# Patient Record
Sex: MALE | Race: WHITE | NOT HISPANIC OR LATINO
[De-identification: names, ages, dates, MRNs, and addresses within clinical notes are randomized per-mention and may not be internally consistent; named-entity substitution may affect disease eponyms.]

---

## 2016-12-30 NOTE — H&P ADULT. - NEGATIVE NEUROLOGICAL SYMPTOMS
no loss of sensation/no transient paralysis/no paresthesias/no weakness/no syncope/no headache/no loss of consciousness

## 2016-12-30 NOTE — H&P ADULT. - NEUROLOGICAL DETAILS
cranial nerves intact/responds to verbal commands/alert and oriented x 3/responds to pain/normal strength/deep reflexes intact/sensation intact

## 2016-12-30 NOTE — H&P ADULT. - ASSESSMENT
66M PMH CKD stage 4, DM2, HTN, MGUS, possible GIB in past, paroxsymal afib, bph, HLD, 4 vessel CABG 15 years ago, presenting from Dr. Leyva office with pancytopenia.

## 2016-12-30 NOTE — H&P ADULT. - PROBLEM SELECTOR PLAN 5
EKG showing afib, irregular on exam  Per patient no AC, only on ASA 81  Will f/u with Dr. Leyva about lack of AC  No AC in setting of possible concern for GIB

## 2016-12-30 NOTE — ED PROVIDER NOTE - ATTENDING CONTRIBUTION TO CARE
67 y/o male s/p recent mandibular surgery, GI bleed, afib (no anticoagulation), HTN, DM, Wild's esophagus, CKD (baseline 4's) sent by pmd for anemia. Pt found to have low hgb on  routine bloodwork.  Pt w/o complaint.  No lh/weakness, cp, sob, abd pain, n/v. + black stools which pt attributed to his recent surgery.  Well appearing, nad, nc/at, conjunctivae pale, perrl, lung cta, heart irreg, abd soft/nt, ext no c/c/e, no gross neuro deficits.  Pt consented and ordered for blood, discussed w pmd who is coming to ed to see pt.  Pt found to have low plt - peripheral smear pending. Plan for admit to pmd but pt likely will sign out ama. 65 y/o male s/p recent mandibular surgery, GI bleed, afib (no anticoagulation), HTN, DM, Wild's esophagus, CKD (baseline 4's) sent by pmd for anemia. Pt found to have low hgb on  routine bloodwork.  Pt w/o complaint.  No lh/weakness, cp, sob, abd pain, n/v. + black stools which pt attributed to his recent surgery.  Well appearing, nad, nc/at, conjunctivae pale, perrl, lung cta, heart irreg, abd soft/nt, ext no c/c/e, no gross neuro deficits. No stool in vault, guaiac neg on pa exam. Pt consented and ordered for blood, discussed w pmd who is coming to ed to see pt.  Pt found to have low plt - peripheral smear pending.   PMD informed about black stool, guaiac neg - will need gi eval. Plan for admit to pmd but pt likely will sign out ama.

## 2016-12-30 NOTE — CONSULT NOTE ADULT - PROBLEM SELECTOR RECOMMENDATION 9
No more antibiotics for the current time given this possible antibiotic side effect I recommended to the patient that he followup with Dr Hoyos early next week  for further antibiotic recommendation. It is my understanding from Dr. Leyva that the plan was for a total of 6 weeks of antibiotics.   I would recommend CBC with differential on Monday before any antibiotics restarted.

## 2016-12-30 NOTE — H&P ADULT. - RS GEN PE MLT RESP DETAILS PC
normal/no rhonchi/breath sounds equal/no wheezes/no rales/clear to auscultation bilaterally/good air movement

## 2016-12-30 NOTE — ED ADULT NURSE REASSESSMENT NOTE - NS ED NURSE REASSESS COMMENT FT1
Post blood transfusion , no delayed reaction noted , patient in stable condition , will go to floor at this time .

## 2016-12-30 NOTE — H&P ADULT. - ATTENDING COMMENTS
as outlined above, pt with MGUS and anemia and renal failure ( neg w/u for myeloma), severe htn, h/o buenrostro's esoph, , h/o GIB, progressive renal failure, s/p resection of enlarging mandibular cyst, requiring placement of prosthesis in november/early dec.   post p had drainage, which grew staph, eikenella, and actinomyces, rxed by dr lake with initial iv abx, and d/juan on amox and linezolid.   pt has felt well since then, but was seen in office yesterday--- looked clinically stable, but hgb 6, and plts now 40's.  and creat increased and c02 down.  now hydrating and cautiously replacing bicarb, and transfusing.  dr james to see. dr montgomery advises we hold all abx till monday, then resume amox if counts/plt better, and then we'll consider iv vanco for staph. continues in a fib or flutter, and continues of systemic a/c since periop period.   cytopenias likely due to linezolid, but pt clearly needs inpt monitoring , hydration, bicarb, and prbc's to attempt to restore baseline state. .  will ask GI service ( best known to dr camacho) to be aware of his presence should GIB recur.

## 2016-12-30 NOTE — H&P ADULT. - PROBLEM SELECTOR PLAN 2
Patient with history of dark stool as per patient never had GIB per patient  Guaiac negative  Will continue to monitor not likely contributing to anemia  Protonix drip for now at 8mg/hr consider switching to 40mg IV BID

## 2016-12-30 NOTE — ED PROVIDER NOTE - MEDICAL DECISION MAKING DETAILS
anemia, thrombocytopenia and worsening creatinine. ? black stools. anemia due to antiobitics vs GI bleed. pt consented for transfusion and 2 units ordered. pt evaluated in ED by Dr Mishra and given ? GI bleed and low plts will admit. protonix given. Dr Lindsey consulted regarding possible change in antibiotics.

## 2016-12-30 NOTE — ED PROVIDER NOTE - CARE PLAN
Principal Discharge DX:	Anemia  Secondary Diagnosis:	GI bleed  Secondary Diagnosis:	Thrombocytopenia

## 2016-12-30 NOTE — ED ADULT NURSE NOTE - OBJECTIVE STATEMENT
patient received to ED A+Ox3 with equal/unlabored breathing and afebrile. patient went to PMD, MD Aden, yesterday for routine blood work and was told he has low H&H and to go to ER for transfusion. patient states he felt weakness yesterday. patient is asymptomatic at present moment. patient has Hx of anemia with multiple blood transfusions in the past.

## 2016-12-30 NOTE — H&P ADULT. - PROBLEM SELECTOR PLAN 4
s/p irrigation 3 weeks, was on zyvox and augmentin, zyvox likely causing panctyopenia  No antibiotics at this time as per Dr. Lindsey will f/u further recs

## 2016-12-30 NOTE — H&P ADULT. - PROBLEM SELECTOR PLAN 6
Continue home meds:   Amlodipine 5mg PO BID  Metolazone 2.5mg PO Qd  Hydralazine 50mg PO TID  Isosorbide mononitrate 30mg ER PO BID

## 2016-12-30 NOTE — H&P ADULT. - PROBLEM SELECTOR PLAN 9
ASA 81mg PO Qd, no acute issues ASA 81mg PO Qd, no acute issues  MGUS- previous history of elevated light chains will have further workup follow hem/onc recs  BPH- doxazosin 1mg PO QHs

## 2016-12-30 NOTE — CONSULT NOTE ADULT - SUBJECTIVE AND OBJECTIVE BOX
HPI:  66M PMH CKD stage 4, DM2, HTN, MGUS, possible GIB in past, paroxsymal afib, bph, HLD, 4 vessel CABG 15 years ago, presenting from Dr. Leyva office with panctyopenia. Patient was in the office yesterday and had CBC done finding pancytopenia significantly lower compared to studies 3 weeks ago. Three weeks ago the patient had mandibular surgery done for a mandibular infection around a TMJ joint prosthesis, with irrigation and debridement. Following the procedure patient placed on linezolid and amoxicillin that he has remained on. Since procedure patient has complained also of black stools, no diarrhea. Patient despite significant pancytopenia denies headache, dizziness, weakness, sob, chest pain, palpitations, fatigue. Patient further denies weight loss/gain, fever chills, paresthesias, LE edema, nvdc, dysuria, hematuria. Patient notes no difference from how he usually feels over the last three weeks. Patient has noted no significant pain, erythema, or drainage from mandibular site.    On admission VS: 98 160/77 106 18 97 on RA. Pancytopenia with 3.6/6.1/18/48. Bicarb 15. Creatinine increased to 5.86. Initially on ½ NS 100cc/hr changed to ½ NS 100cc/hr with 50meq Na bicarb. Protonix 80mg IV push x1, now on protonix drip at 8mg/hr. Guaiac study negative. Dr. Lindsey called from ID. Transfused 1U PRBC with additional unit to be transfused on floor. (30 Dec 2016 11:21)      PAST MEDICAL & SURGICAL HISTORY:  Other hyperlipidemia  Benign prostatic hyperplasia, presence of lower urinary tract symptoms unspecified, unspecified morphology  Paroxysmal atrial fibrillation  Mandibular abscess  Monoclonal gammopathy  HTN (hypertension)  GERD (gastroesophageal reflux disease)  DM (diabetes mellitus)  CRI (chronic renal insufficiency)  Barretts esophagus  Mandibular abscess  History of surgery: cyst removal  S/P CABG (coronary artery bypass graft): 15 years  No significant past surgical history      MEDICATIONS  (STANDING):  sodium chloride 0.45% 1000milliLiter(s) IV Continuous <Continuous>  pantoprazole Infusion 8mG/Hr IV Continuous <Continuous>  insulin lispro (HumaLOG) corrective regimen sliding scale  SubCutaneous three times a day before meals  dextrose 50% Injectable 25Gram(s) IV Push once  hydrALAZINE 50milliGRAM(s) Oral once  isosorbide   mononitrate ER Tablet (IMDUR) 30milliGRAM(s) Oral once    MEDICATIONS  (PRN):      Allergies: No Known Allergies    SOCIAL HISTORY:      PHYSICAL EXAM:    T(F): 97.6, Max: 98.7 (12-30 @ 10:00)  HR: 89 (82 - 106)  BP: 152/72 (128/72 - 168/83)  RR: 18 (18 - 20)  SpO2: 97% (96% - 98%)    Gen: well developed, well nourished, comfortable  HEENT: normocephalic/atraumatic, no conjunctival pallor, no scleral icterus, no oral thrush/mucosal bleeding/mucositis  Neck: supple, no masses, no JVD  Cardiovascular: RR, nl S1S2, no murmurs/rubs/gallops  Respiratory: clear air entry b/l  Gastrointestinal: BS+, soft, NT/ND, no masses, no splenomegaly, no hepatomegaly, no evidence for ascites  Extremities: no clubbing/cyanosis, no edema, no calf tenderness  Neurological: CN 2-12 grossly intact, no focal deficits  Lymph Nodes:  no cervical/supraclavicular LAD, no axillary/groin LAD    LABS:                        6.1    3.6   )-----------( 48       ( 30 Dec 2016 07:06 )             18.0     30 Dec 2016 07:07    138    |  108    |  82     ----------------------------<  119    4.5     |  15     |  5.86     Ca    7.7        30 Dec 2016 07:07    TPro  7.0    /  Alb  3.6    /  TBili  0.3    /  DBili  x      /  AST  21     /  ALT  59     /  AlkPhos  118    30 Dec 2016 07:07    PT/INR - ( 30 Dec 2016 07:06 )   PT: 11.6 sec;   INR: 1.04          PTT - ( 30 Dec 2016 07:06 )  PTT:34.0 sec      RADIOLOGY & ADDITIONAL STUDIES:

## 2016-12-30 NOTE — H&P ADULT. - PROBLEM SELECTOR PLAN 1
Etiology: likely secondary to linezolid given temporal association  GIB less likely given guaiac negative study and no other symptoms other than dark stool  Transfused 1U PRBC will get second unit on floor- checking post transfusion CBC- Hgb goal 8 given cardiac history  Anemia likely multifactorial with linezolid induced pancytopenia and CKD induced, patient given procrit by Dr. Leyva  Monitor CBC  Unlikely malignancy given acuity though if lack of improvement off linezolid consider further oncologic workup possibly including bone marrow biopsy  Platelets at 48- no concern for active bleed on platelets at this time will monitor

## 2016-12-30 NOTE — ED ADULT NURSE REASSESSMENT NOTE - NS ED NURSE REASSESS COMMENT FT1
Received patient at 8am  lying in bed comfortable  alert and oriented x 3 , appearance is pale noted with 6.2 hgb level , with order of PRBC transfusion waiting for blood bank to process . Patient in stable condition , denies any chest pain , sob , palpitations , headache, dizziness , nausea , vomiting , fever nor chills . being evaluated by Dr. Monroe , consent for blood transfusion noted in the chart signed and witnessed . Also patient PMD Dr. Ordaz came to evaluate the patient . Will continue to monitor .

## 2016-12-30 NOTE — ED ADULT NURSE NOTE - PMH
Barretts esophagus    CRI (chronic renal insufficiency)    DM (diabetes mellitus)    GERD (gastroesophageal reflux disease)    HTN (hypertension)    Monoclonal gammopathy

## 2016-12-30 NOTE — ED PROVIDER NOTE - OBJECTIVE STATEMENT
67 y/o male with hx of recent mandibular surgery, GI bleed, HTN, DM, Wild's esophagus, CKD c/o anemia. pt states pmd told him to come to ED for transfusion. pt notes routine bloodwork revealed low Hgb. Pt feeling well. no ha, dizziness or weakness. no cp, sob, abd pain, n/v. Pt admits to black stools and notes that he has attributed them to the recent mandibular surgery. no further complaints.

## 2016-12-30 NOTE — H&P ADULT. - PROBLEM SELECTOR PLAN 3
½ NS with 50meq bicarbonate for lower bicarbonate  Possibly LATRICIA on CKD given increase in creatinine, will f/u with Dr. Leyva about baseline  Urine lytes for further etiology with UA, will f/u  Monitor BMP  Consider restarting home Na Bicarb 1800mg PO Qd following IVF

## 2016-12-30 NOTE — CONSULT NOTE ADULT - ASSESSMENT
65 yo M- pt of Dr. Carreon (heme) and Dr. Leyva (renal) sent to the ED after found to have pancytopenia on outpt labs.

## 2016-12-30 NOTE — CONSULT NOTE ADULT - SUBJECTIVE AND OBJECTIVE BOX
Infectious Diseases  Covering for Dr. Dominguez Hoyos    HPI: The patient was previously in [facility] for a infection involving his mandible. He grew S.lugdensis ,Eichenella from his wound . He was sent home on Linezolid and Amoxicillin. The patient now returns with Pancytopenia. He says he has been on these antibiotics for about 3 weeks  He says that Dr Hoyos is his infectious diseases doctor and that he saw him during his last admission.      PAST MEDICAL & SURGICAL HISTORY:  Monoclonal gammopathy  HTN (hypertension)  GERD (gastroesophageal reflux disease)  DM (diabetes mellitus)  CRI (chronic renal insufficiency)  Barretts esophagus  History of surgery: cyst removal  S/P CABG (coronary artery bypass graft): 15 years  No significant past surgical history      REVIEW OF SYSTEMS:    Constitutional: No fever, weight loss or fatigue  Eyes: No eye pain, visual disturbances, or discharge  ENMT:  No difficulty hearing, tinnitus, vertigo; No sinus or throat pain  Neck: No pain or stiffness  Respiratory: No cough, wheezing, chills or hemoptysis  Cardiovascular: No chest pain, palpitations, shortness of breath, dizziness or leg swelling  Genitourinary: No dysuria, frequency, hematuria or incontinence  Neurological: No headaches, memory loss, loss of strength, numbness or tremors  Skin: No itching, burning, rashes or lesions   Allergy and Immunologic: No hives or eczema    MEDICATIONS  (STANDING):  sodium chloride 0.45% 1000milliLiter(s) IV Continuous <Continuous>  pantoprazole  Injectable 80milliGRAM(s) IV Push Once  pantoprazole Infusion 8mG/Hr IV Continuous <Continuous>    MEDICATIONS  (PRN):          Allergies    No Known Allergies    Intolerances        SOCIAL HISTORY:    FAMILY HISTORY:      Vital Signs Last 24 Hrs  T(C): 37.1, Max: 37.1 (12-30 @ 10:00)  T(F): 98.7, Max: 98.7 (12-30 @ 10:00)  HR: 88 (82 - 106)  BP: 148/76 (128/72 - 160/77)  BP(mean): --  RR: 20 (18 - 20)  SpO2: 98% (97% - 98%)    PHYSICAL EXAM:    General: Well developed; well nourished; in no acute distress  Eyes: PERRL, EOM intact; conjunctiva and sclera clear  Head: Normocephalic; atraumatic  ENMT: No nasal discharge; airway clear  Neck: Supple; non tender; no masses  Respiratory: No wheezes, rales or rhonchi  Cardiovascular: Regular rate and rhythm. S1 and S2 Normal; No murmurs, gallops or rubs  Gastrointestinal: Soft non-tender non-distended; Normal bowel sounds; No hepatosplenomegaly  Genitourinary: No costovertebral angle tenderness  Extremities: Normal range of motion, No clubbing, cyanosis or edema  Vascular: Peripheral pulses palpable 2+ bilaterally  Neurological: Alert and oriented x3  Skin: Warm and dry. No acute rash  Lymph Nodes: No acute cervical adenopathy  Musculoskeletal: Normal gait, tone, without deformities    LABS:                        6.1    3.6   )-----------( 48       ( 30 Dec 2016 07:06 )             18.0     30 Dec 2016 07:07    138    |  108    |  82     ----------------------------<  119    4.5     |  15     |  5.86     Ca    7.7        30 Dec 2016 07:07    TPro  7.0    /  Alb  3.6    /  TBili  0.3    /  DBili  x      /  AST  21     /  ALT  59     /  AlkPhos  118    30 Dec 2016 07:07    PT/INR - ( 30 Dec 2016 07:06 )   PT: 11.6 sec;   INR: 1.04          PTT - ( 30 Dec 2016 07:06 )  PTT:34.0 sec      RADIOLOGY & ADDITIONAL STUDIES:

## 2016-12-30 NOTE — ED PROVIDER NOTE - NS ED ATTENDING STATEMENT MOD
I have personally performed a face to face diagnostic evaluation on this patient. I have reviewed the PA note and agree with the history, exam, and plan of care, except as noted.

## 2016-12-30 NOTE — H&P ADULT. - PMH
Barretts esophagus    Benign prostatic hyperplasia, presence of lower urinary tract symptoms unspecified, unspecified morphology    CRI (chronic renal insufficiency)    DM (diabetes mellitus)    GERD (gastroesophageal reflux disease)    HTN (hypertension)    Mandibular abscess    Monoclonal gammopathy    Other hyperlipidemia    Paroxysmal atrial fibrillation

## 2016-12-30 NOTE — ED ADULT TRIAGE NOTE - CHIEF COMPLAINT QUOTE
pt has a history of chronic anemia with transfusions  in the past  went to his PMD had a routine blood test done yesterday and was informed that he has low H & H and to go to the ER to receive a blood transfusion , pt was c/o weakness yesterday which was resolved , pt denies any symptoms at this moment , no SOB , no dizziness

## 2016-12-30 NOTE — H&P ADULT. - PSH
History of surgery  cyst removal  Mandibular abscess    S/P CABG (coronary artery bypass graft)  15 years

## 2016-12-30 NOTE — CONSULT NOTE ADULT - PROBLEM SELECTOR RECOMMENDATION 9
Pt carries a history of MGUS. Awaiting collateral information for MM work up. For now please order SPEP, immunofixation, free serum light chains, and immunoglobulin levels.   Acute drop in blood counts from near normal counts several weeks ago may be secondary to infection (jaw) and antibiotics (linezolid and amoxicillin). Holding antibiotics for now. Will check peripheral smear and look for reversible causes of pancytopenia. Please check iron panel, b12, folate, haptoglobin, LDH, retic count, and pt, inr, ptt, fibrinogen. Pt carries a history of MGUS. Awaiting collateral information for MM work up. For now please order SPEP, immunofixation, free serum light chains, and immunoglobulin levels.   For anemia transfuse for Hgb <7.   For thrombocytopenia transfuse for plt count <10 if no bleeding and <50 if actively bleeding.   Check a post-transfusion CBC to evaluate response.  Acute drop in blood counts from near normal counts several weeks ago may be secondary to infection (jaw) and antibiotics (linezolid and amoxicillin). Holding antibiotics for now. Will check peripheral smear and look for reversible causes of pancytopenia. Please check iron panel, b12, folate, haptoglobin, LDH, retic count, and pt, inr, ptt, fibrinogen. Pt carries a history of MGUS. Awaiting collateral information for MM work up. For now please order SPEP, immunofixation, free serum light chains, and immunoglobulin levels.   For anemia transfuse for Hgb <7.   For thrombocytopenia transfuse for plt count <10 if no bleeding and <50 if actively bleeding.   Check a post-transfusion CBC to evaluate response.  Acute drop in blood counts from near normal counts several weeks ago may be secondary to infection (jaw), antibiotics (linezolid and amoxicillin), and possible slow bleed since his jaw surgery. Holding antibiotics for now. Will check peripheral smear and look for reversible causes of pancytopenia. Please check iron panel, b12, folate, haptoglobin, LDH, retic count, and pt, inr, ptt, fibrinogen.

## 2016-12-30 NOTE — H&P ADULT. - NEGATIVE ENMT SYMPTOMS
no mandibular pain/no sinus symptoms/no recurrent cold sores/no nasal congestion/no throat pain/no nasal discharge/no hearing difficulty

## 2016-12-30 NOTE — ED PROVIDER NOTE - PROGRESS NOTE DETAILS
Pt discussed w Dr Leyva who wants ivf w bicarb 2/2 pt's elevated cr and low bicarb - suspects dehydration + blood loss.  IVF ordered.  He is also discussing admit w pt 2/2 low hgb, low plt, suspected gi bleed and dehydration.  Will plan ama if pt cont to refuse admission.

## 2016-12-31 NOTE — PROGRESS NOTE ADULT - SUBJECTIVE AND OBJECTIVE BOX
CC: GI BLEED      INTERVAL HISTORY:66M PMH CKD stage 4, DM2, HTN, MGUS, possible GIB in past, paroxsymal afib, bph, HLD, 4 vessel CABG 15 years ago, presenting from Dr. Leyva office with panctyopenia.Three weeks ago the patient had mandibular surgery done for a mandibular infection around a TMJ joint prosthesis, with irrigation and debridement      ROS: No chest pain, no sob, no abd pain. No n/v/d    PAST MEDICAL & SURGICAL HISTORY:  Other hyperlipidemia  Benign prostatic hyperplasia, presence of lower urinary tract symptoms unspecified, unspecified morphology  Paroxysmal atrial fibrillation  Mandibular abscess  Monoclonal gammopathy  HTN (hypertension)  GERD (gastroesophageal reflux disease)  DM (diabetes mellitus)  CRI (chronic renal insufficiency)  Barretts esophagus  Mandibular abscess  History of surgery: cyst removal  S/P CABG (coronary artery bypass graft): 15 years  No significant past surgical history      PHYSICAL EXAM:  T(C): 36.3, Max: 37.1 (12-30 @ 10:00)  HR: 88  BP: 164/85 (128/72 - 168/83)  RR: 16  SpO2: 96%  Wt(kg): --  I&O's Summary    Weight 87 (12-30 @ 11:17)  General: AAO x 3,  NAD.  HEENT: moist mucous membranes, no pallor/cyanosis.  Neck: no JVD visible.  Cardiac: S1, S2. RRR. No murmurs   Respratory: CTA b/l, no access muscle use.   Abdomen: soft. nontender. nondistended  Skin: no rashes.  Extremities: +LE edema  Access:       DATA:                        8.3<L>  5.0   )-----------( 49<L>    ( 31 Dec 2016 06:12 )             24.7<L>        138    |  108    |  72<H>  ----------------------------<  69<L>  Ca:7.7<L> (31 Dec 2016 06:12)  4.5     |  16<L>  |  5.29<H>      eGFR if Non : 10 <L>  eGFR if : 12 <L>    TPro  7.0 g/dL  /  Alb  3.6 g/dL  /  TBili  0.3 mg/dL  /  DBili  x      /  AST  21 U/L  /  ALT  59 U/L<H>  /  AlkPhos  118 U/L  30 Dec 2016 07:07          Chloride, Random Urine: 60 mmoL/L (12-30 @ 15:16)  Creatinine, Random Urine: 53.5 mg/dL (12-30 @ 15:16)  Potassium, Random Urine: 15 mmoL/L (12-30 @ 15:16)  Sodium, Random Urine: 66 mmoL/L (12-30 @ 15:16)  Osmolality, Random Urine: 347 mosmol/kg (12-30 @ 15:16)            MEDICATIONS  (STANDING):  insulin lispro (HumaLOG) corrective regimen sliding scale  SubCutaneous three times a day before meals  dextrose 50% Injectable 25Gram(s) IV Push once  hydrALAZINE 50milliGRAM(s) Oral three times a day  isosorbide    mononitrate Tablet (ISMO) 30milliGRAM(s) Oral two times a day  amLODIPine   Tablet 5milliGRAM(s) Oral two times a day  atorvastatin 40milliGRAM(s) Oral at bedtime  pantoprazole  Injectable 40milliGRAM(s) IV Push two times a day  doxazosin 1milliGRAM(s) Oral at bedtime  sodium bicarbonate 1800milliGRAM(s) Oral daily    MEDICATIONS  (PRN):

## 2016-12-31 NOTE — PROGRESS NOTE ADULT - PROBLEM SELECTOR PLAN 1
2/2 likely to linezolid given temporal association, s/p 3U pRBC  -Hb stable eat 8.6 today  -Platelets and WBC uptrending   -f/u heme/onc  -Per ID: Pt told to call Dr Hoyos next Tuesday to discuss alternative antibiotics.

## 2016-12-31 NOTE — DISCHARGE NOTE ADULT - CARE PROVIDERS DIRECT ADDRESSES
[email or web address]

## 2016-12-31 NOTE — CONSULT NOTE ADULT - SUBJECTIVE AND OBJECTIVE BOX
HPI:    66M PMH CKD stage 4, DM2, HTN, MGUS, possible GIB in past, paroxsymal afib, bph, HLD, 4 vessel CABG 15 years ago, presenting from Dr. Leyva office with panctyopenia. Patient was in the office yesterday and had CBC done finding pancytopenia significantly lower compared to studies 3 weeks ago. Three weeks ago the patient had mandibular surgery done for a mandibular infection around a TMJ joint prosthesis, with irrigation and debridement. Following the procedure patient placed on linezolid and amoxicillin that he has remained on. Since procedure patient has had dark stools (dark brown not black and tarry). He denies BRBPR or hemetaemesis.  He denies izziness, weakness, sob, chest pain, palpitations, fatigue. Patient further denies weight loss/gain, fever chills, paresthesias, LE edema, nvdc, dysuria, hematuria. He denies any changes from his baseline.       PAST MEDICAL & SURGICAL HISTORY:  Other hyperlipidemia  Benign prostatic hyperplasia, presence of lower urinary tract symptoms unspecified, unspecified morphology  Paroxysmal atrial fibrillation  Mandibular abscess  Monoclonal gammopathy  HTN (hypertension)  GERD (gastroesophageal reflux disease)  DM (diabetes mellitus)  CRI (chronic renal insufficiency)  Barretts esophagus  Mandibular abscess  History of surgery: cyst removal  S/P CABG (coronary artery bypass graft): 15 years  No significant past surgical history          MEDICATIONS  (STANDING):  insulin lispro (HumaLOG) corrective regimen sliding scale  SubCutaneous three times a day before meals  dextrose 50% Injectable 25Gram(s) IV Push once  hydrALAZINE 50milliGRAM(s) Oral three times a day  isosorbide    mononitrate Tablet (ISMO) 30milliGRAM(s) Oral two times a day  amLODIPine   Tablet 5milliGRAM(s) Oral two times a day  atorvastatin 40milliGRAM(s) Oral at bedtime  pantoprazole  Injectable 40milliGRAM(s) IV Push two times a day  doxazosin 1milliGRAM(s) Oral at bedtime  sodium bicarbonate 1950milliGRAM(s) Oral daily  sodium bicarbonate 1950milliGRAM(s) Oral daily    MEDICATIONS  (PRN):      Allergies    No Known Allergies    Intolerances        SOCIAL HISTORY: Denies toxic habits    FAMILY HISTORY: Denies    GI HISTORY:    EGD 7/06/2016: Healing barretts   Colon 10/21/16: sigmoid diverticulosis, 2 small polyps removed          Vital Signs Last 24 Hrs  T(C): 36.3, Max: 36.5 (12-30 @ 22:54)  T(F): 97.4, Max: 97.7 (12-30 @ 22:54)  HR: 88 (88 - 93)  BP: 164/85 (152/72 - 164/85)  BP(mean): --  RR: 16 (16 - 19)  SpO2: 96% (95% - 97%)    PHYSICAL EXAM:    GEN: well appearing, NAD, AOx3  HEENT: no pallor noted, anicteric  CHEST: no w/r/r  CVS: No m/r/g  ABD: soft, nt, nd, bs+  RECTAL: scant green stool, no evidence of blood or melena          LABS:                        8.3    5.0   )-----------( 49       ( 31 Dec 2016 06:12 )             24.7     31 Dec 2016 06:12    138    |  108    |  72     ----------------------------<  69     4.5     |  16     |  5.29     Ca    7.7        31 Dec 2016 06:12  Phos  4.2       31 Dec 2016 06:12  Mg     3.3       31 Dec 2016 06:12    TPro  7.0    /  Alb  3.6    /  TBili  0.3    /  DBili  x      /  AST  21     /  ALT  59     /  AlkPhos  118    30 Dec 2016 07:07    PT/INR - ( 30 Dec 2016 07:06 )   PT: 11.6 sec;   INR: 1.04          PTT - ( 30 Dec 2016 07:06 )  PTT:34.0 sec      RADIOLOGY & ADDITIONAL STUDIES:

## 2016-12-31 NOTE — PROGRESS NOTE ADULT - SUBJECTIVE AND OBJECTIVE BOX
INTERVAL HPI/OVERNIGHT EVENTS:    VITAL SIGNS:  T(F): 97.4  HR: 88  BP: 164/85  RR: 16  SpO2: 96%  Wt(kg): --    PHYSICAL EXAM:    Constitutional: NAD, A&Ox3  Eyes: PERRLA, EOMI  ENMT: MMM, No lesions  Neck: No JVD, No LAD  Respiratory: CTA b/l, no crackles, wheezing  Cardiovascular: RRR, no murmurs, rubs, gallops  Gastrointestinal: Soft, NTND, +BS  Extremities: Warm, no clubbing, cyanosis, or edema  Vascular: 2+ distal pulses, equal  Neurological: CN II-XII grossly intact  Musculoskeletal: 5/5 strength x 4 extremities    MEDICATIONS  (STANDING):  insulin lispro (HumaLOG) corrective regimen sliding scale  SubCutaneous three times a day before meals  dextrose 50% Injectable 25Gram(s) IV Push once  hydrALAZINE 50milliGRAM(s) Oral three times a day  isosorbide    mononitrate Tablet (ISMO) 30milliGRAM(s) Oral two times a day  amLODIPine   Tablet 5milliGRAM(s) Oral two times a day  atorvastatin 40milliGRAM(s) Oral at bedtime  pantoprazole  Injectable 40milliGRAM(s) IV Push two times a day  doxazosin 1milliGRAM(s) Oral at bedtime  sodium bicarbonate 1950milliGRAM(s) Oral daily  sodium bicarbonate 1950milliGRAM(s) Oral daily    MEDICATIONS  (PRN):      Allergies    No Known Allergies    Intolerances        LABS:                        8.3    5.0   )-----------( 49       ( 31 Dec 2016 06:12 )             24.7     31 Dec 2016 06:12    138    |  108    |  72     ----------------------------<  69     4.5     |  16     |  5.29     Ca    7.7        31 Dec 2016 06:12  Phos  4.2       31 Dec 2016 06:12  Mg     3.3       31 Dec 2016 06:12    TPro  7.0    /  Alb  3.6    /  TBili  0.3    /  DBili  x      /  AST  21     /  ALT  59     /  AlkPhos  118    30 Dec 2016 07:07    PT/INR - ( 30 Dec 2016 07:06 )   PT: 11.6 sec;   INR: 1.04          PTT - ( 30 Dec 2016 07:06 )  PTT:34.0 sec      RADIOLOGY & ADDITIONAL TESTS: INTERVAL HPI/OVERNIGHT EVENTS: Patient received 3rd unit of pRBC last night. Hb stable this morning. Patient resting comfortably, denies any sob, weakness, palpitations, fevers/chills.     VITAL SIGNS:  T(F): 97.4  HR: 88  BP: 164/85  RR: 16  SpO2: 96%  Wt(kg): --    PHYSICAL EXAM:    Constitutional: NAD, A&Ox3  Eyes: PERRLA, EOMI  ENMT: MMM, No lesions  Neck: No JVD, No LAD  Respiratory: CTA b/l, no crackles, wheezing  Cardiovascular: RRR, no murmurs, rubs, gallops  Gastrointestinal: Soft, NTND, +BS  Extremities: Warm, no clubbing, cyanosis, or edema  Vascular: 2+ distal pulses, equal  Neurological: CN II-XII grossly intact  Musculoskeletal: 5/5 strength x 4 extremities    MEDICATIONS  (STANDING):  insulin lispro (HumaLOG) corrective regimen sliding scale  SubCutaneous three times a day before meals  dextrose 50% Injectable 25Gram(s) IV Push once  hydrALAZINE 50milliGRAM(s) Oral three times a day  isosorbide    mononitrate Tablet (ISMO) 30milliGRAM(s) Oral two times a day  amLODIPine   Tablet 5milliGRAM(s) Oral two times a day  atorvastatin 40milliGRAM(s) Oral at bedtime  pantoprazole  Injectable 40milliGRAM(s) IV Push two times a day  doxazosin 1milliGRAM(s) Oral at bedtime  sodium bicarbonate 1950milliGRAM(s) Oral daily  sodium bicarbonate 1950milliGRAM(s) Oral daily    MEDICATIONS  (PRN):      Allergies    No Known Allergies    Intolerances        LABS:                        8.3    5.0   )-----------( 49       ( 31 Dec 2016 06:12 )             24.7     31 Dec 2016 06:12    138    |  108    |  72     ----------------------------<  69     4.5     |  16     |  5.29     Ca    7.7        31 Dec 2016 06:12  Phos  4.2       31 Dec 2016 06:12  Mg     3.3       31 Dec 2016 06:12    TPro  7.0    /  Alb  3.6    /  TBili  0.3    /  DBili  x      /  AST  21     /  ALT  59     /  AlkPhos  118    30 Dec 2016 07:07    PT/INR - ( 30 Dec 2016 07:06 )   PT: 11.6 sec;   INR: 1.04          PTT - ( 30 Dec 2016 07:06 )  PTT:34.0 sec      RADIOLOGY & ADDITIONAL TESTS:

## 2016-12-31 NOTE — CONSULT NOTE ADULT - ASSESSMENT
66 year old male with multiple medical problems sent by PMD to [facility] for pancytopenia. Pancytopenia likely 2/2 to extended linezolid use resulting in myelosupression (as evidence by lack reticulocytes). Additionally, patient does not have any evidence overt GI bleeding at this time.  No need for endoscopic evaluation; continue to hold abx and monitor CBCs    1. Pancytopenia (likely 2/2 to linezolid induced myelosupression)  -No evidence of GI source  -Hold linezolid  -Monitor CBCs  -ID recs appreciated   -If no improvement, consider heme/onc consult.      GI following 66 year old male with multiple medical problems sent by PMD to [facility] for pancytopenia. Pancytopenia likely 2/2 to extended linezolid use resulting in myelosupression (as evidence by lack reticulocytes). Additionally, patient does not have any evidence overt GI bleeding at this time.  No need for endoscopic evaluation; continue to hold abx and monitor CBCs    1. Pancytopenia (likely 2/2 to linezolid induced myelosupression)  -No evidence of GI source  -Hold linezolid  -Monitor CBCs  -ID recs appreciated   -If no improvement, consider heme/onc consult.      GI following     case d/w with Dr. Amado

## 2016-12-31 NOTE — PROGRESS NOTE ADULT - SUBJECTIVE AND OBJECTIVE BOX
INTERVAL HPI/OVERNIGHT EVENTS: Covering for Dr Hoyos    ANTIBIOTICS    MEDICATIONS  (STANDING):  insulin lispro (HumaLOG) corrective regimen sliding scale  SubCutaneous three times a day before meals  dextrose 50% Injectable 25Gram(s) IV Push once  hydrALAZINE 50milliGRAM(s) Oral three times a day  isosorbide    mononitrate Tablet (ISMO) 30milliGRAM(s) Oral two times a day  amLODIPine   Tablet 5milliGRAM(s) Oral two times a day  atorvastatin 40milliGRAM(s) Oral at bedtime  pantoprazole  Injectable 40milliGRAM(s) IV Push two times a day  doxazosin 1milliGRAM(s) Oral at bedtime  sodium bicarbonate 1800milliGRAM(s) Oral daily    MEDICATIONS  (PRN):      Allergies    No Known Allergies    Intolerances        REVIEW OF SYSTEMS:    Constitutional: No fever, weight loss or fatigue  Eyes: No eye pain, visual disturbances, or discharge  ENMT:  No difficulty hearing, tinnitus, vertigo; No sinus or throat pain  Neck: No pain or stiffness  Respiratory: No cough, wheezing, chills or hemoptysis  Cardiovascular: No chest pain, palpitations, shortness of breath, dizziness or leg swelling  Gastrointestinal: No abdominal or epigastric pain. No nausea, vomiting or hematemesis; patient complaining of dark stools  Neurological: No headaches, memory loss, loss of strength, numbness or tremors  Skin: No itching, burning, rashes or lesions   Lymph Nodes: No enlarged glands  Endocrine: No heat or cold intolerance; No hair loss  Heme/Lymph: No easy bruising or bleeding gums  Allergy and Immunologic: No hives or eczema    Vital Signs Last 24 Hrs  T(C): 36.3, Max: 37.1 (12-30 @ 10:00)  T(F): 97.4, Max: 98.7 (12-30 @ 10:00)  HR: 88 (85 - 93)  BP: 164/85 (147/71 - 168/83)  BP(mean): --  RR: 16 (16 - 20)  SpO2: 96% (95% - 98%)    PHYSICAL EXAM:    General: Well developed; well nourished; in no acute distress  Eyes: PERRL, EOM intact; conjunctiva and sclera clear  Head: Normocephalic; atraumatic  ENMT: No nasal discharge; airway clear  Neck: Supple; non tender; no masses  Respiratory: No wheezes, rales or rhonchi  Cardiovascular: Regular rate and rhythm. S1 and S2 Normal; No murmurs, gallops or rubs  Gastrointestinal: Soft non-tender non-distended; Normal bowel sounds; No hepatosplenomegaly  Genitourinary: No costovertebral angle tenderness  Extremities: Normal range of motion, No clubbing, cyanosis or edema  Vascular: Peripheral pulses palpable 2+ bilaterally  Neurological: Alert and oriented x3  Skin: Warm and dry. No acute rash  Lymph Nodes: No acute cervical adenopathy  Musculoskeletal: Normal gait, tone, without deformities    LABS:                        8.3    5.0   )-----------( 49       ( 31 Dec 2016 06:12 )             24.7     31 Dec 2016 06:12    138    |  108    |  72     ----------------------------<  69     4.5     |  16     |  5.29     Ca    7.7        31 Dec 2016 06:12  Phos  4.2       31 Dec 2016 06:12  Mg     3.3       31 Dec 2016 06:12    TPro  7.0    /  Alb  3.6    /  TBili  0.3    /  DBili  x      /  AST  21     /  ALT  59     /  AlkPhos  118    30 Dec 2016 07:07    PT/INR - ( 30 Dec 2016 07:06 )   PT: 11.6 sec;   INR: 1.04          PTT - ( 30 Dec 2016 07:06 )  PTT:34.0 sec        MICROBIOLOGY:      RADIOLOGY & ADDITIONAL STUDIES:

## 2016-12-31 NOTE — DISCHARGE NOTE ADULT - CARE PROVIDER_API CALL
Kofi Leyva), Internal Medicine; Nephrology  [address] [place] [address]  Phone: [phone]  Fax: [phone]

## 2016-12-31 NOTE — DISCHARGE NOTE ADULT - MEDICATION SUMMARY - MEDICATIONS TO TAKE
I will START or STAY ON the medications listed below when I get home from the hospital:    aspirin 81 mg oral tablet  -- 1 tab(s) by mouth once a day  -- Indication: For Coronary arteriosclerosis after coronary artery bypass grafting    sodium bicarbonate  -- 1800 milligram(s) by mouth once a day  -- Indication: For Chronic renal impairment, stage 4 (severe)    Flomax 0.4 mg oral capsule  -- 1 cap(s) by mouth once a day (at bedtime)  -- It is very important that you take or use this exactly as directed.  Do not skip doses or discontinue unless directed by your doctor.  May cause drowsiness.  Alcohol may intensify this effect.  Use care when operating dangerous machinery.  Some non-prescription drugs may aggravate your condition.  Read all labels carefully.  If a warning appears, check with your doctor before taking.  Swallow whole.  Do not crush.  Take with food or milk.    -- Indication: For Benign prostatic hyperplasia, presence of lower urinary tract symptoms unspecified, unspecified morphology    isosorbide mononitrate 30 mg oral tablet, extended release  -- 1 tab(s) by mouth 2 times a day  -- Indication: For HTN (hypertension)    Tradjenta 5 mg oral tablet  -- 1 tab(s) by mouth once a day  -- Indication: For DM (diabetes mellitus)    Prandin 1 mg oral tablet  -- 1 tab(s) by mouth 3 times a day (before meals)  -- Indication: For DM (diabetes mellitus)    Crestor 10 mg oral tablet  -- 1 tab(s) by mouth once a day (at bedtime)  -- Indication: For Other hyperlipidemia    Ambien 5 mg oral tablet  -- 1 tab(s) by mouth once a day (at bedtime), As Needed  -- Indication: For insomnia    amLODIPine 5 mg oral tablet  -- 5 milligram(s) by mouth 2 times a day  -- Indication: For HTN (hypertension)    metolazone 2.5 mg oral tablet  -- 1 tab(s) by mouth once a day  -- Indication: For HTN (hypertension)    docusate sodium 100 mg oral capsule  -- 1 cap(s) by mouth 3 times a day, As Needed  -- Indication: For Constipation    senna oral tablet  -- 2 tab(s) by mouth once a day (at bedtime), As Needed  -- Indication: For Constipation    hydrALAZINE  -- 50 milligram(s) by mouth 3 times a day  -- Indication: For HTN (hypertension)

## 2016-12-31 NOTE — DISCHARGE NOTE ADULT - CARE PLAN
Principal Discharge DX:	Pancytopenia  Goal:	Improvement of symptoms  Instructions for follow-up, activity and diet:	You were sent in by your PMD because your blood counts were low in outpatient labs. Your low blood counts might have been due to bone marrow suppression from the antibiotic Linezolid you were taking at home. You had 3 transfusions to raise your blood count.  Secondary Diagnosis:	Mandibular abscess  Instructions for follow-up, activity and diet:	Your antibiotics were held during this admission because they caused low blood counts.   Please follow up with Dr. Gan on Tuesday to Principal Discharge DX:	Pancytopenia  Goal:	Improvement of symptoms  Instructions for follow-up, activity and diet:	You were sent in by your PMD because your blood counts were low in outpatient labs. Your low blood counts might have been due to bone marrow suppression from the antibiotic Linezolid you were taking at home. You had 3 transfusions to raise your blood count.  Secondary Diagnosis:	Mandibular abscess  Instructions for follow-up, activity and diet:	Your antibiotics were held during this admission because they caused low blood counts.   Please follow up with Dr. Gan on Tuesday.

## 2016-12-31 NOTE — PROGRESS NOTE ADULT - PROBLEM SELECTOR PLAN 9
ASA 81mg PO Qd, no acute issues  MGUS- previous history of elevated light chains will have further workup follow hem/onc recs  BPH- doxazosin 1mg PO QHs

## 2016-12-31 NOTE — PROGRESS NOTE ADULT - PROBLEM SELECTOR PLAN 1
Observe off antibiotics. Patient told to call Dr Hoyos next   Tuesday to discuss alternative antibiotics

## 2016-12-31 NOTE — PROGRESS NOTE ADULT - PROBLEM SELECTOR PLAN 5
EKG showing afib, irregular on exam  Per patient no AC, only on ASA 81  Will f/u with Dr. Leyva about lack of AC

## 2016-12-31 NOTE — PROGRESS NOTE ADULT - ATTENDING COMMENTS
pt starting to eat and drink  D/C IVF - causing some LE edema and HTN  HTN -  Norvasc started, continue other anti-hypertensives  can re-start Metolazone if BP remains high later today  GI consult

## 2016-12-31 NOTE — DISCHARGE NOTE ADULT - HOSPITAL COURSE
66M w/CKD stage 4, DM2, HTN, MGUS, possible GIB in past, paroxsymal afib, bph, HLD, 4 vessel CABG 15 years ago, s/p resection of enlarging mandibular cyst, requiring placement of prosthesis in november/early dec w/infection (+staph, eikenella, and actinomyces) on Linezolid/Augmentin for past 3 weeks p/w with pancytopenia on outpatient labs, likely 2/2 Linezolid. Patient s/p 3U pRRBCs this admission. Once Linezolid held, improvement seen in platelets, rbcs and wbcs.   Heme/onc consulted,   GI consulted, but no need for intervention or active signs of bleeding 66M w/CKD stage 4, DM2, HTN, MGUS, possible GIB in past, paroxsymal afib, bph, HLD, 4 vessel CABG 15 years ago, s/p resection of enlarging mandibular cyst, requiring placement of prosthesis in november/early dec w/infection (+staph, eikenella, and actinomyces) on Linezolid/Augmentin for past 3 weeks p/w with pancytopenia on outpatient labs, likely 2/2 Linezolid. Patient s/p 3U pRRBCs this admission. Once Linezolid held, improvement seen in platelets, rbcs and wbcs.   Heme/onc consulted. GI consulted, but no need for intervention or active signs of bleeding. Patient was deemed medically stable for discharge with a Hgb of 8.5, with follow up with Dr Leyva.

## 2016-12-31 NOTE — DISCHARGE NOTE ADULT - PLAN OF CARE
Improvement of symptoms You were sent in by your PMD because your blood counts were low in outpatient labs. Your low blood counts might have been due to bone marrow suppression from the antibiotic Linezolid you were taking at home. You had 3 transfusions to raise your blood count. Your antibiotics were held during this admission because they caused low blood counts.   Please follow up with Dr. Gan on Tuesday to Your antibiotics were held during this admission because they caused low blood counts.   Please follow up with Dr. Gan on Tuesday.

## 2016-12-31 NOTE — DISCHARGE NOTE ADULT - PATIENT PORTAL LINK FT
“You can access the Follow[facility] Patient Portal, offered by [facility], by registering with the following website: [email or web address]”

## 2017-01-01 NOTE — PROGRESS NOTE ADULT - PROBLEM SELECTOR PROBLEM 1
Pancytopenia
Antibiotics causing adverse effect in therapeutic use, initial encounter
Mandibular abscess
Pancytopenia
Thrombocytopenia

## 2017-01-01 NOTE — PROGRESS NOTE ADULT - ASSESSMENT
Thrombocytopenia most likely due to one of his antibiotics. Most likely Linzolid  platelets still low today
66 year old male with multiple medical problems sent by PMD to [facility] for pancytopenia. Pancytopenia likely 2/2 to extended linezolid use resulting in myelosupression (as evidence by lack reticulocytes). Additionally, patient does not have any evidence overt GI bleeding at this time.  No need for endoscopic evaluation    1. Pancytopenia (likely 2/2 to linezolid induced myelosupression).  -No evidence of GI source, to be DC today  -completed linezolid   -please reconsult as needed  -ID recs appreciated   -please reconsult as needed
66M PMH CKD stage 4, DM2, HTN, MGUS, possible GIB in past, paroxsymal afib, bph, HLD, 4 vessel CABG 15 years ago, presenting from Dr. Leyva office with pancytopenia, s/p 3U prbc, now improving
Thrombocytopenia due to antibiotics

## 2017-01-01 NOTE — PROGRESS NOTE ADULT - SUBJECTIVE AND OBJECTIVE BOX
CC: GI BLEED      INTERVAL HISTORY:66M PMH CKD stage 4, DM2, HTN, MGUS, possible GIB in past, paroxsymal afib, bph, HLD, 4 vessel CABG 15 years ago, presenting from Dr. Leyva office with panctyopenia. Patient was in the office yesterday and had CBC done finding pancytopenia significantly lower compared to studies 3 weeks ago. Three weeks ago the patient had mandibular surgery done for a mandibular infection around a TMJ joint prosthesis, with irrigation and debridement. Following the procedure patient placed on linezolid and amoxicillin that he has remained on. Since procedure patient has had dark stools (dark brown not black and tarry). He denies BRBPR or hemetaemesis.  He denies izziness, weakness, sob, chest pain, palpitations, fatigue. Patient further denies weight loss/gain, fever chills, paresthesias, LE edema, nvdc, dysuria, hematuria. He denies any changes from his baseline.    feels well  no complaints      ROS: No chest pain, no sob, no abd pain. No n/v/d    PAST MEDICAL & SURGICAL HISTORY:  Other hyperlipidemia  Benign prostatic hyperplasia, presence of lower urinary tract symptoms unspecified, unspecified morphology  Paroxysmal atrial fibrillation  Mandibular abscess  Monoclonal gammopathy  HTN (hypertension)  GERD (gastroesophageal reflux disease)  DM (diabetes mellitus)  CRI (chronic renal insufficiency)  Barretts esophagus  Mandibular abscess  History of surgery: cyst removal  S/P CABG (coronary artery bypass graft): 15 years  No significant past surgical history      PHYSICAL EXAM:  T(C): 36.3, Max: 37 (12-31 @ 23:09)  HR: 99  BP: 165/71 (149/73 - 166/78)  RR: 14  SpO2: 94%  Wt(kg): --  I&O's Summary    Weight 87 (12-30 @ 11:17)  General: AAO x 3,  NAD.  HEENT: moist mucous membranes, no pallor/cyanosis.  Neck: no JVD visible.  Cardiac: S1, S2. RRR. No murmurs   Respratory: CTA b/l, no access muscle use.   Abdomen: soft. nontender. nondistended  Skin: no rashes.  Extremities: no LE edema b/l  Access:       DATA:                        8.5<L>  5.7   )-----------( 52<L>    ( 01 Jan 2017 07:52 )             25.2<L>    Ferritin, Serum: 775.6 ng/mL <H> (12-31 @ 19:31)   Iron Total, Serum: 198 ug/dL <H> (12-31 @ 19:31)     136    |  107    |  69<H>  ----------------------------<  72     Ca:8.2<L> (01 Jan 2017 07:52)  4.4     |  15<L>  |  4.96<H>      eGFR if Non : 11 <L>  eGFR if : 13 <L>    TPro  6.2 g/dL  /  Alb  x      /  TBili  x      /  DBili  x      /  AST  x      /  ALT  x      /  AlkPhos  x      31 Dec 2016 16:42          Chloride, Random Urine: 60 mmoL/L (12-30 @ 15:16)  Creatinine, Random Urine: 53.5 mg/dL (12-30 @ 15:16)  Potassium, Random Urine: 15 mmoL/L (12-30 @ 15:16)  Sodium, Random Urine: 66 mmoL/L (12-30 @ 15:16)  Osmolality, Random Urine: 347 mosmol/kg (12-30 @ 15:16)            MEDICATIONS  (STANDING):  insulin lispro (HumaLOG) corrective regimen sliding scale  SubCutaneous three times a day before meals  dextrose 50% Injectable 25Gram(s) IV Push once  hydrALAZINE 50milliGRAM(s) Oral three times a day  isosorbide    mononitrate Tablet (ISMO) 30milliGRAM(s) Oral two times a day  amLODIPine   Tablet 5milliGRAM(s) Oral two times a day  atorvastatin 40milliGRAM(s) Oral at bedtime  pantoprazole  Injectable 40milliGRAM(s) IV Push two times a day  doxazosin 1milliGRAM(s) Oral at bedtime  sodium bicarbonate 1950milliGRAM(s) Oral daily    MEDICATIONS  (PRN):

## 2017-01-01 NOTE — PROGRESS NOTE ADULT - SUBJECTIVE AND OBJECTIVE BOX
Pt seen and examined. Feelign well, tolerating diet. no bloody or black bm.    REVIEW OF SYSTEMS:  Constitutional: No fever, weight loss or fatigue  Cardiovascular: No chest pain, palpitations, dizziness or leg swelling  Gastrointestinal: No abdominal or epigastric pain. No nausea, vomiting or hematemesis; No diarrhea or constipation. No melena or hematochezia.  Skin: No itching, burning, rashes or lesions       MEDICATIONS:  MEDICATIONS  (STANDING):  insulin lispro (HumaLOG) corrective regimen sliding scale  SubCutaneous three times a day before meals  dextrose 50% Injectable 25Gram(s) IV Push once  hydrALAZINE 50milliGRAM(s) Oral three times a day  isosorbide    mononitrate Tablet (ISMO) 30milliGRAM(s) Oral two times a day  amLODIPine   Tablet 5milliGRAM(s) Oral two times a day  atorvastatin 40milliGRAM(s) Oral at bedtime  pantoprazole  Injectable 40milliGRAM(s) IV Push two times a day  doxazosin 1milliGRAM(s) Oral at bedtime  sodium bicarbonate 1950milliGRAM(s) Oral daily    MEDICATIONS  (PRN):      Allergies    No Known Allergies    Intolerances        Vital Signs Last 24 Hrs  T(C): 36.3, Max: 37 (12-31 @ 23:09)  T(F): 97.3, Max: 98.6 (12-31 @ 23:09)  HR: 99 (94 - 99)  BP: 165/71 (149/73 - 166/78)  BP(mean): --  RR: 14 (12 - 16)  SpO2: 94% (94% - 94%)      PHYSICAL EXAM:    General: Well developed; well nourished; in no acute distress  HEENT: MMM, conjunctiva and sclera clear  Gastrointestinal: Soft non-tender non-distended; Normal bowel sounds; No hepatosplenomegaly. No rebound or guarding  Skin: Warm and dry. No obvious rash    LABS:  CBC Full  -  ( 01 Jan 2017 07:52 )  WBC Count : 5.7 K/uL  Hemoglobin : 8.5 g/dL  Hematocrit : 25.2 %  Platelet Count - Automated : 52 K/uL  Mean Cell Volume : 83.4 fL  Mean Cell Hemoglobin : 28.1 pg  Mean Cell Hemoglobin Concentration : 33.7 g/dL  Auto Neutrophil # : x  Auto Lymphocyte # : x  Auto Monocyte # : x  Auto Eosinophil # : x  Auto Basophil # : x  Auto Neutrophil % : x  Auto Lymphocyte % : x  Auto Monocyte % : x  Auto Eosinophil % : x  Auto Basophil % : x    01 Jan 2017 07:52    136    |  107    |  69     ----------------------------<  72     4.4     |  15     |  4.96     Ca    8.2        01 Jan 2017 07:52  Phos  4.2       31 Dec 2016 06:12  Mg     3.1       01 Jan 2017 07:52    TPro  6.2    /  Alb  x      /  TBili  x      /  DBili  x      /  AST  x      /  ALT  x      /  AlkPhos  x      31 Dec 2016 16:42                      RADIOLOGY & ADDITIONAL STUDIES:

## 2017-01-01 NOTE — PROGRESS NOTE ADULT - SUBJECTIVE AND OBJECTIVE BOX
INTERVAL HPI/OVERNIGHT EVENTS:    ANTIBIOTICS    MEDICATIONS  (STANDING):  insulin lispro (HumaLOG) corrective regimen sliding scale  SubCutaneous three times a day before meals  dextrose 50% Injectable 25Gram(s) IV Push once  hydrALAZINE 50milliGRAM(s) Oral three times a day  isosorbide    mononitrate Tablet (ISMO) 30milliGRAM(s) Oral two times a day  amLODIPine   Tablet 5milliGRAM(s) Oral two times a day  atorvastatin 40milliGRAM(s) Oral at bedtime  pantoprazole  Injectable 40milliGRAM(s) IV Push two times a day  doxazosin 1milliGRAM(s) Oral at bedtime  sodium bicarbonate 1950milliGRAM(s) Oral daily    MEDICATIONS  (PRN):      Allergies    No Known Allergies    Intolerances        REVIEW OF SYSTEMS:    Constitutional: No fever, weight loss or fatigue  Eyes: No eye pain, visual disturbances, or discharge  ENMT:  No difficulty hearing, tinnitus, vertigo; No sinus or throat pain  Neck: No pain or stiffness.  No redness, swelling or tenderness over jaw  Respiratory: No cough, wheezing, chills or hemoptysis  Cardiovascular: No chest pain, palpitations, shortness of breath, dizziness or leg swelling  Gastrointestinal: No abdominal or epigastric pain. No nausea, vomiting or hematemesis; No diarrhea or constipation. No melena or hematochezia.  Genitourinary: No dysuria, frequency, hematuria or incontinence  Rectal: No pain, hemorrhoids or incontinence  Neurological: No headaches, memory loss, loss of strength, numbness or tremors  Skin: No itching, burning, rashes or lesions   Lymph Nodes: No enlarged glands  Endocrine: No heat or cold intolerance; No hair loss  Musculoskeletal: No joint pain or swelling; No muscle, back or extremity pain  Heme/Lymph: No easy bruising or bleeding gums  Allergy and Immunologic: No hives or eczema    Vital Signs Last 24 Hrs  T(C): 36.3, Max: 37 (12-31 @ 23:09)  T(F): 97.3, Max: 98.6 (12-31 @ 23:09)  HR: 99 (94 - 99)  BP: 165/71 (149/73 - 166/78)  BP(mean): --  RR: 14 (12 - 16)  SpO2: 94% (94% - 94%)    PHYSICAL EXAM:    General: Well developed; well nourished; in no acute distress  Eyes: PERRL, EOM intact; conjunctiva and sclera clear  Head: Normocephalic; atraumatic  ENMT: No nasal discharge; airway clear  Neck: Supple; non tender; no masses  Respiratory: No wheezes, rales or rhonchi  Cardiovascular: Regular rate and rhythm. S1 and S2 Normal; No murmurs, gallops or rubs  Gastrointestinal: Soft non-tender non-distended; Normal bowel sounds; No hepatosplenomegaly  Genitourinary: No costovertebral angle tenderness  Extremities: Normal range of motion, No clubbing, cyanosis or edema  Vascular: Peripheral pulses palpable 2+ bilaterally  Neurological: Alert and oriented x3  Skin: Warm and dry. No acute rash  LABS:                        8.5    5.7   )-----------( 52       ( 01 Jan 2017 07:52 )             25.2     01 Jan 2017 07:52    136    |  107    |  69     ----------------------------<  72     4.4     |  15     |  4.96     Ca    8.2        01 Jan 2017 07:52  Phos  4.2       31 Dec 2016 06:12  Mg     3.1       01 Jan 2017 07:52    TPro  6.2    /  Alb  x      /  TBili  x      /  DBili  x      /  AST  x      /  ALT  x      /  AlkPhos  x      31 Dec 2016 16:42            MICROBIOLOGY:      RADIOLOGY & ADDITIONAL STUDIES:

## 2017-01-01 NOTE — PROGRESS NOTE ADULT - PROBLEM SELECTOR PLAN 2
Patient with history of dark stool as per patient never had GIB per patient  Guaiac negative  -Per GI, no need for intervention at this time
stable  no indications for HD  Na HCO3 tabs re-started
treated with IV antibiotics  course is complete
Patient to get a CBC with his PMD on Tuesday and followup with Dr. Hoyos this week to determine further antibiotic therapy  Discussed with patient in detail  I will not follow patient after discharge from [facility]

## 2017-01-03 ENCOUNTER — APPOINTMENT (OUTPATIENT)
Dept: NEPHROLOGY | Facility: CLINIC | Age: 67
End: 2017-01-03

## 2017-01-03 ENCOUNTER — APPOINTMENT (OUTPATIENT)
Dept: HEART AND VASCULAR | Facility: CLINIC | Age: 67
End: 2017-01-03

## 2017-01-03 VITALS
BODY MASS INDEX: 30.22 KG/M2 | SYSTOLIC BLOOD PRESSURE: 140 MMHG | DIASTOLIC BLOOD PRESSURE: 78 MMHG | HEIGHT: 66 IN | HEART RATE: 86 BPM | WEIGHT: 188 LBS

## 2017-01-04 ENCOUNTER — APPOINTMENT (OUTPATIENT)
Dept: OTOLARYNGOLOGY | Facility: CLINIC | Age: 67
End: 2017-01-04

## 2017-01-05 ENCOUNTER — LABORATORY RESULT (OUTPATIENT)
Age: 67
End: 2017-01-05

## 2017-01-05 ENCOUNTER — APPOINTMENT (OUTPATIENT)
Dept: NEPHROLOGY | Facility: CLINIC | Age: 67
End: 2017-01-05

## 2017-01-05 VITALS — SYSTOLIC BLOOD PRESSURE: 160 MMHG | HEART RATE: 84 BPM | DIASTOLIC BLOOD PRESSURE: 80 MMHG

## 2017-01-05 VITALS — SYSTOLIC BLOOD PRESSURE: 160 MMHG | HEART RATE: 84 BPM | DIASTOLIC BLOOD PRESSURE: 70 MMHG

## 2017-01-05 VITALS — WEIGHT: 188 LBS | BODY MASS INDEX: 30.34 KG/M2

## 2017-01-08 LAB
ALBUMIN SERPL ELPH-MCNC: 4.2 G/DL
ALP BLD-CCNC: 132 U/L
ALP BONE SERPL-MCNC: 21 MCG/L
ALT SERPL-CCNC: 41 U/L
ANION GAP SERPL CALC-SCNC: 20 MMOL/L
AST SERPL-CCNC: 21 U/L
BASOPHILS # BLD AUTO: 0.02 K/UL
BASOPHILS NFR BLD AUTO: 0.4 %
BILIRUB SERPL-MCNC: 0.4 MG/DL
BUN SERPL-MCNC: 64 MG/DL
CALCIUM SERPL-MCNC: 8.4 MG/DL
CHLORIDE SERPL-SCNC: 102 MMOL/L
CHOLEST SERPL-MCNC: 87 MG/DL
CHOLEST/HDLC SERPL: 2.3 RATIO
CO2 SERPL-SCNC: 19 MMOL/L
CREAT SERPL-MCNC: 4.18 MG/DL
CRP SERPL-MCNC: 3.02 MG/DL
CYSTATIN C SERPL-MCNC: 3.16 MG/L
EOSINOPHIL # BLD AUTO: 0.26 K/UL
EOSINOPHIL NFR BLD AUTO: 5.1 %
ERYTHROCYTE [SEDIMENTATION RATE] IN BLOOD BY WESTERGREN METHOD: 31 MM/HR
GFR/BSA.PRED SERPLBLD CYS-BASED-ARV: 17
GLUCOSE SERPL-MCNC: 162 MG/DL
HBA1C MFR BLD HPLC: 6.4 %
HCT VFR BLD CALC: 25.8 %
HDLC SERPL-MCNC: 38 MG/DL
HGB BLD-MCNC: 8.4 G/DL
IMM GRANULOCYTES NFR BLD AUTO: 1.4 %
LDLC SERPL CALC-MCNC: 28 MG/DL
LYMPHOCYTES # BLD AUTO: 1.64 K/UL
LYMPHOCYTES NFR BLD AUTO: 32 %
MAGNESIUM SERPL-MCNC: 2.6 MG/DL
MAN DIFF?: NORMAL
MCHC RBC-ENTMCNC: 28.4 PG
MCHC RBC-ENTMCNC: 32.6 GM/DL
MCV RBC AUTO: 87.2 FL
MONOCYTES # BLD AUTO: 0.78 K/UL
MONOCYTES NFR BLD AUTO: 15.2 %
NEUTROPHILS # BLD AUTO: 2.36 K/UL
NEUTROPHILS NFR BLD AUTO: 45.9 %
PHOSPHATE SERPL-MCNC: 3 MG/DL
PLATELET # BLD AUTO: 100 K/UL
POTASSIUM SERPL-SCNC: 3.9 MMOL/L
PROT SERPL-MCNC: 6.7 G/DL
RBC # BLD: 2.96 M/UL
RBC # FLD: 14.8 %
SODIUM SERPL-SCNC: 141 MMOL/L
T4 SERPL-MCNC: 8.1 UG/DL
TRIGL SERPL-MCNC: 103 MG/DL
TSH SERPL-ACNC: 2.46 UIU/ML
URATE SERPL-MCNC: 7.7 MG/DL
WBC # FLD AUTO: 5.13 K/UL

## 2017-01-09 ENCOUNTER — LABORATORY RESULT (OUTPATIENT)
Age: 67
End: 2017-01-09

## 2017-01-09 ENCOUNTER — APPOINTMENT (OUTPATIENT)
Dept: NEPHROLOGY | Facility: CLINIC | Age: 67
End: 2017-01-09

## 2017-01-09 VITALS — OXYGEN SATURATION: 98 % | HEART RATE: 116 BPM

## 2017-01-09 VITALS — HEART RATE: 72 BPM | DIASTOLIC BLOOD PRESSURE: 60 MMHG | SYSTOLIC BLOOD PRESSURE: 132 MMHG

## 2017-01-09 VITALS — HEART RATE: 84 BPM

## 2017-01-10 LAB
ALBUMIN SERPL ELPH-MCNC: 4.4 G/DL
ALP BLD-CCNC: 129 U/L
ALT SERPL-CCNC: 40 U/L
ANION GAP SERPL CALC-SCNC: 16 MMOL/L
AST SERPL-CCNC: 26 U/L
BASOPHILS # BLD AUTO: 0.01 K/UL
BASOPHILS NFR BLD AUTO: 0.2 %
BILIRUB SERPL-MCNC: 0.4 MG/DL
BUN SERPL-MCNC: 64 MG/DL
CALCIUM SERPL-MCNC: 8.5 MG/DL
CHLORIDE SERPL-SCNC: 105 MMOL/L
CHOLEST SERPL-MCNC: 102 MG/DL
CHOLEST/HDLC SERPL: 2.6 RATIO
CO2 SERPL-SCNC: 18 MMOL/L
CREAT SERPL-MCNC: 4.76 MG/DL
EOSINOPHIL # BLD AUTO: 0.2 K/UL
EOSINOPHIL NFR BLD AUTO: 4.3 %
FOLATE SERPL-MCNC: 12.7 NG/ML
GLUCOSE SERPL-MCNC: 151 MG/DL
HCT VFR BLD CALC: 24.8 %
HDLC SERPL-MCNC: 40 MG/DL
HGB BLD-MCNC: 8 G/DL
IMM GRANULOCYTES NFR BLD AUTO: 0.9 %
LDLC SERPL CALC-MCNC: 46 MG/DL
LYMPHOCYTES # BLD AUTO: 2.02 K/UL
LYMPHOCYTES NFR BLD AUTO: 43.5 %
MAGNESIUM SERPL-MCNC: 2.8 MG/DL
MAN DIFF?: NORMAL
MCHC RBC-ENTMCNC: 28.3 PG
MCHC RBC-ENTMCNC: 32.3 GM/DL
MCV RBC AUTO: 87.6 FL
MONOCYTES # BLD AUTO: 0.31 K/UL
MONOCYTES NFR BLD AUTO: 6.7 %
NEUTROPHILS # BLD AUTO: 2.06 K/UL
NEUTROPHILS NFR BLD AUTO: 44.4 %
PHOSPHATE SERPL-MCNC: 5 MG/DL
PLATELET # BLD AUTO: 122 K/UL
POTASSIUM SERPL-SCNC: 4.3 MMOL/L
PROT SERPL-MCNC: 7 G/DL
RBC # BLD: 2.83 M/UL
RBC # BLD: 2.83 M/UL
RBC # FLD: 16.1 %
RETICS # AUTO: 4.5 %
RETICS AGGREG/RBC NFR: 127.1 K/UL
SODIUM SERPL-SCNC: 139 MMOL/L
TRIGL SERPL-MCNC: 82 MG/DL
URATE SERPL-MCNC: 9.3 MG/DL
VIT B12 SERPL-MCNC: 740 PG/ML
WBC # FLD AUTO: 4.64 K/UL

## 2017-01-13 ENCOUNTER — APPOINTMENT (OUTPATIENT)
Dept: HEART AND VASCULAR | Facility: CLINIC | Age: 67
End: 2017-01-13

## 2017-01-18 ENCOUNTER — APPOINTMENT (OUTPATIENT)
Dept: NEPHROLOGY | Facility: CLINIC | Age: 67
End: 2017-01-18

## 2017-01-18 ENCOUNTER — LABORATORY RESULT (OUTPATIENT)
Age: 67
End: 2017-01-18

## 2017-01-18 VITALS — BODY MASS INDEX: 31.47 KG/M2 | HEART RATE: 112 BPM | WEIGHT: 195 LBS | OXYGEN SATURATION: 96 %

## 2017-01-18 VITALS — SYSTOLIC BLOOD PRESSURE: 120 MMHG | HEART RATE: 84 BPM | DIASTOLIC BLOOD PRESSURE: 60 MMHG

## 2017-01-18 VITALS — SYSTOLIC BLOOD PRESSURE: 120 MMHG | DIASTOLIC BLOOD PRESSURE: 60 MMHG

## 2017-01-21 LAB
24R-OH-CALCIDIOL SERPL-MCNC: 20.2 PG/ML
25(OH)D3 SERPL-MCNC: 32.9 NG/ML
ALBUMIN MFR SERPL ELPH: 64.7 %
ALBUMIN SERPL ELPH-MCNC: 4.2 G/DL
ALBUMIN SERPL-MCNC: 4.5 G/DL
ALBUMIN/GLOB SERPL: 1.9 RATIO
ALDOSTERONE SERUM: 23.5 NG/DL
ALP BLD-CCNC: 139 U/L
ALPHA1 GLOB MFR SERPL ELPH: 4.5 %
ALPHA1 GLOB SERPL ELPH-MCNC: 0.3 G/DL
ALPHA2 GLOB MFR SERPL ELPH: 9.8 %
ALPHA2 GLOB SERPL ELPH-MCNC: 0.7 G/DL
ALT SERPL-CCNC: 33 U/L
ANION GAP SERPL CALC-SCNC: 20 MMOL/L
AST SERPL-CCNC: 17 U/L
B-GLOBULIN MFR SERPL ELPH: 8.6 %
B-GLOBULIN SERPL ELPH-MCNC: 0.6 G/DL
BASOPHILS # BLD AUTO: 0.03 K/UL
BASOPHILS NFR BLD AUTO: 0.6 %
BILIRUB SERPL-MCNC: 0.4 MG/DL
BUN SERPL-MCNC: 66 MG/DL
CALCIUM SERPL-MCNC: 8.6 MG/DL
CALCIUM SERPL-MCNC: 8.6 MG/DL
CHLORIDE SERPL-SCNC: 105 MMOL/L
CHOLEST SERPL-MCNC: 98 MG/DL
CHOLEST/HDLC SERPL: 2.2 RATIO
CO2 SERPL-SCNC: 16 MMOL/L
COLLAGEN CTX SERPL-MCNC: 859 PG/ML
CREAT SERPL-MCNC: 4.75 MG/DL
CRP SERPL-MCNC: 0.88 MG/DL
DEPRECATED KAPPA LC FREE/LAMBDA SER: 1.47 RATIO
EOSINOPHIL # BLD AUTO: 0.21 K/UL
EOSINOPHIL NFR BLD AUTO: 3.9 %
ERYTHROCYTE [SEDIMENTATION RATE] IN BLOOD BY WESTERGREN METHOD: 20 MM/HR
FERRITIN SERPL-MCNC: 651.1 NG/ML
FOLATE SERPL-MCNC: 7.1 NG/ML
GAMMA GLOB FLD ELPH-MCNC: 0.9 G/DL
GAMMA GLOB MFR SERPL ELPH: 12.4 %
GLUCOSE SERPL-MCNC: 171 MG/DL
HBA1C MFR BLD HPLC: 5.8 %
HCT VFR BLD CALC: 25.7 %
HCYS SERPL-MCNC: 18.5 UMOL/L
HDLC SERPL-MCNC: 45 MG/DL
HGB BLD-MCNC: 8 G/DL
IGA SER QL IEP: 138 MG/DL
IGG SER QL IEP: 959 MG/DL
IGM SER QL IEP: 32 MG/DL
IMM GRANULOCYTES NFR BLD AUTO: 0.6 %
INTERPRETATION SERPL IEP-IMP: NORMAL
IRON SATN MFR SERPL: 10 %
IRON SERPL-MCNC: 23 UG/DL
KAPPA LC CSF-MCNC: 8.53 MG/DL
KAPPA LC SERPL-MCNC: 12.5 MG/DL
LDLC SERPL CALC-MCNC: 39 MG/DL
LYMPHOCYTES # BLD AUTO: 1.79 K/UL
LYMPHOCYTES NFR BLD AUTO: 33.3 %
M PROTEIN MFR SERPL ELPH: 4.9 %
M PROTEIN SPEC IFE-MCNC: NORMAL
MAGNESIUM SERPL-MCNC: 2.8 MG/DL
MAN DIFF?: NORMAL
MCHC RBC-ENTMCNC: 29.2 PG
MCHC RBC-ENTMCNC: 31.1 GM/DL
MCV RBC AUTO: 93.8 FL
MONOCLON BAND OBS SERPL: 0.3 G/DL
MONOCYTES # BLD AUTO: 0.36 K/UL
MONOCYTES NFR BLD AUTO: 6.7 %
NEUTROPHILS # BLD AUTO: 2.96 K/UL
NEUTROPHILS NFR BLD AUTO: 54.9 %
PARATHYROID HORMONE INTACT: 381 PG/ML
PHOSPHATE SERPL-MCNC: 4.2 MG/DL
PLATELET # BLD AUTO: 157 K/UL
POTASSIUM SERPL-SCNC: 5 MMOL/L
PROT SERPL-MCNC: 6.9 G/DL
RBC # BLD: 2.74 M/UL
RBC # FLD: 19 %
RENIN PLASMA: 17 PG/ML
SODIUM SERPL-SCNC: 141 MMOL/L
T3FREE SERPL-MCNC: 2.12 PG/ML
T3RU NFR SERPL: 1.04 INDEX
T4 FREE SERPL-MCNC: 1.1 NG/DL
T4 SERPL-MCNC: 6.4 UG/DL
THYROGLOB AB SERPL-ACNC: <20 IU/ML
THYROPEROXIDASE AB SERPL IA-ACNC: <10 IU/ML
TIBC SERPL-MCNC: 222 UG/DL
TRIGL SERPL-MCNC: 68 MG/DL
TSH SERPL-ACNC: 1.74 UIU/ML
UIBC SERPL-MCNC: 199 UG/DL
URATE SERPL-MCNC: 7.6 MG/DL
VIT B12 SERPL-MCNC: 558 PG/ML
WBC # FLD AUTO: 5.38 K/UL

## 2017-01-23 ENCOUNTER — APPOINTMENT (OUTPATIENT)
Dept: OTOLARYNGOLOGY | Facility: CLINIC | Age: 67
End: 2017-01-23

## 2017-01-23 LAB
ALP BONE SERPL-MCNC: 21 MCG/L
METHYLMALONATE SERPL-SCNC: 0.35 NMOL/ML

## 2017-01-25 ENCOUNTER — APPOINTMENT (OUTPATIENT)
Dept: ENDOCRINOLOGY | Facility: CLINIC | Age: 67
End: 2017-01-25

## 2017-01-25 ENCOUNTER — APPOINTMENT (OUTPATIENT)
Dept: NEPHROLOGY | Facility: CLINIC | Age: 67
End: 2017-01-25

## 2017-01-25 ENCOUNTER — LABORATORY RESULT (OUTPATIENT)
Age: 67
End: 2017-01-25

## 2017-01-25 VITALS — SYSTOLIC BLOOD PRESSURE: 160 MMHG | DIASTOLIC BLOOD PRESSURE: 80 MMHG | HEART RATE: 72 BPM

## 2017-01-25 VITALS — HEART RATE: 72 BPM

## 2017-01-25 VITALS — HEART RATE: 116 BPM | OXYGEN SATURATION: 98 %

## 2017-01-26 LAB
ALBUMIN SERPL ELPH-MCNC: 4.5 G/DL
ALP BLD-CCNC: 162 U/L
ALT SERPL-CCNC: 39 U/L
ANION GAP SERPL CALC-SCNC: 19 MMOL/L
AST SERPL-CCNC: 28 U/L
BASOPHILS # BLD AUTO: 0.1 K/UL
BASOPHILS NFR BLD AUTO: 2.8 %
BILIRUB SERPL-MCNC: 0.4 MG/DL
BUN SERPL-MCNC: 78 MG/DL
CALCIUM SERPL-MCNC: 8.6 MG/DL
CHLORIDE SERPL-SCNC: 106 MMOL/L
CHOLEST SERPL-MCNC: 100 MG/DL
CHOLEST/HDLC SERPL: 2.6 RATIO
CO2 SERPL-SCNC: 15 MMOL/L
CREAT SERPL-MCNC: 5.1 MG/DL
EOSINOPHIL # BLD AUTO: 0 K/UL
EOSINOPHIL NFR BLD AUTO: 0 %
FERRITIN SERPL-MCNC: 938.1 NG/ML
FOLATE SERPL-MCNC: 10.5 NG/ML
GLUCOSE SERPL-MCNC: 135 MG/DL
HCT VFR BLD CALC: 25.2 %
HDLC SERPL-MCNC: 39 MG/DL
HGB BLD-MCNC: 7.7 G/DL
IRON SATN MFR SERPL: 23 %
IRON SERPL-MCNC: 52 UG/DL
LDLC SERPL CALC-MCNC: 47 MG/DL
LYMPHOCYTES # BLD AUTO: 0.77 K/UL
LYMPHOCYTES NFR BLD AUTO: 21.1 %
MAGNESIUM SERPL-MCNC: 3 MG/DL
MAN DIFF?: NORMAL
MCHC RBC-ENTMCNC: 28.5 PG
MCHC RBC-ENTMCNC: 30.6 GM/DL
MCV RBC AUTO: 93.3 FL
MONOCYTES # BLD AUTO: 0.2 K/UL
MONOCYTES NFR BLD AUTO: 5.5 %
NEUTROPHILS # BLD AUTO: 2.45 K/UL
NEUTROPHILS NFR BLD AUTO: 67 %
PHOSPHATE SERPL-MCNC: 5.1 MG/DL
PLATELET # BLD AUTO: 121 K/UL
POTASSIUM SERPL-SCNC: 5.6 MMOL/L
PROT SERPL-MCNC: 6.8 G/DL
RBC # BLD: 2.7 M/UL
RBC # FLD: 18.1 %
SODIUM SERPL-SCNC: 140 MMOL/L
TIBC SERPL-MCNC: 224 UG/DL
TRIGL SERPL-MCNC: 69 MG/DL
TSH SERPL-ACNC: 2.01 UIU/ML
UIBC SERPL-MCNC: 172 UG/DL
URATE SERPL-MCNC: 8 MG/DL
VIT B12 SERPL-MCNC: 636 PG/ML
WBC # FLD AUTO: 3.66 K/UL

## 2017-01-28 LAB
ALBUMIN MFR SERPL ELPH: 62 %
ALBUMIN SERPL-MCNC: 4.2 G/DL
ALBUMIN/GLOB SERPL: 1.6 RATIO
ALPHA1 GLOB MFR SERPL ELPH: 5 %
ALPHA1 GLOB SERPL ELPH-MCNC: 0.3 G/DL
ALPHA2 GLOB MFR SERPL ELPH: 10.9 %
ALPHA2 GLOB SERPL ELPH-MCNC: 0.7 G/DL
B-GLOBULIN MFR SERPL ELPH: 9.5 %
B-GLOBULIN SERPL ELPH-MCNC: 0.6 G/DL
DEPRECATED KAPPA LC FREE/LAMBDA SER: 1.44 RATIO
GAMMA GLOB FLD ELPH-MCNC: 0.9 G/DL
GAMMA GLOB MFR SERPL ELPH: 12.6 %
HCYS SERPL-MCNC: 19.2 UMOL/L
IGA SER QL IEP: 139 MG/DL
IGG SER QL IEP: 982 MG/DL
IGM SER QL IEP: 32 MG/DL
INTERPRETATION SERPL IEP-IMP: NORMAL
KAPPA LC CSF-MCNC: 8.27 MG/DL
KAPPA LC SERPL-MCNC: 11.9 MG/DL
M PROTEIN MFR SERPL ELPH: 5 %
M PROTEIN SPEC IFE-MCNC: NORMAL
MONOCLON BAND OBS SERPL: 0.3 G/DL
PROT SERPL-MCNC: 6.8 G/DL
PROT SERPL-MCNC: 6.8 G/DL

## 2017-02-01 LAB
CYSTATIN C SERPL-MCNC: 3.79 MG/L
GFR/BSA.PRED SERPLBLD CYS-BASED-ARV: 13
METHYLMALONATE SERPL-SCNC: 0.47 NMOL/ML

## 2017-02-06 ENCOUNTER — APPOINTMENT (OUTPATIENT)
Dept: NEPHROLOGY | Facility: CLINIC | Age: 67
End: 2017-02-06

## 2017-02-06 ENCOUNTER — LABORATORY RESULT (OUTPATIENT)
Age: 67
End: 2017-02-06

## 2017-02-06 VITALS — DIASTOLIC BLOOD PRESSURE: 76 MMHG | SYSTOLIC BLOOD PRESSURE: 166 MMHG | HEART RATE: 84 BPM

## 2017-02-06 VITALS — SYSTOLIC BLOOD PRESSURE: 156 MMHG | DIASTOLIC BLOOD PRESSURE: 80 MMHG

## 2017-02-08 LAB
24R-OH-CALCIDIOL SERPL-MCNC: 17.6 PG/ML
25(OH)D3 SERPL-MCNC: 39.1 NG/ML
ALBUMIN SERPL ELPH-MCNC: 4.9 G/DL
ALP BLD-CCNC: 138 U/L
ALP BONE SERPL-MCNC: 24 MCG/L
ALT SERPL-CCNC: 27 U/L
ANA SER IF-ACNC: NEGATIVE
ANION GAP SERPL CALC-SCNC: 16 MMOL/L
APPEARANCE: CLEAR
AST SERPL-CCNC: 17 U/L
BACTERIA: NEGATIVE
BASOPHILS # BLD AUTO: 0.03 K/UL
BASOPHILS NFR BLD AUTO: 0.6 %
BILIRUB SERPL-MCNC: 0.5 MG/DL
BILIRUBIN URINE: NEGATIVE
BLOOD URINE: NEGATIVE
BUN SERPL-MCNC: 81 MG/DL
C3 SERPL-MCNC: 87 MG/DL
C4 SERPL-MCNC: 31 MG/DL
CALCIUM SERPL-MCNC: 9.2 MG/DL
CALCIUM SERPL-MCNC: 9.2 MG/DL
CHLORIDE SERPL-SCNC: 107 MMOL/L
CHOLEST SERPL-MCNC: 105 MG/DL
CHOLEST/HDLC SERPL: 2.7 RATIO
CO2 SERPL-SCNC: 18 MMOL/L
COLOR: YELLOW
CREAT SERPL-MCNC: 4.36 MG/DL
CREAT SPEC-SCNC: 44 MG/DL
CREAT/PROT UR: 2.2 RATIO
CRP SERPL-MCNC: 0.2 MG/DL
CYSTATIN C SERPL-MCNC: 3.67 MG/L
EOSINOPHIL # BLD AUTO: 0.04 K/UL
EOSINOPHIL NFR BLD AUTO: 0.8 %
ERYTHROCYTE [SEDIMENTATION RATE] IN BLOOD BY WESTERGREN METHOD: 26 MM/HR
FERRITIN SERPL-MCNC: 731 NG/ML
FOLATE SERPL-MCNC: >20 NG/ML
GFR/BSA.PRED SERPLBLD CYS-BASED-ARV: 14
GLUCOSE QUALITATIVE U: NORMAL MG/DL
GLUCOSE SERPL-MCNC: 86 MG/DL
HBA1C MFR BLD HPLC: 6.2 %
HBV SURFACE AB SER QL: NONREACTIVE
HBV SURFACE AG SER QL: NONREACTIVE
HCT VFR BLD CALC: 28.9 %
HCV AB SER QL: NONREACTIVE
HCV S/CO RATIO: 0.09 S/CO
HCYS SERPL-MCNC: 18.7 UMOL/L
HDLC SERPL-MCNC: 39 MG/DL
HGB BLD-MCNC: 8.4 G/DL
IMM GRANULOCYTES NFR BLD AUTO: 0.2 %
IRON SATN MFR SERPL: 26 %
IRON SERPL-MCNC: 60 UG/DL
KETONES URINE: NEGATIVE
LDLC SERPL CALC-MCNC: 51 MG/DL
LEUKOCYTE ESTERASE URINE: NEGATIVE
LYMPHOCYTES # BLD AUTO: 1.76 K/UL
LYMPHOCYTES NFR BLD AUTO: 36.2 %
MAGNESIUM SERPL-MCNC: 2.7 MG/DL
MAN DIFF?: NORMAL
MCHC RBC-ENTMCNC: 27.8 PG
MCHC RBC-ENTMCNC: 29.1 GM/DL
MCV RBC AUTO: 95.7 FL
MICROSCOPIC-UA: NORMAL
MONOCYTES # BLD AUTO: 0.18 K/UL
MONOCYTES NFR BLD AUTO: 3.7 %
MPO AB + PR3 PNL SER: NORMAL
NEUTROPHILS # BLD AUTO: 2.84 K/UL
NEUTROPHILS NFR BLD AUTO: 58.5 %
NITRITE URINE: NEGATIVE
NT-PROBNP SERPL-MCNC: 4738 PG/ML
PARATHYROID HORMONE INTACT: 289 PG/ML
PH URINE: 5.5
PHOSPHATE SERPL-MCNC: 4.3 MG/DL
PLATELET # BLD AUTO: 159 K/UL
POTASSIUM SERPL-SCNC: 4.8 MMOL/L
PROT SERPL-MCNC: 7.2 G/DL
PROT UR-MCNC: 97 MG/DL
PROTEIN URINE: 100 MG/DL
RBC # BLD: 3.02 M/UL
RBC # FLD: 18.6 %
RED BLOOD CELLS URINE: 2 /HPF
RHEUMATOID FACT SER QL: 7.2 IU/ML
SODIUM SERPL-SCNC: 141 MMOL/L
SPECIFIC GRAVITY URINE: 1.01
SQUAMOUS EPITHELIAL CELLS: 0 /HPF
T3FREE SERPL-MCNC: 2.27 PG/ML
T3RU NFR SERPL: 1.05 INDEX
T4 FREE SERPL-MCNC: 1.2 NG/DL
T4 SERPL-MCNC: 8 UG/DL
THYROGLOB AB SERPL-ACNC: <20 IU/ML
THYROPEROXIDASE AB SERPL IA-ACNC: <10 IU/ML
TIBC SERPL-MCNC: 234 UG/DL
TRIGL SERPL-MCNC: 73 MG/DL
TSH SERPL-ACNC: 2.48 UIU/ML
UIBC SERPL-MCNC: 174 UG/DL
URATE SERPL-MCNC: 8.7 MG/DL
UROBILINOGEN URINE: NORMAL MG/DL
VIT B12 SERPL-MCNC: 793 PG/ML
WBC # FLD AUTO: 4.86 K/UL
WHITE BLOOD CELLS URINE: 0 /HPF

## 2017-02-09 LAB
ALBUMIN MFR SERPL ELPH: 64.3 %
ALBUMIN SERPL-MCNC: 4.6 G/DL
ALBUMIN/GLOB SERPL: 1.8 RATIO
ALDOSTERONE SERUM: 13.6 NG/DL
ALPHA1 GLOB MFR SERPL ELPH: 5 %
ALPHA1 GLOB SERPL ELPH-MCNC: 0.4 G/DL
ALPHA2 GLOB MFR SERPL ELPH: 8.8 %
ALPHA2 GLOB SERPL ELPH-MCNC: 0.6 G/DL
B-GLOBULIN MFR SERPL ELPH: 9.3 %
B-GLOBULIN SERPL ELPH-MCNC: 0.7 G/DL
COLLAGEN CTX SERPL-MCNC: 790 PG/ML
DEPRECATED KAPPA LC FREE/LAMBDA SER: 1.47 RATIO
GAMMA GLOB FLD ELPH-MCNC: 0.9 G/DL
GAMMA GLOB MFR SERPL ELPH: 12.6 %
IGA SER QL IEP: 137 MG/DL
IGG SER QL IEP: 1030 MG/DL
IGM SER QL IEP: 34 MG/DL
INTERPRETATION SERPL IEP-IMP: NORMAL
KAPPA LC CSF-MCNC: 8.08 MG/DL
KAPPA LC SERPL-MCNC: 11.9 MG/DL
M PROTEIN MFR SERPL ELPH: 5.9 %
M PROTEIN SPEC IFE-MCNC: NORMAL
MONOCLON BAND OBS SERPL: 0.4 G/DL
PROT SERPL-MCNC: 7.2 G/DL
PROT SERPL-MCNC: 7.2 G/DL

## 2017-02-14 LAB — METHYLMALONATE SERPL-SCNC: 373 NMOL/L

## 2017-02-15 ENCOUNTER — APPOINTMENT (OUTPATIENT)
Dept: NEPHROLOGY | Facility: CLINIC | Age: 67
End: 2017-02-15

## 2017-02-15 ENCOUNTER — LABORATORY RESULT (OUTPATIENT)
Age: 67
End: 2017-02-15

## 2017-02-15 VITALS — HEART RATE: 97 BPM | WEIGHT: 192 LBS | BODY MASS INDEX: 30.99 KG/M2 | OXYGEN SATURATION: 97 %

## 2017-02-15 VITALS — DIASTOLIC BLOOD PRESSURE: 60 MMHG | SYSTOLIC BLOOD PRESSURE: 144 MMHG

## 2017-02-15 VITALS — DIASTOLIC BLOOD PRESSURE: 50 MMHG | SYSTOLIC BLOOD PRESSURE: 150 MMHG

## 2017-02-15 VITALS — HEART RATE: 72 BPM

## 2017-02-20 LAB
24R-OH-CALCIDIOL SERPL-MCNC: 20.8 PG/ML
25(OH)D3 SERPL-MCNC: 34.4 NG/ML
ALBUMIN MFR SERPL ELPH: 65.4 %
ALBUMIN SERPL ELPH-MCNC: 4.6 G/DL
ALBUMIN SERPL-MCNC: 4.7 G/DL
ALBUMIN/GLOB SERPL: 1.9 RATIO
ALP BLD-CCNC: 135 U/L
ALP BONE SERPL-MCNC: 24 MCG/L
ALPHA1 GLOB MFR SERPL ELPH: 3.9 %
ALPHA1 GLOB SERPL ELPH-MCNC: 0.3 G/DL
ALPHA2 GLOB MFR SERPL ELPH: 9 %
ALPHA2 GLOB SERPL ELPH-MCNC: 0.6 G/DL
ALT SERPL-CCNC: 24 U/L
ANION GAP SERPL CALC-SCNC: 19 MMOL/L
AST SERPL-CCNC: 13 U/L
B-GLOBULIN MFR SERPL ELPH: 8.8 %
B-GLOBULIN SERPL ELPH-MCNC: 0.6 G/DL
BASOPHILS # BLD AUTO: 0.01 K/UL
BASOPHILS NFR BLD AUTO: 0.2 %
BILIRUB SERPL-MCNC: 0.4 MG/DL
BUN SERPL-MCNC: 76 MG/DL
CALCIUM SERPL-MCNC: 8.9 MG/DL
CALCIUM SERPL-MCNC: 8.9 MG/DL
CHLORIDE SERPL-SCNC: 103 MMOL/L
CHOLEST SERPL-MCNC: 103 MG/DL
CHOLEST/HDLC SERPL: 2.3 RATIO
CO2 SERPL-SCNC: 17 MMOL/L
CREAT SERPL-MCNC: 4.54 MG/DL
CYSTATIN C SERPL-MCNC: 3.52 MG/L
DEPRECATED KAPPA LC FREE/LAMBDA SER: 2.05 RATIO
EOSINOPHIL # BLD AUTO: 0.01 K/UL
EOSINOPHIL NFR BLD AUTO: 0.2 %
FERRITIN SERPL-MCNC: 565.7 NG/ML
FOLATE SERPL-MCNC: 19.1 NG/ML
GAMMA GLOB FLD ELPH-MCNC: 0.9 G/DL
GAMMA GLOB MFR SERPL ELPH: 12.9 %
GFR/BSA.PRED SERPLBLD CYS-BASED-ARV: 14
GLUCOSE SERPL-MCNC: 151 MG/DL
HBA1C MFR BLD HPLC: 6.4 %
HCT VFR BLD CALC: 29.3 %
HCYS SERPL-MCNC: 16.2 UMOL/L
HDLC SERPL-MCNC: 45 MG/DL
HGB BLD-MCNC: 8.9 G/DL
IGA SER QL IEP: 146 MG/DL
IGG SER QL IEP: 1160 MG/DL
IGM SER QL IEP: 35 MG/DL
IMM GRANULOCYTES NFR BLD AUTO: 0.2 %
INTERPRETATION SERPL IEP-IMP: NORMAL
IRON SATN MFR SERPL: 25 %
IRON SERPL-MCNC: 62 UG/DL
KAPPA LC CSF-MCNC: 9.09 MG/DL
KAPPA LC SERPL-MCNC: 18.6 MG/DL
LDLC SERPL CALC-MCNC: 47 MG/DL
LYMPHOCYTES # BLD AUTO: 1.63 K/UL
LYMPHOCYTES NFR BLD AUTO: 34 %
M PROTEIN MFR SERPL ELPH: 5.3 %
M PROTEIN SPEC IFE-MCNC: NORMAL
MAGNESIUM SERPL-MCNC: 2.6 MG/DL
MAN DIFF?: NORMAL
MCHC RBC-ENTMCNC: 29.4 PG
MCHC RBC-ENTMCNC: 30.4 GM/DL
MCV RBC AUTO: 96.7 FL
MONOCLON BAND OBS SERPL: 0.4 G/DL
MONOCYTES # BLD AUTO: 0.28 K/UL
MONOCYTES NFR BLD AUTO: 5.8 %
NEUTROPHILS # BLD AUTO: 2.85 K/UL
NEUTROPHILS NFR BLD AUTO: 59.6 %
NT-PROBNP SERPL-MCNC: 3999 PG/ML
PARATHYROID HORMONE INTACT: 328 PG/ML
PHOSPHATE SERPL-MCNC: 4.5 MG/DL
PLATELET # BLD AUTO: 138 K/UL
POTASSIUM SERPL-SCNC: 4.3 MMOL/L
PROT SERPL-MCNC: 7.2 G/DL
RBC # BLD: 3.03 M/UL
RBC # FLD: 18.2 %
SODIUM SERPL-SCNC: 139 MMOL/L
T3RU NFR SERPL: 1.02 INDEX
T4 SERPL-MCNC: 8.1 UG/DL
TIBC SERPL-MCNC: 246 UG/DL
TRIGL SERPL-MCNC: 57 MG/DL
TSH SERPL-ACNC: 3.03 UIU/ML
UIBC SERPL-MCNC: 184 UG/DL
URATE SERPL-MCNC: 9.2 MG/DL
VIT B12 SERPL-MCNC: 828 PG/ML
WBC # FLD AUTO: 4.79 K/UL

## 2017-02-23 ENCOUNTER — APPOINTMENT (OUTPATIENT)
Dept: NEPHROLOGY | Facility: CLINIC | Age: 67
End: 2017-02-23

## 2017-02-23 ENCOUNTER — APPOINTMENT (OUTPATIENT)
Dept: HEART AND VASCULAR | Facility: CLINIC | Age: 67
End: 2017-02-23

## 2017-02-23 VITALS
DIASTOLIC BLOOD PRESSURE: 80 MMHG | HEIGHT: 66 IN | BODY MASS INDEX: 30.53 KG/M2 | SYSTOLIC BLOOD PRESSURE: 166 MMHG | WEIGHT: 190 LBS | HEART RATE: 83 BPM

## 2017-02-23 VITALS — SYSTOLIC BLOOD PRESSURE: 142 MMHG | HEART RATE: 84 BPM | DIASTOLIC BLOOD PRESSURE: 60 MMHG

## 2017-02-23 VITALS — SYSTOLIC BLOOD PRESSURE: 160 MMHG | DIASTOLIC BLOOD PRESSURE: 72 MMHG

## 2017-02-23 RX ORDER — SULFAMETHOXAZOLE AND TRIMETHOPRIM 800; 160 MG/1; MG/1
800-160 TABLET ORAL DAILY
Qty: 30 | Refills: 1 | Status: DISCONTINUED | COMMUNITY
Start: 2017-01-05 | End: 2017-02-23

## 2017-02-23 RX ADMIN — ERYTHROPOIETIN 0 UNIT/ML: 20000 INJECTION, SOLUTION INTRAVENOUS; SUBCUTANEOUS at 00:00

## 2017-02-26 ENCOUNTER — FORM ENCOUNTER (OUTPATIENT)
Age: 67
End: 2017-02-26

## 2017-02-27 ENCOUNTER — OUTPATIENT (OUTPATIENT)
Dept: OUTPATIENT SERVICES | Facility: HOSPITAL | Age: 67
LOS: 1 days | End: 2017-02-27
Payer: MEDICARE

## 2017-02-27 DIAGNOSIS — I25.10 ATHEROSCLEROTIC HEART DISEASE OF NATIVE CORONARY ARTERY WITHOUT ANGINA PECTORIS: ICD-10-CM

## 2017-02-27 DIAGNOSIS — Z98.890 OTHER SPECIFIED POSTPROCEDURAL STATES: Chronic | ICD-10-CM

## 2017-02-27 DIAGNOSIS — Z95.1 PRESENCE OF AORTOCORONARY BYPASS GRAFT: Chronic | ICD-10-CM

## 2017-02-27 DIAGNOSIS — R94.39 ABNORMAL RESULT OF OTHER CARDIOVASCULAR FUNCTION STUDY: ICD-10-CM

## 2017-02-27 DIAGNOSIS — M27.2 INFLAMMATORY CONDITIONS OF JAWS: Chronic | ICD-10-CM

## 2017-02-27 PROCEDURE — A9500: CPT

## 2017-02-27 PROCEDURE — 78452 HT MUSCLE IMAGE SPECT MULT: CPT | Mod: 26

## 2017-02-27 PROCEDURE — 93016 CV STRESS TEST SUPVJ ONLY: CPT

## 2017-02-27 PROCEDURE — 78452 HT MUSCLE IMAGE SPECT MULT: CPT

## 2017-02-27 PROCEDURE — 93017 CV STRESS TEST TRACING ONLY: CPT

## 2017-02-27 PROCEDURE — A9505: CPT

## 2017-02-27 PROCEDURE — 93018 CV STRESS TEST I&R ONLY: CPT

## 2017-03-05 LAB
COLLAGEN CTX SERPL-MCNC: 1002 PG/ML
METHYLMALONATE SERPL-SCNC: 339 NMOL/L

## 2017-03-06 ENCOUNTER — LABORATORY RESULT (OUTPATIENT)
Age: 67
End: 2017-03-06

## 2017-03-06 ENCOUNTER — APPOINTMENT (OUTPATIENT)
Dept: NEPHROLOGY | Facility: CLINIC | Age: 67
End: 2017-03-06

## 2017-03-06 VITALS — SYSTOLIC BLOOD PRESSURE: 140 MMHG | DIASTOLIC BLOOD PRESSURE: 60 MMHG | HEART RATE: 96 BPM

## 2017-03-06 VITALS — SYSTOLIC BLOOD PRESSURE: 138 MMHG | DIASTOLIC BLOOD PRESSURE: 60 MMHG

## 2017-03-06 VITALS — OXYGEN SATURATION: 98 % | HEART RATE: 104 BPM | WEIGHT: 196 LBS | BODY MASS INDEX: 31.64 KG/M2

## 2017-03-06 VITALS — DIASTOLIC BLOOD PRESSURE: 62 MMHG | SYSTOLIC BLOOD PRESSURE: 150 MMHG

## 2017-03-06 VITALS — SYSTOLIC BLOOD PRESSURE: 140 MMHG | DIASTOLIC BLOOD PRESSURE: 70 MMHG

## 2017-03-07 ENCOUNTER — MED ADMIN CHARGE (OUTPATIENT)
Age: 67
End: 2017-03-07

## 2017-03-07 RX ORDER — ERYTHROPOIETIN 20000 [IU]/ML
20000 INJECTION, SOLUTION INTRAVENOUS; SUBCUTANEOUS
Qty: 1 | Refills: 0 | Status: COMPLETED | OUTPATIENT
Start: 2017-02-23

## 2017-03-14 LAB
24R-OH-CALCIDIOL SERPL-MCNC: 19.3 PG/ML
25(OH)D3 SERPL-MCNC: 37 NG/ML
ALBUMIN MFR SERPL ELPH: 64.3 %
ALBUMIN SERPL ELPH-MCNC: 4.8 G/DL
ALBUMIN SERPL-MCNC: 4.7 G/DL
ALBUMIN/GLOB SERPL: 1.8 RATIO
ALDOSTERONE SERUM: 15.4 NG/DL
ALP BLD-CCNC: 140 U/L
ALP BONE SERPL-MCNC: 26 MCG/L
ALPHA1 GLOB MFR SERPL ELPH: 4.6 %
ALPHA1 GLOB SERPL ELPH-MCNC: 0.3 G/DL
ALPHA2 GLOB MFR SERPL ELPH: 9.2 %
ALPHA2 GLOB SERPL ELPH-MCNC: 0.7 G/DL
ALT SERPL-CCNC: 38 U/L
ANION GAP SERPL CALC-SCNC: 20 MMOL/L
APPEARANCE: CLEAR
AST SERPL-CCNC: 30 U/L
B-GLOBULIN MFR SERPL ELPH: 9.3 %
B-GLOBULIN SERPL ELPH-MCNC: 0.7 G/DL
BACTERIA: NEGATIVE
BASOPHILS # BLD AUTO: 0 K/UL
BASOPHILS NFR BLD AUTO: 0 %
BILIRUB SERPL-MCNC: 0.4 MG/DL
BILIRUBIN URINE: NEGATIVE
BLOOD URINE: NEGATIVE
BUN SERPL-MCNC: 85 MG/DL
CALCIUM SERPL-MCNC: 8.7 MG/DL
CALCIUM SERPL-MCNC: 8.7 MG/DL
CHLORIDE SERPL-SCNC: 104 MMOL/L
CHOLEST SERPL-MCNC: 102 MG/DL
CHOLEST/HDLC SERPL: 3.1 RATIO
CO2 SERPL-SCNC: 16 MMOL/L
COLLAGEN CTX SERPL-MCNC: 1569 PG/ML
COLOR: YELLOW
CREAT SERPL-MCNC: 4.95 MG/DL
CRP SERPL-MCNC: 0.32 MG/DL
CYSTATIN C SERPL-MCNC: 3.78 MG/L
DEPRECATED KAPPA LC FREE/LAMBDA SER: 2.1 RATIO
EOSINOPHIL # BLD AUTO: 0.05 K/UL
EOSINOPHIL NFR BLD AUTO: 0.9 %
ERYTHROCYTE [SEDIMENTATION RATE] IN BLOOD BY WESTERGREN METHOD: 22 MM/HR
FERRITIN SERPL-MCNC: 549.8 NG/ML
FOLATE SERPL-MCNC: >20 NG/ML
GAMMA GLOB FLD ELPH-MCNC: 0.9 G/DL
GAMMA GLOB MFR SERPL ELPH: 12.6 %
GFR/BSA.PRED SERPLBLD CYS-BASED-ARV: 13
GLUCOSE QUALITATIVE U: NORMAL MG/DL
GLUCOSE SERPL-MCNC: 101 MG/DL
HBA1C MFR BLD HPLC: 6.2 %
HCT VFR BLD CALC: 29 %
HCYS SERPL-MCNC: 23 UMOL/L
HDLC SERPL-MCNC: 33 MG/DL
HGB BLD-MCNC: 8.9 G/DL
HYALINE CASTS: 0 /LPF
IGA SER QL IEP: 131 MG/DL
IGG SER QL IEP: 1090 MG/DL
IGM SER QL IEP: 28 MG/DL
INTERPRETATION SERPL IEP-IMP: NORMAL
IRON SATN MFR SERPL: 20 %
IRON SERPL-MCNC: 49 UG/DL
KAPPA LC CSF-MCNC: 7.01 MG/DL
KAPPA LC SERPL-MCNC: 14.7 MG/DL
KETONES URINE: NEGATIVE
LDLC SERPL CALC-MCNC: 50 MG/DL
LEUKOCYTE ESTERASE URINE: NEGATIVE
LYMPHOCYTES # BLD AUTO: 1.74 K/UL
LYMPHOCYTES NFR BLD AUTO: 29.6 %
M PROTEIN MFR SERPL ELPH: 6 %
M PROTEIN SPEC IFE-MCNC: NORMAL
MAGNESIUM SERPL-MCNC: 2.6 MG/DL
MAN DIFF?: NORMAL
MCHC RBC-ENTMCNC: 28.6 PG
MCHC RBC-ENTMCNC: 30.7 GM/DL
MCV RBC AUTO: 93.2 FL
METHYLMALONATE SERPL-SCNC: 308 NMOL/L
MICROSCOPIC-UA: NORMAL
MONOCLON BAND OBS SERPL: 0.4 G/DL
MONOCYTES # BLD AUTO: 0.27 K/UL
MONOCYTES NFR BLD AUTO: 4.6 %
NEUTROPHILS # BLD AUTO: 3.65 K/UL
NEUTROPHILS NFR BLD AUTO: 62.1 %
NITRITE URINE: NEGATIVE
NT-PROBNP SERPL-MCNC: 4693 PG/ML
PARATHYROID HORMONE INTACT: 492 PG/ML
PH URINE: 5
PHOSPHATE SERPL-MCNC: 5.8 MG/DL
PLATELET # BLD AUTO: 151 K/UL
POTASSIUM SERPL-SCNC: 4.7 MMOL/L
PROT SERPL-MCNC: 7.3 G/DL
PROTEIN URINE: 100 MG/DL
RBC # BLD: 3.11 M/UL
RBC # FLD: 17.2 %
RED BLOOD CELLS URINE: 3 /HPF
SODIUM SERPL-SCNC: 140 MMOL/L
SPECIFIC GRAVITY URINE: 1.01
SQUAMOUS EPITHELIAL CELLS: 0 /HPF
T3FREE SERPL-MCNC: 2.59 PG/ML
T3RU NFR SERPL: 1.03 INDEX
T4 FREE SERPL-MCNC: 1.3 NG/DL
T4 SERPL-MCNC: 8.1 UG/DL
THYROGLOB AB SERPL-ACNC: <20 IU/ML
THYROPEROXIDASE AB SERPL IA-ACNC: <10 IU/ML
TIBC SERPL-MCNC: 247 UG/DL
TRIGL SERPL-MCNC: 94 MG/DL
TSH SERPL-ACNC: 2.32 UIU/ML
UIBC SERPL-MCNC: 198 UG/DL
URATE SERPL-MCNC: 8.8 MG/DL
UROBILINOGEN URINE: NORMAL MG/DL
VIT B12 SERPL-MCNC: 906 PG/ML
WBC # FLD AUTO: 5.87 K/UL
WHITE BLOOD CELLS URINE: 0 /HPF

## 2017-03-22 ENCOUNTER — APPOINTMENT (OUTPATIENT)
Dept: NEPHROLOGY | Facility: CLINIC | Age: 67
End: 2017-03-22

## 2017-03-27 ENCOUNTER — APPOINTMENT (OUTPATIENT)
Dept: NEPHROLOGY | Facility: CLINIC | Age: 67
End: 2017-03-27

## 2017-03-27 ENCOUNTER — APPOINTMENT (OUTPATIENT)
Dept: OTOLARYNGOLOGY | Facility: CLINIC | Age: 67
End: 2017-03-27

## 2017-03-27 VITALS — SYSTOLIC BLOOD PRESSURE: 180 MMHG | DIASTOLIC BLOOD PRESSURE: 80 MMHG

## 2017-03-27 VITALS — SYSTOLIC BLOOD PRESSURE: 178 MMHG | DIASTOLIC BLOOD PRESSURE: 80 MMHG

## 2017-03-27 VITALS — HEART RATE: 72 BPM

## 2017-03-27 DIAGNOSIS — L02.01 CUTANEOUS ABSCESS OF FACE: ICD-10-CM

## 2017-03-27 DIAGNOSIS — J20.8 ACUTE BRONCHITIS DUE TO OTHER SPECIFIED ORGANISMS: ICD-10-CM

## 2017-03-27 DIAGNOSIS — L08.9 LOCAL INFECTION OF THE SKIN AND SUBCUTANEOUS TISSUE, UNSPECIFIED: ICD-10-CM

## 2017-03-27 DIAGNOSIS — B34.9 VIRAL INFECTION, UNSPECIFIED: ICD-10-CM

## 2017-04-04 ENCOUNTER — APPOINTMENT (OUTPATIENT)
Dept: NEPHROLOGY | Facility: CLINIC | Age: 67
End: 2017-04-04

## 2017-04-04 ENCOUNTER — LABORATORY RESULT (OUTPATIENT)
Age: 67
End: 2017-04-04

## 2017-04-04 VITALS — HEART RATE: 60 BPM | SYSTOLIC BLOOD PRESSURE: 148 MMHG | DIASTOLIC BLOOD PRESSURE: 60 MMHG

## 2017-04-04 VITALS — WEIGHT: 193 LBS | HEART RATE: 90 BPM | BODY MASS INDEX: 31.15 KG/M2 | OXYGEN SATURATION: 97 %

## 2017-04-04 VITALS — SYSTOLIC BLOOD PRESSURE: 142 MMHG | DIASTOLIC BLOOD PRESSURE: 60 MMHG

## 2017-04-05 LAB
24R-OH-CALCIDIOL SERPL-MCNC: 17.9 PG/ML
25(OH)D3 SERPL-MCNC: 34.4 NG/ML
HBV SURFACE AB SER QL: NONREACTIVE
HBV SURFACE AG SER QL: NONREACTIVE
HCV AB SER QL: NONREACTIVE
HCV S/CO RATIO: 0.06 S/CO
HCYS SERPL-MCNC: 28.3 UMOL/L

## 2017-04-07 LAB
ALBUMIN MFR SERPL ELPH: 64.9 %
ALBUMIN SERPL ELPH-MCNC: 4.8 G/DL
ALBUMIN SERPL-MCNC: 4.7 G/DL
ALBUMIN/GLOB SERPL: 1.9 RATIO
ALDOLASE SERPL-CCNC: 7.3 U/L
ALDOSTERONE SERUM: 28.3 NG/DL
ALP BLD-CCNC: 155 U/L
ALPHA1 GLOB MFR SERPL ELPH: 3.9 %
ALPHA1 GLOB SERPL ELPH-MCNC: 0.3 G/DL
ALPHA2 GLOB MFR SERPL ELPH: 9.6 %
ALPHA2 GLOB SERPL ELPH-MCNC: 0.7 G/DL
ALT SERPL-CCNC: 25 U/L
ANA PAT FLD IF-IMP: ABNORMAL
ANA SER IF-ACNC: ABNORMAL
ANION GAP SERPL CALC-SCNC: 25 MMOL/L
AST SERPL-CCNC: 16 U/L
B-GLOBULIN MFR SERPL ELPH: 9.3 %
B-GLOBULIN SERPL ELPH-MCNC: 0.7 G/DL
BASOPHILS # BLD AUTO: 0.02 K/UL
BASOPHILS NFR BLD AUTO: 0.3 %
BILIRUB SERPL-MCNC: 0.4 MG/DL
BUN SERPL-MCNC: 91 MG/DL
C3 SERPL-MCNC: 110 MG/DL
C4 SERPL-MCNC: 32 MG/DL
CALCIUM SERPL-MCNC: 9.5 MG/DL
CALCIUM SERPL-MCNC: 9.5 MG/DL
CHLORIDE SERPL-SCNC: 102 MMOL/L
CHOLEST SERPL-MCNC: 109 MG/DL
CHOLEST/HDLC SERPL: 3 RATIO
CO2 SERPL-SCNC: 15 MMOL/L
CREAT SERPL-MCNC: 5.22 MG/DL
CRP SERPL-MCNC: 0.26 MG/DL
DEPRECATED KAPPA LC FREE/LAMBDA SER: 2.29 RATIO
EOSINOPHIL # BLD AUTO: 0.17 K/UL
EOSINOPHIL NFR BLD AUTO: 2.4 %
ERYTHROCYTE [SEDIMENTATION RATE] IN BLOOD BY WESTERGREN METHOD: 16 MM/HR
FERRITIN SERPL-MCNC: 491.8 NG/ML
FOLATE SERPL-MCNC: 16.5 NG/ML
GAMMA GLOB FLD ELPH-MCNC: 0.9 G/DL
GAMMA GLOB MFR SERPL ELPH: 12.3 %
GLUCOSE SERPL-MCNC: 91 MG/DL
HCT VFR BLD CALC: 28.4 %
HDLC SERPL-MCNC: 36 MG/DL
HGB BLD-MCNC: 8.8 G/DL
IGA SER QL IEP: 138 MG/DL
IGG SER QL IEP: 1090 MG/DL
IGM SER QL IEP: 28 MG/DL
IMM GRANULOCYTES NFR BLD AUTO: 0.4 %
INTERPRETATION SERPL IEP-IMP: NORMAL
IRON SATN MFR SERPL: 21 %
IRON SERPL-MCNC: 57 UG/DL
KAPPA LC CSF-MCNC: 8.28 MG/DL
KAPPA LC SERPL-MCNC: 19 MG/DL
LDLC SERPL CALC-MCNC: 51 MG/DL
LYMPHOCYTES # BLD AUTO: 2.82 K/UL
LYMPHOCYTES NFR BLD AUTO: 40.2 %
M PROTEIN MFR SERPL ELPH: 4.6 %
M PROTEIN SPEC IFE-MCNC: NORMAL
MAGNESIUM SERPL-MCNC: 2.7 MG/DL
MAN DIFF?: NO
MCHC RBC-ENTMCNC: 29.3 PG
MCHC RBC-ENTMCNC: 31 GM/DL
MCV RBC AUTO: 94.7 FL
MONOCLON BAND OBS SERPL: 0.3 G/DL
MONOCYTES # BLD AUTO: 0.57 K/UL
MONOCYTES NFR BLD AUTO: 8.1 %
MPO AB + PR3 PNL SER: NORMAL
NEUTROPHILS # BLD AUTO: 3.41 K/UL
NEUTROPHILS NFR BLD AUTO: 48.6 %
PARATHYROID HORMONE INTACT: 304 PG/ML
PHOSPHATE SERPL-MCNC: 5 MG/DL
PLATELET # BLD AUTO: 133 K/UL
POTASSIUM SERPL-SCNC: 4.6 MMOL/L
PROT SERPL-MCNC: 7.1 G/DL
PROT SERPL-MCNC: 7.2 G/DL
PROT SERPL-MCNC: 7.2 G/DL
RBC # BLD: 3 M/UL
RBC # FLD: 15.4 %
RHEUMATOID FACT SER QL: 9.7 IU/ML
SODIUM SERPL-SCNC: 142 MMOL/L
T3FREE SERPL-MCNC: 2.64 PG/ML
T3RU NFR SERPL: 1.05 INDEX
T4 FREE SERPL-MCNC: 1.3 NG/DL
T4 SERPL-MCNC: 8.1 UG/DL
THYROGLOB AB SERPL-ACNC: <20 IU/ML
THYROPEROXIDASE AB SERPL IA-ACNC: <10 IU/ML
TIBC SERPL-MCNC: 267 UG/DL
TRIGL SERPL-MCNC: 108 MG/DL
TSH SERPL-ACNC: 2.92 UIU/ML
UIBC SERPL-MCNC: 210 UG/DL
URATE SERPL-MCNC: 8.8 MG/DL
VIT B12 SERPL-MCNC: 739 PG/ML
WBC # FLD AUTO: 7.02 K/UL

## 2017-04-08 LAB
ADRENAL AB SER-ACNC: <1 U/ML
ALP BONE SERPL-MCNC: 31 MCG/L
COLLAGEN CTX SERPL-MCNC: 1071 PG/ML

## 2017-04-10 LAB — METHYLMALONATE SERPL-SCNC: 381 NMOL/L

## 2017-04-20 ENCOUNTER — APPOINTMENT (OUTPATIENT)
Dept: NEPHROLOGY | Facility: CLINIC | Age: 67
End: 2017-04-20

## 2017-05-10 ENCOUNTER — LABORATORY RESULT (OUTPATIENT)
Age: 67
End: 2017-05-10

## 2017-05-10 ENCOUNTER — APPOINTMENT (OUTPATIENT)
Dept: NEPHROLOGY | Facility: CLINIC | Age: 67
End: 2017-05-10

## 2017-05-10 VITALS — DIASTOLIC BLOOD PRESSURE: 70 MMHG | SYSTOLIC BLOOD PRESSURE: 150 MMHG | HEART RATE: 72 BPM

## 2017-05-10 VITALS — HEART RATE: 72 BPM | DIASTOLIC BLOOD PRESSURE: 70 MMHG | SYSTOLIC BLOOD PRESSURE: 140 MMHG

## 2017-05-10 VITALS — BODY MASS INDEX: 33.25 KG/M2 | WEIGHT: 206 LBS | OXYGEN SATURATION: 96 % | HEART RATE: 92 BPM

## 2017-05-10 VITALS — HEART RATE: 84 BPM | DIASTOLIC BLOOD PRESSURE: 70 MMHG | SYSTOLIC BLOOD PRESSURE: 146 MMHG

## 2017-05-10 DIAGNOSIS — E61.1 IRON DEFICIENCY: ICD-10-CM

## 2017-05-11 LAB
24R-OH-CALCIDIOL SERPL-MCNC: 20.6 PG/ML
25(OH)D3 SERPL-MCNC: 36.6 NG/ML
ALBUMIN SERPL ELPH-MCNC: 4.9 G/DL
ALDOSTERONE SERUM: 31.1 NG/DL
ALP BLD-CCNC: 140 U/L
ALT SERPL-CCNC: 17 U/L
ANION GAP SERPL CALC-SCNC: 18 MMOL/L
AST SERPL-CCNC: 20 U/L
BASOPHILS # BLD AUTO: 0.01 K/UL
BASOPHILS NFR BLD AUTO: 0.1 %
BILIRUB SERPL-MCNC: 0.4 MG/DL
BUN SERPL-MCNC: 81 MG/DL
CALCIUM SERPL-MCNC: 8.7 MG/DL
CALCIUM SERPL-MCNC: 8.7 MG/DL
CHLORIDE SERPL-SCNC: 104 MMOL/L
CHOLEST SERPL-MCNC: 104 MG/DL
CHOLEST/HDLC SERPL: 3 RATIO
CO2 SERPL-SCNC: 20 MMOL/L
CREAT SERPL-MCNC: 5.01 MG/DL
CRP SERPL-MCNC: 0.7 MG/DL
EOSINOPHIL # BLD AUTO: 0.14 K/UL
EOSINOPHIL NFR BLD AUTO: 2 %
ERYTHROCYTE [SEDIMENTATION RATE] IN BLOOD BY WESTERGREN METHOD: 25 MM/HR
FERRITIN SERPL-MCNC: 472 NG/ML
FOLATE SERPL-MCNC: >20 NG/ML
GLUCOSE SERPL-MCNC: 64 MG/DL
HBA1C MFR BLD HPLC: 7.4 %
HCT VFR BLD CALC: 28.1 %
HCYS SERPL-MCNC: 21.4 UMOL/L
HDLC SERPL-MCNC: 38 MG/DL
HGB BLD-MCNC: 8.9 G/DL
IMM GRANULOCYTES NFR BLD AUTO: 0.1 %
IRON SATN MFR SERPL: 18 %
IRON SERPL-MCNC: 44 UG/DL
LDLC SERPL CALC-MCNC: 55 MG/DL
LYMPHOCYTES # BLD AUTO: 2.8 K/UL
LYMPHOCYTES NFR BLD AUTO: 40 %
MAGNESIUM SERPL-MCNC: 2.7 MG/DL
MAN DIFF?: NORMAL
MCHC RBC-ENTMCNC: 29.7 PG
MCHC RBC-ENTMCNC: 31.7 GM/DL
MCV RBC AUTO: 93.7 FL
MONOCYTES # BLD AUTO: 0.41 K/UL
MONOCYTES NFR BLD AUTO: 5.9 %
NEUTROPHILS # BLD AUTO: 3.63 K/UL
NEUTROPHILS NFR BLD AUTO: 51.9 %
PARATHYROID HORMONE INTACT: 469 PG/ML
PHOSPHATE SERPL-MCNC: 4.5 MG/DL
PLATELET # BLD AUTO: 132 K/UL
POTASSIUM SERPL-SCNC: 4.2 MMOL/L
PROT SERPL-MCNC: 7.4 G/DL
RBC # BLD: 3 M/UL
RBC # FLD: 14.6 %
RENIN PLASMA: 15 PG/ML
SODIUM SERPL-SCNC: 142 MMOL/L
T3FREE SERPL-MCNC: 2.61 PG/ML
T3RU NFR SERPL: 1 INDEX
T4 FREE SERPL-MCNC: 1.4 NG/DL
T4 SERPL-MCNC: 9.4 UG/DL
TIBC SERPL-MCNC: 242 UG/DL
TRIGL SERPL-MCNC: 56 MG/DL
TSH SERPL-ACNC: 2.74 UIU/ML
UIBC SERPL-MCNC: 198 UG/DL
URATE SERPL-MCNC: 9.5 MG/DL
VIT B12 SERPL-MCNC: 740 PG/ML
WBC # FLD AUTO: 7 K/UL

## 2017-05-12 LAB
ALP BONE SERPL-MCNC: 30 MCG/L
COLLAGEN CTX SERPL-MCNC: 1141 PG/ML
CYSTATIN C SERPL-MCNC: 3.92 MG/L
GFR/BSA.PRED SERPLBLD CYS-BASED-ARV: 12

## 2017-05-14 LAB
ALBUMIN MFR SERPL ELPH: 63 %
ALBUMIN SERPL-MCNC: 4.7 G/DL
ALBUMIN/GLOB SERPL: 1.7 RATIO
ALPHA1 GLOB MFR SERPL ELPH: 4.1 %
ALPHA1 GLOB SERPL ELPH-MCNC: 0.3 G/DL
ALPHA2 GLOB MFR SERPL ELPH: 10.3 %
ALPHA2 GLOB SERPL ELPH-MCNC: 0.8 G/DL
B-GLOBULIN MFR SERPL ELPH: 9.8 %
B-GLOBULIN SERPL ELPH-MCNC: 0.7 G/DL
DEPRECATED KAPPA LC FREE/LAMBDA SER: 1.94 RATIO
GAMMA GLOB FLD ELPH-MCNC: 0.9 G/DL
GAMMA GLOB MFR SERPL ELPH: 12.8 %
IGA SER QL IEP: 138 MG/DL
IGG SER QL IEP: 1090 MG/DL
IGM SER QL IEP: 28 MG/DL
INTERPRETATION SERPL IEP-IMP: NORMAL
KAPPA LC CSF-MCNC: 8.67 MG/DL
KAPPA LC SERPL-MCNC: 16.8 MG/DL
M PROTEIN MFR SERPL ELPH: 5.6 %
M PROTEIN SPEC IFE-MCNC: NORMAL
MONOCLON BAND OBS SERPL: 0.4 G/DL
PROT SERPL-MCNC: 7.4 G/DL
PROT SERPL-MCNC: 7.4 G/DL
THYROGLOB AB SERPL-ACNC: <20 IU/ML
THYROPEROXIDASE AB SERPL IA-ACNC: <10 IU/ML

## 2017-05-16 LAB — METHYLMALONATE SERPL-SCNC: 306 NMOL/L

## 2017-05-24 ENCOUNTER — RECORD ABSTRACTING (OUTPATIENT)
Age: 67
End: 2017-05-24

## 2017-05-30 ENCOUNTER — APPOINTMENT (OUTPATIENT)
Dept: NEPHROLOGY | Facility: CLINIC | Age: 67
End: 2017-05-30

## 2017-05-30 VITALS — DIASTOLIC BLOOD PRESSURE: 60 MMHG | SYSTOLIC BLOOD PRESSURE: 142 MMHG | HEART RATE: 84 BPM

## 2017-05-30 VITALS — DIASTOLIC BLOOD PRESSURE: 60 MMHG | SYSTOLIC BLOOD PRESSURE: 140 MMHG

## 2017-05-30 VITALS — DIASTOLIC BLOOD PRESSURE: 74 MMHG | SYSTOLIC BLOOD PRESSURE: 136 MMHG

## 2017-06-08 ENCOUNTER — RECORD ABSTRACTING (OUTPATIENT)
Age: 67
End: 2017-06-08

## 2017-06-12 ENCOUNTER — APPOINTMENT (OUTPATIENT)
Dept: HEART AND VASCULAR | Facility: CLINIC | Age: 67
End: 2017-06-12

## 2017-06-12 ENCOUNTER — APPOINTMENT (OUTPATIENT)
Dept: NEPHROLOGY | Facility: CLINIC | Age: 67
End: 2017-06-12

## 2017-06-12 VITALS — DIASTOLIC BLOOD PRESSURE: 60 MMHG | SYSTOLIC BLOOD PRESSURE: 140 MMHG | HEART RATE: 84 BPM

## 2017-06-12 DIAGNOSIS — E87.2 ACIDOSIS: ICD-10-CM

## 2017-08-09 LAB — CRP SERPL-MCNC: 4.57 MG/DL

## 2017-08-16 NOTE — PROGRESS NOTE ADULT - PROBLEM SELECTOR PROBLEM 4
Dilaudid (2mg/IV) given SIVP for pain in back and abdomen. Family is at the bedside. Will continue to monitor. Mandibular abscess

## 2017-09-06 ENCOUNTER — APPOINTMENT (OUTPATIENT)
Dept: HEART AND VASCULAR | Facility: CLINIC | Age: 67
End: 2017-09-06
Payer: MEDICARE

## 2017-09-06 VITALS
WEIGHT: 189 LBS | BODY MASS INDEX: 30.37 KG/M2 | HEART RATE: 78 BPM | HEIGHT: 66 IN | SYSTOLIC BLOOD PRESSURE: 146 MMHG | DIASTOLIC BLOOD PRESSURE: 70 MMHG

## 2017-09-06 DIAGNOSIS — R40.4 TRANSIENT ALTERATION OF AWARENESS: ICD-10-CM

## 2017-09-06 PROCEDURE — 93000 ELECTROCARDIOGRAM COMPLETE: CPT

## 2017-09-06 PROCEDURE — 99215 OFFICE O/P EST HI 40 MIN: CPT | Mod: 25

## 2017-09-06 PROCEDURE — 93306 TTE W/DOPPLER COMPLETE: CPT

## 2017-09-06 RX ORDER — TACROLIMUS 0.5 MG/1
CAPSULE, GELATIN COATED ORAL
Refills: 0 | Status: ACTIVE | COMMUNITY

## 2017-09-06 RX ORDER — REPAGLINIDE 1 MG/1
1 TABLET ORAL
Qty: 270 | Refills: 0 | Status: DISCONTINUED | COMMUNITY
Start: 2017-05-10 | End: 2017-09-06

## 2017-09-06 RX ORDER — ERYTHROPOIETIN 20000 [IU]/ML
20000 INJECTION, SOLUTION INTRAVENOUS; SUBCUTANEOUS
Qty: 13 | Refills: 0 | Status: DISCONTINUED | COMMUNITY
Start: 2017-02-15 | End: 2017-09-06

## 2017-09-07 PROBLEM — R40.4 ALTERED AWARENESS, TRANSIENT: Status: ACTIVE | Noted: 2017-09-07

## 2017-09-18 ENCOUNTER — APPOINTMENT (OUTPATIENT)
Dept: NEPHROLOGY | Facility: CLINIC | Age: 67
End: 2017-09-18

## 2017-09-18 ENCOUNTER — APPOINTMENT (OUTPATIENT)
Dept: OTOLARYNGOLOGY | Facility: CLINIC | Age: 67
End: 2017-09-18
Payer: MEDICARE

## 2017-09-18 VITALS
HEIGHT: 66 IN | SYSTOLIC BLOOD PRESSURE: 139 MMHG | BODY MASS INDEX: 30.37 KG/M2 | DIASTOLIC BLOOD PRESSURE: 73 MMHG | WEIGHT: 189 LBS | TEMPERATURE: 98.8 F | HEART RATE: 71 BPM

## 2017-09-18 PROCEDURE — 99213 OFFICE O/P EST LOW 20 MIN: CPT

## 2017-09-28 ENCOUNTER — APPOINTMENT (OUTPATIENT)
Dept: NEPHROLOGY | Facility: CLINIC | Age: 67
End: 2017-09-28
Payer: MEDICARE

## 2017-09-28 VITALS — OXYGEN SATURATION: 98 % | BODY MASS INDEX: 30.99 KG/M2 | HEART RATE: 90 BPM | WEIGHT: 192 LBS

## 2017-09-28 VITALS — SYSTOLIC BLOOD PRESSURE: 120 MMHG | HEART RATE: 72 BPM | DIASTOLIC BLOOD PRESSURE: 70 MMHG

## 2017-09-28 VITALS — HEART RATE: 72 BPM

## 2017-09-28 DIAGNOSIS — Z23 ENCOUNTER FOR IMMUNIZATION: ICD-10-CM

## 2017-09-28 PROCEDURE — 99215 OFFICE O/P EST HI 40 MIN: CPT | Mod: 25

## 2017-10-16 ENCOUNTER — APPOINTMENT (OUTPATIENT)
Dept: OTOLARYNGOLOGY | Facility: CLINIC | Age: 67
End: 2017-10-16

## 2017-10-17 ENCOUNTER — FORM ENCOUNTER (OUTPATIENT)
Age: 67
End: 2017-10-17

## 2017-10-18 ENCOUNTER — OUTPATIENT (OUTPATIENT)
Dept: OUTPATIENT SERVICES | Facility: HOSPITAL | Age: 67
LOS: 1 days | End: 2017-10-18
Payer: MEDICARE

## 2017-10-18 DIAGNOSIS — Z95.1 PRESENCE OF AORTOCORONARY BYPASS GRAFT: Chronic | ICD-10-CM

## 2017-10-18 DIAGNOSIS — M27.2 INFLAMMATORY CONDITIONS OF JAWS: Chronic | ICD-10-CM

## 2017-10-18 DIAGNOSIS — Z98.890 OTHER SPECIFIED POSTPROCEDURAL STATES: Chronic | ICD-10-CM

## 2017-10-18 PROCEDURE — 70540 MRI ORBIT/FACE/NECK W/O DYE: CPT

## 2017-10-18 PROCEDURE — 70540 MRI ORBIT/FACE/NECK W/O DYE: CPT | Mod: 26

## 2017-10-26 ENCOUNTER — APPOINTMENT (OUTPATIENT)
Dept: NEPHROLOGY | Facility: CLINIC | Age: 67
End: 2017-10-26

## 2017-11-14 ENCOUNTER — RX RENEWAL (OUTPATIENT)
Age: 67
End: 2017-11-14

## 2017-11-16 ENCOUNTER — APPOINTMENT (OUTPATIENT)
Dept: NEPHROLOGY | Facility: CLINIC | Age: 67
End: 2017-11-16
Payer: MEDICARE

## 2017-11-16 VITALS — SYSTOLIC BLOOD PRESSURE: 128 MMHG | DIASTOLIC BLOOD PRESSURE: 60 MMHG

## 2017-11-16 VITALS — HEART RATE: 60 BPM | DIASTOLIC BLOOD PRESSURE: 70 MMHG | SYSTOLIC BLOOD PRESSURE: 120 MMHG

## 2017-11-16 VITALS — SYSTOLIC BLOOD PRESSURE: 120 MMHG | HEART RATE: 72 BPM | DIASTOLIC BLOOD PRESSURE: 76 MMHG

## 2017-11-16 VITALS — BODY MASS INDEX: 30.34 KG/M2 | HEART RATE: 90 BPM | OXYGEN SATURATION: 98 % | WEIGHT: 188 LBS

## 2017-11-16 VITALS — HEART RATE: 84 BPM | DIASTOLIC BLOOD PRESSURE: 48 MMHG | SYSTOLIC BLOOD PRESSURE: 100 MMHG

## 2017-11-16 PROCEDURE — 99215 OFFICE O/P EST HI 40 MIN: CPT

## 2017-12-28 ENCOUNTER — APPOINTMENT (OUTPATIENT)
Dept: NEPHROLOGY | Facility: CLINIC | Age: 67
End: 2017-12-28

## 2018-01-03 ENCOUNTER — APPOINTMENT (OUTPATIENT)
Dept: HEART AND VASCULAR | Facility: CLINIC | Age: 68
End: 2018-01-03
Payer: MEDICARE

## 2018-01-03 VITALS
HEIGHT: 66 IN | SYSTOLIC BLOOD PRESSURE: 130 MMHG | BODY MASS INDEX: 30.86 KG/M2 | WEIGHT: 192 LBS | DIASTOLIC BLOOD PRESSURE: 62 MMHG | HEART RATE: 58 BPM

## 2018-01-03 PROCEDURE — 99214 OFFICE O/P EST MOD 30 MIN: CPT | Mod: 25

## 2018-01-03 PROCEDURE — 93000 ELECTROCARDIOGRAM COMPLETE: CPT

## 2018-02-07 ENCOUNTER — APPOINTMENT (OUTPATIENT)
Dept: NEPHROLOGY | Facility: CLINIC | Age: 68
End: 2018-02-07
Payer: MEDICARE

## 2018-02-07 VITALS — DIASTOLIC BLOOD PRESSURE: 60 MMHG | HEART RATE: 84 BPM | SYSTOLIC BLOOD PRESSURE: 120 MMHG

## 2018-02-07 VITALS — OXYGEN SATURATION: 98 % | HEART RATE: 60 BPM

## 2018-02-07 VITALS — DIASTOLIC BLOOD PRESSURE: 70 MMHG | HEART RATE: 84 BPM | SYSTOLIC BLOOD PRESSURE: 130 MMHG

## 2018-02-07 VITALS — BODY MASS INDEX: 30.99 KG/M2 | WEIGHT: 192 LBS

## 2018-02-07 PROCEDURE — 93000 ELECTROCARDIOGRAM COMPLETE: CPT

## 2018-02-07 PROCEDURE — 99214 OFFICE O/P EST MOD 30 MIN: CPT

## 2018-02-19 ENCOUNTER — RX RENEWAL (OUTPATIENT)
Age: 68
End: 2018-02-19

## 2018-04-09 ENCOUNTER — APPOINTMENT (OUTPATIENT)
Dept: OTOLARYNGOLOGY | Facility: CLINIC | Age: 68
End: 2018-04-09
Payer: MEDICARE

## 2018-04-09 VITALS
HEART RATE: 62 BPM | HEIGHT: 66 IN | SYSTOLIC BLOOD PRESSURE: 128 MMHG | OXYGEN SATURATION: 98 % | DIASTOLIC BLOOD PRESSURE: 73 MMHG | WEIGHT: 192 LBS | TEMPERATURE: 98.2 F | BODY MASS INDEX: 30.86 KG/M2

## 2018-04-09 PROCEDURE — 99213 OFFICE O/P EST LOW 20 MIN: CPT

## 2018-04-11 ENCOUNTER — APPOINTMENT (OUTPATIENT)
Dept: NEPHROLOGY | Facility: CLINIC | Age: 68
End: 2018-04-11

## 2018-05-10 ENCOUNTER — APPOINTMENT (OUTPATIENT)
Dept: HEART AND VASCULAR | Facility: CLINIC | Age: 68
End: 2018-05-10

## 2018-06-18 ENCOUNTER — APPOINTMENT (OUTPATIENT)
Dept: NEPHROLOGY | Facility: CLINIC | Age: 68
End: 2018-06-18
Payer: MEDICARE

## 2018-06-18 ENCOUNTER — APPOINTMENT (OUTPATIENT)
Dept: HEART AND VASCULAR | Facility: CLINIC | Age: 68
End: 2018-06-18
Payer: MEDICARE

## 2018-06-18 VITALS — SYSTOLIC BLOOD PRESSURE: 130 MMHG | DIASTOLIC BLOOD PRESSURE: 70 MMHG | HEART RATE: 72 BPM

## 2018-06-18 VITALS — OXYGEN SATURATION: 98 % | HEART RATE: 85 BPM

## 2018-06-18 VITALS — DIASTOLIC BLOOD PRESSURE: 70 MMHG | HEART RATE: 84 BPM | SYSTOLIC BLOOD PRESSURE: 122 MMHG

## 2018-06-18 VITALS
HEART RATE: 78 BPM | SYSTOLIC BLOOD PRESSURE: 140 MMHG | DIASTOLIC BLOOD PRESSURE: 70 MMHG | WEIGHT: 196 LBS | BODY MASS INDEX: 31.64 KG/M2

## 2018-06-18 VITALS — DIASTOLIC BLOOD PRESSURE: 60 MMHG | SYSTOLIC BLOOD PRESSURE: 126 MMHG

## 2018-06-18 VITALS — HEART RATE: 84 BPM | DIASTOLIC BLOOD PRESSURE: 70 MMHG | SYSTOLIC BLOOD PRESSURE: 112 MMHG

## 2018-06-18 VITALS — DIASTOLIC BLOOD PRESSURE: 70 MMHG | SYSTOLIC BLOOD PRESSURE: 116 MMHG

## 2018-06-18 DIAGNOSIS — Z79.899 OTHER LONG TERM (CURRENT) DRUG THERAPY: ICD-10-CM

## 2018-06-18 PROCEDURE — 93321 DOPPLER ECHO F-UP/LMTD STD: CPT

## 2018-06-18 PROCEDURE — 99215 OFFICE O/P EST HI 40 MIN: CPT | Mod: 25

## 2018-06-18 PROCEDURE — 93000 ELECTROCARDIOGRAM COMPLETE: CPT

## 2018-06-18 PROCEDURE — 93325 DOPPLER ECHO COLOR FLOW MAPG: CPT

## 2018-06-18 PROCEDURE — 93308 TTE F-UP OR LMTD: CPT

## 2018-06-18 RX ORDER — SULFAMETHOXAZOLE AND TRIMETHOPRIM 800; 160 MG/1; MG/1
800-160 TABLET ORAL DAILY
Qty: 30 | Refills: 1 | Status: DISCONTINUED | COMMUNITY
End: 2018-06-18

## 2018-06-18 RX ORDER — INSULIN GLARGINE 100 [IU]/ML
INJECTION, SOLUTION SUBCUTANEOUS
Refills: 0 | Status: DISCONTINUED | COMMUNITY
End: 2018-06-18

## 2018-06-18 RX ORDER — DULAGLUTIDE 4.5 MG/.5ML
INJECTION, SOLUTION SUBCUTANEOUS
Refills: 0 | Status: DISCONTINUED | COMMUNITY
End: 2018-06-18

## 2018-08-29 ENCOUNTER — APPOINTMENT (OUTPATIENT)
Dept: NEPHROLOGY | Facility: CLINIC | Age: 68
End: 2018-08-29
Payer: MEDICARE

## 2018-08-29 ENCOUNTER — LABORATORY RESULT (OUTPATIENT)
Age: 68
End: 2018-08-29

## 2018-08-29 ENCOUNTER — APPOINTMENT (OUTPATIENT)
Dept: HEART AND VASCULAR | Facility: CLINIC | Age: 68
End: 2018-08-29
Payer: MEDICARE

## 2018-08-29 VITALS — DIASTOLIC BLOOD PRESSURE: 60 MMHG | SYSTOLIC BLOOD PRESSURE: 120 MMHG

## 2018-08-29 VITALS — HEART RATE: 74 BPM | OXYGEN SATURATION: 98 % | WEIGHT: 197 LBS | BODY MASS INDEX: 31.8 KG/M2

## 2018-08-29 VITALS — DIASTOLIC BLOOD PRESSURE: 62 MMHG | HEART RATE: 60 BPM | SYSTOLIC BLOOD PRESSURE: 118 MMHG

## 2018-08-29 VITALS — SYSTOLIC BLOOD PRESSURE: 140 MMHG | DIASTOLIC BLOOD PRESSURE: 62 MMHG

## 2018-08-29 DIAGNOSIS — M26.4 MALOCCLUSION, UNSPECIFIED: ICD-10-CM

## 2018-08-29 PROCEDURE — 93000 ELECTROCARDIOGRAM COMPLETE: CPT

## 2018-08-29 PROCEDURE — 36415 COLL VENOUS BLD VENIPUNCTURE: CPT

## 2018-08-29 PROCEDURE — 99215 OFFICE O/P EST HI 40 MIN: CPT | Mod: 25

## 2018-08-29 PROCEDURE — 99214 OFFICE O/P EST MOD 30 MIN: CPT | Mod: 25

## 2018-08-29 RX ORDER — VALGANCICLOVIR 450 MG/1
TABLET, FILM COATED ORAL
Refills: 0 | Status: DISCONTINUED | COMMUNITY
End: 2018-08-29

## 2018-08-30 LAB
BASOPHILS # BLD AUTO: 0.01 K/UL
BASOPHILS NFR BLD AUTO: 0.2 %
EOSINOPHIL # BLD AUTO: 0.1 K/UL
EOSINOPHIL NFR BLD AUTO: 1.8 %
HCT VFR BLD CALC: 38 %
HGB BLD-MCNC: 12.4 G/DL
IMM GRANULOCYTES NFR BLD AUTO: 0.4 %
LYMPHOCYTES # BLD AUTO: 2.84 K/UL
LYMPHOCYTES NFR BLD AUTO: 51.4 %
MAN DIFF?: NORMAL
MCHC RBC-ENTMCNC: 28.5 PG
MCHC RBC-ENTMCNC: 32.6 GM/DL
MCV RBC AUTO: 87.4 FL
MONOCYTES # BLD AUTO: 0.49 K/UL
MONOCYTES NFR BLD AUTO: 8.9 %
NEUTROPHILS # BLD AUTO: 2.07 K/UL
NEUTROPHILS NFR BLD AUTO: 37.3 %
PLATELET # BLD AUTO: 144 K/UL
RBC # BLD: 4.35 M/UL
RBC # FLD: 15.7 %
WBC # FLD AUTO: 5.53 K/UL

## 2018-08-31 LAB
24R-OH-CALCIDIOL SERPL-MCNC: 53.2 PG/ML
25(OH)D3 SERPL-MCNC: 22.1 NG/ML
ALBUMIN MFR SERPL ELPH: 61.4 %
ALBUMIN SERPL ELPH-MCNC: 4.8 G/DL
ALBUMIN SERPL-MCNC: 4.9 G/DL
ALBUMIN/GLOB SERPL: 1.6 RATIO
ALDOSTERONE SERUM: 15.8 NG/DL
ALP BLD-CCNC: 115 U/L
ALPHA1 GLOB MFR SERPL ELPH: 3.6 %
ALPHA1 GLOB SERPL ELPH-MCNC: 0.3 G/DL
ALPHA2 GLOB MFR SERPL ELPH: 9.4 %
ALPHA2 GLOB SERPL ELPH-MCNC: 0.7 G/DL
ALT SERPL-CCNC: 38 U/L
ANION GAP SERPL CALC-SCNC: 15 MMOL/L
APPEARANCE: CLEAR
AST SERPL-CCNC: 31 U/L
B-GLOBULIN MFR SERPL ELPH: 11.7 %
B-GLOBULIN SERPL ELPH-MCNC: 0.9 G/DL
BACTERIA UR CULT: NORMAL
BACTERIA: NEGATIVE
BILIRUB SERPL-MCNC: 0.5 MG/DL
BILIRUBIN URINE: NEGATIVE
BLOOD URINE: NEGATIVE
BUN SERPL-MCNC: 31 MG/DL
C3 SERPL-MCNC: 123 MG/DL
C4 SERPL-MCNC: 38 MG/DL
CALCIUM SERPL-MCNC: 9.7 MG/DL
CALCIUM SERPL-MCNC: 9.7 MG/DL
CHLORIDE ?TM UR-SCNC: 54 MMOL/L
CHLORIDE SERPL-SCNC: 101 MMOL/L
CHOLEST SERPL-MCNC: 105 MG/DL
CHOLEST/HDLC SERPL: 3.4 RATIO
CO2 SERPL-SCNC: 19 MMOL/L
COLOR: YELLOW
CORTIS SERPL-MCNC: 13.3 UG/DL
CREAT SERPL-MCNC: 1.31 MG/DL
CREAT SPEC-SCNC: 97 MG/DL
CREAT SPEC-SCNC: 97 MG/DL
CREAT/PROT UR: 0.2 RATIO
DEPRECATED KAPPA LC FREE/LAMBDA SER: 0.78 RATIO
DEPRECATED KAPPA LC FREE/LAMBDA SER: 0.78 RATIO
FERRITIN SERPL-MCNC: 394 NG/ML
FOLATE SERPL-MCNC: 10.8 NG/ML
GAMMA GLOB FLD ELPH-MCNC: 1.1 G/DL
GAMMA GLOB MFR SERPL ELPH: 13.9 %
GLUCOSE QUALITATIVE U: NEGATIVE MG/DL
GLUCOSE SERPL-MCNC: 151 MG/DL
HBV SURFACE AB SER QL: NONREACTIVE
HBV SURFACE AG SER QL: NONREACTIVE
HCV AB SER QL: NONREACTIVE
HCV S/CO RATIO: 0.08 S/CO
HCYS SERPL-MCNC: 12.3 UMOL/L
HDLC SERPL-MCNC: 31 MG/DL
HYALINE CASTS: 2 /LPF
IGA SER QL IEP: 415 MG/DL
IGG SER QL IEP: 1133 MG/DL
IGM SER QL IEP: 66 MG/DL
INTERPRETATION SERPL IEP-IMP: NORMAL
IRON SATN MFR SERPL: 20 %
IRON SERPL-MCNC: 65 UG/DL
KAPPA LC CSF-MCNC: 3.79 MG/DL
KAPPA LC CSF-MCNC: 3.79 MG/DL
KAPPA LC SERPL-MCNC: 2.97 MG/DL
KAPPA LC SERPL-MCNC: 2.97 MG/DL
KETONES URINE: NEGATIVE
LDLC SERPL CALC-MCNC: 53 MG/DL
LEUKOCYTE ESTERASE URINE: NEGATIVE
M PROTEIN MFR SERPL ELPH: 5.3 %
M PROTEIN SPEC IFE-MCNC: NORMAL
MAGNESIUM SERPL-MCNC: 2 MG/DL
MAGNESIUM UR-MCNC: 4.3 MG/DL
MICROALBUMIN 24H UR DL<=1MG/L-MCNC: 2.5 MG/DL
MICROALBUMIN/CREAT 24H UR-RTO: 26 MG/G
MICROSCOPIC-UA: NORMAL
MONOCLON BAND OBS SERPL: 0.4 G/DL
MPO AB + PR3 PNL SER: NORMAL
NITRITE URINE: NEGATIVE
OSMOLALITY SERPL: 291 MOS/KG
OSMOLALITY UR: 527 MOS/KG
PARATHYROID HORMONE INTACT: 61 PG/ML
PH URINE: 5.5
PHOSPHATE SERPL-MCNC: 2.7 MG/DL
POTASSIUM SERPL-SCNC: 4.8 MMOL/L
POTASSIUM UR-SCNC: 17 MMOL/L
PROT SERPL-MCNC: 7.9 G/DL
PROT UR-MCNC: 18 MG/DL
PROTEIN URINE: NEGATIVE MG/DL
RED BLOOD CELLS URINE: 1 /HPF
RENIN PLASMA: 7.9 PG/ML
RHEUMATOID FACT SER QL: 11 IU/ML
SODIUM ?TM SUB UR QN: 45 MMOL/L
SODIUM SERPL-SCNC: 135 MMOL/L
SPECIFIC GRAVITY URINE: 1.02
SQUAMOUS EPITHELIAL CELLS: 1 /HPF
T3FREE SERPL-MCNC: 2.9 PG/ML
T3RU NFR SERPL: 1.01 INDEX
T4 FREE SERPL-MCNC: 1.6 NG/DL
T4 SERPL-MCNC: 9.4 UG/DL
THYROGLOB AB SERPL-ACNC: <20 IU/ML
THYROPEROXIDASE AB SERPL IA-ACNC: <10 IU/ML
TIBC SERPL-MCNC: 324 UG/DL
TRIGL SERPL-MCNC: 106 MG/DL
TSH SERPL-ACNC: 1.64 UIU/ML
UIBC SERPL-MCNC: 259 UG/DL
URATE SERPL-MCNC: 6.1 MG/DL
URATE UR-MCNC: 33.4 MG/DL
UROBILINOGEN URINE: NEGATIVE MG/DL
VIT B12 SERPL-MCNC: 758 PG/ML
WHITE BLOOD CELLS URINE: 0 /HPF

## 2018-09-01 LAB
ALBUPE: 32.6 %
ALPHA1UPE: 11.1 %
ALPHA2UPE: 18.3 %
ANA SER IF-ACNC: NEGATIVE
BETAUPE: 14.3 %
COLLAGEN CTX SERPL-MCNC: 352 PG/ML
CREAT 24H UR-MCNC: NORMAL G/24 H
CREATININE UR (MAYO): 95 MG/DL
FREE KAPPA URINE: 266 MG/L
FREE KAPPA/LAMDA RATIO: 7.94
FREE LAMDA URINE: 33.5 MG/L
GAMMAUPE: 23.7 %
IGA 24H UR QL IFE: NORMAL
KAPPA LC 24H UR QL: PRESENT
KAPPA LC 24H UR QL: PRESENT
METHYLMALONATE SERPL-SCNC: 203 NMOL/L
PROT PATTERN 24H UR ELPH-IMP: NORMAL
PROT UR-MCNC: 17 MG/DL
PROT UR-MCNC: 17 MG/DL
SPECIMEN VOL 24H UR: NORMAL ML

## 2018-09-04 LAB
C1Q IMMUNE COMPLEX: <1.2 UG EQ/ML
C3D IMMUNE COMPLEXES: 5.3 UG EQ/ML

## 2018-09-05 ENCOUNTER — FORM ENCOUNTER (OUTPATIENT)
Age: 68
End: 2018-09-05

## 2018-09-06 ENCOUNTER — OUTPATIENT (OUTPATIENT)
Dept: OUTPATIENT SERVICES | Facility: HOSPITAL | Age: 68
LOS: 1 days | End: 2018-09-06
Payer: MEDICARE

## 2018-09-06 DIAGNOSIS — Z95.1 PRESENCE OF AORTOCORONARY BYPASS GRAFT: Chronic | ICD-10-CM

## 2018-09-06 DIAGNOSIS — R94.31 ABNORMAL ELECTROCARDIOGRAM [ECG] [EKG]: ICD-10-CM

## 2018-09-06 DIAGNOSIS — M27.2 INFLAMMATORY CONDITIONS OF JAWS: Chronic | ICD-10-CM

## 2018-09-06 DIAGNOSIS — Z98.890 OTHER SPECIFIED POSTPROCEDURAL STATES: Chronic | ICD-10-CM

## 2018-09-06 PROCEDURE — 93016 CV STRESS TEST SUPVJ ONLY: CPT

## 2018-09-06 PROCEDURE — 78452 HT MUSCLE IMAGE SPECT MULT: CPT | Mod: 26

## 2018-09-06 PROCEDURE — 93017 CV STRESS TEST TRACING ONLY: CPT

## 2018-09-06 PROCEDURE — 78452 HT MUSCLE IMAGE SPECT MULT: CPT

## 2018-09-06 PROCEDURE — A9500: CPT

## 2018-09-06 PROCEDURE — 93018 CV STRESS TEST I&R ONLY: CPT

## 2018-09-08 LAB
ALP BONE SERPL-MCNC: 25 MCG/L
VASOPRESSIN SERPL-MCNC: <0.5 PG/ML

## 2018-10-09 ENCOUNTER — APPOINTMENT (OUTPATIENT)
Dept: NEPHROLOGY | Facility: CLINIC | Age: 68
End: 2018-10-09
Payer: MEDICARE

## 2018-10-09 VITALS — OXYGEN SATURATION: 96 % | HEART RATE: 81 BPM | BODY MASS INDEX: 31.96 KG/M2 | WEIGHT: 198 LBS

## 2018-10-09 VITALS — HEART RATE: 84 BPM | DIASTOLIC BLOOD PRESSURE: 70 MMHG | SYSTOLIC BLOOD PRESSURE: 124 MMHG

## 2018-10-09 VITALS — DIASTOLIC BLOOD PRESSURE: 70 MMHG | SYSTOLIC BLOOD PRESSURE: 128 MMHG

## 2018-10-09 PROCEDURE — 99215 OFFICE O/P EST HI 40 MIN: CPT | Mod: 25

## 2018-10-09 PROCEDURE — 93000 ELECTROCARDIOGRAM COMPLETE: CPT

## 2018-11-06 ENCOUNTER — APPOINTMENT (OUTPATIENT)
Dept: GASTROENTEROLOGY | Facility: CLINIC | Age: 68
End: 2018-11-06

## 2018-11-26 ENCOUNTER — APPOINTMENT (OUTPATIENT)
Dept: GASTROENTEROLOGY | Facility: CLINIC | Age: 68
End: 2018-11-26
Payer: MEDICARE

## 2018-11-26 VITALS
HEIGHT: 66 IN | RESPIRATION RATE: 14 BRPM | DIASTOLIC BLOOD PRESSURE: 85 MMHG | SYSTOLIC BLOOD PRESSURE: 132 MMHG | HEART RATE: 79 BPM | BODY MASS INDEX: 32.14 KG/M2 | OXYGEN SATURATION: 97 % | TEMPERATURE: 98 F | WEIGHT: 200 LBS

## 2018-11-26 PROCEDURE — 99214 OFFICE O/P EST MOD 30 MIN: CPT

## 2018-12-17 ENCOUNTER — APPOINTMENT (OUTPATIENT)
Dept: NEPHROLOGY | Facility: CLINIC | Age: 68
End: 2018-12-17
Payer: MEDICARE

## 2018-12-17 VITALS — DIASTOLIC BLOOD PRESSURE: 80 MMHG | HEART RATE: 60 BPM | SYSTOLIC BLOOD PRESSURE: 124 MMHG

## 2018-12-17 DIAGNOSIS — B27.90 INFECTIOUS MONONUCLEOSIS, UNSPECIFIED W/OUT COMPLICATION: ICD-10-CM

## 2018-12-17 PROCEDURE — 99214 OFFICE O/P EST MOD 30 MIN: CPT

## 2018-12-19 ENCOUNTER — APPOINTMENT (OUTPATIENT)
Dept: OTOLARYNGOLOGY | Facility: CLINIC | Age: 68
End: 2018-12-19
Payer: MEDICARE

## 2018-12-19 VITALS
BODY MASS INDEX: 32.14 KG/M2 | HEART RATE: 75 BPM | WEIGHT: 200 LBS | HEIGHT: 66 IN | SYSTOLIC BLOOD PRESSURE: 171 MMHG | DIASTOLIC BLOOD PRESSURE: 73 MMHG

## 2018-12-19 PROCEDURE — 31231 NASAL ENDOSCOPY DX: CPT

## 2018-12-19 PROCEDURE — 99213 OFFICE O/P EST LOW 20 MIN: CPT | Mod: 25

## 2018-12-27 ENCOUNTER — APPOINTMENT (OUTPATIENT)
Dept: HEART AND VASCULAR | Facility: CLINIC | Age: 68
End: 2018-12-27

## 2019-01-02 LAB — BACTERIA SPEC CULT: ABNORMAL

## 2019-01-09 ENCOUNTER — APPOINTMENT (OUTPATIENT)
Dept: GASTROENTEROLOGY | Facility: HOSPITAL | Age: 69
End: 2019-01-09

## 2019-01-11 ENCOUNTER — APPOINTMENT (OUTPATIENT)
Dept: OTOLARYNGOLOGY | Facility: CLINIC | Age: 69
End: 2019-01-11

## 2019-01-14 ENCOUNTER — APPOINTMENT (OUTPATIENT)
Dept: OTOLARYNGOLOGY | Facility: CLINIC | Age: 69
End: 2019-01-14
Payer: MEDICARE

## 2019-01-14 PROCEDURE — 31231 NASAL ENDOSCOPY DX: CPT

## 2019-01-14 PROCEDURE — 99212 OFFICE O/P EST SF 10 MIN: CPT | Mod: 25

## 2019-01-14 NOTE — REASON FOR VISIT
[Subsequent Evaluation] : a subsequent evaluation for [FreeTextEntry2] : acute sinusitis, and nasal obstruction

## 2019-01-14 NOTE — PHYSICAL EXAM
[] : septum deviated to the right [de-identified] : edema  [Midline] : trachea located in midline position [Normal] : no rashes

## 2019-01-14 NOTE — HISTORY OF PRESENT ILLNESS
[FreeTextEntry1] : 1/14/19 Patient is following up for acute sinusitis, and nasal obstruction. Patient admits feeling much better since his last visit. Patient was last seen in December 2018 and put on Afrin, Augmentin and Sudafed with relief. Patient notes occasional nasal congestion that still persists.

## 2019-01-16 ENCOUNTER — APPOINTMENT (OUTPATIENT)
Dept: OTOLARYNGOLOGY | Facility: CLINIC | Age: 69
End: 2019-01-16

## 2019-01-17 ENCOUNTER — OUTPATIENT (OUTPATIENT)
Dept: OUTPATIENT SERVICES | Facility: HOSPITAL | Age: 69
LOS: 1 days | Discharge: HOME | End: 2019-01-17
Payer: MEDICARE

## 2019-01-17 ENCOUNTER — APPOINTMENT (OUTPATIENT)
Dept: GASTROENTEROLOGY | Facility: HOSPITAL | Age: 69
End: 2019-01-17

## 2019-01-17 ENCOUNTER — RESULT REVIEW (OUTPATIENT)
Age: 69
End: 2019-01-17

## 2019-01-17 DIAGNOSIS — Z95.1 PRESENCE OF AORTOCORONARY BYPASS GRAFT: Chronic | ICD-10-CM

## 2019-01-17 DIAGNOSIS — M27.2 INFLAMMATORY CONDITIONS OF JAWS: Chronic | ICD-10-CM

## 2019-01-17 DIAGNOSIS — Z98.890 OTHER SPECIFIED POSTPROCEDURAL STATES: Chronic | ICD-10-CM

## 2019-01-17 LAB — GLUCOSE BLDC GLUCOMTR-MCNC: 105 MG/DL — HIGH (ref 70–99)

## 2019-01-17 PROCEDURE — 88305 TISSUE EXAM BY PATHOLOGIST: CPT

## 2019-01-17 PROCEDURE — 43239 EGD BIOPSY SINGLE/MULTIPLE: CPT

## 2019-01-17 PROCEDURE — 82962 GLUCOSE BLOOD TEST: CPT

## 2019-01-18 LAB — SURGICAL PATHOLOGY STUDY: SIGNIFICANT CHANGE UP

## 2019-01-22 ENCOUNTER — RESULT REVIEW (OUTPATIENT)
Age: 69
End: 2019-01-22

## 2019-03-12 ENCOUNTER — APPOINTMENT (OUTPATIENT)
Dept: HEART AND VASCULAR | Facility: CLINIC | Age: 69
End: 2019-03-12

## 2019-04-04 ENCOUNTER — APPOINTMENT (OUTPATIENT)
Dept: OTOLARYNGOLOGY | Facility: CLINIC | Age: 69
End: 2019-04-04
Payer: MEDICARE

## 2019-04-04 PROCEDURE — 99213 OFFICE O/P EST LOW 20 MIN: CPT | Mod: 25

## 2019-04-04 PROCEDURE — 31231 NASAL ENDOSCOPY DX: CPT

## 2019-04-04 NOTE — HISTORY OF PRESENT ILLNESS
[FreeTextEntry1] : Patient returns to the office as a 3 month follow up on acute sinusitis. Patient was put on Flonase and used as needed. He states sinusitis began to develop on Saturday night and seen at an  and put on Augmentin, Flonase and Zantac with minimal improvement. He notes some relief of symptoms today. Symptoms consist of nasal congestion, minimal right sided sore throat.

## 2019-04-04 NOTE — PROCEDURE
[Recalcitrant Symptoms] : recalcitrant symptoms  [Anterior rhinoscopy insufficient to account for symptoms] : anterior rhinoscopy insufficient to account for symptoms [Topical Lidocaine] : topical lidocaine [Oxymetazoline HCl] : oxymetazoline HCl [Rigid Endoscope] : examined with a rigid endoscope [Congested] : congested [Nasal Mucosa] : bilateral purulence [Normal] : the middle meatus had no abnormalities [Paranasal Sinuses Maxillary Sinus] : bilateral maxillary purulence

## 2019-04-04 NOTE — PHYSICAL EXAM
[de-identified] : left sided cerumen impaction  [Nasal Endoscopy Performed] : nasal endoscopy was performed, see procedure section for findings [Normal] : mucosa is normal [Midline] : trachea located in midline position

## 2019-05-07 ENCOUNTER — APPOINTMENT (OUTPATIENT)
Dept: OTOLARYNGOLOGY | Facility: CLINIC | Age: 69
End: 2019-05-07
Payer: MEDICARE

## 2019-05-07 PROCEDURE — 99212 OFFICE O/P EST SF 10 MIN: CPT | Mod: 25

## 2019-05-07 PROCEDURE — 31231 NASAL ENDOSCOPY DX: CPT

## 2019-05-07 NOTE — PROCEDURE
[Topical Lidocaine] : topical lidocaine [Recalcitrant Symptoms] : recalcitrant symptoms  [Congested] : congested [Oxymetazoline HCl] : oxymetazoline HCl [Rigid Endoscope] : examined with a rigid endoscope [FreeTextEntry6] : The following anatomic sites were directly examined in a sequential fashion:\par The scope was introduced in the nasal passage between the middle and inferior turbinates to exam the inferior portion of the middle meatus and the fontanelle, as well as the maxillary ostia. Next, the scope was passed medically and posteriorly to the middle turbinates to examine the sphenoethmoid recess and the superior turbinate region.\par  [Normal] : the paranasal sinuses had no abnormalities

## 2019-05-07 NOTE — HISTORY OF PRESENT ILLNESS
[FreeTextEntry1] : 68 Year old patient returns to the office as a 1 month follow up on acute sinusitis. Patient admits symptoms have improved since his last visit. Pt still has some nasal congestion

## 2019-05-07 NOTE — PHYSICAL EXAM
[de-identified] : edema  [de-identified] : thickened  [Midline] : trachea located in midline position [Normal] : mucosa is normal

## 2019-05-21 ENCOUNTER — APPOINTMENT (OUTPATIENT)
Dept: HEART AND VASCULAR | Facility: CLINIC | Age: 69
End: 2019-05-21

## 2019-07-03 ENCOUNTER — RX RENEWAL (OUTPATIENT)
Age: 69
End: 2019-07-03

## 2019-07-31 ENCOUNTER — APPOINTMENT (OUTPATIENT)
Dept: HEART AND VASCULAR | Facility: CLINIC | Age: 69
End: 2019-07-31
Payer: MEDICARE

## 2019-07-31 ENCOUNTER — NON-APPOINTMENT (OUTPATIENT)
Age: 69
End: 2019-07-31

## 2019-07-31 VITALS
BODY MASS INDEX: 34.82 KG/M2 | HEIGHT: 65 IN | DIASTOLIC BLOOD PRESSURE: 79 MMHG | WEIGHT: 209 LBS | HEART RATE: 72 BPM | SYSTOLIC BLOOD PRESSURE: 126 MMHG | OXYGEN SATURATION: 97 %

## 2019-07-31 PROCEDURE — 93000 ELECTROCARDIOGRAM COMPLETE: CPT

## 2019-07-31 PROCEDURE — 99215 OFFICE O/P EST HI 40 MIN: CPT | Mod: 25

## 2019-08-01 NOTE — PHYSICAL EXAM
[General Appearance - Well Developed] : well developed [Normal Appearance] : normal appearance [Well Groomed] : well groomed [General Appearance - Well Nourished] : well nourished [No Deformities] : no deformities [General Appearance - In No Acute Distress] : no acute distress [Normal Conjunctiva] : the conjunctiva exhibited no abnormalities [Eyelids - No Xanthelasma] : the eyelids demonstrated no xanthelasmas [Normal Oral Mucosa] : normal oral mucosa [No Oral Pallor] : no oral pallor [No Oral Cyanosis] : no oral cyanosis [Normal Jugular Venous A Waves Present] : normal jugular venous A waves present [Normal Jugular Venous V Waves Present] : normal jugular venous V waves present [No Jugular Venous Rae A Waves] : no jugular venous rae A waves [Respiration, Rhythm And Depth] : normal respiratory rhythm and effort [Exaggerated Use Of Accessory Muscles For Inspiration] : no accessory muscle use [Auscultation Breath Sounds / Voice Sounds] : lungs were clear to auscultation bilaterally [Heart Rate And Rhythm] : heart rate and rhythm were normal [Heart Sounds] : normal S1 and S2 [Abdomen Soft] : soft [Abdomen Tenderness] : non-tender [Abdomen Mass (___ Cm)] : no abdominal mass palpated [Abnormal Walk] : normal gait [Gait - Sufficient For Exercise Testing] : the gait was sufficient for exercise testing [Nail Clubbing] : no clubbing of the fingernails [Cyanosis, Localized] : no localized cyanosis [Petechial Hemorrhages (___cm)] : no petechial hemorrhages [] : no ischemic changes [No Skin Ulcers] : no skin ulcer [No Xanthoma] : no  xanthoma was observed [FreeTextEntry1] : scaly red [Oriented To Time, Place, And Person] : oriented to person, place, and time [Affect] : the affect was normal [Mood] : the mood was normal [No Anxiety] : not feeling anxious

## 2019-08-01 NOTE — HISTORY OF PRESENT ILLNESS
[FreeTextEntry1] : The patient has dyspnea walking 10 blocks. It is mild occasional and passes with rest. He climbs the stairs slowly because of dyspnea. He has had epigastric discomfort. The patient's cholesterol was 89 and his LDL was 40. He has a cough with white sputum which is improving. The patient has one caffeinated drink a day and no alcohol.

## 2019-08-01 NOTE — DISCUSSION/SUMMARY
[FreeTextEntry1] : The patient has mild dyspnea on exertion. He has no chest discomfort. He has mildly positive nuclear stress test. EKG shows  atrial fibrillation ST segment and T-wave abnormalities which are unchanged. The cholesterol profile is good. The patient defers an echocardiogram. The patient declines a cardiac monitor. He will return to reevaluate his atrial fibrillation and see if it is persistent. His heart rate is controlled and he is anticoagulated. He will continue his current medication.

## 2019-09-03 ENCOUNTER — APPOINTMENT (OUTPATIENT)
Dept: HEART AND VASCULAR | Facility: CLINIC | Age: 69
End: 2019-09-03

## 2019-10-20 ENCOUNTER — INPATIENT (INPATIENT)
Facility: HOSPITAL | Age: 69
LOS: 1 days | Discharge: OTHER INSTITUTION | DRG: 872 | End: 2019-10-22
Attending: INTERNAL MEDICINE | Admitting: INTERNAL MEDICINE
Payer: MEDICARE

## 2019-10-20 VITALS
TEMPERATURE: 100 F | SYSTOLIC BLOOD PRESSURE: 144 MMHG | HEART RATE: 96 BPM | OXYGEN SATURATION: 97 % | WEIGHT: 207.9 LBS | DIASTOLIC BLOOD PRESSURE: 81 MMHG | RESPIRATION RATE: 22 BRPM

## 2019-10-20 DIAGNOSIS — Z95.1 PRESENCE OF AORTOCORONARY BYPASS GRAFT: ICD-10-CM

## 2019-10-20 DIAGNOSIS — E11.22 TYPE 2 DIABETES MELLITUS WITH DIABETIC CHRONIC KIDNEY DISEASE: ICD-10-CM

## 2019-10-20 DIAGNOSIS — N49.2 INFLAMMATORY DISORDERS OF SCROTUM: ICD-10-CM

## 2019-10-20 DIAGNOSIS — R59.9 ENLARGED LYMPH NODES, UNSPECIFIED: ICD-10-CM

## 2019-10-20 DIAGNOSIS — N17.9 ACUTE KIDNEY FAILURE, UNSPECIFIED: ICD-10-CM

## 2019-10-20 DIAGNOSIS — E87.2 ACIDOSIS: ICD-10-CM

## 2019-10-20 DIAGNOSIS — N18.4 CHRONIC KIDNEY DISEASE, STAGE 4 (SEVERE): ICD-10-CM

## 2019-10-20 DIAGNOSIS — Z79.82 LONG TERM (CURRENT) USE OF ASPIRIN: ICD-10-CM

## 2019-10-20 DIAGNOSIS — I12.9 HYPERTENSIVE CHRONIC KIDNEY DISEASE WITH STAGE 1 THROUGH STAGE 4 CHRONIC KIDNEY DISEASE, OR UNSPECIFIED CHRONIC KIDNEY DISEASE: ICD-10-CM

## 2019-10-20 DIAGNOSIS — E78.5 HYPERLIPIDEMIA, UNSPECIFIED: ICD-10-CM

## 2019-10-20 DIAGNOSIS — A41.9 SEPSIS, UNSPECIFIED ORGANISM: ICD-10-CM

## 2019-10-20 DIAGNOSIS — R65.20 SEVERE SEPSIS WITHOUT SEPTIC SHOCK: ICD-10-CM

## 2019-10-20 DIAGNOSIS — N40.0 BENIGN PROSTATIC HYPERPLASIA WITHOUT LOWER URINARY TRACT SYMPTOMS: ICD-10-CM

## 2019-10-20 DIAGNOSIS — Z94.0 KIDNEY TRANSPLANT STATUS: ICD-10-CM

## 2019-10-20 DIAGNOSIS — D63.1 ANEMIA IN CHRONIC KIDNEY DISEASE: ICD-10-CM

## 2019-10-20 DIAGNOSIS — Z79.84 LONG TERM (CURRENT) USE OF ORAL HYPOGLYCEMIC DRUGS: ICD-10-CM

## 2019-10-20 DIAGNOSIS — M27.2 INFLAMMATORY CONDITIONS OF JAWS: Chronic | ICD-10-CM

## 2019-10-20 DIAGNOSIS — I25.10 ATHEROSCLEROTIC HEART DISEASE OF NATIVE CORONARY ARTERY WITHOUT ANGINA PECTORIS: ICD-10-CM

## 2019-10-20 DIAGNOSIS — Z95.1 PRESENCE OF AORTOCORONARY BYPASS GRAFT: Chronic | ICD-10-CM

## 2019-10-20 DIAGNOSIS — I48.0 PAROXYSMAL ATRIAL FIBRILLATION: ICD-10-CM

## 2019-10-20 DIAGNOSIS — E87.1 HYPO-OSMOLALITY AND HYPONATREMIA: ICD-10-CM

## 2019-10-20 DIAGNOSIS — Z98.890 OTHER SPECIFIED POSTPROCEDURAL STATES: Chronic | ICD-10-CM

## 2019-10-20 LAB
ALBUMIN SERPL ELPH-MCNC: 3.6 G/DL — SIGNIFICANT CHANGE UP (ref 3.3–5)
ALBUMIN SERPL ELPH-MCNC: 4 G/DL — SIGNIFICANT CHANGE UP (ref 3.3–5)
ALP SERPL-CCNC: 103 U/L — SIGNIFICANT CHANGE UP (ref 40–120)
ALP SERPL-CCNC: 81 U/L — SIGNIFICANT CHANGE UP (ref 40–120)
ALT FLD-CCNC: 25 U/L — SIGNIFICANT CHANGE UP (ref 10–45)
ALT FLD-CCNC: 29 U/L — SIGNIFICANT CHANGE UP (ref 10–45)
ANION GAP SERPL CALC-SCNC: 18 MMOL/L — HIGH (ref 5–17)
ANION GAP SERPL CALC-SCNC: 20 MMOL/L — HIGH (ref 5–17)
AST SERPL-CCNC: 58 U/L — HIGH (ref 10–40)
AST SERPL-CCNC: 78 U/L — HIGH (ref 10–40)
BASE EXCESS BLDV CALC-SCNC: -10.2 MMOL/L — SIGNIFICANT CHANGE UP
BASOPHILS # BLD AUTO: 0 K/UL — SIGNIFICANT CHANGE UP (ref 0–0.2)
BASOPHILS NFR BLD AUTO: 0 % — SIGNIFICANT CHANGE UP (ref 0–2)
BILIRUB SERPL-MCNC: 0.9 MG/DL — SIGNIFICANT CHANGE UP (ref 0.2–1.2)
BILIRUB SERPL-MCNC: 1 MG/DL — SIGNIFICANT CHANGE UP (ref 0.2–1.2)
BUN SERPL-MCNC: 42 MG/DL — HIGH (ref 7–23)
BUN SERPL-MCNC: 46 MG/DL — HIGH (ref 7–23)
BURR CELLS BLD QL SMEAR: PRESENT — SIGNIFICANT CHANGE UP
CA-I SERPL-SCNC: 1.09 MMOL/L — LOW (ref 1.12–1.3)
CALCIUM SERPL-MCNC: 8.4 MG/DL — SIGNIFICANT CHANGE UP (ref 8.4–10.5)
CALCIUM SERPL-MCNC: 9 MG/DL — SIGNIFICANT CHANGE UP (ref 8.4–10.5)
CHLORIDE SERPL-SCNC: 86 MMOL/L — LOW (ref 96–108)
CHLORIDE SERPL-SCNC: 89 MMOL/L — LOW (ref 96–108)
CK MB CFR SERPL CALC: 3.2 NG/ML — SIGNIFICANT CHANGE UP (ref 0–6.7)
CK SERPL-CCNC: 2360 U/L — HIGH (ref 30–200)
CO2 SERPL-SCNC: 15 MMOL/L — LOW (ref 22–31)
CO2 SERPL-SCNC: 16 MMOL/L — LOW (ref 22–31)
CREAT SERPL-MCNC: 2.17 MG/DL — HIGH (ref 0.5–1.3)
CREAT SERPL-MCNC: 2.22 MG/DL — HIGH (ref 0.5–1.3)
DACRYOCYTES BLD QL SMEAR: SLIGHT — SIGNIFICANT CHANGE UP
EOSINOPHIL # BLD AUTO: 0 K/UL — SIGNIFICANT CHANGE UP (ref 0–0.5)
EOSINOPHIL NFR BLD AUTO: 0 % — SIGNIFICANT CHANGE UP (ref 0–6)
EXTRA SST TUBE: SIGNIFICANT CHANGE UP
GAS PNL BLDV: 120 MMOL/L — LOW (ref 138–146)
GAS PNL BLDV: SIGNIFICANT CHANGE UP
GAS PNL BLDV: SIGNIFICANT CHANGE UP
GIANT PLATELETS BLD QL SMEAR: PRESENT — SIGNIFICANT CHANGE UP
GLUCOSE BLDC GLUCOMTR-MCNC: 342 MG/DL — HIGH (ref 70–99)
GLUCOSE SERPL-MCNC: 306 MG/DL — HIGH (ref 70–99)
GLUCOSE SERPL-MCNC: 351 MG/DL — HIGH (ref 70–99)
HBA1C BLD-MCNC: 8.1 % — HIGH (ref 4–5.6)
HCO3 BLDV-SCNC: 15 MMOL/L — LOW (ref 20–27)
HCT VFR BLD CALC: 34.5 % — LOW (ref 39–50)
HGB BLD-MCNC: 11 G/DL — LOW (ref 13–17)
LACTATE SERPL-SCNC: 1.6 MMOL/L — SIGNIFICANT CHANGE UP (ref 0.5–2)
LACTATE SERPL-SCNC: 2.2 MMOL/L — HIGH (ref 0.5–2)
LYMPHOCYTES # BLD AUTO: 41.6 % — SIGNIFICANT CHANGE UP (ref 13–44)
LYMPHOCYTES # BLD AUTO: 9.3 K/UL — HIGH (ref 1–3.3)
MACROCYTES BLD QL: SLIGHT — SIGNIFICANT CHANGE UP
MANUAL SMEAR VERIFICATION: SIGNIFICANT CHANGE UP
MCHC RBC-ENTMCNC: 28.9 PG — SIGNIFICANT CHANGE UP (ref 27–34)
MCHC RBC-ENTMCNC: 31.9 GM/DL — LOW (ref 32–36)
MCV RBC AUTO: 90.8 FL — SIGNIFICANT CHANGE UP (ref 80–100)
METAMYELOCYTES # FLD: 0.9 % — HIGH (ref 0–0)
MONOCYTES # BLD AUTO: 0.6 K/UL — SIGNIFICANT CHANGE UP (ref 0–0.9)
MONOCYTES NFR BLD AUTO: 2.7 % — SIGNIFICANT CHANGE UP (ref 2–14)
NEUTROPHILS # BLD AUTO: 12.25 K/UL — HIGH (ref 1.8–7.4)
NEUTROPHILS NFR BLD AUTO: 45.1 % — SIGNIFICANT CHANGE UP (ref 43–77)
NEUTS BAND # BLD: 9.7 % — HIGH (ref 0–8)
OSMOLALITY SERPL: 282 MOSMOL/KG — SIGNIFICANT CHANGE UP (ref 280–301)
OVALOCYTES BLD QL SMEAR: SLIGHT — SIGNIFICANT CHANGE UP
PCO2 BLDV: 30 MMHG — LOW (ref 41–51)
PH BLDV: 7.31 — LOW (ref 7.32–7.43)
PLAT MORPH BLD: ABNORMAL
PLATELET # BLD AUTO: 126 K/UL — LOW (ref 150–400)
PO2 BLDV: 47 MMHG — SIGNIFICANT CHANGE UP
POIKILOCYTOSIS BLD QL AUTO: SLIGHT — SIGNIFICANT CHANGE UP
POLYCHROMASIA BLD QL SMEAR: SLIGHT — SIGNIFICANT CHANGE UP
POTASSIUM BLDV-SCNC: 4.6 MMOL/L — SIGNIFICANT CHANGE UP (ref 3.5–4.9)
POTASSIUM SERPL-MCNC: 4.7 MMOL/L — SIGNIFICANT CHANGE UP (ref 3.5–5.3)
POTASSIUM SERPL-MCNC: 5.1 MMOL/L — SIGNIFICANT CHANGE UP (ref 3.5–5.3)
POTASSIUM SERPL-SCNC: 4.7 MMOL/L — SIGNIFICANT CHANGE UP (ref 3.5–5.3)
POTASSIUM SERPL-SCNC: 5.1 MMOL/L — SIGNIFICANT CHANGE UP (ref 3.5–5.3)
PROT SERPL-MCNC: 6.5 G/DL — SIGNIFICANT CHANGE UP (ref 6–8.3)
PROT SERPL-MCNC: 7.9 G/DL — SIGNIFICANT CHANGE UP (ref 6–8.3)
RBC # BLD: 3.8 M/UL — LOW (ref 4.2–5.8)
RBC # FLD: 14.6 % — HIGH (ref 10.3–14.5)
RBC BLD AUTO: ABNORMAL
SAO2 % BLDV: 76 % — SIGNIFICANT CHANGE UP
SODIUM SERPL-SCNC: 121 MMOL/L — LOW (ref 135–145)
SODIUM SERPL-SCNC: 123 MMOL/L — LOW (ref 135–145)
TROPONIN T SERPL-MCNC: 0.02 NG/ML — HIGH (ref 0–0.01)
WBC # BLD: 22.36 K/UL — HIGH (ref 3.8–10.5)
WBC # FLD AUTO: 22.36 K/UL — HIGH (ref 3.8–10.5)

## 2019-10-20 PROCEDURE — 71045 X-RAY EXAM CHEST 1 VIEW: CPT | Mod: 26

## 2019-10-20 PROCEDURE — 76870 US EXAM SCROTUM: CPT | Mod: 26

## 2019-10-20 PROCEDURE — 74176 CT ABD & PELVIS W/O CONTRAST: CPT | Mod: 26

## 2019-10-20 PROCEDURE — 99285 EMERGENCY DEPT VISIT HI MDM: CPT

## 2019-10-20 PROCEDURE — 99222 1ST HOSP IP/OBS MODERATE 55: CPT

## 2019-10-20 PROCEDURE — 93010 ELECTROCARDIOGRAM REPORT: CPT

## 2019-10-20 RX ORDER — SODIUM CHLORIDE 9 MG/ML
1000 INJECTION INTRAMUSCULAR; INTRAVENOUS; SUBCUTANEOUS ONCE
Refills: 0 | Status: COMPLETED | OUTPATIENT
Start: 2019-10-20 | End: 2019-10-20

## 2019-10-20 RX ORDER — PIPERACILLIN AND TAZOBACTAM 4; .5 G/20ML; G/20ML
3.38 INJECTION, POWDER, LYOPHILIZED, FOR SOLUTION INTRAVENOUS EVERY 6 HOURS
Refills: 0 | Status: DISCONTINUED | OUTPATIENT
Start: 2019-10-20 | End: 2019-10-22

## 2019-10-20 RX ORDER — VANCOMYCIN HCL 1 G
1000 VIAL (EA) INTRAVENOUS EVERY 24 HOURS
Refills: 0 | Status: DISCONTINUED | OUTPATIENT
Start: 2019-10-21 | End: 2019-10-21

## 2019-10-20 RX ORDER — PIPERACILLIN AND TAZOBACTAM 4; .5 G/20ML; G/20ML
2.25 INJECTION, POWDER, LYOPHILIZED, FOR SOLUTION INTRAVENOUS EVERY 6 HOURS
Refills: 0 | Status: DISCONTINUED | OUTPATIENT
Start: 2019-10-20 | End: 2019-10-20

## 2019-10-20 RX ORDER — VANCOMYCIN HCL 1 G
1000 VIAL (EA) INTRAVENOUS ONCE
Refills: 0 | Status: COMPLETED | OUTPATIENT
Start: 2019-10-20 | End: 2019-10-20

## 2019-10-20 RX ORDER — SODIUM CHLORIDE 9 MG/ML
1000 INJECTION INTRAMUSCULAR; INTRAVENOUS; SUBCUTANEOUS ONCE
Refills: 0 | Status: DISCONTINUED | OUTPATIENT
Start: 2019-10-20 | End: 2019-10-20

## 2019-10-20 RX ORDER — ACETAMINOPHEN 500 MG
650 TABLET ORAL ONCE
Refills: 0 | Status: COMPLETED | OUTPATIENT
Start: 2019-10-20 | End: 2019-10-20

## 2019-10-20 RX ORDER — PIPERACILLIN AND TAZOBACTAM 4; .5 G/20ML; G/20ML
3.38 INJECTION, POWDER, LYOPHILIZED, FOR SOLUTION INTRAVENOUS ONCE
Refills: 0 | Status: COMPLETED | OUTPATIENT
Start: 2019-10-20 | End: 2019-10-20

## 2019-10-20 RX ORDER — PIPERACILLIN AND TAZOBACTAM 4; .5 G/20ML; G/20ML
3.38 INJECTION, POWDER, LYOPHILIZED, FOR SOLUTION INTRAVENOUS EVERY 6 HOURS
Refills: 0 | Status: DISCONTINUED | OUTPATIENT
Start: 2019-10-20 | End: 2019-10-20

## 2019-10-20 RX ORDER — METRONIDAZOLE 500 MG
500 TABLET ORAL ONCE
Refills: 0 | Status: DISCONTINUED | OUTPATIENT
Start: 2019-10-20 | End: 2019-10-20

## 2019-10-20 RX ORDER — INSULIN LISPRO 100/ML
VIAL (ML) SUBCUTANEOUS
Refills: 0 | Status: DISCONTINUED | OUTPATIENT
Start: 2019-10-20 | End: 2019-10-21

## 2019-10-20 RX ADMIN — SODIUM CHLORIDE 1000 MILLILITER(S): 9 INJECTION INTRAMUSCULAR; INTRAVENOUS; SUBCUTANEOUS at 16:31

## 2019-10-20 RX ADMIN — SODIUM CHLORIDE 1000 MILLILITER(S): 9 INJECTION INTRAMUSCULAR; INTRAVENOUS; SUBCUTANEOUS at 19:34

## 2019-10-20 RX ADMIN — Medication 100 MILLIGRAM(S): at 18:35

## 2019-10-20 RX ADMIN — PIPERACILLIN AND TAZOBACTAM 200 GRAM(S): 4; .5 INJECTION, POWDER, LYOPHILIZED, FOR SOLUTION INTRAVENOUS at 16:28

## 2019-10-20 RX ADMIN — Medication 650 MILLIGRAM(S): at 16:28

## 2019-10-20 RX ADMIN — Medication 8: at 22:54

## 2019-10-20 RX ADMIN — Medication 250 MILLIGRAM(S): at 19:09

## 2019-10-20 RX ADMIN — PIPERACILLIN AND TAZOBACTAM 200 GRAM(S): 4; .5 INJECTION, POWDER, LYOPHILIZED, FOR SOLUTION INTRAVENOUS at 23:36

## 2019-10-20 NOTE — CONSULT NOTE ADULT - SUBJECTIVE AND OBJECTIVE BOX
Patient is a 68y old  Male who presents with a chief complaint of     HPI:      Vital Signs Last 24 Hrs  T(C): 39.1 (20 Oct 2019 15:08), Max: 39.1 (20 Oct 2019 15:08)  T(F): 102.3 (20 Oct 2019 15:08), Max: 102.3 (20 Oct 2019 15:08)  HR: 98 (20 Oct 2019 15:20) (96 - 98)  BP: 148/86 (20 Oct 2019 15:20) (144/81 - 148/86)  BP(mean): --  RR: 22 (20 Oct 2019 15:20) (22 - 22)  SpO2: 95% (20 Oct 2019 15:20) (95% - 97%)  I&O's Summary      PE:  Gen:  Abd:  :  MICK:    LABS:                        11.0   22.36 )-----------( 126      ( 20 Oct 2019 15:09 )             34.5     10-20    121<L>  |  86<L>  |  42<H>  ----------------------------<  306<H>  4.7   |  15<L>  |  2.17<H>    Ca    9.0      20 Oct 2019 15:09    TPro  7.9  /  Alb  4.0  /  TBili  1.0  /  DBili  x   /  AST  78<H>  /  ALT  29  /  AlkPhos  103  10-20      Cultures      A/P 68M with a history of renal transplant 2.5 years ago, DM2, HTN, MGUS, pAF, BPH, HLD, CAD s/p CABG 15 years ago presented to the ED for scrotal swelling/pain and erythema. He states the pain started around 3 days ago, associated he had swelling, erythema and warmth to touch. The pain has been constant. He has taken Tylenol BID with some relief of the pain. At home he had low grade temps but no chills. He has not been seen for the scrotal pain.   He has never had any scrotal issues before.      He was seen for walking pna s/p Zpak 2 days ago.    He is followed at [facility] with  for his renal transplant. He is on Prograf 2 mg q12 and Valcyte. He stated that cellcept was stopped by his transplant team.        for progressive pain and swelling of his scrotum that began 3-4 days ago. Denies trauma. Pain is constant, worse with touching. Scrotum has become erythematous and the size of a softball. Never happened before. Pain does not radiate. No urinary symptoms. No dysuria, hematuria, rectal pain. A few loose stools x 2 days. No hematochezia or melena. No abd pain, N/V. Tried OTC antifungal without relief. Sexually active with wife, monogamous. No hx of STIs. No hx of UTIs. Never had  procedures. Getting over a walking pna, s/p azithro completed two days ago. He denies SOB, cough. Subjective fevers at home. Admits to poor PO intake. Denies neuro symptoms including lightheadedness, dizziness, headache, vision changes, n/t/w. ROS otherwise neg.    In the ED, Tmax 102.3, HR 96, /81, RR 22, 97% on RA. Labs notable for WBC 22k, hgb 11, plt 126k, Na 121, Cl 86, bicarb 15, AG 20, BUN 42, Cr 2.17, , lactate 2.2.     He received vanc, zosyn, clinda, tylenol and 1L NS.     Patient stated that he was on Prograf 2 mg po q12, as well as Valcyte. He stated that cellcept was [facility]       Vital Signs Last 24 Hrs  T(C): 39.1 (20 Oct 2019 15:08), Max: 39.1 (20 Oct 2019 15:08)  T(F): 102.3 (20 Oct 2019 15:08), Max: 102.3 (20 Oct 2019 15:08)  HR: 98 (20 Oct 2019 15:20) (96 - 98)  BP: 148/86 (20 Oct 2019 15:20) (144/81 - 148/86)  BP(mean): --  RR: 22 (20 Oct 2019 15:20) (22 - 22)  SpO2: 95% (20 Oct 2019 15:20) (95% - 97%)  I&O's Summary      PE:  Gen:  Abd:  :  MICK:    LABS:                        11.0   22.36 )-----------( 126      ( 20 Oct 2019 15:09 )             34.5     10-20    121<L>  |  86<L>  |  42<H>  ----------------------------<  306<H>  4.7   |  15<L>  |  2.17<H>    Ca    9.0      20 Oct 2019 15:09    TPro  7.9  /  Alb  4.0  /  TBili  1.0  /  DBili  x   /  AST  78<H>  /  ALT  29  /  AlkPhos  103  10-20      Cultures      A/P 68M with a history of renal transplant 2.5 years ago, DM2, HTN, MGUS, pAF, BPH, HLD, CAD s/p CABG 15 years ago presented to the ED for scrotal swelling/pain and erythema. He states the pain started around 3 days ago, associated he had swelling, erythema and warmth to touch. The pain has been constant. He has taken Tylenol BID with some relief of the pain. At home he had low grade temps but no chills. He has not been seen for the scrotal pain. He has never had any scrotal issues before. He has limited his fluid intake for the past few days and has had a low appetite. He has no prior history of UTIs or STDs. He denies fever, chills, n/v, SOB, CP, hematuria, lightheadedness, dizziness, headache, vision changes or dysuria.      He was seen for walking pna s/p Zpak 2 days ago. He is followed at [facility] with  for his renal transplant. He is on Prograf 2 mg q12 and Valcyte. He stated that cellcept was stopped by his transplant team.     In the ED, Tmax 102.3, HR 96, /81, RR 22, 97% on RA. Labs notable for WBC 22k, hgb 11, plt 126k, Na 121, Cl 86, bicarb 15, AG 20, BUN 42, Cr 2.17, , lactate 2.2.     He received vancomycin, zosyn, Clindamycin Tylenol and 1L NS.     Vital Signs Last 24 Hrs  T(C): 39.1 (20 Oct 2019 15:08), Max: 39.1 (20 Oct 2019 15:08)  T(F): 102.3 (20 Oct 2019 15:08), Max: 102.3 (20 Oct 2019 15:08)  HR: 98 (20 Oct 2019 15:20) (96 - 98)  BP: 148/86 (20 Oct 2019 15:20) (144/81 - 148/86)  BP(mean): --  RR: 22 (20 Oct 2019 15:20) (22 - 22)  SpO2: 95% (20 Oct 2019 15:20) (95% - 97%)  I&O's Summary      PE:  Gen: NAD  Abd: soft, nt/nd  : edematous scrotum, 8 inches in diameter, erythematous from base of scrotum to groin, no fluctuance, area of drainage at the base (pressure ulcer), no crepitus, no perineum tenderness      LABS:                        11.0   22.36 )-----------( 126      ( 20 Oct 2019 15:09 )             34.5     10-20    121<L>  |  86<L>  |  42<H>  ----------------------------<  306<H>  4.7   |  15<L>  |  2.17<H>    Ca    9.0      20 Oct 2019 15:09    TPro  7.9  /  Alb  4.0  /  TBili  1.0  /  DBili  x   /  AST  78<H>  /  ALT  29  /  AlkPhos  103  10-20    Cultures    A/P 68M with a history of renal transplant 2.5 years ago, DM2, HTN, MGUS, pAF, BPH, HLD, CAD s/p CABG 15 years ago presented to the ED for scrotal swelling/pain and erythema x 3 days. His Tmax was 102.3 in the ED.    -US testicular   -CT scan to r/o linda's   -Labs, Ucx, Bcx  -Abx   -Will give final recs pending scans  -ICU consult   -Case seen and discussed with  resident

## 2019-10-20 NOTE — CONSULT NOTE ADULT - SUBJECTIVE AND OBJECTIVE BOX
Pt is a 68M with a history of renal transplant on ___, DM2, HTN, MGUS, pAF, BPH, HLD, CAD s/p CABG 15 years ago,    In the ED, Tmax 102.3, HR 96, /81, RR 22, 97% on RA. Labs notable for WBC 22k, hgb 11, plt 126k, Na 121, Cl 86, bicarb 15, AG 20, BUN 42, Cr 2.17, , lactate 2.2.     He received vanc, zosyn, clinda, tylenol and 1L NS.     PMHx:  renal transplant  DM2  HTN  BPH  mandibular abscess  pAF  CAD    Medications:      Allergies:    tobacco-  etoh-  drugs-    surgeries-    family history-    MEDICATIONS  (STANDING):  clindamycin IVPB 600 milliGRAM(s) IV Intermittent once  vancomycin  IVPB 1000 milliGRAM(s) IV Intermittent once    Vital Signs Last 24 Hrs  T(C): 39.1 (20 Oct 2019 15:08), Max: 39.1 (20 Oct 2019 15:08)  T(F): 102.3 (20 Oct 2019 15:08), Max: 102.3 (20 Oct 2019 15:08)  HR: 98 (20 Oct 2019 15:20) (96 - 98)  BP: 148/86 (20 Oct 2019 15:20) (144/81 - 148/86)  BP(mean): --  RR: 22 (20 Oct 2019 15:20) (22 - 22)  SpO2: 95% (20 Oct 2019 15:20) (95% - 97%)    PHYSICAL EXAM                                              11.0   22.36 )-----------( 126      ( 20 Oct 2019 15:09 )             34.5       LIVER FUNCTIONS - ( 20 Oct 2019 15:09 )  Alb: 4.0 g/dL / Pro: 7.9 g/dL / ALK PHOS: 103 U/L / ALT: 29 U/L / AST: 78 U/L / GGT: x           All imaging reviewed. Pt is a 68M with a history of renal transplant (on unknown immunosuppresant) 2.5 years ago, DM2, HTN, MGUS, pAF, BPH, HLD, CAD s/p CABG 15 years ago, presents for progressive pain and swelling of his scrotum that began 3-4 days ago. Denies trauma. Pain is constant, worse with touching. Scrotum has become erythematous and the size of a softball. Never happened before. Pain does not radiate. No urinary symptoms. No dysuria, hematuria, rectal pain. A few loose stools x 2 days. No hematochezia or melena. No abd pain, N/V. Tried OTC antifungal without relief. Sexually active with wife, monogamous. No hx of STIs. No hx of UTIs. Never had  procedures. Getting over a walking pna, s/p azithro completed two days ago. He denies SOB, cough. Subjective fevers at home. Admits to poor PO intake. Denies neuro symptoms including lightheadedness, dizziness, headache, vision changes, n/t/w. ROS otherwise neg.    In the ED, Tmax 102.3, HR 96, /81, RR 22, 97% on RA. Labs notable for WBC 22k, hgb 11, plt 126k, Na 121, Cl 86, bicarb 15, AG 20, BUN 42, Cr 2.17, , lactate 2.2.     He received vanc, zosyn, clinda, tylenol and 1L NS.     PMHx:  renal transplant  DM2  HTN  BPH  mandibular abscess  pAF  CAD    Medications: unknown    Allergies: NKA    tobacco- denies  etoh- denies  drugs- denies    surgeries- CABG, renal trx    family history- father with tobacco abuse    MEDICATIONS  (STANDING):  clindamycin IVPB 600 milliGRAM(s) IV Intermittent once  vancomycin  IVPB 1000 milliGRAM(s) IV Intermittent once    Vital Signs Last 24 Hrs  T(C): 39.1 (20 Oct 2019 15:08), Max: 39.1 (20 Oct 2019 15:08)  T(F): 102.3 (20 Oct 2019 15:08), Max: 102.3 (20 Oct 2019 15:08)  HR: 98 (20 Oct 2019 15:20) (96 - 98)  BP: 148/86 (20 Oct 2019 15:20) (144/81 - 148/86)  BP(mean): --  RR: 22 (20 Oct 2019 15:20) (22 - 22)  SpO2: 95% (20 Oct 2019 15:20) (95% - 97%)    PHYSICAL EXAM    General:  elderly, obese, speaking in full sentences but with labored breathing in supine position for testicular U/S  HENT:  EOMI, PERRL.  No sinus tenderness.  oropharynx WNL. MM dry   Neck:  Trachea midline.  No JVD, LAD, or thyromegaly.  Heart:  S1S2 no M/R/G, slightly tachycardic  Lungs:  CTAB no wheezing, rhonchi or rales.  No accessory muscle use.  Tachypneic at ~30 RR.   Abdomen:  NABS.  soft, nontender, nondistended.  no guarding.  no ascites.  no organomegaly.  Vascular:  Peripheral pulses palpable  Extremities:  No edema  Back:  No CVA tenderness  Neuro:  AOx3, no facial asymmetry, nonfocal, no slurred speech. Strength 5/5 throughout.   GENITALIA: penis WNL, circumcised. No discharge. Scrotum erythematous and swollen (softball sized). Scrotum is soft. R testicle more tender than L.                         11.0   22.36 )-----------( 126      ( 20 Oct 2019 15:09 )             34.5       LIVER FUNCTIONS - ( 20 Oct 2019 15:09 )  Alb: 4.0 g/dL / Pro: 7.9 g/dL / ALK PHOS: 103 U/L / ALT: 29 U/L / AST: 78 U/L / GGT: x           All imaging reviewed.

## 2019-10-20 NOTE — ED ADULT NURSE NOTE - NSIMPLEMENTINTERV_GEN_ALL_ED
Implemented All Universal Safety Interventions:  [place] to call system. Call bell, personal items and telephone within reach. Instruct patient to call for assistance. Room bathroom lighting operational. Non-slip footwear when patient is off stretcher. Physically safe environment: no spills, clutter or unnecessary equipment. Stretcher in lowest position, wheels locked, appropriate side rails in place.

## 2019-10-20 NOTE — CONSULT NOTE ADULT - ATTENDING COMMENTS
Awaiting for results of imaging regarding disposition. Pt will need a monitored unit in view of immunosuppressed state and presentation of sepsis. R/O testicular torsion as well as linda's gangrene. Urology consulted. If evidence of torsion or gas in soft tissues then discussion admission with Urology service to [facility] appropriate monitored unit. continue broad spectrum abx with zosyn and Vancomycin. Would consider adding clindamycin as well until cultures and imaging back. Awaiting for results of imaging regarding disposition. Pt will need a monitored unit in view of immunosuppressed state and presentation of sepsis. R/O testicular torsion as well as linda's gangrene. Urology consulted. If evidence of torsion or gas in soft tissues then discussion admission with Urology service to their appropriate monitored unit. continue broad spectrum abx with zosyn and Vancomycin. Would consider adding clindamycin as well until cultures and imaging back.

## 2019-10-20 NOTE — ED PROVIDER NOTE - OBJECTIVE STATEMENT
69 yo male with CRI DM HTN GERD hx prior renal transplant with fever to 102.5 chills scrotal edema and warmth x 2 days- no N/V slight cough - ? recently had pneumonia  2 weeks ago  no LE edema  no calf tenderness no gross sob or cp  but dyspnea with exertion - nuc stress test 2017 EF 53%

## 2019-10-20 NOTE — ED ADULT NURSE NOTE - NS ED NOTE ABUSE RESPONSE YN
"Per Linda Holbrook PA-C, faxed the following orders to [facility] RNs: \"Discontinue  diltiazem 240mg. Start diltiazem 180mg. Check blood pressure in morning and afternoon daily\". Faxed orders to [phone].  Pt is scheduled for BMP and f/u w/ Linda Holbrook PA-C 5/4/18.   Chely TSANG    " Yes

## 2019-10-20 NOTE — PROGRESS NOTE ADULT - SUBJECTIVE AND OBJECTIVE BOX
Imaging reviewed. History, signs, labs, and exam consistent with infection likely cellulitis, not consistent with torsion. ALthough not measured with doppler there is some color flow on right testicle on one US image. No urologic intervention for possible decreased flow to right testicle. No signs of Tesfaye. No crepitus, no gas on CT scan. No urologic surgical intervention at this time. patient needs antibiotics, scrotal elevation and medical optimization. Will follow. DIscussed with Dr. Rosenberg.

## 2019-10-20 NOTE — CONSULT NOTE ADULT - ASSESSMENT
68M with a history of renal transplant on June 2017, with severe sepsis 2/2 cellulitis (r/o abscess) and hyponatremia.    # Nonoliguric LATRICIA on CKD  in renal transplant patient  - LATRICIA could be due to tacrolimus toxicity (prerenal - afferent vasoconstriction) vs ATN in setting of sepsis  - Baseline creatinine 1.2. > 2.17 today  - on Tacrolimus 2 mg po q12h  - hold Tacrolimus for tonight  - send Tacrolimus level  - goal range is between 4-6  - resume Tacrolimus if level is within the range  - suggest renal sono of transplanted kidney  - send ulytes, urine na, urine cr, urine urea  - s/p 1 L NS  - Suggest additional 1 L NS at 150 cc/hr  - avoid ACE/ARB/Contrast      # Hyponatremia  - Na 121  - may be due to Tacro toxicity and poor free water clearance in setting of LATRICIA VS SIADH  due to pain   - send tacro level, hold tacro dose tonight  - send urine na, urine osm, serum uric acid, TSH, serum am cortisol    # Sepsis  - suggest renal dosing of antibiotics  - Resume valcyte 500 mg po bid 68M with a history of renal transplant on June 2017, with severe sepsis 2/2 cellulitis (r/o abscess) and hyponatremia.    # Nonoliguric LATRICIA on CKD  in renal transplant patient  - LATRICIA could be due to tacrolimus toxicity (prerenal - afferent vasoconstriction) vs ATN in setting of sepsis  - Baseline creatinine 1.2. > 2.17 today  - on Tacrolimus 2 mg po q12h  - hold Tacrolimus for tonight  - send Tacrolimus level  - goal range is between 4-6  - resume Tacrolimus if level is within the range  - suggest renal sono of transplanted kidney  - send ulytes, urine na, urine cr, urine urea  - s/p 1 L NS  - avoid ACE/ARB/Contrast      # Hyponatremia  - Na 121  - may be due to Tacro toxicity and poor free water clearance in setting of LATRICIA VS SIADH  due to pain   - send tacro level, hold tacro dose tonight  - send urine na, urine osm, serum uric acid, TSH, serum am cortisol  - BMP, urine na, urine osm, stat then q4h  - goal Na 127 for 3 pm on 10/21    # Sepsis  - suggest renal dosing of antibiotics  - Resume valcyte 500 mg po bid

## 2019-10-20 NOTE — ED PROVIDER NOTE - CARE PLAN
Principal Discharge DX:	Renal failure  Secondary Diagnosis:	Scrotal infection  Secondary Diagnosis:	Paroxysmal atrial fibrillation  Secondary Diagnosis:	S/P CABG (coronary artery bypass graft)  Secondary Diagnosis:	Renal transplant, status post

## 2019-10-20 NOTE — ED ADULT NURSE NOTE - OBJECTIVE STATEMENT
Patient is a 67yo male reporting bilateral testicular pain and swelling x3 days. Patient was recently tx for pneumonia with Azithromycin, denies any current respiratory complaints. Denies chest pain, abdominal pain, n/v/d, urinary symptoms.

## 2019-10-20 NOTE — ED PROVIDER NOTE - CLINICAL SUMMARY MEDICAL DECISION MAKING FREE TEXT BOX
renal transplant  LATRICIA  scrotal infection req monitoring of Na  IV  abx  not nec fasc  not cw torsion

## 2019-10-20 NOTE — ED PROVIDER NOTE - SECONDARY DIAGNOSIS.
Renal transplant, status post Scrotal infection Paroxysmal atrial fibrillation S/P CABG (coronary artery bypass graft)

## 2019-10-20 NOTE — CONSULT NOTE ADULT - ASSESSMENT
68M with a history of renal transplant on ___  with severe sepsis 2/2 cellulitis (r/o abscess) and hyponatremia.    #severe sepsis  -Fever, leukocytosis, tachycardia + lactic acidosis.   -Likely source scrotal cellulitis r/o abscess  -Received vanc, zosyn and clinda in ED.   -Received 1L NS.   -Trend lactate  -Blood cultures    #hyponatremia  -Na 121  -  -Urine osm and Na  -    #LATRICIA on CKD (with renal transplant)  -Baseline creatinine 1.2. Now 2.17.  -Urine creatinine and     Dispo: pending. 68M with a history of renal transplant on ___  with severe sepsis 2/2 cellulitis (r/o abscess) and hyponatremia.    #severe sepsis  -Fever, leukocytosis, tachycardia + lactic acidosis.   -Differential includes scrotal cellulitis/abscess, epididymitis, incarcerated hernia, orchitis, torsion, Tesfaye's gangrene. Hydrocele observed on U/S.  -Uro consult    -Received vanc, zosyn and clinda in ED.   -Received 1L NS.   -Trend lactate  -Blood cultures    #hyponatremia  -Na 121  -  -Urine osm and Na  -    #LATRICIA on CKD (with renal transplant)  -Baseline creatinine 1.2. Now 2.17.  -Urine creatinine and     Dispo: pending. 68M with a history of renal transplant on immunosuppressants, CAD, AF, HTN, with severe sepsis due to scrotal infection (rule out nec fasc) and hyponatremia.    #severe sepsis  -Fever, leukocytosis, tachycardia + lactic acidosis. Possible scrotal cellulitis, rule out nec fasc.   -Ddx also includes scrotal/pelvic abscess, epididymitis, incarcerated hernia, orchitis, torsion. Hydrocele observed on U/S. F/u official read  -Recc CT A/P without contrast to assess for gas  -Uro consulted  -Received vanc, zosyn and clinda in ED.   -Received 1L NS. Recc additional 1L NS now.  -Trend lactate  -Blood cultures  -Recc UA, VBG    #tachypnea  -RR ~30. Denies feeling SOB. Denies cough. Says his walking pna resolved.   -Possibly compensatory for metabolic acidosis  -Recc CXR    #hyponatremia  -Na 121. Hypovolemic on exam. Asymptomatic.  -Recc uine osm and Na  -Recc additional 1L NS and repeat BMP.   -Goal correction <6-8 meq over 24 hours. BMP Q4-6H    #LATRICIA on CKD (with renal transplant)  -Baseline creatinine 1-1.2. Now 2.17. Possibly pre-renal in setting of decreased PO and sepsis.  -Urine creatinine, urine sodium and urine urea nitrogen (unknown home medications)  -Recc additional 1L NS now  -Collateral from Dr. Araujo/Orlin    Dispo: pending. 68M with a history of renal transplant on immunosuppressants, CAD, AF, HTN, with severe sepsis due to scrotal infection (rule out nec fasc) and hyponatremia.    #severe sepsis  -Fever, leukocytosis, tachycardia + lactic acidosis. Possible scrotal cellulitis, rule out nec fasc.   -Ddx also includes scrotal/pelvic abscess, epididymitis, incarcerated hernia, orchitis, torsion. Hydrocele observed on U/S. F/u official read  -Recc CT A/P without contrast to assess for gas  -Uro consulted  -Received vanc, zosyn and clinda in ED.   -Received 1L NS. Recc additional 1L NS now.  -Trend lactate  -Blood cultures  -Recc UA, VBG    #tachypnea  -RR ~30. Denies feeling SOB. Denies cough. Says his walking pna resolved.   -Possibly compensatory for metabolic acidosis  -Recc CXR    #hyponatremia  -Na 121. Hypovolemic on exam. Asymptomatic.  -Recc urine osm and Na, as well as serum osm  -Recc additional 1L NS and repeat BMP.   -Goal correction <6-8 meq over 24 hours. BMP Q4-6H    #LATRICIA on CKD (with renal transplant)  -Baseline creatinine 1-1.2. Now 2.17. Possibly pre-renal in setting of decreased PO and sepsis.  -Urine creatinine, urine sodium and urine urea nitrogen (unknown home medications)  -Recc additional 1L NS now  -Collateral from Dr. Araujo/Orlin    Dispo: pending. 68M with a history of renal transplant on immunosuppressants, CAD, AF, HTN, with severe sepsis due to scrotal infection (rule out nec fasc) and hyponatremia.    #severe sepsis  -Fever, leukocytosis, tachycardia + lactic acidosis. Possible scrotal cellulitis, rule out nec fasc.   -Ddx also includes scrotal/pelvic abscess, epididymitis, incarcerated hernia, orchitis, torsion. Hydrocele observed on U/S. F/u official read  -Recc CT A/P without contrast to assess for gas  -Uro consulted  -Received vanc, zosyn and clinda in ED.   -Received 1L NS. Recc additional 1L NS now.  -Trend lactate  -Blood cultures  -Recc UA, VBG    #tachypnea  -RR ~30. Denies feeling SOB. Denies cough. Says his walking pna resolved.   -Possibly compensatory for metabolic acidosis  -Recc CXR    #hyponatremia  -Na 121. Hypovolemic on exam. Asymptomatic.  -Recc urine osm and Na, as well as serum osm  -Recc additional 1L NS and repeat BMP.   -Goal correction <6-8 meq over 24 hours. BMP Q4-6H    #LATRICIA on CKD (with renal transplant)  -Baseline creatinine 1-1.2. Now 2.17. Possibly pre-renal in setting of decreased PO and sepsis.  -Urine creatinine, urine sodium and urine urea nitrogen (unknown home medications)  -Recc additional 1L NS now  -Collateral from Dr. Araujo/Orlin    Dispo: Spoke with ED attending about U/S read of possible testicular torsion; uro is evaluating. At this time, cannot accept to medicine. 68M with a history of renal transplant on immunosuppressants, CAD, AF, HTN, with severe sepsis due to scrotal infection (rule out nec fasc) and hyponatremia.    #severe sepsis  -Fever, leukocytosis, tachycardia + lactic acidosis. Possible scrotal cellulitis, rule out nec fasc.   -Ddx also includes scrotal/pelvic abscess, epididymitis, incarcerated hernia, orchitis, torsion. Hydrocele observed on U/S. F/u official read  -Recc CT A/P without contrast to assess for gas  -Uro consulted  -Received vanc, zosyn and clinda in ED.   -Received 1L NS. Recc additional 1L NS now.  -Trend lactate  -Blood cultures  -Recc UA, VBG    #tachypnea  -RR ~30. Denies feeling SOB. Denies cough. Says his walking pna resolved.   -Possibly compensatory for metabolic acidosis  -Recc CXR    #hyponatremia  -Na 121. Hypovolemic on exam. Asymptomatic. possible SIADH vs dehydration vs tacro-induced  -Recc urine osm and Na, as well as serum osm  -Recc additional 1L NS and repeat BMP.   -Goal correction <6-8 meq over 24 hours. BMP Q4-6H    #LATRICIA on CKD (with renal transplant)  -Baseline creatinine 1-1.2. Now 2.17. Possibly pre-renal in setting of decreased PO and sepsis.  -Urine creatinine, urine sodium and urine urea nitrogen (unknown home medications)  -Recc additional 1L NS now  -Collateral from Dr. Araujo/Orlin    Dispo: tele ([facility])

## 2019-10-20 NOTE — CONSULT NOTE ADULT - SUBJECTIVE AND OBJECTIVE BOX
Renal consult placed for management of LATRICIA in kidney transplant patient    68M with a history of renal transplant 2.5 years ago, DM2, HTN, MGUS, pAF, BPH, HLD, CAD s/p CABG 15 years ago, presents for progressive pain and swelling of his scrotum that began 3-4 days ago. Denies trauma. Pain is constant, worse with touching. Scrotum has become erythematous and the size of a softball. Never happened before. Pain does not radiate. No urinary symptoms. No dysuria, hematuria, rectal pain. A few loose stools x 2 days. No hematochezia or melena. No abd pain, N/V. Tried OTC antifungal without relief. Sexually active with wife, monogamous. No hx of STIs. No hx of UTIs. Never had  procedures. Getting over a walking pna, s/p azithro completed two days ago. He denies SOB, cough. Subjective fevers at home. Admits to poor PO intake. Denies neuro symptoms including lightheadedness, dizziness, headache, vision changes, n/t/w. ROS otherwise neg.    In the ED, Tmax 102.3, HR 96, /81, RR 22, 97% on RA. Labs notable for WBC 22k, hgb 11, plt 126k, Na 121, Cl 86, bicarb 15, AG 20, BUN 42, Cr 2.17, , lactate 2.2.     He received vanc, zosyn, clinda, tylenol and 1L NS.     Patient stated that he was on Prograf 2 mg po q12, as well as Valcyte. He stated that cellcept was stopped by his transplant Nephrologist at [facility], Dr Kohler.           PAST MEDICAL & SURGICAL HISTORY:  Other hyperlipidemia  Benign prostatic hyperplasia, presence of lower urinary tract symptoms unspecified, unspecified morphology  Paroxysmal atrial fibrillation  Mandibular abscess  Monoclonal gammopathy  HTN (hypertension)  GERD (gastroesophageal reflux disease)  DM (diabetes mellitus)  CRI (chronic renal insufficiency)  Barretts esophagus  Mandibular abscess  History of surgery: cyst removal  S/P CABG (coronary artery bypass graft): 15 years      Allergies    No Known Allergies    Intolerances        FAMILY HISTORY:      SOCIAL HISTORY:      MEDICATIONS  (STANDING):  clindamycin IVPB 600 milliGRAM(s) IV Intermittent once  vancomycin  IVPB 1000 milliGRAM(s) IV Intermittent once    MEDICATIONS  (PRN):      Vital Signs Last 24 Hrs  T(C): 39.1 (20 Oct 2019 15:08), Max: 39.1 (20 Oct 2019 15:08)  T(F): 102.3 (20 Oct 2019 15:08), Max: 102.3 (20 Oct 2019 15:08)  HR: 98 (20 Oct 2019 15:20) (96 - 98)  BP: 148/86 (20 Oct 2019 15:20) (144/81 - 148/86)  BP(mean): --  RR: 22 (20 Oct 2019 15:20) (22 - 22)  SpO2: 95% (20 Oct 2019 15:20) (95% - 97%)    REVIEW OF SYSTEMS:  Gen: as per hpi  CVS: No chest pain  Resp: No shortness of breath  Abd: as above  CNS: No headache      PHYSICAL EXAM:  General:  elderly, obese, speaking in full sentences but with labored breathing in supine position for testicular U/S  HENT:  EOMI, PERRL.  No sinus tenderness.  oropharynx WNL. MM dry   Neck:  Trachea midline.  No JVD, LAD, or thyromegaly.  Heart:  S1S2 no M/R/G, slightly tachycardic  Lungs:  CTAB no wheezing, rhonchi or rales.  No accessory muscle use.  Tachypneic at ~30 RR.   Abdomen:  NABS.  soft, nontender, nondistended.  no guarding.  no ascites.  no organomegaly.  Vascular:  Peripheral pulses palpable  Extremities:  No edema  Back:  No CVA tenderness  Neuro:  AOx3, no facial asymmetry, nonfocal, no slurred speech. Strength 5/5 throughout.   GENITALIA: penis WNL, circumcised. No discharge. Scrotum erythematous and swollen (softball sized). Scrotum is soft. R testicle more tender than L.       CAPILLARY BLOOD GLUCOSE          I&O's Summary        LABS:                                                   10-20-19 @ 15:09    121<L>  |  86<L>  |  42<H>  ----------------------------<  306<H>  4.7   |  15<L>  |  2.17<H>    Ca    9.0      20 Oct 2019 15:09    TPro  7.9  /  Alb  4.0  /  TBili  1.0  /  DBili  x   /  AST  78<H>  /  ALT  29  /  AlkPhos  103  10-20                          11.0   22.36 )-----------( 126      ( 20 Oct 2019 15:09 )             34.5     CBC Full  -  ( 20 Oct 2019 15:09 )  WBC Count : 22.36 K/uL  Hemoglobin : 11.0 g/dL  Hematocrit : 34.5 %  Platelet Count - Automated : 126 K/uL  Mean Cell Volume : 90.8 fl          CARDIAC MARKERS ( 20 Oct 2019 15:09 )  x     / 0.02 ng/mL / x     / x     / 3.2 ng/mL          RADIOLOGY & ADDITIONAL TESTS:

## 2019-10-21 DIAGNOSIS — E87.1 HYPO-OSMOLALITY AND HYPONATREMIA: ICD-10-CM

## 2019-10-21 DIAGNOSIS — R94.31 ABNORMAL ELECTROCARDIOGRAM [ECG] [EKG]: ICD-10-CM

## 2019-10-21 DIAGNOSIS — E11.9 TYPE 2 DIABETES MELLITUS WITHOUT COMPLICATIONS: ICD-10-CM

## 2019-10-21 DIAGNOSIS — D64.9 ANEMIA, UNSPECIFIED: ICD-10-CM

## 2019-10-21 DIAGNOSIS — R59.1 GENERALIZED ENLARGED LYMPH NODES: ICD-10-CM

## 2019-10-21 DIAGNOSIS — A41.9 SEPSIS, UNSPECIFIED ORGANISM: ICD-10-CM

## 2019-10-21 DIAGNOSIS — E87.2 ACIDOSIS: ICD-10-CM

## 2019-10-21 DIAGNOSIS — Z29.9 ENCOUNTER FOR PROPHYLACTIC MEASURES, UNSPECIFIED: ICD-10-CM

## 2019-10-21 DIAGNOSIS — N17.9 ACUTE KIDNEY FAILURE, UNSPECIFIED: ICD-10-CM

## 2019-10-21 DIAGNOSIS — N49.2 INFLAMMATORY DISORDERS OF SCROTUM: ICD-10-CM

## 2019-10-21 LAB
-  STREPTOCOCCUS SP. (NOT GRP A, B OR S PNEUMONIAE): SIGNIFICANT CHANGE UP
ANION GAP SERPL CALC-SCNC: 12 MMOL/L — SIGNIFICANT CHANGE UP (ref 5–17)
ANION GAP SERPL CALC-SCNC: 14 MMOL/L — SIGNIFICANT CHANGE UP (ref 5–17)
ANION GAP SERPL CALC-SCNC: 14 MMOL/L — SIGNIFICANT CHANGE UP (ref 5–17)
ANION GAP SERPL CALC-SCNC: 16 MMOL/L — SIGNIFICANT CHANGE UP (ref 5–17)
APPEARANCE UR: CLEAR — SIGNIFICANT CHANGE UP
APTT BLD: 32.3 SEC — SIGNIFICANT CHANGE UP (ref 27.5–36.3)
BACTERIA # UR AUTO: PRESENT /HPF
BASOPHILS # BLD AUTO: 0 K/UL — SIGNIFICANT CHANGE UP (ref 0–0.2)
BASOPHILS NFR BLD AUTO: 0 % — SIGNIFICANT CHANGE UP (ref 0–2)
BILIRUB UR-MCNC: ABNORMAL
BUN SERPL-MCNC: 48 MG/DL — HIGH (ref 7–23)
BUN SERPL-MCNC: 48 MG/DL — HIGH (ref 7–23)
BUN SERPL-MCNC: 49 MG/DL — HIGH (ref 7–23)
BUN SERPL-MCNC: 54 MG/DL — HIGH (ref 7–23)
CALCIUM SERPL-MCNC: 8.5 MG/DL — SIGNIFICANT CHANGE UP (ref 8.4–10.5)
CALCIUM SERPL-MCNC: 8.5 MG/DL — SIGNIFICANT CHANGE UP (ref 8.4–10.5)
CALCIUM SERPL-MCNC: 8.6 MG/DL — SIGNIFICANT CHANGE UP (ref 8.4–10.5)
CALCIUM SERPL-MCNC: 8.7 MG/DL — SIGNIFICANT CHANGE UP (ref 8.4–10.5)
CHLORIDE SERPL-SCNC: 88 MMOL/L — LOW (ref 96–108)
CHLORIDE SERPL-SCNC: 89 MMOL/L — LOW (ref 96–108)
CHLORIDE SERPL-SCNC: 90 MMOL/L — LOW (ref 96–108)
CHLORIDE SERPL-SCNC: 92 MMOL/L — LOW (ref 96–108)
CO2 SERPL-SCNC: 17 MMOL/L — LOW (ref 22–31)
CO2 SERPL-SCNC: 18 MMOL/L — LOW (ref 22–31)
CO2 SERPL-SCNC: 18 MMOL/L — LOW (ref 22–31)
CO2 SERPL-SCNC: 20 MMOL/L — LOW (ref 22–31)
COLOR SPEC: YELLOW — SIGNIFICANT CHANGE UP
COMMENT - URINE: SIGNIFICANT CHANGE UP
CREAT ?TM UR-MCNC: 184 MG/DL — SIGNIFICANT CHANGE UP
CREAT SERPL-MCNC: 2.11 MG/DL — HIGH (ref 0.5–1.3)
CREAT SERPL-MCNC: 2.11 MG/DL — HIGH (ref 0.5–1.3)
CREAT SERPL-MCNC: 2.28 MG/DL — HIGH (ref 0.5–1.3)
CREAT SERPL-MCNC: 2.39 MG/DL — HIGH (ref 0.5–1.3)
DIFF PNL FLD: ABNORMAL
EOSINOPHIL # BLD AUTO: 0 K/UL — SIGNIFICANT CHANGE UP (ref 0–0.5)
EOSINOPHIL NFR BLD AUTO: 0 % — SIGNIFICANT CHANGE UP (ref 0–6)
EPI CELLS # UR: SIGNIFICANT CHANGE UP /HPF (ref 0–5)
GLUCOSE BLDC GLUCOMTR-MCNC: 154 MG/DL — HIGH (ref 70–99)
GLUCOSE BLDC GLUCOMTR-MCNC: 171 MG/DL — HIGH (ref 70–99)
GLUCOSE BLDC GLUCOMTR-MCNC: 190 MG/DL — HIGH (ref 70–99)
GLUCOSE BLDC GLUCOMTR-MCNC: 256 MG/DL — HIGH (ref 70–99)
GLUCOSE BLDC GLUCOMTR-MCNC: 303 MG/DL — HIGH (ref 70–99)
GLUCOSE SERPL-MCNC: 178 MG/DL — HIGH (ref 70–99)
GLUCOSE SERPL-MCNC: 178 MG/DL — HIGH (ref 70–99)
GLUCOSE SERPL-MCNC: 293 MG/DL — HIGH (ref 70–99)
GLUCOSE SERPL-MCNC: 326 MG/DL — HIGH (ref 70–99)
GLUCOSE UR QL: 100
GRAM STN FLD: SIGNIFICANT CHANGE UP
GRAN CASTS # UR COMP ASSIST: ABNORMAL /LPF
HCT VFR BLD CALC: 30.9 % — LOW (ref 39–50)
HCV AB S/CO SERPL IA: 0.03 S/CO — SIGNIFICANT CHANGE UP
HCV AB SERPL-IMP: SIGNIFICANT CHANGE UP
HGB BLD-MCNC: 10.1 G/DL — LOW (ref 13–17)
INR BLD: 1.64 — HIGH (ref 0.88–1.16)
KETONES UR-MCNC: ABNORMAL MG/DL
LEUKOCYTE ESTERASE UR-ACNC: NEGATIVE — SIGNIFICANT CHANGE UP
LYMPHOCYTES # BLD AUTO: 34.5 % — SIGNIFICANT CHANGE UP (ref 13–44)
LYMPHOCYTES # BLD AUTO: 5.08 K/UL — HIGH (ref 1–3.3)
MAGNESIUM SERPL-MCNC: 1.9 MG/DL — SIGNIFICANT CHANGE UP (ref 1.6–2.6)
MCHC RBC-ENTMCNC: 29.1 PG — SIGNIFICANT CHANGE UP (ref 27–34)
MCHC RBC-ENTMCNC: 32.7 GM/DL — SIGNIFICANT CHANGE UP (ref 32–36)
MCV RBC AUTO: 89 FL — SIGNIFICANT CHANGE UP (ref 80–100)
METHOD TYPE: SIGNIFICANT CHANGE UP
MONOCYTES # BLD AUTO: 0.38 K/UL — SIGNIFICANT CHANGE UP (ref 0–0.9)
MONOCYTES NFR BLD AUTO: 2.6 % — SIGNIFICANT CHANGE UP (ref 2–14)
NEUTROPHILS # BLD AUTO: 9.26 K/UL — HIGH (ref 1.8–7.4)
NEUTROPHILS NFR BLD AUTO: 54.9 % — SIGNIFICANT CHANGE UP (ref 43–77)
NITRITE UR-MCNC: NEGATIVE — SIGNIFICANT CHANGE UP
OSMOLALITY SERPL: 274 MOSMOL/KG — LOW (ref 280–301)
OSMOLALITY UR: 453 MOSMOL/KG — SIGNIFICANT CHANGE UP (ref 100–650)
OSMOLALITY UR: 474 MOSMOL/KG — SIGNIFICANT CHANGE UP (ref 100–650)
PH UR: 5.5 — SIGNIFICANT CHANGE UP (ref 5–8)
PLATELET # BLD AUTO: 111 K/UL — LOW (ref 150–400)
POTASSIUM SERPL-MCNC: 4 MMOL/L — SIGNIFICANT CHANGE UP (ref 3.5–5.3)
POTASSIUM SERPL-MCNC: 4.2 MMOL/L — SIGNIFICANT CHANGE UP (ref 3.5–5.3)
POTASSIUM SERPL-MCNC: 4.6 MMOL/L — SIGNIFICANT CHANGE UP (ref 3.5–5.3)
POTASSIUM SERPL-MCNC: 4.8 MMOL/L — SIGNIFICANT CHANGE UP (ref 3.5–5.3)
POTASSIUM SERPL-SCNC: 4 MMOL/L — SIGNIFICANT CHANGE UP (ref 3.5–5.3)
POTASSIUM SERPL-SCNC: 4.2 MMOL/L — SIGNIFICANT CHANGE UP (ref 3.5–5.3)
POTASSIUM SERPL-SCNC: 4.6 MMOL/L — SIGNIFICANT CHANGE UP (ref 3.5–5.3)
POTASSIUM SERPL-SCNC: 4.8 MMOL/L — SIGNIFICANT CHANGE UP (ref 3.5–5.3)
PROT UR-MCNC: 30 MG/DL
PROTHROM AB SERPL-ACNC: 18.8 SEC — HIGH (ref 10–12.9)
RBC # BLD: 3.47 M/UL — LOW (ref 4.2–5.8)
RBC # FLD: 14.7 % — HIGH (ref 10.3–14.5)
RBC CASTS # UR COMP ASSIST: < 5 /HPF — SIGNIFICANT CHANGE UP
SODIUM SERPL-SCNC: 121 MMOL/L — LOW (ref 135–145)
SODIUM SERPL-SCNC: 121 MMOL/L — LOW (ref 135–145)
SODIUM SERPL-SCNC: 122 MMOL/L — LOW (ref 135–145)
SODIUM SERPL-SCNC: 124 MMOL/L — LOW (ref 135–145)
SODIUM UR-SCNC: 20 MMOL/L — SIGNIFICANT CHANGE UP
SODIUM UR-SCNC: 22 MMOL/L — SIGNIFICANT CHANGE UP
SP GR SPEC: >=1.03 — SIGNIFICANT CHANGE UP (ref 1–1.03)
UROBILINOGEN FLD QL: 0.2 E.U./DL — SIGNIFICANT CHANGE UP
UUN UR-MCNC: 630 MG/DL — SIGNIFICANT CHANGE UP
VANCOMYCIN TROUGH SERPL-MCNC: 5.2 UG/ML — LOW (ref 10–20)
WBC # BLD: 14.72 K/UL — HIGH (ref 3.8–10.5)
WBC # FLD AUTO: 14.72 K/UL — HIGH (ref 3.8–10.5)
WBC UR QL: < 5 /HPF — SIGNIFICANT CHANGE UP

## 2019-10-21 PROCEDURE — 99233 SBSQ HOSP IP/OBS HIGH 50: CPT | Mod: GC

## 2019-10-21 PROCEDURE — 76776 US EXAM K TRANSPL W/DOPPLER: CPT | Mod: 26,RT

## 2019-10-21 PROCEDURE — 99223 1ST HOSP IP/OBS HIGH 75: CPT | Mod: GC

## 2019-10-21 RX ORDER — ACETAMINOPHEN 500 MG
650 TABLET ORAL EVERY 6 HOURS
Refills: 0 | Status: DISCONTINUED | OUTPATIENT
Start: 2019-10-21 | End: 2019-10-22

## 2019-10-21 RX ORDER — INSULIN LISPRO 100/ML
VIAL (ML) SUBCUTANEOUS
Refills: 0 | Status: DISCONTINUED | OUTPATIENT
Start: 2019-10-21 | End: 2019-10-22

## 2019-10-21 RX ORDER — CARBIDOPA AND LEVODOPA 25; 100 MG/1; MG/1
1 TABLET ORAL
Refills: 0 | Status: DISCONTINUED | OUTPATIENT
Start: 2019-10-21 | End: 2019-10-21

## 2019-10-21 RX ORDER — TAMSULOSIN HYDROCHLORIDE 0.4 MG/1
0.4 CAPSULE ORAL AT BEDTIME
Refills: 0 | Status: DISCONTINUED | OUTPATIENT
Start: 2019-10-21 | End: 2019-10-22

## 2019-10-21 RX ORDER — APIXABAN 2.5 MG/1
2.5 TABLET, FILM COATED ORAL EVERY 12 HOURS
Refills: 0 | Status: DISCONTINUED | OUTPATIENT
Start: 2019-10-21 | End: 2019-10-22

## 2019-10-21 RX ORDER — INSULIN LISPRO 100/ML
15 VIAL (ML) SUBCUTANEOUS
Refills: 0 | Status: DISCONTINUED | OUTPATIENT
Start: 2019-10-21 | End: 2019-10-21

## 2019-10-21 RX ORDER — LINEZOLID 600 MG/300ML
600 INJECTION, SOLUTION INTRAVENOUS EVERY 12 HOURS
Refills: 0 | Status: DISCONTINUED | OUTPATIENT
Start: 2019-10-21 | End: 2019-10-22

## 2019-10-21 RX ORDER — ATORVASTATIN CALCIUM 80 MG/1
40 TABLET, FILM COATED ORAL AT BEDTIME
Refills: 0 | Status: DISCONTINUED | OUTPATIENT
Start: 2019-10-21 | End: 2019-10-22

## 2019-10-21 RX ORDER — AMLODIPINE BESYLATE 2.5 MG/1
10 TABLET ORAL DAILY
Refills: 0 | Status: DISCONTINUED | OUTPATIENT
Start: 2019-10-21 | End: 2019-10-22

## 2019-10-21 RX ORDER — APIXABAN 2.5 MG/1
2.5 TABLET, FILM COATED ORAL EVERY 12 HOURS
Refills: 0 | Status: DISCONTINUED | OUTPATIENT
Start: 2019-10-21 | End: 2019-10-21

## 2019-10-21 RX ORDER — INSULIN LISPRO 100/ML
8 VIAL (ML) SUBCUTANEOUS
Refills: 0 | Status: DISCONTINUED | OUTPATIENT
Start: 2019-10-21 | End: 2019-10-22

## 2019-10-21 RX ORDER — SODIUM CHLORIDE 5 G/100ML
500 INJECTION, SOLUTION INTRAVENOUS
Refills: 0 | Status: DISCONTINUED | OUTPATIENT
Start: 2019-10-21 | End: 2019-10-22

## 2019-10-21 RX ORDER — OXYCODONE AND ACETAMINOPHEN 5; 325 MG/1; MG/1
1 TABLET ORAL ONCE
Refills: 0 | Status: DISCONTINUED | OUTPATIENT
Start: 2019-10-21 | End: 2019-10-21

## 2019-10-21 RX ORDER — INSULIN GLARGINE 100 [IU]/ML
25 INJECTION, SOLUTION SUBCUTANEOUS AT BEDTIME
Refills: 0 | Status: DISCONTINUED | OUTPATIENT
Start: 2019-10-21 | End: 2019-10-22

## 2019-10-21 RX ORDER — INFLUENZA VIRUS VACCINE 15; 15; 15; 15 UG/.5ML; UG/.5ML; UG/.5ML; UG/.5ML
0.5 SUSPENSION INTRAMUSCULAR ONCE
Refills: 0 | Status: DISCONTINUED | OUTPATIENT
Start: 2019-10-21 | End: 2019-10-22

## 2019-10-21 RX ORDER — INSULIN LISPRO 100/ML
10 VIAL (ML) SUBCUTANEOUS
Refills: 0 | Status: DISCONTINUED | OUTPATIENT
Start: 2019-10-21 | End: 2019-10-21

## 2019-10-21 RX ORDER — MAGNESIUM SULFATE 500 MG/ML
1 VIAL (ML) INJECTION ONCE
Refills: 0 | Status: COMPLETED | OUTPATIENT
Start: 2019-10-21 | End: 2019-10-21

## 2019-10-21 RX ORDER — TACROLIMUS 5 MG/1
2 CAPSULE ORAL
Refills: 0 | Status: DISCONTINUED | OUTPATIENT
Start: 2019-10-21 | End: 2019-10-22

## 2019-10-21 RX ORDER — VALGANCICLOVIR 450 MG/1
450 TABLET, FILM COATED ORAL DAILY
Refills: 0 | Status: DISCONTINUED | OUTPATIENT
Start: 2019-10-21 | End: 2019-10-22

## 2019-10-21 RX ORDER — SODIUM CHLORIDE 9 MG/ML
1000 INJECTION INTRAMUSCULAR; INTRAVENOUS; SUBCUTANEOUS
Refills: 0 | Status: DISCONTINUED | OUTPATIENT
Start: 2019-10-21 | End: 2019-10-21

## 2019-10-21 RX ORDER — INSULIN GLARGINE 100 [IU]/ML
100 INJECTION, SOLUTION SUBCUTANEOUS AT BEDTIME
Refills: 0 | Status: DISCONTINUED | OUTPATIENT
Start: 2019-10-21 | End: 2019-10-21

## 2019-10-21 RX ADMIN — Medication 100 MILLIGRAM(S): at 11:19

## 2019-10-21 RX ADMIN — Medication 8: at 06:52

## 2019-10-21 RX ADMIN — Medication 2: at 17:12

## 2019-10-21 RX ADMIN — PIPERACILLIN AND TAZOBACTAM 200 GRAM(S): 4; .5 INJECTION, POWDER, LYOPHILIZED, FOR SOLUTION INTRAVENOUS at 06:13

## 2019-10-21 RX ADMIN — Medication 100 MILLIGRAM(S): at 20:08

## 2019-10-21 RX ADMIN — PIPERACILLIN AND TAZOBACTAM 200 GRAM(S): 4; .5 INJECTION, POWDER, LYOPHILIZED, FOR SOLUTION INTRAVENOUS at 13:00

## 2019-10-21 RX ADMIN — OXYCODONE AND ACETAMINOPHEN 1 TABLET(S): 5; 325 TABLET ORAL at 21:00

## 2019-10-21 RX ADMIN — Medication 100 MILLIGRAM(S): at 02:19

## 2019-10-21 RX ADMIN — TAMSULOSIN HYDROCHLORIDE 0.4 MILLIGRAM(S): 0.4 CAPSULE ORAL at 00:39

## 2019-10-21 RX ADMIN — APIXABAN 2.5 MILLIGRAM(S): 2.5 TABLET, FILM COATED ORAL at 00:39

## 2019-10-21 RX ADMIN — INSULIN GLARGINE 25 UNIT(S): 100 INJECTION, SOLUTION SUBCUTANEOUS at 22:35

## 2019-10-21 RX ADMIN — APIXABAN 2.5 MILLIGRAM(S): 2.5 TABLET, FILM COATED ORAL at 17:12

## 2019-10-21 RX ADMIN — AMLODIPINE BESYLATE 10 MILLIGRAM(S): 2.5 TABLET ORAL at 06:13

## 2019-10-21 RX ADMIN — Medication 2: at 22:35

## 2019-10-21 RX ADMIN — SODIUM CHLORIDE 25 MILLILITER(S): 5 INJECTION, SOLUTION INTRAVENOUS at 22:36

## 2019-10-21 RX ADMIN — Medication 100 GRAM(S): at 08:47

## 2019-10-21 RX ADMIN — Medication 2: at 11:26

## 2019-10-21 RX ADMIN — ATORVASTATIN CALCIUM 40 MILLIGRAM(S): 80 TABLET, FILM COATED ORAL at 21:07

## 2019-10-21 RX ADMIN — TAMSULOSIN HYDROCHLORIDE 0.4 MILLIGRAM(S): 0.4 CAPSULE ORAL at 21:07

## 2019-10-21 RX ADMIN — PIPERACILLIN AND TAZOBACTAM 200 GRAM(S): 4; .5 INJECTION, POWDER, LYOPHILIZED, FOR SOLUTION INTRAVENOUS at 23:58

## 2019-10-21 RX ADMIN — Medication 650 MILLIGRAM(S): at 07:38

## 2019-10-21 RX ADMIN — PIPERACILLIN AND TAZOBACTAM 200 GRAM(S): 4; .5 INJECTION, POWDER, LYOPHILIZED, FOR SOLUTION INTRAVENOUS at 18:22

## 2019-10-21 RX ADMIN — Medication 10 UNIT(S): at 17:12

## 2019-10-21 RX ADMIN — INSULIN GLARGINE 100 UNIT(S): 100 INJECTION, SOLUTION SUBCUTANEOUS at 01:18

## 2019-10-21 RX ADMIN — Medication 15 UNIT(S): at 08:43

## 2019-10-21 RX ADMIN — Medication 650 MILLIGRAM(S): at 09:30

## 2019-10-21 RX ADMIN — OXYCODONE AND ACETAMINOPHEN 1 TABLET(S): 5; 325 TABLET ORAL at 21:30

## 2019-10-21 RX ADMIN — TACROLIMUS 2 MILLIGRAM(S): 5 CAPSULE ORAL at 22:36

## 2019-10-21 NOTE — H&P ADULT - PROBLEM SELECTOR PLAN 8
Patient with LAD seen on CT imaging above concerning for metastatic adenopathy. Patient with hx of MGUS however no protein gap on CMP, calcium WNL, no spine pain low suspicion for conversion of MGUS however other etiologies may be sought  -consider heme consult in am

## 2019-10-21 NOTE — H&P ADULT - NSHPLABSRESULTS_GEN_ALL_CORE
.  LABS:                         11.0   22.36 )-----------( 126      ( 20 Oct 2019 15:09 )             34.5     10-20    123<L>  |  89<L>  |  46<H>  ----------------------------<  351<H>  5.1   |  16<L>  |  2.22<H>    Ca    8.4      20 Oct 2019 21:45    TPro  6.5  /  Alb  3.6  /  TBili  0.9  /  DBili  x   /  AST  58<H>  /  ALT  25  /  AlkPhos  81  10-20        CARDIAC MARKERS ( 20 Oct 2019 21:45 )  x     / 0.02 ng/mL / 2360 U/L / x     / 3.2 ng/mL  CARDIAC MARKERS ( 20 Oct 2019 15:09 )  x     / 0.02 ng/mL / 3438 U/L / x     / 3.2 ng/mL      Serum Pro-Brain Natriuretic Peptide: 9157 pg/mL (10-20 @ 15:09)    Lactate, Blood: 1.6 mmoL/L (10-20 @ 20:12)  Lactate, Blood: 2.2 mmoL/L (10-20 @ 15:24)      RADIOLOGY, EKG & ADDITIONAL TESTS: Reviewed. .  LABS:                         11.0   22.36 )-----------( 126      ( 20 Oct 2019 15:09 )             34.5     10-20    123<L>  |  89<L>  |  46<H>  ----------------------------<  351<H>  5.1   |  16<L>  |  2.22<H>    Ca    8.4      20 Oct 2019 21:45    TPro  6.5  /  Alb  3.6  /  TBili  0.9  /  DBili  x   /  AST  58<H>  /  ALT  25  /  AlkPhos  81  10-20        CARDIAC MARKERS ( 20 Oct 2019 21:45 )  x     / 0.02 ng/mL / 2360 U/L / x     / 3.2 ng/mL  CARDIAC MARKERS ( 20 Oct 2019 15:09 )  x     / 0.02 ng/mL / 3438 U/L / x     / 3.2 ng/mL      Serum Pro-Brain Natriuretic Peptide: 9157 pg/mL (10-20 @ 15:09)    Lactate, Blood: 1.6 mmoL/L (10-20 @ 20:12)  Lactate, Blood: 2.2 mmoL/L (10-20 @ 15:24)    < from: CT Abdomen and Pelvis No Cont (10.20.19 @ 20:00) >    IMPRESSION:   1.  Since 2/18/2016, there is thickening of the scrotum, with scrotal   fluid consistent with hydrocele seen on ultrasound from earlier same   date. No subcutaneous gas is seen in the perineum. Evaluation is limited   without intravenous contrast, however no discernible abscess.    2.  There is new lymphadenopathy involving the bilateral inguinal,   retroperitoneal, and mesenteric lymph nodes with hazy infiltration of the   mesenteric fat which is concerning for metastatic adenopathy.    3.  Distended gallbladder with small stones or sludge. No adjacent   inflammatory change, and not significantly changed compared to prior CT   dated 2/18/2016.    4.  Stable main pulmonary artery dilation measuring 4.0 cm, suggestive of   pulmonary hypertension.    5.  Fatty liver.    6.  Enlarged prostate.    < end of copied text >    < from: US Testicles (10.20.19 @ 17:56) >      IMPRESSION:  Large right hydrocele with no appreciable right testicular venous flow.   Right testicular torsion cannot be excluded.    Scrotal skin thickening which may represent cellulitis.    Left-sided varicocele with mild left hydrocele.    < end of copied text >

## 2019-10-21 NOTE — H&P ADULT - PROBLEM SELECTOR PLAN 5
Na 121. Hypovolemic on exam. Asymptomatic. possible SIADH vs dehydration vs tacro-induced  -urine osm and Na, as well as serum osm  - goal Na 127 for 3 pm on 10/21  -BMP q4

## 2019-10-21 NOTE — PROGRESS NOTE ADULT - ASSESSMENT
69 yo M with a history of renal transplant on June 2017, with severe sepsis 2/2 cellulitis (r/o abscess) and hyponatremia.    # Nonoliguric LATRICIA on CKD in renal transplant patient  - LATRICIA could be due to tacrolimus toxicity (prerenal - afferent vasoconstriction) vs ATN in setting of sepsis  - Baseline creatinine 1.2. > 2.17 today  - on Tacrolimus 2 mg po q12h, on hold   - pending Tacrolimus level  - goal range is between 4-6  - resume Tacrolimus if level is within the range  - suggest renal sono of transplanted kidney  - send ulytes, urine na, urine cr, urine urea  - s/p 1 L NS  - avoid ACE/ARB/Contrast    # Hyponatremia  - Na 122  - may be due to Tacro toxicity and poor free water clearance in setting of LATRICIA vs SIADH due to pain   - send tacro level, hold tacro dose tonight  - send urine na, urine osm, serum uric acid, TSH, serum am cortisol  - BMP, urine na, urine osm, stat then q4h  - goal Na 127 for 3 pm on 10/21    # Sepsis  - suggest renal dosing of antibiotics  - Resume valcyte 500 mg po bid 67 yo M with a history of renal transplant on June 2017, with severe sepsis 2/2 cellulitis (r/o abscess) and hyponatremia.    # Nonoliguric LATRICIA on CKD in renal transplant patient  - LATRICIA could be due to tacrolimus toxicity (prerenal - afferent vasoconstriction) vs ATN in setting of sepsis  - Baseline creatinine 1.2. > 2.17 today  - Tacrolimus 2 mg po q12h on hold, pending Tacrolimus level, goal range is between 4-6  - resume Tacrolimus if level is within the range  - suggest renal sono of transplanted kidney  - ulytes, urine na, urine cr, urine urea noted  - s/p 1 L NS  - avoid ACE/ARB/Contrast    # Hyponatremia  - Na 122  - may be due to Tacro toxicity and poor free water clearance in setting of LATRICIA vs SIADH due to pain   - pending Tacrolimus level  - urine na, urine osm noted  - send serum uric acid, TSH, serum am cortisol  - BMP, urine na, urine osm q4h  - goal Na 127 for 3 pm on 10/21  - 1.0 L fluid restriction/ day    # Sepsis  - suggest renal dosing of antibiotics  - resume valcyte 500 mg po bid      Discussed with attending Dr Araujo. 69 yo M with a history of renal transplant on June 2017, with severe sepsis 2/2 cellulitis (r/o abscess) and hyponatremia.    # Nonoliguric LATRICIA on CKD in renal transplant patient  - LATRICIA could be due to tacrolimus toxicity (prerenal - afferent vasoconstriction) vs ATN in setting of sepsis  - Baseline creatinine 1.2 > 2.17 today  - Tacrolimus 2 mg po q12h on hold, pending Tacrolimus level, goal range is between 4-6  - resume Tacrolimus if level is within the range  - suggest renal sono of transplanted kidney  - ulytes, urine na, urine cr, urine urea noted  - s/p 1 L NS  - avoid ACE/ARB/Contrast    # Hyponatremia  - Na 122  - may be due to Tacro toxicity and poor free water clearance in setting of LATRICIA vs SIADH due to pain   - pending Tacrolimus level  - urine na, urine osm noted  - send serum uric acid, TSH, serum am cortisol  - BMP, urine na, urine osm q4h  - goal Na 127 for 3 pm on 10/21  - 1.0 L fluid restriction/ day    # Sepsis  - suggest renal dosing of antibiotics  - resume valcyte 500 mg po bid  - consider ID consult    # Lymphadenopathy  - at high risk of cancer as transplant recipient  - consider HemOnc consult      Discussed with attending Dr Araujo.

## 2019-10-21 NOTE — H&P ADULT - PROBLEM SELECTOR PLAN 7
Patient with EKG and physical exam findings consistent with afib, on eliquis 2.5 BID at home but not on any rate control.  -c/w eliquis 2.5mg BID  -Clarify need for B-blocker, patient follows with Dr. Paulina Encinas

## 2019-10-21 NOTE — H&P ADULT - PROBLEM SELECTOR PLAN 2
Patient with x3 day history of swelling, erythema, and b/l non-radiating scrotal pain, not associated with n/v, no recent history of trauma. hx, physical exam and imaging as above most consistent with scrotal cellulitis, less likely testicular torsion, linda's gangrene, epididymitis.  -c/w with plan as above  -uro following-no surgical intervention at this time, will f/u recs

## 2019-10-21 NOTE — H&P ADULT - NSICDXPASTSURGICALHX_GEN_ALL_CORE_FT
PAST SURGICAL HISTORY:  History of surgery cyst removal    Mandibular abscess     S/P CABG (coronary artery bypass graft) 15 years

## 2019-10-21 NOTE — H&P ADULT - PROBLEM SELECTOR PLAN 1
Patient presenting with x3 day history of scrotal swelling, warmth, and erythema, findings consistent with cellulitis, erythematous, warm, swollen and TTP on Physical exam. Patient febrile 102.3 HR 96, RR 24, WBC 22.36 Meeting 4/4 SIRS criteria, Lactate 2.2 on admission. Hx, physical exam and labs consistent with severe sepsis in the setting of scrotal cellulitis. s/p vanc, zosyn, and clinda in the ED, 2L NS. Lactate now cleared.   -c/w clindamycin 600mg q8  -c/w vanc 1g q24, with daily vanc troughs in the setting of CKD  -blood cultures sent and pending, f/u cultures  -lactate cleared no longer following  -UA  -trend CBC

## 2019-10-21 NOTE — CONSULT NOTE ADULT - ATTENDING COMMENTS
This patient was admitted for cellulitis of the scrotum. He has a history of diabetes mellitus and is S/P Renal transplant. He also has MGUS with B ce;; clonality and had CABG 15 years ago. He has in addition a problem currently with glucose control and hyponatermia.      Sodium on admission was 121 which has increased a bit after a 2 Liter N/S challenge. His glucoses have been 306-342 yesterday and 308 this AM fasting then 154.The osmolality studies are not necessarily consistent with SIADH. He has a strange home regimen with taking variable doses of Glargine and or premeal Lispro Insulin dependent on inappropriate parameters. Here Hgb A1C is 8.1%. I agree with a weight based regimen of 25 units Lantus and 8 units premeal Lispro Insulin However we must keep in mind that at times patient was taking 100 units Glargine Insulin at home in one dose.

## 2019-10-21 NOTE — H&P ADULT - HISTORY OF PRESENT ILLNESS
68M with a history of renal transplant in 6/2017 (on Prograf), DM2, HTN, MGUS, pAF, BPH, HLD, CAD s/p CABG 15 years ago p/w with x3 days of progressively worsening scrotal erythema, pain, and swelling. Pain described as constant, non-radiating, localized to scrotum worse with palpation and sensitive touch, no recent history of trauma. Patient originally thought this was due to jock itch and attempted to relieve pain, swelling and redness with OTC anti-fungal with no improvement in symptoms. Symptoms not associated with recent fevers, chills, dysuria, hematuria, no history of STIs or UTIs.    of note: patient recently diagnosed with walking PNA recently and completed course of azithromycin 2 days ago. Currently denies CP, SOB, cough.     In the ED, Tmax 102.3, HR 96, /81, RR 22, 97% on RA. Labs notable for WBC 22k, hgb 11, plt 126k, Na 121, Cl 86, bicarb 15, AG 20, BUN 42, Cr 2.17, , lactate 2.2. No crepitus, no gas on CT scan.    He received vanc, zosyn, clinda, tylenol and 1L NS. 68M with a history of renal transplant in 6/2017 (on Prograf), DM2, HTN, MGUS, pAF, BPH, HLD, CAD s/p CABG 15 years ago p/w with x3 days of progressively worsening scrotal erythema, pain, and swelling. Pain described as constant, non-radiating, localized to scrotum worse with palpation and sensitive touch, no recent history of trauma. Patient originally thought this was due to jock itch and attempted to relieve pain, swelling and redness with OTC anti-fungal with no improvement in symptoms. Symptoms not associated with recent fevers, chills, dysuria, hematuria, no history of STIs or UTIs.    of note: patient recently diagnosed with walking PNA recently and completed course of azithromycin 2 days ago. Currently denies CP, SOB, cough.     In the ED, Tmax 102.3, HR 96, /81, RR 22, 97% on RA. Labs notable for WBC 22k, hgb 11, plt 126k, Na 121, Cl 86, bicarb 15, AG 20, BUN 42, Cr 2.17, , lactate 2.2. No crepitus, no gas on CT scan.    Meds given vanc, zosyn, clinda, tylenol and 1L NS.

## 2019-10-21 NOTE — PROGRESS NOTE ADULT - SUBJECTIVE AND OBJECTIVE BOX
Patient seen and examined at bedside. Continues to have pain in scrotum, no much changed from yesterday. No fevers since admission. WBC improved from 22 -> 14. Creat flat at 2.1.   On exam scrotum is same size as yesterday. Tender to palpation throughout scrotum. Significant scrotal wall thickening/edema. Area on bottom of necrotic tissue, likely from pressure with some scant drainage. No area of fluctuance or drainable collection. Perineum is non-tender, no crepitus. Erythema extent is similar to yesterday, no progression.                          10.1   14.72 )-----------( 111      ( 21 Oct 2019 06:17 )             30.9     10-21    122<L>  |  90<L>  |  48<H>  ----------------------------<  293<H>  4.6   |  18<L>  |  2.11<H>    Ca    8.5      21 Oct 2019 06:17  Mg     1.9     10-21    TPro  6.5  /  Alb  3.6  /  TBili  0.9  /  DBili  x   /  AST  58<H>  /  ALT  25  /  AlkPhos  81  10-20        68M with transplant kidney with scrotal cellulitis, LATRICIA, elevated BNP, uncontrolled DM    -Urology to continue serial exams  -No surgical intervention to improve current situation  -Continue broad spectrum abx  -blood sugar control  -Tacrolimus level pending  -will continue to follow

## 2019-10-21 NOTE — H&P ADULT - PROBLEM SELECTOR PLAN 4
LATRICIA could be due to tacrolimus toxicity (prerenal - afferent vasoconstriction) vs ATN in setting of sepsis. Baseline creatinine 1-1.2. Now 2.17. Possibly pre-renal in setting of decreased PO and sepsis.  -s/p 2L NS now on IVF with NS @ 100cc/hr  -Urine creatinine, urine sodium and urine urea nitrogen  -goal Na 127 for 3 pm on 10/21  -renal sono of transplanted kidney  -Tacro level  -holding tacromilus for now

## 2019-10-21 NOTE — H&P ADULT - NSHPREVIEWOFSYSTEMS_GEN_ALL_CORE
REVIEW OF SYSTEMS:    CONSTITUTIONAL: No fevers or chills, +decreased PO intake x1 week  EYES/ENT: No visual or hearing changes  RESPIRATORY: No cough, wheezing; No shortness of breath  CARDIOVASCULAR: No chest pain or palpitations  GASTROINTESTINAL: +Loose stools x2 days, no hematochezia or melena  GENITOURINARY: No dysuria, frequency or hematuria  NEUROLOGICAL: No numbness or weakness  SKIN: +erythema and warmth of scrotum  All other review of systems is negative unless indicated above.

## 2019-10-21 NOTE — H&P ADULT - PROBLEM SELECTOR PLAN 6
Patient with Hgb A1c 8.1 on admission, with glucose 351 on recent BMP. Patient with hx of poorly controlled diabetes mellitus, endorses non-adherence with Lantus. on 100 units of Lantus @ home.  -c/w 100 units lantus for now  -ISS  -Adjust as necessary  -Renal diet

## 2019-10-21 NOTE — PROGRESS NOTE ADULT - SUBJECTIVE AND OBJECTIVE BOX
Patient examined at bedside, with discomfort due to enlarged testicles. Patient states at rest pain is 4/10, but with any movement or palpation pain is 9/10. Patient denies CP, SOB, fever, chills, nausea, vomiting, chills.    T(C): 37.3 (10-21-19 @ 09:22), Max: 39.1 (10-20-19 @ 15:08)  HR: 90 (10-21-19 @ 08:10) (79 - 98)  BP: 138/63 (10-21-19 @ 08:10) (118/67 - 148/86)  RR: 20 (10-21-19 @ 04:05) (20 - 24)  SpO2: 94% (10-21-19 @ 08:10) (91% - 97%)  Wt(kg): --Vital Signs Last 24 Hrs    PHYSICAL EXAM:  GENERAL: in discomfort, obese  HEAD:  Atraumatic, Normocephalic  EYES: EOMI, PERRLA, conjunctiva and sclera clear  ENMT: Moist mucous membranes, Good dentition, No lesions  NECK: Supple, No JVD  NERVOUS SYSTEM:  Alert & Oriented X3, Good concentration;   CHEST/LUNG: Clear to percussion bilaterally; No rales, rhonchi, wheezing, or rubs  HEART: Regular rate and rhythm; No murmurs, rubs, or gallops  ABDOMEN: Soft, Nontender, Nondistended; Bowel sounds present  EXTREMITIES:  2+ Peripheral Pulses, No clubbing, cyanosis, or edema  : large erythematous warm testicles, very tender on palpation  LYMPH: No lymphadenopathy noted  SKIN: No rashes or lesions      LABS:                        10.1   14.72 )-----------( 111      ( 21 Oct 2019 06:17 )             30.9     10-21    122<L>  |  90<L>  |  48<H>  ----------------------------<  293<H>  4.6   |  18<L>  |  2.11<H>    Ca    8.5      21 Oct 2019 06:17  Mg     1.9     10-21    TPro  6.5  /  Alb  3.6  /  TBili  0.9  /  DBili  x   /  AST  58<H>  /  ALT  25  /  AlkPhos  81  10-20    PT/INR - ( 21 Oct 2019 06:17 )   PT: 18.8 sec;   INR: 1.64          PTT - ( 21 Oct 2019 06:17 )  PTT:32.3 sec  Urinalysis Basic - ( 21 Oct 2019 05:58 )    Color: Yellow / Appearance: Clear / SG: >=1.030 / pH: x  Gluc: x / Ketone: Trace mg/dL  / Bili: Small / Urobili: 0.2 E.U./dL   Blood: x / Protein: 30 mg/dL / Nitrite: NEGATIVE   Leuk Esterase: NEGATIVE / RBC: < 5 /HPF / WBC < 5 /HPF   Sq Epi: x / Non Sq Epi: 0-5 /HPF / Bacteria: Present /HPF      CAPILLARY BLOOD GLUCOSE      POCT Blood Glucose.: 256 mg/dL (21 Oct 2019 08:04)  POCT Blood Glucose.: 303 mg/dL (21 Oct 2019 06:44)  POCT Blood Glucose.: 342 mg/dL (20 Oct 2019 22:35)        Urinalysis Basic - ( 21 Oct 2019 05:58 )    Color: Yellow / Appearance: Clear / SG: >=1.030 / pH: x  Gluc: x / Ketone: Trace mg/dL  / Bili: Small / Urobili: 0.2 E.U./dL   Blood: x / Protein: 30 mg/dL / Nitrite: NEGATIVE   Leuk Esterase: NEGATIVE / RBC: < 5 /HPF / WBC < 5 /HPF   Sq Epi: x / Non Sq Epi: 0-5 /HPF / Bacteria: Present /HPF

## 2019-10-21 NOTE — H&P ADULT - NSHPPHYSICALEXAM_GEN_ALL_CORE
.  VITAL SIGNS:  T(C): 36.9 (10-20-19 @ 21:07), Max: 39.1 (10-20-19 @ 15:08)  T(F): 98.5 (10-20-19 @ 21:07), Max: 102.3 (10-20-19 @ 15:08)  HR: 79 (10-20-19 @ 23:15) (79 - 98)  BP: 126/60 (10-20-19 @ 23:15) (118/67 - 148/86)  BP(mean): 87 (10-20-19 @ 23:15) (87 - 87)  RR: 24 (10-20-19 @ 23:15) (20 - 24)  SpO2: 95% (10-20-19 @ 23:15) (95% - 97%)  Wt(kg): --    PHYSICAL EXAM:    Constitutional: WDWN obese gentleman, looks visibly uncomfortable resting in bed, Speaking in full sentences on room air  HEENT: EOMI, DMM  Neck: -JVD  Respiratory: No accessory muscle use, CTA b/l no R/W/R  Cardiac: +S1/S2; Irregularly irregular, rate controlled; no murmurs  Gastrointestinal: abdomen soft, NT/ND; no rebound or guarding; +BSx4. RLQ flank scar  Genitourinary: Circumcised, no discharge, no skin breakdown, scrotum warm, swollen and erythematous. TTP.   Back: No CVA tenderness  Extremities: WWP, trace edema  Musculoskeletal: NROM x4;  Vascular: 2+ radial, DP pulses  Neurologic: AAOx3; CNII-XII grossly intact; no focal deficits  Psychiatric: affect and characteristics of appearance, verbalizations, behaviors are appropriate

## 2019-10-21 NOTE — H&P ADULT - PROBLEM SELECTOR PLAN 10
DVT PPX: on eliquis 2.5 BID    F: IVF @ 100cc/hr for now  E: replete as necessary   N: Renal (kosher) diet

## 2019-10-21 NOTE — PROGRESS NOTE ADULT - SUBJECTIVE AND OBJECTIVE BOX
no acute events overnight  renally stable  Na 122      Meds:    acetaminophen   Tablet .. 650 every 6 hours PRN  amLODIPine   Tablet 10 daily  apixaban 2.5 every 12 hours  atorvastatin 40 at bedtime  clindamycin IVPB 600 every 8 hours  influenza   Vaccine 0.5 once  insulin glargine Injectable (LANTUS) 100 at bedtime  insulin lispro (HumaLOG) corrective regimen sliding scale  Before meals and at bedtime  insulin lispro Injectable (HumaLOG) 15 three times a day before meals  piperacillin/tazobactam IVPB.. 3.375 every 6 hours  tamsulosin 0.4 at bedtime  vancomycin  IVPB 1000 every 24 hours      T(C): , Max: 39.1 (10-20-19 @ 15:08)  T(F): , Max: 102.3 (10-20-19 @ 15:08)  HR: 82 (10-21-19 @ 11:30)  BP: 113/58 (10-21-19 @ 11:30)  BP(mean): 82 (10-21-19 @ 11:30)  RR: 20 (10-21-19 @ 04:05)  SpO2: 90% (10-21-19 @ 11:30)  Wt(kg): --      Weight (kg): 94.3 (10-20 @ 14:37)      PHYSICAL EXAM:  General: elderly, obese, speaking in full sentences  HENT: EOMI, PERRL, MM dry   Neck: LAD, No JVD  Heart: S1S2 no M/R/G  Lungs: CTAB no wheezing, rhonchi or rales  Abdomen:  NABS.  soft, nontender, nondistended.  no guarding.  no ascites.  no organomegaly.  Vascular:  Peripheral pulses palpable  Extremities:  No edema  Back:  No CVA tenderness  Neuro: no facial asymmetry, nonfocal, no slurred speech        LABS:                        10.1   14.72 )-----------( 111      ( 21 Oct 2019 06:17 )             30.9     10-21    124<L>  |  92<L>  |  49<H>  ----------------------------<  178<H>  4.2   |  20<L>  |  2.28<H>    Ca    8.7      21 Oct 2019 12:28  Mg     1.9     10-21    TPro  6.5  /  Alb  3.6  /  TBili  0.9  /  DBili  x   /  AST  58<H>  /  ALT  25  /  AlkPhos  81  10-20    Hepatitis C Virus S/CO Ratio: 0.03 S/CO (10-21 @ 06:17)  Hepatitis C Virus Interpretation: Nonreact (10-21 @ 06:17)    PT/INR - ( 21 Oct 2019 06:17 )   PT: 18.8 sec;   INR: 1.64          PTT - ( 21 Oct 2019 06:17 )  PTT:32.3 sec  Urinalysis Basic - ( 21 Oct 2019 05:58 )    Color: Yellow / Appearance: Clear / SG: >=1.030 / pH: x  Gluc: x / Ketone: Trace mg/dL  / Bili: Small / Urobili: 0.2 E.U./dL   Blood: x / Protein: 30 mg/dL / Nitrite: NEGATIVE   Leuk Esterase: NEGATIVE / RBC: < 5 /HPF / WBC < 5 /HPF   Sq Epi: x / Non Sq Epi: 0-5 /HPF / Bacteria: Present /HPF      Creatinine, Random Urine: 184 mg/dL (10-21 @ 05:58)  Sodium, Random Urine: 22 mmol/L (10-21 @ 05:58)  Osmolality, Random Urine: 474 mosmol/kg (10-21 @ 05:58)        RADIOLOGY & ADDITIONAL STUDIES:      < from: CT Abdomen and Pelvis No Cont (10.20.19 @ 20:00) >  EXAM:  CT ABDOMEN AND PELVIS                          PROCEDURE DATE:  10/20/2019          INTERPRETATION:  CT of the ABDOMEN and PELVIS     INDICATION: Scrotal edema. ?Nec fasc.    TECHNIQUE: CT of the abdomen and pelvis without intravenous or oral   contrast. Axial, sagittal, and coronal images were obtained and reviewed.    COMPARISON: Scrotal ultrasound from same date and PET/CT dated 2/18/2016..    FINDINGS:    Lower chest: Patient is status post CABG. Borderline cardiomegaly. Stable   pain pulmonary artery dilation measuring 4.0 cm. Mild bilateral   gynecomastia. Small hiatal hernia.    Liver: Hepatic steatosis.     Biliary system: The gallbladder is distended with small stones or   echogenic sludge. No pericholecystic fluid or inflammatory changes.   Appearance is similar to prior CT dated 2/18/2016.     Pancreas: Normal.    Spleen: Borderline splenomegaly measuring 13.2 cm.    Adrenal glands: Normal.    Kidneys: There is bilateral renal atrophy kidneys. Multiple bilateral   renal cysts, similar to prior. Patient has right lower quadrant renal   transplant. No hydronephrosis noted within the transplanted kidney.    Bowel/Peritoneum: There is hazy infiltration of the fat near the root of   the mesentery and surrounding diffuselymphadenopathy. Evaluation of the   bowel is limited without the use of enteric contrast. No evidence of   obstruction or free air.    Pelvic organs: Fluid-filled scrotal sac consistent with previously   identified hydrocele. Scrotal skin thickening. No subcutaneous gas.   Evaluation is limited without intravenous contrast, however no discrete   abscess is identified. Enlarged prostate. Bladder appears normal.     Lymph nodes: Diffuse common iliac, para-aortic, retrocaval   lymphadenopathy, as well as mesenteric lymphadenopathy. For example 3.0 x   2.2 cm right common iliac node, (series 4 image 110), 1.7 cm retrocaval   node. Bilateral inguinal lymph nodes nodes appear increased in number and   size compared with prior.    Vascular: Mild calcific atherosclerotic disease.    Bones/Soft tissues: Right lower abdominal wall scarring. Stable   degenerative changes.      IMPRESSION:   1.  Since 2/18/2016, there is thickening of the scrotum, with scrotal   fluid consistent with hydrocele seen on ultrasound from earlier same   date. No subcutaneous gas is seen in the perineum. Evaluation is limited   without intravenous contrast, however no discernible abscess.    2.  There is new lymphadenopathy involving the bilateral inguinal,   retroperitoneal, and mesenteric lymph nodes with hazy infiltration of the   mesenteric fat which is concerning for metastatic adenopathy.    3.  Distended gallbladder with small stones or sludge. No adjacent   inflammatory change, and not significantly changed compared to prior CT   dated 2/18/2016.    4.  Stable main pulmonary artery dilation measuring 4.0 cm, suggestive of   pulmonary hypertension.    5.  Fatty liver.    6.  Enlarged prostate.      < end of copied text >

## 2019-10-21 NOTE — CONSULT NOTE ADULT - SUBJECTIVE AND OBJECTIVE BOX
HPI: 68yMale    Age at Dx:  How dx:  Hx and duration of insulin:  Current Therapy:  Hx of hypoglycemia  Hx of DKA/HHS?    Home FSG:  Fasting  Lunch  Dinner  Bed    Hx of other regimens  Complications:  Outpatient Endo:    PMH & Surgical Hx:GROIN PAIN  H/o or current diagnosis of HF- ACEI/ARB contraindication unknown  H/o or current diagnosis of HF- Contraindication to ACEI/ARBs  H/o or current diagnosis of HF- ACEI/ARB contraindication unknown  Handoff  MEWS Score  Other hyperlipidemia  Benign prostatic hyperplasia, presence of lower urinary tract symptoms unspecified, unspecified morphology  Paroxysmal atrial fibrillation  Mandibular abscess  Monoclonal gammopathy  HTN (hypertension)  GERD (gastroesophageal reflux disease)  DM (diabetes mellitus)  CRI (chronic renal insufficiency)  Barretts esophagus  Renal failure  Prophylactic measure  Anemia  LAD (lymphadenopathy)  Abnormal EKG  Diabetes mellitus  Hyponatremia  Acute on chronic kidney failure  Metabolic acidosis, increased anion gap  Scrotal infection  Severe sepsis  Mandibular abscess  History of surgery  S/P CABG (coronary artery bypass graft)  No significant past surgical history  GROIN PAIN  90+  Renal transplant, status post  S/P CABG (coronary artery bypass graft)  Paroxysmal atrial fibrillation  Scrotal infection      FH:  DM:  Thyroid:  Autoimmune:  Other:    SH:  Smoking  Etoh:  Recreational Drugs:  Social Life:    Current Meds:  acetaminophen   Tablet .. 650 milliGRAM(s) Oral every 6 hours PRN  amLODIPine   Tablet 10 milliGRAM(s) Oral daily  apixaban 2.5 milliGRAM(s) Oral every 12 hours  atorvastatin 40 milliGRAM(s) Oral at bedtime  clindamycin IVPB 600 milliGRAM(s) IV Intermittent every 8 hours  influenza   Vaccine 0.5 milliLiter(s) IntraMuscular once  insulin glargine Injectable (LANTUS) 100 Unit(s) SubCutaneous at bedtime  insulin lispro (HumaLOG) corrective regimen sliding scale   SubCutaneous Before meals and at bedtime  insulin lispro Injectable (HumaLOG) 15 Unit(s) SubCutaneous three times a day before meals  piperacillin/tazobactam IVPB.. 3.375 Gram(s) IV Intermittent every 6 hours  tamsulosin 0.4 milliGRAM(s) Oral at bedtime  vancomycin  IVPB 1000 milliGRAM(s) IV Intermittent every 24 hours      Allergies:  No Known Allergies      ROS:  Denies the following except as indicated.    General: weight loss/weight gain, decreased appetite, fatigue  Eyes: Blurry vision, double vision, visual changes  ENT: Throat pain, changes in voice,   CV: palpitations, SOB, CP, cough  GI: NVD, difficulty swallowing, abdominal pain  : polyuria, dysuria  Endo: abnormal menses, temperature intolerance, decreased libido  MSK: weakness, joint pain  Skin: rash, dryness, diaphoresis  Heme: Easy bruising,bleeding  Neuro: HA, dizziness, lightheadedness, numbness tingling  Psych: Anxiety, Depression    Vital Signs Last 24 Hrs  T(C): 37.3 (21 Oct 2019 09:22), Max: 39.1 (20 Oct 2019 15:08)  T(F): 99.1 (21 Oct 2019 09:22), Max: 102.3 (20 Oct 2019 15:08)  HR: 82 (21 Oct 2019 11:30) (79 - 98)  BP: 113/58 (21 Oct 2019 11:30) (113/58 - 148/86)  BP(mean): 82 (21 Oct 2019 11:30) (82 - 91)  RR: 20 (21 Oct 2019 04:05) (20 - 24)  SpO2: 90% (21 Oct 2019 11:30) (90% - 97%)    Weight (kg): 94.3 (10-20 @ 14:37)      Constitutional: wn/wd in NAD.   HEENT: NCAT, MMM, OP clear, EOMI, , no proptosis or lid retraction  Neck: no thyromegaly or palpable thyroid nodules   Respiratory: lungs CTAB.  Cardiovascular: regular rhythm, normal S1 and S2, no audible murmurs, no peripheral edema  GI: soft, NT/ND, no masses/HSM appreciated.  Neurology: no tremors, DTR 2+  Skin: no visible rashes/lesions  Psychiatric: AAO x 3, normal affect/mood.  Ext: radial pulses intact, DP pulses intact, extremities warm, no cyanosis, clubbing or edema.       LABS:                        10.1   14.72 )-----------( 111      ( 21 Oct 2019 06:17 )             30.9     10-21    124<L>  |  92<L>  |  49<H>  ----------------------------<  178<H>  4.2   |  20<L>  |  2.28<H>    Ca    8.7      21 Oct 2019 12:28  Mg     1.9     10-21    TPro  6.5  /  Alb  3.6  /  TBili  0.9  /  DBili  x   /  AST  58<H>  /  ALT  25  /  AlkPhos  81  10-20    PT/INR - ( 21 Oct 2019 06:17 )   PT: 18.8 sec;   INR: 1.64          PTT - ( 21 Oct 2019 06:17 )  PTT:32.3 sec  Urinalysis Basic - ( 21 Oct 2019 05:58 )    Color: Yellow / Appearance: Clear / SG: >=1.030 / pH: x  Gluc: x / Ketone: Trace mg/dL  / Bili: Small / Urobili: 0.2 E.U./dL   Blood: x / Protein: 30 mg/dL / Nitrite: NEGATIVE   Leuk Esterase: NEGATIVE / RBC: < 5 /HPF / WBC < 5 /HPF   Sq Epi: x / Non Sq Epi: 0-5 /HPF / Bacteria: Present /HPF      Hemoglobin A1C, Whole Blood: 8.1 (10-20 @ 15:09)        RADIOLOGY & ADDITIONAL STUDIES:  CAPILLARY BLOOD GLUCOSE      POCT Blood Glucose.: 154 mg/dL (21 Oct 2019 11:18)  POCT Blood Glucose.: 256 mg/dL (21 Oct 2019 08:04)  POCT Blood Glucose.: 303 mg/dL (21 Oct 2019 06:44)  POCT Blood Glucose.: 342 mg/dL (20 Oct 2019 22:35)        A/P:68y Male    1.  DM  Please continue lantus       units at night / morning.  Please continue lispro      units before each meal.  Please continue lispro moderate / low dose sliding scale four times daily with meals and at bedtime    Pt's fingerstick glucose goal is     Will continue to monitor     For discharge, pt can continue    Pt can follow up at discharge with [facility] by calling  to make an appointment.   Will discuss case with     and update primary team HPI: 68M with a history of renal transplant in 6/2017 (on Prograf), DM2, HTN, MGUS, pAF, BPH, HLD, CAD s/p CABG 15 years ago p/w with x3 days of progressively worsening scrotal erythema, pain, and swelling. Pain described as constant, non-radiating, localized to scrotum worse with palpation and sensitive touch, no recent history of trauma. Patient originally thought this was due to jock itch and attempted to relieve pain, swelling and redness with OTC anti-fungal with no improvement in symptoms. Symptoms not associated with recent fevers, chills, dysuria, hematuria, no history of STIs or UTIs.    of note: patient recently diagnosed with walking PNA recently and completed course of azithromycin 2 days ago. Currently denies CP, SOB, cough.     In the ED, Tmax 102.3, HR 96, /81, RR 22, 97% on RA. Labs notable for WBC 22k, hgb 11, plt 126k, Na 121, Cl 86, bicarb 15, AG 20, BUN 42, Cr 2.17, , lactate 2.2. No crepitus, no gas on CT scan.    Meds given vanc, zosyn, clinda, tylenol and 1L NS.           Age at Dx:  How dx:  Hx and duration of insulin:  Current Therapy:  Hx of hypoglycemia  Hx of DKA/HHS?    Home FSG:  Fasting  Lunch  Dinner  Bed    Hx of other regimens  Complications:  Outpatient Endo:    PMH & Surgical Hx:GROIN PAIN  H/o or current diagnosis of HF- ACEI/ARB contraindication unknown  H/o or current diagnosis of HF- Contraindication to ACEI/ARBs  H/o or current diagnosis of HF- ACEI/ARB contraindication unknown  Handoff  MEWS Score  Other hyperlipidemia  Benign prostatic hyperplasia, presence of lower urinary tract symptoms unspecified, unspecified morphology  Paroxysmal atrial fibrillation  Mandibular abscess  Monoclonal gammopathy  HTN (hypertension)  GERD (gastroesophageal reflux disease)  DM (diabetes mellitus)  CRI (chronic renal insufficiency)  Barretts esophagus  Renal failure  Prophylactic measure  Anemia  LAD (lymphadenopathy)  Abnormal EKG  Diabetes mellitus  Hyponatremia  Acute on chronic kidney failure  Metabolic acidosis, increased anion gap  Scrotal infection  Severe sepsis  Mandibular abscess  History of surgery  S/P CABG (coronary artery bypass graft)  No significant past surgical history  GROIN PAIN  90+  Renal transplant, status post  S/P CABG (coronary artery bypass graft)  Paroxysmal atrial fibrillation  Scrotal infection      FH:  DM:  Thyroid:  Autoimmune:  Other:    SH:  Smoking  Etoh:  Recreational Drugs:  Social Life:    Current Meds:  acetaminophen   Tablet .. 650 milliGRAM(s) Oral every 6 hours PRN  amLODIPine   Tablet 10 milliGRAM(s) Oral daily  apixaban 2.5 milliGRAM(s) Oral every 12 hours  atorvastatin 40 milliGRAM(s) Oral at bedtime  clindamycin IVPB 600 milliGRAM(s) IV Intermittent every 8 hours  influenza   Vaccine 0.5 milliLiter(s) IntraMuscular once  insulin glargine Injectable (LANTUS) 100 Unit(s) SubCutaneous at bedtime  insulin lispro (HumaLOG) corrective regimen sliding scale   SubCutaneous Before meals and at bedtime  insulin lispro Injectable (HumaLOG) 15 Unit(s) SubCutaneous three times a day before meals  piperacillin/tazobactam IVPB.. 3.375 Gram(s) IV Intermittent every 6 hours  tamsulosin 0.4 milliGRAM(s) Oral at bedtime  vancomycin  IVPB 1000 milliGRAM(s) IV Intermittent every 24 hours      Allergies:  No Known Allergies      ROS:  Denies the following except as indicated.    General: weight loss/weight gain, decreased appetite, fatigue  Eyes: Blurry vision, double vision, visual changes  ENT: Throat pain, changes in voice,   CV: palpitations, SOB, CP, cough  GI: NVD, difficulty swallowing, abdominal pain  : polyuria, dysuria  Endo: abnormal menses, temperature intolerance, decreased libido  MSK: weakness, joint pain  Skin: rash, dryness, diaphoresis  Heme: Easy bruising,bleeding  Neuro: HA, dizziness, lightheadedness, numbness tingling  Psych: Anxiety, Depression    Vital Signs Last 24 Hrs  T(C): 37.3 (21 Oct 2019 09:22), Max: 39.1 (20 Oct 2019 15:08)  T(F): 99.1 (21 Oct 2019 09:22), Max: 102.3 (20 Oct 2019 15:08)  HR: 82 (21 Oct 2019 11:30) (79 - 98)  BP: 113/58 (21 Oct 2019 11:30) (113/58 - 148/86)  BP(mean): 82 (21 Oct 2019 11:30) (82 - 91)  RR: 20 (21 Oct 2019 04:05) (20 - 24)  SpO2: 90% (21 Oct 2019 11:30) (90% - 97%)    Weight (kg): 94.3 (10-20 @ 14:37)      Constitutional: wn/wd in NAD.   HEENT: NCAT, MMM, OP clear, EOMI, , no proptosis or lid retraction  Neck: no thyromegaly or palpable thyroid nodules   Respiratory: lungs CTAB.  Cardiovascular: regular rhythm, normal S1 and S2, no audible murmurs, no peripheral edema  GI: soft, NT/ND, no masses/HSM appreciated.  Neurology: no tremors, DTR 2+  Skin: no visible rashes/lesions  Psychiatric: AAO x 3, normal affect/mood.  Ext: radial pulses intact, DP pulses intact, extremities warm, no cyanosis, clubbing or edema.       LABS:                        10.1   14.72 )-----------( 111      ( 21 Oct 2019 06:17 )             30.9     10-21    124<L>  |  92<L>  |  49<H>  ----------------------------<  178<H>  4.2   |  20<L>  |  2.28<H>    Ca    8.7      21 Oct 2019 12:28  Mg     1.9     10-21    TPro  6.5  /  Alb  3.6  /  TBili  0.9  /  DBili  x   /  AST  58<H>  /  ALT  25  /  AlkPhos  81  10-20    PT/INR - ( 21 Oct 2019 06:17 )   PT: 18.8 sec;   INR: 1.64          PTT - ( 21 Oct 2019 06:17 )  PTT:32.3 sec  Urinalysis Basic - ( 21 Oct 2019 05:58 )    Color: Yellow / Appearance: Clear / SG: >=1.030 / pH: x  Gluc: x / Ketone: Trace mg/dL  / Bili: Small / Urobili: 0.2 E.U./dL   Blood: x / Protein: 30 mg/dL / Nitrite: NEGATIVE   Leuk Esterase: NEGATIVE / RBC: < 5 /HPF / WBC < 5 /HPF   Sq Epi: x / Non Sq Epi: 0-5 /HPF / Bacteria: Present /HPF      Hemoglobin A1C, Whole Blood: 8.1 (10-20 @ 15:09)        RADIOLOGY & ADDITIONAL STUDIES:  CAPILLARY BLOOD GLUCOSE      POCT Blood Glucose.: 154 mg/dL (21 Oct 2019 11:18)  POCT Blood Glucose.: 256 mg/dL (21 Oct 2019 08:04)  POCT Blood Glucose.: 303 mg/dL (21 Oct 2019 06:44)  POCT Blood Glucose.: 342 mg/dL (20 Oct 2019 22:35)        A/P:68y Male    1.  DM  Please continue lantus       units at night / morning.  Please continue lispro      units before each meal.  Please continue lispro moderate / low dose sliding scale four times daily with meals and at bedtime    Pt's fingerstick glucose goal is     Will continue to monitor     For discharge, pt can continue    Pt can follow up at discharge with [facility] by calling  to make an appointment.   Will discuss case with     and update primary team HPI: 68M with a history of renal transplant in 2017 (on Prograf), DM2, HTN, MGUS, pAF, BPH, HLD, CAD s/p CABG 15 years ago p/w with x3 days of progressively worsening scrotal erythema, pain, and swelling. Pain described as constant, non-radiating, localized to scrotum worse with palpation and sensitive touch, no recent history of trauma. Patient originally thought this was due to jock itch and attempted to relieve pain, swelling and redness with OTC anti-fungal with no improvement in symptoms. Symptoms not associated with recent fevers, chills, dysuria, hematuria, no history of STIs or UTIs. of note: patient recently diagnosed with walking PNA recently and completed course of azithromycin 2 days ago. Currently denies CP, SOB, cough.   In the ED, Tmax 102.3, HR 96, /81, RR 22, 97% on RA. Labs notable for WBC 22k, hgb 11, plt 126k, Na 121, Cl 86, bicarb 15, AG 20, BUN 42, Cr 2.17, , lactate 2.2. No crepitus, no gas on CT scan. vanc, zosyn, clinda, tylenol started and 1L NS.  Endocrine was consulted for Type 2 DM. Patient was also found to be hyponatremic and being followed by nephrology - D/D include hypovolemia, SIADH vs Tacrolimus. Sodium on admission was 121 - he received 2 L 0.9% Nacl bolus on day of admission. His current sodium was 124 During the hospital stay, patient was started on Lantus 100 Units qhS and Lispro 15 units TID> Hba1c was 8.1.     FSG & Insulin received:  Yesterday: 10/20  bedtime fs, 100 lantus   units at 100 AM on 10/21+  8  units lispro SS  Today:  pre-breakfast fs, 15 nutritional lispro   units+ 8   units lispro SS  pre-lunch fs, no nutritional lispro   units+  2 units lispro SS    DM History  Age at Dx: Around AGe 41 years  How dx: regular blood work  Hx and duration of insulin: Has been on insulin for 2 years. Was on prandin 1mg bid and tradjenta 5mg before. But, currently, no oral agents  Current Therapy: Lantus 30 units QHS and Humalog 20 to 28 units based on FSG ( 3 to 4 times a day). Based on FSG 200s - will give Humalog 20 uints, FSG 250s - Humalog 24 uints, FSG 300s - Huamlog 28 units. He also given lantus based on fSG. if his FSG in evening is 100s - will give no Lantus, If FSG 200s , will give Lantus 30 uints  Hx of hypoglycemia: Has episodes once in 3 to 4 months - FSG in 60s - drinks juice  Hx of DKA/HHS - never    Home FSG:  checks His FSG twice a day between 100 to 200s    Hx of other regimens  Complications: Denies neuropathy, retinopathy.      PMH & Surgical Hx:GROIN PAIN  H/o or current diagnosis of HF- ACEI/ARB contraindication unknown  Benign prostatic hyperplasia, presence of lower urinary tract symptoms unspecified, unspecified morphology  Paroxysmal atrial fibrillation  Mandibular abscess  Monoclonal gammopathy  HTN (hypertension)  GERD (gastroesophageal reflux disease)  DM (diabetes mellitus)  CRI (chronic renal insufficiency)  Barretts esophagus  Renal failure  Prophylactic measure  Anemia  LAD (lymphadenopathy)  Hyponatremia  Acute on chronic kidney failure  Metabolic acidosis, increased anion gap  Scrotal infection  Severe sepsis  Mandibular abscess  S/P CABG (coronary artery bypass graft)  Renal transplant, status post  S/P CABG (coronary artery bypass graft)  Paroxysmal atrial fibrillation  Scrotal infection      FH:  As mentioned inHPI    SH:  As mentioned in HPI    Current Meds:  acetaminophen   Tablet .. 650 milliGRAM(s) Oral every 6 hours PRN  amLODIPine   Tablet 10 milliGRAM(s) Oral daily  apixaban 2.5 milliGRAM(s) Oral every 12 hours  atorvastatin 40 milliGRAM(s) Oral at bedtime  clindamycin IVPB 600 milliGRAM(s) IV Intermittent every 8 hours  influenza   Vaccine 0.5 milliLiter(s) IntraMuscular once  insulin glargine Injectable (LANTUS) 100 Unit(s) SubCutaneous at bedtime  insulin lispro (HumaLOG) corrective regimen sliding scale   SubCutaneous Before meals and at bedtime  insulin lispro Injectable (HumaLOG) 15 Unit(s) SubCutaneous three times a day before meals  piperacillin/tazobactam IVPB.. 3.375 Gram(s) IV Intermittent every 6 hours  tamsulosin 0.4 milliGRAM(s) Oral at bedtime  vancomycin  IVPB 1000 milliGRAM(s) IV Intermittent every 24 hours      Allergies:  No Known Allergies      ROS:  Denies the following except as indicated.    General: weight loss/weight gain, decreased appetite, fatigue  Eyes: Blurry vision, double vision, visual changes  ENT: Throat pain, changes in voice,   CV: palpitations, SOB, CP, cough  GI: NVD, difficulty swallowing, abdominal pain  : polyuria, dysuria  Endo: temperature intolerance, decreased libido  MSK: weakness, joint pain  Skin: rash, dryness, diaphoresis  Heme: Easy bruising,bleeding  Neuro: HA, dizziness, lightheadedness, numbness tingling  Psych: Anxiety, Depression    Vital Signs Last 24 Hrs  T(C): 37.3 (21 Oct 2019 09:22), Max: 39.1 (20 Oct 2019 15:08)  T(F): 99.1 (21 Oct 2019 09:22), Max: 102.3 (20 Oct 2019 15:08)  HR: 82 (21 Oct 2019 11:30) (79 - 98)  BP: 113/58 (21 Oct 2019 11:30) (113/58 - 148/86)  BP(mean): 82 (21 Oct 2019 11:30) (82 - 91)  RR: 20 (21 Oct 2019 04:05) (20 - 24)  SpO2: 90% (21 Oct 2019 11:30) (90% - 97%)    Weight (kg): 94.3 (10-20 @ 14:37)      Constitutional: wn/wd in NAD.   HEENT: NCAT, MMM, OP clear, EOMI, , no proptosis or lid retraction  Neck: no thyromegaly or palpable thyroid nodules   Respiratory: lungs CTAB.  Cardiovascular: regular rhythm, normal S1 and S2,   GI: soft, NT/ND, no masses/HSM appreciated.  Neurology: no tremors, no focal neurological deficits  Psychiatric: AAO x 3, normal affect/mood.  Ext: radial pulses intact, DP pulses intact, extremities warm      LABS:                        10.1   14.72 )-----------( 111      ( 21 Oct 2019 06:17 )             30.9     10-    124<L>  |  92<L>  |  49<H>  ----------------------------<  178<H>  4.2   |  20<L>  |  2.28<H>    Ca    8.7      21 Oct 2019 12:28  Mg     1.9     10-    TPro  6.5  /  Alb  3.6  /  TBili  0.9  /  DBili  x   /  AST  58<H>  /  ALT  25  /  AlkPhos  81  10-20    PT/INR - ( 21 Oct 2019 06:17 )   PT: 18.8 sec;   INR: 1.64          PTT - ( 21 Oct 2019 06:17 )  PTT:32.3 sec  Urinalysis Basic - ( 21 Oct 2019 05:58 )    Color: Yellow / Appearance: Clear / SG: >=1.030 / pH: x  Gluc: x / Ketone: Trace mg/dL  / Bili: Small / Urobili: 0.2 E.U./dL   Blood: x / Protein: 30 mg/dL / Nitrite: NEGATIVE   Leuk Esterase: NEGATIVE / RBC: < 5 /HPF / WBC < 5 /HPF   Sq Epi: x / Non Sq Epi: 0-5 /HPF / Bacteria: Present /HPF      Hemoglobin A1C, Whole Blood: 8.1 (10-20 @ 15:09)        RADIOLOGY & ADDITIONAL STUDIES:  CAPILLARY BLOOD GLUCOSE      POCT Blood Glucose.: 154 mg/dL (21 Oct 2019 11:18)  POCT Blood Glucose.: 256 mg/dL (21 Oct 2019 08:04)  POCT Blood Glucose.: 303 mg/dL (21 Oct 2019 06:44)  POCT Blood Glucose.: 342 mg/dL (20 Oct 2019 22:35)        A/P: 68M with a history of renal transplant in 2017 (on Prograf), DM2, HTN, MGUS, pAF, BPH, HLD, CAD s/p CABG 15 years ago p/w with x3 days of progressively worsening scrotal erythema, pain, and swelling got admitted for scrotal cellulitis    1.  DM Type 2 - Uncontrolled  - Wt 94.3 kg  CR/GFR 2.28/33  Hba1c 8.1  ECHO showed EF 67%  Please decrease to lantus    25   units at night.    Please decrease to  lispro  8    units before each meal.   Please continue lispro moderate dose sliding scale four times daily with meals and at bedtime  Carb consistent diet  Pt's fingerstick glucose goal is 120-150    2. Hyponatremia  - D/D include hypovolemia, SIADH, Tacrolimus induced  - Urine sodium 22 and urine osmolality 474 with Sr.sodium 122  - Unlikely to be SIADH  - Nephrology following the patient    Will continue to monitor     For discharge, TBD    Pt can follow up at discharge with [facility] by calling  to make an appointment.   Discussed case with     and updated primary team HPI: 68M with a history of renal transplant in 2017 (on Prograf), DM2, HTN, MGUS, pAF, BPH, HLD, CAD s/p CABG 15 years ago p/w with x3 days of progressively worsening scrotal erythema, pain, and swelling. Pain described as constant, non-radiating, localized to scrotum worse with palpation and sensitive touch, no recent history of trauma. Patient originally thought this was due to jock itch and attempted to relieve pain, swelling and redness with OTC anti-fungal with no improvement in symptoms. Symptoms not associated with recent fevers, chills, dysuria, hematuria, no history of STIs or UTIs. of note: patient recently diagnosed with walking PNA recently and completed course of azithromycin 2 days ago. Currently denies CP, SOB, cough.   In the ED, Tmax 102.3, HR 96, /81, RR 22, 97% on RA. Labs notable for WBC 22k, hgb 11, plt 126k, Na 121, Cl 86, bicarb 15, AG 20, BUN 42, Cr 2.17, , lactate 2.2. No crepitus, no gas on CT scan. vanc, zosyn, clinda, tylenol started and 1L NS.  Endocrine was consulted for Type 2 DM. Patient was also found to be hyponatremic and being followed by nephrology - D/D include hypovolemia, SIADH vs Tacrolimus. Sodium on admission was 121 - he received 2 L 0.9% Nacl bolus on day of admission. His current sodium was 124 During the hospital stay, patient was started on Lantus 100 Units qhS and Lispro 15 units TID> Hba1c was 8.1.     FSG & Insulin received:  Yesterday: 10/20  bedtime fs, 100 lantus   units at 100 AM on 10/21+  8  units lispro SS  Today:  pre-breakfast fs, 15 nutritional lispro   units+ 8   units lispro SS  pre-lunch fs, no nutritional lispro   units+  2 units lispro SS    DM History  Age at Dx: Around AGe 41 years  How dx: regular blood work  Hx and duration of insulin: Has been on insulin for 2 years. Was on prandin 1mg bid and tradjenta 5mg before. But, currently, no oral agents  Current Therapy: Lantus 30 units QHS and Humalog 20 to 28 units based on FSG ( 3 to 4 times a day). Based on FSG 200s - will give Humalog 20 uints, FSG 250s - Humalog 24 uints, FSG 300s - Huamlog 28 units. He also given lantus based on fSG. if his FSG in evening is 100s - will give no Lantus, If FSG 200s , will give Lantus 30 uints  Hx of hypoglycemia: Has episodes once in 3 to 4 months - FSG in 60s - drinks juice  Hx of DKA/HHS - never    Home FSG:  checks His FSG twice a day between 100 to 200s. He eats about 2 to 3 meals a day - includes salad, fish, vegetables, 1 cup of rice    Hx of other regimens  Complications: Denies neuropathy, retinopathy.      PMH & Surgical Hx:GROIN PAIN  H/o or current diagnosis of HF- ACEI/ARB contraindication unknown  Benign prostatic hyperplasia, presence of lower urinary tract symptoms unspecified, unspecified morphology  Paroxysmal atrial fibrillation  Mandibular abscess  Monoclonal gammopathy  HTN (hypertension)  GERD (gastroesophageal reflux disease)  DM (diabetes mellitus)  CRI (chronic renal insufficiency)  Barretts esophagus  Renal failure  Prophylactic measure  Anemia  LAD (lymphadenopathy)  Hyponatremia  Acute on chronic kidney failure  Metabolic acidosis, increased anion gap  Scrotal infection  Severe sepsis  Mandibular abscess  S/P CABG (coronary artery bypass graft)  Renal transplant, status post  S/P CABG (coronary artery bypass graft)  Paroxysmal atrial fibrillation  Scrotal infection      FH:  As mentioned inHPI    SH:  As mentioned in HPI    Current Meds:  acetaminophen   Tablet .. 650 milliGRAM(s) Oral every 6 hours PRN  amLODIPine   Tablet 10 milliGRAM(s) Oral daily  apixaban 2.5 milliGRAM(s) Oral every 12 hours  atorvastatin 40 milliGRAM(s) Oral at bedtime  clindamycin IVPB 600 milliGRAM(s) IV Intermittent every 8 hours  influenza   Vaccine 0.5 milliLiter(s) IntraMuscular once  insulin glargine Injectable (LANTUS) 100 Unit(s) SubCutaneous at bedtime  insulin lispro (HumaLOG) corrective regimen sliding scale   SubCutaneous Before meals and at bedtime  insulin lispro Injectable (HumaLOG) 15 Unit(s) SubCutaneous three times a day before meals  piperacillin/tazobactam IVPB.. 3.375 Gram(s) IV Intermittent every 6 hours  tamsulosin 0.4 milliGRAM(s) Oral at bedtime  vancomycin  IVPB 1000 milliGRAM(s) IV Intermittent every 24 hours      Allergies:  No Known Allergies      ROS:  Denies the following except as indicated.    General: weight loss/weight gain, decreased appetite, fatigue  Eyes: Blurry vision, double vision, visual changes  ENT: Throat pain, changes in voice,   CV: palpitations, SOB, CP, cough  GI: NVD, difficulty swallowing, abdominal pain  : polyuria, dysuria  Endo: temperature intolerance, decreased libido  MSK: weakness, joint pain  Skin: rash, dryness, diaphoresis  Heme: Easy bruising,bleeding  Neuro: HA, dizziness, lightheadedness, numbness tingling  Psych: Anxiety, Depression    Vital Signs Last 24 Hrs  T(C): 37.3 (21 Oct 2019 09:22), Max: 39.1 (20 Oct 2019 15:08)  T(F): 99.1 (21 Oct 2019 09:22), Max: 102.3 (20 Oct 2019 15:08)  HR: 82 (21 Oct 2019 11:30) (79 - 98)  BP: 113/58 (21 Oct 2019 11:30) (113/58 - 148/86)  BP(mean): 82 (21 Oct 2019 11:30) (82 - 91)  RR: 20 (21 Oct 2019 04:05) (20 - 24)  SpO2: 90% (21 Oct 2019 11:30) (90% - 97%)    Weight (kg): 94.3 (10-20 @ 14:37)      Constitutional: wn/wd in NAD.   HEENT: NCAT, MMM, OP clear, EOMI, , no proptosis or lid retraction  Neck: no thyromegaly or palpable thyroid nodules   Respiratory: lungs CTAB.  Cardiovascular: regular rhythm, normal S1 and S2,   GI: soft, NT/ND, no masses/HSM appreciated.  Neurology: no tremors, no focal neurological deficits  Psychiatric: AAO x 3, normal affect/mood.  Ext: radial pulses intact, DP pulses intact, extremities warm      LABS:                        10.1   14.72 )-----------( 111      ( 21 Oct 2019 06:17 )             30.9     10-21    124<L>  |  92<L>  |  49<H>  ----------------------------<  178<H>  4.2   |  20<L>  |  2.28<H>    Ca    8.7      21 Oct 2019 12:28  Mg     1.9     10-    TPro  6.5  /  Alb  3.6  /  TBili  0.9  /  DBili  x   /  AST  58<H>  /  ALT  25  /  AlkPhos  81  10-20    PT/INR - ( 21 Oct 2019 06:17 )   PT: 18.8 sec;   INR: 1.64          PTT - ( 21 Oct 2019 06:17 )  PTT:32.3 sec  Urinalysis Basic - ( 21 Oct 2019 05:58 )    Color: Yellow / Appearance: Clear / SG: >=1.030 / pH: x  Gluc: x / Ketone: Trace mg/dL  / Bili: Small / Urobili: 0.2 E.U./dL   Blood: x / Protein: 30 mg/dL / Nitrite: NEGATIVE   Leuk Esterase: NEGATIVE / RBC: < 5 /HPF / WBC < 5 /HPF   Sq Epi: x / Non Sq Epi: 0-5 /HPF / Bacteria: Present /HPF      Hemoglobin A1C, Whole Blood: 8.1 (10-20 @ 15:09)        RADIOLOGY & ADDITIONAL STUDIES:  CAPILLARY BLOOD GLUCOSE      POCT Blood Glucose.: 154 mg/dL (21 Oct 2019 11:18)  POCT Blood Glucose.: 256 mg/dL (21 Oct 2019 08:04)  POCT Blood Glucose.: 303 mg/dL (21 Oct 2019 06:44)  POCT Blood Glucose.: 342 mg/dL (20 Oct 2019 22:35)        A/P: 68M with a history of renal transplant in 2017 (on Prograf), DM2, HTN, MGUS, pAF, BPH, HLD, CAD s/p CABG 15 years ago p/w with x3 days of progressively worsening scrotal erythema, pain, and swelling got admitted for scrotal cellulitis    1.  DM Type 2 - Uncontrolled  - Wt 94.3 kg  CR/GFR 2.28/33  Hba1c 8.1  ECHO showed EF 67%  Please decrease to lantus    25   units at night.    Please decrease to  lispro  8    units before each meal.   Please continue lispro moderate dose sliding scale four times daily with meals and at bedtime  Carb consistent diet  Pt's fingerstick glucose goal is 120-150    2. Hyponatremia  - D/D include hypovolemia, SIADH, Tacrolimus induced  - Urine sodium 22 and urine osmolality 474 with Sr.sodium 122  - Unlikely to be SIADH  - Nephrology following the patient    Will continue to monitor     For discharge, TBD    Pt can follow up at discharge with [facility] by calling  to make an appointment.   Discussed case with     and updated primary team

## 2019-10-21 NOTE — H&P ADULT - NSICDXPASTMEDICALHX_GEN_ALL_CORE_FT
PAST MEDICAL HISTORY:  Barretts esophagus     Benign prostatic hyperplasia, presence of lower urinary tract symptoms unspecified, unspecified morphology     CRI (chronic renal insufficiency)     DM (diabetes mellitus)     GERD (gastroesophageal reflux disease)     HTN (hypertension)     Mandibular abscess     Monoclonal gammopathy     Other hyperlipidemia     Paroxysmal atrial fibrillation

## 2019-10-21 NOTE — H&P ADULT - ASSESSMENT
68M with a history of renal transplant on immunosuppressants, CAD, AF, HTN, with severe sepsis due to scrotal infection (rule out nec fasc) and hyponatremia.

## 2019-10-21 NOTE — H&P ADULT - PROBLEM SELECTOR PLAN 3
Patient presented with AG 20, now 18 on most recent BMP likely 2/2 lactic acidosis. delta AG/delta bicarb 1. Uncomplicated increased anion gap metabolic acidosis.   -trend BMP  -c/w IVF @ 100cc/hr    #tachypnea  Patient with RR >20, possibly 2/2 to lactic acidosis  -see plan above

## 2019-10-22 ENCOUNTER — TRANSCRIPTION ENCOUNTER (OUTPATIENT)
Age: 69
End: 2019-10-22

## 2019-10-22 VITALS — TEMPERATURE: 98 F

## 2019-10-22 LAB
ANION GAP SERPL CALC-SCNC: 13 MMOL/L — SIGNIFICANT CHANGE UP (ref 5–17)
ANION GAP SERPL CALC-SCNC: 14 MMOL/L — SIGNIFICANT CHANGE UP (ref 5–17)
ANION GAP SERPL CALC-SCNC: 15 MMOL/L — SIGNIFICANT CHANGE UP (ref 5–17)
ANION GAP SERPL CALC-SCNC: 15 MMOL/L — SIGNIFICANT CHANGE UP (ref 5–17)
BASOPHILS # BLD AUTO: 0.02 K/UL — SIGNIFICANT CHANGE UP (ref 0–0.2)
BASOPHILS NFR BLD AUTO: 0.1 % — SIGNIFICANT CHANGE UP (ref 0–2)
BUN SERPL-MCNC: 57 MG/DL — HIGH (ref 7–23)
BUN SERPL-MCNC: 62 MG/DL — HIGH (ref 7–23)
BUN SERPL-MCNC: 65 MG/DL — HIGH (ref 7–23)
BUN SERPL-MCNC: 66 MG/DL — HIGH (ref 7–23)
CALCIUM SERPL-MCNC: 8.4 MG/DL — SIGNIFICANT CHANGE UP (ref 8.4–10.5)
CALCIUM SERPL-MCNC: 8.4 MG/DL — SIGNIFICANT CHANGE UP (ref 8.4–10.5)
CALCIUM SERPL-MCNC: 8.6 MG/DL — SIGNIFICANT CHANGE UP (ref 8.4–10.5)
CALCIUM SERPL-MCNC: 8.7 MG/DL — SIGNIFICANT CHANGE UP (ref 8.4–10.5)
CHLORIDE SERPL-SCNC: 88 MMOL/L — LOW (ref 96–108)
CHLORIDE SERPL-SCNC: 88 MMOL/L — LOW (ref 96–108)
CHLORIDE SERPL-SCNC: 89 MMOL/L — LOW (ref 96–108)
CHLORIDE SERPL-SCNC: 90 MMOL/L — LOW (ref 96–108)
CK SERPL-CCNC: 182 U/L — SIGNIFICANT CHANGE UP (ref 30–200)
CO2 SERPL-SCNC: 17 MMOL/L — LOW (ref 22–31)
CO2 SERPL-SCNC: 18 MMOL/L — LOW (ref 22–31)
CO2 SERPL-SCNC: 18 MMOL/L — LOW (ref 22–31)
CO2 SERPL-SCNC: 19 MMOL/L — LOW (ref 22–31)
CORTIS AM PEAK SERPL-MCNC: 21.3 UG/DL — SIGNIFICANT CHANGE UP (ref 3.9–37.5)
CREAT ?TM UR-MCNC: 226 MG/DL — SIGNIFICANT CHANGE UP
CREAT SERPL-MCNC: 2.5 MG/DL — HIGH (ref 0.5–1.3)
CREAT SERPL-MCNC: 2.88 MG/DL — HIGH (ref 0.5–1.3)
CREAT SERPL-MCNC: 2.96 MG/DL — HIGH (ref 0.5–1.3)
CREAT SERPL-MCNC: 3 MG/DL — HIGH (ref 0.5–1.3)
EOSINOPHIL # BLD AUTO: 0.02 K/UL — SIGNIFICANT CHANGE UP (ref 0–0.5)
EOSINOPHIL NFR BLD AUTO: 0.1 % — SIGNIFICANT CHANGE UP (ref 0–6)
GLUCOSE BLDC GLUCOMTR-MCNC: 137 MG/DL — HIGH (ref 70–99)
GLUCOSE BLDC GLUCOMTR-MCNC: 157 MG/DL — HIGH (ref 70–99)
GLUCOSE BLDC GLUCOMTR-MCNC: 174 MG/DL — HIGH (ref 70–99)
GLUCOSE SERPL-MCNC: 138 MG/DL — HIGH (ref 70–99)
GLUCOSE SERPL-MCNC: 153 MG/DL — HIGH (ref 70–99)
GLUCOSE SERPL-MCNC: 184 MG/DL — HIGH (ref 70–99)
GLUCOSE SERPL-MCNC: 198 MG/DL — HIGH (ref 70–99)
HCT VFR BLD CALC: 32 % — LOW (ref 39–50)
HGB BLD-MCNC: 10.1 G/DL — LOW (ref 13–17)
IMM GRANULOCYTES NFR BLD AUTO: 0.9 % — SIGNIFICANT CHANGE UP (ref 0–1.5)
LYMPHOCYTES # BLD AUTO: 38 % — SIGNIFICANT CHANGE UP (ref 13–44)
LYMPHOCYTES # BLD AUTO: 5.75 K/UL — HIGH (ref 1–3.3)
MAGNESIUM SERPL-MCNC: 2.3 MG/DL — SIGNIFICANT CHANGE UP (ref 1.6–2.6)
MCHC RBC-ENTMCNC: 28.6 PG — SIGNIFICANT CHANGE UP (ref 27–34)
MCHC RBC-ENTMCNC: 31.6 GM/DL — LOW (ref 32–36)
MCV RBC AUTO: 90.7 FL — SIGNIFICANT CHANGE UP (ref 80–100)
MONOCYTES # BLD AUTO: 0.68 K/UL — SIGNIFICANT CHANGE UP (ref 0–0.9)
MONOCYTES NFR BLD AUTO: 8 % — SIGNIFICANT CHANGE UP (ref 2–14)
NEUTROPHILS # BLD AUTO: 11.08 K/UL — HIGH (ref 1.8–7.4)
NEUTROPHILS NFR BLD AUTO: 49 % — SIGNIFICANT CHANGE UP (ref 43–77)
NRBC # BLD: 0 /100 WBCS — SIGNIFICANT CHANGE UP (ref 0–0)
PLATELET # BLD AUTO: 122 K/UL — LOW (ref 150–400)
POTASSIUM SERPL-MCNC: 3.4 MMOL/L — LOW (ref 3.5–5.3)
POTASSIUM SERPL-MCNC: 3.9 MMOL/L — SIGNIFICANT CHANGE UP (ref 3.5–5.3)
POTASSIUM SERPL-MCNC: 4 MMOL/L — SIGNIFICANT CHANGE UP (ref 3.5–5.3)
POTASSIUM SERPL-MCNC: 4.1 MMOL/L — SIGNIFICANT CHANGE UP (ref 3.5–5.3)
POTASSIUM SERPL-SCNC: 3.4 MMOL/L — LOW (ref 3.5–5.3)
POTASSIUM SERPL-SCNC: 3.9 MMOL/L — SIGNIFICANT CHANGE UP (ref 3.5–5.3)
POTASSIUM SERPL-SCNC: 4 MMOL/L — SIGNIFICANT CHANGE UP (ref 3.5–5.3)
POTASSIUM SERPL-SCNC: 4.1 MMOL/L — SIGNIFICANT CHANGE UP (ref 3.5–5.3)
RBC # BLD: 3.53 M/UL — LOW (ref 4.2–5.8)
RBC # FLD: 15.2 % — HIGH (ref 10.3–14.5)
SODIUM SERPL-SCNC: 120 MMOL/L — CRITICAL LOW (ref 135–145)
SODIUM SERPL-SCNC: 120 MMOL/L — CRITICAL LOW (ref 135–145)
SODIUM SERPL-SCNC: 121 MMOL/L — LOW (ref 135–145)
SODIUM SERPL-SCNC: 123 MMOL/L — LOW (ref 135–145)
SODIUM UR-SCNC: <20 MMOL/L — SIGNIFICANT CHANGE UP
T4 FREE SERPL-MCNC: 1 NG/DL — SIGNIFICANT CHANGE UP (ref 0.7–1.48)
TACROLIMUS SERPL-MCNC: 17.5 NG/ML — SIGNIFICANT CHANGE UP
TSH SERPL-MCNC: 0.9 UIU/ML — SIGNIFICANT CHANGE UP (ref 0.35–4.94)
WBC # BLD: 17.71 K/UL — HIGH (ref 3.8–10.5)
WBC # FLD AUTO: 17.71 K/UL — HIGH (ref 3.8–10.5)

## 2019-10-22 PROCEDURE — 81001 URINALYSIS AUTO W/SCOPE: CPT

## 2019-10-22 PROCEDURE — 84484 ASSAY OF TROPONIN QUANT: CPT

## 2019-10-22 PROCEDURE — 83605 ASSAY OF LACTIC ACID: CPT

## 2019-10-22 PROCEDURE — 82962 GLUCOSE BLOOD TEST: CPT

## 2019-10-22 PROCEDURE — 99233 SBSQ HOSP IP/OBS HIGH 50: CPT

## 2019-10-22 PROCEDURE — 80053 COMPREHEN METABOLIC PANEL: CPT

## 2019-10-22 PROCEDURE — 84132 ASSAY OF SERUM POTASSIUM: CPT

## 2019-10-22 PROCEDURE — 85730 THROMBOPLASTIN TIME PARTIAL: CPT

## 2019-10-22 PROCEDURE — 83930 ASSAY OF BLOOD OSMOLALITY: CPT

## 2019-10-22 PROCEDURE — 87799 DETECT AGENT NOS DNA QUANT: CPT

## 2019-10-22 PROCEDURE — 80202 ASSAY OF VANCOMYCIN: CPT

## 2019-10-22 PROCEDURE — 80048 BASIC METABOLIC PNL TOTAL CA: CPT

## 2019-10-22 PROCEDURE — 82553 CREATINE MB FRACTION: CPT

## 2019-10-22 PROCEDURE — 76776 US EXAM K TRANSPL W/DOPPLER: CPT

## 2019-10-22 PROCEDURE — 84443 ASSAY THYROID STIM HORMONE: CPT

## 2019-10-22 PROCEDURE — 82803 BLOOD GASES ANY COMBINATION: CPT

## 2019-10-22 PROCEDURE — 76870 US EXAM SCROTUM: CPT

## 2019-10-22 PROCEDURE — 87184 SC STD DISK METHOD PER PLATE: CPT

## 2019-10-22 PROCEDURE — 85610 PROTHROMBIN TIME: CPT

## 2019-10-22 PROCEDURE — 87040 BLOOD CULTURE FOR BACTERIA: CPT

## 2019-10-22 PROCEDURE — 83735 ASSAY OF MAGNESIUM: CPT

## 2019-10-22 PROCEDURE — 36415 COLL VENOUS BLD VENIPUNCTURE: CPT

## 2019-10-22 PROCEDURE — 96375 TX/PRO/DX INJ NEW DRUG ADDON: CPT

## 2019-10-22 PROCEDURE — 87150 DNA/RNA AMPLIFIED PROBE: CPT

## 2019-10-22 PROCEDURE — 93005 ELECTROCARDIOGRAM TRACING: CPT

## 2019-10-22 PROCEDURE — 83880 ASSAY OF NATRIURETIC PEPTIDE: CPT

## 2019-10-22 PROCEDURE — 83036 HEMOGLOBIN GLYCOSYLATED A1C: CPT

## 2019-10-22 PROCEDURE — 82330 ASSAY OF CALCIUM: CPT

## 2019-10-22 PROCEDURE — 85025 COMPLETE CBC W/AUTO DIFF WBC: CPT

## 2019-10-22 PROCEDURE — 74176 CT ABD & PELVIS W/O CONTRAST: CPT

## 2019-10-22 PROCEDURE — 86803 HEPATITIS C AB TEST: CPT

## 2019-10-22 PROCEDURE — 82550 ASSAY OF CK (CPK): CPT

## 2019-10-22 PROCEDURE — 99285 EMERGENCY DEPT VISIT HI MDM: CPT | Mod: 25

## 2019-10-22 PROCEDURE — 71045 X-RAY EXAM CHEST 1 VIEW: CPT

## 2019-10-22 PROCEDURE — 80197 ASSAY OF TACROLIMUS: CPT

## 2019-10-22 PROCEDURE — 96374 THER/PROPH/DIAG INJ IV PUSH: CPT

## 2019-10-22 PROCEDURE — 84439 ASSAY OF FREE THYROXINE: CPT

## 2019-10-22 PROCEDURE — 99222 1ST HOSP IP/OBS MODERATE 55: CPT | Mod: GC

## 2019-10-22 PROCEDURE — 84295 ASSAY OF SERUM SODIUM: CPT

## 2019-10-22 PROCEDURE — 83935 ASSAY OF URINE OSMOLALITY: CPT

## 2019-10-22 PROCEDURE — 82570 ASSAY OF URINE CREATININE: CPT

## 2019-10-22 PROCEDURE — 84300 ASSAY OF URINE SODIUM: CPT

## 2019-10-22 PROCEDURE — 84540 ASSAY OF URINE/UREA-N: CPT

## 2019-10-22 PROCEDURE — 82533 TOTAL CORTISOL: CPT

## 2019-10-22 RX ORDER — TACROLIMUS 5 MG/1
2 CAPSULE ORAL
Qty: 0 | Refills: 0 | DISCHARGE
Start: 2019-10-22

## 2019-10-22 RX ORDER — PIPERACILLIN AND TAZOBACTAM 4; .5 G/20ML; G/20ML
3.38 INJECTION, POWDER, LYOPHILIZED, FOR SOLUTION INTRAVENOUS EVERY 6 HOURS
Refills: 0 | Status: DISCONTINUED | OUTPATIENT
Start: 2019-10-22 | End: 2019-10-22

## 2019-10-22 RX ORDER — SODIUM CHLORIDE 5 G/100ML
100 INJECTION, SOLUTION INTRAVENOUS
Refills: 0 | Status: DISCONTINUED | OUTPATIENT
Start: 2019-10-22 | End: 2019-10-22

## 2019-10-22 RX ORDER — SODIUM CHLORIDE 5 G/100ML
50 INJECTION, SOLUTION INTRAVENOUS
Refills: 0 | Status: DISCONTINUED | OUTPATIENT
Start: 2019-10-22 | End: 2019-10-22

## 2019-10-22 RX ORDER — LINEZOLID 600 MG/300ML
1 INJECTION, SOLUTION INTRAVENOUS
Qty: 0 | Refills: 0 | DISCHARGE
Start: 2019-10-22

## 2019-10-22 RX ORDER — SODIUM CHLORIDE 5 G/100ML
120 INJECTION, SOLUTION INTRAVENOUS
Refills: 0 | Status: DISCONTINUED | OUTPATIENT
Start: 2019-10-22 | End: 2019-10-22

## 2019-10-22 RX ORDER — PIPERACILLIN AND TAZOBACTAM 4; .5 G/20ML; G/20ML
0 INJECTION, POWDER, LYOPHILIZED, FOR SOLUTION INTRAVENOUS
Qty: 0 | Refills: 0 | DISCHARGE
Start: 2019-10-22

## 2019-10-22 RX ORDER — VALGANCICLOVIR 450 MG/1
1 TABLET, FILM COATED ORAL
Qty: 0 | Refills: 0 | DISCHARGE
Start: 2019-10-22

## 2019-10-22 RX ADMIN — Medication 650 MILLIGRAM(S): at 11:29

## 2019-10-22 RX ADMIN — LINEZOLID 600 MILLIGRAM(S): 600 INJECTION, SOLUTION INTRAVENOUS at 11:39

## 2019-10-22 RX ADMIN — APIXABAN 2.5 MILLIGRAM(S): 2.5 TABLET, FILM COATED ORAL at 07:12

## 2019-10-22 RX ADMIN — Medication 2: at 11:27

## 2019-10-22 RX ADMIN — SODIUM CHLORIDE 30 MILLILITER(S): 5 INJECTION, SOLUTION INTRAVENOUS at 13:49

## 2019-10-22 RX ADMIN — LINEZOLID 600 MILLIGRAM(S): 600 INJECTION, SOLUTION INTRAVENOUS at 00:57

## 2019-10-22 RX ADMIN — Medication 100 MILLIGRAM(S): at 01:00

## 2019-10-22 RX ADMIN — LINEZOLID 600 MILLIGRAM(S): 600 INJECTION, SOLUTION INTRAVENOUS at 18:24

## 2019-10-22 RX ADMIN — PIPERACILLIN AND TAZOBACTAM 200 GRAM(S): 4; .5 INJECTION, POWDER, LYOPHILIZED, FOR SOLUTION INTRAVENOUS at 11:52

## 2019-10-22 RX ADMIN — Medication 650 MILLIGRAM(S): at 10:42

## 2019-10-22 RX ADMIN — PIPERACILLIN AND TAZOBACTAM 200 GRAM(S): 4; .5 INJECTION, POWDER, LYOPHILIZED, FOR SOLUTION INTRAVENOUS at 18:24

## 2019-10-22 RX ADMIN — AMLODIPINE BESYLATE 10 MILLIGRAM(S): 2.5 TABLET ORAL at 07:12

## 2019-10-22 RX ADMIN — TACROLIMUS 2 MILLIGRAM(S): 5 CAPSULE ORAL at 07:12

## 2019-10-22 RX ADMIN — Medication 2: at 07:12

## 2019-10-22 RX ADMIN — SODIUM CHLORIDE 25 MILLILITER(S): 5 INJECTION, SOLUTION INTRAVENOUS at 11:28

## 2019-10-22 RX ADMIN — VALGANCICLOVIR 450 MILLIGRAM(S): 450 TABLET, FILM COATED ORAL at 11:36

## 2019-10-22 RX ADMIN — Medication 8 UNIT(S): at 11:28

## 2019-10-22 RX ADMIN — SODIUM CHLORIDE 100 MILLILITER(S): 5 INJECTION, SOLUTION INTRAVENOUS at 11:32

## 2019-10-22 RX ADMIN — Medication 8 UNIT(S): at 07:14

## 2019-10-22 RX ADMIN — APIXABAN 2.5 MILLIGRAM(S): 2.5 TABLET, FILM COATED ORAL at 18:23

## 2019-10-22 RX ADMIN — PIPERACILLIN AND TAZOBACTAM 200 GRAM(S): 4; .5 INJECTION, POWDER, LYOPHILIZED, FOR SOLUTION INTRAVENOUS at 06:16

## 2019-10-22 RX ADMIN — TACROLIMUS 2 MILLIGRAM(S): 5 CAPSULE ORAL at 18:24

## 2019-10-22 RX ADMIN — Medication 8 UNIT(S): at 17:10

## 2019-10-22 NOTE — DISCHARGE NOTE PROVIDER - NSDCCPCAREPLAN_GEN_ALL_CORE_FT
PRINCIPAL DISCHARGE DIAGNOSIS  Diagnosis: Cellulitis  Assessment and Plan of Treatment: Cellulitis a common, potentially serious bacterial skin infection. The affected skin appears swollen and red and is typically painful and warm to the touch. It occurs when a crack or break in your skin allows bacteria to enter. Left untreated, the infection can spread to your lymph nodes and bloodstream and rapidly become life-threatening. We did imaging of your testicles and confirmed there wasnt any other cause for the swelling and pain seen in your testicles. You were started on antibiotics to help fight off your infection. Treatment progress varies from person to person.         SECONDARY DISCHARGE DIAGNOSES  Diagnosis: Hyponatremia  Assessment and Plan of Treatment: You were found to have low salt levels on admission, known as hyponatremia. Hyponatremia occurs when the concentration of sodium in your blood is abnormally low. Sodium is an electrolyte, and it helps regulate the amount of water that's in and around your cells.In hyponatremia, one or more factors — ranging from an underlying medical condition to drinking too much water — cause the sodium in your body to become diluted. When this happens, your body's water levels rise, and your cells begin to swell. This swelling can cause many health problems, from mild to life-threatening.  We restricted your fluid consumption in the hospital to 1 L. We then gave you fluids high in salt content to help bring your levels up. Given concerns that you only have one functioning transplanted kidney, we are now transferring you to [place]'s transplant team to help manage your kidney function and salt level.      Diagnosis: Renal transplant, status post  Assessment and Plan of Treatment:     Diagnosis: S/P CABG (coronary artery bypass graft)  Assessment and Plan of Treatment: 15 years    Diagnosis: Paroxysmal atrial fibrillation  Assessment and Plan of Treatment:     Diagnosis: Scrotal infection  Assessment and Plan of Treatment:

## 2019-10-22 NOTE — PROGRESS NOTE ADULT - PROBLEM SELECTOR PLAN 5
Na 121. Hypovolemic on exam. Asymptomatic. possible SIADH vs dehydration vs tacro-induced  -fluid restriction at 1 L yesterday  -given 500 cc hypertonic saline-->O/N BMP still 121  -BMP q4
Na 121. Hypovolemic on exam. Asymptomatic. possible SIADH vs dehydration vs tacro-induced  -fluid restriction at 1 L at this time   -BMP q4

## 2019-10-22 NOTE — PROGRESS NOTE ADULT - SUBJECTIVE AND OBJECTIVE BOX
CC: GROIN PAIN    INTERVAL HISTORY:    * Pt seen prior to discharge. * Asymptomatic except for scrotal swelling. * SCr increased to 3. * Sodium increased to 123 with hypertonic saline.     ROS: No chest pain. No shortness of breath. No nausea.    PAST MEDICAL & SURGICAL HISTORY:  Other hyperlipidemia  Benign prostatic hyperplasia, presence of lower urinary tract symptoms unspecified, unspecified morphology  Paroxysmal atrial fibrillation  Mandibular abscess  Monoclonal gammopathy  HTN (hypertension)  GERD (gastroesophageal reflux disease)  DM (diabetes mellitus)  CRI (chronic renal insufficiency)  Barretts esophagus  Mandibular abscess  History of surgery: cyst removal  S/P CABG (coronary artery bypass graft): 15 years  No significant past surgical history    PHYSICAL EXAM:  T(C): 36.5 (22 Oct 2019 17:24), Max: 37.1 (22 Oct 2019 09:48)  HR: 80 (22 Oct 2019 15:58)  BP: 99/64 (22 Oct 2019 15:58) (99/64 - 122/57)  RR: 17 (22 Oct 2019 15:58)  SpO2: 94% (22 Oct 2019 15:58)      21 Oct 2019 07:01  -  22 Oct 2019 07:00  --------------------------------------------------------  IN:    IV PiggyBack: 150 mL    Oral Fluid: 200 mL    sodium chloride 3%: 75 mL  Total IN: 425 mL    OUT:  Total OUT: 0 mL    Total NET: 425 mL        Weight 94.3 (20 Oct 2019 14:37)    Appearance: alert, pleasant, INAD.  ENT: oral mucosa moist, no pallor/cyanosis.  Neck: no JVD visible.  Cardiac: no rubs. no murmurs. Extremities: ankle edema  Skin: no rashes. scrotal swelling   Extremities (digits): no clubbing or cyanosis.  Respratory effort: no access muscle use. Lungs: CLEAR TO AUSCULTATION.  Abdomen: soft. nontender. no masses.  Psych affect: not depressed. Orientation: person, place, situation.     MEDICATIONS  (STANDING):  amLODIPine   Tablet 10 milliGRAM(s) Oral daily  apixaban 2.5 milliGRAM(s) Oral every 12 hours  atorvastatin 40 milliGRAM(s) Oral at bedtime  influenza   Vaccine 0.5 milliLiter(s) IntraMuscular once  insulin glargine Injectable (LANTUS) 25 Unit(s) SubCutaneous at bedtime  insulin lispro (HumaLOG) corrective regimen sliding scale   SubCutaneous Before meals and at bedtime  insulin lispro Injectable (HumaLOG) 8 Unit(s) SubCutaneous three times a day before meals  linezolid    Tablet 600 milliGRAM(s) Oral every 12 hours  piperacillin/tazobactam IVPB.. 3.375 Gram(s) IV Intermittent every 6 hours  sodium chloride 3%. 120 milliLiter(s) (30 mL/Hr) IV Continuous <Continuous>  tacrolimus 2 milliGRAM(s) Oral two times a day  tamsulosin 0.4 milliGRAM(s) Oral at bedtime  valGANciclovir 450 milliGRAM(s) Oral daily    MEDICATIONS  (PRN):  acetaminophen   Tablet .. 650 milliGRAM(s) Oral every 6 hours PRN Moderate Pain (4 - 6)    DATA:  123<L>  |  90<L>  |  66<H>  ----------------------------<  138<H>  Ca:8.6   (22 Oct 2019 15:21)  3.4<L>   |  18<L>  |  2.96<H>      eGFR if Non : 21 <L>  eGFR if : 24 <L>    TPro  6.5    /  Alb  3.6    /  TBili  0.9    /  DBili  x      /  AST  58<H>  /  ALT  25     /  AlkPhos  81     20 Oct 2019 21:45    SCr 2.96 [22 Oct 2019 15:21]  SCr 3.00 [22 Oct 2019 12:46]  SCr 2.88 [22 Oct 2019 07:45]  SCr 2.50 [22 Oct 2019 01:49]  SCr 2.39 [21 Oct 2019 20:09]                          10.1<L>  17.71<H> )-----------( 122<L>    ( 22 Oct 2019 07:45 )             32.0<L>    Phos:-- M.3 mg/dL PTH:-- Uric acid:-- Serum Osm:--  Ferritin:-- Iron:-- TIBC:-- Tsat:--  B12:0.902 uIU/mL TSH:-- (22 Oct 2019 07:45)    Urinalysis Basic - ( 21 Oct 2019 05:58 )  Color: Yellow / Appearance: Clear / SG: >=1.030 / pH: x  Gluc: x / Ketone: Trace mg/dL<!>  / Bili: Small<!> / Urobili: 0.2 E.U./dL   Blood: x / Protein: 30 mg/dL<!> / Nitrite: NEGATIVE   Leuk Esterase: NEGATIVE / RBC: < 5 /HPF / WBC < 5 /HPF   Sq Epi: x / Non Sq Epi: 0-5 /HPF / Bacteria: Present /HPF<!>      UProt:-- UCr:226 mg/dL P/C Ratio:-- 24 hour Prot:-- UVol:-- CrCl:--  Julien:<20 mmol/L UOsm:-- UVol:-- UCl:-- [place]:-- (22 Oct 2019 04:48)

## 2019-10-22 NOTE — PROGRESS NOTE ADULT - ASSESSMENT
68 year old with renal transplant, cellulitis, hyponatremia, possible resolving rhabdo, abdominal LAD concerning for PTLD.    Suggest:    1. Case discussed in detail with [facility] transplant. Will transfer today.  2. Hypertonic saline for hyponatremia.   3. Follow SCr.  4. Continue tacrolimus.    Please call with any questions.    Oz Araujo MD, FACP, FASN | kidney.[facility]  Nephrology, Hypertension, and Internal Medicine  Mobile: [phone] (Daytime Hours Only)  Office/Answering Service: [phone]  Asst. Prof. of Medicine, [employer] at [facility] - Nephrology at [address]  [address] [place] [address], [address], [place], [place]

## 2019-10-22 NOTE — PROGRESS NOTE ADULT - SUBJECTIVE AND OBJECTIVE BOX
Patient examined at bedside, in no acute distress. Patient states resting testicular pain is 2/10 today improved from yesterday. Still very tender on palpation or movement. Reports normal BM yesterday. Denies fever, chills, nausea, vomiting, HA.     T(C): 36.9 (10-22-19 @ 04:55), Max: 37.3 (10-21-19 @ 09:22)  HR: 86 (10-22-19 @ 04:22) (80 - 90)  BP: 116/57 (10-22-19 @ 04:22) (107/63 - 138/63)  RR: 18 (10-22-19 @ 04:22) (18 - 18)  SpO2: 92% (10-22-19 @ 04:22) (90% - 94%)  Wt(kg): --Vital Signs Last 24 Hrs      PHYSICAL EXAM:  GENERAL: NAD, obese  HEAD:  Atraumatic, Normocephalic  EYES: EOMI, PERRLA, conjunctiva and sclera clear  ENMT: Moist mucous membranes, Good dentition, No lesions  NECK: Supple, No JVD  NERVOUS SYSTEM:  Alert & Oriented X3, Good concentration;   CHEST/LUNG: Clear to percussion bilaterally; No rales, rhonchi, wheezing, or rubs  HEART: Regular rate and rhythm; No murmurs, rubs, or gallops  ABDOMEN: Soft, Nontender, Nondistended; Bowel sounds present  EXTREMITIES:  2+ Peripheral Pulses, No clubbing, cyanosis, or edema  : large erythematous warm testicles, very tender on palpation  LYMPH: No lymphadenopathy noted  SKIN: No rashes or lesions      LABS:                        10.1   14.72 )-----------( 111      ( 21 Oct 2019 06:17 )             30.9     10-22    121<L>  |  89<L>  |  57<H>  ----------------------------<  184<H>  4.0   |  19<L>  |  2.50<H>    Ca    8.7      22 Oct 2019 01:49  Mg     1.9     10-21    TPro  6.5  /  Alb  3.6  /  TBili  0.9  /  DBili  x   /  AST  58<H>  /  ALT  25  /  AlkPhos  81  10-20    PT/INR - ( 21 Oct 2019 06:17 )   PT: 18.8 sec;   INR: 1.64          PTT - ( 21 Oct 2019 06:17 )  PTT:32.3 sec  Urinalysis Basic - ( 21 Oct 2019 05:58 )    Color: Yellow / Appearance: Clear / SG: >=1.030 / pH: x  Gluc: x / Ketone: Trace mg/dL  / Bili: Small / Urobili: 0.2 E.U./dL   Blood: x / Protein: 30 mg/dL / Nitrite: NEGATIVE   Leuk Esterase: NEGATIVE / RBC: < 5 /HPF / WBC < 5 /HPF   Sq Epi: x / Non Sq Epi: 0-5 /HPF / Bacteria: Present /HPF      CAPILLARY BLOOD GLUCOSE      POCT Blood Glucose.: 174 mg/dL (22 Oct 2019 06:02)  POCT Blood Glucose.: 171 mg/dL (21 Oct 2019 22:07)  POCT Blood Glucose.: 190 mg/dL (21 Oct 2019 16:24)  POCT Blood Glucose.: 154 mg/dL (21 Oct 2019 11:18)  POCT Blood Glucose.: 256 mg/dL (21 Oct 2019 08:04)        Urinalysis Basic - ( 21 Oct 2019 05:58 )    Color: Yellow / Appearance: Clear / SG: >=1.030 / pH: x  Gluc: x / Ketone: Trace mg/dL  / Bili: Small / Urobili: 0.2 E.U./dL   Blood: x / Protein: 30 mg/dL / Nitrite: NEGATIVE   Leuk Esterase: NEGATIVE / RBC: < 5 /HPF / WBC < 5 /HPF   Sq Epi: x / Non Sq Epi: 0-5 /HPF / Bacteria: Present /HPF

## 2019-10-22 NOTE — PROGRESS NOTE ADULT - PROVIDER SPECIALTY LIST ADULT
Critical Care
Endocrinology
Internal Medicine
Nephrology
Nephrology
Urology
Urology
Internal Medicine

## 2019-10-22 NOTE — PROGRESS NOTE ADULT - PROBLEM SELECTOR PLAN 2
Patient with x3 day history of swelling, erythema, and b/l non-radiating scrotal pain, not associated with n/v, no recent history of trauma. hx, physical exam and imaging as above most consistent with scrotal cellulitis, less likely testicular torsion, linda's gangrene, epididymitis.  -c/w with plan as above  -uro following-no surgical intervention at this time, will f/u recs
Patient with x3 day history of swelling, erythema, and b/l non-radiating scrotal pain, not associated with n/v, no recent history of trauma. hx, physical exam and imaging as above most consistent with scrotal cellulitis, less likely testicular torsion, linda's gangrene, epididymitis.  -c/w with plan as above  -uro following-no surgical intervention at this time, will f/u recs

## 2019-10-22 NOTE — PROGRESS NOTE ADULT - PROBLEM SELECTOR PLAN 7
Patient with EKG and physical exam findings consistent with afib, on eliquis 2.5 BID at home but not on any rate control.  -c/w eliquis 2.5mg BID  -Clarify need for B-blocker, patient follows with Dr. Paulina Encinas
Patient with EKG and physical exam findings consistent with afib, on eliquis 2.5 BID at home but not on any rate control.  -c/w eliquis 2.5mg BID  -Clarify need for B-blocker, patient follows with Dr. Paulina Encinas

## 2019-10-22 NOTE — PROGRESS NOTE ADULT - ATTENDING COMMENTS
The patient was seen at the bedsidewith Dr. Champagne.  He is being transferred to [facility] for possible transplant rejection. Sodium has so far remained stable at 121-122 despite hypertonic saline infusions. I agree with him continuing on Lantus 27 units plus premeal lispro Insulini 10 units. Glucoses yesterday were 190-17-1 and today 174-157.
I independently performed the key portions of the evaluation and management service provided. I agree with the above history, physical, and plan which I have reviewed and edited where appropriate. I find renal failure, strep bacteremia, probably scrotal cellulitis without deep infection, abdominal LAD. Follow SCr. Continue tacrolimus and Valcyte. I discussed with patient and team possible transfer to [facility] for consideration of renal biopsy and lymphadenopathy workup. See full note. (Patient seen earlier in day.) (Admission note also reviewed/confirmed.)
Patient seen and examined with house-staff during bedside rounds  Resident note read, including vitals, physical findings, laboratory data, and radiological reports.   Revisions included below.  Case discussed with House staff  Direct personal management at bedside  and extensive interpretation of data. Decision making of high complexity.

## 2019-10-22 NOTE — PROGRESS NOTE ADULT - PROBLEM SELECTOR PLAN 3
Patient presented with AG 20, now 18 on most recent BMP likely 2/2 lactic acidosis. delta AG/delta bicarb 1. Uncomplicated increased anion gap metabolic acidosis.   -trend BMP  -c/w IVF @ 100cc/hr    #tachypnea  Patient with RR >20, possibly 2/2 to lactic acidosis  -see plan above
Patient presented with AG 20, now 18 on most recent BMP likely 2/2 lactic acidosis. delta AG/delta bicarb 1. Uncomplicated increased anion gap metabolic acidosis.   -trend BMP  -c/w IVF @ 100cc/hr    #tachypnea  Patient with RR >20, possibly 2/2 to lactic acidosis  -see plan above

## 2019-10-22 NOTE — CONSULT NOTE ADULT - ASSESSMENT
68M with PMH renal transplant on Tacrolimus, DM2, HTN, MGUS, pAF, BPH, HLD, CAD s/p  CABG presented with worsening pain and scrotal swelling, admitted for severe sepsis and hyponatremia.     Recommendations:     **INCOMPLETE** 68M with PMH renal transplant on Tacrolimus, DM2, HTN, MGUS, pAF, BPH, HLD, CAD s/p  CABG presented with worsening pain and scrotal swelling, admitted for severe sepsis and hyponatremia.     Recommendations:   - Continue Linezolid 600mg q12h  for MRSA coverage and gram+  - continue Zosyn 3.375 q6h for gram negatives and anaerobes   - Agree with discontinuing Clindamycin   Pt to be transferred to [facility] for further management.   Plan discussed with Dr. Ervin and team.

## 2019-10-22 NOTE — CONSULT NOTE ADULT - SUBJECTIVE AND OBJECTIVE BOX
Consultation Requested by:    Patient is a 68y old  Male who presents with a chief complaint of Testicular Cellulitis (22 Oct 2019 10:20)    HPI:  68M with a history of renal transplant in 6/2017 (on Prograf), DM2, HTN, MGUS, pAF, BPH, HLD, CAD s/p CABG 15 years ago p/w with x3 days of progressively worsening scrotal erythema, pain, and swelling. Pain described as constant, non-radiating, localized to scrotum worse with palpation and sensitive touch, no recent history of trauma. Patient originally thought this was due to jock itch and attempted to relieve pain, swelling and redness with OTC anti-fungal with no improvement in symptoms. Symptoms not associated with recent fevers, chills, dysuria, hematuria, no history of STIs or UTIs.    of note: patient recently diagnosed with walking PNA recently and completed course of azithromycin 2 days prior to arrival. Patient presented with severe sepsis suspected 2/2 scrotal cellulitis. He was initially placed on Vancomycin, Zosyn, and Clindamycin. However to uptrending Cr, he was switched to Linezolid 600mg q12h. Clindamycin also discontinued today. Testicular US  with hydroceles. One of two blood culture growing Group G strep. Per primary team, pt to be transferred to [facility] for further management for renal transplant.       REVIEW OF SYSTEMS  See HPI    Allergies    No Known Allergies    Intolerances      Antimicrobials:  linezolid    Tablet 600 milliGRAM(s) Oral every 12 hours  piperacillin/tazobactam IVPB.. 3.375 Gram(s) IV Intermittent every 6 hours  valGANciclovir 450 milliGRAM(s) Oral daily      Other Medications:  acetaminophen   Tablet .. 650 milliGRAM(s) Oral every 6 hours PRN  amLODIPine   Tablet 10 milliGRAM(s) Oral daily  apixaban 2.5 milliGRAM(s) Oral every 12 hours  atorvastatin 40 milliGRAM(s) Oral at bedtime  influenza   Vaccine 0.5 milliLiter(s) IntraMuscular once  insulin glargine Injectable (LANTUS) 25 Unit(s) SubCutaneous at bedtime  insulin lispro (HumaLOG) corrective regimen sliding scale   SubCutaneous Before meals and at bedtime  insulin lispro Injectable (HumaLOG) 8 Unit(s) SubCutaneous three times a day before meals  sodium chloride 3%. 50 milliLiter(s) IV Continuous <Continuous>  tacrolimus 2 milliGRAM(s) Oral two times a day  tamsulosin 0.4 milliGRAM(s) Oral at bedtime      FAMILY HISTORY:    PAST MEDICAL & SURGICAL HISTORY:  Other hyperlipidemia  Benign prostatic hyperplasia, presence of lower urinary tract symptoms unspecified, unspecified morphology  Paroxysmal atrial fibrillation  Mandibular abscess  Monoclonal gammopathy  HTN (hypertension)  GERD (gastroesophageal reflux disease)  DM (diabetes mellitus)  CRI (chronic renal insufficiency)  Barretts esophagus  Mandibular abscess  History of surgery: cyst removal  S/P CABG (coronary artery bypass graft): 15 years    SOCIAL HISTORY:    IMMUNIZATIONS  [] Up to Date		[] Not Up to Date:  Recent Immunizations:	[] No	[] Yes:    Daily     Daily   Head Circumference:  Vital Signs Last 24 Hrs  T(C): 37.1 (22 Oct 2019 09:48), Max: 37.1 (22 Oct 2019 09:48)  T(F): 98.7 (22 Oct 2019 09:48), Max: 98.7 (22 Oct 2019 09:48)  HR: 84 (22 Oct 2019 08:04) (80 - 86)  BP: 122/57 (22 Oct 2019 08:04) (107/63 - 127/58)  BP(mean): 81 (22 Oct 2019 08:04) (80 - 84)  RR: 17 (22 Oct 2019 08:04) (17 - 18)  SpO2: 94% (22 Oct 2019 08:04) (91% - 94%)    PHYSICAL EXAM  General: NAD, obese   HEENT: NC/AT, EOMI, moist mucus membranes  Neck: supple, no JVD  CV/Resp: CTA bilaterally, no rales/rhonchi, wheezing. RRR S1/S2 present, no murmurs appreciated   Abd: distended, obese, non-tender, Bowel sounds present   : erythematous testicles and scrotal swelling present, tender to palpation   Skin: no rashes, dry  Neuro: alert and oriented x3, no focal deficits     Lab Results:                        10.1   17.71 )-----------( 122      ( 22 Oct 2019 07:45 )             32.0     10-22    120<LL>  |  88<L>  |  62<H>  ----------------------------<  198<H>  3.9   |  18<L>  |  2.88<H>    Ca    8.4      22 Oct 2019 07:45  Mg     2.3     10-22    TPro  6.5  /  Alb  3.6  /  TBili  0.9  /  DBili  x   /  AST  58<H>  /  ALT  25  /  AlkPhos  81  10-20    LIVER FUNCTIONS - ( 20 Oct 2019 21:45 )  Alb: 3.6 g/dL / Pro: 6.5 g/dL / ALK PHOS: 81 U/L / ALT: 25 U/L / AST: 58 U/L / GGT: x           PT/INR - ( 21 Oct 2019 06:17 )   PT: 18.8 sec;   INR: 1.64          PTT - ( 21 Oct 2019 06:17 )  PTT:32.3 sec  Urinalysis Basic - ( 21 Oct 2019 05:58 )    Color: Yellow / Appearance: Clear / SG: >=1.030 / pH: x  Gluc: x / Ketone: Trace mg/dL  / Bili: Small / Urobili: 0.2 E.U./dL   Blood: x / Protein: 30 mg/dL / Nitrite: NEGATIVE   Leuk Esterase: NEGATIVE / RBC: < 5 /HPF / WBC < 5 /HPF   Sq Epi: x / Non Sq Epi: 0-5 /HPF / Bacteria: Present /HPF      MICROBIOLOGY  Culture - Blood (10.21.19 @ 13:07)    Specimen Source: .Blood Blood    Culture Results:   No growth at 12 hours    Culture - Blood (10.20.19 @ 17:51)    -  Streptococcus sp. (Not Grp A, B or S pneumoniae): Detec    Gram Stain:   Anaerobic Bottle: Gram Positive Cocci in Pairs and Chains  Result called to and read back by_ Ms. JOSÉ Pascual RN  10/21/2019 08:25:25  ***Blood Panel PCR results on this specimen are available  approximately 3 hours after the Gram stain result.***  Gram stain, PCR, and/or culture results may not always  correspond due to difference in methodologies.  ************************************************************  This PCR assay was performed using [employer].  The following targets are tested for: Enterococcus,  vancomycin resistant enterococci, Listeria monocytogenes,  coagulase negative staphylococci, S. aureus,  methicillin resistant S. aureus, Streptococcus agalactiae  (Group B), S. pneumoniae, S. pyogenes (Group A),  Acinetobacterbaumannii, Enterobacter cloacae, E. coli,  Klebsiella oxytoca, K. pneumoniae, Proteus sp.,  Serratia marcescens, Haemophilus influenzae,  Neisseria meningitidis, Pseudomonas aeruginosa, Candida  albicans, C. glabrata, C krusei, C parapsilosis,  C. tropicalis and the KPC resistance gene.    Specimen Source: .Blood Blood-Peripheral    Organism: Blood Culture PCR    Culture Results:   Growth in anaerobic bottle: Beta Hemolytic Streptococci, Group G  Susceptibility to follow.    Organism Identification: Blood Culture PCR    Method Type: PCR

## 2019-10-22 NOTE — PROGRESS NOTE ADULT - PROBLEM SELECTOR PLAN 10
DVT PPX: on eliquis 2.5 BID    F: IVF @ 100cc/hr for now  E: replete as necessary   N: Renal (kosher) diet
DVT PPX: on eliquis 2.5 BID    F: IVF @ 100cc/hr for now  E: replete as necessary   N: Renal (kosher) diet

## 2019-10-22 NOTE — PROGRESS NOTE ADULT - SUBJECTIVE AND OBJECTIVE BOX
Patient is a 68y old  Male who presents with a chief complaint of testicular cellulitis (22 Oct 2019 07:54)        INTERVAL HPI/OVERNIGHT EVENTS: none    SYMPTOMS feels better, still discomfort with movement in scrotal area, episode of vommiting this am feels better no nausea or abd pain    DRIPS none        ICU Vital Signs Last 24 Hrs  T(C): 37.1 (22 Oct 2019 09:48), Max: 37.1 (22 Oct 2019 09:48)  T(F): 98.7 (22 Oct 2019 09:48), Max: 98.7 (22 Oct 2019 09:48)  HR: 84 (22 Oct 2019 08:04) (80 - 86)  BP: 122/57 (22 Oct 2019 08:04) (107/63 - 127/58)  BP(mean): 81 (22 Oct 2019 08:04) (80 - 84)  ABP: --  ABP(mean): --  RR: 17 (22 Oct 2019 08:04) (17 - 18)  SpO2: 94% (22 Oct 2019 08:04) (90% - 94%)      I&O's Summary    21 Oct 2019 07:01  -  22 Oct 2019 07:00  --------------------------------------------------------  IN: 425 mL / OUT: 0 mL / NET: 425 mL        EXAM    Chest clear    Heart irreg    Abdomen soft nontender with bs    Extremities trace edema, scrotal area war, with erythema and weeping    Neuro intact      LABS:                            10.1   17.71 )-----------( 122      ( 22 Oct 2019 07:45 )             32.0     10-22    120<LL>  |  88<L>  |  62<H>  ----------------------------<  198<H>  3.9   |  18<L>  |  2.88<H>    Ca    8.4      22 Oct 2019 07:45  Mg     2.3     10-22    TPro  6.5  /  Alb  3.6  /  TBili  0.9  /  DBili  x   /  AST  58<H>  /  ALT  25  /  AlkPhos  81  10-20    PT/INR - ( 21 Oct 2019 06:17 )   PT: 18.8 sec;   INR: 1.64          PTT - ( 21 Oct 2019 06:17 )  PTT:32.3 sec  Urinalysis Basic - ( 21 Oct 2019 05:58 )    Color: Yellow / Appearance: Clear / SG: >=1.030 / pH: x  Gluc: x / Ketone: Trace mg/dL  / Bili: Small / Urobili: 0.2 E.U./dL   Blood: x / Protein: 30 mg/dL / Nitrite: NEGATIVE   Leuk Esterase: NEGATIVE / RBC: < 5 /HPF / WBC < 5 /HPF   Sq Epi: x / Non Sq Epi: 0-5 /HPF / Bacteria: Present /HPF      fs 170-250      RADIOLOGY & ADDITIONAL STUDIES:    CRITICAL CARE TIME SPENT:

## 2019-10-22 NOTE — DISCHARGE NOTE PROVIDER - CARE PROVIDER_API CALL
Oz Araujo)  Internal Medicine; Nephrology  [address], [place] [facility]  Phone: [phone]  Fax: [phone]  Follow Up Time:

## 2019-10-22 NOTE — PROGRESS NOTE ADULT - SUBJECTIVE AND OBJECTIVE BOX
INTERVAL HPI/OVERNIGHT EVENTS:    Patient is a 68y old  Male who presents with a chief complaint of Testicular Cellulitis (22 Oct 2019 10:20)      Pt reports the following symptoms:    CONSTITUTIONAL:  Negative fever or chills, feels well, good appetite  EYES:  Negative  blurry vision or double vision  CARDIOVASCULAR:  Negative for chest pain or palpitations  RESPIRATORY:  Negative for cough, wheezing, or SOB   GASTROINTESTINAL:  Negative for nausea, vomiting, diarrhea, constipation, or abdominal pain  GENITOURINARY:  Negative frequency, urgency or dysuria  NEUROLOGIC:  No headache, confusion, dizziness, lightheadedness    MEDICATIONS  (STANDING):  amLODIPine   Tablet 10 milliGRAM(s) Oral daily  apixaban 2.5 milliGRAM(s) Oral every 12 hours  atorvastatin 40 milliGRAM(s) Oral at bedtime  influenza   Vaccine 0.5 milliLiter(s) IntraMuscular once  insulin glargine Injectable (LANTUS) 25 Unit(s) SubCutaneous at bedtime  insulin lispro (HumaLOG) corrective regimen sliding scale   SubCutaneous Before meals and at bedtime  insulin lispro Injectable (HumaLOG) 8 Unit(s) SubCutaneous three times a day before meals  linezolid    Tablet 600 milliGRAM(s) Oral every 12 hours  piperacillin/tazobactam IVPB.. 3.375 Gram(s) IV Intermittent every 6 hours  sodium chloride 3%. 50 milliLiter(s) (100 mL/Hr) IV Continuous <Continuous>  sodium chloride 3%. 120 milliLiter(s) (30 mL/Hr) IV Continuous <Continuous>  tacrolimus 2 milliGRAM(s) Oral two times a day  tamsulosin 0.4 milliGRAM(s) Oral at bedtime  valGANciclovir 450 milliGRAM(s) Oral daily    MEDICATIONS  (PRN):  acetaminophen   Tablet .. 650 milliGRAM(s) Oral every 6 hours PRN Moderate Pain (4 - 6)      PHYSICAL EXAM  Vital Signs Last 24 Hrs  T(C): 37.1 (22 Oct 2019 09:48), Max: 37.1 (22 Oct 2019 09:48)  T(F): 98.7 (22 Oct 2019 09:48), Max: 98.7 (22 Oct 2019 09:48)  HR: 84 (22 Oct 2019 08:04) (80 - 86)  BP: 122/57 (22 Oct 2019 08:04) (107/63 - 127/58)  BP(mean): 81 (22 Oct 2019 08:04) (80 - 84)  RR: 17 (22 Oct 2019 08:04) (17 - 18)  SpO2: 94% (22 Oct 2019 08:04) (91% - 94%)    Constitutional: wn/wd in NAD.   HEENT: NCAT, MMM, OP clear, EOMI, no proptosis or lid retraction  Neck: no thyromegaly or palpable thyroid nodules   Respiratory: lungs CTAB.  Cardiovascular: regular rhythm, normal S1 and S2, no audible murmurs, no peripheral edema  GI: soft, NT/ND, no masses/HSM appreciated.  Neurology: no tremors, DTR 2+  Skin: no visible rashes/lesions  Psychiatric: AAO x 3, normal affect/mood.    LABS:                        10.1   17.71 )-----------( 122      ( 22 Oct 2019 07:45 )             32.0     10-22    120<LL>  |  88<L>  |  65<H>  ----------------------------<  153<H>  4.1   |  17<L>  |  3.00<H>    Ca    8.4      22 Oct 2019 12:46  Mg     2.3     10-22    TPro  6.5  /  Alb  3.6  /  TBili  0.9  /  DBili  x   /  AST  58<H>  /  ALT  25  /  AlkPhos  81  10-20    PT/INR - ( 21 Oct 2019 06:17 )   PT: 18.8 sec;   INR: 1.64          PTT - ( 21 Oct 2019 06:17 )  PTT:32.3 sec  Urinalysis Basic - ( 21 Oct 2019 05:58 )    Color: Yellow / Appearance: Clear / SG: >=1.030 / pH: x  Gluc: x / Ketone: Trace mg/dL  / Bili: Small / Urobili: 0.2 E.U./dL   Blood: x / Protein: 30 mg/dL / Nitrite: NEGATIVE   Leuk Esterase: NEGATIVE / RBC: < 5 /HPF / WBC < 5 /HPF   Sq Epi: x / Non Sq Epi: 0-5 /HPF / Bacteria: Present /HPF      Thyroid Stimulating Hormone, Serum: 0.902 uIU/mL (10-22 @ 07:45)      HbA1C: 8.1 % (10-20 @ 15:09)    CAPILLARY BLOOD GLUCOSE      POCT Blood Glucose.: 157 mg/dL (22 Oct 2019 11:22)  POCT Blood Glucose.: 174 mg/dL (22 Oct 2019 06:02)  POCT Blood Glucose.: 171 mg/dL (21 Oct 2019 22:07)  POCT Blood Glucose.: 190 mg/dL (21 Oct 2019 16:24)      Insulin Sliding Scale requirements X 24 Hours:    RADIOLOGY & ADDITIONAL TESTS:    A/P: 68y Male with history of DM type II presenting for       1.  DM -     Please continue           units lantus at bedtime  / in the morning and        units lispro with meals and lispro moderate / low dose sliding scale 4 times daily with meals and at bedtime.  Please continue consistent carbohydrate diet.      Goal FSG is   Will continue to monitor   For discharge, pt can continue    Pt can follow up at discharge with [facility] by calling  to make an appointment.   Will discuss case with     and update primary team INTERVAL HPI/OVERNIGHT EVENTS:    Patient     FSG & Insulin received:  Yesterday:  pre-dinner fs, 10 nutritional lispro   units +  2 units lispro SS, had jello  bedtime fs, 25 lantus   units +2    units lispro SS  Today:  pre-breakfast fs, 8 nutritional lispro   units+ 2   units lispro SS  pre-lunch fs, 8 nutritional lispro   units+ 2  units lispro SS      Pt reports the following symptoms:    CONSTITUTIONAL:  Negative fever or chills, feels well, good appetite  EYES:  Negative  blurry vision or double vision  CARDIOVASCULAR:  Negative for chest pain or palpitations  RESPIRATORY:  Negative for cough, wheezing, or SOB   GASTROINTESTINAL:  Negative for nausea, vomiting, diarrhea, constipation, or abdominal pain  GENITOURINARY:  Negative frequency, urgency or dysuria  NEUROLOGIC:  No headache, confusion, dizziness, lightheadedness    MEDICATIONS  (STANDING):  amLODIPine   Tablet 10 milliGRAM(s) Oral daily  apixaban 2.5 milliGRAM(s) Oral every 12 hours  atorvastatin 40 milliGRAM(s) Oral at bedtime  influenza   Vaccine 0.5 milliLiter(s) IntraMuscular once  insulin glargine Injectable (LANTUS) 25 Unit(s) SubCutaneous at bedtime  insulin lispro (HumaLOG) corrective regimen sliding scale   SubCutaneous Before meals and at bedtime  insulin lispro Injectable (HumaLOG) 8 Unit(s) SubCutaneous three times a day before meals  linezolid    Tablet 600 milliGRAM(s) Oral every 12 hours  piperacillin/tazobactam IVPB.. 3.375 Gram(s) IV Intermittent every 6 hours  sodium chloride 3%. 50 milliLiter(s) (100 mL/Hr) IV Continuous <Continuous>  sodium chloride 3%. 120 milliLiter(s) (30 mL/Hr) IV Continuous <Continuous>  tacrolimus 2 milliGRAM(s) Oral two times a day  tamsulosin 0.4 milliGRAM(s) Oral at bedtime  valGANciclovir 450 milliGRAM(s) Oral daily    MEDICATIONS  (PRN):  acetaminophen   Tablet .. 650 milliGRAM(s) Oral every 6 hours PRN Moderate Pain (4 - 6)      PHYSICAL EXAM  Vital Signs Last 24 Hrs  T(C): 37.1 (22 Oct 2019 09:48), Max: 37.1 (22 Oct 2019 09:48)  T(F): 98.7 (22 Oct 2019 09:48), Max: 98.7 (22 Oct 2019 09:48)  HR: 84 (22 Oct 2019 08:04) (80 - 86)  BP: 122/57 (22 Oct 2019 08:04) (107/63 - 127/58)  BP(mean): 81 (22 Oct 2019 08:04) (80 - 84)  RR: 17 (22 Oct 2019 08:04) (17 - 18)  SpO2: 94% (22 Oct 2019 08:04) (91% - 94%)    Constitutional: wn/wd in NAD.   Respiratory: lungs CTAB.  Cardiovascular:irregular rhythm, normal S1 and S2, mild peripheral edema  GI: soft, NT/ND, no masses/HSM appreciated.  Neurology: no tremors, No focal neurological deficits    LABS:                        10.1   17.71 )-----------( 122      ( 22 Oct 2019 07:45 )             32.0     10    120<LL>  |  88<L>  |  65<H>  ----------------------------<  153<H>  4.1   |  17<L>  |  3.00<H>    Ca    8.4      22 Oct 2019 12:46  Mg     2.3     10-22    TPro  6.5  /  Alb  3.6  /  TBili  0.9  /  DBili  x   /  AST  58<H>  /  ALT  25  /  AlkPhos  81  10-20    PT/INR - ( 21 Oct 2019 06:17 )   PT: 18.8 sec;   INR: 1.64          PTT - ( 21 Oct 2019 06:17 )  PTT:32.3 sec  Urinalysis Basic - ( 21 Oct 2019 05:58 )    Color: Yellow / Appearance: Clear / SG: >=1.030 / pH: x  Gluc: x / Ketone: Trace mg/dL  / Bili: Small / Urobili: 0.2 E.U./dL   Blood: x / Protein: 30 mg/dL / Nitrite: NEGATIVE   Leuk Esterase: NEGATIVE / RBC: < 5 /HPF / WBC < 5 /HPF   Sq Epi: x / Non Sq Epi: 0-5 /HPF / Bacteria: Present /HPF      Thyroid Stimulating Hormone, Serum: 0.902 uIU/mL (10-22 @ 07:45)      HbA1C: 8.1 % (10-20 @ 15:09)    CAPILLARY BLOOD GLUCOSE      POCT Blood Glucose.: 157 mg/dL (22 Oct 2019 11:22)  POCT Blood Glucose.: 174 mg/dL (22 Oct 2019 06:02)  POCT Blood Glucose.: 171 mg/dL (21 Oct 2019 22:07)  POCT Blood Glucose.: 190 mg/dL (21 Oct 2019 16:24)      Insulin Sliding Scale requirements X 24 Hours:    RADIOLOGY & ADDITIONAL TESTS:    A/P:68M with a history of renal transplant in 2017 (on Prograf), DM2, HTN, MGUS, pAF, BPH, HLD, CAD s/p CABG 15 years ago p/w with x3 days of progressively worsening scrotal erythema, pain, and swelling got admitted for scrotal cellulitis    1.  DM Type 2 - Uncontrolled  - Wt 94.3 kg  CR/GFR 2.28/33  Hba1c 8.1  ECHO showed EF 67%  Please increase to lantus    27   units at night.    Please increase to  lispro  10   units before each meal.   Please continue lispro moderate dose sliding scale four times daily with meals and at bedtime  Carb consistent diet  Pt's fingerstick glucose goal is 120-150    2. Hyponatremia -   - D/D include hypovolemia, SIADH, Tacrolimus induced  - TSH 0.902, Free T4 1, Cortisol 21.3  - Urine sodium 22 and urine osmolality 474 with Sr.sodium 122  - Sodium today was 121 and 120, urine osmolality 453 and urine sodium 20 - after he received 3% hypertonic saline 25 cc - 3 doses.   - he also received another dose 3% Hypertonic saline 50 cc today  - As per nephrology, he will be started on 3% hypertonic saline at 30cc/hr and sodium monitoring Q2H    Will continue to monitor   Patient is being planned for transfer to [place].  Pt can follow up at discharge with [facility] by calling  to make an appointment.   Discussed case with   and updated primary team INTERVAL HPI/OVERNIGHT EVENTS:    Patient seen and examined at the bedside. His sodium continued to drop and he was given 3% Hypertonic saline yesterday and today, started on continuous bolus. His renal function continues to worsen. He was started back on tacrolimus, and on vanc, zosyn and valganciclovir. His WBC was also trending high    FSG & Insulin received:  Yesterday:  pre-dinner fs, 10 nutritional lispro   units +  2 units lispro SS, had jello  bedtime fs, 25 lantus   units +2    units lispro SS  Today:  pre-breakfast fs, 8 nutritional lispro   units+ 2   units lispro SS, had oatmeal  pre-lunch fs, 8 nutritional lispro   units+ 2  units lispro SS      Pt reports the following symptoms:    CONSTITUTIONAL:  Negative fever or chills, feels well, good appetite  EYES:  Negative  blurry vision or double vision  CARDIOVASCULAR:  Negative for chest pain or palpitations  RESPIRATORY:  Negative for cough, wheezing, or SOB   GASTROINTESTINAL:  Negative for nausea, vomiting, diarrhea, constipation, or abdominal pain  GENITOURINARY:  Negative frequency, urgency or dysuria  NEUROLOGIC:  No headache, confusion, dizziness, lightheadedness    MEDICATIONS  (STANDING):  amLODIPine   Tablet 10 milliGRAM(s) Oral daily  apixaban 2.5 milliGRAM(s) Oral every 12 hours  atorvastatin 40 milliGRAM(s) Oral at bedtime  influenza   Vaccine 0.5 milliLiter(s) IntraMuscular once  insulin glargine Injectable (LANTUS) 25 Unit(s) SubCutaneous at bedtime  insulin lispro (HumaLOG) corrective regimen sliding scale   SubCutaneous Before meals and at bedtime  insulin lispro Injectable (HumaLOG) 8 Unit(s) SubCutaneous three times a day before meals  linezolid    Tablet 600 milliGRAM(s) Oral every 12 hours  piperacillin/tazobactam IVPB.. 3.375 Gram(s) IV Intermittent every 6 hours  sodium chloride 3%. 50 milliLiter(s) (100 mL/Hr) IV Continuous <Continuous>  sodium chloride 3%. 120 milliLiter(s) (30 mL/Hr) IV Continuous <Continuous>  tacrolimus 2 milliGRAM(s) Oral two times a day  tamsulosin 0.4 milliGRAM(s) Oral at bedtime  valGANciclovir 450 milliGRAM(s) Oral daily    MEDICATIONS  (PRN):  acetaminophen   Tablet .. 650 milliGRAM(s) Oral every 6 hours PRN Moderate Pain (4 - 6)      PHYSICAL EXAM  Vital Signs Last 24 Hrs  T(C): 37.1 (22 Oct 2019 09:48), Max: 37.1 (22 Oct 2019 09:48)  T(F): 98.7 (22 Oct 2019 09:48), Max: 98.7 (22 Oct 2019 09:48)  HR: 84 (22 Oct 2019 08:04) (80 - 86)  BP: 122/57 (22 Oct 2019 08:04) (107/63 - 127/58)  BP(mean): 81 (22 Oct 2019 08:04) (80 - 84)  RR: 17 (22 Oct 2019 08:04) (17 - 18)  SpO2: 94% (22 Oct 2019 08:04) (91% - 94%)    Constitutional: wn/wd in NAD.   Respiratory: lungs CTAB.  Cardiovascular:irregular rhythm, normal S1 and S2, mild peripheral edema  GI: soft, NT/ND, no masses/HSM appreciated.  Neurology: no tremors, No focal neurological deficits    LABS:                        10.1   17.71 )-----------( 122      ( 22 Oct 2019 07:45 )             32.0     10-22    120<LL>  |  88<L>  |  65<H>  ----------------------------<  153<H>  4.1   |  17<L>  |  3.00<H>    Ca    8.4      22 Oct 2019 12:46  Mg     2.3     10-22    TPro  6.5  /  Alb  3.6  /  TBili  0.9  /  DBili  x   /  AST  58<H>  /  ALT  25  /  AlkPhos  81  10-20    PT/INR - ( 21 Oct 2019 06:17 )   PT: 18.8 sec;   INR: 1.64          PTT - ( 21 Oct 2019 06:17 )  PTT:32.3 sec  Urinalysis Basic - ( 21 Oct 2019 05:58 )    Color: Yellow / Appearance: Clear / SG: >=1.030 / pH: x  Gluc: x / Ketone: Trace mg/dL  / Bili: Small / Urobili: 0.2 E.U./dL   Blood: x / Protein: 30 mg/dL / Nitrite: NEGATIVE   Leuk Esterase: NEGATIVE / RBC: < 5 /HPF / WBC < 5 /HPF   Sq Epi: x / Non Sq Epi: 0-5 /HPF / Bacteria: Present /HPF      Thyroid Stimulating Hormone, Serum: 0.902 uIU/mL (10-22 @ 07:45)      HbA1C: 8.1 % (10-20 @ 15:09)    CAPILLARY BLOOD GLUCOSE      POCT Blood Glucose.: 157 mg/dL (22 Oct 2019 11:22)  POCT Blood Glucose.: 174 mg/dL (22 Oct 2019 06:02)  POCT Blood Glucose.: 171 mg/dL (21 Oct 2019 22:07)  POCT Blood Glucose.: 190 mg/dL (21 Oct 2019 16:24)      Insulin Sliding Scale requirements X 24 Hours:    RADIOLOGY & ADDITIONAL TESTS:    A/P:68M with a history of renal transplant in 2017 (on Prograf), DM2, HTN, MGUS, pAF, BPH, HLD, CAD s/p CABG 15 years ago p/w with x3 days of progressively worsening scrotal erythema, pain, and swelling got admitted for scrotal cellulitis    1.  DM Type 2 - Uncontrolled  - Wt 94.3 kg  CR/GFR 2.28/33  Hba1c 8.1  ECHO showed EF 67%  Please increase to lantus    27   units at night.    Please increase to  lispro  10   units before each meal.   Please continue lispro moderate dose sliding scale four times daily with meals and at bedtime  Carb consistent diet  Pt's fingerstick glucose goal is 120-150    2. Hyponatremia -   - D/D include hypovolemia, SIADH, Tacrolimus induced  - TSH 0.902, Free T4 1, Cortisol 21.3  - Urine sodium 22 and urine osmolality 474 with Sr.sodium 122  - Sodium today was 121 and 120, urine osmolality 453 and urine sodium 20 - after he received 3% hypertonic saline 25 cc - 3 doses.   - he also received another dose 3% Hypertonic saline 50 cc today  - As per nephrology, he will be started on 3% hypertonic saline at 30cc/hr and sodium monitoring Q2H    Will continue to monitor   Patient is being planned for transfer to [place] given his renal transplant and to rule out rejection.   Pt can follow up at discharge with [facility] by calling  to make an appointment.   Discussed case with   and updated primary team

## 2019-10-22 NOTE — DISCHARGE NOTE PROVIDER - HOSPITAL COURSE
68M with a history of renal transplant (on unknown immunosuppresant) 2.5 years ago, DM2, HTN, MGUS, pAF, BPH, HLD, CAD s/p CABG 15 years ago, presents for progressive pain and swelling of his scrotum, admitted for severe sepsis 2/2, on10/21, scrotal cellulitis and hyponatremia            Testicular Cellulitis    -patient presented with severe Sepsis: VS-102.3 F, HR 96, RR 22, WBC 22 K    -testicles were large, warm, erythematous, tender on palpation    -Testicular US showed b/l fluid consistent with hydroceles    -CT scan showed findings similar to US with no signs of abscess    -US Right Kidney showed normal renal perfusion, no hydronephrosis, and no perigraft fluid collection    -patient was started on vancomycin 1 g QD, zosyn 3.375 g Q8, and clindamycin 600 mg Q8    -patient was noted to having worsening kidney function 2.28 Cr on admission and is now 2.88.    -Vancomycin was discontinued and patient was started on linezolid 600 mg BID        Hyponatremia    -patient found to be hyponatremic during course of admission 120-124    -values are slightly higher after glucose correction    -patient was fluid restricted to 1 L on 10/21 with no improvement    -patient was given 3 rounds of 50 cc hypertonic saline with no improvement overnight on 10/21    -patient getting 25 cc hypertonic saline over 4 hours today.         Kidney function    -given history of renal transplant and worsening kidney function    -started on valgancyclovir 450 mg    -resumed home tacrolimus 2 g    -patient to be transferred to [place] for transplant team management 68M with a history of renal transplant (on unknown immunosuppresant) 2.5 years ago, DM2, HTN, MGUS, pAF, BPH, HLD, CAD s/p CABG 15 years ago, presents for progressive pain and swelling of his scrotum, admitted for severe sepsis 2/2, on10/21, scrotal cellulitis and hyponatremia            Testicular Cellulitis    -patient presented with severe Sepsis: VS-102.3 F, HR 96, RR 22, WBC 22 K    -testicles were large, warm, erythematous, tender on palpation    -Testicular US showed b/l fluid consistent with hydroceles    -CT scan showed findings similar to US with no signs of abscess    -US Right Kidney showed normal renal perfusion, no hydronephrosis, and no perigraft fluid collection    -patient was started on vancomycin 1 g QD, zosyn 3.375 g Q8, and clindamycin 600 mg Q8    -patient was noted to having worsening kidney function 2.28 Cr on admission and is now 2.88.    -Vancomycin was discontinued and patient was started on linezolid 600 mg BID        Hyponatremia    -patient found to be hyponatremic during course of admission 120-124    -values are slightly higher after glucose correction    -patient was fluid restricted to 1 L on 10/21 with no improvement    -patient was given 3 rounds of 50 cc hypertonic saline with no improvement overnight on 10/21    -patient getting 25 cc hypertonic saline over 4 hours today.         CKD stage IV    -given history of renal transplant and worsening kidney function    -started on valgancyclovir 450 mg    -resumed home tacrolimus 2 g    -patient to be transferred to [facility] for transplant team management 68M with a history of renal transplant (on unknown immunosuppresant) 2.5 years ago, DM2, HTN, MGUS, pAF, BPH, HLD, CAD s/p CABG 15 years ago, presents for progressive pain and swelling of his scrotum, admitted for severe sepsis 2/2, on10/21, scrotal cellulitis and hyponatremia            Testicular Cellulitis    -patient presented with severe Sepsis: VS-102.3 F, HR 96, RR 22, WBC 22 K    -testicles were large, warm, erythematous, tender on palpation    -Testicular US showed b/l fluid consistent with hydroceles    -CT scan showed findings similar to US with no signs of abscess    -US Right Kidney showed normal renal perfusion, no hydronephrosis, and no perigraft fluid collection    -patient was started on vancomycin 1 g QD, zosyn 3.375 g Q8, and clindamycin 600 mg Q8    -patient was noted to having worsening kidney function 2.28 Cr on admission and is now 2.88.    -Vancomycin was discontinued and patient was started on linezolid 600 mg BID        Hyponatremia    -patient found to be hyponatremic during course of admission 120-124    -values are slightly higher after glucose correction    -patient was fluid restricted to 1 L on 10/21 with no improvement    -patient was given 3 rounds of 50 cc hypertonic saline with no improvement overnight on 10/21    -patient getting 25 cc hypertonic saline over 4 hours today.         CKD stage IV    -given history of renal transplant and worsening kidney function    -started on valgancyclovir 450 mg    -resumed home tacrolimus 2 g    -patient to be transferred to [facility] for transplant team management         Elevated Creatinine    -difficult to determine whether ATN or renal rejection in setting of infection

## 2019-10-22 NOTE — PROGRESS NOTE ADULT - PROBLEM SELECTOR PLAN 1
Patient presenting with x3 day history of scrotal swelling, warmth, and erythema, findings consistent with cellulitis, erythematous, warm, swollen and TTP on Physical exam. Patient febrile 102.3 HR 96, RR 24, WBC 22.36 Meeting 4/4 SIRS criteria, Lactate 2.2 on admission. Hx, physical exam and labs consistent with severe sepsis in the setting of scrotal cellulitis. s/p vanc, zosyn, and clinda in the ED, 2L NS. Lactate now cleared.   -c/w clindamycin 600mg q8 and zosyn 3.375 g Q8  -given worsening kidney function vanc D/C and started on linezolid 600 mg BID  -blood cultures showed gram + cocci in pairs  -lactate cleared no longer following  -UA negative  -trend CBC
Patient presenting with x3 day history of scrotal swelling, warmth, and erythema, findings consistent with cellulitis, erythematous, warm, swollen and TTP on Physical exam. Patient febrile 102.3 HR 96, RR 24, WBC 22.36 Meeting 4/4 SIRS criteria, Lactate 2.2 on admission. Hx, physical exam and labs consistent with severe sepsis in the setting of scrotal cellulitis. s/p vanc, zosyn, and clinda in the ED, 2L NS. Lactate now cleared.   -c/w clindamycin 600mg q8 and zosyn 3.375 g Q8  -c/w vanc 1g q24, with daily vanc troughs in the setting of CKD  -blood cultures showed gram + cocci in pairs  -lactate cleared no longer following  -UA negative  -trend CBC

## 2019-10-22 NOTE — PROGRESS NOTE ADULT - ASSESSMENT
A - severe scrotal cellultis/DM. sp renal  transplant /LATRICIA vs rejection/hyponatremia/ atrial fib    Suggest:  continue ABX, discuss need for clinda with ID  change all meds to NS  fluid restriction  HS per renal  continue eliquis  DM per endocrin  if vommting recurs, abd film  considering transfer to [place]

## 2019-10-22 NOTE — PROGRESS NOTE ADULT - PROBLEM SELECTOR PLAN 6
Patient with Hgb A1c 8.1 on admission, with glucose 351 on recent BMP. Patient with hx of poorly controlled diabetes mellitus, endorses non-adherence with Lantus. on 100 units of Lantus @ home.  -c/w 100 units lantus for now  -8 units lispro premeal  -endocrinology consulted  -Adjust as necessary  -Renal diet
Patient with Hgb A1c 8.1 on admission, with glucose 351 on recent BMP. Patient with hx of poorly controlled diabetes mellitus, endorses non-adherence with Lantus. on 100 units of Lantus @ home.  -c/w 100 units lantus for now  -15 units lispro premeal  -endocrinology consulted  -Adjust as necessary  -Renal diet

## 2019-10-22 NOTE — PROGRESS NOTE ADULT - PROBLEM SELECTOR PLAN 8
Patient with LAD seen on CT imaging above concerning for metastatic adenopathy. Patient with hx of MGUS however no protein gap on CMP, calcium WNL, no spine pain low suspicion for conversion of MGUS however other etiologies may be sought  -consider heme consult in am
Patient with LAD seen on CT imaging above concerning for metastatic adenopathy. Patient with hx of MGUS however no protein gap on CMP, calcium WNL, no spine pain low suspicion for conversion of MGUS however other etiologies may be sought  -consider heme consult in am

## 2019-10-22 NOTE — PROGRESS NOTE ADULT - PROBLEM SELECTOR PLAN 9
Hgb 11.0 on admission, no s&s of active bleeding-patient with hx of anemia likely 2/2 to CKD  -trend CBC for now  -transfuse for Hgb<7
Hgb 11.0 on admission, no s&s of active bleeding-patient with hx of anemia likely 2/2 to CKD  -trend CBC for now  -transfuse for Hgb<7

## 2019-10-22 NOTE — PROGRESS NOTE ADULT - PROBLEM SELECTOR PLAN 4
LATRICIA could be due to tacrolimus toxicity (prerenal - afferent vasoconstriction) vs ATN in setting of sepsis. Baseline creatinine 1-1.2. Now 2.17. Possibly pre-renal in setting of decreased PO and sepsis.  -was restricted to 1 L yesterday  -renal sono of transplanted kidney showed LATRICIA  -started on tacrolimus 2 g PO
LATRICIA could be due to tacrolimus toxicity (prerenal - afferent vasoconstriction) vs ATN in setting of sepsis. Baseline creatinine 1-1.2. Now 2.17. Possibly pre-renal in setting of decreased PO and sepsis.  -fluid restriction of 1 L at this time   -s/p 2L NS now on IVF with NS @ 100cc/hr  -renal sono of transplanted kidney  -Tacro level  -holding tacromilus for now

## 2019-10-22 NOTE — DISCHARGE NOTE NURSING/CASE MANAGEMENT/SOCIAL WORK - PATIENT PORTAL LINK FT
You can access the FollowMyHealth Patient Portal offered by [facility] by registering at the following website: [email or web address]. By joining [facility]’s FollowMyHealth portal, you will also be able to view your health information using other applications (apps) compatible with our system.

## 2019-10-23 LAB
-  AMPICILLIN: SIGNIFICANT CHANGE UP
-  CLINDAMYCIN: SIGNIFICANT CHANGE UP
-  ERYTHROMYCIN: SIGNIFICANT CHANGE UP
-  LEVOFLOXACIN: SIGNIFICANT CHANGE UP
-  PENICILLIN: SIGNIFICANT CHANGE UP
-  VANCOMYCIN: SIGNIFICANT CHANGE UP
CMV DNA CSF QL NAA+PROBE: SIGNIFICANT CHANGE UP
CMV DNA SPEC NAA+PROBE-LOG#: SIGNIFICANT CHANGE UP LOGIU/ML
METHOD TYPE: SIGNIFICANT CHANGE UP

## 2019-10-24 LAB — BKV DNA SPEC QL NAA+PROBE: SIGNIFICANT CHANGE UP

## 2019-10-25 LAB
CULTURE RESULTS: SIGNIFICANT CHANGE UP
SPECIMEN SOURCE: SIGNIFICANT CHANGE UP

## 2019-10-26 LAB
CULTURE RESULTS: SIGNIFICANT CHANGE UP
SPECIMEN SOURCE: SIGNIFICANT CHANGE UP

## 2019-10-28 LAB
CULTURE RESULTS: SIGNIFICANT CHANGE UP
ORGANISM # SPEC MICROSCOPIC CNT: SIGNIFICANT CHANGE UP
SPECIMEN SOURCE: SIGNIFICANT CHANGE UP

## 2019-10-29 ENCOUNTER — APPOINTMENT (OUTPATIENT)
Dept: NEPHROLOGY | Facility: CLINIC | Age: 69
End: 2019-10-29

## 2019-11-20 ENCOUNTER — RECORD ABSTRACTING (OUTPATIENT)
Age: 69
End: 2019-11-20

## 2019-11-20 RX ORDER — ZOLPIDEM TARTRATE 5 MG/1
5 TABLET ORAL
Qty: 30 | Refills: 3 | Status: DISCONTINUED | COMMUNITY
Start: 2017-05-10 | End: 2019-11-20

## 2019-11-20 RX ORDER — ZOLPIDEM TARTRATE 5 MG/1
5 TABLET ORAL
Qty: 30 | Refills: 3 | Status: DISCONTINUED | COMMUNITY
Start: 2018-06-18 | End: 2019-11-20

## 2019-12-19 ENCOUNTER — APPOINTMENT (OUTPATIENT)
Dept: NEPHROLOGY | Facility: CLINIC | Age: 69
End: 2019-12-19
Payer: MEDICARE

## 2019-12-19 VITALS — DIASTOLIC BLOOD PRESSURE: 80 MMHG | SYSTOLIC BLOOD PRESSURE: 110 MMHG

## 2019-12-19 VITALS — HEIGHT: 65 IN | HEART RATE: 97 BPM | OXYGEN SATURATION: 98 % | BODY MASS INDEX: 33.7 KG/M2 | WEIGHT: 202.25 LBS

## 2019-12-19 VITALS — HEART RATE: 96 BPM | DIASTOLIC BLOOD PRESSURE: 70 MMHG | SYSTOLIC BLOOD PRESSURE: 112 MMHG

## 2019-12-19 VITALS — SYSTOLIC BLOOD PRESSURE: 110 MMHG | HEART RATE: 72 BPM | DIASTOLIC BLOOD PRESSURE: 70 MMHG

## 2019-12-19 PROCEDURE — 99214 OFFICE O/P EST MOD 30 MIN: CPT

## 2019-12-19 NOTE — HISTORY OF PRESENT ILLNESS
[FreeTextEntry1] : This patient is a 69 year old male being seen for follow up after hospitalization, with PMH of CRI and past renal transplant, HTN, DM, CAD, monoclonal B cell abnormality, and history of atrial flutter. \par \par Interval Data:\par - 12/17/19 Labs reveal: glucose 180, Na 135, K 5.3, Cl 103, CO2 of 22, urea nitrogen 21, creatinine 1.21, , HGB 10, HCT 30.3.\par - 12/11/19 PET/CT.  Report is on record. \par - 10/20/19 Patient was admitted for testicular cellulitis with worsening kidney function. Testicular US showed b/l fluid consistent with hydroceles. CT scan showed findings similar to US with no signs of abscess. Patient was started on vancomycin 1 g QD, Zosyn 3.375 g Q8, clindamycin 600 mg Q8 and valganciclovir 450 mg. Vancomycin was discontinued and patient was started on linezolid 600 mg BID. Patient found to be hyponatremic during course of admission 120-124. Patient to be transferred to [place] for transplant team management.\par - Labs from 8/19/19 reveal: WBC 6.7, H/H 11.4/34.6, glucose 143, Na 132\par - On 7/31/19, patient had cardiology evaluation with Dr. Paulina Encinas for dyspnea. He has mildly positive nuclear stress test. EKG shows atrial fibrillation ST segment and T-wave abnormalities which are unchanged. Note is on record. \par - Otology evaluation with Dr. Arnold Sanabria for acute sinusitis. Notes on record. \par \par Current Complaints:\par - Patient feels generally well. He reports good appetite. Patient reports he still has infection and swollen testicles, though improved since hospitalization. Patient is scheduled to visit Dr. Huffman on 01/6/2020 and will obtain Bone Marrow examination.\par - EKG taken at [place], advised pt to send the results as he declines to obtain one today.\par - Patient reportedly received flu and pneumonia vaccine yesterday with IONA Hurtado. \par \par Current medications:  Eliquis 2.5mg BID, Ambien 5mg prn, amlodipine 10mg QD, Protonix 20mg QD, Crestor 10mg QD, Prograf 2mg BID, isosorbide 30mg QD.  \par

## 2019-12-19 NOTE — END OF VISIT
[FreeTextEntry3] : All medical record entries made by the Scribe were at my, Dr. Kofi Leyva, direction and personally dictated by me on 12/19/2019. I have reviewed the chart and agree that the record accurately reflects my personal performance of the history, physical exam, assessment and plan. I have also personally directed, reviewed, and agreed with the chart.

## 2019-12-19 NOTE — ASSESSMENT
[FreeTextEntry1] : Plan\par 1) HTN: BP acceptable today on current regimen and therefore will not adjust patient's antihypertensive medications. Will reassess pressure and regimen at next evaluation. \par 2) Transplant medicine: Continue regimen of Prograf 2mg BID, Mycophenolate 500mg BID.\par 3) HLD: lipids found to be well-controlled and within normal limits on current therapy of Crestor. Due to patient's tolerance of current treatment and acceptable lab results we will not adjust course at this time. Will continue to monitor with lipid profile on blood work today. \par 4) Patient will continue to follow with Dr. Huffman for his hemologic status. Advised patient to obtain record from [facility]. Call placed to Dr. Huffman for verbal review. \par \par Changes to Medications: none\par \par Labs were not drawn, reviewed labs from 12/17/2019 on record, and patient will return in 4-6 weeks for a follow-up appointment.

## 2019-12-19 NOTE — REASON FOR VISIT
[Follow-Up] : a follow-up visit [FreeTextEntry1] : follow up visit after recent hospitalization at [facility] for scrotal cellulitis, complicated by LATRICIA and followed by [facility] transplant team to evaluate marked lymphadenopathy. Pt has recently seen Dr. Jojo Huffman at [facility]  for apparent PTLD.

## 2019-12-19 NOTE — PHYSICAL EXAM
[General Appearance - In No Acute Distress] : in no acute distress [General Appearance - Alert] : alert [Sclera] : the sclera and conjunctiva were normal [PERRL With Normal Accommodation] : pupils were equal in size, round, and reactive to light [Outer Ear] : the ears and nose were normal in appearance [Extraocular Movements] : extraocular movements were intact [Oropharynx] : the oropharynx was normal [Neck Cervical Mass (___cm)] : no neck mass was observed [Jugular Venous Distention Increased] : there was no jugular-venous distention [Neck Appearance] : the appearance of the neck was normal [Thyroid Diffuse Enlargement] : the thyroid was not enlarged [Thyroid Nodule] : there were no palpable thyroid nodules [Heart Rate And Rhythm] : heart rate was normal and rhythm regular [Auscultation Breath Sounds / Voice Sounds] : lungs were clear to auscultation bilaterally [Heart Sounds] : normal S1 and S2 [Murmurs] : no murmurs [Heart Sounds Pericardial Friction Rub] : no pericardial rub [Heart Sounds Gallop] : no gallops [Bowel Sounds] : normal bowel sounds [Abdomen Soft] : soft [Edema] : there was no peripheral edema [Abdomen Tenderness] : non-tender [Abdomen Mass (___ Cm)] : no abdominal mass palpated [Abnormal Walk] : normal gait [No CVA Tenderness] : no ~M costovertebral angle tenderness [No Spinal Tenderness] : no spinal tenderness [Motor Tone] : muscle strength and tone were normal [Nail Clubbing] : no clubbing  or cyanosis of the fingernails [Musculoskeletal - Swelling] : no joint swelling seen [Skin Color & Pigmentation] : normal skin color and pigmentation [Skin Turgor] : normal skin turgor [] : no rash [No Focal Deficits] : no focal deficits [Oriented To Time, Place, And Person] : oriented to person, place, and time [Affect] : the affect was normal [Impaired Insight] : insight and judgment were intact

## 2020-02-10 DIAGNOSIS — C91.Z0 OTHER LYMPHOID LEUKEMIA NOT HAVING ACHIEVED REMISSION: ICD-10-CM

## 2020-02-19 ENCOUNTER — APPOINTMENT (OUTPATIENT)
Dept: HEART AND VASCULAR | Facility: CLINIC | Age: 70
End: 2020-02-19
Payer: MEDICARE

## 2020-02-19 ENCOUNTER — APPOINTMENT (OUTPATIENT)
Dept: HEART AND VASCULAR | Facility: CLINIC | Age: 70
End: 2020-02-19

## 2020-02-19 ENCOUNTER — NON-APPOINTMENT (OUTPATIENT)
Age: 70
End: 2020-02-19

## 2020-02-19 VITALS
BODY MASS INDEX: 34.28 KG/M2 | DIASTOLIC BLOOD PRESSURE: 76 MMHG | SYSTOLIC BLOOD PRESSURE: 140 MMHG | WEIGHT: 206 LBS | HEART RATE: 76 BPM

## 2020-02-19 PROCEDURE — 99214 OFFICE O/P EST MOD 30 MIN: CPT | Mod: 25

## 2020-02-19 PROCEDURE — 93923 UPR/LXTR ART STDY 3+ LVLS: CPT

## 2020-02-19 PROCEDURE — 93000 ELECTROCARDIOGRAM COMPLETE: CPT

## 2020-02-19 RX ORDER — LORATADINE 5 MG/5 ML
0.05 SOLUTION, ORAL ORAL TWICE DAILY
Qty: 2 | Refills: 0 | Status: DISCONTINUED | COMMUNITY
Start: 2018-12-19 | End: 2020-02-19

## 2020-02-19 RX ORDER — TAMSULOSIN HYDROCHLORIDE 0.4 MG/1
0.4 CAPSULE ORAL
Qty: 90 | Refills: 3 | Status: DISCONTINUED | COMMUNITY
Start: 2018-06-18 | End: 2020-02-19

## 2020-02-19 RX ORDER — MYCOPHENOLATE MOFETIL 500 MG/1
500 TABLET, FILM COATED ORAL
Refills: 0 | Status: DISCONTINUED | COMMUNITY
End: 2020-02-19

## 2020-02-19 RX ORDER — FLUTICASONE PROPIONATE 50 UG/1
50 SPRAY, METERED NASAL DAILY
Qty: 1 | Refills: 3 | Status: DISCONTINUED | COMMUNITY
Start: 2019-01-14 | End: 2020-02-19

## 2020-02-19 RX ORDER — PSEUDOEPHEDRINE HCL 30 MG
30 TABLET ORAL
Qty: 30 | Refills: 3 | Status: DISCONTINUED | COMMUNITY
Start: 2018-12-19 | End: 2020-02-19

## 2020-02-19 RX ORDER — PREDNISONE 10 MG/1
10 TABLET ORAL
Qty: 16 | Refills: 0 | Status: DISCONTINUED | COMMUNITY
Start: 2019-04-04 | End: 2020-02-19

## 2020-02-19 RX ORDER — AMOXICILLIN AND CLAVULANATE POTASSIUM 875; 125 MG/1; MG/1
875-125 TABLET, COATED ORAL
Qty: 28 | Refills: 0 | Status: DISCONTINUED | COMMUNITY
Start: 2018-12-19 | End: 2020-02-19

## 2020-02-19 RX ORDER — DOXYCYCLINE HYCLATE 100 MG/1
100 CAPSULE ORAL DAILY
Qty: 14 | Refills: 0 | Status: DISCONTINUED | COMMUNITY
Start: 2019-04-04 | End: 2020-02-19

## 2020-02-19 RX ORDER — INSULIN GLARGINE 100 [IU]/ML
100 INJECTION, SOLUTION SUBCUTANEOUS
Qty: 15 | Refills: 0 | Status: DISCONTINUED | COMMUNITY
Start: 2019-05-06 | End: 2020-02-19

## 2020-02-19 RX ORDER — SOFT LENS RINSE,STORE SOLUTION
0.9 SOLUTION, NON-ORAL MISCELLANEOUS
Qty: 2 | Refills: 10 | Status: DISCONTINUED | COMMUNITY
Start: 2019-05-07 | End: 2020-02-19

## 2020-02-19 RX ORDER — FLUTICASONE PROPIONATE 50 UG/1
50 SPRAY, METERED NASAL DAILY
Qty: 1 | Refills: 6 | Status: DISCONTINUED | COMMUNITY
Start: 2019-05-07 | End: 2020-02-19

## 2020-02-19 NOTE — DISCUSSION/SUMMARY
[FreeTextEntry1] : The patient's is in atrial fibrillation. Last EKG in sinus rhythm was October of 2018. He is asymptomatic. He will undergo a one-week cardiac monitor to determine if he has episodes of sinus rhythm. We had a pulmonary discussion about the alternatives if he is in persistent atrial fibrillation. The ABIs shows elevated pressures. The patient will return for an arterial Doppler. His recent blood pressures have been normal. He will continue his current medication.

## 2020-02-19 NOTE — PHYSICAL EXAM
[General Appearance - Well Developed] : well developed [Normal Appearance] : normal appearance [Well Groomed] : well groomed [General Appearance - Well Nourished] : well nourished [No Deformities] : no deformities [General Appearance - In No Acute Distress] : no acute distress [Normal Conjunctiva] : the conjunctiva exhibited no abnormalities [Eyelids - No Xanthelasma] : the eyelids demonstrated no xanthelasmas [Normal Oral Mucosa] : normal oral mucosa [No Oral Pallor] : no oral pallor [No Oral Cyanosis] : no oral cyanosis [Normal Jugular Venous A Waves Present] : normal jugular venous A waves present [No Jugular Venous Rae A Waves] : no jugular venous rae A waves [Normal Jugular Venous V Waves Present] : normal jugular venous V waves present [Respiration, Rhythm And Depth] : normal respiratory rhythm and effort [Exaggerated Use Of Accessory Muscles For Inspiration] : no accessory muscle use [Heart Rate And Rhythm] : heart rate and rhythm were normal [Auscultation Breath Sounds / Voice Sounds] : lungs were clear to auscultation bilaterally [Abdomen Soft] : soft [Heart Sounds] : normal S1 and S2 [Abdomen Tenderness] : non-tender [Abdomen Mass (___ Cm)] : no abdominal mass palpated [Gait - Sufficient For Exercise Testing] : the gait was sufficient for exercise testing [Nail Clubbing] : no clubbing of the fingernails [Abnormal Walk] : normal gait [Cyanosis, Localized] : no localized cyanosis [Petechial Hemorrhages (___cm)] : no petechial hemorrhages [] : no ischemic changes [No Skin Ulcers] : no skin ulcer [No Xanthoma] : no  xanthoma was observed [Mood] : the mood was normal [Oriented To Time, Place, And Person] : oriented to person, place, and time [Affect] : the affect was normal [No Anxiety] : not feeling anxious [FreeTextEntry1] : hyperpigmented

## 2020-02-19 NOTE — HISTORY OF PRESENT ILLNESS
[FreeTextEntry1] : The patient's was hospitalized at [facility] for three weeks for scrotum cellulitis. He was also hospitalized overnight for pneumonia. The patient walks a box without difficulty. He climbs stairs slowly but without symptoms. His blood pressure has been 120-130 systolic.

## 2020-02-24 DIAGNOSIS — I47.2 VENTRICULAR TACHYCARDIA: ICD-10-CM

## 2020-03-27 DIAGNOSIS — Z86.39 PERSONAL HISTORY OF OTHER ENDOCRINE, NUTRITIONAL AND METABOLIC DISEASE: ICD-10-CM

## 2020-04-21 ENCOUNTER — APPOINTMENT (OUTPATIENT)
Dept: OTOLARYNGOLOGY | Facility: CLINIC | Age: 70
End: 2020-04-21
Payer: MEDICARE

## 2020-04-21 DIAGNOSIS — Z87.09 PERSONAL HISTORY OF OTHER DISEASES OF THE RESPIRATORY SYSTEM: ICD-10-CM

## 2020-04-21 PROCEDURE — 99213 OFFICE O/P EST LOW 20 MIN: CPT | Mod: 95

## 2020-04-21 NOTE — HISTORY OF PRESENT ILLNESS
[FreeTextEntry1] : Pt has several week history of congestion thick brown mucous.  Pt has decreased smell and taste. Pt has been on fluticasone with mild improvement, Pt has persistent symptoms that have had only minimal improvement.  [de-identified] : telemedicine consent was obtained. two way video was performed.

## 2020-04-27 ENCOUNTER — APPOINTMENT (OUTPATIENT)
Dept: HEART AND VASCULAR | Facility: CLINIC | Age: 70
End: 2020-04-27
Payer: MEDICARE

## 2020-04-27 PROCEDURE — 99213 OFFICE O/P EST LOW 20 MIN: CPT | Mod: 95

## 2020-04-27 NOTE — HISTORY OF PRESENT ILLNESS
[Medical Office: ([facility])___] : at the medical office located in  [Home] : at home, [unfilled] , at the time of the visit. [Self] : self [Patient] : the patient [FreeTextEntry1] :  The patient requested a telemetry visit and consented utilizing  [employer] . [employer] could not be downloaded. The patient has a family member who was improved after ablation. The patient has felt fatigued. He does not sleep well. He denies palpitations dyspnea and chest discomfort. He has epigastric pain after eating. He has gained 20 pounds. He is sedentary.

## 2020-04-27 NOTE — DISCUSSION/SUMMARY
[FreeTextEntry1] :  The patient has a history of atrial fibrillation. He is fatigued. This may be related to his rhythm. He is consistently in atrial fibrillation. The likelihood of maintaining normal sinus rhythm his declining. The patient will consult with Dr. Schuster about the options for correcting his cardiac rhythm. He will continue his current medication.

## 2020-04-28 ENCOUNTER — APPOINTMENT (OUTPATIENT)
Dept: NEPHROLOGY | Facility: CLINIC | Age: 70
End: 2020-04-28
Payer: MEDICARE

## 2020-04-28 PROCEDURE — 99214 OFFICE O/P EST MOD 30 MIN: CPT | Mod: 95

## 2020-04-28 NOTE — ASSESSMENT
[FreeTextEntry1] : Plan:\par 1) Atrial flutter: Will speak with Dr. Schuster concerning further evaluation. Questions will include need for intervention and timing of therapy if so.\par 2) HTN: Patient reports acceptable BPs at home. Continue current regimen.\par 3) Transplant medication: Patient to continue on Prograf and Valsyte. Reinforced with patient that due to his immunosuppression he absolutely must maintain his self isolation in quarantine to protect himself from covid.\par 4) Patient to have routine labs drawn at his home later this week. Will share labs with Dr. Huffman and Dr. Chao (transplant doctor), and all of [facility] team. \par \par Plan to reconvene with patient in one week after labs are drawn.

## 2020-04-28 NOTE — PHYSICAL EXAM
[General Appearance - Alert] : alert [Outer Ear] : the ears and nose were normal in appearance [General Appearance - In No Acute Distress] : in no acute distress [Extraocular Movements] : extraocular movements were intact [Neck Appearance] : the appearance of the neck was normal [Respiration, Rhythm And Depth] : normal respiratory rhythm and effort [] : no respiratory distress [Edema] : there was no peripheral edema [Exaggerated Use Of Accessory Muscles For Inspiration] : no accessory muscle use [Involuntary Movements] : no involuntary movements were seen [Skin Color & Pigmentation] : normal skin color and pigmentation [No Focal Deficits] : no focal deficits [Impaired Insight] : insight and judgment were intact [Oriented To Time, Place, And Person] : oriented to person, place, and time [Affect] : the affect was normal [FreeTextEntry1] : no evidence of deformity of discomfort.

## 2020-04-28 NOTE — HISTORY OF PRESENT ILLNESS
[Home] : at home, [unfilled] , at the time of the visit. [Patient] : the patient [Other Location: e.g. Home (Enter Location, City,State)___] : at [unfilled] [Self] : self [Stable] : stable [Good Compliance] : good compliance with treatment [___ Month(s) Ago] : [unfilled] month(s) ago [Chronic Kidney Disease] : chronic kidney disease [Primary] : primary hypertension [CAD] : coronary artery disease [Diabetes Mellitus] : diabetes mellitus [None] : ~He/She~ has no significant interval events [de-identified] : PTLD [de-identified] : h/o renal transplant, monoclonal B cell abnormality, and AFib. [FreeTextEntry1] : Patient reports feeling generally well today. He has been in quarantine with his wife. He denies changes in breathing and exercise tolerance. He denies sob, cough, fevers, and LE edema. Patient reports that he saw Dr. Encinas a few weeks ago and was reportedly advised to f/u with Dr. Schuster for atrial flutter. He notes that his pulse at home is between 75-85 and BP has been around  130-140/70. He remain on Eliquis 2.5mg BID, and Prograf and Valsite for transplant immunosuppression. He notes that he has gained weight while in quarantine. Patient notes that he saw Dr. Huffman in December for bone marrow examination and was scheduled in March for f/u, but appointment was rescheduled for May due to covid pandemic. \par \par Labs from 2/7/20 reveal: RBC 3.93, HGB 12.3, HCT 37.2, platelet 122k, BUN 27, creatinine 1.02, eGFR 73.\par \par Current medications: Eliquis 2.5mg BID, Ambien 5mg prn, amlodipine 10mg QD, Protonix 20mg QD, Crestor 10mg QD, Prograf 2mg BID, isosorbide 30mg QD, Valcyte.

## 2020-04-28 NOTE — END OF VISIT
[Time Spent: ___ minutes] : I have spent [unfilled] minutes of face to face time with the patient [FreeTextEntry3] : All medical record entries made by the Scribe were at my, Dr. Kofi Leyva, direction and personally dictated by me on 04/28/2020. I have reviewed the chart and agree that the record accurately reflects my personal performance of the history, physical exam, assessment and plan. I have also personally directed, reviewed, and agreed with the chart.

## 2020-04-30 ENCOUNTER — APPOINTMENT (OUTPATIENT)
Dept: HEART AND VASCULAR | Facility: CLINIC | Age: 70
End: 2020-04-30
Payer: MEDICARE

## 2020-04-30 PROCEDURE — 99214 OFFICE O/P EST MOD 30 MIN: CPT | Mod: 95

## 2020-04-30 NOTE — DISCUSSION/SUMMARY
[FreeTextEntry1] : Mr. Ashton is a 66 year-old gentleman with HTN, HLD, DM II, CAD s/p CABG, renal transplant, and persistent atrial fibrillation and flutter.  A review of his EKGs over the last several years implies that his has been in afib with good rate control most, if not all, of the time over the last 2 years.  It is difficult to ascribe his fatigue over the last 2 months to his AF.  As such, I have asked him to increase his level of physical activity and attempt to lose 10lbs over the next 1-2 months.  If his fatigue fails to improve, we will consider a SUKHDEEP/DCCV to re-evaluate his symptoms in NSR.  With regards to his stroke prophylaxis, Eliquis 2.5mg twice daily may not be an appropriate dose for him.  I will d/w Chuck Encinas and Orlin about any contraindications to increasing to 5mg twice daily.  Mr. Ashton and I will meet again in 6-8 weeks.

## 2020-04-30 NOTE — HISTORY OF PRESENT ILLNESS
[Home] : at home, [unfilled] , at the time of the visit. [Other Location: e.g. Home (Enter Location, City,State)___] : at [unfilled] [Patient] : the patient [FreeTextEntry1] : Mr. Ashton is a pleasant 66 year-old gentleman with a past medical history significant for HTN, HLD, DM II, CAD s/p CABG and atrial fibrillation/flutter.  Of note, he underwent renal transplant in the fall of 2019 with a complicated post-operative course.  He presents for follow-up today after reports of worsening fatigue over the last 1-2 months.   He is unsure if this is due quarantine and the associated decrease in activity or due to his arrhythmia.   He denies any associated CP, palpitations, SOB, SORIA dizziness, near syncope or syncope.  His exercise tolerance, he believes, is stable.  He has been maintained on Eliquis 2.5mg twice daily for stroke prophylaxis.

## 2020-04-30 NOTE — PHYSICAL EXAM
[] : no respiratory distress [Respiration, Rhythm And Depth] : normal respiratory rhythm and effort [Heart Rate And Rhythm] : heart rate and rhythm were normal [Heart Sounds] : normal S1 and S2 [Murmurs] : no murmurs present [Abdomen Mass (___ Cm)] : no abdominal mass palpated [Oriented To Time, Place, And Person] : oriented to person, place, and time

## 2020-04-30 NOTE — REVIEW OF SYSTEMS
[Recent Weight Gain (___ Lbs)] : recent [unfilled] ~Ulb weight gain [Feeling Fatigued] : feeling fatigued [see HPI] : see HPI [Negative] : Heme/Lymph

## 2020-05-07 DIAGNOSIS — N18.9 CHRONIC KIDNEY DISEASE, UNSPECIFIED: ICD-10-CM

## 2020-05-19 ENCOUNTER — APPOINTMENT (OUTPATIENT)
Dept: HEART AND VASCULAR | Facility: CLINIC | Age: 70
End: 2020-05-19

## 2020-06-09 ENCOUNTER — APPOINTMENT (OUTPATIENT)
Dept: HEART AND VASCULAR | Facility: CLINIC | Age: 70
End: 2020-06-09
Payer: MEDICARE

## 2020-06-09 ENCOUNTER — NON-APPOINTMENT (OUTPATIENT)
Age: 70
End: 2020-06-09

## 2020-06-09 VITALS
OXYGEN SATURATION: 97 % | TEMPERATURE: 98.4 F | HEART RATE: 93 BPM | SYSTOLIC BLOOD PRESSURE: 137 MMHG | DIASTOLIC BLOOD PRESSURE: 67 MMHG

## 2020-06-09 PROCEDURE — 93000 ELECTROCARDIOGRAM COMPLETE: CPT

## 2020-06-09 PROCEDURE — 99213 OFFICE O/P EST LOW 20 MIN: CPT | Mod: 25

## 2020-06-09 RX ORDER — DOXYCYCLINE HYCLATE 100 MG/1
100 CAPSULE ORAL DAILY
Qty: 14 | Refills: 0 | Status: DISCONTINUED | COMMUNITY
Start: 2020-04-21 | End: 2020-06-09

## 2020-06-09 RX ORDER — APIXABAN 2.5 MG/1
2.5 TABLET, FILM COATED ORAL
Qty: 180 | Refills: 2 | Status: DISCONTINUED | COMMUNITY
End: 2020-06-09

## 2020-06-11 ENCOUNTER — APPOINTMENT (OUTPATIENT)
Dept: NEPHROLOGY | Facility: CLINIC | Age: 70
End: 2020-06-11
Payer: MEDICARE

## 2020-06-11 PROCEDURE — 99214 OFFICE O/P EST MOD 30 MIN: CPT | Mod: 95

## 2020-06-11 NOTE — ASSESSMENT
[FreeTextEntry1] : Plan:\par 1) HTN: The patient reports recent BP readings of 137/60, which is borderline elevated.  Will reassess current management at next office visit.\par 2) Transplant medication: patient to continue on Prograf and Valcyte.  Reinforced with patient COVID precautions as noted below.\par 3) Atrial flutter: Per Dr. Schuster, Eliquis increased to 5mg BID. WIll reassess at next office visit.\par 4) Hyperglycemia: Patient HbA1C of 9.5 and blood sugar of 220-230 is elevated.  Will reassess HbA1C with labs at next visit.\par 5) COVID precaution: Discussed with patient that he is at very high risk for COVID infection and complications due to his status as a transplant recipient and immunocompromised state. Strongly stressed that patient should not be attending [facility] and should be avoiding crowds, along with associated precautions for COVID avoidance generally. Discussed that patient should not have visitors at his house who are not proven not be immune to COVID infection.  Will test patient for COVID antibodies in office at next visit.\par \par Will reassess in 1 week in office.

## 2020-06-11 NOTE — HISTORY OF PRESENT ILLNESS
[Home] : at home, [unfilled] , at the time of the visit. [Other Location: e.g. Home (Enter Location, City,State)___] : at [unfilled] [Verbal consent obtained from patient] : the patient, [unfilled] [Adding Medication ___] : adding [unfilled] [___ Week(s) Ago] : [unfilled] week(s) ago [Changing Medication Dose ___] : changing the dose of [unfilled] [Primary] : primary hypertension [CAD] : coronary artery disease [Diabetes Mellitus] : diabetes mellitus [Hyperlipidemia] : hyperlipidemia [None] : The patient is currently asymptomatic [de-identified] : PTLD [de-identified] : s/p CABG and AFib, h/o renal transplant, monoclonal B cell abnormality [FreeTextEntry1] : The patient reports he has lost 10 lbs since last visit voluntarily with increased exercise and dieting. He feels less lethargic recently along with his weight loss. He reports recent BP readings of 137/60.  However,he believes his blood sugar levels have worsened, and are now in the 220-230 range.  He also denies having had any symptoms suggestive of COVID. His sleep study with Dr. Huffman is still ongoing.\par Patient has reportedly begun taking alprazolam as needed for sleep. Patient was seen 6/9/20 by Dr. Schuster for atrial flutter, and had Eliqius increased to 5mg BID from 2.5mg.\par \par Labs from 5/5/20 reveal: WBC 7.0, HGB 11.2, HCT 34.5, glucose 234, sodium 133, CO2 21.4, eGFR >60, HbA1C 9.5\par \par Current medications: Eliquis 5mg BID, Ambien 5mg prn, amlodipine 10mg QD, Protonix 20mg QD, Crestor 10mg QD, Prograf 2mg BID, isosorbide 30mg QD, alprazolam PRN QHS, Valcyte.

## 2020-06-11 NOTE — ADDENDUM
[FreeTextEntry1] : All medical record entries made by the Scribe were at my, Dr. Kofi Leyva, direction and personally dictated by me on 06/11/2020. I have reviewed the chart and agree that the record accurately reflects my personal performance of the history, physical exam, assessment and plan. I have also personally directed, reviewed, and agreed with the chart.

## 2020-06-11 NOTE — PHYSICAL EXAM
[General Appearance - Alert] : alert [General Appearance - In No Acute Distress] : in no acute distress [Extraocular Movements] : extraocular movements were intact [Outer Ear] : the ears and nose were normal in appearance [Neck Appearance] : the appearance of the neck was normal [Hearing Threshold Finger Rub Not Leon] : hearing was normal [Examination Of The Oral Cavity] : the lips and gums were normal [Impaired Insight] : insight and judgment were intact [Affect] : the affect was normal [Oriented To Time, Place, And Person] : oriented to person, place, and time [No Focal Deficits] : no focal deficits [FreeTextEntry1] : No evidence of abnormal or labored breathing

## 2020-06-11 NOTE — END OF VISIT
[Time Spent: ___ minutes] : I have spent [unfilled] minutes of time on the encounter. [FreeTextEntry3] : Documented by Sandoavl Chaudhari acting as a scribe for Dr. Kofi Leyva on 06/11/2020.

## 2020-06-17 NOTE — DISCUSSION/SUMMARY
[FreeTextEntry1] : 68 y/o M with HTN, HLD, DM II, CAD s/p CABG, renal transplant, and persistent atrial fibrillation and flutter.  Since the last visit he has been sleeping better and he reports having more energy.  \par - Lab from Dr. Ordaz in May showed Cr 1. Plan to increase Eliquis to 5 mg BID for his age and weight.  Plan to repeat CBC / BMP in 1 month.  \par - Since he is asymptomatic with AFIB which is well rate controlled, we would aim for rate control strategy. \par - Pending Sleep Apnea test result.  \par - Continue weight loss. Aim for 5 lbs weight loss every 6 months. Explained to pt that controlling risk factors is a way to manage his AFIB. Encourage daily walking. \par - F/U in 1 month via Telehealth.

## 2020-06-17 NOTE — HISTORY OF PRESENT ILLNESS
[FreeTextEntry1] : 68 y/o M with a past medical history significant for HTN, HLD, DM II, CAD s/p CABG and atrial fibrillation/flutter.  Of note, he underwent renal transplant in the fall of 2019 with a complicated post-operative course. He is on Eliquis 2.5 mg BID.  \par Feels better since he was given Xanax to help him sleep better at night. He is in the process of getting Sleep Study at [facility].  \par No palpitations, CP, dizziness, near-syncope, or syncope. \par

## 2020-08-13 ENCOUNTER — NON-APPOINTMENT (OUTPATIENT)
Age: 70
End: 2020-08-13

## 2020-08-13 ENCOUNTER — APPOINTMENT (OUTPATIENT)
Dept: HEART AND VASCULAR | Facility: CLINIC | Age: 70
End: 2020-08-13
Payer: MEDICARE

## 2020-08-13 VITALS
BODY MASS INDEX: 37.82 KG/M2 | DIASTOLIC BLOOD PRESSURE: 78 MMHG | HEIGHT: 65 IN | HEART RATE: 72 BPM | WEIGHT: 227 LBS | SYSTOLIC BLOOD PRESSURE: 150 MMHG

## 2020-08-13 VITALS
WEIGHT: 218 LBS | HEART RATE: 74 BPM | DIASTOLIC BLOOD PRESSURE: 70 MMHG | BODY MASS INDEX: 36.32 KG/M2 | HEIGHT: 65 IN | SYSTOLIC BLOOD PRESSURE: 130 MMHG

## 2020-08-13 VITALS — TEMPERATURE: 97 F

## 2020-08-13 PROCEDURE — 99214 OFFICE O/P EST MOD 30 MIN: CPT | Mod: 25

## 2020-08-13 PROCEDURE — 93970 EXTREMITY STUDY: CPT

## 2020-08-13 PROCEDURE — 93000 ELECTROCARDIOGRAM COMPLETE: CPT

## 2020-08-14 ENCOUNTER — APPOINTMENT (OUTPATIENT)
Age: 70
End: 2020-08-14
Payer: MEDICARE

## 2020-08-14 DIAGNOSIS — Z00.00 ENCOUNTER FOR GENERAL ADULT MEDICAL EXAMINATION W/OUT ABNORMAL FINDINGS: ICD-10-CM

## 2020-08-14 DIAGNOSIS — K21.0 GASTRO-ESOPHAGEAL REFLUX DISEASE WITH ESOPHAGITIS: ICD-10-CM

## 2020-08-14 LAB
CHOLEST SERPL-MCNC: 100 MG/DL
CHOLEST/HDLC SERPL: 2.6 RATIO
HDLC SERPL-MCNC: 39 MG/DL
LDLC SERPL CALC-MCNC: 45 MG/DL
TRIGL SERPL-MCNC: 83 MG/DL
TSH SERPL-ACNC: 2.41 UIU/ML

## 2020-08-14 PROCEDURE — 99214 OFFICE O/P EST MOD 30 MIN: CPT | Mod: 95

## 2020-08-14 RX ORDER — POTASSIUM CHLORIDE 750 MG/1
10 TABLET, FILM COATED, EXTENDED RELEASE ORAL
Qty: 30 | Refills: 1 | Status: COMPLETED | COMMUNITY
Start: 2020-08-14 | End: 2020-08-14

## 2020-08-16 NOTE — ASSESSMENT
[FreeTextEntry1] : 69 y.o M with PMH of CKD (s/p LDKT), DM, HTN, kidney stones, ? monoclonal gammopathy diagnosed with BE in 2016 (without dysplasia), here for follow up  \par \par #BE\par -EGD 6/17 demonstrated BE without dysplasia\par -Plan for repeat EGD for surveillance in October 2021 \par -continue PPI 20mg daily, consider increase PPI to 40mg if symptoms worsen \par - weight loss/ possible nutritionist referral as weight gain is likely contributing to symptoms\par \par #HCM\par -2 TA's repeat colonoscopy in 2021 \par \par f/u 1 month\par \par Luma Haddad, NP

## 2020-08-16 NOTE — DISCUSSION/SUMMARY
[FreeTextEntry1] : 69 year old male with PMHX of CAD sp CABG, AFIB / Flutter, DM II,  HTN and PVD here for follow up.\par \par CAD sp CABG: Currently no exertional symptoms. EKG today AFib, rate controlled at 72 bpm with TWI throughout similar to history. Will stop IMDUR. Get stress echo at next visit. \par AFIB / FLUTTER: Currently rate controlled. He reports no symptoms and tolerating the Eliquis 5mg BID well. Continue to follow up with Dr Schuster.\par DMII: Currently not under control. Advised decrease in sugar and carbohydrate intake\par HLD: Currently stable at LDL 45, HDL 39. Goal LDL < 70, HDL >40. Continue with Crestor 10 and increase exercise\par PVD: Swelling has worsened. Venous US reveals severe bilateral venous stasis. Patient currently not interested in any surgical interventions. Will do conservative treatment; compression stockings, low sodium diet and elevate when at rest. Will start Lasix 20mg MWF. Plan of initiating diuretic discussed w Dr Leyva. Patient will return in 2 weeks to reassess blood work. \par \par Stress echo in 3 months\par  I have discussed the case with PUSHPA Gandhi. I have personally performed a history, physical exam, and my own medical decision making. I have reviewed the note and agree with the findings and plan with the following additions: Bilateral GSC reflux.	\par

## 2020-08-16 NOTE — HISTORY OF PRESENT ILLNESS
[FreeTextEntry1] : 69 year old male with PMHX of CAD sp CABG, AFIB / Flutter, DM II,  HTN and PVD here for follow up. He has since followed up with Dr Schuster and has increased Eliquis to 5mg BID with out complications. He remains to deny any irregular heart beats, dyspnea, dizziness or episodes of LOC. \par \par He remains to have epigastric pain and sensation of food is stuck after eating. He denies any nausea or vomiting. He is due to have a telehealth visit with GI, Dr North tomorrow. \par \par He continues to be primarily sedentary but walks out of his house daily, usually to his car and back. He does however report going hiking with his children last week without any problem. He denies any exertional chest pain, dyspnea, palpitations, dizziness or claudications.\par \par No changes in leg swelling. States he noticed it after gaining all his weight and being more sedentary. Denies any pain, redness, ulcers, orthopnea but usually sleeps up or on his side if he just ate to avoid epigastric pains. He has started following up with a nutritionist and is focused on his weight loss. \par \par Non fasting labs sent today.

## 2020-08-16 NOTE — PHYSICAL EXAM
[General Appearance - Well Developed] : well developed [Normal Appearance] : normal appearance [Well Groomed] : well groomed [General Appearance - Well Nourished] : well nourished [Eyelids - No Xanthelasma] : the eyelids demonstrated no xanthelasmas [Normal Conjunctiva] : the conjunctiva exhibited no abnormalities [Auscultation Breath Sounds / Voice Sounds] : lungs were clear to auscultation bilaterally [Respiration, Rhythm And Depth] : normal respiratory rhythm and effort [Heart Rate And Rhythm] : heart rate and rhythm were normal [Heart Sounds] : normal S1 and S2 [Murmurs] : no murmurs present [Arterial Pulses Normal] : the arterial pulses were normal [Veins - Varicosity Changes] : no varicosital changes were noted in the lower extremities [Abdomen Soft] : soft [Abdomen Tenderness] : non-tender [Abnormal Walk] : normal gait [Skin Color & Pigmentation] : normal skin color and pigmentation [Gait - Sufficient For Exercise Testing] : the gait was sufficient for exercise testing [No Venous Stasis] : no venous stasis [] : no rash [Skin Lesions] : no skin lesions [No Skin Ulcers] : no skin ulcer [Oriented To Time, Place, And Person] : oriented to person, place, and time [Affect] : the affect was normal [Impaired Insight] : insight and judgment were intact [Mood] : the mood was normal [FreeTextEntry1] : Bilateral edema 1-2 +, Normal pulses

## 2020-08-16 NOTE — HISTORY OF PRESENT ILLNESS
[Home] : at home, [unfilled] , at the time of the visit. [Other Location: e.g. Home (Enter Location, City,State)___] : at [unfilled] [Verbal consent obtained from patient] : the patient, [unfilled] [de-identified] : 69m WITH PMHx of CKD (s/p KT) MGUS, BE and ALEXANDER referred by Dr. Ordaz for evaluation of BE surveillance. S/p LDKT. Here for follow up. \par \par Pt requested TELEMEDICINE appt today. \par \par 8/14/20\par - Patient reports that he has hiatal hernia and recently has been feeling like food stays stuck sometimes after eating and has to take a deep breath in order to get relief. has gained weight since COVID which he believes is why he is having symptoms.\par - no heartburn or regurgitation symptoms\par - taking Pantoprazole 20 mg daily \par -last colonoscopy 2016 and last EGD 2019 \par - pt reports he had asymptomatic COVID\par \par Previous history:\par BE hx:\par -7/16: 3 cm tongue of BE and small nodule \par **PATH:  c/w BE without dysplasia\par \par VCE 10/16\par -No evidence of GI bleed\par Previous Hx:\par \par 65 y.o M with PMH of CKD (Cr 3), DM, HTN, kidney stones, ? 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 No current GI complaints. No melena, rectal bleeding.\par Currently pt is receiving IV iron infusions.\par Bone marrow Bx: inconclusive\par \par EGD (2 yrs ago): hiatal hernia\par Colonoscopy (2 yrs ago): ? small polyp\par \par No family h/o CRC.

## 2020-08-16 NOTE — REVIEW OF SYSTEMS
[Lower Ext Edema] : lower extremity edema [see HPI] : see HPI [Fever] : no fever [Shortness Of Breath] : no shortness of breath [Chills] : no chills [Dyspnea on exertion] : not dyspnea during exertion [Chest  Pressure] : no chest pressure [Chest Pain] : no chest pain [Leg Claudication] : no intermittent leg claudication [Palpitations] : no palpitations [Abdominal Pain] : no abdominal pain [Cough] : no cough [Nausea] : no nausea [Vomiting] : no vomiting [Heartburn] : no heartburn [Change in Appetite] : no change in appetite [Dysphagia] : no dysphagia [Skin: A Rash] : no rash: [Change In Color Of Skin] : change in skin color [Dizziness] : no dizziness [Easy Bleeding] : no tendency for easy bleeding [Easy Bruising] : no tendency for easy bruising

## 2020-08-31 ENCOUNTER — RX RENEWAL (OUTPATIENT)
Age: 70
End: 2020-08-31

## 2020-09-01 ENCOUNTER — RX RENEWAL (OUTPATIENT)
Age: 70
End: 2020-09-01

## 2020-10-15 NOTE — ED ADULT NURSE NOTE - NSSEPSISCRITERIAMET_ED_A_ED
Je / Cardiovascular Progress Note:    Patient: Yadi Oneil Date: 10/15/2020   : 1935 Primary Service: Sreekanth Mayer MD   85 year old female Outpatient Cardiologist: Je     Summary of Our Care On This Date:    Cardiovascular-Related Diagnoses:  1. S/P CABG x 2    2. Atherosclerosis of native coronary artery of native heart without angina pectoris    3. Mixed hyperlipidemia    4. Essential hypertension    5. Irritable bowel syndrome, unspecified type         Medical Decision-Making Today:  Discussed with Dr. Mayer. Very grateful for heroic surgery. Recommendations at bedside.     Hemodynamics stable. Agree with discontinue swan  Defer decision re chest tubes to Dr. Mayer   Weaning  oxygen as tolerates.   HTN:  Better after bb  Acute post op blood loss anemia:  Transfused, please discharge on FeS04    _________________________________________________________________    Documentation:    Chief Complaint/Main CV (cardiovascular) Problems Today:      All other physician and consultant notes since our last note have been reviewed: yes    Scheduled:   • hydrALAZINE  10 mg Oral Once   • furosemide       • melatonin  3 mg Oral Nightly   • aspirin  81 mg Oral Daily   • atorvastatin  80 mg Oral Nightly   • sodium chloride (PF)  2 mL Intracatheter 2 times per day   • insulin lispro   Subcutaneous TID AC   • metoPROLOL tartrate  12.5 mg Oral 2 times per day   • thyroid  60 mg Oral Daily   • Potassium Standard Replacement Protocol   Does not apply See Admin Instructions   • Magnesium Standard Replacement Protocol   Does not apply See Admin Instructions   • acetaminophen  650 mg Oral 4 times per day   • famotidine  20 mg Intravenous Daily   • docusate sodium-sennosides  2 tablet Oral Daily   • bisacodyl  10 mg Rectal Once   • [START ON 10/16/2020] sodium biphosphate  1 enema Rectal Once   • [START ON 10/17/2020] polyethylene glycol  17 g Oral BID   • [START ON 10/16/2020] influenza virus  quadrivalent vaccine inactivated injection  0.5 mL Intramuscular Once   • [START ON 10/16/2020] pneumococcal 23-valent vaccine  0.5 mL Intramuscular Once        Weight: 79.4 kg  Wt Readings from Last 4 Encounters:   10/15/20 82.6 kg   10/02/20 79.9 kg   09/14/20 79.7 kg   09/04/20 79.5 kg       I/O last 3 completed shifts:  In: 300 [Blood:300]  Out: 1990 [Urine:1820; Chest Tube:170]    Intake/Output Summary (Last 24 hours) at 10/15/2020 0800  Last data filed at 10/15/2020 0430  Gross per 24 hour   Intake --   Output 1925 ml   Net -1925 ml       Blood pressure 144/75, pulse 110, temperature 97.7 °F (36.5 °C),, SpO2 100 %.      General appearance: No distress,  Skin: Warm and dry  Eyes: Anicteric   JVP  See CVP reading =  Chest configuration: Normal bandages clear  Lungs: Crackles laterally   CV: PMI not palpated due to bandages.RRR, gallop, Normal S1/physiologically split S2 urmurs: Systolic ejection soft,   Abdomen: Soft, non-tender  Extremities: nwarm perfused no cyanosis peripheral edema: __  Distal pulses: See prior   No acute joint inflammation.  Psych: sedated    TELE/ECG: NSR    Recent Labs   Lab 10/15/20  0517 10/14/20  1421 10/14/20  0401 10/13/20  1046   WBC  --   --  8.0 7.1   HCT  --  25.9* 20.9* 23.8*   HGB  --  8.0* 6.5* 7.4*   PLT  --   --  93* 102*   INR  --   --  1.1 1.2   PTT  --   --   --  29   SODIUM 140  --   --   --    POTASSIUM 3.6  --  4.1 5.1   CHLORIDE 106  --   --   --    CO2 28  --   --   --    GLUCOSE 144*  --   --   --    BUN 18  --   --   --    CREATININE 0.90  --   --   --         Best Practice Box       See Top of Note for Today's Diagnoses and Decision Making    Jack Wooten MD [facility]  8:00 AM           Abnormal VS & WBC/Abnormal Lactate

## 2020-11-24 ENCOUNTER — RX RENEWAL (OUTPATIENT)
Age: 70
End: 2020-11-24

## 2020-12-02 ENCOUNTER — LABORATORY RESULT (OUTPATIENT)
Age: 70
End: 2020-12-02

## 2020-12-02 ENCOUNTER — APPOINTMENT (OUTPATIENT)
Dept: NEPHROLOGY | Facility: CLINIC | Age: 70
End: 2020-12-02
Payer: MEDICARE

## 2020-12-02 VITALS — HEART RATE: 98 BPM | OXYGEN SATURATION: 97 % | TEMPERATURE: 97.5 F

## 2020-12-02 VITALS — SYSTOLIC BLOOD PRESSURE: 120 MMHG | HEART RATE: 72 BPM | DIASTOLIC BLOOD PRESSURE: 70 MMHG

## 2020-12-02 DIAGNOSIS — R16.1 SPLENOMEGALY, NOT ELSEWHERE CLASSIFIED: ICD-10-CM

## 2020-12-02 PROCEDURE — 36415 COLL VENOUS BLD VENIPUNCTURE: CPT

## 2020-12-02 PROCEDURE — 99214 OFFICE O/P EST MOD 30 MIN: CPT | Mod: 25

## 2020-12-02 NOTE — END OF VISIT
[Time Spent: ___ minutes] : I have spent [unfilled] minutes of time on the encounter. [>50% of the face to face encounter time was spent on counseling and/or coordination of care for ___] : Greater than 50% of the face to face encounter time was spent on counseling and/or coordination of care for [unfilled] [FreeTextEntry3] : Documented by Sandoval Chaudhari acting as a scribe for Dr. Kofi Leyva on 12/02/2020.

## 2020-12-02 NOTE — ADDENDUM
[FreeTextEntry1] : All medical record entries made by the Scribe were at my, Dr. Kofi Leyva, direction and personally dictated by me on 12/02/2020. I have reviewed the chart and agree that the record accurately reflects my personal performance of the history, physical exam, assessment and plan. I have also personally directed, reviewed, and agreed with the chart.

## 2020-12-02 NOTE — PHYSICAL EXAM
[General Appearance - Alert] : alert [General Appearance - In No Acute Distress] : in no acute distress [Sclera] : the sclera and conjunctiva were normal [PERRL With Normal Accommodation] : pupils were equal in size, round, and reactive to light [Extraocular Movements] : extraocular movements were intact [Outer Ear] : the ears and nose were normal in appearance [Examination Of The Oral Cavity] : the lips and gums were normal [Neck Appearance] : the appearance of the neck was normal [Neck Cervical Mass (___cm)] : no neck mass was observed [Jugular Venous Distention Increased] : there was no jugular-venous distention [Thyroid Diffuse Enlargement] : the thyroid was not enlarged [Thyroid Nodule] : there were no palpable thyroid nodules [Auscultation Breath Sounds / Voice Sounds] : lungs were clear to auscultation bilaterally [Heart Rate And Rhythm] : heart rate was normal and rhythm regular [Heart Sounds] : normal S1 and S2 [Heart Sounds Gallop] : no gallops [Murmurs] : no murmurs [Heart Sounds Pericardial Friction Rub] : no pericardial rub [Bowel Sounds] : normal bowel sounds [Abdomen Soft] : soft [Abdomen Tenderness] : non-tender [Abdomen Mass (___ Cm)] : no abdominal mass palpated [No CVA Tenderness] : no ~M costovertebral angle tenderness [No Spinal Tenderness] : no spinal tenderness [Abnormal Walk] : normal gait [Involuntary Movements] : no involuntary movements were seen [Musculoskeletal - Swelling] : no joint swelling seen [Motor Tone] : muscle strength and tone were normal [Skin Color & Pigmentation] : normal skin color and pigmentation [Skin Turgor] : normal skin turgor [] : no rash [No Focal Deficits] : no focal deficits [Oriented To Time, Place, And Person] : oriented to person, place, and time [Impaired Insight] : insight and judgment were intact [Affect] : the affect was normal [FreeTextEntry1] : Trace bilateral LE edema

## 2020-12-02 NOTE — HISTORY OF PRESENT ILLNESS
[FreeTextEntry1] : The patient is a 70 year old male presenting today for follow-up evaluation of HTN, CAD, DM, HLD, s/p CABG, AFib, h/o renal transplant, and monoclonal B cell abnormality.\par \par The patient is feeling generally well today, and states he is continuing to follow recommended COVID avoidance protocols. However, he notes he has struggled with his weight recently; he states is followed by a nutritionist in [facility]. He was started on furosemide by Dr. Encinas in cardiology since our last visit 6 months ago, though patient admits to infrequently taking this medication as his LE edema has reportedly improved. He denies any CP, SOB, dysuria. The patient also reports congestion with phlegm in his throat that onsets intermittently; he endorses post-nasal drip.\par \par Labs from 10/27/20 reveal: WBC 9.0, HGB 10.5, HCT 32.4, , glucose 210, sodium 132, potassium 4.7, BUN 24, creatinine 1.04, eGFR >60, ALK phos 135, AST 15, IgG Lambda monoclonal band\par \par Current medications: Eliquis 5mg BID, furosemide 20mg MWF, Ambien 5mg prn, amlodipine 10mg QD, Protonix 20mg QD, Crestor 10mg QD, Prograf 2mg QAM+1mg QPM, isosorbide 30mg QD, alprazolam PRN QHS, Valcyte, Humalog, tamsulosin 0.4mg QPM. Patient is reportedly taking Trulicity infrequently due to perceived lack of effectiveness.

## 2020-12-02 NOTE — ASSESSMENT
[FreeTextEntry1] : Plan:\par 1) HTN: BP excellent today on current regimen. Will therefore not adjust patient's antihypertensive medications at this time but will reassess pressure and regimen at next evaluation. \par 2) Fluid overload: patient determined to be with trace bilateral LE edema today through physical exam. We will maintain current approach to fluid volume management today but will continue to monitor at future evaluations.\par 3) Transplant medications: patient to continue on Prograf and Valcyte.\par 4) H/o atrial flutter: EKG taken 8/13 with Dr. Encinas reviewed, which revealed nonspecific STT changes and atrial fibrillation. Advised patient to f/u with Dr. Schuster in cardiology for rhythm evaluation.\par 5) Hyperglycemia: Patient's most recent HbA1C of 9.5 and blood sugar of 210 is elevated. Will reassess HbA1C with labs today. Recommended that patient discuss Trulicity regimen with endocrinology.\par 6) Advised patient to continue COVID precautions throughout the duration of the COVID pandemic.\par 7) The patient reports he has already received annual influenza vaccine.\par \par Changes to medications: None\par \par Labs were drawn and patient will return in 2 months for a follow-up appointment.

## 2020-12-03 LAB
25(OH)D3 SERPL-MCNC: 18.2 NG/ML
ALBUMIN SERPL ELPH-MCNC: 4.6 G/DL
ALP BLD-CCNC: 138 U/L
ALT SERPL-CCNC: 14 U/L
ANION GAP SERPL CALC-SCNC: 11 MMOL/L
APPEARANCE: CLEAR
AST SERPL-CCNC: 19 U/L
BACTERIA: NEGATIVE
BILIRUB SERPL-MCNC: 0.4 MG/DL
BILIRUBIN URINE: NEGATIVE
BLOOD URINE: NEGATIVE
BUN SERPL-MCNC: 25 MG/DL
CALCIUM SERPL-MCNC: 8.8 MG/DL
CALCIUM SERPL-MCNC: 8.8 MG/DL
CHLORIDE SERPL-SCNC: 101 MMOL/L
CHOLEST SERPL-MCNC: 91 MG/DL
CO2 SERPL-SCNC: 19 MMOL/L
COLOR: NORMAL
CREAT SERPL-MCNC: 1.08 MG/DL
CREAT SPEC-SCNC: 78 MG/DL
CREAT SPEC-SCNC: 78 MG/DL
CREAT/PROT UR: 0.2 RATIO
CRP SERPL-MCNC: 0.27 MG/DL
CYSTATIN C SERPL-MCNC: 1.61 MG/L
ERYTHROCYTE [SEDIMENTATION RATE] IN BLOOD BY WESTERGREN METHOD: 40 MM/HR
ESTIMATED AVERAGE GLUCOSE: 206 MG/DL
FERRITIN SERPL-MCNC: 124 NG/ML
FOLATE SERPL-MCNC: 17.3 NG/ML
GFR/BSA.PRED SERPLBLD CYS-BASED-ARV: 40 ML/MIN
GLUCOSE QUALITATIVE U: NEGATIVE
GLUCOSE SERPL-MCNC: 104 MG/DL
HBA1C MFR BLD HPLC: 8.8 %
HBV SURFACE AB SER QL: NONREACTIVE
HBV SURFACE AG SER QL: NONREACTIVE
HCV AB SER QL: NONREACTIVE
HCV S/CO RATIO: 0.05 S/CO
HCYS SERPL-MCNC: 15.6 UMOL/L
HDLC SERPL-MCNC: 38 MG/DL
HYALINE CASTS: 0 /LPF
IRON SATN MFR SERPL: 13 %
IRON SERPL-MCNC: 42 UG/DL
KETONES URINE: NEGATIVE
LDLC SERPL CALC-MCNC: 43 MG/DL
LEUKOCYTE ESTERASE URINE: NEGATIVE
MAGNESIUM SERPL-MCNC: 2.1 MG/DL
MICROALBUMIN 24H UR DL<=1MG/L-MCNC: 3.7 MG/DL
MICROALBUMIN/CREAT 24H UR-RTO: 47 MG/G
MICROSCOPIC-UA: NORMAL
NITRITE URINE: NEGATIVE
NONHDLC SERPL-MCNC: 53 MG/DL
NT-PROBNP SERPL-MCNC: 1163 PG/ML
PARATHYROID HORMONE INTACT: 194 PG/ML
PH URINE: 5.5
PHOSPHATE SERPL-MCNC: 3.1 MG/DL
POTASSIUM SERPL-SCNC: 4.3 MMOL/L
PROT SERPL-MCNC: 7.5 G/DL
PROT UR-MCNC: 19 MG/DL
PROTEIN URINE: NEGATIVE
PSA FREE FLD-MCNC: 55 %
PSA FREE SERPL-MCNC: 0.24 NG/ML
PSA SERPL-MCNC: 0.44 NG/ML
RED BLOOD CELLS URINE: 1 /HPF
RHEUMATOID FACT SER QL: 16 IU/ML
SODIUM SERPL-SCNC: 131 MMOL/L
SPECIFIC GRAVITY URINE: 1.01
SQUAMOUS EPITHELIAL CELLS: 0 /HPF
T3FREE SERPL-MCNC: 2.86 PG/ML
T3RU NFR SERPL: 0.9 TBI
T4 FREE SERPL-MCNC: 1.4 NG/DL
T4 SERPL-MCNC: 7.7 UG/DL
TIBC SERPL-MCNC: 329 UG/DL
TRIGL SERPL-MCNC: 49 MG/DL
TSH SERPL-ACNC: 2.75 UIU/ML
UIBC SERPL-MCNC: 288 UG/DL
URATE SERPL-MCNC: 6.8 MG/DL
UROBILINOGEN URINE: NORMAL
VIT B12 SERPL-MCNC: 667 PG/ML
WHITE BLOOD CELLS URINE: 0 /HPF

## 2020-12-04 LAB
24R-OH-CALCIDIOL SERPL-MCNC: 55.6 PG/ML
ALDOSTERONE SERUM: 17.3 NG/DL
ANA PAT FLD IF-IMP: ABNORMAL
ANA SER IF-ACNC: ABNORMAL
BASOPHILS # BLD AUTO: 0.09 K/UL
BASOPHILS NFR BLD AUTO: 0.9 %
C3 SERPL-MCNC: 112 MG/DL
C4 SERPL-MCNC: 27 MG/DL
DEPRECATED KAPPA LC FREE/LAMBDA SER: 0.67 RATIO
EOSINOPHIL # BLD AUTO: 0 K/UL
EOSINOPHIL NFR BLD AUTO: 0 %
HCT VFR BLD CALC: 37 %
HGB BLD-MCNC: 11.1 G/DL
KAPPA LC CSF-MCNC: 8.01 MG/DL
KAPPA LC SERPL-MCNC: 5.39 MG/DL
KAPPA TOTAL LIGHT CHAIN, URINE: 2.41 MG/DL
KAPPA/LAMBDA TOTAL LIGHT CHAIN RATIO, URINE: 1.8
LAMBDA TOTAL LIGHT CHAIN, URINE: 1.34 MG/DL
LYMPHOCYTES # BLD AUTO: 8.43 K/UL
LYMPHOCYTES NFR BLD AUTO: 85 %
MAN DIFF?: NORMAL
MCHC RBC-ENTMCNC: 27.3 PG
MCHC RBC-ENTMCNC: 30 GM/DL
MCV RBC AUTO: 90.9 FL
MONOCYTES # BLD AUTO: 0.26 K/UL
MONOCYTES NFR BLD AUTO: 2.6 %
MPO AB + PR3 PNL SER: NORMAL
NEUTROPHILS # BLD AUTO: 1.14 K/UL
NEUTROPHILS NFR BLD AUTO: 11.5 %
PLATELET # BLD AUTO: 162 K/UL
RBC # BLD: 4.07 M/UL
RBC # FLD: 15.9 %
RENIN PLASMA: 11.5 PG/ML
THYROGLOB AB SERPL-ACNC: <20 IU/ML
THYROPEROXIDASE AB SERPL IA-ACNC: <10 IU/ML
WBC # FLD AUTO: 9.92 K/UL

## 2020-12-06 LAB
ALBUMIN MFR SERPL ELPH: 56.4 %
ALBUMIN SERPL-MCNC: 4.6 G/DL
ALBUMIN/GLOB SERPL: 1.3 RATIO
ALP BONE SERPL-MCNC: 23 UG/L
ALPHA1 GLOB MFR SERPL ELPH: 4 %
ALPHA1 GLOB SERPL ELPH-MCNC: 0.3 G/DL
ALPHA2 GLOB MFR SERPL ELPH: 9.8 %
ALPHA2 GLOB SERPL ELPH-MCNC: 0.8 G/DL
B-GLOBULIN MFR SERPL ELPH: 9.8 %
B-GLOBULIN SERPL ELPH-MCNC: 0.8 G/DL
DEPRECATED KAPPA LC FREE/LAMBDA SER: 0.67 RATIO
GAMMA GLOB FLD ELPH-MCNC: 1.6 G/DL
GAMMA GLOB MFR SERPL ELPH: 20 %
IGA SER QL IEP: 241 MG/DL
IGG SER QL IEP: 1665 MG/DL
IGM SER QL IEP: 25 MG/DL
INTERPRETATION SERPL IEP-IMP: NORMAL
KAPPA LC CSF-MCNC: 8.01 MG/DL
KAPPA LC SERPL-MCNC: 5.39 MG/DL
M PROTEIN MFR SERPL ELPH: 3.1 %
M PROTEIN SPEC IFE-MCNC: NORMAL
METHYLMALONATE SERPL-SCNC: 224 NMOL/L
MONOCLON BAND OBS SERPL: 0.3 G/DL
PROT SERPL-MCNC: 8.1 G/DL
PROT SERPL-MCNC: 8.1 G/DL

## 2020-12-08 LAB
C1Q IMMUNE COMPLEX: 2.1 UG EQ/ML
C3D IMMUNE COMPLEXES: 6.6 UG EQ/ML
COLLAGEN CTX SERPL-MCNC: 726 PG/ML

## 2020-12-14 ENCOUNTER — APPOINTMENT (OUTPATIENT)
Dept: HEART AND VASCULAR | Facility: CLINIC | Age: 70
End: 2020-12-14
Payer: MEDICARE

## 2020-12-14 ENCOUNTER — APPOINTMENT (OUTPATIENT)
Dept: HEART AND VASCULAR | Facility: CLINIC | Age: 70
End: 2020-12-14

## 2020-12-14 ENCOUNTER — NON-APPOINTMENT (OUTPATIENT)
Age: 70
End: 2020-12-14

## 2020-12-14 VITALS
WEIGHT: 215 LBS | BODY MASS INDEX: 34.55 KG/M2 | OXYGEN SATURATION: 95 % | SYSTOLIC BLOOD PRESSURE: 138 MMHG | HEART RATE: 71 BPM | HEIGHT: 66 IN | DIASTOLIC BLOOD PRESSURE: 78 MMHG

## 2020-12-14 VITALS — TEMPERATURE: 98.1 F

## 2020-12-14 VITALS — DIASTOLIC BLOOD PRESSURE: 72 MMHG | SYSTOLIC BLOOD PRESSURE: 130 MMHG

## 2020-12-14 VITALS — DIASTOLIC BLOOD PRESSURE: 68 MMHG | SYSTOLIC BLOOD PRESSURE: 126 MMHG

## 2020-12-14 DIAGNOSIS — I51.7 CARDIOMEGALY: ICD-10-CM

## 2020-12-14 PROCEDURE — 93000 ELECTROCARDIOGRAM COMPLETE: CPT

## 2020-12-14 PROCEDURE — 99214 OFFICE O/P EST MOD 30 MIN: CPT | Mod: 25

## 2020-12-14 PROCEDURE — 93306 TTE W/DOPPLER COMPLETE: CPT

## 2020-12-14 RX ORDER — APIXABAN 5 MG/1
5 TABLET, FILM COATED ORAL
Qty: 180 | Refills: 3 | Status: COMPLETED | COMMUNITY
Start: 2020-06-09 | End: 2020-12-14

## 2020-12-14 RX ORDER — TAMSULOSIN HCL 0.4 MG
0.4 CAPSULE ORAL
Refills: 0 | Status: COMPLETED | COMMUNITY
End: 2020-12-14

## 2020-12-16 NOTE — PHYSICAL EXAM
[General Appearance - Well Developed] : well developed [Normal Appearance] : normal appearance [Well Groomed] : well groomed [General Appearance - Well Nourished] : well nourished [] : no respiratory distress [Respiration, Rhythm And Depth] : normal respiratory rhythm and effort [Exaggerated Use Of Accessory Muscles For Inspiration] : no accessory muscle use [Heart Rate And Rhythm] : heart rate and rhythm were normal [Heart Sounds] : normal S1 and S2 [Murmurs] : no murmurs present [Bowel Sounds] : normal bowel sounds [Abdomen Soft] : soft [Abdomen Tenderness] : non-tender [Oriented To Time, Place, And Person] : oriented to person, place, and time [Impaired Insight] : insight and judgment were intact [Affect] : the affect was normal [Mood] : the mood was normal [FreeTextEntry1] : Venous stasis with breaks in skin noted bilaterally

## 2020-12-16 NOTE — HISTORY OF PRESENT ILLNESS
[FreeTextEntry1] : 70 year old male with PMHX of CAD sp CABG, AFIB / Flutter ( on Eliquis ), DM II,  HTN and PVD here for follow up and echocardiogram. \par \par Since last visit, he has only restarted Furosemide 20mg averaging 1 to 2 tabs per week. He denies any leg swelling or dyspnea on exertion but is primarily sedentary. He gets out of home daily but at most length from home to his vehicle. He did not notice any difficulty breathing from walking front of our building to today's appointment. \par \par He continues to deny any chest pains, palpitations, dizziness or episodes of LOC. He continues to report epigastric pain and the sensation of food getting stuck only after he eats. He continues to follow up with Dr Hartmann. \par \par He checks his FS routinely but reports various readings ranging from 110's  - 300's.  \par \par He was to get a stress echo at today's visit however he recently fell and does not feel comfortable ambulating quickly. Mechanical fall with primary impact of left upper arm about 2 weeks ago. No preceding palpations, dizziness, chest pains or shortness of breath. Had some significant bruising however has greatly resolved.

## 2020-12-16 NOTE — REVIEW OF SYSTEMS
[see HPI] : see HPI [Abdominal Pain] : abdominal pain [Fever] : no fever [Chills] : no chills [Shortness Of Breath] : no shortness of breath [Dyspnea on exertion] : not dyspnea during exertion [Chest  Pressure] : no chest pressure [Chest Pain] : no chest pain [Lower Ext Edema] : no extremity edema [Leg Claudication] : no intermittent leg claudication [Palpitations] : no palpitations [Cough] : no cough [Nausea] : no nausea [Vomiting] : no vomiting [Heartburn] : no heartburn [Change in Appetite] : no change in appetite [Dysphagia] : no dysphagia [Skin: A Rash] : no rash: [Skin Lesions] : no skin lesions [Easy Bleeding] : no tendency for easy bleeding [Easy Bruising] : no tendency for easy bruising

## 2020-12-23 PROBLEM — Z87.09 HISTORY OF ACUTE SINUSITIS: Status: RESOLVED | Noted: 2018-12-19 | Resolved: 2020-12-23

## 2020-12-31 ENCOUNTER — APPOINTMENT (OUTPATIENT)
Dept: OTOLARYNGOLOGY | Facility: CLINIC | Age: 70
End: 2020-12-31
Payer: MEDICARE

## 2020-12-31 VITALS — TEMPERATURE: 97.2 F

## 2020-12-31 DIAGNOSIS — Z87.09 PERSONAL HISTORY OF OTHER DISEASES OF THE RESPIRATORY SYSTEM: ICD-10-CM

## 2020-12-31 DIAGNOSIS — J30.9 ALLERGIC RHINITIS, UNSPECIFIED: ICD-10-CM

## 2020-12-31 DIAGNOSIS — J01.90 ACUTE SINUSITIS, UNSPECIFIED: ICD-10-CM

## 2020-12-31 PROCEDURE — 99214 OFFICE O/P EST MOD 30 MIN: CPT | Mod: 25

## 2020-12-31 PROCEDURE — 31231 NASAL ENDOSCOPY DX: CPT

## 2020-12-31 NOTE — PROCEDURE
[Recalcitrant Symptoms] : recalcitrant symptoms  [None] : none [Congested] : congested [Normal] : the paranasal sinuses had no abnormalities

## 2020-12-31 NOTE — PHYSICAL EXAM
[Nasal Endoscopy Performed] : nasal endoscopy was performed, see procedure section for findings [Midline] : trachea located in midline position [Normal] : external appearance is normal [de-identified] : edema  [de-identified] : thickened

## 2020-12-31 NOTE — HISTORY OF PRESENT ILLNESS
[FreeTextEntry1] : Patient returns today following up nasal congestion.Denies any post nasal drip. Hard to breathe out of nostrils, nose is stuffy. he was given course of Doxycycline at last visit. RInsing of the nose has not been helpful. Pt has started on flonase. COVID ab positive.

## 2021-02-08 ENCOUNTER — APPOINTMENT (OUTPATIENT)
Age: 71
End: 2021-02-08
Payer: MEDICARE

## 2021-02-08 VITALS
HEIGHT: 66 IN | TEMPERATURE: 97.3 F | OXYGEN SATURATION: 97 % | WEIGHT: 215 LBS | RESPIRATION RATE: 15 BRPM | DIASTOLIC BLOOD PRESSURE: 70 MMHG | SYSTOLIC BLOOD PRESSURE: 125 MMHG | BODY MASS INDEX: 34.55 KG/M2 | HEART RATE: 93 BPM

## 2021-02-08 DIAGNOSIS — R63.5 ABNORMAL WEIGHT GAIN: ICD-10-CM

## 2021-02-08 DIAGNOSIS — Z86.010 PERSONAL HISTORY OF COLONIC POLYPS: ICD-10-CM

## 2021-02-08 DIAGNOSIS — K22.70 BARRETT'S ESOPHAGUS W/OUT DYSPLASIA: ICD-10-CM

## 2021-02-08 DIAGNOSIS — K86.2 CYST OF PANCREAS: ICD-10-CM

## 2021-02-08 PROCEDURE — 99213 OFFICE O/P EST LOW 20 MIN: CPT

## 2021-02-10 PROBLEM — R63.5 WEIGHT GAIN: Status: ACTIVE | Noted: 2021-02-10

## 2021-02-10 PROBLEM — Z86.010 HISTORY OF COLON POLYPS: Status: ACTIVE | Noted: 2021-02-10

## 2021-02-10 NOTE — HISTORY OF PRESENT ILLNESS
[Home] : at home, [unfilled] , at the time of the visit. [Other Location: e.g. Home (Enter Location, City,State)___] : at [unfilled] [Verbal consent obtained from patient] : the patient, [unfilled] [de-identified] : 70m WITH PMHx of CKD (s/p KT) MGUS, BE and ALEXANDER referred by Dr. Ordaz for evaluation of BE surveillance. S/p LDKT. Here for follow up. \par \par 2/8/21\par - in general feeling very well\par - know has hiatal hernia (2cm) and that is bothering him\par - has gained 25 pounds since COVID \par - no reflux symptoms \par - pantoprazole 20mg\par - feels bloating in epigastric area and like "something is there" \par \par 8/14/20\par - Patient reports that he has hiatal hernia and recently has been feeling like food stays stuck sometimes after eating and has to take a deep breath in order to get relief. has gained weight since COVID which he believes is why he is having symptoms.\par - no heartburn or regurgitation symptoms\par - taking Pantoprazole 20 mg daily \par -last colonoscopy 2016 and last EGD 2019 \par - pt reports he had asymptomatic COVID\par \par Previous history:\par BE hx:\par -7/16: 3 cm tongue of BE and small nodule \par **PATH:  c/w BE without dysplasia\par \par VCE 10/16\par -No evidence of GI bleed\par Previous Hx:\par \par 65 y.o M with PMH of CKD (Cr 3), DM, HTN, kidney stones, ? 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 No current GI complaints. No melena, rectal bleeding.\par Currently pt is receiving IV iron infusions.\par Bone marrow Bx: inconclusive\par \par EGD (2 yrs ago): hiatal hernia\par Colonoscopy (2 yrs ago): ? small polyp\par \par No family h/o CRC.

## 2021-02-10 NOTE — PHYSICAL EXAM
[General Appearance - Alert] : alert [General Appearance - In No Acute Distress] : in no acute distress [Sclera] : the sclera and conjunctiva were normal [General Appearance - Well Nourished] : well nourished [Outer Ear] : the ears and nose were normal in appearance [Neck Appearance] : the appearance of the neck was normal [Respiration, Rhythm And Depth] : normal respiratory rhythm and effort [Heart Rate And Rhythm] : heart rate was normal and rhythm regular [Edema] : there was no peripheral edema [Bowel Sounds] : normal bowel sounds [Abdomen Soft] : soft [Abdomen Tenderness] : non-tender [Abnormal Walk] : normal gait [Skin Color & Pigmentation] : normal skin color and pigmentation [Skin Turgor] : normal skin turgor [] : no rash [No Focal Deficits] : no focal deficits [Oriented To Time, Place, And Person] : oriented to person, place, and time [Impaired Insight] : insight and judgment were intact [Affect] : the affect was normal

## 2021-02-10 NOTE — END OF VISIT
[FreeTextEntry3] : non teaching FTF to review hx BE and weight gain, inactivity and polyp surveillance

## 2021-02-10 NOTE — ASSESSMENT
[FreeTextEntry1] : 70m WITH PMHx of CKD (s/p KT) MGUS, BE and ALEXANDER referred by Dr. Ordaz for evaluation of BE surveillance. S/p LDKT. Here for follow up. \par \par #BE\par -EGD 6/17 demonstrated BE without dysplasia\par -Plan for repeat EGD in next few months \par -continue PPI 20mg daily\par - weight loss/ possible nutritionist referral as weight gain is likely contributing to symptoms\par \par #HCM\par -2 TA's on cscope in 2016, repeat colonoscopy at time of EGD \par - will need to be off Eliquis prior to procedures, obtain cardiac clearance \par \par f/u post procedures \par \par Luma Haddad NP

## 2021-03-03 NOTE — PATIENT PROFILE ADULT - NSPROCHRONICPAIN_GEN_A_NUR
Hospitalist Hospitalist Hospitalist Hospitalist Hospitalist Hospitalist Hospitalist Infectious Disease Physiatry Rehab Medicine Rehab Medicine Rehab Medicine Hospitalist Hospitalist Hospitalist Hospitalist Hospitalist Infectious Disease Physiatry Physiatry Physiatry Physiatry Rehab Medicine Rehab Medicine Rehab Medicine Rehab Medicine Rehab Medicine Hospitalist Hospitalist Hospitalist Hospitalist Hospitalist Hospitalist Hospitalist Hospitalist Hospitalist Hospitalist Hospitalist Hospitalist Hospitalist Hospitalist Hospitalist Hospitalist Hospitalist Hospitalist Hospitalist Hospitalist Hospitalist Hospitalist Hospitalist Hospitalist Hospitalist Hospitalist Hospitalist Hospitalist Infectious Disease Infectious Disease Infectious Disease Physiatry Physiatry Physiatry Physiatry Physiatry Physiatry Physiatry Physiatry Physiatry Physiatry Rehab Medicine Rehab Medicine Rehab Medicine Rehab Medicine Rehab Medicine Rehab Medicine Rehab Medicine Rehab Medicine Rehab Medicine Hospitalist Hospitalist Hospitalist Hospitalist Hospitalist Hospitalist Hospitalist Infectious Disease Infectious Disease Physiatry Physiatry Physiatry Rehab Medicine Rehab Medicine Rehab Medicine Rehab Medicine Hospitalist Physiatry Physiatry Physiatry Rehab Medicine Rehab Medicine Hospitalist Physiatry Physiatry Physiatry Physiatry no Physiatry

## 2021-03-08 DIAGNOSIS — Z01.812 ENCOUNTER FOR PREPROCEDURAL LABORATORY EXAMINATION: ICD-10-CM

## 2021-03-10 LAB — SARS-COV-2 N GENE NPH QL NAA+PROBE: NOT DETECTED

## 2021-03-11 ENCOUNTER — RESULT REVIEW (OUTPATIENT)
Age: 71
End: 2021-03-11

## 2021-03-11 ENCOUNTER — OUTPATIENT (OUTPATIENT)
Dept: OUTPATIENT SERVICES | Facility: HOSPITAL | Age: 71
LOS: 1 days | Discharge: HOME | End: 2021-03-11
Payer: MEDICARE

## 2021-03-11 ENCOUNTER — APPOINTMENT (OUTPATIENT)
Age: 71
End: 2021-03-11

## 2021-03-11 DIAGNOSIS — Z95.1 PRESENCE OF AORTOCORONARY BYPASS GRAFT: Chronic | ICD-10-CM

## 2021-03-11 DIAGNOSIS — M27.2 INFLAMMATORY CONDITIONS OF JAWS: Chronic | ICD-10-CM

## 2021-03-11 DIAGNOSIS — Z98.890 OTHER SPECIFIED POSTPROCEDURAL STATES: Chronic | ICD-10-CM

## 2021-03-11 PROCEDURE — 88305 TISSUE EXAM BY PATHOLOGIST: CPT | Mod: 26

## 2021-03-11 PROCEDURE — 43239 EGD BIOPSY SINGLE/MULTIPLE: CPT

## 2021-03-11 PROCEDURE — 45380 COLONOSCOPY AND BIOPSY: CPT

## 2021-03-12 PROCEDURE — 45380 COLONOSCOPY AND BIOPSY: CPT

## 2021-03-12 PROCEDURE — 43239 EGD BIOPSY SINGLE/MULTIPLE: CPT

## 2021-03-12 PROCEDURE — 88305 TISSUE EXAM BY PATHOLOGIST: CPT

## 2021-03-16 LAB — SURGICAL PATHOLOGY STUDY: SIGNIFICANT CHANGE UP

## 2021-03-16 RX ORDER — PANTOPRAZOLE 40 MG/1
40 TABLET, DELAYED RELEASE ORAL
Qty: 90 | Refills: 0 | Status: ACTIVE | COMMUNITY
Start: 2017-02-15 | End: 1900-01-01

## 2021-03-16 RX ORDER — ZOLPIDEM TARTRATE 10 MG/1
10 TABLET ORAL
Qty: 30 | Refills: 0 | Status: DISCONTINUED | COMMUNITY
Start: 1900-01-01 | End: 2021-03-16

## 2021-04-14 ENCOUNTER — APPOINTMENT (OUTPATIENT)
Dept: HEART AND VASCULAR | Facility: CLINIC | Age: 71
End: 2021-04-14

## 2021-04-28 ENCOUNTER — RX RENEWAL (OUTPATIENT)
Age: 71
End: 2021-04-28

## 2021-05-10 ENCOUNTER — APPOINTMENT (OUTPATIENT)
Dept: HEART AND VASCULAR | Facility: CLINIC | Age: 71
End: 2021-05-10
Payer: MEDICARE

## 2021-05-10 ENCOUNTER — NON-APPOINTMENT (OUTPATIENT)
Age: 71
End: 2021-05-10

## 2021-05-10 VITALS — SYSTOLIC BLOOD PRESSURE: 120 MMHG | DIASTOLIC BLOOD PRESSURE: 70 MMHG

## 2021-05-10 VITALS
DIASTOLIC BLOOD PRESSURE: 86 MMHG | HEART RATE: 86 BPM | SYSTOLIC BLOOD PRESSURE: 124 MMHG | HEIGHT: 66 IN | BODY MASS INDEX: 34.39 KG/M2 | OXYGEN SATURATION: 94 % | WEIGHT: 214 LBS

## 2021-05-10 PROCEDURE — ZZZZZ: CPT

## 2021-05-10 PROCEDURE — 99214 OFFICE O/P EST MOD 30 MIN: CPT | Mod: 25

## 2021-05-10 PROCEDURE — 93000 ELECTROCARDIOGRAM COMPLETE: CPT

## 2021-05-10 PROCEDURE — 93306 TTE W/DOPPLER COMPLETE: CPT

## 2021-05-10 NOTE — HISTORY OF PRESENT ILLNESS
[FreeTextEntry1] : The patient received 2 Pfizer vaccines.  When he walks after eating he feels something is stuck in his epigastrium and has dyspnea.  Without food he can walk 8 blocks without difficulty.  He has been dizzy when his sugar is 60.  He has been on Imdur in the past.  He sees the podiatrist every few weeks.

## 2021-05-10 NOTE — DISCUSSION/SUMMARY
[FreeTextEntry1] : The patient does moderate exercise without difficulty.  He has a history of coronary artery disease.  He will restart his Imdur.  EKG shows atrial fibrillation and incomplete right bundle branch block and ST segment and T wave abnormalities.  It is unchanged.  The echocardiogram reveals an ejection fraction of 65% without significant valvular disease.  There is moderate left ventricular hypertrophy.  The blood pressure is well controlled.  The patient has a history of elevated pressures on an CINDY.  He will return for an arterial Doppler.

## 2021-05-10 NOTE — PHYSICAL EXAM
[Well Developed] : well developed [Well Nourished] : well nourished [No Acute Distress] : no acute distress [Normal Conjunctiva] : normal conjunctiva [Normal Venous Pressure] : normal venous pressure [No Carotid Bruit] : no carotid bruit [Normal S1, S2] : normal S1, S2 [No Murmur] : no murmur [No Rub] : no rub [No Gallop] : no gallop [Clear Lung Fields] : clear lung fields [Good Air Entry] : good air entry [No Respiratory Distress] : no respiratory distress  [Soft] : abdomen soft [Non Tender] : non-tender [No Masses/organomegaly] : no masses/organomegaly [Normal Bowel Sounds] : normal bowel sounds [Normal Gait] : normal gait [No Edema] : no edema [No Cyanosis] : no cyanosis [No Clubbing] : no clubbing [No Varicosities] : no varicosities [Venous stasis] : venous stasis [Moves all extremities] : moves all extremities [No Focal Deficits] : no focal deficits [Normal Speech] : normal speech [Alert and Oriented] : alert and oriented [Normal memory] : normal memory [de-identified] : Hyperpigmentation and erythema

## 2021-07-05 ENCOUNTER — INPATIENT (INPATIENT)
Facility: HOSPITAL | Age: 71
LOS: 7 days | Discharge: HOME | DRG: 853 | End: 2021-07-13
Payer: MEDICARE

## 2021-07-05 VITALS
OXYGEN SATURATION: 94 % | SYSTOLIC BLOOD PRESSURE: 162 MMHG | WEIGHT: 214.07 LBS | HEART RATE: 114 BPM | RESPIRATION RATE: 20 BRPM | TEMPERATURE: 100 F | HEIGHT: 66 IN | DIASTOLIC BLOOD PRESSURE: 83 MMHG

## 2021-07-05 DIAGNOSIS — Z95.1 PRESENCE OF AORTOCORONARY BYPASS GRAFT: Chronic | ICD-10-CM

## 2021-07-05 DIAGNOSIS — M27.2 INFLAMMATORY CONDITIONS OF JAWS: Chronic | ICD-10-CM

## 2021-07-05 DIAGNOSIS — Z98.890 OTHER SPECIFIED POSTPROCEDURAL STATES: Chronic | ICD-10-CM

## 2021-07-05 LAB
ALBUMIN SERPL ELPH-MCNC: 3.4 G/DL — SIGNIFICANT CHANGE UP (ref 3.3–5)
ALBUMIN SERPL ELPH-MCNC: 3.8 G/DL — SIGNIFICANT CHANGE UP (ref 3.3–5)
ALP SERPL-CCNC: 108 U/L — SIGNIFICANT CHANGE UP (ref 40–120)
ALP SERPL-CCNC: 115 U/L — SIGNIFICANT CHANGE UP (ref 40–120)
ALT FLD-CCNC: 18 U/L — SIGNIFICANT CHANGE UP (ref 10–45)
ALT FLD-CCNC: 19 U/L — SIGNIFICANT CHANGE UP (ref 10–45)
ANION GAP SERPL CALC-SCNC: 13 MMOL/L — SIGNIFICANT CHANGE UP (ref 5–17)
ANION GAP SERPL CALC-SCNC: 16 MMOL/L — SIGNIFICANT CHANGE UP (ref 5–17)
ANISOCYTOSIS BLD QL: SLIGHT — SIGNIFICANT CHANGE UP
APPEARANCE UR: CLEAR — SIGNIFICANT CHANGE UP
APTT BLD: 35.2 SEC — SIGNIFICANT CHANGE UP (ref 27.5–35.5)
AST SERPL-CCNC: 26 U/L — SIGNIFICANT CHANGE UP (ref 10–40)
AST SERPL-CCNC: 26 U/L — SIGNIFICANT CHANGE UP (ref 10–40)
BACTERIA # UR AUTO: PRESENT /HPF
BASE EXCESS BLDV CALC-SCNC: -5.4 MMOL/L — LOW (ref -2–3)
BASOPHILS # BLD AUTO: 0 K/UL — SIGNIFICANT CHANGE UP (ref 0–0.2)
BASOPHILS NFR BLD AUTO: 0 % — SIGNIFICANT CHANGE UP (ref 0–2)
BILIRUB SERPL-MCNC: 1.1 MG/DL — SIGNIFICANT CHANGE UP (ref 0.2–1.2)
BILIRUB SERPL-MCNC: 1.2 MG/DL — SIGNIFICANT CHANGE UP (ref 0.2–1.2)
BILIRUB UR-MCNC: ABNORMAL
BUN SERPL-MCNC: 21 MG/DL — SIGNIFICANT CHANGE UP (ref 7–23)
BUN SERPL-MCNC: 25 MG/DL — HIGH (ref 7–23)
BURR CELLS BLD QL SMEAR: PRESENT — SIGNIFICANT CHANGE UP
CA-I SERPL-SCNC: 1.12 MMOL/L — LOW (ref 1.15–1.33)
CALCIUM SERPL-MCNC: 8.3 MG/DL — LOW (ref 8.4–10.5)
CALCIUM SERPL-MCNC: 8.8 MG/DL — SIGNIFICANT CHANGE UP (ref 8.4–10.5)
CHLORIDE SERPL-SCNC: 91 MMOL/L — LOW (ref 96–108)
CHLORIDE SERPL-SCNC: 98 MMOL/L — SIGNIFICANT CHANGE UP (ref 96–108)
CO2 BLDV-SCNC: 21.2 MMOL/L — LOW (ref 22–26)
CO2 SERPL-SCNC: 16 MMOL/L — LOW (ref 22–31)
CO2 SERPL-SCNC: 19 MMOL/L — LOW (ref 22–31)
COLOR SPEC: YELLOW — SIGNIFICANT CHANGE UP
CREAT SERPL-MCNC: 1.16 MG/DL — SIGNIFICANT CHANGE UP (ref 0.5–1.3)
CREAT SERPL-MCNC: 1.21 MG/DL — SIGNIFICANT CHANGE UP (ref 0.5–1.3)
DACRYOCYTES BLD QL SMEAR: SLIGHT — SIGNIFICANT CHANGE UP
DIFF PNL FLD: ABNORMAL
EOSINOPHIL # BLD AUTO: 0 K/UL — SIGNIFICANT CHANGE UP (ref 0–0.5)
EOSINOPHIL NFR BLD AUTO: 0 % — SIGNIFICANT CHANGE UP (ref 0–6)
EPI CELLS # UR: SIGNIFICANT CHANGE UP /HPF (ref 0–5)
GAS PNL BLDV: 123 MMOL/L — LOW (ref 136–145)
GAS PNL BLDV: SIGNIFICANT CHANGE UP
GLUCOSE BLDC GLUCOMTR-MCNC: 256 MG/DL — HIGH (ref 70–99)
GLUCOSE SERPL-MCNC: 151 MG/DL — HIGH (ref 70–99)
GLUCOSE SERPL-MCNC: 267 MG/DL — HIGH (ref 70–99)
GLUCOSE UR QL: NEGATIVE — SIGNIFICANT CHANGE UP
HCO3 BLDV-SCNC: 20 MMOL/L — LOW (ref 22–29)
HCT VFR BLD CALC: 33.6 % — LOW (ref 39–50)
HCT VFR BLD CALC: 35.7 % — LOW (ref 39–50)
HGB BLD-MCNC: 10.6 G/DL — LOW (ref 13–17)
HGB BLD-MCNC: 11.4 G/DL — LOW (ref 13–17)
INR BLD: 1.75 — HIGH (ref 0.88–1.16)
KETONES UR-MCNC: 15 MG/DL
LACTATE SERPL-SCNC: 1.3 MMOL/L — SIGNIFICANT CHANGE UP (ref 0.5–2)
LEUKOCYTE ESTERASE UR-ACNC: NEGATIVE — SIGNIFICANT CHANGE UP
LYMPHOCYTES # BLD AUTO: 22.4 K/UL — HIGH (ref 1–3.3)
LYMPHOCYTES # BLD AUTO: 92.2 % — HIGH (ref 13–44)
MANUAL SMEAR VERIFICATION: SIGNIFICANT CHANGE UP
MCHC RBC-ENTMCNC: 29.4 PG — SIGNIFICANT CHANGE UP (ref 27–34)
MCHC RBC-ENTMCNC: 29.6 PG — SIGNIFICANT CHANGE UP (ref 27–34)
MCHC RBC-ENTMCNC: 31.5 GM/DL — LOW (ref 32–36)
MCHC RBC-ENTMCNC: 31.9 GM/DL — LOW (ref 32–36)
MCV RBC AUTO: 92.7 FL — SIGNIFICANT CHANGE UP (ref 80–100)
MCV RBC AUTO: 93.1 FL — SIGNIFICANT CHANGE UP (ref 80–100)
MICROCYTES BLD QL: SLIGHT — SIGNIFICANT CHANGE UP
MONOCYTES # BLD AUTO: 1.26 K/UL — HIGH (ref 0–0.9)
MONOCYTES NFR BLD AUTO: 5.2 % — SIGNIFICANT CHANGE UP (ref 2–14)
MRSA PCR RESULT.: NEGATIVE — SIGNIFICANT CHANGE UP
NEUTROPHILS # BLD AUTO: 0.63 K/UL — LOW (ref 1.8–7.4)
NEUTROPHILS NFR BLD AUTO: 2.6 % — LOW (ref 43–77)
NITRITE UR-MCNC: NEGATIVE — SIGNIFICANT CHANGE UP
NRBC # BLD: 0 /100 WBCS — SIGNIFICANT CHANGE UP (ref 0–0)
OVALOCYTES BLD QL SMEAR: SLIGHT — SIGNIFICANT CHANGE UP
PCO2 BLDV: 38 MMHG — LOW (ref 42–55)
PH BLDV: 7.33 — SIGNIFICANT CHANGE UP (ref 7.32–7.43)
PH UR: 6 — SIGNIFICANT CHANGE UP (ref 5–8)
PLAT MORPH BLD: NORMAL — SIGNIFICANT CHANGE UP
PLATELET # BLD AUTO: 122 K/UL — LOW (ref 150–400)
PLATELET # BLD AUTO: 130 K/UL — LOW (ref 150–400)
PO2 BLDV: 36 MMHG — SIGNIFICANT CHANGE UP
POIKILOCYTOSIS BLD QL AUTO: SLIGHT — SIGNIFICANT CHANGE UP
POLYCHROMASIA BLD QL SMEAR: SLIGHT — SIGNIFICANT CHANGE UP
POTASSIUM BLDV-SCNC: 5.4 MMOL/L — HIGH (ref 3.5–5.1)
POTASSIUM SERPL-MCNC: 5.2 MMOL/L — SIGNIFICANT CHANGE UP (ref 3.5–5.3)
POTASSIUM SERPL-MCNC: 5.4 MMOL/L — HIGH (ref 3.5–5.3)
POTASSIUM SERPL-SCNC: 5.2 MMOL/L — SIGNIFICANT CHANGE UP (ref 3.5–5.3)
POTASSIUM SERPL-SCNC: 5.4 MMOL/L — HIGH (ref 3.5–5.3)
PROT SERPL-MCNC: 6.8 G/DL — SIGNIFICANT CHANGE UP (ref 6–8.3)
PROT SERPL-MCNC: 7.4 G/DL — SIGNIFICANT CHANGE UP (ref 6–8.3)
PROT UR-MCNC: 100 MG/DL
PROTHROM AB SERPL-ACNC: 20.4 SEC — HIGH (ref 10.6–13.6)
RAPID RVP RESULT: SIGNIFICANT CHANGE UP
RBC # BLD: 3.61 M/UL — LOW (ref 4.2–5.8)
RBC # BLD: 3.85 M/UL — LOW (ref 4.2–5.8)
RBC # FLD: 15.3 % — HIGH (ref 10.3–14.5)
RBC # FLD: 15.6 % — HIGH (ref 10.3–14.5)
RBC BLD AUTO: ABNORMAL
RBC CASTS # UR COMP ASSIST: < 5 /HPF — SIGNIFICANT CHANGE UP
S AUREUS DNA NOSE QL NAA+PROBE: POSITIVE
SAO2 % BLDV: 61 % — SIGNIFICANT CHANGE UP
SARS-COV-2 RNA SPEC QL NAA+PROBE: NEGATIVE — SIGNIFICANT CHANGE UP
SARS-COV-2 RNA SPEC QL NAA+PROBE: SIGNIFICANT CHANGE UP
SCHISTOCYTES BLD QL AUTO: SLIGHT — SIGNIFICANT CHANGE UP
SMUDGE CELLS # BLD: PRESENT — SIGNIFICANT CHANGE UP
SODIUM SERPL-SCNC: 126 MMOL/L — LOW (ref 135–145)
SODIUM SERPL-SCNC: 127 MMOL/L — LOW (ref 135–145)
SP GR SPEC: >=1.03 — SIGNIFICANT CHANGE UP (ref 1–1.03)
SPHEROCYTES BLD QL SMEAR: SLIGHT — SIGNIFICANT CHANGE UP
UROBILINOGEN FLD QL: 1 E.U./DL — SIGNIFICANT CHANGE UP
WBC # BLD: 23.3 K/UL — HIGH (ref 3.8–10.5)
WBC # BLD: 24.29 K/UL — HIGH (ref 3.8–10.5)
WBC # FLD AUTO: 23.3 K/UL — HIGH (ref 3.8–10.5)
WBC # FLD AUTO: 24.29 K/UL — HIGH (ref 3.8–10.5)
WBC UR QL: < 5 /HPF — SIGNIFICANT CHANGE UP

## 2021-07-05 PROCEDURE — 93010 ELECTROCARDIOGRAM REPORT: CPT

## 2021-07-05 PROCEDURE — 99291 CRITICAL CARE FIRST HOUR: CPT

## 2021-07-05 PROCEDURE — 71045 X-RAY EXAM CHEST 1 VIEW: CPT | Mod: 26

## 2021-07-05 PROCEDURE — 99291 CRITICAL CARE FIRST HOUR: CPT | Mod: CS

## 2021-07-05 RX ORDER — AZITHROMYCIN 500 MG/1
500 TABLET, FILM COATED ORAL EVERY 24 HOURS
Refills: 0 | Status: DISCONTINUED | OUTPATIENT
Start: 2021-07-06 | End: 2021-07-09

## 2021-07-05 RX ORDER — SODIUM CHLORIDE 9 MG/ML
1000 INJECTION, SOLUTION INTRAVENOUS
Refills: 0 | Status: DISCONTINUED | OUTPATIENT
Start: 2021-07-05 | End: 2021-07-13

## 2021-07-05 RX ORDER — INSULIN LISPRO 100/ML
VIAL (ML) SUBCUTANEOUS EVERY 6 HOURS
Refills: 0 | Status: DISCONTINUED | OUTPATIENT
Start: 2021-07-05 | End: 2021-07-07

## 2021-07-05 RX ORDER — DEXTROSE 50 % IN WATER 50 %
15 SYRINGE (ML) INTRAVENOUS ONCE
Refills: 0 | Status: DISCONTINUED | OUTPATIENT
Start: 2021-07-05 | End: 2021-07-13

## 2021-07-05 RX ORDER — ACETAMINOPHEN 500 MG
650 TABLET ORAL ONCE
Refills: 0 | Status: COMPLETED | OUTPATIENT
Start: 2021-07-05 | End: 2021-07-05

## 2021-07-05 RX ORDER — TAMSULOSIN HYDROCHLORIDE 0.4 MG/1
0.4 CAPSULE ORAL AT BEDTIME
Refills: 0 | Status: DISCONTINUED | OUTPATIENT
Start: 2021-07-05 | End: 2021-07-06

## 2021-07-05 RX ORDER — GLUCAGON INJECTION, SOLUTION 0.5 MG/.1ML
1 INJECTION, SOLUTION SUBCUTANEOUS ONCE
Refills: 0 | Status: DISCONTINUED | OUTPATIENT
Start: 2021-07-05 | End: 2021-07-13

## 2021-07-05 RX ORDER — ALPRAZOLAM 0.25 MG
0.25 TABLET ORAL ONCE
Refills: 0 | Status: DISCONTINUED | OUTPATIENT
Start: 2021-07-05 | End: 2021-07-05

## 2021-07-05 RX ORDER — INSULIN HUMAN 100 [IU]/ML
10 INJECTION, SOLUTION SUBCUTANEOUS ONCE
Refills: 0 | Status: COMPLETED | OUTPATIENT
Start: 2021-07-05 | End: 2021-07-05

## 2021-07-05 RX ORDER — APIXABAN 2.5 MG/1
5 TABLET, FILM COATED ORAL EVERY 12 HOURS
Refills: 0 | Status: DISCONTINUED | OUTPATIENT
Start: 2021-07-05 | End: 2021-07-06

## 2021-07-05 RX ORDER — LINEZOLID 600 MG/300ML
600 INJECTION, SOLUTION INTRAVENOUS EVERY 12 HOURS
Refills: 0 | Status: DISCONTINUED | OUTPATIENT
Start: 2021-07-05 | End: 2021-07-08

## 2021-07-05 RX ORDER — ATORVASTATIN CALCIUM 80 MG/1
40 TABLET, FILM COATED ORAL AT BEDTIME
Refills: 0 | Status: DISCONTINUED | OUTPATIENT
Start: 2021-07-05 | End: 2021-07-06

## 2021-07-05 RX ORDER — DEXTROSE 50 % IN WATER 50 %
50 SYRINGE (ML) INTRAVENOUS ONCE
Refills: 0 | Status: COMPLETED | OUTPATIENT
Start: 2021-07-05 | End: 2021-07-05

## 2021-07-05 RX ORDER — APIXABAN 2.5 MG/1
1 TABLET, FILM COATED ORAL
Qty: 0 | Refills: 0 | DISCHARGE

## 2021-07-05 RX ORDER — INSULIN LISPRO 100/ML
VIAL (ML) SUBCUTANEOUS AT BEDTIME
Refills: 0 | Status: DISCONTINUED | OUTPATIENT
Start: 2021-07-05 | End: 2021-07-05

## 2021-07-05 RX ORDER — SODIUM POLYSTYRENE SULFONATE 4.1 MEQ/G
15 POWDER, FOR SUSPENSION ORAL ONCE
Refills: 0 | Status: DISCONTINUED | OUTPATIENT
Start: 2021-07-05 | End: 2021-07-06

## 2021-07-05 RX ORDER — CHLORHEXIDINE GLUCONATE 213 G/1000ML
1 SOLUTION TOPICAL
Refills: 0 | Status: DISCONTINUED | OUTPATIENT
Start: 2021-07-05 | End: 2021-07-12

## 2021-07-05 RX ORDER — VANCOMYCIN HCL 1 G
1500 VIAL (EA) INTRAVENOUS ONCE
Refills: 0 | Status: COMPLETED | OUTPATIENT
Start: 2021-07-05 | End: 2021-07-05

## 2021-07-05 RX ORDER — SODIUM CHLORIDE 9 MG/ML
1000 INJECTION INTRAMUSCULAR; INTRAVENOUS; SUBCUTANEOUS ONCE
Refills: 0 | Status: COMPLETED | OUTPATIENT
Start: 2021-07-05 | End: 2021-07-05

## 2021-07-05 RX ORDER — PIPERACILLIN AND TAZOBACTAM 4; .5 G/20ML; G/20ML
4.5 INJECTION, POWDER, LYOPHILIZED, FOR SOLUTION INTRAVENOUS ONCE
Refills: 0 | Status: COMPLETED | OUTPATIENT
Start: 2021-07-05 | End: 2021-07-05

## 2021-07-05 RX ORDER — AZITHROMYCIN 500 MG/1
500 TABLET, FILM COATED ORAL ONCE
Refills: 0 | Status: COMPLETED | OUTPATIENT
Start: 2021-07-05 | End: 2021-07-05

## 2021-07-05 RX ORDER — DEXTROSE 50 % IN WATER 50 %
25 SYRINGE (ML) INTRAVENOUS ONCE
Refills: 0 | Status: DISCONTINUED | OUTPATIENT
Start: 2021-07-05 | End: 2021-07-13

## 2021-07-05 RX ORDER — PIPERACILLIN AND TAZOBACTAM 4; .5 G/20ML; G/20ML
4.5 INJECTION, POWDER, LYOPHILIZED, FOR SOLUTION INTRAVENOUS EVERY 6 HOURS
Refills: 0 | Status: DISCONTINUED | OUTPATIENT
Start: 2021-07-05 | End: 2021-07-07

## 2021-07-05 RX ORDER — DEXTROSE 50 % IN WATER 50 %
12.5 SYRINGE (ML) INTRAVENOUS ONCE
Refills: 0 | Status: DISCONTINUED | OUTPATIENT
Start: 2021-07-05 | End: 2021-07-13

## 2021-07-05 RX ORDER — PANTOPRAZOLE SODIUM 20 MG/1
1 TABLET, DELAYED RELEASE ORAL
Qty: 0 | Refills: 0 | DISCHARGE

## 2021-07-05 RX ORDER — AMLODIPINE BESYLATE 2.5 MG/1
5 TABLET ORAL
Qty: 0 | Refills: 0 | DISCHARGE

## 2021-07-05 RX ORDER — AZITHROMYCIN 500 MG/1
TABLET, FILM COATED ORAL
Refills: 0 | Status: DISCONTINUED | OUTPATIENT
Start: 2021-07-05 | End: 2021-07-09

## 2021-07-05 RX ORDER — VANCOMYCIN HCL 1 G
1000 VIAL (EA) INTRAVENOUS ONCE
Refills: 0 | Status: DISCONTINUED | OUTPATIENT
Start: 2021-07-05 | End: 2021-07-05

## 2021-07-05 RX ORDER — HALOPERIDOL DECANOATE 100 MG/ML
1 INJECTION INTRAMUSCULAR ONCE
Refills: 0 | Status: COMPLETED | OUTPATIENT
Start: 2021-07-05 | End: 2021-07-05

## 2021-07-05 RX ORDER — PIPERACILLIN AND TAZOBACTAM 4; .5 G/20ML; G/20ML
3.38 INJECTION, POWDER, LYOPHILIZED, FOR SOLUTION INTRAVENOUS ONCE
Refills: 0 | Status: COMPLETED | OUTPATIENT
Start: 2021-07-05 | End: 2021-07-05

## 2021-07-05 RX ORDER — SODIUM POLYSTYRENE SULFONATE 4.1 MEQ/G
30 POWDER, FOR SUSPENSION ORAL ONCE
Refills: 0 | Status: COMPLETED | OUTPATIENT
Start: 2021-07-05 | End: 2021-07-05

## 2021-07-05 RX ADMIN — AZITHROMYCIN 255 MILLIGRAM(S): 500 TABLET, FILM COATED ORAL at 19:50

## 2021-07-05 RX ADMIN — Medication 300 MILLIGRAM(S): at 13:11

## 2021-07-05 RX ADMIN — Medication 50 MILLILITER(S): at 23:39

## 2021-07-05 RX ADMIN — SODIUM CHLORIDE 1000 MILLILITER(S): 9 INJECTION INTRAMUSCULAR; INTRAVENOUS; SUBCUTANEOUS at 14:04

## 2021-07-05 RX ADMIN — Medication 650 MILLIGRAM(S): at 12:56

## 2021-07-05 RX ADMIN — INSULIN HUMAN 10 UNIT(S): 100 INJECTION, SOLUTION SUBCUTANEOUS at 23:40

## 2021-07-05 RX ADMIN — Medication 6: at 22:55

## 2021-07-05 RX ADMIN — LINEZOLID 300 MILLIGRAM(S): 600 INJECTION, SOLUTION INTRAVENOUS at 23:14

## 2021-07-05 RX ADMIN — SODIUM CHLORIDE 1000 MILLILITER(S): 9 INJECTION INTRAMUSCULAR; INTRAVENOUS; SUBCUTANEOUS at 15:00

## 2021-07-05 RX ADMIN — PIPERACILLIN AND TAZOBACTAM 200 GRAM(S): 4; .5 INJECTION, POWDER, LYOPHILIZED, FOR SOLUTION INTRAVENOUS at 12:56

## 2021-07-05 RX ADMIN — SODIUM CHLORIDE 1000 MILLILITER(S): 9 INJECTION INTRAMUSCULAR; INTRAVENOUS; SUBCUTANEOUS at 16:38

## 2021-07-05 RX ADMIN — PIPERACILLIN AND TAZOBACTAM 3.38 GRAM(S): 4; .5 INJECTION, POWDER, LYOPHILIZED, FOR SOLUTION INTRAVENOUS at 13:30

## 2021-07-05 RX ADMIN — Medication 650 MILLIGRAM(S): at 13:30

## 2021-07-05 RX ADMIN — SODIUM CHLORIDE 1000 MILLILITER(S): 9 INJECTION INTRAMUSCULAR; INTRAVENOUS; SUBCUTANEOUS at 14:00

## 2021-07-05 RX ADMIN — SODIUM CHLORIDE 1000 MILLILITER(S): 9 INJECTION INTRAMUSCULAR; INTRAVENOUS; SUBCUTANEOUS at 12:56

## 2021-07-05 RX ADMIN — HALOPERIDOL DECANOATE 1 MILLIGRAM(S): 100 INJECTION INTRAMUSCULAR at 22:23

## 2021-07-05 RX ADMIN — Medication 1500 MILLIGRAM(S): at 16:03

## 2021-07-05 RX ADMIN — Medication 0.25 MILLIGRAM(S): at 15:30

## 2021-07-05 RX ADMIN — PIPERACILLIN AND TAZOBACTAM 200 GRAM(S): 4; .5 INJECTION, POWDER, LYOPHILIZED, FOR SOLUTION INTRAVENOUS at 18:34

## 2021-07-05 NOTE — ED PROVIDER NOTE - OBJECTIVE STATEMENT
70M PMH renal transplant (tacrolimus), DM2, HTN, MGUS, pAF, BPH, HLD, CAD s/p CABG 17 years ago, p/w fever. Has 5d of fevers, cough, worsening generalized weakness. Completed z-pack w/o improvement. Was COVID+ March 2020, also received pfizer vaccine x2 a few months ago. Also noticed painless rash to b/l LE for 3-4weeks.   Denies recent travel, sick contacts, HA, SOB, CP, rhinorrhea, sore throat, nausea, vomiting, diarrhea, abd pain, urinary complaints, black/bloody stool, focal weakness/numbness, vision changes, neck/back pain, LE pain/swelling.   meds: pantoprazole, eliquis, tacrolimus, rosuvastatin, amlodipine, isosorbide

## 2021-07-05 NOTE — CONSULT NOTE ADULT - ATTENDING COMMENTS
Patient is tachypneic but feels better on BIPAP. Discussed with ID and will start linezolid and zosyn/zithromax to cover MRSA and Pseudomonas/CAP in this immunosuppressed patient. Continue hydration and follow creatinine and UO.

## 2021-07-05 NOTE — H&P ADULT - HISTORY OF PRESENT ILLNESS
70M PMHx renal transplant (tacrolimus), DM2, HTN, MGUS, pAF, BPH, HLD, CAD s/p CABG 17 years ago, p/w fever. Has 5d of fevers, cough, worsening generalized weakness. Completed z-pack w/o improvement. Was COVID+ March 2020, also received pfizer vaccine x2 a few months ago. Also noticed painless rash to b/l LE for 3-4weeks.    70M with PMHx significant for renal transplant (on tacrolimus, unk date), DM2, HTN, MGUS, pAF, BPH, HLD, CAD (s/p CABG 15 years ago), presents for 3-4 days of worsening cough. Patient states that he has a productive cough with brownish colored sputum. Patient denied fever or shortness of breath at home, but on day of admission did notice that his breathing felt different and that he was breathing quicker. Per ED it was reported that he completed a Z-pack without improvement. Past history notable for COVID+ March 2020, also received pfizer vaccine x2 a few months ago. Endorses SOB, is drinking less fluid, and may be urinating less. Denies sick contacts, HA, CP, rhinorrhea, sore throat, nausea, vomiting, diarrhea, abd pain, urinary complaints, black/bloody stool, focal weakness/numbness, vision changes, neck/back pain, LE pain/swelling.     Patient also reports a rash on both of his upper thighs that he developed after coming back from [place] 3 weeks ago. It does not itch, does not know if any creams or detergents were used. States that he has a dermatologist, but does not know what the diagnosis is.    ED Vitals:   T 100, P 114, /83, RR 18, O2 94% on 6L NC  T 103.4, P 115, /65, RR 22, O2 96% on 6L NC  ED Course:  Labs notable for WBC 24.29 (92.2% neutrophils), Hgb 11.4, Lactate 1.3, INR 1.75, Na 126, Cl 91, Co2 19, AG 16, procal 0.43, Cr 1.16  CXR: Cardiomegaly, status post median sternotomy, CABG. Bilateral opacities/right perihilar opacity/consolidation.     70M with PMHx significant for renal transplant (on tacrolimus, unk date), DM2, HTN, MGUS, pAF, BPH, HLD, CAD (s/p CABG 15 years ago), presents for 3-4 days of worsening cough. Patient states that he has a productive cough with brownish colored sputum. Patient denied fever or shortness of breath at home, but on day of admission did notice that his breathing felt different and that he was breathing quicker. Per ED it was reported that he completed a Z-pack without improvement. Past history notable for COVID+ March 2020, also received pfizer vaccine x2 a few months ago. Endorses SOB, is drinking less fluid, and may be urinating less. Denies sick contacts, HA, CP, rhinorrhea, sore throat, nausea, vomiting, diarrhea, abd pain, urinary complaints, black/bloody stool, focal weakness/numbness, vision changes, neck/back pain, LE pain/swelling.     Patient also reports a rash on both of his upper thighs that he developed after coming back from [place] 3 weeks ago. It does not itch, does not know if any creams or detergents were used. States that he has a dermatologist, but does not know what the diagnosis is.    ED Vitals:   T 100, P 114, /83, RR 18, O2 94% on 6L NC  T 103.4, P 115, /65, RR 22, O2 96% on 6L NC  ED Course:  Labs notable for WBC 24.29 (92.2% neutrophils), Hgb 11.4, Lactate 1.3, INR 1.75, Na 126, Cl 91, Co2 19, AG 16, procal 0.43, Cr 1.16  CXR: Cardiomegaly, status post median sternotomy, CABG. Bilateral opacities/right perihilar opacity/consolidation.    Bedside POCUS: diffuse b-lines, consolidations worse on R lung

## 2021-07-05 NOTE — ED ADULT TRIAGE NOTE - OTHER COMPLAINTS
patient BIBEMS from home c/o fevers-- diagnosed with URI x several days ago, no improvement s/p Z-pack-- patient reports being vaccinated for COVID

## 2021-07-05 NOTE — ED ADULT NURSE NOTE - NSIMPLEMENTINTERV_GEN_ALL_ED
Implemented All Fall Risk Interventions:  [place] to call system. Call bell, personal items and telephone within reach. Instruct patient to call for assistance. Room bathroom lighting operational. Non-slip footwear when patient is off stretcher. Physically safe environment: no spills, clutter or unnecessary equipment. Stretcher in lowest position, wheels locked, appropriate side rails in place. Provide visual cue, wrist band, yellow gown, etc. Monitor gait and stability. Monitor for mental status changes and reorient to person, place, and time. Review medications for side effects contributing to fall risk. Reinforce activity limits and safety measures with patient and family.

## 2021-07-05 NOTE — H&P ADULT - ASSESSMENT
71 yo M with a past medical history of renal transplant (on tacrolimus, unk date), DM2, HTN, MGUS, pAF, BPH, HLD, CAD (s/p CABG 15 years ago), presents for 3-4 days of worsening cough. Patient states that he has a productive cough with brownish colored sputum. Found to have acute hypoxic respiratory failure and sepsis 2/2 likely multifocal pneumonia.    Neuro  no active issues    CV      Pulm  # Acute hypoxic respiratory failure  Likely 2/2 multifocal pneumonia. Increasing work of breathing in ED requiring escalating O2 requirements from nasal canula to NRB to BIPAP  - Antibiotic management as above  - c/w BIPAP and wean to HFNC as tolerated  - Bronchodilator PRN  - Strict I's and O's  - Daily CXR    GI    ID  # Sepsis  Patient with 4/4 SIRS criteria (febrile, tachycardic, tachypneic, and WBC) likely 2/2 multifocal pneumonia. Patient with recent Z-pack use, but no known past hospitalizations or other antibiotics in past 3 months. Labs in ED notable for lactate 1.1 and VBG 7.33, CO2 38, HCO3 20. UA with specific gravity 1.030.  - s/p 3L NS, Vancomycin, and Zosyn  - will continue Azithromycin 500mg until legionella is ruled out  - Linezolid 600mg q12h to cover MRSA   - Zosyn 4.5g q6h to cover Pseudomonas  - f/u Tacrolimus level  - f/u urine strep and legionella  - f/u MRSA swab  - f/u blood culture, urine culture and sputum culture  - Nephrology consult for collateral on tacrolimus and if patient is on other immunosuppressive agents (Dr. Leyva and Dr. Preciado)  - Strict I's and O's with q6h bladder scans    Renal  # Renal Transplant  - History of renal transplant on unknown date. Reported tacrolimus use. Unknown if other immunosuppressives being use. Patient reports creatinine baseline 1.0.  - Nephrology consult for collateral on tacrolimus and if patient is on other immunosuppressive agents (Dr. Leyva and Dr. Preciado)  - Strict I's and O's with q6h bladder scans    Heme    Endo    F  E  N  G  D    Dispo: MICU    CODE: Full         71 yo M with a past medical history of renal transplant (on tacrolimus, unk date), DM2, HTN, MGUS, pAF, BPH, HLD, CAD (s/p CABG 15 years ago), presents for 3-4 days of worsening cough. Patient states that he has a productive cough with brownish colored sputum. Found to have acute hypoxic respiratory failure and sepsis 2/2 likely multifocal pneumonia.    Neuro  no active issues    CV      Pulm  # Acute hypoxic respiratory failure  Likely 2/2 multifocal pneumonia. Increasing work of breathing in ED requiring escalating O2 requirements from nasal canula to NRB to BIPAP  - Antibiotic management as above  - c/w BIPAP and wean to HFNC as tolerated  - Bronchodilator PRN  - Strict I's and O's  - Daily CXR    GI  no active issues  npo while on BIPAP    ID  # Sepsis  Patient with 4/4 SIRS criteria (febrile, tachycardic, tachypneic, and WBC) likely 2/2 multifocal pneumonia. Patient with recent Z-pack use, but no known past hospitalizations or other antibiotics in past 3 months. Labs in ED notable for lactate 1.1 and VBG 7.33, CO2 38, HCO3 20. UA with specific gravity 1.030.  - s/p 3L NS, Vancomycin, and Zosyn  - will continue Azithromycin 500mg until legionella is ruled out  - Linezolid 600mg q12h to cover MRSA   - Zosyn 4.5g q6h to cover Pseudomonas  - f/u Tacrolimus level  - f/u urine strep and legionella  - f/u MRSA swab  - f/u blood culture, urine culture and sputum culture  - Nephrology consult for collateral on tacrolimus and if patient is on other immunosuppressive agents (Dr. Leyva and Dr. Preciado)  - Strict I's and O's with q6h bladder scans    Renal  # Renal Transplant  - History of renal transplant on unknown date. Reported tacrolimus use. Unknown if other immunosuppressives being use. Patient reports creatinine baseline 1.0.  - Nephrology consult for collateral on tacrolimus and if patient is on other immunosuppressive agents (Dr. Leyva and Dr. Preciado)  - Strict I's and O's with q6h bladder scans    Heme  - no active issues    Endo  -    F  E  N  G  D    Dispo: MICU    CODE: Full         71 yo M with a past medical history of renal transplant (on tacrolimus, unk date), DM2, HTN, MGUS, pAF, BPH, HLD, CAD (s/p CABG 15 years ago), presents for 3-4 days of worsening cough. Patient states that he has a productive cough with brownish colored sputum. Found to have acute hypoxic respiratory failure and sepsis 2/2 likely multifocal pneumonia.    Neuro  no active issues    CV  # HTN  - on home amlodipine 10mg, furosemide 20mg  - restart as needed  # HLD  - on home rosuvastatin 10mg  # CAD (s/p CABG 15 yrs ago)  - on home isosorbide mononitrate 30mg  # pAF  - on home eliquis 5mg BID    Pulm  # Acute hypoxic respiratory failure  Likely 2/2 multifocal pneumonia. Increasing work of breathing in ED requiring escalating O2 requirements from nasal canula to NRB to BIPAP  - Antibiotic management as above  - c/w BIPAP and wean to HFNC as tolerated  - Bronchodilator PRN  - Strict I's and O's  - Daily CXR    GI  no active issues  npo while on BIPAP    ID  # Sepsis  Patient with 4/4 SIRS criteria (febrile, tachycardic, tachypneic, and WBC) likely 2/2 multifocal pneumonia. Patient with recent Z-pack use, but no known past hospitalizations or other antibiotics in past 3 months. Labs in ED notable for lactate 1.1 and VBG 7.33, CO2 38, HCO3 20. UA with specific gravity 1.030.  - s/p 3L NS, Vancomycin, and Zosyn  - will continue Azithromycin 500mg until legionella is ruled out  - Linezolid 600mg q12h to cover MRSA   - Zosyn 4.5g q6h to cover Pseudomonas  - f/u Tacrolimus level  - f/u urine strep and legionella  - f/u MRSA swab  - f/u blood culture, urine culture and sputum culture  - Nephrology consult for collateral on tacrolimus and if patient is on other immunosuppressive agents (Dr. Leyva and Dr. Preciado)  - Strict I's and O's with q6h bladder scans    Renal  # Renal Transplant  - History of renal transplant on unknown date. Reported tacrolimus use. Unknown if other immunosuppressives being use. Patient reports creatinine baseline 1.0.  - Nephrology consult for collateral on tacrolimus and if patient is on other immunosuppressive agents (Dr. Leyva and Dr. Preciado)  - Strict I's and O's with q6h bladder scans      # BPH  - On tamsulosin 0.4mg    Heme  # anemia  - Hgb 11.4, normocytic anemia    Endo  # DM2  - moderate insulin sliding scale    F: none  E: replete PRN  N: NPO while on BiPAP  G: none  D:     Dispo: MICU    CODE: Full         69 yo M with a past medical history of renal transplant (on tacrolimus, unk date), DM2, HTN, MGUS, pAF, BPH, HLD, CAD (s/p CABG 15 years ago), presents for 3-4 days of worsening cough. Patient states that he has a productive cough with brownish colored sputum. Found to have acute hypoxic respiratory failure and sepsis 2/2 likely multifocal pneumonia.    Neuro  no active issues    CV  # HTN  - on home amlodipine 10mg, furosemide 20mg  - restart as needed  # HLD  - on home rosuvastatin 10mg  # CAD (s/p CABG 15 yrs ago)  - on home isosorbide mononitrate 30mg  # pAF  - on home eliquis 5mg BID, continue    Pulm  # Acute hypoxic respiratory failure  Likely 2/2 multifocal pneumonia. Increasing work of breathing in ED requiring escalating O2 requirements from nasal canula to NRB to BIPAP  - Antibiotic management as above  - c/w BIPAP and wean to HFNC as tolerated  - Bronchodilator PRN  - Strict I's and O's  - Daily CXR    GI  no active issues  npo while on BIPAP    ID  # Sepsis  Patient with 4/4 SIRS criteria (febrile, tachycardic, tachypneic, and WBC) likely 2/2 multifocal pneumonia. Patient with recent Z-pack use, but no known past hospitalizations or other antibiotics in past 3 months. Labs in ED notable for lactate 1.1 and VBG 7.33, CO2 38, HCO3 20. UA with specific gravity 1.030.  - s/p 3L NS, Vancomycin, and Zosyn  - will continue Azithromycin 500mg until legionella is ruled out  - Linezolid 600mg q12h to cover MRSA   - Zosyn 4.5g q6h to cover Pseudomonas  - f/u Tacrolimus level  - f/u urine strep and legionella  - f/u MRSA swab  - f/u blood culture, urine culture and sputum culture  - Nephrology consult for collateral on tacrolimus and if patient is on other immunosuppressive agents (Dr. Leyva and Dr. Preciado)  - Strict I's and O's with q6h bladder scans    Renal  # Renal Transplant  - History of renal transplant on unknown date. Reported tacrolimus use. Unknown if other immunosuppressives being use. Patient reports creatinine baseline 1.0.  - Nephrology consult for collateral on tacrolimus and if patient is on other immunosuppressive agents (Dr. Leyva and Dr. Preciado)  - Strict I's and O's with q6h bladder scans      # BPH  - On tamsulosin 0.4mg    Heme  # anemia  - Hgb 11.4, normocytic anemia    Endo  # DM2  - moderate insulin sliding scale    F: none  E: replete PRN  N: NPO while on BiPAP  G: none  D:     Dispo: MICU    CODE: Full

## 2021-07-05 NOTE — ED PROVIDER NOTE - CLINICAL SUMMARY MEDICAL DECISION MAKING FREE TEXT BOX
70M PMH renal transplant (tacrolimus), DM2, HTN, MGUS, pAF, BPH, HLD, CAD s/p CABG 17 years ago, p/w fever. Has 5d of fevers, cough, worsening generalized weakness. Completed z-pack w/o improvement.  Tachycardic. Hypoxic to 91% on 6L NC. Mild tachypnea, other vitals wnl. Exam as above.  ddx: Sepsis. Likely pulm etiology. Immunosuppressed.   sepsis labs and w/u. Empiric abx. Judicious IVF (given hx CKD, hypoxia, ?aspect of fluid overload). Reassess.  Less likely PE (on eliquis). CTA given CKD/renal transplant likely has more risks than benefits at this time.

## 2021-07-05 NOTE — ED PROVIDER NOTE - CARE PLAN
Principal Discharge DX:	Sepsis   Principal Discharge DX:	Sepsis  Secondary Diagnosis:	Multifocal pneumonia  Secondary Diagnosis:	Hyponatremia   Principal Discharge DX:	Sepsis  Secondary Diagnosis:	Multifocal pneumonia  Secondary Diagnosis:	Hyponatremia  Secondary Diagnosis:	Respiratory failure

## 2021-07-05 NOTE — H&P ADULT - NSHPPHYSICALEXAM_GEN_ALL_CORE
PHYSICAL EXAM:  GENERAL: Pt lying in bed on BIPAP, in mild discomfort but able to speak in short sentences  HEAD:  normocephalic atraumatic  EENT: EOMI, PERRLA, conjunctiva and sclera clear.  NECK: Supple, non-tender, no JVD  CHEST/LUNGS: On BIPAP, b/l inspiratory and expiratory wheezes with good air movement, no rales or rhonchi  HEART: Regular rate, normal rhythm; normal S1, S2; no murmurs  ABDOMEN: Soft, Nontender, Nondistended; BS4+  EXTREMITIES:  WWP, No clubbing, cyanosis, or edema  Vascular: 2+ peripheral pulses b/l  SKIN: No rashes or lesions  Neuro: A&O x3 PHYSICAL EXAM:  GENERAL: Pt lying in bed on BIPAP, in mild discomfort but able to speak in short sentences  HEAD:  normocephalic atraumatic  EENT: EOMI, PERRLA, conjunctiva and sclera clear.  NECK: Supple, non-tender, no JVD  CHEST/LUNGS: On BIPAP, b/l inspiratory and expiratory wheezes with good air movement, no rales or rhonchi  HEART: Regular rate, normal rhythm; normal S1, S2; no murmurs  ABDOMEN: Soft, Nontender, Nondistended; BS4+  EXTREMITIES:  WWP, No clubbing, cyanosis, or edema  Vascular: 2+ peripheral pulses b/l  SKIN: there is an erythematous, lluvia, non-raised, non-blanching rash present from upper thigh to the knees b/l  Neuro: A&O x3

## 2021-07-05 NOTE — ED PROVIDER NOTE - PROGRESS NOTE DETAILS
Klepfish: pt denies SOB, satting 93% on 6L NC.  WBC 24, plt 130, na 126, cxr w/ likely multifocal pna. though stable on 6L NC, given persistent mild tachypnea and hypoxia, will consult ICU. Dr. chester had called  earlier requesting us to notify dr. frederick. I updated dr. frederick. Updated pt. Klepfish: increasing WOB, diaphoresis, tachycardia despite being on NRB. Started BIPAP. Now improving and more comfortable on bipap. Will admit micu for further care.

## 2021-07-05 NOTE — H&P ADULT - NSHPLABSRESULTS_GEN_ALL_CORE
LABS:                         11.4   24.29 )-----------( 130      ( 05 Jul 2021 12:57 )             35.7     07-05    126<L>  |  91<L>  |  21  ----------------------------<  151<H>  5.2   |  19<L>  |  1.16    Ca    8.8      05 Jul 2021 12:57    TPro  7.4  /  Alb  3.8  /  TBili  1.1  /  DBili  x   /  AST  26  /  ALT  19  /  AlkPhos  115  07-05    PT/INR - ( 05 Jul 2021 13:10 )   PT: 20.4 sec;   INR: 1.75          PTT - ( 05 Jul 2021 13:10 )  PTT:35.2 sec  Urinalysis Basic - ( 05 Jul 2021 15:26 )    Color: Yellow / Appearance: Clear / SG: >=1.030 / pH: x  Gluc: x / Ketone: 15 mg/dL  / Bili: Small / Urobili: 1.0 E.U./dL   Blood: x / Protein: 100 mg/dL / Nitrite: NEGATIVE   Leuk Esterase: NEGATIVE / RBC: < 5 /HPF / WBC < 5 /HPF   Sq Epi: x / Non Sq Epi: 0-5 /HPF / Bacteria: Present /HPF        Lactate, Blood: 1.3 mmol/L (07-05 @ 12:57)      RADIOLOGY, EKG & ADDITIONAL TESTS: Reviewed.

## 2021-07-05 NOTE — CHART NOTE - NSCHARTNOTEFT_GEN_A_CORE
Infectious Diseases Anti-infective Approval Note    Medication: linezolid  Dose: 600mg  Route: IV (switch to PO if patient can tolerate)  Frequency: q12h  Duration**: 3 days    *THIS IS NOT AN INFECTIOUS DISEASES CONSULTATION*    **Indicates duration of approval, not necessarily duration of treatment**

## 2021-07-05 NOTE — ED ADULT NURSE NOTE - OBJECTIVE STATEMENT
PT is a 69 y/o male A&Ox4 BIBA c/o fever. Pt reports SOB, cough, generalized weakness/fatigue. Pt reports hx of COVID in 2020 and vaccinated for COVID, recently diagnosed with URI and placed on z-pack. PT denies urinary symptoms, N/V/D, chest pain, itchiness. Pt tachypneic, 92% on 6L NC, talking in clear, short sentences. Pt noted to have erythematic rash to b/l lower extremities. EKG done and signed, PIV placed, labs sent, pt placed on CCM.

## 2021-07-05 NOTE — CONSULT NOTE ADULT - SUBJECTIVE AND OBJECTIVE BOX
69 yo M with a past medical history of renal transplant (on tacrolimus, unk date), DM2, HTN, MGUS, pAF, BPH, HLD, CAD (s/p CABG 15 years ago), presents for 3-4 days of worsening cough. Patient states that he has a productive cough with brownish colored sputum. Patient denied fever or shortness of breath at home, but on day of admission did notice that his breathing felt different and that he was breathing quicker. Per ED it was reported that he completed a Z-pack without improvement. Past history notable for COVID+ March 2020, also received pfizer vaccine x2 a few months ago. Endorses SOB, is drinking less fluid, and may be urinating less. Denies sick contacts, HA, CP, rhinorrhea, sore throat, nausea, vomiting, diarrhea, abd pain, urinary complaints, black/bloody stool, focal weakness/numbness, vision changes, neck/back pain, LE pain/swelling.     Patient also reports a rash on both of his upper thighs that he developed after coming back from [place] 3 weeks ago. It does not itch, does not know if any creams or detergents were used. States that he has a dermatologist, but does not know what the diagnosis is.    ED Vitals:   T 100, P 114, /83, RR 18, O2 94% on 6L NC  T 103.4, P 115, /65, RR 22, O2 96% on 6L NC  ED Course:  Labs notable for WBC 24.29 (92.2% neutrophils), Hgb 11.4, Lactate 1.3, INR 1.75, Na 126, Cl 91, Co2 19, AG 16, procal 0.43, Cr 1.16  CXR: Cardiomegaly, status post median sternotomy, CABG. Bilateral opacities/right perihilar opacity/consolidation.        OBJECTIVE:    VITAL SIGNS:  ICU Vital Signs Last 24 Hrs  T(C): 36.3 (05 Jul 2021 16:43), Max: 39.7 (05 Jul 2021 12:44)  T(F): 97.4 (05 Jul 2021 16:43), Max: 103.4 (05 Jul 2021 12:44)  HR: 98 (05 Jul 2021 17:13) (92 - 120)  BP: 143/67 (05 Jul 2021 17:13) (133/65 - 162/83)  BP(mean): --  ABP: --  ABP(mean): --  RR: 18 (05 Jul 2021 17:13) (18 - 28)  SpO2: 94% (05 Jul 2021 17:13) (89% - 96%)        CAPILLARY BLOOD GLUCOSE          PHYSICAL EXAM:  General: uncomfortable appearing, non-toxic, answering questions in medium length sentences  HEENT: NC/AT; PERRL, clear conjunctiva, dry mucous membranes  Neck: supple  Respiratory: diffuse bilateral rhonchi, tachypneic appearing, but no nasal flaring. Tolerating BIPAP.  Cardiovascular: +S1/S2; tachycardic rate  Abdomen: soft, obese abdomen, NT/ND; +BS x4  Extremities: WWP, 2+ peripheral pulses b/l; peripheral skin changes on lower extermity  Skin: normal color and turgor; bilateral rash on upper thighs, scattered clusters of erythematous skin, no clear demarcation. Limited to thighs, does not extend to stomach, back, or scrotum  Neurological: AAOx3    MEDICATIONS:  MEDICATIONS  (STANDING):  chlorhexidine 2% Cloths 1 Application(s) Topical <User Schedule>    MEDICATIONS  (PRN):      ALLERGIES:  Allergies    No Known Allergies    Intolerances        LABS:                        11.4   24.29 )-----------( 130      ( 05 Jul 2021 12:57 )             35.7     07-05    126<L>  |  91<L>  |  21  ----------------------------<  151<H>  5.2   |  19<L>  |  1.16    Ca    8.8      05 Jul 2021 12:57    TPro  7.4  /  Alb  3.8  /  TBili  1.1  /  DBili  x   /  AST  26  /  ALT  19  /  AlkPhos  115  07-05    PT/INR - ( 05 Jul 2021 13:10 )   PT: 20.4 sec;   INR: 1.75          PTT - ( 05 Jul 2021 13:10 )  PTT:35.2 sec  Urinalysis Basic - ( 05 Jul 2021 15:26 )    Color: Yellow / Appearance: Clear / SG: >=1.030 / pH: x  Gluc: x / Ketone: 15 mg/dL  / Bili: Small / Urobili: 1.0 E.U./dL   Blood: x / Protein: 100 mg/dL / Nitrite: NEGATIVE   Leuk Esterase: NEGATIVE / RBC: < 5 /HPF / WBC < 5 /HPF   Sq Epi: x / Non Sq Epi: 0-5 /HPF / Bacteria: Present /HPF    RADIOLOGY & ADDITIONAL TESTS: Reviewed.

## 2021-07-05 NOTE — ED PROVIDER NOTE - ADMIT DISPOSITION PRESENT ON ADMISSION SEPSIS Q1 - RE-EVALUATED PATIENT FLUID AND VITAL SIGNS
[facility] Psychiatric Progress Note       Interim History   The patient's care was discussed with the treatment team and chart notes were reviewed. According to staff members, the patient continues to be pleasant, cooperative, medication compliant, and attending group sessions. It was noted that the patient expressed feelings of becoming withdrawn and lethargic more than usual. Per  note, Eufemia Lundy at [employer] has reported they are pursing revocation of the patients provisional discharge. Pt seen on 2/21/19 by Dr. Phillips. Upon interview, the patient reports he is feeling a little achy, flu-like. Dr. Phillips informs the patient this could be from the medication Trintellix that was recently started, thus he will order Zofran PRN. Again, Dr. Phillips reviews the possibility of the patient attending [facility] after this hospitalization. The patient continues to be in support of this plan.      Hospital Course   This is a pleasant 27 year old male with a longstanding history of substance abuse (methamphetamine and alcohol) and anxiety. The patient was presented to the Emergency Department for psychiatric evaluation due to the patient demonstrating symptoms of psychosis. It was noted that the patient had to be placed in 5 point restraints due to becoming agitated, paranoid, and violent. He was given Ativan for this and became sedate, calm, and cooperative thus the restraints were removed shortly after. Patient has previous hospitalizations and ED visits in regards to psychosis (auditory hallucinations), drug induced. He had a recent ED visit on 2/16/19 in which he was discharged back to [facility]. He is currently under full commitment through [facility] in regards to substance abuse from 11/1/18-5/1/19. On interview with Dr. Phillips, patient expressed feelings of anxiety, however he is not currently experiencing auditory hallucinations. He reported  "that these psychotic episodes occur during and after drug use. Patient's current drugs of choice are alcohol, which then leads him to use methamphetamine. He stated prior to relapse 2 days ago, he had his longest sobriety period, which was 4 months. Due to patient's current state of anxiousness, Dr. Phillips informed patient about the  medication Trintellix. After reviewing his medication, patient gave verbal consent to start this medication. Thus, Dr. Phillips started Trintellix 5 mg daily. Along with this medication adjustment, Dr. Phillips has started Vraylar 1.5 mg in mornings to address patient's recurrent auditory hallucinations. Additionally, our  was informed by [place] at [facility] that patient has been discharged at this time.      Medications     Current Facility-Administered Medications Ordered in Epic   Medication Dose Route Frequency Last Rate Last Dose     - Psychiatric Emergency -   Does not apply See Admin Instructions         cariprazine (VRAYLAR) capsule CAPS 1.5 mg  1.5 mg Oral Daily   1.5 mg at 02/21/19 0847     ibuprofen (ADVIL/MOTRIN) tablet 400 mg  400 mg Oral Q6H PRN   400 mg at 02/21/19 1214     melatonin tablet 5 mg  5 mg Oral At Bedtime PRN         nicotine (NICORETTE) gum 2 mg  2 mg Buccal Q1H PRN         OLANZapine zydis (zyPREXA) ODT tab 5-10 mg  5-10 mg Oral BID PRN   5 mg at 02/21/19 1214    Or     OLANZapine (zyPREXA) injection 5-10 mg  5-10 mg Intramuscular BID PRN         OLANZapine (zyPREXA) tablet 2.5 mg  2.5 mg Oral At Bedtime   2.5 mg at 02/20/19 2102     omeprazole (priLOSEC) CR capsule 40 mg  40 mg Oral QAM   40 mg at 02/21/19 0847     vortioxetine (TRINTELLIX/BRINTELLIX) tablet 5 mg  5 mg Oral Daily   5 mg at 02/21/19 0846     No current [facility]-ordered outpatient medications on file.         Allergies      Allergies   Allergen Reactions     Seroquel [Quetiapine]      \"When I take it I go to sleep for like, days\"        Medical Review of Systems     BP (!) " "145/96   Pulse 115   Temp 98.1  F (36.7  C) (Oral)   Resp 16   Ht 1.727 m (5' 8\")   Wt 128.1 kg (282 lb 6.4 oz)   SpO2 96%   BMI 42.94 kg/m    Body mass index is 42.94 kg/m .  A 10-point review of systems was performed by Yong Phillips MD and is negative, no new findings.      Psychiatric Examination     Appearance Sitting in chair, dressed in hospital scrubs. Appears stated age.   Attitude Cooperative   Orientation Oriented to person, place, time   Eye Contact Good   Speech Regular rate, rhythm, volume and tone   Language Normal   Psychomotor Behavior Normal   Mood Depressed   Affect Pleasant   Thought Process Goal-Oriented, Intact   Associations Intact   Thought Content Patient is currently negative for suicidal ideation, negative for plan or intent, able to contract no self harm and identify barriers to suicide.  Negative for obsessions, compulsions or psychosis.     Fund of Knowledge Intact   Insight Improving   Judgement Improving   Attention Span & Concentration Intact   Recent & Remote Memory Normal   Gait Normal   Muscle Tone Intact        Labs     Labs reviewed.  No results found for this or any previous visit (from the past 24 hour(s)).     Impression   This is a pleasant 27 year old male with a longstanding history of substance abuse (methamphetamine and alcohol) and anxiety. The patient was presented to the Emergency Department for psychiatric evaluation due to the patient demonstrating symptoms of psychosis. Attending staff members, the patient has been cooperative, medication compliant, and pleasant throughout SDU.  He proceeds to tolerate his current medication regiment. He has denied experiencing any forms of side effects or attaining any concerns in regards of his medications. Again, the patient has a desire to attend an IOP after this hospitalization, thus our  will continue to be in contact with the patients  in regards of placement at [facility]. "      Diagnoses     1. Anxiety Disorder, NOS  2. Major depression, recurrent, severe, without psychotic features.  3. 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 Explained side effects, benefits, and complications of medications to the patient, Pt gave verbal consent.  2. Medication changes: Ordered Zofran 8mg every 6 hrs PRN   3. Discussed treatment plan with patient and team.  4. Projected length of stay: 7+ days  5.  will contact the patients  in regards of finding   6. Please encourage the patient to attend group sessions    Attestation:   Patient has been seen and evaluated by me, Yong Phillips MD.    Patient ID:  Name: Vipul Salazar    MRN: [id number]  Admission: 2/18/2019   YOB: 1991   I have re-evaluated the patient's fluid status and reviewed vital signs. Clinical perfusion assessment was performed.

## 2021-07-05 NOTE — ED PROVIDER NOTE - PHYSICAL EXAMINATION
faint non-blanching non-confluent macular rash on b/l LE  no LE edema  resp: mild tachypnea, good air entry b/l, minimal scattered coarse breath sounds.

## 2021-07-06 LAB
A1C WITH ESTIMATED AVERAGE GLUCOSE RESULT: 8.2 % — HIGH (ref 4–5.6)
ANION GAP SERPL CALC-SCNC: 11 MMOL/L — SIGNIFICANT CHANGE UP (ref 5–17)
ANION GAP SERPL CALC-SCNC: 12 MMOL/L — SIGNIFICANT CHANGE UP (ref 5–17)
ANION GAP SERPL CALC-SCNC: 13 MMOL/L — SIGNIFICANT CHANGE UP (ref 5–17)
ANISOCYTOSIS BLD QL: SLIGHT — SIGNIFICANT CHANGE UP
APTT BLD: 29.8 SEC — SIGNIFICANT CHANGE UP (ref 27.5–35.5)
BASOPHILS # BLD AUTO: 0 K/UL — SIGNIFICANT CHANGE UP (ref 0–0.2)
BASOPHILS NFR BLD AUTO: 0 % — SIGNIFICANT CHANGE UP (ref 0–2)
BLD GP AB SCN SERPL QL: NEGATIVE — SIGNIFICANT CHANGE UP
BUN SERPL-MCNC: 25 MG/DL — HIGH (ref 7–23)
BUN SERPL-MCNC: 26 MG/DL — HIGH (ref 7–23)
BUN SERPL-MCNC: 27 MG/DL — HIGH (ref 7–23)
BURR CELLS BLD QL SMEAR: PRESENT — SIGNIFICANT CHANGE UP
CALCIUM SERPL-MCNC: 8.4 MG/DL — SIGNIFICANT CHANGE UP (ref 8.4–10.5)
CALCIUM SERPL-MCNC: 8.6 MG/DL — SIGNIFICANT CHANGE UP (ref 8.4–10.5)
CALCIUM SERPL-MCNC: 8.6 MG/DL — SIGNIFICANT CHANGE UP (ref 8.4–10.5)
CHLORIDE SERPL-SCNC: 93 MMOL/L — LOW (ref 96–108)
CHLORIDE SERPL-SCNC: 96 MMOL/L — SIGNIFICANT CHANGE UP (ref 96–108)
CHLORIDE SERPL-SCNC: 98 MMOL/L — SIGNIFICANT CHANGE UP (ref 96–108)
CMV IGG FLD QL: 7.5 U/ML — HIGH
CMV IGG SERPL-IMP: POSITIVE
CMV IGM FLD-ACNC: >240 AU/ML — HIGH
CMV IGM SERPL QL: POSITIVE
CO2 SERPL-SCNC: 19 MMOL/L — LOW (ref 22–31)
CO2 SERPL-SCNC: 19 MMOL/L — LOW (ref 22–31)
CO2 SERPL-SCNC: 21 MMOL/L — LOW (ref 22–31)
COVID-19 SPIKE DOMAIN AB INTERP: POSITIVE
COVID-19 SPIKE DOMAIN ANTIBODY RESULT: >250 U/ML — HIGH
CREAT SERPL-MCNC: 1.14 MG/DL — SIGNIFICANT CHANGE UP (ref 0.5–1.3)
CREAT SERPL-MCNC: 1.18 MG/DL — SIGNIFICANT CHANGE UP (ref 0.5–1.3)
CREAT SERPL-MCNC: 1.38 MG/DL — HIGH (ref 0.5–1.3)
CULTURE RESULTS: NO GROWTH — SIGNIFICANT CHANGE UP
DACRYOCYTES BLD QL SMEAR: SLIGHT — SIGNIFICANT CHANGE UP
EBV EA AB SER IA-ACNC: >150 U/ML — HIGH
EBV EA AB TITR SER IF: NEGATIVE — SIGNIFICANT CHANGE UP
EBV EA IGG SER-ACNC: POSITIVE
EBV NA IGG SER IA-ACNC: <3 U/ML — SIGNIFICANT CHANGE UP
EBV PATRN SPEC IB-IMP: SIGNIFICANT CHANGE UP
EBV VCA IGG AVIDITY SER QL IA: POSITIVE
EBV VCA IGM SER IA-ACNC: 458 U/ML — HIGH
EBV VCA IGM SER IA-ACNC: >160 U/ML — HIGH
EBV VCA IGM TITR FLD: POSITIVE
EOSINOPHIL # BLD AUTO: 0 K/UL — SIGNIFICANT CHANGE UP (ref 0–0.5)
EOSINOPHIL NFR BLD AUTO: 0 % — SIGNIFICANT CHANGE UP (ref 0–6)
ESTIMATED AVERAGE GLUCOSE: 189 MG/DL — HIGH (ref 68–114)
GAS PNL BLDA: SIGNIFICANT CHANGE UP
GAS PNL BLDA: SIGNIFICANT CHANGE UP
GLUCOSE BLDC GLUCOMTR-MCNC: 266 MG/DL — HIGH (ref 70–99)
GLUCOSE BLDC GLUCOMTR-MCNC: 268 MG/DL — HIGH (ref 70–99)
GLUCOSE BLDC GLUCOMTR-MCNC: 273 MG/DL — HIGH (ref 70–99)
GLUCOSE BLDC GLUCOMTR-MCNC: 281 MG/DL — HIGH (ref 70–99)
GLUCOSE BLDC GLUCOMTR-MCNC: 302 MG/DL — HIGH (ref 70–99)
GLUCOSE SERPL-MCNC: 272 MG/DL — HIGH (ref 70–99)
GLUCOSE SERPL-MCNC: 274 MG/DL — HIGH (ref 70–99)
GLUCOSE SERPL-MCNC: 314 MG/DL — HIGH (ref 70–99)
GRAM STN FLD: SIGNIFICANT CHANGE UP
HCT VFR BLD CALC: 32.1 % — LOW (ref 39–50)
HGB BLD-MCNC: 10.3 G/DL — LOW (ref 13–17)
INR BLD: 1.41 — HIGH (ref 0.88–1.16)
LEGIONELLA AG UR QL: NEGATIVE — SIGNIFICANT CHANGE UP
LYMPHOCYTES # BLD AUTO: 16.05 K/UL — HIGH (ref 1–3.3)
LYMPHOCYTES # BLD AUTO: 85.9 % — HIGH (ref 13–44)
MAGNESIUM SERPL-MCNC: 2.2 MG/DL — SIGNIFICANT CHANGE UP (ref 1.6–2.6)
MANUAL SMEAR VERIFICATION: SIGNIFICANT CHANGE UP
MCHC RBC-ENTMCNC: 30.3 PG — SIGNIFICANT CHANGE UP (ref 27–34)
MCHC RBC-ENTMCNC: 32.1 GM/DL — SIGNIFICANT CHANGE UP (ref 32–36)
MCV RBC AUTO: 94.4 FL — SIGNIFICANT CHANGE UP (ref 80–100)
METAMYELOCYTES # FLD: 4.4 % — HIGH (ref 0–0)
MICROCYTES BLD QL: SLIGHT — SIGNIFICANT CHANGE UP
MONOCYTES # BLD AUTO: 0.17 K/UL — SIGNIFICANT CHANGE UP (ref 0–0.9)
MONOCYTES NFR BLD AUTO: 0.9 % — LOW (ref 2–14)
MYELOCYTES NFR BLD: 2.6 % — HIGH (ref 0–0)
NEUTROPHILS # BLD AUTO: 0.99 K/UL — LOW (ref 1.8–7.4)
NEUTROPHILS NFR BLD AUTO: 4.4 % — LOW (ref 43–77)
NEUTS BAND # BLD: 0.9 % — SIGNIFICANT CHANGE UP (ref 0–8)
NT-PROBNP SERPL-SCNC: 3362 PG/ML — HIGH (ref 0–300)
OVALOCYTES BLD QL SMEAR: SLIGHT — SIGNIFICANT CHANGE UP
PLAT MORPH BLD: ABNORMAL
PLATELET # BLD AUTO: 120 K/UL — LOW (ref 150–400)
POIKILOCYTOSIS BLD QL AUTO: SLIGHT — SIGNIFICANT CHANGE UP
POLYCHROMASIA BLD QL SMEAR: SLIGHT — SIGNIFICANT CHANGE UP
POTASSIUM SERPL-MCNC: 5.2 MMOL/L — SIGNIFICANT CHANGE UP (ref 3.5–5.3)
POTASSIUM SERPL-MCNC: 5.4 MMOL/L — HIGH (ref 3.5–5.3)
POTASSIUM SERPL-MCNC: 5.5 MMOL/L — HIGH (ref 3.5–5.3)
POTASSIUM SERPL-SCNC: 5.2 MMOL/L — SIGNIFICANT CHANGE UP (ref 3.5–5.3)
POTASSIUM SERPL-SCNC: 5.4 MMOL/L — HIGH (ref 3.5–5.3)
POTASSIUM SERPL-SCNC: 5.5 MMOL/L — HIGH (ref 3.5–5.3)
PROTHROM AB SERPL-ACNC: 16.6 SEC — HIGH (ref 10.6–13.6)
RBC # BLD: 3.4 M/UL — LOW (ref 4.2–5.8)
RBC # FLD: 15.8 % — HIGH (ref 10.3–14.5)
RBC BLD AUTO: ABNORMAL
RH IG SCN BLD-IMP: POSITIVE — SIGNIFICANT CHANGE UP
SARS-COV-2 IGG+IGM SERPL QL IA: >250 U/ML — HIGH
SARS-COV-2 IGG+IGM SERPL QL IA: POSITIVE
SMUDGE CELLS # BLD: PRESENT — SIGNIFICANT CHANGE UP
SODIUM SERPL-SCNC: 125 MMOL/L — LOW (ref 135–145)
SODIUM SERPL-SCNC: 127 MMOL/L — LOW (ref 135–145)
SODIUM SERPL-SCNC: 130 MMOL/L — LOW (ref 135–145)
SPECIMEN SOURCE: SIGNIFICANT CHANGE UP
SPECIMEN SOURCE: SIGNIFICANT CHANGE UP
VARIANT LYMPHS # BLD: 0.9 % — SIGNIFICANT CHANGE UP (ref 0–6)
WBC # BLD: 18.68 K/UL — HIGH (ref 3.8–10.5)
WBC # FLD AUTO: 18.68 K/UL — HIGH (ref 3.8–10.5)

## 2021-07-06 PROCEDURE — 99223 1ST HOSP IP/OBS HIGH 75: CPT

## 2021-07-06 PROCEDURE — 43753 TX GASTRO INTUB W/ASP: CPT

## 2021-07-06 PROCEDURE — 99222 1ST HOSP IP/OBS MODERATE 55: CPT

## 2021-07-06 PROCEDURE — 36620 INSERTION CATHETER ARTERY: CPT

## 2021-07-06 PROCEDURE — 71045 X-RAY EXAM CHEST 1 VIEW: CPT | Mod: 26,77

## 2021-07-06 PROCEDURE — 99291 CRITICAL CARE FIRST HOUR: CPT

## 2021-07-06 PROCEDURE — 76937 US GUIDE VASCULAR ACCESS: CPT | Mod: 26

## 2021-07-06 PROCEDURE — 99223 1ST HOSP IP/OBS HIGH 75: CPT | Mod: GC

## 2021-07-06 PROCEDURE — 71045 X-RAY EXAM CHEST 1 VIEW: CPT | Mod: 26

## 2021-07-06 RX ORDER — FENTANYL CITRATE 50 UG/ML
200 INJECTION INTRAVENOUS ONCE
Refills: 0 | Status: DISCONTINUED | OUTPATIENT
Start: 2021-07-06 | End: 2021-07-06

## 2021-07-06 RX ORDER — ACETAMINOPHEN 500 MG
1000 TABLET ORAL ONCE
Refills: 0 | Status: COMPLETED | OUTPATIENT
Start: 2021-07-06 | End: 2021-07-06

## 2021-07-06 RX ORDER — HEPARIN SODIUM 5000 [USP'U]/ML
1700 INJECTION INTRAVENOUS; SUBCUTANEOUS
Qty: 25000 | Refills: 0 | Status: DISCONTINUED | OUTPATIENT
Start: 2021-07-06 | End: 2021-07-07

## 2021-07-06 RX ORDER — ACETAMINOPHEN 500 MG
650 TABLET ORAL EVERY 6 HOURS
Refills: 0 | Status: DISCONTINUED | OUTPATIENT
Start: 2021-07-06 | End: 2021-07-06

## 2021-07-06 RX ORDER — FUROSEMIDE 40 MG
20 TABLET ORAL ONCE
Refills: 0 | Status: COMPLETED | OUTPATIENT
Start: 2021-07-06 | End: 2021-07-06

## 2021-07-06 RX ORDER — HUMAN INSULIN 100 [IU]/ML
20 INJECTION, SUSPENSION SUBCUTANEOUS ONCE
Refills: 0 | Status: COMPLETED | OUTPATIENT
Start: 2021-07-06 | End: 2021-07-06

## 2021-07-06 RX ORDER — NOREPINEPHRINE BITARTRATE/D5W 8 MG/250ML
0.05 PLASTIC BAG, INJECTION (ML) INTRAVENOUS
Qty: 8 | Refills: 0 | Status: DISCONTINUED | OUTPATIENT
Start: 2021-07-06 | End: 2021-07-07

## 2021-07-06 RX ORDER — INSULIN GLARGINE 100 [IU]/ML
40 INJECTION, SOLUTION SUBCUTANEOUS AT BEDTIME
Refills: 0 | Status: DISCONTINUED | OUTPATIENT
Start: 2021-07-06 | End: 2021-07-07

## 2021-07-06 RX ORDER — TACROLIMUS 5 MG/1
1 CAPSULE ORAL EVERY 12 HOURS
Refills: 0 | Status: DISCONTINUED | OUTPATIENT
Start: 2021-07-06 | End: 2021-07-07

## 2021-07-06 RX ORDER — DEXMEDETOMIDINE HYDROCHLORIDE IN 0.9% SODIUM CHLORIDE 4 UG/ML
0.2 INJECTION INTRAVENOUS
Qty: 400 | Refills: 0 | Status: DISCONTINUED | OUTPATIENT
Start: 2021-07-06 | End: 2021-07-06

## 2021-07-06 RX ORDER — INSULIN GLARGINE 100 [IU]/ML
25 INJECTION, SOLUTION SUBCUTANEOUS AT BEDTIME
Refills: 0 | Status: DISCONTINUED | OUTPATIENT
Start: 2021-07-06 | End: 2021-07-06

## 2021-07-06 RX ORDER — FENTANYL CITRATE 50 UG/ML
50 INJECTION INTRAVENOUS ONCE
Refills: 0 | Status: DISCONTINUED | OUTPATIENT
Start: 2021-07-06 | End: 2021-07-06

## 2021-07-06 RX ORDER — ACETAMINOPHEN 500 MG
650 TABLET ORAL EVERY 6 HOURS
Refills: 0 | Status: DISCONTINUED | OUTPATIENT
Start: 2021-07-06 | End: 2021-07-11

## 2021-07-06 RX ORDER — DEXTROSE 50 % IN WATER 50 %
50 SYRINGE (ML) INTRAVENOUS ONCE
Refills: 0 | Status: COMPLETED | OUTPATIENT
Start: 2021-07-06 | End: 2021-07-06

## 2021-07-06 RX ORDER — SODIUM ZIRCONIUM CYCLOSILICATE 10 G/10G
5 POWDER, FOR SUSPENSION ORAL ONCE
Refills: 0 | Status: COMPLETED | OUTPATIENT
Start: 2021-07-06 | End: 2021-07-06

## 2021-07-06 RX ORDER — SODIUM ZIRCONIUM CYCLOSILICATE 10 G/10G
5 POWDER, FOR SUSPENSION ORAL ONCE
Refills: 0 | Status: DISCONTINUED | OUTPATIENT
Start: 2021-07-06 | End: 2021-07-06

## 2021-07-06 RX ORDER — INSULIN HUMAN 100 [IU]/ML
2 INJECTION, SOLUTION SUBCUTANEOUS
Qty: 50 | Refills: 0 | Status: DISCONTINUED | OUTPATIENT
Start: 2021-07-06 | End: 2021-07-08

## 2021-07-06 RX ORDER — FUROSEMIDE 40 MG
20 TABLET ORAL DAILY
Refills: 0 | Status: DISCONTINUED | OUTPATIENT
Start: 2021-07-06 | End: 2021-07-07

## 2021-07-06 RX ORDER — CHLORHEXIDINE GLUCONATE 213 G/1000ML
15 SOLUTION TOPICAL EVERY 12 HOURS
Refills: 0 | Status: DISCONTINUED | OUTPATIENT
Start: 2021-07-06 | End: 2021-07-10

## 2021-07-06 RX ORDER — INSULIN HUMAN 100 [IU]/ML
10 INJECTION, SOLUTION SUBCUTANEOUS ONCE
Refills: 0 | Status: COMPLETED | OUTPATIENT
Start: 2021-07-06 | End: 2021-07-06

## 2021-07-06 RX ORDER — TACROLIMUS 5 MG/1
2 CAPSULE ORAL
Refills: 0 | Status: DISCONTINUED | OUTPATIENT
Start: 2021-07-06 | End: 2021-07-06

## 2021-07-06 RX ORDER — PROPOFOL 10 MG/ML
5 INJECTION, EMULSION INTRAVENOUS
Qty: 500 | Refills: 0 | Status: DISCONTINUED | OUTPATIENT
Start: 2021-07-06 | End: 2021-07-07

## 2021-07-06 RX ADMIN — FENTANYL CITRATE 200 MICROGRAM(S): 50 INJECTION INTRAVENOUS at 14:51

## 2021-07-06 RX ADMIN — Medication 650 MILLIGRAM(S): at 18:23

## 2021-07-06 RX ADMIN — AZITHROMYCIN 255 MILLIGRAM(S): 500 TABLET, FILM COATED ORAL at 17:30

## 2021-07-06 RX ADMIN — PIPERACILLIN AND TAZOBACTAM 200 GRAM(S): 4; .5 INJECTION, POWDER, LYOPHILIZED, FOR SOLUTION INTRAVENOUS at 18:23

## 2021-07-06 RX ADMIN — PIPERACILLIN AND TAZOBACTAM 200 GRAM(S): 4; .5 INJECTION, POWDER, LYOPHILIZED, FOR SOLUTION INTRAVENOUS at 07:08

## 2021-07-06 RX ADMIN — PROPOFOL 2.91 MICROGRAM(S)/KG/MIN: 10 INJECTION, EMULSION INTRAVENOUS at 17:31

## 2021-07-06 RX ADMIN — FENTANYL CITRATE 200 MICROGRAM(S): 50 INJECTION INTRAVENOUS at 15:02

## 2021-07-06 RX ADMIN — Medication 20 MILLIGRAM(S): at 03:46

## 2021-07-06 RX ADMIN — Medication 650 MILLIGRAM(S): at 12:19

## 2021-07-06 RX ADMIN — LINEZOLID 300 MILLIGRAM(S): 600 INJECTION, SOLUTION INTRAVENOUS at 11:20

## 2021-07-06 RX ADMIN — HEPARIN SODIUM 17 UNIT(S)/HR: 5000 INJECTION INTRAVENOUS; SUBCUTANEOUS at 17:30

## 2021-07-06 RX ADMIN — Medication 650 MILLIGRAM(S): at 19:36

## 2021-07-06 RX ADMIN — PIPERACILLIN AND TAZOBACTAM 200 GRAM(S): 4; .5 INJECTION, POWDER, LYOPHILIZED, FOR SOLUTION INTRAVENOUS at 12:18

## 2021-07-06 RX ADMIN — Medication 50 MILLILITER(S): at 13:59

## 2021-07-06 RX ADMIN — FENTANYL CITRATE 50 MICROGRAM(S): 50 INJECTION INTRAVENOUS at 19:24

## 2021-07-06 RX ADMIN — Medication 6: at 11:46

## 2021-07-06 RX ADMIN — Medication 8: at 07:05

## 2021-07-06 RX ADMIN — INSULIN GLARGINE 40 UNIT(S): 100 INJECTION, SOLUTION SUBCUTANEOUS at 22:35

## 2021-07-06 RX ADMIN — SODIUM POLYSTYRENE SULFONATE 30 GRAM(S): 4.1 POWDER, FOR SUSPENSION ORAL at 01:06

## 2021-07-06 RX ADMIN — Medication 6: at 22:36

## 2021-07-06 RX ADMIN — FENTANYL CITRATE 50 MICROGRAM(S): 50 INJECTION INTRAVENOUS at 20:20

## 2021-07-06 RX ADMIN — SODIUM ZIRCONIUM CYCLOSILICATE 5 GRAM(S): 10 POWDER, FOR SUSPENSION ORAL at 17:30

## 2021-07-06 RX ADMIN — PROPOFOL 2.91 MICROGRAM(S)/KG/MIN: 10 INJECTION, EMULSION INTRAVENOUS at 21:03

## 2021-07-06 RX ADMIN — HUMAN INSULIN 20 UNIT(S): 100 INJECTION, SUSPENSION SUBCUTANEOUS at 09:38

## 2021-07-06 RX ADMIN — INSULIN HUMAN 10 UNIT(S): 100 INJECTION, SOLUTION SUBCUTANEOUS at 13:58

## 2021-07-06 RX ADMIN — LINEZOLID 300 MILLIGRAM(S): 600 INJECTION, SOLUTION INTRAVENOUS at 22:48

## 2021-07-06 RX ADMIN — CHLORHEXIDINE GLUCONATE 1 APPLICATION(S): 213 SOLUTION TOPICAL at 07:08

## 2021-07-06 RX ADMIN — PIPERACILLIN AND TAZOBACTAM 200 GRAM(S): 4; .5 INJECTION, POWDER, LYOPHILIZED, FOR SOLUTION INTRAVENOUS at 00:39

## 2021-07-06 RX ADMIN — Medication 6: at 17:31

## 2021-07-06 RX ADMIN — Medication 650 MILLIGRAM(S): at 11:20

## 2021-07-06 RX ADMIN — Medication 20 MILLIGRAM(S): at 11:21

## 2021-07-06 NOTE — CONSULT NOTE ADULT - ATTENDING COMMENTS
70M h/o renal transplant (6/2017 at [place] on tacro), MGUS, T2DM, extensive CAD p/w SOB, productive cough and weakness x 1 week. Was recently treated with azithro x 10 days w/o improvement. Of note, he traveled to [place] three weeks ago.  Upon presentation, he was febrile to 103.4, tachy, required BiPAP.  Lab notable for leukocytosis of 24 however lymphocyte predominant with ANC 0.63. CXR showed large R mid lung opacity. He was intubated today.  MRSA nasal swab neg. COVID ab positive, PCR neg x 2.  He is vaccinated x 2. He also developed b/l non-branching reticular leg rash which progressed during this admission.       Acute hypoxic respiratory failure due to PNA.  DDx broad in immunocompromised patient with renal transplant - includes bacterial pneumonia (although lymphocyte predominant), fungal pneumonia (large consolidation), atypical such as legionella (he is hyponatremic but didn't respond to azithro).  Less likely viral PNA (imaging not c/w viral).    Patient is both CMV IgG and IgM positive, which represents past infection or reactivation.  In setting of acute illness, it is common to have CMV reactivation and the serology result doesn't tell if patient has CMV disease or not.   EBV serology result represent past infection.       - cont zosyn/linezolid/azithro  - if oxygen requirement goes up or patient decompensate overnight, then start empiric voriconazole 600mg IV x q12h x 2 followed by 400mg IV q12h   - CT chest  - f/u CMV PCR serum  - toxo serum PCR and serology  - HSV serology   - legionella urine ag, strep urine ag  - fungitell, galactomannon, serum  - aspergillus serum ab  - histoplasma ag, ab  - cryptococcus ag, ab  - coccidioides ab   - sputum culture     For bronchoscopy, please send the following:  - bacterial, fungal, AFB culture  - nocardia culture, legionella culture  - PCP PCR, toxoplasma PCR, HSV PCR, CMV PCR, VZV PCR  - galactomannon, fungitell  - biopsy with GMS, AFB, CMV stain       Team 1 will follow you.  Case d/w primary team.    Chiquis García MD, MS  Infectious Disease attending  work cell [phone] 70M h/o renal transplant (6/2017 at [place] on tacro), MGUS, T2DM, extensive CAD p/w SOB, productive cough and weakness x 1 week. Was recently treated with azithro x 10 days w/o improvement. Of note, he traveled to [place] three weeks ago.  Upon presentation, he was febrile to 103.4, tachy, required BiPAP.  Lab notable for leukocytosis of 24 however lymphocyte predominant with ANC 0.63. CXR showed large R mid lung opacity. He was intubated today.  MRSA nasal swab neg. COVID ab positive, PCR neg x 2.  He is vaccinated x 2. He also developed b/l non-branching reticular leg rash which progressed during this admission.       Acute hypoxic respiratory failure due to PNA.  DDx broad in immunocompromised patient with renal transplant - includes bacterial pneumonia (although lymphocyte predominant), fungal pneumonia (large consolidation), atypical such as legionella (he is hyponatremic but didn't respond to azithro).  Less likely viral PNA (imaging not c/w viral).    Patient is both CMV IgG and IgM positive, which represents past infection or reactivation.  In setting of acute illness, it is common to have CMV reactivation and the serology result doesn't tell if patient has CMV disease or not.   EBV serology result represent past infection.       - cont zosyn/linezolid/azithro  - if oxygen requirement goes up or patient decompensate overnight, then start empiric voriconazole 600mg IV x q12h x 2 followed by 400mg IV q12h   - CT chest  - f/u CMV PCR serum  - toxo serum PCR and serology  - HSV serology   - legionella urine ag, strep urine ag  - fungitell, galactomannon, serum  - aspergillus serum ab  - histoplasma ag, ab  - cryptococcus ag, ab  - coccidioides ab   - sputum culture   - derm consult for skin biopsy of the rash    For bronchoscopy, please send the following:  - bacterial, fungal, AFB culture  - nocardia culture, legionella culture  - PCP PCR, toxoplasma PCR, HSV PCR, CMV PCR, VZV PCR  - galactomannon, fungitell  - biopsy with GMS, AFB, CMV stain       Team 1 will follow you.  Case d/w primary team.    Chiquis García MD, MS  Infectious Disease attending  work cell [phone]

## 2021-07-06 NOTE — CONSULT NOTE ADULT - ATTENDING COMMENTS
70M h/o renal transplant (6/2017 at [place] on tacro), MGUS, T2DM, extensive CAD p/w SOB, productive cough and weakness x 1 week. Was recently treated with azithro x 10 days w/o improvement. Of note, he traveled to [place] three weeks ago.  Upon presentation, he was febrile to 103.4, tachy, required BiPAP.  Lab notable for leukocytosis of 24 however lymphocyte predominant with ANC 0.63. CXR showed large R mid lung opacity. He was intubated today.  MRSA nasal swab neg. COVID ab positive, PCR neg x 2.  He is vaccinated x 2. He also developed b/l non-branching reticular leg rash which progressed during this admission.       Acute hypoxic respiratory failure due to PNA.  DDx broad in immunocompromised patient with renal transplant - includes bacterial pneumonia (although lymphocyte predominant), fungal pneumonia, atypical such as legionella (he is hyponatremic but didn't respond to azithro), viral PNA (although imaging not c/w viral).    Patient is both CMV IgG and IgM positive, which represents past infection or reactivation.  In setting of acute illness, it is common to have CMV reactivation and the serology result doesn't tell if patient has CMV disease or not.   EBV serology result represent past infection.       - cont zosyn/linezolid/azithro  - if oxygen requirement goes up or patient decompensate overnight, then start empiric voriconazole 600mg IV x q12h x 2 followed by 400mg IV q12h   - CT chest  - f/u CMV PCR serum  - toxo serum PCR and serology  - HSV serology   - legionella urine ag, strep urine ag  - fungitell, galactomannon, serum  - aspergillus serum ab  - histoplasma ag, ab  - cryptococcus ag, ab  - coccidioides ab   - sputum culture     For bronchoscopy, please send the following:  - bacterial, fungal, AFB culture  - nocardia culture, legionella culture  - PCP PCR, toxoplasma PCR, HSV PCR, CMV PCR, VZV PCR  - galactomannon, fungitell      Team 1 will follow you.  Case d/w primary team.    Chiquis García MD, MS  Infectious Disease attending  work cell [phone] Acute hypoxic respiratory failure with right side pneumonia with right side pleural effusion with h/o renal transplant (on tacrolimus) with lymphocytosis with h/o MGUS. Plan for CT chest and then consider for BAL with possible transbronchial biopsy. Continue management for HAP for  now. Possibility of additional lymphoma can not be ruled out. Rest as above.

## 2021-07-06 NOTE — CONSULT NOTE ADULT - ASSESSMENT
71 yo M with a past medical history of renal transplant (on tacrolimus, unk date), DM2, HTN, MGUS, pAF, BPH, HLD, CAD (s/p CABG 15 years ago), presents for 3-4 days of worsening cough. Patient states that he has a productive cough with brownish colored sputum. Found to have acute hypoxic respiratory failure and sepsis 2/2 likely multifocal pneumonia. Negative for legionella pneumonia. Today has been intubated.    Recommendations:  - Suspend Vancomycin, and Linezolid since MSSA has been found in the cultures  - Will continue Azithromycin 500mg until legionella is ruled out  - Zosyn 4.5g q6h to cover Pseudomonas  - f/u Tacrolimus level  - f/u urine strep  - f/u fungi work up (cryptococcus, aspergillus, PCP, hystoplasma (serology and PCR)), toxoplasma (serology and PCR), CMV serology (Ig M/Ig G).  - f/u blood culture, urine culture and sputum culture  - We recommend to obtain a CT chest 69 yo M with a past medical history of renal transplant (on tacrolimus, unk date), DM2, HTN, MGUS, pAF, BPH, HLD, CAD (s/p CABG 15 years ago), presents for 3-4 days of worsening cough. Patient states that he has a productive cough with brownish colored sputum. Found to have acute hypoxic respiratory failure and sepsis 2/2 likely multifocal pneumonia. Negative for legionella pneumonia. Today has been intubated. Pulmonology likely will perform diagnostic bronchoscopy tomorrow.    Recommendations:  - Suspend Vancomycin, and Linezolid since MSSA has been found in the cultures  - Will continue Azithromycin 500mg until legionella is ruled out  - Zosyn 4.5g q6h to cover Pseudomonas  - f/u Tacrolimus level  - f/u urine strep  - f/u fungi work up (cryptococcus, aspergillus, PCP, hystoplasma (serology and PCR)), toxoplasma (serology and PCR), CMV serology (Ig M/Ig G).  - f/u blood culture, urine culture and sputum culture  - We recommend to obtain a CT chest 69 yo M with a past medical history of renal transplant (on tacrolimus, unk date), DM2, HTN, MGUS, pAF, BPH, HLD, CAD (s/p CABG 15 years ago), presents for 3-4 days of worsening cough. Patient states that he has a productive cough with brownish colored sputum. Found to have acute hypoxic respiratory failure and sepsis 2/2 likely multifocal pneumonia. Negative for legionella pneumonia. Today has been intubated. Pulmonology likely will perform diagnostic bronchoscopy tomorrow.    Recommendations:  - continue Azithromycin 500mg for now  - Continue linezolid 600mg IV q12h for now  - Zosyn 4.5g q6h to cover Pseudomonas  - f/u Tacrolimus level  - f/u urine strep  - f/u fungi work up (cryptococcus, aspergillus, PCP, hystoplasma (serology and PCR)), toxoplasma (serology and PCR), CMV serology (Ig M/Ig G).  - f/u blood culture, urine culture and sputum culture  - We recommend to obtain a CT chest

## 2021-07-06 NOTE — CONSULT NOTE ADULT - ASSESSMENT
69 YO M with PMH of renal transplant (6/2017, [place], on Tacrolimus), DM2 (on insulin), HTN, MGUS, AF (on Eliquis), CAD (CABG ~15 years ago) presents with generalized weakness, admitted for acute hypoxic respiratory failure and found to have large R sided pneumonia.    # Acute hypoxic respiratory failure 2/2 below  # R Lung lobar pneumonia in context of immunosuppression with Tacrolimus    Hypoxia (90% on 6L). Fever (TMax 103.4). Tachypnea to 35.  Small noncomplicated effusion on R side on Pocus. Large lobar consolidation on Xray.  RVP negative.  Procal only 0.43. Lyph 16k, Neutr <1.0.     Overall clinical picture is consistent with bacterial pneumonia, however several features favor atypical pathogens in this relatively immunosupressed patient:   1. Lymphocytosis with neutropenia, while when admitted in 2019 with cellulitis he diff was profoundly neutrophilic   2. Macular rash on lower extremities points towards viral, but RVP is negative. CMV PCR is pending.  3. Macular rash and hyponatremia (although he was hypoNA already on prior admission) also raises concern for Legionella.    Suggest:  - agree with current Antibiotics  - follow CMV PCR  - send urine legionella antigen  - CT chest with contrast   - will likely perform bronchoscopy tomorrow, please keep NPO for tomorrow, and off apixaban       INCOMPLETE NOTE       71 YO M with PMH of renal transplant (6/2017, [place], on Tacrolimus), DM2 (on insulin), HTN, MGUS, AF (on Eliquis), CAD (CABG ~15 years ago) presents with generalized weakness, admitted for acute hypoxic respiratory failure and found to have large R sided pneumonia.    # Acute hypoxic respiratory failure 2/2 below  # R Lung lobar pneumonia in context of immunosuppression with Tacrolimus    Hypoxia (90% on 6L). Fever (TMax 103.4). Tachypnea to 35.  Small noncomplicated effusion on R side on Pocus. Large lobar consolidation on Xray.  RVP negative.  Procal only 0.43. Lyph 16k, Neutr <1.0.     Overall clinical picture is consistent with bacterial pneumonia, however several features favor atypical pathogens in this relatively immunosupressed patient:   1. Lymphocytosis with neutropenia, while when admitted in 2019 with bacterial cellulitis his diff was profoundly neutrophilic   2. Macular rash on lower extremities points towards viral, but RVP is negative. CMV PCR is pending.  3. Macular rash and hyponatremia (although he was hypoNA already on prior admission) also raises concern for Legionella, especially given T-Lymphocyte suppression 2/2 Tacrolimus.    Suggest:  - agree with current Antibiotics  - follow CMV PCR  - send urine legionella antigen  - CT chest with contrast   - will likely perform bronchoscopy tomorrow, please keep NPO for tomorrow, and off apixaban       INCOMPLETE NOTE       71 YO M with PMH of renal transplant (6/2017, [place], on Tacrolimus), DM2 (on insulin), HTN, MGUS, AF (on Eliquis), CAD (CABG ~15 years ago) presents with generalized weakness, admitted for acute hypoxic respiratory failure and found to have large R sided pneumonia.    # Acute hypoxic respiratory failure 2/2 below  # R Lung lobar pneumonia in context of immunosuppression with Tacrolimus    Hypoxia (90% on 6L). Fever (TMax 103.4). Tachypnea to 35.  Small noncomplicated effusion on R side on Pocus. Large lobar consolidation on Xray.  RVP negative.  Procal only 0.43. Lyph 16k, Neutr <1.0.     Overall clinical picture is consistent with bacterial pneumonia, however several features favor atypical pathogens in this relatively immunosupressed patient:   1. Lymphocytosis with neutropenia, while when admitted in 2019 with bacterial cellulitis his diff was profoundly neutrophilic   2. Macular rash on lower extremities points towards viral, but RVP is negative. CMV PCR is pending.  3. Macular rash and hyponatremia also raises concern for Legionella, especially given T-Lymphocyte suppression 2/2 Tacrolimus.    Suggest:  - agree with current Antibiotics  - follow CMV PCR  - send urine legionella antigen  - CT chest with contrast   - will likely perform bronchoscopy tomorrow, please keep NPO for tomorrow, and off apixaban      71 YO M with PMH of renal transplant (6/2017, [place], on Tacrolimus), DM2 (on insulin), HTN, MGUS, AF (on Eliquis), CAD (CABG ~15 years ago) presents with generalized weakness, admitted for acute hypoxic respiratory failure and found to have large R sided pneumonia.    # Acute hypoxic respiratory failure 2/2 below  # R Lung lobar pneumonia in context of immunosuppression with Tacrolimus    Hypoxia (90% on 6L). Fever (TMax 103.4). Tachypnea to 35.  Small noncomplicated effusion on R side on Pocus. Large lobar consolidation on Xray.  RVP negative.  Procal only 0.43. Lyph 16k, Neutr <1.0.     Overall clinical picture is consistent with bacterial pneumonia, however several features favor atypical pathogens in this relatively immunosupressed patient:   1. Lymphocytosis with neutropenia, while when admitted in 2019 with bacterial cellulitis his diff was profoundly neutrophilic   2. Macular rash on lower extremities points towards viral, but RVP is negative. CMV PCR is pending.  3. Macular rash and hyponatremia also raises concern for Legionella, especially given T-Lymphocyte suppression 2/2 Tacrolimus.    Suggest:  - agree with current Antibiotics  - follow CMV PCR  - send urine legionella antigen and legionella PCR from sputum  - send sputum cultures  - send PCP PCR in sputum  - CT chest with contrast   - will likely perform bronchoscopy tomorrow, please keep NPO for tomorrow, and off apixaban      69 YO M with PMH of renal transplant (6/2017, [place], on Tacrolimus), DM2 (on insulin), HTN, MGUS, AF (on Eliquis), CAD (CABG ~15 years ago) presents with generalized weakness, admitted for acute hypoxic respiratory failure and found to have large R sided pneumonia.    # Acute hypoxic respiratory failure 2/2 below  # R Lung lobar pneumonia in context of immunosuppression with Tacrolimus    Hypoxia (90% on 6L). Fever (TMax 103.4). Tachypnea to 35.  Small noncomplicated effusion on R side on Pocus. Large lobar consolidation on Xray.  RVP negative.  Procal only 0.43. Lyph 16k, Neutr <1.0.     Overall clinical picture is consistent with bacterial pneumonia, however several features favor atypical pathogens in this relatively immunosupressed patient:   1. Lymphocytosis with neutropenia, while when admitted in 2019 with bacterial cellulitis his diff was profoundly neutrophilic   2. Macular rash on lower extremities points towards viral, but RVP is negative. CMV PCR is pending.  3. Macular rash and hyponatremia also raises concern for Legionella, especially given T-Lymphocyte suppression 2/2 Tacrolimus.    Suggest:  - agree with current Antibiotics  - follow CMV PCR  - send urine legionella antigen and legionella PCR from sputum  - send sputum cultures  - send PCP PCR in sputum  - CT chest with contrast   - will likely perform diagnostic bronchoscopy tomorrow, please keep NPO for tomorrow, and off apixaban      71 YO M with PMH of renal transplant (6/2017, [place], on Tacrolimus), DM2 (on insulin), HTN, MGUS, AF (on Eliquis), CAD (CABG ~15 years ago) presents with generalized weakness, admitted for acute hypoxic respiratory failure and found to have large R sided pneumonia.    # Acute hypoxic respiratory failure 2/2 below  # R Lung lobar pneumonia in context of immunosuppression with Tacrolimus    Hypoxia (90% on 6L). Fever (TMax 103.4). Tachypnea to 35.  Small noncomplicated effusion on R side on Pocus. Large lobar consolidation on Xray.  RVP negative.  Procal only 0.43. Lyph 16k, Neutr <1.0.     Overall clinical picture is consistent with bacterial pneumonia, however several features favor atypical pathogens in this relatively immunosupressed patient:   1. Lymphocytosis with neutropenia, while when admitted in 2019 with bacterial cellulitis his diff was profoundly neutrophilic   2. Macular rash on lower extremities points towards viral, but RVP is negative. CMV PCR is pending.  3. Macular rash and hyponatremia also raises concern for Legionella, especially given T-Lymphocyte suppression 2/2 Tacrolimus.    Suggest:  - agree with current Antibiotics  - follow CMV PCR  - send urine legionella antigen and legionella PCR from sputum  - send sputum cultures  - send PCP PCR in sputum  - CT chest  - will likely perform diagnostic bronchoscopy tomorrow, please keep NPO for tomorrow, and off apixaban

## 2021-07-06 NOTE — CONSULT NOTE ADULT - SUBJECTIVE AND OBJECTIVE BOX
Patient is a 70y old  Male who presents with a chief complaint of sepsis (06 Jul 2021 12:53)      HPI:  70M with PMHx significant for renal transplant (in 2017 2/2 diabetic nephropathy and MGUS with post-transplant lymphoma, now with , renal transplant physician at [facility] Dr Julisa Carreno formerly on cellcept now on  tacrolimus, ), DM2, HTN, MGUS, pAF, BPH, HLD, CAD (s/p CABG 15 years ago), presents for 3-4 days of worsening cough. Patient states that he has a productive cough with brownish colored sputum. Patient denied fever or shortness of breath at home, but on day of admission did notice that his breathing felt different and that he was breathing quicker. Per ED it was reported that he completed a Z-pack without improvement. Past history notable for COVID+ March 2020, also received pfizer vaccine x2 a few months ago. Endorses SOB, is drinking less fluid, and may be urinating less. Denies sick contacts, HA, CP, rhinorrhea, sore throat, nausea, vomiting, diarrhea, abd pain, urinary complaints, black/bloody stool, focal weakness/numbness, vision changes, neck/back pain, LE pain/swelling.     Patient also reports a rash on both of his upper thighs that he developed after coming back from [place] 3 weeks ago. It does not itch, does not know if any creams or detergents were used. States that he has a dermatologist, but does not know what the diagnosis is.    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 poor follow-up/compliance; patients baseline Cr is 1    ED Vitals:   T 100, P 114, /83, RR 18, O2 94% on 6L NC  T 103.4, P 115, /65, RR 22, O2 96% on 6L NC  ED Course:  Labs notable for WBC 24.29 (92.2% neutrophils), Hgb 11.4, Lactate 1.3, INR 1.75, Na 126, Cl 91, Co2 19, AG 16, procal 0.43, Cr 1.16  CXR: Cardiomegaly, status post median sternotomy, CABG. Bilateral opacities/right perihilar opacity/consolidation.    Bedside POCUS: diffuse b-lines, consolidations worse on R lung (05 Jul 2021 17:04)      PAST MEDICAL & SURGICAL HISTORY:  Barretts esophagus    CRI (chronic renal insufficiency)    DM (diabetes mellitus)    GERD (gastroesophageal reflux disease)    HTN (hypertension)    Monoclonal gammopathy    Mandibular abscess    Paroxysmal atrial fibrillation    Benign prostatic hyperplasia, presence of lower urinary tract symptoms unspecified, unspecified morphology    Other hyperlipidemia    S/P CABG (coronary artery bypass graft)  15 years    History of surgery  cyst removal    Mandibular abscess          Allergies:  No Known Allergies      Home Medications:   acetaminophen  Suppository .. 650 milliGRAM(s) Rectal every 6 hours PRN  azithromycin  IVPB      azithromycin  IVPB 500 milliGRAM(s) IV Intermittent every 24 hours  chlorhexidine 2% Cloths 1 Application(s) Topical <User Schedule>  dextrose 40% Gel 15 Gram(s) Oral once  dextrose 5%. 1000 milliLiter(s) IV Continuous <Continuous>  dextrose 5%. 1000 milliLiter(s) IV Continuous <Continuous>  dextrose 50% Injectable 25 Gram(s) IV Push once  dextrose 50% Injectable 12.5 Gram(s) IV Push once  dextrose 50% Injectable 25 Gram(s) IV Push once  furosemide   Injectable 20 milliGRAM(s) IV Push daily  glucagon  Injectable 1 milliGRAM(s) IntraMuscular once  insulin glargine Injectable (LANTUS) 25 Unit(s) SubCutaneous at bedtime  insulin lispro (ADMELOG) corrective regimen sliding scale   SubCutaneous every 6 hours  linezolid  IVPB 600 milliGRAM(s) IV Intermittent every 12 hours  norepinephrine Infusion 0.05 MICROgram(s)/kG/Min IV Continuous <Continuous>  piperacillin/tazobactam IVPB.. 4.5 Gram(s) IV Intermittent every 6 hours  propofol Infusion 5 MICROgram(s)/kG/Min IV Continuous <Continuous>      Hospital Medications:   MEDICATIONS  (STANDING):  azithromycin  IVPB      azithromycin  IVPB 500 milliGRAM(s) IV Intermittent every 24 hours  chlorhexidine 2% Cloths 1 Application(s) Topical <User Schedule>  dextrose 40% Gel 15 Gram(s) Oral once  dextrose 5%. 1000 milliLiter(s) (50 mL/Hr) IV Continuous <Continuous>  dextrose 5%. 1000 milliLiter(s) (100 mL/Hr) IV Continuous <Continuous>  dextrose 50% Injectable 25 Gram(s) IV Push once  dextrose 50% Injectable 12.5 Gram(s) IV Push once  dextrose 50% Injectable 25 Gram(s) IV Push once  furosemide   Injectable 20 milliGRAM(s) IV Push daily  glucagon  Injectable 1 milliGRAM(s) IntraMuscular once  insulin glargine Injectable (LANTUS) 25 Unit(s) SubCutaneous at bedtime  insulin lispro (ADMELOG) corrective regimen sliding scale   SubCutaneous every 6 hours  linezolid  IVPB 600 milliGRAM(s) IV Intermittent every 12 hours  norepinephrine Infusion 0.05 MICROgram(s)/kG/Min (9.1 mL/Hr) IV Continuous <Continuous>  piperacillin/tazobactam IVPB.. 4.5 Gram(s) IV Intermittent every 6 hours  propofol Infusion 5 MICROgram(s)/kG/Min (2.91 mL/Hr) IV Continuous <Continuous>      SOCIAL HISTORY:  Denies ETOh, Smoking,     Family History:  FAMILY HISTORY:        VITALS:  T(F): 101.6 (07-06-21 @ 11:00), Max: 101.6 (07-06-21 @ 11:00)  HR: 80 (07-06-21 @ 14:30)  BP: 124/57 (07-06-21 @ 14:30)  RR: 28 (07-06-21 @ 14:30)  SpO2: 92% (07-06-21 @ 14:30)  Wt(kg): --    07-05 @ 07:01  -  07-06 @ 07:00  --------------------------------------------------------  IN: 350 mL / OUT: 1000 mL / NET: -650 mL    07-06 @ 07:01  -  07-06 @ 15:04  --------------------------------------------------------  IN: 400 mL / OUT: 350 mL / NET: 50 mL        CAPILLARY BLOOD GLUCOSE      POCT Blood Glucose.: 273 mg/dL (06 Jul 2021 11:43)  POCT Blood Glucose.: 266 mg/dL (06 Jul 2021 09:32)  POCT Blood Glucose.: 302 mg/dL (06 Jul 2021 06:33)  POCT Blood Glucose.: 256 mg/dL (05 Jul 2021 22:50)      Review of Systems:  CONSTITUTIONAL: No fever or chills.  RESPIRATORY: + shortness of breath  CARDIOVASCULAR: + shortness of breath   GASTROINTESTINAL: No abdominal pain, nausea, vomiting, diarrhea  SKIN: + rash on bl upper thighs   MUSCULOSKELETAL: + swelling  Psych: Denies suicidal or homicidal ideation    PHYSICAL EXAM:  GENERAL: Alert, awake, on bipap; in distress    CHEST/LUNG: + rhonchi BL; + crackles   HEART: Regular rate and rhythm, no murmur, no gallops, no rub   ABDOMEN: Soft, nontender, non distended  EXTREMITIES: 2+ pedal edema   SKIN+ macular rash on bL upper thighs       LABS:  07-06    127<L>  |  96  |  26<H>  ----------------------------<  272<H>  5.5<H>   |  19<L>  |  1.18    Ca    8.4      06 Jul 2021 12:05  Mg     2.2     07-06    TPro  6.8  /  Alb  3.4  /  TBili  1.2  /  DBili      /  AST  26  /  ALT  18  /  AlkPhos  108  07-05    Creatinine Trend: 1.18 <--, 1.14 <--, 1.21 <--, 1.16 <--                        10.3   18.68 )-----------( 120      ( 06 Jul 2021 03:31 )             32.1     Urine Studies:  Urinalysis Basic - ( 05 Jul 2021 15:26 )    Color: Yellow / Appearance: Clear / SG: >=1.030 / pH:   Gluc:  / Ketone: 15 mg/dL  / Bili: Small / Urobili: 1.0 E.U./dL   Blood:  / Protein: 100 mg/dL / Nitrite: NEGATIVE   Leuk Esterase: NEGATIVE / RBC: < 5 /HPF / WBC < 5 /HPF   Sq Epi:  / Non Sq Epi: 0-5 /HPF / Bacteria: Present /HPF             Patient is a 70y old  Male who presents with a chief complaint of sepsis (06 Jul 2021 12:53)      HPI:  70M with PMHx significant for renal transplant (in 2017 2/2 diabetic nephropathy and MGUS with post-transplant lymphoma, now with , renal transplant physician at [facility] Dr Julisa Carreno formerly on cellcept now on  tacrolimus, ), DM2, HTN, MGUS, pAF, BPH, HLD, CAD (s/p CABG 15 years ago), presents for 3-4 days of worsening cough. Patient states that he has a productive cough with brownish colored sputum. Patient denied fever or shortness of breath at home, but on day of admission did notice that his breathing felt different and that he was breathing quicker. Per ED it was reported that he completed a Z-pack without improvement. Past history notable for COVID+ March 2020, also received pfizer vaccine x2 a few months ago. Endorses SOB, is drinking less fluid, and may be urinating less. Denies sick contacts, HA, CP, rhinorrhea, sore throat, nausea, vomiting, diarrhea, abd pain, urinary complaints, black/bloody stool, focal weakness/numbness, vision changes, neck/back pain, LE pain/swelling.     Patient also reports a rash on both of his upper thighs that he developed after coming back from [place] 3 weeks ago. It does not itch, does not know if any creams or detergents were used. States that he has a dermatologist, but does not know what the diagnosis is.    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 Bilateral opacities/right perihilar opacity/consolidation.    Bedside POCUS: diffuse b-lines, consolidations worse on R lung (05 Jul 2021 17:04)      PAST MEDICAL & SURGICAL HISTORY:  Barretts esophagus    CRI (chronic renal insufficiency)    DM (diabetes mellitus)    GERD (gastroesophageal reflux disease)    HTN (hypertension)    Monoclonal gammopathy    Mandibular abscess    Paroxysmal atrial fibrillation    Benign prostatic hyperplasia, presence of lower urinary tract symptoms unspecified, unspecified morphology    Other hyperlipidemia    S/P CABG (coronary artery bypass graft)  15 years    History of surgery  cyst removal    Mandibular abscess          Allergies:  No Known Allergies      Home Medications:   acetaminophen  Suppository .. 650 milliGRAM(s) Rectal every 6 hours PRN  azithromycin  IVPB      azithromycin  IVPB 500 milliGRAM(s) IV Intermittent every 24 hours  chlorhexidine 2% Cloths 1 Application(s) Topical <User Schedule>  dextrose 40% Gel 15 Gram(s) Oral once  dextrose 5%. 1000 milliLiter(s) IV Continuous <Continuous>  dextrose 5%. 1000 milliLiter(s) IV Continuous <Continuous>  dextrose 50% Injectable 25 Gram(s) IV Push once  dextrose 50% Injectable 12.5 Gram(s) IV Push once  dextrose 50% Injectable 25 Gram(s) IV Push once  furosemide   Injectable 20 milliGRAM(s) IV Push daily  glucagon  Injectable 1 milliGRAM(s) IntraMuscular once  insulin glargine Injectable (LANTUS) 25 Unit(s) SubCutaneous at bedtime  insulin lispro (ADMELOG) corrective regimen sliding scale   SubCutaneous every 6 hours  linezolid  IVPB 600 milliGRAM(s) IV Intermittent every 12 hours  norepinephrine Infusion 0.05 MICROgram(s)/kG/Min IV Continuous <Continuous>  piperacillin/tazobactam IVPB.. 4.5 Gram(s) IV Intermittent every 6 hours  propofol Infusion 5 MICROgram(s)/kG/Min IV Continuous <Continuous>      Hospital Medications:   MEDICATIONS  (STANDING):  azithromycin  IVPB      azithromycin  IVPB 500 milliGRAM(s) IV Intermittent every 24 hours  chlorhexidine 2% Cloths 1 Application(s) Topical <User Schedule>  dextrose 40% Gel 15 Gram(s) Oral once  dextrose 5%. 1000 milliLiter(s) (50 mL/Hr) IV Continuous <Continuous>  dextrose 5%. 1000 milliLiter(s) (100 mL/Hr) IV Continuous <Continuous>  dextrose 50% Injectable 25 Gram(s) IV Push once  dextrose 50% Injectable 12.5 Gram(s) IV Push once  dextrose 50% Injectable 25 Gram(s) IV Push once  furosemide   Injectable 20 milliGRAM(s) IV Push daily  glucagon  Injectable 1 milliGRAM(s) IntraMuscular once  insulin glargine Injectable (LANTUS) 25 Unit(s) SubCutaneous at bedtime  insulin lispro (ADMELOG) corrective regimen sliding scale   SubCutaneous every 6 hours  linezolid  IVPB 600 milliGRAM(s) IV Intermittent every 12 hours  norepinephrine Infusion 0.05 MICROgram(s)/kG/Min (9.1 mL/Hr) IV Continuous <Continuous>  piperacillin/tazobactam IVPB.. 4.5 Gram(s) IV Intermittent every 6 hours  propofol Infusion 5 MICROgram(s)/kG/Min (2.91 mL/Hr) IV Continuous <Continuous>      SOCIAL HISTORY:  Denies ETOh, Smoking,     Family History:  FAMILY HISTORY:        VITALS:  T(F): 101.6 (07-06-21 @ 11:00), Max: 101.6 (07-06-21 @ 11:00)  HR: 80 (07-06-21 @ 14:30)  BP: 124/57 (07-06-21 @ 14:30)  RR: 28 (07-06-21 @ 14:30)  SpO2: 92% (07-06-21 @ 14:30)  Wt(kg): --    07-05 @ 07:01  -  07-06 @ 07:00  --------------------------------------------------------  IN: 350 mL / OUT: 1000 mL / NET: -650 mL    07-06 @ 07:01  -  07-06 @ 15:04  --------------------------------------------------------  IN: 400 mL / OUT: 350 mL / NET: 50 mL        CAPILLARY BLOOD GLUCOSE      POCT Blood Glucose.: 273 mg/dL (06 Jul 2021 11:43)  POCT Blood Glucose.: 266 mg/dL (06 Jul 2021 09:32)  POCT Blood Glucose.: 302 mg/dL (06 Jul 2021 06:33)  POCT Blood Glucose.: 256 mg/dL (05 Jul 2021 22:50)      Review of Systems:  CONSTITUTIONAL: No fever or chills.  RESPIRATORY: + shortness of breath  CARDIOVASCULAR: + shortness of breath   GASTROINTESTINAL: No abdominal pain, nausea, vomiting, diarrhea  SKIN: + rash on bl upper thighs   MUSCULOSKELETAL: + swelling  Psych: Denies suicidal or homicidal ideation    PHYSICAL EXAM:  GENERAL: Alert, awake, on bipap; in distress    CHEST/LUNG: + rhonchi BL; + crackles   HEART: Regular rate and rhythm, no murmur, no gallops, no rub   ABDOMEN: Soft, nontender, non distended  EXTREMITIES: 2+ pedal edema   SKIN+ macular rash on bL upper thighs       LABS:  07-06    127<L>  |  96  |  26<H>  ----------------------------<  272<H>  5.5<H>   |  19<L>  |  1.18    Ca    8.4      06 Jul 2021 12:05  Mg     2.2     07-06    TPro  6.8  /  Alb  3.4  /  TBili  1.2  /  DBili      /  AST  26  /  ALT  18  /  AlkPhos  108  07-05    Creatinine Trend: 1.18 <--, 1.14 <--, 1.21 <--, 1.16 <--                        10.3   18.68 )-----------( 120      ( 06 Jul 2021 03:31 )             32.1     Urine Studies:  Urinalysis Basic - ( 05 Jul 2021 15:26 )    Color: Yellow / Appearance: Clear / SG: >=1.030 / pH:   Gluc:  / Ketone: 15 mg/dL  / Bili: Small / Urobili: 1.0 E.U./dL   Blood:  / Protein: 100 mg/dL / Nitrite: NEGATIVE   Leuk Esterase: NEGATIVE / RBC: < 5 /HPF / WBC < 5 /HPF   Sq Epi:  / Non Sq Epi: 0-5 /HPF / Bacteria: Present /HPF             Patient is a 70y old  Male who presents with a chief complaint of sepsis (06 Jul 2021 12:53)      HPI:  70M with PMHx significant for renal transplant (in 2017 2/2 diabetic nephropathy and MGUS with post-transplant lymphoma, renal transplant physician at [facility] Dr Julisa Carreno formerly on cellcept now only on tacrolimus), DM2, HTN, pAF, BPH, HLD, CAD (s/p CABG 15 years ago), presents for 3-4 days of worsening cough. Patient states that he has a productive cough with brownish colored sputum. Patient denied fever or shortness of breath at home, but on day of admission did notice that his breathing felt different and that he was breathing quicker. Per ED it was reported that he completed a Z-pack without improvement. Past history notable for COVID+ March 2020, also received pfizer vaccine x2 a few months ago. Endorses SOB, is drinking less fluid, and may be urinating less. Denies sick contacts, HA, CP, rhinorrhea, sore throat, nausea, vomiting, diarrhea, abd pain, urinary complaints, black/bloody stool, focal weakness/numbness, vision changes, neck/back pain, LE pain/swelling.     Patient also reports a rash on both of his upper thighs that he developed after coming back from [place] 3 weeks ago. It does not itch, does not know if any creams or detergents were used. States that he has a dermatologist, but does not know what the diagnosis is.    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 Bilateral opacities/right perihilar opacity/consolidation.    Bedside POCUS: diffuse b-lines, consolidations worse on R lung (05 Jul 2021 17:04)      PAST MEDICAL & SURGICAL HISTORY:  Barretts esophagus    CRI (chronic renal insufficiency)    DM (diabetes mellitus)    GERD (gastroesophageal reflux disease)    HTN (hypertension)    Monoclonal gammopathy    Mandibular abscess    Paroxysmal atrial fibrillation    Benign prostatic hyperplasia, presence of lower urinary tract symptoms unspecified, unspecified morphology    Other hyperlipidemia    S/P CABG (coronary artery bypass graft)  15 years    History of surgery  cyst removal    Mandibular abscess          Allergies:  No Known Allergies      Home Medications:   acetaminophen  Suppository .. 650 milliGRAM(s) Rectal every 6 hours PRN  azithromycin  IVPB      azithromycin  IVPB 500 milliGRAM(s) IV Intermittent every 24 hours  chlorhexidine 2% Cloths 1 Application(s) Topical <User Schedule>  dextrose 40% Gel 15 Gram(s) Oral once  dextrose 5%. 1000 milliLiter(s) IV Continuous <Continuous>  dextrose 5%. 1000 milliLiter(s) IV Continuous <Continuous>  dextrose 50% Injectable 25 Gram(s) IV Push once  dextrose 50% Injectable 12.5 Gram(s) IV Push once  dextrose 50% Injectable 25 Gram(s) IV Push once  furosemide   Injectable 20 milliGRAM(s) IV Push daily  glucagon  Injectable 1 milliGRAM(s) IntraMuscular once  insulin glargine Injectable (LANTUS) 25 Unit(s) SubCutaneous at bedtime  insulin lispro (ADMELOG) corrective regimen sliding scale   SubCutaneous every 6 hours  linezolid  IVPB 600 milliGRAM(s) IV Intermittent every 12 hours  norepinephrine Infusion 0.05 MICROgram(s)/kG/Min IV Continuous <Continuous>  piperacillin/tazobactam IVPB.. 4.5 Gram(s) IV Intermittent every 6 hours  propofol Infusion 5 MICROgram(s)/kG/Min IV Continuous <Continuous>      Hospital Medications:   MEDICATIONS  (STANDING):  azithromycin  IVPB      azithromycin  IVPB 500 milliGRAM(s) IV Intermittent every 24 hours  chlorhexidine 2% Cloths 1 Application(s) Topical <User Schedule>  dextrose 40% Gel 15 Gram(s) Oral once  dextrose 5%. 1000 milliLiter(s) (50 mL/Hr) IV Continuous <Continuous>  dextrose 5%. 1000 milliLiter(s) (100 mL/Hr) IV Continuous <Continuous>  dextrose 50% Injectable 25 Gram(s) IV Push once  dextrose 50% Injectable 12.5 Gram(s) IV Push once  dextrose 50% Injectable 25 Gram(s) IV Push once  furosemide   Injectable 20 milliGRAM(s) IV Push daily  glucagon  Injectable 1 milliGRAM(s) IntraMuscular once  insulin glargine Injectable (LANTUS) 25 Unit(s) SubCutaneous at bedtime  insulin lispro (ADMELOG) corrective regimen sliding scale   SubCutaneous every 6 hours  linezolid  IVPB 600 milliGRAM(s) IV Intermittent every 12 hours  norepinephrine Infusion 0.05 MICROgram(s)/kG/Min (9.1 mL/Hr) IV Continuous <Continuous>  piperacillin/tazobactam IVPB.. 4.5 Gram(s) IV Intermittent every 6 hours  propofol Infusion 5 MICROgram(s)/kG/Min (2.91 mL/Hr) IV Continuous <Continuous>      SOCIAL HISTORY:  Denies ETOh, Smoking,     Family History:  FAMILY HISTORY:        VITALS:  T(F): 101.6 (07-06-21 @ 11:00), Max: 101.6 (07-06-21 @ 11:00)  HR: 80 (07-06-21 @ 14:30)  BP: 124/57 (07-06-21 @ 14:30)  RR: 28 (07-06-21 @ 14:30)  SpO2: 92% (07-06-21 @ 14:30)  Wt(kg): --    07-05 @ 07:01  -  07-06 @ 07:00  --------------------------------------------------------  IN: 350 mL / OUT: 1000 mL / NET: -650 mL    07-06 @ 07:01  -  07-06 @ 15:04  --------------------------------------------------------  IN: 400 mL / OUT: 350 mL / NET: 50 mL        CAPILLARY BLOOD GLUCOSE      POCT Blood Glucose.: 273 mg/dL (06 Jul 2021 11:43)  POCT Blood Glucose.: 266 mg/dL (06 Jul 2021 09:32)  POCT Blood Glucose.: 302 mg/dL (06 Jul 2021 06:33)  POCT Blood Glucose.: 256 mg/dL (05 Jul 2021 22:50)      Review of Systems:  CONSTITUTIONAL: No fever or chills.  RESPIRATORY: + shortness of breath  CARDIOVASCULAR: + shortness of breath   GASTROINTESTINAL: No abdominal pain, nausea, vomiting, diarrhea  SKIN: + rash on bl upper thighs   MUSCULOSKELETAL: + swelling  Psych: Denies suicidal or homicidal ideation    PHYSICAL EXAM:  GENERAL: Alert, awake, on bipap; in distress    CHEST/LUNG: + rhonchi BL; + crackles   HEART: Regular rate and rhythm, no murmur, no gallops, no rub   ABDOMEN: Soft, nontender, non distended  EXTREMITIES: 2+ pedal edema   SKIN+ macular rash on bL upper thighs       LABS:  07-06    127<L>  |  96  |  26<H>  ----------------------------<  272<H>  5.5<H>   |  19<L>  |  1.18    Ca    8.4      06 Jul 2021 12:05  Mg     2.2     07-06    TPro  6.8  /  Alb  3.4  /  TBili  1.2  /  DBili      /  AST  26  /  ALT  18  /  AlkPhos  108  07-05    Creatinine Trend: 1.18 <--, 1.14 <--, 1.21 <--, 1.16 <--                        10.3   18.68 )-----------( 120      ( 06 Jul 2021 03:31 )             32.1     Urine Studies:  Urinalysis Basic - ( 05 Jul 2021 15:26 )    Color: Yellow / Appearance: Clear / SG: >=1.030 / pH:   Gluc:  / Ketone: 15 mg/dL  / Bili: Small / Urobili: 1.0 E.U./dL   Blood:  / Protein: 100 mg/dL / Nitrite: NEGATIVE   Leuk Esterase: NEGATIVE / RBC: < 5 /HPF / WBC < 5 /HPF   Sq Epi:  / Non Sq Epi: 0-5 /HPF / Bacteria: Present /HPF             Patient is a 70y old  Male who presents with a chief complaint of sepsis (06 Jul 2021 12:53)      HPI:  70M with PMHx significant for renal transplant (in 2017 2/2 diabetic nephropathy and MGUS with post-transplant lymphoma, renal transplant physician at [facility] Dr Julisa Carreno formerly on cellcept now only on tacrolimus), DM2, HTN, pAF, BPH, HLD, CAD (s/p CABG 15 years ago), presents for 3-4 days of worsening cough. Patient states that he has a productive cough with brownish colored sputum. Patient denied fever or shortness of breath at home, but on day of admission did notice that his breathing felt different and that he was breathing quicker. Per ED it was reported that he completed a Z-pack without improvement. Past history notable for COVID+ March 2020, also received pfizer vaccine x2 a few months ago. Endorses SOB, is drinking less fluid, and may be urinating less. Denies sick contacts, HA, CP, rhinorrhea, sore throat, nausea, vomiting, diarrhea, abd pain, urinary complaints, black/bloody stool, focal weakness/numbness, vision changes, neck/back pain, LE pain/swelling.     Patient also reports a rash on both of his upper thighs that he developed after coming back from [place] 3 weeks ago. It does not itch, does not know if any creams or detergents were used. States that he has a dermatologist, but does not know what the diagnosis is.    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; patient was formerly on cellcept and tacrolimus however was taken off cellcept a few months ago; patients baseline Cr is 1      PAST MEDICAL & SURGICAL HISTORY:  Barretts esophagus    CRI (chronic renal insufficiency)    DM (diabetes mellitus)    GERD (gastroesophageal reflux disease)    HTN (hypertension)    Monoclonal gammopathy    Mandibular abscess    Paroxysmal atrial fibrillation    Benign prostatic hyperplasia, presence of lower urinary tract symptoms unspecified, unspecified morphology    Other hyperlipidemia    S/P CABG (coronary artery bypass graft)  15 years    History of surgery  cyst removal    Mandibular abscess          Allergies:  No Known Allergies      Home Medications:   acetaminophen  Suppository .. 650 milliGRAM(s) Rectal every 6 hours PRN  azithromycin  IVPB      azithromycin  IVPB 500 milliGRAM(s) IV Intermittent every 24 hours  chlorhexidine 2% Cloths 1 Application(s) Topical <User Schedule>  dextrose 40% Gel 15 Gram(s) Oral once  dextrose 5%. 1000 milliLiter(s) IV Continuous <Continuous>  dextrose 5%. 1000 milliLiter(s) IV Continuous <Continuous>  dextrose 50% Injectable 25 Gram(s) IV Push once  dextrose 50% Injectable 12.5 Gram(s) IV Push once  dextrose 50% Injectable 25 Gram(s) IV Push once  furosemide   Injectable 20 milliGRAM(s) IV Push daily  glucagon  Injectable 1 milliGRAM(s) IntraMuscular once  insulin glargine Injectable (LANTUS) 25 Unit(s) SubCutaneous at bedtime  insulin lispro (ADMELOG) corrective regimen sliding scale   SubCutaneous every 6 hours  linezolid  IVPB 600 milliGRAM(s) IV Intermittent every 12 hours  norepinephrine Infusion 0.05 MICROgram(s)/kG/Min IV Continuous <Continuous>  piperacillin/tazobactam IVPB.. 4.5 Gram(s) IV Intermittent every 6 hours  propofol Infusion 5 MICROgram(s)/kG/Min IV Continuous <Continuous>      Hospital Medications:   MEDICATIONS  (STANDING):  azithromycin  IVPB      azithromycin  IVPB 500 milliGRAM(s) IV Intermittent every 24 hours  chlorhexidine 2% Cloths 1 Application(s) Topical <User Schedule>  dextrose 40% Gel 15 Gram(s) Oral once  dextrose 5%. 1000 milliLiter(s) (50 mL/Hr) IV Continuous <Continuous>  dextrose 5%. 1000 milliLiter(s) (100 mL/Hr) IV Continuous <Continuous>  dextrose 50% Injectable 25 Gram(s) IV Push once  dextrose 50% Injectable 12.5 Gram(s) IV Push once  dextrose 50% Injectable 25 Gram(s) IV Push once  furosemide   Injectable 20 milliGRAM(s) IV Push daily  glucagon  Injectable 1 milliGRAM(s) IntraMuscular once  insulin glargine Injectable (LANTUS) 25 Unit(s) SubCutaneous at bedtime  insulin lispro (ADMELOG) corrective regimen sliding scale   SubCutaneous every 6 hours  linezolid  IVPB 600 milliGRAM(s) IV Intermittent every 12 hours  norepinephrine Infusion 0.05 MICROgram(s)/kG/Min (9.1 mL/Hr) IV Continuous <Continuous>  piperacillin/tazobactam IVPB.. 4.5 Gram(s) IV Intermittent every 6 hours  propofol Infusion 5 MICROgram(s)/kG/Min (2.91 mL/Hr) IV Continuous <Continuous>      SOCIAL HISTORY:  Denies ETOh, Smoking,     Family History:  FAMILY HISTORY:        VITALS:  T(F): 101.6 (07-06-21 @ 11:00), Max: 101.6 (07-06-21 @ 11:00)  HR: 80 (07-06-21 @ 14:30)  BP: 124/57 (07-06-21 @ 14:30)  RR: 28 (07-06-21 @ 14:30)  SpO2: 92% (07-06-21 @ 14:30)  Wt(kg): --    07-05 @ 07:01  -  07-06 @ 07:00  --------------------------------------------------------  IN: 350 mL / OUT: 1000 mL / NET: -650 mL    07-06 @ 07:01  -  07-06 @ 15:04  --------------------------------------------------------  IN: 400 mL / OUT: 350 mL / NET: 50 mL        CAPILLARY BLOOD GLUCOSE      POCT Blood Glucose.: 273 mg/dL (06 Jul 2021 11:43)  POCT Blood Glucose.: 266 mg/dL (06 Jul 2021 09:32)  POCT Blood Glucose.: 302 mg/dL (06 Jul 2021 06:33)  POCT Blood Glucose.: 256 mg/dL (05 Jul 2021 22:50)      Review of Systems:  CONSTITUTIONAL: No fever or chills.  RESPIRATORY: + shortness of breath  CARDIOVASCULAR: + shortness of breath   GASTROINTESTINAL: No abdominal pain, nausea, vomiting, diarrhea  SKIN: + rash on bl upper thighs   MUSCULOSKELETAL: + swelling  Psych: Denies suicidal or homicidal ideation    PHYSICAL EXAM:  GENERAL: Alert, awake, on bipap; in distress    CHEST/LUNG: + rhonchi BL; + crackles   HEART: Regular rate and rhythm, no murmur, no gallops, no rub   ABDOMEN: Soft, nontender, non distended  EXTREMITIES: 2+ pedal edema   SKIN+ macular rash on bL upper thighs       LABS:  07-06    127<L>  |  96  |  26<H>  ----------------------------<  272<H>  5.5<H>   |  19<L>  |  1.18    Ca    8.4      06 Jul 2021 12:05  Mg     2.2     07-06    TPro  6.8  /  Alb  3.4  /  TBili  1.2  /  DBili      /  AST  26  /  ALT  18  /  AlkPhos  108  07-05    Creatinine Trend: 1.18 <--, 1.14 <--, 1.21 <--, 1.16 <--                        10.3   18.68 )-----------( 120      ( 06 Jul 2021 03:31 )             32.1     Urine Studies:  Urinalysis Basic - ( 05 Jul 2021 15:26 )    Color: Yellow / Appearance: Clear / SG: >=1.030 / pH:   Gluc:  / Ketone: 15 mg/dL  / Bili: Small / Urobili: 1.0 E.U./dL   Blood:  / Protein: 100 mg/dL / Nitrite: NEGATIVE   Leuk Esterase: NEGATIVE / RBC: < 5 /HPF / WBC < 5 /HPF   Sq Epi:  / Non Sq Epi: 0-5 /HPF / Bacteria: Present /HPF

## 2021-07-06 NOTE — CONSULT NOTE ADULT - ASSESSMENT
70M with PMHx significant for renal transplant (in 2017 2/2 diabetic nephropathy and MGUS with post-transplant lymphoma, renal transplant physician at [facility] Dr Julisa Carreno formerly on cellcept now on  tacrolimus, ), DM2, HTN, MGUS, pAF, BPH, HLD, CAD (s/p CABG 15 years ago), presents for 3-4 days of worsening cough. Patient states that he has a productive cough with brownish colored sputum now on BIPAP with likely bacterial pnuemonia     #Renal Transplant (2017)  patient formerly on cellcept, valagancyclovir and tacrolimus now solely on tacrolimus  -baseline Cr 1.0  -f/u tacrolimus level   -c/w tacrolimus 1 BID    #Anemia  Hgb 10.3  -no signs of active bleed  -can get iron panel and ferritin    #BPH  no signs of retention  -c/w flomax          70M with PMHx significant for renal transplant (in 2017 2/2 diabetic nephropathy and MGUS with post-transplant lymphoma, renal transplant physician at [facility] Dr Julisa Carreno formerly on cellcept now on  tacrolimus, ), DM2, HTN, MGUS, pAF, BPH, HLD, CAD (s/p CABG 15 years ago), presents for 3-4 days of worsening cough now intubated with likely bacterial pnuemonia     #Renal Transplant (2017)  patient formerly on cellcept, valagancyclovir and tacrolimus now solely on tacrolimus  -baseline Cr 1.0  -will f/u tacrolimus level   -please restart tacrolimus 1 mg BID this evening       #Hyponatremia  Acute on Chronic Hyponatremia (patient has been hyponatremic in the past according to sunrise); likely hypervolemic hyponatremia    however given patients hyperkalemia  will need to rule out adrenal insufficiency  -order TSH, AM Cortisol, Urine Na, Urine Osm, Serum Osm, Urine K,      #Anemia  Hgb 10.3  -no signs of active bleed  -can get iron panel and ferritin    #BPH  no signs of retention  -c/w flomax   -monitor I's and O's        70M with PMHx significant for renal transplant (in 2017 2/2 diabetic nephropathy and MGUS with post-transplant lymphoma, renal transplant physician at [facility] Dr Julisa Carreno formerly on cellcept now on  tacrolimus, ), DM2, HTN, MGUS, pAF, BPH, HLD, CAD (s/p CABG 15 years ago), presents for 3-4 days of worsening cough now intubated with likely bacterial pnuemonia     #Renal Transplant (2017)  patient formerly on cellcept, valagancyclovir and tacrolimus, now solely on tacrolimus 1mg q12h (dose confirmed w/Dr. Leyva)   -baseline Cr 1.0  -f/u tacrolimus level   -please restart tacrolimus 1 mg q12h this evening     #Hyponatremia  Acute on Chronic Hyponatremia (patient has been hyponatremic in the past according to sunrise); likely hypervolemic hyponatremia    however given patients hyperkalemia  will need to rule out adrenal insufficiency  -order TSH, AM Cortisol, Urine Na, Urine Osm, Serum Osm, Urine K,      #Anemia  Hgb 10.3  -no signs of active bleed  -can get iron panel and ferritin    #BPH  no signs of retention  -c/w flomax   -monitor I's and O's

## 2021-07-06 NOTE — CONSULT NOTE ADULT - ATTENDING COMMENTS
septic shock due to PNA with cultures so far + MSSA. Given immunosuppression due to renal transplant medications, appreciate ID recs for broadened infectious w/u and CT chest. f/u tacro levels, last dose > 24hrs ago so trough will not be accurate. Restart tacrolimus. Not getting cellcept as outpt for unclear reasons, will touch base with his transplant nephrologist. Not on steroids however w hyponatremia, hyperkalemia and low BPs - check cortisol and TSH. Check u/a with urine Na, Cr, Urea, Osm

## 2021-07-06 NOTE — CONSULT NOTE ADULT - SUBJECTIVE AND OBJECTIVE BOX
Infectious Diseases Progress Note:    SUBJECTIVE: Patient seen and examined at bedside. He is able to have a conversation but he has an important SOB. Patient denies fever, chills, HA, nausea, vomiting, abdominal pain, dysuria, diarrhea.    OBJECTIVE: Patient is dyspneic and has been recently placed again to BIPAP. He has been placed in nasal canula but he wasn't able to tolerate it. By the end of the day, he has been intubated.     SEPSIS MULTIFOCAL PNEUMONIA    Allergies  No Known Allergies    ANTIBIOTICS/RELEVANT:  antimicrobials  azithromycin  IVPB      azithromycin  IVPB 500 milliGRAM(s) IV Intermittent every 24 hours  linezolid  IVPB 600 milliGRAM(s) IV Intermittent every 12 hours  piperacillin/tazobactam IVPB.. 4.5 Gram(s) IV Intermittent every 6 hours    immunologic:  tacrolimus 2 milliGRAM(s) Oral two times a day    OTHER:  acetaminophen    Suspension .. 650 milliGRAM(s) Oral every 6 hours PRN  chlorhexidine 2% Cloths 1 Application(s) Topical <User Schedule>  dextrose 40% Gel 15 Gram(s) Oral once  dextrose 5%. 1000 milliLiter(s) IV Continuous <Continuous>  dextrose 5%. 1000 milliLiter(s) IV Continuous <Continuous>  dextrose 50% Injectable 25 Gram(s) IV Push once  dextrose 50% Injectable 12.5 Gram(s) IV Push once  dextrose 50% Injectable 25 Gram(s) IV Push once  furosemide   Injectable 20 milliGRAM(s) IV Push daily  glucagon  Injectable 1 milliGRAM(s) IntraMuscular once  heparin  Infusion 1700 Unit(s)/Hr IV Continuous <Continuous>  insulin glargine Injectable (LANTUS) 40 Unit(s) SubCutaneous at bedtime  insulin lispro (ADMELOG) corrective regimen sliding scale   SubCutaneous every 6 hours  norepinephrine Infusion 0.05 MICROgram(s)/kG/Min IV Continuous <Continuous>  propofol Infusion 5 MICROgram(s)/kG/Min IV Continuous <Continuous>    Objective:  Vital Signs Last 24 Hrs  T(C): 38.2 (06 Jul 2021 18:16), Max: 38.7 (06 Jul 2021 11:00)  T(F): 100.8 (06 Jul 2021 18:16), Max: 101.6 (06 Jul 2021 11:00)  HR: 59 (06 Jul 2021 18:19) (59 - 104)  BP: 96/58 (06 Jul 2021 18:00) (93/53 - 162/70)  BP(mean): 72 (06 Jul 2021 18:00) (68 - 115)  RR: 13 (06 Jul 2021 18:00) (13 - 332)  SpO2: 93% (06 Jul 2021 18:19) (91% - 98%)    PHYSICAL EXAM:  GENERAL: Pt lying in bed intubated.  HEAD:  normocephalic atraumatic  EENT: EOMI, PERRLA, conjunctiva and sclera clear.  NECK: Supple, non-tender, no JVD  CHEST/LUNGS: On BIPAP, b/l inspiratory and expiratory wheezes with good air movement with difuse rhonchi  HEART: Regular rate, normal rhythm; normal S1, S2; no murmurs  ABDOMEN: Soft, Nontender, Nondistended; BS4+  EXTREMITIES:  WWP, No clubbing, cyanosis, or edema  Vascular: 2+ peripheral pulses b/l  SKIN: there is an erythematous, lluvia, non-raised, non-blanching rash present from upper thigh to the knees b/l  Neuro: A&O x3    LABS:                10.3   18.68 )-----------( 120      ( 06 Jul 2021 03:31 )             32.1     07-06  130<L>  |  98  |  27<H>  ----------------------------<  274<H>  5.4<H>   |  21<L>  |  1.38<H>    Ca    8.6      06 Jul 2021 17:24  Mg     2.2     07-06    TPro  6.8  /  Alb  3.4  /  TBili  1.2  /  DBili  x   /  AST  26  /  ALT  18  /  AlkPhos  108  07-05    PT/INR - ( 06 Jul 2021 17:24 )   PT: 16.6 sec;   INR: 1.41       PTT - ( 06 Jul 2021 17:24 )  PTT:29.8 sec  Urinalysis Basic - ( 05 Jul 2021 15:26 )    Color: Yellow / Appearance: Clear / SG: >=1.030 / pH: x  Gluc: x / Ketone: 15 mg/dL  / Bili: Small / Urobili: 1.0 E.U./dL   Blood: x / Protein: 100 mg/dL / Nitrite: NEGATIVE   Leuk Esterase: NEGATIVE / RBC: < 5 /HPF / WBC < 5 /HPF   Sq Epi: x / Non Sq Epi: 0-5 /HPF / Bacteria: Present /HPF    MICROBIOLOGY:  Culture - Sputum (collected 07-06-21 @ 16:08) Source: Sputum. Gram Stain. No epithelial cells seen. Few WBC's. Rare Gram positive cocci in pairs.  Legionella pneumophila Antigen, Urine (07.06.21 @ 15:51) Negative  MRSA/MSSA PCR (07.05.21 @ 18:33) MRSA PCR Result.: Negative. Staph Aureus PCR Result: Positive.  Culture - Blood (07.05.21 @ 16:39) Specimen Source: .Blood Blood-Peripheral. Culture Results: No growth at 1 day.    RADIOLOGY & ADDITIONAL STUDIES:  Xray Chest 1 View-PORTABLE IMMEDIATE (07.05.21 @ 13:23) Cardiomegaly, status post median sternotomy, CABG. Bilateral opacities/right perihilar opacity/consolidation.  Xray Chest 1 View- PORTABLE-Urgent (Xray Chest 1 View- PORTABLE-Urgent .) (07.06.21 @ 02:46) Support Devices: None. Heart: Cardiomegaly. Mediastinum:  Unremarkable. Lungs/Airways: Worsening right midlung airspace opacity, favor pneumonia, less likely asymmetrical pulmonary edema. No significant pleural effusion. Bones/Soft tissues:Median sternotomy.

## 2021-07-06 NOTE — CONSULT NOTE ADULT - SUBJECTIVE AND OBJECTIVE BOX
71 YO immunosuppressed M presented with generalized weakness, found to have R sided focal pneumonia, for which pulmonary is consulted.     Mr Poli has PMH of renal transplant (6/2017, [place], on Tacrolimus), DM2 (on insulin), HTN, MGUS, AF (on Eliquis), CAD (CABG ~15 years ago) presented yesterday with weakness progressive over last 1 weak. He also noticed worsening cough productive of brownish sputum. It was associated with chills and low grade temp (tmax 100). He reports lower extremities bilateral faint red rash over last 2 weeks. He did not endorse SOB to me, but his son noticed that "he was breathing heavier and faster". No lower extremity swelling, no orthopnea, no chest pain. No sick contacts. He is compliant with his meds, including Prograft and Lasix. He traveled to [place] and [place] over last 6M. He had covid last year, received pfizer vacinne x 2 this year.     As per Dr Whiteside, he also has history of post transplant lymphoma. Patient himself denies it - he said he had lymph node biopsy, and was told "it's all infection".       Vitals: Initially 96% on 6L, put on Bipap. I took him off bipap for a moment - O2 sat drops to 90% with good waveform on 6L NC. Temp 103.4. -120. BP 140s/60s. RR 35 on Bipap, 35-40 on NC.     Labs s/f: WBC of 24 with 22.4 of lymphocytes, Neutrophil count 0.63. WBC trending down now, Neutr trending up. Hb 11.4 (at baseline).  (at baseline).   Na 125, K+ 5.4, Crea 1.14.   No transaminitis    Imaging: Dx Chest - large R midlung opacity.     Bedside pocus: Large consolidation on R side, small pleural effusion. Scattered B- lines R side. Normal appearing L lung.         Meds:  Tacrolimus 2mg BID  Pantoprazole   Flomax  Lantus  Humalog  Eliquis 5mg BID  Rosuvastatin 10mg QD  Furosemide 20mg QD    He was admitted for pneumonia. He is treated with Azithro/Linezolid/Zosyn. Also received 1 dose of Vanc yesterday, and 3L of IVF. Today he feels around the same as yesterday.            IMAGING    CT AP 2019    IMPRESSION:   1. Since 2/18/2016, there is thickening of the scrotum, with scrotal fluid   consistent with hydrocele seen on ultrasound from earlier same date. No   subcutaneous gas is seen in the perineum. Evaluation is limited without   intravenous contrast, however no discernible abscess.     2. There is new lymphadenopathy involving the bilateral inguinal,   retroperitoneal, and mesenteric lymph nodes with hazy infiltration of the   mesenteric fat which is concerning for metastatic adenopathy.     3. Distended gallbladder with small stones or sludge. No adjacent   inflammatory change, and not significantly changed compared to prior CT   dated 2/18/2016.     4. Stable main pulmonary artery dilation measuring 4.0 cm, suggestive of   pulmonary hypertension.     5. Fatty liver.     6. Enlarged prostate.     TTE 2016    IMPRESSION: Interpretation Summary   There is moderate concentric left ventricular hypertrophy. The left   ventricular    wall motion is normal. The left ventricular ejection fraction is normal.   The    left ventricular ejection fraction is 65%. The left atrium is mildly   dilated.    Right atrial size is normal.The right ventricle is normal in size and    function.There is mild mitral annular calcification. There is mild to   moderate    mitral regurgitation. There is mild to moderate tricuspid   regurgitation.There    is mild to moderate pulmonary hypertension. The pulmonary artery systolic    pressure is estimated to be 45-50 mmHg. The IVC is dilated (>2.1 cm)   however    with normal inspiratory collapse (>50%) consistent with mildly elevated   right    atrial pressure ([id number], range 5-10mmHg). No aortic root dilatation.    Normal size aortic arch with no hemodynamic evidence for coarctation       Physical Exam  Gen: Elderly male, initially on BIPAP breathing at 35 O2 sat 95%. When off bipap - on 6L 90%, RR 35, in no distress but with mild abdominal breathing  Chest: Decreased breath sound on R side. Normal breath sounds on L side. No wheezes. No rhonchi. No crackles.  Heart: RRR, no rmg  Skin: Macular rash on lower extremities up to thighs. No other rashes.   Lower ext: Trace pitting edema. WWP. Good distal pulses  Abd: obese, soft, nontender  +Lucas in

## 2021-07-06 NOTE — PROCEDURE NOTE - NSPOSTCAREGUIDE_GEN_A_CORE
Detail Level: Simple
Verbal/written post procedure instructions were given to patient/caregiver/Instructed patient/caregiver regarding signs and symptoms of infection/Keep the cast/splint/dressing clean and dry/Care for catheter as per unit/ICU protocols
Verbal/written post procedure instructions were given to patient/caregiver/Instructed patient/caregiver regarding signs and symptoms of infection/Keep the cast/splint/dressing clean and dry

## 2021-07-06 NOTE — PROGRESS NOTE ADULT - SUBJECTIVE AND OBJECTIVE BOX
INTERVAL HPI/OVERNIGHT EVENTS: anxious o/n. Given haldol 1mg IV X1, haldol then d/c'd because of risk of torsades d/t interaction with tacrolimus. Pt give hyperkalemia cocktail for elevated K. Pt agitated on BIPAP, broke BIPAP mask, started on precedex and placed on NC this AM, unable to tolerate (sats 89%) and was placed back on BIPAP    SUBJECTIVE: pt denies any complaints but states that his mouth is very dry    OBJECTIVE:    VITAL SIGNS:  ICU Vital Signs Last 24 Hrs  T(C): 38.7 (06 Jul 2021 11:00), Max: 38.7 (06 Jul 2021 11:00)  T(F): 101.6 (06 Jul 2021 11:00), Max: 101.6 (06 Jul 2021 11:00)  HR: 73 (06 Jul 2021 13:00) (73 - 120)  BP: 128/64 (06 Jul 2021 13:00) (113/61 - 162/70)  BP(mean): 90 (06 Jul 2021 13:00) (79 - 115)  ABP: --  ABP(mean): --  RR: 30 (06 Jul 2021 13:00) (18 - 332)  SpO2: 95% (06 Jul 2021 13:00) (89% - 98%)        07-05 @ 07:01  -  07-06 @ 07:00  --------------------------------------------------------  IN: 350 mL / OUT: 1000 mL / NET: -650 mL    07-06 @ 07:01  -  07-06 @ 13:44  --------------------------------------------------------  IN: 400 mL / OUT: 350 mL / NET: 50 mL      CAPILLARY BLOOD GLUCOSE      POCT Blood Glucose.: 273 mg/dL (06 Jul 2021 11:43)      PHYSICAL EXAM:    General: obese male in moderate distress, lying on his right side  HEENT: PERRL, clear conjunctiva, MMM  Neck: supple  Respiratory: tachypneic, abdominal breathing pattern, diminished BS b/l R worse than L, no rales, wheezing or rhonchi  Cardiovascular: +S1/S2; RRR  Abdomen: soft, NT/ND; +BS x4  Extremities: WWP, no LE edema  Vascular: 2+ peripheral pulses b/l  Skin: erythematous, lluvia, non-raised, non-blanching rash present from upper thigh to the knees b/l (unchanged from admission)  Neurological: AOX3    MEDICATIONS:  MEDICATIONS  (STANDING):  azithromycin  IVPB      azithromycin  IVPB 500 milliGRAM(s) IV Intermittent every 24 hours  chlorhexidine 2% Cloths 1 Application(s) Topical <User Schedule>  dextrose 40% Gel 15 Gram(s) Oral once  dextrose 5%. 1000 milliLiter(s) (50 mL/Hr) IV Continuous <Continuous>  dextrose 5%. 1000 milliLiter(s) (100 mL/Hr) IV Continuous <Continuous>  dextrose 50% Injectable 25 Gram(s) IV Push once  dextrose 50% Injectable 12.5 Gram(s) IV Push once  dextrose 50% Injectable 25 Gram(s) IV Push once  dextrose 50% Injectable 50 milliLiter(s) IV Push once  fentaNYL    Injectable 200 MICROGram(s) IV Push once  furosemide   Injectable 20 milliGRAM(s) IV Push daily  glucagon  Injectable 1 milliGRAM(s) IntraMuscular once  insulin lispro (ADMELOG) corrective regimen sliding scale   SubCutaneous every 6 hours  insulin regular  human recombinant 10 Unit(s) IV Push once  linezolid  IVPB 600 milliGRAM(s) IV Intermittent every 12 hours  norepinephrine Infusion 0.05 MICROgram(s)/kG/Min (9.1 mL/Hr) IV Continuous <Continuous>  piperacillin/tazobactam IVPB.. 4.5 Gram(s) IV Intermittent every 6 hours  propofol Infusion 5 MICROgram(s)/kG/Min (2.91 mL/Hr) IV Continuous <Continuous>    MEDICATIONS  (PRN):  acetaminophen  Suppository .. 650 milliGRAM(s) Rectal every 6 hours PRN Temp greater or equal to 38C (100.4F), Mild Pain (1 - 3)      ALLERGIES:  Allergies    No Known Allergies    Intolerances        LABS:                        10.3   18.68 )-----------( 120      ( 06 Jul 2021 03:31 )             32.1     07-06    127<L>  |  96  |  26<H>  ----------------------------<  272<H>  5.5<H>   |  19<L>  |  1.18    Ca    8.4      06 Jul 2021 12:05  Mg     2.2     07-06    TPro  6.8  /  Alb  3.4  /  TBili  1.2  /  DBili  x   /  AST  26  /  ALT  18  /  AlkPhos  108  07-05    PT/INR - ( 05 Jul 2021 13:10 )   PT: 20.4 sec;   INR: 1.75          PTT - ( 05 Jul 2021 13:10 )  PTT:35.2 sec  Urinalysis Basic - ( 05 Jul 2021 15:26 )    Color: Yellow / Appearance: Clear / SG: >=1.030 / pH: x  Gluc: x / Ketone: 15 mg/dL  / Bili: Small / Urobili: 1.0 E.U./dL   Blood: x / Protein: 100 mg/dL / Nitrite: NEGATIVE   Leuk Esterase: NEGATIVE / RBC: < 5 /HPF / WBC < 5 /HPF   Sq Epi: x / Non Sq Epi: 0-5 /HPF / Bacteria: Present /HPF        RADIOLOGY & ADDITIONAL TESTS: Reviewed. CXR today shows worsening infiltrate on the R lung field

## 2021-07-06 NOTE — PROCEDURE NOTE - NSICDXPROCEDURE_GEN_ALL_CORE_FT
PROCEDURES:  Insertion, nasogastric tube 06-Jul-2021 17:38:10  Darrel Esteban  
PROCEDURES:  Insertion, arterial line, percutaneous 06-Jul-2021 19:54:54  Darrel Esteban  Ultrasound guidance, for vascular access 06-Jul-2021 19:55:07  Darrel Esteban  
PROCEDURES:  Endotracheal intubation 06-Jul-2021 15:06:44  Marvel Mijares

## 2021-07-06 NOTE — PROGRESS NOTE ADULT - ASSESSMENT
71 yo M with a past medical history of renal transplant (on tacrolimus, unk date), DM2, HTN, MGUS, pAF, BPH, HLD, CAD (s/p CABG 15 years ago), presents for 3-4 days of worsening cough. Patient states that he has a productive cough with brownish colored sputum. Found to have acute hypoxic respiratory failure and sepsis 2/2 likely multifocal pneumonia.    Neuro  - Precedex d/c'd as Pt is no longer agitated    CV  # HTN  - on home amlodipine 10mg, furosemide 20mg  - restart as needed  # HLD  - on home rosuvastatin 10mg  # CAD (s/p CABG 15 yrs ago)  - on home isosorbide mononitrate 30mg  # pAF  - on home eliquis 5mg BID, continue    Pulm  # Acute hypoxic respiratory failure  Likely 2/2 multifocal pneumonia. Increasing work of breathing in ED requiring escalating O2 requirements from nasal canula to NRB to BIPAP. CXR findings show worsening infiltrate on R lung field and Pt is doing worse on BIPAP.   -Will need CT chest, bronchoscopy/lung Bx to evaluate further  - Anticipate need for intubation given need for above tests/worsening respiratory status  - restarted home lasix 20 IV for pulmonary edema  - Consult pulmonology for possible bronchoscopy  - Get ABG to r/o acidosis as pt is tachypneic  - Antibiotic management as above  - c/w BIPAP and wean to HFNC as tolerated  - Bronchodilator PRN  - Strict I's and O's  - Daily CXR - worse today (see pulm)    GI  no active issues  npo while on BIPAP    ID  # Sepsis  Patient with 4/4 SIRS criteria (febrile, tachycardic, tachypneic, and WBC) likely 2/2 multifocal pneumonia. Patient with recent Z-pack use, but no known past hospitalizations or other antibiotics in past 3 months. Labs in ED notable for lactate 1.1 and VBG 7.33, CO2 38, HCO3 20. UA with specific gravity 1.030.  - s/p 3L NS, Vancomycin, and Zosyn  - will continue Azithromycin 500mg until legionella is ruled out  - Linezolid 600mg q12h to cover MRSA   - Zosyn 4.5g q6h to cover Pseudomonas  - f/u Tacrolimus level  - f/u urine strep and legionella  - Staph positive, MRSA negative  -RVP negative  -COVID negative  -BCX no growth to date (7/5)    - Nephrology consult for collateral on tacrolimus and if patient is on other immunosuppressive agents (Dr. Leyva and Dr. Preciado)  - Strict I's and O's with q6h bladder scans    Renal  # Renal Transplant  - History of renal transplant on unknown date. Reported tacrolimus use. Unknown if other immunosuppressives being use. Patient reports creatinine baseline 1.0.  - Cr 1.18 today  - Nephrology consult for collateral on tacrolimus and if patient is on other immunosuppressive agents (Dr. Leyva and Dr. Preciado)  - Strict I's and O's with q6h bladder scans  - insulin/dextrose given for hyperkalemia (K 5.2) will recheck BMP in PM      # BPH  - On tamsulosin 0.4mg    Heme  # anemia  - Hgb 10.3, normocytic anemia    Endo  # DM2  -  today  - 20U NPH given  - Pt on home lantus 45 at night and lispro 45 TID  - Started Lantus 25U  - Moderate insulin sliding scale    DERM  # lluvia reticular non-blanching rash from upper thighs to knees b/l  - consult Dermatology    F: none  E: replete PRN  N: NPO while on BiPAP  G: none  D:     Dispo: MICU    CODE: Full

## 2021-07-07 ENCOUNTER — RESULT REVIEW (OUTPATIENT)
Age: 71
End: 2021-07-07

## 2021-07-07 LAB
ALBUMIN SERPL ELPH-MCNC: 2.8 G/DL — LOW (ref 3.3–5)
ALP SERPL-CCNC: 93 U/L — SIGNIFICANT CHANGE UP (ref 40–120)
ALT FLD-CCNC: 24 U/L — SIGNIFICANT CHANGE UP (ref 10–45)
ANION GAP SERPL CALC-SCNC: 10 MMOL/L — SIGNIFICANT CHANGE UP (ref 5–17)
ANION GAP SERPL CALC-SCNC: 12 MMOL/L — SIGNIFICANT CHANGE UP (ref 5–17)
ANION GAP SERPL CALC-SCNC: 9 MMOL/L — SIGNIFICANT CHANGE UP (ref 5–17)
ANISOCYTOSIS BLD QL: SLIGHT — SIGNIFICANT CHANGE UP
APTT BLD: 30.8 SEC — SIGNIFICANT CHANGE UP (ref 27.5–35.5)
APTT BLD: 51.3 SEC — HIGH (ref 27.5–35.5)
AST SERPL-CCNC: 36 U/L — SIGNIFICANT CHANGE UP (ref 10–40)
B PERT IGG+IGM PNL SER: SIGNIFICANT CHANGE UP
BASE EXCESS BLDA CALC-SCNC: -3.8 MMOL/L — LOW (ref -2–3)
BASOPHILS # BLD AUTO: 0 K/UL — SIGNIFICANT CHANGE UP (ref 0–0.2)
BASOPHILS NFR BLD AUTO: 0 % — SIGNIFICANT CHANGE UP (ref 0–2)
BILIRUB SERPL-MCNC: 0.6 MG/DL — SIGNIFICANT CHANGE UP (ref 0.2–1.2)
BUN SERPL-MCNC: 29 MG/DL — HIGH (ref 7–23)
BUN SERPL-MCNC: 32 MG/DL — HIGH (ref 7–23)
BUN SERPL-MCNC: 35 MG/DL — HIGH (ref 7–23)
BURR CELLS BLD QL SMEAR: PRESENT — SIGNIFICANT CHANGE UP
CALCIUM SERPL-MCNC: 8.4 MG/DL — SIGNIFICANT CHANGE UP (ref 8.4–10.5)
CALCIUM SERPL-MCNC: 8.6 MG/DL — SIGNIFICANT CHANGE UP (ref 8.4–10.5)
CALCIUM SERPL-MCNC: 8.9 MG/DL — SIGNIFICANT CHANGE UP (ref 8.4–10.5)
CHLORIDE SERPL-SCNC: 96 MMOL/L — SIGNIFICANT CHANGE UP (ref 96–108)
CHLORIDE SERPL-SCNC: 97 MMOL/L — SIGNIFICANT CHANGE UP (ref 96–108)
CHLORIDE SERPL-SCNC: 97 MMOL/L — SIGNIFICANT CHANGE UP (ref 96–108)
CO2 BLDA-SCNC: 23 MMOL/L — SIGNIFICANT CHANGE UP (ref 19–24)
CO2 SERPL-SCNC: 20 MMOL/L — LOW (ref 22–31)
CO2 SERPL-SCNC: 21 MMOL/L — LOW (ref 22–31)
CO2 SERPL-SCNC: 21 MMOL/L — LOW (ref 22–31)
COLOR FLD: SIGNIFICANT CHANGE UP
COMMENT - FLUIDS: SIGNIFICANT CHANGE UP
CORTIS AM PEAK SERPL-MCNC: 15.66 UG/DL — SIGNIFICANT CHANGE UP (ref 6.02–18.4)
CREAT SERPL-MCNC: 1.57 MG/DL — HIGH (ref 0.5–1.3)
CREAT SERPL-MCNC: 1.72 MG/DL — HIGH (ref 0.5–1.3)
CREAT SERPL-MCNC: 1.84 MG/DL — HIGH (ref 0.5–1.3)
CRYPTOC AG FLD QL: NEGATIVE — SIGNIFICANT CHANGE UP
DACRYOCYTES BLD QL SMEAR: SLIGHT — SIGNIFICANT CHANGE UP
EOSINOPHIL # BLD AUTO: 0.14 K/UL — SIGNIFICANT CHANGE UP (ref 0–0.5)
EOSINOPHIL NFR BLD AUTO: 0.9 % — SIGNIFICANT CHANGE UP (ref 0–6)
FERRITIN SERPL-MCNC: 870 NG/ML — HIGH (ref 30–400)
FLUID INTAKE SUBSTANCE CLASS: SIGNIFICANT CHANGE UP
FLUID SEGMENTED GRANULOCYTES: 72 % — SIGNIFICANT CHANGE UP
GAS PNL BLDA: SIGNIFICANT CHANGE UP
GLUCOSE BLDC GLUCOMTR-MCNC: 129 MG/DL — HIGH (ref 70–99)
GLUCOSE BLDC GLUCOMTR-MCNC: 130 MG/DL — HIGH (ref 70–99)
GLUCOSE BLDC GLUCOMTR-MCNC: 135 MG/DL — HIGH (ref 70–99)
GLUCOSE BLDC GLUCOMTR-MCNC: 136 MG/DL — HIGH (ref 70–99)
GLUCOSE BLDC GLUCOMTR-MCNC: 149 MG/DL — HIGH (ref 70–99)
GLUCOSE BLDC GLUCOMTR-MCNC: 152 MG/DL — HIGH (ref 70–99)
GLUCOSE BLDC GLUCOMTR-MCNC: 154 MG/DL — HIGH (ref 70–99)
GLUCOSE BLDC GLUCOMTR-MCNC: 157 MG/DL — HIGH (ref 70–99)
GLUCOSE BLDC GLUCOMTR-MCNC: 159 MG/DL — HIGH (ref 70–99)
GLUCOSE BLDC GLUCOMTR-MCNC: 160 MG/DL — HIGH (ref 70–99)
GLUCOSE BLDC GLUCOMTR-MCNC: 164 MG/DL — HIGH (ref 70–99)
GLUCOSE BLDC GLUCOMTR-MCNC: 166 MG/DL — HIGH (ref 70–99)
GLUCOSE BLDC GLUCOMTR-MCNC: 168 MG/DL — HIGH (ref 70–99)
GLUCOSE BLDC GLUCOMTR-MCNC: 168 MG/DL — HIGH (ref 70–99)
GLUCOSE BLDC GLUCOMTR-MCNC: 184 MG/DL — HIGH (ref 70–99)
GLUCOSE BLDC GLUCOMTR-MCNC: 191 MG/DL — HIGH (ref 70–99)
GLUCOSE BLDC GLUCOMTR-MCNC: 199 MG/DL — HIGH (ref 70–99)
GLUCOSE BLDC GLUCOMTR-MCNC: 201 MG/DL — HIGH (ref 70–99)
GLUCOSE BLDC GLUCOMTR-MCNC: 230 MG/DL — HIGH (ref 70–99)
GLUCOSE BLDC GLUCOMTR-MCNC: 247 MG/DL — HIGH (ref 70–99)
GLUCOSE SERPL-MCNC: 135 MG/DL — HIGH (ref 70–99)
GLUCOSE SERPL-MCNC: 173 MG/DL — HIGH (ref 70–99)
GLUCOSE SERPL-MCNC: 285 MG/DL — HIGH (ref 70–99)
GRAM STN FLD: SIGNIFICANT CHANGE UP
HCO3 BLDA-SCNC: 22 MMOL/L — SIGNIFICANT CHANGE UP (ref 21–28)
HCT VFR BLD CALC: 29.1 % — LOW (ref 39–50)
HGB BLD-MCNC: 9.3 G/DL — LOW (ref 13–17)
INR BLD: 1.34 — HIGH (ref 0.88–1.16)
IRON SATN MFR SERPL: 18 % — SIGNIFICANT CHANGE UP (ref 16–55)
IRON SATN MFR SERPL: 26 UG/DL — LOW (ref 45–165)
LYMPHOCYTES # BLD AUTO: 12.56 K/UL — HIGH (ref 1–3.3)
LYMPHOCYTES # BLD AUTO: 81.6 % — HIGH (ref 13–44)
LYMPHOCYTES # FLD: 5 % — SIGNIFICANT CHANGE UP
MAGNESIUM SERPL-MCNC: 2.3 MG/DL — SIGNIFICANT CHANGE UP (ref 1.6–2.6)
MANUAL SMEAR VERIFICATION: SIGNIFICANT CHANGE UP
MCHC RBC-ENTMCNC: 29.8 PG — SIGNIFICANT CHANGE UP (ref 27–34)
MCHC RBC-ENTMCNC: 32 GM/DL — SIGNIFICANT CHANGE UP (ref 32–36)
MCV RBC AUTO: 93.3 FL — SIGNIFICANT CHANGE UP (ref 80–100)
METAMYELOCYTES # FLD: 1.8 % — HIGH (ref 0–0)
MICROCYTES BLD QL: SLIGHT — SIGNIFICANT CHANGE UP
MONOCYTES # BLD AUTO: 0.26 K/UL — SIGNIFICANT CHANGE UP (ref 0–0.9)
MONOCYTES NFR BLD AUTO: 1.7 % — LOW (ref 2–14)
MONOS+MACROS # FLD: 23 % — SIGNIFICANT CHANGE UP
MYELOCYTES NFR BLD: 1.8 % — HIGH (ref 0–0)
NEUTROPHILS # BLD AUTO: 1.88 K/UL — SIGNIFICANT CHANGE UP (ref 1.8–7.4)
NEUTROPHILS NFR BLD AUTO: 9.6 % — LOW (ref 43–77)
NEUTS BAND # BLD: 2.6 % — SIGNIFICANT CHANGE UP (ref 0–8)
OSMOLALITY UR: 380 MOSM/KG — SIGNIFICANT CHANGE UP (ref 300–900)
OVALOCYTES BLD QL SMEAR: SLIGHT — SIGNIFICANT CHANGE UP
PCO2 BLDA: 39 MMHG — SIGNIFICANT CHANGE UP (ref 35–48)
PH BLDA: 7.35 — SIGNIFICANT CHANGE UP (ref 7.35–7.45)
PHOSPHATE SERPL-MCNC: 4 MG/DL — SIGNIFICANT CHANGE UP (ref 2.5–4.5)
PLAT MORPH BLD: ABNORMAL
PLATELET # BLD AUTO: 131 K/UL — LOW (ref 150–400)
PO2 BLDA: 80 MMHG — LOW (ref 83–108)
POIKILOCYTOSIS BLD QL AUTO: SLIGHT — SIGNIFICANT CHANGE UP
POLYCHROMASIA BLD QL SMEAR: SLIGHT — SIGNIFICANT CHANGE UP
POTASSIUM SERPL-MCNC: 4.5 MMOL/L — SIGNIFICANT CHANGE UP (ref 3.5–5.3)
POTASSIUM SERPL-SCNC: 4.5 MMOL/L — SIGNIFICANT CHANGE UP (ref 3.5–5.3)
POTASSIUM UR-SCNC: 47 MMOL/L — SIGNIFICANT CHANGE UP
PROT SERPL-MCNC: 6.1 G/DL — SIGNIFICANT CHANGE UP (ref 6–8.3)
PROTHROM AB SERPL-ACNC: 15.9 SEC — HIGH (ref 10.6–13.6)
RBC # BLD: 3.12 M/UL — LOW (ref 4.2–5.8)
RBC # FLD: 15.9 % — HIGH (ref 10.3–14.5)
RBC BLD AUTO: ABNORMAL
RCV VOL RI: 7450 /UL — HIGH (ref 0–0)
SAO2 % BLDA: 96.7 % — SIGNIFICANT CHANGE UP (ref 94–98)
SMUDGE CELLS # BLD: PRESENT — SIGNIFICANT CHANGE UP
SODIUM SERPL-SCNC: 127 MMOL/L — LOW (ref 135–145)
SODIUM SERPL-SCNC: 128 MMOL/L — LOW (ref 135–145)
SODIUM SERPL-SCNC: 128 MMOL/L — LOW (ref 135–145)
SODIUM UR-SCNC: <20 MMOL/L — SIGNIFICANT CHANGE UP
SPECIMEN SOURCE FLD: SIGNIFICANT CHANGE UP
SPECIMEN SOURCE: SIGNIFICANT CHANGE UP
TACROLIMUS SERPL-MCNC: 6.4 NG/ML — SIGNIFICANT CHANGE UP
TIBC SERPL-MCNC: 146 UG/DL — LOW (ref 220–430)
TOTAL NUCLEATED CELL COUNT, BODY FLUID: 269 /UL — SIGNIFICANT CHANGE UP
TRANSFERRIN SERPL-MCNC: 123 MG/DL — LOW (ref 200–360)
TRIGL SERPL-MCNC: 123 MG/DL — SIGNIFICANT CHANGE UP
TSH SERPL-MCNC: 0.93 UIU/ML — SIGNIFICANT CHANGE UP (ref 0.27–4.2)
TUBE TYPE: SIGNIFICANT CHANGE UP
UIBC SERPL-MCNC: 120 UG/DL — SIGNIFICANT CHANGE UP (ref 110–370)
UUN UR-MCNC: 454 MG/DL — SIGNIFICANT CHANGE UP
WBC # BLD: 15.39 K/UL — HIGH (ref 3.8–10.5)
WBC # FLD AUTO: 15.39 K/UL — HIGH (ref 3.8–10.5)

## 2021-07-07 PROCEDURE — 71045 X-RAY EXAM CHEST 1 VIEW: CPT | Mod: 26

## 2021-07-07 PROCEDURE — 31624 DX BRONCHOSCOPE/LAVAGE: CPT

## 2021-07-07 PROCEDURE — 88112 CYTOPATH CELL ENHANCE TECH: CPT | Mod: 26,59

## 2021-07-07 PROCEDURE — 99232 SBSQ HOSP IP/OBS MODERATE 35: CPT | Mod: GC

## 2021-07-07 PROCEDURE — 99291 CRITICAL CARE FIRST HOUR: CPT

## 2021-07-07 PROCEDURE — 99233 SBSQ HOSP IP/OBS HIGH 50: CPT

## 2021-07-07 PROCEDURE — 88305 TISSUE EXAM BY PATHOLOGIST: CPT | Mod: 26

## 2021-07-07 PROCEDURE — 99233 SBSQ HOSP IP/OBS HIGH 50: CPT | Mod: GC,25

## 2021-07-07 PROCEDURE — 71250 CT THORAX DX C-: CPT | Mod: 26

## 2021-07-07 RX ORDER — NOREPINEPHRINE BITARTRATE/D5W 8 MG/250ML
0.05 PLASTIC BAG, INJECTION (ML) INTRAVENOUS
Qty: 8 | Refills: 0 | Status: DISCONTINUED | OUTPATIENT
Start: 2021-07-07 | End: 2021-07-07

## 2021-07-07 RX ORDER — SODIUM CHLORIDE 9 MG/ML
500 INJECTION INTRAMUSCULAR; INTRAVENOUS; SUBCUTANEOUS ONCE
Refills: 0 | Status: COMPLETED | OUTPATIENT
Start: 2021-07-07 | End: 2021-07-07

## 2021-07-07 RX ORDER — HEPARIN SODIUM 5000 [USP'U]/ML
1900 INJECTION INTRAVENOUS; SUBCUTANEOUS
Qty: 25000 | Refills: 0 | Status: DISCONTINUED | OUTPATIENT
Start: 2021-07-07 | End: 2021-07-08

## 2021-07-07 RX ORDER — PIPERACILLIN AND TAZOBACTAM 4; .5 G/20ML; G/20ML
3.38 INJECTION, POWDER, LYOPHILIZED, FOR SOLUTION INTRAVENOUS EVERY 6 HOURS
Refills: 0 | Status: DISCONTINUED | OUTPATIENT
Start: 2021-07-07 | End: 2021-07-09

## 2021-07-07 RX ORDER — HEPARIN SODIUM 5000 [USP'U]/ML
1900 INJECTION INTRAVENOUS; SUBCUTANEOUS
Qty: 25000 | Refills: 0 | Status: DISCONTINUED | OUTPATIENT
Start: 2021-07-07 | End: 2021-07-07

## 2021-07-07 RX ORDER — POLYETHYLENE GLYCOL 3350 17 G/17G
17 POWDER, FOR SOLUTION ORAL EVERY 24 HOURS
Refills: 0 | Status: DISCONTINUED | OUTPATIENT
Start: 2021-07-07 | End: 2021-07-09

## 2021-07-07 RX ORDER — PROPOFOL 10 MG/ML
10 INJECTION, EMULSION INTRAVENOUS
Qty: 1000 | Refills: 0 | Status: DISCONTINUED | OUTPATIENT
Start: 2021-07-07 | End: 2021-07-10

## 2021-07-07 RX ORDER — SENNA PLUS 8.6 MG/1
2 TABLET ORAL AT BEDTIME
Refills: 0 | Status: DISCONTINUED | OUTPATIENT
Start: 2021-07-07 | End: 2021-07-11

## 2021-07-07 RX ORDER — TACROLIMUS 5 MG/1
1 CAPSULE ORAL EVERY 12 HOURS
Refills: 0 | Status: DISCONTINUED | OUTPATIENT
Start: 2021-07-07 | End: 2021-07-07

## 2021-07-07 RX ADMIN — Medication 1 APPLICATION(S): at 10:52

## 2021-07-07 RX ADMIN — Medication 1 APPLICATION(S): at 22:48

## 2021-07-07 RX ADMIN — PIPERACILLIN AND TAZOBACTAM 200 GRAM(S): 4; .5 INJECTION, POWDER, LYOPHILIZED, FOR SOLUTION INTRAVENOUS at 00:47

## 2021-07-07 RX ADMIN — HEPARIN SODIUM 19 UNIT(S)/HR: 5000 INJECTION INTRAVENOUS; SUBCUTANEOUS at 15:41

## 2021-07-07 RX ADMIN — PIPERACILLIN AND TAZOBACTAM 200 GRAM(S): 4; .5 INJECTION, POWDER, LYOPHILIZED, FOR SOLUTION INTRAVENOUS at 13:46

## 2021-07-07 RX ADMIN — LINEZOLID 300 MILLIGRAM(S): 600 INJECTION, SOLUTION INTRAVENOUS at 22:55

## 2021-07-07 RX ADMIN — TACROLIMUS 1 MILLIGRAM(S): 5 CAPSULE ORAL at 10:21

## 2021-07-07 RX ADMIN — PIPERACILLIN AND TAZOBACTAM 200 GRAM(S): 4; .5 INJECTION, POWDER, LYOPHILIZED, FOR SOLUTION INTRAVENOUS at 07:16

## 2021-07-07 RX ADMIN — INSULIN HUMAN 2 UNIT(S)/HR: 100 INJECTION, SOLUTION SUBCUTANEOUS at 01:22

## 2021-07-07 RX ADMIN — Medication 650 MILLIGRAM(S): at 18:45

## 2021-07-07 RX ADMIN — CHLORHEXIDINE GLUCONATE 1 APPLICATION(S): 213 SOLUTION TOPICAL at 07:15

## 2021-07-07 RX ADMIN — Medication 650 MILLIGRAM(S): at 19:18

## 2021-07-07 RX ADMIN — PIPERACILLIN AND TAZOBACTAM 200 GRAM(S): 4; .5 INJECTION, POWDER, LYOPHILIZED, FOR SOLUTION INTRAVENOUS at 18:45

## 2021-07-07 RX ADMIN — INSULIN HUMAN 2 UNIT(S)/HR: 100 INJECTION, SOLUTION SUBCUTANEOUS at 13:42

## 2021-07-07 RX ADMIN — PROPOFOL 5.83 MICROGRAM(S)/KG/MIN: 10 INJECTION, EMULSION INTRAVENOUS at 13:54

## 2021-07-07 RX ADMIN — POLYETHYLENE GLYCOL 3350 17 GRAM(S): 17 POWDER, FOR SOLUTION ORAL at 10:54

## 2021-07-07 RX ADMIN — Medication 20 MILLIGRAM(S): at 07:16

## 2021-07-07 RX ADMIN — LINEZOLID 300 MILLIGRAM(S): 600 INJECTION, SOLUTION INTRAVENOUS at 10:51

## 2021-07-07 RX ADMIN — PROPOFOL 2.91 MICROGRAM(S)/KG/MIN: 10 INJECTION, EMULSION INTRAVENOUS at 01:22

## 2021-07-07 RX ADMIN — SODIUM CHLORIDE 1000 MILLILITER(S): 9 INJECTION INTRAMUSCULAR; INTRAVENOUS; SUBCUTANEOUS at 19:19

## 2021-07-07 RX ADMIN — SENNA PLUS 2 TABLET(S): 8.6 TABLET ORAL at 22:55

## 2021-07-07 RX ADMIN — CHLORHEXIDINE GLUCONATE 15 MILLILITER(S): 213 SOLUTION TOPICAL at 22:54

## 2021-07-07 RX ADMIN — AZITHROMYCIN 255 MILLIGRAM(S): 500 TABLET, FILM COATED ORAL at 17:48

## 2021-07-07 RX ADMIN — SODIUM CHLORIDE 1000 MILLILITER(S): 9 INJECTION INTRAMUSCULAR; INTRAVENOUS; SUBCUTANEOUS at 15:47

## 2021-07-07 RX ADMIN — CHLORHEXIDINE GLUCONATE 15 MILLILITER(S): 213 SOLUTION TOPICAL at 10:51

## 2021-07-07 NOTE — DIETITIAN INITIAL EVALUATION ADULT. - OTHER CALCULATIONS
Ideal body weight used for calculations as pt >120% of IBW (151% IBW). Needs estimated for age and adjusted for vent demands. Fluids per team 2/2 respiratory failure, hyponatremia

## 2021-07-07 NOTE — DIETITIAN INITIAL EVALUATION ADULT. - PERTINENT LABORATORY DATA
WBC 15.39 (H), Na 128 (L), K/Mg/Phos WNL, BUN 32/Cr 1.72 (H, trending up), POC , 191, 201mg/dL, A1C 8.2%

## 2021-07-07 NOTE — PROGRESS NOTE ADULT - SUBJECTIVE AND OBJECTIVE BOX
Patient is a 70y Male seen and evaluated at bedside.       Meds:    acetaminophen    Suspension .. 650 every 6 hours PRN  azithromycin  IVPB    azithromycin  IVPB 500 every 24 hours  chlorhexidine 0.12% Liquid 15 every 12 hours  chlorhexidine 2% Cloths 1 <User Schedule>  dextrose 40% Gel 15 once  dextrose 5%. 1000 <Continuous>  dextrose 5%. 1000 <Continuous>  dextrose 50% Injectable 25 once  dextrose 50% Injectable 12.5 once  dextrose 50% Injectable 25 once  glucagon  Injectable 1 once  insulin regular Infusion 2 <Continuous>  linezolid  IVPB 600 every 12 hours  petrolatum Ophthalmic Ointment 1 two times a day  piperacillin/tazobactam IVPB.. 3.375 every 6 hours  polyethylene glycol 3350 17 every 24 hours  propofol Infusion 10 <Continuous>      T(C): , Max: 38.2 (07-06-21 @ 18:16)  T(F): , Max: 100.8 (07-06-21 @ 18:16)  HR: 69 (07-07-21 @ 11:00)  BP: 98/55 (07-07-21 @ 11:00)  BP(mean): 71 (07-07-21 @ 11:00)  RR: 20 (07-07-21 @ 11:00)  SpO2: 90% (07-07-21 @ 11:00)  Wt(kg): --    07-06 @ 07:01  -  07-07 @ 07:00  --------------------------------------------------------  IN: 1985.1 mL / OUT: 1575 mL / NET: 410.1 mL    07-07 @ 07:01  -  07-07 @ 12:26  --------------------------------------------------------  IN: 367.5 mL / OUT: 50 mL / NET: 317.5 mL          Review of Systems:  CONSTITUTIONAL: No fever or chills, No fatigue or tiredness  RESPIRATORY: No shortness of breath, cough, hemoptysis  CARDIOVASCULAR: No chest pain or shortness of breath  GASTROINTESTINAL: No abdominal or flank pain, No nausea or vomiting, No diarrhea  GENITOURINARY: No dysuria or urinary burning, No difficulty passing urine, No hematuria  NEUROLOGICAL: No headaches or blurred vision  SKIN: No skin rashes   MUSCULOSKELETAL: No arthralgia, No leg edema, No muscle pain      PHYSICAL EXAM:  GENERAL: well-developed, well nourished, alert, no acute distress at present  NECK: supple, No JVD  CHEST/LUNG: Clear to auscultation bilaterally  HEART: normal S1S2, RRR  ABDOMEN: Soft, Nontender, +BS, No flank tenderness bilateral  EXTREMITIES: No clubbing, cyanosis, or edema   NEUROLOGY: AAO x3, no focal neurological deficit  ACCESS: good thrill and bruit appreciated      LABS:                        9.3    15.39 )-----------( 131      ( 07 Jul 2021 06:23 )             29.1     07-07    128<L>  |  97  |  32<H>  ----------------------------<  173<H>  4.5   |  21<L>  |  1.72<H>    Ca    8.9      07 Jul 2021 06:23  Phos  4.0     07-07  Mg     2.3     07-07    TPro  6.1  /  Alb  2.8<L>  /  TBili  0.6  /  DBili  x   /  AST  36  /  ALT  24  /  AlkPhos  93  07-07      PT/INR - ( 07 Jul 2021 08:47 )   PT: 15.9 sec;   INR: 1.34          PTT - ( 07 Jul 2021 08:47 )  PTT:30.8 sec  Urinalysis Basic - ( 05 Jul 2021 15:26 )    Color: Yellow / Appearance: Clear / SG: >=1.030 / pH: x  Gluc: x / Ketone: 15 mg/dL  / Bili: Small / Urobili: 1.0 E.U./dL   Blood: x / Protein: 100 mg/dL / Nitrite: NEGATIVE   Leuk Esterase: NEGATIVE / RBC: < 5 /HPF / WBC < 5 /HPF   Sq Epi: x / Non Sq Epi: 0-5 /HPF / Bacteria: Present /HPF            RADIOLOGY & ADDITIONAL STUDIES:           Patient is a 70y Male seen and evaluated at bedside. overnight patient became hypotensive requiring pressor support with levophed - this AM patient no longer on levophed and his oxygen  requirements are decreasing; urine output was 320ml by straight cath  CR this AM 1.71->1.57->1.38->1.12      Meds:    acetaminophen    Suspension .. 650 every 6 hours PRN  azithromycin  IVPB    azithromycin  IVPB 500 every 24 hours  chlorhexidine 0.12% Liquid 15 every 12 hours  chlorhexidine 2% Cloths 1 <User Schedule>  dextrose 40% Gel 15 once  dextrose 5%. 1000 <Continuous>  dextrose 5%. 1000 <Continuous>  dextrose 50% Injectable 25 once  dextrose 50% Injectable 12.5 once  dextrose 50% Injectable 25 once  glucagon  Injectable 1 once  insulin regular Infusion 2 <Continuous>  linezolid  IVPB 600 every 12 hours  petrolatum Ophthalmic Ointment 1 two times a day  piperacillin/tazobactam IVPB.. 3.375 every 6 hours  polyethylene glycol 3350 17 every 24 hours  propofol Infusion 10 <Continuous>      T(C): , Max: 38.2 (07-06-21 @ 18:16)  T(F): , Max: 100.8 (07-06-21 @ 18:16)  HR: 69 (07-07-21 @ 11:00)  BP: 98/55 (07-07-21 @ 11:00)  BP(mean): 71 (07-07-21 @ 11:00)  RR: 20 (07-07-21 @ 11:00)  SpO2: 90% (07-07-21 @ 11:00)  Wt(kg): --    07-06 @ 07:01  -  07-07 @ 07:00  --------------------------------------------------------  IN: 1985.1 mL / OUT: 1575 mL / NET: 410.1 mL    07-07 @ 07:01  -  07-07 @ 12:26  --------------------------------------------------------  IN: 367.5 mL / OUT: 50 mL / NET: 317.5 mL          Review of Systems:  CONSTITUTIONAL: No fever or chills, No fatigue or tiredness  RESPIRATORY: No shortness of breath, cough, hemoptysis  CARDIOVASCULAR: No chest pain or shortness of breath  GASTROINTESTINAL: No abdominal or flank pain, No nausea or vomiting, No diarrhea  GENITOURINARY: No dysuria or urinary burning, No difficulty passing urine, No hematuria  NEUROLOGICAL: No headaches or blurred vision  SKIN: No skin rashes   MUSCULOSKELETAL: No arthralgia, No leg edema, No muscle pain      PHYSICAL EXAM:  GENERAL: well-developed, well nourished, alert, no acute distress at present  NECK: supple, No JVD  CHEST/LUNG: Clear to auscultation bilaterally  HEART: normal S1S2, RRR  ABDOMEN: Soft, Nontender, +BS, No flank tenderness bilateral  EXTREMITIES: No clubbing, cyanosis, or edema   NEUROLOGY: AAO x3, no focal neurological deficit  ACCESS: good thrill and bruit appreciated      LABS:                        9.3    15.39 )-----------( 131      ( 07 Jul 2021 06:23 )             29.1     07-07    128<L>  |  97  |  32<H>  ----------------------------<  173<H>  4.5   |  21<L>  |  1.72<H>    Ca    8.9      07 Jul 2021 06:23  Phos  4.0     07-07  Mg     2.3     07-07    TPro  6.1  /  Alb  2.8<L>  /  TBili  0.6  /  DBili  x   /  AST  36  /  ALT  24  /  AlkPhos  93  07-07      PT/INR - ( 07 Jul 2021 08:47 )   PT: 15.9 sec;   INR: 1.34          PTT - ( 07 Jul 2021 08:47 )  PTT:30.8 sec  Urinalysis Basic - ( 05 Jul 2021 15:26 )    Color: Yellow / Appearance: Clear / SG: >=1.030 / pH: x  Gluc: x / Ketone: 15 mg/dL  / Bili: Small / Urobili: 1.0 E.U./dL   Blood: x / Protein: 100 mg/dL / Nitrite: NEGATIVE   Leuk Esterase: NEGATIVE / RBC: < 5 /HPF / WBC < 5 /HPF   Sq Epi: x / Non Sq Epi: 0-5 /HPF / Bacteria: Present /HPF            RADIOLOGY & ADDITIONAL STUDIES:           Patient is a 70y Male seen and evaluated at bedside. overnight patient became hypotensive requiring pressor support with levophed - this AM patient no longer on levophed and his oxygen  requirements are decreasing; urine output was 320ml by straight cath  CR this AM 1.71->1.57->1.38->1.12      Meds:    acetaminophen    Suspension .. 650 every 6 hours PRN  azithromycin  IVPB    azithromycin  IVPB 500 every 24 hours  chlorhexidine 0.12% Liquid 15 every 12 hours  chlorhexidine 2% Cloths 1 <User Schedule>  dextrose 40% Gel 15 once  dextrose 5%. 1000 <Continuous>  dextrose 5%. 1000 <Continuous>  dextrose 50% Injectable 25 once  dextrose 50% Injectable 12.5 once  dextrose 50% Injectable 25 once  glucagon  Injectable 1 once  insulin regular Infusion 2 <Continuous>  linezolid  IVPB 600 every 12 hours  petrolatum Ophthalmic Ointment 1 two times a day  piperacillin/tazobactam IVPB.. 3.375 every 6 hours  polyethylene glycol 3350 17 every 24 hours  propofol Infusion 10 <Continuous>      T(C): , Max: 38.2 (07-06-21 @ 18:16)  T(F): , Max: 100.8 (07-06-21 @ 18:16)  HR: 69 (07-07-21 @ 11:00)  BP: 98/55 (07-07-21 @ 11:00)  BP(mean): 71 (07-07-21 @ 11:00)  RR: 20 (07-07-21 @ 11:00)  SpO2: 90% (07-07-21 @ 11:00)  Wt(kg): --    07-06 @ 07:01  -  07-07 @ 07:00  --------------------------------------------------------  IN: 1985.1 mL / OUT: 1575 mL / NET: 410.1 mL    07-07 @ 07:01  -  07-07 @ 12:26  --------------------------------------------------------  IN: 367.5 mL / OUT: 50 mL / NET: 317.5 mL          Review of Systems:Unablt to obtain as patient is intubated; sedated       PHYSICAL EXAM:  GENERAL: intubated; sedated  NECK: supple, No JVD  CHEST/LUNG: rhocnhi appreciated b/l  HEART: normal S1S2, RRR  ABDOMEN: Soft, Nontender, +BS, No flank tenderness bilateral  EXTREMITIES:+ 2+ pititng edema BL LE and           LABS:                        9.3    15.39 )-----------( 131      ( 07 Jul 2021 06:23 )             29.1     07-07    128<L>  |  97  |  32<H>  ----------------------------<  173<H>  4.5   |  21<L>  |  1.72<H>    Ca    8.9      07 Jul 2021 06:23  Phos  4.0     07-07  Mg     2.3     07-07    TPro  6.1  /  Alb  2.8<L>  /  TBili  0.6  /  DBili  x   /  AST  36  /  ALT  24  /  AlkPhos  93  07-07      PT/INR - ( 07 Jul 2021 08:47 )   PT: 15.9 sec;   INR: 1.34          PTT - ( 07 Jul 2021 08:47 )  PTT:30.8 sec  Urinalysis Basic - ( 05 Jul 2021 15:26 )    Color: Yellow / Appearance: Clear / SG: >=1.030 / pH: x  Gluc: x / Ketone: 15 mg/dL  / Bili: Small / Urobili: 1.0 E.U./dL   Blood: x / Protein: 100 mg/dL / Nitrite: NEGATIVE   Leuk Esterase: NEGATIVE / RBC: < 5 /HPF / WBC < 5 /HPF   Sq Epi: x / Non Sq Epi: 0-5 /HPF / Bacteria: Present /HPF            RADIOLOGY & ADDITIONAL STUDIES:           Patient is a 70y Male seen and evaluated at bedside. overnight patient became hypotensive requiring pressor support with levophed - this AM patient no longer on levophed and his oxygen  requirements are decreasing; urine output was 320ml by straight cath  CR this AM 1.71->1.57->1.38->1.12      Meds:    acetaminophen    Suspension .. 650 every 6 hours PRN  azithromycin  IVPB    azithromycin  IVPB 500 every 24 hours  chlorhexidine 0.12% Liquid 15 every 12 hours  chlorhexidine 2% Cloths 1 <User Schedule>  dextrose 40% Gel 15 once  dextrose 5%. 1000 <Continuous>  dextrose 5%. 1000 <Continuous>  dextrose 50% Injectable 25 once  dextrose 50% Injectable 12.5 once  dextrose 50% Injectable 25 once  glucagon  Injectable 1 once  insulin regular Infusion 2 <Continuous>  linezolid  IVPB 600 every 12 hours  petrolatum Ophthalmic Ointment 1 two times a day  piperacillin/tazobactam IVPB.. 3.375 every 6 hours  polyethylene glycol 3350 17 every 24 hours  propofol Infusion 10 <Continuous>      T(C): , Max: 38.2 (07-06-21 @ 18:16)  T(F): , Max: 100.8 (07-06-21 @ 18:16)  HR: 69 (07-07-21 @ 11:00)  BP: 98/55 (07-07-21 @ 11:00)  BP(mean): 71 (07-07-21 @ 11:00)  RR: 20 (07-07-21 @ 11:00)  SpO2: 90% (07-07-21 @ 11:00)  Wt(kg): --    07-06 @ 07:01  -  07-07 @ 07:00  --------------------------------------------------------  IN: 1985.1 mL / OUT: 1575 mL / NET: 410.1 mL    07-07 @ 07:01  -  07-07 @ 12:26  --------------------------------------------------------  IN: 367.5 mL / OUT: 50 mL / NET: 317.5 mL          Review of Systems:Unablt to obtain as patient is intubated; sedated       PHYSICAL EXAM:  GENERAL: intubated; sedated  NECK: supple, No JVD  CHEST/LUNG: rhocnhi appreciated b/l at bases  HEART: normal S1S2, RRR  ABDOMEN: Soft, Nontender, +BS, No flank tenderness bilateral  EXTREMITIES:+ 2+ pitting edema BL LE w/ scrotal edema            LABS:                        9.3    15.39 )-----------( 131      ( 07 Jul 2021 06:23 )             29.1     07-07    128<L>  |  97  |  32<H>  ----------------------------<  173<H>  4.5   |  21<L>  |  1.72<H>    Ca    8.9      07 Jul 2021 06:23  Phos  4.0     07-07  Mg     2.3     07-07    TPro  6.1  /  Alb  2.8<L>  /  TBili  0.6  /  DBili  x   /  AST  36  /  ALT  24  /  AlkPhos  93  07-07      PT/INR - ( 07 Jul 2021 08:47 )   PT: 15.9 sec;   INR: 1.34          PTT - ( 07 Jul 2021 08:47 )  PTT:30.8 sec  Urinalysis Basic - ( 05 Jul 2021 15:26 )    Color: Yellow / Appearance: Clear / SG: >=1.030 / pH: x  Gluc: x / Ketone: 15 mg/dL  / Bili: Small / Urobili: 1.0 E.U./dL   Blood: x / Protein: 100 mg/dL / Nitrite: NEGATIVE   Leuk Esterase: NEGATIVE / RBC: < 5 /HPF / WBC < 5 /HPF   Sq Epi: x / Non Sq Epi: 0-5 /HPF / Bacteria: Present /HPF            RADIOLOGY & ADDITIONAL STUDIES:

## 2021-07-07 NOTE — CHART NOTE - NSCHARTNOTEFT_GEN_A_CORE
[facility] PRE-BRONCHOSCOPY RISK ASSESSMENT  ------------------------------------------------------------    Procedure Date: 7/7/21    Provider Name: Wes Connell    Reason for Bronchoscopy: Lobar pneumonia, r/o atypical infection     Location of Procedure (check one):   [  ] Endoscopy  [  ] Emergency Department  [x] Intensive Care Unit  [  ] Operating Room  [  ] Other: _________    RISK ASSESSMENT  I. Patient symptoms (check all that apply): >/ 3 of these = SIGNIFICANT RISK for TB  [  ] Coughing > 2 to 3 weeks                 [  ] Unexplained fever >/ 2 weeks   [  ] Unusual weakness or fatigue  [  ] Unexplained weight loss > 10lbs.  [  ] Hemoptysis                                   [  ] Unusual or night sweating  [x] NON-APPLICABLE    II. TB history (Check all that apply): >/ 1 of these = SIGNIFICANT RISK for TB  [  ] Sputum smear/culture (+) for acid fast bacilli (AFB)                           [  ] Abnormal CXR or CT suggestive of TB; date(s) & description __________  [  ] Positive TB skin or blood test; date: __________                               [  ] On medication for latent TB or disease; list medication(s) ____________  [  ] TB diagnosed in the past; year treated: _______                                [  ] Inadequately treated TB  [  ] Current close contact of a person known or suspected to have TB  [x] NON-APPLICABLE    III. Additional Risk factors for TB (Check all that apply): Consider these in relation to symptoms and history  [x] Person has conditions placing them at higher risk for TB disease (i.e. immunosuppressive therapy)  [  ] Person has lived in a country for 3 months or more where TB is common  [  ] Person lives in a high risk environment for TB (i.e. long term care, health care worker, incarcerated, homeless)  [  ] Person injects illicit drugs  [  ] Person is HIV positive or at high risk for HIV    ****************************************************************  BASED ON THE TB RISK ASSESSMENT ABOVE, THE PATIENT'S RISK FOR TB IS:                       [x] LOW RISK FOR TB            [  ] SIGNIFICANT RISK FOR TB  ****************************************************************    IV. Based on the Determined Risk for TB, the following Action(s) are Recommended:  [x] Low risk for TB infection --> Proceed with the diagnostic procedure    [  ] Significant risk for TB infection:  1. Perform the procedure in a negative pressure room, with appropriate personal protective equipment (PPE) for healthcare personnel (i.e. N95 respirator)    2. If it is not feasible to move the patient or defer the procedure:    a. Use a single-bedded room in a low traffic area to perform the bronchoscopy procedure    b. Place a portable high-efficiency particulate air (HEPA) filter in the space prior to starting the procedure and keep the door closed. Refer to Infection Control policy titled "Tuberculosis Control Strategy Plan" for additional information.    c. All healthcare personnel in the procedure room shall wear and N95 disposible respirator.    3. Documentation of the tuberculosis risk assessment is to be included in the patient's medical record.

## 2021-07-07 NOTE — PROGRESS NOTE ADULT - SUBJECTIVE AND OBJECTIVE BOX
INTERVAL HPI/OVERNIGHT EVENTS:    SUBJECTIVE: Patient seen and examined at bedside.     OBJECTIVE:    VITAL SIGNS:  ICU Vital Signs Last 24 Hrs  T(C): 37.5 (07 Jul 2021 09:32), Max: 38.2 (06 Jul 2021 18:16)  T(F): 99.5 (07 Jul 2021 09:32), Max: 100.8 (06 Jul 2021 18:16)  HR: 69 (07 Jul 2021 11:00) (59 - 80)  BP: 98/55 (07 Jul 2021 11:00) (89/48 - 130/70)  BP(mean): 71 (07 Jul 2021 11:00) (64 - 94)  ABP: 103/45 (07 Jul 2021 11:00) (96/50 - 126/54)  ABP(mean): 60 (07 Jul 2021 11:00) (56 - 72)  RR: 20 (07 Jul 2021 11:00) (13 - 30)  SpO2: 90% (07 Jul 2021 11:00) (90% - 100%)    Mode: AC/ CMV (Assist Control/ Continuous Mandatory Ventilation), RR (machine): 14, TV (machine): 500, FiO2: 50, PEEP: 8, ITime: 1, MAP: 13, PIP: 26    07-06 @ 07:01  -  07-07 @ 07:00  --------------------------------------------------------  IN: 1985.1 mL / OUT: 1575 mL / NET: 410.1 mL    07-07 @ 07:01  -  07-07 @ 12:34  --------------------------------------------------------  IN: 367.5 mL / OUT: 50 mL / NET: 317.5 mL      CAPILLARY BLOOD GLUCOSE      POCT Blood Glucose.: 164 mg/dL (07 Jul 2021 12:07)      PHYSICAL EXAM:    General: NAD  HEENT: NC/AT; PERRL, clear conjunctiva  Neck: supple, sumandibular and sebmental nodes palpated  Respiratory: CTA b/l  Cardiovascular: +S1/S2; RRR  Abdomen: soft, NT/ND; +BS x4  Extremities: WWP, 2+ peripheral pulses b/l; no LE edema  Skin: normal color and turgor; no rash  Neurological:    MEDICATIONS:  MEDICATIONS  (STANDING):  azithromycin  IVPB      azithromycin  IVPB 500 milliGRAM(s) IV Intermittent every 24 hours  chlorhexidine 0.12% Liquid 15 milliLiter(s) Oral Mucosa every 12 hours  chlorhexidine 2% Cloths 1 Application(s) Topical <User Schedule>  dextrose 40% Gel 15 Gram(s) Oral once  dextrose 5%. 1000 milliLiter(s) (50 mL/Hr) IV Continuous <Continuous>  dextrose 5%. 1000 milliLiter(s) (100 mL/Hr) IV Continuous <Continuous>  dextrose 50% Injectable 25 Gram(s) IV Push once  dextrose 50% Injectable 12.5 Gram(s) IV Push once  dextrose 50% Injectable 25 Gram(s) IV Push once  glucagon  Injectable 1 milliGRAM(s) IntraMuscular once  insulin regular Infusion 2 Unit(s)/Hr (2 mL/Hr) IV Continuous <Continuous>  linezolid  IVPB 600 milliGRAM(s) IV Intermittent every 12 hours  petrolatum Ophthalmic Ointment 1 Application(s) Both EYES two times a day  piperacillin/tazobactam IVPB.. 3.375 Gram(s) IV Intermittent every 6 hours  polyethylene glycol 3350 17 Gram(s) Oral every 24 hours  propofol Infusion 10 MICROgram(s)/kG/Min (5.83 mL/Hr) IV Continuous <Continuous>    MEDICATIONS  (PRN):  acetaminophen    Suspension .. 650 milliGRAM(s) Enteral Tube every 6 hours PRN Temp greater or equal to 38C (100.4F), Mild Pain (1 - 3)      ALLERGIES:  Allergies    No Known Allergies    Intolerances        LABS:                        9.3    15.39 )-----------( 131      ( 07 Jul 2021 06:23 )             29.1     07-07    128<L>  |  97  |  32<H>  ----------------------------<  173<H>  4.5   |  21<L>  |  1.72<H>    Ca    8.9      07 Jul 2021 06:23  Phos  4.0     07-07  Mg     2.3     07-07    TPro  6.1  /  Alb  2.8<L>  /  TBili  0.6  /  DBili  x   /  AST  36  /  ALT  24  /  AlkPhos  93  07-07    PT/INR - ( 07 Jul 2021 08:47 )   PT: 15.9 sec;   INR: 1.34          PTT - ( 07 Jul 2021 08:47 )  PTT:30.8 sec  Urinalysis Basic - ( 05 Jul 2021 15:26 )    Color: Yellow / Appearance: Clear / SG: >=1.030 / pH: x  Gluc: x / Ketone: 15 mg/dL  / Bili: Small / Urobili: 1.0 E.U./dL   Blood: x / Protein: 100 mg/dL / Nitrite: NEGATIVE   Leuk Esterase: NEGATIVE / RBC: < 5 /HPF / WBC < 5 /HPF   Sq Epi: x / Non Sq Epi: 0-5 /HPF / Bacteria: Present /HPF        RADIOLOGY & ADDITIONAL TESTS: Reviewed. INTERVAL HPI/OVERNIGHT EVENTS: o/n sugars 200. Started on insuin 2 units gtt/hr. BS at 0300 with 288 SC with 325 out.    SUBJECTIVE: Patient seen and examined at bedside. Intubated and sedated.    OBJECTIVE:    VITAL SIGNS:  ICU Vital Signs Last 24 Hrs  T(C): 37.5 (07 Jul 2021 09:32), Max: 38.2 (06 Jul 2021 18:16)  T(F): 99.5 (07 Jul 2021 09:32), Max: 100.8 (06 Jul 2021 18:16)  HR: 69 (07 Jul 2021 11:00) (59 - 80)  BP: 98/55 (07 Jul 2021 11:00) (89/48 - 130/70)  BP(mean): 71 (07 Jul 2021 11:00) (64 - 94)  ABP: 103/45 (07 Jul 2021 11:00) (96/50 - 126/54)  ABP(mean): 60 (07 Jul 2021 11:00) (56 - 72)  RR: 20 (07 Jul 2021 11:00) (13 - 30)  SpO2: 90% (07 Jul 2021 11:00) (90% - 100%)    Mode: AC/ CMV (Assist Control/ Continuous Mandatory Ventilation), RR (machine): 14, TV (machine): 500, FiO2: 50, PEEP: 8, ITime: 1, MAP: 13, PIP: 26    07-06 @ 07:01  -  07-07 @ 07:00  --------------------------------------------------------  IN: 1985.1 mL / OUT: 1575 mL / NET: 410.1 mL    07-07 @ 07:01 - 07-07 @ 12:34  --------------------------------------------------------  IN: 367.5 mL / OUT: 50 mL / NET: 317.5 mL      CAPILLARY BLOOD GLUCOSE      POCT Blood Glucose.: 164 mg/dL (07 Jul 2021 12:07)      PHYSICAL EXAM:    General: obese male in NAD, intubated and sedated RASS -5  HEENT: PERRL, clear conjunctiva, MMM, Sump and ET tube in place   Neck: supple, submandibular and submental lympahdenopathy  Respiratory: Diminished breath sounds on the R vs L, no wheezing, rhonchi or crackles  Cardiovascular: +S1/S2; RRR  Abdomen: soft, abdominal fullness, dullness to percussion in the suprapubic region  Extremities: WWP, no LE edema, R a line  Vascular: 2+ peripheral pulses b/l  Skin: erythematous, lluvia, non-raised, non-blanching rash present from upper thigh to the knees b/l (unchanged from admission)  Neurological: Unable to assess d/t sedation      MEDICATIONS:  MEDICATIONS  (STANDING):  azithromycin  IVPB      azithromycin  IVPB 500 milliGRAM(s) IV Intermittent every 24 hours  chlorhexidine 0.12% Liquid 15 milliLiter(s) Oral Mucosa every 12 hours  chlorhexidine 2% Cloths 1 Application(s) Topical <User Schedule>  dextrose 40% Gel 15 Gram(s) Oral once  dextrose 5%. 1000 milliLiter(s) (50 mL/Hr) IV Continuous <Continuous>  dextrose 5%. 1000 milliLiter(s) (100 mL/Hr) IV Continuous <Continuous>  dextrose 50% Injectable 25 Gram(s) IV Push once  dextrose 50% Injectable 12.5 Gram(s) IV Push once  dextrose 50% Injectable 25 Gram(s) IV Push once  glucagon  Injectable 1 milliGRAM(s) IntraMuscular once  insulin regular Infusion 2 Unit(s)/Hr (2 mL/Hr) IV Continuous <Continuous>  linezolid  IVPB 600 milliGRAM(s) IV Intermittent every 12 hours  petrolatum Ophthalmic Ointment 1 Application(s) Both EYES two times a day  piperacillin/tazobactam IVPB.. 3.375 Gram(s) IV Intermittent every 6 hours  polyethylene glycol 3350 17 Gram(s) Oral every 24 hours  propofol Infusion 10 MICROgram(s)/kG/Min (5.83 mL/Hr) IV Continuous <Continuous>    MEDICATIONS  (PRN):  acetaminophen    Suspension .. 650 milliGRAM(s) Enteral Tube every 6 hours PRN Temp greater or equal to 38C (100.4F), Mild Pain (1 - 3)      ALLERGIES:  Allergies    No Known Allergies    Intolerances        LABS:                        9.3    15.39 )-----------( 131      ( 07 Jul 2021 06:23 )             29.1     07-07    128<L>  |  97  |  32<H>  ----------------------------<  173<H>  4.5   |  21<L>  |  1.72<H>    Ca    8.9      07 Jul 2021 06:23  Phos  4.0     07-07  Mg     2.3     07-07    TPro  6.1  /  Alb  2.8<L>  /  TBili  0.6  /  DBili  x   /  AST  36  /  ALT  24  /  AlkPhos  93  07-07    PT/INR - ( 07 Jul 2021 08:47 )   PT: 15.9 sec;   INR: 1.34          PTT - ( 07 Jul 2021 08:47 )  PTT:30.8 sec  Urinalysis Basic - ( 05 Jul 2021 15:26 )    Color: Yellow / Appearance: Clear / SG: >=1.030 / pH: x  Gluc: x / Ketone: 15 mg/dL  / Bili: Small / Urobili: 1.0 E.U./dL   Blood: x / Protein: 100 mg/dL / Nitrite: NEGATIVE   Leuk Esterase: NEGATIVE / RBC: < 5 /HPF / WBC < 5 /HPF   Sq Epi: x / Non Sq Epi: 0-5 /HPF / Bacteria: Present /HPF        RADIOLOGY & ADDITIONAL TESTS: Reviewed.    constult heme/onc for CT lymph nodes and exam findings  ALEXANDER - ferritin increased

## 2021-07-07 NOTE — DIETITIAN INITIAL EVALUATION ADULT. - OTHER INFO
71 yo M with a past medical history of renal transplant (on tacrolimus, unk date), DM2, HTN, MGUS, pAF, BPH, HLD, CAD (s/p CABG 15 years ago), presents for 3-4 days of worsening cough. Patient states that he has a productive cough with brownish colored sputum. Found to have acute hypoxic respiratory failure and sepsis 2/2 likely multifocal pneumonia. Pt intubated on 7/6 in preparation for CT chest and bronch. Pt seen in room and discussed during MICU rounds. He is intubated on VC/AC mode, sedated on propofol @ 29.1ml/hr (768kcal/day from lipids). MAP 68- not requiring levophed at this time. NPO w/sump to LIWS. Pending bronch today, and will start EN after if pt not extubated. Noted on insulin gtt @ 3U/hr. No BM recorded since admit. Skin intact w/generalized mild edema. Febrile to 99.9F this AM. Will continue to follow per RD protocol.

## 2021-07-07 NOTE — PROGRESS NOTE ADULT - ASSESSMENT
70M with PMHx significant for renal transplant (in 2017 2/2 diabetic nephropathy and MGUS with post-transplant lymphoma, renal transplant physician at [facility] Dr Julisa Carreno formerly on cellcept now on  tacrolimus, ), DM2, HTN, MGUS, pAF, BPH, HLD, CAD (s/p CABG 15 years ago), presents for 3-4 days of worsening cough now intubated with likely bacterial pnuemonia     #Renal Transplant (2017)  patient formerly on cellcept, valagancyclovir and tacrolimus, now solely on tacrolimus 1mg q12h (dose confirmed w/Dr. Leyva)   -tacrolimus level noted to be 9.3;   -hold tacrolimus; repeat level at 10pm this evening (goal for tacrolimus level 4-6)   -baseline Cr 1.0     #LATRICIA on CKD  Cr up to 1.7 from 1.57   likely 2/2 ATN given patients hypotensive episodes requiring pressor support overnight  -please order urine Na, Urine Cr, urine K urine urea  -monitor urine output (most recent straight cath was 320ml)    #Hyponatremia  128 this AM  Acute on Chronic Hyponatremia (patient has been hyponatremic in the past according to sunrise); likely hypervolemic hyponatremia   -order Urine Na, Urine Osm, Serum Osm (TSH and Am cortisol noted)  -repeat BMP this afternoon      #Anemia  Hgb 9.3  -no signs of active bleed  -can get iron panel and ferritin    #BPH  no signs of retention  -c/w flomax   -monitor I's and O's     #Lymphadenopathy  patient found to have extensive lymphadenopathy seen on CT scan ? lymphoma/neoplastic disease  -given patients history of already having lymphoma  would obtain heme-onc consult

## 2021-07-07 NOTE — PROGRESS NOTE ADULT - ASSESSMENT
69 YO M with PMH of renal transplant (6/2017, [place], on Tacrolimus), DM2 (on insulin), HTN, MGUS, AF (on Eliquis), CAD (CABG ~15 years ago) presents with generalized weakness, admitted for acute hypoxic respiratory failure and found to have large R sided pneumonia.    # Acute hypoxic respiratory failure 2/2 below  # R Lung lobar pneumonia in context of immunosuppression with Tacrolimus    Initial: Hypoxia (90% on 6L). Fever (TMax 103.4). Tachypnea to 35.  Small noncomplicated effusion on R side on Pocus. Large lobar consolidation on Xray.  RVP negative.  Procal only 0.43. Lyph 16k, Neutr <1.0.     Now WBC     Overall clinical picture is consistent with bacterial pneumonia, however several features favor atypical pathogens in this relatively immunosupressed patient:   1. Lymphocytosis with neutropenia, while when admitted in 2019 with bacterial cellulitis his diff was profoundly neutrophilic   2. Macular rash on lower extremities points towards viral, but RVP is negative. CMV PCR is pending.  3. Macular rash and hyponatremia also raises concern for Legionella, especially given T-Lymphocyte suppression 2/2 Tacrolimus.    Suggest:  - agree with current Antibiotics  - follow CMV PCR  - send urine legionella antigen and legionella PCR from sputum  - send sputum cultures  - send PCP PCR in sputum  - CT chest  - will likely perform diagnostic bronchoscopy tomorrow, please keep NPO for tomorrow, and off apixaban    69 YO M with PMH of renal transplant (6/2017, [place], on Tacrolimus), DM2 (on insulin), HTN, MGUS, AF (on Eliquis), CAD (CABG ~15 years ago) presents with generalized weakness, admitted for acute hypoxic respiratory failure and found to have large R sided pneumonia.    # Acute hypoxic respiratory failure 2/2 below  # R Lung lobar pneumonia in context of immunosuppression with Tacrolimus    Initial: Hypoxia (90% on 6L). Fever (TMax 103.4). Tachypnea to 35.  Small noncomplicated effusion on R side on Pocus. Large lobar consolidation on Xray.  RVP negative.      Initially with some concerns that this is not bacterial pneumonia (in view of pronounced lymphocytosis, neutropenia, rash on lower extremities), but now he seems to be improving on antibiotics - WBC is trending down and he is afebrile. Neutropenia also resolved. He is s/p Bronchoscopy with BAL, awaiting results, but during bronchoscopy significant purulent secretions from RUL and RML noted. CT scan also with large lobar consolidation. Overall, it all supports bacterial pneumonia, but will follow results of BAL. No biopsy performed.      Suggest  - continue with current Antibiotics  - will follow results of BAL   71 YO M with PMH of renal transplant (6/2017, [place], on Tacrolimus), DM2 (on insulin), HTN, MGUS, AF (on Eliquis), CAD (CABG ~15 years ago) presents with generalized weakness, admitted for acute hypoxic respiratory failure and found to have large R sided pneumonia.    # Acute hypoxic respiratory failure 2/2 below  # R Lung lobar pneumonia in context of immunosuppression with Tacrolimus    Initial: Hypoxia (90% on 6L). Fever (TMax 103.4). Tachypnea to 35.  Small noncomplicated effusion on R side on Pocus. Large lobar consolidation on Xray.  RVP negative.      Initially with some concerns that this is not bacterial pneumonia (in view of pronounced lymphocytosis, neutropenia, rash on lower extremities), but now he seems to be improving on antibiotics - WBC is trending down and he is afebrile. Neutropenia also resolved. He is s/p Bronchoscopy with BAL, awaiting results, but during bronchoscopy significant purulent secretions from RUL and RML noted. CT scan also with large lobar consolidation. Overall, it all supports bacterial pneumonia, but will follow results of BAL. No biopsy performed.      Suggest  - continue with current Antibiotics  - will follow results of BAL  - will follow bronchial cultures (fungal, bacterial, AFB)

## 2021-07-07 NOTE — PROGRESS NOTE ADULT - SUBJECTIVE AND OBJECTIVE BOX
Infectious Diseases Progress Note:    SUBJECTIVE: Patient seen and examined at bedside. Patient denies fever, chills, HA, nausea, vomiting, abdominal pain, dysuria, diarrhea.    SEPSIS MULTIFOCALPNEUMONIA          Allergies    No Known Allergies    Intolerances        ANTIBIOTICS/RELEVANT:  antimicrobials  azithromycin  IVPB      azithromycin  IVPB 500 milliGRAM(s) IV Intermittent every 24 hours  linezolid  IVPB 600 milliGRAM(s) IV Intermittent every 12 hours  piperacillin/tazobactam IVPB.. 3.375 Gram(s) IV Intermittent every 6 hours    immunologic:      OTHER:  acetaminophen    Suspension .. 650 milliGRAM(s) Enteral Tube every 6 hours PRN  chlorhexidine 0.12% Liquid 15 milliLiter(s) Oral Mucosa every 12 hours  chlorhexidine 2% Cloths 1 Application(s) Topical <User Schedule>  dextrose 40% Gel 15 Gram(s) Oral once  dextrose 5%. 1000 milliLiter(s) IV Continuous <Continuous>  dextrose 5%. 1000 milliLiter(s) IV Continuous <Continuous>  dextrose 50% Injectable 25 Gram(s) IV Push once  dextrose 50% Injectable 12.5 Gram(s) IV Push once  dextrose 50% Injectable 25 Gram(s) IV Push once  glucagon  Injectable 1 milliGRAM(s) IntraMuscular once  heparin  Infusion 1900 Unit(s)/Hr IV Continuous <Continuous>  insulin regular Infusion 2 Unit(s)/Hr IV Continuous <Continuous>  petrolatum Ophthalmic Ointment 1 Application(s) Both EYES two times a day  polyethylene glycol 3350 17 Gram(s) Oral every 24 hours  propofol Infusion 10 MICROgram(s)/kG/Min IV Continuous <Continuous>      Objective:  Vital Signs Last 24 Hrs  T(C): 38.2 (07 Jul 2021 16:00), Max: 38.2 (06 Jul 2021 18:16)  T(F): 100.7 (07 Jul 2021 16:00), Max: 100.8 (06 Jul 2021 18:16)  HR: 68 (07 Jul 2021 16:00) (59 - 73)  BP: 100/55 (07 Jul 2021 16:00) (89/48 - 115/62)  BP(mean): 72 (07 Jul 2021 16:00) (64 - 83)  RR: 16 (07 Jul 2021 16:00) (13 - 27)  SpO2: 95% (07 Jul 2021 16:00) (89% - 100%)    PHYSICAL EXAM:  Constitutional: Well-developed, well nourished  Eyes: PERRLA, EOMI  Ear/Nose/Throat: no oral lesion, no sinus tenderness on percussion	  Neck:no JVD, no lymphadenopathy, supple  Respiratory: CTA bilateral  Cardiovascular: S1S2, RRR, no murmurs  Gastrointestinal: soft, NTND, (+) BS, no HSM  Extremities: no c/e/e  Skin: no rashes    LABS:                        9.3    15.39 )-----------( 131      ( 07 Jul 2021 06:23 )             29.1     07-07    128<L>  |  97  |  32<H>  ----------------------------<  173<H>  4.5   |  21<L>  |  1.72<H>    Ca    8.9      07 Jul 2021 06:23  Phos  4.0     07-07  Mg     2.3     07-07    TPro  6.1  /  Alb  2.8<L>  /  TBili  0.6  /  DBili  x   /  AST  36  /  ALT  24  /  AlkPhos  93  07-07    PT/INR - ( 07 Jul 2021 08:47 )   PT: 15.9 sec;   INR: 1.34          PTT - ( 07 Jul 2021 08:47 )  PTT:30.8 sec      MICROBIOLOGY:            RADIOLOGY & ADDITIONAL STUDIES: Infectious Diseases Progress Note:    SUBJECTIVE: Patient seen and examined at bedside. Patient denies fever, chills, HA, nausea, vomiting, abdominal pain, dysuria, diarrhea.    SEPSIS MULTIFOCALPNEUMONIA    Allergies  No Known Allergies    ANTIBIOTICS/RELEVANT:  antimicrobials  azithromycin  IVPB      azithromycin  IVPB 500 milliGRAM(s) IV Intermittent every 24 hours  linezolid  IVPB 600 milliGRAM(s) IV Intermittent every 12 hours  piperacillin/tazobactam IVPB.. 3.375 Gram(s) IV Intermittent every 6 hours    OTHER:  acetaminophen    Suspension .. 650 milliGRAM(s) Enteral Tube every 6 hours PRN  chlorhexidine 0.12% Liquid 15 milliLiter(s) Oral Mucosa every 12 hours  chlorhexidine 2% Cloths 1 Application(s) Topical <User Schedule>  dextrose 40% Gel 15 Gram(s) Oral once  dextrose 5%. 1000 milliLiter(s) IV Continuous <Continuous>  dextrose 5%. 1000 milliLiter(s) IV Continuous <Continuous>  dextrose 50% Injectable 25 Gram(s) IV Push once  dextrose 50% Injectable 12.5 Gram(s) IV Push once  dextrose 50% Injectable 25 Gram(s) IV Push once  glucagon  Injectable 1 milliGRAM(s) IntraMuscular once  heparin  Infusion 1900 Unit(s)/Hr IV Continuous <Continuous>  insulin regular Infusion 2 Unit(s)/Hr IV Continuous <Continuous>  petrolatum Ophthalmic Ointment 1 Application(s) Both EYES two times a day  polyethylene glycol 3350 17 Gram(s) Oral every 24 hours  propofol Infusion 10 MICROgram(s)/kG/Min IV Continuous <Continuous>    Objective:  Vital Signs Last 24 Hrs  T(C): 38.2 (07 Jul 2021 16:00), Max: 38.2 (06 Jul 2021 18:16)  T(F): 100.7 (07 Jul 2021 16:00), Max: 100.8 (06 Jul 2021 18:16)  HR: 68 (07 Jul 2021 16:00) (59 - 73)  BP: 100/55 (07 Jul 2021 16:00) (89/48 - 115/62)  BP(mean): 72 (07 Jul 2021 16:00) (64 - 83)  RR: 16 (07 Jul 2021 16:00) (13 - 27)  SpO2: 95% (07 Jul 2021 16:00) (89% - 100%)    PHYSICAL EXAM:  Constitutional: Well-developed, well nourished  Eyes: PERRLA, EOMI  Ear/Nose/Throat: no oral lesion, no sinus tenderness on percussion	  Neck:no JVD, no lymphadenopathy, supple  Respiratory: CTA bilateral  Cardiovascular: S1S2, RRR, no murmurs  Gastrointestinal: soft, NTND, (+) BS, no HSM  Extremities: no c/e/e  Skin: no rashes    LABS:                        9.3    15.39 )-----------( 131      ( 07 Jul 2021 06:23 )             29.1     07-07    128<L>  |  97  |  32<H>  ----------------------------<  173<H>  4.5   |  21<L>  |  1.72<H>    Ca    8.9      07 Jul 2021 06:23  Phos  4.0     07-07  Mg     2.3     07-07    TPro  6.1  /  Alb  2.8<L>  /  TBili  0.6  /  DBili  x   /  AST  36  /  ALT  24  /  AlkPhos  93  07-07    PT/INR - ( 07 Jul 2021 08:47 )   PT: 15.9 sec;   INR: 1.34        PTT - ( 07 Jul 2021 08:47 )  PTT:30.8 sec    MICROBIOLOGY:  MRSA/MSSA PCR (07.05.21 @ 18:33) MRSA PCR Result.: Negative. Staph Aureus PCR Result: Positive.  Culture - Blood (07.05.21 @ 16:39) Specimen Source: .Blood Blood-Peripheral. Culture Results: No growth at 1 day.  Culture - Sputum (collected 07-06-21 @ 16:08) Source: Sputum. Gram Stain. No epithelial cells seen. Few WBC's. Rare Gram positive cocci in pairs.  Legionella pneumophila Antigen, Urine (07.06.21 @ 15:51) Negative  Culture - Bronchial (07.07.21 @ 12:21) Gram Stain: No epithelial cells seen. Rare WBC's. No organisms seen. Specimen Source: Bronchial Bronchial Wash    RADIOLOGY & ADDITIONAL STUDIES:  Xray Chest 1 View-PORTABLE IMMEDIATE (07.05.21 @ 13:23) Cardiomegaly, status post median sternotomy, CABG. Bilateral opacities/right perihilar opacity/consolidation.  Xray Chest 1 View- PORTABLE-Urgent (Xray Chest 1 View- PORTABLE-Urgent .) (07.06.21 @ 02:46) Support Devices: None. Heart: Cardiomegaly. Mediastinum:  Unremarkable. Lungs/Airways: Worsening right midlung airspace opacity, favor pneumonia, less likely asymmetrical pulmonary edema. No significant pleural effusion. Bones/Soft tissues:Median sternotomy.   Infectious Diseases Progress Note:    SUBJECTIVE: Patient seen and examined at bedside. Patient denies fever, chills, HA, nausea, vomiting, abdominal pain, dysuria, diarrhea.    SEPSIS MULTIFOCALPNEUMONIA    Allergies  No Known Allergies    ANTIBIOTICS/RELEVANT:  antimicrobials  azithromycin  IVPB      azithromycin  IVPB 500 milliGRAM(s) IV Intermittent every 24 hours  linezolid  IVPB 600 milliGRAM(s) IV Intermittent every 12 hours  piperacillin/tazobactam IVPB.. 3.375 Gram(s) IV Intermittent every 6 hours    OTHER:  acetaminophen    Suspension .. 650 milliGRAM(s) Enteral Tube every 6 hours PRN  chlorhexidine 0.12% Liquid 15 milliLiter(s) Oral Mucosa every 12 hours  chlorhexidine 2% Cloths 1 Application(s) Topical <User Schedule>  dextrose 40% Gel 15 Gram(s) Oral once  dextrose 5%. 1000 milliLiter(s) IV Continuous <Continuous>  dextrose 5%. 1000 milliLiter(s) IV Continuous <Continuous>  dextrose 50% Injectable 25 Gram(s) IV Push once  dextrose 50% Injectable 12.5 Gram(s) IV Push once  dextrose 50% Injectable 25 Gram(s) IV Push once  glucagon  Injectable 1 milliGRAM(s) IntraMuscular once  heparin  Infusion 1900 Unit(s)/Hr IV Continuous <Continuous>  insulin regular Infusion 2 Unit(s)/Hr IV Continuous <Continuous>  petrolatum Ophthalmic Ointment 1 Application(s) Both EYES two times a day  polyethylene glycol 3350 17 Gram(s) Oral every 24 hours  propofol Infusion 10 MICROgram(s)/kG/Min IV Continuous <Continuous>    Objective:  Vital Signs Last 24 Hrs  T(C): 38.2 (07 Jul 2021 16:00), Max: 38.2 (06 Jul 2021 18:16)  T(F): 100.7 (07 Jul 2021 16:00), Max: 100.8 (06 Jul 2021 18:16)  HR: 68 (07 Jul 2021 16:00) (59 - 73)  BP: 100/55 (07 Jul 2021 16:00) (89/48 - 115/62)  BP(mean): 72 (07 Jul 2021 16:00) (64 - 83)  RR: 16 (07 Jul 2021 16:00) (13 - 27)  SpO2: 95% (07 Jul 2021 16:00) (89% - 100%)    PHYSICAL EXAM:  Gen: Elderly male, intubated, sedated  Chest: Decreased breath sound on R side. Normal breath sounds on L side. No wheezes. No rhonchi. No crackles.  Heart: RRR, no rmg  Skin: Macular rash on lower extremities up to thighs. No other rashes.   Lower ext: Trace pitting edema. WWP. Good distal pulses  Abd: obese, soft, nontender  +Lucas in  +A-line    LABS:                        9.3    15.39 )-----------( 131      ( 07 Jul 2021 06:23 )             29.1     07-07    128<L>  |  97  |  32<H>  ----------------------------<  173<H>  4.5   |  21<L>  |  1.72<H>    Ca    8.9      07 Jul 2021 06:23  Phos  4.0     07-07  Mg     2.3     07-07    TPro  6.1  /  Alb  2.8<L>  /  TBili  0.6  /  DBili  x   /  AST  36  /  ALT  24  /  AlkPhos  93  07-07    PT/INR - ( 07 Jul 2021 08:47 )   PT: 15.9 sec;   INR: 1.34        PTT - ( 07 Jul 2021 08:47 )  PTT:30.8 sec    MICROBIOLOGY:  MRSA/MSSA PCR (07.05.21 @ 18:33) MRSA PCR Result.: Negative. Staph Aureus PCR Result: Positive.  Culture - Blood (07.05.21 @ 16:39) Specimen Source: .Blood Blood-Peripheral. Culture Results: No growth at 1 day.  Culture - Sputum (collected 07-06-21 @ 16:08) Source: Sputum. Gram Stain. No epithelial cells seen. Few WBC's. Rare Gram positive cocci in pairs.  Legionella pneumophila Antigen, Urine (07.06.21 @ 15:51) Negative  Culture - Bronchial (07.07.21 @ 12:21) Gram Stain: No epithelial cells seen. Rare WBC's. No organisms seen. Specimen Source: Bronchial Bronchial Wash    RADIOLOGY & ADDITIONAL STUDIES:  Xray Chest 1 View-PORTABLE IMMEDIATE (07.05.21 @ 13:23) Cardiomegaly, status post median sternotomy, CABG. Bilateral opacities/right perihilar opacity/consolidation.  Xray Chest 1 View- PORTABLE-Urgent (Xray Chest 1 View- PORTABLE-Urgent .) (07.06.21 @ 02:46) Support Devices: None. Heart: Cardiomegaly. Mediastinum:  Unremarkable. Lungs/Airways: Worsening right midlung airspace opacity, favor pneumonia, less likely asymmetrical pulmonary edema. No significant pleural effusion. Bones/Soft tissues:Median sternotomy.  CT Chest No Cont (07.07.21 @ 07:18) Large multi lobar pulmonary consolidations, right greater than left, for pneumonia but recommend follow-up to exclude neoplasm including lymphoma. Small to moderate right pleural effusion. Trace left pleural effusion. Extensive cervical, thoracic and upper abdominal lymphadenopathy. Partially visualized splenomegaly.   Infectious Diseases Progress Note:    SUBJECTIVE: Patient seen and examined at bedside. Patient intubated and sedated.     SEPSIS MULTIFOCALPNEUMONIA    Allergies  No Known Allergies    ANTIBIOTICS/RELEVANT:  antimicrobials  azithromycin  IVPB      azithromycin  IVPB 500 milliGRAM(s) IV Intermittent every 24 hours  linezolid  IVPB 600 milliGRAM(s) IV Intermittent every 12 hours  piperacillin/tazobactam IVPB.. 3.375 Gram(s) IV Intermittent every 6 hours    OTHER:  acetaminophen    Suspension .. 650 milliGRAM(s) Enteral Tube every 6 hours PRN  chlorhexidine 0.12% Liquid 15 milliLiter(s) Oral Mucosa every 12 hours  chlorhexidine 2% Cloths 1 Application(s) Topical <User Schedule>  dextrose 40% Gel 15 Gram(s) Oral once  dextrose 5%. 1000 milliLiter(s) IV Continuous <Continuous>  dextrose 5%. 1000 milliLiter(s) IV Continuous <Continuous>  dextrose 50% Injectable 25 Gram(s) IV Push once  dextrose 50% Injectable 12.5 Gram(s) IV Push once  dextrose 50% Injectable 25 Gram(s) IV Push once  glucagon  Injectable 1 milliGRAM(s) IntraMuscular once  heparin  Infusion 1900 Unit(s)/Hr IV Continuous <Continuous>  insulin regular Infusion 2 Unit(s)/Hr IV Continuous <Continuous>  petrolatum Ophthalmic Ointment 1 Application(s) Both EYES two times a day  polyethylene glycol 3350 17 Gram(s) Oral every 24 hours  propofol Infusion 10 MICROgram(s)/kG/Min IV Continuous <Continuous>    Objective:  Vital Signs Last 24 Hrs  T(C): 38.2 (07 Jul 2021 16:00), Max: 38.2 (06 Jul 2021 18:16)  T(F): 100.7 (07 Jul 2021 16:00), Max: 100.8 (06 Jul 2021 18:16)  HR: 68 (07 Jul 2021 16:00) (59 - 73)  BP: 100/55 (07 Jul 2021 16:00) (89/48 - 115/62)  BP(mean): 72 (07 Jul 2021 16:00) (64 - 83)  RR: 16 (07 Jul 2021 16:00) (13 - 27)  SpO2: 95% (07 Jul 2021 16:00) (89% - 100%)    PHYSICAL EXAM:  Gen: Elderly male, intubated, sedated  Chest: Decreased breath sound on R side. Normal breath sounds on L side. No wheezes. No rhonchi. No crackles.  Heart: RRR, no rmg  Skin: Macular rash on lower extremities up to thighs. No other rashes.   Lower ext: Trace pitting edema. WWP. Good distal pulses  Abd: obese, soft, nontender  +Lucas in  +A-line    LABS:                        9.3    15.39 )-----------( 131      ( 07 Jul 2021 06:23 )             29.1     07-07    128<L>  |  97  |  32<H>  ----------------------------<  173<H>  4.5   |  21<L>  |  1.72<H>    Ca    8.9      07 Jul 2021 06:23  Phos  4.0     07-07  Mg     2.3     07-07    TPro  6.1  /  Alb  2.8<L>  /  TBili  0.6  /  DBili  x   /  AST  36  /  ALT  24  /  AlkPhos  93  07-07    PT/INR - ( 07 Jul 2021 08:47 )   PT: 15.9 sec;   INR: 1.34        PTT - ( 07 Jul 2021 08:47 )  PTT:30.8 sec    MICROBIOLOGY:  MRSA/MSSA PCR (07.05.21 @ 18:33) MRSA PCR Result.: Negative. Staph Aureus PCR Result: Positive.  Culture - Blood (07.05.21 @ 16:39) Specimen Source: .Blood Blood-Peripheral. Culture Results: No growth at 1 day.  Culture - Sputum (collected 07-06-21 @ 16:08) Source: Sputum. Gram Stain. No epithelial cells seen. Few WBC's. Rare Gram positive cocci in pairs.  Legionella pneumophila Antigen, Urine (07.06.21 @ 15:51) Negative  Culture - Bronchial (07.07.21 @ 12:21) Gram Stain: No epithelial cells seen. Rare WBC's. No organisms seen. Specimen Source: Bronchial Bronchial Wash    RADIOLOGY & ADDITIONAL STUDIES:  Xray Chest 1 View-PORTABLE IMMEDIATE (07.05.21 @ 13:23) Cardiomegaly, status post median sternotomy, CABG. Bilateral opacities/right perihilar opacity/consolidation.  Xray Chest 1 View- PORTABLE-Urgent (Xray Chest 1 View- PORTABLE-Urgent .) (07.06.21 @ 02:46) Support Devices: None. Heart: Cardiomegaly. Mediastinum:  Unremarkable. Lungs/Airways: Worsening right midlung airspace opacity, favor pneumonia, less likely asymmetrical pulmonary edema. No significant pleural effusion. Bones/Soft tissues:Median sternotomy.  CT Chest No Cont (07.07.21 @ 07:18) Large multi lobar pulmonary consolidations, right greater than left, for pneumonia but recommend follow-up to exclude neoplasm including lymphoma. Small to moderate right pleural effusion. Trace left pleural effusion. Extensive cervical, thoracic and upper abdominal lymphadenopathy. Partially visualized splenomegaly.

## 2021-07-07 NOTE — DIETITIAN INITIAL EVALUATION ADULT. - ETIOLOGY
RT current NPO status ,DirectAddress_Unknown,[email or web address] [place] [facility]\[email or web address],DirectAddress_Unknown,DirectAddress_Unknown,DirectAddress_Unknown,[email or web address] [place] [facility]\""[facility]

## 2021-07-07 NOTE — DIETITIAN INITIAL EVALUATION ADULT. - ADD RECOMMEND
1. If pt not extubated w/in 24hrs, recommend starting trickle feeds of Glucerna 1.5 [place] @ 20ml/hr via dobhoff. Will provide goal recs pending tolerance, medical course, and propofol use 2. Once extubated, recommend swallow eval prior to diet advancement. 3. Monitor lytes and replete prn. POC BG q6hrs 4. Pain and bowel regimens per team 5. Obtain nutrition hx and provide diet ed at f/u as appropriate *d/w team during rounds

## 2021-07-07 NOTE — CONSULT NOTE ADULT - ATTENDING COMMENTS
Patient seen and examined at bedside.  Agree with above.    No acute surgical intervention currently indicated given patient's current clinical status (intubated due to PNA).  As LN are not clinically palpable, recommend IR evaluation for biopsy.  If IR biopsy is non-diagnostic, consider surgical oncology evaluation (inpatient versus outpatient) versus thoracic surgery evaluation given thoracic LAD.

## 2021-07-07 NOTE — CONSULT NOTE ADULT - SUBJECTIVE AND OBJECTIVE BOX
INCOMPLETE    Infectious Diseases Progress Note:    SUBJECTIVE: Patient seen and examined at bedside. Patient denies fever, chills, HA, nausea, vomiting, abdominal pain, dysuria, diarrhea.    SEPSIS MULTIFOCALPNEUMONIA          Allergies    No Known Allergies    Intolerances        ANTIBIOTICS/RELEVANT:  antimicrobials  azithromycin  IVPB      azithromycin  IVPB 500 milliGRAM(s) IV Intermittent every 24 hours  linezolid  IVPB 600 milliGRAM(s) IV Intermittent every 12 hours  piperacillin/tazobactam IVPB.. 4.5 Gram(s) IV Intermittent every 6 hours    immunologic:  tacrolimus 1 milliGRAM(s) Oral every 12 hours      OTHER:  acetaminophen    Suspension .. 650 milliGRAM(s) Oral every 6 hours PRN  chlorhexidine 0.12% Liquid 15 milliLiter(s) Oral Mucosa every 12 hours  chlorhexidine 2% Cloths 1 Application(s) Topical <User Schedule>  dextrose 40% Gel 15 Gram(s) Oral once  dextrose 5%. 1000 milliLiter(s) IV Continuous <Continuous>  dextrose 5%. 1000 milliLiter(s) IV Continuous <Continuous>  dextrose 50% Injectable 25 Gram(s) IV Push once  dextrose 50% Injectable 12.5 Gram(s) IV Push once  dextrose 50% Injectable 25 Gram(s) IV Push once  furosemide   Injectable 20 milliGRAM(s) IV Push daily  glucagon  Injectable 1 milliGRAM(s) IntraMuscular once  insulin regular Infusion 2 Unit(s)/Hr IV Continuous <Continuous>  norepinephrine Infusion 0.05 MICROgram(s)/kG/Min IV Continuous <Continuous>  petrolatum Ophthalmic Ointment 1 Application(s) Both EYES two times a day  propofol Infusion 5 MICROgram(s)/kG/Min IV Continuous <Continuous>      Objective:  Vital Signs Last 24 Hrs  T(C): 37.7 (07 Jul 2021 01:12), Max: 38.7 (06 Jul 2021 11:00)  T(F): 99.9 (07 Jul 2021 01:12), Max: 101.6 (06 Jul 2021 11:00)  HR: 66 (07 Jul 2021 07:00) (59 - 80)  BP: 90/50 (06 Jul 2021 19:30) (89/48 - 152/74)  BP(mean): 67 (06 Jul 2021 19:30) (64 - 106)  RR: 27 (07 Jul 2021 07:00) (13 - 332)  SpO2: 93% (07 Jul 2021 07:00) (92% - 98%)    PHYSICAL EXAM:  Constitutional: Well-developed, well nourished  Eyes: PERRLA, EOMI  Ear/Nose/Throat: no oral lesion, no sinus tenderness on percussion	  Neck:no JVD, no lymphadenopathy, supple  Respiratory: CTA bilateral  Cardiovascular: S1S2, RRR, no murmurs  Gastrointestinal: soft, NTND, (+) BS, no HSM  Extremities: no c/e/e  Skin: no rashes    LABS:                        9.3    15.39 )-----------( 131      ( 07 Jul 2021 06:23 )             29.1     07-07    128<L>  |  97  |  32<H>  ----------------------------<  173<H>  4.5   |  21<L>  |  1.72<H>    Ca    8.9      07 Jul 2021 06:23  Phos  4.0     07-07  Mg     2.3     07-07    TPro  6.1  /  Alb  2.8<L>  /  TBili  0.6  /  DBili  x   /  AST  36  /  ALT  24  /  AlkPhos  93  07-07    PT/INR - ( 06 Jul 2021 17:24 )   PT: 16.6 sec;   INR: 1.41          PTT - ( 06 Jul 2021 23:46 )  PTT:51.3 sec  Urinalysis Basic - ( 05 Jul 2021 15:26 )    Color: Yellow / Appearance: Clear / SG: >=1.030 / pH: x  Gluc: x / Ketone: 15 mg/dL  / Bili: Small / Urobili: 1.0 E.U./dL   Blood: x / Protein: 100 mg/dL / Nitrite: NEGATIVE   Leuk Esterase: NEGATIVE / RBC: < 5 /HPF / WBC < 5 /HPF   Sq Epi: x / Non Sq Epi: 0-5 /HPF / Bacteria: Present /HPF        MICROBIOLOGY:    Culture - Sputum (collected 07-06-21 @ 16:08)  Source: .Sputum Sputum  Gram Stain (07-06-21 @ 17:21):    No epithelial cells seen    Few WBC's    Rare Gram positive cocci in pairs              RADIOLOGY & ADDITIONAL STUDIES:

## 2021-07-07 NOTE — CONSULT NOTE ADULT - ASSESSMENT
70M with PMHx significant for renal transplant (on tacrolimus, unk date), DM2, HTN, MGUS, pAF, BPH, HLD, CAD (s/p CABG 15 years ago), presents for 3-4 days of worsening cough, admitted for acute hypoxic respiratory failure and sepsis 2/2 multifocal PNA. General surgery consulted for evaluation for excisional lymph node biopsy given finding of lymphadenopathy and splenomegaly in setting of immunosuppression and MGUS history.     Recommendations:   - no palpable axillary and supra/infraclavicular nodes  - no acute surgical intervention  - patient should be medically optimized  - lymph node excisional bx can be done outpt if needed  - schedule for outpt follow-up with Dr. Bucio for bx  - can consider CT consult if bx needed inpt  - surgery team 4c signing off  - seen and examined w chief resident  - discussed w attending physician

## 2021-07-07 NOTE — CONSULT NOTE ADULT - SUBJECTIVE AND OBJECTIVE BOX
General Surgery Consult      Consulting surgical team: 4c  Consulting attending: Dr. Nunez      HPI:  70M with PMHx significant for renal transplant (on tacrolimus, unk date), DM2, HTN, MGUS, pAF, BPH, HLD, CAD (s/p CABG 15 years ago), presents for 3-4 days of worsening cough. Patient states that he has a productive cough with brownish colored sputum. Patient denied fever or shortness of breath at home, but on day of admission did notice that his breathing felt different and that he was breathing quicker. Per ED it was reported that he completed a Z-pack without improvement. Past history notable for COVID+ March 2020, also received pfizer vaccine x2 a few months ago. Endorses SOB, is drinking less fluid, and may be urinating less. Denies sick contacts, HA, CP, rhinorrhea, sore throat, nausea, vomiting, diarrhea, abd pain, urinary complaints, black/bloody stool, focal weakness/numbness, vision changes, neck/back pain, LE pain/swelling.     General surgery consulted for evaluation for excisional lymph node biopsy given finding of lymphadenopathy and splenomegaly in setting of immunosuppression and MGUS history.         PAST MEDICAL HISTORY: renal transplant (on tacrolimus, unk date), DM2, HTN, MGUS, pAF, BPH, HLD, CAD (s/p CABG 15 years ago)    PAST SURGICAL HISTORY:  CABG 15 yr ago  kidney txp (on tacro)    Significant meds: eliquis held since 7/4 on hep gtt, tacro for renal txp    ALLERGIES:  No Known Allergies    VITALS & I/Os:  Vital Signs Last 24 Hrs  T(C): 37.8 (07 Jul 2021 13:40), Max: 38.2 (06 Jul 2021 18:16)  T(F): 100.1 (07 Jul 2021 13:40), Max: 100.8 (06 Jul 2021 18:16)  HR: 69 (07 Jul 2021 12:00) (59 - 80)  BP: 105/59 (07 Jul 2021 12:00) (89/48 - 124/57)  BP(mean): 77 (07 Jul 2021 12:00) (64 - 82)  RR: 20 (07 Jul 2021 12:00) (13 - 28)  SpO2: 89% (07 Jul 2021 12:00) (89% - 100%)  Mode: AC/ CMV (Assist Control/ Continuous Mandatory Ventilation)  RR (machine): 14  TV (machine): 500  FiO2: 50  PEEP: 8  ITime: 1  MAP: 13  PIP: 26    I&O's Summary    06 Jul 2021 07:01  -  07 Jul 2021 07:00  --------------------------------------------------------  IN: 1985.1 mL / OUT: 1575 mL / NET: 410.1 mL    07 Jul 2021 07:01  -  07 Jul 2021 14:17  --------------------------------------------------------  IN: 367.5 mL / OUT: 50 mL / NET: 317.5 mL      PHYSICAL EXAM:  General: intubated, sedated, nonagitated  Respiratory: intubated  Axilla: no palpable nodes bilaterally  Chest: no palpable supra or infraclavicular nodes  Abdominal: Soft, nondistended. No grimace to palpation. Unable to palpate splenomegaly      LABS:                        9.3    15.39 )-----------( 131      ( 07 Jul 2021 06:23 )             29.1     07-07    128<L>  |  97  |  32<H>  ----------------------------<  173<H>  4.5   |  21<L>  |  1.72<H>    Ca    8.9      07 Jul 2021 06:23  Phos  4.0     07-07  Mg     2.3     07-07    TPro  6.1  /  Alb  2.8<L>  /  TBili  0.6  /  DBili  x   /  AST  36  /  ALT  24  /  AlkPhos  93  07-07    Lactate:    PT/INR - ( 07 Jul 2021 08:47 )   PT: 15.9 sec;   INR: 1.34          PTT - ( 07 Jul 2021 08:47 )  PTT:30.8 sec  ABG - ( 07 Jul 2021 06:22 )  pH, Arterial: 7.35  pH, Blood: x     /  pCO2: 39    /  pO2: 80    / HCO3: 22    / Base Excess: -3.8  /  SaO2: 96.7        u/a negative        IMAGING:  < from: CT Chest No Cont (07.07.21 @ 07:18) >  IMPRESSION:    Large multi lobar pulmonary consolidations, right greater than left, for pneumonia but recommend follow-up to exclude neoplasm including lymphoma.    Small to moderate right pleural effusion. Trace left pleural effusion.    Extensive cervical, thoracic and upper abdominal lymphadenopathy. Partially visualized splenomegaly.    < from: CT Chest No Cont (07.07.21 @ 07:18) >  Mediastinum and hilar regions: Numerous enlarged mediastinal lymphadenopathy with the largest right paratracheal lymph node measuring 1.4 cm in short axis and prevascular space lymph node measuring 1.3 cm in short axis. Extensive bilateral axillary lymphadenopathy with the largest right axillary lymph node measuring 1.5 cm short axis and the largest left axillary lymphadenopathy measuring 1.4 cm short axis. Partial visualization of supraclavicular and cervical lymphadenopathy.    < end of copied text >

## 2021-07-07 NOTE — PROGRESS NOTE ADULT - ASSESSMENT
71 yo M with a past medical history of renal transplant (on tacrolimus, unk date), DM2, HTN, MGUS, pAF, BPH, HLD, CAD (s/p CABG 15 years ago), presents for 3-4 days of worsening cough. Patient states that he has a productive cough with brownish colored sputum. Found to have acute hypoxic respiratory failure and sepsis 2/2 likely multifocal pneumonia. Negative for legionella pneumonia. Today has been intubated. Pulmonology likely will perform diagnostic bronchoscopy tomorrow.    Recommendations:  - CT chest showed large multi lobar pulmonary consolidations, R>L, as well as extensive cervical, thoracic and upper abdominal lymphadenopathy and splenomegary.   - Bronch showed thick purulent secretion on R lung.  Suspect bacterial PNA as well as lymphoma, or PTLD.  Per team, patient has h/o PTLD in 2018.  EBV serum PCR added.  Cont linezoild/zosyn/azithro for now.  f/u bronch studies.  f/u CMV, EBV, toxo pcr.  f/u toxo, HSV serology. f/u aspergillus, crypto, histo, cocci ab.  f/u galactomannon, fungitell.  need LN biopsy.  Patient's transplant ID Dr. Sloan at [facility] is well known to Dr. Nicole who will take over the care tomorrow.  I asked Dr. Nicole to obtain patient's ID history from Dr. Sloan.   71 yo M with a past medical history of renal transplant (on tacrolimus, unk date), DM2, HTN, MGUS, pAF, BPH, HLD, CAD (s/p CABG 15 years ago), presents for 3-4 days of worsening cough. Patient states that he has a productive cough with brownish colored sputum. Found to have acute hypoxic respiratory failure and sepsis 2/2 likely multifocal pneumonia. Negative for legionella pneumonia. Today has been intubated. Pulmonology likely will perform diagnostic bronchoscopy tomorrow. CT chest showed large multi lobar pulmonary consolidations, R>L, as well as extensive cervical, thoracic and upper abdominal lymphadenopathy and splenomegary. Bronch showed thick purulent secretion on R lung.  Suspect bacterial PNA as well as lymphoma, or PTLD.  Per team, patient has h/o PTLD in 2018.  EBV serum PCR added.    Recommendations:  - Cont linezoild/zosyn/azithro for now.    - F/u bronch studies.    - F/u CMV, EBV, toxo PCR.    - F/u toxo, HSV serology. f/u aspergillus, crypto, histo, cocci ab.    - F/u galactomannon, fungitell.    - Need LN biopsy.   71 yo M with a past medical history of renal transplant (on tacrolimus, unk date), DM2, HTN, MGUS, pAF, BPH, HLD, CAD (s/p CABG 15 years ago), presents for 3-4 days of worsening cough. Patient states that he has a productive cough with brownish colored sputum. Found to have acute hypoxic respiratory failure and sepsis 2/2 likely multifocal pneumonia. Now intubated on 7/6. CT chest showed large multi lobar pulmonary consolidations, R>L, as well as extensive cervical, thoracic and upper abdominal lymphadenopathy and splenomegaly. Urine legionella negative. Bronch this morning showed thick purulent secretion on R lung. Suspect bacterial PNA as well as lymphoma, or PTLD.  Per team, patient has h/o PTLD in 2018. EBV serum PCR added.    Recommendations:  - Cont linezoild/zosyn/azithro for now  - F/u bronch studies  - F/u CMV, EBV, toxo PCR  - F/u toxo, HSV serology. f/u aspergillus, crypto, histo, cocci ab.    - F/u galactomannon, fungitell  - Need LN biopsy

## 2021-07-07 NOTE — CONSULT NOTE ADULT - ASSESSMENT
71 yo M with PMHx of renal transplant (on tacrolimus), DM2, essential hypertension, MGUS, hx of lymphoma (follows Dr. Jojo Huffman), pAFib, BPH, HLD, CAD s/p CABG (15 years ago), presents for 3-4 days of worsening cough, found to be in AHRF and sepsis likely 2/2 multifocal pneumonia, incidentally found to have extensive, thoracic and upper abdominal lymphadenopathy. Hematology consulted for lymphadenopathy in the context of lymphoma history.    #) DIAGNOSIS  - Normocytic anemia, likely anemia of chronic disease/borderline iron deficiency anemia  - Mild leukocytosis    #) PLAN    To discuss with Dr. Carmen    71 yo M with PMHx of renal transplant (on tacrolimus), DM2, essential hypertension, MGUS, hx of lymphoma (follows Dr. Jojo Huffman), pAFib, BPH, HLD, CAD s/p CABG (15 years ago), presents for 3-4 days of worsening cough, found to be in AHRF and sepsis likely 2/2 multifocal pneumonia, incidentally found to have extensive, thoracic and upper abdominal lymphadenopathy. Hematology consulted for lymphadenopathy in the context of lymphoma history.    #) DIAGNOSIS  - Bicytopenia: Normocytic anemia, likely anemia of chronic disease/borderline or mixed mild iron deficiency anemia, and mild thrombocytopenia  - Mild leukocytosis with lymphocytic predominance (ALC 12K) and left-shift   - Extensive bilateral axillary/supraclavicular/cervical/mediastinal/abdominal lymphadenopathy  - Splenomegaly  - History of lymphoma?  - History of MGUS   - Mild INR prolongation likely 2/2 Eliquis  - AFib on Eliquis    #) PLAN  - Send stat peripheral flow cytometry, LDH and uric acid.   - Consider CT A/P with IV Contrast for evaluation of abdominopelvic lymphadenopathy. If contrast cannot be used due to declining renal function, can also perform CT A/P non-contrast.   - Send for whole-body PET-CT to complete staging  - Surgery consult for excisional lymph node biopsy    - Obtain collateral from [facility] (Dr. Lydia Huffman)    To discuss with Dr. Carmen    71 yo M with PMHx of renal transplant (on tacrolimus), DM2, essential hypertension, MGUS, hx of lymphoma (follows Dr. Jojo Huffman), pAFib, BPH, HLD, CAD s/p CABG (15 years ago), presents for 3-4 days of worsening cough, found to be in AHRF and sepsis likely 2/2 multifocal pneumonia, incidentally found to have extensive, thoracic and upper abdominal lymphadenopathy. Hematology consulted for lymphadenopathy in the context of lymphoma history.    #) DIAGNOSIS  - Bicytopenia: Normocytic anemia, likely anemia of chronic disease/borderline or mixed mild iron deficiency anemia, and mild thrombocytopenia likely multifactorial but partially related to sepsis   - Mild leukocytosis with lymphocytic predominance (ALC 12K) and left-shift   - Extensive bilateral axillary/supraclavicular/cervical/mediastinal/abdominal lymphadenopathy  - Splenomegaly  - History of lymphoma?  - History of MGUS   - Mild INR prolongation likely 2/2 Eliquis  - AFib on Eliquis    #) PLAN  - Per family at bedside/phone, patient has a known history of lymphadenopathy for which he is following with Dr. Lydia Huffman at [facility], last appointment sometime earlier this year. He has underwent biopsies of the lymph nodes that showed it was primarily related to infection at the time. The lymph nodes were under routine surveillance. The family is unclear about the diagnosis of lymphoma, but says that it may have been related to one of the immunosuppressive medications he was taking s/p renal transplant. Per family, he did not have any constitutional symptoms including night sweats, persistent fever, or weight loss over the past 6 months.   - Obtain collateral from [facility] (Dr. Lydia Huffman, [phone]). Should have medical records release the following: the most recent CT Chest/Abdomen/Pelvis, PET scan, LDH, uric acid, peripheral flow cytometry, CBC, CMP, SPEP, serum immunofixation, quantitative immunoglobulin, free light chains, UPEP, urine immunofixation, bone marrow biopsy, most recent outpatient progress note with Dr. Huffman.   - Given earlier bronchoscopy (07/07) that showed clinical signs of bacterial pneumonia, it is possible that extensive lymphadenopathy may be related to infection. Will need to compare radiology images/reports.    - Peripheral blood smears shows numerous lymphocytes with clumped chromatin and a narrow rim of cytoplasm, concerning for possible CLL.   - In the meantime, send stat peripheral flow cytometry to evaluate for lymphocytosis (rule-out CLL), LDH and uric acid.   - Send CT A/P with IV Contrast for evaluation of abdominopelvic lymphadenopathy. If contrast cannot be used due to declining renal function, can also perform CT A/P non-contrast. Send for whole-body PET-CT to complete staging  - Will hold off Surgery consult for excisional lymph node biopsy until lymph nodes are properly evaluated.     - Maintain active T+S, transfuse PRBC if Hb < 7 or if actively bleeding/symptomatic or if platelets < 10K or < 20K if febrile or < 50 K if actively bleeding     To discuss with Dr. Carmen    69 yo M with PMHx of renal transplant (on tacrolimus), DM2, essential hypertension, MGUS, hx of lymphoma (follows Dr. Jojo Huffman), pAFib, BPH, HLD, CAD s/p CABG (15 years ago), presents for 3-4 days of worsening cough, found to be in AHRF and sepsis likely 2/2 multifocal pneumonia, incidentally found to have extensive, thoracic and upper abdominal lymphadenopathy. Hematology consulted for lymphadenopathy in the context of lymphoma history.    #) DIAGNOSIS  - Bicytopenia: Normocytic anemia, likely anemia of chronic disease/borderline or mixed mild iron deficiency anemia, and mild thrombocytopenia likely multifactorial such as immunosuppressive medications, anemia of renal disease, and sepsis   - Mild leukocytosis with lymphocytic predominance (ALC 12K) and left-shift   - Extensive bilateral axillary/supraclavicular/cervical/mediastinal/abdominal lymphadenopathy  - Splenomegaly  - History of lymphoma?  - History of MGUS   - Mild INR prolongation likely 2/2 Eliquis  - AFib on Eliquis    #) PLAN  - Per family at bedside/phone, patient has a known history of lymphadenopathy for which he is following with Dr. Lydia Huffman at [facility], last appointment sometime earlier this year. He has underwent biopsies of the lymph nodes that showed it was primarily related to infection at the time. The lymph nodes were under routine surveillance. The family is unclear about the diagnosis of lymphoma, but says that it may have been related to one of the immunosuppressive medications he was taking s/p renal transplant. Per family, he did not have any constitutional symptoms including night sweats, persistent fever, or weight loss over the past 6 months.   - Obtain collateral from [facility] (Dr. Lydia Huffman, [phone]). Should have medical records release the following: the most recent CT Chest/Abdomen/Pelvis, PET scan, LDH, uric acid, peripheral flow cytometry, CBC, CMP, SPEP, serum immunofixation, quantitative immunoglobulin, free light chains, UPEP, urine immunofixation, bone marrow biopsy, most recent outpatient progress note with Dr. Huffman.   - Given earlier bronchoscopy (07/07) that showed clinical signs of bacterial pneumonia, it is possible that extensive lymphadenopathy may be related to infection. Will need to compare radiology images/reports.    - Peripheral blood smears shows numerous lymphocytes with clumped chromatin and a narrow rim of cytoplasm, concerning for possible CLL.   - In the meantime, send stat peripheral flow cytometry to evaluate for lymphocytosis (rule-out CLL), LDH and uric acid.   - Send CT A/P with IV Contrast for evaluation of abdominopelvic lymphadenopathy and splenomegaly. If contrast cannot be used due to declining renal function, can also perform CT A/P non-contrast. Send for whole-body PET-CT to complete staging  - Will hold off Surgery consult for excisional lymph node biopsy until lymph nodes are properly evaluated.     - Maintain active T+S, transfuse PRBC if Hb < 7 or if actively bleeding/symptomatic or if platelets < 10K or < 20K if febrile or < 50 K if actively bleeding     To discuss with Dr. Carmen    71 yo M with PMHx of renal transplant (on tacrolimus), DM2, essential hypertension, MGUS, hx of lymphoma (follows Dr. Jojo Huffman), pAFib, BPH, HLD, CAD s/p CABG (15 years ago), presents for 3-4 days of worsening cough, found to be in AHRF and sepsis likely 2/2 multifocal pneumonia, incidentally found to have extensive, thoracic and upper abdominal lymphadenopathy. Hematology consulted for lymphadenopathy in the context of lymphoma history.    #) DIAGNOSIS  - Bicytopenia: Normocytic anemia, likely anemia of chronic disease/borderline or mixed mild iron deficiency anemia, and mild thrombocytopenia likely multifactorial such as immunosuppressive medications, anemia of renal disease, and sepsis   - Mild leukocytosis with lymphocytic predominance (ALC 12K) and left-shift   - Extensive bilateral axillary/supraclavicular/cervical/mediastinal/abdominal lymphadenopathy  - Splenomegaly  - History of lymphoma?  - History of MGUS   - Mild INR prolongation likely 2/2 Eliquis  - AFib on Eliquis    #) PLAN  - Per family at bedside/phone, patient has a known history of lymphadenopathy for which he is following with Dr. Lydia Huffman at [facility], last appointment sometime earlier this year. He has underwent biopsies of the lymph nodes that showed it was primarily related to infection at the time. The lymph nodes were under routine surveillance. The family is unclear about the diagnosis of lymphoma, but says that it may have been related to one of the immunosuppressive medications he was taking s/p renal transplant. Per family, he did not have any constitutional symptoms including night sweats, persistent fever, or weight loss over the past 6 months.   - Obtain collateral from [facility] (Dr. Lydia Huffman, [phone]). Should have medical records release the following: the most recent CT Chest/Abdomen/Pelvis, PET scan, LDH, uric acid, peripheral flow cytometry, CBC, CMP, SPEP, serum immunofixation, quantitative immunoglobulin, free light chains, UPEP, urine immunofixation, bone marrow biopsy, most recent outpatient progress note with Dr. Huffman.   - Given earlier bronchoscopy (07/07) that showed clinical signs of bacterial pneumonia, it is possible that extensive lymphadenopathy may be related to infection. Will need to compare radiology images/reports.    - Peripheral blood smears shows numerous lymphocytes with clumped chromatin and a narrow rim of cytoplasm, concerning for possible CLL.   - In the meantime, send stat peripheral flow cytometry to evaluate for lymphocytosis (rule-out CLL), LDH and uric acid.   - Send CT A/P with IV Contrast for evaluation of abdominopelvic lymphadenopathy and splenomegaly. If contrast cannot be used due to declining renal function, can also perform CT A/P non-contrast. Send for whole-body PET-CT to complete staging  - Will hold off Surgery consult for excisional lymph node biopsy until lymph nodes are properly evaluated.     - Send serum immunofixation, SPEP, quantitative immunoglobulin and free light chains.   - Maintain active T+S, transfuse PRBC if Hb < 7 or if actively bleeding/symptomatic or if platelets < 10K or < 20K if febrile or < 50 K if actively bleeding     Discussed with Dr. Carmen

## 2021-07-07 NOTE — PROGRESS NOTE ADULT - SUBJECTIVE AND OBJECTIVE BOX
PULMONARY CONSULT SERVICE FOLLOW-UP NOTE    INTERVAL HPI:  Reviewed chart and overnight events; patient seen and examined at bedside.    MEDICATIONS:  Pulmonary:    Antimicrobials:  azithromycin  IVPB      azithromycin  IVPB 500 milliGRAM(s) IV Intermittent every 24 hours  linezolid  IVPB 600 milliGRAM(s) IV Intermittent every 12 hours  piperacillin/tazobactam IVPB.. 3.375 Gram(s) IV Intermittent every 6 hours    Anticoagulants:    Cardiac:      Allergies    No Known Allergies    Intolerances        Vital Signs Last 24 Hrs  T(C): 37.5 (07 Jul 2021 09:32), Max: 38.2 (06 Jul 2021 18:16)  T(F): 99.5 (07 Jul 2021 09:32), Max: 100.8 (06 Jul 2021 18:16)  HR: 69 (07 Jul 2021 11:00) (59 - 80)  BP: 98/55 (07 Jul 2021 11:00) (89/48 - 135/71)  BP(mean): 71 (07 Jul 2021 11:00) (64 - 98)  RR: 20 (07 Jul 2021 11:00) (13 - 30)  SpO2: 90% (07 Jul 2021 11:00) (90% - 100%)    07-06 @ 07:01 - 07-07 @ 07:00  --------------------------------------------------------  IN: 1985.1 mL / OUT: 1575 mL / NET: 410.1 mL    07-07 @ 07:01 - 07-07 @ 11:36  --------------------------------------------------------  IN: 367.5 mL / OUT: 50 mL / NET: 317.5 mL      Mode: AC/ CMV (Assist Control/ Continuous Mandatory Ventilation)  RR (machine): 14  TV (machine): 500  FiO2: 50  PEEP: 8  ITime: 1  MAP: 13  PIP: 26      PHYSICAL EXAM:  Constitutional: WDWN  HEENT: NC/AT; PERRL, anicteric sclera; MMM  Neck: supple  Cardiovascular: +S1/S2, RRR  Respiratory: CTA B/L; no W/R/R  Gastrointestinal: soft, NT/ND  Extremities: WWP; no edema, clubbing or cyanosis  Vascular: 2+ radial pulses B/L  Neurological: awake and alert; BABCOCK    LABS:  ABG - ( 07 Jul 2021 06:22 )  pH, Arterial: 7.35  pH, Blood: x     /  pCO2: 39    /  pO2: 80    / HCO3: 22    / Base Excess: -3.8  /  SaO2: 96.7                CBC Full  -  ( 07 Jul 2021 06:23 )  WBC Count : 15.39 K/uL  RBC Count : 3.12 M/uL  Hemoglobin : 9.3 g/dL  Hematocrit : 29.1 %  Platelet Count - Automated : 131 K/uL  Mean Cell Volume : 93.3 fl  Mean Cell Hemoglobin : 29.8 pg  Mean Cell Hemoglobin Concentration : 32.0 gm/dL  Auto Neutrophil # : 1.88 K/uL  Auto Lymphocyte # : 12.56 K/uL  Auto Monocyte # : 0.26 K/uL  Auto Eosinophil # : 0.14 K/uL  Auto Basophil # : 0.00 K/uL  Auto Neutrophil % : 9.6 %  Auto Lymphocyte % : 81.6 %  Auto Monocyte % : 1.7 %  Auto Eosinophil % : 0.9 %  Auto Basophil % : 0.0 %    07-07    128<L>  |  97  |  32<H>  ----------------------------<  173<H>  4.5   |  21<L>  |  1.72<H>    Ca    8.9      07 Jul 2021 06:23  Phos  4.0     07-07  Mg     2.3     07-07    TPro  6.1  /  Alb  2.8<L>  /  TBili  0.6  /  DBili  x   /  AST  36  /  ALT  24  /  AlkPhos  93  07-07    PT/INR - ( 07 Jul 2021 08:47 )   PT: 15.9 sec;   INR: 1.34          PTT - ( 07 Jul 2021 08:47 )  PTT:30.8 sec      Urinalysis Basic - ( 05 Jul 2021 15:26 )    Color: Yellow / Appearance: Clear / SG: >=1.030 / pH: x  Gluc: x / Ketone: 15 mg/dL  / Bili: Small / Urobili: 1.0 E.U./dL   Blood: x / Protein: 100 mg/dL / Nitrite: NEGATIVE   Leuk Esterase: NEGATIVE / RBC: < 5 /HPF / WBC < 5 /HPF   Sq Epi: x / Non Sq Epi: 0-5 /HPF / Bacteria: Present /HPF                RADIOLOGY & ADDITIONAL STUDIES: PULMONARY CONSULT SERVICE FOLLOW-UP NOTE    INTERVAL HPI:  WBC trending down on antibiotics, neutropenia resolved, he's been afebrile.  Was intubated yesterday for CT scan and Bronchoscopy  Today bronchoscopy performed, showing significant amount of purulent secretions. No endobronchial lesions. No biopsy performed Awaiting BAL.      MEDICATIONS:  Pulmonary:    Antimicrobials:  azithromycin  IVPB      azithromycin  IVPB 500 milliGRAM(s) IV Intermittent every 24 hours  linezolid  IVPB 600 milliGRAM(s) IV Intermittent every 12 hours  piperacillin/tazobactam IVPB.. 3.375 Gram(s) IV Intermittent every 6 hours    Anticoagulants:    Cardiac:      Allergies    No Known Allergies    Intolerances        Vital Signs Last 24 Hrs  T(C): 37.5 (07 Jul 2021 09:32), Max: 38.2 (06 Jul 2021 18:16)  T(F): 99.5 (07 Jul 2021 09:32), Max: 100.8 (06 Jul 2021 18:16)  HR: 69 (07 Jul 2021 11:00) (59 - 80)  BP: 98/55 (07 Jul 2021 11:00) (89/48 - 135/71)  BP(mean): 71 (07 Jul 2021 11:00) (64 - 98)  RR: 20 (07 Jul 2021 11:00) (13 - 30)  SpO2: 90% (07 Jul 2021 11:00) (90% - 100%)    07-06 @ 07:01 - 07-07 @ 07:00  --------------------------------------------------------  IN: 1985.1 mL / OUT: 1575 mL / NET: 410.1 mL    07-07 @ 07:01 - 07-07 @ 11:36  --------------------------------------------------------  IN: 367.5 mL / OUT: 50 mL / NET: 317.5 mL      Mode: AC/ CMV (Assist Control/ Continuous Mandatory Ventilation)  RR (machine): 14  TV (machine): 500  FiO2: 50  PEEP: 8  ITime: 1  MAP: 13  PIP: 26        LABS:  ABG - ( 07 Jul 2021 06:22 )  pH, Arterial: 7.35  pH, Blood: x     /  pCO2: 39    /  pO2: 80    / HCO3: 22    / Base Excess: -3.8  /  SaO2: 96.7          CBC Full  -  ( 07 Jul 2021 06:23 )  WBC Count : 15.39 K/uL  RBC Count : 3.12 M/uL  Hemoglobin : 9.3 g/dL  Hematocrit : 29.1 %  Platelet Count - Automated : 131 K/uL  Mean Cell Volume : 93.3 fl  Mean Cell Hemoglobin : 29.8 pg  Mean Cell Hemoglobin Concentration : 32.0 gm/dL  Auto Neutrophil # : 1.88 K/uL  Auto Lymphocyte # : 12.56 K/uL  Auto Monocyte # : 0.26 K/uL  Auto Eosinophil # : 0.14 K/uL  Auto Basophil # : 0.00 K/uL  Auto Neutrophil % : 9.6 %  Auto Lymphocyte % : 81.6 %  Auto Monocyte % : 1.7 %  Auto Eosinophil % : 0.9 %  Auto Basophil % : 0.0 %    07-07    128<L>  |  97  |  32<H>  ----------------------------<  173<H>  4.5   |  21<L>  |  1.72<H>    Ca    8.9      07 Jul 2021 06:23  Phos  4.0     07-07  Mg     2.3     07-07    TPro  6.1  /  Alb  2.8<L>  /  TBili  0.6  /  DBili  x   /  AST  36  /  ALT  24  /  AlkPhos  93  07-07    PT/INR - ( 07 Jul 2021 08:47 )   PT: 15.9 sec;   INR: 1.34          PTT - ( 07 Jul 2021 08:47 )  PTT:30.8 sec      Urinalysis Basic - ( 05 Jul 2021 15:26 )    Color: Yellow / Appearance: Clear / SG: >=1.030 / pH: x  Gluc: x / Ketone: 15 mg/dL  / Bili: Small / Urobili: 1.0 E.U./dL   Blood: x / Protein: 100 mg/dL / Nitrite: NEGATIVE   Leuk Esterase: NEGATIVE / RBC: < 5 /HPF / WBC < 5 /HPF   Sq Epi: x / Non Sq Epi: 0-5 /HPF / Bacteria: Present /HPF        Physical Exam  Gen: Elderly male, intubated, sedated  Chest: Decreased breath sound on R side. Normal breath sounds on L side. No wheezes. No rhonchi. No crackles.  Heart: RRR, no rmg  Skin: Macular rash on lower extremities up to thighs. No other rashes.   Lower ext: Trace pitting edema. WWP. Good distal pulses  Abd: obese, soft, nontender  +Lucas in  +A-line

## 2021-07-07 NOTE — CONSULT NOTE ADULT - SUBJECTIVE AND OBJECTIVE BOX
LENGTH OF HOSPITAL STAY: 2d    CHIEF COMPLAINT:   Patient is a 70y old  Male who presents with a chief complaint of sepsis (07 Jul 2021 12:25)    HISTORY OF PRESENTING ILLNESS:   70M with PMHx significant for renal transplant (on tacrolimus, unk date), DM2, HTN, MGUS, pAF, BPH, HLD, CAD (s/p CABG 15 years ago), presents for 3-4 days of worsening cough. Patient states that he has a productive cough with brownish colored sputum. Patient denied fever or shortness of breath at home, but on day of admission did notice that his breathing felt different and that he was breathing quicker. Per ED it was reported that he completed a Z-pack without improvement. Past history notable for COVID+ March 2020, also received pfizer vaccine x2 a few months ago. Endorses SOB, is drinking less fluid, and may be urinating less. Denies sick contacts, HA, CP, rhinorrhea, sore throat, nausea, vomiting, diarrhea, abd pain, urinary complaints, black/bloody stool, focal weakness/numbness, vision changes, neck/back pain, LE pain/swelling.     Patient also reports a rash on both of his upper thighs that he developed after coming back from [place] 3 weeks ago. It does not itch, does not know if any creams or detergents were used. States that he has a dermatologist, but does not know what the diagnosis is.    ED Vitals:   T 100, P 114, /83, RR 18, O2 94% on 6L NC  T 103.4, P 115, /65, RR 22, O2 96% on 6L NC  ED Course:  Labs notable for WBC 24.29 (92.2% neutrophils), Hgb 11.4, Lactate 1.3, INR 1.75, Na 126, Cl 91, Co2 19, AG 16, procal 0.43, Cr 1.16  CXR: Cardiomegaly, status post median sternotomy, CABG. Bilateral opacities/right perihilar opacity/consolidation.    Bedside POCUS: diffuse b-lines, consolidations worse on R lung (05 Jul 2021 17:04)    PAST MEDICAL & SURGICAL HISTORY:  Barretts esophagus  CRI (chronic renal insufficiency)  DM (diabetes mellitus)  GERD (gastroesophageal reflux disease)  HTN (hypertension)  Monoclonal gammopathy  Mandibular abscess  Paroxysmal atrial fibrillation  Benign prostatic hyperplasia, presence of lower urinary tract symptoms unspecified, unspecified morphology  Other hyperlipidemia  S/P CABG (coronary artery bypass graft)  15 years  History of surgery  cyst removal  Mandibular abscess    SOCIAL HISTORY:    ALLERGIES:  No Known Allergies    MEDICATIONS:  STANDING MEDICATIONS  azithromycin  IVPB      azithromycin  IVPB 500 milliGRAM(s) IV Intermittent every 24 hours  chlorhexidine 0.12% Liquid 15 milliLiter(s) Oral Mucosa every 12 hours  chlorhexidine 2% Cloths 1 Application(s) Topical <User Schedule>  dextrose 40% Gel 15 Gram(s) Oral once  dextrose 5%. 1000 milliLiter(s) IV Continuous <Continuous>  dextrose 5%. 1000 milliLiter(s) IV Continuous <Continuous>  dextrose 50% Injectable 25 Gram(s) IV Push once  dextrose 50% Injectable 12.5 Gram(s) IV Push once  dextrose 50% Injectable 25 Gram(s) IV Push once  glucagon  Injectable 1 milliGRAM(s) IntraMuscular once  insulin regular Infusion 2 Unit(s)/Hr IV Continuous <Continuous>  linezolid  IVPB 600 milliGRAM(s) IV Intermittent every 12 hours  petrolatum Ophthalmic Ointment 1 Application(s) Both EYES two times a day  piperacillin/tazobactam IVPB.. 3.375 Gram(s) IV Intermittent every 6 hours  polyethylene glycol 3350 17 Gram(s) Oral every 24 hours  propofol Infusion 10 MICROgram(s)/kG/Min IV Continuous <Continuous>    PRN MEDICATIONS  acetaminophen    Suspension .. 650 milliGRAM(s) Enteral Tube every 6 hours PRN    VITALS:   T(F): 99.5  HR: 69  BP: 105/59  RR: 20  SpO2: 89%    LABS:                        9.3    15.39 )-----------( 131      ( 07 Jul 2021 06:23 )             29.1     07-07    128<L>  |  97  |  32<H>  ----------------------------<  173<H>  4.5   |  21<L>  |  1.72<H>    Ca    8.9      07 Jul 2021 06:23  Phos  4.0     07-07  Mg     2.3     07-07    TPro  6.1  /  Alb  2.8<L>  /  TBili  0.6  /  DBili  x   /  AST  36  /  ALT  24  /  AlkPhos  93  07-07    PT/INR - ( 07 Jul 2021 08:47 )   PT: 15.9 sec;   INR: 1.34       PTT - ( 07 Jul 2021 08:47 )  PTT:30.8 sec  Urinalysis Basic - ( 05 Jul 2021 15:26 )    Color: Yellow / Appearance: Clear / SG: >=1.030 / pH: x  Gluc: x / Ketone: 15 mg/dL  / Bili: Small / Urobili: 1.0 E.U./dL   Blood: x / Protein: 100 mg/dL / Nitrite: NEGATIVE   Leuk Esterase: NEGATIVE / RBC: < 5 /HPF / WBC < 5 /HPF   Sq Epi: x / Non Sq Epi: 0-5 /HPF / Bacteria: Present /HPF    ABG - ( 07 Jul 2021 06:22 )  pH, Arterial: 7.35  pH, Blood: x     /  pCO2: 39    /  pO2: 80    / HCO3: 22    / Base Excess: -3.8  /  SaO2: 96.7      Culture - Sputum (collected 06 Jul 2021 16:08)  Source: .Sputum Sputum  Gram Stain (06 Jul 2021 17:21):    No epithelial cells seen    Few WBC's    Rare Gram positive cocci in pairs  Preliminary Report (07 Jul 2021 08:38):    Rare Normal Respiratory Jessi present to date    Culture - Blood (collected 05 Jul 2021 16:39)  Source: .Blood Blood-Peripheral  Preliminary Report (06 Jul 2021 17:00):    No growth at 1 day.    Culture - Blood (collected 05 Jul 2021 16:39)  Source: .Blood Blood-Peripheral  Preliminary Report (06 Jul 2021 17:00):    No growth at 1 day.    Culture - Urine (collected 05 Jul 2021 16:03)  Source: .Urine Clean Catch (Midstream)  Final Report (06 Jul 2021 15:39):    No growth    RADIOLOGY:  < from: CT Chest No Cont (07.07.21 @ 07:18) >  Lungs and large airways: ET tube in satisfactory position. Large airspace consolidation right upper lobe. Complete consolidation of the right lower lobe. Small subsegmental atelectasis/consolidation left upper lobe. Dependent atelectasis left lower lobe. Patent bronchi.    Pleura: Small to moderate right pleural effusion. Trace left pleural effusion.    Mediastinum and hilar regions: Numerous enlarged mediastinal lymphadenopathy with the largest right paratracheal lymph node measuring 1.4 cm in short axis and prevascular space lymph node measuring 1.3 cm in short axis. Extensive bilateral axillary lymphadenopathy with the largest right axillary lymph node measuring 1.5 cm short axis and the largest left axillary lymphadenopathy measuring 1.4 cm short axis. Partial visualization of supraclavicular and cervical lymphadenopathy.    Heart and pericardium: Status post CABG. Mild cardiomegaly. No pericardial effusion.    Vessels:  No thoracic aortic aneurysm.    Chest wall and lower neck:  Symmetrical gynecomastia.    Upper abdomen: Gastric tube in satisfactory position. Small hiatal hernia. Upper abdominal lymphadenopathy partially visualized. Splenomegaly partially visualized. Infiltration of the retroperitoneal fat, partially visualized. 1.3 cm cyst upper pole left kidney. Sludge within the gallbladder.    Bones: Median sternotomy. Degenerative changes of spine.      IMPRESSION:    Large multi lobar pulmonary consolidations, right greater than left, for pneumonia but recommend follow-up to exclude neoplasm including lymphoma.    Small to moderate right pleural effusion. Trace left pleural effusion.    Extensive cervical, thoracic and upper abdominal lymphadenopathy. Partially visualized splenomegaly.      < end of copied text >    PHYSICAL EXAM:  GEN: No acute distress  HEENT:   LUNGS: Clear to auscultation bilaterally   HEART: S1/S2 present. RRR.   ABD: Soft, non-tender, non-distended. Bowel sounds present  EXT:  NEURO: AAOX3     LENGTH OF HOSPITAL STAY: 2d    CHIEF COMPLAINT:   Patient is a 70y old  Male who presents with a chief complaint of sepsis (07 Jul 2021 12:25)    HISTORY OF PRESENTING ILLNESS:   70M with PMHx significant for renal transplant (on tacrolimus, unk date), DM2, HTN, MGUS, pAF, BPH, HLD, CAD (s/p CABG 15 years ago), presents for 3-4 days of worsening cough. Patient states that he has a productive cough with brownish colored sputum. Patient denied fever or shortness of breath at home, but on day of admission did notice that his breathing felt different and that he was breathing quicker. Per ED it was reported that he completed a Z-pack without improvement. Past history notable for COVID+ March 2020, also received pfizer vaccine x2 a few months ago. Endorses SOB, is drinking less fluid, and may be urinating less. Denies sick contacts, HA, CP, rhinorrhea, sore throat, nausea, vomiting, diarrhea, abd pain, urinary complaints, black/bloody stool, focal weakness/numbness, vision changes, neck/back pain, LE pain/swelling.     Patient also reports a rash on both of his upper thighs that he developed after coming back from [place] 3 weeks ago. It does not itch, does not know if any creams or detergents were used. States that he has a dermatologist, but does not know what the diagnosis is.    Per wife, no bleeding or clotting history. No night sweats, fever, weight loss. Has recently travelled to [place]. Per daughter, he tends to get lung infections whenever he travels. Lymph nodes are known to them since 2 years ago, has been following Dr. Lydia Huffman (), last visit was sometime this year per son who takes care of him (Anup ). These lymph nodes were biopsied and were found to be infectious-related. Denies chemoradiation.     PAST MEDICAL & SURGICAL HISTORY:  Barretts esophagus  CRI (chronic renal insufficiency)  DM (diabetes mellitus)  GERD (gastroesophageal reflux disease)  HTN (hypertension)  Monoclonal gammopathy  Mandibular abscess  Paroxysmal atrial fibrillation  Benign prostatic hyperplasia, presence of lower urinary tract symptoms unspecified, unspecified morphology  Other hyperlipidemia  S/P CABG (coronary artery bypass graft)  15 years  History of surgery  cyst removal  Mandibular abscess    SOCIAL HISTORY:  Never a smoker  No history of cancer    ALLERGIES:  No Known Allergies    MEDICATIONS:  STANDING MEDICATIONS  azithromycin  IVPB      azithromycin  IVPB 500 milliGRAM(s) IV Intermittent every 24 hours  chlorhexidine 0.12% Liquid 15 milliLiter(s) Oral Mucosa every 12 hours  chlorhexidine 2% Cloths 1 Application(s) Topical <User Schedule>  dextrose 40% Gel 15 Gram(s) Oral once  dextrose 5%. 1000 milliLiter(s) IV Continuous <Continuous>  dextrose 5%. 1000 milliLiter(s) IV Continuous <Continuous>  dextrose 50% Injectable 25 Gram(s) IV Push once  dextrose 50% Injectable 12.5 Gram(s) IV Push once  dextrose 50% Injectable 25 Gram(s) IV Push once  glucagon  Injectable 1 milliGRAM(s) IntraMuscular once  insulin regular Infusion 2 Unit(s)/Hr IV Continuous <Continuous>  linezolid  IVPB 600 milliGRAM(s) IV Intermittent every 12 hours  petrolatum Ophthalmic Ointment 1 Application(s) Both EYES two times a day  piperacillin/tazobactam IVPB.. 3.375 Gram(s) IV Intermittent every 6 hours  polyethylene glycol 3350 17 Gram(s) Oral every 24 hours  propofol Infusion 10 MICROgram(s)/kG/Min IV Continuous <Continuous>    PRN MEDICATIONS  acetaminophen    Suspension .. 650 milliGRAM(s) Enteral Tube every 6 hours PRN    VITALS:   T(F): 99.5  HR: 69  BP: 105/59  RR: 20  SpO2: 89%    LABS:                        9.3    15.39 )-----------( 131      ( 07 Jul 2021 06:23 )             29.1     07-07    128<L>  |  97  |  32<H>  ----------------------------<  173<H>  4.5   |  21<L>  |  1.72<H>    Ca    8.9      07 Jul 2021 06:23  Phos  4.0     07-07  Mg     2.3     07-07    TPro  6.1  /  Alb  2.8<L>  /  TBili  0.6  /  DBili  x   /  AST  36  /  ALT  24  /  AlkPhos  93  07-07    PT/INR - ( 07 Jul 2021 08:47 )   PT: 15.9 sec;   INR: 1.34       PTT - ( 07 Jul 2021 08:47 )  PTT:30.8 sec  Urinalysis Basic - ( 05 Jul 2021 15:26 )    Color: Yellow / Appearance: Clear / SG: >=1.030 / pH: x  Gluc: x / Ketone: 15 mg/dL  / Bili: Small / Urobili: 1.0 E.U./dL   Blood: x / Protein: 100 mg/dL / Nitrite: NEGATIVE   Leuk Esterase: NEGATIVE / RBC: < 5 /HPF / WBC < 5 /HPF   Sq Epi: x / Non Sq Epi: 0-5 /HPF / Bacteria: Present /HPF    ABG - ( 07 Jul 2021 06:22 )  pH, Arterial: 7.35  pH, Blood: x     /  pCO2: 39    /  pO2: 80    / HCO3: 22    / Base Excess: -3.8  /  SaO2: 96.7      Culture - Sputum (collected 06 Jul 2021 16:08)  Source: .Sputum Sputum  Gram Stain (06 Jul 2021 17:21):    No epithelial cells seen    Few WBC's    Rare Gram positive cocci in pairs  Preliminary Report (07 Jul 2021 08:38):    Rare Normal Respiratory Jessi present to date    Culture - Blood (collected 05 Jul 2021 16:39)  Source: .Blood Blood-Peripheral  Preliminary Report (06 Jul 2021 17:00):    No growth at 1 day.    Culture - Blood (collected 05 Jul 2021 16:39)  Source: .Blood Blood-Peripheral  Preliminary Report (06 Jul 2021 17:00):    No growth at 1 day.    Culture - Urine (collected 05 Jul 2021 16:03)  Source: .Urine Clean Catch (Midstream)  Final Report (06 Jul 2021 15:39):    No growth    RADIOLOGY:  < from: CT Chest No Cont (07.07.21 @ 07:18) >  Lungs and large airways: ET tube in satisfactory position. Large airspace consolidation right upper lobe. Complete consolidation of the right lower lobe. Small subsegmental atelectasis/consolidation left upper lobe. Dependent atelectasis left lower lobe. Patent bronchi.    Pleura: Small to moderate right pleural effusion. Trace left pleural effusion.    Mediastinum and hilar regions: Numerous enlarged mediastinal lymphadenopathy with the largest right paratracheal lymph node measuring 1.4 cm in short axis and prevascular space lymph node measuring 1.3 cm in short axis. Extensive bilateral axillary lymphadenopathy with the largest right axillary lymph node measuring 1.5 cm short axis and the largest left axillary lymphadenopathy measuring 1.4 cm short axis. Partial visualization of supraclavicular and cervical lymphadenopathy.    Heart and pericardium: Status post CABG. Mild cardiomegaly. No pericardial effusion.    Vessels:  No thoracic aortic aneurysm.    Chest wall and lower neck:  Symmetrical gynecomastia.    Upper abdomen: Gastric tube in satisfactory position. Small hiatal hernia. Upper abdominal lymphadenopathy partially visualized. Splenomegaly partially visualized. Infiltration of the retroperitoneal fat, partially visualized. 1.3 cm cyst upper pole left kidney. Sludge within the gallbladder.    Bones: Median sternotomy. Degenerative changes of spine.      IMPRESSION:    Large multi lobar pulmonary consolidations, right greater than left, for pneumonia but recommend follow-up to exclude neoplasm including lymphoma.    Small to moderate right pleural effusion. Trace left pleural effusion.    Extensive cervical, thoracic and upper abdominal lymphadenopathy. Partially visualized splenomegaly.      < end of copied text >    PHYSICAL EXAM:  GEN: No acute distress  HEENT: NCAT  LUNGS: Clear to auscultation bilaterally   HEART: S1/S2 present. RRR.   ABD: Soft, non-tender, distended. Bowel sounds present  EXT: No pitting edema  NEURO: AAOX0, intubated

## 2021-07-08 ENCOUNTER — RESULT REVIEW (OUTPATIENT)
Age: 71
End: 2021-07-08

## 2021-07-08 LAB
ALBUMIN SERPL ELPH-MCNC: 2.8 G/DL — LOW (ref 3.3–5)
ALP SERPL-CCNC: 91 U/L — SIGNIFICANT CHANGE UP (ref 40–120)
ALT FLD-CCNC: 38 U/L — SIGNIFICANT CHANGE UP (ref 10–45)
ANION GAP SERPL CALC-SCNC: 13 MMOL/L — SIGNIFICANT CHANGE UP (ref 5–17)
ANISOCYTOSIS BLD QL: SLIGHT — SIGNIFICANT CHANGE UP
APTT BLD: 58.8 SEC — HIGH (ref 27.5–35.5)
APTT BLD: 67 SEC — HIGH (ref 27.5–35.5)
APTT BLD: 76.4 SEC — HIGH (ref 27.5–35.5)
AST SERPL-CCNC: 63 U/L — HIGH (ref 10–40)
BASOPHILS # BLD AUTO: 0 K/UL — SIGNIFICANT CHANGE UP (ref 0–0.2)
BASOPHILS NFR BLD AUTO: 0 % — SIGNIFICANT CHANGE UP (ref 0–2)
BILIRUB SERPL-MCNC: 0.6 MG/DL — SIGNIFICANT CHANGE UP (ref 0.2–1.2)
BUN SERPL-MCNC: 32 MG/DL — HIGH (ref 7–23)
BURR CELLS BLD QL SMEAR: PRESENT — SIGNIFICANT CHANGE UP
CALCIUM SERPL-MCNC: 8.4 MG/DL — SIGNIFICANT CHANGE UP (ref 8.4–10.5)
CHLORIDE SERPL-SCNC: 96 MMOL/L — SIGNIFICANT CHANGE UP (ref 96–108)
CMV IGG FLD QL: 6.6 U/ML — HIGH
CMV IGG SERPL-IMP: POSITIVE
CMV IGM FLD-ACNC: >240 AU/ML — HIGH
CMV IGM SERPL QL: POSITIVE
CO2 SERPL-SCNC: 20 MMOL/L — LOW (ref 22–31)
CREAT SERPL-MCNC: 1.65 MG/DL — HIGH (ref 0.5–1.3)
CULTURE RESULTS: NO GROWTH — SIGNIFICANT CHANGE UP
CULTURE RESULTS: SIGNIFICANT CHANGE UP
DACRYOCYTES BLD QL SMEAR: SLIGHT — SIGNIFICANT CHANGE UP
EOSINOPHIL # BLD AUTO: 0.17 K/UL — SIGNIFICANT CHANGE UP (ref 0–0.5)
EOSINOPHIL NFR BLD AUTO: 0.9 % — SIGNIFICANT CHANGE UP (ref 0–6)
FOLATE SERPL-MCNC: 6.6 NG/ML — SIGNIFICANT CHANGE UP
GIANT PLATELETS BLD QL SMEAR: PRESENT — SIGNIFICANT CHANGE UP
GLUCOSE BLDC GLUCOMTR-MCNC: 127 MG/DL — HIGH (ref 70–99)
GLUCOSE BLDC GLUCOMTR-MCNC: 139 MG/DL — HIGH (ref 70–99)
GLUCOSE BLDC GLUCOMTR-MCNC: 150 MG/DL — HIGH (ref 70–99)
GLUCOSE BLDC GLUCOMTR-MCNC: 156 MG/DL — HIGH (ref 70–99)
GLUCOSE BLDC GLUCOMTR-MCNC: 158 MG/DL — HIGH (ref 70–99)
GLUCOSE BLDC GLUCOMTR-MCNC: 163 MG/DL — HIGH (ref 70–99)
GLUCOSE BLDC GLUCOMTR-MCNC: 174 MG/DL — HIGH (ref 70–99)
GLUCOSE BLDC GLUCOMTR-MCNC: 175 MG/DL — HIGH (ref 70–99)
GLUCOSE BLDC GLUCOMTR-MCNC: 186 MG/DL — HIGH (ref 70–99)
GLUCOSE BLDC GLUCOMTR-MCNC: 198 MG/DL — HIGH (ref 70–99)
GLUCOSE BLDC GLUCOMTR-MCNC: 205 MG/DL — HIGH (ref 70–99)
GLUCOSE SERPL-MCNC: 146 MG/DL — HIGH (ref 70–99)
HCT VFR BLD CALC: 29.7 % — LOW (ref 39–50)
HGB BLD-MCNC: 9.3 G/DL — LOW (ref 13–17)
IGA FLD-MCNC: 160 MG/DL — SIGNIFICANT CHANGE UP (ref 84–499)
IGG FLD-MCNC: 877 MG/DL — SIGNIFICANT CHANGE UP (ref 610–1660)
IGM SERPL-MCNC: 24 MG/DL — LOW (ref 35–242)
KAPPA LC SER QL IFE: 3.62 MG/DL — HIGH (ref 0.33–1.94)
KAPPA LC SER QL IFE: 3.62 MG/DL — HIGH (ref 0.33–1.94)
KAPPA/LAMBDA FREE LIGHT CHAIN RATIO, SERUM: 0.4 RATIO — SIGNIFICANT CHANGE UP (ref 0.26–1.65)
KAPPA/LAMBDA FREE LIGHT CHAIN RATIO, SERUM: 0.4 RATIO — SIGNIFICANT CHANGE UP (ref 0.26–1.65)
LAMBDA LC SER QL IFE: 9.03 MG/DL — HIGH (ref 0.57–2.63)
LAMBDA LC SER QL IFE: 9.03 MG/DL — HIGH (ref 0.57–2.63)
LDH SERPL L TO P-CCNC: 283 U/L — HIGH (ref 50–242)
LYMPHOCYTES # BLD AUTO: 16.8 K/UL — HIGH (ref 1–3.3)
LYMPHOCYTES # BLD AUTO: 90.4 % — HIGH (ref 13–44)
MAGNESIUM SERPL-MCNC: 2.3 MG/DL — SIGNIFICANT CHANGE UP (ref 1.6–2.6)
MANUAL SMEAR VERIFICATION: SIGNIFICANT CHANGE UP
MCHC RBC-ENTMCNC: 29.2 PG — SIGNIFICANT CHANGE UP (ref 27–34)
MCHC RBC-ENTMCNC: 31.3 GM/DL — LOW (ref 32–36)
MCV RBC AUTO: 93.4 FL — SIGNIFICANT CHANGE UP (ref 80–100)
METAMYELOCYTES # FLD: 1.7 % — HIGH (ref 0–0)
MICROCYTES BLD QL: SLIGHT — SIGNIFICANT CHANGE UP
MONOCYTES # BLD AUTO: 0.33 K/UL — SIGNIFICANT CHANGE UP (ref 0–0.9)
MONOCYTES NFR BLD AUTO: 1.8 % — LOW (ref 2–14)
MYELOCYTES NFR BLD: 1.7 % — HIGH (ref 0–0)
NEUTROPHILS # BLD AUTO: 0.65 K/UL — LOW (ref 1.8–7.4)
NEUTROPHILS NFR BLD AUTO: 2.6 % — LOW (ref 43–77)
NEUTS BAND # BLD: 0.9 % — SIGNIFICANT CHANGE UP (ref 0–8)
NIGHT BLUE STAIN TISS: SIGNIFICANT CHANGE UP
OVALOCYTES BLD QL SMEAR: SLIGHT — SIGNIFICANT CHANGE UP
PHOSPHATE SERPL-MCNC: 5.2 MG/DL — HIGH (ref 2.5–4.5)
PLAT MORPH BLD: NORMAL — SIGNIFICANT CHANGE UP
PLATELET # BLD AUTO: 144 K/UL — LOW (ref 150–400)
POIKILOCYTOSIS BLD QL AUTO: SLIGHT — SIGNIFICANT CHANGE UP
POLYCHROMASIA BLD QL SMEAR: SLIGHT — SIGNIFICANT CHANGE UP
POTASSIUM SERPL-MCNC: 4.2 MMOL/L — SIGNIFICANT CHANGE UP (ref 3.5–5.3)
POTASSIUM SERPL-SCNC: 4.2 MMOL/L — SIGNIFICANT CHANGE UP (ref 3.5–5.3)
PROT SERPL-MCNC: 6.1 G/DL — SIGNIFICANT CHANGE UP (ref 6–8.3)
RBC # BLD: 3.18 M/UL — LOW (ref 4.2–5.8)
RBC # FLD: 16.1 % — HIGH (ref 10.3–14.5)
RBC BLD AUTO: ABNORMAL
S PNEUM AG UR QL: NEGATIVE — SIGNIFICANT CHANGE UP
SMUDGE CELLS # BLD: PRESENT — SIGNIFICANT CHANGE UP
SODIUM SERPL-SCNC: 129 MMOL/L — LOW (ref 135–145)
SPECIMEN SOURCE: SIGNIFICANT CHANGE UP
T GONDII IGG SER QL: <3 IU/ML — SIGNIFICANT CHANGE UP
T GONDII IGG SER QL: NEGATIVE — SIGNIFICANT CHANGE UP
T GONDII IGM SER QL: <3 AU/ML — SIGNIFICANT CHANGE UP
T GONDII IGM SER QL: NEGATIVE — SIGNIFICANT CHANGE UP
TACROLIMUS SERPL-MCNC: 7.2 NG/ML — SIGNIFICANT CHANGE UP
URATE SERPL-MCNC: 5.3 MG/DL — SIGNIFICANT CHANGE UP (ref 3.4–8.8)
VIT B12 SERPL-MCNC: >2000 PG/ML — HIGH (ref 232–1245)
WBC # BLD: 18.58 K/UL — HIGH (ref 3.8–10.5)
WBC # FLD AUTO: 18.58 K/UL — HIGH (ref 3.8–10.5)

## 2021-07-08 PROCEDURE — 88189 FLOWCYTOMETRY/READ 16 & >: CPT

## 2021-07-08 PROCEDURE — 99233 SBSQ HOSP IP/OBS HIGH 50: CPT

## 2021-07-08 PROCEDURE — 88360 TUMOR IMMUNOHISTOCHEM/MANUAL: CPT | Mod: 26

## 2021-07-08 PROCEDURE — 88341 IMHCHEM/IMCYTCHM EA ADD ANTB: CPT | Mod: 26,59

## 2021-07-08 PROCEDURE — 93306 TTE W/DOPPLER COMPLETE: CPT | Mod: 26

## 2021-07-08 PROCEDURE — 99291 CRITICAL CARE FIRST HOUR: CPT

## 2021-07-08 PROCEDURE — 88342 IMHCHEM/IMCYTCHM 1ST ANTB: CPT | Mod: 26,59

## 2021-07-08 PROCEDURE — 71045 X-RAY EXAM CHEST 1 VIEW: CPT | Mod: 26

## 2021-07-08 PROCEDURE — 99233 SBSQ HOSP IP/OBS HIGH 50: CPT | Mod: GC

## 2021-07-08 PROCEDURE — 38505 NEEDLE BIOPSY LYMPH NODES: CPT

## 2021-07-08 PROCEDURE — 88173 CYTOPATH EVAL FNA REPORT: CPT | Mod: 26

## 2021-07-08 PROCEDURE — 76942 ECHO GUIDE FOR BIOPSY: CPT | Mod: 26

## 2021-07-08 RX ORDER — SODIUM CHLORIDE 9 MG/ML
500 INJECTION INTRAMUSCULAR; INTRAVENOUS; SUBCUTANEOUS ONCE
Refills: 0 | Status: COMPLETED | OUTPATIENT
Start: 2021-07-08 | End: 2021-07-08

## 2021-07-08 RX ORDER — INSULIN HUMAN 100 [IU]/ML
2 INJECTION, SOLUTION SUBCUTANEOUS
Qty: 50 | Refills: 0 | Status: DISCONTINUED | OUTPATIENT
Start: 2021-07-08 | End: 2021-07-08

## 2021-07-08 RX ORDER — INSULIN GLARGINE 100 [IU]/ML
45 INJECTION, SOLUTION SUBCUTANEOUS EVERY MORNING
Refills: 0 | Status: DISCONTINUED | OUTPATIENT
Start: 2021-07-08 | End: 2021-07-09

## 2021-07-08 RX ORDER — HEPARIN SODIUM 5000 [USP'U]/ML
1900 INJECTION INTRAVENOUS; SUBCUTANEOUS
Qty: 25000 | Refills: 0 | Status: DISCONTINUED | OUTPATIENT
Start: 2021-07-08 | End: 2021-07-10

## 2021-07-08 RX ORDER — FAMOTIDINE 10 MG/ML
20 INJECTION INTRAVENOUS DAILY
Refills: 0 | Status: DISCONTINUED | OUTPATIENT
Start: 2021-07-08 | End: 2021-07-11

## 2021-07-08 RX ORDER — INSULIN HUMAN 100 [IU]/ML
INJECTION, SOLUTION SUBCUTANEOUS EVERY 6 HOURS
Refills: 0 | Status: DISCONTINUED | OUTPATIENT
Start: 2021-07-08 | End: 2021-07-12

## 2021-07-08 RX ADMIN — INSULIN HUMAN 2: 100 INJECTION, SOLUTION SUBCUTANEOUS at 13:12

## 2021-07-08 RX ADMIN — HEPARIN SODIUM 19 UNIT(S)/HR: 5000 INJECTION INTRAVENOUS; SUBCUTANEOUS at 04:31

## 2021-07-08 RX ADMIN — CHLORHEXIDINE GLUCONATE 15 MILLILITER(S): 213 SOLUTION TOPICAL at 10:30

## 2021-07-08 RX ADMIN — PIPERACILLIN AND TAZOBACTAM 200 GRAM(S): 4; .5 INJECTION, POWDER, LYOPHILIZED, FOR SOLUTION INTRAVENOUS at 07:46

## 2021-07-08 RX ADMIN — LINEZOLID 300 MILLIGRAM(S): 600 INJECTION, SOLUTION INTRAVENOUS at 10:30

## 2021-07-08 RX ADMIN — Medication 1 APPLICATION(S): at 10:30

## 2021-07-08 RX ADMIN — PROPOFOL 5.83 MICROGRAM(S)/KG/MIN: 10 INJECTION, EMULSION INTRAVENOUS at 05:55

## 2021-07-08 RX ADMIN — CHLORHEXIDINE GLUCONATE 15 MILLILITER(S): 213 SOLUTION TOPICAL at 22:57

## 2021-07-08 RX ADMIN — SODIUM CHLORIDE 2000 MILLILITER(S): 9 INJECTION INTRAMUSCULAR; INTRAVENOUS; SUBCUTANEOUS at 07:10

## 2021-07-08 RX ADMIN — PIPERACILLIN AND TAZOBACTAM 200 GRAM(S): 4; .5 INJECTION, POWDER, LYOPHILIZED, FOR SOLUTION INTRAVENOUS at 13:11

## 2021-07-08 RX ADMIN — PROPOFOL 5.83 MICROGRAM(S)/KG/MIN: 10 INJECTION, EMULSION INTRAVENOUS at 22:57

## 2021-07-08 RX ADMIN — CHLORHEXIDINE GLUCONATE 1 APPLICATION(S): 213 SOLUTION TOPICAL at 05:56

## 2021-07-08 RX ADMIN — PIPERACILLIN AND TAZOBACTAM 200 GRAM(S): 4; .5 INJECTION, POWDER, LYOPHILIZED, FOR SOLUTION INTRAVENOUS at 01:26

## 2021-07-08 RX ADMIN — POLYETHYLENE GLYCOL 3350 17 GRAM(S): 17 POWDER, FOR SOLUTION ORAL at 10:30

## 2021-07-08 RX ADMIN — Medication 1 APPLICATION(S): at 22:58

## 2021-07-08 RX ADMIN — PIPERACILLIN AND TAZOBACTAM 200 GRAM(S): 4; .5 INJECTION, POWDER, LYOPHILIZED, FOR SOLUTION INTRAVENOUS at 18:51

## 2021-07-08 RX ADMIN — HEPARIN SODIUM 19 UNIT(S)/HR: 5000 INJECTION INTRAVENOUS; SUBCUTANEOUS at 12:00

## 2021-07-08 RX ADMIN — SENNA PLUS 2 TABLET(S): 8.6 TABLET ORAL at 22:57

## 2021-07-08 RX ADMIN — PROPOFOL 5.83 MICROGRAM(S)/KG/MIN: 10 INJECTION, EMULSION INTRAVENOUS at 14:17

## 2021-07-08 RX ADMIN — AZITHROMYCIN 255 MILLIGRAM(S): 500 TABLET, FILM COATED ORAL at 18:51

## 2021-07-08 RX ADMIN — INSULIN GLARGINE 45 UNIT(S): 100 INJECTION, SOLUTION SUBCUTANEOUS at 11:10

## 2021-07-08 NOTE — PROGRESS NOTE ADULT - ASSESSMENT
70M with PMHx significant for renal transplant on tacrolimus, ) DM2, HTN, MGUS, pAF, BPH, HLD, CAD (s/p CABG 15 years ago), presents for 3-4 days of worsening cough nephrology consulted for LATRICIA on CKD     #Renal Transplant (2017)  patient formerly on cellcept, valagancyclovir and tacrolimus, now solely on tacrolimus 1mg q12h (dose confirmed w/Dr. Leyva)   -tacrolimus level noted to be 6.4 this AM- will touch base with heme-onc regarding goal tacro level as l  -hold tacrolimus;        #LATRICIA on CKD  likely etiology ischemic ATN 2/ 2 hemodynamic instability   Cr down to 1.6 this AM form 1.71   -strict i's and o's       #Hyponatremia  129 this AM up from 128 yesterday   -urine studies noted ; hypovolemic hyponatremia in setting of septic shock       #Anemia  Hgb 9.3  -no signs of active bleed  -iron panel noted      #Lymphadenopathy  patient has history of post-transplant lymphoproliferative disorder   found to have extensive lymphadenopathy seen on CT scan ? lymphoma/neoplastic disease   -heme- onc on board; patient to undergo lymph node FNA  70M with PMHx significant for renal transplant on tacrolimus, ) DM2, HTN, MGUS, pAF, BPH, HLD, CAD (s/p CABG 15 years ago), presents for 3-4 days of worsening cough nephrology consulted for LATRICIA on CKD     #Renal Transplant (2017)  patient formerly on cellcept, valagancyclovir and tacrolimus, now solely on tacrolimus 1mg q12h (dose confirmed w/Dr. Leyva)   -tacrolimus level noted to be 6.4 this AM  -will touch base with heme-onc regarding goal tacro level given concern for post-transplant lymphoproliferative disorder   -hold tacrolimus pending new level today        #LATRICIA on CKD  likely etiology ischemic ATN 2/ 2 hemodynamic instability   Cr down to 1.6 this AM form 1.71   -strict i's and o's       #Hyponatremia  129 this AM up from 128 yesterday   -urine studies noted ; hypovolemic hyponatremia in setting of septic shock       #Anemia  Hgb 9.3, ~at goal   -no signs of active bleed  -iron panel noted      #Lymphadenopathy  patient has history of post-transplant lymphoproliferative disorder  -found to have extensive lymphadenopathy seen on CT scan ? lymphoma/neoplastic disease   -heme-onc on board; patient to undergo lymph node FNA

## 2021-07-08 NOTE — PROGRESS NOTE ADULT - ASSESSMENT
ID ATTENDING ATTESTATION  KT c/b CLL/PTLD; [place]. [place] [facility] [place] persistent low level EBV viremia. Now with c/f PNA as well as PTLD; f/u BAL cx including Nocardia culture; f/u axillary LN bx; EBV and CMV PCR pending; urine S. pneumoniae Ag; continue empiric Zosyn and azithromycin; d/c linezolid.  DORITA Nicole MD 71 yo M with PMH of kidney transplant c/b CLL/PTLD with persistent EBV viremia in a low level (collateral obtained from Dr. Sanchez, [place] f[place] [facility] [place] DM2, HTN, MGUS, pAF, BPH, HLD, CAD (s/p CABG 15 years ago), now with PNA as well as PTLD. Now intubated on 7/6. CT chest showed large multi lobar pulmonary consolidations, R>L, as well as extensive cervical, thoracic and upper abdominal lymphadenopathy and splenomegaly. Urine legionella negative. Cryptoc negative. Fast/acid negative. Other microbiology labs are in progress. Today axillary LN bx intervention was performed. EBV and CMV PCR and urine S. pneumoniae Ag are pending.     Recommendations:  - F/u BAL cx including Nocardia cx.  - F/u axillary LN bx.  - Continue empiric Zosyn and azithromycin  - D/c linezolid.      ID ATTENDING ATTESTATION  KT c/b CLL/PTLD; collateral obtained from Dr. Sanchez [place] [facility] [place]known to have persistent low level EBV viremia. Now with c/f PNA as well as PTLD; f/u BAL cx including Nocardia culture; f/u axillary LN bx; EBV and CMV PCR pending; urine S. pneumoniae Ag; continue empiric Zosyn and azithromycin; d/c linezolid.  DORITA Nicole MD

## 2021-07-08 NOTE — PROGRESS NOTE ADULT - SUBJECTIVE AND OBJECTIVE BOX
LENGTH OF HOSPITAL STAY: 3d    SUBJECTIVE: No acute overnight events    HISTORY OF PRESENTING ILLNESS:   70M with PMHx significant for renal transplant (on tacrolimus, unk date), DM2, HTN, MGUS, pAF, BPH, HLD, CAD (s/p CABG 15 years ago), presents for 3-4 days of worsening cough. Patient states that he has a productive cough with brownish colored sputum. Patient denied fever or shortness of breath at home, but on day of admission did notice that his breathing felt different and that he was breathing quicker. Per ED it was reported that he completed a Z-pack without improvement. Past history notable for COVID+ March 2020, also received pfizer vaccine x2 a few months ago. Endorses SOB, is drinking less fluid, and may be urinating less. Denies sick contacts, HA, CP, rhinorrhea, sore throat, nausea, vomiting, diarrhea, abd pain, urinary complaints, black/bloody stool, focal weakness/numbness, vision changes, neck/back pain, LE pain/swelling.     Patient also reports a rash on both of his upper thighs that he developed after coming back from [place] 3 weeks ago. It does not itch, does not know if any creams or detergents were used. States that he has a dermatologist, but does not know what the diagnosis is.    PAST MEDICAL & SURGICAL HISTORY:  Barretts esophagus  CRI (chronic renal insufficiency)  DM (diabetes mellitus)  GERD (gastroesophageal reflux disease)  HTN (hypertension)  Monoclonal gammopathy  Mandibular abscess  Paroxysmal atrial fibrillation  Benign prostatic hyperplasia, presence of lower urinary tract symptoms unspecified, unspecified morphology  Other hyperlipidemia  S/P CABG (coronary artery bypass graft)  15 years  History of surgery  cyst removal  Mandibular abscess    ALLERGIES:  No Known Allergies    MEDICATIONS:  STANDING MEDICATIONS  azithromycin  IVPB      azithromycin  IVPB 500 milliGRAM(s) IV Intermittent every 24 hours  chlorhexidine 0.12% Liquid 15 milliLiter(s) Oral Mucosa every 12 hours  chlorhexidine 2% Cloths 1 Application(s) Topical <User Schedule>  dextrose 40% Gel 15 Gram(s) Oral once  dextrose 5%. 1000 milliLiter(s) IV Continuous <Continuous>  dextrose 5%. 1000 milliLiter(s) IV Continuous <Continuous>  dextrose 50% Injectable 25 Gram(s) IV Push once  dextrose 50% Injectable 12.5 Gram(s) IV Push once  dextrose 50% Injectable 25 Gram(s) IV Push once  glucagon  Injectable 1 milliGRAM(s) IntraMuscular once  heparin  Infusion 1900 Unit(s)/Hr IV Continuous <Continuous>  insulin glargine Injectable (LANTUS) 45 Unit(s) SubCutaneous every morning  insulin regular  human corrective regimen sliding scale   SubCutaneous every 6 hours  petrolatum Ophthalmic Ointment 1 Application(s) Both EYES two times a day  piperacillin/tazobactam IVPB.. 3.375 Gram(s) IV Intermittent every 6 hours  polyethylene glycol 3350 17 Gram(s) Oral every 24 hours  propofol Infusion 10 MICROgram(s)/kG/Min IV Continuous <Continuous>  senna 2 Tablet(s) Oral at bedtime    PRN MEDICATIONS  acetaminophen    Suspension .. 650 milliGRAM(s) Enteral Tube every 6 hours PRN    VITALS:   T(F): 99.9  HR: 60  BP: 134/71  RR: 18  SpO2: 99%    LABS:                        9.3    18.58 )-----------( 144      ( 08 Jul 2021 07:18 )             29.7     07-08    129<L>  |  96  |  32<H>  ----------------------------<  146<H>  4.2   |  20<L>  |  1.65<H>    Ca    8.4      08 Jul 2021 07:18  Phos  5.2     07-08  Mg     2.3     07-08    TPro  6.1  /  Alb  2.8<L>  /  TBili  0.6  /  DBili  x   /  AST  63<H>  /  ALT  38  /  AlkPhos  91  07-08    PT/INR - ( 07 Jul 2021 08:47 )   PT: 15.9 sec;   INR: 1.34        PTT - ( 08 Jul 2021 01:02 )  PTT:67.0 sec    ABG - ( 07 Jul 2021 06:22 )  pH, Arterial: 7.35  pH, Blood: x     /  pCO2: 39    /  pO2: 80    / HCO3: 22    / Base Excess: -3.8  /  SaO2: 96.7      Culture - Acid Fast - Bronchial w/Smear (collected 08 Jul 2021 03:28)  Source: .Bronchial Bronchial Wash    Culture - Fungal, Bronchial (collected 08 Jul 2021 03:28)  Source: .Bronchial Bronchial Wash  Preliminary Report (08 Jul 2021 11:51):    Testing in progress    Culture - Nocardia (collected 08 Jul 2021 03:28)  Source: .Bronchial Bronchial Wash  Preliminary Report (08 Jul 2021 10:41):    Testing in progress    Culture - Urine (collected 07 Jul 2021 14:45)  Source: .Urine Catheterized  Final Report (08 Jul 2021 08:54):    No growth    Culture - Bronchial (collected 07 Jul 2021 12:21)  Source: .Bronchial Bronchial Wash  Gram Stain (07 Jul 2021 17:47):    No epithelial cells seen    Rare WBC's    No organisms seen  Preliminary Report (08 Jul 2021 07:56):    Normal Respiratory Jessi present to date    Culture - Sputum (collected 06 Jul 2021 16:08)  Source: .Sputum Sputum  Gram Stain (06 Jul 2021 17:21):    No epithelial cells seen    Few WBC's    Rare Gram positive cocci in pairs  Final Report (08 Jul 2021 11:28):    Rare Normal Respiratory Jessi present    Culture - Blood (collected 05 Jul 2021 16:39)  Source: .Blood Blood-Peripheral  Preliminary Report (07 Jul 2021 17:01):    No growth at 2 days.    Culture - Blood (collected 05 Jul 2021 16:39)  Source: .Blood Blood-Peripheral  Preliminary Report (07 Jul 2021 17:01):    No growth at 2 days.    Culture - Urine (collected 05 Jul 2021 16:03)  Source: .Urine Clean Catch (Midstream)  Final Report (06 Jul 2021 15:39):    No growth    RADIOLOGY:  Reviewed    PHYSICAL EXAM:  GEN: No acute distress  HEENT: NCAT  LUNGS: Clear to auscultation bilaterally   HEART: S1/S2 present. RRR.   ABD: Soft, non-tender, distended. Bowel sounds present  EXT: No pitting edema  NEURO: AAOX0, intubated

## 2021-07-08 NOTE — PROCEDURE NOTE - NSPROCDETAILS_GEN_ALL_CORE
patient pre-oxygenated, tube inserted, placement confirmed
nasogastric/audible air bolus/placement confirmed by auscultation
nasogastric
US guidance/location identified, draped/prepped, sterile technique used, needle inserted/introduced/positive blood return obtained via catheter/connected to a pressurized flush line/sutured in place/hemostasis with direct pressure, dressing applied/Seldinger technique/all materials/supplies accounted for at end of procedure

## 2021-07-08 NOTE — PROCEDURE NOTE - NSINDICATIONS_GEN_A_CORE
feeds
respiratory distress
arterial puncture to obtain ABG's/blood sampling/cannulation purposes/critical patient/monitoring purposes
drainage

## 2021-07-08 NOTE — CONSULT NOTE ADULT - SUBJECTIVE AND OBJECTIVE BOX
69 yo M with a past medical history of renal transplant (on tacrolimus, unk date), DM2, HTN, MGUS, pAF, BPH, HLD, CAD (s/p CABG 15 years ago), presents for 3-4 days of worsening cough. Patient states that he has a productive cough with brownish colored sputum. Found to have acute hypoxic respiratory failure and sepsis 2/2 likely multifocal pneumonia. Now intubated on 7/6, sedated. CT chest showed large multi lobar pulmonary consolidations, R>L, as well as extensive cervical, thoracic and upper abdominal lymphadenopathy and splenomegaly. Urine legionella negative. Bronch this morning showed thick purulent secretion on R lung. Suspect bacterial PNA as well as lymphoma, or PTLD.  Per team, patient has h/o PTLD in 2018. General surgery recommended against excisional lymph node biopsy given finding of lymphadenopathy and splenomegaly in setting of immunosuppression and MGUS history. IR consulted for lymph node FNA.     Clinical History: SEPSIS MULTIFOCALPNEUMONIA    H/o or current diagnosis of HF- ACEI/ARB contraindication unknown    H/o or current diagnosis of HF- Contraindication to ACEI/ARBs    H/o or current diagnosis of HF- ACEI/ARB contraindication unknown    Handoff    MEWS Score    Barretts esophagus    CRI (chronic renal insufficiency)    DM (diabetes mellitus)    GERD (gastroesophageal reflux disease)    HTN (hypertension)    Monoclonal gammopathy    Mandibular abscess    Paroxysmal atrial fibrillation    Benign prostatic hyperplasia, presence of lower urinary tract symptoms unspecified, unspecified morphology    Other hyperlipidemia    Lobar pneumonia    Lobar pneumonia    Sepsis    Endotracheal intubation    Insertion, nasogastric tube    Insertion, arterial line, percutaneous    Ultrasound guidance, for vascular access    Bronchoscopy with bronchoalveolar lavage    No significant past surgical history    S/P CABG (coronary artery bypass graft)    History of surgery    Mandibular abscess    FEVER    90+    Multifocal pneumonia    Hyponatremia    Respiratory failure    [email or web address]        Meds:acetaminophen    Suspension .. 650 milliGRAM(s) Enteral Tube every 6 hours PRN  azithromycin  IVPB      azithromycin  IVPB 500 milliGRAM(s) IV Intermittent every 24 hours  chlorhexidine 0.12% Liquid 15 milliLiter(s) Oral Mucosa every 12 hours  chlorhexidine 2% Cloths 1 Application(s) Topical <User Schedule>  dextrose 40% Gel 15 Gram(s) Oral once  dextrose 5%. 1000 milliLiter(s) IV Continuous <Continuous>  dextrose 5%. 1000 milliLiter(s) IV Continuous <Continuous>  dextrose 50% Injectable 25 Gram(s) IV Push once  dextrose 50% Injectable 12.5 Gram(s) IV Push once  dextrose 50% Injectable 25 Gram(s) IV Push once  glucagon  Injectable 1 milliGRAM(s) IntraMuscular once  insulin regular Infusion 2 Unit(s)/Hr IV Continuous <Continuous>  linezolid  IVPB 600 milliGRAM(s) IV Intermittent every 12 hours  petrolatum Ophthalmic Ointment 1 Application(s) Both EYES two times a day  piperacillin/tazobactam IVPB.. 3.375 Gram(s) IV Intermittent every 6 hours  polyethylene glycol 3350 17 Gram(s) Oral every 24 hours  propofol Infusion 10 MICROgram(s)/kG/Min IV Continuous <Continuous>  senna 2 Tablet(s) Oral at bedtime      Allergies:No Known Allergies        Labs:                           9.3    18.58 )-----------( 144      ( 08 Jul 2021 07:18 )             29.7     PT/INR - ( 07 Jul 2021 08:47 )   PT: 15.9 sec;   INR: 1.34          PTT - ( 08 Jul 2021 01:02 )  PTT:67.0 sec  07-08    129<L>  |  96  |  32<H>  ----------------------------<  146<H>  4.2   |  20<L>  |  1.65<H>    Ca    8.4      08 Jul 2021 07:18  Phos  5.2     07-08  Mg     2.3     07-08    TPro  6.1  /  Alb  2.8<L>  /  TBili  0.6  /  DBili  x   /  AST  63<H>  /  ALT  38  /  AlkPhos  91  07-08          Imaging Findings: reviewed; cervical, thoracic and upper abdominal LAD

## 2021-07-08 NOTE — PROGRESS NOTE ADULT - SUBJECTIVE AND OBJECTIVE BOX
INCOMPLETE    Infectious Diseases Progress Note:    SUBJECTIVE: Patient seen and examined at bedside. Patient denies fever, chills, HA, nausea, vomiting, abdominal pain, dysuria, diarrhea.    SEPSIS MULTIFOCALPNEUMONIA          Allergies    No Known Allergies    Intolerances        ANTIBIOTICS/RELEVANT:  antimicrobials  azithromycin  IVPB      azithromycin  IVPB 500 milliGRAM(s) IV Intermittent every 24 hours  linezolid  IVPB 600 milliGRAM(s) IV Intermittent every 12 hours  piperacillin/tazobactam IVPB.. 3.375 Gram(s) IV Intermittent every 6 hours    immunologic:      OTHER:  acetaminophen    Suspension .. 650 milliGRAM(s) Enteral Tube every 6 hours PRN  chlorhexidine 0.12% Liquid 15 milliLiter(s) Oral Mucosa every 12 hours  chlorhexidine 2% Cloths 1 Application(s) Topical <User Schedule>  dextrose 40% Gel 15 Gram(s) Oral once  dextrose 5%. 1000 milliLiter(s) IV Continuous <Continuous>  dextrose 5%. 1000 milliLiter(s) IV Continuous <Continuous>  dextrose 50% Injectable 25 Gram(s) IV Push once  dextrose 50% Injectable 12.5 Gram(s) IV Push once  dextrose 50% Injectable 25 Gram(s) IV Push once  glucagon  Injectable 1 milliGRAM(s) IntraMuscular once  insulin regular Infusion 2 Unit(s)/Hr IV Continuous <Continuous>  petrolatum Ophthalmic Ointment 1 Application(s) Both EYES two times a day  polyethylene glycol 3350 17 Gram(s) Oral every 24 hours  propofol Infusion 10 MICROgram(s)/kG/Min IV Continuous <Continuous>  senna 2 Tablet(s) Oral at bedtime      Objective:  Vital Signs Last 24 Hrs  T(C): 37.9 (08 Jul 2021 06:02), Max: 38.3 (07 Jul 2021 18:28)  T(F): 100.2 (08 Jul 2021 06:02), Max: 100.9 (07 Jul 2021 18:28)  HR: 66 (08 Jul 2021 05:00) (61 - 72)  BP: 121/73 (08 Jul 2021 05:00) (98/55 - 128/60)  BP(mean): 93 (08 Jul 2021 05:00) (71 - 93)  RR: 15 (08 Jul 2021 05:00) (14 - 21)  SpO2: 98% (08 Jul 2021 05:00) (89% - 100%)    PHYSICAL EXAM:  Constitutional: Well-developed, well nourished  Eyes: PERRLA, EOMI  Ear/Nose/Throat: no oral lesion, no sinus tenderness on percussion	  Neck:no JVD, no lymphadenopathy, supple  Respiratory: CTA bilateral  Cardiovascular: S1S2, RRR, no murmurs  Gastrointestinal: soft, NTND, (+) BS, no HSM  Extremities: no c/e/e  Skin: no rashes    LABS:                        9.3    18.58 )-----------( 144      ( 08 Jul 2021 07:18 )             29.7     07-08    129<L>  |  96  |  32<H>  ----------------------------<  146<H>  4.2   |  20<L>  |  1.65<H>    Ca    8.4      08 Jul 2021 07:18  Phos  5.2     07-08  Mg     2.3     07-08    TPro  6.1  /  Alb  2.8<L>  /  TBili  0.6  /  DBili  x   /  AST  63<H>  /  ALT  38  /  AlkPhos  91  07-08    PT/INR - ( 07 Jul 2021 08:47 )   PT: 15.9 sec;   INR: 1.34          PTT - ( 08 Jul 2021 01:02 )  PTT:67.0 sec      MICROBIOLOGY:    Culture - Bronchial (collected 07-07-21 @ 12:21)  Source: .Bronchial Bronchial Wash  Gram Stain (07-07-21 @ 17:47):    No epithelial cells seen    Rare WBC's    No organisms seen              RADIOLOGY & ADDITIONAL STUDIES: Infectious Diseases Progress Note:    SUBJECTIVE: Patient seen and examined at bedside. Patient intubated and sedated. I inform his wife about his infectious status and his pending test.     OBJECTIVE: No significant changes with the previous examination.     SEPSIS MULTIFOCALPNEUMONIA    Allergies  No Known Allergies    ANTIBIOTICS/RELEVANT:  antimicrobials  azithromycin  IVPB      azithromycin  IVPB 500 milliGRAM(s) IV Intermittent every 24 hours  linezolid  IVPB 600 milliGRAM(s) IV Intermittent every 12 hours  piperacillin/tazobactam IVPB.. 3.375 Gram(s) IV Intermittent every 6 hours    OTHER:  acetaminophen    Suspension .. 650 milliGRAM(s) Enteral Tube every 6 hours PRN  chlorhexidine 0.12% Liquid 15 milliLiter(s) Oral Mucosa every 12 hours  chlorhexidine 2% Cloths 1 Application(s) Topical <User Schedule>  dextrose 40% Gel 15 Gram(s) Oral once  dextrose 5%. 1000 milliLiter(s) IV Continuous <Continuous>  dextrose 5%. 1000 milliLiter(s) IV Continuous <Continuous>  dextrose 50% Injectable 25 Gram(s) IV Push once  dextrose 50% Injectable 12.5 Gram(s) IV Push once  dextrose 50% Injectable 25 Gram(s) IV Push once  glucagon  Injectable 1 milliGRAM(s) IntraMuscular once  insulin regular Infusion 2 Unit(s)/Hr IV Continuous <Continuous>  petrolatum Ophthalmic Ointment 1 Application(s) Both EYES two times a day  polyethylene glycol 3350 17 Gram(s) Oral every 24 hours  propofol Infusion 10 MICROgram(s)/kG/Min IV Continuous <Continuous>  senna 2 Tablet(s) Oral at bedtime    Objective:  Vital Signs Last 24 Hrs  T(C): 37.9 (08 Jul 2021 06:02), Max: 38.3 (07 Jul 2021 18:28)  T(F): 100.2 (08 Jul 2021 06:02), Max: 100.9 (07 Jul 2021 18:28)  HR: 66 (08 Jul 2021 05:00) (61 - 72)  BP: 121/73 (08 Jul 2021 05:00) (98/55 - 128/60)  BP(mean): 93 (08 Jul 2021 05:00) (71 - 93)  RR: 15 (08 Jul 2021 05:00) (14 - 21)  SpO2: 98% (08 Jul 2021 05:00) (89% - 100%)    PHYSICAL EXAM:  Gen: Elderly male, intubated, sedated  Chest: Decreased breath sound on R side. Normal breath sounds on L side. No wheezes. No rhonchi. No crackles.  Heart: RRR, no rmg  Skin: Macular rash on lower extremities up to thighs. No other rashes.   Lower ext: Trace pitting edema. WWP. Good distal pulses  Abd: obese, soft, nontender  +Lucas in  +A-line    LABS:             9.3    18.58 )-----------( 144      ( 08 Jul 2021 07:18 )             29.7     07-08    129<L>  |  96  |  32<H>  ----------------------------<  146<H>  4.2   |  20<L>  |  1.65<H>    Ca    8.4      08 Jul 2021 07:18  Phos  5.2     07-08  Mg     2.3     07-08    TPro  6.1  /  Alb  2.8<L>  /  TBili  0.6  /  DBili  x   /  AST  63<H>  /  ALT  38  /  AlkPhos  91  07-08    PT/INR - ( 07 Jul 2021 08:47 )   PT: 15.9 sec;   INR: 1.34       PTT - ( 08 Jul 2021 01:02 )  PTT:67.0 sec    MICROBIOLOGY:  Culture - Bronchial (collected 07-07-21 @ 12:21) Source: .Bronchial Bronchial Wash. Gram Stain (07-07-21 @ 17:47): No epithelial cells seen. Rare WBC's. No organisms seen.  Culture - Urine (07.07.21 @ 14:45) Specimen Source: .Urine Catheterized. Culture Results: No growth.  Culture - Nocardia (07.08.21 @ 03:28) Specimen Source: .Bronchial Bronchial Wash. Culture Results: Testing in progress.  Culture - Fungal, Bronchial (07.08.21 @ 03:28) Specimen Source: .Bronchial Bronchial Wash. Culture Results: Testing in progress.  Culture - Acid Fast - Bronchial w/Smear (07.08.21 @ 03:28) Specimen Source: .Bronchial Bronchial Wash. Acid Fast Bacilli. Smear: No acid fast bacilli seen by fluorochrome stain.    RADIOLOGY & ADDITIONAL STUDIES:   Xray Chest 1 View-PORTABLE IMMEDIATE (Xray Chest 1 View-PORTABLE IMMEDIATE .) (07.08.21 @ 10:37) Support Devices:  Unchanged. Heart: Cardiomegaly. Mediastinum:  Unremarkable. Lungs/Airways: Pulmonary edema and small right pleural effusion stable. Bones/Soft tissues: Unremarkable.   Infectious Diseases Progress Note:    SUBJECTIVE: Patient seen and examined at bedside. Patient intubated and sedated. I inform his wife about his infectious status and his pending test.     OBJECTIVE: No significant changes with the previous examination.     SEPSIS MULTIFOCALPNEUMONIA    Allergies  No Known Allergies    ANTIBIOTICS/RELEVANT:  antimicrobials  azithromycin  IVPB      azithromycin  IVPB 500 milliGRAM(s) IV Intermittent every 24 hours  linezolid  IVPB 600 milliGRAM(s) IV Intermittent every 12 hours  piperacillin/tazobactam IVPB.. 3.375 Gram(s) IV Intermittent every 6 hours    OTHER:  acetaminophen    Suspension .. 650 milliGRAM(s) Enteral Tube every 6 hours PRN  chlorhexidine 0.12% Liquid 15 milliLiter(s) Oral Mucosa every 12 hours  chlorhexidine 2% Cloths 1 Application(s) Topical <User Schedule>  dextrose 40% Gel 15 Gram(s) Oral once  dextrose 5%. 1000 milliLiter(s) IV Continuous <Continuous>  dextrose 5%. 1000 milliLiter(s) IV Continuous <Continuous>  dextrose 50% Injectable 25 Gram(s) IV Push once  dextrose 50% Injectable 12.5 Gram(s) IV Push once  dextrose 50% Injectable 25 Gram(s) IV Push once  glucagon  Injectable 1 milliGRAM(s) IntraMuscular once  insulin regular Infusion 2 Unit(s)/Hr IV Continuous <Continuous>  petrolatum Ophthalmic Ointment 1 Application(s) Both EYES two times a day  polyethylene glycol 3350 17 Gram(s) Oral every 24 hours  propofol Infusion 10 MICROgram(s)/kG/Min IV Continuous <Continuous>  senna 2 Tablet(s) Oral at bedtime    Objective:  Vital Signs Last 24 Hrs  T(C): 37.9 (08 Jul 2021 06:02), Max: 38.3 (07 Jul 2021 18:28)  T(F): 100.2 (08 Jul 2021 06:02), Max: 100.9 (07 Jul 2021 18:28)  HR: 66 (08 Jul 2021 05:00) (61 - 72)  BP: 121/73 (08 Jul 2021 05:00) (98/55 - 128/60)  BP(mean): 93 (08 Jul 2021 05:00) (71 - 93)  RR: 15 (08 Jul 2021 05:00) (14 - 21)  SpO2: 98% (08 Jul 2021 05:00) (89% - 100%)    PHYSICAL EXAM:  Gen: Elderly male, intubated, sedated  Chest: Decreased breath sound on R side. Normal breath sounds on L side. No wheezes. No rhonchi. No crackles.  Heart: RRR, no rmg  Skin: Macular rash on lower extremities up to thighs. No other rashes.   Lower ext: Trace pitting edema. WWP. Good distal pulses  Abd: obese, soft, nontender  +Lucas in  +A-line    LABS:             9.3    18.58 )-----------( 144      ( 08 Jul 2021 07:18 )             29.7     07-08    129<L>  |  96  |  32<H>  ----------------------------<  146<H>  4.2   |  20<L>  |  1.65<H>    Ca    8.4      08 Jul 2021 07:18  Phos  5.2     07-08  Mg     2.3     07-08    TPro  6.1  /  Alb  2.8<L>  /  TBili  0.6  /  DBili  x   /  AST  63<H>  /  ALT  38  /  AlkPhos  91  07-08    PT/INR - ( 07 Jul 2021 08:47 )   PT: 15.9 sec;   INR: 1.34       PTT - ( 08 Jul 2021 01:02 )  PTT:67.0 sec    MICROBIOLOGY:  Culture - Bronchial (collected 07-07-21 @ 12:21) Source: .Bronchial Bronchial Wash. Gram Stain (07-07-21 @ 17:47): No epithelial cells seen. Rare WBC's. No organisms seen.  Culture - Urine (07.07.21 @ 14:45) Specimen Source: .Urine Catheterized. Culture Results: No growth.  Culture - Nocardia (07.08.21 @ 03:28) Specimen Source: .Bronchial Bronchial Wash. Culture Results: Testing in progress.  Culture - Fungal, Bronchial (07.08.21 @ 03:28) Specimen Source: .Bronchial Bronchial Wash. Culture Results: Testing in progress.  Culture - Acid Fast - Bronchial w/Smear (07.08.21 @ 03:28) Specimen Source: .Bronchial Bronchial Wash. Acid Fast Bacilli. Smear: No acid fast bacilli seen by fluorochrome stain.    RADIOLOGY & ADDITIONAL STUDIES:   Xray Chest 1 View-PORTABLE IMMEDIATE (Xray Chest 1 View-PORTABLE IMMEDIATE .) (07.08.21 @ 10:37) Support Devices:  Unchanged. Heart: Cardiomegaly. Mediastinum:  Unremarkable. Lungs/Airways: Pulmonary edema and small right pleural effusion stable. Bones/Soft tissues: Unremarkable.    INTERVENTIONAL PROCEDURE:  IR Procedure. (07.08.21 @ 09:38) Left supraclavicular lymph node fine-needle aspiration and core needle biopsy.

## 2021-07-08 NOTE — CONSULT NOTE ADULT - ASSESSMENT
Assessment: 71 yo M with a past medical history of renal transplant (on tacrolimus, unk date), DM2, HTN, MGUS, pAF, BPH, HLD, CAD (s/p CABG 15 years ago), presents for 3-4 days of worsening cough. Patient states that he has a productive cough with brownish colored sputum. Found to have acute hypoxic respiratory failure and sepsis 2/2 likely multifocal pneumonia. Now intubated on 7/6, sedated. Ct chest showing LAD. IR consulted for lymph node FNA given finding of lymphadenopathy and splenomegaly in setting of immunosuppression and MGUS history. Case reviewed with Dr. Gomez, plan for procedure with local lidocaine.     Recommendations: turn off heparin gtt 1 hour prior to procedure.    Communicated with: primary team, Dr. Farrell

## 2021-07-08 NOTE — PROGRESS NOTE ADULT - ASSESSMENT
69 yo M with PMHx of renal transplant (on tacrolimus), DM2, essential hypertension, MGUS, hx of lymphoma (follows Dr. Jojo Huffman), pAFib, BPH, HLD, CAD s/p CABG (15 years ago), presents for 3-4 days of worsening cough, found to be in AHRF and sepsis likely 2/2 multifocal pneumonia, incidentally found to have extensive, thoracic and upper abdominal lymphadenopathy. Hematology consulted for lymphadenopathy in the context of lymphoma history.    #) DIAGNOSIS  - Bicytopenia: Normocytic anemia, likely anemia of chronic disease/borderline or mixed mild iron deficiency anemia, and mild thrombocytopenia likely multifactorial such as immunosuppressive medications, anemia of renal disease, and sepsis   - Mild leukocytosis with lymphocytic predominance (ALC 12K) and left-shift   - Extensive bilateral axillary/supraclavicular/cervical/mediastinal/abdominal lymphadenopathy  - Splenomegaly  - History of lymphoma?  - History of MGUS   - Mild INR prolongation likely 2/2 Eliquis  - AFib on Eliquis    #) PLAN  - Per family at bedside/phone, patient has a known history of lymphadenopathy for which he is following with Dr. Lydia Huffman at [facility], last appointment sometime earlier this year. He has underwent biopsies of the lymph nodes that showed it was primarily related to infection at the time. The lymph nodes were under routine surveillance. The family is unclear about the diagnosis of lymphoma, but says that it may have been related to one of the immunosuppressive medications he was taking s/p renal transplant. Per family, he did not have any constitutional symptoms including night sweats, persistent fever, or weight loss over the past 6 months.   - Obtain collateral from [facility] (Dr. Lydia Huffman, [phone]). Should have medical records release the following: the most recent CT Chest/Abdomen/Pelvis, PET scan, LDH, uric acid, peripheral flow cytometry, CBC, CMP, SPEP, serum immunofixation, quantitative immunoglobulin, free light chains, UPEP, urine immunofixation, bone marrow biopsy, most recent outpatient progress note with Dr. Huffman.   - Given bronchoscopy (07/07) showing clinical signs of bacterial pneumonia, it is possible that extensive lymphadenopathy may be related to infection. Will need to compare radiology images/reports.    - Peripheral blood smears shows numerous lymphocytes with clumped chromatin and a narrow rim of cytoplasm, concerning for possible CLL.   - In the meantime, follow-up peripheral flow cytometry to evaluate for lymphocytosis (rule-out CLL). LDH minimally elevated. Uric acid WNL.   - Send CT A/P with IV Contrast for evaluation of abdominopelvic lymphadenopathy and splenomegaly. If contrast cannot be used due to declining renal function, can also perform CT A/P non-contrast. Send for whole-body PET-CT to complete staging  - Will hold off Surgery consult for excisional lymph node biopsy until lymph nodes are properly evaluated.     - Send serum immunofixation, SPEP, quantitative immunoglobulin and free light chains.   - Maintain active T+S, transfuse PRBC if Hb < 7 or if actively bleeding/symptomatic or if platelets < 10K or < 20K if febrile or < 50 K if actively bleeding     Discussed with Dr. Carmen  71 yo M with PMHx of renal transplant (on tacrolimus), DM2, essential hypertension, MGUS, hx of lymphoma (follows Dr. Jojo Huffman), pAFib, BPH, HLD, CAD s/p CABG (15 years ago), presents for 3-4 days of worsening cough, found to be in AHRF and sepsis likely 2/2 multifocal pneumonia, incidentally found to have extensive, thoracic and upper abdominal lymphadenopathy. Hematology consulted for lymphadenopathy in the context of lymphoma history.    #) DIAGNOSIS  - Bicytopenia: Normocytic anemia, likely anemia of chronic disease/borderline or mixed mild iron deficiency anemia, and mild thrombocytopenia likely multifactorial such as immunosuppressive medications, anemia of renal disease, and sepsis   - Mild leukocytosis with lymphocytic predominance (ALC 12K) and left-shift   - Extensive bilateral axillary/supraclavicular/cervical/mediastinal/abdominal lymphadenopathy  - Splenomegaly  - Known history of CLL Stage 0   - History of adult T-cell lymphoma  - History of MGUS   - Mild INR prolongation likely 2/2 Eliquis  - AFib on Eliquis    #) PLAN  - Records from [facility] showed a history of adult T-cell lymphoma, MGUS, CLL Stage 0 and known splenomegaly. The lymph nodes were monitored by Dr. Lydia Huffman, and no treatment was offered. Per family, he did not have any constitutional symptoms including night sweats, persistent fever, or weight loss over the past 6 months.   - Given bronchoscopy (07/07) showing clinical signs of bacterial pneumonia, it is possible that extensive lymphadenopathy may be related to infection. Will need to compare radiology images/reports.    - Peripheral blood smears shows numerous lymphocytes with clumped chromatin and a narrow rim of cytoplasm, likely related to known history of CLL.   - Follow-up peripheral flow cytometry (rule-out CLL). LDH minimally elevated. Uric acid WNL.   - Send CT A/P with IV Contrast for evaluation of abdominopelvic lymphadenopathy and splenomegaly. If contrast cannot be used due to declining renal function, can also perform CT A/P non-contrast. Send for whole-body PET-CT once infection is cleared   - Hold off on lymph node biopsy for now until medical condition stabilizes/cleared infection   - Send serum immunofixation, SPEP, quantitative immunoglobulin and free light chains.   - Maintain active T+S, transfuse PRBC if Hb < 7 or if actively bleeding/symptomatic or if platelets < 10K or < 20K if febrile or < 50 K if actively bleeding     To discuss with Dr. Carmen  71 yo M with PMHx of renal transplant (on tacrolimus), DM2, essential hypertension, MGUS, hx of lymphoma (follows Dr. Jojo Huffman), pAFib, BPH, HLD, CAD s/p CABG (15 years ago), presents for 3-4 days of worsening cough, found to be in AHRF and sepsis likely 2/2 multifocal pneumonia, incidentally found to have extensive, thoracic and upper abdominal lymphadenopathy. Hematology consulted for lymphadenopathy in the context of lymphoma history.    #) DIAGNOSIS  - Bicytopenia: Normocytic anemia, likely anemia of chronic disease/borderline or mixed mild iron deficiency anemia, and mild thrombocytopenia likely multifactorial such as immunosuppressive medications, anemia of renal disease, and sepsis   - Mild leukocytosis with lymphocytic predominance (ALC 12K) and left-shift   - Extensive bilateral axillary/supraclavicular/cervical/mediastinal/abdominal lymphadenopathy  - Splenomegaly (known to be 15.8 cm in 12/2019)  - Known history of CLL Stage 0   - History of adult T-cell lymphoma  - History of MGUS (12/2016)  - Mild INR prolongation likely 2/2 Eliquis  - AFib on Eliquis  - History of renal transplant (06/20/2017 with living donor)  - History of Barretts esophagus without dysplasia (12/2016)  - Hstory of pancreatic cyst (12/2016)    #) PLAN  - Records from [facility] showed a history of adult T-cell lymphoma, MGUS, CLL Stage 0 and known splenomegaly. The lymph nodes were monitored by Dr. Lydia Huffman, and no treatment was offered. Per family, he did not have any constitutional symptoms including night sweats, persistent fever, or weight loss over the past 6 months. Last CBC in 4/2021: WBC 13.6, Hb 11.3, Platelet 141, M-spike 0.39, K/L 0.65.   - Given bronchoscopy (07/07) showing clinical signs of bacterial pneumonia, it is possible that extensive lymphadenopathy may be related to infection. Will need to compare radiology images/reports.    - Peripheral blood smears shows numerous lymphocytes with clumped chromatin and a narrow rim of cytoplasm, likely related to known history of CLL.   - Follow-up peripheral flow cytometry (rule-out CLL). LDH minimally elevated. Uric acid WNL.   - Send CT A/P with IV Contrast for evaluation of abdominopelvic lymphadenopathy and splenomegaly. If contrast cannot be used due to declining renal function, can also perform CT A/P non-contrast. Send for whole-body PET-CT once infection is cleared   - Hold off on lymph node biopsy for now until medical condition stabilizes/cleared infection   - Send serum immunofixation, SPEP, slightly lower IgM, K/L 0.40  - Maintain active T+S, transfuse PRBC if Hb < 7 or if actively bleeding/symptomatic or if platelets < 10K or < 20K if febrile or < 50 K if actively bleeding     To discuss with Dr. Carmen  69 yo M with PMHx of renal transplant (on tacrolimus), DM2, essential hypertension, MGUS, hx of lymphoma (follows Dr. Jojo Huffman), pAFib, BPH, HLD, CAD s/p CABG (15 years ago), presents for 3-4 days of worsening cough, found to be in AHRF and sepsis likely 2/2 multifocal pneumonia, incidentally found to have extensive, thoracic and upper abdominal lymphadenopathy. Hematology consulted for lymphadenopathy in the context of lymphoma history.    #) DIAGNOSIS  - Bicytopenia: Normocytic anemia, likely anemia of chronic disease/borderline or mixed mild iron deficiency anemia, and mild thrombocytopenia likely multifactorial such as immunosuppressive medications, anemia of renal disease, and sepsis   - Mild leukocytosis with lymphocytic predominance (ALC 12K) and left-shift   - Extensive bilateral axillary/supraclavicular/cervical/mediastinal/abdominal lymphadenopathy  - Splenomegaly (known to be 15.8 cm in 12/2019)  - Known history of CLL Stage 0   - History of adult T-cell lymphoma  - History of MGUS (12/2016)  - Mild INR prolongation likely 2/2 Eliquis  - AFib on Eliquis  - History of renal transplant (06/20/2017 with living donor)  - History of Barretts esophagus without dysplasia (12/2016)  - Hstory of pancreatic cyst (12/2016)    #) PLAN  - Spoke with Dr. Lydia Huffman, patient's oncologist yesterday over the phone. Records from [facility] showed a history of adult T-cell lymphoma, MGUS, CLL Stage 0 and known splenomegaly. The lymph nodes were monitored by Dr. Huffman, and no treatment was offered due to low stage/no evidence of disease progression at the time. Per family, he did not have any constitutional symptoms including night sweats, persistent fever, or weight loss over the past 6 months. Last CBC in 4/2021: WBC 13.6, Hb 11.3, Platelet 141, M-spike 0.39, K/L 0.65.   - Given bronchoscopy (07/07) showing clinical signs of bacterial pneumonia, it is possible that extensive lymphadenopathy may be related to infection. Comparison of radiology reports of the CT Chest to a PET scan obtained in 12/2019 showed roughly stable mediastinal/axillary lymph nodes. Cervical/supraclavicular lymph nodes could not be directly compared as they were poorly visualized in CT Chest imaging.   - Peripheral blood smears shows numerous lymphocytes with clumped chromatin and a narrow rim of cytoplasm, likely related to known history of CLL.   - Follow-up peripheral flow cytometry, although prior records indicates that patient already has confirmed diagnosis of CLL/SLL. LDH minimally elevated. Uric acid WNL.   - Send CT A/P with IV Contrast for evaluation of abdominopelvic lymphadenopathy and splenomegaly, to see if there is a disease progression of CLL. If contrast cannot be used due to declining renal function, can also perform CT A/P non-contrast.   - Can hold off on whole-body PET-CT to be done as outpatient when infection is cleared. Hold off on lymph node biopsy for now until medical condition stabilizes/cleared infection   - Follow-up serum immunofixation, SPEP. K/L 0.40. Quantitative IgM slightly low.   - Maintain active T+S, transfuse PRBC if Hb < 7 or if actively bleeding/symptomatic or if platelets < 10K or < 20K if febrile or < 50 K if actively bleeding  - Can follow-up with Dr. Lydia Huffman at [facility] upon discharge in 1-2 weeks (Office: [phone]).    Discussed with Dr. Carmen

## 2021-07-08 NOTE — PROGRESS NOTE ADULT - ASSESSMENT
71 YO M with PMH of renal transplant (6/2017, [place], on Tacrolimus), DM2 (on insulin), HTN, MGUS, AF (on Eliquis), CAD (CABG ~15 years ago), ?Post transplant lymphoma presents with generalized weakness, admitted for acute hypoxic respiratory failure and found to have bilateral multifocal pneumonia    # Acute hypoxic respiratory failure 2/2 below  # R Lung lobar pneumonia in context of immunosuppression with Tacrolimus    Initial: Hypoxia (90% on 6L). Fever (TMax 103.4). Tachypnea to 35.  Small noncomplicated effusion on R side on P[place]. Large lobar consolidation on Xray.  RVP negative.  Surprisingly initially with lymphocytosis and neutropenia with some doubts whether it is regular bacterial pneumonia, however, now he seems to be improving on antibiotics - WBC is trending down and he is afebrile. Neutropenia also resolved. He is s/p Bronchoscopy with BAL - it is neutrophilic predominant. During bronchoscopy significant purulent secretions from RUL and RML noted. CT scan also with large lobar consolidation. Overall, it all supports bacterial pneumonia. No biopsy performed.  Low grade fever yesterday likely postprocedural.    # C/F Lymphoma. He is found to have abdominopelvic lymphadenopathy and splenomegaly. 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 He is s/p FNA biopsy of LN, hem/onc is following.       Suggest:  - continue with current Antibiotics  - will follow bronchial cultures (fungal, bacterial, AFB)  - will continue to follow

## 2021-07-08 NOTE — PROGRESS NOTE ADULT - ASSESSMENT
69 yo M with a past medical history of renal transplant (on tacrolimus, unk date), complicated by low grade lymphoma post transplant, DM2, HTN, MGUS, pAF, BPH, HLD, CAD (s/p CABG 15 years ago), presents with fever, chills and worsening cough for 5 days. Patient states that he has a productive cough with brownish colored sputum. Found to have acute hypoxic respiratory failure and sepsis 2/2 likely multifocal pneumonia.    Neuro    #pt intubated and sedated  - propofol 50mcg/kg/hr RASS -5  - continue current management      CV  # HTN  - on home amlodipine 10mg, furosemide 20mg  - restart as needed    # HLD  - on home rosuvastatin 10mg  # CAD (s/p CABG 15 yrs ago)  - on home isosorbide mononitrate 30mg    # pAF  - d/c'd home eliquis 5mg BID. Will restart when appropriate    Pulm  # Acute hypoxic respiratory failure  Likely 2/2 multifocal pneumonia. Increasing work of breathing in ED requiring intubation on 7/6. Chest CT (7/7) showing Large airspace consolidation right upper lobe. Complete consolidation of the right lower lobe. Small subsegmental atelectasis/consolidation left upper lobe.  - BAL culture in progress  - Strict I's and O's  - Daily CXR- Improvement today in infiltrate on R lung field      GI  -no active issues      ID  # Sepsis  Patient with 4/4 SIRS criteria (febrile, tachycardic, tachypneic, and WBC) likely 2/2 multifocal pneumonia. Patient with recent Z-pack use, but no known past hospitalizations or other antibiotics in past 3 months. Labs in ED notable for lactate 1.1 and VBG 7.33, CO2 38, HCO3 20. UA with specific gravity 1.030. s/p 3L NS, Vancomycin, and Zosyn.    -continue Azithromycin 500mg to cover atypicals  - Linezolid 600mg q12h to cover MRSA   - Zosyn 3.375g (renal dosing) q6h to cover Pseudomonas pneumonia  - f/u urine strep  - Urine legionella negative  - Staph PCR positive  -MRSA PCR negative  -RVP negative  -COVID negative  -BCX no growth to date (7/5) HERE      Renal  # Renal Transplant  - History of renal transplant on unknown date.  Patient reports creatinine baseline 1.0.  - Nephrology (Dr. Leyva and Dr. Preciado) on board  - d/c lasix because of elevated creatinine  - f/u Tacrolimus level  - Strict I's and O's with q6h bladder scans    #Acute kidney injury  -BUN of 32 and Cr of 1.7 today  - will order urine electrolytes (FeNa, FeUrea) to identify source of LATRICIA        # BPH  - on tamsulosin 0.4mg at home. Will restart when appropriate    Heme  # Iron deficiency anemia  - serum iron of 26  - will do FOBT to evaluate potential source of blood loss    #Hx of lymphoma post transplant  - consulted surgery, recommend OP lymph node Bx  - IR will do excisional lymph node Bx in AM tomorrow  - consulted heme/onc, recommend PET scan and CT with contrast    Endo  # DM2  - Pt on home lantus 45 at night and lispro 45 TID  - serum glucose of 173 this AM  -continue insulin 2 units ggt/hr    DERM  # lluvia reticular non-blanching rash from upper thighs to knees b/l  - consult Dermatology    F: none  E: replete PRN  N: NPO while on BiPAP  G: none  D:     Dispo: MICU    CODE: Full         69 yo M with a past medical history of renal transplant (on tacrolimus, unk date), complicated by low grade lymphoma post transplant, DM2, HTN, MGUS, pAF, BPH, HLD, CAD (s/p CABG 15 years ago), presents with fever, chills and worsening cough for 5 days. Patient states that he has a productive cough with brownish colored sputum. Found to have acute hypoxic respiratory failure and sepsis 2/2 likely multifocal pneumonia.    Neuro    #pt intubated and sedated  - propofol 50mcg/kg/hr RASS -5  - continue current management      CV  - Ordered TTE to r/o cardiac causes of pulmonary disease    # HTN  - on home amlodipine 10mg, furosemide 20mg  - restart as needed    # HLD  - on home rosuvastatin 10mg  # CAD (s/p CABG 15 yrs ago)  - on home isosorbide mononitrate 30mg    # pAF  - d/c'd home eliquis 5mg BID. Will restart when appropriate    Pulm  # Acute hypoxic respiratory failure  Likely 2/2 multifocal pneumonia. Increasing work of breathing in ED requiring intubation on 7/6. Chest CT (7/7) showing Large airspace consolidation right upper lobe. Complete consolidation of the right lower lobe. Small subsegmental atelectasis/consolidation left upper lobe.  - BAL culture in progress  - Strict I's and O's  - Daily CXR- Improvement today in infiltrate on R lung field      GI  -no active issues      ID  # Sepsis  Patient with 4/4 SIRS criteria (febrile, tachycardic, tachypneic, and WBC) likely 2/2 multifocal pneumonia. Patient with recent Z-pack use, but no known past hospitalizations or other antibiotics in past 3 months. Labs in ED notable for lactate 1.1 and VBG 7.33, CO2 38, HCO3 20. UA with specific gravity 1.030. s/p 3L NS, Vancomycin, and Zosyn.    Thick secretions today, will require saline lavage with suctioning, not ready for extubation as of today.    -F/u BAL cultures  -continue Azithromycin 500mg to cover atypicals  - Linezolid 600mg q12h to cover MRSA   - Zosyn 3.375g (renal dosing) q6h to cover Pseudomonas pneumonia  - f/u urine strep  - Urine legionella negative  - Staph PCR positive  -MRSA PCR negative  -RVP negative  -COVID negative  -BCX: no growth      Renal  # Renal Transplant  - History of renal transplant on (6/2017, [facility], on tacolimus) .creatinine baseline 1.0.  - Nephrology (Dr. Leyva and Dr. Preciado) on board  - f/u Tacrolimus level  - Strict I's and O's with q6h bladder scans    #Acute kidney injury  -Ulytes show prerenal picture. Given 500mL bolus NS overnight  -Cr improving this AM        # BPH  - on tamsulosin 0.4mg at home. Will restart when appropriate    Heme  # Anemia of chronic disease/ Mixed mild Iron deficiency anemia  - f/u FOBT to evaluate potential source of blood loss    #Hx of lymphoma post transplant  - consulted surgery, recommend OP lymph node Bx  - Completed excisional Lymph node Bx of Left suprclavicular nodes this AM  - Can restart Heparin drip per IR  - Heme/onc reccs PET scan and CT with contrast. Will not get CT W contrast currently d/t poor renal function    Endo  # DM2  - Pt on home lantus 45 at night and lispro 45 TID  - this AM. Last fingertick 127  -d/c insulin 2 units ggt/hr  - will begin 45U [place] tomorrow AM.   - regular fingersticks with sliding scale coverage to determine amount of lispro needed  - Begin feeds today with glucerna 1.5 [place]    DERM  # lluvia reticular non-blanching rash from upper thighs to knees b/l  - consult Dermatology  - no change on exam    F: none  E: replete PRN  N: NPO while on BiPAP  G: none  D:     Dispo: MICU    CODE: Full

## 2021-07-08 NOTE — PROGRESS NOTE ADULT - SUBJECTIVE AND OBJECTIVE BOX
PULMONARY CONSULT SERVICE FOLLOW-UP NOTE    INTERVAL HPI:  Seen at bedside, intubated and sedated, unable to provide history  Underwent IR guided FNA biopsy of LN today  Spiked low grade fever yesterday, likely related to s/p bronchoscopy    MEDICATIONS:  Pulmonary:    Antimicrobials:  azithromycin  IVPB      azithromycin  IVPB 500 milliGRAM(s) IV Intermittent every 24 hours  piperacillin/tazobactam IVPB.. 3.375 Gram(s) IV Intermittent every 6 hours    Anticoagulants:  heparin  Infusion 1900 Unit(s)/Hr IV Continuous <Continuous>    Cardiac:      Allergies    No Known Allergies    Intolerances        Vital Signs Last 24 Hrs  T(C): 37.6 (08 Jul 2021 14:35), Max: 38.3 (07 Jul 2021 18:28)  T(F): 99.7 (08 Jul 2021 14:35), Max: 100.9 (07 Jul 2021 18:28)  HR: 60 (08 Jul 2021 14:00) (58 - 71)  BP: 134/71 (08 Jul 2021 13:00) (100/55 - 154/77)  BP(mean): 97 (08 Jul 2021 13:00) (72 - 105)  RR: 18 (08 Jul 2021 14:00) (11 - 22)  SpO2: 99% (08 Jul 2021 14:00) (92% - 99%)    07-07 @ 07:01 - 07-08 @ 07:00  --------------------------------------------------------  IN: 3078 mL / OUT: 1295 mL / NET: 1783 mL    07-08 @ 07:01 - 07-08 @ 15:08  --------------------------------------------------------  IN: 185.6 mL / OUT: 700 mL / NET: -514.4 mL      Mode: AC/ CMV (Assist Control/ Continuous Mandatory Ventilation)  RR (machine): 14  TV (machine): 500  FiO2: 45  PEEP: 8  ITime: 1  MAP: 12  PIP: 25      LABS:  ABG - ( 07 Jul 2021 06:22 )  pH, Arterial: 7.35  pH, Blood: x     /  pCO2: 39    /  pO2: 80    / HCO3: 22    / Base Excess: -3.8  /  SaO2: 96.7          CBC Full  -  ( 08 Jul 2021 07:18 )  WBC Count : 18.58 K/uL  RBC Count : 3.18 M/uL  Hemoglobin : 9.3 g/dL  Hematocrit : 29.7 %  Platelet Count - Automated : 144 K/uL  Mean Cell Volume : 93.4 fl  Mean Cell Hemoglobin : 29.2 pg  Mean Cell Hemoglobin Concentration : 31.3 gm/dL  Auto Neutrophil # : 0.65 K/uL  Auto Lymphocyte # : 16.80 K/uL  Auto Monocyte # : 0.33 K/uL  Auto Eosinophil # : 0.17 K/uL  Auto Basophil # : 0.00 K/uL  Auto Neutrophil % : 2.6 %  Auto Lymphocyte % : 90.4 %  Auto Monocyte % : 1.8 %  Auto Eosinophil % : 0.9 %  Auto Basophil % : 0.0 %    07-08    129<L>  |  96  |  32<H>  ----------------------------<  146<H>  4.2   |  20<L>  |  1.65<H>    Ca    8.4      08 Jul 2021 07:18  Phos  5.2     07-08  Mg     2.3     07-08    TPro  6.1  /  Alb  2.8<L>  /  TBili  0.6  /  DBili  x   /  AST  63<H>  /  ALT  38  /  AlkPhos  91  07-08    PT/INR - ( 07 Jul 2021 08:47 )   PT: 15.9 sec;   INR: 1.34          PTT - ( 08 Jul 2021 01:02 )  PTT:67.0 sec      RADIOLOGY & ADDITIONAL STUDIES:    Physical Exam  Gen: Elderly male, intubated, sedated  Chest: Decreased breath sound on R side. Normal breath sounds on L side. No wheezes. No rhonchi. No crackles.  Heart: RRR, no rmg  Skin: Macular rash on lower extremities up to thighs. No other rashes.   Lower ext: Trace pitting edema. WWP. Good distal pulses  Abd: obese, soft, nontender  +Lucas in  +A-line

## 2021-07-08 NOTE — PROGRESS NOTE ADULT - SUBJECTIVE AND OBJECTIVE BOX
INTERVAL HPI/OVERNIGHT EVENTS: BS 45 at 1AM, 500mL NS bolus given    SUBJECTIVE: Patient is non-verbal d/t intubation and sedation    OBJECTIVE:    VITAL SIGNS:  ICU Vital Signs Last 24 Hrs  T(C): 37.9 (08 Jul 2021 06:02), Max: 38.3 (07 Jul 2021 18:28)  T(F): 100.2 (08 Jul 2021 06:02), Max: 100.9 (07 Jul 2021 18:28)  HR: 66 (08 Jul 2021 05:00) (61 - 72)  BP: 121/73 (08 Jul 2021 05:00) (98/55 - 128/60)  BP(mean): 93 (08 Jul 2021 05:00) (71 - 93)  ABP: 134/58 (08 Jul 2021 05:00) (99/42 - 134/58)  ABP(mean): 81 (08 Jul 2021 05:00) (56 - 81)  RR: 15 (08 Jul 2021 05:00) (14 - 21)  SpO2: 98% (08 Jul 2021 05:00) (89% - 100%)    Mode: AC/ CMV (Assist Control/ Continuous Mandatory Ventilation), RR (machine): 14, TV (machine): 500, FiO2: 45, PEEP: 8, ITime: 1, MAP: 13, PIP: 24    07-07 @ 07:01  -  07-08 @ 07:00  --------------------------------------------------------  IN: 3078 mL / OUT: 1295 mL / NET: 1783 mL      CAPILLARY BLOOD GLUCOSE      POCT Blood Glucose.: 158 mg/dL (08 Jul 2021 07:12)      PHYSICAL EXAM:    General: NAD, does not follow commands, moves head to painful stimuli RASS -4  HEENT: PERRL, clear conjunctiva, bruising of the lip  Neck: cervical LAD b/l  Respiratory: diminshed BS on R vs L. no wheezes, crackles or rhonchi  Cardiovascular: +S1/S2; RRR  Abdomen: soft, non distended; +BS x4  Extremities: WWP, ; no LE edema  Vascular: 2+ peripheral pulses b/l  Skin: normal color and turgor; no rash  Neurological: sedated    MEDICATIONS:  MEDICATIONS  (STANDING):  azithromycin  IVPB      azithromycin  IVPB 500 milliGRAM(s) IV Intermittent every 24 hours  chlorhexidine 0.12% Liquid 15 milliLiter(s) Oral Mucosa every 12 hours  chlorhexidine 2% Cloths 1 Application(s) Topical <User Schedule>  dextrose 40% Gel 15 Gram(s) Oral once  dextrose 5%. 1000 milliLiter(s) (50 mL/Hr) IV Continuous <Continuous>  dextrose 5%. 1000 milliLiter(s) (100 mL/Hr) IV Continuous <Continuous>  dextrose 50% Injectable 25 Gram(s) IV Push once  dextrose 50% Injectable 12.5 Gram(s) IV Push once  dextrose 50% Injectable 25 Gram(s) IV Push once  glucagon  Injectable 1 milliGRAM(s) IntraMuscular once  heparin  Infusion 1900 Unit(s)/Hr (19 mL/Hr) IV Continuous <Continuous>  insulin regular Infusion 2 Unit(s)/Hr (2 mL/Hr) IV Continuous <Continuous>  linezolid  IVPB 600 milliGRAM(s) IV Intermittent every 12 hours  petrolatum Ophthalmic Ointment 1 Application(s) Both EYES two times a day  piperacillin/tazobactam IVPB.. 3.375 Gram(s) IV Intermittent every 6 hours  polyethylene glycol 3350 17 Gram(s) Oral every 24 hours  propofol Infusion 10 MICROgram(s)/kG/Min (5.83 mL/Hr) IV Continuous <Continuous>  senna 2 Tablet(s) Oral at bedtime    MEDICATIONS  (PRN):  acetaminophen    Suspension .. 650 milliGRAM(s) Enteral Tube every 6 hours PRN Temp greater or equal to 38C (100.4F), Mild Pain (1 - 3)      ALLERGIES:  Allergies    No Known Allergies    Intolerances        LABS:                        9.3    x     )-----------( 144      ( 08 Jul 2021 07:18 )             29.7     07-07    128<L>  |  96  |  35<H>  ----------------------------<  135<H>  4.5   |  20<L>  |  1.84<H>    Ca    8.6      07 Jul 2021 19:08  Phos  4.0     07-07  Mg     2.3     07-07    TPro  6.1  /  Alb  2.8<L>  /  TBili  0.6  /  DBili  x   /  AST  36  /  ALT  24  /  AlkPhos  93  07-07    PT/INR - ( 07 Jul 2021 08:47 )   PT: 15.9 sec;   INR: 1.34          PTT - ( 08 Jul 2021 01:02 )  PTT:67.0 sec      RADIOLOGY & ADDITIONAL TESTS: Reviewed. CXR from today shows improvement in R sided infiltrate. INTERVAL HPI/OVERNIGHT EVENTS: BS 45 at 1AM, 500mL NS bolus given    SUBJECTIVE: Patient is non-verbal d/t intubation and sedation    OBJECTIVE:    VITAL SIGNS:  ICU Vital Signs Last 24 Hrs  T(C): 37.9 (08 Jul 2021 06:02), Max: 38.3 (07 Jul 2021 18:28)  T(F): 100.2 (08 Jul 2021 06:02), Max: 100.9 (07 Jul 2021 18:28)  HR: 66 (08 Jul 2021 05:00) (61 - 72)  BP: 121/73 (08 Jul 2021 05:00) (98/55 - 128/60)  BP(mean): 93 (08 Jul 2021 05:00) (71 - 93)  ABP: 134/58 (08 Jul 2021 05:00) (99/42 - 134/58)  ABP(mean): 81 (08 Jul 2021 05:00) (56 - 81)  RR: 15 (08 Jul 2021 05:00) (14 - 21)  SpO2: 98% (08 Jul 2021 05:00) (89% - 100%)    Mode: AC/ CMV (Assist Control/ Continuous Mandatory Ventilation), RR (machine): 14, TV (machine): 500, FiO2: 45, PEEP: 8, ITime: 1, MAP: 13, PIP: 24    07-07 @ 07:01  -  07-08 @ 07:00  --------------------------------------------------------  IN: 3078 mL / OUT: 1295 mL / NET: 1783 mL      CAPILLARY BLOOD GLUCOSE      POCT Blood Glucose.: 158 mg/dL (08 Jul 2021 07:12)      PHYSICAL EXAM:    General: NAD, does not follow commands, moves head to painful stimuli RASS -4  HEENT: PERRL, clear conjunctiva, bruising of the lip, SUMP and ETT in place  Neck: cervical LAD b/l  Respiratory: diminshed BS on R vs L. no wheezes, crackles or rhonchi  Cardiovascular: +S1/S2; RRR  Abdomen: soft, non distended; +BS x4  Extremities: WWP, ; no LE edema, L rober  Vascular: 2+ peripheral pulses b/l  Skin: normal color and turgor; no rash  Neurological: sedated    MEDICATIONS:  MEDICATIONS  (STANDING):  azithromycin  IVPB      azithromycin  IVPB 500 milliGRAM(s) IV Intermittent every 24 hours  chlorhexidine 0.12% Liquid 15 milliLiter(s) Oral Mucosa every 12 hours  chlorhexidine 2% Cloths 1 Application(s) Topical <User Schedule>  dextrose 40% Gel 15 Gram(s) Oral once  dextrose 5%. 1000 milliLiter(s) (50 mL/Hr) IV Continuous <Continuous>  dextrose 5%. 1000 milliLiter(s) (100 mL/Hr) IV Continuous <Continuous>  dextrose 50% Injectable 25 Gram(s) IV Push once  dextrose 50% Injectable 12.5 Gram(s) IV Push once  dextrose 50% Injectable 25 Gram(s) IV Push once  glucagon  Injectable 1 milliGRAM(s) IntraMuscular once  heparin  Infusion 1900 Unit(s)/Hr (19 mL/Hr) IV Continuous <Continuous>  insulin regular Infusion 2 Unit(s)/Hr (2 mL/Hr) IV Continuous <Continuous>  linezolid  IVPB 600 milliGRAM(s) IV Intermittent every 12 hours  petrolatum Ophthalmic Ointment 1 Application(s) Both EYES two times a day  piperacillin/tazobactam IVPB.. 3.375 Gram(s) IV Intermittent every 6 hours  polyethylene glycol 3350 17 Gram(s) Oral every 24 hours  propofol Infusion 10 MICROgram(s)/kG/Min (5.83 mL/Hr) IV Continuous <Continuous>  senna 2 Tablet(s) Oral at bedtime    MEDICATIONS  (PRN):  acetaminophen    Suspension .. 650 milliGRAM(s) Enteral Tube every 6 hours PRN Temp greater or equal to 38C (100.4F), Mild Pain (1 - 3)      ALLERGIES:  Allergies    No Known Allergies    Intolerances        LABS:                        9.3    x     )-----------( 144      ( 08 Jul 2021 07:18 )             29.7     07-07    128<L>  |  96  |  35<H>  ----------------------------<  135<H>  4.5   |  20<L>  |  1.84<H>    Ca    8.6      07 Jul 2021 19:08  Phos  4.0     07-07  Mg     2.3     07-07    TPro  6.1  /  Alb  2.8<L>  /  TBili  0.6  /  DBili  x   /  AST  36  /  ALT  24  /  AlkPhos  93  07-07    PT/INR - ( 07 Jul 2021 08:47 )   PT: 15.9 sec;   INR: 1.34          PTT - ( 08 Jul 2021 01:02 )  PTT:67.0 sec      RADIOLOGY & ADDITIONAL TESTS: Reviewed. CXR from today shows improvement in R sided infiltrate. INTERVAL HPI/OVERNIGHT EVENTS: BS 45 at 1AM, 500mL NS bolus given    SUBJECTIVE: Patient is non-verbal d/t intubation and sedation    OBJECTIVE:    VITAL SIGNS:  ICU Vital Signs Last 24 Hrs  T(C): 37.9 (08 Jul 2021 06:02), Max: 38.3 (07 Jul 2021 18:28)  T(F): 100.2 (08 Jul 2021 06:02), Max: 100.9 (07 Jul 2021 18:28)  HR: 66 (08 Jul 2021 05:00) (61 - 72)  BP: 121/73 (08 Jul 2021 05:00) (98/55 - 128/60)  BP(mean): 93 (08 Jul 2021 05:00) (71 - 93)  ABP: 134/58 (08 Jul 2021 05:00) (99/42 - 134/58)  ABP(mean): 81 (08 Jul 2021 05:00) (56 - 81)  RR: 15 (08 Jul 2021 05:00) (14 - 21)  SpO2: 98% (08 Jul 2021 05:00) (89% - 100%)    Mode: AC/ CMV (Assist Control/ Continuous Mandatory Ventilation), RR (machine): 14, TV (machine): 500, FiO2: 45, PEEP: 8, ITime: 1, MAP: 13, PIP: 24    07-07 @ 07:01  -  07-08 @ 07:00  --------------------------------------------------------  IN: 3078 mL / OUT: 1295 mL / NET: 1783 mL      CAPILLARY BLOOD GLUCOSE      POCT Blood Glucose.: 158 mg/dL (08 Jul 2021 07:12)      PHYSICAL EXAM:    General: NAD, does not follow commands, moves head to painful stimuli RASS -4  HEENT: PERRL, clear conjunctiva, bruising of the lip, blood tinged thick secretions, good cough reflex, NGT and ETT in place  Neck: cervical LAD b/l  Respiratory: diminished BS on R vs L. no wheezes, crackles or rhonchi  Cardiovascular: +S1/S2; RRR  Abdomen: soft, non distended; +BS x4  Extremities: WWP, ; no LE edema, L rober  Vascular: 2+ peripheral pulses b/l  Skin: normal color and turgor; no rash  Neurological: sedated    MEDICATIONS:  MEDICATIONS  (STANDING):  azithromycin  IVPB      azithromycin  IVPB 500 milliGRAM(s) IV Intermittent every 24 hours  chlorhexidine 0.12% Liquid 15 milliLiter(s) Oral Mucosa every 12 hours  chlorhexidine 2% Cloths 1 Application(s) Topical <User Schedule>  dextrose 40% Gel 15 Gram(s) Oral once  dextrose 5%. 1000 milliLiter(s) (50 mL/Hr) IV Continuous <Continuous>  dextrose 5%. 1000 milliLiter(s) (100 mL/Hr) IV Continuous <Continuous>  dextrose 50% Injectable 25 Gram(s) IV Push once  dextrose 50% Injectable 12.5 Gram(s) IV Push once  dextrose 50% Injectable 25 Gram(s) IV Push once  glucagon  Injectable 1 milliGRAM(s) IntraMuscular once  heparin  Infusion 1900 Unit(s)/Hr (19 mL/Hr) IV Continuous <Continuous>  insulin regular Infusion 2 Unit(s)/Hr (2 mL/Hr) IV Continuous <Continuous>  linezolid  IVPB 600 milliGRAM(s) IV Intermittent every 12 hours  petrolatum Ophthalmic Ointment 1 Application(s) Both EYES two times a day  piperacillin/tazobactam IVPB.. 3.375 Gram(s) IV Intermittent every 6 hours  polyethylene glycol 3350 17 Gram(s) Oral every 24 hours  propofol Infusion 10 MICROgram(s)/kG/Min (5.83 mL/Hr) IV Continuous <Continuous>  senna 2 Tablet(s) Oral at bedtime    MEDICATIONS  (PRN):  acetaminophen    Suspension .. 650 milliGRAM(s) Enteral Tube every 6 hours PRN Temp greater or equal to 38C (100.4F), Mild Pain (1 - 3)      ALLERGIES:  Allergies    No Known Allergies    Intolerances        LABS:                        9.3    x     )-----------( 144      ( 08 Jul 2021 07:18 )             29.7     07-07    128<L>  |  96  |  35<H>  ----------------------------<  135<H>  4.5   |  20<L>  |  1.84<H>    Ca    8.6      07 Jul 2021 19:08  Phos  4.0     07-07  Mg     2.3     07-07    TPro  6.1  /  Alb  2.8<L>  /  TBili  0.6  /  DBili  x   /  AST  36  /  ALT  24  /  AlkPhos  93  07-07    PT/INR - ( 07 Jul 2021 08:47 )   PT: 15.9 sec;   INR: 1.34          PTT - ( 08 Jul 2021 01:02 )  PTT:67.0 sec      RADIOLOGY & ADDITIONAL TESTS: Reviewed. CXR from today shows improvement in R sided infiltrate.

## 2021-07-08 NOTE — PROCEDURE NOTE - NSPROCNAME_GEN_A_CORE
Arterial Puncture/Cannulation
Gastric Intubation/Gastric Lavage
Gastric Intubation/Gastric Lavage
Tracheal Intubation

## 2021-07-08 NOTE — PROGRESS NOTE ADULT - SUBJECTIVE AND OBJECTIVE BOX
Patient is a 70y Male seen and evaluated at bedside. yesterday patient underwent a bronchoscopy with showed purulent secretions at the RUL and RML ; patient hemodynamically stable off of pressor support; with decreasing oxygen requirements  Cr this AM 1.65 down from 1.72 yesterday  output: 1.3L in 24 hrs  tacro level : 6.4      Meds:    acetaminophen    Suspension .. 650 every 6 hours PRN  azithromycin  IVPB    azithromycin  IVPB 500 every 24 hours  chlorhexidine 0.12% Liquid 15 every 12 hours  chlorhexidine 2% Cloths 1 <User Schedule>  dextrose 40% Gel 15 once  dextrose 5%. 1000 <Continuous>  dextrose 5%. 1000 <Continuous>  dextrose 50% Injectable 25 once  dextrose 50% Injectable 12.5 once  dextrose 50% Injectable 25 once  glucagon  Injectable 1 once  heparin  Infusion 1900 <Continuous>  insulin glargine Injectable (LANTUS) 45 every morning  insulin regular  human corrective regimen sliding scale  every 6 hours  petrolatum Ophthalmic Ointment 1 two times a day  piperacillin/tazobactam IVPB.. 3.375 every 6 hours  polyethylene glycol 3350 17 every 24 hours  propofol Infusion 10 <Continuous>  senna 2 at bedtime      T(C): , Max: 38.3 (07-07-21 @ 18:28)  T(F): , Max: 100.9 (07-07-21 @ 18:28)  HR: 60 (07-08-21 @ 11:46)  BP: 154/77 (07-08-21 @ 08:00)  BP(mean): 105 (07-08-21 @ 08:00)  RR: 16 (07-08-21 @ 11:00)  SpO2: 98% (07-08-21 @ 11:46)  Wt(kg): --    07-07 @ 07:01  -  07-08 @ 07:00  --------------------------------------------------------  IN: 3078 mL / OUT: 1295 mL / NET: 1783 mL          Review of Systems: unable to obtain as patient is intubated and sedated        PHYSICAL EXAM:  GENERAL:intubated; sedated   CHEST/LUNG: Coarse rhochi appreciated at the bases  HEART: normal S1S2, RRR  ABDOMEN: Soft, Nontender, +BS,   EXTREMITIES: 2+ pitting edema BL LE   SKIN: faint macular rash on thighs (improving)        LABS:                        9.3    18.58 )-----------( 144      ( 08 Jul 2021 07:18 )             29.7     07-08    129<L>  |  96  |  32<H>  ----------------------------<  146<H>  4.2   |  20<L>  |  1.65<H>    Ca    8.4      08 Jul 2021 07:18  Phos  5.2     07-08  Mg     2.3     07-08    TPro  6.1  /  Alb  2.8<L>  /  TBili  0.6  /  DBili  x   /  AST  63<H>  /  ALT  38  /  AlkPhos  91  07-08    Uric Acid, Serum: 5.3 mg/dL [3.4 - 8.8] (07-08 @ 07:18)    PT/INR - ( 07 Jul 2021 08:47 )   PT: 15.9 sec;   INR: 1.34          PTT - ( 08 Jul 2021 01:02 )  PTT:67.0 sec    Osmolality, Random Urine: 380 mosm/kg (07-07 @ 14:17)  Sodium, Random Urine: <20 mmol/L (07-07 @ 14:17)  Potassium, Random Urine: 47 mmol/L (07-07 @ 14:17)        RADIOLOGY & ADDITIONAL STUDIES:

## 2021-07-09 DIAGNOSIS — R53.2 FUNCTIONAL QUADRIPLEGIA: ICD-10-CM

## 2021-07-09 DIAGNOSIS — Z51.5 ENCOUNTER FOR PALLIATIVE CARE: ICD-10-CM

## 2021-07-09 DIAGNOSIS — D47.2 MONOCLONAL GAMMOPATHY: ICD-10-CM

## 2021-07-09 DIAGNOSIS — J96.01 ACUTE RESPIRATORY FAILURE WITH HYPOXIA: ICD-10-CM

## 2021-07-09 DIAGNOSIS — Z71.89 OTHER SPECIFIED COUNSELING: ICD-10-CM

## 2021-07-09 LAB
ALBUMIN SERPL ELPH-MCNC: 2.7 G/DL — LOW (ref 3.3–5)
ALP SERPL-CCNC: 89 U/L — SIGNIFICANT CHANGE UP (ref 40–120)
ALT FLD-CCNC: 39 U/L — SIGNIFICANT CHANGE UP (ref 10–45)
ANION GAP SERPL CALC-SCNC: 10 MMOL/L — SIGNIFICANT CHANGE UP (ref 5–17)
APTT BLD: 80.4 SEC — HIGH (ref 27.5–35.5)
AST SERPL-CCNC: 67 U/L — HIGH (ref 10–40)
BASOPHILS # BLD AUTO: 0 K/UL — SIGNIFICANT CHANGE UP (ref 0–0.2)
BASOPHILS NFR BLD AUTO: 0 % — SIGNIFICANT CHANGE UP (ref 0–2)
BILIRUB SERPL-MCNC: 0.5 MG/DL — SIGNIFICANT CHANGE UP (ref 0.2–1.2)
BLD GP AB SCN SERPL QL: NEGATIVE — SIGNIFICANT CHANGE UP
BUN SERPL-MCNC: 23 MG/DL — SIGNIFICANT CHANGE UP (ref 7–23)
CALCIUM SERPL-MCNC: 8.6 MG/DL — SIGNIFICANT CHANGE UP (ref 8.4–10.5)
CHLORIDE SERPL-SCNC: 101 MMOL/L — SIGNIFICANT CHANGE UP (ref 96–108)
CMV DNA CSF QL NAA+PROBE: SIGNIFICANT CHANGE UP
CO2 SERPL-SCNC: 22 MMOL/L — SIGNIFICANT CHANGE UP (ref 22–31)
CREAT SERPL-MCNC: 1.24 MG/DL — SIGNIFICANT CHANGE UP (ref 0.5–1.3)
CULTURE RESULTS: SIGNIFICANT CHANGE UP
DACRYOCYTES BLD QL SMEAR: SLIGHT — SIGNIFICANT CHANGE UP
EOSINOPHIL # BLD AUTO: 0 K/UL — SIGNIFICANT CHANGE UP (ref 0–0.5)
EOSINOPHIL NFR BLD AUTO: 0 % — SIGNIFICANT CHANGE UP (ref 0–6)
FUNGITELL: <31 PG/ML — SIGNIFICANT CHANGE UP
GALACTOMANNAN AG SERPL-ACNC: 0.06 INDEX — SIGNIFICANT CHANGE UP (ref 0–0.49)
GIANT PLATELETS BLD QL SMEAR: PRESENT — SIGNIFICANT CHANGE UP
GLUCOSE BLDC GLUCOMTR-MCNC: 156 MG/DL — HIGH (ref 70–99)
GLUCOSE BLDC GLUCOMTR-MCNC: 160 MG/DL — HIGH (ref 70–99)
GLUCOSE BLDC GLUCOMTR-MCNC: 175 MG/DL — HIGH (ref 70–99)
GLUCOSE BLDC GLUCOMTR-MCNC: 182 MG/DL — HIGH (ref 70–99)
GLUCOSE SERPL-MCNC: 184 MG/DL — HIGH (ref 70–99)
H CAPSUL MYC AB FLD-ACNC: SIGNIFICANT CHANGE UP
H CAPSUL YST AB FLD-ACNC: SIGNIFICANT CHANGE UP
HCT VFR BLD CALC: 29.7 % — LOW (ref 39–50)
HEMATOPATHOLOGY REPORT: SIGNIFICANT CHANGE UP
HEMATOPATHOLOGY REPORT: SIGNIFICANT CHANGE UP
HGB BLD-MCNC: 9.3 G/DL — LOW (ref 13–17)
HYPOCHROMIA BLD QL: SLIGHT — SIGNIFICANT CHANGE UP
LYMPHOCYTES # BLD AUTO: 13.43 K/UL — HIGH (ref 1–3.3)
LYMPHOCYTES # BLD AUTO: 83.2 % — HIGH (ref 13–44)
MAGNESIUM SERPL-MCNC: 2.4 MG/DL — SIGNIFICANT CHANGE UP (ref 1.6–2.6)
MANUAL SMEAR VERIFICATION: SIGNIFICANT CHANGE UP
MCHC RBC-ENTMCNC: 29.4 PG — SIGNIFICANT CHANGE UP (ref 27–34)
MCHC RBC-ENTMCNC: 31.3 GM/DL — LOW (ref 32–36)
MCV RBC AUTO: 94 FL — SIGNIFICANT CHANGE UP (ref 80–100)
MONOCYTES # BLD AUTO: 0.29 K/UL — SIGNIFICANT CHANGE UP (ref 0–0.9)
MONOCYTES NFR BLD AUTO: 1.8 % — LOW (ref 2–14)
NEUTROPHILS # BLD AUTO: 2.42 K/UL — SIGNIFICANT CHANGE UP (ref 1.8–7.4)
NEUTROPHILS NFR BLD AUTO: 15 % — LOW (ref 43–77)
NON-GYNECOLOGICAL CYTOLOGY STUDY: SIGNIFICANT CHANGE UP
OVALOCYTES BLD QL SMEAR: SLIGHT — SIGNIFICANT CHANGE UP
P JIROVECII DNA L RESP QL NAA+NON-PROBE: NEGATIVE — SIGNIFICANT CHANGE UP
PHOSPHATE SERPL-MCNC: 3.1 MG/DL — SIGNIFICANT CHANGE UP (ref 2.5–4.5)
PLAT MORPH BLD: ABNORMAL
PLATELET # BLD AUTO: 147 K/UL — LOW (ref 150–400)
POIKILOCYTOSIS BLD QL AUTO: SLIGHT — SIGNIFICANT CHANGE UP
POLYCHROMASIA BLD QL SMEAR: SLIGHT — SIGNIFICANT CHANGE UP
POTASSIUM SERPL-MCNC: 4.4 MMOL/L — SIGNIFICANT CHANGE UP (ref 3.5–5.3)
POTASSIUM SERPL-SCNC: 4.4 MMOL/L — SIGNIFICANT CHANGE UP (ref 3.5–5.3)
PROT SERPL-MCNC: 5.7 G/DL — LOW (ref 6–8.3)
PROT SERPL-MCNC: 5.7 G/DL — LOW (ref 6–8.3)
PROT SERPL-MCNC: 6 G/DL — SIGNIFICANT CHANGE UP (ref 6–8.3)
RBC # BLD: 3.16 M/UL — LOW (ref 4.2–5.8)
RBC # FLD: 15.9 % — HIGH (ref 10.3–14.5)
RBC BLD AUTO: ABNORMAL
RH IG SCN BLD-IMP: POSITIVE — SIGNIFICANT CHANGE UP
SCHISTOCYTES BLD QL AUTO: SLIGHT — SIGNIFICANT CHANGE UP
SMUDGE CELLS # BLD: PRESENT — SIGNIFICANT CHANGE UP
SODIUM SERPL-SCNC: 133 MMOL/L — LOW (ref 135–145)
SPECIMEN SOURCE: SIGNIFICANT CHANGE UP
SPECIMEN SOURCE: SIGNIFICANT CHANGE UP
T GONDII DNA SPEC QL NAA+PROBE: SIGNIFICANT CHANGE UP
TACROLIMUS SERPL-MCNC: 4.5 NG/ML — SIGNIFICANT CHANGE UP
WBC # BLD: 16.14 K/UL — HIGH (ref 3.8–10.5)
WBC # FLD AUTO: 16.14 K/UL — HIGH (ref 3.8–10.5)

## 2021-07-09 PROCEDURE — 99291 CRITICAL CARE FIRST HOUR: CPT

## 2021-07-09 PROCEDURE — 99233 SBSQ HOSP IP/OBS HIGH 50: CPT

## 2021-07-09 PROCEDURE — 71045 X-RAY EXAM CHEST 1 VIEW: CPT | Mod: 26

## 2021-07-09 PROCEDURE — 99233 SBSQ HOSP IP/OBS HIGH 50: CPT | Mod: GC

## 2021-07-09 PROCEDURE — 99358 PROLONG SERVICE W/O CONTACT: CPT

## 2021-07-09 PROCEDURE — 99223 1ST HOSP IP/OBS HIGH 75: CPT

## 2021-07-09 RX ORDER — DEXTROSE 10 % IN WATER 10 %
1000 INTRAVENOUS SOLUTION INTRAVENOUS
Refills: 0 | Status: DISCONTINUED | OUTPATIENT
Start: 2021-07-09 | End: 2021-07-09

## 2021-07-09 RX ORDER — LACTULOSE 10 G/15ML
20 SOLUTION ORAL ONCE
Refills: 0 | Status: COMPLETED | OUTPATIENT
Start: 2021-07-09 | End: 2021-07-09

## 2021-07-09 RX ORDER — PIPERACILLIN AND TAZOBACTAM 4; .5 G/20ML; G/20ML
4.5 INJECTION, POWDER, LYOPHILIZED, FOR SOLUTION INTRAVENOUS EVERY 6 HOURS
Refills: 0 | Status: DISCONTINUED | OUTPATIENT
Start: 2021-07-09 | End: 2021-07-11

## 2021-07-09 RX ORDER — POLYETHYLENE GLYCOL 3350 17 G/17G
17 POWDER, FOR SOLUTION ORAL
Refills: 0 | Status: DISCONTINUED | OUTPATIENT
Start: 2021-07-09 | End: 2021-07-11

## 2021-07-09 RX ORDER — TACROLIMUS 5 MG/1
0.5 CAPSULE ORAL
Refills: 0 | Status: DISCONTINUED | OUTPATIENT
Start: 2021-07-10 | End: 2021-07-11

## 2021-07-09 RX ORDER — INSULIN GLARGINE 100 [IU]/ML
25 INJECTION, SOLUTION SUBCUTANEOUS EVERY MORNING
Refills: 0 | Status: DISCONTINUED | OUTPATIENT
Start: 2021-07-10 | End: 2021-07-12

## 2021-07-09 RX ADMIN — PIPERACILLIN AND TAZOBACTAM 200 GRAM(S): 4; .5 INJECTION, POWDER, LYOPHILIZED, FOR SOLUTION INTRAVENOUS at 17:06

## 2021-07-09 RX ADMIN — INSULIN HUMAN 2: 100 INJECTION, SOLUTION SUBCUTANEOUS at 05:54

## 2021-07-09 RX ADMIN — HEPARIN SODIUM 19 UNIT(S)/HR: 5000 INJECTION INTRAVENOUS; SUBCUTANEOUS at 14:58

## 2021-07-09 RX ADMIN — PROPOFOL 5.83 MICROGRAM(S)/KG/MIN: 10 INJECTION, EMULSION INTRAVENOUS at 05:22

## 2021-07-09 RX ADMIN — HEPARIN SODIUM 19 UNIT(S)/HR: 5000 INJECTION INTRAVENOUS; SUBCUTANEOUS at 00:08

## 2021-07-09 RX ADMIN — FAMOTIDINE 20 MILLIGRAM(S): 10 INJECTION INTRAVENOUS at 12:02

## 2021-07-09 RX ADMIN — Medication 650 MILLIGRAM(S): at 23:32

## 2021-07-09 RX ADMIN — Medication 1 APPLICATION(S): at 21:10

## 2021-07-09 RX ADMIN — POLYETHYLENE GLYCOL 3350 17 GRAM(S): 17 POWDER, FOR SOLUTION ORAL at 17:07

## 2021-07-09 RX ADMIN — LACTULOSE 20 GRAM(S): 10 SOLUTION ORAL at 11:34

## 2021-07-09 RX ADMIN — Medication 1 APPLICATION(S): at 09:55

## 2021-07-09 RX ADMIN — PIPERACILLIN AND TAZOBACTAM 200 GRAM(S): 4; .5 INJECTION, POWDER, LYOPHILIZED, FOR SOLUTION INTRAVENOUS at 07:30

## 2021-07-09 RX ADMIN — PIPERACILLIN AND TAZOBACTAM 200 GRAM(S): 4; .5 INJECTION, POWDER, LYOPHILIZED, FOR SOLUTION INTRAVENOUS at 12:01

## 2021-07-09 RX ADMIN — INSULIN HUMAN 2: 100 INJECTION, SOLUTION SUBCUTANEOUS at 17:04

## 2021-07-09 RX ADMIN — Medication 650 MILLIGRAM(S): at 10:05

## 2021-07-09 RX ADMIN — Medication 650 MILLIGRAM(S): at 11:20

## 2021-07-09 RX ADMIN — PIPERACILLIN AND TAZOBACTAM 200 GRAM(S): 4; .5 INJECTION, POWDER, LYOPHILIZED, FOR SOLUTION INTRAVENOUS at 00:21

## 2021-07-09 RX ADMIN — CHLORHEXIDINE GLUCONATE 15 MILLILITER(S): 213 SOLUTION TOPICAL at 09:54

## 2021-07-09 RX ADMIN — CHLORHEXIDINE GLUCONATE 15 MILLILITER(S): 213 SOLUTION TOPICAL at 22:28

## 2021-07-09 RX ADMIN — PIPERACILLIN AND TAZOBACTAM 200 GRAM(S): 4; .5 INJECTION, POWDER, LYOPHILIZED, FOR SOLUTION INTRAVENOUS at 23:30

## 2021-07-09 RX ADMIN — PROPOFOL 5.83 MICROGRAM(S)/KG/MIN: 10 INJECTION, EMULSION INTRAVENOUS at 13:29

## 2021-07-09 RX ADMIN — CHLORHEXIDINE GLUCONATE 1 APPLICATION(S): 213 SOLUTION TOPICAL at 07:30

## 2021-07-09 RX ADMIN — INSULIN HUMAN 2: 100 INJECTION, SOLUTION SUBCUTANEOUS at 12:17

## 2021-07-09 RX ADMIN — INSULIN GLARGINE 45 UNIT(S): 100 INJECTION, SOLUTION SUBCUTANEOUS at 08:01

## 2021-07-09 RX ADMIN — INSULIN HUMAN 4: 100 INJECTION, SOLUTION SUBCUTANEOUS at 00:21

## 2021-07-09 NOTE — CONSULT NOTE ADULT - PROBLEM SELECTOR RECOMMENDATION 3
.  Found ot have lymphadenopathy and cytopenias  -ongoing work-up guided by Heme to evaluate for malignancy

## 2021-07-09 NOTE — PROGRESS NOTE ADULT - ASSESSMENT
71 YO M with PMH of renal transplant (6/2017, [place], on Tacrolimus), DM2 (on insulin), HTN, MGUS, AF (on Eliquis), CAD (CABG ~15 years ago), ?Post transplant lymphoma presents with generalized weakness, admitted for acute hypoxic respiratory failure and found to have bilateral multifocal pneumonia.    # Acute hypoxic respiratory failure 2/2 below  # R Lung lobar pneumonia in context of immunosuppression with Tacrolimus    Initial: Hypoxia (90% on 6L). Fever (TMax 103.4). Tachypnea to 35.  Small noncomplicated effusion on R side on Pocus. Large lobar consolidation on Xray.  RVP negative.  Surprisingly initially with lymphocytosis and neutropenia with some doubts whether it is regular bacterial pneumonia, however, now he seems to be improving on antibiotics - WBC is trending down, fever curve trending down. Neutropenia also resolved. He is s/p Bronchoscopy with BAL - it is neutrophilic predominant. During bronchoscopy significant purulent secretions from RUL and RML noted. CT scan also with large lobar consolidation. Overall, it all supports bacterial pneumonia. No biopsy performed.  Cultures from BAL so far negative (but performed while on antibiotics already).  Legionella negative.      # C/F Lymphoma. He is found to have abdominopelvic lymphadenopathy and splenomegaly. 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 He is s/p FNA biopsy of LN, hem/onc is following. We would suggest excisional biopsy of LN, but will defer to primary team and heamatology/oncology.    Suggest:  - continue with current Antibiotics      Pulmonary will sign off. Please recall us with any questions.    69 YO M with PMH of renal transplant (6/2017, [place], on Tacrolimus), DM2 (on insulin), HTN, MGUS, AF (on Eliquis), CAD (CABG ~15 years ago), Post transplant lymphoma, CLL stage 0 presents with generalized weakness, admitted for acute hypoxic respiratory failure and found to have bilateral multifocal pneumonia.    # Acute hypoxic respiratory failure 2/2 below  # R Lung lobar pneumonia in context of immunosuppression with Tacrolimus    Initial: Hypoxia (90% on 6L). Fever (TMax 103.4). Tachypnea to 35.  Small noncomplicated effusion on R side on Pocus. Large lobar consolidation on Xray.  RVP negative.  Surprisingly initially with lymphocytosis and neutropenia with some doubts whether it is regular bacterial pneumonia, however, now he seems to be improving on antibiotics - WBC is trending down, fever curve trending down. Neutropenia also resolved. He is s/p Bronchoscopy with BAL - it is neutrophilic predominant. During bronchoscopy significant purulent secretions from RUL and RML noted. CT scan also with large lobar consolidation. Overall, it all supports bacterial pneumonia. No biopsy performed.  Cultures from BAL so far negative (but performed while on antibiotics already).  Legionella negative.      # C/F Lymphoma. He is found to have abdominopelvic lymphadenopathy and splenomegaly. It is known from prior, but unclear if progressed.  Reportedly he has history of Post Transplant Lymphoma and CLL, collaterals were obtained by Hem/Onc from his primamry onc. He is s/p FNA biopsy of LN, hem/onc is following. We would suggest excisional biopsy of LN, but will defer to primary team and heamatology/oncology.    Suggest:  - continue with current Antibiotics      Pulmonary will sign off. Please recall us with any questions.

## 2021-07-09 NOTE — CONSULT NOTE ADULT - CONVERSATION DETAILS
Reviewed patient's medical course and recent developments. Emotional support provided to wife, discussed current critical status and the life-sustaining treatments underway. Patient's spouse states they feel obligated to pursue all indicated medical interventions due to their jayde tradition. She states the patient never discussed advanced directives with her prior to this. Patient is Full Code.

## 2021-07-09 NOTE — PROGRESS NOTE ADULT - SUBJECTIVE AND OBJECTIVE BOX
PULMONARY CONSULT SERVICE FOLLOW-UP NOTE      INTERVAL HPI:  - intubated and sedated, unable to provide history   - Hem/Onc obtained collaterals from patient's oncologist - he has history of adult T-Cell lymphoma, MGUS, and CLL stage 0 and known splenomegaly      MEDICATIONS:  Pulmonary:    Antimicrobials:  azithromycin  IVPB      azithromycin  IVPB 500 milliGRAM(s) IV Intermittent every 24 hours  piperacillin/tazobactam IVPB.. 4.5 Gram(s) IV Intermittent every 6 hours    Anticoagulants:  heparin  Infusion 1900 Unit(s)/Hr IV Continuous <Continuous>    Cardiac:      Allergies    No Known Allergies    Intolerances        Vital Signs Last 24 Hrs  T(C): 38.2 (09 Jul 2021 09:17), Max: 38.2 (09 Jul 2021 09:17)  T(F): 100.8 (09 Jul 2021 09:17), Max: 100.8 (09 Jul 2021 09:17)  HR: 71 (09 Jul 2021 10:30) (58 - 72)  BP: 134/71 (08 Jul 2021 13:00) (134/71 - 134/71)  BP(mean): 97 (08 Jul 2021 13:00) (97 - 97)  RR: 30 (09 Jul 2021 10:30) (15 - 30)  SpO2: 96% (09 Jul 2021 10:30) (95% - 100%)    07-08 @ 07:01 - 07-09 @ 07:00  --------------------------------------------------------  IN: 1694.8 mL / OUT: 2951 mL / NET: -1256.2 mL    07-09 @ 07:01  -  07-09 @ 12:25  --------------------------------------------------------  IN: 67.5 mL / OUT: 0 mL / NET: 67.5 mL      Mode: AC/ CMV (Assist Control/ Continuous Mandatory Ventilation)  RR (machine): 14  TV (machine): 500  FiO2: 45  PEEP: 8  ITime: 1  MAP: 13  PIP: 26      Physical Exam  Gen: Elderly male, intubated, sedated  Chest: Decreased breath sound on R side. Normal breath sounds on L side. No wheezes. No rhonchi. No crackles.  Heart: RRR, no rmg  Skin: Macular rash on lower extremities up to thighs. No other rashes.   Lower ext: Trace pitting edema. WWP. Good distal pulses  Abd: obese, soft, nontender  +Lucas in  +A-line      LABS:      CBC Full  -  ( 09 Jul 2021 04:38 )  WBC Count : 16.14 K/uL  RBC Count : 3.16 M/uL  Hemoglobin : 9.3 g/dL  Hematocrit : 29.7 %  Platelet Count - Automated : 147 K/uL  Mean Cell Volume : 94.0 fl  Mean Cell Hemoglobin : 29.4 pg  Mean Cell Hemoglobin Concentration : 31.3 gm/dL  Auto Neutrophil # : 2.42 K/uL  Auto Lymphocyte # : 13.43 K/uL  Auto Monocyte # : 0.29 K/uL  Auto Eosinophil # : 0.00 K/uL  Auto Basophil # : 0.00 K/uL  Auto Neutrophil % : 15.0 %  Auto Lymphocyte % : 83.2 %  Auto Monocyte % : 1.8 %  Auto Eosinophil % : 0.0 %  Auto Basophil % : 0.0 %    07-09    133<L>  |  101  |  23  ----------------------------<  184<H>  4.4   |  22  |  1.24    Ca    8.6      09 Jul 2021 04:37  Phos  3.1     07-09  Mg     2.4     07-09    TPro  6.0  /  Alb  2.7<L>  /  TBili  0.5  /  DBili  x   /  AST  67<H>  /  ALT  39  /  AlkPhos  89  07-09    PTT - ( 09 Jul 2021 04:37 )  PTT:80.4 sec     PULMONARY CONSULT SERVICE FOLLOW-UP NOTE      INTERVAL HPI:  - intubated and sedated, unable to provide history   - Hem/Onc obtained collaterals from patient's oncologist - he has history of adult T-Cell lymphoma, MGUS, and CLL stage 0 and known splenomegaly  - low grade fevers, but overall fever curve down    MEDICATIONS:  Pulmonary:    Antimicrobials:  azithromycin  IVPB      azithromycin  IVPB 500 milliGRAM(s) IV Intermittent every 24 hours  piperacillin/tazobactam IVPB.. 4.5 Gram(s) IV Intermittent every 6 hours    Anticoagulants:  heparin  Infusion 1900 Unit(s)/Hr IV Continuous <Continuous>    Cardiac:      Allergies    No Known Allergies    Intolerances        Vital Signs Last 24 Hrs  T(C): 38.2 (09 Jul 2021 09:17), Max: 38.2 (09 Jul 2021 09:17)  T(F): 100.8 (09 Jul 2021 09:17), Max: 100.8 (09 Jul 2021 09:17)  HR: 71 (09 Jul 2021 10:30) (58 - 72)  BP: 134/71 (08 Jul 2021 13:00) (134/71 - 134/71)  BP(mean): 97 (08 Jul 2021 13:00) (97 - 97)  RR: 30 (09 Jul 2021 10:30) (15 - 30)  SpO2: 96% (09 Jul 2021 10:30) (95% - 100%)    07-08 @ 07:01  -  07-09 @ 07:00  --------------------------------------------------------  IN: 1694.8 mL / OUT: 2951 mL / NET: -1256.2 mL    07-09 @ 07:01  -  07-09 @ 12:25  --------------------------------------------------------  IN: 67.5 mL / OUT: 0 mL / NET: 67.5 mL      Mode: AC/ CMV (Assist Control/ Continuous Mandatory Ventilation)  RR (machine): 14  TV (machine): 500  FiO2: 45  PEEP: 8  ITime: 1  MAP: 13  PIP: 26      Physical Exam  Gen: Elderly male, intubated, sedated  Chest: Decreased breath sound on R side. Normal breath sounds on L side. No wheezes. No rhonchi. No crackles.  Heart: RRR, no rmg  Skin: Macular rash on lower extremities up to thighs. No other rashes.   Lower ext: Trace pitting edema. WWP. Good distal pulses  Abd: obese, soft, nontender  +Lucas in  +A-line      LABS:      CBC Full  -  ( 09 Jul 2021 04:38 )  WBC Count : 16.14 K/uL  RBC Count : 3.16 M/uL  Hemoglobin : 9.3 g/dL  Hematocrit : 29.7 %  Platelet Count - Automated : 147 K/uL  Mean Cell Volume : 94.0 fl  Mean Cell Hemoglobin : 29.4 pg  Mean Cell Hemoglobin Concentration : 31.3 gm/dL  Auto Neutrophil # : 2.42 K/uL  Auto Lymphocyte # : 13.43 K/uL  Auto Monocyte # : 0.29 K/uL  Auto Eosinophil # : 0.00 K/uL  Auto Basophil # : 0.00 K/uL  Auto Neutrophil % : 15.0 %  Auto Lymphocyte % : 83.2 %  Auto Monocyte % : 1.8 %  Auto Eosinophil % : 0.0 %  Auto Basophil % : 0.0 %    07-09    133<L>  |  101  |  23  ----------------------------<  184<H>  4.4   |  22  |  1.24    Ca    8.6      09 Jul 2021 04:37  Phos  3.1     07-09  Mg     2.4     07-09    TPro  6.0  /  Alb  2.7<L>  /  TBili  0.5  /  DBili  x   /  AST  67<H>  /  ALT  39  /  AlkPhos  89  07-09    PTT - ( 09 Jul 2021 04:37 )  PTT:80.4 sec

## 2021-07-09 NOTE — PROGRESS NOTE ADULT - SUBJECTIVE AND OBJECTIVE BOX
INTERVAL HPI/OVERNIGHT EVENTS: o/n   at 12AM. PTT therapeutic X2    SUBJECTIVE: Patient seen and examined at bedside.     OBJECTIVE:    VITAL SIGNS:  ICU Vital Signs Last 24 Hrs  T(C): 38 (09 Jul 2021 11:00), Max: 38.2 (09 Jul 2021 09:17)  T(F): 100.4 (09 Jul 2021 11:00), Max: 100.8 (09 Jul 2021 09:17)  HR: 69 (09 Jul 2021 13:00) (60 - 72)  BP: --  BP(mean): --  ABP: 148/61 (09 Jul 2021 13:00) (116/42 - 156/69)  ABP(mean): 94 (09 Jul 2021 13:00) (64 - 99)  RR: 35 (09 Jul 2021 13:00) (15 - 35)  SpO2: 94% (09 Jul 2021 13:00) (94% - 100%)    Mode: AC/ CMV (Assist Control/ Continuous Mandatory Ventilation), RR (machine): 14, TV (machine): 500, FiO2: 45, PEEP: 8, ITime: 1, MAP: 13, PIP: 26    07-08 @ 07:01  -  07-09 @ 07:00  --------------------------------------------------------  IN: 1694.8 mL / OUT: 2951 mL / NET: -1256.2 mL    07-09 @ 07:01  -  07-09 @ 13:52  --------------------------------------------------------  IN: 380.5 mL / OUT: 553 mL / NET: -172.5 mL      CAPILLARY BLOOD GLUCOSE      POCT Blood Glucose.: 182 mg/dL (09 Jul 2021 12:16)      PHYSICAL EXAM:    General: NAD, intubated and sedated opens eyes to verbal stimuli, unable to follow commands RASS -1  HEENT: PERRL, clear conjunctiva NGT and ETT in place  Neck: cervical LAD b/l  Respiratory: diminished BS on R vs L. no wheezes, crackles or rhonchi  Cardiovascular: +S1/S2; RRR  Abdomen: soft, non distended; +BS x4  Extremities: WWP, ; no LE edema, L rober  Vascular: 2+ peripheral pulses b/l  Skin: normal color and turgor; no rash  Neurological: sedated    MEDICATIONS:  MEDICATIONS  (STANDING):  chlorhexidine 0.12% Liquid 15 milliLiter(s) Oral Mucosa every 12 hours  chlorhexidine 2% Cloths 1 Application(s) Topical <User Schedule>  dextrose 10%. 1000 milliLiter(s) (50 mL/Hr) IV Continuous <Continuous>  dextrose 40% Gel 15 Gram(s) Oral once  dextrose 5%. 1000 milliLiter(s) (50 mL/Hr) IV Continuous <Continuous>  dextrose 5%. 1000 milliLiter(s) (100 mL/Hr) IV Continuous <Continuous>  dextrose 50% Injectable 25 Gram(s) IV Push once  dextrose 50% Injectable 12.5 Gram(s) IV Push once  dextrose 50% Injectable 25 Gram(s) IV Push once  famotidine   Suspension 20 milliGRAM(s) Oral daily  glucagon  Injectable 1 milliGRAM(s) IntraMuscular once  heparin  Infusion 1900 Unit(s)/Hr (19 mL/Hr) IV Continuous <Continuous>  insulin regular  human corrective regimen sliding scale   SubCutaneous every 6 hours  petrolatum Ophthalmic Ointment 1 Application(s) Both EYES two times a day  piperacillin/tazobactam IVPB.. 4.5 Gram(s) IV Intermittent every 6 hours  polyethylene glycol 3350 17 Gram(s) Oral two times a day  propofol Infusion 10 MICROgram(s)/kG/Min (5.83 mL/Hr) IV Continuous <Continuous>  senna 2 Tablet(s) Oral at bedtime    MEDICATIONS  (PRN):  acetaminophen    Suspension .. 650 milliGRAM(s) Enteral Tube every 6 hours PRN Temp greater or equal to 38C (100.4F), Mild Pain (1 - 3)      ALLERGIES:  Allergies    No Known Allergies    Intolerances        LABS:                        9.3    16.14 )-----------( 147      ( 09 Jul 2021 04:38 )             29.7     07-09    133<L>  |  101  |  23  ----------------------------<  184<H>  4.4   |  22  |  1.24    Ca    8.6      09 Jul 2021 04:37  Phos  3.1     07-09  Mg     2.4     07-09    TPro  6.0  /  Alb  2.7<L>  /  TBili  0.5  /  DBili  x   /  AST  67<H>  /  ALT  39  /  AlkPhos  89  07-09    PTT - ( 09 Jul 2021 04:37 )  PTT:80.4 sec      RADIOLOGY & ADDITIONAL TESTS: reviewed

## 2021-07-09 NOTE — PROGRESS NOTE ADULT - ASSESSMENT
70M with PMHx significant for renal transplant on tacrolimus, ) DM2, HTN, MGUS, pAF, BPH, HLD, CAD (s/p CABG 15 years ago), presents for 3-4 days of worsening cough nephrology consulted for LATRICIA on CKD     #Renal Transplant (2017)  patient formerly on cellcept, valagancyclovir and tacrolimus, now solely on tacrolimus 1mg q12h (dose confirmed w/Dr. Leyva)   -tacrolimus level noted to be 7.4 this AM  -will touch base with heme-onc regarding goal tacro level given concern for post-transplant lymphoproliferative disorder (goal usually between 2-5)  -hold tacrolimus pending new level today        #LATRICIA on CKD  likely etiology ischemic ATN 2/ 2 hemodynamic instability   improving  Cr down to 1.2 this AM from 1.71   -strict i's and o's       #Hyponatremia  133 this AM up from 129 yesterday   -urine studies noted ; hypovolemic hyponatremia in setting of septic shock       #Anemia  Hgb 9.3, ~at goal   -no signs of active bleed  -iron panel noted      #Lymphadenopathy  patient has history of post-transplant lymphoproliferative disorder, MUGUS and CLL  -found to have extensive lymphadenopathy seen on CT scan ? lymphoma/neoplastic disease   -heme-onc on board; patient underwent LN FNA yesterday 70M with PMHx significant for renal transplant on tacrolimus, ) DM2, HTN, MGUS, pAF, BPH, HLD, CAD (s/p CABG 15 years ago), presents for 3-4 days of worsening cough nephrology consulted for LATRICIA on CKD     #Renal Transplant (2017)  patient formerly on cellcept, valagancyclovir and tacrolimus, now solely on tacrolimus 1mg q12h (dose confirmed w/Dr. Leyva)   -tacrolimus level noted to be 7.4 this AM  -will touch base with heme-onc regarding goal tacro level given concern for post-transplant lymphoproliferative disorder (goal usually between 2-5)  -hold tacrolimus pending new level today        #LATRICIA on CKD  likely etiology ischemic ATN 2/ 2 hemodynamic instability   improving  Cr down to 1.2 this AM from 1.71   -strict i's and o's l urine output over 24 hours >2L; need to closely monitor for post ATN diuresis       #Hyponatremia  133 this AM up from 129 yesterday   -urine studies noted ; hypovolemic hyponatremia in setting of septic shock       #Anemia  Hgb 9.3, ~at goal   -no signs of active bleed  -iron panel noted      #Lymphadenopathy  patient has history of post-transplant lymphoproliferative disorder, MUGUS and CLL  -found to have extensive lymphadenopathy seen on CT scan ? lymphoma/neoplastic disease   -heme-onc on board; patient underwent LN FNA yesterday

## 2021-07-09 NOTE — PROGRESS NOTE ADULT - ASSESSMENT
69 yo M with PMH of kidney transplant c/b CLL/SLL stage 0/PTLD with persistent EBV viremia in a low level (collateral obtained from Dr. Sanchez, transplant ID f/u the patient at [facility] [place]), DM2, HTN, MGUS, pAF, BPH, HLD, CAD (s/p CABG 15 years ago), now with PNA as well as PTLD. Now intubated on 7/6. CT chest showed large multi lobar pulmonary consolidations, R>L, as well as extensive cervical, thoracic and upper abdominal lymphadenopathy and splenomegaly.  Yesterday axillary LN bx intervention was performed. In terms of microlabs, PCP PCR, CMV PCR, urine S. pneumoniae and Legionella, Cryptoc and Fast/acid were negatives. Other microbiology labs are in progress. EBV PCR is pending. EBV PCR still pending.     Recommendations:  - F/u BAL cx including Nocardia cx.  - F/u axillary LN bx.   - Continue empiric Zosyn. ID will reevaluate during this weekend.   - After 5 days of treatment, with legionella cx negative, we recommend d/c azithromicin. 71 yo M with PMH of CLL/SLL stage 0, adult T-cell lynphoma and MGUS; kidney transplant c/w PTLD with persistent EBV viremia in a low level (collateral obtained from Dr. Sanchez, transplant ID f/u the patient at [facility] [place]), DM2, HTN, HLD, pAF, CAD (s/p CABG 15 years ago), now with PNA. Now intubated on 7/6. CT chest showed large multi lobar pulmonary consolidations, R>L, as well as extensive cervical, thoracic and upper abdominal lymphadenopathy and splenomegaly. Yesterday axillary LN bx intervention was performed. In terms of microlabs, just PCR MSSA was found in nasal specimen. PCP PCR, CMV PCR, urine S. pneumoniae and Legionella, Cryptoc and Fast/acid were negatives. Other microbiology labs are in progress: EBV PCR, fungal, nocardia, aspergillus, Fungitel, toxoplasma PCR and histoplasma. Chest XR has shown decresed bilateral opacities/pleural effusions today.     Recommendations:  - F/u BAL cx including Nocardia cx, EBV PCR, fungal, nocardia, aspergillus, Fungitel, toxoplasma PCR and histoplasma.  - F/u axillary LN bx.   - Continue empiric Zosyn. ID will reevaluate during this weekend.   - After 5 days of treatment, with legionella cx negative, we recommend d/c azithromycin

## 2021-07-09 NOTE — PROGRESS NOTE ADULT - SUBJECTIVE AND OBJECTIVE BOX
INCOMPLETE     Infectious Diseases Progress Note:    SUBJECTIVE: Patient seen and examined at bedside. Patient denies fever, chills, HA, nausea, vomiting, abdominal pain, dysuria, diarrhea.    SEPSIS MULTIFOCALPNEUMONIA          Allergies    No Known Allergies    Intolerances        ANTIBIOTICS/RELEVANT:  antimicrobials  azithromycin  IVPB 500 milliGRAM(s) IV Intermittent every 24 hours  azithromycin  IVPB      piperacillin/tazobactam IVPB.. 4.5 Gram(s) IV Intermittent every 6 hours    immunologic:      OTHER:  acetaminophen    Suspension .. 650 milliGRAM(s) Enteral Tube every 6 hours PRN  chlorhexidine 0.12% Liquid 15 milliLiter(s) Oral Mucosa every 12 hours  chlorhexidine 2% Cloths 1 Application(s) Topical <User Schedule>  dextrose 40% Gel 15 Gram(s) Oral once  dextrose 5%. 1000 milliLiter(s) IV Continuous <Continuous>  dextrose 5%. 1000 milliLiter(s) IV Continuous <Continuous>  dextrose 50% Injectable 25 Gram(s) IV Push once  dextrose 50% Injectable 12.5 Gram(s) IV Push once  dextrose 50% Injectable 25 Gram(s) IV Push once  famotidine   Suspension 20 milliGRAM(s) Oral daily  glucagon  Injectable 1 milliGRAM(s) IntraMuscular once  heparin  Infusion 1900 Unit(s)/Hr IV Continuous <Continuous>  insulin glargine Injectable (LANTUS) 45 Unit(s) SubCutaneous every morning  insulin regular  human corrective regimen sliding scale   SubCutaneous every 6 hours  petrolatum Ophthalmic Ointment 1 Application(s) Both EYES two times a day  polyethylene glycol 3350 17 Gram(s) Oral every 24 hours  propofol Infusion 10 MICROgram(s)/kG/Min IV Continuous <Continuous>  senna 2 Tablet(s) Oral at bedtime      Objective:  Vital Signs Last 24 Hrs  T(C): 38.1 (09 Jul 2021 05:20), Max: 38.1 (09 Jul 2021 05:20)  T(F): 100.6 (09 Jul 2021 05:20), Max: 100.6 (09 Jul 2021 05:20)  HR: 68 (09 Jul 2021 08:00) (58 - 72)  BP: 134/71 (08 Jul 2021 13:00) (134/71 - 134/71)  BP(mean): 97 (08 Jul 2021 13:00) (97 - 97)  RR: 25 (09 Jul 2021 08:00) (15 - 25)  SpO2: 99% (09 Jul 2021 08:00) (95% - 100%)    PHYSICAL EXAM:  Constitutional: Well-developed, well nourished  Eyes: PERRLA, EOMI  Ear/Nose/Throat: no oral lesion, no sinus tenderness on percussion	  Neck:no JVD, no lymphadenopathy, supple  Respiratory: CTA bilateral  Cardiovascular: S1S2, RRR, no murmurs  Gastrointestinal: soft, NTND, (+) BS, no HSM  Extremities: no c/e/e  Skin: no rashes    LABS:                        9.3    16.14 )-----------( 147      ( 09 Jul 2021 04:38 )             29.7     07-09    133<L>  |  101  |  23  ----------------------------<  184<H>  4.4   |  22  |  1.24    Ca    8.6      09 Jul 2021 04:37  Phos  3.1     07-09  Mg     2.4     07-09    TPro  6.0  /  Alb  2.7<L>  /  TBili  0.5  /  DBili  x   /  AST  67<H>  /  ALT  39  /  AlkPhos  89  07-09    PT/INR - ( 07 Jul 2021 08:47 )   PT: 15.9 sec;   INR: 1.34          PTT - ( 09 Jul 2021 04:37 )  PTT:80.4 sec      MICROBIOLOGY:            RADIOLOGY & ADDITIONAL STUDIES: Infectious Diseases Progress Note:    SUBJECTIVE: Patient seen and examined at bedside. I inform her wife about ID plan.     OBJECTIVE: No changes clinically.     SEPSIS MULTIFOCALPNEUMONIA    Allergies  No Known Allergies    ANTIBIOTICS/RELEVANT:  antimicrobials  azithromycin  IVPB 500 milliGRAM(s) IV Intermittent every 24 hours  azithromycin  IVPB      piperacillin/tazobactam IVPB.. 4.5 Gram(s) IV Intermittent every 6 hours    OTHER:  acetaminophen    Suspension .. 650 milliGRAM(s) Enteral Tube every 6 hours PRN  chlorhexidine 0.12% Liquid 15 milliLiter(s) Oral Mucosa every 12 hours  chlorhexidine 2% Cloths 1 Application(s) Topical <User Schedule>  dextrose 40% Gel 15 Gram(s) Oral once  dextrose 5%. 1000 milliLiter(s) IV Continuous <Continuous>  dextrose 5%. 1000 milliLiter(s) IV Continuous <Continuous>  dextrose 50% Injectable 25 Gram(s) IV Push once  dextrose 50% Injectable 12.5 Gram(s) IV Push once  dextrose 50% Injectable 25 Gram(s) IV Push once  famotidine   Suspension 20 milliGRAM(s) Oral daily  glucagon  Injectable 1 milliGRAM(s) IntraMuscular once  heparin  Infusion 1900 Unit(s)/Hr IV Continuous <Continuous>  insulin glargine Injectable (LANTUS) 45 Unit(s) SubCutaneous every morning  insulin regular  human corrective regimen sliding scale   SubCutaneous every 6 hours  petrolatum Ophthalmic Ointment 1 Application(s) Both EYES two times a day  polyethylene glycol 3350 17 Gram(s) Oral every 24 hours  propofol Infusion 10 MICROgram(s)/kG/Min IV Continuous <Continuous>  senna 2 Tablet(s) Oral at bedtime    Objective:  Vital Signs Last 24 Hrs  T(C): 38.1 (09 Jul 2021 05:20), Max: 38.1 (09 Jul 2021 05:20)  T(F): 100.6 (09 Jul 2021 05:20), Max: 100.6 (09 Jul 2021 05:20)  HR: 68 (09 Jul 2021 08:00) (58 - 72)  BP: 134/71 (08 Jul 2021 13:00) (134/71 - 134/71)  BP(mean): 97 (08 Jul 2021 13:00) (97 - 97)  RR: 25 (09 Jul 2021 08:00) (15 - 25)  SpO2: 99% (09 Jul 2021 08:00) (95% - 100%)    PHYSICAL EXAM:  Gen: Elderly male, intubated, sedated  Chest: Decreased breath sound on R side. Normal breath sounds on L side. No wheezes. No rhonchi. No crackles.  Heart: RRR, no rmg  Skin: Macular rash on lower extremities up to thighs. No other rashes.   Lower ext: Trace pitting edema. WWP. Good distal pulses  Abd: obese, soft, nontender  +Lucas in  +A-line    LABS:                        9.3    16.14 )-----------( 147      ( 09 Jul 2021 04:38 )             29.7     07-09    133<L>  |  101  |  23  ----------------------------<  184<H>  4.4   |  22  |  1.24    Ca    8.6      09 Jul 2021 04:37  Phos  3.1     07-09  Mg     2.4     07-09    TPro  6.0  /  Alb  2.7<L>  /  TBili  0.5  /  DBili  x   /  AST  67<H>  /  ALT  39  /  AlkPhos  89  07-09    PT/INR - ( 07 Jul 2021 08:47 )   PT: 15.9 sec;   INR: 1.34          PTT - ( 09 Jul 2021 04:37 )  PTT:80.4 sec      MICROBIOLOGY:  Culture - Nocardia (07.08.21 @ 03:28)    Specimen Source: .Bronchial Bronchial Wash    Culture Results:   Testing in progress    Culture - Legionella (07.08.21 @ 03:28)    Specimen Source: .Legionella Bronchial Wash    Culture Results:   No Legionella species isolated to date    Culture - Acid Fast - Bronchial w/Smear (07.08.21 @ 03:28)    Specimen Source: .Bronchial Bronchial Wash    Acid Fast Bacilli Smear:   No acid fast bacilli seen by fluorochrome stain    Culture - Fungal, Bronchial (07.08.21 @ 03:28)    Specimen Source: .Bronchial Bronchial Wash    Culture Results:   Testing in progress    Pneumocystis jiroveci PCR (07.08.21 @ 21:46)    Pneumocystis Specimen Source: See Note: RESULT: BRONCHOALVEOLAR LAVAGE RIGHT UPPER LOBE    Pneumocystis PCR Result: Negative:    RADIOLOGY & ADDITIONAL STUDIES:  Xray Chest 1 View- PORTABLE-Routine (Xray Chest 1 View- PORTABLE-Routine in AM.) (07.09.21 @ 04:46) >  Stable positioning of endotracheal tube.. Dobbhoff tube followed to the edge of the radiograph in the region of the EG junction with the tip not being included. Stable cardiomegaly, status post median sternotomy, CABG. Bilateral opacities/pleural effusions, decreased. Stable bony structures.

## 2021-07-09 NOTE — PROGRESS NOTE ADULT - ASSESSMENT
71 yo M with a past medical history of renal transplant (on tacrolimus, unk date), complicated by low grade lymphoma post transplant, DM2, HTN, MGUS, pAF, BPH, HLD, CAD (s/p CABG 15 years ago), presents with fever, chills and worsening cough for 5 days. Patient states that he has a productive cough with brownish colored sputum. Found to have acute hypoxic respiratory failure and sepsis 2/2 likely multifocal pneumonia.    Neuro    #pt intubated and sedated  - plan to wean off of propofol today    CV  - Grade III diastolic dysfunction. EF: 60-65%    # HTN  - on home amlodipine 10mg, furosemide 20mg  - restart as needed    # HLD  - on home rosuvastatin 10mg  # CAD (s/p CABG 15 yrs ago)  - on home isosorbide mononitrate 30mg    # pAF  - d/c'd home eliquis 5mg BID. Will restart when appropriate    Pulm  # Acute hypoxic respiratory failure  Likely 2/2 multifocal pneumonia. Increasing work of breathing in ED requiring intubation on 7/6. Chest CT (7/7) showing Large airspace consolidation right upper lobe. Complete consolidation of the right lower lobe. Small subsegmental atelectasis/consolidation left upper lobe.  - AFB negative, Pneumocystis PCR negative  - Strict I's and O's  - Daily CXR      GI  -D10 at 50cc/hr when feeds not running  -Begin feeds today with glucerna 1.5 [place]      ID  # Sepsis  Patient with 4/4 SIRS criteria (febrile, tachycardic, tachypneic, and WBC) likely 2/2 multifocal pneumonia. Patient with recent Z-pack use, but no known past hospitalizations or other antibiotics in past 3 months. Labs in ED notable for lactate 1.1 and VBG 7.33, CO2 38, HCO3 20. UA with specific gravity 1.030. s/p 3L NS, Vancomycin, and Zosyn.    -AFB negative, Pneumocystis PCR negative   -continue Azithromycin 500mg to cover atypicals  - Linezolid 600mg q12h to cover MRSA   - inc zosyn to 4.5g Q6hrs as renal function has improved  - f/u urine strep  - Urine legionella negative  - Staph PCR positive  -MRSA PCR negative  -RVP negative  -COVID negative  -BCX: no growth      Renal  # Renal Transplant  - History of renal transplant on (6/2017, [facility], on tacolimus) .creatinine baseline 1.0.  - Nephrology (Dr. Leyva and Dr. Preciado) on board  - tacrolimus level sent at 10AM  - Strict I's and O's with q6h bladder scans    #Acute kidney injury- Resolved   -BUN, Cr stable this AM      # BPH  - on tamsulosin 0.4mg at home. Will restart when appropriate    Heme  # Anemia of chronic disease/ Mixed mild Iron deficiency anemia  - f/u FOBT to evaluate potential source of blood loss    #Hx of lymphoma post transplant  - consulted surgery, recommend OP lymph node Bx  - Completed excisional Lymph node Bx of Left suprclavicular nodes this AM  - on heparin drip 25U, PTT 80.4  - Heme/onc reccs PET scan and CT with contrast. Will not get CT W contrast currently d/t poor renal function    Endo  # DM2  - Pt on home lantus 45 at night and lispro 45 TID  - this AM. Last fingerstick 156  - Required 8 units of sliding scale coverage   - Dec lantus to 25 units starting tomorrow      DERM  # lluvia reticular non-blanching rash from upper thighs to knees b/l  - consult Dermatology  - no change on exam    F: none  E: replete PRN  N: NPO while on BiPAP  G: none  D:     Dispo: MICU    CODE: Full

## 2021-07-09 NOTE — CONSULT NOTE ADULT - ASSESSMENT
Full Note to Follow.    71yo M with PMH of Renal Transplant, T2DM, HTN, MGUS, CAD, and AFib p/w worsening cough now intubated for respiratory failure in the setting of PNA. Palliative consulted for complex medical decision making in the setting of critical illness.    ·	met with patient's wife at bedside, emotional support provided and questions answered  ·	wife states that even though the patient would never "want" CPR/Intubation, they would accept these interventions given the life/death implications 71yo M with PMH of Renal Transplant, T2DM, HTN, MGUS, CAD, and AFib p/w worsening cough now intubated for respiratory failure in the setting of PNA. Palliative consulted for complex medical decision making in the setting of critical illness.    ·	met with patient's wife at bedside, emotional support provided and questions answered  ·	wife states that even though the patient would never "want" CPR/Intubation, they would accept these interventions given the life/death implications -> remains Full Code

## 2021-07-09 NOTE — PROGRESS NOTE ADULT - SUBJECTIVE AND OBJECTIVE BOX
Patient is a 70y Male seen and evaluated at bedside. overnight patient spiked a fever of 100.6-; patient r      Meds:    acetaminophen    Suspension .. 650 every 6 hours PRN  azithromycin  IVPB 500 every 24 hours  azithromycin  IVPB    chlorhexidine 0.12% Liquid 15 every 12 hours  chlorhexidine 2% Cloths 1 <User Schedule>  dextrose 40% Gel 15 once  dextrose 5%. 1000 <Continuous>  dextrose 5%. 1000 <Continuous>  dextrose 50% Injectable 25 once  dextrose 50% Injectable 12.5 once  dextrose 50% Injectable 25 once  famotidine   Suspension 20 daily  glucagon  Injectable 1 once  heparin  Infusion 1900 <Continuous>  insulin glargine Injectable (LANTUS) 45 every morning  insulin regular  human corrective regimen sliding scale  every 6 hours  petrolatum Ophthalmic Ointment 1 two times a day  piperacillin/tazobactam IVPB.. 4.5 every 6 hours  polyethylene glycol 3350 17 every 24 hours  propofol Infusion 10 <Continuous>  senna 2 at bedtime      T(C): , Max: 38.1 (07-09-21 @ 05:20)  T(F): , Max: 100.6 (07-09-21 @ 05:20)  HR: 68 (07-09-21 @ 08:00)  BP: 134/71 (07-08-21 @ 13:00)  BP(mean): 97 (07-08-21 @ 13:00)  RR: 25 (07-09-21 @ 08:00)  SpO2: 99% (07-09-21 @ 08:00)  Wt(kg): --    07-08 @ 07:01  -  07-09 @ 07:00  --------------------------------------------------------  IN: 1694.8 mL / OUT: 2951 mL / NET: -1256.2 mL    07-09 @ 07:01  -  07-09 @ 09:17  --------------------------------------------------------  IN: 67.5 mL / OUT: 0 mL / NET: 67.5 mL          Review of Systems:  CONSTITUTIONAL: No fever or chills, No fatigue or tiredness  RESPIRATORY: No shortness of breath, cough, hemoptysis  CARDIOVASCULAR: No chest pain or shortness of breath  GASTROINTESTINAL: No abdominal or flank pain, No nausea or vomiting, No diarrhea  GENITOURINARY: No dysuria or urinary burning, No difficulty passing urine, No hematuria  NEUROLOGICAL: No headaches or blurred vision  SKIN: No skin rashes   MUSCULOSKELETAL: No arthralgia, No leg edema, No muscle pain      PHYSICAL EXAM:  GENERAL: well-developed, well nourished, alert, no acute distress at present  NECK: supple, No JVD  CHEST/LUNG: Clear to auscultation bilaterally  HEART: normal S1S2, RRR  ABDOMEN: Soft, Nontender, +BS, No flank tenderness bilateral  EXTREMITIES: No clubbing, cyanosis, or edema   NEUROLOGY: AAO x3, no focal neurological deficit  ACCESS: good thrill and bruit appreciated      LABS:                        9.3    16.14 )-----------( 147      ( 09 Jul 2021 04:38 )             29.7     07-09    133<L>  |  101  |  23  ----------------------------<  184<H>  4.4   |  22  |  1.24    Ca    8.6      09 Jul 2021 04:37  Phos  3.1     07-09  Mg     2.4     07-09    TPro  6.0  /  Alb  2.7<L>  /  TBili  0.5  /  DBili  x   /  AST  67<H>  /  ALT  39  /  AlkPhos  89  07-09    [place]/Lambda Free Light Chain Ratio, Serum: 0.40 Ratio [0.26 - 1.65] (07-08 @ 11:59)  [place]/Lambda Free Light Chain Ratio, Serum: 0.40 Ratio [0.26 - 1.65] (07-08 @ 11:59)    PTT - ( 09 Jul 2021 04:37 )  PTT:80.4 sec    Osmolality, Random Urine: 380 mosm/kg (07-07 @ 14:17)  Sodium, Random Urine: <20 mmol/L (07-07 @ 14:17)  Potassium, Random Urine: 47 mmol/L (07-07 @ 14:17)        RADIOLOGY & ADDITIONAL STUDIES:           Patient is a 70y Male seen and evaluated at bedside. overnight patient spiked a fever of 100.6-; patient underwent lymph node FNA yesterday; patient remains hemdodynamically stable off pressor support  urine output: 2.9L/2 hrs with net negative 1.25cc/hr  Cr down to 1.24 from 1.77 yesterday   tacro level : 7.4      Meds:    acetaminophen    Suspension .. 650 every 6 hours PRN  azithromycin  IVPB 500 every 24 hours  azithromycin  IVPB    chlorhexidine 0.12% Liquid 15 every 12 hours  chlorhexidine 2% Cloths 1 <User Schedule>  dextrose 40% Gel 15 once  dextrose 5%. 1000 <Continuous>  dextrose 5%. 1000 <Continuous>  dextrose 50% Injectable 25 once  dextrose 50% Injectable 12.5 once  dextrose 50% Injectable 25 once  famotidine   Suspension 20 daily  glucagon  Injectable 1 once  heparin  Infusion 1900 <Continuous>  insulin glargine Injectable (LANTUS) 45 every morning  insulin regular  human corrective regimen sliding scale  every 6 hours  petrolatum Ophthalmic Ointment 1 two times a day  piperacillin/tazobactam IVPB.. 4.5 every 6 hours  polyethylene glycol 3350 17 every 24 hours  propofol Infusion 10 <Continuous>  senna 2 at bedtime      T(C): , Max: 38.1 (07-09-21 @ 05:20)  T(F): , Max: 100.6 (07-09-21 @ 05:20)  HR: 68 (07-09-21 @ 08:00)  BP: 134/71 (07-08-21 @ 13:00)  BP(mean): 97 (07-08-21 @ 13:00)  RR: 25 (07-09-21 @ 08:00)  SpO2: 99% (07-09-21 @ 08:00)  Wt(kg): --    07-08 @ 07:01  -  07-09 @ 07:00  --------------------------------------------------------  IN: 1694.8 mL / OUT: 2951 mL / NET: -1256.2 mL    07-09 @ 07:01  -  07-09 @ 09:17  --------------------------------------------------------  IN: 67.5 mL / OUT: 0 mL / NET: 67.5 mL          Review of Systems:  unable to obtain as patient is intubated and sedated      PHYSICAL EXAM:  GENERAL: intubated; sedated on 45% fi02  CHEST/LUNG: rhonchi appreicated B/L  HEART: normal S1S2, RRR  ABDOMEN: Soft, Nontender, +BS  EXTREMITIES: 2+ pitting edema BL LE  SKIN: faint macular rash on BL thighs       LABS:                        9.3    16.14 )-----------( 147      ( 09 Jul 2021 04:38 )             29.7     07-09    133<L>  |  101  |  23  ----------------------------<  184<H>  4.4   |  22  |  1.24    Ca    8.6      09 Jul 2021 04:37  Phos  3.1     07-09  Mg     2.4     07-09    TPro  6.0  /  Alb  2.7<L>  /  TBili  0.5  /  DBili  x   /  AST  67<H>  /  ALT  39  /  AlkPhos  89  07-09    [place]/Lambda Free Light Chain Ratio, Serum: 0.40 Ratio [0.26 - 1.65] (07-08 @ 11:59)  [place]/Lambda Free Light Chain Ratio, Serum: 0.40 Ratio [0.26 - 1.65] (07-08 @ 11:59)    PTT - ( 09 Jul 2021 04:37 )  PTT:80.4 sec    Osmolality, Random Urine: 380 mosm/kg (07-07 @ 14:17)  Sodium, Random Urine: <20 mmol/L (07-07 @ 14:17)  Potassium, Random Urine: 47 mmol/L (07-07 @ 14:17)        RADIOLOGY & ADDITIONAL STUDIES:

## 2021-07-09 NOTE — CONSULT NOTE ADULT - SUBJECTIVE AND OBJECTIVE BOX
[facility] Geriatrics and Palliative Care  Rufino Brandon, Palliative Care Attending  Contact Info: Call [phone] (including Nights/Weekend) or message on Microsoft Teams (Rufino Brandon)    HPI:  70M with PMHx significant for renal transplant (on tacrolimus, unk date), DM2, HTN, MGUS, pAF, BPH, HLD, CAD (s/p CABG 15 years ago), presents for 3-4 days of worsening cough. Patient states that he has a productive cough with brownish colored sputum. Patient denied fever or shortness of breath at home, but on day of admission did notice that his breathing felt different and that he was breathing quicker. Per ED it was reported that he completed a Z-pack without improvement. Past history notable for COVID+ March 2020, also received pfizer vaccine x2 a few months ago. Endorses SOB, is drinking less fluid, and may be urinating less. Denies sick contacts, HA, CP, rhinorrhea, sore throat, nausea, vomiting, diarrhea, abd pain, urinary complaints, black/bloody stool, focal weakness/numbness, vision changes, neck/back pain, LE pain/swelling. Patient also reports a rash on both of his upper thighs that he developed after coming back from [place] 3 weeks ago. It does not itch, does not know if any creams or detergents were used. States that he has a dermatologist, but does not know what the diagnosis is. (05 Jul 2021 17:04)    PERTINENT PM/SXH:   Barretts esophagus  CRI (chronic renal insufficiency)  DM (diabetes mellitus)  GERD (gastroesophageal reflux disease)  HTN (hypertension)  Monoclonal gammopathy  Mandibular abscess  Paroxysmal atrial fibrillation  Benign prostatic hyperplasia, presence of lower urinary tract symptoms unspecified, unspecified morphology  Other hyperlipidemia  S/P CABG (coronary artery bypass graft)  History of surgery  Mandibular abscess    FAMILY HISTORY:  Non-contributory in first degree relatives    ITEMS NOT CHECKED ARE NOT PRESENT    SOCIAL HISTORY:   Significant other/partner:  [x]  Children:  []  Temple/Spirituality:  Substance hx:  []   Tobacco hx:  []   Alcohol hx: []   Home Opioid hx:  [] I-Stop Reference No: [id number]  06/07/2021 06/09/2021 alprazolam 0.25 mg tablet 30 30 Holley Chao [id number] [facility]   03/16/2021 03/17/2021 alprazolam 0.25 mg tablet 45 15 Arnold Hartmann MD [id number] [facility]   12/04/2020 12/07/2020 alprazolam 0.25 mg tablet 45 15 Kofi Leyva MD [id number] [facility]   12/04/2020 12/07/2020 zolpidem tartrate 10 mg tablet 30 30 Kofi Leyva MD [id number] [facility]   08/13/2020 08/13/2020 alprazolam 0.25 mg tablet 45 30 Paulina Encinas MD [id number] Insurance [facility]  Living Situation: [x]Home  []Long term care  []Rehab []Other    ADVANCE DIRECTIVES:    []MOLST  []Living Will  DECISION MAKER(s):  [] Health Care Proxy(s)  [] Surrogate(s)  [] Guardian           Name(s)/Phone Number(s):     BASELINE (I)ADLs (prior to admission):  [place]: []Total  [] Moderate []Dependent    ALLERGIES:  NKDA    MEDICATIONS  (STANDING):  azithromycin  IVPB 500 milliGRAM(s) IV Intermittent every 24 hours  azithromycin  IVPB      chlorhexidine 0.12% Liquid 15 milliLiter(s) Oral Mucosa every 12 hours  chlorhexidine 2% Cloths 1 Application(s) Topical <User Schedule>  dextrose 10%. 1000 milliLiter(s) (50 mL/Hr) IV Continuous <Continuous>  dextrose 40% Gel 15 Gram(s) Oral once  dextrose 5%. 1000 milliLiter(s) (50 mL/Hr) IV Continuous <Continuous>  dextrose 5%. 1000 milliLiter(s) (100 mL/Hr) IV Continuous <Continuous>  dextrose 50% Injectable 25 Gram(s) IV Push once  dextrose 50% Injectable 12.5 Gram(s) IV Push once  dextrose 50% Injectable 25 Gram(s) IV Push once  famotidine   Suspension 20 milliGRAM(s) Oral daily  glucagon  Injectable 1 milliGRAM(s) IntraMuscular once  heparin  Infusion 1900 Unit(s)/Hr (19 mL/Hr) IV Continuous <Continuous>  insulin regular  human corrective regimen sliding scale   SubCutaneous every 6 hours  petrolatum Ophthalmic Ointment 1 Application(s) Both EYES two times a day  piperacillin/tazobactam IVPB.. 4.5 Gram(s) IV Intermittent every 6 hours  polyethylene glycol 3350 17 Gram(s) Oral two times a day  propofol Infusion 10 MICROgram(s)/kG/Min (5.83 mL/Hr) IV Continuous <Continuous>  senna 2 Tablet(s) Oral at bedtime    MEDICATIONS  (PRN):  acetaminophen    Suspension .. 650 milliGRAM(s) Enteral Tube every 6 hours PRN Temp greater or equal to 38C (100.4F), Mild Pain (1 - 3)    PRESENT SYMPTOMS: [x]Unable to obtain due to poor mentation/encephalopathy  Source if other than patient:  []Family   []Team     Pain: [ ] yes [ ] no  QOL impact -   Location -                    Aggravating Factors -  Quality -  Radiation -  Timing -  Severity (0-10 scale) -   Minimal Acceptable Level (0-10 scale) -    PAIN AD Score: 1  http://geriatrictoolkit.[email or web address]/painad.pdf (press ctrl +  left click to view)    Dyspnea:                           []Mild  []Moderate []Severe  Anxiety:                             []Mild []Moderate []Severe  Fatigue:                             []Mild []Moderate []Severe  Nausea:                             []Mild []Moderate []Severe  Loss of Appetite:              []Mild []Moderate []Severe  Constipation:                    []Mild []Moderate []Severe    Other Symptoms:  []All other review of systems negative     Palliative Performance Status Version 2:  30%    http://npcrc.org/files/news/palliative_performance_scale_ppsv2.pdf    PHYSICAL EXAM:  GENERAL:  []Alert  []Oriented x   []Lethargic  []Cachexia  []Unarousable  []Verbal  []Non-Verbal  Behavioral:   [] Anxiety  [] Delirium [] Agitation [] Other  HEENT:  []Normal   []Dry mouth   []ET Tube/Trach  []Oral lesions  PULMONARY:   []Clear []Tachypnea  []Audible excessive secretions   []Rhonchi        []Right []Left []Bilateral  []Crackles        []Right []Left []Bilateral  []Wheezing     []Right []Left []Bilateral  CARDIOVASCULAR:    []Regular []Irregular []Tachy  []Cesar []Murmur []Other  GASTROINTESTINAL:  []Soft  []Distended   []+BS  []Non tender []Tender  []PEG []OGT/ NGT  Last BM:   07-08-21 @ 07:01  -  07-09-21 @ 07:00  --------------------------------------------------------  IN: 0 mL / OUT: 1 mL / NET: -1 mL      GENITOURINARY:  []Normal [] Incontinent   []Oliguria/Anuria   []Lucas  MUSCULOSKELETAL:   []Normal   []Weakness  []Bed/Wheelchair bound []Edema  NEUROLOGIC:   []No focal deficits  [] Cognitive impairment  [] Dysphagia []Dysarthria [] Paresis []Other   SKIN:   []Normal   []Pressure ulcer(s)  []Rash    CRITICAL CARE:  [ ] Shock Present  [ ]Septic [ ]Cardiogenic [ ]Neurologic [ ]Hypovolemic  [ ]  Vasopressors [ ]  Inotropes   [ ] Respiratory failure present [ ] Mechanical Ventilation [ ] Non-invasive ventilatory support [ ] High-Flow  [ ] Acute  [ ] Chronic [ ] Hypoxic  [ ] Hypercarbic [ ] Other  [ ] Other organ failure    Vital Signs Last 24 Hrs  T(C): 38.2 (09 Jul 2021 09:17), Max: 38.2 (09 Jul 2021 09:17)  T(F): 100.8 (09 Jul 2021 09:17), Max: 100.8 (09 Jul 2021 09:17)  HR: 64 (09 Jul 2021 12:00) (58 - 72)  BP: 134/71 (08 Jul 2021 13:00) (134/71 - 134/71)  BP(mean): 97 (08 Jul 2021 13:00) (97 - 97)  RR: 27 (09 Jul 2021 12:00) (15 - 30)  SpO2: 94% (09 Jul 2021 12:00) (94% - 100%) I&O's Summary    08 Jul 2021 07:01  -  09 Jul 2021 07:00  --------------------------------------------------------  IN: 1694.8 mL / OUT: 2951 mL / NET: -1256.2 mL    09 Jul 2021 07:01  -  09 Jul 2021 12:59  --------------------------------------------------------  IN: 67.5 mL / OUT: 0 mL / NET: 67.5 mL    LABS:                        9.3    16.14 )-----------( 147      ( 09 Jul 2021 04:38 )             29.7   07-09    133<L>  |  101  |  23  ----------------------------<  184<H>  4.4   |  22  |  1.24    Ca    8.6      09 Jul 2021 04:37  Phos  3.1     07-09  Mg     2.4     07-09    TPro  6.0  /  Alb  2.7<L>  /  TBili  0.5  /  DBili  x   /  AST  67<H>  /  ALT  39  /  AlkPhos  89  07-09    RADIOLOGY & ADDITIONAL STUDIES:  < from: Xray Chest 1 View- PORTABLE-Routine (Xray Chest 1 View- PORTABLE-Routine in AM.) (07.09.21 @ 04:46) >  Stable positioning of endotracheal tube.. Dobbhoff tube followed to the edge of the radiograph in the region of the EG junction with the tip not being included. Stable cardiomegaly, status post median sternotomy, CABG. Bilateral opacities/pleural effusions, decreased. Stable bony structures.    PROTEIN CALORIE MALNUTRITION PRESENT: [ ]mild [ ]moderate [ ]severe [ ]underweight [ ]morbid obesity  []PPSV2 < or = to 30% []significant weight loss  []poor nutritional intake []catabolic state []anasarca     Albumin, Serum: 2.7 g/dL (07-09-21 @ 04:37)  Artificial Nutrition []     REFERRALS:  [x]Social Work  []Case management []PT/OT []Chaplaincy  []Hospice  []Patient/Family Support    Care Coordination/Goals of Care Document: None            [facility] Geriatrics and Palliative Care  Rufino Brandon, Palliative Care Attending  Contact Info: Call [phone] (including Nights/Weekend) or message on Microsoft Teams (Rufino Brandon)    HPI:  70M with PMHx significant for renal transplant (on tacrolimus, unk date), DM2, HTN, MGUS, pAF, BPH, HLD, CAD (s/p CABG 15 years ago), presents for 3-4 days of worsening cough. Patient states that he has a productive cough with brownish colored sputum. Patient denied fever or shortness of breath at home, but on day of admission did notice that his breathing felt different and that he was breathing quicker. Per ED it was reported that he completed a Z-pack without improvement. Past history notable for COVID+ March 2020, also received pfizer vaccine x2 a few months ago. Endorses SOB, is drinking less fluid, and may be urinating less. Denies sick contacts, HA, CP, rhinorrhea, sore throat, nausea, vomiting, diarrhea, abd pain, urinary complaints, black/bloody stool, focal weakness/numbness, vision changes, neck/back pain, LE pain/swelling. Patient also reports a rash on both of his upper thighs that he developed after coming back from [place] 3 weeks ago. It does not itch, does not know if any creams or detergents were used. States that he has a dermatologist, but does not know what the diagnosis is. (05 Jul 2021 17:04)    PERTINENT PM/SXH:   Barretts esophagus  CRI (chronic renal insufficiency)  DM (diabetes mellitus)  GERD (gastroesophageal reflux disease)  HTN (hypertension)  Monoclonal gammopathy  Mandibular abscess  Paroxysmal atrial fibrillation  Benign prostatic hyperplasia, presence of lower urinary tract symptoms unspecified, unspecified morphology  Other hyperlipidemia  S/P CABG (coronary artery bypass graft)  History of surgery  Mandibular abscess    FAMILY HISTORY:  Non-contributory in first degree relatives    ITEMS NOT CHECKED ARE NOT PRESENT    SOCIAL HISTORY:   Significant other/partner:  [x]  Children:  [x]  Worship/Spirituality: Roman Catholic  Substance hx:  []   Tobacco hx:  []   Alcohol hx: []   Home Opioid hx:  [] I-Stop Reference No: [id number]  06/07/2021 06/09/2021 alprazolam 0.25 mg tablet 30 30 Holley Chao [id number] [facility]   03/16/2021 03/17/2021 alprazolam 0.25 mg tablet 45 15 Arnold Hartmann MD [id number] [facility]   12/04/2020 12/07/2020 alprazolam 0.25 mg tablet 45 15 Kofi Leyva MD [id number] [facility]   12/04/2020 12/07/2020 zolpidem tartrate 10 mg tablet 30 30 Kofi Leyva MD [id number] Insurance [facility]   08/13/2020 08/13/2020 alprazolam 0.25 mg tablet 45 30 Paulina Encinas MD [id number] Insurance [facility]  Living Situation: [x]Home  []Long term care  []Rehab []Other    ADVANCE DIRECTIVES:    []MOLST  []Living Will  DECISION MAKER(s):  [] Health Care Proxy(s)  [] Surrogate(s)  [] Guardian           Name(s)/Phone Number(s):     BASELINE (I)ADLs (prior to admission):  [place]: []Total  [x] Moderate []Dependent    ALLERGIES:  NKDA    MEDICATIONS  (STANDING):  azithromycin  IVPB 500 milliGRAM(s) IV Intermittent every 24 hours  azithromycin  IVPB      chlorhexidine 0.12% Liquid 15 milliLiter(s) Oral Mucosa every 12 hours  chlorhexidine 2% Cloths 1 Application(s) Topical <User Schedule>  dextrose 10%. 1000 milliLiter(s) (50 mL/Hr) IV Continuous <Continuous>  dextrose 40% Gel 15 Gram(s) Oral once  dextrose 5%. 1000 milliLiter(s) (50 mL/Hr) IV Continuous <Continuous>  dextrose 5%. 1000 milliLiter(s) (100 mL/Hr) IV Continuous <Continuous>  dextrose 50% Injectable 25 Gram(s) IV Push once  dextrose 50% Injectable 12.5 Gram(s) IV Push once  dextrose 50% Injectable 25 Gram(s) IV Push once  famotidine   Suspension 20 milliGRAM(s) Oral daily  glucagon  Injectable 1 milliGRAM(s) IntraMuscular once  heparin  Infusion 1900 Unit(s)/Hr (19 mL/Hr) IV Continuous <Continuous>  insulin regular  human corrective regimen sliding scale   SubCutaneous every 6 hours  petrolatum Ophthalmic Ointment 1 Application(s) Both EYES two times a day  piperacillin/tazobactam IVPB.. 4.5 Gram(s) IV Intermittent every 6 hours  polyethylene glycol 3350 17 Gram(s) Oral two times a day  propofol Infusion 10 MICROgram(s)/kG/Min (5.83 mL/Hr) IV Continuous <Continuous>  senna 2 Tablet(s) Oral at bedtime    MEDICATIONS  (PRN):  acetaminophen    Suspension .. 650 milliGRAM(s) Enteral Tube every 6 hours PRN Temp greater or equal to 38C (100.4F), Mild Pain (1 - 3)    PRESENT SYMPTOMS: [x]Unable to obtain due to poor mentation/encephalopathy  Source if other than patient:  []Family   []Team     Pain: [ ] yes [ ] no  QOL impact -   Location -                    Aggravating Factors -  Quality -  Radiation -  Timing -  Severity (0-10 scale) -   Minimal Acceptable Level (0-10 scale) -    PAIN AD Score: 1  http://geriatrictoolkit.[facility]/cog/painad.pdf (press ctrl +  left click to view)    Dyspnea:                           []Mild  []Moderate []Severe  Anxiety:                             []Mild []Moderate []Severe  Fatigue:                             []Mild []Moderate []Severe  Nausea:                             []Mild []Moderate []Severe  Loss of Appetite:              []Mild []Moderate []Severe  Constipation:                    []Mild []Moderate []Severe    Other Symptoms:  []All other review of systems negative     Palliative Performance Status Version 2:  30%    http://npcrc.org/files/news/palliative_performance_scale_ppsv2.pdf    PHYSICAL EXAM:  GENERAL:  []Alert  []Oriented x   [x]Lethargic  []Cachexia  []Unarousable  []Verbal  [x]Non-Verbal  Behavioral:   [] Anxiety  [x] Delirium [] Agitation [] Other  HEENT:  []Normal   []Dry mouth   [x]ET Tube/Trach  []Oral lesions  PULMONARY:   []Clear []Tachypnea  []Audible excessive secretions   [x]Rhonchi        [x]Right []Left []Bilateral  []Crackles        []Right []Left []Bilateral  []Wheezing     []Right []Left []Bilateral  CARDIOVASCULAR:    [x]Regular []Irregular []Tachy  []Cesar []Murmur []Other  GASTROINTESTINAL:  [x]Soft  [x]Distended   [x]+BS  [x]Non tender []Tender  []PEG [x]OGT/ NGT  Last BM:   07-08-21 @ 07:01  -  07-09-21 @ 07:00  --------------------------------------------------------  IN: 0 mL / OUT: 1 mL / NET: -1 mL  GENITOURINARY:  [x]Normal [] Incontinent   []Oliguria/Anuria   []Lucas  MUSCULOSKELETAL:   []Normal   []Weakness  [x]Bed/Wheelchair bound []Edema  NEUROLOGIC:   []No focal deficits  [] Cognitive impairment  [] Dysphagia []Dysarthria [] Paresis [x]Encephalopathic  SKIN:   [x]Normal   []Pressure ulcer(s)  []Rash    CRITICAL CARE:  [ ] Shock Present  [ ]Septic [ ]Cardiogenic [ ]Neurologic [ ]Hypovolemic  [ ]  Vasopressors [ ]  Inotropes   [ ] Respiratory failure present [ ] Mechanical Ventilation [ ] Non-invasive ventilatory support [ ] High-Flow  [ ] Acute  [ ] Chronic [ ] Hypoxic  [ ] Hypercarbic [ ] Other  [ ] Other organ failure    Vital Signs Last 24 Hrs  T(C): 38.2 (09 Jul 2021 09:17), Max: 38.2 (09 Jul 2021 09:17)  T(F): 100.8 (09 Jul 2021 09:17), Max: 100.8 (09 Jul 2021 09:17)  HR: 64 (09 Jul 2021 12:00) (58 - 72)  BP: 134/71 (08 Jul 2021 13:00) (134/71 - 134/71)  BP(mean): 97 (08 Jul 2021 13:00) (97 - 97)  RR: 27 (09 Jul 2021 12:00) (15 - 30)  SpO2: 94% (09 Jul 2021 12:00) (94% - 100%) I&O's Summary    08 Jul 2021 07:01  -  09 Jul 2021 07:00  --------------------------------------------------------  IN: 1694.8 mL / OUT: 2951 mL / NET: -1256.2 mL    09 Jul 2021 07:01  -  09 Jul 2021 12:59  --------------------------------------------------------  IN: 67.5 mL / OUT: 0 mL / NET: 67.5 mL    LABS:                        9.3    16.14 )-----------( 147      ( 09 Jul 2021 04:38 )             29.7   07-09    133<L>  |  101  |  23  ----------------------------<  184<H>  4.4   |  22  |  1.24    Ca    8.6      09 Jul 2021 04:37  Phos  3.1     07-09  Mg     2.4     07-09    TPro  6.0  /  Alb  2.7<L>  /  TBili  0.5  /  DBili  x   /  AST  67<H>  /  ALT  39  /  AlkPhos  89  07-09    RADIOLOGY & ADDITIONAL STUDIES:  < from: Xray Chest 1 View- PORTABLE-Routine (Xray Chest 1 View- PORTABLE-Routine in AM.) (07.09.21 @ 04:46) >  Stable positioning of endotracheal tube.. Dobbhoff tube followed to the edge of the radiograph in the region of the EG junction with the tip not being included. Stable cardiomegaly, status post median sternotomy, CABG. Bilateral opacities/pleural effusions, decreased. Stable bony structures.    PROTEIN CALORIE MALNUTRITION PRESENT: [ ]mild [ ]moderate [ ]severe [ ]underweight [ ]morbid obesity  []PPSV2 < or = to 30% []significant weight loss  []poor nutritional intake []catabolic state []anasarca     Albumin, Serum: 2.7 g/dL (07-09-21 @ 04:37)  Artificial Nutrition []     REFERRALS:  [x]Social Work  []Case management []PT/OT []Chaplaincy  []Hospice  []Patient/Family Support    Care Coordination/Goals of Care Document: None    PALLIATIVE MEDICINE COORDINATION OF CARE DOCUMENTATION: [x] Inpatient Consult  Non-Face-to-Face prolonged service provided that relates to (face-to-face) care that has or will occur and ongoing patient management, including one or more of the following: - Reviewed documentation from other physicians and other health care professional services - Reviewed medical records and diagnostic / radiology study results - Coordination with patient's support system  ************************************************************************  MEDICATION REVIEW:  - See Medication List Above    ISTOP REFERENCE: [id number]  - chronic Xanax PRN Rx's  - PRN usage: NO PRN'S  ------------------------------------------------------------------------  COORDINATION OF CARE:  - Palliative Care consulted for: GO  - Patient (to be) assessed: 7/9/2021  - Patient previously seen by Palliative Care service: NO    ADVANCE CARE PLANNING  - Code status: FULL  - MOLST reviewed in chart: NONE; None found on Alpha  - HCP/ Surrogate: NONE found on Alpha  - GO documents: NONE found on Alpha  - HCP/ Living will/Other Advanced Directives in Alpha: NONE found on Alpha  ------------------------------------------------------------------------  CARE PROVIDER DOCUMENTATION:  - SW/CM notes: Remains medically active  - Renal notes: will touch base with heme-onc regarding goal tacro level given concern for post-transplant lymphoproliferative disorder   - ID notes: known to have persistent low level EBV viremia. Now with c/f PNA as well as PTLD; f/u BAL cx including Nocardia culture; f/u axillary LN bx; EBV and CMV PCR pending; urine S. pneumoniae Ag; continue empiric Zosyn and azithromycin; d/c linezolid  - IR notes: consulted for lymph node FNA given finding of lymphadenopathy and splenomegaly in setting of immunosuppression and MGUS  - Heme/Onc notes: CT A/P with IV Contrast for evaluation of abdominopelvic lymphadenopathy and splenomegaly. whole-body PET-CT to complete staging. hold off Surgery consult for excisional lymph node biopsy until lymph nodes are properly evaluated.    PLAN OF CARE  - Known admissions in past year: 0  - Current admit date: 7/5/21  - LOS: 4  - LACE score: 12  - Current dispo plan: TO BE DETERMINED  ------------------------------------------------------------------------  - Time Spent/Chart reviewed: 31 Minutes [including time used to gather, review and transfer data]  - Start: 12:30PM  - End: 1:01PM    Prolonged services rendered, as part of this patient's care provided by Palliative Medicine, include: i. chart review for provider and ancillary service documentation, ii. pertinent diagnostics including laboratory and imaging studies, iii. medication review including PRN use, iv. admission history including previous palliative care encounters and GOC notes, v. advance care planning documents including HCP and MOLST forms in Alpha. Part of Palliative Medicine extended evaluation and management also involves coordination of care with our IDT, the primary and consulting teams, and unit /SW and Hospice if eligible. Recommendations based on the information gathered and discussed are outlined in the A/P of Palliative notes.

## 2021-07-09 NOTE — CONSULT NOTE ADULT - PROBLEM SELECTOR RECOMMENDATION 4
.  Patient is Full Code  -discussed with patient's wife at bedside, she states they feel obligated to accept all indicated medical interventions to prolong patient's life

## 2021-07-09 NOTE — CONSULT NOTE ADULT - PROBLEM SELECTOR RECOMMENDATION 5
.  Complex medical decision making in the setting of critical illness.    Will continue to follow for ongoing GOC discussion and emotional support.   Emotional support provided, questions answered.  Active Psychosocial Referrals: SW    For new or uncontrolled symptoms, please call Palliative Care at [phone]. The service is available 24/7 (including nights & weekends) to provide symptom management recommendations over the phone as appropriate

## 2021-07-10 DIAGNOSIS — N17.9 ACUTE KIDNEY FAILURE, UNSPECIFIED: ICD-10-CM

## 2021-07-10 LAB
A FLAVUS AB FLD QL: NEGATIVE — SIGNIFICANT CHANGE UP
A NIGER AB FLD QL: NEGATIVE — SIGNIFICANT CHANGE UP
A NIGER AB FLD QL: NEGATIVE — SIGNIFICANT CHANGE UP
ALBUMIN SERPL ELPH-MCNC: 2.9 G/DL — LOW (ref 3.3–5)
ALP SERPL-CCNC: 91 U/L — SIGNIFICANT CHANGE UP (ref 40–120)
ALT FLD-CCNC: 35 U/L — SIGNIFICANT CHANGE UP (ref 10–45)
ANION GAP SERPL CALC-SCNC: 10 MMOL/L — SIGNIFICANT CHANGE UP (ref 5–17)
ANISOCYTOSIS BLD QL: SLIGHT — SIGNIFICANT CHANGE UP
APTT BLD: 97.6 SEC — HIGH (ref 27.5–35.5)
AST SERPL-CCNC: 48 U/L — HIGH (ref 10–40)
BASE EXCESS BLDA CALC-SCNC: 0.1 MMOL/L — SIGNIFICANT CHANGE UP (ref -2–3)
BASOPHILS # BLD AUTO: 0 K/UL — SIGNIFICANT CHANGE UP (ref 0–0.2)
BASOPHILS NFR BLD AUTO: 0 % — SIGNIFICANT CHANGE UP (ref 0–2)
BILIRUB SERPL-MCNC: 0.4 MG/DL — SIGNIFICANT CHANGE UP (ref 0.2–1.2)
BUN SERPL-MCNC: 18 MG/DL — SIGNIFICANT CHANGE UP (ref 7–23)
CA-I BLDA-SCNC: 1.17 MMOL/L — SIGNIFICANT CHANGE UP (ref 1.15–1.33)
CALCIUM SERPL-MCNC: 8.5 MG/DL — SIGNIFICANT CHANGE UP (ref 8.4–10.5)
CHLORIDE SERPL-SCNC: 104 MMOL/L — SIGNIFICANT CHANGE UP (ref 96–108)
CO2 BLDA-SCNC: 26 MMOL/L — HIGH (ref 19–24)
CO2 SERPL-SCNC: 23 MMOL/L — SIGNIFICANT CHANGE UP (ref 22–31)
COHGB MFR BLDA: 1.1 % — SIGNIFICANT CHANGE UP
CREAT SERPL-MCNC: 1.07 MG/DL — SIGNIFICANT CHANGE UP (ref 0.5–1.3)
CULTURE RESULTS: SIGNIFICANT CHANGE UP
CULTURE RESULTS: SIGNIFICANT CHANGE UP
DACRYOCYTES BLD QL SMEAR: SLIGHT — SIGNIFICANT CHANGE UP
EOSINOPHIL # BLD AUTO: 0.12 K/UL — SIGNIFICANT CHANGE UP (ref 0–0.5)
EOSINOPHIL NFR BLD AUTO: 0.9 % — SIGNIFICANT CHANGE UP (ref 0–6)
GLUCOSE BLDC GLUCOMTR-MCNC: 137 MG/DL — HIGH (ref 70–99)
GLUCOSE BLDC GLUCOMTR-MCNC: 141 MG/DL — HIGH (ref 70–99)
GLUCOSE BLDC GLUCOMTR-MCNC: 152 MG/DL — HIGH (ref 70–99)
GLUCOSE BLDC GLUCOMTR-MCNC: 160 MG/DL — HIGH (ref 70–99)
GLUCOSE SERPL-MCNC: 156 MG/DL — HIGH (ref 70–99)
H CAPSUL AG SER IA-MCNC: SIGNIFICANT CHANGE UP
HCO3 BLDA-SCNC: 25 MMOL/L — SIGNIFICANT CHANGE UP (ref 21–28)
HCT VFR BLD CALC: 30.4 % — LOW (ref 39–50)
HGB BLD-MCNC: 9.3 G/DL — LOW (ref 13–17)
HGB BLDA-MCNC: <6.5 G/DL — CRITICAL LOW (ref 12.6–17.4)
HYPOCHROMIA BLD QL: SLIGHT — SIGNIFICANT CHANGE UP
LYMPHOCYTES # BLD AUTO: 10.82 K/UL — HIGH (ref 1–3.3)
LYMPHOCYTES # BLD AUTO: 79.3 % — HIGH (ref 13–44)
MACROCYTES BLD QL: SLIGHT — SIGNIFICANT CHANGE UP
MAGNESIUM SERPL-MCNC: 2.5 MG/DL — SIGNIFICANT CHANGE UP (ref 1.6–2.6)
MANUAL SMEAR VERIFICATION: SIGNIFICANT CHANGE UP
MCHC RBC-ENTMCNC: 29.2 PG — SIGNIFICANT CHANGE UP (ref 27–34)
MCHC RBC-ENTMCNC: 30.6 GM/DL — LOW (ref 32–36)
MCV RBC AUTO: 95.6 FL — SIGNIFICANT CHANGE UP (ref 80–100)
METHGB MFR BLDA: 0.2 % — SIGNIFICANT CHANGE UP
MICROCYTES BLD QL: SLIGHT — SIGNIFICANT CHANGE UP
MONOCYTES # BLD AUTO: 0 K/UL — SIGNIFICANT CHANGE UP (ref 0–0.9)
MONOCYTES NFR BLD AUTO: 0 % — LOW (ref 2–14)
MYELOCYTES NFR BLD: 0.9 % — HIGH (ref 0–0)
NEUTROPHILS # BLD AUTO: 2.58 K/UL — SIGNIFICANT CHANGE UP (ref 1.8–7.4)
NEUTROPHILS NFR BLD AUTO: 18 % — LOW (ref 43–77)
NEUTS BAND # BLD: 0.9 % — SIGNIFICANT CHANGE UP (ref 0–8)
OVALOCYTES BLD QL SMEAR: SLIGHT — SIGNIFICANT CHANGE UP
OXYHGB MFR BLDA: 96.4 % — HIGH (ref 90–95)
PCO2 BLDA: 39 MMHG — SIGNIFICANT CHANGE UP (ref 35–48)
PH BLDA: 7.41 — SIGNIFICANT CHANGE UP (ref 7.35–7.45)
PHOSPHATE SERPL-MCNC: 3.5 MG/DL — SIGNIFICANT CHANGE UP (ref 2.5–4.5)
PLAT MORPH BLD: NORMAL — SIGNIFICANT CHANGE UP
PLATELET # BLD AUTO: 143 K/UL — LOW (ref 150–400)
PO2 BLDA: 79 MMHG — LOW (ref 83–108)
POIKILOCYTOSIS BLD QL AUTO: SIGNIFICANT CHANGE UP
POLYCHROMASIA BLD QL SMEAR: SLIGHT — SIGNIFICANT CHANGE UP
POTASSIUM BLDA-SCNC: 4.4 MMOL/L — SIGNIFICANT CHANGE UP (ref 3.5–5.1)
POTASSIUM SERPL-MCNC: 4.4 MMOL/L — SIGNIFICANT CHANGE UP (ref 3.5–5.3)
POTASSIUM SERPL-SCNC: 4.4 MMOL/L — SIGNIFICANT CHANGE UP (ref 3.5–5.3)
PROT SERPL-MCNC: 6 G/DL — SIGNIFICANT CHANGE UP (ref 6–8.3)
RBC # BLD: 3.18 M/UL — LOW (ref 4.2–5.8)
RBC # FLD: 16.1 % — HIGH (ref 10.3–14.5)
RBC BLD AUTO: ABNORMAL
SAO2 % BLDA: 97.7 % — SIGNIFICANT CHANGE UP (ref 94–98)
SMUDGE CELLS # BLD: PRESENT — SIGNIFICANT CHANGE UP
SODIUM BLDA-SCNC: 136 MMOL/L — SIGNIFICANT CHANGE UP (ref 136–145)
SODIUM SERPL-SCNC: 137 MMOL/L — SIGNIFICANT CHANGE UP (ref 135–145)
SPECIMEN SOURCE: SIGNIFICANT CHANGE UP
SPECIMEN SOURCE: SIGNIFICANT CHANGE UP
SPHEROCYTES BLD QL SMEAR: SLIGHT — SIGNIFICANT CHANGE UP
TACROLIMUS SERPL-MCNC: 3.4 NG/ML — SIGNIFICANT CHANGE UP
WBC # BLD: 13.65 K/UL — HIGH (ref 3.8–10.5)
WBC # FLD AUTO: 13.65 K/UL — HIGH (ref 3.8–10.5)

## 2021-07-10 PROCEDURE — 99232 SBSQ HOSP IP/OBS MODERATE 35: CPT

## 2021-07-10 PROCEDURE — 99233 SBSQ HOSP IP/OBS HIGH 50: CPT | Mod: GC

## 2021-07-10 PROCEDURE — 71045 X-RAY EXAM CHEST 1 VIEW: CPT | Mod: 26

## 2021-07-10 RX ORDER — DEXTROSE 10 % IN WATER 10 %
1000 INTRAVENOUS SOLUTION INTRAVENOUS
Refills: 0 | Status: DISCONTINUED | OUTPATIENT
Start: 2021-07-10 | End: 2021-07-10

## 2021-07-10 RX ORDER — LANOLIN ALCOHOL/MO/W.PET/CERES
5 CREAM (GRAM) TOPICAL AT BEDTIME
Refills: 0 | Status: DISCONTINUED | OUTPATIENT
Start: 2021-07-10 | End: 2021-07-13

## 2021-07-10 RX ADMIN — PIPERACILLIN AND TAZOBACTAM 200 GRAM(S): 4; .5 INJECTION, POWDER, LYOPHILIZED, FOR SOLUTION INTRAVENOUS at 06:25

## 2021-07-10 RX ADMIN — Medication 1 APPLICATION(S): at 10:33

## 2021-07-10 RX ADMIN — TACROLIMUS 0.5 MILLIGRAM(S): 5 CAPSULE ORAL at 06:24

## 2021-07-10 RX ADMIN — INSULIN HUMAN 2: 100 INJECTION, SOLUTION SUBCUTANEOUS at 18:37

## 2021-07-10 RX ADMIN — FAMOTIDINE 20 MILLIGRAM(S): 10 INJECTION INTRAVENOUS at 11:55

## 2021-07-10 RX ADMIN — CHLORHEXIDINE GLUCONATE 15 MILLILITER(S): 213 SOLUTION TOPICAL at 10:33

## 2021-07-10 RX ADMIN — PIPERACILLIN AND TAZOBACTAM 200 GRAM(S): 4; .5 INJECTION, POWDER, LYOPHILIZED, FOR SOLUTION INTRAVENOUS at 23:15

## 2021-07-10 RX ADMIN — PIPERACILLIN AND TAZOBACTAM 200 GRAM(S): 4; .5 INJECTION, POWDER, LYOPHILIZED, FOR SOLUTION INTRAVENOUS at 11:55

## 2021-07-10 RX ADMIN — HEPARIN SODIUM 19 UNIT(S)/HR: 5000 INJECTION INTRAVENOUS; SUBCUTANEOUS at 17:20

## 2021-07-10 RX ADMIN — Medication 5 MILLIGRAM(S): at 23:49

## 2021-07-10 RX ADMIN — INSULIN GLARGINE 25 UNIT(S): 100 INJECTION, SOLUTION SUBCUTANEOUS at 06:41

## 2021-07-10 RX ADMIN — TACROLIMUS 0.5 MILLIGRAM(S): 5 CAPSULE ORAL at 17:21

## 2021-07-10 RX ADMIN — Medication 50 MILLILITER(S): at 19:27

## 2021-07-10 RX ADMIN — PIPERACILLIN AND TAZOBACTAM 200 GRAM(S): 4; .5 INJECTION, POWDER, LYOPHILIZED, FOR SOLUTION INTRAVENOUS at 17:20

## 2021-07-10 RX ADMIN — Medication 1 APPLICATION(S): at 22:51

## 2021-07-10 RX ADMIN — INSULIN HUMAN 2: 100 INJECTION, SOLUTION SUBCUTANEOUS at 00:36

## 2021-07-10 RX ADMIN — CHLORHEXIDINE GLUCONATE 1 APPLICATION(S): 213 SOLUTION TOPICAL at 06:25

## 2021-07-10 RX ADMIN — Medication 650 MILLIGRAM(S): at 00:31

## 2021-07-10 NOTE — PROGRESS NOTE ADULT - PROBLEM SELECTOR PLAN 1
probably from ischemic ATN  has resolved to baseline serum creatinine of 1.0  Tacolimus dose decreased to 0.5mg suspension BID  continue to follow levels daily

## 2021-07-10 NOTE — PROGRESS NOTE ADULT - SUBJECTIVE AND OBJECTIVE BOX
INTERVAL HPI/OVERNIGHT EVENTS: Extubated. Fever.    CONSTITUTIONAL:  Negative fever or chills, feels well, good appetite  EYES:  Negative  blurry vision or double vision  CARDIOVASCULAR:  Negative for chest pain or palpitations  RESPIRATORY:  Negative for cough, wheezing, or SOB   GASTROINTESTINAL:  Negative for nausea, vomiting, diarrhea, constipation, or abdominal pain  GENITOURINARY:  Negative frequency, urgency or dysuria  NEUROLOGIC:  No headache, confusion, dizziness, lightheadedness      ANTIBIOTICS/RELEVANT:    MEDICATIONS  (STANDING):  chlorhexidine 2% Cloths 1 Application(s) Topical <User Schedule>  dextrose 40% Gel 15 Gram(s) Oral once  dextrose 5%. 1000 milliLiter(s) (50 mL/Hr) IV Continuous <Continuous>  dextrose 5%. 1000 milliLiter(s) (100 mL/Hr) IV Continuous <Continuous>  dextrose 50% Injectable 25 Gram(s) IV Push once  dextrose 50% Injectable 12.5 Gram(s) IV Push once  dextrose 50% Injectable 25 Gram(s) IV Push once  famotidine   Suspension 20 milliGRAM(s) Oral daily  glucagon  Injectable 1 milliGRAM(s) IntraMuscular once  heparin  Infusion 1900 Unit(s)/Hr (19 mL/Hr) IV Continuous <Continuous>  insulin glargine Injectable (LANTUS) 25 Unit(s) SubCutaneous every morning  insulin regular  human corrective regimen sliding scale   SubCutaneous every 6 hours  petrolatum Ophthalmic Ointment 1 Application(s) Both EYES two times a day  piperacillin/tazobactam IVPB.. 4.5 Gram(s) IV Intermittent every 6 hours  polyethylene glycol 3350 17 Gram(s) Oral two times a day  propofol Infusion 10 MICROgram(s)/kG/Min (5.83 mL/Hr) IV Continuous <Continuous>  senna 2 Tablet(s) Oral at bedtime  tacrolimus    0.5 mG/mL Suspension 0.5 milliGRAM(s) Oral two times a day    MEDICATIONS  (PRN):  acetaminophen    Suspension .. 650 milliGRAM(s) Enteral Tube every 6 hours PRN Temp greater or equal to 38C (100.4F), Mild Pain (1 - 3)        Vital Signs Last 24 Hrs  T(C): 37.7 (10 Jul 2021 14:29), Max: 38.8 (10 Jul 2021 04:58)  T(F): 99.9 (10 Jul 2021 14:29), Max: 101.9 (10 Jul 2021 04:58)  HR: 61 (10 Jul 2021 16:00) (52 - 68)  BP: --  BP(mean): --  RR: 32 (10 Jul 2021 16:00) (14 - 35)  SpO2: 95% (10 Jul 2021 16:00) (93% - 98%)    PHYSICAL EXAM:  Constitutional:Well-developed, well nourished  Eyes:FADY, EOMI  Ear/Nose/Throat: no oral lesion, no sinus tenderness on percussion	  Neck:no JVD, no lymphadenopathy, supple  Respiratory: CTA dm  Cardiovascular: S1S2 RRR, no murmurs  Gastrointestinal:soft, (+) BS, no HSM  Extremities:no e/e/c  Vascular: DP Pulse:	right normal; left normal      LABS:                        9.3    13.65 )-----------( 143      ( 10 Jul 2021 06:01 )             30.4     07-10    137  |  104  |  18  ----------------------------<  156<H>  4.4   |  23  |  1.07    Ca    8.5      10 Jul 2021 06:01  Phos  3.5     07-10  Mg     2.5     07-10    TPro  6.0  /  Alb  2.9<L>  /  TBili  0.4  /  DBili  x   /  AST  48<H>  /  ALT  35  /  AlkPhos  91  07-10    PTT - ( 10 Jul 2021 06:01 )  PTT:97.6 sec      MICROBIOLOGY: reviewed    RADIOLOGY & ADDITIONAL STUDIES: reviewed

## 2021-07-10 NOTE — PROGRESS NOTE ADULT - ASSESSMENT
69 yo M with a past medical history of renal transplant (on tacrolimus, unk date), complicated by low grade lymphoma post transplant, DM2, HTN, MGUS, pAF, BPH, HLD, CAD (s/p CABG 15 years ago), presents with fever, chills and worsening cough for 5 days. Patient states that he has a productive cough with brownish colored sputum. Found to have acute hypoxic respiratory failure and sepsis 2/2 likely multifocal pneumonia.    Neuro  #pt intubated and sedated  - d/c propofol today  - patient passed SBT for which he was extubated and started on CPAP and subsequently placed on HFNC    CV  - Grade III diastolic dysfunction. EF: 60-65%    # HTN  - on home amlodipine 10mg, furosemide 20mg  - restart as needed    # HLD  - on home rosuvastatin 10mg    # CAD (s/p CABG 15 yrs ago)  - on home isosorbide mononitrate 30mg    # pAF  - d/c'd home eliquis 5mg BID. Will restart when appropriate    Pulm  # Acute hypoxic respiratory failure  Likely 2/2 multifocal pneumonia. Increasing work of breathing in ED requiring intubation on 7/6. Chest CT (7/7) showing Large airspace consolidation right upper lobe. Complete consolidation of the right lower lobe. Small subsegmental atelectasis/consolidation left upper lobe.  - AFB negative, Pneumocystis PCR negative  - Strict I's and O's  - Daily CXR  - extubated today, currently on HFNC      GI  -D10 at 50cc/hr when feeds not running  -Begin feeds today with glucerna 1.5 [place]       ID  # Sepsis  Patient with 4/4 SIRS criteria (febrile, tachycardic, tachypneic, and WBC) likely 2/2 multifocal pneumonia. Patient with recent Z-pack use, but no known past hospitalizations or other antibiotics in past 3 months. Labs in ED notable for lactate 1.1 and VBG 7.33, CO2 38, HCO3 20. UA with specific gravity 1.030. s/p 3L NS, Vancomycin, and Zosyn.  - AFB negative, Pneumocystis PCR negative   - zosyn to 4.5g Q6hrs as renal function has improved  - f/u urine strep  - Urine legionella negative  - Staph PCR positive  - MRSA PCR negative  - RVP negative  - COVID negative  - BCX: no growth  - Bronchial lavage still pending results      Renal  # Renal Transplant  - History of renal transplant on (6/2017, [facility], on tacolimus) .creatinine baseline 1.0.  - Nephrology (Dr. Leyva and Dr. Preciado) on board  - tacrolimus level sent for tomorrow am  - Strict I's and O's with q6h bladder scans    #Acute kidney injury- Resolved   -BUN, Cr stable this AM      # BPH  - on tamsulosin 0.4mg at home. Will restart when appropriate    Heme  #Hx of lymphoma post transplant  - consulted surgery, recommend OP lymph node Bx  - Completed excisional Lymph node Bx of Left suprclavicular nodes- pending result  - on heparin drip 25U, PTT 97.6  - Heme/onc reccs PET scan and CT with contrast. Will not get CT W contrast currently d/t poor renal function  - Flow cytometry 07/08/21: B-cell lymphoma and are suggestive of chronic lymphocytic leukemia/small lymphocytic lymphoma    # Anemia of chronic disease/ Mixed mild Iron deficiency anemia      Endo  # DM2  - Pt on home lantus 45 at night and lispro 45 TID  -  this AM. CMP glucose 156  - sliding scale coverage   - lantus to 25 units in the morning      DERM  # lluvia reticular non-blanching rash from upper thighs to knees b/l  - consult Dermatology  - no change on exam    F: none  E: replete PRN  N: NPO with tube feed       Dispo: MICU    CODE: Full

## 2021-07-10 NOTE — PROGRESS NOTE ADULT - SUBJECTIVE AND OBJECTIVE BOX
CC: SEPSIS MULTIFOCALPNEUMONIA        INTERVAL HISTORY:  intubated but awake and communicative      ROS: No chest pain, no sob, no abd pain. No n/v/d    PAST MEDICAL & SURGICAL HISTORY:  Barretts esophagus    CRI (chronic renal insufficiency)    DM (diabetes mellitus)    GERD (gastroesophageal reflux disease)    HTN (hypertension)    Monoclonal gammopathy    Mandibular abscess    Paroxysmal atrial fibrillation    Benign prostatic hyperplasia, presence of lower urinary tract symptoms unspecified, unspecified morphology    Other hyperlipidemia    No significant past surgical history    S/P CABG (coronary artery bypass graft)  15 years    History of surgery  cyst removal    Mandibular abscess        PHYSICAL EXAM:  T(C): 37.8 (07-10-21 @ 09:00), Max: 38.8 (07-10-21 @ 04:58)  HR: 58 (07-10-21 @ 08:00)  BP: --  RR: 17 (07-10-21 @ 08:00)  SpO2: 97% (07-10-21 @ 08:00)  Wt(kg): --  I&O's Summary    09 Jul 2021 07:01  -  10 Jul 2021 07:00  --------------------------------------------------------  IN: 1774 mL / OUT: 1757 mL / NET: 17 mL    10 Jul 2021 07:01  -  10 Jul 2021 09:24  --------------------------------------------------------  IN: 67.5 mL / OUT: 0 mL / NET: 67.5 mL      Weight 97.1 (07-05 @ 12:33)  General: AAO x 3,  NAD.  HEENT: moist mucous membranes, no pallor/cyanosis.  Neck: no JVD visible.  Cardiac: S1, S2. RRR. No murmurs   Respratory: CTA b/l, no access muscle use.   Abdomen: soft. nontender. nondistended  Skin: no rashes.  Extremities: no LE edema b/l  Access:       DATA:                        9.3<L>  13.65<H> )-----------( 143<L>    ( 10 Jul 2021 06:01 )             30.4<L>    Ferritin, Serum: 870 ng/mL *H* (07-07 @ 06:23)      137    |  104    |  18     ----------------------------<  156<H>  Ca:8.5   (10 Jul 2021 06:01)  4.4     |  23     |  1.07       eGFR if Non : 70  eGFR if : 82    TPro  6.0    /  Alb  2.9<L>  /  TBili  0.4    /  DBili  x      /  AST  48<H>  /  ALT  35     /  AlkPhos  91     10 Jul 2021 06:01        Urinalysis Basic - ( 05 Jul 2021 15:26 )  Color: Yellow / Appearance: Clear / SG: >=1.030 / pH: x  Gluc: x / Ketone: 15 mg/dL<!>  / Bili: Small<!> / Urobili: 1.0 E.U./dL   Blood: x / Protein: 100 mg/dL<!> / Nitrite: NEGATIVE   Leuk Esterase: NEGATIVE / RBC: < 5 /HPF / WBC < 5 /HPF   Sq Epi: x / Non Sq Epi: 0-5 /HPF / Bacteria: Present /HPF<!>                MEDICATIONS  (STANDING):  chlorhexidine 0.12% Liquid 15 milliLiter(s) Oral Mucosa every 12 hours  chlorhexidine 2% Cloths 1 Application(s) Topical <User Schedule>  dextrose 40% Gel 15 Gram(s) Oral once  dextrose 5%. 1000 milliLiter(s) (50 mL/Hr) IV Continuous <Continuous>  dextrose 5%. 1000 milliLiter(s) (100 mL/Hr) IV Continuous <Continuous>  dextrose 50% Injectable 25 Gram(s) IV Push once  dextrose 50% Injectable 12.5 Gram(s) IV Push once  dextrose 50% Injectable 25 Gram(s) IV Push once  famotidine   Suspension 20 milliGRAM(s) Oral daily  glucagon  Injectable 1 milliGRAM(s) IntraMuscular once  heparin  Infusion 1900 Unit(s)/Hr (19 mL/Hr) IV Continuous <Continuous>  insulin glargine Injectable (LANTUS) 25 Unit(s) SubCutaneous every morning  insulin regular  human corrective regimen sliding scale   SubCutaneous every 6 hours  petrolatum Ophthalmic Ointment 1 Application(s) Both EYES two times a day  piperacillin/tazobactam IVPB.. 4.5 Gram(s) IV Intermittent every 6 hours  polyethylene glycol 3350 17 Gram(s) Oral two times a day  propofol Infusion 10 MICROgram(s)/kG/Min (5.83 mL/Hr) IV Continuous <Continuous>  senna 2 Tablet(s) Oral at bedtime  tacrolimus    0.5 mG/mL Suspension 0.5 milliGRAM(s) Oral two times a day    MEDICATIONS  (PRN):  acetaminophen    Suspension .. 650 milliGRAM(s) Enteral Tube every 6 hours PRN Temp greater or equal to 38C (100.4F), Mild Pain (1 - 3)

## 2021-07-10 NOTE — PROGRESS NOTE ADULT - ASSESSMENT
70M with PMHx significant for renal transplant on tacrolimus, ) DM2, HTN, MGUS, pAF, BPH, HLD, CAD (s/p CABG 15 years ago), presents with sepsis from multifocal pneumonia  following for LATRICIA

## 2021-07-10 NOTE — PROGRESS NOTE ADULT - SUBJECTIVE AND OBJECTIVE BOX
INTERVAL HPI/OVERNIGHT EVENTS:    SUBJECTIVE: Patient seen and examined at bedside.     OBJECTIVE:    VITAL SIGNS:  ICU Vital Signs Last 24 Hrs  T(C): 38.8 (10 Jul 2021 04:58), Max: 38.8 (10 Jul 2021 04:58)  T(F): 101.9 (10 Jul 2021 04:58), Max: 101.9 (10 Jul 2021 04:58)  HR: 58 (10 Jul 2021 08:00) (52 - 71)  BP: --  BP(mean): --  ABP: 112/70 (10 Jul 2021 08:00) (103/64 - 156/69)  ABP(mean): 89 (10 Jul 2021 08:00) (69 - 99)  RR: 17 (10 Jul 2021 08:00) (14 - 35)  SpO2: 97% (10 Jul 2021 08:00) (91% - 98%)    Mode: AC/ CMV (Assist Control/ Continuous Mandatory Ventilation), RR (machine): 14, TV (machine): 500, FiO2: 35, PEEP: 5, ITime: 1, MAP: 16, PIP: 23    07-09 @ 07:01  -  07-10 @ 07:00  --------------------------------------------------------  IN: 1774 mL / OUT: 1757 mL / NET: 17 mL    07-10 @ 07:01  -  07-10 @ 08:38  --------------------------------------------------------  IN: 67.5 mL / OUT: 0 mL / NET: 67.5 mL      CAPILLARY BLOOD GLUCOSE      POCT Blood Glucose.: 141 mg/dL (10 Jul 2021 06:34)      PHYSICAL EXAM:    General: NAD  HEENT: NC/AT; PERRL, clear conjunctiva  Neck: supple  Respiratory: CTA b/l  Cardiovascular: +S1/S2; RRR  Abdomen: soft, NT/ND; +BS x4  Extremities: WWP, 2+ peripheral pulses b/l; no LE edema  Skin: normal color and turgor; no rash  Neurological:    MEDICATIONS:  MEDICATIONS  (STANDING):  chlorhexidine 0.12% Liquid 15 milliLiter(s) Oral Mucosa every 12 hours  chlorhexidine 2% Cloths 1 Application(s) Topical <User Schedule>  dextrose 40% Gel 15 Gram(s) Oral once  dextrose 5%. 1000 milliLiter(s) (50 mL/Hr) IV Continuous <Continuous>  dextrose 5%. 1000 milliLiter(s) (100 mL/Hr) IV Continuous <Continuous>  dextrose 50% Injectable 25 Gram(s) IV Push once  dextrose 50% Injectable 12.5 Gram(s) IV Push once  dextrose 50% Injectable 25 Gram(s) IV Push once  famotidine   Suspension 20 milliGRAM(s) Oral daily  glucagon  Injectable 1 milliGRAM(s) IntraMuscular once  heparin  Infusion 1900 Unit(s)/Hr (19 mL/Hr) IV Continuous <Continuous>  insulin glargine Injectable (LANTUS) 25 Unit(s) SubCutaneous every morning  insulin regular  human corrective regimen sliding scale   SubCutaneous every 6 hours  petrolatum Ophthalmic Ointment 1 Application(s) Both EYES two times a day  piperacillin/tazobactam IVPB.. 4.5 Gram(s) IV Intermittent every 6 hours  polyethylene glycol 3350 17 Gram(s) Oral two times a day  propofol Infusion 10 MICROgram(s)/kG/Min (5.83 mL/Hr) IV Continuous <Continuous>  senna 2 Tablet(s) Oral at bedtime  tacrolimus    0.5 mG/mL Suspension 0.5 milliGRAM(s) Oral two times a day    MEDICATIONS  (PRN):  acetaminophen    Suspension .. 650 milliGRAM(s) Enteral Tube every 6 hours PRN Temp greater or equal to 38C (100.4F), Mild Pain (1 - 3)      ALLERGIES:  Allergies    No Known Allergies    Intolerances        LABS:                        9.3    13.65 )-----------( 143      ( 10 Jul 2021 06:01 )             30.4     07-10    137  |  104  |  18  ----------------------------<  156<H>  4.4   |  23  |  1.07    Ca    8.5      10 Jul 2021 06:01  Phos  3.5     07-10  Mg     2.5     07-10    TPro  6.0  /  Alb  2.9<L>  /  TBili  0.4  /  DBili  x   /  AST  48<H>  /  ALT  35  /  AlkPhos  91  07-10    PTT - ( 10 Jul 2021 06:01 )  PTT:97.6 sec      RADIOLOGY & ADDITIONAL TESTS: Reviewed. INTERVAL HPI/OVERNIGHT EVENTS: Toxo negative and  ml at 12 am.     SUBJECTIVE: Patient seen and examined at bedside. Patient is intubated and refers feeling anxious.     OBJECTIVE:    VITAL SIGNS:  ICU Vital Signs Last 24 Hrs  T(C): 38.8 (10 Jul 2021 04:58), Max: 38.8 (10 Jul 2021 04:58)  T(F): 101.9 (10 Jul 2021 04:58), Max: 101.9 (10 Jul 2021 04:58)  HR: 58 (10 Jul 2021 08:00) (52 - 71)  BP: --  BP(mean): --  ABP: 112/70 (10 Jul 2021 08:00) (103/64 - 156/69)  ABP(mean): 89 (10 Jul 2021 08:00) (69 - 99)  RR: 17 (10 Jul 2021 08:00) (14 - 35)  SpO2: 97% (10 Jul 2021 08:00) (91% - 98%)    Mode: AC/ CMV (Assist Control/ Continuous Mandatory Ventilation), RR (machine): 14, TV (machine): 500, FiO2: 35, PEEP: 5, ITime: 1, MAP: 16, PIP: 23    07-09 @ 07:01  -  07-10 @ 07:00  --------------------------------------------------------  IN: 1774 mL / OUT: 1757 mL / NET: 17 mL    07-10 @ 07:01  -  07-10 @ 08:38  --------------------------------------------------------  IN: 67.5 mL / OUT: 0 mL / NET: 67.5 mL      CAPILLARY BLOOD GLUCOSE      POCT Blood Glucose.: 141 mg/dL (10 Jul 2021 06:34)      PHYSICAL EXAM:    General: NAD, intubated and sedated opens eyes to verbal stimuli, follows commands RASS -1  HEENT: PERRL, clear conjunctiva NGT and ETT in place in the morning, removed ETT in the afternoon   Neck: cervical LAD b/l  Respiratory: diminished BS on R vs L. no wheezes, crackles or rhonchi  Cardiovascular: +S1/S2; RRR  Abdomen: soft, non distended; +BS x4  Extremities: WWP, ; no LE edema, L rober  Vascular: 2+ peripheral pulses b/l  Skin: normal color and turgor; no rash  Neurological: sedated    MEDICATIONS:  MEDICATIONS  (STANDING):  chlorhexidine 0.12% Liquid 15 milliLiter(s) Oral Mucosa every 12 hours  chlorhexidine 2% Cloths 1 Application(s) Topical <User Schedule>  dextrose 40% Gel 15 Gram(s) Oral once  dextrose 5%. 1000 milliLiter(s) (50 mL/Hr) IV Continuous <Continuous>  dextrose 5%. 1000 milliLiter(s) (100 mL/Hr) IV Continuous <Continuous>  dextrose 50% Injectable 25 Gram(s) IV Push once  dextrose 50% Injectable 12.5 Gram(s) IV Push once  dextrose 50% Injectable 25 Gram(s) IV Push once  famotidine   Suspension 20 milliGRAM(s) Oral daily  glucagon  Injectable 1 milliGRAM(s) IntraMuscular once  heparin  Infusion 1900 Unit(s)/Hr (19 mL/Hr) IV Continuous <Continuous>  insulin glargine Injectable (LANTUS) 25 Unit(s) SubCutaneous every morning  insulin regular  human corrective regimen sliding scale   SubCutaneous every 6 hours  petrolatum Ophthalmic Ointment 1 Application(s) Both EYES two times a day  piperacillin/tazobactam IVPB.. 4.5 Gram(s) IV Intermittent every 6 hours  polyethylene glycol 3350 17 Gram(s) Oral two times a day  propofol Infusion 10 MICROgram(s)/kG/Min (5.83 mL/Hr) IV Continuous <Continuous>  senna 2 Tablet(s) Oral at bedtime  tacrolimus    0.5 mG/mL Suspension 0.5 milliGRAM(s) Oral two times a day    MEDICATIONS  (PRN):  acetaminophen    Suspension .. 650 milliGRAM(s) Enteral Tube every 6 hours PRN Temp greater or equal to 38C (100.4F), Mild Pain (1 - 3)      ALLERGIES:  Allergies    No Known Allergies    Intolerances        LABS:                        9.3    13.65 )-----------( 143      ( 10 Jul 2021 06:01 )             30.4     07-10    137  |  104  |  18  ----------------------------<  156<H>  4.4   |  23  |  1.07    Ca    8.5      10 Jul 2021 06:01  Phos  3.5     07-10  Mg     2.5     07-10    TPro  6.0  /  Alb  2.9<L>  /  TBili  0.4  /  DBili  x   /  AST  48<H>  /  ALT  35  /  AlkPhos  91  07-10    PTT - ( 10 Jul 2021 06:01 )  PTT:97.6 sec      RADIOLOGY & ADDITIONAL TESTS: Reviewed.

## 2021-07-10 NOTE — PROGRESS NOTE ADULT - ASSESSMENT
69 yo M with PMH of CLL/SLL stage 0, adult T-cell lynphoma and MGUS; kidney transplant c/w PTLD with persistent EBV viremia in a low level (collateral obtained from Dr. Sanchez, transplant ID f/u the [place] at [facility] [place]), DM2, HTN, HLD, pAF, CAD (s/p CABG 15 years ago), now with PNA. Now intubated on 7/6. CT chest showed large multi lobar pulmonary consolidations, R>L, as well as extensive cervical, thoracic and upper abdominal lymphadenopathy and splenomegaly. Yesterday axillary LN bx intervention was performed. In terms of microlabs, just PCR MSSA was found in nasal specimen. PCP PCR, CMV PCR, urine S. pneumoniae and Legionella, Cryptoc and Fast/acid were negatives. Other microbiology labs are in progress: EBV PCR, fungal, nocardia, aspergillus, Fungitel, toxoplasma PCR and histoplasma. Chest XR has shown decresed bilateral opacities/pleural effusions today. Extubated with resolving PNA.    Recommendations:  - F/u BAL cx including Nocardia cx, fungal, nocardia, aspergillus, toxoplasma PCR and histoplasma.  - f/u serum EBV PCR  - F/u axillary LN bx  - continue Zosyn 4.5g IV q6h to complete course through 7/11

## 2021-07-11 DIAGNOSIS — I10 ESSENTIAL (PRIMARY) HYPERTENSION: ICD-10-CM

## 2021-07-11 DIAGNOSIS — E11.9 TYPE 2 DIABETES MELLITUS WITHOUT COMPLICATIONS: ICD-10-CM

## 2021-07-11 DIAGNOSIS — Z94.0 KIDNEY TRANSPLANT STATUS: ICD-10-CM

## 2021-07-11 DIAGNOSIS — I48.0 PAROXYSMAL ATRIAL FIBRILLATION: ICD-10-CM

## 2021-07-11 DIAGNOSIS — C85.90 NON-HODGKIN LYMPHOMA, UNSPECIFIED, UNSPECIFIED SITE: ICD-10-CM

## 2021-07-11 DIAGNOSIS — N40.0 BENIGN PROSTATIC HYPERPLASIA WITHOUT LOWER URINARY TRACT SYMPTOMS: ICD-10-CM

## 2021-07-11 DIAGNOSIS — R21 RASH AND OTHER NONSPECIFIC SKIN ERUPTION: ICD-10-CM

## 2021-07-11 DIAGNOSIS — Z02.9 ENCOUNTER FOR ADMINISTRATIVE EXAMINATIONS, UNSPECIFIED: ICD-10-CM

## 2021-07-11 DIAGNOSIS — J18.9 PNEUMONIA, UNSPECIFIED ORGANISM: ICD-10-CM

## 2021-07-11 LAB
ALBUMIN SERPL ELPH-MCNC: 3.2 G/DL — LOW (ref 3.3–5)
ALP SERPL-CCNC: 91 U/L — SIGNIFICANT CHANGE UP (ref 40–120)
ALT FLD-CCNC: 37 U/L — SIGNIFICANT CHANGE UP (ref 10–45)
ANION GAP SERPL CALC-SCNC: 8 MMOL/L — SIGNIFICANT CHANGE UP (ref 5–17)
ANISOCYTOSIS BLD QL: SLIGHT — SIGNIFICANT CHANGE UP
APTT BLD: 33.1 SEC — SIGNIFICANT CHANGE UP (ref 27.5–35.5)
AST SERPL-CCNC: 54 U/L — HIGH (ref 10–40)
BASOPHILS # BLD AUTO: 0.22 K/UL — HIGH (ref 0–0.2)
BASOPHILS NFR BLD AUTO: 1.7 % — SIGNIFICANT CHANGE UP (ref 0–2)
BILIRUB SERPL-MCNC: 0.6 MG/DL — SIGNIFICANT CHANGE UP (ref 0.2–1.2)
BLD GP AB SCN SERPL QL: NEGATIVE — SIGNIFICANT CHANGE UP
BUN SERPL-MCNC: 13 MG/DL — SIGNIFICANT CHANGE UP (ref 7–23)
CALCIUM SERPL-MCNC: 8.9 MG/DL — SIGNIFICANT CHANGE UP (ref 8.4–10.5)
CHLORIDE SERPL-SCNC: 100 MMOL/L — SIGNIFICANT CHANGE UP (ref 96–108)
CO2 SERPL-SCNC: 25 MMOL/L — SIGNIFICANT CHANGE UP (ref 22–31)
CREAT SERPL-MCNC: 1.02 MG/DL — SIGNIFICANT CHANGE UP (ref 0.5–1.3)
DACRYOCYTES BLD QL SMEAR: SLIGHT — SIGNIFICANT CHANGE UP
EOSINOPHIL # BLD AUTO: 0.12 K/UL — SIGNIFICANT CHANGE UP (ref 0–0.5)
EOSINOPHIL NFR BLD AUTO: 0.9 % — SIGNIFICANT CHANGE UP (ref 0–6)
GLUCOSE BLDC GLUCOMTR-MCNC: 101 MG/DL — HIGH (ref 70–99)
GLUCOSE BLDC GLUCOMTR-MCNC: 107 MG/DL — HIGH (ref 70–99)
GLUCOSE BLDC GLUCOMTR-MCNC: 118 MG/DL — HIGH (ref 70–99)
GLUCOSE BLDC GLUCOMTR-MCNC: 89 MG/DL — SIGNIFICANT CHANGE UP (ref 70–99)
GLUCOSE SERPL-MCNC: 94 MG/DL — SIGNIFICANT CHANGE UP (ref 70–99)
HCT VFR BLD CALC: 30.2 % — LOW (ref 39–50)
HGB BLD-MCNC: 9.2 G/DL — LOW (ref 13–17)
INR BLD: 1.33 — HIGH (ref 0.88–1.16)
LYMPHOCYTES # BLD AUTO: 75 % — HIGH (ref 13–44)
LYMPHOCYTES # BLD AUTO: 9.67 K/UL — HIGH (ref 1–3.3)
MACROCYTES BLD QL: SLIGHT — SIGNIFICANT CHANGE UP
MAGNESIUM SERPL-MCNC: 2.2 MG/DL — SIGNIFICANT CHANGE UP (ref 1.6–2.6)
MANUAL SMEAR VERIFICATION: SIGNIFICANT CHANGE UP
MCHC RBC-ENTMCNC: 29.2 PG — SIGNIFICANT CHANGE UP (ref 27–34)
MCHC RBC-ENTMCNC: 30.5 GM/DL — LOW (ref 32–36)
MCV RBC AUTO: 95.9 FL — SIGNIFICANT CHANGE UP (ref 80–100)
MONOCYTES # BLD AUTO: 0.44 K/UL — SIGNIFICANT CHANGE UP (ref 0–0.9)
MONOCYTES NFR BLD AUTO: 3.4 % — SIGNIFICANT CHANGE UP (ref 2–14)
NEUTROPHILS # BLD AUTO: 2.33 K/UL — SIGNIFICANT CHANGE UP (ref 1.8–7.4)
NEUTROPHILS NFR BLD AUTO: 16.4 % — LOW (ref 43–77)
NEUTS BAND # BLD: 1.7 % — SIGNIFICANT CHANGE UP (ref 0–8)
NON-GYNECOLOGICAL CYTOLOGY STUDY: SIGNIFICANT CHANGE UP
OVALOCYTES BLD QL SMEAR: SLIGHT — SIGNIFICANT CHANGE UP
PHOSPHATE SERPL-MCNC: 3.4 MG/DL — SIGNIFICANT CHANGE UP (ref 2.5–4.5)
PLAT MORPH BLD: ABNORMAL
PLATELET # BLD AUTO: 134 K/UL — LOW (ref 150–400)
POIKILOCYTOSIS BLD QL AUTO: SLIGHT — SIGNIFICANT CHANGE UP
POLYCHROMASIA BLD QL SMEAR: SLIGHT — SIGNIFICANT CHANGE UP
POTASSIUM SERPL-MCNC: 4.3 MMOL/L — SIGNIFICANT CHANGE UP (ref 3.5–5.3)
POTASSIUM SERPL-SCNC: 4.3 MMOL/L — SIGNIFICANT CHANGE UP (ref 3.5–5.3)
PROT SERPL-MCNC: 6.4 G/DL — SIGNIFICANT CHANGE UP (ref 6–8.3)
PROTHROM AB SERPL-ACNC: 15.8 SEC — HIGH (ref 10.6–13.6)
RBC # BLD: 3.15 M/UL — LOW (ref 4.2–5.8)
RBC # FLD: 15.9 % — HIGH (ref 10.3–14.5)
RBC BLD AUTO: ABNORMAL
RH IG SCN BLD-IMP: POSITIVE — SIGNIFICANT CHANGE UP
SMUDGE CELLS # BLD: PRESENT — SIGNIFICANT CHANGE UP
SODIUM SERPL-SCNC: 133 MMOL/L — LOW (ref 135–145)
STOMATOCYTES BLD QL SMEAR: SLIGHT — SIGNIFICANT CHANGE UP
T GONDII DNA SPEC QL NAA+PROBE: SIGNIFICANT CHANGE UP
TACROLIMUS SERPL-MCNC: <2 NG/ML — SIGNIFICANT CHANGE UP
VARIANT LYMPHS # BLD: 0.9 % — SIGNIFICANT CHANGE UP (ref 0–6)
WBC # BLD: 12.89 K/UL — HIGH (ref 3.8–10.5)
WBC # FLD AUTO: 12.89 K/UL — HIGH (ref 3.8–10.5)

## 2021-07-11 PROCEDURE — 99233 SBSQ HOSP IP/OBS HIGH 50: CPT | Mod: GC

## 2021-07-11 PROCEDURE — 71045 X-RAY EXAM CHEST 1 VIEW: CPT | Mod: 26

## 2021-07-11 RX ORDER — TAMSULOSIN HYDROCHLORIDE 0.4 MG/1
0.4 CAPSULE ORAL AT BEDTIME
Refills: 0 | Status: DISCONTINUED | OUTPATIENT
Start: 2021-07-11 | End: 2021-07-13

## 2021-07-11 RX ORDER — TACROLIMUS 5 MG/1
1 CAPSULE ORAL ONCE
Refills: 0 | Status: COMPLETED | OUTPATIENT
Start: 2021-07-11 | End: 2021-07-11

## 2021-07-11 RX ORDER — ZALEPLON 10 MG
5 CAPSULE ORAL AT BEDTIME
Refills: 0 | Status: DISCONTINUED | OUTPATIENT
Start: 2021-07-11 | End: 2021-07-11

## 2021-07-11 RX ORDER — FAMOTIDINE 10 MG/ML
20 INJECTION INTRAVENOUS DAILY
Refills: 0 | Status: DISCONTINUED | OUTPATIENT
Start: 2021-07-11 | End: 2021-07-13

## 2021-07-11 RX ORDER — SENNA PLUS 8.6 MG/1
2 TABLET ORAL AT BEDTIME
Refills: 0 | Status: DISCONTINUED | OUTPATIENT
Start: 2021-07-11 | End: 2021-07-13

## 2021-07-11 RX ORDER — FUROSEMIDE 40 MG
20 TABLET ORAL DAILY
Refills: 0 | Status: DISCONTINUED | OUTPATIENT
Start: 2021-07-11 | End: 2021-07-13

## 2021-07-11 RX ORDER — LOPERAMIDE HCL 2 MG
2 TABLET ORAL
Refills: 0 | Status: DISCONTINUED | OUTPATIENT
Start: 2021-07-11 | End: 2021-07-13

## 2021-07-11 RX ORDER — ZALEPLON 10 MG
10 CAPSULE ORAL AT BEDTIME
Refills: 0 | Status: DISCONTINUED | OUTPATIENT
Start: 2021-07-11 | End: 2021-07-12

## 2021-07-11 RX ORDER — POLYETHYLENE GLYCOL 3350 17 G/17G
17 POWDER, FOR SOLUTION ORAL
Refills: 0 | Status: DISCONTINUED | OUTPATIENT
Start: 2021-07-11 | End: 2021-07-13

## 2021-07-11 RX ORDER — TACROLIMUS 5 MG/1
0.5 CAPSULE ORAL
Refills: 0 | Status: DISCONTINUED | OUTPATIENT
Start: 2021-07-11 | End: 2021-07-11

## 2021-07-11 RX ORDER — ACETAMINOPHEN 500 MG
650 TABLET ORAL EVERY 6 HOURS
Refills: 0 | Status: DISCONTINUED | OUTPATIENT
Start: 2021-07-11 | End: 2021-07-13

## 2021-07-11 RX ORDER — ACETAMINOPHEN 500 MG
650 TABLET ORAL EVERY 6 HOURS
Refills: 0 | Status: DISCONTINUED | OUTPATIENT
Start: 2021-07-11 | End: 2021-07-11

## 2021-07-11 RX ORDER — TACROLIMUS 5 MG/1
1 CAPSULE ORAL EVERY 12 HOURS
Refills: 0 | Status: DISCONTINUED | OUTPATIENT
Start: 2021-07-11 | End: 2021-07-13

## 2021-07-11 RX ORDER — ISOSORBIDE MONONITRATE 60 MG/1
30 TABLET, EXTENDED RELEASE ORAL DAILY
Refills: 0 | Status: DISCONTINUED | OUTPATIENT
Start: 2021-07-11 | End: 2021-07-13

## 2021-07-11 RX ADMIN — PIPERACILLIN AND TAZOBACTAM 200 GRAM(S): 4; .5 INJECTION, POWDER, LYOPHILIZED, FOR SOLUTION INTRAVENOUS at 17:17

## 2021-07-11 RX ADMIN — Medication 5 MILLIGRAM(S): at 21:15

## 2021-07-11 RX ADMIN — Medication 2 MILLIGRAM(S): at 11:05

## 2021-07-11 RX ADMIN — FAMOTIDINE 20 MILLIGRAM(S): 10 INJECTION INTRAVENOUS at 11:05

## 2021-07-11 RX ADMIN — CHLORHEXIDINE GLUCONATE 1 APPLICATION(S): 213 SOLUTION TOPICAL at 06:39

## 2021-07-11 RX ADMIN — TACROLIMUS 1 MILLIGRAM(S): 5 CAPSULE ORAL at 17:17

## 2021-07-11 RX ADMIN — Medication 20 MILLIGRAM(S): at 11:05

## 2021-07-11 RX ADMIN — ISOSORBIDE MONONITRATE 30 MILLIGRAM(S): 60 TABLET, EXTENDED RELEASE ORAL at 17:19

## 2021-07-11 RX ADMIN — TAMSULOSIN HYDROCHLORIDE 0.4 MILLIGRAM(S): 0.4 CAPSULE ORAL at 21:15

## 2021-07-11 RX ADMIN — PIPERACILLIN AND TAZOBACTAM 200 GRAM(S): 4; .5 INJECTION, POWDER, LYOPHILIZED, FOR SOLUTION INTRAVENOUS at 11:05

## 2021-07-11 RX ADMIN — Medication 10 MILLIGRAM(S): at 21:14

## 2021-07-11 RX ADMIN — Medication 5 MILLIGRAM(S): at 01:17

## 2021-07-11 RX ADMIN — PIPERACILLIN AND TAZOBACTAM 200 GRAM(S): 4; .5 INJECTION, POWDER, LYOPHILIZED, FOR SOLUTION INTRAVENOUS at 06:39

## 2021-07-11 NOTE — PROGRESS NOTE ADULT - SUBJECTIVE AND OBJECTIVE BOX
INTERVAL HPI/OVERNIGHT EVENTS: o/n hep d/c'd due to hemoptysis from prior day. Melotonin 5mg for sleep, pt reports melotonin was not working. Sonata 5mg given instead. Pt pulled/NG tube "fell out    SUBJECTIVE: Patient states he feels better. Denies any complaints currently     OBJECTIVE:    VITAL SIGNS:  ICU Vital Signs Last 24 Hrs  T(C): 37.5 (11 Jul 2021 01:44), Max: 37.8 (10 Jul 2021 09:00)  T(F): 99.5 (11 Jul 2021 01:44), Max: 100 (10 Jul 2021 09:00)  HR: 69 (11 Jul 2021 07:00) (58 - 69)  BP: 136/90 (11 Jul 2021 07:00) (133/68 - 180/78)  BP(mean): 109 (11 Jul 2021 07:00) (93 - 123)  ABP: 91/90 (11 Jul 2021 00:00) (91/90 - 176/76)  ABP(mean): 91 (11 Jul 2021 00:00) (84 - 108)  RR: 35 (11 Jul 2021 07:00) (17 - 48)  SpO2: 69% (11 Jul 2021 07:00) (69% - 99%)    Mode: CPAP with PS, FiO2: 35, PEEP: 5, ITime: 13, MAP: 8, PIP: 13    07-10 @ 07:01  -  07-11 @ 07:00  --------------------------------------------------------  IN: 933.5 mL / OUT: 1576 mL / NET: -642.5 mL      CAPILLARY BLOOD GLUCOSE      POCT Blood Glucose.: 101 mg/dL (11 Jul 2021 01:30)      PHYSICAL EXAM:    General: NAD, sitting up in bed, speaks in full sentences  HEENT: PERRL, clear conjunctiva    Neck: cervical LAD b/l  Respiratory: Diminished BS on R vs L with improvment. no wheezes, crackles or rhonchi  Cardiovascular: +S1/S2; RRR  Abdomen: soft, non distended; +BS x4  Extremities: WWP, ; no LE edema  Vascular: 2+ peripheral pulses b/l  Skin: normal color and turgor; b/l lluvia reticular non blanching rash from upper thighs to knees  Neurological: AOX4    MEDICATIONS:  MEDICATIONS  (STANDING):  chlorhexidine 2% Cloths 1 Application(s) Topical <User Schedule>  dextrose 40% Gel 15 Gram(s) Oral once  dextrose 5%. 1000 milliLiter(s) (50 mL/Hr) IV Continuous <Continuous>  dextrose 5%. 1000 milliLiter(s) (100 mL/Hr) IV Continuous <Continuous>  dextrose 50% Injectable 25 Gram(s) IV Push once  dextrose 50% Injectable 25 Gram(s) IV Push once  dextrose 50% Injectable 12.5 Gram(s) IV Push once  famotidine   Suspension 20 milliGRAM(s) Oral daily  glucagon  Injectable 1 milliGRAM(s) IntraMuscular once  insulin glargine Injectable (LANTUS) 25 Unit(s) SubCutaneous every morning  insulin regular  human corrective regimen sliding scale   SubCutaneous every 6 hours  melatonin 5 milliGRAM(s) Oral at bedtime  petrolatum Ophthalmic Ointment 1 Application(s) Both EYES two times a day  piperacillin/tazobactam IVPB.. 4.5 Gram(s) IV Intermittent every 6 hours  polyethylene glycol 3350 17 Gram(s) Oral two times a day  senna 2 Tablet(s) Oral at bedtime  tacrolimus    0.5 mG/mL Suspension 0.5 milliGRAM(s) Oral two times a day    MEDICATIONS  (PRN):  acetaminophen    Suspension .. 650 milliGRAM(s) Enteral Tube every 6 hours PRN Temp greater or equal to 38C (100.4F), Mild Pain (1 - 3)      ALLERGIES:  Allergies    No Known Allergies    Intolerances        LABS:                        9.2    12.89 )-----------( 134      ( 11 Jul 2021 06:04 )             30.2     07-11    133<L>  |  100  |  13  ----------------------------<  94  4.3   |  25  |  1.02    Ca    8.9      11 Jul 2021 06:04  Phos  3.4     07-11  Mg     2.2     07-11    TPro  6.4  /  Alb  3.2<L>  /  TBili  0.6  /  DBili  x   /  AST  54<H>  /  ALT  37  /  AlkPhos  91  07-11    PT/INR - ( 11 Jul 2021 06:04 )   PT: 15.8 sec;   INR: 1.33          PTT - ( 11 Jul 2021 06:04 )  PTT:33.1 sec      RADIOLOGY & ADDITIONAL TESTS: Reviewed. INTERVAL HPI/OVERNIGHT EVENTS: o/n hep d/c'd due to hemoptysis from prior day. Melotonin 5mg for sleep, pt reports melotonin was not working. Sonata 5mg given instead. Pt pulled/NG tube "fell out.    SUBJECTIVE: Patient states he feels better. Denies any complaints currently     OBJECTIVE:    VITAL SIGNS:  ICU Vital Signs Last 24 Hrs  T(C): 37.5 (11 Jul 2021 01:44), Max: 37.8 (10 Jul 2021 09:00)  T(F): 99.5 (11 Jul 2021 01:44), Max: 100 (10 Jul 2021 09:00)  HR: 69 (11 Jul 2021 07:00) (58 - 69)  BP: 136/90 (11 Jul 2021 07:00) (133/68 - 180/78)  BP(mean): 109 (11 Jul 2021 07:00) (93 - 123)  ABP: 91/90 (11 Jul 2021 00:00) (91/90 - 176/76)  ABP(mean): 91 (11 Jul 2021 00:00) (84 - 108)  RR: 35 (11 Jul 2021 07:00) (17 - 48)  SpO2: 69% (11 Jul 2021 07:00) (69% - 99%)    Mode: CPAP with PS, FiO2: 35, PEEP: 5, ITime: 13, MAP: 8, PIP: 13    07-10 @ 07:01  -  07-11 @ 07:00  --------------------------------------------------------  IN: 933.5 mL / OUT: 1576 mL / NET: -642.5 mL      CAPILLARY BLOOD GLUCOSE      POCT Blood Glucose.: 101 mg/dL (11 Jul 2021 01:30)      PHYSICAL EXAM:    General: NAD, sitting up in bed, speaks in full sentences  HEENT: PERRL, clear conjunctiva    Neck: cervical LAD b/l  Respiratory: Diminished BS on R vs L with improvment. no wheezes, crackles or rhonchi  Cardiovascular: +S1/S2; RRR  Abdomen: soft, non distended; +BS x4  Extremities: WWP, ; no LE edema  Vascular: 2+ peripheral pulses b/l  Skin: normal color and turgor; b/l lluvia reticular non blanching rash from upper thighs to knees  Neurological: AOX4    MEDICATIONS:  MEDICATIONS  (STANDING):  chlorhexidine 2% Cloths 1 Application(s) Topical <User Schedule>  dextrose 40% Gel 15 Gram(s) Oral once  dextrose 5%. 1000 milliLiter(s) (50 mL/Hr) IV Continuous <Continuous>  dextrose 5%. 1000 milliLiter(s) (100 mL/Hr) IV Continuous <Continuous>  dextrose 50% Injectable 25 Gram(s) IV Push once  dextrose 50% Injectable 25 Gram(s) IV Push once  dextrose 50% Injectable 12.5 Gram(s) IV Push once  famotidine   Suspension 20 milliGRAM(s) Oral daily  glucagon  Injectable 1 milliGRAM(s) IntraMuscular once  insulin glargine Injectable (LANTUS) 25 Unit(s) SubCutaneous every morning  insulin regular  human corrective regimen sliding scale   SubCutaneous every 6 hours  melatonin 5 milliGRAM(s) Oral at bedtime  petrolatum Ophthalmic Ointment 1 Application(s) Both EYES two times a day  piperacillin/tazobactam IVPB.. 4.5 Gram(s) IV Intermittent every 6 hours  polyethylene glycol 3350 17 Gram(s) Oral two times a day  senna 2 Tablet(s) Oral at bedtime  tacrolimus    0.5 mG/mL Suspension 0.5 milliGRAM(s) Oral two times a day    MEDICATIONS  (PRN):  acetaminophen    Suspension .. 650 milliGRAM(s) Enteral Tube every 6 hours PRN Temp greater or equal to 38C (100.4F), Mild Pain (1 - 3)      ALLERGIES:  Allergies    No Known Allergies    Intolerances        LABS:                        9.2    12.89 )-----------( 134      ( 11 Jul 2021 06:04 )             30.2     07-11    133<L>  |  100  |  13  ----------------------------<  94  4.3   |  25  |  1.02    Ca    8.9      11 Jul 2021 06:04  Phos  3.4     07-11  Mg     2.2     07-11    TPro  6.4  /  Alb  3.2<L>  /  TBili  0.6  /  DBili  x   /  AST  54<H>  /  ALT  37  /  AlkPhos  91  07-11    PT/INR - ( 11 Jul 2021 06:04 )   PT: 15.8 sec;   INR: 1.33          PTT - ( 11 Jul 2021 06:04 )  PTT:33.1 sec      RADIOLOGY & ADDITIONAL TESTS: CXR: poor quality film Transfer MICU to     Hospital Course:  Pt is a 69 yo M with PMH renal tx (tacro), T2D, HTN, HLD, MGUS, pAF (eliquis), BPH, CAD (s/p CABG), COVID (3/2020), Garcia's esophagus/GERD, chronic hypoNa, CLL, and post-renal tx low grade lymphoma who p/w F/C, cough, and brown sputum x5d along with BL LE painless rash x3-4wks. Recently returned from [place] 3-4wks prior to admission. On arrival, septic from pneumonia treated with V/Z -- linezolid, azithromycin, and zosyn. Intubated 7/6 for work of breathing and planned bronchoscopy, started on heparin gtt; started on insulin gtt for hyperglycemia >200s. 7/7 underwent bronchoscopy with pulm with copious purulent secretions suctioned and BAL sent. Gen sx consulted for LAD with no plan for excisional bx. Heme-onc consulted with collateral from [facility] with CLL and adult T cell lymphoma. IR was consulted and pt underwent LN FNA/CNA 7/8 of L supraclavicular node; dc'd linezolid per ID and restarted tube feeds and heparin gtt; dc'd insulin gtt and started on lantus with mISS. 7/9 started SBT and zosyn dosing increased as renal function improved to cover for pseudomonas. BCx were sent for persistent low grade fevers with NGTD. DC'd azithromycin as strep ag and legionella neg. Flow cytometry returned with B cell lymphoma/CLL. 7/10 tacrolimus level at goal 3, restarted tacrolimus 0.5mg BID and sent T cell receptor rearrangement testing along with T cell counts. Extubated to BIPAP 12/5 with HFNC 40/40 during the day. Passed dysphagia screen but with hemoptysis so remained NPO and held heparin gtt. 7/11 without hemoptysis x12h, plan to hold AC for another 24h and restart eliquis tomorrow if stable. Deescalated HFNC to NC and tolerating 4L with O2sat >90%. Restarted home meds and started diet. Stable for s/d 7L for further observation and management. Last day of zosyn today.    INTERVAL HPI/OVERNIGHT EVENTS: o/n hep d/c'd due to hemoptysis from prior day. Melotonin 5mg for sleep, pt reports melotonin was not working. Sonata 5mg given instead. Pt pulled/NG tube "fell out.    SUBJECTIVE: Patient states he feels better. Denies any complaints currently     OBJECTIVE:    VITAL SIGNS:  ICU Vital Signs Last 24 Hrs  T(C): 37.5 (11 Jul 2021 01:44), Max: 37.8 (10 Jul 2021 09:00)  T(F): 99.5 (11 Jul 2021 01:44), Max: 100 (10 Jul 2021 09:00)  HR: 69 (11 Jul 2021 07:00) (58 - 69)  BP: 136/90 (11 Jul 2021 07:00) (133/68 - 180/78)  BP(mean): 109 (11 Jul 2021 07:00) (93 - 123)  ABP: 91/90 (11 Jul 2021 00:00) (91/90 - 176/76)  ABP(mean): 91 (11 Jul 2021 00:00) (84 - 108)  RR: 35 (11 Jul 2021 07:00) (17 - 48)  SpO2: 69% (11 Jul 2021 07:00) (69% - 99%)    Mode: CPAP with PS, FiO2: 35, PEEP: 5, ITime: 13, MAP: 8, PIP: 13    07-10 @ 07:01  -  07-11 @ 07:00  --------------------------------------------------------  IN: 933.5 mL / OUT: 1576 mL / NET: -642.5 mL      CAPILLARY BLOOD GLUCOSE      POCT Blood Glucose.: 101 mg/dL (11 Jul 2021 01:30)      PHYSICAL EXAM:    General: NAD, sitting up in bed, speaks in full sentences  HEENT: PERRL, clear conjunctiva    Neck: cervical LAD b/l  Respiratory: Diminished BS on R vs L with improvment. no wheezes, crackles or rhonchi  Cardiovascular: +S1/S2; RRR  Abdomen: soft, non distended; +BS x4  Extremities: WWP, ; no LE edema  Vascular: 2+ peripheral pulses b/l  Skin: normal color and turgor; b/l lluvia reticular non blanching rash from upper thighs to knees  Neurological: AOX4    MEDICATIONS:  MEDICATIONS  (STANDING):  chlorhexidine 2% Cloths 1 Application(s) Topical <User Schedule>  dextrose 40% Gel 15 Gram(s) Oral once  dextrose 5%. 1000 milliLiter(s) (50 mL/Hr) IV Continuous <Continuous>  dextrose 5%. 1000 milliLiter(s) (100 mL/Hr) IV Continuous <Continuous>  dextrose 50% Injectable 25 Gram(s) IV Push once  dextrose 50% Injectable 25 Gram(s) IV Push once  dextrose 50% Injectable 12.5 Gram(s) IV Push once  famotidine   Suspension 20 milliGRAM(s) Oral daily  glucagon  Injectable 1 milliGRAM(s) IntraMuscular once  insulin glargine Injectable (LANTUS) 25 Unit(s) SubCutaneous every morning  insulin regular  human corrective regimen sliding scale   SubCutaneous every 6 hours  melatonin 5 milliGRAM(s) Oral at bedtime  petrolatum Ophthalmic Ointment 1 Application(s) Both EYES two times a day  piperacillin/tazobactam IVPB.. 4.5 Gram(s) IV Intermittent every 6 hours  polyethylene glycol 3350 17 Gram(s) Oral two times a day  senna 2 Tablet(s) Oral at bedtime  tacrolimus    0.5 mG/mL Suspension 0.5 milliGRAM(s) Oral two times a day    MEDICATIONS  (PRN):  acetaminophen    Suspension .. 650 milliGRAM(s) Enteral Tube every 6 hours PRN Temp greater or equal to 38C (100.4F), Mild Pain (1 - 3)      ALLERGIES:  Allergies    No Known Allergies    Intolerances        LABS:                        9.2    12.89 )-----------( 134      ( 11 Jul 2021 06:04 )             30.2     07-11    133<L>  |  100  |  13  ----------------------------<  94  4.3   |  25  |  1.02    Ca    8.9      11 Jul 2021 06:04  Phos  3.4     07-11  Mg     2.2     07-11    TPro  6.4  /  Alb  3.2<L>  /  TBili  0.6  /  DBili  x   /  AST  54<H>  /  ALT  37  /  AlkPhos  91  07-11    PT/INR - ( 11 Jul 2021 06:04 )   PT: 15.8 sec;   INR: 1.33          PTT - ( 11 Jul 2021 06:04 )  PTT:33.1 sec      RADIOLOGY & ADDITIONAL TESTS: CXR: poor quality film

## 2021-07-11 NOTE — SWALLOW BEDSIDE ASSESSMENT ADULT - COMMENTS
Pt currently receiving 4L supplemental O2 via NC. Pt currently receiving 4L supplemental O2 via NC. Pt was expectorating thick red secretions frequently yesterday, now improved/minimal. Per pt, no hx of dysphagia.

## 2021-07-11 NOTE — PROGRESS NOTE ADULT - PROBLEM SELECTOR PLAN 6
# lluvia reticular non-blanching rash from upper thighs to knees b/l  - consult Dermatology  - no change on exam

## 2021-07-11 NOTE — SWALLOW BEDSIDE ASSESSMENT ADULT - SWALLOW EVAL: DIAGNOSIS
Pt presented with a grossly functional oropharyngeal swallow. Recs for mech soft vs hard solids in conjunction with pt preference at this time.

## 2021-07-11 NOTE — PROGRESS NOTE ADULT - SUBJECTIVE AND OBJECTIVE BOX
------------ACCEPTED TRANSFER TO [facility]------------------  CC: Patient is a 70y old  Male who presents with a chief complaint of sepsis (11 Jul 2021 09:40)    Pt is a 69 yo M with PMH renal tx (tacro), T2D, HTN, HLD, MGUS, pAF (eliquis), BPH, CAD (s/p CABG), COVID (3/2020), Garcia's esophagus/GERD, chronic hypoNa, CLL, and post-renal tx low grade lymphoma who p/w F/C, cough, and brown sputum x5d along with BL LE painless rash x3-4wks. Recently returned from [place] 3-4wks prior to admission. On arrival, septic from pneumonia treated with V/Z -- linezolid, azithromycin, and zosyn. Intubated 7/6 for work of breathing and planned bronchoscopy, started on heparin gtt; started on insulin gtt for hyperglycemia >200s. 7/7 underwent bronchoscopy with pulm with copious purulent secretions suctioned and BAL sent. Gen sx consulted for LAD with no plan for excisional bx. Heme-onc consulted with collateral from [facility] with CLL and adult T cell lymphoma. IR was consulted and pt underwent LN FNA/CNA 7/8 of L supraclavicular node; dc'd linezolid per ID and restarted tube feeds and heparin gtt; dc'd insulin gtt and started on lantus with mISS. 7/9 started SBT and zosyn dosing increased as renal function improved to cover for pseudomonas. BCx were sent for persistent low grade fevers with NGTD. DC'd azithromycin as strep ag and legionella neg. Flow cytometry returned with B cell lymphoma/CLL. 7/10 tacrolimus level at goal 3, restarted tacrolimus 0.5mg BID and sent T cell receptor rearrangement testing along with T cell counts. Extubated to BIPAP 12/5 with HFNC 40/40 during the day. Passed dysphagia screen but with hemoptysis so remained NPO and held heparin gtt. 7/11 without hemoptysis x12h, plan to hold AC for another 24h and restart eliquis tomorrow if stable. Deescalated HFNC to NC and tolerating 4L with O2sat >90%. Restarted home meds and started diet. Stable for s/d 7L for further observation and management. Last day of zosyn today 7/11    INTERVAL EVENTS: ALEXANDER    SUBJECTIVE / INTERVAL HPI: Patient seen and examined at bedside.     ROS: negative unless otherwise stated above.    VITAL SIGNS:  Vital Signs Last 24 Hrs  T(C): 36.9 (11 Jul 2021 09:20), Max: 37.8 (10 Jul 2021 18:11)  T(F): 98.5 (11 Jul 2021 09:20), Max: 100 (10 Jul 2021 18:11)  HR: 64 (11 Jul 2021 14:00) (61 - 69)  BP: 109/51 (11 Jul 2021 10:00) (109/51 - 180/78)  BP(mean): 75 (11 Jul 2021 10:00) (75 - 123)  RR: 32 (11 Jul 2021 14:00) (21 - 48)  SpO2: 96% (11 Jul 2021 14:00) (69% - 99%)      07-10-21 @ 07:01  -  07-11-21 @ 07:00  --------------------------------------------------------  IN: 1033.5 mL / OUT: 1651 mL / NET: -617.5 mL    07-11-21 @ 07:01  -  07-11-21 @ 14:20  --------------------------------------------------------  IN: 100 mL / OUT: 1321 mL / NET: -1221 mL        PHYSICAL EXAM:    General: NAD, sitting up in bed, speaks in full sentences  HEENT: PERRL, clear conjunctiva    Neck: cervical LAD b/l  Respiratory: Diminished BS on R vs L no wheezes, crackles or rhonchi  Cardiovascular: +S1/S2; RRR  Abdomen: soft, non distended; +BS x4  Extremities: WWP, ; no LE edema  Vascular: 2+ peripheral pulses b/l  Skin: normal color and turgor; b/l lluvia reticular non blanching rash from upper thighs to knees  Neurological: AOX4    MEDICATIONS:  MEDICATIONS  (STANDING):  chlorhexidine 2% Cloths 1 Application(s) Topical <User Schedule>  dextrose 40% Gel 15 Gram(s) Oral once  dextrose 5%. 1000 milliLiter(s) (50 mL/Hr) IV Continuous <Continuous>  dextrose 5%. 1000 milliLiter(s) (100 mL/Hr) IV Continuous <Continuous>  dextrose 50% Injectable 25 Gram(s) IV Push once  dextrose 50% Injectable 25 Gram(s) IV Push once  dextrose 50% Injectable 12.5 Gram(s) IV Push once  famotidine   Suspension 20 milliGRAM(s) Oral daily  furosemide    Tablet 20 milliGRAM(s) Oral daily  glucagon  Injectable 1 milliGRAM(s) IntraMuscular once  insulin glargine Injectable (LANTUS) 25 Unit(s) SubCutaneous every morning  insulin regular  human corrective regimen sliding scale   SubCutaneous every 6 hours  isosorbide   mononitrate ER Tablet (IMDUR) 30 milliGRAM(s) Oral daily  melatonin 5 milliGRAM(s) Oral at bedtime  piperacillin/tazobactam IVPB.. 4.5 Gram(s) IV Intermittent every 6 hours  polyethylene glycol 3350 17 Gram(s) Oral two times a day  senna 2 Tablet(s) Oral at bedtime  tacrolimus    0.5 mG/mL Suspension 0.5 milliGRAM(s) Oral two times a day  tamsulosin 0.4 milliGRAM(s) Oral at bedtime    MEDICATIONS  (PRN):  acetaminophen    Suspension .. 650 milliGRAM(s) Oral every 6 hours PRN Temp greater or equal to 38C (100.4F), Mild Pain (1 - 3)  loperamide 2 milliGRAM(s) Oral every 3 hours PRN Diarrhea      ALLERGIES:  Allergies    No Known Allergies    Intolerances        LABS:                        9.2    12.89 )-----------( 134      ( 11 Jul 2021 06:04 )             30.2     07-11    133<L>  |  100  |  13  ----------------------------<  94  4.3   |  25  |  1.02    Ca    8.9      11 Jul 2021 06:04  Phos  3.4     07-11  Mg     2.2     07-11    TPro  6.4  /  Alb  3.2<L>  /  TBili  0.6  /  DBili  x   /  AST  54<H>  /  ALT  37  /  AlkPhos  91  07-11    PT/INR - ( 11 Jul 2021 06:04 )   PT: 15.8 sec;   INR: 1.33          PTT - ( 11 Jul 2021 06:04 )  PTT:33.1 sec    CAPILLARY BLOOD GLUCOSE      POCT Blood Glucose.: 89 mg/dL (11 Jul 2021 11:11)      RADIOLOGY & ADDITIONAL TESTS: Reviewed.

## 2021-07-11 NOTE — PROGRESS NOTE ADULT - ASSESSMENT
69 yo M with a past medical history of renal transplant (on tacrolimus, unk date), complicated by low grade lymphoma post transplant, DM2, HTN, MGUS, pAF, BPH, HLD, CAD (s/p CABG 15 years ago), presents with fever, chills and worsening cough for 5 days. Patient states that he has a productive cough with brownish colored sputum. Found to have acute hypoxic respiratory failure and sepsis 2/2 likely multifocal pneumonia.    Neuro - no active issues    CV  - Grade III diastolic dysfunction. EF: 60-65%    # HTN  - restart 20mg lasix   - continue to hold amlodipine 10mg  - restart as needed    # HLD  - on home rosuvastatin 10mg    # CAD (s/p CABG 15 yrs ago)  - restart home isosorbide mononitrate 30mg    # pAF  - will beign home eliquis 5mg BID tomorrow if pt is stable  Pulm  # Acute hypoxic respiratory failure  Likely 2/2 multifocal pneumonia. Increasing work of breathing in ED requiring intubation on 7/6. Chest CT (7/7) showing Large airspace consolidation right upper lobe. Complete consolidation of the right lower lobe. Small subsegmental atelectasis/consolidation left upper lobe.  - AFB negative, Pneumocystis PCR negative  - Strict I's and O's  - Daily CXR  - Switched HFNC to 6L NC  - A-line removed today      GI  -NGT removed o/n. Will perform dysphagia screen in AM, and begin PO feeds if pt passes screening  - Rectal tube removed today  - Start 2mg loperamide Q3hrs PRN as pt having diarrhea    ID  # Sepsis  Patient with 4/4 SIRS criteria (febrile, tachycardic, tachypneic, and WBC) likely 2/2 multifocal pneumonia. Patient with recent Z-pack use, but no known past hospitalizations or other antibiotics in past 3 months. Labs in ED notable for lactate 1.1 and VBG 7.33, CO2 38, HCO3 20. UA with specific gravity 1.030. s/p 3L NS, Vancomycin, and Zosyn.  - AFB negative, Pneumocystis PCR negative   - zosyn to 4.5g Q6hrs as renal function has improved  - f/u urine strep  - Urine legionella negative  - Staph PCR positive  - MRSA PCR negative  - RVP negative  - COVID negative  - BCX: no growth  - Bronchial lavage still pending results      Renal  # Renal Transplant  - History of renal transplant on (6/2017, [facility], on tacolimus) .creatinine baseline 1.0.  - Nephrology (Dr. Leyva and Dr. Preciado) on board  - tacrolimus level sent for tomorrow am  - Strict I's and O's with q6h bladder scans    #Acute kidney injury- Resolved   -BUN, Cr stable this AM      # BPH  - restart tamsulosin 0.4mg at home.    Heme  #Hx of lymphoma post transplant  - consulted surgery, recommend OP lymph node Bx  - Completed excisional Lymph node Bx of Left suprclavicular nodes- pending result  - on heparin drip 25U, PTT 97.6  - Heme/onc reccs PET scan and CT with contrast. Will not get CT W contrast currently d/t poor renal function  - Flow cytometry 07/08/21: B-cell lymphoma and are suggestive of chronic lymphocytic leukemia/small lymphocytic lymphoma    # Anemia of chronic disease/ Mixed mild Iron deficiency anemia      Endo  # DM2  - Pt on home lantus 45 at night and lispro 45 TID  -  this AM. CMP glucose 156  - sliding scale coverage   - lantus to 25 units in the morning      DERM  # lluvia reticular non-blanching rash from upper thighs to knees b/l  - consult Dermatology  - no change on exam    F: none  E: replete PRN  N: NPO with tube feed       Dispo: [facility]    CODE: Full         71 yo M with a past medical history of renal transplant (on tacrolimus, unk date), complicated by low grade lymphoma post transplant, DM2, HTN, MGUS, pAF, BPH, HLD, CAD (s/p CABG 15 years ago), presents with fever, chills and worsening cough for 5 days. Patient states that he has a productive cough with brownish colored sputum. Found to have acute hypoxic respiratory failure and sepsis 2/2 likely multifocal pneumonia.    Neuro - no active issues    CV  - Grade III diastolic dysfunction. EF: 60-65%    # HTN  - restart 20mg lasix   - continue to hold amlodipine 10mg    # HLD  - on home rosuvastatin 10mg    # CAD (s/p CABG 15 yrs ago)  - restart home isosorbide mononitrate 30mg    # pAF  - will beign home eliquis 5mg BID tomorrow if pt is stable  Pulm  # Acute hypoxic respiratory failure  Likely 2/2 multifocal pneumonia. Increasing work of breathing in ED requiring intubation on 7/6. Chest CT (7/7) showing Large airspace consolidation right upper lobe. Complete consolidation of the right lower lobe. Small subsegmental atelectasis/consolidation left upper lobe.  - AFB negative, Pneumocystis PCR negative  - Strict I's and O's  - Daily CXR  - Switched HFNC to 6L NC  - A-line removed today      GI  -NGT removed o/n. Will perform dysphagia screen in AM, and begin PO feeds if pt passes screening  - Rectal tube removed today  - Start 2mg loperamide Q3hrs PRN as pt having diarrhea    ID  # Sepsis  Patient with 4/4 SIRS criteria (febrile, tachycardic, tachypneic, and WBC) likely 2/2 multifocal pneumonia. Patient with recent Z-pack use, but no known past hospitalizations or other antibiotics in past 3 months. Labs in ED notable for lactate 1.1 and VBG 7.33, CO2 38, HCO3 20. UA with specific gravity 1.030. s/p 3L NS, Vancomycin, and Zosyn.  - AFB negative, Pneumocystis PCR negative   - zosyn to 4.5g Q6hrs as renal function has improved  - f/u urine strep  - Urine legionella negative  - Staph PCR positive  - MRSA PCR negative  - RVP negative  - COVID negative  - BCX: no growth  - Bronchial lavage still pending results      Renal  # Renal Transplant  - History of renal transplant on (6/2017, [facility], on tacolimus) .creatinine baseline 1.0.  - Nephrology (Dr. Leyva and Dr. Preciado) on board  - tacrolimus level 3.4 this AM    #Acute kidney injury- Resolved   -BUN, Cr stable this AM      # BPH  - restart tamsulosin 0.4mg at home.    Heme  #Hx of lymphoma post transplant  - consulted surgery, recommend OP lymph node Bx  - Completed excisional Lymph node Bx of Left suprclavicular nodes- pending result  - Flow cytometry 07/08/21: B-cell lymphoma and are suggestive of chronic lymphocytic leukemia/small lymphocytic lymphoma    # Anemia of chronic disease/ Mixed mild Iron deficiency anemia  - H&H stable  - will continue to monitor    Endo  # DM2  - Pt on home lantus 45 at night and lispro 45 TID  - sliding scale coverage   - lantus 25 units in the morning      DERM  # lluvia reticular non-blanching rash from upper thighs to knees b/l  - consult Dermatology  - no change on exam    F: none  E: replete PRN  N: NPO with tube feed       Dispo: Pt ready for s/d to [facility]    CODE: Full

## 2021-07-11 NOTE — PROGRESS NOTE ADULT - PROBLEM SELECTOR PLAN 7
# HTN  - c/w 20mg lasix   - hold amlodipine 10mg    # HLD  - on home rosuvastatin 10mg    # CAD (s/p CABG 15 yrs ago)  - c/w home isosorbide mononitrate 30mg

## 2021-07-11 NOTE — PROGRESS NOTE ADULT - PROBLEM SELECTOR PLAN 1
Likely 2/2 multifocal pneumonia. Intubated on 7/6. Chest CT (7/7) showing Large airspace consolidation right upper lobe. Complete consolidation of the right lower lobe. Small subsegmental atelectasis/consolidation left upper lobe.  - AFB negative, Pneumocystis PCR negative  - Strict I's and O's  - Daily CXR  - Switched HFNC to 6L NC

## 2021-07-11 NOTE — SWALLOW BEDSIDE ASSESSMENT ADULT - SLP PERTINENT HISTORY OF CURRENT PROBLEM
Acute hypoxic respiratory failure and sepsis 2/2 likely multifocal PNA, intubated 7/6-7/10. PMHx renal transplant, lymphoma, DM2, CAD s/p CABG 15 yrs ago.

## 2021-07-11 NOTE — PROGRESS NOTE ADULT - PROBLEM SELECTOR PLAN 3
Heme-onc consulted with collateral from [facility] with CLL and adult T cell lymphoma. Flow cytometry 07/08/21: B-cell lymphoma and are suggestive of chronic lymphocytic leukemia/small lymphocytic lymphoma  - s/p excisional Lymph node Bx of Left supraclavicular nodes- pending result    PLAN:  - f/u biopsy results  - Send CT A/P with IV Contrast for evaluation of abdominopelvic lymphadenopathy and splenomegaly, to see if there is a disease progression of CLL. If contrast cannot be used due to declining renal function, can also perform CT A/P non-contrast.   - Can hold off on whole-body PET-CT to be done as outpatient when infection is cleared.   - Follow-up serum immunofixation, SPEP. K/L 0.40. Quantitative IgM slightly low.   - Maintain active T+S, transfuse PRBC if Hb < 7 or if actively bleeding/symptomatic or if platelets < 10K or < 20K if febrile or < 50 K if actively bleeding  - Can follow-up with Dr. Lydia Huffman at [facility] upon discharge in 1-2 weeks (Office: [phone]).

## 2021-07-11 NOTE — PROGRESS NOTE ADULT - SUBJECTIVE AND OBJECTIVE BOX
CC: SEPSIS MULTIFOCALPNEUMONIA        INTERVAL HISTORY: extubated  doing well  no complaints      ROS: No chest pain, no sob, no abd pain. No n/v/d    PAST MEDICAL & SURGICAL HISTORY:  Barretts esophagus    CRI (chronic renal insufficiency)    DM (diabetes mellitus)    GERD (gastroesophageal reflux disease)    HTN (hypertension)    Monoclonal gammopathy    Mandibular abscess    Paroxysmal atrial fibrillation    Benign prostatic hyperplasia, presence of lower urinary tract symptoms unspecified, unspecified morphology    Other hyperlipidemia    No significant past surgical history    S/P CABG (coronary artery bypass graft)  15 years    History of surgery  cyst removal    Mandibular abscess        PHYSICAL EXAM:  T(C): 36.9 (07-11-21 @ 09:20), Max: 37.8 (07-10-21 @ 18:11)  HR: 66 (07-11-21 @ 09:00)  BP: 140/70 (07-11-21 @ 09:00) (133/68 - 180/78)  RR: 32 (07-11-21 @ 09:00)  SpO2: 92% (07-11-21 @ 09:00)  Wt(kg): --  I&O's Summary    10 Jul 2021 07:01  -  11 Jul 2021 07:00  --------------------------------------------------------  IN: 1033.5 mL / OUT: 1651 mL / NET: -617.5 mL    11 Jul 2021 07:01  -  11 Jul 2021 09:40  --------------------------------------------------------  IN: 0 mL / OUT: 301 mL / NET: -301 mL      Weight 97.1 (07-05 @ 12:33)  General: AAO x 3,  NAD.  HEENT: moist mucous membranes, no pallor/cyanosis.  Neck: no JVD visible.  Cardiac: S1, S2. RRR. No murmurs   Respratory: CTA b/l, no access muscle use.   Abdomen: soft. nontender. nondistended  Skin: no rashes.  Extremities: no LE edema b/l  Access:       DATA:                        9.2<L>  12.89<H> )-----------( 134<L>    ( 11 Jul 2021 06:04 )             30.2<L>    Ferritin, Serum: 870 ng/mL *H* (07-07 @ 06:23)      133<L>  |  100    |  13     ----------------------------<  94     Ca:8.9   (11 Jul 2021 06:04)  4.3     |  25     |  1.02       eGFR if Non : 75  eGFR if : 87    TPro  6.4    /  Alb  3.2<L>  /  TBili  0.6    /  DBili  x      /  AST  54<H>  /  ALT  37     /  AlkPhos  91     11 Jul 2021 06:04                    MEDICATIONS  (STANDING):  chlorhexidine 2% Cloths 1 Application(s) Topical <User Schedule>  dextrose 40% Gel 15 Gram(s) Oral once  dextrose 5%. 1000 milliLiter(s) (50 mL/Hr) IV Continuous <Continuous>  dextrose 5%. 1000 milliLiter(s) (100 mL/Hr) IV Continuous <Continuous>  dextrose 50% Injectable 25 Gram(s) IV Push once  dextrose 50% Injectable 25 Gram(s) IV Push once  dextrose 50% Injectable 12.5 Gram(s) IV Push once  famotidine   Suspension 20 milliGRAM(s) Oral daily  furosemide    Tablet 20 milliGRAM(s) Oral daily  glucagon  Injectable 1 milliGRAM(s) IntraMuscular once  insulin glargine Injectable (LANTUS) 25 Unit(s) SubCutaneous every morning  insulin regular  human corrective regimen sliding scale   SubCutaneous every 6 hours  isosorbide   mononitrate ER Tablet (IMDUR) 30 milliGRAM(s) Oral daily  melatonin 5 milliGRAM(s) Oral at bedtime  piperacillin/tazobactam IVPB.. 4.5 Gram(s) IV Intermittent every 6 hours  polyethylene glycol 3350 17 Gram(s) Oral two times a day  senna 2 Tablet(s) Oral at bedtime  tacrolimus    0.5 mG/mL Suspension 0.5 milliGRAM(s) Oral two times a day  tamsulosin 0.4 milliGRAM(s) Oral at bedtime    MEDICATIONS  (PRN):  acetaminophen    Suspension .. 650 milliGRAM(s) Oral every 6 hours PRN Temp greater or equal to 38C (100.4F), Mild Pain (1 - 3)  loperamide 2 milliGRAM(s) Oral every 3 hours PRN Diarrhea

## 2021-07-11 NOTE — PROGRESS NOTE ADULT - PROBLEM SELECTOR PLAN 5
# Renal Transplant  - History of renal transplant on (6/2017, [facility], on tacolimus) .creatinine baseline 1.0.  - Nephrology (Dr. Leyva and Dr. Preciado) on board    PLAN:  - tacrolimus level 3.4  - c/w PO tacrolimus dose of .5mg suspension BID  - f/u daily tacrolimus levels      #Acute kidney injury- Resolved   -BUN, Cr stable # Renal Transplant  - History of renal transplant on (6/2017, [facility], on tacolimus) .creatinine baseline 1.0.  - Nephrology (Dr. Leyva and Dr. Preciado) on board    PLAN:  - tacrolimus level goal 3-4  - c/w PO tacrolimus dose of .5mg suspension BID  - f/u daily tacrolimus levels  - f/u renal recs for tacro dosing      #Acute kidney injury- Resolved   -BUN, Cr stable

## 2021-07-11 NOTE — PROGRESS NOTE ADULT - ASSESSMENT
69 yo M with a past medical history of renal transplant (on tacrolimus, unk date), complicated by low grade lymphoma post transplant, DM2, HTN, MGUS, pAF, BPH, HLD, CAD (s/p CABG 15 years ago), presents with fever, chills and worsening cough for 5 days. Patient states that he has a productive cough with brownish colored sputum. Found to have acute hypoxic respiratory failure and sepsis 2/2 likely multifocal pneumonia in the setting of new CLL/B cell lymphoma.

## 2021-07-11 NOTE — PROGRESS NOTE ADULT - PROBLEM SELECTOR PLAN 4
- Pt on home lantus 45 at night and lispro 45 TID  - sliding scale coverage     #GI  - NGT removed o/n.   - Passed dysphagia screening  - Rectal tube removed    PLAN:  - c/w PO diet DASH/TLC  - c/w 2mg loperamide Q3hrs PRN as pt having diarrhea - Pt on home lantus 25 at night  - sliding scale coverage for now  - when restarting diet, will calculate lispro needs.    #GI  - NGT removed o/n.   - Passed dysphagia screening  - Rectal tube removed    PLAN:  - c/w PO diet DASH/TLC  - c/w 2mg loperamide Q3hrs PRN as pt having diarrhea

## 2021-07-11 NOTE — CHART NOTE - NSCHARTNOTEFT_GEN_A_CORE
Tacro level resulted <2.     Recommend increase tacrolimus dose to 1mg PO q12h and give 1mg STAT.     D/w attending, resident.

## 2021-07-11 NOTE — PROGRESS NOTE ADULT - PROBLEM SELECTOR PLAN 10
F: none  E: replete PRN  N: diet DASH/TLC  DVT: held anticoag for procedure consider restarting 7/12    Dispo: [facility] F: none  E: replete PRN  N: diet DASH/TLC, mechanical soft  DVT: held anticoag for procedure consider restarting 7/12    Dispo: [facility]

## 2021-07-12 LAB
4/8 RATIO: 0.33 RATIO — LOW (ref 0.86–4.14)
A FLAVUS AB FLD QL: NEGATIVE — SIGNIFICANT CHANGE UP
A NIGER AB FLD QL: NEGATIVE — SIGNIFICANT CHANGE UP
A NIGER AB FLD QL: NEGATIVE — SIGNIFICANT CHANGE UP
ABS CD8: 699 /UL — SIGNIFICANT CHANGE UP (ref 90–775)
ALBUMIN SERPL ELPH-MCNC: 3.1 G/DL — LOW (ref 3.3–5)
ALP SERPL-CCNC: 90 U/L — SIGNIFICANT CHANGE UP (ref 40–120)
ALT FLD-CCNC: 37 U/L — SIGNIFICANT CHANGE UP (ref 10–45)
ANION GAP SERPL CALC-SCNC: 12 MMOL/L — SIGNIFICANT CHANGE UP (ref 5–17)
ANISOCYTOSIS BLD QL: SLIGHT — SIGNIFICANT CHANGE UP
AST SERPL-CCNC: 50 U/L — HIGH (ref 10–40)
BASOPHILS # BLD AUTO: 0 K/UL — SIGNIFICANT CHANGE UP (ref 0–0.2)
BASOPHILS NFR BLD AUTO: 0 % — SIGNIFICANT CHANGE UP (ref 0–2)
BILIRUB SERPL-MCNC: 0.6 MG/DL — SIGNIFICANT CHANGE UP (ref 0.2–1.2)
BUN SERPL-MCNC: 15 MG/DL — SIGNIFICANT CHANGE UP (ref 7–23)
BURR CELLS BLD QL SMEAR: PRESENT — SIGNIFICANT CHANGE UP
CALCIUM SERPL-MCNC: 8.7 MG/DL — SIGNIFICANT CHANGE UP (ref 8.4–10.5)
CD3 BLASTS SPEC-ACNC: 10 % — LOW (ref 58–84)
CD3 BLASTS SPEC-ACNC: 972 /UL — SIGNIFICANT CHANGE UP (ref 396–2024)
CD4 %: 2 % — LOW (ref 30–56)
CD8 %: 8 % — LOW (ref 11–43)
CHLORIDE SERPL-SCNC: 101 MMOL/L — SIGNIFICANT CHANGE UP (ref 96–108)
CMV DNA CSF QL NAA+PROBE: SIGNIFICANT CHANGE UP
CO2 SERPL-SCNC: 24 MMOL/L — SIGNIFICANT CHANGE UP (ref 22–31)
CREAT SERPL-MCNC: 0.99 MG/DL — SIGNIFICANT CHANGE UP (ref 0.5–1.3)
DACRYOCYTES BLD QL SMEAR: SLIGHT — SIGNIFICANT CHANGE UP
EBV DNA SERPL NAA+PROBE-ACNC: 800 IU/ML — HIGH
EOSINOPHIL # BLD AUTO: 0.11 K/UL — SIGNIFICANT CHANGE UP (ref 0–0.5)
EOSINOPHIL NFR BLD AUTO: 0.8 % — SIGNIFICANT CHANGE UP (ref 0–6)
FUNGITELL B-D-GLUCAN,  BRONCHIAL LAVAGE: SIGNIFICANT CHANGE UP
GALACTOMANNAN AG SERPL-ACNC: 0.06 INDEX — SIGNIFICANT CHANGE UP (ref 0–0.49)
GLUCOSE BLDC GLUCOMTR-MCNC: 106 MG/DL — HIGH (ref 70–99)
GLUCOSE BLDC GLUCOMTR-MCNC: 158 MG/DL — HIGH (ref 70–99)
GLUCOSE BLDC GLUCOMTR-MCNC: 174 MG/DL — HIGH (ref 70–99)
GLUCOSE BLDC GLUCOMTR-MCNC: 182 MG/DL — HIGH (ref 70–99)
GLUCOSE SERPL-MCNC: 105 MG/DL — HIGH (ref 70–99)
HCT VFR BLD CALC: 30.9 % — LOW (ref 39–50)
HGB BLD-MCNC: 9.2 G/DL — LOW (ref 13–17)
LYMPHOCYTES # BLD AUTO: 67.8 % — HIGH (ref 13–44)
LYMPHOCYTES # BLD AUTO: 9.74 K/UL — HIGH (ref 1–3.3)
MAGNESIUM SERPL-MCNC: 1.9 MG/DL — SIGNIFICANT CHANGE UP (ref 1.6–2.6)
MANUAL SMEAR VERIFICATION: SIGNIFICANT CHANGE UP
MCHC RBC-ENTMCNC: 28.8 PG — SIGNIFICANT CHANGE UP (ref 27–34)
MCHC RBC-ENTMCNC: 29.8 GM/DL — LOW (ref 32–36)
MCV RBC AUTO: 96.9 FL — SIGNIFICANT CHANGE UP (ref 80–100)
METAMYELOCYTES # FLD: 0.9 % — HIGH (ref 0–0)
MICROCYTES BLD QL: SLIGHT — SIGNIFICANT CHANGE UP
MISCELLANEOUS TEST NAME: SIGNIFICANT CHANGE UP
MONOCYTES # BLD AUTO: 0.13 K/UL — SIGNIFICANT CHANGE UP (ref 0–0.9)
MONOCYTES NFR BLD AUTO: 0.9 % — LOW (ref 2–14)
NEUTROPHILS # BLD AUTO: 4.25 K/UL — SIGNIFICANT CHANGE UP (ref 1.8–7.4)
NEUTROPHILS NFR BLD AUTO: 28.7 % — LOW (ref 43–77)
NEUTS BAND # BLD: 0.9 % — SIGNIFICANT CHANGE UP (ref 0–8)
OVALOCYTES BLD QL SMEAR: SLIGHT — SIGNIFICANT CHANGE UP
PHOSPHATE SERPL-MCNC: 3.4 MG/DL — SIGNIFICANT CHANGE UP (ref 2.5–4.5)
PLAT MORPH BLD: NORMAL — SIGNIFICANT CHANGE UP
PLATELET # BLD AUTO: 139 K/UL — LOW (ref 150–400)
POIKILOCYTOSIS BLD QL AUTO: SLIGHT — SIGNIFICANT CHANGE UP
POLYCHROMASIA BLD QL SMEAR: SLIGHT — SIGNIFICANT CHANGE UP
POTASSIUM SERPL-MCNC: 4.8 MMOL/L — SIGNIFICANT CHANGE UP (ref 3.5–5.3)
POTASSIUM SERPL-SCNC: 4.8 MMOL/L — SIGNIFICANT CHANGE UP (ref 3.5–5.3)
PROT SERPL-MCNC: 6.3 G/DL — SIGNIFICANT CHANGE UP (ref 6–8.3)
RBC # BLD: 3.19 M/UL — LOW (ref 4.2–5.8)
RBC # FLD: 15.3 % — HIGH (ref 10.3–14.5)
RBC BLD AUTO: ABNORMAL
SMUDGE CELLS # BLD: PRESENT — SIGNIFICANT CHANGE UP
SODIUM SERPL-SCNC: 137 MMOL/L — SIGNIFICANT CHANGE UP (ref 135–145)
SPHEROCYTES BLD QL SMEAR: SLIGHT — SIGNIFICANT CHANGE UP
SURGICAL PATHOLOGY STUDY: SIGNIFICANT CHANGE UP
T-CELL CD4 SUBSET PNL BLD: 231 /UL — LOW (ref 325–1251)
TACROLIMUS SERPL-MCNC: 2.6 NG/ML — SIGNIFICANT CHANGE UP
WBC # BLD: 14.36 K/UL — HIGH (ref 3.8–10.5)
WBC # FLD AUTO: 14.36 K/UL — HIGH (ref 3.8–10.5)

## 2021-07-12 PROCEDURE — 99232 SBSQ HOSP IP/OBS MODERATE 35: CPT | Mod: GC

## 2021-07-12 PROCEDURE — 99233 SBSQ HOSP IP/OBS HIGH 50: CPT

## 2021-07-12 PROCEDURE — 71045 X-RAY EXAM CHEST 1 VIEW: CPT | Mod: 26

## 2021-07-12 PROCEDURE — 99232 SBSQ HOSP IP/OBS MODERATE 35: CPT

## 2021-07-12 RX ORDER — ALPRAZOLAM 0.25 MG
0.25 TABLET ORAL ONCE
Refills: 0 | Status: DISCONTINUED | OUTPATIENT
Start: 2021-07-12 | End: 2021-07-12

## 2021-07-12 RX ORDER — INSULIN LISPRO 100/ML
VIAL (ML) SUBCUTANEOUS
Refills: 0 | Status: DISCONTINUED | OUTPATIENT
Start: 2021-07-12 | End: 2021-07-13

## 2021-07-12 RX ORDER — VALGANCICLOVIR 450 MG/1
450 TABLET, FILM COATED ORAL EVERY 24 HOURS
Refills: 0 | Status: DISCONTINUED | OUTPATIENT
Start: 2021-07-12 | End: 2021-07-12

## 2021-07-12 RX ORDER — INSULIN GLARGINE 100 [IU]/ML
20 INJECTION, SOLUTION SUBCUTANEOUS EVERY MORNING
Refills: 0 | Status: DISCONTINUED | OUTPATIENT
Start: 2021-07-12 | End: 2021-07-13

## 2021-07-12 RX ORDER — APIXABAN 2.5 MG/1
5 TABLET, FILM COATED ORAL EVERY 12 HOURS
Refills: 0 | Status: DISCONTINUED | OUTPATIENT
Start: 2021-07-12 | End: 2021-07-13

## 2021-07-12 RX ORDER — ROSUVASTATIN CALCIUM 5 MG/1
10 TABLET ORAL AT BEDTIME
Refills: 0 | Status: DISCONTINUED | OUTPATIENT
Start: 2021-07-12 | End: 2021-07-13

## 2021-07-12 RX ORDER — ALPRAZOLAM 0.25 MG
0.25 TABLET ORAL AT BEDTIME
Refills: 0 | Status: DISCONTINUED | OUTPATIENT
Start: 2021-07-12 | End: 2021-07-13

## 2021-07-12 RX ADMIN — INSULIN GLARGINE 25 UNIT(S): 100 INJECTION, SOLUTION SUBCUTANEOUS at 06:52

## 2021-07-12 RX ADMIN — VALGANCICLOVIR 450 MILLIGRAM(S): 450 TABLET, FILM COATED ORAL at 13:44

## 2021-07-12 RX ADMIN — Medication 20 MILLIGRAM(S): at 05:43

## 2021-07-12 RX ADMIN — Medication 0.25 MILLIGRAM(S): at 22:06

## 2021-07-12 RX ADMIN — TAMSULOSIN HYDROCHLORIDE 0.4 MILLIGRAM(S): 0.4 CAPSULE ORAL at 22:07

## 2021-07-12 RX ADMIN — APIXABAN 5 MILLIGRAM(S): 2.5 TABLET, FILM COATED ORAL at 11:34

## 2021-07-12 RX ADMIN — ROSUVASTATIN CALCIUM 10 MILLIGRAM(S): 5 TABLET ORAL at 22:07

## 2021-07-12 RX ADMIN — Medication 1: at 23:12

## 2021-07-12 RX ADMIN — APIXABAN 5 MILLIGRAM(S): 2.5 TABLET, FILM COATED ORAL at 22:06

## 2021-07-12 RX ADMIN — FAMOTIDINE 20 MILLIGRAM(S): 10 INJECTION INTRAVENOUS at 11:34

## 2021-07-12 RX ADMIN — Medication 0.25 MILLIGRAM(S): at 03:43

## 2021-07-12 RX ADMIN — TACROLIMUS 1 MILLIGRAM(S): 5 CAPSULE ORAL at 05:43

## 2021-07-12 RX ADMIN — ISOSORBIDE MONONITRATE 30 MILLIGRAM(S): 60 TABLET, EXTENDED RELEASE ORAL at 11:34

## 2021-07-12 RX ADMIN — Medication 1: at 17:39

## 2021-07-12 RX ADMIN — TACROLIMUS 1 MILLIGRAM(S): 5 CAPSULE ORAL at 17:39

## 2021-07-12 NOTE — PROGRESS NOTE ADULT - SUBJECTIVE AND OBJECTIVE BOX
---------------------TRANSFER to [facility] ------------------------    Hospital Course:   Pt is a 69 yo M with PMH renal tx (tacro), T2D, HTN, HLD, MGUS, pAF (eliquis), BPH, CAD (s/p CABG), COVID (3/2020), Garcia's esophagus/GERD, chronic hypoNa, CLL, and post-renal transplant low grade lymphoma who p/w F/C, cough, and brown sputum x5d along with BL LE painless rash x3-4wks. On arrival, septic from pneumonia treated with Vanc/Zosyn, then changed to linezolid, azithromycin, and zosyn as per ID. Intubated on 7/6 for work of breathing and planned bronchoscopy. Bronchoscopy (on 7/7) showed copious purulent secretions sent for BAL. Hilar lymphadenopathy was seen on imaging. Antibiotics were adjusted as per ID to zosyn alone for pseudomonal coverage. BCx have NGTD. Due to lymphadenopathy on CT scan being concerning for lymphoma, heme-onc consulted and recommended FNA/CNA of L supraclavicular node which was performed 7/8. After completion of bx, collateral from [facility] revealed hx of CLL and adult T cell lymphoma. Flow cytometry returned with B cell lymphoma/CLL, which was consistent with his past history which is followed outpatient by oncologist at [facility]. Pt was restarted on tacrolimus with a goal level 3-4. S/p extubation and weaning to NC. Post extubation course complicated by hemoptysis on 7/10, and anticoagulation was held. Hemoptysis resolved and diet was started the following day 7/11. Completed course of zosyn. NC titrated and tolerating 2L with O2sat >90%. Restarting eliquis today as patient is without hemoptysis for >36 hours. Stable for s/d to [facility] for further observation and management.    CC: Patient is a 70y old  Male who presents with a chief complaint of sepsis (11 Jul 2021 14:20)      INTERVAL EVENTS: Completed zosyn. anxious overnight given ativan .25mg. toxo and aspergillus negative.     SUBJECTIVE / INTERVAL HPI: Patient seen and examined at bedside. Endorses feeling well today. Does not think he needs to be on oxygen. Currently on 2L NC. Denies any SOB. Endorses continued non-bloody productive cough. Denies fever, chills, headache, chest pain, SOB, abd pain, nausea, vomiting, diarrhea, extremity pain or swelling.     ROS: negative unless otherwise stated above.    VITAL SIGNS:  Vital Signs Last 24 Hrs  T(C): 36.5 (12 Jul 2021 06:31), Max: 37.2 (11 Jul 2021 21:45)  T(F): 97.7 (12 Jul 2021 06:31), Max: 98.9 (11 Jul 2021 21:45)  HR: 70 (12 Jul 2021 04:16) (64 - 74)  BP: 129/60 (12 Jul 2021 04:16) (98/51 - 179/72)  BP(mean): 87 (12 Jul 2021 04:16) (72 - 112)  RR: 24 (12 Jul 2021 04:16) (24 - 32)  SpO2: 94% (12 Jul 2021 04:16) (92% - 97%)      07-11-21 @ 07:01  -  07-12-21 @ 07:00  --------------------------------------------------------  IN: 100 mL / OUT: 2671 mL / NET: -2571 mL        PHYSICAL EXAM:    General: NAD, sitting comfortably in chair with 2L NC.   HEENT: Normocephalic atraumatic, MMM, conjunctiva/sclera clear, no rhinorrhea  Neck: supple  Cardiovascular: +S1/S2; RRR  Respiratory: CTA b/l, no W/R/R  Gastrointestinal: soft, NT/ND. BS present x4.   Extremities: WWP; no edema, clubbing or cyanosis  Vascular: 2+ radial, DP/PT pulses B/L  Neurological: AAOx3; no focal deficits    MEDICATIONS:  MEDICATIONS  (STANDING):  chlorhexidine 2% Cloths 1 Application(s) Topical <User Schedule>  dextrose 40% Gel 15 Gram(s) Oral once  dextrose 5%. 1000 milliLiter(s) (50 mL/Hr) IV Continuous <Continuous>  dextrose 5%. 1000 milliLiter(s) (100 mL/Hr) IV Continuous <Continuous>  dextrose 50% Injectable 25 Gram(s) IV Push once  dextrose 50% Injectable 12.5 Gram(s) IV Push once  dextrose 50% Injectable 25 Gram(s) IV Push once  famotidine   Suspension 20 milliGRAM(s) Oral daily  furosemide    Tablet 20 milliGRAM(s) Oral daily  glucagon  Injectable 1 milliGRAM(s) IntraMuscular once  insulin glargine Injectable (LANTUS) 25 Unit(s) SubCutaneous every morning  insulin regular  human corrective regimen sliding scale   SubCutaneous every 6 hours  isosorbide   mononitrate ER Tablet (IMDUR) 30 milliGRAM(s) Oral daily  melatonin 5 milliGRAM(s) Oral at bedtime  polyethylene glycol 3350 17 Gram(s) Oral two times a day  senna 2 Tablet(s) Oral at bedtime  tacrolimus 1 milliGRAM(s) Oral every 12 hours  tamsulosin 0.4 milliGRAM(s) Oral at bedtime    MEDICATIONS  (PRN):  acetaminophen    Suspension .. 650 milliGRAM(s) Oral every 6 hours PRN Temp greater or equal to 38C (100.4F), Mild Pain (1 - 3)  loperamide 2 milliGRAM(s) Oral every 3 hours PRN Diarrhea  zaleplon 10 milliGRAM(s) Oral at bedtime PRN Insomnia      ALLERGIES:  Allergies    No Known Allergies    Intolerances        LABS:                        9.2    14.36 )-----------( 139      ( 12 Jul 2021 06:19 )             30.9     07-12    137  |  101  |  15  ----------------------------<  105<H>  4.8   |  24  |  0.99    Ca    8.7      12 Jul 2021 06:18  Phos  3.4     07-12  Mg     1.9     07-12    TPro  6.3  /  Alb  3.1<L>  /  TBili  0.6  /  DBili  x   /  AST  50<H>  /  ALT  37  /  AlkPhos  90  07-12    PT/INR - ( 11 Jul 2021 06:04 )   PT: 15.8 sec;   INR: 1.33          PTT - ( 11 Jul 2021 06:04 )  PTT:33.1 sec    CAPILLARY BLOOD GLUCOSE      POCT Blood Glucose.: 106 mg/dL (12 Jul 2021 06:36)      RADIOLOGY & ADDITIONAL TESTS: Reviewed.

## 2021-07-12 NOTE — PROGRESS NOTE ADULT - PROBLEM SELECTOR PLAN 7
# HTN  - c/w 20mg lasix   - hold amlodipine 10mg    # HLD  - on home rosuvastatin 10mg    # CAD (s/p CABG 15 yrs ago)  - c/w home isosorbide mononitrate 30mg # Renal Transplant  - History of renal transplant on (6/2017, [facility], on tacolimus) .creatinine baseline 1.0.  - Nephrology (Dr. Leyva and Dr. Preciado) on board    PLAN:  - tacrolimus level goal 3-4  - c/w PO tacrolimus dose of 1mg suspension BID  - f/u ID approval for valganiciclovir   - f/u daily tacrolimus levels  - f/u renal recs for tacro dosing      #Acute kidney injury- Resolved   -BUN, Cr stable

## 2021-07-12 NOTE — PROGRESS NOTE ADULT - ASSESSMENT
70M with PMHx significant for renal transplant on tacrolimus, ) DM2, HTN, MGUS, pAF, BPH, HLD, CAD (s/p CABG 15 years ago), presents for 3-4 days of worsening cough nephrology consulted for LATRICIA on CKD     #Renal Transplant (2017)  patient formerly on cellcept, valagancyclovir and tacrolimus, now solely on tacrolimus 2mg in AM and 1 mg PM (confirmed with [facility] transplant physician)  -last tacro level noted to be 2.6  repeat level in tomorrow AM (30 mins before AM dose); goal 3-4  - will continue with 1mg q12 given concern for PTLD ?    #LATRICIA on CKD  likely etiology ischemic ATN 2/ 2 hemodynamic instability   resolved  Cr down to 0.98 this AM from 1.1 yesterday  -strict i's and o's l       #Hyponatremia  resolved      #Anemia  Hgb 9.3, ~at goal   -no signs of active bleed  -iron panel noted      #Lymphadenopathy  patient has history of post-transplant lymphoproliferative disorder, MUGUS and CLL  -found to have extensive lymphadenopathy seen on CT scan ? lymphoma/neoplastic disease   -heme-onc on board; patient underwent LN FNA results pending

## 2021-07-12 NOTE — PROGRESS NOTE ADULT - PROBLEM SELECTOR PLAN 10
F: none  E: replete PRN  N: diet DASH/TLC, mechanical soft  DVT: held anticoag for procedure consider restarting 7/12    Dispo: [facility]

## 2021-07-12 NOTE — PROGRESS NOTE ADULT - PROBLEM SELECTOR PLAN 3
Heme-onc consulted with collateral from [facility] with CLL and adult T cell lymphoma. Flow cytometry 07/08/21: B-cell lymphoma and are suggestive of chronic lymphocytic leukemia/small lymphocytic lymphoma  - s/p excisional Lymph node Bx of Left supraclavicular nodes- pending result    PLAN:  - f/u biopsy results  - Send CT A/P with IV Contrast for evaluation of abdominopelvic lymphadenopathy and splenomegaly, to see if there is a disease progression of CLL. If contrast cannot be used due to declining renal function, can also perform CT A/P non-contrast.   - Can hold off on whole-body PET-CT to be done as outpatient when infection is cleared.   - Follow-up serum immunofixation, SPEP. K/L 0.40. Quantitative IgM slightly low.   - Maintain active T+S, transfuse PRBC if Hb < 7 or if actively bleeding/symptomatic or if platelets < 10K or < 20K if febrile or < 50 K if actively bleeding  - Can follow-up with Dr. Lydia Huffman at [facility] upon discharge in 1-2 weeks (Office: [phone]). Heme-onc consulted with collateral from [facility] with CLL and adult T cell lymphoma. Flow cytometry 07/08/21: B-cell lymphoma and are suggestive of chronic lymphocytic leukemia/small lymphocytic lymphoma  - s/p excisional Lymph node Bx of Left supraclavicular nodes- pending result    PLAN:  - f/u biopsy results  - Deferring CT A/P with IV Contrast for evaluation of abdominopelvic lymphadenopathy and splenomegaly to outpatient  - Can hold off on whole-body PET-CT to be done as outpatient when infection is cleared.   - Follow-up serum immunofixation, SPEP. K/L 0.40. Quantitative IgM slightly low.   - Maintain active T+S, transfuse PRBC if Hb < 7 or if actively bleeding/symptomatic or if platelets < 10K or < 20K if febrile or < 50 K if actively bleeding  - Can follow-up with Dr. Lydia Huffman at [facility] upon discharge in 1-2 weeks (Office: [phone]). - anticoagulation initially held in the setting of blood tinged sputum post extubation  - restart home Eliquis 5mg BID

## 2021-07-12 NOTE — PROGRESS NOTE ADULT - SUBJECTIVE AND OBJECTIVE BOX
Patient is a 70y Male seen and evaluated at bedside. no acute events overnight patient states that he is feeling well and is very anxious to go home- patient was extubated over the weekend and completed his antibiotic course;   urine output: 2.7L; net negative 2.5L        Meds:    acetaminophen    Suspension .. 650 every 6 hours PRN  ALPRAZolam 0.25 at bedtime PRN  apixaban 5 every 12 hours  chlorhexidine 2% Cloths 1 <User Schedule>  dextrose 40% Gel 15 once  dextrose 5%. 1000 <Continuous>  dextrose 5%. 1000 <Continuous>  dextrose 50% Injectable 25 once  dextrose 50% Injectable 25 once  dextrose 50% Injectable 12.5 once  famotidine   Suspension 20 daily  furosemide    Tablet 20 daily  glucagon  Injectable 1 once  insulin glargine Injectable (LANTUS) 25 every morning  insulin regular  human corrective regimen sliding scale  every 6 hours  isosorbide   mononitrate ER Tablet (IMDUR) 30 daily  loperamide 2 every 3 hours PRN  melatonin 5 at bedtime  polyethylene glycol 3350 17 two times a day  rosuvastatin 10 at bedtime  senna 2 at bedtime  tacrolimus 1 every 12 hours  tamsulosin 0.4 at bedtime  valGANciclovir 450 every 24 hours      T(C): , Max: 37.2 (07-11-21 @ 21:45)  T(F): , Max: 98.9 (07-11-21 @ 21:45)  HR: 74 (07-12-21 @ 11:36)  BP: 121/58 (07-12-21 @ 11:36)  BP(mean): 84 (07-12-21 @ 11:36)  RR: 20 (07-12-21 @ 11:36)  SpO2: 94% (07-12-21 @ 11:36)  Wt(kg): --    07-11 @ 07:01  -  07-12 @ 07:00  --------------------------------------------------------  IN: 100 mL / OUT: 2671 mL / NET: -2571 mL          Review of Systems:  CONSTITUTIONAL: No fever or chills, No fatigue or tiredness  RESPIRATORY: No shortness of breath, cough,   CARDIOVASCULAR: No chest pain or shortness of breath  GASTROINTESTINAL: No abdominal pain, No nausea or vomiting, No diarrhea  GENITOURINARY: No dysuria or urinary burning, No difficulty passing urine, No hematuria        PHYSICAL EXAM:  GENERAL: well-developed, well nourished, alert, no acute distress at present  CHEST/LUNG: Clear to auscultation bilaterally no rales, rhonchi or wheezing  HEART: normal S1S2, RRR  ABDOMEN: Soft, Nontender, +BS,   EXTREMITIES: No clubbing, cyanosis, or edema       LABS:                        9.2    14.36 )-----------( 139      ( 12 Jul 2021 06:19 )             30.9     07-12    137  |  101  |  15  ----------------------------<  105<H>  4.8   |  24  |  0.99    Ca    8.7      12 Jul 2021 06:18  Phos  3.4     07-12  Mg     1.9     07-12    TPro  6.3  /  Alb  3.1<L>  /  TBili  0.6  /  DBili  x   /  AST  50<H>  /  ALT  37  /  AlkPhos  90  07-12      PT/INR - ( 11 Jul 2021 06:04 )   PT: 15.8 sec;   INR: 1.33          PTT - ( 11 Jul 2021 06:04 )  PTT:33.1 sec          RADIOLOGY & ADDITIONAL STUDIES:

## 2021-07-12 NOTE — PROGRESS NOTE ADULT - PROBLEM SELECTOR PLAN 4
# HTN  - c/w 20mg lasix   - holding amlodipine 10mg, consider re-starting     # HLD  - on home rosuvastatin 10mg    # CAD (s/p CABG 15 yrs ago)  - home med isosorbide mononitrate 30mg BID  Plan:  - cont isosorbide mononitrate 30mg OD, titrate up to BID as tolerated

## 2021-07-12 NOTE — PROGRESS NOTE ADULT - PROBLEM SELECTOR PLAN 8
- discuss restarting home Eliquis 5mg BID tomorrow 7/12 if pt is stable # lluvia reticular non-blanching rash from upper thighs to knees b/l  - f/u outpatient w/ derm  - no change on exam

## 2021-07-12 NOTE — PROGRESS NOTE ADULT - PROBLEM SELECTOR PLAN 10
F: none  E: replete PRN  N: diet DASH/TLC, mechanical soft  DVT: held anticoag for procedure consider restarting 7/12

## 2021-07-12 NOTE — CONSULT NOTE ADULT - CONSULT REQUESTED DATE/TIME
09-Jul-2021 12:58
06-Jul-2021 08:39
06-Jul-2021 12:53
05-Jul-2021 17:20
07-Jul-2021 07:52
12-Jul-2021 06:40
07-Jul-2021 12:58
06-Jul-2021 15:03
07-Jul-2021 14:17
08-Jul-2021 09:33

## 2021-07-12 NOTE — PROGRESS NOTE ADULT - ASSESSMENT
69 yo M with a past medical history of renal transplant (on tacrolimus, unk date), complicated by low grade lymphoma post transplant, DM2, HTN, MGUS, pAF, BPH, HLD, CAD (s/p CABG 15 years ago), presents with fever, chills and worsening cough for 5 days. Patient states that he has a productive cough with brownish colored sputum. Found to have acute hypoxic respiratory failure and sepsis 2/2 likely multifocal pneumonia in the setting of new CLL/B cell lymphoma. 69 yo M with a past medical history of renal transplant (on tacrolimus, unk date), complicated by low grade lymphoma post transplant, DM2, HTN, MGUS, pAF, BPH, HLD, CAD (s/p CABG 15 years ago), presents with fever, chills and worsening cough for 5 days. Patient states that he has a productive cough with brownish colored sputum. Found to have acute hypoxic respiratory failure and sepsis 2/2 likely multifocal pneumonia in the setting of known CLL/B cell lymphoma.

## 2021-07-12 NOTE — PROGRESS NOTE ADULT - PROBLEM SELECTOR PLAN 8
# lluvia reticular non-blanching rash from upper thighs to knees b/l  - f/u outpatient w/ derm  - no change on exam

## 2021-07-12 NOTE — PROGRESS NOTE ADULT - PROBLEM SELECTOR PLAN 5
# Renal Transplant  - History of renal transplant on (6/2017, [facility], on tacolimus) .creatinine baseline 1.0.  - Nephrology (Dr. Leyva and Dr. Preciado) on board    PLAN:  - tacrolimus level goal 3-4  - c/w PO tacrolimus dose of 1mg suspension BID  - f/u daily tacrolimus levels  - f/u renal recs for tacro dosing      #Acute kidney injury- Resolved   -BUN, Cr stable Heme-onc consulted with collateral from [facility] with CLL and adult T cell lymphoma. Flow cytometry 07/08/21: B-cell lymphoma and are suggestive of chronic lymphocytic leukemia/small lymphocytic lymphoma  -SPEP with elevated Ig Kappa and Lambda   -EBV positive  PLAN:  - Deferring CT A/P with IV Contrast for evaluation of abdominopelvic lymphadenopathy and splenomegaly to outpatient (pt follows with [facility])  - Can hold off on whole-body PET-CT to be done as outpatient when infection is cleared.   - Follow-up serum immunofixation, SPEP. K/L 0.40. Quantitative IgM slightly low.   - Maintain active T+S, transfuse PRBC if Hb < 7 or if actively bleeding/symptomatic or if platelets < 10K or < 20K if febrile or < 50 K if actively bleeding  - Can follow-up with Dr. Lydia Huffman at [facility] upon discharge in 1-2 weeks (Office: [phone]).

## 2021-07-12 NOTE — CHART NOTE - NSCHARTNOTEFT_GEN_A_CORE
Patient seen at bedside. Doing well post-extubation, denies any physical symptoms (no pain, SOB, nausea, etc). Reaffirms his desire to be Full Code, relies heavily on his jayde tradition. Goals are established; Symptoms are managed. Palliative Care will SIGN OFF.    Please reconsult with any new issues or concerns (including nights/weekends): Call [phone]  Rufino Brandon, Palliative Care Attending

## 2021-07-12 NOTE — PROGRESS NOTE ADULT - PROBLEM SELECTOR PLAN 2
Patient with 4/4 SIRS criteria (febrile, tachycardic, tachypneic, and WBC) likely 2/2 multifocal pneumonia on admission.  -Chest CT (7/7) showing Large airspace consolidation right upper lobe. Complete consolidation of the right lower lobe. Small subsegmental atelectasis/consolidation left upper lobe.  - Staph PCR positive, MRSA PCR negative  - AFB negative, Pneumocystis PCR neg, Urine legionella negative, urine strep negative, RVP neg, COVID neg, histoplasma negative, toxoplasma negative, aspergillus negative  - CMV Ab's positive, but PCR negative  - BAL negative for toxoplasma, PCP  - BCx: NGTD  - s/p vanc, linezolid  - s/p IV zosyn to 4.5g Q6hrs as renal function has improved (completed course 7/11)  Plan:  - f/u Bronchial lavage cx including fungal and Nocardia cx  - monitor off antibiotics

## 2021-07-12 NOTE — CONSULT NOTE ADULT - ASSESSMENT
per Internal Medicine    71 yo M with a past medical history of renal transplant (on tacrolimus, unk date), complicated by low grade lymphoma post transplant, DM2, HTN, MGUS, pAF, BPH, HLD, CAD (s/p CABG 15 years ago), presents with fever, chills and worsening cough for 5 days. Patient states that he has a productive cough with brownish colored sputum. Found to have acute hypoxic respiratory failure and sepsis 2/2 likely multifocal pneumonia in the setting of new CLL/B cell lymphoma.    Problem/Plan - 1:  ·  Problem: Acute respiratory failure with hypoxia.  Plan: Likely 2/2 multifocal pneumonia. Intubated on 7/6. Chest CT (7/7) showing Large airspace consolidation right upper lobe. Complete consolidation of the right lower lobe. Small subsegmental atelectasis/consolidation left upper lobe.  - AFB negative, Pneumocystis PCR negative  - Strict I's and O's  - Daily CXR  - Switched HFNC to 6L NC.     Problem/Plan - 2:  ·  Problem: Multifocal pneumonia.  Plan: Patient with 4/4 SIRS criteria (febrile, tachycardic, tachypneic, and WBC) likely 2/2 multifocal pneumonia on admission.   - Staph PCR positive, MRSA PCR negative  - s/p vanc, linezolid  - s/p IV zosyn to 4.5g Q6hrs as renal function has improved (completed course 7/11)  - AFB negative, Pneumocystis PCR neg, Urine legionella negative, RVP neg, COVID neg  - BCx: NGTD  - f/u Bronchial lavage cx including Nocardia cx, fungal, nocardia, aspergillus, toxoplasma PCR and histoplasma.  - f/u urine strep  - f/u serum EBV PCR  - F/u axillary LN bx.     Problem/Plan - 3:  ·  Problem: Lymphoma.  Plan: Heme-onc consulted with collateral from [facility] with CLL and adult T cell lymphoma. Flow cytometry 07/08/21: B-cell lymphoma and are suggestive of chronic lymphocytic leukemia/small lymphocytic lymphoma  - s/p excisional Lymph node Bx of Left supraclavicular nodes- pending result    PLAN:  - f/u biopsy results  - Send CT A/P with IV Contrast for evaluation of abdominopelvic lymphadenopathy and splenomegaly, to see if there is a disease progression of CLL. If contrast cannot be used due to declining renal function, can also perform CT A/P non-contrast.   - Can hold off on whole-body PET-CT to be done as outpatient when infection is cleared.   - Follow-up serum immunofixation, SPEP. K/L 0.40. Quantitative IgM slightly low.   - Maintain active T+S, transfuse PRBC if Hb < 7 or if actively bleeding/symptomatic or if platelets < 10K or < 20K if febrile or < 50 K if actively bleeding  - Can follow-up with Dr. Lydia Huffman at [facility] upon discharge in 1-2 weeks (Office: [phone]).     Problem/Plan - 4:  ·  Problem: DM (diabetes mellitus).  Plan: - Pt on home lantus 25 at night  - sliding scale coverage for now  - when restarting diet, will calculate lispro needs.    #GI  - NGT removed o/n.   - Passed dysphagia screening  - Rectal tube removed    PLAN:  - c/w PO diet DASH/TLC  - c/w 2mg loperamide Q3hrs PRN as pt having diarrhea.     Problem/Plan - 5:  ·  Problem: Renal transplant recipient.  Plan: # Renal Transplant  - History of renal transplant on (6/2017, [facility], on tacolimus) .creatinine baseline 1.0.  - Nephrology (Dr. Leyva and Dr. Preciado) on board    PLAN:  - tacrolimus level goal 3-4  - c/w PO tacrolimus dose of 1mg suspension BID  - f/u daily tacrolimus levels  - f/u renal recs for tacro dosing      #Acute kidney injury- Resolved   -BUN, Cr stable.     Problem/Plan - 6:  Problem: Rash. Plan: # lluvia reticular non-blanching rash from upper thighs to knees b/l  - f/u outpatient w/ derm  - no change on exam.    Problem/Plan - 7:  ·  Problem: HTN (hypertension).  Plan: # HTN  - c/w 20mg lasix   - hold amlodipine 10mg    # HLD  - on home rosuvastatin 10mg    # CAD (s/p CABG 15 yrs ago)  - c/w home isosorbide mononitrate 30mg.     Problem/Plan - 8:  ·  Problem: Paroxysmal atrial fibrillation.  Plan: - discuss restarting home Eliquis 5mg BID tomorrow 7/12 if pt is stable.     Problem/Plan - 9:  ·  Problem: BPH (benign prostatic hyperplasia).  Plan: - c/w tamsulosin 0.4mg at home.     Problem/Plan - 10:  Problem: Discharge planning issues. Plan; F: none  E: replete PRN  N: diet DASH/TLC, mechanical soft  DVT: held anticoag for procedure consider restarting 7/12    Dispo: 7lach.

## 2021-07-12 NOTE — PROGRESS NOTE ADULT - PROBLEM SELECTOR PLAN 7
# Renal Transplant  - History of renal transplant on (6/2017, [facility], on tacolimus) .creatinine baseline 1.0.  - Nephrology (Dr. Leyva and Dr. Preciado) on board    PLAN:  - tacrolimus level goal 3-4  - c/w PO tacrolimus dose of 1mg suspension BID  - f/u ID approval for valganiciclovir   - f/u daily tacrolimus levels  - f/u renal recs for tacro dosing      #Acute kidney injury- Resolved   -BUN, Cr stable

## 2021-07-12 NOTE — PROGRESS NOTE ADULT - SUBJECTIVE AND OBJECTIVE BOX
INCOMPLETE    Infectious Diseases Progress Note:    SUBJECTIVE: Patient seen and examined at bedside. Patient denies fever, chills, HA, nausea, vomiting, abdominal pain, dysuria, diarrhea.    SEPSIS MULTIFOCALPNEUMONIA      Functional quadriplegia    Acute respiratory failure with hypoxia    MGUS (monoclonal gammopathy of unknown significance)    Advanced care planning/counseling discussion    Encounter for palliative care    LATRICIA (acute kidney injury)    Multifocal pneumonia    Lymphoma    DM (diabetes mellitus)    Renal transplant recipient    Rash    HTN (hypertension)    Paroxysmal atrial fibrillation    BPH (benign prostatic hyperplasia)    Discharge planning issues        Allergies    No Known Allergies    Intolerances        ANTIBIOTICS/RELEVANT:  antimicrobials    immunologic:  tacrolimus 1 milliGRAM(s) Oral every 12 hours      OTHER:  acetaminophen    Suspension .. 650 milliGRAM(s) Oral every 6 hours PRN  ALPRAZolam 0.25 milliGRAM(s) Oral at bedtime PRN  chlorhexidine 2% Cloths 1 Application(s) Topical <User Schedule>  dextrose 40% Gel 15 Gram(s) Oral once  dextrose 5%. 1000 milliLiter(s) IV Continuous <Continuous>  dextrose 5%. 1000 milliLiter(s) IV Continuous <Continuous>  dextrose 50% Injectable 25 Gram(s) IV Push once  dextrose 50% Injectable 25 Gram(s) IV Push once  dextrose 50% Injectable 12.5 Gram(s) IV Push once  famotidine   Suspension 20 milliGRAM(s) Oral daily  furosemide    Tablet 20 milliGRAM(s) Oral daily  glucagon  Injectable 1 milliGRAM(s) IntraMuscular once  insulin glargine Injectable (LANTUS) 25 Unit(s) SubCutaneous every morning  insulin regular  human corrective regimen sliding scale   SubCutaneous every 6 hours  isosorbide   mononitrate ER Tablet (IMDUR) 30 milliGRAM(s) Oral daily  loperamide 2 milliGRAM(s) Oral every 3 hours PRN  melatonin 5 milliGRAM(s) Oral at bedtime  polyethylene glycol 3350 17 Gram(s) Oral two times a day  senna 2 Tablet(s) Oral at bedtime  tamsulosin 0.4 milliGRAM(s) Oral at bedtime      Objective:  Vital Signs Last 24 Hrs  T(C): 36.5 (12 Jul 2021 06:31), Max: 37.2 (11 Jul 2021 21:45)  T(F): 97.7 (12 Jul 2021 06:31), Max: 98.9 (11 Jul 2021 21:45)  HR: 66 (12 Jul 2021 08:36) (64 - 74)  BP: 139/67 (12 Jul 2021 08:36) (98/51 - 179/72)  BP(mean): 96 (12 Jul 2021 08:36) (72 - 112)  RR: 24 (12 Jul 2021 08:36) (24 - 30)  SpO2: 94% (12 Jul 2021 08:36) (94% - 97%)    PHYSICAL EXAM:  Constitutional: Well-developed, well nourished  Eyes: PERRLA, EOMI  Ear/Nose/Throat: no oral lesion, no sinus tenderness on percussion	  Neck:no JVD, no lymphadenopathy, supple  Respiratory: CTA bilateral  Cardiovascular: S1S2, RRR, no murmurs  Gastrointestinal: soft, NTND, (+) BS, no HSM  Extremities: no c/e/e  Skin: no rashes    LABS:                        9.2    14.36 )-----------( 139      ( 12 Jul 2021 06:19 )             30.9     07-12    137  |  101  |  15  ----------------------------<  105<H>  4.8   |  24  |  0.99    Ca    8.7      12 Jul 2021 06:18  Phos  3.4     07-12  Mg     1.9     07-12    TPro  6.3  /  Alb  3.1<L>  /  TBili  0.6  /  DBili  x   /  AST  50<H>  /  ALT  37  /  AlkPhos  90  07-12    PT/INR - ( 11 Jul 2021 06:04 )   PT: 15.8 sec;   INR: 1.33          PTT - ( 11 Jul 2021 06:04 )  PTT:33.1 sec      MICROBIOLOGY:            RADIOLOGY & ADDITIONAL STUDIES: Infectious Diseases Progress Note:    SUBJECTIVE: Patient seen and examined at chair. Patient denies fever, chills, HA, nausea, vomiting, abdominal pain, dysuria, diarrhea. He is eating and going to the bathroom regularly. Respiratory he is on nasal canula without dyspnea. He does not have any complain about anything during the weekend.     SEPSIS MULTIFOCALPNEUMONIA  Functional quadriplegia  Acute respiratory failure with hypoxia  MGUS (monoclonal gammopathy of unknown significance)  Advanced care planning/counseling discussion  Encounter for palliative care  LATRICIA (acute kidney injury)  Multifocal pneumonia  Lymphoma  DM (diabetes mellitus)  Renal transplant recipient  Rash  HTN (hypertension)  Paroxysmal atrial fibrillation  BPH (benign prostatic hyperplasia)  Discharge planning issues    Allergies  No Known Allergies    ANTIBIOTICS/RELEVANT:  antimicrobials    immunologic:  tacrolimus 1 milliGRAM(s) Oral every 12 hours    OTHER:  acetaminophen    Suspension .. 650 milliGRAM(s) Oral every 6 hours PRN  ALPRAZolam 0.25 milliGRAM(s) Oral at bedtime PRN  chlorhexidine 2% Cloths 1 Application(s) Topical <User Schedule>  dextrose 40% Gel 15 Gram(s) Oral once  dextrose 5%. 1000 milliLiter(s) IV Continuous <Continuous>  dextrose 5%. 1000 milliLiter(s) IV Continuous <Continuous>  dextrose 50% Injectable 25 Gram(s) IV Push once  dextrose 50% Injectable 25 Gram(s) IV Push once  dextrose 50% Injectable 12.5 Gram(s) IV Push once  famotidine   Suspension 20 milliGRAM(s) Oral daily  furosemide    Tablet 20 milliGRAM(s) Oral daily  glucagon  Injectable 1 milliGRAM(s) IntraMuscular once  insulin glargine Injectable (LANTUS) 25 Unit(s) SubCutaneous every morning  insulin regular  human corrective regimen sliding scale   SubCutaneous every 6 hours  isosorbide   mononitrate ER Tablet (IMDUR) 30 milliGRAM(s) Oral daily  loperamide 2 milliGRAM(s) Oral every 3 hours PRN  melatonin 5 milliGRAM(s) Oral at bedtime  polyethylene glycol 3350 17 Gram(s) Oral two times a day  senna 2 Tablet(s) Oral at bedtime  tamsulosin 0.4 milliGRAM(s) Oral at bedtime    Objective:  Vital Signs Last 24 Hrs  T(C): 36.5 (12 Jul 2021 06:31), Max: 37.2 (11 Jul 2021 21:45)  T(F): 97.7 (12 Jul 2021 06:31), Max: 98.9 (11 Jul 2021 21:45)  HR: 66 (12 Jul 2021 08:36) (64 - 74)  BP: 139/67 (12 Jul 2021 08:36) (98/51 - 179/72)  BP(mean): 96 (12 Jul 2021 08:36) (72 - 112)  RR: 24 (12 Jul 2021 08:36) (24 - 30)  SpO2: 94% (12 Jul 2021 08:36) (94% - 97%)    PHYSICAL EXAM:  General: NAD  HEENT: MMM  Neck: supple  Cardiovascular: +S1/S2; RRR  Respiratory: mild bibasilar crackles   Gastrointestinal: soft, NT/ND  Extremities: WWP; no edema, clubbing or cyanosis  Vascular: 2+ radial, DP/PT pulses B/L  Neurological: AAOx3; no focal deficits    LABS:                        9.2    14.36 )-----------( 139      ( 12 Jul 2021 06:19 )             30.9     07-12    137  |  101  |  15  ----------------------------<  105<H>  4.8   |  24  |  0.99    Ca    8.7      12 Jul 2021 06:18  Phos  3.4     07-12  Mg     1.9     07-12    TPro  6.3  /  Alb  3.1<L>  /  TBili  0.6  /  DBili  x   /  AST  50<H>  /  ALT  37  /  AlkPhos  90  07-12    PT/INR - ( 11 Jul 2021 06:04 )   PT: 15.8 sec;   INR: 1.33       PTT - ( 11 Jul 2021 06:04 )  PTT:33.1 sec    MICROBIOLOGY:  Culture - Blood (07.09.21 @ 15:26)    Specimen Source: .Blood Blood-Venous    Culture Results:   No growth at 3 days.              RADIOLOGY & ADDITIONAL STUDIES:

## 2021-07-12 NOTE — PROGRESS NOTE ADULT - ASSESSMENT
ID ATTENDING ATTESTATION  Pneumonia resolved. Continue to observe off antibiotics. Axillary LN bx c/w known lymphoproliferative disorder--follows at [place] (Dr. Huffman). Known low level EBV viremia. VGCV started per primary team--to reconcile indication.   DORITA Nicole MD         ID ATTENDING ATTESTATION  Pneumonia resolved. Continue to observe off antibiotics. Axillary LN bx c/w known lymphoproliferative disorder--follows at [place] (Dr. Huffman). Known low level EBV viremia. VGCV started per primary team--no obvious indication; KT was 4 years ago; did not appear to come in on this agent; CMV PCR here not detected--advise d/c VGCV (unless team able to confirm this is in fact a home medication for him).  Please reconsult with?  DORITA Nicole MD 71 yo M with PMH of CLL/SLL stage 0, adult T-cell lynphoma and MGUS; kidney transplant c/w PTLD with persistent EBV viremia in a low level (collateral obtained from Dr. Sanchez, transplant ID f/u the patient at [facility]), DM2, HTN, HLD, pAF, CAD (s/p CABG 15 years ago), now with PNA. Now intubated on 7/6. CT chest showed large multi lobar pulmonary consolidations, R>L, as well as extensive cervical, thoracic and upper abdominal lymphadenopathy and splenomegaly. Yesterday axillary LN bx intervention was performed. In terms of microlabs, just PCR MSSA was found in nasal specimen. PCP PCR, CMV PCR, urine S. pneumoniae, Legionella, Cryptoc, Fast/acid, nocardia, aspergillus, Fungitel, toxoplasma PCR and histoplasm were negatives. EBV PCR has been positive. Chest XR has shown decresed bilateral opacities/pleural effusions today. His axillary LN bx c/w known lymphoproliferative disorder and his flow citometry is compatible with CLL/SLL. Extubated with resolving probable bacterial PNA, he is now on nasal canula after been placed on BIPAP. Clinically, patient has shown an extraordinary improvement during the weekend.    Recommendations:  - No longer course of antibiotics are needed.  - Continue care with primary team.  - ID team 1 signs off.      ID ATTENDING ATTESTATION  Pneumonia resolved. Continue to observe off antibiotics. Axillary LN bx c/w known lymphoproliferative disorder--follows at [place] (Dr. Huffman). Known low level EBV viremia. VGCV started per primary team--no obvious indication; KT was 4 years ago; did not appear to come in on this agent; CMV PCR here not detected--advise d/c VGCV (unless team able to confirm this is in fact a home medication for him).  Please reconsult with?  DORITA Nicole MD

## 2021-07-12 NOTE — CONSULT NOTE ADULT - SUBJECTIVE AND OBJECTIVE BOX
Patient is a 70y old  Male who presents with a chief complaint of sepsis (12 Jul 2021 07:07)       HPI:  70M with PMHx significant for renal transplant (on tacrolimus, unk date), DM2, HTN, MGUS, pAF, BPH, HLD, CAD (s/p CABG 15 years ago), presents for 3-4 days of worsening cough. Patient states that he has a productive cough with brownish colored sputum. Patient denied fever or shortness of breath at home, but on day of admission did notice that his breathing felt different and that he was breathing quicker. Per ED it was reported that he completed a Z-pack without improvement. Past history notable for COVID+ March 2020, also received pfizer vaccine x2 a few months ago. Endorses SOB, is drinking less fluid, and may be urinating less. Denies sick contacts, HA, CP, rhinorrhea, sore throat, nausea, vomiting, diarrhea, abd pain, urinary complaints, black/bloody stool, focal weakness/numbness, vision changes, neck/back pain, LE pain/swelling.     Patient also reports a rash on both of his upper thighs that he developed after coming back from [place] 3 weeks ago. It does not itch, does not know if any creams or detergents were used. States that he has a dermatologist, but does not know what the diagnosis is.    ED Vitals:   T 100, P 114, /83, RR 18, O2 94% on 6L NC  T 103.4, P 115, /65, RR 22, O2 96% on 6L NC  ED Course:  Labs notable for WBC 24.29 (92.2% neutrophils), Hgb 11.4, Lactate 1.3, INR 1.75, Na 126, Cl 91, Co2 19, AG 16, procal 0.43, Cr 1.16  CXR: Cardiomegaly, status post median sternotomy, CABG. Bilateral opacities/right perihilar opacity/consolidation.    Bedside POCUS: diffuse b-lines, consolidations worse on R lung (05 Jul 2021 17:04)      PAST MEDICAL & SURGICAL HISTORY:  Barretts esophagus    CRI (chronic renal insufficiency)    DM (diabetes mellitus)    GERD (gastroesophageal reflux disease)    HTN (hypertension)    Monoclonal gammopathy    Mandibular abscess    Paroxysmal atrial fibrillation    Benign prostatic hyperplasia, presence of lower urinary tract symptoms unspecified, unspecified morphology    Other hyperlipidemia    S/P CABG (coronary artery bypass graft)  15 years    History of surgery  cyst removal    Mandibular abscess        MEDICATIONS  (STANDING):  chlorhexidine 2% Cloths 1 Application(s) Topical <User Schedule>  dextrose 40% Gel 15 Gram(s) Oral once  dextrose 5%. 1000 milliLiter(s) (50 mL/Hr) IV Continuous <Continuous>  dextrose 5%. 1000 milliLiter(s) (100 mL/Hr) IV Continuous <Continuous>  dextrose 50% Injectable 25 Gram(s) IV Push once  dextrose 50% Injectable 25 Gram(s) IV Push once  dextrose 50% Injectable 12.5 Gram(s) IV Push once  famotidine   Suspension 20 milliGRAM(s) Oral daily  furosemide    Tablet 20 milliGRAM(s) Oral daily  glucagon  Injectable 1 milliGRAM(s) IntraMuscular once  insulin glargine Injectable (LANTUS) 25 Unit(s) SubCutaneous every morning  insulin regular  human corrective regimen sliding scale   SubCutaneous every 6 hours  isosorbide   mononitrate ER Tablet (IMDUR) 30 milliGRAM(s) Oral daily  melatonin 5 milliGRAM(s) Oral at bedtime  polyethylene glycol 3350 17 Gram(s) Oral two times a day  senna 2 Tablet(s) Oral at bedtime  tacrolimus 1 milliGRAM(s) Oral every 12 hours  tamsulosin 0.4 milliGRAM(s) Oral at bedtime    MEDICATIONS  (PRN):  acetaminophen    Suspension .. 650 milliGRAM(s) Oral every 6 hours PRN Temp greater or equal to 38C (100.4F), Mild Pain (1 - 3)  ALPRAZolam 0.25 milliGRAM(s) Oral at bedtime PRN sleep  loperamide 2 milliGRAM(s) Oral every 3 hours PRN Diarrhea      FAMILY HISTORY:      CBC Full  -  ( 12 Jul 2021 06:19 )  WBC Count : 14.36 K/uL  RBC Count : 3.19 M/uL  Hemoglobin : 9.2 g/dL  Hematocrit : 30.9 %  Platelet Count - Automated : 139 K/uL  Mean Cell Volume : 96.9 fl  Mean Cell Hemoglobin : 28.8 pg  Mean Cell Hemoglobin Concentration : 29.8 gm/dL  Auto Neutrophil # : 4.25 K/uL  Auto Lymphocyte # : 9.74 K/uL  Auto Monocyte # : 0.13 K/uL  Auto Eosinophil # : 0.11 K/uL  Auto Basophil # : 0.00 K/uL  Auto Neutrophil % : 28.7 %  Auto Lymphocyte % : 67.8 %  Auto Monocyte % : 0.9 %  Auto Eosinophil % : 0.8 %  Auto Basophil % : 0.0 %      07-12    137  |  101  |  15  ----------------------------<  105<H>  4.8   |  24  |  0.99    Ca    8.7      12 Jul 2021 06:18  Phos  3.4     07-12  Mg     1.9     07-12    TPro  6.3  /  Alb  3.1<L>  /  TBili  0.6  /  DBili  x   /  AST  50<H>  /  ALT  37  /  AlkPhos  90  07-12            Radiology:    < from: CT Chest No Cont (07.07.21 @ 07:18) >  EXAM:  CT CHEST                          PROCEDURE DATE:  07/07/2021          INTERPRETATION:  CT SCAN OF CHEST    History: Sepsis. Multifocal pneumonia. Immunosuppressed. Acute respiratory failure.    Technique: CT scan of chest performed from lung apices through lung bases. Axial, coronal, and sagittal multiplanar reformatted images were produced. Thin section axial images and axial MIPS were also produced. Intravenous contrast material was not administered, as ordered.    Comparison: CT abdomen pelvis 10/20/2019. PET/CT 2/18/2016.    Findings:    Lungs and large airways: ET tube in satisfactory position. Large airspace consolidation right upper lobe. Complete consolidation of the right lower lobe. Small subsegmental atelectasis/consolidation left upper lobe. Dependent atelectasis left lower lobe. Patent bronchi.    Pleura: Small to moderate right pleural effusion. Trace left pleural effusion.    Mediastinum and hilar regions: Numerous enlarged mediastinal lymphadenopathy with the largest right paratracheal lymph node measuring 1.4 cm in short axis and prevascular space lymph node measuring 1.3 cm in short axis. Extensive bilateral axillary lymphadenopathy with the largest right axillary lymph node measuring 1.5 cm short axis and the largest left axillary lymphadenopathy measuring 1.4 cm short axis. Partial visualization of supraclavicular and cervical lymphadenopathy.    Heart and pericardium: Status post CABG. Mild cardiomegaly. No pericardial effusion.    Vessels:  No thoracic aortic aneurysm.    Chest wall and lower neck:  Symmetrical gynecomastia.    Upper abdomen: Gastric tube in satisfactory position. Small hiatal hernia. Upper abdominal lymphadenopathy partially visualized. Splenomegaly partially visualized. Infiltration of the retroperitoneal fat, partially visualized. 1.3 cm cyst upper pole left kidney. Sludge within the gallbladder.    Bones: Median sternotomy. Degenerative changes of spine.      IMPRESSION:    Large multi lobar pulmonary consolidations, right greater than left, for pneumonia but recommend follow-up to exclude neoplasm including lymphoma.    Small to moderate right pleural effusion. Trace left pleural effusion.    Extensive cervical, thoracic and upper abdominal lymphadenopathy. Partially visualized splenomegaly.                Vital Signs Last 24 Hrs  T(C): 36.5 (12 Jul 2021 06:31), Max: 37.2 (11 Jul 2021 21:45)  T(F): 97.7 (12 Jul 2021 06:31), Max: 98.9 (11 Jul 2021 21:45)  HR: 66 (12 Jul 2021 08:36) (64 - 74)  BP: 139/67 (12 Jul 2021 08:36) (98/51 - 179/72)  BP(mean): 96 (12 Jul 2021 08:36) (72 - 112)  RR: 24 (12 Jul 2021 08:36) (24 - 30)  SpO2: 94% (12 Jul 2021 08:36) (94% - 97%)        REVIEW OF SYSTEMS: as per HPI,     CONSTITUTIONAL: No fever, weight loss, or fatigue  EYES: No eye pain, visual disturbances, or discharge  ENMT:  No difficulty hearing, tinnitus, vertigo; No sinus or throat pain  NECK: No pain or stiffness  BREASTS: No pain, masses, or nipple discharge  RESPIRATORY:  cough  CARDIOVASCULAR: No chest pain, palpitations, dizziness, or leg swelling  GASTROINTESTINAL: No abdominal or epigastric pain. No nausea, vomiting, or hematemesis; No diarrhea or constipation. No melena or hematochezia.  GENITOURINARY: No dysuria, frequency, hematuria, or incontinence  NEUROLOGICAL: No headaches, memory loss, loss of strength, numbness, or tremors  SKIN: No itching, burning, rashes, or lesions   LYMPH NODES: No enlarged glands  ENDOCRINE: No heat or cold intolerance; No hair loss  MUSCULOSKELETAL: No joint pain or swelling; No muscle, back, or extremity pain  PSYCHIATRIC: No depression, anxiety, mood swings, or difficulty sleeping  HEME/LYMPH: No easy bruising, or bleeding gums  ALLERGY AND IMMUNOLOGIC: No hives or eczema  VASCULAR: no swelling, erythema,   : no dysuria, hematuria, frequency        Physical Exam:  WDWN 69 yo  gentleman lying in semi Hutchins's position, no acute complaints    Neurologic Exam:    Alert and oriented to person, place, date/year, speech fluent w/o dysarthria    Motor Exam:    Right UE:           no focal weakness > 3+/5    Left UE:             no focal weakness > 3+/5    Right LE:           no focal weakness > 3+/5    Left LE:             no focal weakness > 3+/5               Sensation:           intact to light touch x 4 extremities                                                    DTR:                  biceps/brachioradialis: equal bilaterally                                                     patella/ankle: equal bilaterally                            Gait:  not tested        PM&R Impression:    1) deconditioned  2) no focal weakness    Plan:    1) Physical therapy focusing on therapeutic exercises, bed mobility/transfer out of bed evaluation, progressive ambulation with assistive devices prn.    2) Anticipated Disposition Plan/Recs:    pending functional progress

## 2021-07-12 NOTE — PROGRESS NOTE ADULT - PROBLEM SELECTOR PLAN 5
Heme-onc consulted with collateral from [facility] with CLL and adult T cell lymphoma. Flow cytometry 07/08/21: B-cell lymphoma and are suggestive of chronic lymphocytic leukemia/small lymphocytic lymphoma  -SPEP with elevated Ig Kappa and Lambda   -EBV positive  PLAN:  - Deferring CT A/P with IV Contrast for evaluation of abdominopelvic lymphadenopathy and splenomegaly to outpatient (pt follows with [facility])  - Can hold off on whole-body PET-CT to be done as outpatient when infection is cleared.   - Follow-up serum immunofixation, SPEP. K/L 0.40. Quantitative IgM slightly low.   - Maintain active T+S, transfuse PRBC if Hb < 7 or if actively bleeding/symptomatic or if platelets < 10K or < 20K if febrile or < 50 K if actively bleeding  - Can follow-up with Dr. Lydia Huffman at [facility] upon discharge in 1-2 weeks (Office: [phone]).

## 2021-07-12 NOTE — PROGRESS NOTE ADULT - SUBJECTIVE AND OBJECTIVE BOX
CC: Patient is a 70y old  Male who presents with a chief complaint of sepsis (11 Jul 2021 14:20)      INTERVAL EVENTS: Completed zosyn. anxious overnight given ativan .25mg. toxo and aspergillus negative.     SUBJECTIVE / INTERVAL HPI: Patient seen and examined at bedside.     ROS: negative unless otherwise stated above.    VITAL SIGNS:  Vital Signs Last 24 Hrs  T(C): 36.5 (12 Jul 2021 06:31), Max: 37.2 (11 Jul 2021 21:45)  T(F): 97.7 (12 Jul 2021 06:31), Max: 98.9 (11 Jul 2021 21:45)  HR: 70 (12 Jul 2021 04:16) (64 - 74)  BP: 129/60 (12 Jul 2021 04:16) (98/51 - 179/72)  BP(mean): 87 (12 Jul 2021 04:16) (72 - 112)  RR: 24 (12 Jul 2021 04:16) (24 - 32)  SpO2: 94% (12 Jul 2021 04:16) (92% - 97%)      07-11-21 @ 07:01  -  07-12-21 @ 07:00  --------------------------------------------------------  IN: 100 mL / OUT: 2671 mL / NET: -2571 mL        PHYSICAL EXAM:    General: NAD  HEENT: MMM  Neck: supple  Cardiovascular: +S1/S2; RRR  Respiratory: CTA B/L; no W/R/R  Gastrointestinal: soft, NT/ND  Extremities: WWP; no edema, clubbing or cyanosis  Vascular: 2+ radial, DP/PT pulses B/L  Neurological: AAOx3; no focal deficits    MEDICATIONS:  MEDICATIONS  (STANDING):  chlorhexidine 2% Cloths 1 Application(s) Topical <User Schedule>  dextrose 40% Gel 15 Gram(s) Oral once  dextrose 5%. 1000 milliLiter(s) (50 mL/Hr) IV Continuous <Continuous>  dextrose 5%. 1000 milliLiter(s) (100 mL/Hr) IV Continuous <Continuous>  dextrose 50% Injectable 25 Gram(s) IV Push once  dextrose 50% Injectable 12.5 Gram(s) IV Push once  dextrose 50% Injectable 25 Gram(s) IV Push once  famotidine   Suspension 20 milliGRAM(s) Oral daily  furosemide    Tablet 20 milliGRAM(s) Oral daily  glucagon  Injectable 1 milliGRAM(s) IntraMuscular once  insulin glargine Injectable (LANTUS) 25 Unit(s) SubCutaneous every morning  insulin regular  human corrective regimen sliding scale   SubCutaneous every 6 hours  isosorbide   mononitrate ER Tablet (IMDUR) 30 milliGRAM(s) Oral daily  melatonin 5 milliGRAM(s) Oral at bedtime  polyethylene glycol 3350 17 Gram(s) Oral two times a day  senna 2 Tablet(s) Oral at bedtime  tacrolimus 1 milliGRAM(s) Oral every 12 hours  tamsulosin 0.4 milliGRAM(s) Oral at bedtime    MEDICATIONS  (PRN):  acetaminophen    Suspension .. 650 milliGRAM(s) Oral every 6 hours PRN Temp greater or equal to 38C (100.4F), Mild Pain (1 - 3)  loperamide 2 milliGRAM(s) Oral every 3 hours PRN Diarrhea  zaleplon 10 milliGRAM(s) Oral at bedtime PRN Insomnia      ALLERGIES:  Allergies    No Known Allergies    Intolerances        LABS:                        9.2    14.36 )-----------( 139      ( 12 Jul 2021 06:19 )             30.9     07-12    137  |  101  |  15  ----------------------------<  105<H>  4.8   |  24  |  0.99    Ca    8.7      12 Jul 2021 06:18  Phos  3.4     07-12  Mg     1.9     07-12    TPro  6.3  /  Alb  3.1<L>  /  TBili  0.6  /  DBili  x   /  AST  50<H>  /  ALT  37  /  AlkPhos  90  07-12    PT/INR - ( 11 Jul 2021 06:04 )   PT: 15.8 sec;   INR: 1.33          PTT - ( 11 Jul 2021 06:04 )  PTT:33.1 sec    CAPILLARY BLOOD GLUCOSE      POCT Blood Glucose.: 106 mg/dL (12 Jul 2021 06:36)      RADIOLOGY & ADDITIONAL TESTS: Reviewed. ---------------------TRANSFER to [facility] ------------------------    Hospital Course:   Pt is a 71 yo M with PMH renal tx (tacro), T2D, HTN, HLD, MGUS, pAF (eliquis), BPH, CAD (s/p CABG), COVID (3/2020), Garcia's esophagus/GERD, chronic hypoNa, CLL, and post-renal transplant low grade lymphoma who p/w F/C, cough, and brown sputum x5d along with BL LE painless rash x3-4wks. On arrival, septic from pneumonia treated with Vanc/Zosyn, then changed to linezolid, azithromycin, and zosyn as per ID. Intubated on 7/6 for work of breathing and planned bronchoscopy. Bronchoscopy (on 7/7) showed copious purulent secretions sent for BAL. Hilar lymphadenopathy was seen on imaging. Pt had known history of CLL/Adult T cell lymphoma.  Heme-onc consulted with collateral from [facility] with CLL and adult T cell lymphoma. Gen surgery declined excisional biopsy while inpatient. IR was consulted and pt underwent LN FNA/CNA 7/8 of L supraclavicular node. Antibiotics were adjusted as per ID to zosyn alone for pseudomonal coverage. BCx have NGTD. Flow cytometry returned with B cell lymphoma/CLL, which was consistent with his past history which is followed outpatient by oncologist at [facility]. Pt was restarted on tacrolimus with a goal level 3-4. Tacro levels were subtherapeutic so dosing was adjusted to 1mg BID with levels to be followed daily. Extubated to BIPAP 12/5. Transitioned to HFNC. Post extubation course complicated by hemoptysis on 7/10, so diet was held despite passing dysphagia screen and heparin gtt was held. Hemoptysis resolved and diet was started the following day 7/11. Completed course of zosyn. NC titrated and tolerating 2L with O2sat >90%. Restarting eliquis today as patient is without hemoptysis for >36 hours. Stable for s/d to [facility] for further observation and management.    CC: Patient is a 70y old  Male who presents with a chief complaint of sepsis (11 Jul 2021 14:20)      INTERVAL EVENTS: Completed zosyn. anxious overnight given ativan .25mg. toxo and aspergillus negative.     SUBJECTIVE / INTERVAL HPI: Patient seen and examined at bedside.     ROS: negative unless otherwise stated above.    VITAL SIGNS:  Vital Signs Last 24 Hrs  T(C): 36.5 (12 Jul 2021 06:31), Max: 37.2 (11 Jul 2021 21:45)  T(F): 97.7 (12 Jul 2021 06:31), Max: 98.9 (11 Jul 2021 21:45)  HR: 70 (12 Jul 2021 04:16) (64 - 74)  BP: 129/60 (12 Jul 2021 04:16) (98/51 - 179/72)  BP(mean): 87 (12 Jul 2021 04:16) (72 - 112)  RR: 24 (12 Jul 2021 04:16) (24 - 32)  SpO2: 94% (12 Jul 2021 04:16) (92% - 97%)      07-11-21 @ 07:01  -  07-12-21 @ 07:00  --------------------------------------------------------  IN: 100 mL / OUT: 2671 mL / NET: -2571 mL        PHYSICAL EXAM:    General: NAD  HEENT: MMM  Neck: supple  Cardiovascular: +S1/S2; RRR  Respiratory: mild bibasilar crackles   Gastrointestinal: soft, NT/ND  Extremities: WWP; no edema, clubbing or cyanosis  Vascular: 2+ radial, DP/PT pulses B/L  Neurological: AAOx3; no focal deficits    MEDICATIONS:  MEDICATIONS  (STANDING):  chlorhexidine 2% Cloths 1 Application(s) Topical <User Schedule>  dextrose 40% Gel 15 Gram(s) Oral once  dextrose 5%. 1000 milliLiter(s) (50 mL/Hr) IV Continuous <Continuous>  dextrose 5%. 1000 milliLiter(s) (100 mL/Hr) IV Continuous <Continuous>  dextrose 50% Injectable 25 Gram(s) IV Push once  dextrose 50% Injectable 12.5 Gram(s) IV Push once  dextrose 50% Injectable 25 Gram(s) IV Push once  famotidine   Suspension 20 milliGRAM(s) Oral daily  furosemide    Tablet 20 milliGRAM(s) Oral daily  glucagon  Injectable 1 milliGRAM(s) IntraMuscular once  insulin glargine Injectable (LANTUS) 25 Unit(s) SubCutaneous every morning  insulin regular  human corrective regimen sliding scale   SubCutaneous every 6 hours  isosorbide   mononitrate ER Tablet (IMDUR) 30 milliGRAM(s) Oral daily  melatonin 5 milliGRAM(s) Oral at bedtime  polyethylene glycol 3350 17 Gram(s) Oral two times a day  senna 2 Tablet(s) Oral at bedtime  tacrolimus 1 milliGRAM(s) Oral every 12 hours  tamsulosin 0.4 milliGRAM(s) Oral at bedtime    MEDICATIONS  (PRN):  acetaminophen    Suspension .. 650 milliGRAM(s) Oral every 6 hours PRN Temp greater or equal to 38C (100.4F), Mild Pain (1 - 3)  loperamide 2 milliGRAM(s) Oral every 3 hours PRN Diarrhea  zaleplon 10 milliGRAM(s) Oral at bedtime PRN Insomnia      ALLERGIES:  Allergies    No Known Allergies    Intolerances        LABS:                        9.2    14.36 )-----------( 139      ( 12 Jul 2021 06:19 )             30.9     07-12    137  |  101  |  15  ----------------------------<  105<H>  4.8   |  24  |  0.99    Ca    8.7      12 Jul 2021 06:18  Phos  3.4     07-12  Mg     1.9     07-12    TPro  6.3  /  Alb  3.1<L>  /  TBili  0.6  /  DBili  x   /  AST  50<H>  /  ALT  37  /  AlkPhos  90  07-12    PT/INR - ( 11 Jul 2021 06:04 )   PT: 15.8 sec;   INR: 1.33          PTT - ( 11 Jul 2021 06:04 )  PTT:33.1 sec    CAPILLARY BLOOD GLUCOSE      POCT Blood Glucose.: 106 mg/dL (12 Jul 2021 06:36)      RADIOLOGY & ADDITIONAL TESTS: Reviewed. ---------------------TRANSFER to [facility] ------------------------    Hospital Course:   Pt is a 71 yo M with PMH renal tx (tacro), T2D, HTN, HLD, MGUS, pAF (eliquis), BPH, CAD (s/p CABG), COVID (3/2020), Garcia's esophagus/GERD, chronic hypoNa, CLL, and post-renal transplant low grade lymphoma who p/w F/C, cough, and brown sputum x5d along with BL LE painless rash x3-4wks. On arrival, septic from pneumonia treated with Vanc/Zosyn, then changed to linezolid, azithromycin, and zosyn as per ID. Intubated on 7/6 for work of breathing and planned bronchoscopy. Bronchoscopy (on 7/7) showed copious purulent secretions sent for BAL. Hilar lymphadenopathy was seen on imaging. Antibiotics were adjusted as per ID to zosyn alone for pseudomonal coverage. BCx have NGTD. Due to lymphadenopathy on CT scan being concerning for lymphoma, heme-onc consulted and recommended FNA/CNA of L supraclavicular node which was performed 7/8. After completion of bx, collateral from [facility] revealed hx of CLL and adult T cell lymphoma. Flow cytometry returned with B cell lymphoma/CLL, which was consistent with his past history which is followed outpatient by oncologist at [facility]. Pt was restarted on tacrolimus with a goal level 3-4. S/p extubation and weaning to NC. Post extubation course complicated by hemoptysis on 7/10, and anticoagulation was held. Hemoptysis resolved and diet was started the following day 7/11. Completed course of zosyn. NC titrated and tolerating 2L with O2sat >90%. Restarting eliquis today as patient is without hemoptysis for >36 hours. Stable for s/d to [facility] for further observation and management.    CC: Patient is a 70y old  Male who presents with a chief complaint of sepsis (11 Jul 2021 14:20)      INTERVAL EVENTS: Completed zosyn. anxious overnight given ativan .25mg. toxo and aspergillus negative.     SUBJECTIVE / INTERVAL HPI: Patient seen and examined at bedside.     ROS: negative unless otherwise stated above.    VITAL SIGNS:  Vital Signs Last 24 Hrs  T(C): 36.5 (12 Jul 2021 06:31), Max: 37.2 (11 Jul 2021 21:45)  T(F): 97.7 (12 Jul 2021 06:31), Max: 98.9 (11 Jul 2021 21:45)  HR: 70 (12 Jul 2021 04:16) (64 - 74)  BP: 129/60 (12 Jul 2021 04:16) (98/51 - 179/72)  BP(mean): 87 (12 Jul 2021 04:16) (72 - 112)  RR: 24 (12 Jul 2021 04:16) (24 - 32)  SpO2: 94% (12 Jul 2021 04:16) (92% - 97%)      07-11-21 @ 07:01  -  07-12-21 @ 07:00  --------------------------------------------------------  IN: 100 mL / OUT: 2671 mL / NET: -2571 mL        PHYSICAL EXAM:    General: NAD  HEENT: MMM  Neck: supple  Cardiovascular: +S1/S2; RRR  Respiratory: mild bibasilar crackles   Gastrointestinal: soft, NT/ND  Extremities: WWP; no edema, clubbing or cyanosis  Vascular: 2+ radial, DP/PT pulses B/L  Neurological: AAOx3; no focal deficits    MEDICATIONS:  MEDICATIONS  (STANDING):  chlorhexidine 2% Cloths 1 Application(s) Topical <User Schedule>  dextrose 40% Gel 15 Gram(s) Oral once  dextrose 5%. 1000 milliLiter(s) (50 mL/Hr) IV Continuous <Continuous>  dextrose 5%. 1000 milliLiter(s) (100 mL/Hr) IV Continuous <Continuous>  dextrose 50% Injectable 25 Gram(s) IV Push once  dextrose 50% Injectable 12.5 Gram(s) IV Push once  dextrose 50% Injectable 25 Gram(s) IV Push once  famotidine   Suspension 20 milliGRAM(s) Oral daily  furosemide    Tablet 20 milliGRAM(s) Oral daily  glucagon  Injectable 1 milliGRAM(s) IntraMuscular once  insulin glargine Injectable (LANTUS) 25 Unit(s) SubCutaneous every morning  insulin regular  human corrective regimen sliding scale   SubCutaneous every 6 hours  isosorbide   mononitrate ER Tablet (IMDUR) 30 milliGRAM(s) Oral daily  melatonin 5 milliGRAM(s) Oral at bedtime  polyethylene glycol 3350 17 Gram(s) Oral two times a day  senna 2 Tablet(s) Oral at bedtime  tacrolimus 1 milliGRAM(s) Oral every 12 hours  tamsulosin 0.4 milliGRAM(s) Oral at bedtime    MEDICATIONS  (PRN):  acetaminophen    Suspension .. 650 milliGRAM(s) Oral every 6 hours PRN Temp greater or equal to 38C (100.4F), Mild Pain (1 - 3)  loperamide 2 milliGRAM(s) Oral every 3 hours PRN Diarrhea  zaleplon 10 milliGRAM(s) Oral at bedtime PRN Insomnia      ALLERGIES:  Allergies    No Known Allergies    Intolerances        LABS:                        9.2    14.36 )-----------( 139      ( 12 Jul 2021 06:19 )             30.9     07-12    137  |  101  |  15  ----------------------------<  105<H>  4.8   |  24  |  0.99    Ca    8.7      12 Jul 2021 06:18  Phos  3.4     07-12  Mg     1.9     07-12    TPro  6.3  /  Alb  3.1<L>  /  TBili  0.6  /  DBili  x   /  AST  50<H>  /  ALT  37  /  AlkPhos  90  07-12    PT/INR - ( 11 Jul 2021 06:04 )   PT: 15.8 sec;   INR: 1.33          PTT - ( 11 Jul 2021 06:04 )  PTT:33.1 sec    CAPILLARY BLOOD GLUCOSE      POCT Blood Glucose.: 106 mg/dL (12 Jul 2021 06:36)      RADIOLOGY & ADDITIONAL TESTS: Reviewed.

## 2021-07-12 NOTE — CHART NOTE - NSCHARTNOTESELECT_GEN_ALL_CORE
Nephrology/Event Note
TB Evaluation/Event Note
anti-infective approval note/Event Note
Palliative Care/Off Service Note
anti-infective approval/Event Note
follow up/Nutrition Services

## 2021-07-12 NOTE — PROGRESS NOTE ADULT - PROBLEM SELECTOR PLAN 4
- Pt on home lantus 25 at night  - sliding scale coverage for now  - when restarting diet, will calculate lispro needs.    #GI  - NGT removed o/n.   - Passed dysphagia screening  - Rectal tube removed    PLAN:  - c/w PO diet DASH/TLC  - c/w 2mg loperamide Q3hrs PRN as pt having diarrhea - Pt on home lantus 25 at night  - sliding scale coverage for now  - when restarting diet, will calculate lispro needs.    #GI  - NGT removed o/n.   - Passed dysphagia screening  - Rectal tube removed    PLAN:  - c/w PO diet DASH/TLC  - c/w 2mg loperamide Q3hrs PRN as pt was having diarrhea   - diarrhea improved while on loperamide # HTN  - c/w 20mg lasix   - hold amlodipine 10mg    # HLD  - on home rosuvastatin 10mg    # CAD (s/p CABG 15 yrs ago)  - home med isosorbide mononitrate 30mg BID  Plan:  - cont isosorbide mononitrate 30mg OD, titrate up to BID as tolerated

## 2021-07-12 NOTE — PROGRESS NOTE ADULT - ASSESSMENT
71 yo M with a past medical history of renal transplant (on tacrolimus, unk date), complicated by low grade lymphoma post transplant, DM2, HTN, MGUS, pAF, BPH, HLD, CAD (s/p CABG 15 years ago), presents with fever, chills and worsening cough for 5 days. Patient states that he has a productive cough with brownish colored sputum. Found to have acute hypoxic respiratory failure and sepsis 2/2 likely multifocal pneumonia in the setting of known CLL/B cell lymphoma.

## 2021-07-12 NOTE — CHART NOTE - NSCHARTNOTEFT_GEN_A_CORE
Admitting Diagnosis:   Patient is a 70y old  Male who presents with a chief complaint of sepsis (12 Jul 2021 13:16)      PAST MEDICAL & SURGICAL HISTORY:  Barretts esophagus    CRI (chronic renal insufficiency)    DM (diabetes mellitus)    GERD (gastroesophageal reflux disease)    HTN (hypertension)    Monoclonal gammopathy    Mandibular abscess    Paroxysmal atrial fibrillation    Benign prostatic hyperplasia, presence of lower urinary tract symptoms unspecified, unspecified morphology    Other hyperlipidemia    S/P CABG (coronary artery bypass graft)  15 years    History of surgery  cyst removal    Mandibular abscess        Current Nutrition Order:   DASH/TLC, CST CHO, Mechanical soft diet, kosher    PO Intake: Good (%) [   ]  Fair (50-75%) [   ] Poor (<25%) [ x ]    GI Issues: Diarrhea, last BM 7/12    Pain: denies pain/discomfort    Skin Integrity: Isac 19, pressure ulcer, suspected deep tissue @ L and R buttocks    Labs:   07-12    137  |  101  |  15  ----------------------------<  105<H>  4.8   |  24  |  0.99    Ca    8.7      12 Jul 2021 06:18  Phos  3.4     07-12  Mg     1.9     07-12    TPro  6.3  /  Alb  3.1<L>  /  TBili  0.6  /  DBili  x   /  AST  50<H>  /  ALT  37  /  AlkPhos  90  07-12    CAPILLARY BLOOD GLUCOSE      POCT Blood Glucose.: 158 mg/dL (12 Jul 2021 11:49)  POCT Blood Glucose.: 106 mg/dL (12 Jul 2021 06:36)  POCT Blood Glucose.: 118 mg/dL (11 Jul 2021 23:29)  POCT Blood Glucose.: 107 mg/dL (11 Jul 2021 17:13)      Medications:  MEDICATIONS  (STANDING):  apixaban 5 milliGRAM(s) Oral every 12 hours  chlorhexidine 2% Cloths 1 Application(s) Topical <User Schedule>  dextrose 40% Gel 15 Gram(s) Oral once  dextrose 5%. 1000 milliLiter(s) (50 mL/Hr) IV Continuous <Continuous>  dextrose 5%. 1000 milliLiter(s) (100 mL/Hr) IV Continuous <Continuous>  dextrose 50% Injectable 25 Gram(s) IV Push once  dextrose 50% Injectable 25 Gram(s) IV Push once  dextrose 50% Injectable 12.5 Gram(s) IV Push once  famotidine   Suspension 20 milliGRAM(s) Oral daily  furosemide    Tablet 20 milliGRAM(s) Oral daily  glucagon  Injectable 1 milliGRAM(s) IntraMuscular once  insulin glargine Injectable (LANTUS) 25 Unit(s) SubCutaneous every morning  insulin regular  human corrective regimen sliding scale   SubCutaneous every 6 hours  isosorbide   mononitrate ER Tablet (IMDUR) 30 milliGRAM(s) Oral daily  melatonin 5 milliGRAM(s) Oral at bedtime  polyethylene glycol 3350 17 Gram(s) Oral two times a day  rosuvastatin 10 milliGRAM(s) Oral at bedtime  senna 2 Tablet(s) Oral at bedtime  tacrolimus 1 milliGRAM(s) Oral every 12 hours  tamsulosin 0.4 milliGRAM(s) Oral at bedtime  valGANciclovir 450 milliGRAM(s) Oral every 24 hours    MEDICATIONS  (PRN):  acetaminophen    Suspension .. 650 milliGRAM(s) Oral every 6 hours PRN Temp greater or equal to 38C (100.4F), Mild Pain (1 - 3)  ALPRAZolam 0.25 milliGRAM(s) Oral at bedtime PRN sleep  loperamide 2 milliGRAM(s) Oral every 3 hours PRN Diarrhea    Adm Anthropometrics:  Height 5 ft 6 in  Weight 214 lbs  BMI 34.6   lbs  %%    Weight Change: no new wts in EMR. Please obtain wt weekly to assess for any changes    Estimated energy needs:     Subjective:   69 yo M with a past medical history of renal transplant (on tacrolimus, unk date), DM2, HTN, MGUS, pAF, BPH, HLD, CAD (s/p CABG 15 years ago), CLL and adult T cell lymphoma presents for 3-4 days of worsening cough. Patient states that he has a productive cough with brownish colored sputum. Found to have acute hypoxic respiratory failure and sepsis 2/2 likely multifocal pneumonia. Pt intubated on 7/6 in preparation for CT chest and bronch. Chest CT (7/7) showing Large airspace consolidation right upper lobe. Complete consolidation of the right lower lobe. Small subsegmental atelectasis/consolidation left upper lobe. Bronchoscopy (on 7/7) showed copious purulent secretions sent for BAL. BCx have NGTD. Due to lymphadenopathy on CT scan being concerning for lymphoma, heme-onc consulted and recommended FNA/CNA of L supraclavicular node, performed on 7/8. Flow cytometry revealed B cell lymphoma/CLL, which was consistent with his pmh which is followed outpatient by oncologist at [facility]. D/c propofol, s/p extubation 7/10 and weaned to NC, resolving PNA. Post extubation course complicated by hemoptysis on 7/10, and anticoagulation was held. Hemoptysis resolved and diet was started 7/11. S/s eval, recs mech soft/thins.    On follow up assessment, pt seen lying in bed, comfortable, currently on NC saturating at 94%, 2L O2, daughter in room. Pt reported eating very little for breakfast this morning, ate eggs, potatoes, and bread, which he reports to only have eaten a couple of bites. Pt reported to have just ordered lunch which consisted of mainly liquids (diet soda, juice, pudding). He reports that he "didn't want to have an appetite", which daughter explained that he didn't want to eat more d/t wanting to loss weight. Pt reported that UBW is prior to COVID was 200 lbs, and after COVID he was at 228 lbs, now current wt at 214 lbs. Explained to pt that he needs adequate PO intake, and discussed with pt heart healthy with CST CHO diet +daily physical activity to help with weight loss while maintaining a healthy/balanced diet. Written material given to daughter to review with pt. Denies any n/v. Confirmed NKFA. Will continue to follow per RD protocol. Please see RD recs below.    Previous Nutrition Diagnosis:  Nutrition Diagnostic Terminology #1 Other...     Other Inadequate energy intake.     Etiology RT current NPO status.     Signs/Symptoms AEB 0% of EER able to be met at this time.     Goal/Expected Outcome Nutrition will be initiated via tolerated route w/in 48hrs.  Active [   ]  Resolved [ x ]    If resolved, new PES:   Inadequate oral intake RT need for diet education 2/2 PMH (HLD, CAD, DM2) and increased needs d/t sepsis AEB lack of PO intake <25%.    Goal: Pt to recall 2 main concepts from edu (healthy fats, low cholesterol foods, CST CHO diet)    Recommendations:  1. Continue on DASH/TLC, CST/CHO diet, kosher, consistency per SLP recs  2. Monitor %PO intake  3. Reinforce diet edu prn  4. Monitor BM, may need fiber supplement  5. Monitor Labs: BMP, CBC, BG q6hrs, lytes, replete prn    Education: Heart Healthy with CST CHO diet    Risk Level: High [ x ] Moderate [   ] Low [   ] Admitting Diagnosis:   Patient is a 70y old  Male who presents with a chief complaint of sepsis (12 Jul 2021 13:16)      PAST MEDICAL & SURGICAL HISTORY:  Barretts esophagus    CRI (chronic renal insufficiency)    DM (diabetes mellitus)    GERD (gastroesophageal reflux disease)    HTN (hypertension)    Monoclonal gammopathy    Mandibular abscess    Paroxysmal atrial fibrillation    Benign prostatic hyperplasia, presence of lower urinary tract symptoms unspecified, unspecified morphology    Other hyperlipidemia    S/P CABG (coronary artery bypass graft)  15 years    History of surgery  cyst removal    Mandibular abscess        Current Nutrition Order:   DASH/TLC, CST CHO, Mechanical soft diet, kosher    PO Intake: Good (%) [   ]  Fair (50-75%) [   ] Poor (<25%) [ x ]    GI Issues: Diarrhea, last BM 7/12    Pain: denies pain/discomfort    Skin Integrity: Isac 19, pressure ulcer, suspected deep tissue @ L and R buttocks    Labs:   07-12    137  |  101  |  15  ----------------------------<  105<H>  4.8   |  24  |  0.99    Ca    8.7      12 Jul 2021 06:18  Phos  3.4     07-12  Mg     1.9     07-12    TPro  6.3  /  Alb  3.1<L>  /  TBili  0.6  /  DBili  x   /  AST  50<H>  /  ALT  37  /  AlkPhos  90  07-12    CAPILLARY BLOOD GLUCOSE      POCT Blood Glucose.: 158 mg/dL (12 Jul 2021 11:49)  POCT Blood Glucose.: 106 mg/dL (12 Jul 2021 06:36)  POCT Blood Glucose.: 118 mg/dL (11 Jul 2021 23:29)  POCT Blood Glucose.: 107 mg/dL (11 Jul 2021 17:13)      Medications:  MEDICATIONS  (STANDING):  apixaban 5 milliGRAM(s) Oral every 12 hours  chlorhexidine 2% Cloths 1 Application(s) Topical <User Schedule>  dextrose 40% Gel 15 Gram(s) Oral once  dextrose 5%. 1000 milliLiter(s) (50 mL/Hr) IV Continuous <Continuous>  dextrose 5%. 1000 milliLiter(s) (100 mL/Hr) IV Continuous <Continuous>  dextrose 50% Injectable 25 Gram(s) IV Push once  dextrose 50% Injectable 25 Gram(s) IV Push once  dextrose 50% Injectable 12.5 Gram(s) IV Push once  famotidine   Suspension 20 milliGRAM(s) Oral daily  furosemide    Tablet 20 milliGRAM(s) Oral daily  glucagon  Injectable 1 milliGRAM(s) IntraMuscular once  insulin glargine Injectable (LANTUS) 25 Unit(s) SubCutaneous every morning  insulin regular  human corrective regimen sliding scale   SubCutaneous every 6 hours  isosorbide   mononitrate ER Tablet (IMDUR) 30 milliGRAM(s) Oral daily  melatonin 5 milliGRAM(s) Oral at bedtime  polyethylene glycol 3350 17 Gram(s) Oral two times a day  rosuvastatin 10 milliGRAM(s) Oral at bedtime  senna 2 Tablet(s) Oral at bedtime  tacrolimus 1 milliGRAM(s) Oral every 12 hours  tamsulosin 0.4 milliGRAM(s) Oral at bedtime  valGANciclovir 450 milliGRAM(s) Oral every 24 hours    MEDICATIONS  (PRN):  acetaminophen    Suspension .. 650 milliGRAM(s) Oral every 6 hours PRN Temp greater or equal to 38C (100.4F), Mild Pain (1 - 3)  ALPRAZolam 0.25 milliGRAM(s) Oral at bedtime PRN sleep  loperamide 2 milliGRAM(s) Oral every 3 hours PRN Diarrhea    Adm Anthropometrics:  Height 5 ft 6 in  Weight 214 lbs  BMI 34.6   lbs  %%    Weight Change: no new wts in EMR. Please obtain wt weekly to assess for any changes    Estimated energy needs:     Subjective:   69 yo M with a past medical history of renal transplant (on tacrolimus, unk date), DM2, HTN, MGUS, pAF, BPH, HLD, CAD (s/p CABG 15 years ago), CLL and adult T cell lymphoma presents for 3-4 days of worsening cough. Patient states that he has a productive cough with brownish colored sputum. Found to have acute hypoxic respiratory failure and sepsis 2/2 likely multifocal pneumonia. Pt intubated on 7/6 in preparation for CT chest and bronch. Chest CT (7/7) showing Large airspace consolidation right upper lobe. Complete consolidation of the right lower lobe. Small subsegmental atelectasis/consolidation left upper lobe. Bronchoscopy (on 7/7) showed copious purulent secretions sent for BAL. BCx have NGTD. Due to lymphadenopathy on CT scan being concerning for lymphoma, heme-onc consulted and recommended FNA/CNA of L supraclavicular node, performed on 7/8. Flow cytometry revealed B cell lymphoma/CLL, which was consistent with his pmh which is followed outpatient by oncologist at [facility]. D/c propofol, s/p extubation 7/10 and weaned to NC, resolving PNA. Post extubation course complicated by hemoptysis on 7/10, and anticoagulation was held. Hemoptysis resolved and diet was started 7/11. S/s eval, recs mech soft/thins.    On follow up assessment, pt seen lying in bed, comfortable, currently on NC saturating at 94%, 2L O2, daughter in room. Pt reported eating very little for breakfast this morning, ate eggs, potatoes, and bread, which he reports to only have eaten a couple of bites. Pt reported to have just ordered lunch which consisted of mainly liquids (diet soda, juice, pudding). He reports that he "didn't want to have an appetite", which daughter explained that he didn't want to eat more d/t wanting to loss weight. Pt reported that UBW is prior to COVID was 200 lbs, and after COVID he was at 228 lbs, now current wt at 214 lbs. Explained to pt that he needs adequate PO intake, and discussed with pt heart healthy with CST CHO diet +daily physical activity to help with weight loss while maintaining a healthy/balanced diet. Written material given to daughter to review with pt. Denies any n/v. No complaints at this time. Confirmed NKFA. Will continue to follow per RD protocol. Please see RD recs below.    Previous Nutrition Diagnosis:  Nutrition Diagnostic Terminology #1 Other...     Other Inadequate energy intake.     Etiology RT current NPO status.     Signs/Symptoms AEB 0% of EER able to be met at this time.     Goal/Expected Outcome Nutrition will be initiated via tolerated route w/in 48hrs.  Active [   ]  Resolved [ x ]    If resolved, new PES:   Inadequate oral intake RT need for diet education 2/2 PMH (HLD, CAD, DM2) and increased needs d/t sepsis AEB lack of PO intake <25%.    Goal: Pt to recall 2 main concepts from [facility] (healthy fats, low cholesterol foods, CST CHO diet)    Recommendations:  1. Continue on DASH/TLC, CST/CHO diet, kosher, consistency per SLP recs  2. Monitor %PO intake  3. Reinforce diet edu prn  4. Monitor BM, may need fiber supplement  5. Monitor Labs: BMP, CBC, BG q6hrs, lytes, replete prn    Education: Heart Healthy with CST CHO diet    Risk Level: High [ x ] Moderate [   ] Low [   ] Supple/No evidence of meningial irritation

## 2021-07-12 NOTE — CHART NOTE - NSCHARTNOTEFT_GEN_A_CORE
Infectious Diseases Anti-infective Approval Note    Medication:  Valganciclovir  Dose:  450 mg   Route:  PO   Frequency: daily  Duration:  duration of hospitalization    **Duration refers to duration of approval, not recommended duration of treatment    Dose may be adjusted as needed for alterations in renal function.    *THIS IS NOT AN INFECTIOUS DISEASES CONSULTATION*

## 2021-07-12 NOTE — CONSULT NOTE ADULT - CONSULT REASON
Sepsis / multifocal pneumonia
History of lymphoma
[facility] Trigger - Complex Medical Decision Making/GOC in the setting of Critical Illness
R lung pneumonia, assessment for potential bronchoscopy
Rehab evaluation
Sepsis and respiratory failure
excisional biopsy lymph node
renal transplant patient
Cough and sputum without fever
request for lymph node FNA

## 2021-07-12 NOTE — PROGRESS NOTE ADULT - PROBLEM SELECTOR PLAN 1
Likely 2/2 multifocal pneumonia. Intubated on 7/6. Chest CT (7/7) showing Large airspace consolidation right upper lobe. Complete consolidation of the right lower lobe. Small subsegmental atelectasis/consolidation left upper lobe.  - AFB negative, Pneumocystis PCR negative  - Strict I's and O's  - Daily CXR  - Switched HFNC to 6L NC Likely 2/2 multifocal pneumonia. Intubated on 7/6. Chest CT (7/7) showing Large airspace consolidation right upper lobe. Complete consolidation of the right lower lobe. Small subsegmental atelectasis/consolidation left upper lobe.  - AFB negative, Pneumocystis PCR negative  - Strict I's and O's  - Daily CXR  - Titrated NC from 6L to 2L tolerating well.   - unable to transition to room air as patient desaturates to 80% Likely 2/2 multifocal pneumonia. Intubated on 7/6, extubated 7/10. Chest CT (7/7) showing Large airspace consolidation right upper lobe. Complete consolidation of the right lower lobe. Small subsegmental atelectasis/consolidation left upper lobe.  Plan:  - AFB negative, Pneumocystis PCR negative  - Titrated NC from 6L to 2L tolerating well.   - unable to transition to room air as patient desaturates to 80%  - tx pneumonia as below

## 2021-07-12 NOTE — PROGRESS NOTE ADULT - PROBLEM SELECTOR PLAN 1
Likely 2/2 multifocal pneumonia. Intubated on 7/6, extubated 7/10. Chest CT (7/7) showing Large airspace consolidation right upper lobe. Complete consolidation of the right lower lobe. Small subsegmental atelectasis/consolidation left upper lobe.  Plan:  - AFB negative, Pneumocystis PCR negative  - Titrated NC from 6L to 2L tolerating well.   - unable to transition to room air as patient desaturates to 80%  - tx pneumonia as below

## 2021-07-12 NOTE — CONSULT NOTE ADULT - CONSULT REQUESTED BY NAME
MICU
Dr. Harrison
Chacha Feliciano
Dr Felicaino
Dr. Feliciano
Dr. Rollins
house staff
Medicine
dr Feliciano
Chacha Feliciano

## 2021-07-12 NOTE — PROGRESS NOTE ADULT - PROBLEM SELECTOR PLAN 3
- anticoagulation initially held in the setting of blood tinged sputum post extubation  - restarted home Eliquis 5mg BID

## 2021-07-12 NOTE — PHYSICAL THERAPY INITIAL EVALUATION ADULT - GENERAL OBSERVATIONS, REHAB EVAL
As per RN Mei patient cleared for PT/OOB. Received sitting in chair + heplock, oxygen 2LNC, family at bedside, in NAD

## 2021-07-12 NOTE — PROGRESS NOTE ADULT - PROBLEM SELECTOR PLAN 6
Home meds: lantus 45, humalog 40 TID  Plan:  - lantus 25 at night  - sliding scale coverage for now    #Diarrhea  PLAN:  - c/w PO diet DASH/TLC  - c/w 2mg loperamide Q3hrs PRN as pt was having diarrhea   - diarrhea improved while on loperamide

## 2021-07-12 NOTE — PROGRESS NOTE ADULT - PROBLEM SELECTOR PLAN 2
Patient with 4/4 SIRS criteria (febrile, tachycardic, tachypneic, and WBC) likely 2/2 multifocal pneumonia on admission.   - Staph PCR positive, MRSA PCR negative  - s/p vanc, linezolid  - s/p IV zosyn to 4.5g Q6hrs as renal function has improved (completed course 7/11)  - AFB negative, Pneumocystis PCR neg, Urine legionella negative, RVP neg, COVID neg  - BCx: NGTD  - f/u Bronchial lavage cx including Nocardia cx, fungal, nocardia, aspergillus, toxoplasma PCR and histoplasma.  - f/u urine strep  - f/u serum EBV PCR  - F/u axillary LN bx Patient with 4/4 SIRS criteria (febrile, tachycardic, tachypneic, and WBC) likely 2/2 multifocal pneumonia on admission.  -Chest CT (7/7) showing Large airspace consolidation right upper lobe. Complete consolidation of the right lower lobe. Small subsegmental atelectasis/consolidation left upper lobe.  - Staph PCR positive, MRSA PCR negative  - AFB negative, Pneumocystis PCR neg, Urine legionella negative, urine strep negative, RVP neg, COVID neg, histoplasma negative, toxoplasma negative, aspergillus negative  - CMV Ab's positive, but PCR negative  - BAL negative for toxoplasma, PCP  - BCx: NGTD  - s/p vanc, linezolid  - s/p IV zosyn to 4.5g Q6hrs as renal function has improved (completed course 7/11)  Plan:  - f/u Bronchial lavage cx including fungal and Nocardia cx  - monitor off antibiotics

## 2021-07-12 NOTE — PHYSICAL THERAPY INITIAL EVALUATION ADULT - ADDITIONAL COMMENTS
Patient lives with wife in a private house with 3 ADAM. Denies use of AD PTA but states he owns a RW. Denies recent falls

## 2021-07-13 ENCOUNTER — TRANSCRIPTION ENCOUNTER (OUTPATIENT)
Age: 71
End: 2021-07-13

## 2021-07-13 VITALS — OXYGEN SATURATION: 94 %

## 2021-07-13 LAB
ALBUMIN SERPL ELPH-MCNC: 3.7 G/DL — SIGNIFICANT CHANGE UP (ref 3.3–5)
ALP SERPL-CCNC: 101 U/L — SIGNIFICANT CHANGE UP (ref 40–120)
ALT FLD-CCNC: 40 U/L — SIGNIFICANT CHANGE UP (ref 10–45)
ANION GAP SERPL CALC-SCNC: 8 MMOL/L — SIGNIFICANT CHANGE UP (ref 5–17)
ANISOCYTOSIS BLD QL: SLIGHT — SIGNIFICANT CHANGE UP
AST SERPL-CCNC: 43 U/L — HIGH (ref 10–40)
BASOPHILS # BLD AUTO: 0 K/UL — SIGNIFICANT CHANGE UP (ref 0–0.2)
BASOPHILS NFR BLD AUTO: 0 % — SIGNIFICANT CHANGE UP (ref 0–2)
BILIRUB SERPL-MCNC: 0.5 MG/DL — SIGNIFICANT CHANGE UP (ref 0.2–1.2)
BUN SERPL-MCNC: 15 MG/DL — SIGNIFICANT CHANGE UP (ref 7–23)
CALCIUM SERPL-MCNC: 9.5 MG/DL — SIGNIFICANT CHANGE UP (ref 8.4–10.5)
CHLORIDE SERPL-SCNC: 98 MMOL/L — SIGNIFICANT CHANGE UP (ref 96–108)
CO2 SERPL-SCNC: 27 MMOL/L — SIGNIFICANT CHANGE UP (ref 22–31)
CREAT SERPL-MCNC: 0.95 MG/DL — SIGNIFICANT CHANGE UP (ref 0.5–1.3)
DACRYOCYTES BLD QL SMEAR: SLIGHT — SIGNIFICANT CHANGE UP
EOSINOPHIL # BLD AUTO: 0 K/UL — SIGNIFICANT CHANGE UP (ref 0–0.5)
EOSINOPHIL NFR BLD AUTO: 0 % — SIGNIFICANT CHANGE UP (ref 0–6)
GIANT PLATELETS BLD QL SMEAR: PRESENT — SIGNIFICANT CHANGE UP
GLUCOSE BLDC GLUCOMTR-MCNC: 161 MG/DL — HIGH (ref 70–99)
GLUCOSE BLDC GLUCOMTR-MCNC: 259 MG/DL — HIGH (ref 70–99)
GLUCOSE BLDC GLUCOMTR-MCNC: 284 MG/DL — HIGH (ref 70–99)
GLUCOSE SERPL-MCNC: 163 MG/DL — HIGH (ref 70–99)
HCT VFR BLD CALC: 31.6 % — LOW (ref 39–50)
HGB BLD-MCNC: 9.7 G/DL — LOW (ref 13–17)
HYPOCHROMIA BLD QL: SIGNIFICANT CHANGE UP
LYMPHOCYTES # BLD AUTO: 11.55 K/UL — HIGH (ref 1–3.3)
LYMPHOCYTES # BLD AUTO: 73.9 % — HIGH (ref 13–44)
MACROCYTES BLD QL: SLIGHT — SIGNIFICANT CHANGE UP
MAGNESIUM SERPL-MCNC: 2 MG/DL — SIGNIFICANT CHANGE UP (ref 1.6–2.6)
MANUAL SMEAR VERIFICATION: SIGNIFICANT CHANGE UP
MCHC RBC-ENTMCNC: 29.6 PG — SIGNIFICANT CHANGE UP (ref 27–34)
MCHC RBC-ENTMCNC: 30.7 GM/DL — LOW (ref 32–36)
MCV RBC AUTO: 96.3 FL — SIGNIFICANT CHANGE UP (ref 80–100)
MICROCYTES BLD QL: SLIGHT — SIGNIFICANT CHANGE UP
MONOCYTES # BLD AUTO: 1.22 K/UL — HIGH (ref 0–0.9)
MONOCYTES NFR BLD AUTO: 7.8 % — SIGNIFICANT CHANGE UP (ref 2–14)
NEUTROPHILS # BLD AUTO: 2.86 K/UL — SIGNIFICANT CHANGE UP (ref 1.8–7.4)
NEUTROPHILS NFR BLD AUTO: 18.3 % — LOW (ref 43–77)
OVALOCYTES BLD QL SMEAR: SLIGHT — SIGNIFICANT CHANGE UP
PHOSPHATE SERPL-MCNC: 3.6 MG/DL — SIGNIFICANT CHANGE UP (ref 2.5–4.5)
PLAT MORPH BLD: ABNORMAL
PLATELET # BLD AUTO: 145 K/UL — LOW (ref 150–400)
POIKILOCYTOSIS BLD QL AUTO: SLIGHT — SIGNIFICANT CHANGE UP
POLYCHROMASIA BLD QL SMEAR: SLIGHT — SIGNIFICANT CHANGE UP
POTASSIUM SERPL-MCNC: 4.5 MMOL/L — SIGNIFICANT CHANGE UP (ref 3.5–5.3)
POTASSIUM SERPL-SCNC: 4.5 MMOL/L — SIGNIFICANT CHANGE UP (ref 3.5–5.3)
PROT SERPL-MCNC: 7 G/DL — SIGNIFICANT CHANGE UP (ref 6–8.3)
RBC # BLD: 3.28 M/UL — LOW (ref 4.2–5.8)
RBC # FLD: 15.1 % — HIGH (ref 10.3–14.5)
RBC BLD AUTO: ABNORMAL
SMUDGE CELLS # BLD: PRESENT — SIGNIFICANT CHANGE UP
SODIUM SERPL-SCNC: 133 MMOL/L — LOW (ref 135–145)
SPHEROCYTES BLD QL SMEAR: SLIGHT — SIGNIFICANT CHANGE UP
STOMATOCYTES BLD QL SMEAR: SLIGHT — SIGNIFICANT CHANGE UP
TACROLIMUS SERPL-MCNC: 2.8 NG/ML — SIGNIFICANT CHANGE UP
TACROLIMUS SERPL-MCNC: 3.6 NG/ML — SIGNIFICANT CHANGE UP
WBC # BLD: 15.63 K/UL — HIGH (ref 3.8–10.5)
WBC # FLD AUTO: 15.63 K/UL — HIGH (ref 3.8–10.5)

## 2021-07-13 PROCEDURE — 82803 BLOOD GASES ANY COMBINATION: CPT

## 2021-07-13 PROCEDURE — 86778 TOXOPLASMA ANTIBODY IGM: CPT

## 2021-07-13 PROCEDURE — 87594 PNEUMCYSTS JIROVECII AMP PRB: CPT

## 2021-07-13 PROCEDURE — 86641 CRYPTOCOCCUS ANTIBODY: CPT

## 2021-07-13 PROCEDURE — 82330 ASSAY OF CALCIUM: CPT

## 2021-07-13 PROCEDURE — 87641 MR-STAPH DNA AMP PROBE: CPT

## 2021-07-13 PROCEDURE — 83036 HEMOGLOBIN GLYCOSYLATED A1C: CPT

## 2021-07-13 PROCEDURE — C8929: CPT

## 2021-07-13 PROCEDURE — 36415 COLL VENOUS BLD VENIPUNCTURE: CPT

## 2021-07-13 PROCEDURE — 93005 ELECTROCARDIOGRAM TRACING: CPT

## 2021-07-13 PROCEDURE — 87635 SARS-COV-2 COVID-19 AMP PRB: CPT

## 2021-07-13 PROCEDURE — 89051 BODY FLUID CELL COUNT: CPT

## 2021-07-13 PROCEDURE — 82784 ASSAY IGA/IGD/IGG/IGM EACH: CPT

## 2021-07-13 PROCEDURE — 71250 CT THORAX DX C-: CPT

## 2021-07-13 PROCEDURE — 88360 TUMOR IMMUNOHISTOCHEM/MANUAL: CPT

## 2021-07-13 PROCEDURE — 88305 TISSUE EXAM BY PATHOLOGIST: CPT

## 2021-07-13 PROCEDURE — 76942 ECHO GUIDE FOR BIOPSY: CPT

## 2021-07-13 PROCEDURE — 84300 ASSAY OF URINE SODIUM: CPT

## 2021-07-13 PROCEDURE — 88341 IMHCHEM/IMCYTCHM EA ADD ANTB: CPT

## 2021-07-13 PROCEDURE — 86698 HISTOPLASMA ANTIBODY: CPT

## 2021-07-13 PROCEDURE — 87106 FUNGI IDENTIFICATION YEAST: CPT

## 2021-07-13 PROCEDURE — 96365 THER/PROPH/DIAG IV INF INIT: CPT

## 2021-07-13 PROCEDURE — 86769 SARS-COV-2 COVID-19 ANTIBODY: CPT

## 2021-07-13 PROCEDURE — 38505 NEEDLE BIOPSY LYMPH NODES: CPT

## 2021-07-13 PROCEDURE — 87206 SMEAR FLUORESCENT/ACID STAI: CPT

## 2021-07-13 PROCEDURE — 99291 CRITICAL CARE FIRST HOUR: CPT | Mod: 25

## 2021-07-13 PROCEDURE — 83880 ASSAY OF NATRIURETIC PEPTIDE: CPT

## 2021-07-13 PROCEDURE — 71046 X-RAY EXAM CHEST 2 VIEWS: CPT | Mod: 26

## 2021-07-13 PROCEDURE — 86900 BLOOD TYPING SEROLOGIC ABO: CPT

## 2021-07-13 PROCEDURE — 83735 ASSAY OF MAGNESIUM: CPT

## 2021-07-13 PROCEDURE — 96368 THER/DIAG CONCURRENT INF: CPT

## 2021-07-13 PROCEDURE — 85730 THROMBOPLASTIN TIME PARTIAL: CPT

## 2021-07-13 PROCEDURE — 88173 CYTOPATH EVAL FNA REPORT: CPT

## 2021-07-13 PROCEDURE — 84155 ASSAY OF PROTEIN SERUM: CPT

## 2021-07-13 PROCEDURE — 96366 THER/PROPH/DIAG IV INF ADDON: CPT

## 2021-07-13 PROCEDURE — 87305 ASPERGILLUS AG IA: CPT

## 2021-07-13 PROCEDURE — 71045 X-RAY EXAM CHEST 1 VIEW: CPT

## 2021-07-13 PROCEDURE — 82728 ASSAY OF FERRITIN: CPT

## 2021-07-13 PROCEDURE — 84295 ASSAY OF SERUM SODIUM: CPT

## 2021-07-13 PROCEDURE — 85018 HEMOGLOBIN: CPT

## 2021-07-13 PROCEDURE — 87899 AGENT NOS ASSAY W/OPTIC: CPT

## 2021-07-13 PROCEDURE — 87081 CULTURE SCREEN ONLY: CPT

## 2021-07-13 PROCEDURE — 81001 URINALYSIS AUTO W/SCOPE: CPT

## 2021-07-13 PROCEDURE — 84133 ASSAY OF URINE POTASSIUM: CPT

## 2021-07-13 PROCEDURE — 86665 EPSTEIN-BARR CAPSID VCA: CPT

## 2021-07-13 PROCEDURE — 87040 BLOOD CULTURE FOR BACTERIA: CPT

## 2021-07-13 PROCEDURE — 83935 ASSAY OF URINE OSMOLALITY: CPT

## 2021-07-13 PROCEDURE — 87086 URINE CULTURE/COLONY COUNT: CPT

## 2021-07-13 PROCEDURE — 87385 HISTOPLASMA CAPSUL AG IA: CPT

## 2021-07-13 PROCEDURE — 86777 TOXOPLASMA ANTIBODY: CPT

## 2021-07-13 PROCEDURE — 87015 SPECIMEN INFECT AGNT CONCNTJ: CPT

## 2021-07-13 PROCEDURE — 87116 MYCOBACTERIA CULTURE: CPT

## 2021-07-13 PROCEDURE — 85025 COMPLETE CBC W/AUTO DIFF WBC: CPT

## 2021-07-13 PROCEDURE — 87449 NOS EACH ORGANISM AG IA: CPT

## 2021-07-13 PROCEDURE — 88112 CYTOPATH CELL ENHANCE TECH: CPT

## 2021-07-13 PROCEDURE — 85610 PROTHROMBIN TIME: CPT

## 2021-07-13 PROCEDURE — 86850 RBC ANTIBODY SCREEN: CPT

## 2021-07-13 PROCEDURE — 84540 ASSAY OF URINE/UREA-N: CPT

## 2021-07-13 PROCEDURE — 87640 STAPH A DNA AMP PROBE: CPT

## 2021-07-13 PROCEDURE — 80053 COMPREHEN METABOLIC PANEL: CPT

## 2021-07-13 PROCEDURE — 80048 BASIC METABOLIC PNL TOTAL CA: CPT

## 2021-07-13 PROCEDURE — 99232 SBSQ HOSP IP/OBS MODERATE 35: CPT

## 2021-07-13 PROCEDURE — 83605 ASSAY OF LACTIC ACID: CPT

## 2021-07-13 PROCEDURE — 86359 T CELLS TOTAL COUNT: CPT

## 2021-07-13 PROCEDURE — 80197 ASSAY OF TACROLIMUS: CPT

## 2021-07-13 PROCEDURE — 83550 IRON BINDING TEST: CPT

## 2021-07-13 PROCEDURE — 83615 LACTATE (LD) (LDH) ENZYME: CPT

## 2021-07-13 PROCEDURE — 85027 COMPLETE CBC AUTOMATED: CPT

## 2021-07-13 PROCEDURE — 99239 HOSP IP/OBS DSCHRG MGMT >30: CPT

## 2021-07-13 PROCEDURE — 87252 VIRUS INOCULATION TISSUE: CPT

## 2021-07-13 PROCEDURE — 94002 VENT MGMT INPAT INIT DAY: CPT

## 2021-07-13 PROCEDURE — 71046 X-RAY EXAM CHEST 2 VIEWS: CPT

## 2021-07-13 PROCEDURE — 82607 VITAMIN B-12: CPT

## 2021-07-13 PROCEDURE — 84550 ASSAY OF BLOOD/URIC ACID: CPT

## 2021-07-13 PROCEDURE — 84443 ASSAY THYROID STIM HORMONE: CPT

## 2021-07-13 PROCEDURE — 82746 ASSAY OF FOLIC ACID SERUM: CPT

## 2021-07-13 PROCEDURE — 87102 FUNGUS ISOLATION CULTURE: CPT

## 2021-07-13 PROCEDURE — 84100 ASSAY OF PHOSPHORUS: CPT

## 2021-07-13 PROCEDURE — 94003 VENT MGMT INPAT SUBQ DAY: CPT

## 2021-07-13 PROCEDURE — 86403 PARTICLE AGGLUT ANTBDY SCRN: CPT

## 2021-07-13 PROCEDURE — 84478 ASSAY OF TRIGLYCERIDES: CPT

## 2021-07-13 PROCEDURE — 82533 TOTAL CORTISOL: CPT

## 2021-07-13 PROCEDURE — 87799 DETECT AGENT NOS DNA QUANT: CPT

## 2021-07-13 PROCEDURE — 86901 BLOOD TYPING SEROLOGIC RH(D): CPT

## 2021-07-13 PROCEDURE — 84466 ASSAY OF TRANSFERRIN: CPT

## 2021-07-13 PROCEDURE — 86663 EPSTEIN-BARR ANTIBODY: CPT

## 2021-07-13 PROCEDURE — 82962 GLUCOSE BLOOD TEST: CPT

## 2021-07-13 PROCEDURE — 87070 CULTURE OTHR SPECIMN AEROBIC: CPT

## 2021-07-13 PROCEDURE — 86606 ASPERGILLUS ANTIBODY: CPT

## 2021-07-13 PROCEDURE — 84132 ASSAY OF SERUM POTASSIUM: CPT

## 2021-07-13 PROCEDURE — 0225U NFCT DS DNA&RNA 21 SARSCOV2: CPT

## 2021-07-13 PROCEDURE — 86645 CMV ANTIBODY IGM: CPT

## 2021-07-13 PROCEDURE — 86664 EPSTEIN-BARR NUCLEAR ANTIGEN: CPT

## 2021-07-13 PROCEDURE — 86334 IMMUNOFIX E-PHORESIS SERUM: CPT

## 2021-07-13 PROCEDURE — 86644 CMV ANTIBODY: CPT

## 2021-07-13 PROCEDURE — 83521 IG LIGHT CHAINS FREE EACH: CPT

## 2021-07-13 PROCEDURE — 83540 ASSAY OF IRON: CPT

## 2021-07-13 PROCEDURE — 86360 T CELL ABSOLUTE COUNT/RATIO: CPT

## 2021-07-13 PROCEDURE — 87798 DETECT AGENT NOS DNA AMP: CPT

## 2021-07-13 PROCEDURE — 84145 PROCALCITONIN (PCT): CPT

## 2021-07-13 PROCEDURE — 84165 PROTEIN E-PHORESIS SERUM: CPT

## 2021-07-13 PROCEDURE — 97162 PT EVAL MOD COMPLEX 30 MIN: CPT

## 2021-07-13 PROCEDURE — 92610 EVALUATE SWALLOWING FUNCTION: CPT

## 2021-07-13 RX ORDER — VALGANCICLOVIR 450 MG/1
450 TABLET, FILM COATED ORAL DAILY
Refills: 0 | Status: DISCONTINUED | OUTPATIENT
Start: 2021-07-13 | End: 2021-07-13

## 2021-07-13 RX ORDER — INSULIN LISPRO 100/ML
0 VIAL (ML) SUBCUTANEOUS
Qty: 0 | Refills: 0 | DISCHARGE

## 2021-07-13 RX ORDER — INSULIN GLARGINE 100 [IU]/ML
23 INJECTION, SOLUTION SUBCUTANEOUS
Qty: 0 | Refills: 0 | DISCHARGE
Start: 2021-07-13

## 2021-07-13 RX ORDER — ISOSORBIDE MONONITRATE 60 MG/1
1 TABLET, EXTENDED RELEASE ORAL
Qty: 0 | Refills: 0 | DISCHARGE
Start: 2021-07-13

## 2021-07-13 RX ORDER — VALGANCICLOVIR 450 MG/1
1 TABLET, FILM COATED ORAL
Qty: 0 | Refills: 0 | DISCHARGE
Start: 2021-07-13

## 2021-07-13 RX ORDER — INSULIN GLARGINE 100 [IU]/ML
23 INJECTION, SOLUTION SUBCUTANEOUS AT BEDTIME
Refills: 0 | Status: DISCONTINUED | OUTPATIENT
Start: 2021-07-13 | End: 2021-07-13

## 2021-07-13 RX ORDER — INSULIN GLARGINE 100 [IU]/ML
100 INJECTION, SOLUTION SUBCUTANEOUS
Qty: 0 | Refills: 0 | DISCHARGE

## 2021-07-13 RX ADMIN — INSULIN GLARGINE 20 UNIT(S): 100 INJECTION, SOLUTION SUBCUTANEOUS at 10:20

## 2021-07-13 RX ADMIN — TACROLIMUS 1 MILLIGRAM(S): 5 CAPSULE ORAL at 08:33

## 2021-07-13 RX ADMIN — Medication 20 MILLIGRAM(S): at 08:32

## 2021-07-13 RX ADMIN — ISOSORBIDE MONONITRATE 30 MILLIGRAM(S): 60 TABLET, EXTENDED RELEASE ORAL at 11:09

## 2021-07-13 RX ADMIN — FAMOTIDINE 20 MILLIGRAM(S): 10 INJECTION INTRAVENOUS at 11:10

## 2021-07-13 RX ADMIN — APIXABAN 5 MILLIGRAM(S): 2.5 TABLET, FILM COATED ORAL at 11:09

## 2021-07-13 RX ADMIN — Medication 0.25 MILLIGRAM(S): at 01:03

## 2021-07-13 RX ADMIN — Medication 1: at 08:32

## 2021-07-13 RX ADMIN — TACROLIMUS 1 MILLIGRAM(S): 5 CAPSULE ORAL at 18:31

## 2021-07-13 RX ADMIN — Medication 3: at 13:59

## 2021-07-13 NOTE — DISCHARGE NOTE NURSING/CASE MANAGEMENT/SOCIAL WORK - PATIENT PORTAL LINK FT
You can access the FollowMyHealth Patient Portal offered by [facility] by registering at the following website: [email or web address]. By joining [facility]’s FollowMyHealth portal, you will also be able to view your health information using other applications (apps) compatible with our system.

## 2021-07-13 NOTE — DISCHARGE NOTE PROVIDER - HOSPITAL COURSE
Mr. Ashton is a 69 yo M with a past medical history of renal transplant (on tacrolimus, unk date), complicated by low grade lymphoma post transplant, DM2, HTN, MGUS, pAF, BPH, HLD, CAD (s/p CABG 15 years ago), presents with fever, chills and worsening cough for 5 days. Patient states that he has a productive cough with brownish colored sputum. Found to have acute hypoxic respiratory failure and sepsis 2/2 likely multifocal pneumonia in the setting of known CLL/B cell lymphoma.    Problem List/Main Diagnoses (system-based):    # Acute respiratory failure with hypoxia.  Plan: Likely 2/2 multifocal pneumonia. Intubated on 7/6, extubated 7/10. Chest CT (7/7) showing Large airspace consolidation right upper lobe. Complete consolidation of the right lower lobe. Small subsegmental atelectasis/consolidation left upper lobe.  Plan:  - AFB negative, Pneumocystis PCR negative  - Titrated NC from 6L to 1L tolerating well.   - de-satting to 86% while walking off O2; increases when lying down.   - tx pneumonia as below  -d/c with home oxygen    # Multifocal pneumonia.  Plan: Patient with 4/4 SIRS criteria (febrile, tachycardic, tachypneic, and WBC) likely 2/2 multifocal pneumonia on admission.  -Chest CT (7/7) showing Large airspace consolidation right upper lobe. Complete consolidation of the right lower lobe. Small subsegmental atelectasis/consolidation left upper lobe.  - Staph PCR positive, MRSA PCR negative  - AFB negative, Pneumocystis PCR neg, Urine legionella negative, urine strep negative, RVP neg, COVID neg, histoplasma negative, toxoplasma negative, aspergillus negative  - CMV Ab's positive, but PCR negative  - BAL negative for toxoplasma, PCP, and aspergillus   - BCx: NGTD (last 7/9)  - s/p vanc, linezolid  - s/p IV zosyn to 4.5g Q6hrs as renal function has improved (completed course 7/11)  - per ID no more antibiotics needed.     #Paroxysmal atrial fibrillation.  Plan: - anticoagulation initially held in the setting of blood tinged sputum post extubation  - restarted home Eliquis 5mg BID  -monitor for any more hemoptysis.      #HTN (hypertension).  Plan: # HTN  - c/w 20mg lasix   - holding home amlodipine 10mg, SBP has been at target 120-130  -goal SBP >100    # HLD  - on home rosuvastatin 10mg    # CAD (s/p CABG 15 yrs ago)  - home med isosorbide mononitrate 30mg BID  - cont isosorbide mononitrate 30mg OD     # Lymphoma.  Plan: Heme-onc consulted with collateral from [facility] with CLL and adult T cell lymphoma. Flow cytometry 07/08/21: B-cell lymphoma and are suggestive of chronic lymphocytic leukemia/small lymphocytic lymphoma  -SPEP with elevated Ig Kappa and Lambda   -EBV positive  - Deferring CT A/P with IV Contrast for evaluation of abdominopelvic lymphadenopathy and splenomegaly to outpatient (pt follows with [facility])  - Can hold off on whole-body PET-CT to be done as outpatient when infection is cleared.   -. K/L 0.40. Quantitative IgM slightly low.   - Maintain active T+S, transfuse PRBC if Hb < 7 or if actively bleeding/symptomatic or if platelets < 10K or < 20K if febrile or < 50 K if actively bleeding  - Can follow-up with Dr. Lydia Huffman at [facility] upon discharge in 1-2 weeks (Office: [phone]).     #Problem: DM (diabetes mellitus). Plan: Home meds: lantus 45, humalog 40 TID  Plan:  - lantus 25 at night decreased to 20, sugars this morning 200 after eating, increased lantus to 23   - sliding scale coverage     #Diarrhea  - c/w PO diet DASH/TLC  - c/w 2mg loperamide Q3hrs PRN as pt was having diarrhea   - diarrhea improved while on loperamide.     #Renal transplant recipient.  Plan: # Renal Transplant  - History of renal transplant on (6/2017, [facility], on tacolimus) .creatinine baseline 1.0.  - Nephrology (Dr. Leyva and Dr. Preciado) on board  - tacrolimus level goal 3-4  - c/w PO tacrolimus dose of 1mg suspension BID  - followed daily tacrolimus levels  -valgancyclovir restarted     #Acute kidney injury- Resolved   -BUN, Cr stable.      #Rash.  Plan: # lluvia reticular non-blanching rash from upper thighs to knees b/l  - f/u outpatient w/ derm  - no change on exam.      #BPH (benign prostatic hyperplasia).  Plan: - c/w tamsulosin 0.4mg at home.    Inpatient treatment course:   Pt is a 69 yo M with PMH renal tx (tacro), T2D, HTN, HLD, MGUS, pAF (eliquis), BPH, CAD (s/p CABG), COVID (3/2020), Garcia's esophagus/GERD, chronic hypoNa, CLL, and post-renal transplant low grade lymphoma who p/w F/C, cough, and brown sputum x5d along with BL LE painless rash x3-4wks. On arrival, septic from pneumonia treated with Vanc/Zosyn, then changed to linezolid, azithromycin, and zosyn as per ID. Intubated on 7/6 for work of breathing and planned bronchoscopy. Bronchoscopy (on 7/7) showed copious purulent secretions sent for BAL. Hilar lymphadenopathy was seen on imaging. Antibiotics were adjusted as per ID to zosyn alone for pseudomonal coverage. BCx have NGTD. Due to lymphadenopathy on CT scan being concerning for lymphoma, heme-onc consulted and recommended FNA/CNA of L supraclavicular node which was performed 7/8. After completion of bx, collateral from [facility] revealed hx of CLL and adult T cell lymphoma. Flow cytometry returned with B cell lymphoma/CLL, which was consistent with his past history which is followed outpatient by oncologist at [facility]. Pt was restarted on tacrolimus with a goal level 3-4. S/p extubation and weaning to NC. Post extubation course complicated by hemoptysis on 7/10, and anticoagulation was held. Hemoptysis resolved and diet was started the following day 7/11. Completed course of zosyn. NC titrated and tolerating 2L with O2sat >90%. Restarting eliquis today as patient is without hemoptysis for >36 hours. Stable for s/d to [facility] for further observation and management.   neg aspergillis, NC titrated down to 2L, incentive spirometry. restarted eliquis. restarted home valganciclovir and crestor. Tacro level ordered for AM. Monitor FSG to prevent hypoglycemia. Lantus reduced to 20 units. DC Valgancyclovir per Dr. Nicole               New medications:   Labs to be followed outpatient:   Exam to be followed outpatient:    Mr. Ashton is a 71 yo M with a past medical history of renal transplant (on tacrolimus, unk date), complicated by low grade lymphoma post transplant, DM2, HTN, MGUS, pAF, BPH, HLD, CAD (s/p CABG 15 years ago), presents with fever, chills and worsening cough for 5 days. Patient states that he has a productive cough with brownish colored sputum. Found to have acute hypoxic respiratory failure and sepsis 2/2 likely multifocal pneumonia in the setting of known CLL/B cell lymphoma.    Problem List/Main Diagnoses (system-based):    # Acute respiratory failure with hypoxia.  Plan: Likely 2/2 multifocal pneumonia. Intubated on 7/6, extubated 7/10. Chest CT (7/7) showing Large airspace consolidation right upper lobe. Complete consolidation of the right lower lobe. Small subsegmental atelectasis/consolidation left upper lobe.  Plan:  - AFB negative, Pneumocystis PCR negative  - Titrated NC from 6L to 1L tolerating well.   - de-satting to 86% while walking off O2; increases when lying down.   - tx pneumonia as below  -d/c with home oxygen    # Multifocal pneumonia.  Plan: Patient with 4/4 SIRS criteria (febrile, tachycardic, tachypneic, and WBC) likely 2/2 multifocal pneumonia on admission.  -Chest CT (7/7) showing Large airspace consolidation right upper lobe. Complete consolidation of the right lower lobe. Small subsegmental atelectasis/consolidation left upper lobe.  - Staph PCR positive, MRSA PCR negative  - AFB negative, Pneumocystis PCR neg, Urine legionella negative, urine strep negative, RVP neg, COVID neg, histoplasma negative, toxoplasma negative, aspergillus negative  - CMV Ab's positive, but PCR negative  - BAL negative for toxoplasma, PCP, and aspergillus   - BCx: NGTD (last 7/9)  - s/p vanc, linezolid  - s/p IV zosyn to 4.5g Q6hrs as renal function has improved (completed course 7/11)  - per ID no more antibiotics needed.     #Paroxysmal atrial fibrillation.  Plan: - anticoagulation initially held in the setting of blood tinged sputum post extubation  - restarted home Eliquis 5mg BID  -monitor for any more hemoptysis.      #HTN (hypertension).  Plan: # HTN  - c/w 20mg lasix   - holding home amlodipine 10mg, SBP has been at target 120-130  -goal SBP >100    # HLD  - on home rosuvastatin 10mg    # CAD (s/p CABG 15 yrs ago)  - home med isosorbide mononitrate 30mg BID  - cont isosorbide mononitrate 30mg OD     # Lymphoma.  Plan: Heme-onc consulted with collateral from [facility] with CLL and adult T cell lymphoma. Flow cytometry 07/08/21: B-cell lymphoma and are suggestive of chronic lymphocytic leukemia/small lymphocytic lymphoma  -SPEP with elevated Ig Kappa and Lambda   -EBV positive  - Deferring CT A/P with IV Contrast for evaluation of abdominopelvic lymphadenopathy and splenomegaly to outpatient (pt follows with [facility])  - Can hold off on whole-body PET-CT to be done as outpatient when infection is cleared.   -. K/L 0.40. Quantitative IgM slightly low.   - Maintain active T+S, transfuse PRBC if Hb < 7 or if actively bleeding/symptomatic or if platelets < 10K or < 20K if febrile or < 50 K if actively bleeding  - Can follow-up with Dr. Lydia Huffman at [facility] upon discharge in 1-2 weeks (Office: [phone]).     #Problem: DM (diabetes mellitus). Plan: Home meds: lantus 45, humalog 40 TID  Plan:  - lantus 25 at night decreased to 20, sugars this morning 200 after eating, increased lantus to 23   - sliding scale coverage     #Diarrhea  - c/w PO diet DASH/TLC  - c/w 2mg loperamide Q3hrs PRN as pt was having diarrhea   - diarrhea improved while on loperamide.     #Renal transplant recipient.  Plan: # Renal Transplant  - History of renal transplant on (6/2017, [facility], on tacolimus) .creatinine baseline 1.0.  - Nephrology (Dr. Leyva and Dr. Preciado) on board  - tacrolimus level goal 3-4  - c/w PO tacrolimus dose of 1mg suspension BID  - followed daily tacrolimus levels  -valgancyclovir restarted     #Acute kidney injury- Resolved   -BUN, Cr stable.      #Rash.  Plan: # lluvia reticular non-blanching rash from upper thighs to knees b/l  - f/u outpatient w/ derm  - no change on exam.      #BPH (benign prostatic hyperplasia).  Plan: - c/w tamsulosin 0.4mg at home.    Inpatient treatment course:   Pt is a 71 yo M with PMH renal tx (tacro), T2D, HTN, HLD, MGUS, pAF (eliquis), BPH, CAD (s/p CABG), COVID (3/2020), Garcia's esophagus/GERD, chronic hypoNa, CLL, and post-renal transplant low grade lymphoma who p/w F/C, cough, and brown sputum x5d along with BL LE painless rash x3-4wks. On arrival, septic from pneumonia treated with Vanc/Zosyn, then changed to linezolid, azithromycin, and zosyn as per ID. Intubated on 7/6 for work of breathing and planned bronchoscopy. Bronchoscopy (on 7/7) showed copious purulent secretions sent for BAL. Hilar lymphadenopathy was seen on imaging. Antibiotics were adjusted as per ID to zosyn alone for pseudomonal coverage. BCx have NGTD. Due to lymphadenopathy on CT scan being concerning for lymphoma, heme-onc consulted and recommended FNA/CNA of L supraclavicular node which was performed 7/8. After completion of bx, collateral from [facility] revealed hx of CLL and adult T cell lymphoma. Flow cytometry returned with B cell lymphoma/CLL, which was consistent with his past history which is followed outpatient by oncologist at [facility]. Pt was restarted on tacrolimus with a goal level 3-4. S/p extubation and weaning to NC. Post extubation course complicated by hemoptysis on 7/10, and anticoagulation was held. Hemoptysis resolved and diet was started the following day 7/11. Completed course of zosyn. NC titrated and tolerating 2L with O2sat >90%. Restarting eliquis today as patient is without hemoptysis for >36 hours. Stable for s/d to F for further observation and management. Aspergillis negative. NC titrated down to 1L, incentive spirometry. restarted eliquis. restarted home valganciclovir and crestor. Lantus reduced to 23 units. Patient stable to be discharged on home medications.      New medications: none  Labs to be followed outpatient: CBC, CMP, oxygen saturation  Exam to be followed outpatient:    Mr. Ashton is a 69 yo M with a past medical history of renal transplant (on tacrolimus, unk date), complicated by low grade lymphoma post transplant, DM2, HTN, MGUS, pAF, BPH, HLD, CAD (s/p CABG 15 years ago), presents with fever, chills and worsening cough for 5 days. Patient states that he has a productive cough with brownish colored sputum. Found to have acute hypoxic respiratory failure and sepsis 2/2 likely multifocal pneumonia in the setting of known CLL/B cell lymphoma.    Problem List/Main Diagnoses (system-based):    # Acute respiratory failure with hypoxia.  Plan: Likely 2/2 multifocal pneumonia. Intubated on 7/6, extubated 7/10. Chest CT (7/7) showing Large airspace consolidation right upper lobe. Complete consolidation of the right lower lobe. Small subsegmental atelectasis/consolidation left upper lobe.  Plan:  - AFB negative, Pneumocystis PCR negative  - Titrated NC from 6L to 1L tolerating well.   - de-satting to 86% while walking off O2; increases when lying down.   - tx pneumonia as below  -d/c with home oxygen    # Multifocal pneumonia.  Plan: Patient with 4/4 SIRS criteria (febrile, tachycardic, tachypneic, and WBC) likely 2/2 multifocal pneumonia on admission.  -Chest CT (7/7) showing Large airspace consolidation right upper lobe. Complete consolidation of the right lower lobe. Small subsegmental atelectasis/consolidation left upper lobe.  - Staph PCR positive, MRSA PCR negative  - AFB negative, Pneumocystis PCR neg, Urine legionella negative, urine strep negative, RVP neg, COVID neg, histoplasma negative, toxoplasma negative, aspergillus negative  - CMV Ab's positive, but PCR negative  - BAL negative for toxoplasma, PCP, and aspergillus   - BCx: NGTD (last 7/9)  - s/p vanc, linezolid  - s/p IV zosyn to 4.5g Q6hrs as renal function has improved (completed course 7/11)  - per ID no more antibiotics needed.     #Paroxysmal atrial fibrillation.  Plan: - anticoagulation initially held in the setting of blood tinged sputum post extubation  - restarted home Eliquis 5mg BID  -monitor for any more hemoptysis.      #HTN (hypertension).  Plan: # HTN  - c/w 20mg lasix   - holding home amlodipine 10mg, SBP has been at target 120-130  -goal SBP >100    # HLD  - on home rosuvastatin 10mg    # CAD (s/p CABG 15 yrs ago)  - home med isosorbide mononitrate 30mg BID  - cont isosorbide mononitrate 30mg OD     # Lymphoma.  Plan: Heme-onc consulted with collateral from [facility] with CLL and adult T cell lymphoma. Flow cytometry 07/08/21: B-cell lymphoma and are suggestive of chronic lymphocytic leukemia/small lymphocytic lymphoma  -SPEP with elevated Ig Kappa and Lambda   -EBV positive  - Deferring CT A/P with IV Contrast for evaluation of abdominopelvic lymphadenopathy and splenomegaly to outpatient (pt follows with [facility])  - Can hold off on whole-body PET-CT to be done as outpatient when infection is cleared.   -. K/L 0.40. Quantitative IgM slightly low.   - Maintain active T+S, transfuse PRBC if Hb < 7 or if actively bleeding/symptomatic or if platelets < 10K or < 20K if febrile or < 50 K if actively bleeding  - Can follow-up with Dr. Lydia Huffman at [facility] upon discharge in 1-2 weeks (Office: [phone]).     #Problem: DM (diabetes mellitus). Plan: Home meds: lantus 45, humalog 40 TID  Plan:  - lantus 25 at night decreased to 20, sugars this morning 200 after eating, increased lantus to 23   - sliding scale coverage     #Diarrhea  - c/w PO diet DASH/TLC  - c/w 2mg loperamide Q3hrs PRN as pt was having diarrhea   - diarrhea improved while on loperamide.     #Renal transplant recipient.  Plan: # Renal Transplant  - History of renal transplant on (6/2017, [facility], on tacolimus) .creatinine baseline 1.0.  - Nephrology (Dr. Leyva and Dr. Preciado) on board  - tacrolimus level goal 3-4  - c/w PO tacrolimus dose of 1mg suspension BID  - followed daily tacrolimus levels  -valgancyclovir restarted     #Acute kidney injury- Resolved   -BUN, Cr stable.      #Rash.  Plan: # lluvia reticular non-blanching rash from upper thighs to knees b/l  - f/u outpatient w/ derm  - no change on exam.      #BPH (benign prostatic hyperplasia).  Plan: - c/w tamsulosin 0.4mg at home.    Inpatient treatment course:   Pt is a 69 yo M with PMH renal tx (tacro), T2D, HTN, HLD, MGUS, pAF (eliquis), BPH, CAD (s/p CABG), COVID (3/2020), Garcia's esophagus/GERD, chronic hypoNa, CLL, and post-renal transplant low grade lymphoma who p/w F/C, cough, and brown sputum x5d along with BL LE painless rash x3-4wks. On arrival, septic from pneumonia treated with Vanc/Zosyn, then changed to linezolid, azithromycin, and zosyn as per ID. Intubated on 7/6 for work of breathing and planned bronchoscopy. Bronchoscopy (on 7/7) showed copious purulent secretions sent for BAL. Hilar lymphadenopathy was seen on imaging. Antibiotics were adjusted as per ID to zosyn alone for pseudomonal coverage. BCx have NGTD. Due to lymphadenopathy on CT scan being concerning for lymphoma, heme-onc consulted and recommended FNA/CNA of L supraclavicular node which was performed 7/8. After completion of bx, collateral from [facility] revealed hx of CLL and adult T cell lymphoma. Flow cytometry returned with B cell lymphoma/CLL, which was consistent with his past history which is followed outpatient by oncologist at [facility]. Pt was restarted on tacrolimus with a goal level 3-4. S/p extubation and weaning to NC. Post extubation course complicated by hemoptysis on 7/10, and anticoagulation was held. Hemoptysis resolved and diet was started the following day 7/11. Completed course of zosyn. NC titrated and tolerating 2L with O2sat >90%. Restarting eliquis today as patient is without hemoptysis for >36 hours. Stable for s/d to F for further observation and management. Aspergillis negative. NC titrated down to 1L, incentive spirometry. restarted eliquis. restarted home valganciclovir and crestor. Lantus reduced to 23 units. Patient stable to be discharged on home medications and home oxygen.      New medications: none  Labs to be followed outpatient: CBC, CMP, oxygen saturation  Exam to be followed outpatient:

## 2021-07-13 NOTE — PROGRESS NOTE ADULT - PROBLEM SELECTOR PLAN 1
Likely 2/2 multifocal pneumonia. Intubated on 7/6, extubated 7/10. Chest CT (7/7) showing Large airspace consolidation right upper lobe. Complete consolidation of the right lower lobe. Small subsegmental atelectasis/consolidation left upper lobe.  Plan:  - AFB negative, Pneumocystis PCR negative  - Titrated NC from 6L to 2L tolerating well.   - de-satting to 86% while walking off O2; increases when lying down.   - tx pneumonia as below Likely 2/2 multifocal pneumonia. Intubated on 7/6, extubated 7/10. Chest CT (7/7) showing Large airspace consolidation right upper lobe. Complete consolidation of the right lower lobe. Small subsegmental atelectasis/consolidation left upper lobe.  Plan:  - AFB negative, Pneumocystis PCR negative  - Titrated NC from 6L to 1L tolerating well.   - de-satting to 86% while walking off O2; increases when lying down.   - tx pneumonia as below  -f/u repeat CXR

## 2021-07-13 NOTE — PROGRESS NOTE ADULT - PROBLEM SELECTOR PLAN 2
Patient with 4/4 SIRS criteria (febrile, tachycardic, tachypneic, and WBC) likely 2/2 multifocal pneumonia on admission.  -Chest CT (7/7) showing Large airspace consolidation right upper lobe. Complete consolidation of the right lower lobe. Small subsegmental atelectasis/consolidation left upper lobe.  - Staph PCR positive, MRSA PCR negative  - AFB negative, Pneumocystis PCR neg, Urine legionella negative, urine strep negative, RVP neg, COVID neg, histoplasma negative, toxoplasma negative, aspergillus negative  - CMV Ab's positive, but PCR negative  - BAL negative for toxoplasma, PCP, and aspergillus   - BCx: NGTD (last 7/9)  - s/p vanc, linezolid  - s/p IV zosyn to 4.5g Q6hrs as renal function has improved (completed course 7/11)  Plan:  - f/u Bronchial lavage cx including fungal and Nocardia cx  - per ID no more antibiotics needed

## 2021-07-13 NOTE — PROGRESS NOTE ADULT - SUBJECTIVE AND OBJECTIVE BOX
Patient is a 70y Male seen and evaluated at bedside. no acute events overnight; patient is feeling very well and is ready to go home; he remains hemodynamically stable  tacro leveL 3.6  Cr stable       Meds:    acetaminophen    Suspension .. 650 every 6 hours PRN  ALPRAZolam 0.25 at bedtime PRN  apixaban 5 every 12 hours  dextrose 40% Gel 15 once  dextrose 5%. 1000 <Continuous>  dextrose 5%. 1000 <Continuous>  dextrose 50% Injectable 25 once  dextrose 50% Injectable 25 once  dextrose 50% Injectable 12.5 once  famotidine   Suspension 20 daily  furosemide    Tablet 20 daily  glucagon  Injectable 1 once  insulin glargine Injectable (LANTUS) 20 every morning  insulin lispro (ADMELOG) corrective regimen sliding scale.  Before meals and at bedtime  isosorbide   mononitrate ER Tablet (IMDUR) 30 daily  loperamide 2 every 3 hours PRN  melatonin 5 at bedtime  polyethylene glycol 3350 17 two times a day  rosuvastatin 10 at bedtime  senna 2 at bedtime  tacrolimus 1 every 12 hours  tamsulosin 0.4 at bedtime      T(C): , Max: 36.9 (07-12-21 @ 13:16)  T(F): , Max: 98.4 (07-12-21 @ 13:16)  HR: 74 (07-13-21 @ 07:47)  BP: 119/68 (07-13-21 @ 07:47)  BP(mean): 84 (07-12-21 @ 11:36)  RR: 19 (07-13-21 @ 06:15)  SpO2: 98% (07-13-21 @ 06:15)  Wt(kg): --        Review of Systems:  CONSTITUTIONAL: No fever or chills, No fatigue   RESPIRATORY: No shortness of breath, cough,   CARDIOVASCULAR: No chest pain or shortness of breath  GASTROINTESTINAL: No abdominalNo nausea or vomiting, No diarrhea  GENITOURINARY: No dysuria or urinary burning, MUSCULOSKELETAL: no leg edema       PHYSICAL EXAM:  GENERAL: well-developed, well nourished, alert, no acute distress at present  NECK: supple, No JVD  CHEST/LUNG: Clear to auscultation bilaterally no rales, rhonchi or wheezing   HEART: normal S1S2, RRR  ABDOMEN: Soft, Nontender, +BS,   EXTREMITIES: No clubbing, cyanosis, or edema         LABS:                        9.7    15.63 )-----------( 145      ( 13 Jul 2021 08:27 )             31.6     07-13    133<L>  |  98  |  15  ----------------------------<  163<H>  4.5   |  27  |  0.95    Ca    9.5      13 Jul 2021 08:27  Phos  3.6     07-13  Mg     2.0     07-13    TPro  7.0  /  Alb  3.7  /  TBili  0.5  /  DBili  x   /  AST  43<H>  /  ALT  40  /  AlkPhos  101  07-13                RADIOLOGY & ADDITIONAL STUDIES:

## 2021-07-13 NOTE — DISCHARGE NOTE PROVIDER - NSDCMRMEDTOKEN_GEN_ALL_CORE_FT
Eliquis 5 mg oral tablet: 1 tab(s) orally 2 times a day  Flomax 0.4 mg oral capsule: 1 cap(s) orally once a day (at bedtime)  furosemide 20 mg oral tablet: 1 tab(s) orally once a day  HumaLOG 100 units/mL injectable solution: unit(s) injectable  isosorbide mononitrate 30 mg oral tablet, extended release: 1 tab(s) orally once a day  Lantus 100 units/mL subcutaneous solution: 100 unit(s) subcutaneous once a day (at bedtime)  pantoprazole 20 mg oral delayed release tablet: 1 tab(s) orally once a day  rosuvastatin 10 mg oral tablet: 1 tab(s) orally once a day (at bedtime)  tacrolimus 1 mg oral capsule: 2 cap(s) orally 2 times a day  valGANciclovir 450 mg oral tablet: 1 tab(s) orally once a day  Xanax 0.25 mg oral tablet: 1 tab(s) orally once a day (at bedtime), As Needed

## 2021-07-13 NOTE — PROGRESS NOTE ADULT - PROBLEM SELECTOR PLAN 4
# HTN  - c/w 20mg lasix   - holding home amlodipine 10mg, SBP has been at target 120-130  -goal SBP >100    # HLD  - on home rosuvastatin 10mg    # CAD (s/p CABG 15 yrs ago)  - home med isosorbide mononitrate 30mg BID  Plan:  - cont isosorbide mononitrate 30mg OD, will consider titrating up to BID as tolerated

## 2021-07-13 NOTE — PROGRESS NOTE ADULT - PROBLEM SELECTOR PROBLEM 1
LATRICIA (acute kidney injury)
LATRICIA (acute kidney injury)
Acute respiratory failure with hypoxia

## 2021-07-13 NOTE — DISCHARGE NOTE PROVIDER - NSDCHC_MEDRECSTATUS_GEN_ALL_CORE
How Severe Are Your Bumps?: moderate
Have Your Bumps Been Treated?: not been treated
Is This A New Presentation, Or A Follow-Up?: Bumps
Admission Reconciliation is Completed  Discharge Reconciliation is Completed

## 2021-07-13 NOTE — PROGRESS NOTE ADULT - PROBLEM SELECTOR PLAN 3
- anticoagulation initially held in the setting of blood tinged sputum post extubation  - restarted home Eliquis 5mg BID  -monitor for any more hemoptysis

## 2021-07-13 NOTE — PROGRESS NOTE ADULT - ATTENDING SUPERVISION STATEMENT
Fellow
Resident
Resident/Fellow
Fellow
Resident
Fellow
Resident

## 2021-07-13 NOTE — PROGRESS NOTE ADULT - PROVIDER SPECIALTY LIST ADULT
MICU
MICU
Heme/Onc
Infectious Disease
Infectious Disease
MICU
Nephrology
MICU
Nephrology
Pulmonology
Pulmonology
Infectious Disease
Pulmonology
Infectious Disease
Infectious Disease
Nephrology
Internal Medicine

## 2021-07-13 NOTE — PROGRESS NOTE ADULT - PROBLEM SELECTOR PLAN 7
# Renal Transplant  - History of renal transplant on (6/2017, [facility], on tacolimus) .creatinine baseline 1.0.  - Nephrology (Dr. Leyva and Dr. Preciado) on board    PLAN:  - tacrolimus level goal 3-4  - c/w PO tacrolimus dose of 1mg suspension BID  - f/u daily tacrolimus levels  - tacrolimus level 3.6 today  -per ID do not start valgancyclovir unless it is a home med as CMV PCR negative      #Acute kidney injury- Resolved   -BUN, Cr stable

## 2021-07-13 NOTE — DISCHARGE NOTE PROVIDER - NSDCCAREPROVSEEN_GEN_ALL_CORE_FT
Marie Lora Marie Lora A  Patient seen and examined with house-staff during bedside rounds.  Resident note read, including vitals, physical findings, laboratory data, and radiological reports.   Revisions included below.  Direct personal management at bed side and extensive interpretation of the data.  Plan was outlined and discussed in details with the housestaff.  Decision making of high complexity  Action taken for acute disease activity to reflect the level of care provided:  - medication reconciliation  - review laboratory data    The patient was seen and examined.  The chest x-ray revealed slight improvement.  The oxygen saturation is normal at rest but he decompensated and desats to 86% with exertion.  Patient has home oxygen.  Discharge on home oxygen.  Patient is off antibiotic.  The patient is to start on home meds.  Decrease insulin.  Patient will be followed as an outpatient.  I discussed the case with the wife

## 2021-07-13 NOTE — DISCHARGE NOTE PROVIDER - NSDCCPCAREPLAN_GEN_ALL_CORE_FT
PRINCIPAL DISCHARGE DIAGNOSIS  Diagnosis: Sepsis  Assessment and Plan of Treatment:       SECONDARY DISCHARGE DIAGNOSES  Diagnosis: Multifocal pneumonia  Assessment and Plan of Treatment:     Diagnosis: Hyponatremia  Assessment and Plan of Treatment:     Diagnosis: Respiratory failure  Assessment and Plan of Treatment:      PRINCIPAL DISCHARGE DIAGNOSIS  Diagnosis: Sepsis  Assessment and Plan of Treatment: On admission to the hospital, you were found to have sepsis, which is an infection causing your vital signs to be unstable. This was likely from your pneumonia. You were treated with antibiotics and your sepsis resolved. Please follow up with Dr. Lora.      SECONDARY DISCHARGE DIAGNOSES  Diagnosis: Multifocal pneumonia  Assessment and Plan of Treatment: You were diagnosed with pneumonia, or an infection of the lungs during your admission to [facility]. This was noted based on your symptom profile and findings on chest X-ray. You were treated with antibiotics throughout your hospital course. Please follow-up with your primary care physician when you leave the hospital. Should you experience symptoms such as but not limited to: worsening shortness of breath, wheezing, severe cough, coughing bloody sputum, unrelenting/high fever (>103 F or lasting >3 days), and chest pain, please return to the emergency department for interval evaluation.      Diagnosis: LATRICIA (acute kidney injury)  Assessment and Plan of Treatment: During this hospital admission you were found to have acute kidney injury. Acute kidney injury (LATRICIA) is a sudden episode of kidney failure or kidney damage that happens within a few hours or a few days. LATRICIA causes a build-up of waste products in your blood and makes it hard for your kidneys to keep the right balance of fluid in your body. Your kidney numbers have been improving. Please follow up with your primary care doctor for further evaluation and testing.      Diagnosis: MGUS (monoclonal gammopathy of unknown significance)  Assessment and Plan of Treatment: You have a history of MGUS (monoclonal gammopathy of unknown significance). This is a condtion in which an abnormal protein (M protein) is found in your blood. You should follow up with your oncologist within the next two weeks and keep taking your home medications as prescribed.    Diagnosis: DM (diabetes mellitus)  Assessment and Plan of Treatment: You have a known history of diabetes mellitus prior to your admission. This condition results from blood sugar levels getting too high because your body is more resistant to insulin. Uncontrolled blood sugar levels can lead to kidney and heart damage, pain/numbness/paralysis in your hands and feet, and increased rates of infections.  To manage this you are on a medication called humalog and lantus. It is important that you continue to take this medication when you are discharged so that you can continue to control your blood sugar levels. Additionally be sure to follow up with your primary care physician, podiatrist, and ophthalmologist on a regular basis.      Diagnosis: Lymphoma  Assessment and Plan of Treatment: You have a condition called Lymphoma which is a cancer of the lymph nodes and blood cells. You should follow up with your oncologist and keep taking your home medications as prescribed.    Diagnosis: HTN (hypertension)  Assessment and Plan of Treatment: Hypertension is the medical term for high blood pressure. Blood pressure refers to the pressure that blood applies to the inner walls of the arteries. Arteries carry blood from the heart to other organs and parts of the body. Untreated high blood pressure increases the strain on the heart and arteries, eventually causing organ damage. High blood pressure increases the risk of heart failure, heart attack (myocardial infarction), stroke, and kidney failure. High blood pressure does not usually cause any symptoms. Treatment of hypertension usually begins with lifestyle changes. Making these lifestyle changes involves little or no risk. Recommended changes often include reducing the amount of salt in your diet, losing weight if you are overweight or obese, avoiding drinking too much alcohol, stopping smoking and exercising at least 30 minutes per day most days of the week. If you are prescribed medication for your hypertension it is important to take these as prescribed to prevent the possible complications of uncontrolled hypertension.      Diagnosis: Paroxysmal atrial fibrillation  Assessment and Plan of Treatment: You have a history of atrial fibrillation. This is an irregular, often rapid heart rate that commonly causes poor blood flow. The heart's upper chambers (atria) beat out of coordination with the lower chambers (ventricles). This condition may have no symptoms, but when symptoms do appear they include palpitations, shortness of breath, and fatigue. Treatments include drugs, electrical shock (cardioversion), and minimally invasive surgery (ablation). Continue to take your blood thinner and rate controlling medications as directed. Please continue to take your medication eliquis as prescribed and follow up with your primary care doctor and cardiologist in 10-14 days.      Diagnosis: BPH (benign prostatic hyperplasia)  Assessment and Plan of Treatment: Benign prostatic hyperplasia, also called "BPH," is a common problem. any men with BPH have no symptoms at all. When symptoms do occur, they can include needing to urinate often, especially at night, having trouble starting to urinate (this means that you might have to wait or strain before urine will come out), having a weak urine stream, leaking or dribbling urine, feeling as though your bladder is not empty even after you urinate. In rare cases, BPH makes it so a man cannot urinate at all. This is a serious problem. If you cannot urinate at all, call your doctor right away.      Diagnosis: Renal transplant recipient  Assessment and Plan of Treatment: You have a kidney transplant. You should continue taking your tacrolimus as prescribed and follow up with your nephrologist.    Diagnosis: Respiratory failure  Assessment and Plan of Treatment: You were found to be in respiratory failure during your admission. This means your lungs are not exchanging oxygen as well as they should be. You will be discharged with home oxygen. Please follow up with your primary care doctor and return to the emergency room if you feel very short of breath or lightheaded.

## 2021-07-13 NOTE — PROGRESS NOTE ADULT - TIME BILLING
Antibiotic management of pneumonia; diagnostic evaluation for PTLD
Fever evaluation and pneumonia evaluation in kidney transplant recipient
management of PNA
Diagnostic evaluation for pneumonia and lymphoproliferative disorder in kidney transplant recipient
Diagnostic evaluation of fever, pneumonia, lymphadenopathy, in kidney transplant recipient
Patient seen and examined with house-staff during bedside rounds.  Resident note read, including vitals, physical findings, laboratory data, and radiological reports.   Revisions included below.  Direct personal management at bed side and extensive interpretation of the data.  Plan was outlined and discussed in details with the housestaff.  Decision making of high complexity  Action taken for acute disease activity to reflect the level of care provided:  - medication reconciliation  - review laboratory data

## 2021-07-13 NOTE — PROGRESS NOTE ADULT - PROBLEM SELECTOR PLAN 9
- c/w tamsulosin 0.4mg at home.

## 2021-07-13 NOTE — PROGRESS NOTE ADULT - NSICDXPILOT_GEN_ALL_CORE
[place]

## 2021-07-13 NOTE — PROGRESS NOTE ADULT - REASON FOR ADMISSION
sepsis

## 2021-07-13 NOTE — PROGRESS NOTE ADULT - ASSESSMENT
70M with PMHx significant for renal transplant on tacrolimus, ) DM2, HTN, MGUS, pAF, BPH, HLD, CAD (s/p CABG 15 years ago), presents for 3-4 days of worsening cough nephrology consulted for LATRICIA on CKD     #Renal Transplant (2017)  patient formerly on cellcept, valagancyclovir and tacrolimus, now solely on tacrolimus 2mg in AM and 1 mg PM (confirmed with [facility] transplant physician)  -last tacro level noted to be 3.6  repeat level in tomorrow AM (30 mins before AM dose); goal 3-4  - will continue with 1mg q12 given concern for PTLD     #LATRICIA on CKD  likely etiology ischemic ATN 2/ 2 hemodynamic instability   resolved  Cr down to 0.95 this AM f  -strict i's and o's l       #Hyponatremia  133 this AM  send urine Na urine Osm      #Anemia  Hgb 9.3, ~at goal   -no signs of active bleed  -iron panel noted      #Lymphadenopathy  patient has history of post-transplant lymphoproliferative disorder, MUGUS and CLL  -found to have extensive lymphadenopathy seen on CT scan ? lymphoma/neoplastic disease   -heme-onc on board; patient underwent LN FNA results pending      70M with PMHx significant for renal transplant on tacrolimus, ) DM2, HTN, MGUS, pAF, BPH, HLD, CAD (s/p CABG 15 years ago), presents for 3-4 days of worsening cough nephrology consulted for LATRICIA on CKD     #Renal Transplant (2017)   on tacrolimus 2mg in AM and 1 mg PM (confirmed with [facility] transplant physician)  -last tacro level noted to be 3.6  repeat level in tomorrow AM (30 mins before AM dose); goal 3-4  - will continue with 1mg q12 given concern for PTLD       #LATRICIA on CKD  likely etiology ischemic ATN 2/ 2 hemodynamic instability   resolved  Cr down to 0.95 this AM f  -strict i's and o's l       #Hyponatremia  133 this AM  send urine Na urine Osm      #Anemia  Hgb 9.3, ~at goal   -no signs of active bleed  -iron panel noted      #Lymphadenopathy  patient has history of post-transplant lymphoproliferative disorder, MUGUS and CLL  -found to have extensive lymphadenopathy seen on CT scan ? lymphoma/neoplastic disease   -heme-onc on board; patient underwent LN FNA results pending      70M with PMHx significant for renal transplant on tacrolimus, ) DM2, HTN, MGUS, pAF, BPH, HLD, CAD (s/p CABG 15 years ago), presents for 3-4 days of worsening cough nephrology consulted for LATRICIA on CKD     #Renal Transplant (2017)   on tacrolimus 2mg in AM and 1 mg PM (confirmed with [facility] transplant physician)  -last tacro level noted to be 3.6  repeat level in tomorrow AM (30 mins before AM dose); goal 3-4  - will continue with 1mg q12 given concern for PTLD   -confirmed both with patient and pharmacy that patient takes valgancyclovir 450mg daily       #LATRICIA on CKD  likely etiology ischemic ATN 2/ 2 hemodynamic instability   resolved  Cr down to 0.95 this AM f  -strict i's and o's l       #Hyponatremia  133 this AM  send urine Na urine Osm      #Anemia  Hgb 9.3, ~at goal   -no signs of active bleed  -iron panel noted      #Lymphadenopathy  patient has history of post-transplant lymphoproliferative disorder, MUGUS and CLL  -found to have extensive lymphadenopathy seen on CT scan ? lymphoma/neoplastic disease   -heme-onc on board; patient underwent LN FNA results pending

## 2021-07-13 NOTE — DISCHARGE NOTE PROVIDER - CARE PROVIDER_API CALL
Marie Lora)  Critical Care Medicine; Pulmonary Disease  [address] [place] [address]  Phone: [phone]  Fax: [phone]  Established Patient  Follow Up Time:

## 2021-07-13 NOTE — PROGRESS NOTE ADULT - PROBLEM SELECTOR PLAN 5
Heme-onc consulted with collateral from [facility] with CLL and adult T cell lymphoma. Flow cytometry 07/08/21: B-cell lymphoma and are suggestive of chronic lymphocytic leukemia/small lymphocytic lymphoma  -SPEP with elevated Ig Kappa and Lambda   -EBV positive  PLAN:  - Deferring CT A/P with IV Contrast for evaluation of abdominopelvic lymphadenopathy and splenomegaly to outpatient (pt follows with [facility])  - Can hold off on whole-body PET-CT to be done as outpatient when infection is cleared.   - Follow-up serum immunofixation, SPEP. K/L 0.40. Quantitative IgM slightly low.   - Maintain active T+S, transfuse PRBC if Hb < 7 or if actively bleeding/symptomatic or if platelets < 10K or < 20K if febrile or < 50 K if actively bleeding  - Can follow-up with Dr. Lydia Huffman at [facility] upon discharge in 1-2 weeks (Office: [phone]).

## 2021-07-13 NOTE — PROGRESS NOTE ADULT - SUBJECTIVE AND OBJECTIVE BOX
INTERVAL EVENTS: Anxious and trouble sleeping overnight as roommate passed away, given ativan .25mg x2    SUBJECTIVE / INTERVAL HPI: Patient seen and examined at bedside. Very drowsy and un-arousable as did not sleep much overnight and was given 0.5mg of ativan. Vitals wnl. Feeling well. Walked with PT, while walking de-satted to 86% off O2 however not SOB. When returned to bed, O2 sat increased to 97% off O2. Denies fever, chills, headache, chest pain, SOB, abd pain, nausea, vomiting, diarrhea, extremity pain or swelling.     ROS: negative unless otherwise stated above.    VITAL SIGNS:  Vital Signs Last 24 Hrs  T(C): 36.5 (12 Jul 2021 06:31), Max: 37.2 (11 Jul 2021 21:45)  T(F): 97.7 (12 Jul 2021 06:31), Max: 98.9 (11 Jul 2021 21:45)  HR: 70 (12 Jul 2021 04:16) (64 - 74)  BP: 129/60 (12 Jul 2021 04:16) (98/51 - 179/72)  BP(mean): 87 (12 Jul 2021 04:16) (72 - 112)  RR: 24 (12 Jul 2021 04:16) (24 - 32)  SpO2: 94% (12 Jul 2021 04:16) (92% - 97%)      07-11-21 @ 07:01  -  07-12-21 @ 07:00  --------------------------------------------------------  IN: 100 mL / OUT: 2671 mL / NET: -2571 mL        PHYSICAL EXAM:    General: NAD, sitting comfortably in chair with 2L NC. Sleeping, unable to be aroused.   HEENT: Normocephalic atraumatic, MMM, conjunctiva/sclera clear, no rhinorrhea  Neck: supple  Cardiovascular: +S1/S2; RRR  Respiratory: CTA b/l, no W/R/R  Gastrointestinal: obese abdomen, soft, NT/ND. BS present x4.   Extremities: WWP; no edema, clubbing or cyanosis. no TTP  Vascular: 2+ radial, DP/PT pulses B/L  Neurological: AAOx3; no focal deficits    MEDICATIONS:  MEDICATIONS  (STANDING):  chlorhexidine 2% Cloths 1 Application(s) Topical <User Schedule>  dextrose 40% Gel 15 Gram(s) Oral once  dextrose 5%. 1000 milliLiter(s) (50 mL/Hr) IV Continuous <Continuous>  dextrose 5%. 1000 milliLiter(s) (100 mL/Hr) IV Continuous <Continuous>  dextrose 50% Injectable 25 Gram(s) IV Push once  dextrose 50% Injectable 12.5 Gram(s) IV Push once  dextrose 50% Injectable 25 Gram(s) IV Push once  famotidine   Suspension 20 milliGRAM(s) Oral daily  furosemide    Tablet 20 milliGRAM(s) Oral daily  glucagon  Injectable 1 milliGRAM(s) IntraMuscular once  insulin glargine Injectable (LANTUS) 25 Unit(s) SubCutaneous every morning  insulin regular  human corrective regimen sliding scale   SubCutaneous every 6 hours  isosorbide   mononitrate ER Tablet (IMDUR) 30 milliGRAM(s) Oral daily  melatonin 5 milliGRAM(s) Oral at bedtime  polyethylene glycol 3350 17 Gram(s) Oral two times a day  senna 2 Tablet(s) Oral at bedtime  tacrolimus 1 milliGRAM(s) Oral every 12 hours  tamsulosin 0.4 milliGRAM(s) Oral at bedtime    MEDICATIONS  (PRN):  acetaminophen    Suspension .. 650 milliGRAM(s) Oral every 6 hours PRN Temp greater or equal to 38C (100.4F), Mild Pain (1 - 3)  loperamide 2 milliGRAM(s) Oral every 3 hours PRN Diarrhea  zaleplon 10 milliGRAM(s) Oral at bedtime PRN Insomnia      ALLERGIES:  Allergies    No Known Allergies    Intolerances        LABS:                        9.2    14.36 )-----------( 139      ( 12 Jul 2021 06:19 )             30.9     07-12    137  |  101  |  15  ----------------------------<  105<H>  4.8   |  24  |  0.99    Ca    8.7      12 Jul 2021 06:18  Phos  3.4     07-12  Mg     1.9     07-12    TPro  6.3  /  Alb  3.1<L>  /  TBili  0.6  /  DBili  x   /  AST  50<H>  /  ALT  37  /  AlkPhos  90  07-12    PT/INR - ( 11 Jul 2021 06:04 )   PT: 15.8 sec;   INR: 1.33          PTT - ( 11 Jul 2021 06:04 )  PTT:33.1 sec    CAPILLARY BLOOD GLUCOSE      POCT Blood Glucose.: 106 mg/dL (12 Jul 2021 06:36)      RADIOLOGY & ADDITIONAL TESTS: Reviewed. INTERVAL EVENTS: Anxious and trouble sleeping overnight as roommate passed away, given ativan .25mg x2    SUBJECTIVE / INTERVAL HPI: Patient seen and examined at bedside. Very drowsy and un-arousable as did not sleep much overnight and was given 0.5mg of ativan. Vitals wnl. Spoke to him again later, more alert, feeling well and would like to go home. No complaints today. Walked with PT, while walking de-satted to 86% off O2 however not SOB. When returned to bed, O2 sat increased to 97% off O2. Denies fever, chills, headache, chest pain, SOB, abd pain, nausea, vomiting, diarrhea, extremity pain or swelling.     ROS: negative unless otherwise stated above.    VITAL SIGNS:  Vital Signs Last 24 Hrs  T(C): 36.5 (12 Jul 2021 06:31), Max: 37.2 (11 Jul 2021 21:45)  T(F): 97.7 (12 Jul 2021 06:31), Max: 98.9 (11 Jul 2021 21:45)  HR: 70 (12 Jul 2021 04:16) (64 - 74)  BP: 129/60 (12 Jul 2021 04:16) (98/51 - 179/72)  BP(mean): 87 (12 Jul 2021 04:16) (72 - 112)  RR: 24 (12 Jul 2021 04:16) (24 - 32)  SpO2: 94% (12 Jul 2021 04:16) (92% - 97%)      07-11-21 @ 07:01  -  07-12-21 @ 07:00  --------------------------------------------------------  IN: 100 mL / OUT: 2671 mL / NET: -2571 mL        PHYSICAL EXAM:    General: NAD, sitting comfortably in chair with 1L NC. Sleeping, unable to be aroused.   HEENT: Normocephalic atraumatic, MMM, conjunctiva/sclera clear, no rhinorrhea  Neck: supple  Cardiovascular: +S1/S2; RRR  Respiratory: CTA b/l, no W/R/R  Gastrointestinal: obese abdomen, soft, NT/ND. BS present x4.   Extremities: WWP; no edema, clubbing or cyanosis. no TTP  Vascular: 2+ radial, DP/PT pulses B/L  Neurological: AAOx3; no focal deficits    MEDICATIONS:  MEDICATIONS  (STANDING):  chlorhexidine 2% Cloths 1 Application(s) Topical <User Schedule>  dextrose 40% Gel 15 Gram(s) Oral once  dextrose 5%. 1000 milliLiter(s) (50 mL/Hr) IV Continuous <Continuous>  dextrose 5%. 1000 milliLiter(s) (100 mL/Hr) IV Continuous <Continuous>  dextrose 50% Injectable 25 Gram(s) IV Push once  dextrose 50% Injectable 12.5 Gram(s) IV Push once  dextrose 50% Injectable 25 Gram(s) IV Push once  famotidine   Suspension 20 milliGRAM(s) Oral daily  furosemide    Tablet 20 milliGRAM(s) Oral daily  glucagon  Injectable 1 milliGRAM(s) IntraMuscular once  insulin glargine Injectable (LANTUS) 25 Unit(s) SubCutaneous every morning  insulin regular  human corrective regimen sliding scale   SubCutaneous every 6 hours  isosorbide   mononitrate ER Tablet (IMDUR) 30 milliGRAM(s) Oral daily  melatonin 5 milliGRAM(s) Oral at bedtime  polyethylene glycol 3350 17 Gram(s) Oral two times a day  senna 2 Tablet(s) Oral at bedtime  tacrolimus 1 milliGRAM(s) Oral every 12 hours  tamsulosin 0.4 milliGRAM(s) Oral at bedtime    MEDICATIONS  (PRN):  acetaminophen    Suspension .. 650 milliGRAM(s) Oral every 6 hours PRN Temp greater or equal to 38C (100.4F), Mild Pain (1 - 3)  loperamide 2 milliGRAM(s) Oral every 3 hours PRN Diarrhea  zaleplon 10 milliGRAM(s) Oral at bedtime PRN Insomnia      ALLERGIES:  Allergies    No Known Allergies    Intolerances        LABS:                        9.2    14.36 )-----------( 139      ( 12 Jul 2021 06:19 )             30.9     07-12    137  |  101  |  15  ----------------------------<  105<H>  4.8   |  24  |  0.99    Ca    8.7      12 Jul 2021 06:18  Phos  3.4     07-12  Mg     1.9     07-12    TPro  6.3  /  Alb  3.1<L>  /  TBili  0.6  /  DBili  x   /  AST  50<H>  /  ALT  37  /  AlkPhos  90  07-12    PT/INR - ( 11 Jul 2021 06:04 )   PT: 15.8 sec;   INR: 1.33          PTT - ( 11 Jul 2021 06:04 )  PTT:33.1 sec    CAPILLARY BLOOD GLUCOSE      POCT Blood Glucose.: 106 mg/dL (12 Jul 2021 06:36)      RADIOLOGY & ADDITIONAL TESTS: Reviewed.

## 2021-07-14 ENCOUNTER — APPOINTMENT (OUTPATIENT)
Dept: CARE COORDINATION | Facility: HOME HEALTH | Age: 71
End: 2021-07-14

## 2021-07-14 ENCOUNTER — APPOINTMENT (OUTPATIENT)
Dept: NEPHROLOGY | Facility: CLINIC | Age: 71
End: 2021-07-14

## 2021-07-14 LAB
% ALBUMIN: 46.8 % — SIGNIFICANT CHANGE UP
% ALPHA 1: 10.5 % — SIGNIFICANT CHANGE UP
% ALPHA 2: 17.4 % — SIGNIFICANT CHANGE UP
% BETA: 11.5 % — SIGNIFICANT CHANGE UP
% GAMMA: 13.8 % — SIGNIFICANT CHANGE UP
% M SPIKE: 3.9 % — SIGNIFICANT CHANGE UP
ALBUMIN SERPL ELPH-MCNC: 2.7 G/DL — LOW (ref 3.6–5.5)
ALBUMIN/GLOB SERPL ELPH: 0.9 RATIO — SIGNIFICANT CHANGE UP
ALPHA1 GLOB SERPL ELPH-MCNC: 0.6 G/DL — HIGH (ref 0.1–0.4)
ALPHA2 GLOB SERPL ELPH-MCNC: 1 G/DL — SIGNIFICANT CHANGE UP (ref 0.5–1)
B-GLOBULIN SERPL ELPH-MCNC: 0.7 G/DL — SIGNIFICANT CHANGE UP (ref 0.5–1)
CRYPTOC AB SER-ACNC: NEGATIVE — SIGNIFICANT CHANGE UP
CULTURE RESULTS: SIGNIFICANT CHANGE UP
GAMMA GLOBULIN: 0.8 G/DL — SIGNIFICANT CHANGE UP (ref 0.6–1.6)
INTERPRETATION SERPL IFE-IMP: SIGNIFICANT CHANGE UP
M-SPIKE: 0.2 G/DL — HIGH (ref 0–0)
PROT PATTERN SERPL ELPH-IMP: SIGNIFICANT CHANGE UP
SPECIMEN SOURCE: SIGNIFICANT CHANGE UP

## 2021-07-16 LAB
CULTURE RESULTS: SIGNIFICANT CHANGE UP
SPECIMEN SOURCE: SIGNIFICANT CHANGE UP

## 2021-07-19 LAB — VIRUS SPEC CULT: SIGNIFICANT CHANGE UP

## 2021-07-20 DIAGNOSIS — D69.6 THROMBOCYTOPENIA, UNSPECIFIED: ICD-10-CM

## 2021-07-20 DIAGNOSIS — Z79.4 LONG TERM (CURRENT) USE OF INSULIN: ICD-10-CM

## 2021-07-20 DIAGNOSIS — E11.22 TYPE 2 DIABETES MELLITUS WITH DIABETIC CHRONIC KIDNEY DISEASE: ICD-10-CM

## 2021-07-20 DIAGNOSIS — E78.5 HYPERLIPIDEMIA, UNSPECIFIED: ICD-10-CM

## 2021-07-20 DIAGNOSIS — N40.0 BENIGN PROSTATIC HYPERPLASIA WITHOUT LOWER URINARY TRACT SYMPTOMS: ICD-10-CM

## 2021-07-20 DIAGNOSIS — N17.0 ACUTE KIDNEY FAILURE WITH TUBULAR NECROSIS: ICD-10-CM

## 2021-07-20 DIAGNOSIS — E87.1 HYPO-OSMOLALITY AND HYPONATREMIA: ICD-10-CM

## 2021-07-20 DIAGNOSIS — Z94.0 KIDNEY TRANSPLANT STATUS: ICD-10-CM

## 2021-07-20 DIAGNOSIS — K21.9 GASTRO-ESOPHAGEAL REFLUX DISEASE WITHOUT ESOPHAGITIS: ICD-10-CM

## 2021-07-20 DIAGNOSIS — J18.9 PNEUMONIA, UNSPECIFIED ORGANISM: ICD-10-CM

## 2021-07-20 DIAGNOSIS — R04.2 HEMOPTYSIS: ICD-10-CM

## 2021-07-20 DIAGNOSIS — A41.9 SEPSIS, UNSPECIFIED ORGANISM: ICD-10-CM

## 2021-07-20 DIAGNOSIS — I12.9 HYPERTENSIVE CHRONIC KIDNEY DISEASE WITH STAGE 1 THROUGH STAGE 4 CHRONIC KIDNEY DISEASE, OR UNSPECIFIED CHRONIC KIDNEY DISEASE: ICD-10-CM

## 2021-07-20 DIAGNOSIS — D50.9 IRON DEFICIENCY ANEMIA, UNSPECIFIED: ICD-10-CM

## 2021-07-20 DIAGNOSIS — N18.9 CHRONIC KIDNEY DISEASE, UNSPECIFIED: ICD-10-CM

## 2021-07-20 DIAGNOSIS — Z78.1 PHYSICAL RESTRAINT STATUS: ICD-10-CM

## 2021-07-20 DIAGNOSIS — B27.90 INFECTIOUS MONONUCLEOSIS, UNSPECIFIED WITHOUT COMPLICATION: ICD-10-CM

## 2021-07-20 DIAGNOSIS — C91.10 CHRONIC LYMPHOCYTIC LEUKEMIA OF B-CELL TYPE NOT HAVING ACHIEVED REMISSION: ICD-10-CM

## 2021-07-20 DIAGNOSIS — Z95.1 PRESENCE OF AORTOCORONARY BYPASS GRAFT: ICD-10-CM

## 2021-07-20 DIAGNOSIS — R65.21 SEVERE SEPSIS WITH SEPTIC SHOCK: ICD-10-CM

## 2021-07-20 DIAGNOSIS — D47.2 MONOCLONAL GAMMOPATHY: ICD-10-CM

## 2021-07-20 DIAGNOSIS — Z86.16 PERSONAL HISTORY OF COVID-19: ICD-10-CM

## 2021-07-20 DIAGNOSIS — R21 RASH AND OTHER NONSPECIFIC SKIN ERUPTION: ICD-10-CM

## 2021-07-20 DIAGNOSIS — B95.8 UNSPECIFIED STAPHYLOCOCCUS AS THE CAUSE OF DISEASES CLASSIFIED ELSEWHERE: ICD-10-CM

## 2021-07-20 DIAGNOSIS — Z20.822 CONTACT WITH AND (SUSPECTED) EXPOSURE TO COVID-19: ICD-10-CM

## 2021-07-20 DIAGNOSIS — I48.0 PAROXYSMAL ATRIAL FIBRILLATION: ICD-10-CM

## 2021-07-20 DIAGNOSIS — I25.10 ATHEROSCLEROTIC HEART DISEASE OF NATIVE CORONARY ARTERY WITHOUT ANGINA PECTORIS: ICD-10-CM

## 2021-07-20 DIAGNOSIS — R19.7 DIARRHEA, UNSPECIFIED: ICD-10-CM

## 2021-07-20 DIAGNOSIS — D63.8 ANEMIA IN OTHER CHRONIC DISEASES CLASSIFIED ELSEWHERE: ICD-10-CM

## 2021-07-20 DIAGNOSIS — Z79.01 LONG TERM (CURRENT) USE OF ANTICOAGULANTS: ICD-10-CM

## 2021-07-20 DIAGNOSIS — J96.01 ACUTE RESPIRATORY FAILURE WITH HYPOXIA: ICD-10-CM

## 2021-07-20 NOTE — DIETITIAN INITIAL EVALUATION ADULT. - HEIGHT FOR BMI (INCHES)
Discharge Planner Post-Acute Rehab PT:      Discharge Plan: Home with 3 JOHNS[place] with bilat handrail. Home care vs OP PT pending progress.      Precautions: falls, cervical     Current Status:  Bed Mobility: SBA supine<>sit  Transfer: SBA squat pivot transfer. Variable CGA<>min A for STS from EOB, WC  Gait: up to 10 ft with FWW, CGA, WC follow. Significantly decreased gait speed and stride length, with forward flexed posture  Stairs: NT  Balance: good sitting balance, needs heavy UE support in standing     Assessment:  Despite continued LE strength, sensation, balance impairments, pt continues to remain motivated, and able to progress to stand pivot transfer with FWW and min A (for STS), as well as ambulation 2x10 ft with FWW, CGA, WC follow. Plan to continue to progress standing tolerance, balance, gait as tolerated.     Other Barriers to Discharge (DME, Family Training, etc): JOH[place] home, family training, potential mixed mobility FWW vs WC   6

## 2021-07-21 LAB
CULTURE RESULTS: SIGNIFICANT CHANGE UP
SPECIMEN SOURCE: SIGNIFICANT CHANGE UP

## 2021-07-26 NOTE — PLAN
[FreeTextEntry1] : CV and pulmonary status stable\par Adhere to all medications i\par Increase activity as tolerated and maintain optimal activity levels. \par Continue coughing and deep breathing exercises \par Receive routine pneumococcal and influenza vaccinations.\par Maintain proper nutrition and adequate hydration.\par Notify NP for worsening symptoms including fever, chills, SOB, CP, increased cough and secretions.\par Follow up with MD within 7 days of discharge.\par Instructed the patient to call TCM Team or CCC with any questions or concerns.\par \par \par

## 2021-07-26 NOTE — ASSESSMENT
[FreeTextEntry1] : 70 year old Male with a past medical history of renal transplant (on tacrolimus, unk date), complicated by low grade lymphoma post transplant, DM2, HTN, MGUS, pAF, BPH, HLD, CAD (s/p CABG 15 years ago), presents with fever, chills and worsening cough for 5 days. Patient states that he has a productive cough with brownish colored sputum. Found to have acute hypoxic respiratory failure and sepsis 2/2 likely multifocal pneumonia in the setting of known CLL/B cell lymphoma".  The patient was discharged in stable condition with follow up care.\par \par CN was not able to connect with the patient via TeleHealth due to technology challenges.  Telephonic follow up was conducted.  The patient denies chest pain, shortness of breath, palpitations, abdominal pain, nausea, vomiting, fever or chills.  The patient is able to speak in complete sentences and with no audible wheeze.\par \par PNA\par DM\par HTN\par \par

## 2021-07-27 ENCOUNTER — APPOINTMENT (OUTPATIENT)
Dept: CT IMAGING | Facility: HOSPITAL | Age: 71
End: 2021-07-27
Payer: MEDICARE

## 2021-07-27 ENCOUNTER — OUTPATIENT (OUTPATIENT)
Dept: OUTPATIENT SERVICES | Facility: HOSPITAL | Age: 71
LOS: 1 days | End: 2021-07-27
Payer: MEDICARE

## 2021-07-27 DIAGNOSIS — Z95.1 PRESENCE OF AORTOCORONARY BYPASS GRAFT: Chronic | ICD-10-CM

## 2021-07-27 DIAGNOSIS — M27.2 INFLAMMATORY CONDITIONS OF JAWS: Chronic | ICD-10-CM

## 2021-07-27 DIAGNOSIS — Z98.890 OTHER SPECIFIED POSTPROCEDURAL STATES: Chronic | ICD-10-CM

## 2021-07-27 PROCEDURE — 70486 CT MAXILLOFACIAL W/O DYE: CPT | Mod: MH

## 2021-07-27 PROCEDURE — 70486 CT MAXILLOFACIAL W/O DYE: CPT | Mod: 26,MH

## 2021-08-04 ENCOUNTER — APPOINTMENT (OUTPATIENT)
Dept: PULMONOLOGY | Facility: CLINIC | Age: 71
End: 2021-08-04
Payer: MEDICARE

## 2021-08-04 VITALS
WEIGHT: 214 LBS | TEMPERATURE: 97.6 F | DIASTOLIC BLOOD PRESSURE: 78 MMHG | RESPIRATION RATE: 12 BRPM | BODY MASS INDEX: 34.39 KG/M2 | HEART RATE: 84 BPM | OXYGEN SATURATION: 97 % | HEIGHT: 66 IN | SYSTOLIC BLOOD PRESSURE: 144 MMHG

## 2021-08-04 DIAGNOSIS — J15.9 UNSPECIFIED BACTERIAL PNEUMONIA: ICD-10-CM

## 2021-08-04 PROCEDURE — 99215 OFFICE O/P EST HI 40 MIN: CPT | Mod: 25

## 2021-08-04 PROCEDURE — 71046 X-RAY EXAM CHEST 2 VIEWS: CPT

## 2021-08-04 NOTE — ASSESSMENT
Department of Anesthesiology  Postprocedure Note    Patient: Danisha Pacheco  MRN: [id number]  YOB: 1964  Date of evaluation: 8/30/2019  Time:  2:20 PM     Procedure Summary     Date:  08/30/19 Room / Location:  [facility] OR  / [facility] OR    Anesthesia Start:  1243 Anesthesia Stop:      Procedure:  OPEN REDUCTION INTERNAL FIXATION LEFT DISTAL RADIUS FRACTURE REPAIR (Left Arm Lower) Diagnosis:  (P12.613J)    Surgeon:  Samuel Forrester MD Responsible Provider: ANNIE Hernandez CRNA    Anesthesia Type:  general ASA Status:  1          Anesthesia Type: general    Thor Phase I:      Thor Phase II:      Last vitals: Reviewed and per EMR flowsheets.        Anesthesia Post Evaluation    Patient location during evaluation: PACU  Patient participation: waiting for patient participation  Level of consciousness: responsive to painful stimuli  Airway patency: patent  Nausea & Vomiting: no nausea  Complications: no  Cardiovascular status: blood pressure returned to baseline  Respiratory status: spontaneous ventilation, face mask and acceptable  Hydration status: euvolemic [FreeTextEntry1] : Bacterial pneumonia\par \par The patient was admitted with multilobar pneumonia.  The patient was on antibiotic and he responded clinically.  Patient had another course of antibiotic by his infectious disease due to elevation in white count.  I repeated the chest x-ray and there is resolution of the right pleural effusion and decrease in infiltrate on right lower lobe.  I reviewed the previous chest x-rays and there is dramatic improvement in the right lower lobe infiltrate and educated the patient that it takes about 8 weeks an elderly person to resolve the pneumonia completely radiologically.  At this time the baseline oxygen saturation is normal.  He is clinically stable.  He monitor oxygen saturation at home.  Is increase of activity as tolerated.  I will repeat the CT scan in September to confirm the resolution of the infiltrate.\par \par Pulmonary hypertension\par \par Most likely related to obstructive sleep apnea and his coronary artery disease\par \par Obstructive sleep apnea\par  I informed the patient that he has to be compliant with the CPAP mask and may be for better compliance can change to a nasal pillow instead of the full facemask.\par \par I advised the patient about Covid precautions and wearing the mask and a 6 feet distance

## 2021-08-04 NOTE — PROCEDURE
[FreeTextEntry1] : Chest x-ray PA and lateral good effort resolution of the pleural effusion and decrease in the right lower lobe infiltrate.

## 2021-08-04 NOTE — PHYSICAL EXAM
Ariela Miranda    Dear Dr. Warner Grey    Thank you for requesting ultrasound evaluation and maternal fetal medicine consultation on your patient Ana Maria Rose.   As you are aware she is a 33year old female  with a si pregnancy is associated with numerous maternal and  risks.   It is not clear whether obesity is a direct cause of adverse pregnancy outcome or whether the association between obesity and adverse pregnancy outcome is due to factors such as diabetes rather than an intrinsic predisposition to spontaneous  birth. Prevention of  birth in these patients, therefore, should be directed toward prevention or management of medical and obstetrical complications.                Both prepregnancy obe [No Acute Distress] : no acute distress [Normal Oropharynx] : normal oropharynx [Normal Appearance] : normal appearance [No Neck Mass] : no neck mass [Normal Rate/Rhythm] : normal rate/rhythm [Normal S1, S2] : normal s1, s2 [No Murmurs] : no murmurs [No Resp Distress] : no resp distress [Clear to Auscultation Bilaterally] : clear to auscultation bilaterally [No Abnormalities] : no abnormalities [Benign] : benign [Normal Gait] : normal gait [No Clubbing] : no clubbing [No Cyanosis] : no cyanosis [No Edema] : no edema [FROM] : FROM [Normal Color/ Pigmentation] : normal color/ pigmentation [No Focal Deficits] : no focal deficits [Oriented x3] : oriented x3 [Normal Affect] : normal affect

## 2021-08-04 NOTE — HISTORY OF PRESENT ILLNESS
[TextBox_4] : he is doing well.  His labs are all normal.  He got a pulse oximeter.  He is still weak.  No fever, chest pain, nor swelling of leg.  Appetitie.  Sleeps well.  he lost 10 pounds and trying to lose more

## 2021-08-13 LAB
CULTURE RESULTS: SIGNIFICANT CHANGE UP
SPECIMEN SOURCE: SIGNIFICANT CHANGE UP

## 2021-08-28 LAB
CULTURE RESULTS: SIGNIFICANT CHANGE UP
SPECIMEN SOURCE: SIGNIFICANT CHANGE UP

## 2021-09-13 ENCOUNTER — APPOINTMENT (OUTPATIENT)
Dept: CT IMAGING | Facility: HOSPITAL | Age: 71
End: 2021-09-13
Payer: MEDICARE

## 2021-09-13 ENCOUNTER — OUTPATIENT (OUTPATIENT)
Dept: OUTPATIENT SERVICES | Facility: HOSPITAL | Age: 71
LOS: 1 days | End: 2021-09-13
Payer: MEDICARE

## 2021-09-13 DIAGNOSIS — M27.2 INFLAMMATORY CONDITIONS OF JAWS: Chronic | ICD-10-CM

## 2021-09-13 DIAGNOSIS — Z95.1 PRESENCE OF AORTOCORONARY BYPASS GRAFT: Chronic | ICD-10-CM

## 2021-09-13 DIAGNOSIS — Z98.890 OTHER SPECIFIED POSTPROCEDURAL STATES: Chronic | ICD-10-CM

## 2021-09-13 PROCEDURE — 71250 CT THORAX DX C-: CPT

## 2021-09-13 PROCEDURE — 71250 CT THORAX DX C-: CPT | Mod: 26,MH

## 2021-10-05 ENCOUNTER — APPOINTMENT (OUTPATIENT)
Dept: HEART AND VASCULAR | Facility: CLINIC | Age: 71
End: 2021-10-05
Payer: MEDICARE

## 2021-10-05 ENCOUNTER — NON-APPOINTMENT (OUTPATIENT)
Age: 71
End: 2021-10-05

## 2021-10-05 ENCOUNTER — APPOINTMENT (OUTPATIENT)
Dept: PULMONOLOGY | Facility: CLINIC | Age: 71
End: 2021-10-05
Payer: MEDICARE

## 2021-10-05 VITALS
WEIGHT: 224 LBS | TEMPERATURE: 97.8 F | HEART RATE: 82 BPM | BODY MASS INDEX: 36 KG/M2 | HEIGHT: 66 IN | DIASTOLIC BLOOD PRESSURE: 70 MMHG | SYSTOLIC BLOOD PRESSURE: 120 MMHG

## 2021-10-05 DIAGNOSIS — I73.9 PERIPHERAL VASCULAR DISEASE, UNSPECIFIED: ICD-10-CM

## 2021-10-05 PROCEDURE — 99215 OFFICE O/P EST HI 40 MIN: CPT | Mod: 25

## 2021-10-05 PROCEDURE — 93925 LOWER EXTREMITY STUDY: CPT

## 2021-10-05 PROCEDURE — 99443: CPT | Mod: 95

## 2021-10-05 PROCEDURE — 93000 ELECTROCARDIOGRAM COMPLETE: CPT

## 2021-10-05 RX ORDER — METHYLPREDNISOLONE 4 MG/1
4 TABLET ORAL
Qty: 1 | Refills: 0 | Status: DISCONTINUED | COMMUNITY
Start: 2020-12-31 | End: 2021-10-05

## 2021-10-05 NOTE — REASON FOR VISIT
[Home] : at home, [unfilled] , at the time of the visit. [Medical Office: ([facility])___] : at the medical office located in  [Follow-Up] : a follow-up visit [Abnormal CXR/ Chest CT] : an abnormal CXR/ chest CT

## 2021-10-05 NOTE — REVIEW OF SYSTEMS
[Dyspnea on exertion] : dyspnea during exertion [Lower Ext Edema] : lower extremity edema [Heartburn] : heartburn [Dizziness] : dizziness [Negative] : Psychiatric

## 2021-10-05 NOTE — HISTORY OF PRESENT ILLNESS
[FreeTextEntry1] : The patient sleeps poorly and is tired during the day.  He fell asleep and had an injury.  He denies dizziness.  When he walks he has mid abdominal discomfort and dyspnea after 1 block.  This sometimes occurs on the stairs but not always.

## 2021-10-05 NOTE — PHYSICAL EXAM
[Well Developed] : well developed [Well Nourished] : well nourished [No Acute Distress] : no acute distress [Normal Conjunctiva] : normal conjunctiva [Normal Venous Pressure] : normal venous pressure [No Carotid Bruit] : no carotid bruit [Normal S1, S2] : normal S1, S2 [No Murmur] : no murmur [No Rub] : no rub [No Gallop] : no gallop [Clear Lung Fields] : clear lung fields [Good Air Entry] : good air entry [No Respiratory Distress] : no respiratory distress  [Soft] : abdomen soft [Non Tender] : non-tender [No Masses/organomegaly] : no masses/organomegaly [Normal Bowel Sounds] : normal bowel sounds [Normal Gait] : normal gait [No Cyanosis] : no cyanosis [No Clubbing] : no clubbing [No Varicosities] : no varicosities [Edema ___] : edema [unfilled] [Venous stasis] : venous stasis [Moves all extremities] : moves all extremities [No Focal Deficits] : no focal deficits [Normal Speech] : normal speech [Alert and Oriented] : alert and oriented [Normal memory] : normal memory [de-identified] : Hyperpigmentation and erythema

## 2021-10-05 NOTE — HISTORY OF PRESENT ILLNESS
[Never] : never [TextBox_4] : Seen by his cardiologist today and was found to have fluid he was told to increase the diuretics.  He is following on the CAT scan of the chest.  He has no coughing no fever.

## 2021-10-05 NOTE — ASSESSMENT
[FreeTextEntry1] : Obstructive sleep apnea\par \par He is noncompliant with the CPAP and is not tolerating well.\par \par Mediastinal and abdominal adenopathy,\par \par I discussed the CT scan finding with the patient there is resolution of the groundglass opacities but he has some sort of mosaic appearance and also the pleural effusion.  The picture is most likely related to resolution of her CHF picture. The recent echocardiogram did not reveal any evidence of underlying pulmonary hypertension.\par \par I discussed with infectious disease and his oncologist regarding the CT scan finding especially with the mediastinal adenopathy.  The patient has a T-cell leukemia

## 2021-10-05 NOTE — DISCUSSION/SUMMARY
[FreeTextEntry1] : The patient has coronary artery disease and dyspnea on exertion.  EKG done for CAD shows atrial fibrillation, incomplete right bundle branch block and T wave abnormalities.  There are T wave changes.  This may indicate ischemia.  This could have serious sequelae.  The patient will undergo a pharmacologic nuclear stress test.  His recent creatinine was 1, potassium 5.3 and hemoglobin 10.7.  He has a mild response to furosemide.  He will increase it and take 40 mg 3 times a week.  He has had elevated pressures on his CINDY.  The arterial Doppler is normal.  There is no evidence for lower extremity vascular obstruction.

## 2021-10-11 ENCOUNTER — APPOINTMENT (OUTPATIENT)
Age: 71
End: 2021-10-11
Payer: MEDICARE

## 2021-10-11 VITALS
RESPIRATION RATE: 17 BRPM | BODY MASS INDEX: 36 KG/M2 | HEART RATE: 83 BPM | TEMPERATURE: 97.7 F | SYSTOLIC BLOOD PRESSURE: 132 MMHG | WEIGHT: 224 LBS | HEIGHT: 66 IN | OXYGEN SATURATION: 90 % | DIASTOLIC BLOOD PRESSURE: 60 MMHG

## 2021-10-11 PROCEDURE — 99214 OFFICE O/P EST MOD 30 MIN: CPT

## 2021-10-12 NOTE — ASSESSMENT
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 Unclear if source of symptoms GI in origin as multiple other possible etiologies including ischemia in the setting of known CAD vs fluid overload (as noted on recent CT, Sept 2021). 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 \par -Repeat surveillance EGD in 3-5 years\par -c/w current regimen of Pantoprazole 20 mg daily\par \par #CRC screening\par Last Colonoscopy, March 2021, notable for external hemorrhoids, 2 cm sigmoid polyp (pathology negative), diverticulosis\par -Due for repeat colonoscopy in 3 yrs (2024)\par \par Eliza Thakkar DO\par Gastroenterology Fellow

## 2021-10-12 NOTE — REVIEW OF SYSTEMS
[Negative] : Heme/Lymph [Recent Weight Gain (___ Lbs)] : recent [unfilled] ~Ulb weight gain [Abdominal Pain] : no abdominal pain [Vomiting] : no vomiting [Constipation] : no constipation [Diarrhea] : no diarrhea [Heartburn] : no heartburn [Melena] : no melena

## 2021-10-12 NOTE — PHYSICAL EXAM
[General Appearance - Alert] : alert [General Appearance - In No Acute Distress] : in no acute distress [Sclera] : the sclera and conjunctiva were normal [PERRL With Normal Accommodation] : pupils were equal in size, round, and reactive to light [Extraocular Movements] : extraocular movements were intact [Outer Ear] : the ears and nose were normal in appearance [Oropharynx] : the oropharynx was normal [Neck Appearance] : the appearance of the neck was normal [Jugular Venous Distention Increased] : there was no jugular-venous distention [Neck Cervical Mass (___cm)] : no neck mass was observed [Thyroid Diffuse Enlargement] : the thyroid was not enlarged [Thyroid Nodule] : there were no palpable thyroid nodules [Auscultation Breath Sounds / Voice Sounds] : lungs were clear to auscultation bilaterally [Bowel Sounds] : normal bowel sounds [Abdomen Soft] : soft [Abdomen Tenderness] : non-tender [Abdomen Mass (___ Cm)] : no abdominal mass palpated [No CVA Tenderness] : no ~M costovertebral angle tenderness [No Spinal Tenderness] : no spinal tenderness [Abnormal Walk] : normal gait [Nail Clubbing] : no clubbing  or cyanosis of the fingernails [Musculoskeletal - Swelling] : no joint swelling seen [Motor Tone] : muscle strength and tone were normal [Skin Color & Pigmentation] : normal skin color and pigmentation [Skin Turgor] : normal skin turgor [] : no rash [Deep Tendon Reflexes (DTR)] : deep tendon reflexes were 2+ and symmetric [Sensation] : the sensory exam was normal to light touch and pinprick [No Focal Deficits] : no focal deficits [Oriented To Time, Place, And Person] : oriented to person, place, and time [Impaired Insight] : insight and judgment were intact [Affect] : the affect was normal [FreeTextEntry1] : +LE edema

## 2021-10-12 NOTE — HISTORY OF PRESENT ILLNESS
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 Patient notes a long-standing history of intermittent epigastric discomfort which has been adequately controlled with his daily PPI regimen. Within the last month, he has noted associated shortness of breath, mainly with exertion. Notes that he follows with multiple outpatient specialists including cardiology, pulmonology for these issues. Notes that since his last follow up visit with GI, he has been unable to lose some of the weight he had gained during the COVID pandemic last year and additionally was hospitalized at [facility] for PNA. \par \par Also, he underwent an EGD/C-scope 3/15/21 which was notable for mucosa suggestive of buenrostro's, hiatal hernia (biopsies negative for dysplasia) and external hemorrhoids, 2cm sigmoid polyp and diverticulosis, respectively. Reports that he continues to take his Pantoprazole 20 mg daily. \par \par ROS positive for SORIA. Negative for epigastric pain, fevers, chills, NS, no changes in bowel habits.  Remainder of ROS negative.

## 2021-10-18 ENCOUNTER — APPOINTMENT (OUTPATIENT)
Dept: HEART AND VASCULAR | Facility: CLINIC | Age: 71
End: 2021-10-18
Payer: MEDICARE

## 2021-10-18 VITALS
TEMPERATURE: 97.6 F | BODY MASS INDEX: 36 KG/M2 | OXYGEN SATURATION: 93 % | HEIGHT: 66 IN | WEIGHT: 224 LBS | SYSTOLIC BLOOD PRESSURE: 130 MMHG | DIASTOLIC BLOOD PRESSURE: 70 MMHG | RESPIRATION RATE: 17 BRPM | HEART RATE: 80 BPM

## 2021-10-18 DIAGNOSIS — R94.39 ABNORMAL RESULT OF OTHER CARDIOVASCULAR FUNCTION STUDY: ICD-10-CM

## 2021-10-18 PROCEDURE — 99203 OFFICE O/P NEW LOW 30 MIN: CPT | Mod: 25

## 2021-10-18 PROCEDURE — 78452 HT MUSCLE IMAGE SPECT MULT: CPT

## 2021-10-18 PROCEDURE — 93015 CV STRESS TEST SUPVJ I&R: CPT

## 2021-10-18 PROCEDURE — A9500: CPT

## 2021-10-19 ENCOUNTER — NON-APPOINTMENT (OUTPATIENT)
Age: 71
End: 2021-10-19

## 2021-10-20 ENCOUNTER — NON-APPOINTMENT (OUTPATIENT)
Age: 71
End: 2021-10-20

## 2021-10-27 ENCOUNTER — FORM ENCOUNTER (OUTPATIENT)
Age: 71
End: 2021-10-27

## 2021-10-27 ENCOUNTER — LABORATORY RESULT (OUTPATIENT)
Age: 71
End: 2021-10-27

## 2021-10-27 ENCOUNTER — APPOINTMENT (OUTPATIENT)
Dept: NEPHROLOGY | Facility: CLINIC | Age: 71
End: 2021-10-27
Payer: MEDICARE

## 2021-10-27 VITALS — SYSTOLIC BLOOD PRESSURE: 148 MMHG | DIASTOLIC BLOOD PRESSURE: 78 MMHG

## 2021-10-27 VITALS — OXYGEN SATURATION: 91 % | WEIGHT: 224 LBS | TEMPERATURE: 98.2 F | BODY MASS INDEX: 36.15 KG/M2 | HEART RATE: 88 BPM

## 2021-10-27 VITALS — DIASTOLIC BLOOD PRESSURE: 67 MMHG | SYSTOLIC BLOOD PRESSURE: 140 MMHG

## 2021-10-27 VITALS — HEART RATE: 80 BPM | DIASTOLIC BLOOD PRESSURE: 71 MMHG | SYSTOLIC BLOOD PRESSURE: 143 MMHG

## 2021-10-27 PROCEDURE — 36415 COLL VENOUS BLD VENIPUNCTURE: CPT

## 2021-10-27 PROCEDURE — 99215 OFFICE O/P EST HI 40 MIN: CPT | Mod: 25

## 2021-10-27 NOTE — END OF VISIT
[Time Spent: ___ minutes] : I have spent [unfilled] minutes of time on the encounter. [FreeTextEntry3] : Documented by Jessie Lockwood acting as a scribe for MARITO Coleman on 10/27/2021.\par

## 2021-10-27 NOTE — ASSESSMENT
[FreeTextEntry1] : Plan:\par 1) HTN: BP is elevated in office today. Will therefore increase furosemide to 40mg QD in the interim, with reassessment next week before next evaluation. \par 2) Breathing Issues: Will be referred to [facility] for echocardiogram, CT Chest, and PFTs.\par 3) Transplant medications: patient to continue on Prograf and Valcyte. Pt scheduled to see [facility] transplant team next week.\par 4) H/o atrial flutter:  Continue to f/u with Dr. Encinas in cardiology for rhythm evaluation.\par 5) Hyperglycemia: Patient's most recent HbA1C of 8.8% (reported 6.7%) and glucose of 140 is elevated. Will reassess HbA1C with labs today. I asked pt to include me in the discussion with his endocrinologist at his next diabetes visit.\par 6) Advised patient to receive a COVID vaccine booster dose as soon as possible in view of persistent risks. \par 7) Weight Gain: Pt urged to make appointment with his endocrinologist in the next week to manage his medications and assess weight management and glycemic control.\par \par Changes to medications: Increase furosemide to 40mg QD. WIll have interval assessment next week when he sees transplant team.\par \par Labs were drawn and patient will return in 1 month for a follow-up appointment.

## 2021-10-27 NOTE — PHYSICAL EXAM
[General Appearance - Alert] : alert [General Appearance - In No Acute Distress] : in no acute distress [Sclera] : the sclera and conjunctiva were normal [PERRL With Normal Accommodation] : pupils were equal in size, round, and reactive to light [Extraocular Movements] : extraocular movements were intact [Outer Ear] : the ears and nose were normal in appearance [Hearing Threshold Finger Rub Not Titus] : hearing was normal [Examination Of The Oral Cavity] : the lips and gums were normal [Neck Appearance] : the appearance of the neck was normal [Neck Cervical Mass (___cm)] : no neck mass was observed [Jugular Venous Distention Increased] : there was no jugular-venous distention [Thyroid Diffuse Enlargement] : the thyroid was not enlarged [Thyroid Nodule] : there were no palpable thyroid nodules [Auscultation Breath Sounds / Voice Sounds] : lungs were clear to auscultation bilaterally [Heart Rate And Rhythm] : heart rate was normal and rhythm regular [Heart Sounds] : normal S1 and S2 [Heart Sounds Gallop] : no gallops [Murmurs] : no murmurs [Heart Sounds Pericardial Friction Rub] : no pericardial rub [Bowel Sounds] : normal bowel sounds [Abdomen Soft] : soft [Abdomen Tenderness] : non-tender [Abdomen Mass (___ Cm)] : no abdominal mass palpated [No CVA Tenderness] : no ~M costovertebral angle tenderness [No Spinal Tenderness] : no spinal tenderness [Abnormal Walk] : normal gait [Involuntary Movements] : no involuntary movements were seen [Musculoskeletal - Swelling] : no joint swelling seen [Motor Tone] : muscle strength and tone were normal [Skin Color & Pigmentation] : normal skin color and pigmentation [Skin Turgor] : normal skin turgor [] : no rash [Sensation] : the sensory exam was normal to light touch and pinprick [No Focal Deficits] : no focal deficits [Oriented To Time, Place, And Person] : oriented to person, place, and time [Impaired Insight] : insight and judgment were intact [Affect] : the affect was normal [Memory Recent] : recent memory was not impaired [FreeTextEntry1] : bilateral LE edema

## 2021-10-27 NOTE — ADDENDUM
[FreeTextEntry1] : All medical record entries made by the Scribe were at my, MARITO Coleman's direction and personally dictated by me on 10/27/2021. I have received the chart and agree that the record accurately reflects my personal performance of the history, physical exam, assessment, and plan. I have also personally directed, reviewed, and agreed with the chart.\par

## 2021-10-27 NOTE — HISTORY OF PRESENT ILLNESS
[FreeTextEntry1] : The patient is a 70 year old male presenting today for follow-up evaluation of HTN, CAD, DM, HLD, s/p CABG, AFib, h/o renal transplant, and monoclonal B cell abnormality. Last seen December 2020.[facility] Pt has had f/u with otolaryngology, cardiology, pulmonology, and gastroenterology since last visit. He had a pharmacologic nuclear stress test with Dr. Encinas with results as noted in record, and EKG with results as summarized below. He had a CT Chest with Dr. Lora with results as summarized below. He will reportedly be f/u with his endocrinologist in January, but he needs to be seen sooner. [facility] Pt was admitted to [facility] 07/05/21 presenting with fever, chills and worsening cough for 5 days. Patient stated that he had a productive cough with brownish colored sputum. Found to have acute hypoxic respiratory failure and sepsis 2/2 likely multifocal pneumonia in the setting of known CLL/B cell lymphoma. [facility] Hospital treatment course was as follows:\[facility] On arrival, septic from pneumonia treated with Vanc/Zosyn, then changed to linezolid, azithromycin, and zosyn as per ID. Intubated on 7/6 for work of breathing and planned bronchoscopy. Bronchoscopy (on 7/7) showed copious purulent secretions sent for BAL. Hilar lymphadenopathy was seen on imaging. Antibiotics were adjusted as per ID to zosyn alone for pseudomonal coverage. BCx have NGTD. Due to lymphadenopathy on CT scan being concerning for lymphoma, heme-onc consulted and recommended FNA/CNA of L supraclavicular node which was performed 7/8. After completion of bx, collateral from [facility] revealed hx of CLL and adult T cell lymphoma. Flow cytometry returned with B cell lymphoma/CLL, which was consistent with his past history which is followed outpatient by oncologist at [facility]. Pt was restarted on tacrolimus with a goal level 3-4. S/p extubation and weaning to NC. Post extubation course complicated by hemoptysis on 7/10, and anticoagulation was held. Hemoptysis resolved and diet was started the following day 7/11. Completed course of zosyn. NC titrated and tolerating 2L with O2sat >90%. Restarting eliquis today as patient is without hemoptysis for >36 hours. Stable for s/d to [facility] for further observation and management. Aspergillis negative. NC titrated down to 1L, incentive spirometry. restarted eliquis. restarted home valganciclovir and crestor. Lantus reduced to 23 units. Patient was stable to be discharged 07/15/21 on home medications and home oxygen. \par \par Pt is accompanied today by his daughter. The patient is feeling generally well today. He has notable weight gain of approx 10lbs. He presents last HgbA1c of 6.7% result on his phone. He reportedly has already received his annual influenza vaccine, as well as two doses of COVID vaccine. He has not yet received his booster. He reports most recent creatinine of 1.1. His daughter reports that he has difficulty getting out of a car, ambulating, and sleeping, and daytime somnolence. She details an episode of her father falling asleep on his feet while standing, which resulted in a fall for which he required stitches. She attributes these issues to breathing difficulties which are contributing to his weight gain. The pt firmly attributes these issues to weight gain and would primarily like to address his weight gain. \par \par Labs:\par - 04/13/21: WBC 13.6, HGB 11.3, HCT 35.2%, , glucose 140, Na 134, creatinine 1.10, eGFR >=60\par - 12/03/20: IgG Lambda Band identified, RBC 4.07, HGB 11.1, HCT 37%, sed rate 40, pro BNP 1163, HgbA1c 8.8%, eGFr by cystatin C 40, cystatin C 1.61, , Vit D 25-OH 18.2, HDL 38, LDL 43, Fe 42, iron sat 13%, Na 131, CO2 19, glucose 104, BUN 25, creatinine 25, alk phos 138, eGFR 70\par \par 10/05/21 US Bilateral LE Arteries:\par 1. RIGHT: No evidence of hemodynamically significant stenosis in the lower extremity.\par 2. LEFT: No evidence of hemodynamically significant stenosis in the lower extremity.\par \par 10/05/21 EKG:\par - Undetermined rhythm. Incomplete right bundle branch block. Nonspecific T-abnormality.\par \par 09/13/21 CT Chest No Contrast:\par Impression:\par 1. Interval resolution and multifocal pneumonia/atelectasis with near complete resolution of right-sided pleural effusion.\par 2. Mild pulmonary venous congestive changes with cardiomegaly.\par 3. Bilateral mosaic perfusion pattern which may represent underlying air trapping from small airways inflammation versus chronic thromboembolic disease in view the presence of pulmonary artery dilatation. Pulmonary hypertension cannot be excluded unchanged from prior imaging. Further imaging when the patient is clinically stable to include end inspiration/end expiration may be of value.\par 4. Innumerable subpleural solid subcentimeter nodules are identified the largest measuring 8 mm within the right lower lobe these appear new and may represent newly developing metastatic disease. Short interval surveillance to confirm resolution and/or stability.\par 5. Extensive, unchanged, thoracic as well as upper abdominal lymphadenopathy as well as splenomegaly. Abbreviated differential includes lymphoma. Body PET/CT correlation.\par 6. Stable fat plane stranding involving the retroperitoneal and left perinephric regions. Etiology this is unclear and further imaging may be of value to include a contrast-enhanced CT of the abdomen and pelvis as clinically indicated.\par 7. Persistent mild aneurysmal dilatation of the aortic root at the sinus of Valsalva measuring 4 cm.\par 8. Additional stable findings as on report in record. \par \par 05/10/21 Echocardiogram:\par 1. Normal mitral valve. Mild mitral regurgitation.\par 2. Thickened aortic valve. Minimal aortic regurgitation.\par 3. Mildly dilated left atrium. LA volume index=40cc/m2.\par 4. Moderately concentric left ventricular hypertrophy.\par 5. Normal left ventricular systolic function. No segmental wall motion abnormalities. EF is 65%.\par 6. Normal right ventricular size and function.\par 7. Normal pericardium with no pericardial effusion.\par \par Current medications: Eliquis 5mg BID, furosemide 40mg TIW, amlodipine 10mg QD, Crestor 10mg QD, Prograf 2mg QAM+1mg QPM, isosorbide 30mg QD, alprazolam 0.25mg PRN (0-2x weekly), Valcyte, Humalog 24-38u prn, tamsulosin 0.4mg QPM, pantaprazole 40mg QD, Ambien prn, Protonix 20mg QD. \par - Pt could not tolerate Trulicity. \par - Pt interested in Ozempic and other similar class drugs to lose weight.

## 2021-10-28 ENCOUNTER — OUTPATIENT (OUTPATIENT)
Dept: OUTPATIENT SERVICES | Facility: HOSPITAL | Age: 71
LOS: 1 days | End: 2021-10-28
Payer: MEDICARE

## 2021-10-28 ENCOUNTER — NON-APPOINTMENT (OUTPATIENT)
Age: 71
End: 2021-10-28

## 2021-10-28 DIAGNOSIS — R06.09 OTHER FORMS OF DYSPNEA: ICD-10-CM

## 2021-10-28 DIAGNOSIS — Z95.1 PRESENCE OF AORTOCORONARY BYPASS GRAFT: Chronic | ICD-10-CM

## 2021-10-28 DIAGNOSIS — Z98.890 OTHER SPECIFIED POSTPROCEDURAL STATES: Chronic | ICD-10-CM

## 2021-10-28 DIAGNOSIS — M27.2 INFLAMMATORY CONDITIONS OF JAWS: Chronic | ICD-10-CM

## 2021-10-28 LAB
24R-OH-CALCIDIOL SERPL-MCNC: 37.2 PG/ML
25(OH)D3 SERPL-MCNC: 15 NG/ML
ALBUMIN SERPL ELPH-MCNC: 4.3 G/DL
ALP BLD-CCNC: 156 U/L
ALT SERPL-CCNC: 11 U/L
ANION GAP SERPL CALC-SCNC: 12 MMOL/L
APPEARANCE: CLEAR
AST SERPL-CCNC: 15 U/L
BACTERIA: NEGATIVE
BASOPHILS # BLD AUTO: 0.04 K/UL
BASOPHILS NFR BLD AUTO: 0.2 %
BILIRUB SERPL-MCNC: 0.6 MG/DL
BILIRUBIN URINE: NEGATIVE
BLOOD URINE: NEGATIVE
BUN SERPL-MCNC: 20 MG/DL
C3 SERPL-MCNC: 109 MG/DL
C4 SERPL-MCNC: 27 MG/DL
CALCIUM SERPL-MCNC: 9.2 MG/DL
CALCIUM SERPL-MCNC: 9.2 MG/DL
CHLORIDE SERPL-SCNC: 98 MMOL/L
CHOLEST SERPL-MCNC: 87 MG/DL
CO2 SERPL-SCNC: 22 MMOL/L
COLOR: NORMAL
COVID-19 NUCLEOCAPSID  GAM ANTIBODY INTERPRETATION: POSITIVE
COVID-19 SPIKE DOMAIN ANTIBODY INTERPRETATION: POSITIVE
CREAT SERPL-MCNC: 1.04 MG/DL
CRP SERPL-MCNC: 8 MG/L
CYSTATIN C SERPL-MCNC: 1.6 MG/L
EOSINOPHIL # BLD AUTO: 0.05 K/UL
EOSINOPHIL NFR BLD AUTO: 0.3 %
ERYTHROCYTE [SEDIMENTATION RATE] IN BLOOD BY WESTERGREN METHOD: 33 MM/HR
ESTIMATED AVERAGE GLUCOSE: 148 MG/DL
FERRITIN SERPL-MCNC: 121 NG/ML
FOLATE SERPL-MCNC: 7.8 NG/ML
GFR/BSA.PRED SERPLBLD CYS-BASED-ARV: 39 ML/MIN
GLUCOSE QUALITATIVE U: NEGATIVE
GLUCOSE SERPL-MCNC: 128 MG/DL
HBA1C MFR BLD HPLC: 6.8 %
HBV SURFACE AB SER QL: NONREACTIVE
HBV SURFACE AG SER QL: NONREACTIVE
HCT VFR BLD CALC: 32.4 %
HCV AB SER QL: NONREACTIVE
HCV S/CO RATIO: 0.09 S/CO
HCYS SERPL-MCNC: 13.8 UMOL/L
HDLC SERPL-MCNC: 34 MG/DL
HGB BLD-MCNC: 9.5 G/DL
HYALINE CASTS: 0 /LPF
IMM GRANULOCYTES NFR BLD AUTO: 0.1 %
IRON SATN MFR SERPL: 12 %
IRON SERPL-MCNC: 35 UG/DL
KETONES URINE: NEGATIVE
LDLC SERPL CALC-MCNC: 41 MG/DL
LEUKOCYTE ESTERASE URINE: NEGATIVE
LYMPHOCYTES # BLD AUTO: 14.86 K/UL
LYMPHOCYTES NFR BLD AUTO: 76.9 %
MAGNESIUM SERPL-MCNC: 2.1 MG/DL
MAN DIFF?: NORMAL
MCHC RBC-ENTMCNC: 28.5 PG
MCHC RBC-ENTMCNC: 29.3 GM/DL
MCV RBC AUTO: 97.3 FL
MICROSCOPIC-UA: NORMAL
MONOCYTES # BLD AUTO: 1.98 K/UL
MONOCYTES NFR BLD AUTO: 10.2 %
NEUTROPHILS # BLD AUTO: 2.37 K/UL
NEUTROPHILS NFR BLD AUTO: 12.3 %
NITRITE URINE: NEGATIVE
NONHDLC SERPL-MCNC: 53 MG/DL
NT-PROBNP SERPL-MCNC: 1310 PG/ML
PARATHYROID HORMONE INTACT: 101 PG/ML
PH URINE: 7
PHOSPHATE SERPL-MCNC: 3.4 MG/DL
PLATELET # BLD AUTO: 177 K/UL
POTASSIUM SERPL-SCNC: 4.7 MMOL/L
PROT SERPL-MCNC: 6.8 G/DL
PROTEIN URINE: NEGATIVE
RBC # BLD: 3.33 M/UL
RBC # FLD: 16.2 %
RED BLOOD CELLS URINE: 0 /HPF
RENIN PLASMA: 8.4 PG/ML
RHEUMATOID FACT SER QL: <10 IU/ML
SARS-COV-2 AB SERPL IA-ACNC: >250 U/ML
SARS-COV-2 AB SERPL QL IA: 1.64 INDEX
SODIUM SERPL-SCNC: 131 MMOL/L
SPECIFIC GRAVITY URINE: 1.01
SQUAMOUS EPITHELIAL CELLS: 0 /HPF
T3FREE SERPL-MCNC: 2.57 PG/ML
T3RU NFR SERPL: 1 TBI
T4 FREE SERPL-MCNC: 1.4 NG/DL
T4 SERPL-MCNC: 7.5 UG/DL
TIBC SERPL-MCNC: 302 UG/DL
TRIGL SERPL-MCNC: 56 MG/DL
TSH SERPL-ACNC: 4.42 UIU/ML
UIBC SERPL-MCNC: 274 UG/DL
URATE SERPL-MCNC: 6.5 MG/DL
UROBILINOGEN URINE: NORMAL
VIT B12 SERPL-MCNC: 508 PG/ML
WBC # FLD AUTO: 19.32 K/UL
WHITE BLOOD CELLS URINE: 0 /HPF

## 2021-10-28 PROCEDURE — 93306 TTE W/DOPPLER COMPLETE: CPT

## 2021-10-28 PROCEDURE — 93306 TTE W/DOPPLER COMPLETE: CPT | Mod: 26

## 2021-10-29 ENCOUNTER — OUTPATIENT (OUTPATIENT)
Dept: OUTPATIENT SERVICES | Facility: HOSPITAL | Age: 71
LOS: 1 days | End: 2021-10-29

## 2021-10-29 ENCOUNTER — RESULT REVIEW (OUTPATIENT)
Age: 71
End: 2021-10-29

## 2021-10-29 ENCOUNTER — APPOINTMENT (OUTPATIENT)
Dept: CT IMAGING | Facility: CLINIC | Age: 71
End: 2021-10-29
Payer: MEDICARE

## 2021-10-29 DIAGNOSIS — M27.2 INFLAMMATORY CONDITIONS OF JAWS: Chronic | ICD-10-CM

## 2021-10-29 DIAGNOSIS — Z95.1 PRESENCE OF AORTOCORONARY BYPASS GRAFT: Chronic | ICD-10-CM

## 2021-10-29 DIAGNOSIS — Z98.890 OTHER SPECIFIED POSTPROCEDURAL STATES: Chronic | ICD-10-CM

## 2021-10-29 LAB
CARDIOLIPIN IGM SER-MCNC: <5 GPL
CARDIOLIPIN IGM SER-MCNC: <5 MPL

## 2021-10-29 PROCEDURE — 71250 CT THORAX DX C-: CPT | Mod: 26,MH

## 2021-10-29 RX ORDER — APIXABAN 5 MG/1
5 TABLET, FILM COATED ORAL
Qty: 180 | Refills: 2 | Status: ACTIVE | COMMUNITY
Start: 2020-12-04 | End: 1900-01-01

## 2021-10-29 RX ORDER — TAMSULOSIN HYDROCHLORIDE 0.4 MG/1
0.4 CAPSULE ORAL
Qty: 90 | Refills: 3 | Status: ACTIVE | COMMUNITY
Start: 2020-12-04 | End: 1900-01-01

## 2021-10-31 LAB
ALP BONE SERPL-MCNC: 22.8 UG/L
ANA SER IF-ACNC: NEGATIVE
IGG4 SER-MCNC: 8 MG/DL

## 2021-11-01 LAB
ALBUMIN MFR SERPL ELPH: 57.8 %
ALBUMIN SERPL-MCNC: 4.1 G/DL
ALBUMIN/GLOB SERPL: 1.4 RATIO
ALPHA1 GLOB MFR SERPL ELPH: 5.3 %
ALPHA1 GLOB SERPL ELPH-MCNC: 0.4 G/DL
ALPHA2 GLOB MFR SERPL ELPH: 11 %
ALPHA2 GLOB SERPL ELPH-MCNC: 0.8 G/DL
B-GLOBULIN MFR SERPL ELPH: 10.4 %
B-GLOBULIN SERPL ELPH-MCNC: 0.7 G/DL
COLLAGEN CTX SERPL-MCNC: 472 PG/ML
DEPRECATED KAPPA LC FREE/LAMBDA SER: 0.42 RATIO
GAMMA GLOB FLD ELPH-MCNC: 1.1 G/DL
GAMMA GLOB MFR SERPL ELPH: 15.5 %
IGA SER QL IEP: 176 MG/DL
IGG SER QL IEP: 1282 MG/DL
IGM SER QL IEP: 17 MG/DL
INTERPRETATION SERPL IEP-IMP: NORMAL
KAPPA LC CSF-MCNC: 8.59 MG/DL
KAPPA LC SERPL-MCNC: 3.58 MG/DL
M PROTEIN MFR SERPL ELPH: 3.1 %
M PROTEIN SPEC IFE-MCNC: NORMAL
MONOCLON BAND OBS SERPL: 0.2 G/DL
MPO AB + PR3 PNL SER: NORMAL
PROT SERPL-MCNC: 7.1 G/DL
PROT SERPL-MCNC: 7.1 G/DL

## 2021-11-02 LAB
ALDOSTERONE SERUM: 10.3 NG/DL
C1Q IMMUNE COMPLEX: <1.2 UG EQ/ML
C3D IMMUNE COMPLEXES: 4.7 UG EQ/ML
CARDIOLIPIN AB SER IA-ACNC: NEGATIVE
IGA 24H UR QL IFE: NORMAL
METHYLMALONATE SERPL-SCNC: 210 NMOL/L
THYROGLOB AB SERPL-ACNC: <20 IU/ML
THYROPEROXIDASE AB SERPL IA-ACNC: <10 IU/ML

## 2021-11-08 ENCOUNTER — INPATIENT (INPATIENT)
Facility: HOSPITAL | Age: 71
LOS: 3 days | Discharge: HOME | DRG: 824 | End: 2021-11-12
Payer: MEDICARE

## 2021-11-08 VITALS
SYSTOLIC BLOOD PRESSURE: 156 MMHG | HEIGHT: 66 IN | HEART RATE: 79 BPM | DIASTOLIC BLOOD PRESSURE: 73 MMHG | RESPIRATION RATE: 18 BRPM | TEMPERATURE: 98 F | OXYGEN SATURATION: 96 %

## 2021-11-08 DIAGNOSIS — M27.2 INFLAMMATORY CONDITIONS OF JAWS: Chronic | ICD-10-CM

## 2021-11-08 DIAGNOSIS — Z94.0 KIDNEY TRANSPLANT STATUS: ICD-10-CM

## 2021-11-08 DIAGNOSIS — K21.9 GASTRO-ESOPHAGEAL REFLUX DISEASE WITHOUT ESOPHAGITIS: ICD-10-CM

## 2021-11-08 DIAGNOSIS — D64.9 ANEMIA, UNSPECIFIED: ICD-10-CM

## 2021-11-08 DIAGNOSIS — I10 ESSENTIAL (PRIMARY) HYPERTENSION: ICD-10-CM

## 2021-11-08 DIAGNOSIS — J90 PLEURAL EFFUSION, NOT ELSEWHERE CLASSIFIED: ICD-10-CM

## 2021-11-08 DIAGNOSIS — E11.9 TYPE 2 DIABETES MELLITUS WITHOUT COMPLICATIONS: ICD-10-CM

## 2021-11-08 DIAGNOSIS — D72.829 ELEVATED WHITE BLOOD CELL COUNT, UNSPECIFIED: ICD-10-CM

## 2021-11-08 DIAGNOSIS — C91.10 CHRONIC LYMPHOCYTIC LEUKEMIA OF B-CELL TYPE NOT HAVING ACHIEVED REMISSION: ICD-10-CM

## 2021-11-08 DIAGNOSIS — Z95.1 PRESENCE OF AORTOCORONARY BYPASS GRAFT: Chronic | ICD-10-CM

## 2021-11-08 DIAGNOSIS — R60.0 LOCALIZED EDEMA: ICD-10-CM

## 2021-11-08 DIAGNOSIS — I25.10 ATHEROSCLEROTIC HEART DISEASE OF NATIVE CORONARY ARTERY WITHOUT ANGINA PECTORIS: ICD-10-CM

## 2021-11-08 DIAGNOSIS — Z29.9 ENCOUNTER FOR PROPHYLACTIC MEASURES, UNSPECIFIED: ICD-10-CM

## 2021-11-08 DIAGNOSIS — Z98.890 OTHER SPECIFIED POSTPROCEDURAL STATES: Chronic | ICD-10-CM

## 2021-11-08 DIAGNOSIS — E87.1 HYPO-OSMOLALITY AND HYPONATREMIA: ICD-10-CM

## 2021-11-08 LAB
ANION GAP SERPL CALC-SCNC: 9 MMOL/L — SIGNIFICANT CHANGE UP (ref 5–17)
ANISOCYTOSIS BLD QL: SLIGHT — SIGNIFICANT CHANGE UP
APTT BLD: 40.3 SEC — HIGH (ref 27.5–35.5)
BASE EXCESS BLDV CALC-SCNC: 0.8 MMOL/L — SIGNIFICANT CHANGE UP (ref -2–3)
BASOPHILS # BLD AUTO: 0 K/UL — SIGNIFICANT CHANGE UP (ref 0–0.2)
BASOPHILS NFR BLD AUTO: 0 % — SIGNIFICANT CHANGE UP (ref 0–2)
BUN SERPL-MCNC: 28 MG/DL — HIGH (ref 7–23)
CA-I SERPL-SCNC: 1.25 MMOL/L — SIGNIFICANT CHANGE UP (ref 1.15–1.33)
CALCIUM SERPL-MCNC: 9.5 MG/DL — SIGNIFICANT CHANGE UP (ref 8.4–10.5)
CHLORIDE SERPL-SCNC: 98 MMOL/L — SIGNIFICANT CHANGE UP (ref 96–108)
CO2 BLDV-SCNC: 28 MMOL/L — HIGH (ref 22–26)
CO2 SERPL-SCNC: 24 MMOL/L — SIGNIFICANT CHANGE UP (ref 22–31)
CREAT SERPL-MCNC: 1.32 MG/DL — HIGH (ref 0.5–1.3)
DACRYOCYTES BLD QL SMEAR: SLIGHT — SIGNIFICANT CHANGE UP
EOSINOPHIL # BLD AUTO: 0.16 K/UL — SIGNIFICANT CHANGE UP (ref 0–0.5)
EOSINOPHIL NFR BLD AUTO: 0.9 % — SIGNIFICANT CHANGE UP (ref 0–6)
GAS PNL BLDV: 129 MMOL/L — LOW (ref 136–145)
GAS PNL BLDV: SIGNIFICANT CHANGE UP
GLUCOSE BLDC GLUCOMTR-MCNC: 158 MG/DL — HIGH (ref 70–99)
GLUCOSE SERPL-MCNC: 152 MG/DL — HIGH (ref 70–99)
HCO3 BLDV-SCNC: 27 MMOL/L — SIGNIFICANT CHANGE UP (ref 22–29)
HCT VFR BLD CALC: 29.9 % — LOW (ref 39–50)
HGB BLD-MCNC: 9.3 G/DL — LOW (ref 13–17)
HYPOCHROMIA BLD QL: SLIGHT — SIGNIFICANT CHANGE UP
INR BLD: 1.75 — HIGH (ref 0.88–1.16)
LYMPHOCYTES # BLD AUTO: 13.68 K/UL — HIGH (ref 1–3.3)
LYMPHOCYTES # BLD AUTO: 75.9 % — HIGH (ref 13–44)
MACROCYTES BLD QL: SLIGHT — SIGNIFICANT CHANGE UP
MANUAL SMEAR VERIFICATION: SIGNIFICANT CHANGE UP
MCHC RBC-ENTMCNC: 29 PG — SIGNIFICANT CHANGE UP (ref 27–34)
MCHC RBC-ENTMCNC: 31.1 GM/DL — LOW (ref 32–36)
MCV RBC AUTO: 93.1 FL — SIGNIFICANT CHANGE UP (ref 80–100)
MICROCYTES BLD QL: SLIGHT — SIGNIFICANT CHANGE UP
MONOCYTES # BLD AUTO: 0.32 K/UL — SIGNIFICANT CHANGE UP (ref 0–0.9)
MONOCYTES NFR BLD AUTO: 1.8 % — LOW (ref 2–14)
NEUTROPHILS # BLD AUTO: 3.86 K/UL — SIGNIFICANT CHANGE UP (ref 1.8–7.4)
NEUTROPHILS NFR BLD AUTO: 21.4 % — LOW (ref 43–77)
NT-PROBNP SERPL-SCNC: 1683 PG/ML — HIGH (ref 0–300)
OVALOCYTES BLD QL SMEAR: SLIGHT — SIGNIFICANT CHANGE UP
PCO2 BLDV: 46 MMHG — SIGNIFICANT CHANGE UP (ref 42–55)
PH BLDV: 7.37 — SIGNIFICANT CHANGE UP (ref 7.32–7.43)
PLAT MORPH BLD: ABNORMAL
PLATELET # BLD AUTO: 157 K/UL — SIGNIFICANT CHANGE UP (ref 150–400)
PO2 BLDV: 36 MMHG — SIGNIFICANT CHANGE UP (ref 25–45)
POIKILOCYTOSIS BLD QL AUTO: SLIGHT — SIGNIFICANT CHANGE UP
POLYCHROMASIA BLD QL SMEAR: SLIGHT — SIGNIFICANT CHANGE UP
POTASSIUM BLDV-SCNC: 5.3 MMOL/L — HIGH (ref 3.5–5.1)
POTASSIUM SERPL-MCNC: 4.8 MMOL/L — SIGNIFICANT CHANGE UP (ref 3.5–5.3)
POTASSIUM SERPL-SCNC: 4.8 MMOL/L — SIGNIFICANT CHANGE UP (ref 3.5–5.3)
PROTHROM AB SERPL-ACNC: 20.4 SEC — HIGH (ref 10.6–13.6)
RBC # BLD: 3.21 M/UL — LOW (ref 4.2–5.8)
RBC # FLD: 15.9 % — HIGH (ref 10.3–14.5)
RBC BLD AUTO: ABNORMAL
SAO2 % BLDV: 64.9 % — LOW (ref 67–88)
SARS-COV-2 RNA SPEC QL NAA+PROBE: NEGATIVE — SIGNIFICANT CHANGE UP
SCHISTOCYTES BLD QL AUTO: SLIGHT — SIGNIFICANT CHANGE UP
SMUDGE CELLS # BLD: PRESENT — SIGNIFICANT CHANGE UP
SODIUM SERPL-SCNC: 131 MMOL/L — LOW (ref 135–145)
STOMATOCYTES BLD QL SMEAR: SLIGHT — SIGNIFICANT CHANGE UP
WBC # BLD: 18.03 K/UL — HIGH (ref 3.8–10.5)
WBC # FLD AUTO: 18.03 K/UL — HIGH (ref 3.8–10.5)

## 2021-11-08 PROCEDURE — 93010 ELECTROCARDIOGRAM REPORT: CPT

## 2021-11-08 PROCEDURE — 99223 1ST HOSP IP/OBS HIGH 75: CPT | Mod: GC

## 2021-11-08 PROCEDURE — 71045 X-RAY EXAM CHEST 1 VIEW: CPT | Mod: 26

## 2021-11-08 PROCEDURE — 99285 EMERGENCY DEPT VISIT HI MDM: CPT

## 2021-11-08 RX ORDER — DEXTROSE 50 % IN WATER 50 %
25 SYRINGE (ML) INTRAVENOUS ONCE
Refills: 0 | Status: DISCONTINUED | OUTPATIENT
Start: 2021-11-08 | End: 2021-11-09

## 2021-11-08 RX ORDER — DEXTROSE 50 % IN WATER 50 %
15 SYRINGE (ML) INTRAVENOUS ONCE
Refills: 0 | Status: DISCONTINUED | OUTPATIENT
Start: 2021-11-08 | End: 2021-11-09

## 2021-11-08 RX ORDER — TACROLIMUS 5 MG/1
1 CAPSULE ORAL EVERY 24 HOURS
Refills: 0 | Status: DISCONTINUED | OUTPATIENT
Start: 2021-11-08 | End: 2021-11-12

## 2021-11-08 RX ORDER — VALGANCICLOVIR 450 MG/1
450 TABLET, FILM COATED ORAL ONCE
Refills: 0 | Status: COMPLETED | OUTPATIENT
Start: 2021-11-09 | End: 2021-11-09

## 2021-11-08 RX ORDER — AMLODIPINE BESYLATE 2.5 MG/1
10 TABLET ORAL DAILY
Refills: 0 | Status: DISCONTINUED | OUTPATIENT
Start: 2021-11-09 | End: 2021-11-09

## 2021-11-08 RX ORDER — FUROSEMIDE 40 MG
40 TABLET ORAL DAILY
Refills: 0 | Status: DISCONTINUED | OUTPATIENT
Start: 2021-11-09 | End: 2021-11-09

## 2021-11-08 RX ORDER — ATORVASTATIN CALCIUM 80 MG/1
40 TABLET, FILM COATED ORAL AT BEDTIME
Refills: 0 | Status: DISCONTINUED | OUTPATIENT
Start: 2021-11-08 | End: 2021-11-12

## 2021-11-08 RX ORDER — ISOSORBIDE MONONITRATE 60 MG/1
30 TABLET, EXTENDED RELEASE ORAL DAILY
Refills: 0 | Status: DISCONTINUED | OUTPATIENT
Start: 2021-11-09 | End: 2021-11-12

## 2021-11-08 RX ORDER — INSULIN LISPRO 100/ML
VIAL (ML) SUBCUTANEOUS
Refills: 0 | Status: DISCONTINUED | OUTPATIENT
Start: 2021-11-08 | End: 2021-11-09

## 2021-11-08 RX ORDER — LANOLIN ALCOHOL/MO/W.PET/CERES
3 CREAM (GRAM) TOPICAL AT BEDTIME
Refills: 0 | Status: DISCONTINUED | OUTPATIENT
Start: 2021-11-08 | End: 2021-11-12

## 2021-11-08 RX ORDER — GLUCAGON INJECTION, SOLUTION 0.5 MG/.1ML
1 INJECTION, SOLUTION SUBCUTANEOUS ONCE
Refills: 0 | Status: DISCONTINUED | OUTPATIENT
Start: 2021-11-08 | End: 2021-11-12

## 2021-11-08 RX ORDER — SODIUM CHLORIDE 9 MG/ML
1000 INJECTION, SOLUTION INTRAVENOUS
Refills: 0 | Status: DISCONTINUED | OUTPATIENT
Start: 2021-11-08 | End: 2021-11-09

## 2021-11-08 RX ORDER — TAMSULOSIN HYDROCHLORIDE 0.4 MG/1
0.4 CAPSULE ORAL AT BEDTIME
Refills: 0 | Status: DISCONTINUED | OUTPATIENT
Start: 2021-11-08 | End: 2021-11-12

## 2021-11-08 RX ORDER — VALGANCICLOVIR 450 MG/1
450 TABLET, FILM COATED ORAL EVERY 24 HOURS
Refills: 0 | Status: DISCONTINUED | OUTPATIENT
Start: 2021-11-10 | End: 2021-11-12

## 2021-11-08 RX ORDER — TACROLIMUS 5 MG/1
2 CAPSULE ORAL EVERY 24 HOURS
Refills: 0 | Status: DISCONTINUED | OUTPATIENT
Start: 2021-11-09 | End: 2021-11-12

## 2021-11-08 RX ORDER — INSULIN GLARGINE 100 [IU]/ML
25 INJECTION, SOLUTION SUBCUTANEOUS AT BEDTIME
Refills: 0 | Status: DISCONTINUED | OUTPATIENT
Start: 2021-11-08 | End: 2021-11-12

## 2021-11-08 RX ORDER — ACETAMINOPHEN 500 MG
650 TABLET ORAL EVERY 6 HOURS
Refills: 0 | Status: DISCONTINUED | OUTPATIENT
Start: 2021-11-08 | End: 2021-11-12

## 2021-11-08 RX ORDER — ALPRAZOLAM 0.25 MG
0.25 TABLET ORAL AT BEDTIME
Refills: 0 | Status: DISCONTINUED | OUTPATIENT
Start: 2021-11-08 | End: 2021-11-12

## 2021-11-08 RX ORDER — PANTOPRAZOLE SODIUM 20 MG/1
20 TABLET, DELAYED RELEASE ORAL
Refills: 0 | Status: DISCONTINUED | OUTPATIENT
Start: 2021-11-09 | End: 2021-11-12

## 2021-11-08 RX ORDER — DEXTROSE 50 % IN WATER 50 %
12.5 SYRINGE (ML) INTRAVENOUS ONCE
Refills: 0 | Status: DISCONTINUED | OUTPATIENT
Start: 2021-11-08 | End: 2021-11-09

## 2021-11-08 RX ADMIN — TAMSULOSIN HYDROCHLORIDE 0.4 MILLIGRAM(S): 0.4 CAPSULE ORAL at 21:58

## 2021-11-08 RX ADMIN — Medication 0.25 MILLIGRAM(S): at 23:37

## 2021-11-08 RX ADMIN — TACROLIMUS 1 MILLIGRAM(S): 5 CAPSULE ORAL at 21:58

## 2021-11-08 RX ADMIN — ATORVASTATIN CALCIUM 40 MILLIGRAM(S): 80 TABLET, FILM COATED ORAL at 21:58

## 2021-11-08 RX ADMIN — INSULIN GLARGINE 25 UNIT(S): 100 INJECTION, SOLUTION SUBCUTANEOUS at 22:59

## 2021-11-08 RX ADMIN — Medication 2: at 23:03

## 2021-11-08 NOTE — H&P ADULT - PROBLEM/PLAN-1
DISPLAY PLAN FREE TEXT Asc Procedure Text (C): After obtaining clear surgical margins the patient was sent to an ASC for surgical repair.  The patient understands they will receive post-surgical care and follow-up from the ASC physician.

## 2021-11-08 NOTE — ED PROVIDER NOTE - CLINICAL SUMMARY MEDICAL DECISION MAKING FREE TEXT BOX
Pt referred to the ED by Dr Leyva for worsening pleural effusion. D/w Dr Lora - pt for thoracentesis, biopsy, CTS consult pending results, admit to his service Pt referred to the ED by Dr Leyva for worsening pleural effusion, known CLL, ? malignant, also consider decompensated CHF, worsening renal function, other pathology. D/w Dr Lora - pt for thoracentesis, biopsy, CTS consult pending results, admit to his service

## 2021-11-08 NOTE — ED ADULT NURSE NOTE - OBJECTIVE STATEMENT
71 y/o male c/o SOB, epigastric pain intermittently for over two weeks, had chest CT that shows "fluid in the lungs"

## 2021-11-08 NOTE — H&P ADULT - NSICDXPASTMEDICALHX_GEN_ALL_CORE_FT
PAST MEDICAL HISTORY:  Barretts esophagus     Benign prostatic hyperplasia, presence of lower urinary tract symptoms unspecified, unspecified morphology     CRI (chronic renal insufficiency)     DM (diabetes mellitus)     GERD (gastroesophageal reflux disease)     HTN (hypertension)     Hyperlipidemia     Mandibular abscess     Monoclonal gammopathy     Other hyperlipidemia     Paroxysmal atrial fibrillation

## 2021-11-08 NOTE — ED ADULT NURSE NOTE - ISOLATION TYPE:
None Heri Galicia), Surgery  [address] [address]  Phone: [phone]  Fax: [phone]    Damaris Read), Hematology; Internal Medicine; Medical Oncology  [address] [place], [place] [id number]  Phone: [phone]  Fax: [phone]

## 2021-11-08 NOTE — H&P ADULT - PROBLEM SELECTOR PLAN 9
Fluids: None  Electrolytes: replete as necessary, K>4, Mg>2  Nutrition: consistent carb kosher  Bowel Regimen: None  DVT ppx: SCD (holding eliquis)  GI ppx: Pantoprazole  Code: Full/DNR/DNI  Disposition: medical floor Last admission lantus 25. At  home uses sliding scale require 20-40 units before each meal. A1c 6.7.  -25 Latus  -moderate sliding scale No signs of rejection. Home regimen below. October Cr 1.1   -Tacrolimus 2mg AM, 1mg PM  -Valgancyclovir 450 QD

## 2021-11-08 NOTE — H&P ADULT - PROBLEM SELECTOR PLAN 8
Last admission lantus 25. At  home uses sliding scale require 20-40 units before each meal. A1c 6.7.  -25 Latus  -moderate sliding scale No signs of rejection. Home regimen below. October Cr 1.1   -Tacrolimus 2mg AM, 1mg PM  -Valgancyclovir 450 QD Admission sodium 131. Last admission 133. Etiologies may include furosemide, SIADH, kidney dysfunction

## 2021-11-08 NOTE — ED PROVIDER NOTE - OBJECTIVE STATEMENT
Pt w/ PMHx CLL (not currently getting tx), DM2, HTN, HLD, MGUS, pAF on Eliquis, BPH, CAD s/p CABG, R renal txp on immunosuppressants, referred to the ED by Dr Leyva for admission to Dr Lora. Pt had CT chest on 10/29 showing R sided pleural effusion. Pt reports hx hiatal hernia. He states for years after he eats he feels full, has to sit up straight, otherwise he feels SOB. However, in the past 1-2 months pt a/w SORIA, worsening LE edema. Pt is on Lasix 40 mg QD. He reports normal urination. He denies CP, f/c, and other sx. Pt is fully vaccinated against COVID19

## 2021-11-08 NOTE — ED ADULT TRIAGE NOTE - CHIEF COMPLAINT QUOTE
Pt sent by Dr. Ordaz for "fluid in the chest cavity." Pt reports mild shortness of breath. Denies chest pain, fevers, chills, cough.

## 2021-11-08 NOTE — H&P ADULT - PROBLEM SELECTOR PLAN 3
Home meds: Norvasc 10mg, Imdur 30mg  -Continue home meds Reports hx of hiatal hernia. This may contribute to his SOB. Home med pantoprazole 20  -pantoprazole 20

## 2021-11-08 NOTE — H&P ADULT - PROBLEM SELECTOR PLAN 7
Last admission lantus 25. At  home uses sliding scale require 20-40 units before each meal. A1c 6.7.  -25 Latus  -moderate sliding scale No signs of rejection. Home regimen below.  -Tacrolimus 2mg AM, 1mg PM  -Valgancyclovir 450 QD No signs of rejection. Home regimen below. October Cr 1.1   -Tacrolimus 2mg AM, 1mg PM  -Valgancyclovir 450 QD Hb 9.3. Likely anemia of chronic disease vs malignancy additionally due to poor renal function  -f/u iron studies and retic

## 2021-11-08 NOTE — H&P ADULT - NSHPPHYSICALEXAM_GEN_ALL_CORE
PHYSICAL EXAM:    Constitutional: Obese elderly male in NAD.  HEENT: PERRL, EOMI, sclera non-icteric, neck supple, trachea midline, no masses, no JVD, MMM, good dentition  Respiratory: Decreased breath sounds R lung base. Crackles L lung base. No accessory muscle use or intercostal retractions  Cardiovascular: RRR, normal S1S2, no M/R/G  Gastrointestinal: soft, NTND, no masses palpable, BS normal  Extremities: Warm, well perfused, pulses equal bilateral upper and lower extremities. 2-3+ pitting edema.  Neurological: AAOx3, CN Grossly intact  Skin: Normal temperature, warm, dry

## 2021-11-08 NOTE — H&P ADULT - PROBLEM SELECTOR PLAN 6
Hb 9.3. Likely anemia of chronic disease vs malignancy additionally due to poor renal function  -f/u iron studies and retic No active treatment. Reports baseline CBC 14. WBC 18 may represent CLL vs active infection  -Trend CBC    #Leukocytosis  -see above No active treatment. Reports baseline CBC 14. WBC 18 may represent CLL. No signs of infection.  -Trend CBC    #Leukocytosis  -see above

## 2021-11-08 NOTE — H&P ADULT - TIME BILLING
Patient seen and examined with house-staff during bedside rounds.  Resident note read, including vitals, physical findings, laboratory data, and radiological reports.   Revisions included below.  Direct personal management at bed side and extensive interpretation of the data.  Plan was outlined and discussed in details with the housestaff.  Decision making of high complexity  Action taken for acute disease activity to reflect the level of care provided:  - medication reconciliation  - review laboratory data  The patient is well-known to me.  Patient has a history of CLL which is seem to be stable.  CT scan of the chest.  Increase in the left lobe right pleural effusion and also persistent of the mediastinal adenopathy.  The differential diagnoses include but not limited to active malignancy lymphoma.  Patient was admitted for further evaluation.  PFT.  Hold apixaban.  Ultrasound of the chest to evaluate for thoracentesis.  Patient will require sampling of the mediastinal lymph nodes.

## 2021-11-08 NOTE — H&P ADULT - PROBLEM SELECTOR PLAN 5
No active treatment. Reports baseline CBC 14. WBC 18 may represent CLL vs active infection  -Trend CBC    #Leukocytosis  -see above CABG 2005. Has HTN HLD, DM  -Atorvastatin 40 while inpatient (home rosuvostatin 10)

## 2021-11-08 NOTE — H&P ADULT - PROBLEM SELECTOR PLAN 2
Reports hx of hiatal hernia. This may represent Reports hx of hiatal hernia. This may contribute to his SOB. Home med pantoprazole 20  -pantoprazole 20 Worse edema over past 2 months. 2+ pitting on exam. elevated BNP however EF 55-60%. Home lasix 40qd  -Continue furosemide 40mg QD Worse edema over past 2 months. 2+ pitting on exam. elevated BNP,grade 2 diastolic dysf. EF 55-60%. Home lasix 40qd  -Continue furosemide 40mg QD

## 2021-11-08 NOTE — H&P ADULT - HISTORY OF PRESENT ILLNESS
HPI  69 yo male PMHx AF (Eliquis), hiatal hernia, CAD s/p CABG (2005), R renal txp (tacro), Obesity, DM2 (A1C 6.7), HTN, HLD, BPH, CLL, MGUS, referred to the ED by Dr Leyva for possible thoracentesis with Dr Lora of R sided pleural effusion seen on 10/29 CT. Was recently admitted to [facility] for PNA discharged with supplemental O2 as needed. Has SOB and dyspnea on exertion notably after crossing the street, needing to pause to catch his breath. LE edema worse over the past several months for which his PCP increased furosemide dosing. Reports he has always used 2 pillows at night, denying orthopnea, and wheezing at night. Patient denies snoring, waking up at night but suffers from narcolepsy and recently attended a sleep study for [facility]. Up to date with vaccinations: flu, shigles, pneumo. Covid vaccine in April. Denies dysphagia. Denies chest pain, cough, fever, night sweats, weight loss, nausea, diarrhea, dysuria, sick contacts.    In the ED:    - VS: Tmax: , HR:  , BP:  , RR: , O2: %     - Pertinent Labs:     - Imaging: CXR: CT: US: Cath: EKG:     - Treatment/interventions:     Meds: See med rec  Allergies:  Social: see below     HPI  69 yo male PMHx AF (Eliquis), hiatal hernia, CAD s/p CABG (2005), R renal txp (tacro), Obesity, DM2 (A1C 6.7), HTN, HLD, BPH, CLL, MGUS, referred to the ED by Dr Leyva for possible thoracentesis with Dr Lora of R sided pleural effusion seen on 10/29 CT. Was recently admitted to [facility] for PNA discharged with supplemental O2 as needed. Reports O2 between 90-97 at home and uses supplemental O2 if pulse ox read is low, but has not used it for SOB. Has SOB and dyspnea on exertion notably after crossing the street, needing to pause to catch his breath. He attributes this to his hiatal hernia and endorses pressure at the epigastric area a/w feeling bloated. LE edema worse over the past several months for which his PCP increased furosemide dosing.  Reports he has always used 2 pillows at night, denying orthopnea, and wheezing at night. Patient denies snoring, waking up at night but suffers from narcolepsy and recently attended a sleep study for [facility]. Up to date with vaccinations: flu, shingles, pneumo. Covid vaccine in April. Denies dysphagia. Denies chest pain, cough, fever, night sweats, weight loss, nausea, diarrhea, dysuria, sick contacts.    In the ED:    - VS: Tmax: 97.7, HR: 79, BP: 156/73, RR: 18, O2: 96%     - Pertinent Labs: WBC 18.03 Hb 9.3, Cr 1.31. BNP 1683. Covid PCR Neg     - Imaging: CXR: Cardiomegaly. Mediastinal widening. Bilateral opacities/right pleural effusion.  CT: moderate right pleural effusion with right basilar atelectasis. Persistent bulky thoracic and upper dominant adenopathy    Echo: EF 55-60%, Pulm artery pressure 50mmHg Cath: EKG: Afib vs "undetermined rhythm"     - Treatment/interventions: None    Meds: See med rec  Allergies:  Social: see below     HPI  69 yo male PMHx AF (Eliquis), hiatal hernia, CAD s/p CABG (2005), R renal txp (tacro), Obesity, DM2 (A1C 6.7), HTN, HLD, BPH, CLL, MGUS, referred to the ED by Dr Leyva for possible thoracentesis with Dr Lora of R sided pleural effusion seen on 10/29 CT. Was recently admitted to [facility] for PNA discharged with supplemental O2 as needed. Reports O2 between 90-97 at home and uses supplemental O2 if pulse ox read is low, but has not used it for SOB. Has SOB and dyspnea on exertion notably after crossing the street, needing to pause to catch his breath. He attributes this to his hiatal hernia and endorses pressure at the epigastric area a/w feeling bloated. LE edema worse over the past several months for which his PCP increased furosemide dosing.  Reports he has always used 2 pillows at night, denying orthopnea, and wheezing at night. Patient denies snoring, waking up at night but suffers from narcolepsy and recently attended a sleep study for [facility]. Up to date with vaccinations: flu, shingles, pneumo. Covid vaccine in April. Endorses 25 pound weight gain this year attributed to staying at home and eating more during the pandemic. Denies dysphagia. Denies chest pain, cough, fever, night sweats, weight loss, nausea, diarrhea, dysuria, sick contacts.    In the ED:    - VS: Tmax: 97.7, HR: 79, BP: 156/73, RR: 18, O2: 96%     - Pertinent Labs: WBC 18.03 Hb 9.3, Cr 1.31. BNP 1683. Covid PCR Neg     - Imaging: CXR: Cardiomegaly. Mediastinal widening. Bilateral opacities/right pleural effusion.  CT: moderate right pleural effusion with right basilar atelectasis. Persistent bulky thoracic and upper dominant adenopathy    Echo: EF 55-60%, Pulm artery pressure 50mmHg Cath: EKG: Afib vs "undetermined rhythm"     - Treatment/interventions: None    Meds: See med rec  Allergies:  Social: see below     HPI  69 yo male PMHx AF (Eliquis), hiatal hernia, CAD s/p CABG (2005), R renal txp (tacro), Obesity, DM2 (A1C 6.7), HTN, HLD, BPH, CLL, MGUS, referred to the ED by Dr Leyva for possible thoracentesis with Dr Lora of R sided pleural effusion seen on 10/29 CT. Was recently admitted to [facility] for PNA discharged with supplemental O2 as needed. Reports O2 between 90-97 at home and uses supplemental O2 if pulse ox read is low, but has not used it for SOB. Has SOB and dyspnea on exertion notably after crossing the street, needing to pause to catch his breath. He attributes this to his hiatal hernia and endorses pressure at the epigastric area a/w feeling bloated. LE edema worse over the past several months for which his PCP increased furosemide dosing.  Reports he has always used 2 pillows at night, denying orthopnea, and wheezing at night. Patient denies snoring, waking up at night but suffers from narcolepsy and recently attended a sleep study for [facility]. Up to date with vaccinations: flu, shingles, pneumo. Covid vaccine in April. Endorses 25 pound weight gain this year attributed to staying at home and eating more during the pandemic. Denies dysphagia. Denies chest pain, cough, fever, night sweats, weight loss, nausea, diarrhea, dysuria, sick contacts.    In the ED:    - VS: Tmax: 97.7, HR: 79, BP: 156/73, RR: 18, O2: 96%     - Pertinent Labs: WBC 18.03 Hb 9.3, Cr 1.31. BNP 1683. Covid PCR Neg     - Imaging: CXR: Cardiomegaly. Mediastinal widening. Bilateral opacities/right pleural effusion.  CT: moderate right pleural effusion with right basilar atelectasis. Persistent bulky thoracic and upper dominant adenopathy    Echo: EF 55-60%, Grade II diastolic dysfunction  EKG: Afib vs "undetermined rhythm"     - Treatment/interventions: None    Meds: See med rec  Allergies:  Social: see below

## 2021-11-08 NOTE — H&P ADULT - PROBLEM SELECTOR PLAN 10
Fluids: None  Electrolytes: replete as necessary, K>4, Mg>2  Nutrition: consistent carb kosher  Bowel Regimen: None  DVT ppx: SCD (holding eliquis)  GI ppx: Pantoprazole  Code: Full/DNR/DNI  Disposition: medical floor Last admission lantus 25. At  home uses sliding scale require 20-40 units before each meal. A1c 6.7.  -25 Lantus  -moderate sliding scale

## 2021-11-08 NOTE — H&P ADULT - ASSESSMENT
71 yo male PMHx AF (Eliquis), hiatal hernia, CAD s/p CABG (2005), R renal txp (tacro), Obesity, DM2 (A1C 6.7), HTN, HLD, BPH, CLL, MGUS, referred to the ED by Dr Leyva for possible thoracentesis with Dr Lora of R sided pleural effusion seen on 10/29 CT after 2 months of worsened LE edema and dyspnea on exertion.

## 2021-11-08 NOTE — H&P ADULT - PROBLEM SELECTOR PLAN 4
CABG 2005. Has HTN HLD, DM  -Atorvastatin 40 while inpatient (home rosuvostatin 10) Home meds: Norvasc 10mg, Imdur 30mg  -Continue home meds

## 2021-11-08 NOTE — ED PROVIDER NOTE - NS ED ROS FT
Constitutional: No fever or chills.   Eyes: No pain, blurry vision, or discharge.  ENMT: No hearing changes, pain, discharge or infections. No neck pain or stiffness.  Cardiac: No chest pain. No chest pain with exertion. + SORIA, worsening b/l LE edema  Respiratory: No cough or respiratory distress. No hemoptysis. No history of asthma or RAD.  GI: No nausea, vomiting, diarrhea or abdominal pain.  : No dysuria, frequency or burning.  MS: No myalgia, muscle weakness, joint pain or back pain.  Neuro: No headache or weakness. No LOC.  Skin: No skin rash.   Endocrine: No history of thyroid disease or diabetes.  Except as documented in the HPI, all other systems are negative.

## 2021-11-08 NOTE — H&P ADULT - PROBLEM SELECTOR PLAN 1
While afebrile and denies cough, recent admission for PNA, effusion may represent empyema. Transudative effusion is more likely i/s/o worsened edema. CLL and MGUS with lymph node on imaging would be concerning for malignant effusion.  -Thoracentesis tomorrow  -Trend CBC While afebrile and denies cough, recent admission for PNA, effusion may represent empyema with reactive lymph nodes. Transudative effusion is possible given worsened edema however EF 55-60%. CLL and MGUS history with CT findings of lymphadenopathy is concerning for malignant effusion.  -Thoracentesis tomorrow  -Possible lymph node biopsy tomorrow  -Hold eliquis for procedure  -Trend CBC    Reports baseline wbc 14 While afebrile and denies cough, recent admission for PNA, effusion may represent empyema with reactive lymph nodes. Transudative effusion is possible given worsened edema and elevated BNP however EF 55-60%. CLL and MGUS history with CT findings of lymphadenopathy is concerning for malignant effusion.  -Thoracentesis tomorrow  -Possible lymph node biopsy tomorrow  -Hold eliquis for procedure  -Trend CBC  -Holding lasix i/s/o elevated Cr    Reports baseline wbc 14 While afebrile and denies cough, recent admission for PNA, effusion may represent empyema with reactive lymph nodes. Transudative effusion is possible given worsened edema and elevated BNP however EF 55-60%. CLL and MGUS history with CT findings of lymphadenopathy is concerning for malignant effusion.  -Thoracentesis tomorrow  -Possible lymph node biopsy tomorrow  -Hold eliquis for procedure  -Trend CBC      Reports baseline wbc 14 While afebrile and denies cough, recent admission for PNA, effusion possible. reactive lymph nodes. Transudative effusion is possible given worsened edema and elevated BNP with grade II diastolic dysf seen on Echo however EF 55-60%. CLL and MGUS history with CT findings of lymphadenopathy is concerning for malignant effusion.  -Thoracentesis tomorrow  -Possible lymph node biopsy tomorrow  -Hold eliquis for procedure  -Trend CBC      Reports baseline wbc 14

## 2021-11-09 LAB
A1C WITH ESTIMATED AVERAGE GLUCOSE RESULT: 6.8 % — HIGH (ref 4–5.6)
ANION GAP SERPL CALC-SCNC: 9 MMOL/L — SIGNIFICANT CHANGE UP (ref 5–17)
APTT BLD: 40.5 SEC — HIGH (ref 27.5–35.5)
BASOPHILS # BLD AUTO: 0 K/UL — SIGNIFICANT CHANGE UP (ref 0–0.2)
BASOPHILS NFR BLD AUTO: 0 % — SIGNIFICANT CHANGE UP (ref 0–2)
BLD GP AB SCN SERPL QL: NEGATIVE — SIGNIFICANT CHANGE UP
BUN SERPL-MCNC: 33 MG/DL — HIGH (ref 7–23)
CALCIUM SERPL-MCNC: 9.1 MG/DL — SIGNIFICANT CHANGE UP (ref 8.4–10.5)
CHLORIDE SERPL-SCNC: 98 MMOL/L — SIGNIFICANT CHANGE UP (ref 96–108)
CO2 SERPL-SCNC: 23 MMOL/L — SIGNIFICANT CHANGE UP (ref 22–31)
COVID-19 NUCLEOCAPSID GAM AB INTERP: POSITIVE
COVID-19 NUCLEOCAPSID TOTAL GAM ANTIBODY RESULT: 1.55 INDEX — HIGH
COVID-19 SPIKE DOMAIN AB INTERP: POSITIVE
COVID-19 SPIKE DOMAIN ANTIBODY RESULT: >250 U/ML — HIGH
CREAT SERPL-MCNC: 1.34 MG/DL — HIGH (ref 0.5–1.3)
DACRYOCYTES BLD QL SMEAR: SLIGHT — SIGNIFICANT CHANGE UP
EOSINOPHIL # BLD AUTO: 0 K/UL — SIGNIFICANT CHANGE UP (ref 0–0.5)
EOSINOPHIL NFR BLD AUTO: 0 % — SIGNIFICANT CHANGE UP (ref 0–6)
ESTIMATED AVERAGE GLUCOSE: 148 MG/DL — HIGH (ref 68–114)
GLUCOSE BLDC GLUCOMTR-MCNC: 169 MG/DL — HIGH (ref 70–99)
GLUCOSE BLDC GLUCOMTR-MCNC: 184 MG/DL — HIGH (ref 70–99)
GLUCOSE BLDC GLUCOMTR-MCNC: 276 MG/DL — HIGH (ref 70–99)
GLUCOSE BLDC GLUCOMTR-MCNC: 277 MG/DL — HIGH (ref 70–99)
GLUCOSE SERPL-MCNC: 188 MG/DL — HIGH (ref 70–99)
HCT VFR BLD CALC: 29.2 % — LOW (ref 39–50)
HGB BLD-MCNC: 8.8 G/DL — LOW (ref 13–17)
HYPOCHROMIA BLD QL: SLIGHT — SIGNIFICANT CHANGE UP
INR BLD: 1.5 — HIGH (ref 0.88–1.16)
LYMPHOCYTES # BLD AUTO: 13.35 K/UL — HIGH (ref 1–3.3)
LYMPHOCYTES # BLD AUTO: 76 % — HIGH (ref 13–44)
MANUAL SMEAR VERIFICATION: SIGNIFICANT CHANGE UP
MCHC RBC-ENTMCNC: 28.6 PG — SIGNIFICANT CHANGE UP (ref 27–34)
MCHC RBC-ENTMCNC: 30.1 GM/DL — LOW (ref 32–36)
MCV RBC AUTO: 94.8 FL — SIGNIFICANT CHANGE UP (ref 80–100)
MONOCYTES # BLD AUTO: 0.33 K/UL — SIGNIFICANT CHANGE UP (ref 0–0.9)
MONOCYTES NFR BLD AUTO: 1.9 % — LOW (ref 2–14)
NEUTROPHILS # BLD AUTO: 3.71 K/UL — SIGNIFICANT CHANGE UP (ref 1.8–7.4)
NEUTROPHILS NFR BLD AUTO: 21.1 % — LOW (ref 43–77)
OVALOCYTES BLD QL SMEAR: SIGNIFICANT CHANGE UP
PLAT MORPH BLD: ABNORMAL
PLATELET # BLD AUTO: 143 K/UL — LOW (ref 150–400)
POIKILOCYTOSIS BLD QL AUTO: SLIGHT — SIGNIFICANT CHANGE UP
POLYCHROMASIA BLD QL SMEAR: SLIGHT — SIGNIFICANT CHANGE UP
POTASSIUM SERPL-MCNC: 5.1 MMOL/L — SIGNIFICANT CHANGE UP (ref 3.5–5.3)
POTASSIUM SERPL-SCNC: 5.1 MMOL/L — SIGNIFICANT CHANGE UP (ref 3.5–5.3)
PROTHROM AB SERPL-ACNC: 17.6 SEC — HIGH (ref 10.6–13.6)
RBC # BLD: 3.08 M/UL — LOW (ref 4.2–5.8)
RBC # FLD: 16 % — HIGH (ref 10.3–14.5)
RBC BLD AUTO: ABNORMAL
RH IG SCN BLD-IMP: POSITIVE — SIGNIFICANT CHANGE UP
SARS-COV-2 IGG+IGM SERPL QL IA: 1.55 INDEX — HIGH
SARS-COV-2 IGG+IGM SERPL QL IA: >250 U/ML — HIGH
SARS-COV-2 IGG+IGM SERPL QL IA: POSITIVE
SARS-COV-2 IGG+IGM SERPL QL IA: POSITIVE
SMUDGE CELLS # BLD: PRESENT — SIGNIFICANT CHANGE UP
SODIUM SERPL-SCNC: 130 MMOL/L — LOW (ref 135–145)
SPHEROCYTES BLD QL SMEAR: SLIGHT — SIGNIFICANT CHANGE UP
VARIANT LYMPHS # BLD: 1 % — SIGNIFICANT CHANGE UP (ref 0–6)
WBC # BLD: 17.56 K/UL — HIGH (ref 3.8–10.5)
WBC # FLD AUTO: 17.56 K/UL — HIGH (ref 3.8–10.5)

## 2021-11-09 PROCEDURE — 99222 1ST HOSP IP/OBS MODERATE 55: CPT | Mod: GC

## 2021-11-09 PROCEDURE — 99232 SBSQ HOSP IP/OBS MODERATE 35: CPT | Mod: GC

## 2021-11-09 RX ORDER — SODIUM CHLORIDE 9 MG/ML
1000 INJECTION, SOLUTION INTRAVENOUS
Refills: 0 | Status: DISCONTINUED | OUTPATIENT
Start: 2021-11-09 | End: 2021-11-12

## 2021-11-09 RX ORDER — AMLODIPINE BESYLATE 2.5 MG/1
10 TABLET ORAL EVERY 24 HOURS
Refills: 0 | Status: DISCONTINUED | OUTPATIENT
Start: 2021-11-09 | End: 2021-11-12

## 2021-11-09 RX ORDER — DEXTROSE 50 % IN WATER 50 %
15 SYRINGE (ML) INTRAVENOUS ONCE
Refills: 0 | Status: DISCONTINUED | OUTPATIENT
Start: 2021-11-09 | End: 2021-11-12

## 2021-11-09 RX ORDER — FUROSEMIDE 40 MG
1 TABLET ORAL
Qty: 0 | Refills: 0 | DISCHARGE

## 2021-11-09 RX ORDER — METFORMIN HYDROCHLORIDE 850 MG/1
1 TABLET ORAL
Qty: 0 | Refills: 0 | DISCHARGE

## 2021-11-09 RX ORDER — DEXTROSE 50 % IN WATER 50 %
25 SYRINGE (ML) INTRAVENOUS ONCE
Refills: 0 | Status: DISCONTINUED | OUTPATIENT
Start: 2021-11-09 | End: 2021-11-12

## 2021-11-09 RX ORDER — TACROLIMUS 5 MG/1
1 CAPSULE ORAL
Qty: 0 | Refills: 0 | DISCHARGE

## 2021-11-09 RX ORDER — ALPRAZOLAM 0.25 MG
1 TABLET ORAL
Qty: 0 | Refills: 0 | DISCHARGE

## 2021-11-09 RX ORDER — DEXTROSE 50 % IN WATER 50 %
12.5 SYRINGE (ML) INTRAVENOUS ONCE
Refills: 0 | Status: DISCONTINUED | OUTPATIENT
Start: 2021-11-09 | End: 2021-11-12

## 2021-11-09 RX ORDER — INSULIN LISPRO 100/ML
VIAL (ML) SUBCUTANEOUS
Refills: 0 | Status: DISCONTINUED | OUTPATIENT
Start: 2021-11-09 | End: 2021-11-12

## 2021-11-09 RX ORDER — INSULIN LISPRO 100/ML
3 VIAL (ML) SUBCUTANEOUS ONCE
Refills: 0 | Status: COMPLETED | OUTPATIENT
Start: 2021-11-09 | End: 2021-11-09

## 2021-11-09 RX ORDER — FUROSEMIDE 40 MG
40 TABLET ORAL EVERY 24 HOURS
Refills: 0 | Status: DISCONTINUED | OUTPATIENT
Start: 2021-11-09 | End: 2021-11-12

## 2021-11-09 RX ADMIN — ATORVASTATIN CALCIUM 40 MILLIGRAM(S): 80 TABLET, FILM COATED ORAL at 21:59

## 2021-11-09 RX ADMIN — INSULIN GLARGINE 25 UNIT(S): 100 INJECTION, SOLUTION SUBCUTANEOUS at 22:48

## 2021-11-09 RX ADMIN — TAMSULOSIN HYDROCHLORIDE 0.4 MILLIGRAM(S): 0.4 CAPSULE ORAL at 21:59

## 2021-11-09 RX ADMIN — PANTOPRAZOLE SODIUM 20 MILLIGRAM(S): 20 TABLET, DELAYED RELEASE ORAL at 06:01

## 2021-11-09 RX ADMIN — Medication 3 UNIT(S): at 18:35

## 2021-11-09 RX ADMIN — ISOSORBIDE MONONITRATE 30 MILLIGRAM(S): 60 TABLET, EXTENDED RELEASE ORAL at 11:48

## 2021-11-09 RX ADMIN — Medication 2: at 22:51

## 2021-11-09 RX ADMIN — Medication 6: at 18:34

## 2021-11-09 RX ADMIN — Medication 6: at 12:33

## 2021-11-09 RX ADMIN — Medication 2: at 08:29

## 2021-11-09 RX ADMIN — Medication 0.25 MILLIGRAM(S): at 23:57

## 2021-11-09 RX ADMIN — Medication 40 MILLIGRAM(S): at 15:15

## 2021-11-09 RX ADMIN — TACROLIMUS 1 MILLIGRAM(S): 5 CAPSULE ORAL at 21:59

## 2021-11-09 RX ADMIN — AMLODIPINE BESYLATE 10 MILLIGRAM(S): 2.5 TABLET ORAL at 15:15

## 2021-11-09 RX ADMIN — TACROLIMUS 2 MILLIGRAM(S): 5 CAPSULE ORAL at 06:01

## 2021-11-09 RX ADMIN — VALGANCICLOVIR 450 MILLIGRAM(S): 450 TABLET, FILM COATED ORAL at 10:13

## 2021-11-09 NOTE — CONSULT NOTE ADULT - ATTENDING COMMENTS
I: SBP 160s   Cr 1.34 s/p renal transplant.   A: Stable CKD. HTN uncontrolled.   P: Follow SCr. Continue tacrolimus. Follow levels. Await workup of effusion. Continue BP meds.

## 2021-11-09 NOTE — CONSULT NOTE ADULT - ASSESSMENT
69 yo male PMHx AF (Eliquis), hiatal hernia, CAD s/p CABG (2005), R renal txp (tacro), Obesity, DM2 (A1C 6.7), HTN, HLD, BPH, CLL, MGUS, referred to the ED by Dr Leyva for possible thoracentesis with Dr Lora of R sided pleural effusion seen on 10/29 CT after 2 months of worsened LE edema and dyspnea on exertion.  Nephrology following for LATRICIA and history of renal transplant     #LATRICIA  -Please obtain UA, urine Na, urine creatinine, and urine urea   -Trend Scr while in patient   -Avoid nephrotoxic agents   -Renally dose medications   -Monitor UOP    #R Renal Transplant (2017)  -Continue tacrolimus 2 mg at 6 AM and 1 mg at 6 PM  -Please obtain tacrolimus level at 5:30 PM     Plan of care discussed with Dr. Araujo   Recommendations are considered final after attending attestation

## 2021-11-09 NOTE — PHYSICAL THERAPY INITIAL EVALUATION ADULT - PERTINENT HX OF CURRENT PROBLEM, REHAB EVAL
71 yo male PMHx AF (Eliquis), hiatal hernia, CAD s/p CABG (2005), R renal txp (tacro), Obesity, DM2 (A1C 6.7), HTN, HLD, BPH, CLL, MGUS, referred to the ED by Dr Leyva for possible thoracentesis with Dr oLra of R sided pleural effusion seen on 10/29 CT. Was recently admitted to [facility] for PNA discharged with supplemental O2 as needed. Reports O2 between 90-97 at home and uses supplemental O2 if pulse ox read is low, but has not used it for SOB.

## 2021-11-09 NOTE — PHYSICAL THERAPY INITIAL EVALUATION ADULT - ADDITIONAL COMMENTS
Pt lives in a private home with 3 steps to enter and has family present. Pt denies use of DME prior to admission.

## 2021-11-09 NOTE — PROGRESS NOTE ADULT - SUBJECTIVE AND OBJECTIVE BOX
OVERNIGHT EVENTS:    SUBJECTIVE / INTERVAL HPI: Patient seen and examined at bedside.     VITAL SIGNS:  Vital Signs Last 24 Hrs  T(C): 36.8 (09 Nov 2021 06:04), Max: 36.8 (09 Nov 2021 06:04)  T(F): 98.3 (09 Nov 2021 06:04), Max: 98.3 (09 Nov 2021 06:04)  HR: 74 (09 Nov 2021 06:04) (58 - 82)  BP: 160/72 (09 Nov 2021 06:04) (148/73 - 160/72)  BP(mean): --  RR: 17 (09 Nov 2021 06:04) (17 - 18)  SpO2: 95% (09 Nov 2021 06:04) (94% - 96%)    PHYSICAL EXAM:    General: Well developed, well nourished, no acute distress  HEENT: NC/AT; PERRL, anicteric sclera; MMM  Neck: supple  Cardiovascular: +S1/S2, RRR, no murmurs, rubs, gallops  Respiratory: CTA B/L; no W/R/R  Gastrointestinal: soft, NT/ND; +BSx4  Extremities: WWP; no edema, clubbing or cyanosis  Vascular: 2+ radial, DP/PT pulses B/L  Neurological: AAOx3; no focal deficits    MEDICATIONS:  MEDICATIONS  (STANDING):  amLODIPine   Tablet 10 milliGRAM(s) Oral daily  atorvastatin 40 milliGRAM(s) Oral at bedtime  dextrose 40% Gel 15 Gram(s) Oral once  dextrose 5%. 1000 milliLiter(s) (50 mL/Hr) IV Continuous <Continuous>  dextrose 5%. 1000 milliLiter(s) (100 mL/Hr) IV Continuous <Continuous>  dextrose 50% Injectable 12.5 Gram(s) IV Push once  dextrose 50% Injectable 25 Gram(s) IV Push once  dextrose 50% Injectable 25 Gram(s) IV Push once  furosemide    Tablet 40 milliGRAM(s) Oral daily  glucagon  Injectable 1 milliGRAM(s) IntraMuscular once  insulin glargine Injectable (LANTUS) 25 Unit(s) SubCutaneous at bedtime  insulin lispro (ADMELOG) corrective regimen sliding scale   SubCutaneous Before meals and at bedtime  isosorbide   mononitrate ER Tablet (IMDUR) 30 milliGRAM(s) Oral daily  pantoprazole    Tablet 20 milliGRAM(s) Oral before breakfast  tacrolimus 2 milliGRAM(s) Oral every 24 hours  tacrolimus 1 milliGRAM(s) Oral every 24 hours  tamsulosin 0.4 milliGRAM(s) Oral at bedtime  valGANciclovir 450 milliGRAM(s) Oral once  valGANciclovir 450 milliGRAM(s) Oral daily    MEDICATIONS  (PRN):  acetaminophen     Tablet .. 650 milliGRAM(s) Oral every 6 hours PRN Temp greater or equal to 38C (100.4F), Mild Pain (1 - 3)  ALPRAZolam 0.25 milliGRAM(s) Oral at bedtime PRN insomnia  melatonin 3 milliGRAM(s) Oral at bedtime PRN Insomnia      ALLERGIES:  Allergies    No Known Allergies    Intolerances        LABS:                        8.8    17.56 )-----------( 143      ( 09 Nov 2021 06:13 )             29.2     11-08    131<L>  |  98  |  28<H>  ----------------------------<  152<H>  4.8   |  24  |  1.32<H>    Ca    9.5      08 Nov 2021 14:10      PT/INR - ( 09 Nov 2021 06:13 )   PT: 17.6 sec;   INR: 1.50          PTT - ( 09 Nov 2021 06:13 )  PTT:40.5 sec    CAPILLARY BLOOD GLUCOSE      POCT Blood Glucose.: 158 mg/dL (08 Nov 2021 22:30)      RADIOLOGY & ADDITIONAL TESTS: Reviewed.    PLAN:  OVERNIGHT EVENTS: ALEXANDER    SUBJECTIVE / INTERVAL HPI: Patient seen and examined at bedside.     VITAL SIGNS:  Vital Signs Last 24 Hrs  T(C): 36.8 (09 Nov 2021 06:04), Max: 36.8 (09 Nov 2021 06:04)  T(F): 98.3 (09 Nov 2021 06:04), Max: 98.3 (09 Nov 2021 06:04)  HR: 74 (09 Nov 2021 06:04) (58 - 82)  BP: 160/72 (09 Nov 2021 06:04) (148/73 - 160/72)  BP(mean): --  RR: 17 (09 Nov 2021 06:04) (17 - 18)  SpO2: 95% (09 Nov 2021 06:04) (94% - 96%)    PHYSICAL EXAM:    General: Well developed, well nourished, no acute distress  HEENT: NC/AT; PERRL, anicteric sclera; MMM  Neck: supple  Cardiovascular: +S1/S2, RRR, no murmurs, rubs, gallops  Respiratory: CTA B/L; no W/R/R  Gastrointestinal: soft, NT/ND; +BSx4  Extremities: WWP; no edema, clubbing or cyanosis  Vascular: 2+ radial, DP/PT pulses B/L  Neurological: AAOx3; no focal deficits    MEDICATIONS:  MEDICATIONS  (STANDING):  amLODIPine   Tablet 10 milliGRAM(s) Oral daily  atorvastatin 40 milliGRAM(s) Oral at bedtime  dextrose 40% Gel 15 Gram(s) Oral once  dextrose 5%. 1000 milliLiter(s) (50 mL/Hr) IV Continuous <Continuous>  dextrose 5%. 1000 milliLiter(s) (100 mL/Hr) IV Continuous <Continuous>  dextrose 50% Injectable 12.5 Gram(s) IV Push once  dextrose 50% Injectable 25 Gram(s) IV Push once  dextrose 50% Injectable 25 Gram(s) IV Push once  furosemide    Tablet 40 milliGRAM(s) Oral daily  glucagon  Injectable 1 milliGRAM(s) IntraMuscular once  insulin glargine Injectable (LANTUS) 25 Unit(s) SubCutaneous at bedtime  insulin lispro (ADMELOG) corrective regimen sliding scale   SubCutaneous Before meals and at bedtime  isosorbide   mononitrate ER Tablet (IMDUR) 30 milliGRAM(s) Oral daily  pantoprazole    Tablet 20 milliGRAM(s) Oral before breakfast  tacrolimus 2 milliGRAM(s) Oral every 24 hours  tacrolimus 1 milliGRAM(s) Oral every 24 hours  tamsulosin 0.4 milliGRAM(s) Oral at bedtime  valGANciclovir 450 milliGRAM(s) Oral once  valGANciclovir 450 milliGRAM(s) Oral daily    MEDICATIONS  (PRN):  acetaminophen     Tablet .. 650 milliGRAM(s) Oral every 6 hours PRN Temp greater or equal to 38C (100.4F), Mild Pain (1 - 3)  ALPRAZolam 0.25 milliGRAM(s) Oral at bedtime PRN insomnia  melatonin 3 milliGRAM(s) Oral at bedtime PRN Insomnia      ALLERGIES:  Allergies    No Known Allergies    Intolerances        LABS:                        8.8    17.56 )-----------( 143      ( 09 Nov 2021 06:13 )             29.2     11-08    131<L>  |  98  |  28<H>  ----------------------------<  152<H>  4.8   |  24  |  1.32<H>    Ca    9.5      08 Nov 2021 14:10      PT/INR - ( 09 Nov 2021 06:13 )   PT: 17.6 sec;   INR: 1.50          PTT - ( 09 Nov 2021 06:13 )  PTT:40.5 sec    CAPILLARY BLOOD GLUCOSE      POCT Blood Glucose.: 158 mg/dL (08 Nov 2021 22:30)      RADIOLOGY & ADDITIONAL TESTS: Reviewed.    PLAN:  OVERNIGHT EVENTS: ALEXANDER    SUBJECTIVE / INTERVAL HPI: Patient seen and examined at bedside. Denied cough. Was not able to sleep last night    VITAL SIGNS:  Vital Signs Last 24 Hrs  T(C): 36.8 (09 Nov 2021 06:04), Max: 36.8 (09 Nov 2021 06:04)  T(F): 98.3 (09 Nov 2021 06:04), Max: 98.3 (09 Nov 2021 06:04)  HR: 74 (09 Nov 2021 06:04) (58 - 82)  BP: 160/72 (09 Nov 2021 06:04) (148/73 - 160/72)  BP(mean): --  RR: 17 (09 Nov 2021 06:04) (17 - 18)  SpO2: 95% (09 Nov 2021 06:04) (94% - 96%)    PHYSICAL EXAM:    Constitutional: Obese elderly male in NAD.  HEENT: PERRL, EOMI, sclera non-icteric, neck supple, trachea midline, no masses, no JVD, MMM, good dentition  Respiratory: Decreased breath sounds R lung base. Crackles L lung base. No accessory muscle use or intercostal retractions  Cardiovascular: RRR, normal S1S2, no M/R/G  Gastrointestinal: soft, NTND, no masses palpable, BS normal  Extremities: Warm, well perfused, pulses equal bilateral upper and lower extremities. 2-3+ pitting edema.  Neurological: AAOx3, CN Grossly intact  Skin: Normal temperature, warm    MEDICATIONS:  MEDICATIONS  (STANDING):  amLODIPine   Tablet 10 milliGRAM(s) Oral daily  atorvastatin 40 milliGRAM(s) Oral at bedtime  dextrose 40% Gel 15 Gram(s) Oral once  dextrose 5%. 1000 milliLiter(s) (50 mL/Hr) IV Continuous <Continuous>  dextrose 5%. 1000 milliLiter(s) (100 mL/Hr) IV Continuous <Continuous>  dextrose 50% Injectable 12.5 Gram(s) IV Push once  dextrose 50% Injectable 25 Gram(s) IV Push once  dextrose 50% Injectable 25 Gram(s) IV Push once  furosemide    Tablet 40 milliGRAM(s) Oral daily  glucagon  Injectable 1 milliGRAM(s) IntraMuscular once  insulin glargine Injectable (LANTUS) 25 Unit(s) SubCutaneous at bedtime  insulin lispro (ADMELOG) corrective regimen sliding scale   SubCutaneous Before meals and at bedtime  isosorbide   mononitrate ER Tablet (IMDUR) 30 milliGRAM(s) Oral daily  pantoprazole    Tablet 20 milliGRAM(s) Oral before breakfast  tacrolimus 2 milliGRAM(s) Oral every 24 hours  tacrolimus 1 milliGRAM(s) Oral every 24 hours  tamsulosin 0.4 milliGRAM(s) Oral at bedtime  valGANciclovir 450 milliGRAM(s) Oral once  valGANciclovir 450 milliGRAM(s) Oral daily    MEDICATIONS  (PRN):  acetaminophen     Tablet .. 650 milliGRAM(s) Oral every 6 hours PRN Temp greater or equal to 38C (100.4F), Mild Pain (1 - 3)  ALPRAZolam 0.25 milliGRAM(s) Oral at bedtime PRN insomnia  melatonin 3 milliGRAM(s) Oral at bedtime PRN Insomnia      ALLERGIES:  Allergies    No Known Allergies    Intolerances        LABS:                        8.8    17.56 )-----------( 143      ( 09 Nov 2021 06:13 )             29.2     11-08    131<L>  |  98  |  28<H>  ----------------------------<  152<H>  4.8   |  24  |  1.32<H>    Ca    9.5      08 Nov 2021 14:10      PT/INR - ( 09 Nov 2021 06:13 )   PT: 17.6 sec;   INR: 1.50          PTT - ( 09 Nov 2021 06:13 )  PTT:40.5 sec    CAPILLARY BLOOD GLUCOSE      POCT Blood Glucose.: 158 mg/dL (08 Nov 2021 22:30)      RADIOLOGY & ADDITIONAL TESTS: Reviewed.    PLAN:

## 2021-11-09 NOTE — CONSULT NOTE ADULT - SUBJECTIVE AND OBJECTIVE BOX
NEPHROLOGY CONSULTATION NOTE    Brief HPI:  69 yo male PMHx AF (Eliquis), hiatal hernia, CAD s/p CABG (2005), R renal txp (tacro), Obesity, DM2 (A1C 6.7), HTN, HLD, BPH, CLL, MGUS, referred to the ED by Dr Leyva for possible thoracentesis with Dr Lora of R sided pleural effusion seen on 10/29 CT. Was recently admitted to [facility] for PNA discharged with supplemental O2 as needed. Reports O2 between 90-97 at home and uses supplemental O2 if pulse ox read is low, but has not used it for SOB. Has SOB and dyspnea on exertion notably after crossing the street, needing to pause to catch his breath. He attributes this to his hiatal hernia and endorses pressure at the epigastric area a/w feeling bloated. LE edema worse over the past several months for which his PCP increased furosemide dosing.  Reports he has always used 2 pillows at night, denying orthopnea, and wheezing at night. Patient denies snoring, waking up at night but suffers from narcolepsy and recently attended a sleep study for JAYANT. Up to date with vaccinations: flu, shingles, pneumo. Covid vaccine in April. Endorses 25 pound weight gain this year attributed to staying at home and eating more during the pandemic. Denies dysphagia. Denies chest pain, cough, fever, night sweats, weight loss, nausea, diarrhea, dysuria, sick contacts.    In the ED:    - VS: Tmax: 97.7, HR: 79, BP: 156/73, RR: 18, O2: 96%     - Pertinent Labs: WBC 18.03 Hb 9.3, Cr 1.31. BNP 1683. Covid PCR Neg     - Imaging: CXR: Cardiomegaly. Mediastinal widening. Bilateral opacities/right pleural effusion.  CT: moderate right pleural effusion with right basilar atelectasis. Persistent bulky thoracic and upper dominant adenopathy    Echo: EF 55-60%, Grade II diastolic dysfunction  EKG: Afib vs "undetermined rhythm"     - Treatment/interventions: None    Meds: See med rec  Allergies:  Social: see below      PAST MEDICAL & SURGICAL HISTORY:  Barretts esophagus    CRI (chronic renal insufficiency)    DM (diabetes mellitus)    GERD (gastroesophageal reflux disease)    HTN (hypertension)    Monoclonal gammopathy    Mandibular abscess    Paroxysmal atrial fibrillation    Benign prostatic hyperplasia, presence of lower urinary tract symptoms unspecified, unspecified morphology    Other hyperlipidemia    Hyperlipidemia    S/P CABG (coronary artery bypass graft)  15 years    History of surgery  cyst removal    Mandibular abscess      Allergies:  No Known Allergies    Home Medications Reviewed  Hospital Medications:   MEDICATIONS  (STANDING):  amLODIPine   Tablet 10 milliGRAM(s) Oral daily  atorvastatin 40 milliGRAM(s) Oral at bedtime  dextrose 40% Gel 15 Gram(s) Oral once  dextrose 5%. 1000 milliLiter(s) (50 mL/Hr) IV Continuous <Continuous>  dextrose 5%. 1000 milliLiter(s) (100 mL/Hr) IV Continuous <Continuous>  dextrose 50% Injectable 25 Gram(s) IV Push once  dextrose 50% Injectable 12.5 Gram(s) IV Push once  dextrose 50% Injectable 25 Gram(s) IV Push once  furosemide    Tablet 40 milliGRAM(s) Oral daily  glucagon  Injectable 1 milliGRAM(s) IntraMuscular once  insulin glargine Injectable (LANTUS) 25 Unit(s) SubCutaneous at bedtime  insulin lispro (ADMELOG) corrective regimen sliding scale   SubCutaneous Before meals and at bedtime  isosorbide   mononitrate ER Tablet (IMDUR) 30 milliGRAM(s) Oral daily  pantoprazole    Tablet 20 milliGRAM(s) Oral before breakfast  tacrolimus 2 milliGRAM(s) Oral every 24 hours  tacrolimus 1 milliGRAM(s) Oral every 24 hours  tamsulosin 0.4 milliGRAM(s) Oral at bedtime  valGANciclovir 450 milliGRAM(s) Oral once  valGANciclovir 450 milliGRAM(s) Oral daily      SOCIAL HISTORY:  Denies ETOH,Smoking,   FAMILY HISTORY:        REVIEW OF SYSTEMS:  CONSTITUTIONAL: No weakness, fevers or chills  EYES/ENT: No visual changes;  No vertigo or throat pain   NECK: No pain or stiffness  RESPIRATORY: No cough, wheezing, hemoptysis; No shortness of breath  CARDIOVASCULAR: No chest pain or palpitations.  GASTROINTESTINAL: No abdominal or epigastric pain. No nausea, vomiting, or hematemesis; No diarrhea or constipation. No melena or hematochezia.  GENITOURINARY: No dysuria, frequency, foamy urine, urinary urgency, incontinence or hematuria  NEUROLOGICAL: No numbness or weakness  SKIN: No itching, burning, rashes, or lesions   VASCULAR: No bilateral lower extremity edema.   All other review of systems is negative unless indicated above.    VITALS:  T(F): 98.3 (11-09-21 @ 06:04), Max: 98.3 (11-09-21 @ 06:04)  HR: 74 (11-09-21 @ 06:04)  BP: 160/72 (11-09-21 @ 06:04)  RR: 17 (11-09-21 @ 06:04)  SpO2: 95% (11-09-21 @ 06:04)    Height (cm): 167.6 (11-08 @ 19:52)  Weight (kg): 102.3 (11-08 @ 19:52)  BMI (kg/m2): 36.4 (11-08 @ 19:52)  BSA (m2): 2.1 (11-08 @ 19:52)    I&O's Detail    PHYSICAL EXAM:  Constitutional: Obese elderly male in NAD.  HEENT: PERRL, EOMI, sclera non-icteric, neck supple, trachea midline, no masses, no JVD, MMM, good dentition  Respiratory: Decreased breath sounds R lung base. Crackles L lung base. No accessory muscle use or intercostal retractions  Cardiovascular: RRR, normal S1S2, no M/R/G  Gastrointestinal: soft, NTND, no masses palpable, BS normal  Extremities: Warm, well perfused, pulses equal bilateral upper and lower extremities. 2-3+ pitting edema.  Neurological: AAOx3, CN Grossly intact  Skin: Normal temperature, warm, dry    LABS:  11-09    130<L>  |  98  |  33<H>  ----------------------------<  188<H>  5.1   |  23  |  1.34<H>    Ca    9.1      09 Nov 2021 06:13      Creatinine Trend: 1.34 <--, 1.32 <--                        8.8    17.56 )-----------( 143      ( 09 Nov 2021 06:13 )             29.2     Urine Studies:              RADIOLOGY & ADDITIONAL STUDIES: Reviewed

## 2021-11-09 NOTE — CHART NOTE - NSCHARTNOTEFT_GEN_A_CORE
Infectious Diseases Anti-infective Approval Note    Medication: Valganciclovir   Dose: 450mg  Route: PO   Frequency: daily   Duration**: 7 days     *THIS IS NOT AN INFECTIOUS DISEASES CONSULTATION*    **Indicates duration of approval, not necessarily duration of treatment**

## 2021-11-09 NOTE — PROGRESS NOTE ADULT - PROBLEM SELECTOR PLAN 1
While afebrile and denies cough, recent admission for PNA, effusion possible. reactive lymph nodes. Transudative effusion is possible given worsened edema and elevated BNP with grade II diastolic dysf seen on Echo however EF 55-60%. CLL and MGUS history with CT findings of lymphadenopathy is concerning for malignant effusion.  -Thoracentesis tomorrow  -Possible lymph node biopsy tomorrow  -Hold eliquis for procedure  -Trend CBC      Reports baseline wbc 14 While afebrile and denies cough, recent admission for PNA, effusion possible. reactive lymph nodes. Transudative effusion is possible given worsened edema and elevated BNP with grade II diastolic dysf seen on Echo however EF 55-60%. CLL and MGUS history with CT findings of lymphadenopathy is concerning for malignant effusion.  -Thoracentesis tomorrow  -Possible lymph node biopsy tomorrow  -Hold eliquis for procedure  -Trend CBC

## 2021-11-10 ENCOUNTER — TRANSCRIPTION ENCOUNTER (OUTPATIENT)
Age: 71
End: 2021-11-10

## 2021-11-10 LAB
ANION GAP SERPL CALC-SCNC: 11 MMOL/L — SIGNIFICANT CHANGE UP (ref 5–17)
ANISOCYTOSIS BLD QL: SLIGHT — SIGNIFICANT CHANGE UP
APTT BLD: 40.3 SEC — HIGH (ref 27.5–35.5)
BASOPHILS # BLD AUTO: 0 K/UL — SIGNIFICANT CHANGE UP (ref 0–0.2)
BASOPHILS NFR BLD AUTO: 0 % — SIGNIFICANT CHANGE UP (ref 0–2)
BUN SERPL-MCNC: 28 MG/DL — HIGH (ref 7–23)
CALCIUM SERPL-MCNC: 9.5 MG/DL — SIGNIFICANT CHANGE UP (ref 8.4–10.5)
CHLORIDE SERPL-SCNC: 96 MMOL/L — SIGNIFICANT CHANGE UP (ref 96–108)
CO2 SERPL-SCNC: 24 MMOL/L — SIGNIFICANT CHANGE UP (ref 22–31)
CREAT SERPL-MCNC: 1.1 MG/DL — SIGNIFICANT CHANGE UP (ref 0.5–1.3)
DACRYOCYTES BLD QL SMEAR: SLIGHT — SIGNIFICANT CHANGE UP
EOSINOPHIL # BLD AUTO: 0.15 K/UL — SIGNIFICANT CHANGE UP (ref 0–0.5)
EOSINOPHIL NFR BLD AUTO: 1 % — SIGNIFICANT CHANGE UP (ref 0–6)
GLUCOSE BLDC GLUCOMTR-MCNC: 139 MG/DL — HIGH (ref 70–99)
GLUCOSE BLDC GLUCOMTR-MCNC: 155 MG/DL — HIGH (ref 70–99)
GLUCOSE BLDC GLUCOMTR-MCNC: 234 MG/DL — HIGH (ref 70–99)
GLUCOSE BLDC GLUCOMTR-MCNC: 262 MG/DL — HIGH (ref 70–99)
GLUCOSE BLDC GLUCOMTR-MCNC: 315 MG/DL — HIGH (ref 70–99)
GLUCOSE SERPL-MCNC: 134 MG/DL — HIGH (ref 70–99)
HCT VFR BLD CALC: 30.8 % — LOW (ref 39–50)
HGB BLD-MCNC: 9.4 G/DL — LOW (ref 13–17)
HYPOCHROMIA BLD QL: SIGNIFICANT CHANGE UP
INR BLD: 1.32 — HIGH (ref 0.88–1.16)
LYMPHOCYTES # BLD AUTO: 11.7 K/UL — HIGH (ref 1–3.3)
LYMPHOCYTES # BLD AUTO: 75.9 % — HIGH (ref 13–44)
MANUAL SMEAR VERIFICATION: SIGNIFICANT CHANGE UP
MCHC RBC-ENTMCNC: 28.5 PG — SIGNIFICANT CHANGE UP (ref 27–34)
MCHC RBC-ENTMCNC: 30.5 GM/DL — LOW (ref 32–36)
MCV RBC AUTO: 93.3 FL — SIGNIFICANT CHANGE UP (ref 80–100)
MONOCYTES # BLD AUTO: 0.14 K/UL — SIGNIFICANT CHANGE UP (ref 0–0.9)
MONOCYTES NFR BLD AUTO: 0.9 % — LOW (ref 2–14)
NEUTROPHILS # BLD AUTO: 3.42 K/UL — SIGNIFICANT CHANGE UP (ref 1.8–7.4)
NEUTROPHILS NFR BLD AUTO: 22.2 % — LOW (ref 43–77)
OVALOCYTES BLD QL SMEAR: SLIGHT — SIGNIFICANT CHANGE UP
PHOSPHATE SERPL-MCNC: 4.6 MG/DL — HIGH (ref 2.5–4.5)
PLAT MORPH BLD: ABNORMAL
PLATELET # BLD AUTO: 139 K/UL — LOW (ref 150–400)
POIKILOCYTOSIS BLD QL AUTO: SLIGHT — SIGNIFICANT CHANGE UP
POTASSIUM SERPL-MCNC: 4.4 MMOL/L — SIGNIFICANT CHANGE UP (ref 3.5–5.3)
POTASSIUM SERPL-SCNC: 4.4 MMOL/L — SIGNIFICANT CHANGE UP (ref 3.5–5.3)
PROTHROM AB SERPL-ACNC: 15.6 SEC — HIGH (ref 10.6–13.6)
RBC # BLD: 3.3 M/UL — LOW (ref 4.2–5.8)
RBC # FLD: 16 % — HIGH (ref 10.3–14.5)
RBC BLD AUTO: ABNORMAL
SMUDGE CELLS # BLD: PRESENT — SIGNIFICANT CHANGE UP
SODIUM SERPL-SCNC: 131 MMOL/L — LOW (ref 135–145)
TACROLIMUS SERPL-MCNC: 6.5 NG/ML — SIGNIFICANT CHANGE UP
WBC # BLD: 15.41 K/UL — HIGH (ref 3.8–10.5)
WBC # FLD AUTO: 15.41 K/UL — HIGH (ref 3.8–10.5)

## 2021-11-10 PROCEDURE — 99233 SBSQ HOSP IP/OBS HIGH 50: CPT | Mod: GC

## 2021-11-10 PROCEDURE — 94726 PLETHYSMOGRAPHY LUNG VOLUMES: CPT | Mod: 26

## 2021-11-10 PROCEDURE — 70490 CT SOFT TISSUE NECK W/O DYE: CPT | Mod: 26

## 2021-11-10 PROCEDURE — 94010 BREATHING CAPACITY TEST: CPT | Mod: 26,59

## 2021-11-10 PROCEDURE — 99232 SBSQ HOSP IP/OBS MODERATE 35: CPT | Mod: GC

## 2021-11-10 PROCEDURE — 94729 DIFFUSING CAPACITY: CPT | Mod: 26

## 2021-11-10 RX ORDER — INSULIN LISPRO 100/ML
3 VIAL (ML) SUBCUTANEOUS ONCE
Refills: 0 | Status: DISCONTINUED | OUTPATIENT
Start: 2021-11-10 | End: 2021-11-12

## 2021-11-10 RX ADMIN — TACROLIMUS 2 MILLIGRAM(S): 5 CAPSULE ORAL at 07:14

## 2021-11-10 RX ADMIN — PANTOPRAZOLE SODIUM 20 MILLIGRAM(S): 20 TABLET, DELAYED RELEASE ORAL at 07:15

## 2021-11-10 RX ADMIN — TACROLIMUS 1 MILLIGRAM(S): 5 CAPSULE ORAL at 21:48

## 2021-11-10 RX ADMIN — TAMSULOSIN HYDROCHLORIDE 0.4 MILLIGRAM(S): 0.4 CAPSULE ORAL at 21:49

## 2021-11-10 RX ADMIN — ISOSORBIDE MONONITRATE 30 MILLIGRAM(S): 60 TABLET, EXTENDED RELEASE ORAL at 11:54

## 2021-11-10 RX ADMIN — Medication 8: at 18:00

## 2021-11-10 RX ADMIN — INSULIN GLARGINE 25 UNIT(S): 100 INJECTION, SOLUTION SUBCUTANEOUS at 23:02

## 2021-11-10 RX ADMIN — VALGANCICLOVIR 450 MILLIGRAM(S): 450 TABLET, FILM COATED ORAL at 10:17

## 2021-11-10 RX ADMIN — Medication 6: at 22:54

## 2021-11-10 RX ADMIN — Medication 4: at 12:41

## 2021-11-10 RX ADMIN — AMLODIPINE BESYLATE 10 MILLIGRAM(S): 2.5 TABLET ORAL at 15:02

## 2021-11-10 RX ADMIN — ATORVASTATIN CALCIUM 40 MILLIGRAM(S): 80 TABLET, FILM COATED ORAL at 21:49

## 2021-11-10 RX ADMIN — Medication 40 MILLIGRAM(S): at 15:02

## 2021-11-10 NOTE — DIETITIAN INITIAL EVALUATION ADULT. - PROBLEM SELECTOR PLAN 10
Last admission lantus 25. At  home uses sliding scale require 20-40 units before each meal. A1c 6.7.  -25 Lantus  -moderate sliding scale

## 2021-11-10 NOTE — DISCHARGE NOTE PROVIDER - NSDCMRMEDTOKEN_GEN_ALL_CORE_FT
Eliquis 5 mg oral tablet: 1 tab(s) orally 2 times a day  Flomax 0.4 mg oral capsule: 1 cap(s) orally once a day (at bedtime)  furosemide 40 mg oral tablet: 1 tab(s) orally once a day  HumaLOG 100 units/mL subcutaneous solution: Using as sliding scale based on pre-meal BG three times a day, usually between 24-32 units.   Imdur 30 mg oral tablet, extended release: 1 tab(s) orally once a day (in the morning)  Norvasc 10 mg oral tablet: 1 tab(s) orally once a day  pantoprazole 20 mg oral delayed release tablet: 1 tab(s) orally once a day  rosuvastatin 10 mg oral tablet: 1 tab(s) orally once a day (at bedtime)  tacrolimus 1 mg oral capsule: 2 cap(s) orally in AM  1 caps in PM  valGANciclovir 450 mg oral tablet: 1 tab(s) orally once a day  Xanax 0.25 mg oral tablet: 1 tab(s) orally every 8 hours, As Needed

## 2021-11-10 NOTE — PROGRESS NOTE ADULT - SUBJECTIVE AND OBJECTIVE BOX
OVERNIGHT EVENTS:    SUBJECTIVE / INTERVAL HPI: Patient seen and examined at bedside.     VITAL SIGNS:  Vital Signs Last 24 Hrs  T(C): 36.3 (10 Nov 2021 05:42), Max: 36.7 (09 Nov 2021 22:00)  T(F): 97.3 (10 Nov 2021 05:42), Max: 98 (09 Nov 2021 22:00)  HR: 72 (10 Nov 2021 05:42) (68 - 81)  BP: 157/82 (10 Nov 2021 05:42) (139/69 - 157/82)  BP(mean): --  RR: 17 (10 Nov 2021 05:42) (16 - 17)  SpO2: 91% (10 Nov 2021 05:42) (91% - 98%)    PHYSICAL EXAM:    General: Well developed, well nourished, no acute distress  HEENT: NC/AT; PERRL, anicteric sclera; MMM  Neck: supple  Cardiovascular: +S1/S2, RRR, no murmurs, rubs, gallops  Respiratory: CTA B/L; no W/R/R  Gastrointestinal: soft, NT/ND; +BSx4  Extremities: WWP; no edema, clubbing or cyanosis  Vascular: 2+ radial, DP/PT pulses B/L  Neurological: AAOx3; no focal deficits    MEDICATIONS:  MEDICATIONS  (STANDING):  amLODIPine   Tablet 10 milliGRAM(s) Oral every 24 hours  atorvastatin 40 milliGRAM(s) Oral at bedtime  dextrose 40% Gel 15 Gram(s) Oral once  dextrose 5%. 1000 milliLiter(s) (50 mL/Hr) IV Continuous <Continuous>  dextrose 5%. 1000 milliLiter(s) (100 mL/Hr) IV Continuous <Continuous>  dextrose 50% Injectable 25 Gram(s) IV Push once  dextrose 50% Injectable 12.5 Gram(s) IV Push once  dextrose 50% Injectable 25 Gram(s) IV Push once  furosemide    Tablet 40 milliGRAM(s) Oral every 24 hours  glucagon  Injectable 1 milliGRAM(s) IntraMuscular once  insulin glargine Injectable (LANTUS) 25 Unit(s) SubCutaneous at bedtime  insulin lispro (ADMELOG) corrective regimen sliding scale   SubCutaneous Before meals and at bedtime  isosorbide   mononitrate ER Tablet (IMDUR) 30 milliGRAM(s) Oral daily  pantoprazole    Tablet 20 milliGRAM(s) Oral before breakfast  tacrolimus 2 milliGRAM(s) Oral every 24 hours  tacrolimus 1 milliGRAM(s) Oral every 24 hours  tamsulosin 0.4 milliGRAM(s) Oral at bedtime  valGANciclovir 450 milliGRAM(s) Oral every 24 hours    MEDICATIONS  (PRN):  acetaminophen     Tablet .. 650 milliGRAM(s) Oral every 6 hours PRN Temp greater or equal to 38C (100.4F), Mild Pain (1 - 3)  ALPRAZolam 0.25 milliGRAM(s) Oral at bedtime PRN insomnia  melatonin 3 milliGRAM(s) Oral at bedtime PRN Insomnia      ALLERGIES:  Allergies    No Known Allergies    Intolerances        LABS:                        8.8    17.56 )-----------( 143      ( 09 Nov 2021 06:13 )             29.2     11-09    130<L>  |  98  |  33<H>  ----------------------------<  188<H>  5.1   |  23  |  1.34<H>    Ca    9.1      09 Nov 2021 06:13      PT/INR - ( 09 Nov 2021 06:13 )   PT: 17.6 sec;   INR: 1.50          PTT - ( 09 Nov 2021 06:13 )  PTT:40.5 sec    CAPILLARY BLOOD GLUCOSE      POCT Blood Glucose.: 139 mg/dL (10 Nov 2021 09:50)      RADIOLOGY & ADDITIONAL TESTS: Reviewed.    PLAN:  OVERNIGHT EVENTS: ALEXANDER. Has been concerned about not getting enough insulin while inpatient which has been discussed at length several times. Patient has been checking his FSG frequently    SUBJECTIVE / INTERVAL HPI: Patient seen and examined at bedside. Denied Cp. States breathing and epigastric pressure has resolved.     VITAL SIGNS:  Vital Signs Last 24 Hrs  T(C): 36.3 (10 Nov 2021 05:42), Max: 36.7 (09 Nov 2021 22:00)  T(F): 97.3 (10 Nov 2021 05:42), Max: 98 (09 Nov 2021 22:00)  HR: 72 (10 Nov 2021 05:42) (68 - 81)  BP: 157/82 (10 Nov 2021 05:42) (139/69 - 157/82)  BP(mean): --  RR: 17 (10 Nov 2021 05:42) (16 - 17)  SpO2: 91% (10 Nov 2021 05:42) (91% - 98%)    PHYSICAL EXAM:    Constitutional: Obese elderly male in NAD.  HEENT: PERRL, EOMI, sclera non-icteric, neck supple, trachea midline, no masses, no JVD, MMM, good dentition  Respiratory: Decreased breath sounds R lung base. Crackles L lung base. No accessory muscle use or intercostal retractions  Cardiovascular: RRR, normal S1S2, no M/R/G  Gastrointestinal: soft, NTND, no masses palpable, BS normal  Extremities: Warm, well perfused, pulses equal bilateral upper and lower extremities. 2-3+ pitting edema.  Neurological: AAOx3, CN Grossly intact  Skin: Normal temperature, warm      MEDICATIONS:  MEDICATIONS  (STANDING):  amLODIPine   Tablet 10 milliGRAM(s) Oral every 24 hours  atorvastatin 40 milliGRAM(s) Oral at bedtime  dextrose 40% Gel 15 Gram(s) Oral once  dextrose 5%. 1000 milliLiter(s) (50 mL/Hr) IV Continuous <Continuous>  dextrose 5%. 1000 milliLiter(s) (100 mL/Hr) IV Continuous <Continuous>  dextrose 50% Injectable 25 Gram(s) IV Push once  dextrose 50% Injectable 12.5 Gram(s) IV Push once  dextrose 50% Injectable 25 Gram(s) IV Push once  furosemide    Tablet 40 milliGRAM(s) Oral every 24 hours  glucagon  Injectable 1 milliGRAM(s) IntraMuscular once  insulin glargine Injectable (LANTUS) 25 Unit(s) SubCutaneous at bedtime  insulin lispro (ADMELOG) corrective regimen sliding scale   SubCutaneous Before meals and at bedtime  isosorbide   mononitrate ER Tablet (IMDUR) 30 milliGRAM(s) Oral daily  pantoprazole    Tablet 20 milliGRAM(s) Oral before breakfast  tacrolimus 2 milliGRAM(s) Oral every 24 hours  tacrolimus 1 milliGRAM(s) Oral every 24 hours  tamsulosin 0.4 milliGRAM(s) Oral at bedtime  valGANciclovir 450 milliGRAM(s) Oral every 24 hours    MEDICATIONS  (PRN):  acetaminophen     Tablet .. 650 milliGRAM(s) Oral every 6 hours PRN Temp greater or equal to 38C (100.4F), Mild Pain (1 - 3)  ALPRAZolam 0.25 milliGRAM(s) Oral at bedtime PRN insomnia  melatonin 3 milliGRAM(s) Oral at bedtime PRN Insomnia      ALLERGIES:  Allergies    No Known Allergies    Intolerances        LABS:                        8.8    17.56 )-----------( 143      ( 09 Nov 2021 06:13 )             29.2     11-09    130<L>  |  98  |  33<H>  ----------------------------<  188<H>  5.1   |  23  |  1.34<H>    Ca    9.1      09 Nov 2021 06:13      PT/INR - ( 09 Nov 2021 06:13 )   PT: 17.6 sec;   INR: 1.50          PTT - ( 09 Nov 2021 06:13 )  PTT:40.5 sec    CAPILLARY BLOOD GLUCOSE      POCT Blood Glucose.: 139 mg/dL (10 Nov 2021 09:50)      RADIOLOGY & ADDITIONAL TESTS: Reviewed.    PLAN:

## 2021-11-10 NOTE — DISCHARGE NOTE PROVIDER - CARE PROVIDERS DIRECT ADDRESSES
[email or web address]

## 2021-11-10 NOTE — DIETITIAN INITIAL EVALUATION ADULT. - PROBLEM SELECTOR PLAN 3
Reports hx of hiatal hernia. This may contribute to his SOB. Home med pantoprazole 20  -pantoprazole 20

## 2021-11-10 NOTE — DIETITIAN INITIAL EVALUATION ADULT. - PROBLEM SELECTOR PLAN 1
While afebrile and denies cough, recent admission for PNA, effusion possible. reactive lymph nodes. Transudative effusion is possible given worsened edema and elevated BNP with grade II diastolic dysf seen on Echo however EF 55-60%. CLL and MGUS history with CT findings of lymphadenopathy is concerning for malignant effusion.  -Thoracentesis tomorrow  -Possible lymph node biopsy tomorrow  -Hold eliquis for procedure  -Trend CBC      Reports baseline wbc 14

## 2021-11-10 NOTE — DISCHARGE NOTE PROVIDER - NSDCCPCAREPLAN_GEN_ALL_CORE_FT
PRINCIPAL DISCHARGE DIAGNOSIS  Diagnosis: Pleural effusion  Assessment and Plan of Treatment: Your lungs are covered with 2 thin layers of tissue called the pleura. One layer of tissue is on the lung and the other lines the chest cavity. In between these layers is a small space that is most often filled with a small amount of fluid. The fluid allows the 2 layers to glide over one another when you breathe in and out. If there is too much fluid in this space it is a pleural effusion. It may be caused by heart failure or a lung injury. Other times, it is caused by a lung infection like pneumonia or the flu. Kidney and liver diseases as well as other conditions can also cause pleural effusions. The extra fluid makes it harder to breathe. The doctors will try to get rid of the extra fluid and treat what is causing the fluid to build up in the pleural space. Please see your primary care doctor and pulmonologist in 1-2 weeks for a post-hospitalization follow up.  Please seek medical care if you have any of the following:  -Coughing up blood  -Chest pain. If chest pain suddenly gets worse, it may be an emergency.  -Shortness of breath  -Fever of 100.4°F (38°C) or higher, or as directed by your healthcare provider  -Pain that doesn't get better after taking pain medicine  -Signs of infection at the puncture site. These include increased pain, redness, swelling, or warmth.  -Fluid draining from the puncture site       PRINCIPAL DISCHARGE DIAGNOSIS  Diagnosis: Pleural effusion  Assessment and Plan of Treatment: Your lungs are covered with 2 thin layers of tissue called the pleura. One layer of tissue is on the lung and the other lines the chest cavity. In between these layers is a small space that is most often filled with a small amount of fluid. The fluid allows the 2 layers to glide over one another when you breathe in and out. If there is too much fluid in this space it is a pleural effusion. It may be caused by heart failure or a lung injury. Other times, it is caused by a lung infection like pneumonia or the flu. Kidney and liver diseases as well as other conditions can also cause pleural effusions. The extra fluid makes it harder to breathe. The doctors will try to get rid of the extra fluid and treat what is causing the fluid to build up in the pleural space. Please see your primary care doctor and Dr. Lora in 2 weeks for a post-hospitalization follow up.  Please seek medical care if you have any of the following:  -Coughing up blood  -Chest pain. If chest pain suddenly gets worse, it may be an emergency.  -Shortness of breath  -Fever of 100.4°F (38°C) or higher, or as directed by your healthcare provider  -Pain that doesn't get better after taking pain medicine  -Signs of infection at the puncture site. These include increased pain, redness, swelling, or warmth.  -Fluid draining from the puncture site      SECONDARY DISCHARGE DIAGNOSES  Diagnosis: Lymphadenopathy  Assessment and Plan of Treatment: A CT scan was performed and showed enlargement of lymph nodes. Lymph nodes can be enlarged for many reasons. One possibility is that the enlarged nodes are related to your CLL. The ENT (otolaryngologists) team performed a biopsy of lymph nodes in the neck. Based on the results, the pulmonologists will create their treatment approach to the pleural effusion. Please make an appointment with Dr. Beltran in 1-2 weeks to discuss the biopsy results when they come back.

## 2021-11-10 NOTE — PROGRESS NOTE ADULT - SUBJECTIVE AND OBJECTIVE BOX
Nephrology Progress Note:    INTERVAL HPI/OVERNIGHT EVENTS: f      On further ROS, patient did not have complaint of: Headache, Blurred Vision, Cough, Dyspnea, Palpitations, Abdominal Pain, N/V, Weakness, Change in Sensation.     VITAL SIGNS:  T(F): 97.3 (11-10-21 @ 05:42)  HR: 72 (11-10-21 @ 05:42)  BP: 157/82 (11-10-21 @ 05:42)  RR: 17 (11-10-21 @ 05:42)  SpO2: 91% (11-10-21 @ 05:42)  Wt(kg): --    Input & Output:      PHYSICAL EXAM:  Constitutional: Patient seated comfortably in bed, of appropriate color, nutrition, and hydration.   HEENT: PERRLA, Normal Range of eye movement with no complaint of diplopia, Normal Hearing  Neck: No LAD, No JVD  Back: Normal spine flexure, No CVA tenderness  Respiratory: Normal air entry, Lungs clear to auscultation b/l w/o wheeze or crepitations.   Cardiovascular: S1 and S2 present - no additional abnormal sounds or murmurs. Normal rhythm and rate of pulse.   Gastrointestinal: BS+, soft, NT/ND  Extremities: No peripheral edema  Vascular: 2+ peripheral pulses  Neurological: A/O x 3, CN V-XII grossly intact.  Psychiatric: Normal mood, normal affect  Musculoskeletal: 5/5 strength b/l upper and lower extremities  Skin: No rashes    MEDICATIONS  (STANDING):  amLODIPine   Tablet 10 milliGRAM(s) Oral every 24 hours  atorvastatin 40 milliGRAM(s) Oral at bedtime  dextrose 40% Gel 15 Gram(s) Oral once  dextrose 5%. 1000 milliLiter(s) (50 mL/Hr) IV Continuous <Continuous>  dextrose 5%. 1000 milliLiter(s) (100 mL/Hr) IV Continuous <Continuous>  dextrose 50% Injectable 25 Gram(s) IV Push once  dextrose 50% Injectable 12.5 Gram(s) IV Push once  dextrose 50% Injectable 25 Gram(s) IV Push once  furosemide    Tablet 40 milliGRAM(s) Oral every 24 hours  glucagon  Injectable 1 milliGRAM(s) IntraMuscular once  insulin glargine Injectable (LANTUS) 25 Unit(s) SubCutaneous at bedtime  insulin lispro (ADMELOG) corrective regimen sliding scale   SubCutaneous Before meals and at bedtime  isosorbide   mononitrate ER Tablet (IMDUR) 30 milliGRAM(s) Oral daily  pantoprazole    Tablet 20 milliGRAM(s) Oral before breakfast  tacrolimus 2 milliGRAM(s) Oral every 24 hours  tacrolimus 1 milliGRAM(s) Oral every 24 hours  tamsulosin 0.4 milliGRAM(s) Oral at bedtime  valGANciclovir 450 milliGRAM(s) Oral every 24 hours    MEDICATIONS  (PRN):  acetaminophen     Tablet .. 650 milliGRAM(s) Oral every 6 hours PRN Temp greater or equal to 38C (100.4F), Mild Pain (1 - 3)  ALPRAZolam 0.25 milliGRAM(s) Oral at bedtime PRN insomnia  melatonin 3 milliGRAM(s) Oral at bedtime PRN Insomnia      Allergies    No Known Allergies    Intolerances        LABS:                        8.8    17.56 )-----------( 143      ( 09 Nov 2021 06:13 )             29.2     11-09    130<L>  |  98  |  33<H>  ----------------------------<  188<H>  5.1   |  23  |  1.34<H>    Ca    9.1      09 Nov 2021 06:13      PT/INR - ( 09 Nov 2021 06:13 )   PT: 17.6 sec;   INR: 1.50          PTT - ( 09 Nov 2021 06:13 )  PTT:40.5 sec      RADIOLOGY & ADDITIONAL TESTS:   Nephrology Progress Note:    INTERVAL HPI/OVERNIGHT EVENTS: No acute overnight events, no fever spikes. Patient kept NPO for procedure today. Examined at bedside- in no acute distress. Came back from PFT study. Denies SOB or chest pain.       On further ROS, patient did not have complaint of: Headache, Blurred Vision, Cough, Dyspnea, Palpitations, Abdominal Pain, N/V, Weakness, Change in Sensation.     VITAL SIGNS:  T(F): 97.3 (11-10-21 @ 05:42)  HR: 72 (11-10-21 @ 05:42)  BP: 157/82 (11-10-21 @ 05:42)  RR: 17 (11-10-21 @ 05:42)  SpO2: 91% (11-10-21 @ 05:42)  Wt(kg): --    Input & Output:      PHYSICAL EXAM:  Constitutional: Obese elderly male in NAD.  HEENT: PERRL, EOMI, sclera non-icteric, neck supple, trachea midline, no masses, no JVD, MMM, good dentition  Respiratory: Decreased breath sounds R lung base. Crackles L lung base. No accessory muscle use or intercostal retractions  Cardiovascular: RRR, normal S1S2, no M/R/G  Gastrointestinal: soft, NTND, no masses palpable, BS normal  Extremities: Warm, well perfused, pulses equal bilateral upper and lower extremities. 2-3+ pitting edema.  Neurological: AAOx3, CN Grossly intact  Skin: Normal temperature, warm, dry      MEDICATIONS  (STANDING):  amLODIPine   Tablet 10 milliGRAM(s) Oral every 24 hours  atorvastatin 40 milliGRAM(s) Oral at bedtime  dextrose 40% Gel 15 Gram(s) Oral once  dextrose 5%. 1000 milliLiter(s) (50 mL/Hr) IV Continuous <Continuous>  dextrose 5%. 1000 milliLiter(s) (100 mL/Hr) IV Continuous <Continuous>  dextrose 50% Injectable 25 Gram(s) IV Push once  dextrose 50% Injectable 12.5 Gram(s) IV Push once  dextrose 50% Injectable 25 Gram(s) IV Push once  furosemide    Tablet 40 milliGRAM(s) Oral every 24 hours  glucagon  Injectable 1 milliGRAM(s) IntraMuscular once  insulin glargine Injectable (LANTUS) 25 Unit(s) SubCutaneous at bedtime  insulin lispro (ADMELOG) corrective regimen sliding scale   SubCutaneous Before meals and at bedtime  isosorbide   mononitrate ER Tablet (IMDUR) 30 milliGRAM(s) Oral daily  pantoprazole    Tablet 20 milliGRAM(s) Oral before breakfast  tacrolimus 2 milliGRAM(s) Oral every 24 hours  tacrolimus 1 milliGRAM(s) Oral every 24 hours  tamsulosin 0.4 milliGRAM(s) Oral at bedtime  valGANciclovir 450 milliGRAM(s) Oral every 24 hours    MEDICATIONS  (PRN):  acetaminophen     Tablet .. 650 milliGRAM(s) Oral every 6 hours PRN Temp greater or equal to 38C (100.4F), Mild Pain (1 - 3)  ALPRAZolam 0.25 milliGRAM(s) Oral at bedtime PRN insomnia  melatonin 3 milliGRAM(s) Oral at bedtime PRN Insomnia      Allergies    No Known Allergies    Intolerances        LABS:                        8.8    17.56 )-----------( 143      ( 09 Nov 2021 06:13 )             29.2     11-09    130<L>  |  98  |  33<H>  ----------------------------<  188<H>  5.1   |  23  |  1.34<H>    Ca    9.1      09 Nov 2021 06:13      PT/INR - ( 09 Nov 2021 06:13 )   PT: 17.6 sec;   INR: 1.50          PTT - ( 09 Nov 2021 06:13 )  PTT:40.5 sec      RADIOLOGY & ADDITIONAL TESTS:

## 2021-11-10 NOTE — DIETITIAN INITIAL EVALUATION ADULT. - ORAL INTAKE PTA/DIET HISTORY
Patient reported he was well versed with DM and salt restrictions and does avoid concentrated sweets/juice soda.Admitted to gaining weight due to increased portions and being at home a lot during COVID-19.Patient now checks his FS'Alot with new glucometer and HGBA1C has improved.

## 2021-11-10 NOTE — CONSULT NOTE ADULT - SUBJECTIVE AND OBJECTIVE BOX
ENT CONSULT NOTE    HPI: 71 yo male PMHx AF (Eliquis), hiatal hernia, CAD s/p CABG (2005), R renal txp (tacro), Obesity, DM2 (A1C 6.7), HTN, HLD, BPH, CLL, MGUS, referred to the ED by Dr Leyva for possible thoracentesis with Dr Lora of R sided pleural effusion seen on 10/29 CT after 2 months of worsened LE edema and dyspnea on exertion.    ENT consulted for possible cervical lymph node biopsy given CT neck (11/10) findings of "widespread lymphadenopathy in the neck and partially seen chest including the axilla and mediastinum. Findings are most consistent with lymphoproliferative disease, query relapse of CLL, and new compared to PET-CT from five years ago. Additionally, CT scan from 10/29 shows "persistent bulky thoracic and upper dominant adenopathy"    Patient was seen and examined bedside. He was explained in detail the risks vs. benefits of a fine needle aspiration of the lymphadenopathy vs. an excision biopsy in the OR. He understood that the lymphadenopathy, while not emergent, still needs to be performed with relative urgency in order to rule out a relapse of CLL or other disease processes. Patient was reliable and understood the risks and benefits, but still would like to follow up outpatient for a potential biopsy for personal reasons he needs to attend to in the coming days. At this time, he would defer on an inpatient biopsy during this admission.    ICU Vital Signs Last 24 Hrs  T(C): 36.4 (10 Nov 2021 12:55), Max: 36.7 (09 Nov 2021 22:00)  T(F): 97.5 (10 Nov 2021 12:55), Max: 98 (09 Nov 2021 22:00)  HR: 84 (10 Nov 2021 15:00) (72 - 84)  BP: 130/68 (10 Nov 2021 15:00) (124/56 - 157/82)  BP(mean): --  ABP: --  ABP(mean): --  RR: 20 (10 Nov 2021 12:55) (17 - 20)  SpO2: 95% (10 Nov 2021 12:55) (91% - 95%)      PHYSICAL EXAM:    CONSTITUTIONAL: Well nourished, well developed, NON-DYSMORPHIC, and in no acute distress.    HEAD: normocephalic, atraumatic.  NECK: Diffuse lymphadenopathy  RESPIRATORY: Respirations unlabored, no increased work of breathing with use of accessory muscles and retractions. No stridor.  CARDIAC: Warm extremities, no cyanosis.       A/P: 71 yo male PMHx AF (Eliquis), hiatal hernia, CAD s/p CABG (2005), R renal txp (tacro), Obesity, DM2 (A1C 6.7), HTN, HLD, BPH, CLL, MGUS, referred to the ED by Dr Leyva for possible thoracentesis with Dr Lora of R sided pleural effusion seen on 10/29 CT after 2 months of worsened LE edema and dyspnea on exertion.    ENT consulted for possible cervical lymph node biopsy given CT neck (11/10) findings of "widespread lymphadenopathy in the neck and partially seen chest including the axilla and mediastinum. Findings are most consistent with lymphoproliferative disease, query relapse of CLL, and new compared to PET-CT from five years ago. Additionally, CT scan from 10/29 shows "persistent bulky thoracic and upper dominant adenopathy"    Although patient was explained risks and benefits of acquiring an excisional biopsy with relative urgency, patient would like to defer an inpatient workup and would like to follow up with Dr. Beltran next week with the potential for a biopsy in the future afterwards.    - No acute ENT intervention during this admission  - If medically appropriate, primary team can consider US-guided FNA of the lymphadenopathy with IR       ENT CONSULT NOTE    HPI: 71 yo male PMHx AF (Eliquis), hiatal hernia, CAD s/p CABG (2005), R renal txp (tacro), Obesity, DM2 (A1C 6.7), HTN, HLD, BPH, CLL, MGUS, referred to the ED by Dr Leyva for possible thoracentesis with Dr Lora of R sided pleural effusion seen on 10/29 CT after 2 months of worsened LE edema and dyspnea on exertion.    ENT consulted for possible cervical lymph node biopsy given CT neck (11/10) findings of "widespread lymphadenopathy in the neck and partially seen chest including the axilla and mediastinum. Findings are most consistent with lymphoproliferative disease, query relapse of CLL, and new compared to PET-CT from five years ago. Additionally, CT scan from 10/29 shows "persistent bulky thoracic and upper dominant adenopathy"    Patient was seen and examined bedside. He was explained in detail the risks vs. benefits of a fine needle aspiration of the lymphadenopathy vs. an excision biopsy in the OR. He understood that the lymphadenopathy, while not emergent, still needs to be performed with relative urgency in order to rule out a relapse of CLL or other disease processes. Patient was reliable and understood the risks and benefits, but still would like to follow up outpatient for a potential biopsy for personal reasons he needs to attend to in the coming days. At this time, he would defer on an inpatient biopsy during this admission.    ICU Vital Signs Last 24 Hrs  T(C): 36.4 (10 Nov 2021 12:55), Max: 36.7 (09 Nov 2021 22:00)  T(F): 97.5 (10 Nov 2021 12:55), Max: 98 (09 Nov 2021 22:00)  HR: 84 (10 Nov 2021 15:00) (72 - 84)  BP: 130/68 (10 Nov 2021 15:00) (124/56 - 157/82)  BP(mean): --  ABP: --  ABP(mean): --  RR: 20 (10 Nov 2021 12:55) (17 - 20)  SpO2: 95% (10 Nov 2021 12:55) (91% - 95%)      PHYSICAL EXAM:    CONSTITUTIONAL: Well nourished, well developed, NON-DYSMORPHIC, and in no acute distress.    HEAD: normocephalic, atraumatic.  NECK: Diffuse lymphadenopathy  RESPIRATORY: Respirations unlabored, no increased work of breathing with use of accessory muscles and retractions. No stridor.  CARDIAC: Warm extremities, no cyanosis.       A/P: 71 yo male PMHx AF (Eliquis), hiatal hernia, CAD s/p CABG (2005), R renal txp (tacro), Obesity, DM2 (A1C 6.7), HTN, HLD, BPH, CLL, MGUS, referred to the ED by Dr Leyva for possible thoracentesis with Dr Lora of R sided pleural effusion seen on 10/29 CT after 2 months of worsened LE edema and dyspnea on exertion.    ENT consulted for possible cervical lymph node biopsy given CT neck (11/10) findings of "widespread lymphadenopathy in the neck and partially seen chest including the axilla and mediastinum. Findings are most consistent with lymphoproliferative disease, query relapse of CLL, and new compared to PET-CT from five years ago. Additionally, CT scan from 10/29 shows "persistent bulky thoracic and upper dominant adenopathy"    Although patient was explained risks and benefits of acquiring an excisional biopsy with relative urgency, patient would like to defer an inpatient workup and would like to follow up with Dr. Beltran next week with the potential for a biopsy in the future afterwards.    - No acute ENT intervention during this admission  - If medically appropriate, primary team can consider US-guided FNA of the lymphadenopathy with IR  - Follow up with Dr. Roshan Beltran (Head and Neck Surgeon) can be made at [phone]

## 2021-11-10 NOTE — DIETITIAN INITIAL EVALUATION ADULT. - PROBLEM SELECTOR PROBLEM 7
Vaccine Information Statement(s) was given today. This has been reviewed, questions answered, and verbal consent given by Patient for injection(s) and administration of Shingles.      Patient tolerated without incident. See immunization grid for documentation.     Anemia

## 2021-11-10 NOTE — PROGRESS NOTE ADULT - PROBLEM SELECTOR PLAN 1
While afebrile and denies cough, recent admission for PNA, effusion possible. reactive lymph nodes. Transudative effusion is possible given worsened edema and elevated BNP with grade II diastolic dysf seen on Echo however EF 55-60%. CLL and MGUS history with CT findings of lymphadenopathy is concerning for malignant effusion.  -Thoracentesis tomorrow  -Possible lymph node biopsy tomorrow  -Hold eliquis for procedure  -Trend CBC While afebrile and denies cough, recent admission for PNA, effusion possible. reactive lymph nodes. Transudative effusion is possible given worsened edema and elevated BNP with grade II diastolic dysf seen on Echo however EF 55-60%. CLL and MGUS history with CT findings of lymphadenopathy is concerning for malignant effusion.  -Thoracentesis today  -Possible lymph node biopsy  -Resume eliquis after procedure  -Trend CBC

## 2021-11-10 NOTE — DIETITIAN INITIAL EVALUATION ADULT. - PROBLEM SELECTOR PLAN 2
Worse edema over past 2 months. 2+ pitting on exam. elevated BNP,grade 2 diastolic dysf. EF 55-60%. Home lasix 40qd  -Continue furosemide 40mg QD

## 2021-11-10 NOTE — DISCHARGE NOTE PROVIDER - CARE PROVIDER_API CALL
Kofi Leyva (MD)  Internal Medicine; Nephrology  [address], [place] [address]  Phone: [phone]  Fax: [phone]  Established Patient  Follow Up Time: 1 week   Kofi Leyva (MD)  Internal Medicine; Nephrology  [address], [place] [address]  Phone: [phone]  Fax: [phone]  Established Patient  Scheduled Appointment: 12/01/2021 10:40 AM   Kofi Leyva)  Internal Medicine; Nephrology  [address], [address]  [place] [address]  Phone: [phone]  Fax: [phone]  Established Patient  Scheduled Appointment: 12/01/2021 10:40 AM    Roshan Blanchard)  [facility] Surgery; Otolaryngology  [address] [place] [address]  Phone: [phone]  Fax: [phone]  Follow Up Time: 1 week    Marie Lora)  Critical Care Medicine; Pulmonary Disease  [address], [address] [place] [address]  Phone: [phone]  Fax: [phone]  Follow Up Time: 1 week

## 2021-11-10 NOTE — DIETITIAN INITIAL EVALUATION ADULT. - OTHER INFO
69y/o male with history of AFIb, Hiatal hernia(evident in abdomen girth),CAD s/p CABG,Obesity,T2DM,HTN,HLD,BPH and CLL admitted with right sided pleural effusion with worsening LE edema and dyspnea. Possible thorancentesis .Patient denied N/V/D/C or pain .LE edema evident .Patient stated he has lost 8lbs over last few days due to lasix, but admitted to 25lb weight gain in a year. UBW:198lbs(pre-edema),IBW:142lbs +/-10%,158% of IBW.BMI:36.4.Per diet recall ,patient is trying to comply with diet and is motivated to lose more weight due to admitted feeling SOB when walking. RD reviewed prescribed diet Patient receptive to education.

## 2021-11-10 NOTE — DIETITIAN INITIAL EVALUATION ADULT. - PROBLEM SELECTOR PLAN 6
No active treatment. Reports baseline CBC 14. WBC 18 may represent CLL. No signs of infection.  -Trend CBC    #Leukocytosis  -see above

## 2021-11-10 NOTE — DISCHARGE NOTE PROVIDER - NSDCFUSCHEDAPPT_GEN_ALL_CORE_FT
HERB BROWN ; 12/01/2021 ; [facility] Nephro [address]   HERB BROWN ; 01/05/2022 ; [facility] Cardio [facility] [address]

## 2021-11-10 NOTE — DIETITIAN INITIAL EVALUATION ADULT. - PROBLEM SELECTOR PLAN 8
Admission sodium 131. Last admission 133. Etiologies may include furosemide, SIADH, kidney dysfunction

## 2021-11-10 NOTE — DISCHARGE NOTE PROVIDER - NSDCFUADDAPPT_GEN_ALL_CORE_FT
Please bring your Insurance card, Photo ID, Covid-19 vaccination card (if applicable) and Discharge paperwork to the following appointment:    (1) Please follow up with your Nephrology Provider, Dr. Kofi Leyva at [address] [place] [address], [place] [address] on 12/01/2021 at 10:40am.    Appointment was facilitated by Ms. ANTON Lamb, Referral Coordinator.

## 2021-11-10 NOTE — DISCHARGE NOTE PROVIDER - PROVIDER TOKENS
PROVIDER:[TOKEN:[[id number]],FOLLOWUP:[1 week],ESTABLISHEDPATIENT:[T]] PROVIDER:[TOKEN:[[id number]],SCHEDULEDAPPT:[12/01/2021],SCHEDULEDAPPTTIME:[10:40 AM],ESTABLISHEDPATIENT:[T]] PROVIDER:[TOKEN:[[id number]],SCHEDULEDAPPT:[12/01/2021],SCHEDULEDAPPTTIME:[10:40 AM],ESTABLISHEDPATIENT:[T]],PROVIDER:[TOKEN:[[id number]],FOLLOWUP:[1 week]],PROVIDER:[TOKEN:[[id number]],FOLLOWUP:[1 week]]

## 2021-11-10 NOTE — DISCHARGE NOTE PROVIDER - HOSPITAL COURSE
#Discharge: do not delete    69 yo male PMHx AF (Eliquis), hiatal hernia, CAD s/p CABG (2005), R renal txp (tacro), Obesity, DM2 (A1C 6.7), HTN, HLD, BPH, CLL, MGUS, referred to the ED by Dr Leyva thoracentesis with Dr Lora of R sided pleural effusion seen on 10/29 CT after 2 months of worsened LE edema and dyspnea on exertion. Shortness of breath and chest discomfort may have been multifactorial: 2/2 pleural effusion and hiatal hernia.     Problem List/Main Diagnoses:   #Pleural effusion.   While afebrile and denies cough, recent admission for PNA, effusion possible. reactive lymph nodes. Transudative effusion is possible given worsened edema and elevated BNP with grade II diastolic dysf seen on Echo however EF 55-60%. CLL and MGUS history with CT findings of lymphadenopathy may consider malignant effusion.  -Thoracentesis  -Possible lymph node biopsy tomorrow  -Hold eliquis for procedure  -Trend CBC.    #Leg edema.   Worse edema over past 2 months. 2+ pitting on exam. elevated BNP,grade 2 diastolic dysf. EF 55-60%. Home lasix 40qd  -Continue furosemide 40mg QD  -Follow up with PCP    #Hiatal hernia with GERD.   Reports hx of hiatal hernia. This may contribute to his SOB. Home med pantoprazole 20  -pantoprazole 20.    #Hypertension.   Home meds: Norvasc 10mg, Imdur 30mg  -Continue home meds.    #Coronary artery disease.   CABG 2005. Has HTN HLD, DM  -Atorvastatin 40 while inpatient (home rosuvostatin 10).    #Chronic lymphocytic leukemia.   No active treatment. Reports baseline CBC 14. WBC 18 may represent CLL. No signs of infection.  - Outpatient follow up with heme/onc    #Anemia.   Hb 9.3. Likely anemia of chronic disease vs malignancy additionally due to poor renal function  -f/u iron studies and retic.    #Hyponatremia.   Admission sodium 131. Last admission 133. Etiologies may include furosemide, SIADH, kidney dysfunction.    #Kidney transplant recipient.   No signs of rejection. Home regimen below. October Cr 1.1   -Tacrolimus 2mg AM, 1mg PM  -Valgancyclovir 450 QD.    #Diabetes mellitus.   Last admission lantus 25. At  home uses sliding scale require 20-40 units before each meal. A1c 6.7.  -Inpatient: 25 Lantus and moderate sliding scale  -Resume home regimen after discharge    New medications/therapies: None   New lines/hardware: None  Labs to be followed outpatient: None  Exam to be followed outpatient: None    Discharge plan: discharge to home     #Discharge: do not delete    71 yo male PMHx AF (Eliquis), hiatal hernia, CAD s/p CABG (2005), R renal txp (tacro), Obesity, DM2 (A1C 6.7), HTN, HLD, BPH, CLL, MGUS, referred to the ED by Dr Leyva thoracentesis with Dr Lora of R sided pleural effusion seen on 10/29 CT after 2 months of worsened LE edema and dyspnea on exertion. Shortness of breath and chest discomfort may have been multifactorial: 2/2 pleural effusion and hiatal hernia. CT neck (11/10) showed widespread lymphadenopathy in the neck and partially seen chest including the axilla and mediastinum. Findings are most consistent with lymphoproliferative disease, query relapse of CLL, and new compared to PET-CT from five years ago.     Problem List/Main Diagnoses:   #Pleural effusion.   While afebrile and denies cough, recent admission for PNA, effusion possible. reactive lymph nodes. Transudative effusion is possible given worsened edema and elevated BNP with grade II diastolic dysf seen on Echo however EF 55-60%. CLL and MGUS history with CT findings of lymphadenopathy may consider malignant effusion.  -Thoracentesis  -Possible lymph node biopsy tomorrow  -Hold eliquis for procedure  -Trend CBC.    #Leg edema.   Worse edema over past 2 months. 2+ pitting on exam. elevated BNP,grade 2 diastolic dysf. EF 55-60%. Home lasix 40qd  -Continue furosemide 40mg QD  -Follow up with PCP    #Hiatal hernia with GERD.   Reports hx of hiatal hernia. This may contribute to his SOB. Home med pantoprazole 20  -pantoprazole 20.    #Hypertension.   Home meds: Norvasc 10mg, Imdur 30mg  -Continue home meds.    #Coronary artery disease.   CABG 2005. Has HTN HLD, DM  -Atorvastatin 40 while inpatient (home rosuvostatin 10).    #Chronic lymphocytic leukemia.   No active treatment. Reports baseline CBC 14. WBC 18 may represent CLL. No signs of infection.  - Outpatient follow up with heme/onc    #Anemia.   Hb 9.3. Likely anemia of chronic disease vs malignancy additionally due to poor renal function  -f/u iron studies and retic.    #Hyponatremia.   Admission sodium 131. Last admission 133. Etiologies may include furosemide, SIADH, kidney dysfunction.    #Kidney transplant recipient.   No signs of rejection. Home regimen below. October Cr 1.1   -Tacrolimus 2mg AM, 1mg PM  -Valgancyclovir 450 QD.    #Diabetes mellitus.   Last admission lantus 25. At  home uses sliding scale require 20-40 units before each meal. A1c 6.7.  -Inpatient: 25 Lantus and moderate sliding scale  -Resume home regimen after discharge    New medications/therapies: None   New lines/hardware: None  Labs to be followed outpatient: None  Exam to be followed outpatient: None    Discharge plan: discharge to home     #Discharge: do not delete    69 yo male PMHx AF (Eliquis), hiatal hernia, CAD s/p CABG (2005), R renal txp (tacro), Obesity, DM2 (A1C 6.7), HTN, HLD, BPH, CLL, MGUS, referred to the ED by Dr Leyva thoracentesis with Dr Lora of R sided pleural effusion seen on 10/29 CT after 2 months of worsened LE edema and dyspnea on exertion. Shortness of breath and chest discomfort may have been multifactorial: 2/2 pleural effusion and hiatal hernia. CT neck (11/10) showed widespread lymphadenopathy in the neck and partially seen chest including the axilla and mediastinum. Findings are most consistent with lymphoproliferative disease, query relapse of CLL, and new compared to PET-CT from five years ago. Effusion I/s/o lymphadenopathy may represent malgnant effusion results to be followed up outpatient.     Problem List/Main Diagnoses:     #Lymphadenopathy  CT neck (11/10) showed widespread lymphadenopathy in the neck and partially seen chest including the axilla and mediastinum. Findings are most consistent with lymphoproliferative disease, query relapse of CLL, and new compared to PET-CT from five years ago.   -F/u biopsy results with Dr. Beltran    #Pleural effusion.   While afebrile and denies cough, recent admission for PNA, effusion possible. reactive lymph nodes. Transudative effusion is possible given worsened edema and elevated BNP with grade II diastolic dysf seen on Echo however EF 55-60%. CLL and MGUS history with CT findings of lymphadenopathy may consider malignant effusion.  -Thoracentesis outpatient  -F/u node biopsy results  -F/u with Dr. Lora    #Leg edema.   Worse edema over past 2 months. 2+ pitting on exam. elevated BNP,grade 2 diastolic dysf. EF 55-60%. Home lasix 40qd  -Continue furosemide 40mg QD  -Follow up with PCP    #Hiatal hernia with GERD.   Reports hx of hiatal hernia. This may contribute to his SOB. Home med pantoprazole 20  -pantoprazole 20.    #Hypertension.   Home meds: Norvasc 10mg, Imdur 30mg  -Continue home meds    #Coronary artery disease.   CABG 2005. Has HTN HLD, DM Atorvastatin 40 while inpatient   -home rosuvostatin 10).    #Chronic lymphocytic leukemia.   No active treatment. Reports baseline CBC 14. WBC 18 may represent CLL. No signs of infection.  - Outpatient follow up with heme/onc    #Anemia.   Hb 9.3. Likely anemia of chronic disease vs malignancy additionally due to poor renal function    #Hyponatremia.   Admission sodium 131. Last admission 133. Etiologies may include furosemide, SIADH, kidney dysfunction.    #Kidney transplant recipient.   No signs of rejection. Home regimen below. October Cr 1.1   -Tacrolimus 2mg AM, 1mg PM  -Valgancyclovir 450 QD.    #Diabetes mellitus.   Last admission lantus 25. At  home uses sliding scale require 20-40 units before each meal. A1c 6.7.  -Inpatient: 25 Lantus and moderate sliding scale  -Resume home regimen after discharge    New medications/therapies: None   New lines/hardware: None  Labs to be followed outpatient: None  Exam to be followed outpatient: None    Discharge plan: discharge to home

## 2021-11-10 NOTE — DIETITIAN INITIAL EVALUATION ADULT. - PROBLEM SELECTOR PLAN 9
No signs of rejection. Home regimen below. October Cr 1.1   -Tacrolimus 2mg AM, 1mg PM  -Valgancyclovir 450 QD

## 2021-11-10 NOTE — PROGRESS NOTE ADULT - ASSESSMENT
69 yo male PMHx AF (Eliquis), hiatal hernia, CAD s/p CABG (2005), R renal txp (tacro), Obesity, DM2 (A1C 6.7), HTN, HLD, BPH, CLL, MGUS, referred to the ED by Dr Leyva for possible thoracentesis with Dr Lora of R sided pleural effusion seen on 10/29 CT after 2 months of worsened LE edema and dyspnea on exertion.  Nephrology following for LATRICIA and history of renal transplant     #LATRICIA  -Please obtain UA, urine Na, urine creatinine, and urine urea   -Trend Scr while in patient   -Avoid nephrotoxic agents   -Renally dose medications   -Monitor UOP    #T2DM   Patient needs tighter glycemic control- received 16 additional lispro (correctional)    #HTN  -BP control  -Continue amlodipine 10 mg daily  -DASH diet     #R Renal Transplant (2017)  Tacrolimus level 6.5  -Continue tacrolimus 2 mg at 6 AM and 1 mg at 6 PM  -Please obtain tacrolimus level at 5:30 PM     Plan of care discussed with Dr. Araujo   Recommendations are considered final after attending attestation     ***incomplete    69 yo male PMHx AF (Eliquis), hiatal hernia, CAD s/p CABG (2005), R renal txp (tacro), Obesity, DM2 (A1C 6.7), HTN, HLD, BPH, CLL, MGUS, referred to the ED by Dr Leyva for possible thoracentesis with Dr Lora of R sided pleural effusion seen on 10/29 CT after 2 months of worsened LE edema and dyspnea on exertion.  Nephrology following for LATRICIA and history of renal transplant     #LATRICIA  -Please obtain UA, urine Na, urine creatinine, and urine urea   -Trend Scr while in patient   -Avoid nephrotoxic agents   -Renally dose medications   -Monitor UOP    #T2DM   Patient needs tighter glycemic control- received 16 additional lispro (correctional)    #HTN  -BP control  -Continue amlodipine 10 mg daily  -DASH diet     #R Renal Transplant (2017)  Tacrolimus level 6.5 - level taken after 2nd dose   -Continue tacrolimus 2 mg at 6 AM and 1 mg at 6 PM  -Please obtain tacrolimus level at 5 PM     Plan of care discussed with Dr. Araujo   Recommendations are considered final after attending attestation

## 2021-11-10 NOTE — DIETITIAN INITIAL EVALUATION ADULT. - PROBLEM SELECTOR PLAN 7
Hb 9.3. Likely anemia of chronic disease vs malignancy additionally due to poor renal function  -f/u iron studies and retic

## 2021-11-11 ENCOUNTER — NON-APPOINTMENT (OUTPATIENT)
Age: 71
End: 2021-11-11

## 2021-11-11 ENCOUNTER — TRANSCRIPTION ENCOUNTER (OUTPATIENT)
Age: 71
End: 2021-11-11

## 2021-11-11 LAB
GLUCOSE BLDC GLUCOMTR-MCNC: 122 MG/DL — HIGH (ref 70–99)
GLUCOSE BLDC GLUCOMTR-MCNC: 176 MG/DL — HIGH (ref 70–99)
GLUCOSE BLDC GLUCOMTR-MCNC: 248 MG/DL — HIGH (ref 70–99)
GLUCOSE BLDC GLUCOMTR-MCNC: 294 MG/DL — HIGH (ref 70–99)
SARS-COV-2 RNA SPEC QL NAA+PROBE: SIGNIFICANT CHANGE UP

## 2021-11-11 PROCEDURE — 99232 SBSQ HOSP IP/OBS MODERATE 35: CPT | Mod: GC

## 2021-11-11 RX ORDER — INSULIN LISPRO 100/ML
3 VIAL (ML) SUBCUTANEOUS ONCE
Refills: 0 | Status: COMPLETED | OUTPATIENT
Start: 2021-11-11 | End: 2021-11-11

## 2021-11-11 RX ADMIN — Medication 3 UNIT(S): at 17:30

## 2021-11-11 RX ADMIN — ATORVASTATIN CALCIUM 40 MILLIGRAM(S): 80 TABLET, FILM COATED ORAL at 21:57

## 2021-11-11 RX ADMIN — TACROLIMUS 1 MILLIGRAM(S): 5 CAPSULE ORAL at 21:57

## 2021-11-11 RX ADMIN — VALGANCICLOVIR 450 MILLIGRAM(S): 450 TABLET, FILM COATED ORAL at 09:35

## 2021-11-11 RX ADMIN — PANTOPRAZOLE SODIUM 20 MILLIGRAM(S): 20 TABLET, DELAYED RELEASE ORAL at 06:27

## 2021-11-11 RX ADMIN — AMLODIPINE BESYLATE 10 MILLIGRAM(S): 2.5 TABLET ORAL at 15:21

## 2021-11-11 RX ADMIN — Medication 6: at 22:31

## 2021-11-11 RX ADMIN — Medication 40 MILLIGRAM(S): at 15:21

## 2021-11-11 RX ADMIN — TACROLIMUS 2 MILLIGRAM(S): 5 CAPSULE ORAL at 06:27

## 2021-11-11 RX ADMIN — TAMSULOSIN HYDROCHLORIDE 0.4 MILLIGRAM(S): 0.4 CAPSULE ORAL at 21:57

## 2021-11-11 RX ADMIN — ISOSORBIDE MONONITRATE 30 MILLIGRAM(S): 60 TABLET, EXTENDED RELEASE ORAL at 11:27

## 2021-11-11 RX ADMIN — Medication 4: at 17:29

## 2021-11-11 RX ADMIN — Medication 2: at 12:43

## 2021-11-11 RX ADMIN — INSULIN GLARGINE 25 UNIT(S): 100 INJECTION, SOLUTION SUBCUTANEOUS at 22:32

## 2021-11-11 RX ADMIN — Medication 0.25 MILLIGRAM(S): at 01:34

## 2021-11-11 NOTE — PROGRESS NOTE ADULT - SUBJECTIVE AND OBJECTIVE BOX
Nephrology Consult Note:    INTERVAL HPI/OVERNIGHT EVENTS: No acute overnight events. Patient examined at bedside- in no acute distress. Planning for lymph node biopsy tomorrow.       On further ROS, patient did not have complaint of: Headache, Blurred Vision, Cough, Dyspnea, Palpitations, Abdominal Pain, N/V, Weakness, Change in Sensation.     VITAL SIGNS:  T(F): 98.3 (11-11-21 @ 06:34)  HR: 70 (11-11-21 @ 06:34)  BP: 127/56 (11-11-21 @ 06:34)  RR: 18 (11-11-21 @ 06:34)  SpO2: 90% (11-11-21 @ 06:34)  Wt(kg): --    Input & Output:      PHYSICAL EXAM:  Constitutional: Obese elderly male in NAD.  HEENT: PERRL, EOMI, sclera non-icteric, neck supple, trachea midline, no masses, no JVD, MMM, good dentition  Respiratory: Decreased breath sounds R lung base. Crackles L lung base. No accessory muscle use or intercostal retractions  Cardiovascular: RRR, normal S1S2, no M/R/G  Gastrointestinal: soft, NTND, no masses palpable, BS normal  Extremities: Warm, well perfused, pulses equal bilateral upper and lower extremities. 2-3+ pitting edema.  Neurological: AAOx3, CN Grossly intact  Skin: Normal temperature, warm, dry    MEDICATIONS  (STANDING):  amLODIPine   Tablet 10 milliGRAM(s) Oral every 24 hours  atorvastatin 40 milliGRAM(s) Oral at bedtime  dextrose 40% Gel 15 Gram(s) Oral once  dextrose 5%. 1000 milliLiter(s) (50 mL/Hr) IV Continuous <Continuous>  dextrose 5%. 1000 milliLiter(s) (100 mL/Hr) IV Continuous <Continuous>  dextrose 50% Injectable 25 Gram(s) IV Push once  dextrose 50% Injectable 12.5 Gram(s) IV Push once  dextrose 50% Injectable 25 Gram(s) IV Push once  furosemide    Tablet 40 milliGRAM(s) Oral every 24 hours  glucagon  Injectable 1 milliGRAM(s) IntraMuscular once  insulin glargine Injectable (LANTUS) 25 Unit(s) SubCutaneous at bedtime  insulin lispro (ADMELOG) corrective regimen sliding scale   SubCutaneous Before meals and at bedtime  insulin lispro Injectable (ADMELOG). 3 Unit(s) SubCutaneous once  isosorbide   mononitrate ER Tablet (IMDUR) 30 milliGRAM(s) Oral daily  pantoprazole    Tablet 20 milliGRAM(s) Oral before breakfast  tacrolimus 2 milliGRAM(s) Oral every 24 hours  tacrolimus 1 milliGRAM(s) Oral every 24 hours  tamsulosin 0.4 milliGRAM(s) Oral at bedtime  valGANciclovir 450 milliGRAM(s) Oral every 24 hours    MEDICATIONS  (PRN):  acetaminophen     Tablet .. 650 milliGRAM(s) Oral every 6 hours PRN Temp greater or equal to 38C (100.4F), Mild Pain (1 - 3)  ALPRAZolam 0.25 milliGRAM(s) Oral at bedtime PRN insomnia  melatonin 3 milliGRAM(s) Oral at bedtime PRN Insomnia      Allergies    No Known Allergies    Intolerances        LABS:                        9.4    15.41 )-----------( 139      ( 10 Nov 2021 11:02 )             30.8     11-10    131<L>  |  96  |  28<H>  ----------------------------<  134<H>  4.4   |  24  |  1.10    Ca    9.5      10 Nov 2021 11:02  Phos  4.6     11-10      PT/INR - ( 10 Nov 2021 11:02 )   PT: 15.6 sec;   INR: 1.32          PTT - ( 10 Nov 2021 11:02 )  PTT:40.3 sec      RADIOLOGY & ADDITIONAL TESTS:

## 2021-11-11 NOTE — PROGRESS NOTE ADULT - ASSESSMENT
69 yo male PMHx AF (Eliquis), hiatal hernia, CAD s/p CABG (2005), R renal txp (tacro), Obesity, DM2 (A1C 6.7), HTN, HLD, BPH, CLL, MGUS, referred to the ED by Dr Leyva for possible thoracentesis with Dr Lora of R sided pleural effusion seen on 10/29 CT after 2 months of worsened LE edema and dyspnea on exertion.

## 2021-11-11 NOTE — PROGRESS NOTE ADULT - SUBJECTIVE AND OBJECTIVE BOX
OVERNIGHT EVENTS: ALEXANDER    SUBJECTIVE / INTERVAL HPI: Patient seen and examined at bedside.     VITAL SIGNS:  Vital Signs Last 24 Hrs  T(C): 36.8 (11 Nov 2021 06:34), Max: 36.8 (11 Nov 2021 06:34)  T(F): 98.3 (11 Nov 2021 06:34), Max: 98.3 (11 Nov 2021 06:34)  HR: 70 (11 Nov 2021 06:34) (70 - 84)  BP: 127/56 (11 Nov 2021 06:34) (124/56 - 135/72)  BP(mean): --  RR: 18 (11 Nov 2021 06:34) (18 - 20)  SpO2: 90% (11 Nov 2021 06:34) (90% - 96%)    PHYSICAL EXAM:    General: Well developed, well nourished, no acute distress  HEENT: NC/AT; PERRL, anicteric sclera; MMM  Neck: supple  Cardiovascular: +S1/S2, RRR, no murmurs, rubs, gallops  Respiratory: CTA B/L; no W/R/R  Gastrointestinal: soft, NT/ND; +BSx4  Extremities: WWP; no edema, clubbing or cyanosis  Vascular: 2+ radial, DP/PT pulses B/L  Neurological: AAOx3; no focal deficits    MEDICATIONS:  MEDICATIONS  (STANDING):  amLODIPine   Tablet 10 milliGRAM(s) Oral every 24 hours  atorvastatin 40 milliGRAM(s) Oral at bedtime  dextrose 40% Gel 15 Gram(s) Oral once  dextrose 5%. 1000 milliLiter(s) (50 mL/Hr) IV Continuous <Continuous>  dextrose 5%. 1000 milliLiter(s) (100 mL/Hr) IV Continuous <Continuous>  dextrose 50% Injectable 25 Gram(s) IV Push once  dextrose 50% Injectable 12.5 Gram(s) IV Push once  dextrose 50% Injectable 25 Gram(s) IV Push once  furosemide    Tablet 40 milliGRAM(s) Oral every 24 hours  glucagon  Injectable 1 milliGRAM(s) IntraMuscular once  insulin glargine Injectable (LANTUS) 25 Unit(s) SubCutaneous at bedtime  insulin lispro (ADMELOG) corrective regimen sliding scale   SubCutaneous Before meals and at bedtime  insulin lispro Injectable (ADMELOG). 3 Unit(s) SubCutaneous once  isosorbide   mononitrate ER Tablet (IMDUR) 30 milliGRAM(s) Oral daily  pantoprazole    Tablet 20 milliGRAM(s) Oral before breakfast  tacrolimus 2 milliGRAM(s) Oral every 24 hours  tacrolimus 1 milliGRAM(s) Oral every 24 hours  tamsulosin 0.4 milliGRAM(s) Oral at bedtime  valGANciclovir 450 milliGRAM(s) Oral every 24 hours    MEDICATIONS  (PRN):  acetaminophen     Tablet .. 650 milliGRAM(s) Oral every 6 hours PRN Temp greater or equal to 38C (100.4F), Mild Pain (1 - 3)  ALPRAZolam 0.25 milliGRAM(s) Oral at bedtime PRN insomnia  melatonin 3 milliGRAM(s) Oral at bedtime PRN Insomnia      ALLERGIES:  Allergies    No Known Allergies    Intolerances        LABS:                        9.4    15.41 )-----------( 139      ( 10 Nov 2021 11:02 )             30.8     11-10    131<L>  |  96  |  28<H>  ----------------------------<  134<H>  4.4   |  24  |  1.10    Ca    9.5      10 Nov 2021 11:02  Phos  4.6     11-10      PT/INR - ( 10 Nov 2021 11:02 )   PT: 15.6 sec;   INR: 1.32          PTT - ( 10 Nov 2021 11:02 )  PTT:40.3 sec    CAPILLARY BLOOD GLUCOSE      POCT Blood Glucose.: 122 mg/dL (11 Nov 2021 08:05)      RADIOLOGY & ADDITIONAL TESTS: Reviewed.    PLAN:  OVERNIGHT EVENTS: ALEXANDER    SUBJECTIVE / INTERVAL HPI: Patient seen and examined at bedside. Denied CP SOB.    VITAL SIGNS:  Vital Signs Last 24 Hrs  T(C): 36.8 (11 Nov 2021 06:34), Max: 36.8 (11 Nov 2021 06:34)  T(F): 98.3 (11 Nov 2021 06:34), Max: 98.3 (11 Nov 2021 06:34)  HR: 70 (11 Nov 2021 06:34) (70 - 84)  BP: 127/56 (11 Nov 2021 06:34) (124/56 - 135/72)  BP(mean): --  RR: 18 (11 Nov 2021 06:34) (18 - 20)  SpO2: 90% (11 Nov 2021 06:34) (90% - 96%)    PHYSICAL EXAM:    Constitutional: Obese elderly male in NAD.  HEENT: PERRL, EOMI, sclera non-icteric, neck supple, trachea midline, no masses, no JVD, MMM, good dentition  Respiratory: Decreased breath sounds R lung base. Crackles L lung base. No accessory muscle use or intercostal retractions  Cardiovascular: RRR, normal S1S2, no M/R/G  Gastrointestinal: soft, NTND, no masses palpable, BS normal  Extremities: Warm, well perfused, pulses equal bilateral upper and lower extremities. 2-3+ pitting edema.  Neurological: AAOx3, CN Grossly intact  Skin: Normal temperature, warm    MEDICATIONS:  MEDICATIONS  (STANDING):  amLODIPine   Tablet 10 milliGRAM(s) Oral every 24 hours  atorvastatin 40 milliGRAM(s) Oral at bedtime  dextrose 40% Gel 15 Gram(s) Oral once  dextrose 5%. 1000 milliLiter(s) (50 mL/Hr) IV Continuous <Continuous>  dextrose 5%. 1000 milliLiter(s) (100 mL/Hr) IV Continuous <Continuous>  dextrose 50% Injectable 25 Gram(s) IV Push once  dextrose 50% Injectable 12.5 Gram(s) IV Push once  dextrose 50% Injectable 25 Gram(s) IV Push once  furosemide    Tablet 40 milliGRAM(s) Oral every 24 hours  glucagon  Injectable 1 milliGRAM(s) IntraMuscular once  insulin glargine Injectable (LANTUS) 25 Unit(s) SubCutaneous at bedtime  insulin lispro (ADMELOG) corrective regimen sliding scale   SubCutaneous Before meals and at bedtime  insulin lispro Injectable (ADMELOG). 3 Unit(s) SubCutaneous once  isosorbide   mononitrate ER Tablet (IMDUR) 30 milliGRAM(s) Oral daily  pantoprazole    Tablet 20 milliGRAM(s) Oral before breakfast  tacrolimus 2 milliGRAM(s) Oral every 24 hours  tacrolimus 1 milliGRAM(s) Oral every 24 hours  tamsulosin 0.4 milliGRAM(s) Oral at bedtime  valGANciclovir 450 milliGRAM(s) Oral every 24 hours    MEDICATIONS  (PRN):  acetaminophen     Tablet .. 650 milliGRAM(s) Oral every 6 hours PRN Temp greater or equal to 38C (100.4F), Mild Pain (1 - 3)  ALPRAZolam 0.25 milliGRAM(s) Oral at bedtime PRN insomnia  melatonin 3 milliGRAM(s) Oral at bedtime PRN Insomnia      ALLERGIES:  Allergies    No Known Allergies    Intolerances        LABS:                        9.4    15.41 )-----------( 139      ( 10 Nov 2021 11:02 )             30.8     11-10    131<L>  |  96  |  28<H>  ----------------------------<  134<H>  4.4   |  24  |  1.10    Ca    9.5      10 Nov 2021 11:02  Phos  4.6     11-10      PT/INR - ( 10 Nov 2021 11:02 )   PT: 15.6 sec;   INR: 1.32          PTT - ( 10 Nov 2021 11:02 )  PTT:40.3 sec    CAPILLARY BLOOD GLUCOSE      POCT Blood Glucose.: 122 mg/dL (11 Nov 2021 08:05)      RADIOLOGY & ADDITIONAL TESTS: Reviewed.    PLAN:

## 2021-11-11 NOTE — PROGRESS NOTE ADULT - ASSESSMENT
71 yo male PMHx AF (Eliquis), hiatal hernia, CAD s/p CABG (2005), R renal txp (tacro), Obesity, DM2 (A1C 6.7), HTN, HLD, BPH, CLL, MGUS, referred to the ED by Dr Leyva for possible thoracentesis with Dr Lora of R sided pleural effusion seen on 10/29 CT after 2 months of worsened LE edema and dyspnea on exertion.  Nephrology following for LATRICIA and history of renal transplant     #LATRICIA  -Please obtain UA, urine Na, urine creatinine, and urine urea   -Trend Scr while in patient   -Avoid nephrotoxic agents   -Renally dose medications   -Monitor UOP    #T2DM   Patient needs tighter glycemic control- received additional 18 units of correctional lispro     #HTN  -BP control  -Continue amlodipine 10 mg daily  -DASH diet     #R Renal Transplant (2017)  Tacrolimus level 6.5 - level taken after 2nd dose. No level from yesterday   -Continue tacrolimus 2 mg at 6 AM and 1 mg at 6 PM  -Please obtain tacrolimus level at 5 PM     Plan of care discussed with Dr. Araujo   Recommendations are considered final after attending attestation

## 2021-11-11 NOTE — PROGRESS NOTE ADULT - PROBLEM SELECTOR PLAN 1
While afebrile and denies cough, recent admission for PNA, effusion possible. reactive lymph nodes. Transudative effusion is possible given worsened edema and elevated BNP with grade II diastolic dysf seen on Echo however EF 55-60%. CLL and MGUS history with CT findings of lymphadenopathy is concerning for malignant effusion.  - lymph node biopsy tomorrow  -Resume eliquis after procedure  -Trend CBC

## 2021-11-11 NOTE — PROGRESS NOTE ADULT - SUBJECTIVE AND OBJECTIVE BOX
ENT CONSULT NOTE    HPI: 69 yo male PMHx AF (Eliquis), hiatal hernia, CAD s/p CABG (2005), R renal txp (tacro), Obesity, DM2 (A1C 6.7), HTN, HLD, BPH, CLL, MGUS, referred to the ED by Dr Leyva for possible thoracentesis with Dr Lora of R sided pleural effusion seen on 10/29 CT after 2 months of worsened LE edema and dyspnea on exertion.    ENT consulted for possible cervical lymph node biopsy given CT neck (11/10) findings of "widespread lymphadenopathy in the neck and partially seen chest including the axilla and mediastinum. Findings are most consistent with lymphoproliferative disease, query relapse of CLL, and new compared to PET-CT from five years ago. Additionally, CT scan from 10/29 shows "persistent bulky thoracic and upper dominant adenopathy"    ICU Vital Signs Last 24 Hrs  T(C): 36.6 (11 Nov 2021 12:54), Max: 36.8 (11 Nov 2021 06:34)  T(F): 97.8 (11 Nov 2021 12:54), Max: 98.3 (11 Nov 2021 06:34)  HR: 72 (11 Nov 2021 12:54) (70 - 76)  BP: 133/79 (11 Nov 2021 12:54) (127/56 - 135/72)  BP(mean): --  ABP: --  ABP(mean): --  RR: 17 (11 Nov 2021 12:54) (17 - 20)  SpO2: 95% (11 Nov 2021 12:54) (90% - 96%)        PHYSICAL EXAM:    CONSTITUTIONAL: Well nourished, well developed, NON-DYSMORPHIC, and in no acute distress.    HEAD: normocephalic, atraumatic.  NECK: Diffuse lymphadenopathy  RESPIRATORY: Respirations unlabored, no increased work of breathing with use of accessory muscles and retractions. No stridor.  CARDIAC: Warm extremities, no cyanosis.       A/P: 69 yo male PMHx AF (Eliquis), hiatal hernia, CAD s/p CABG (2005), R renal txp (tacro), Obesity, DM2 (A1C 6.7), HTN, HLD, BPH, CLL, MGUS, referred to the ED by Dr Leyva for possible thoracentesis with Dr Lora of R sided pleural effusion seen on 10/29 CT after 2 months of worsened LE edema and dyspnea on exertion.    ENT consulted for possible cervical lymph node biopsy given CT neck (11/10) findings of "widespread lymphadenopathy in the neck and partially seen chest including the axilla and mediastinum. Findings are most consistent with lymphoproliferative disease, query relapse of CLL, and new compared to PET-CT from five years ago. Additionally, CT scan from 10/29 shows "persistent bulky thoracic and upper dominant adenopathy"    - ENT to perform excisional biopsy in OR tomorrow with Dr. Beltran  - Added on to OR schedule  - NPO/IVF at midnight  - Has active preop labs  - Needs active COVID: PLEASE OBTAIN TONIGHT 11/11

## 2021-11-11 NOTE — PROGRESS NOTE ADULT - NSPROGADDITIONALINFOA_GEN_ALL_CORE
I: SBP 120s. Cr 1.1. s/p renal transplant.   A: Stable CKD. HTN better controlled.   P: Follow SCr. Continue tacrolimus. Follow levels. Await workup of LAD. Continue BP meds.

## 2021-11-12 ENCOUNTER — RESULT REVIEW (OUTPATIENT)
Age: 71
End: 2021-11-12

## 2021-11-12 ENCOUNTER — TRANSCRIPTION ENCOUNTER (OUTPATIENT)
Age: 71
End: 2021-11-12

## 2021-11-12 VITALS
RESPIRATION RATE: 17 BRPM | HEART RATE: 65 BPM | OXYGEN SATURATION: 98 % | SYSTOLIC BLOOD PRESSURE: 114 MMHG | DIASTOLIC BLOOD PRESSURE: 56 MMHG

## 2021-11-12 LAB
ANISOCYTOSIS BLD QL: SLIGHT — SIGNIFICANT CHANGE UP
APTT BLD: 36 SEC — HIGH (ref 27.5–35.5)
BASOPHILS # BLD AUTO: 0 K/UL — SIGNIFICANT CHANGE UP (ref 0–0.2)
BASOPHILS NFR BLD AUTO: 0 % — SIGNIFICANT CHANGE UP (ref 0–2)
DACRYOCYTES BLD QL SMEAR: SLIGHT — SIGNIFICANT CHANGE UP
EOSINOPHIL # BLD AUTO: 0.15 K/UL — SIGNIFICANT CHANGE UP (ref 0–0.5)
EOSINOPHIL NFR BLD AUTO: 0.9 % — SIGNIFICANT CHANGE UP (ref 0–6)
GLUCOSE BLDC GLUCOMTR-MCNC: 127 MG/DL — HIGH (ref 70–99)
GLUCOSE BLDC GLUCOMTR-MCNC: 138 MG/DL — HIGH (ref 70–99)
GLUCOSE BLDC GLUCOMTR-MCNC: 147 MG/DL — HIGH (ref 70–99)
HCT VFR BLD CALC: 30.7 % — LOW (ref 39–50)
HGB BLD-MCNC: 9.1 G/DL — LOW (ref 13–17)
HYPOCHROMIA BLD QL: SLIGHT — SIGNIFICANT CHANGE UP
INR BLD: 1.3 — HIGH (ref 0.88–1.16)
LYMPHOCYTES # BLD AUTO: 13.44 K/UL — HIGH (ref 1–3.3)
LYMPHOCYTES # BLD AUTO: 81.1 % — HIGH (ref 13–44)
MACROCYTES BLD QL: SLIGHT — SIGNIFICANT CHANGE UP
MAGNESIUM SERPL-MCNC: 1.8 MG/DL — SIGNIFICANT CHANGE UP (ref 1.6–2.6)
MANUAL SMEAR VERIFICATION: SIGNIFICANT CHANGE UP
MCHC RBC-ENTMCNC: 27.5 PG — SIGNIFICANT CHANGE UP (ref 27–34)
MCHC RBC-ENTMCNC: 29.6 GM/DL — LOW (ref 32–36)
MCV RBC AUTO: 92.7 FL — SIGNIFICANT CHANGE UP (ref 80–100)
MICROCYTES BLD QL: SLIGHT — SIGNIFICANT CHANGE UP
MONOCYTES # BLD AUTO: 0.3 K/UL — SIGNIFICANT CHANGE UP (ref 0–0.9)
MONOCYTES NFR BLD AUTO: 1.8 % — LOW (ref 2–14)
NEUTROPHILS # BLD AUTO: 2.68 K/UL — SIGNIFICANT CHANGE UP (ref 1.8–7.4)
NEUTROPHILS NFR BLD AUTO: 16.2 % — LOW (ref 43–77)
OVALOCYTES BLD QL SMEAR: SLIGHT — SIGNIFICANT CHANGE UP
PHOSPHATE SERPL-MCNC: 4.1 MG/DL — SIGNIFICANT CHANGE UP (ref 2.5–4.5)
PLAT MORPH BLD: NORMAL — SIGNIFICANT CHANGE UP
PLATELET # BLD AUTO: 135 K/UL — LOW (ref 150–400)
POIKILOCYTOSIS BLD QL AUTO: SLIGHT — SIGNIFICANT CHANGE UP
POLYCHROMASIA BLD QL SMEAR: SLIGHT — SIGNIFICANT CHANGE UP
PROTHROM AB SERPL-ACNC: 15.4 SEC — HIGH (ref 10.6–13.6)
RBC # BLD: 3.31 M/UL — LOW (ref 4.2–5.8)
RBC # FLD: 16 % — HIGH (ref 10.3–14.5)
RBC BLD AUTO: ABNORMAL
SCHISTOCYTES BLD QL AUTO: SLIGHT — SIGNIFICANT CHANGE UP
SMUDGE CELLS # BLD: PRESENT — SIGNIFICANT CHANGE UP
SPHEROCYTES BLD QL SMEAR: SLIGHT — SIGNIFICANT CHANGE UP
WBC # BLD: 16.57 K/UL — HIGH (ref 3.8–10.5)
WBC # FLD AUTO: 16.57 K/UL — HIGH (ref 3.8–10.5)

## 2021-11-12 PROCEDURE — 88342 IMHCHEM/IMCYTCHM 1ST ANTB: CPT | Mod: 26,59

## 2021-11-12 PROCEDURE — 88341 IMHCHEM/IMCYTCHM EA ADD ANTB: CPT | Mod: 26,59

## 2021-11-12 PROCEDURE — 99232 SBSQ HOSP IP/OBS MODERATE 35: CPT | Mod: GC

## 2021-11-12 PROCEDURE — 88305 TISSUE EXAM BY PATHOLOGIST: CPT | Mod: 26

## 2021-11-12 PROCEDURE — 88360 TUMOR IMMUNOHISTOCHEM/MANUAL: CPT | Mod: 26

## 2021-11-12 PROCEDURE — 88189 FLOWCYTOMETRY/READ 16 & >: CPT

## 2021-11-12 PROCEDURE — 99239 HOSP IP/OBS DSCHRG MGMT >30: CPT

## 2021-11-12 PROCEDURE — 88331 PATH CONSLTJ SURG 1 BLK 1SPC: CPT | Mod: 26

## 2021-11-12 PROCEDURE — 38542 EXPLORE DEEP NODE(S) NECK: CPT

## 2021-11-12 RX ADMIN — Medication 0.25 MILLIGRAM(S): at 02:40

## 2021-11-12 RX ADMIN — VALGANCICLOVIR 450 MILLIGRAM(S): 450 TABLET, FILM COATED ORAL at 09:30

## 2021-11-12 RX ADMIN — TACROLIMUS 2 MILLIGRAM(S): 5 CAPSULE ORAL at 05:46

## 2021-11-12 RX ADMIN — ISOSORBIDE MONONITRATE 30 MILLIGRAM(S): 60 TABLET, EXTENDED RELEASE ORAL at 11:41

## 2021-11-12 RX ADMIN — PANTOPRAZOLE SODIUM 20 MILLIGRAM(S): 20 TABLET, DELAYED RELEASE ORAL at 05:46

## 2021-11-12 NOTE — PROGRESS NOTE ADULT - PROBLEM SELECTOR PROBLEM 9
Kidney transplant recipient

## 2021-11-12 NOTE — PROGRESS NOTE ADULT - SUBJECTIVE AND OBJECTIVE BOX
ENT CONSULT NOTE    HPI: 69 yo male PMHx AF (Eliquis), hiatal hernia, CAD s/p CABG (2005), R renal txp (tacro), Obesity, DM2 (A1C 6.7), HTN, HLD, BPH, CLL, MGUS, referred to the ED by Dr Leyva for possible thoracentesis with Dr Lora of R sided pleural effusion seen on 10/29 CT after 2 months of worsened LE edema and dyspnea on exertion.    ENT consulted for possible cervical lymph node biopsy given CT neck (11/10) findings of "widespread lymphadenopathy in the neck and partially seen chest including the axilla and mediastinum. Findings are most consistent with lymphoproliferative disease, query relapse of CLL, and new compared to PET-CT from five years ago. Additionally, CT scan from 10/29 shows "persistent bulky thoracic and upper dominant adenopathy"    INTERVAL:    11/12: NAEON - OR today  11/12: s/p left left Vb excisional lymph node biopsy. pt tolerated procedure well.     ICU Vital Signs Last 24 Hrs  T(C): 36.4 (12 Nov 2021 12:29), Max: 36.7 (12 Nov 2021 05:27)  T(F): 97.6 (12 Nov 2021 12:29), Max: 98.1 (12 Nov 2021 05:27)  HR: 73 (12 Nov 2021 12:29) (63 - 75)  BP: 128/80 (12 Nov 2021 12:29) (111/65 - 149/84)  BP(mean): --  ABP: --  ABP(mean): --  RR: 15 (12 Nov 2021 12:29) (15 - 18)  SpO2: 97% (12 Nov 2021 12:29) (96% - 97%)      PHYSICAL EXAM:    CONSTITUTIONAL: Well nourished, well developed, NON-DYSMORPHIC, and in no acute distress.    HEAD: normocephalic, atraumatic.  NECK: left neck incision dressing clean, dry, and intact. Diffuse lymphadenopathy.   RESPIRATORY: Respirations unlabored, no increased work of breathing with use of accessory muscles and retractions. No stridor.  CARDIAC: Warm extremities, no cyanosis.       A/P: 69 yo male PMHx AF (Eliquis), hiatal hernia, CAD s/p CABG (2005), R renal txp (tacro), Obesity, DM2 (A1C 6.7), HTN, HLD, BPH, CLL, MGUS, referred to the ED by Dr Leyva for possible thoracentesis with Dr Lora of R sided pleural effusion seen on 10/29 CT after 2 months of worsened LE edema and dyspnea on exertion.    ENT consulted for possible cervical lymph node biopsy given CT neck (11/10) findings of "widespread lymphadenopathy in the neck and partially seen chest including the axilla and mediastinum. Findings are most consistent with lymphoproliferative disease, query relapse of CLL, and new compared to PET-CT from five years ago. Additionally, CT scan from 10/29 shows "persistent bulky thoracic and upper dominant adenopathy". S/p left level Vb excisional biopsy    -f/u final pathology  -rest of care per primary team  -please have pt follow up with Dr. Beltran in one week    seen and discussed with chief resident and attending

## 2021-11-12 NOTE — PROGRESS NOTE ADULT - SUBJECTIVE AND OBJECTIVE BOX
ENT CONSULT NOTE    HPI: 71 yo male PMHx AF (Eliquis), hiatal hernia, CAD s/p CABG (2005), R renal txp (tacro), Obesity, DM2 (A1C 6.7), HTN, HLD, BPH, CLL, MGUS, referred to the ED by Dr Leyva for possible thoracentesis with Dr Lora of R sided pleural effusion seen on 10/29 CT after 2 months of worsened LE edema and dyspnea on exertion.    ENT consulted for possible cervical lymph node biopsy given CT neck (11/10) findings of "widespread lymphadenopathy in the neck and partially seen chest including the axilla and mediastinum. Findings are most consistent with lymphoproliferative disease, query relapse of CLL, and new compared to PET-CT from five years ago. Additionally, CT scan from 10/29 shows "persistent bulky thoracic and upper dominant adenopathy"    INTERVAL:    11/12: NAEON - OR today    ICU Vital Signs Last 24 Hrs  T(C): 36.7 (12 Nov 2021 05:27), Max: 36.7 (12 Nov 2021 05:27)  T(F): 98.1 (12 Nov 2021 05:27), Max: 98.1 (12 Nov 2021 05:27)  HR: 63 (12 Nov 2021 05:27) (63 - 75)  BP: 149/84 (12 Nov 2021 05:27) (133/79 - 149/84)  BP(mean): --  ABP: --  ABP(mean): --  RR: 18 (12 Nov 2021 05:27) (17 - 18)  SpO2: 96% (12 Nov 2021 05:27) (95% - 96%)        PHYSICAL EXAM:    CONSTITUTIONAL: Well nourished, well developed, NON-DYSMORPHIC, and in no acute distress.    HEAD: normocephalic, atraumatic.  NECK: Diffuse lymphadenopathy  RESPIRATORY: Respirations unlabored, no increased work of breathing with use of accessory muscles and retractions. No stridor.  CARDIAC: Warm extremities, no cyanosis.       A/P: 71 yo male PMHx AF (Eliquis), hiatal hernia, CAD s/p CABG (2005), R renal txp (tacro), Obesity, DM2 (A1C 6.7), HTN, HLD, BPH, CLL, MGUS, referred to the ED by Dr Leyva for possible thoracentesis with Dr Lora of R sided pleural effusion seen on 10/29 CT after 2 months of worsened LE edema and dyspnea on exertion.    ENT consulted for possible cervical lymph node biopsy given CT neck (11/10) findings of "widespread lymphadenopathy in the neck and partially seen chest including the axilla and mediastinum. Findings are most consistent with lymphoproliferative disease, query relapse of CLL, and new compared to PET-CT from five years ago. Additionally, CT scan from 10/29 shows "persistent bulky thoracic and upper dominant adenopathy"    - ENT to perform excisional biopsy in OR with Dr. Beltran  - Added on to OR schedule  - NPO/IVF at midnight  - Has active preop labs, COVID

## 2021-11-12 NOTE — PROGRESS NOTE ADULT - PROBLEM SELECTOR PLAN 11
Fluids: None  Electrolytes: replete as necessary, K>4, Mg>2  Nutrition: consistent carb kosher  Bowel Regimen: None  DVT ppx: SCD (holding eliquis)  GI ppx: Pantoprazole  Code: Full  Disposition: medical floor

## 2021-11-12 NOTE — PROGRESS NOTE ADULT - PROBLEM SELECTOR PLAN 4
Home meds: Norvasc 10mg, Imdur 30mg  -Continue home meds

## 2021-11-12 NOTE — DISCHARGE NOTE NURSING/CASE MANAGEMENT/SOCIAL WORK - PATIENT PORTAL LINK FT
You can access the FollowMyHealth Patient Portal offered by [facility] by registering at the following website: [email or web address]. By joining [facility]’s FollowMyHealth portal, you will also be able to view your health information using other applications (apps) compatible with our system.

## 2021-11-12 NOTE — PROGRESS NOTE ADULT - PROBLEM SELECTOR PROBLEM 6
Chronic lymphocytic leukemia

## 2021-11-12 NOTE — PROGRESS NOTE ADULT - ASSESSMENT
69 yo male PMHx AF (Eliquis), hiatal hernia, CAD s/p CABG (2005), R renal txp (tacro), Obesity, DM2 (A1C 6.7), HTN, HLD, BPH, CLL, MGUS, referred to the ED by Dr Leyva for possible thoracentesis with Dr Lora of R sided pleural effusion seen on 10/29 CT after 2 months of worsened LE edema and dyspnea on exertion.  Nephrology following for LATRICIA and history of renal transplant   No AM creatinine     #LATRICIA  -Please obtain UA, urine Na, urine creatinine, and urine urea   -Trend Scr while in patient   -Avoid nephrotoxic agents   -Renally dose medications   -Monitor UOP    #T2DM   Patient needs tighter glycemic control    #HTN  -BP control  -Continue amlodipine 10 mg daily  -DASH diet     #R Renal Transplant (2017)  -Continue tacrolimus 2 mg at 6 AM and 1 mg at 6 PM    Plan of care discussed with Dr. Araujo   Recommendations are considered final after attending attestation

## 2021-11-12 NOTE — PROGRESS NOTE ADULT - PROBLEM SELECTOR PLAN 5
CABG 2005. Has HTN HLD, DM  -Atorvastatin 40 while inpatient (home rosuvostatin 10)

## 2021-11-12 NOTE — PROGRESS NOTE ADULT - TIME BILLING
Patient seen and examined with house-staff during bedside rounds.  Resident note read, including vitals, physical findings, laboratory data, and radiological reports.   Revisions included below.  Direct personal management at bed side and extensive interpretation of the data.  Plan was outlined and discussed in details with the housestaff.  Decision making of high complexity  Action taken for acute disease activity to reflect the level of care provided:  - medication reconciliation  - review laboratory data
Patient seen and examined with house-staff during bedside rounds.  Resident note read, including vitals, physical findings, laboratory data, and radiological reports.   Revisions included below.  Direct personal management at bed side and extensive interpretation of the data.  Plan was outlined and discussed in details with the housestaff.  Decision making of high complexity  Action taken for acute disease activity to reflect the level of care provided:  - medication reconciliation  - review laboratory data  I discussed the case with Dr. Zhang.  CT scan of the chest was ordered CT scan of the neck was ordered I reviewed extensive adenopathy.  The discussed the case with ENT and he is on for excisional biopsy.  To rule out lymphoma.  PFT was consistent with restrictive lung disease decreased diffusion capacity.
Patient seen and examined with house-staff during bedside rounds.  Resident note read, including vitals, physical findings, laboratory data, and radiological reports.   Revisions included below.  Direct personal management at bed side and extensive interpretation of the data.  Plan was outlined and discussed in details with the housestaff.  Decision making of high complexity  Action taken for acute disease activity to reflect the level of care provided:  - medication reconciliation  - review laboratory data  Ultrasound of the chest revealed moderate amount of pleural effusion.  No evidence of rotation.  The plan for thoracentesis and EBUS.  PFT tomorrow
Patient seen and examined with house-staff during bedside rounds.  Resident note read, including vitals, physical findings, laboratory data, and radiological reports.   Revisions included below.  Direct personal management at bed side and extensive interpretation of the data.  Plan was outlined and discussed in details with the housestaff.  Decision making of high complexity  Action taken for acute disease activity to reflect the level of care provided:  - medication reconciliation  - review laboratory dataThe patient is clinically stable.  Discussed the case with ENT.  The biopsy frozen section consistent with CLL.  Patient is to be discharged.  Follow-up with the pathology.  Restart anticoagulation.  Continue diuresis.  I discussed the PFT with the patient.  Follow-up as an outpatient.

## 2021-11-12 NOTE — PACU DISCHARGE NOTE - COMMENTS
PACU criteria met. Report given to Robert Green RN.  Patient transported to [facility] via stretcher by PCA.

## 2021-11-12 NOTE — PROGRESS NOTE ADULT - SUBJECTIVE AND OBJECTIVE BOX
Nephrology Progress Note:    INTERVAL HPI/OVERNIGHT EVENTS: Patient kept NPO for excisional biopsy with ENT. Patient examined at bedside- in no acute distress      On further ROS, patient did not have complaint of: Headache, Blurred Vision, Cough, Dyspnea, Palpitations, Abdominal Pain, N/V, Weakness, Change in Sensation.     VITAL SIGNS:  T(F): 97.7 (11-12-21 @ 10:18)  HR: 74 (11-12-21 @ 10:18)  BP: 111/65 (11-12-21 @ 10:18)  RR: 17 (11-12-21 @ 10:18)  SpO2: 96% (11-12-21 @ 10:18)  Wt(kg): --    Input & Output:      PHYSICAL EXAM:  Constitutional: Obese elderly male in NAD.  HEENT: PERRL, EOMI, sclera non-icteric, neck supple, trachea midline, no masses, no JVD, MMM, good dentition  Respiratory: Decreased breath sounds R lung base. Crackles L lung base. No accessory muscle use or intercostal retractions  Cardiovascular: RRR, normal S1S2, no M/R/G  Gastrointestinal: soft, NTND, no masses palpable, BS normal  Extremities: Warm, well perfused, pulses equal bilateral upper and lower extremities. 2-3+ pitting edema.  Neurological: AAOx3, CN Grossly intact  Skin: Normal temperature, warm, dry      MEDICATIONS  (STANDING):  amLODIPine   Tablet 10 milliGRAM(s) Oral every 24 hours  atorvastatin 40 milliGRAM(s) Oral at bedtime  dextrose 40% Gel 15 Gram(s) Oral once  dextrose 5%. 1000 milliLiter(s) (50 mL/Hr) IV Continuous <Continuous>  dextrose 5%. 1000 milliLiter(s) (100 mL/Hr) IV Continuous <Continuous>  dextrose 50% Injectable 25 Gram(s) IV Push once  dextrose 50% Injectable 12.5 Gram(s) IV Push once  dextrose 50% Injectable 25 Gram(s) IV Push once  furosemide    Tablet 40 milliGRAM(s) Oral every 24 hours  glucagon  Injectable 1 milliGRAM(s) IntraMuscular once  insulin glargine Injectable (LANTUS) 25 Unit(s) SubCutaneous at bedtime  insulin lispro (ADMELOG) corrective regimen sliding scale   SubCutaneous Before meals and at bedtime  insulin lispro Injectable (ADMELOG). 3 Unit(s) SubCutaneous once  isosorbide   mononitrate ER Tablet (IMDUR) 30 milliGRAM(s) Oral daily  pantoprazole    Tablet 20 milliGRAM(s) Oral before breakfast  tacrolimus 2 milliGRAM(s) Oral every 24 hours  tacrolimus 1 milliGRAM(s) Oral every 24 hours  tamsulosin 0.4 milliGRAM(s) Oral at bedtime  valGANciclovir 450 milliGRAM(s) Oral every 24 hours    MEDICATIONS  (PRN):  acetaminophen     Tablet .. 650 milliGRAM(s) Oral every 6 hours PRN Temp greater or equal to 38C (100.4F), Mild Pain (1 - 3)  ALPRAZolam 0.25 milliGRAM(s) Oral at bedtime PRN insomnia  melatonin 3 milliGRAM(s) Oral at bedtime PRN Insomnia      Allergies    No Known Allergies    Intolerances        LABS:                        9.1    16.57 )-----------( 135      ( 12 Nov 2021 08:42 )             30.7       Phos  4.1     11-12  Mg     1.8     11-12      PT/INR - ( 12 Nov 2021 08:42 )   PT: 15.4 sec;   INR: 1.30          PTT - ( 12 Nov 2021 08:42 )  PTT:36.0 sec      RADIOLOGY & ADDITIONAL TESTS: Reviewed

## 2021-11-12 NOTE — DISCHARGE NOTE NURSING/CASE MANAGEMENT/SOCIAL WORK - NSDCFUADDAPPT_GEN_ALL_CORE_FT
Please bring your Insurance card, Photo ID, Covid-19 vaccination card (if applicable) and Discharge paperwork to the following appointment:    (1) Please follow up with your Nephrology Provider, Dr. Kofi Leyva at [address] [place] [address], [place] [address] on 12/01/2021 at 10:40am.    Appointment was facilitated by Ms. ANTON Lamb, Referral Coordinator.

## 2021-11-12 NOTE — PROGRESS NOTE ADULT - ATTENDING COMMENTS
I: CKD stable.  HGB 9.1.   A: Stable CKD.s/p renal transplant. Anemia stable.   P: Follow SCr. Follow up as outpatient.
I: SBP 150s. Cr 1.34 s/p renal transplant.   A: Stable CKD. HTN uncontrolled.   P: Follow SCr. Continue tacrolimus. Follow levels. Await workup of effusion. Continue BP meds.

## 2021-11-12 NOTE — PROGRESS NOTE ADULT - REASON FOR ADMISSION
pleural effusion

## 2021-11-12 NOTE — PROGRESS NOTE ADULT - ASSESSMENT
69 yo male PMHx AF (Eliquis), hiatal hernia, CAD s/p CABG (2005), R renal txp (tacro), Obesity, DM2 (A1C 6.7), HTN, HLD, BPH, CLL, MGUS, referred to the ED by Dr Leyva for possible thoracentesis with Dr Lora of R sided pleural effusion seen on 10/29 CT after 2 months of worsened LE edema and dyspnea on exertion. Now s/p lymph node biopsy.

## 2021-11-12 NOTE — PROGRESS NOTE ADULT - PROVIDER SPECIALTY LIST ADULT
ENT
Internal Medicine
ENT
Nephrology
ENT
Internal Medicine
Nephrology
Nephrology
Internal Medicine
Internal Medicine

## 2021-11-12 NOTE — PROGRESS NOTE ADULT - SUBJECTIVE AND OBJECTIVE BOX
OVERNIGHT EVENTS:    SUBJECTIVE / INTERVAL HPI: Patient seen and examined at bedside.     VITAL SIGNS:  Vital Signs Last 24 Hrs  T(C): 36.7 (12 Nov 2021 05:27), Max: 36.7 (12 Nov 2021 05:27)  T(F): 98.1 (12 Nov 2021 05:27), Max: 98.1 (12 Nov 2021 05:27)  HR: 63 (12 Nov 2021 05:27) (63 - 75)  BP: 149/84 (12 Nov 2021 05:27) (133/79 - 149/84)  BP(mean): --  RR: 18 (12 Nov 2021 05:27) (17 - 18)  SpO2: 96% (12 Nov 2021 05:27) (95% - 96%)    PHYSICAL EXAM:    General: Well developed, well nourished, no acute distress  HEENT: NC/AT; PERRL, anicteric sclera; MMM  Neck: supple  Cardiovascular: +S1/S2, RRR, no murmurs, rubs, gallops  Respiratory: CTA B/L; no W/R/R  Gastrointestinal: soft, NT/ND; +BSx4  Extremities: WWP; no edema, clubbing or cyanosis  Vascular: 2+ radial, DP/PT pulses B/L  Neurological: AAOx3; no focal deficits    MEDICATIONS:  MEDICATIONS  (STANDING):  amLODIPine   Tablet 10 milliGRAM(s) Oral every 24 hours  atorvastatin 40 milliGRAM(s) Oral at bedtime  dextrose 40% Gel 15 Gram(s) Oral once  dextrose 5%. 1000 milliLiter(s) (50 mL/Hr) IV Continuous <Continuous>  dextrose 5%. 1000 milliLiter(s) (100 mL/Hr) IV Continuous <Continuous>  dextrose 50% Injectable 25 Gram(s) IV Push once  dextrose 50% Injectable 12.5 Gram(s) IV Push once  dextrose 50% Injectable 25 Gram(s) IV Push once  furosemide    Tablet 40 milliGRAM(s) Oral every 24 hours  glucagon  Injectable 1 milliGRAM(s) IntraMuscular once  insulin glargine Injectable (LANTUS) 25 Unit(s) SubCutaneous at bedtime  insulin lispro (ADMELOG) corrective regimen sliding scale   SubCutaneous Before meals and at bedtime  insulin lispro Injectable (ADMELOG). 3 Unit(s) SubCutaneous once  isosorbide   mononitrate ER Tablet (IMDUR) 30 milliGRAM(s) Oral daily  pantoprazole    Tablet 20 milliGRAM(s) Oral before breakfast  tacrolimus 2 milliGRAM(s) Oral every 24 hours  tacrolimus 1 milliGRAM(s) Oral every 24 hours  tamsulosin 0.4 milliGRAM(s) Oral at bedtime  valGANciclovir 450 milliGRAM(s) Oral every 24 hours    MEDICATIONS  (PRN):  acetaminophen     Tablet .. 650 milliGRAM(s) Oral every 6 hours PRN Temp greater or equal to 38C (100.4F), Mild Pain (1 - 3)  ALPRAZolam 0.25 milliGRAM(s) Oral at bedtime PRN insomnia  melatonin 3 milliGRAM(s) Oral at bedtime PRN Insomnia      ALLERGIES:  Allergies    No Known Allergies    Intolerances        LABS:                        9.1    x     )-----------( 135      ( 12 Nov 2021 08:42 )             30.7     11-10    131<L>  |  96  |  28<H>  ----------------------------<  134<H>  4.4   |  24  |  1.10    Ca    9.5      10 Nov 2021 11:02  Phos  4.6     11-10      PT/INR - ( 12 Nov 2021 08:42 )   PT: 15.4 sec;   INR: 1.30          PTT - ( 12 Nov 2021 08:42 )  PTT:36.0 sec    CAPILLARY BLOOD GLUCOSE      POCT Blood Glucose.: 147 mg/dL (12 Nov 2021 08:55)      RADIOLOGY & ADDITIONAL TESTS: Reviewed.    PLAN:  OVERNIGHT EVENTS: ALEXANDER    SUBJECTIVE / INTERVAL HPI: Patient seen and examined at bedside. No complaints    VITAL SIGNS:  Vital Signs Last 24 Hrs  T(C): 36.7 (12 Nov 2021 05:27), Max: 36.7 (12 Nov 2021 05:27)  T(F): 98.1 (12 Nov 2021 05:27), Max: 98.1 (12 Nov 2021 05:27)  HR: 63 (12 Nov 2021 05:27) (63 - 75)  BP: 149/84 (12 Nov 2021 05:27) (133/79 - 149/84)  BP(mean): --  RR: 18 (12 Nov 2021 05:27) (17 - 18)  SpO2: 96% (12 Nov 2021 05:27) (95% - 96%)    PHYSICAL EXAM:    Constitutional: Obese elderly male in NAD.  HEENT: PERRL, EOMI, sclera non-icteric, neck supple, trachea midline, no masses, no JVD, MMM, good dentition  Respiratory: Decreased breath sounds R lung base. Crackles L lung base. No accessory muscle use or intercostal retractions  Cardiovascular: RRR, normal S1S2, no M/R/G  Gastrointestinal: soft, NTND, no masses palpable, BS normal  Extremities: Warm, well perfused, pulses equal bilateral upper and lower extremities. 2-3+ pitting edema.  Neurological: AAOx3, CN Grossly intact  Skin: Normal temperature, warm      MEDICATIONS:  MEDICATIONS  (STANDING):  amLODIPine   Tablet 10 milliGRAM(s) Oral every 24 hours  atorvastatin 40 milliGRAM(s) Oral at bedtime  dextrose 40% Gel 15 Gram(s) Oral once  dextrose 5%. 1000 milliLiter(s) (50 mL/Hr) IV Continuous <Continuous>  dextrose 5%. 1000 milliLiter(s) (100 mL/Hr) IV Continuous <Continuous>  dextrose 50% Injectable 25 Gram(s) IV Push once  dextrose 50% Injectable 12.5 Gram(s) IV Push once  dextrose 50% Injectable 25 Gram(s) IV Push once  furosemide    Tablet 40 milliGRAM(s) Oral every 24 hours  glucagon  Injectable 1 milliGRAM(s) IntraMuscular once  insulin glargine Injectable (LANTUS) 25 Unit(s) SubCutaneous at bedtime  insulin lispro (ADMELOG) corrective regimen sliding scale   SubCutaneous Before meals and at bedtime  insulin lispro Injectable (ADMELOG). 3 Unit(s) SubCutaneous once  isosorbide   mononitrate ER Tablet (IMDUR) 30 milliGRAM(s) Oral daily  pantoprazole    Tablet 20 milliGRAM(s) Oral before breakfast  tacrolimus 2 milliGRAM(s) Oral every 24 hours  tacrolimus 1 milliGRAM(s) Oral every 24 hours  tamsulosin 0.4 milliGRAM(s) Oral at bedtime  valGANciclovir 450 milliGRAM(s) Oral every 24 hours    MEDICATIONS  (PRN):  acetaminophen     Tablet .. 650 milliGRAM(s) Oral every 6 hours PRN Temp greater or equal to 38C (100.4F), Mild Pain (1 - 3)  ALPRAZolam 0.25 milliGRAM(s) Oral at bedtime PRN insomnia  melatonin 3 milliGRAM(s) Oral at bedtime PRN Insomnia      ALLERGIES:  Allergies    No Known Allergies    Intolerances        LABS:                        9.1    x     )-----------( 135      ( 12 Nov 2021 08:42 )             30.7     11-10    131<L>  |  96  |  28<H>  ----------------------------<  134<H>  4.4   |  24  |  1.10    Ca    9.5      10 Nov 2021 11:02  Phos  4.6     11-10      PT/INR - ( 12 Nov 2021 08:42 )   PT: 15.4 sec;   INR: 1.30          PTT - ( 12 Nov 2021 08:42 )  PTT:36.0 sec    CAPILLARY BLOOD GLUCOSE      POCT Blood Glucose.: 147 mg/dL (12 Nov 2021 08:55)      RADIOLOGY & ADDITIONAL TESTS: Reviewed.    PLAN:

## 2021-11-13 LAB
CULTURE RESULTS: SIGNIFICANT CHANGE UP
CULTURE RESULTS: SIGNIFICANT CHANGE UP
SPECIMEN SOURCE: SIGNIFICANT CHANGE UP
SPECIMEN SOURCE: SIGNIFICANT CHANGE UP

## 2021-11-18 LAB — SURGICAL PATHOLOGY STUDY: SIGNIFICANT CHANGE UP
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
## 2021-11-22 DIAGNOSIS — Z79.4 LONG TERM (CURRENT) USE OF INSULIN: ICD-10-CM

## 2021-11-22 DIAGNOSIS — Z95.1 PRESENCE OF AORTOCORONARY BYPASS GRAFT: ICD-10-CM

## 2021-11-22 DIAGNOSIS — K22.70 BARRETT'S ESOPHAGUS WITHOUT DYSPLASIA: ICD-10-CM

## 2021-11-22 DIAGNOSIS — J90 PLEURAL EFFUSION, NOT ELSEWHERE CLASSIFIED: ICD-10-CM

## 2021-11-22 DIAGNOSIS — K21.9 GASTRO-ESOPHAGEAL REFLUX DISEASE WITHOUT ESOPHAGITIS: ICD-10-CM

## 2021-11-22 DIAGNOSIS — G47.419 NARCOLEPSY WITHOUT CATAPLEXY: ICD-10-CM

## 2021-11-22 DIAGNOSIS — C91.10 CHRONIC LYMPHOCYTIC LEUKEMIA OF B-CELL TYPE NOT HAVING ACHIEVED REMISSION: ICD-10-CM

## 2021-11-22 DIAGNOSIS — Z79.899 OTHER LONG TERM (CURRENT) DRUG THERAPY: ICD-10-CM

## 2021-11-22 DIAGNOSIS — N17.9 ACUTE KIDNEY FAILURE, UNSPECIFIED: ICD-10-CM

## 2021-11-22 DIAGNOSIS — D63.0 ANEMIA IN NEOPLASTIC DISEASE: ICD-10-CM

## 2021-11-22 DIAGNOSIS — I25.10 ATHEROSCLEROTIC HEART DISEASE OF NATIVE CORONARY ARTERY WITHOUT ANGINA PECTORIS: ICD-10-CM

## 2021-11-22 DIAGNOSIS — I48.0 PAROXYSMAL ATRIAL FIBRILLATION: ICD-10-CM

## 2021-11-22 DIAGNOSIS — Z79.01 LONG TERM (CURRENT) USE OF ANTICOAGULANTS: ICD-10-CM
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
## 2021-11-22 DIAGNOSIS — E66.9 OBESITY, UNSPECIFIED: ICD-10-CM

## 2021-11-22 DIAGNOSIS — T86.19 OTHER COMPLICATION OF KIDNEY TRANSPLANT: ICD-10-CM

## 2021-11-22 DIAGNOSIS — J91.0 MALIGNANT PLEURAL EFFUSION: ICD-10-CM

## 2021-11-22 DIAGNOSIS — E87.1 HYPO-OSMOLALITY AND HYPONATREMIA: ICD-10-CM

## 2021-11-22 DIAGNOSIS — E11.22 TYPE 2 DIABETES MELLITUS WITH DIABETIC CHRONIC KIDNEY DISEASE: ICD-10-CM

## 2021-11-22 DIAGNOSIS — N40.0 BENIGN PROSTATIC HYPERPLASIA WITHOUT LOWER URINARY TRACT SYMPTOMS: ICD-10-CM

## 2021-11-22 DIAGNOSIS — N18.9 CHRONIC KIDNEY DISEASE, UNSPECIFIED: ICD-10-CM

## 2021-11-22 DIAGNOSIS — E78.5 HYPERLIPIDEMIA, UNSPECIFIED: ICD-10-CM

## 2021-11-22 DIAGNOSIS — K44.9 DIAPHRAGMATIC HERNIA WITHOUT OBSTRUCTION OR GANGRENE: ICD-10-CM

## 2021-11-22 DIAGNOSIS — Z78.1 PHYSICAL RESTRAINT STATUS: ICD-10-CM

## 2021-11-22 DIAGNOSIS — D47.2 MONOCLONAL GAMMOPATHY: ICD-10-CM

## 2021-11-22 DIAGNOSIS — I13.10 HYPERTENSIVE HEART AND CHRONIC KIDNEY DISEASE WITHOUT HEART FAILURE, WITH STAGE 1 THROUGH STAGE 4 CHRONIC KIDNEY DISEASE, OR UNSPECIFIED CHRONIC KIDNEY DISEASE: ICD-10-CM

## 2021-11-24 ENCOUNTER — APPOINTMENT (OUTPATIENT)
Dept: PULMONOLOGY | Facility: CLINIC | Age: 71
End: 2021-11-24
Payer: MEDICARE

## 2021-11-24 PROBLEM — E78.5 HYPERLIPIDEMIA, UNSPECIFIED: Chronic | Status: ACTIVE | Noted: 2021-11-08

## 2021-11-24 PROCEDURE — 99443: CPT | Mod: 95

## 2021-11-24 NOTE — REASON FOR VISIT
[Home] : at home, [unfilled] , at the time of the visit. [Medical Office: ([facility])___] : at the medical office located in  [Follow-Up] : a follow-up visit [Abnormal CXR/ Chest CT] : an abnormal CXR/ chest CT

## 2021-11-24 NOTE — ASSESSMENT
[FreeTextEntry1] : Lymphoma and right pleural effusion\par \par I discussed the pathology with the patient which is consistent with CLL and small lymphocytic lymphoma.  The recommendation by his oncologist is to proceed with biopsy of the mediastinal lymph node and the pleural fluid.  I discussed the case in details with the patient informed that the recommendation by his oncologist to sample the pulmonary pathology prior to any treatment recommendation.  Informed the patient that will refer him to Dr. Onel Zhang for evaluation for drainage of the pleural fluid, pleural biopsy, and mediastinal lymph node excisional biopsy.\par \par Obstructive sleep apnea\par \par Patient does not use CPAP

## 2021-12-01 ENCOUNTER — LABORATORY RESULT (OUTPATIENT)
Age: 71
End: 2021-12-01

## 2021-12-01 ENCOUNTER — APPOINTMENT (OUTPATIENT)
Dept: NEPHROLOGY | Facility: CLINIC | Age: 71
End: 2021-12-01
Payer: MEDICARE

## 2021-12-01 VITALS — DIASTOLIC BLOOD PRESSURE: 71 MMHG | SYSTOLIC BLOOD PRESSURE: 141 MMHG | HEART RATE: 87 BPM

## 2021-12-01 VITALS — WEIGHT: 226 LBS | TEMPERATURE: 97.2 F | OXYGEN SATURATION: 98 % | HEART RATE: 89 BPM | BODY MASS INDEX: 36.48 KG/M2

## 2021-12-01 VITALS — SYSTOLIC BLOOD PRESSURE: 139 MMHG | HEART RATE: 87 BPM | DIASTOLIC BLOOD PRESSURE: 74 MMHG

## 2021-12-01 PROCEDURE — 99215 OFFICE O/P EST HI 40 MIN: CPT

## 2021-12-01 NOTE — PHYSICAL EXAM
[General Appearance - Alert] : alert [General Appearance - In No Acute Distress] : in no acute distress [Sclera] : the sclera and conjunctiva were normal [PERRL With Normal Accommodation] : pupils were equal in size, round, and reactive to light [Extraocular Movements] : extraocular movements were intact [Outer Ear] : the ears and nose were normal in appearance [Hearing Threshold Finger Rub Not [place]] : hearing was normal [Neck Appearance] : the appearance of the neck was normal [Neck Cervical Mass (___cm)] : no neck mass was observed [Jugular Venous Distention Increased] : there was no jugular-venous distention [Thyroid Diffuse Enlargement] : the thyroid was not enlarged [Thyroid Nodule] : there were no palpable thyroid nodules [Heart Rate And Rhythm] : heart rate was normal and rhythm regular [Heart Sounds] : normal S1 and S2 [Heart Sounds Gallop] : no gallops [Murmurs] : no murmurs [Heart Sounds Pericardial Friction Rub] : no pericardial rub [Bowel Sounds] : normal bowel sounds [Abdomen Soft] : soft [Abdomen Tenderness] : non-tender [Abdomen Mass (___ Cm)] : no abdominal mass palpated [No CVA Tenderness] : no ~M costovertebral angle tenderness [No Spinal Tenderness] : no spinal tenderness [Abnormal Walk] : normal gait [Involuntary Movements] : no involuntary movements were seen [Musculoskeletal - Swelling] : no joint swelling seen [Motor Tone] : muscle strength and tone were normal [Skin Color & Pigmentation] : normal skin color and pigmentation [Skin Turgor] : normal skin turgor [] : no rash [No Focal Deficits] : no focal deficits [Oriented To Time, Place, And Person] : oriented to person, place, and time [Impaired Insight] : insight and judgment were intact [Affect] : the affect was normal [FreeTextEntry1] : bilateral LE edema

## 2021-12-01 NOTE — ADDENDUM
[FreeTextEntry1] : All medical record entries made by the Scribe were at my, MARITO Coleman's direction and personally dictated by me on 12/01/2021. I have received the chart and agree that the record accurately reflects my personal performance of the history, physical exam, assessment, and plan. I have also personally directed, reviewed, and agreed with the chart.\par

## 2021-12-01 NOTE — ASSESSMENT
[FreeTextEntry1] : Plan:\par 1) HTN: BP borderline elevated though acceptable today on current regimen. Will therefore continue patient's current antihypertensive medications and will reassess pressure and regimen at next evaluation. \par 2) Transplant medications: patient to continue on Prograf and Valcyte. Pt will continue f/u [place] transplant team.\par 3) H/o atrial flutter: Continue to f/u with Dr. Encinas in cardiology for rhythm evaluation.\par 4) Hyperglycemia: Patient's most recent HbA1C of 6.8% and glucose of 101 is elevated. Will reassess HbA1C with labs.\par 5) Weight Gain: Pt has yet to see his endocrinologist and was again urged to make appointment with his endocrinologist in the next week to manage his medications and assess weight management and glycemic control.\par 6) Exertional dyspnea: Pt advised to take O2 at home as needed. \par 7) Have attempted to contact Dr. Lora and Dr. Zhang. Have contacted NP who works with them to discuss today's physical exam findings and my concern for his breathing. Discussed with patient my reasons for concern regarding his current condition but he is reluctant to be sent to the hospital today. He appears to have more fluid in his right chest than on last physical exam, and is SOB at rest. He declines to go to the hospital today. Pt was provided referral to have COVID test and labs drawn today at [facility] in an attempt to bypass emergency room and be admitted directly to pulmonary department when he goes to the hospital tomorrow. Pt will likely have VATS surgery done and was instructed to stop taking any more Eliquis. \par \par Changes to medications: Hold Eliquis in anticipation of possible surgery. \par \par Labs and COVID swab will be drawn today at an outside lab, and patient will return pending hospital discharge for a follow-up appointment.

## 2021-12-01 NOTE — HISTORY OF PRESENT ILLNESS
[FreeTextEntry1] : The patient is a 70 year old male presenting today for follow-up evaluation of HTN, CAD, DM, HLD, s/p CABG, AFib, h/o renal transplant, and monoclonal B cell abnormality. \par \par The pt has had f/u with pulmonologist Dr. Lora.\par \par The patient claims he is feeling generally well today and is accompanied by his son. Pt reports continued exertional dyspnea and denies any worsening of his SOB. However his son reports that today the pt's breathing was the worst he had ever seen. Pt has home oxygen after previous hospitalization; he reports his baseline oxygen saturation 93-94 but occasionally has readings of 89 with exertion. \par \par Labs 10/27/21: sed rate 33, BNP 1310, HDL 34, LDL 41, Fe 35, iron sat 12%, Na 131, glucose 128, creatinine 1.04, eGFR 73, PTHH 101, eGFR by Cystatin C 39, Cystatin C 1.6, TSH 4.42, Vit D 25-OH 15.0, WBC 19.32, RBC 3.33, HGB 9.5, HCT 32.4%, HgbA1c 6.8%, IgG Lambda Band Identified, positive COVID spike domain antibody with nucleocapsid\par - The pt has had echocardiogram 10/28/21 with results on record.\par \par 10/29/21 CT Chest w/o contrast:\par IMPRESSION:\par - Interval enlargement of now moderate right pleural effusion with right basilar atelectasis.\par - No interval change in scattered bilateral pulmonary nodules, possibly infectious/inflammatory or lymphomatous in nature.\par - Persistent bulky thoracic and upper dominant adenopathy.\par \par Current medications: Eliquis 5mg BID, furosemide 40mg QD, amlodipine 10mg QD, Crestor 10mg QD, Prograf 2mg QAM+1mg QPM, isosorbide 30mg QD, alprazolam 0.25mg PRN (0-2x weekly), Valcyte, Humalog 24-38u prn, tamsulosin 0.4mg QPM, pantaprazole 40mg QD, Ambien prn, Protonix 20mg QD. \par - Pt could not tolerate Trulicity. \par - Pt interested in Ozempic and other similar class drugs to lose weight. \par

## 2021-12-01 NOTE — END OF VISIT
[Time Spent: ___ minutes] : I have spent [unfilled] minutes of time on the encounter. [FreeTextEntry3] : Documented by Jessie Lockwood acting as a scribe for MARITO Coleman on 12/01/2021.\par

## 2021-12-02 ENCOUNTER — APPOINTMENT (OUTPATIENT)
Dept: THORACIC SURGERY | Facility: CLINIC | Age: 71
End: 2021-12-02

## 2021-12-02 LAB
24R-OH-CALCIDIOL SERPL-MCNC: 40.2 PG/ML
25(OH)D3 SERPL-MCNC: 14.3 NG/ML
ALBUMIN SERPL ELPH-MCNC: 4.5 G/DL
ALP BLD-CCNC: 152 U/L
ALT SERPL-CCNC: 10 U/L
ANION GAP SERPL CALC-SCNC: 15 MMOL/L
APPEARANCE: CLEAR
APTT BLD: 38.1 SEC
AST SERPL-CCNC: 12 U/L
BACTERIA: NEGATIVE
BASOPHILS # BLD AUTO: 0 K/UL
BASOPHILS NFR BLD AUTO: 0 %
BILIRUB SERPL-MCNC: 0.7 MG/DL
BILIRUBIN URINE: NEGATIVE
BLOOD URINE: NEGATIVE
BUN SERPL-MCNC: 27 MG/DL
C3 SERPL-MCNC: 100 MG/DL
C4 SERPL-MCNC: 27 MG/DL
CALCIUM SERPL-MCNC: 8.9 MG/DL
CALCIUM SERPL-MCNC: 8.9 MG/DL
CHLORIDE SERPL-SCNC: 93 MMOL/L
CHOLEST SERPL-MCNC: 78 MG/DL
CO2 SERPL-SCNC: 22 MMOL/L
COLOR: NORMAL
COVID-19 NUCLEOCAPSID  GAM ANTIBODY INTERPRETATION: POSITIVE
COVID-19 SPIKE DOMAIN ANTIBODY INTERPRETATION: POSITIVE
CREAT SERPL-MCNC: 1.12 MG/DL
CREAT SPEC-SCNC: 35 MG/DL
CREAT/PROT UR: 0.1 RATIO
DEPRECATED KAPPA LC FREE/LAMBDA SER: 0.48 RATIO
EOSINOPHIL # BLD AUTO: 0 K/UL
EOSINOPHIL NFR BLD AUTO: 0 %
ESTIMATED AVERAGE GLUCOSE: 157 MG/DL
FERRITIN SERPL-MCNC: 86 NG/ML
FOLATE SERPL-MCNC: 6.2 NG/ML
GLUCOSE QUALITATIVE U: NEGATIVE
GLUCOSE SERPL-MCNC: 229 MG/DL
HBA1C MFR BLD HPLC: 7.1 %
HBV SURFACE AB SER QL: NONREACTIVE
HBV SURFACE AG SER QL: NONREACTIVE
HCT VFR BLD CALC: 30.6 %
HCV AB SER QL: NONREACTIVE
HCV S/CO RATIO: 0.08 S/CO
HCYS SERPL-MCNC: 15.3 UMOL/L
HDLC SERPL-MCNC: 33 MG/DL
HGB BLD-MCNC: 9 G/DL
HYALINE CASTS: 0 /LPF
INR PPP: 1.99 RATIO
IRON SATN MFR SERPL: 10 %
IRON SERPL-MCNC: 31 UG/DL
KAPPA LC CSF-MCNC: 9.24 MG/DL
KAPPA LC SERPL-MCNC: 4.41 MG/DL
KETONES URINE: NEGATIVE
LDLC SERPL CALC-MCNC: 34 MG/DL
LEUKOCYTE ESTERASE URINE: NEGATIVE
LYMPHOCYTES # BLD AUTO: 8.29 K/UL
LYMPHOCYTES NFR BLD AUTO: 63.7 %
MAGNESIUM SERPL-MCNC: 2 MG/DL
MAN DIFF?: NORMAL
MCHC RBC-ENTMCNC: 27.7 PG
MCHC RBC-ENTMCNC: 29.4 GM/DL
MCV RBC AUTO: 94.2 FL
MICROSCOPIC-UA: NORMAL
MONOCYTES # BLD AUTO: 0.46 K/UL
MONOCYTES NFR BLD AUTO: 3.5 %
NEUTROPHILS # BLD AUTO: 3.92 K/UL
NEUTROPHILS NFR BLD AUTO: 30.1 %
NITRITE URINE: NEGATIVE
NONHDLC SERPL-MCNC: 45 MG/DL
NT-PROBNP SERPL-MCNC: 1691 PG/ML
PARATHYROID HORMONE INTACT: 161 PG/ML
PH URINE: 6
PHOSPHATE SERPL-MCNC: 3.6 MG/DL
PLATELET # BLD AUTO: 145 K/UL
POTASSIUM SERPL-SCNC: 5.2 MMOL/L
PROCALCITONIN SERPL-MCNC: 0.05 NG/ML
PROT SERPL-MCNC: 7.2 G/DL
PROT UR-MCNC: 4 MG/DL
PROTEIN URINE: NEGATIVE
PT BLD: 22.7 SEC
RAPID RVP RESULT: NOT DETECTED
RBC # BLD: 3.25 M/UL
RBC # FLD: 16.7 %
RED BLOOD CELLS URINE: 0 /HPF
RENIN PLASMA: 21.3 PG/ML
RHEUMATOID FACT SER QL: 11 IU/ML
SARS-COV-2 AB SERPL IA-ACNC: >250 U/ML
SARS-COV-2 AB SERPL QL IA: 1.62 INDEX
SARS-COV-2 RNA PNL RESP NAA+PROBE: NOT DETECTED
SODIUM SERPL-SCNC: 128 MMOL/L
SPECIFIC GRAVITY URINE: 1.01
SQUAMOUS EPITHELIAL CELLS: 0 /HPF
T3FREE SERPL-MCNC: 2.78 PG/ML
T3RU NFR SERPL: 0.9 TBI
T4 FREE SERPL-MCNC: 1.5 NG/DL
T4 SERPL-MCNC: 7.5 UG/DL
THYROGLOB AB SERPL-ACNC: <20 IU/ML
THYROPEROXIDASE AB SERPL IA-ACNC: <10 IU/ML
TIBC SERPL-MCNC: 308 UG/DL
TRIGL SERPL-MCNC: 58 MG/DL
TSH SERPL-ACNC: 4.36 UIU/ML
UIBC SERPL-MCNC: 277 UG/DL
URATE SERPL-MCNC: 7.6 MG/DL
UROBILINOGEN URINE: NORMAL
VIT B12 SERPL-MCNC: 508 PG/ML
WBC # FLD AUTO: 13.02 K/UL
WHITE BLOOD CELLS URINE: 0 /HPF

## 2021-12-02 NOTE — DISCHARGE NOTE PROVIDER - NSDCCAREPROVSEEN_GEN_ALL_CORE_FT
General Orthopedics    Patient seen and examined. No acute complaints. No overnight events per nursing. Pain controlled. No cp sob n/v any other acute complaints.     Vital Signs Last 24 Hrs  T(C): 36.7 (01 Dec 2021 10:16), Max: 36.7 (01 Dec 2021 10:16)  T(F): 98.1 (01 Dec 2021 10:16), Max: 98.1 (01 Dec 2021 10:16)  HR: 108 (01 Dec 2021 10:16) (108 - 108)  BP: 154/79 (01 Dec 2021 10:16) (154/79 - 154/79)  BP(mean): --  RR: 18 (01 Dec 2021 10:16) (18 - 18)  SpO2: 100% (01 Dec 2021 10:16) (100% - 100%)                          10.3   6.25  )-----------( 66       ( 01 Dec 2021 11:05 )             32.2       Exam:  General: Awake confused no acute distress  RLE:   Skin intact. No obvious abrasions/lacerations  Leg minimally shortened but externally rotated  TTP over proximal femur/hip.  Unable to SLR  Deferred log roll and axial load due to known hip fracture  +Gsc/TA/EHL/FHL, SILT  DP pulses intact  Compartments soft and compressible  No calf TTP bilaterally   Marie Lora Jennifer

## 2021-12-03 ENCOUNTER — INPATIENT (INPATIENT)
Facility: HOSPITAL | Age: 71
LOS: 6 days | Discharge: HOME | DRG: 286 | End: 2021-12-10
Payer: MEDICARE

## 2021-12-03 VITALS
RESPIRATION RATE: 20 BRPM | TEMPERATURE: 98 F | HEART RATE: 85 BPM | OXYGEN SATURATION: 90 % | DIASTOLIC BLOOD PRESSURE: 75 MMHG | HEIGHT: 66 IN | SYSTOLIC BLOOD PRESSURE: 146 MMHG | WEIGHT: 225.97 LBS

## 2021-12-03 DIAGNOSIS — C91.10 CHRONIC LYMPHOCYTIC LEUKEMIA OF B-CELL TYPE NOT HAVING ACHIEVED REMISSION: ICD-10-CM

## 2021-12-03 DIAGNOSIS — R63.8 OTHER SYMPTOMS AND SIGNS CONCERNING FOOD AND FLUID INTAKE: ICD-10-CM

## 2021-12-03 DIAGNOSIS — J96.11 CHRONIC RESPIRATORY FAILURE WITH HYPOXIA: ICD-10-CM

## 2021-12-03 DIAGNOSIS — Z94.0 KIDNEY TRANSPLANT STATUS: ICD-10-CM

## 2021-12-03 DIAGNOSIS — J90 PLEURAL EFFUSION, NOT ELSEWHERE CLASSIFIED: ICD-10-CM

## 2021-12-03 DIAGNOSIS — K21.9 GASTRO-ESOPHAGEAL REFLUX DISEASE WITHOUT ESOPHAGITIS: ICD-10-CM

## 2021-12-03 DIAGNOSIS — E11.9 TYPE 2 DIABETES MELLITUS WITHOUT COMPLICATIONS: ICD-10-CM

## 2021-12-03 DIAGNOSIS — I48.91 UNSPECIFIED ATRIAL FIBRILLATION: ICD-10-CM

## 2021-12-03 DIAGNOSIS — Z98.890 OTHER SPECIFIED POSTPROCEDURAL STATES: Chronic | ICD-10-CM

## 2021-12-03 DIAGNOSIS — Z95.1 PRESENCE OF AORTOCORONARY BYPASS GRAFT: Chronic | ICD-10-CM

## 2021-12-03 DIAGNOSIS — R60.0 LOCALIZED EDEMA: ICD-10-CM

## 2021-12-03 DIAGNOSIS — E78.5 HYPERLIPIDEMIA, UNSPECIFIED: ICD-10-CM

## 2021-12-03 DIAGNOSIS — I10 ESSENTIAL (PRIMARY) HYPERTENSION: ICD-10-CM

## 2021-12-03 DIAGNOSIS — M27.2 INFLAMMATORY CONDITIONS OF JAWS: Chronic | ICD-10-CM

## 2021-12-03 LAB
ALBUMIN SERPL ELPH-MCNC: 4.4 G/DL — SIGNIFICANT CHANGE UP (ref 3.3–5)
ALP SERPL-CCNC: 138 U/L — HIGH (ref 40–120)
ALT FLD-CCNC: 9 U/L — LOW (ref 10–45)
ANA PAT FLD IF-IMP: ABNORMAL
ANA SER IF-ACNC: ABNORMAL
ANION GAP SERPL CALC-SCNC: 10 MMOL/L — SIGNIFICANT CHANGE UP (ref 5–17)
ANISOCYTOSIS BLD QL: SLIGHT — SIGNIFICANT CHANGE UP
APTT BLD: 39.3 SEC — HIGH (ref 27.5–35.5)
AST SERPL-CCNC: 13 U/L — SIGNIFICANT CHANGE UP (ref 10–40)
BASOPHILS # BLD AUTO: 0.17 K/UL — SIGNIFICANT CHANGE UP (ref 0–0.2)
BASOPHILS NFR BLD AUTO: 1 % — SIGNIFICANT CHANGE UP (ref 0–2)
BILIRUB SERPL-MCNC: 0.7 MG/DL — SIGNIFICANT CHANGE UP (ref 0.2–1.2)
BLD GP AB SCN SERPL QL: NEGATIVE — SIGNIFICANT CHANGE UP
BUN SERPL-MCNC: 29 MG/DL — HIGH (ref 7–23)
CALCIUM SERPL-MCNC: 9.4 MG/DL — SIGNIFICANT CHANGE UP (ref 8.4–10.5)
CHLORIDE SERPL-SCNC: 95 MMOL/L — LOW (ref 96–108)
CO2 SERPL-SCNC: 24 MMOL/L — SIGNIFICANT CHANGE UP (ref 22–31)
CREAT SERPL-MCNC: 1.16 MG/DL — SIGNIFICANT CHANGE UP (ref 0.5–1.3)
EOSINOPHIL # BLD AUTO: 0 K/UL — SIGNIFICANT CHANGE UP (ref 0–0.5)
EOSINOPHIL NFR BLD AUTO: 0 % — SIGNIFICANT CHANGE UP (ref 0–6)
GIANT PLATELETS BLD QL SMEAR: PRESENT — SIGNIFICANT CHANGE UP
GLUCOSE BLDC GLUCOMTR-MCNC: 175 MG/DL — HIGH (ref 70–99)
GLUCOSE BLDC GLUCOMTR-MCNC: 301 MG/DL — HIGH (ref 70–99)
GLUCOSE SERPL-MCNC: 139 MG/DL — HIGH (ref 70–99)
HCT VFR BLD CALC: 30.2 % — LOW (ref 39–50)
HGB BLD-MCNC: 8.9 G/DL — LOW (ref 13–17)
HYPOCHROMIA BLD QL: SLIGHT — SIGNIFICANT CHANGE UP
INR BLD: 1.4 — HIGH (ref 0.88–1.16)
LYMPHOCYTES # BLD AUTO: 12.24 K/UL — HIGH (ref 1–3.3)
LYMPHOCYTES # BLD AUTO: 70 % — HIGH (ref 13–44)
MACROCYTES BLD QL: SLIGHT — SIGNIFICANT CHANGE UP
MANUAL SMEAR VERIFICATION: SIGNIFICANT CHANGE UP
MCHC RBC-ENTMCNC: 27.2 PG — SIGNIFICANT CHANGE UP (ref 27–34)
MCHC RBC-ENTMCNC: 29.5 GM/DL — LOW (ref 32–36)
MCV RBC AUTO: 92.4 FL — SIGNIFICANT CHANGE UP (ref 80–100)
MONOCYTES # BLD AUTO: 0.17 K/UL — SIGNIFICANT CHANGE UP (ref 0–0.9)
MONOCYTES NFR BLD AUTO: 1 % — LOW (ref 2–14)
NEUTROPHILS # BLD AUTO: 4.89 K/UL — SIGNIFICANT CHANGE UP (ref 1.8–7.4)
NEUTROPHILS NFR BLD AUTO: 28 % — LOW (ref 43–77)
OVALOCYTES BLD QL SMEAR: SLIGHT — SIGNIFICANT CHANGE UP
PLAT MORPH BLD: ABNORMAL
PLATELET # BLD AUTO: 151 K/UL — SIGNIFICANT CHANGE UP (ref 150–400)
POIKILOCYTOSIS BLD QL AUTO: SLIGHT — SIGNIFICANT CHANGE UP
POLYCHROMASIA BLD QL SMEAR: SLIGHT — SIGNIFICANT CHANGE UP
POTASSIUM SERPL-MCNC: 5.1 MMOL/L — SIGNIFICANT CHANGE UP (ref 3.5–5.3)
POTASSIUM SERPL-SCNC: 5.1 MMOL/L — SIGNIFICANT CHANGE UP (ref 3.5–5.3)
PROT SERPL-MCNC: 7.5 G/DL — SIGNIFICANT CHANGE UP (ref 6–8.3)
PROTHROM AB SERPL-ACNC: 16.5 SEC — HIGH (ref 10.6–13.6)
RBC # BLD: 3.27 M/UL — LOW (ref 4.2–5.8)
RBC # FLD: 16.1 % — HIGH (ref 10.3–14.5)
RBC BLD AUTO: ABNORMAL
RH IG SCN BLD-IMP: POSITIVE — SIGNIFICANT CHANGE UP
SARS-COV-2 RNA SPEC QL NAA+PROBE: NEGATIVE — SIGNIFICANT CHANGE UP
SMUDGE CELLS # BLD: PRESENT — SIGNIFICANT CHANGE UP
SODIUM SERPL-SCNC: 129 MMOL/L — LOW (ref 135–145)
WBC # BLD: 17.48 K/UL — HIGH (ref 3.8–10.5)
WBC # FLD AUTO: 17.48 K/UL — HIGH (ref 3.8–10.5)

## 2021-12-03 PROCEDURE — 71250 CT THORAX DX C-: CPT | Mod: 26

## 2021-12-03 PROCEDURE — 71045 X-RAY EXAM CHEST 1 VIEW: CPT | Mod: 26

## 2021-12-03 PROCEDURE — 99285 EMERGENCY DEPT VISIT HI MDM: CPT

## 2021-12-03 PROCEDURE — 93010 ELECTROCARDIOGRAM REPORT: CPT

## 2021-12-03 PROCEDURE — 71045 X-RAY EXAM CHEST 1 VIEW: CPT | Mod: 26,77

## 2021-12-03 PROCEDURE — 99223 1ST HOSP IP/OBS HIGH 75: CPT

## 2021-12-03 PROCEDURE — 99223 1ST HOSP IP/OBS HIGH 75: CPT | Mod: GC,25

## 2021-12-03 PROCEDURE — 32555 ASPIRATE PLEURA W/ IMAGING: CPT | Mod: GC

## 2021-12-03 PROCEDURE — 93970 EXTREMITY STUDY: CPT | Mod: 26

## 2021-12-03 RX ORDER — DEXTROSE 50 % IN WATER 50 %
25 SYRINGE (ML) INTRAVENOUS ONCE
Refills: 0 | Status: DISCONTINUED | OUTPATIENT
Start: 2021-12-03 | End: 2021-12-08

## 2021-12-03 RX ORDER — SODIUM CHLORIDE 9 MG/ML
1000 INJECTION, SOLUTION INTRAVENOUS
Refills: 0 | Status: DISCONTINUED | OUTPATIENT
Start: 2021-12-03 | End: 2021-12-08

## 2021-12-03 RX ORDER — PANTOPRAZOLE SODIUM 20 MG/1
40 TABLET, DELAYED RELEASE ORAL
Refills: 0 | Status: DISCONTINUED | OUTPATIENT
Start: 2021-12-04 | End: 2021-12-10

## 2021-12-03 RX ORDER — FUROSEMIDE 40 MG
20 TABLET ORAL EVERY 12 HOURS
Refills: 0 | Status: DISCONTINUED | OUTPATIENT
Start: 2021-12-04 | End: 2021-12-04

## 2021-12-03 RX ORDER — GLUCAGON INJECTION, SOLUTION 0.5 MG/.1ML
1 INJECTION, SOLUTION SUBCUTANEOUS ONCE
Refills: 0 | Status: DISCONTINUED | OUTPATIENT
Start: 2021-12-03 | End: 2021-12-10

## 2021-12-03 RX ORDER — FUROSEMIDE 40 MG
40 TABLET ORAL EVERY 6 HOURS
Refills: 0 | Status: DISCONTINUED | OUTPATIENT
Start: 2021-12-03 | End: 2021-12-03

## 2021-12-03 RX ORDER — ATORVASTATIN CALCIUM 80 MG/1
40 TABLET, FILM COATED ORAL AT BEDTIME
Refills: 0 | Status: DISCONTINUED | OUTPATIENT
Start: 2021-12-03 | End: 2021-12-10

## 2021-12-03 RX ORDER — DEXTROSE 50 % IN WATER 50 %
15 SYRINGE (ML) INTRAVENOUS ONCE
Refills: 0 | Status: DISCONTINUED | OUTPATIENT
Start: 2021-12-03 | End: 2021-12-08

## 2021-12-03 RX ORDER — TACROLIMUS 5 MG/1
1 CAPSULE ORAL
Refills: 0 | Status: DISCONTINUED | OUTPATIENT
Start: 2021-12-03 | End: 2021-12-04

## 2021-12-03 RX ORDER — TACROLIMUS 5 MG/1
1 CAPSULE ORAL DAILY
Refills: 0 | Status: DISCONTINUED | OUTPATIENT
Start: 2021-12-03 | End: 2021-12-03

## 2021-12-03 RX ORDER — VALGANCICLOVIR 450 MG/1
450 TABLET, FILM COATED ORAL DAILY
Refills: 0 | Status: DISCONTINUED | OUTPATIENT
Start: 2021-12-03 | End: 2021-12-04

## 2021-12-03 RX ORDER — AMLODIPINE BESYLATE 2.5 MG/1
10 TABLET ORAL DAILY
Refills: 0 | Status: DISCONTINUED | OUTPATIENT
Start: 2021-12-04 | End: 2021-12-10

## 2021-12-03 RX ORDER — TACROLIMUS 5 MG/1
2 CAPSULE ORAL
Refills: 0 | Status: DISCONTINUED | OUTPATIENT
Start: 2021-12-04 | End: 2021-12-04

## 2021-12-03 RX ORDER — DEXTROSE 50 % IN WATER 50 %
12.5 SYRINGE (ML) INTRAVENOUS ONCE
Refills: 0 | Status: DISCONTINUED | OUTPATIENT
Start: 2021-12-03 | End: 2021-12-08

## 2021-12-03 RX ORDER — AMLODIPINE BESYLATE 2.5 MG/1
10 TABLET ORAL ONCE
Refills: 0 | Status: COMPLETED | OUTPATIENT
Start: 2021-12-03 | End: 2021-12-03

## 2021-12-03 RX ORDER — ALPRAZOLAM 0.25 MG
0.25 TABLET ORAL ONCE
Refills: 0 | Status: DISCONTINUED | OUTPATIENT
Start: 2021-12-03 | End: 2021-12-04

## 2021-12-03 RX ORDER — TAMSULOSIN HYDROCHLORIDE 0.4 MG/1
0.4 CAPSULE ORAL AT BEDTIME
Refills: 0 | Status: DISCONTINUED | OUTPATIENT
Start: 2021-12-03 | End: 2021-12-10

## 2021-12-03 RX ORDER — INSULIN LISPRO 100/ML
VIAL (ML) SUBCUTANEOUS
Refills: 0 | Status: DISCONTINUED | OUTPATIENT
Start: 2021-12-03 | End: 2021-12-08

## 2021-12-03 RX ORDER — TACROLIMUS 5 MG/1
2 CAPSULE ORAL ONCE
Refills: 0 | Status: COMPLETED | OUTPATIENT
Start: 2021-12-03 | End: 2021-12-03

## 2021-12-03 RX ADMIN — TAMSULOSIN HYDROCHLORIDE 0.4 MILLIGRAM(S): 0.4 CAPSULE ORAL at 22:46

## 2021-12-03 RX ADMIN — ATORVASTATIN CALCIUM 40 MILLIGRAM(S): 80 TABLET, FILM COATED ORAL at 22:46

## 2021-12-03 RX ADMIN — TACROLIMUS 2 MILLIGRAM(S): 5 CAPSULE ORAL at 14:24

## 2021-12-03 RX ADMIN — VALGANCICLOVIR 450 MILLIGRAM(S): 450 TABLET, FILM COATED ORAL at 14:09

## 2021-12-03 RX ADMIN — Medication 8: at 22:46

## 2021-12-03 RX ADMIN — TACROLIMUS 1 MILLIGRAM(S): 5 CAPSULE ORAL at 18:51

## 2021-12-03 RX ADMIN — AMLODIPINE BESYLATE 10 MILLIGRAM(S): 2.5 TABLET ORAL at 22:45

## 2021-12-03 RX ADMIN — Medication 40 MILLIGRAM(S): at 14:09

## 2021-12-03 NOTE — PROCEDURE NOTE - NSPROCDETAILS_GEN_ALL_CORE
R sided US guided thoracentesis performed. Fluid aspirated with 10cm needle and catheter inserted, but no fluid able to be aspirated./location identified, draped/prepped, sterile technique used, needle inserted/introduced/catheter inserted over needle/connection to syringe

## 2021-12-03 NOTE — H&P ADULT - NSHPLABSRESULTS_GEN_ALL_CORE
LABS:                         8.9    17.48 )-----------( 151      ( 03 Dec 2021 09:23 )             30.2     12-03    129<L>  |  95<L>  |  29<H>  ----------------------------<  139<H>  5.1   |  24  |  1.16    Ca    9.4      03 Dec 2021 09:23    TPro  7.5  /  Alb  4.4  /  TBili  0.7  /  DBili  x   /  AST  13  /  ALT  9<L>  /  AlkPhos  138<H>  12-03    PT/INR - ( 03 Dec 2021 09:23 )   PT: 16.5 sec;   INR: 1.40          PTT - ( 03 Dec 2021 09:23 )  PTT:39.3 sec    RADIOLOGY, EKG & ADDITIONAL TESTS: Reviewed.

## 2021-12-03 NOTE — CONSULT NOTE ADULT - SUBJECTIVE AND OBJECTIVE BOX
Patient is a 71y old  Male who presents with a chief complaint of dyspnea on exertion (03 Dec 2021 11:48)    HPI:  70 yo male PMHx AF (Eliquis), hiatal hernia, CAD s/p CABG (2005), R renal txp (tacro), Obesity, DM2 (A1C 6.7), HTN, HLD, BPH, CLL, MGUS, referred to the ED by Dr Leyva for possible thoracentesis with Dr Lora of R sided pleural effusion seen on 10/29 CT. Patient seen by Dr. Mishra on 12/01/21 and was holing eliquis since then in anticipation for procedure. Pt was recently admitted to [facility] in 11/2021 for cervical lymphadenopathy biopsy and was also recently addmited in July 2021, diagnosed w/ PNA discharged with supplemental O2 as needed. He c/o progressive LE edema worse over the past 2-3 weeks.    PAST MEDICAL & SURGICAL HISTORY:  Barretts esophagus    CRI (chronic renal insufficiency)    DM (diabetes mellitus)    GERD (gastroesophageal reflux disease)    HTN (hypertension)    Monoclonal gammopathy    Mandibular abscess    Paroxysmal atrial fibrillation    Benign prostatic hyperplasia, presence of lower urinary tract symptoms unspecified, unspecified morphology    Other hyperlipidemia    Hyperlipidemia    S/P CABG (coronary artery bypass graft)  15 years    History of surgery  cyst removal    Mandibular abscess        Allergies:  No Known Allergies      Home Medications:   atorvastatin 40 milliGRAM(s) Oral at bedtime  dextrose 40% Gel 15 Gram(s) Oral once  dextrose 5%. 1000 milliLiter(s) IV Continuous <Continuous>  dextrose 5%. 1000 milliLiter(s) IV Continuous <Continuous>  dextrose 50% Injectable 25 Gram(s) IV Push once  dextrose 50% Injectable 12.5 Gram(s) IV Push once  dextrose 50% Injectable 25 Gram(s) IV Push once  furosemide   Injectable 40 milliGRAM(s) IV Push every 6 hours  glucagon  Injectable 1 milliGRAM(s) IntraMuscular once  insulin lispro (ADMELOG) corrective regimen sliding scale   SubCutaneous Before meals and at bedtime  pantoprazole    Tablet 40 milliGRAM(s) Oral before breakfast  tacrolimus 2 milliGRAM(s) Oral once  tacrolimus 1 milliGRAM(s) Oral <User Schedule>  tamsulosin 0.4 milliGRAM(s) Oral at bedtime  valGANciclovir 450 milliGRAM(s) Oral daily      Hospital Medications:   MEDICATIONS  (STANDING):  atorvastatin 40 milliGRAM(s) Oral at bedtime  dextrose 40% Gel 15 Gram(s) Oral once  dextrose 5%. 1000 milliLiter(s) (50 mL/Hr) IV Continuous <Continuous>  dextrose 5%. 1000 milliLiter(s) (100 mL/Hr) IV Continuous <Continuous>  dextrose 50% Injectable 25 Gram(s) IV Push once  dextrose 50% Injectable 12.5 Gram(s) IV Push once  dextrose 50% Injectable 25 Gram(s) IV Push once  furosemide   Injectable 40 milliGRAM(s) IV Push every 6 hours  glucagon  Injectable 1 milliGRAM(s) IntraMuscular once  insulin lispro (ADMELOG) corrective regimen sliding scale   SubCutaneous Before meals and at bedtime  pantoprazole    Tablet 40 milliGRAM(s) Oral before breakfast  tacrolimus 2 milliGRAM(s) Oral once  tacrolimus 1 milliGRAM(s) Oral <User Schedule>  tamsulosin 0.4 milliGRAM(s) Oral at bedtime  valGANciclovir 450 milliGRAM(s) Oral daily      Family History:  FAMILY HISTORY:      ROS:  RESPIRATORY: +shortness of breath  CARDIOVASCULAR: No Chest pain  MUSCULOSKELETAL: +leg swelling  All other ROS negative    VITALS:  T(F): 97.6 (12-03-21 @ 08:22), Max: 97.6 (12-03-21 @ 08:22)  HR: 85 (12-03-21 @ 08:22)  BP: 146/75 (12-03-21 @ 08:22)  RR: 20 (12-03-21 @ 08:22)  SpO2: 90% (12-03-21 @ 08:22)  Wt(kg): --    Height (cm): 167.6 (12-03 @ 08:22)  Weight (kg): 102.5 (12-03 @ 08:22)  BMI (kg/m2): 36.5 (12-03 @ 08:22)  BSA (m2): 2.11 (12-03 @ 08:22)  CAPILLARY BLOOD GLUCOSE          PHYSICAL EXAM:  GENERAL: Alert, awake, oriented x3 on RA  HEENT: EOMI, neck supple, no JVP  CHEST/LUNG: Left chest CTA, reduced airflow at Right bases  HEART: Regular rate and rhythm, no murmur  ABDOMEN: Soft, nontender, non distended  EXTREMITIES: +4 pitting oedema to thighs    LABS:  12-03    129<L>  |  95<L>  |  29<H>  ----------------------------<  139<H>  5.1   |  24  |  1.16    Ca    9.4      03 Dec 2021 09:23    TPro  7.5  /  Alb  4.4  /  TBili  0.7  /  DBili      /  AST  13  /  ALT  9<L>  /  AlkPhos  138<H>  12-03    Creatinine Trend: 1.16 <--                        8.9    17.48 )-----------( 151      ( 03 Dec 2021 09:23 )             30.2       Urine Studies:

## 2021-12-03 NOTE — CONSULT NOTE ADULT - SUBJECTIVE AND OBJECTIVE BOX
Surgeon:  Dr. Zhang    Requesting Physician:     HISTORY OF PRESENT ILLNESS (Need 4):  71y Male    PAST MEDICAL & SURGICAL HISTORY:  Barretts esophagus    CRI (chronic renal insufficiency)    DM (diabetes mellitus)    GERD (gastroesophageal reflux disease)    HTN (hypertension)    Monoclonal gammopathy    Mandibular abscess    Paroxysmal atrial fibrillation    Benign prostatic hyperplasia, presence of lower urinary tract symptoms unspecified, unspecified morphology    Other hyperlipidemia    Hyperlipidemia    S/P CABG (coronary artery bypass graft)  15 years    History of surgery  cyst removal    Mandibular abscess        MEDICATIONS  (STANDING):  amLODIPine   Tablet 10 milliGRAM(s) Oral once  atorvastatin 40 milliGRAM(s) Oral at bedtime  dextrose 40% Gel 15 Gram(s) Oral once  dextrose 5%. 1000 milliLiter(s) (50 mL/Hr) IV Continuous <Continuous>  dextrose 5%. 1000 milliLiter(s) (100 mL/Hr) IV Continuous <Continuous>  dextrose 50% Injectable 25 Gram(s) IV Push once  dextrose 50% Injectable 12.5 Gram(s) IV Push once  dextrose 50% Injectable 25 Gram(s) IV Push once  glucagon  Injectable 1 milliGRAM(s) IntraMuscular once  insulin lispro (ADMELOG) corrective regimen sliding scale   SubCutaneous Before meals and at bedtime  pantoprazole    Tablet 40 milliGRAM(s) Oral before breakfast  tacrolimus 1 milliGRAM(s) Oral <User Schedule>  tamsulosin 0.4 milliGRAM(s) Oral at bedtime  valGANciclovir 450 milliGRAM(s) Oral daily    MEDICATIONS  (PRN):      Allergies    No Known Allergies    Intolerances        SOCIAL HISTORY:  Smoker:  YES / NO        PACK YEARS:                         WHEN QUIT?  ETOH use:  YES / NO               FREQUENCY / QUANTITY:  Ilicit Drug use:  YES / NO  Occupation:  Assisted device use (Cane / Walker):  Live with:    FAMILY HISTORY:      Review of Systems (Need 10):  CONSTITUTIONAL: Denies fevers / chills, sweats, fatigue, weight loss, weight gain                                       NEURO:  Denies parathesias, seizures, syncope, confusion                                                                                  EYES:  Denies blurry vision, discharge, pain, loss of vision                                                                                    ENMT:  Denies difficulty hearing, vertigo, dysphagia, epistaxis, recent dental work                                       CV:  Denies chest pain, palpitations, SORIA, orthopnea                                                                                           RESPIRATORY:  Denies wWheezing, SOB, cough / sputum, hemoptysis                                                               GI:  Denies nausea, vomiting, diarrhea, constipation, melena                                                                          : Denies hematuria, dysuria, urgency, incontinence                                                                                          MUSKULOSKELETAL:  Denies arthritis, joint swelling, muscle weakness                                                             SKIN/BREAST:  Denies rash, itching, hair loss, masses                                                                                              PSYCH:  Denies depression, anxiety, suicidal ideation                                                                                                HEME/LYMPH:  Denies bruises easily, enlarged lymph nodes, tender lymph nodes                                          ENDOCRINE:  Denies cold intolerance, heat intolerance, polydipsia                                                                      Vital Signs Last 24 Hrs  T(C): 36.3 (03 Dec 2021 16:30), Max: 36.4 (03 Dec 2021 08:22)  T(F): 97.4 (03 Dec 2021 16:30), Max: 97.6 (03 Dec 2021 08:22)  HR: 74 (03 Dec 2021 16:30) (74 - 85)  BP: 167/90 (03 Dec 2021 16:30) (146/75 - 167/90)  BP(mean): --  RR: 18 (03 Dec 2021 16:30) (18 - 20)  SpO2: 98% (03 Dec 2021 16:30) (90% - 98%)    Physical Exam (Need 8)  CONSTITUTIONAL:                                                                          WNL  NEURO:                                                                                             WNL                      EYES:                                                                                                  WNL  ENMT:                                                                                                WNL  CV:                                                                                                      WNL  RESPIRATORY:                                                                                  WNL  GI:                                                                                                       WNL  : BLOUNT + / -                                                                                 WNL  MUSKULOSKELETAL:                                                                       WNL  SKIN / BREAST:                                                                                 WNL                                                          LABS:                        8.9    17.48 )-----------( 151      ( 03 Dec 2021 09:23 )             30.2     12-03    129<L>  |  95<L>  |  29<H>  ----------------------------<  139<H>  5.1   |  24  |  1.16    Ca    9.4      03 Dec 2021 09:23    TPro  7.5  /  Alb  4.4  /  TBili  0.7  /  DBili  x   /  AST  13  /  ALT  9<L>  /  AlkPhos  138<H>  12-03    PT/INR - ( 03 Dec 2021 09:23 )   PT: 16.5 sec;   INR: 1.40          PTT - ( 03 Dec 2021 09:23 )  PTT:39.3 sec            RADIOLOGY & ADDITIONAL STUDIES:  CAROTID U/S:    CXR:    CT Scan:    EKG:    TTE / SUKHDEEP:    Cardiac Cath: Surgeon:  Dr. Zhang    Requesting Physician:     HISTORY OF PRESENT ILLNESS:  71y Male     PAST MEDICAL & SURGICAL HISTORY:  Barretts esophagus    CRI (chronic renal insufficiency)    DM (diabetes mellitus)    GERD (gastroesophageal reflux disease)    HTN (hypertension)    Monoclonal gammopathy    Mandibular abscess    Paroxysmal atrial fibrillation    Benign prostatic hyperplasia, presence of lower urinary tract symptoms unspecified, unspecified morphology    Other hyperlipidemia    Hyperlipidemia    S/P CABG (coronary artery bypass graft)  15 years    History of surgery  cyst removal    Mandibular abscess        MEDICATIONS  (STANDING):  amLODIPine   Tablet 10 milliGRAM(s) Oral once  atorvastatin 40 milliGRAM(s) Oral at bedtime  dextrose 40% Gel 15 Gram(s) Oral once  dextrose 5%. 1000 milliLiter(s) (50 mL/Hr) IV Continuous <Continuous>  dextrose 5%. 1000 milliLiter(s) (100 mL/Hr) IV Continuous <Continuous>  dextrose 50% Injectable 25 Gram(s) IV Push once  dextrose 50% Injectable 12.5 Gram(s) IV Push once  dextrose 50% Injectable 25 Gram(s) IV Push once  glucagon  Injectable 1 milliGRAM(s) IntraMuscular once  insulin lispro (ADMELOG) corrective regimen sliding scale   SubCutaneous Before meals and at bedtime  pantoprazole    Tablet 40 milliGRAM(s) Oral before breakfast  tacrolimus 1 milliGRAM(s) Oral <User Schedule>  tamsulosin 0.4 milliGRAM(s) Oral at bedtime  valGANciclovir 450 milliGRAM(s) Oral daily    MEDICATIONS  (PRN):      Allergies    No Known Allergies    Intolerances        SOCIAL HISTORY:  Smoker:  YES / NO        PACK YEARS:                         WHEN QUIT?  ETOH use:  YES / NO               FREQUENCY / QUANTITY:  Ilicit Drug use:  YES / NO  Occupation:  Assisted device use (Cane / Walker):  Live with:    FAMILY HISTORY:      Review of Systems (Need 10):  CONSTITUTIONAL: Denies fevers / chills, sweats, fatigue, weight loss, weight gain                                       NEURO:  Denies parathesias, seizures, syncope, confusion                                                                                  EYES:  Denies blurry vision, discharge, pain, loss of vision                                                                                    ENMT:  Denies difficulty hearing, vertigo, dysphagia, epistaxis, recent dental work                                       CV:  Denies chest pain, palpitations, SORIA, orthopnea                                                                                           RESPIRATORY:  Denies wWheezing, SOB, cough / sputum, hemoptysis                                                               GI:  Denies nausea, vomiting, diarrhea, constipation, melena                                                                          : Denies hematuria, dysuria, urgency, incontinence                                                                                          MUSKULOSKELETAL:  Denies arthritis, joint swelling, muscle weakness                                                             SKIN/BREAST:  Denies rash, itching, hair loss, masses                                                                                              PSYCH:  Denies depression, anxiety, suicidal ideation                                                                                                HEME/LYMPH:  Denies bruises easily, enlarged lymph nodes, tender lymph nodes                                          ENDOCRINE:  Denies cold intolerance, heat intolerance, polydipsia                                                                      Vital Signs Last 24 Hrs  T(C): 36.3 (03 Dec 2021 16:30), Max: 36.4 (03 Dec 2021 08:22)  T(F): 97.4 (03 Dec 2021 16:30), Max: 97.6 (03 Dec 2021 08:22)  HR: 74 (03 Dec 2021 16:30) (74 - 85)  BP: 167/90 (03 Dec 2021 16:30) (146/75 - 167/90)  BP(mean): --  RR: 18 (03 Dec 2021 16:30) (18 - 20)  SpO2: 98% (03 Dec 2021 16:30) (90% - 98%)    Physical Exam (Need 8)  CONSTITUTIONAL:                                                                          WNL  NEURO:                                                                                             WNL                      EYES:                                                                                                  WNL  ENMT:                                                                                                WNL  CV:                                                                                                      WNL  RESPIRATORY:                                                                                  WNL  GI:                                                                                                       WNL  : BLOUNT + / -                                                                                 WNL  MUSKULOSKELETAL:                                                                       WNL  SKIN / BREAST:                                                                                 WNL                                                          LABS:                        8.9    17.48 )-----------( 151      ( 03 Dec 2021 09:23 )             30.2     12-03    129<L>  |  95<L>  |  29<H>  ----------------------------<  139<H>  5.1   |  24  |  1.16    Ca    9.4      03 Dec 2021 09:23    TPro  7.5  /  Alb  4.4  /  TBili  0.7  /  DBili  x   /  AST  13  /  ALT  9<L>  /  AlkPhos  138<H>  12-03    PT/INR - ( 03 Dec 2021 09:23 )   PT: 16.5 sec;   INR: 1.40          PTT - ( 03 Dec 2021 09:23 )  PTT:39.3 sec            RADIOLOGY & ADDITIONAL STUDIES:  CAROTID U/S:    CXR:    CT Scan:    EKG:    TTE / SUKHDEEP:    Cardiac Cath: Surgeon:  Dr. Zhang    Requesting Physician: Dr. Lora    HISTORY OF PRESENT ILLNESS:  71 yo male PMHx AF (Eliquis), hiatal hernia, CAD s/p CABG (2005), R renal txp (tacrolimus and valganciclovir), Obesity, DM2 (A1C 6.8 in 11/2021), HTN, HLD, CLL referred to the ED by Dr Leyva for thoracentesis with Dr Lora of R sided pleural effusion seen on 10/29 CT. Pt also has chronic leukocytosis 2/2 CLL diagnosis. Patient seen by Dr. Mishra on 12/01/21 and has been holing eliquis since then in anticipation for procedure. Pt was recently admitted to [facility] in July 2021, diagnosed w/ PNA discharged with supplemental O2 as needed and again in November 2021 where he underwent L sided deep cervical lymph node biopsy . Reports O2 between 90-95 at home and uses supplemental O2 if pulse ox read is low, but has not used it for SOB. Yesterday, he noted his SpO2 to be around 90% so he used 2L NC at home.  Patient states his LE edema has worsened, L>R, with edema extending up into his thigh. Pt reports that he always uses 3 pillows at night and he admits to SORIA with activity. He denies orthopnea, chest pain, cough, fever, night sweats, weight loss, nausea, diarrhea, dysuria, sick contacts. On 12/4 he presented to [facility] for planned thoracentesis with pulmonology. Procedure aborted after unable to aspirate pleural fluid. Thoracic surgery (Dr. Zhang) consulted for evaluation.     PAST MEDICAL & SURGICAL HISTORY:  Barretts esophagus    CRI (chronic renal insufficiency)    DM (diabetes mellitus)    GERD (gastroesophageal reflux disease)    HTN (hypertension)    Monoclonal gammopathy    Mandibular abscess    Paroxysmal atrial fibrillation    Benign prostatic hyperplasia, presence of lower urinary tract symptoms unspecified, unspecified morphology    Other hyperlipidemia    Hyperlipidemia    S/P CABG (coronary artery bypass graft)  15 years    History of surgery  cyst removal    Mandibular abscess    MEDICATIONS  (STANDING):  amLODIPine   Tablet 10 milliGRAM(s) Oral once  atorvastatin 40 milliGRAM(s) Oral at bedtime  dextrose 40% Gel 15 Gram(s) Oral once  dextrose 5%. 1000 milliLiter(s) (50 mL/Hr) IV Continuous <Continuous>  dextrose 5%. 1000 milliLiter(s) (100 mL/Hr) IV Continuous <Continuous>  dextrose 50% Injectable 25 Gram(s) IV Push once  dextrose 50% Injectable 12.5 Gram(s) IV Push once  dextrose 50% Injectable 25 Gram(s) IV Push once  glucagon  Injectable 1 milliGRAM(s) IntraMuscular once  insulin lispro (ADMELOG) corrective regimen sliding scale   SubCutaneous Before meals and at bedtime  pantoprazole    Tablet 40 milliGRAM(s) Oral before breakfast  tacrolimus 1 milliGRAM(s) Oral <User Schedule>  tamsulosin 0.4 milliGRAM(s) Oral at bedtime  valGANciclovir 450 milliGRAM(s) Oral daily    MEDICATIONS  (PRN):      Allergies    No Known Allergies    Intolerances        SOCIAL HISTORY:  Smoker: NO    ETOH use:  NO    Ilicit Drug use: NO  Occupation: N/A  Assisted device use (Cane / Walker): None  Live with: Family     FAMILY HISTORY:      Review of Systems:  CONSTITUTIONAL: Denies fevers / chills, sweats, fatigue, weight loss, weight gain                                       NEURO:  Denies parathesias, seizures, syncope, confusion                                                                                  EYES:  Denies blurry vision, discharge, pain, loss of vision                                                                                    ENMT:  Denies difficulty hearing, vertigo, dysphagia, epistaxis, recent dental work                                       CV:  Denies chest pain, palpitations, SORIA, orthopnea                                                                                           RESPIRATORY:  Denies wheezing, SOB, cough / sputum, hemoptysis                                                               GI:  Denies nausea, vomiting, diarrhea, constipation, melena                                                                          : Denies hematuria, dysuria, urgency, incontinence                                                                                          MUSKULOSKELETAL:  Denies arthritis, joint swelling, muscle weakness                                                             SKIN/BREAST:  Denies rash, itching, hair loss, masses                                                                                              PSYCH:  Denies depression, anxiety, suicidal ideation                                                                                                HEME/LYMPH:  Denies bruises easily, enlarged lymph nodes, tender lymph nodes                                          ENDOCRINE:  Denies cold intolerance, heat intolerance, polydipsia                                                                      Vital Signs Last 24 Hrs  T(C): 36.3 (03 Dec 2021 16:30), Max: 36.4 (03 Dec 2021 08:22)  T(F): 97.4 (03 Dec 2021 16:30), Max: 97.6 (03 Dec 2021 08:22)  HR: 74 (03 Dec 2021 16:30) (74 - 85)  BP: 167/90 (03 Dec 2021 16:30) (146/75 - 167/90)  BP(mean): --  RR: 18 (03 Dec 2021 16:30) (18 - 20)  SpO2: 98% (03 Dec 2021 16:30) (90% - 98%)    Physical Exam  CONSTITUTIONAL: well appearing, NAD  NEURO: A&OX3, no focal deficits noted                       EYES: PERRLA  ENMT: Neck supple   CV: RRR, no m/r/g  RESPIRATORY: CTA bilateral posterior lung fields, no wheezes, rales, rhonchi   GI: +BS, NT/ND  : No elmore  MUSKULOSKELETAL: No peripheral edema or calf tenderness   SKIN / BREAST: No rashes noted                                                           LABS:                        8.9    17.48 )-----------( 151      ( 03 Dec 2021 09:23 )             30.2     12-03    129<L>  |  95<L>  |  29<H>  ----------------------------<  139<H>  5.1   |  24  |  1.16    Ca    9.4      03 Dec 2021 09:23    TPro  7.5  /  Alb  4.4  /  TBili  0.7  /  DBili  x   /  AST  13  /  ALT  9<L>  /  AlkPhos  138<H>  12-03    PT/INR - ( 03 Dec 2021 09:23 )   PT: 16.5 sec;   INR: 1.40          PTT - ( 03 Dec 2021 09:23 )  PTT:39.3 sec        RADIOLOGY & ADDITIONAL STUDIES:    CXR:  < from: Xray Chest 1 View AP/PA (12.03.21 @ 09:07) >  FINDINGS:    Single frontal view of the chest demonstrates small to moderate right-sided effusion, unchanged. Mild CHF, unchanged. Mediastinal sternotomy wires. The cardiomediastinal silhouette is enlarged. No acute osseous abnormalities.    IMPRESSION: No interval change.    < end of copied text >

## 2021-12-03 NOTE — ED ADULT TRIAGE NOTE - CHIEF COMPLAINT QUOTE
Pt presents co SORIA x months, pt states, "i'm here for a procedure by Dr. Lora", pt unsure of procedure he is scheduled for. Denying CP, fevers/ chills, SOB at rest.

## 2021-12-03 NOTE — PATIENT PROFILE ADULT - IS THERE A SUSPICION OF ABUSE/NEGLIGENCE?
Updated Dr. Rena Richardson to patient still in stage 1 d/t inablitlity to maintain SpO2 > 92% consistently. Incentive Spirometere well tolerated to 1500 ml and deep breathing consistently encouraged. He is agreeable to send to stage II and will follow her there. no

## 2021-12-03 NOTE — ED PROVIDER NOTE - WR INTERPRETATION 1
Moderate sized R pleural effusion; sternotomy wires present; cardiomegaly; no focal consolidations or pulmonary vascular congestion; normal ribs.

## 2021-12-03 NOTE — H&P ADULT - PROBLEM SELECTOR PLAN 11
F: none   E: replete as needed, keep K>4, Mg>2  D: Consistent Carb Diet    DVT Proph: Holding i/s/o procedure today. resume s/p thoracentesis  GI Proph: Protonix 40mg qd    Dispo: [facility]

## 2021-12-03 NOTE — H&P ADULT - PROBLEM SELECTOR PLAN 9
Hx of HTN. Pt on Norvasc 10mg PO qd at home  -c/w norvasc 10mg PO Hx of hyperlipidemia.   -c/w Atorvastatin 40mg

## 2021-12-03 NOTE — H&P ADULT - TIME BILLING
Patient seen and examined with house-staff during bedside rounds.  Resident note read, including vitals, physical findings, laboratory data, and radiological reports.   Revisions included below.  Direct personal management at bed side and extensive interpretation of the data.  Plan was outlined and discussed in details with the housestaff.  Decision making of high complexity  Action taken for acute disease activity to reflect the level of care provided:  - medication reconciliation  - review laboratory dataThe patient is admitted with worsening of his leg edema. The chest x-ray also revealed increase in the right pleural effusion. Patient is off anticoagulation 24 hours. The decision was to proceed with aggressive diuresis and follow-up with cardiology regarding his cardiac status. Also there was plan to diagnostic thoracentesis on the right side and possibility of surgical sampling of the mediastinal lymph node. Attempt for bedside thoracentesis was on accessible was able to aspirate the fluid but unable to aspirate or drain the effusion through the thoracentesis catheter. I discussed the case with thoracic and will proceed with CT scan of the chest. CT scan of the chest was performed and was consistent with increased consolidation in the right lower lobe loculated effusion. Continue diuresis. Follow-up with thoracic surgery. Full anticoagulation. Echocardiogram. I discussed the case with nephrology and cardiologist

## 2021-12-03 NOTE — PATIENT PROFILE ADULT - VISION (WITH CORRECTIVE LENSES IF THE PATIENT USUALLY WEARS THEM):
Glasses./Partially impaired: cannot see medication labels or newsprint, but can see obstacles in path, and the surrounding layout; can count fingers at arm's length

## 2021-12-03 NOTE — H&P ADULT - NSHPREVIEWOFSYSTEMS_GEN_ALL_CORE
REVIEW OF SYSTEMS:    CONSTITUTIONAL: No weakness, fevers or chills  EYES/ENT: No visual changes;  No vertigo or throat pain   NECK: No pain or stiffness  RESPIRATORY: No cough, wheezing, hemoptysis; No shortness of breath  CARDIOVASCULAR: No chest pain or palpitations  GASTROINTESTINAL: No abdominal or epigastric pain. No nausea, vomiting, or hematemesis; No diarrhea or constipation. No melena or hematochezia.  GENITOURINARY: No dysuria, frequency or hematuria  NEUROLOGICAL: No numbness or weakness  SKIN: No itching, rashes REVIEW OF SYSTEMS:    CONSTITUTIONAL: No weakness, fevers or chills  EYES/ENT: No visual changes;  No vertigo or throat pain   NECK: No pain or stiffness  RESPIRATORY: shortness of breath with exertion.   CARDIOVASCULAR: No chest pain or palpitations  GASTROINTESTINAL: No abdominal or epigastric pain. No nausea, vomiting, or hematemesis; No diarrhea or constipation. No melena or hematochezia.  GENITOURINARY: No dysuria, frequency or hematuria  EXTREMITIES:   NEUROLOGICAL: No numbness or weakness  SKIN: No itching, rashes REVIEW OF SYSTEMS:    CONSTITUTIONAL: No weakness, fevers or chills  EYES/ENT: No visual changes;  No vertigo or throat pain   NECK: No pain or stiffness  RESPIRATORY: shortness of breath with exertion   CARDIOVASCULAR: No chest pain or palpitations  GASTROINTESTINAL: No abdominal or epigastric pain. No nausea, vomiting, or hematemesis; No diarrhea or constipation. No melena or hematochezia.  GENITOURINARY: No dysuria, frequency or hematuria  EXTREMITIES: increasing lower extremity edema with difficulty ambulating noted   NEUROLOGICAL: No numbness or weakness

## 2021-12-03 NOTE — H&P ADULT - PROBLEM SELECTOR PLAN 5
Pt with hx of CLL. Follows with oncology at [facility]. CT neck soft tissue from 11/10 showing widespread lyphadenopathy in neck and chest most consistent with lymphoproliferative disorder, query relapse of CLL and new compared to PET-CT from 5 eyars prior. Pt underwent Lymph node biopsy on 11/12. Patient now noted to have R sided supraclavicular lymph node. Pt with chronic leukocytosis 2/2 CLL. WBC 17.48.   -consider heme/onc consult for further evaluation   -continue to monitor Pt w/ hx of atrial fibrillation. Pt on Eliquis outpatient. Patient has been holding Eliquis since 12/01/21 for thoracentesis today.  -c/w anticoagulation s/p procedure

## 2021-12-03 NOTE — H&P ADULT - PROBLEM SELECTOR PLAN 7
Pt with Hx of T2DM. At home, patient uses sliding scale require 20-40 units before each meal. 11/09 6.8  -c/w Gallo   -continue to monitor Pt w/ history of hiatal hernia.   -c/w  home medication pantoprazole 40mg qd

## 2021-12-03 NOTE — PATIENT PROFILE ADULT - FALL HARM RISK - HARM RISK INTERVENTIONS
Communicate Risk of Fall with Harm to all staff/Monitor gait and stability/Reinforce activity limits and safety measures with patient and family/Sit up slowly, dangle for a short time, stand at bedside before walking/Tailored Fall Risk Interventions/Visual Cue: Yellow wristband and red socks/Bed in lowest position, wheels locked, appropriate side rails in place/Call bell, personal items and telephone in reach/Instruct patient to call for assistance before getting out of bed or chair/Non-slip footwear when patient is out of bed/[place] to call system/Physically safe environment - no spills, clutter or unnecessary equipment/Purposeful Proactive Rounding/Room/bathroom lighting operational, light cord in reach

## 2021-12-03 NOTE — H&P ADULT - PROBLEM SELECTOR PLAN 4
English Hx of renal transplant in 2017 at [facility]. Pt on Tacrolimus and Valganciclovir at home.  -c/w Tacrolimus, 2mg in AM, 1mg in PM  -c/w valganciclovir 450mg PO qd   -Renal following -- f/u recs

## 2021-12-03 NOTE — H&P ADULT - ASSESSMENT
71 yo male PMHx AF (Eliquis), hiatal hernia, CAD s/p CABG (2005), R renal txp (tacro), Obesity, DM2 (A1C 6.7), HTN, HLD, BPH, CLL, MGUS, referred to the ED by Dr Leyva for possible thoracentesis with Dr Lora of R sided pleural effusion seen on 10/29 CT. Pt being followed by Dr. Lora and renal team during admission. Pt had not taken any meds in AM of 12/3 prior to admission -- scheduled for medications to be given in hospital. Patient to undergo R sided thoracentesis on 12/3 with Dr. Lora. Pt started on IV Lasix 40mg q6hour. LE dopplers ordered for LE edema.

## 2021-12-03 NOTE — H&P ADULT - NSHPPHYSICALEXAM_GEN_ALL_CORE
.  VITAL SIGNS:  T(C): 36.4 (12-03-21 @ 08:22), Max: 36.4 (12-03-21 @ 08:22)  T(F): 97.6 (12-03-21 @ 08:22), Max: 97.6 (12-03-21 @ 08:22)  HR: 85 (12-03-21 @ 08:22) (85 - 85)  BP: 146/75 (12-03-21 @ 08:22) (146/75 - 146/75)  RR: 20 (12-03-21 @ 08:22) (20 - 20)  SpO2: 90% (12-03-21 @ 08:22) (90% - 90%)    PHYSICAL EXAM:    Constitutional: laying in bed on 2 L NC, speaking in full sentences   Eyes: PERRL, EOMI, clear conjunctiva  ENT: no nasal discharge; no oropharyngeal erythema or exudates; MMM  Respiratory: reduced breath sounds in R lower lung base, no wheezing noted   Cardiac: +S1/S2; RRR; no M/R/G  Gastrointestinal: distended abdomen, non-tender, no rebound tenderness, no guarding noted   Extremities: WWP, +2 pitting edema in RLE extending from shin into foot; +2 pitting edema in LLE extending from mid-thigh into foot   Musculoskeletal: NROM x4; no joint swelling, tenderness or erythema  Lymphatic: supraclavicular lymphadenopathy   Neurologic: AAOx3; CNII-XII grossly intact; no focal deficits  Psychiatric: affect and characteristics of appearance, verbalizations, behaviors are appropriate

## 2021-12-03 NOTE — PROCEDURE NOTE - ADDITIONAL PROCEDURE DETAILS
US guided RIGHT thoracentesis performed. Needle and catheter inserted easily with free flowing fluid initially, but unable to aspirate fluid thereafter. Needle and catheter removed. Post procedure CXR no pneumothorax. Suspicion for possible loculation? in pleural fluid or fibrotic fluid.

## 2021-12-03 NOTE — ED ADULT NURSE REASSESSMENT NOTE - NS ED NURSE REASSESS COMMENT FT1
Patient a/oX3, no chest pain or SOB complaint s/p right thoracentecis procedure, tolerated procedure well,  dressing intact to right chest wall , no drainage from site.  Time out procedure done prior to procedure, consents signed. Vital signs stable, O2 3 L NC ongoing, tolerating well.  Food and fluids PO tolerated well.  Unable to adminsiter Lispro insulin after FSBG 175 obtained due to patient thoracentecis procedure.  For admit to 7 uris.  Transport pending.  STAble and comfortable.
Patient care and endorsement received from previous RN.  Patient w/ c/o of increasing SOB and dyspnea on exertion, no chest pain, w/ extensive PMHx, scheduled for thoracentecis, patient w/ bilateral lower leg edema.  Lasix 40mg IVP administered in ED.  Patient for admit for thoracentecis, currently in US. PIV #20 left AC.
Patient taken to Ultrasound via Wheelchair
Pulmonary MD at bedside speaking with patient concerning Thoracentesis.

## 2021-12-03 NOTE — ED PROVIDER NOTE - OBJECTIVE STATEMENT
72 y/o M with PMHx HTN, HLD, DM, CKD s/p renal transplant in remote past, and CLL, presents to the ED c/o progressively worsening SOB, SORIA, and outpatient XRs showing worsening R sided pleural effusion. Pt denies cough, fever, chills, or chest pain. Does report associated BLE edema. Pt states Dr. Lora will be performing thoracentesis on pleural effusion today to improve his symptoms.

## 2021-12-03 NOTE — H&P ADULT - NSHPSOCIALHISTORY_GEN_ALL_CORE
Pt lives at home with his wife  Non-smoker, non-drinker, no illicit drug use, no herbal supplements or OTC vitamins   Pt works as an art seller

## 2021-12-03 NOTE — H&P ADULT - PROBLEM SELECTOR PLAN 1
CT Chest on 10/29 showing interval enlargement of moderate R pleural effusion w/ R basilar atelectasis. CXR from 11/08 showing stable pleural effusion. CXR on admission on 12/3 showing interval increase in R sided pleural effusion. Pt seen by Dr. Mishra outpatient on 12/01/21 and stopped Eliquis at that time. Pt admitted to undergo thoracentesis for R sided pleural effusion.  -c/w IV lasix 40mg q6hr  -f/u pulmonology recs s/p Thoracentesis with Dr. Lora   -resume anticoagulation and anti-hypertensives s/p thoracentesis

## 2021-12-03 NOTE — ED PROVIDER NOTE - CONSTITUTIONAL, MLM
independent normal... Well appearing, awake, alert, oriented to person, place, time/situation and in no apparent distress.

## 2021-12-03 NOTE — H&P ADULT - PROBLEM SELECTOR PLAN 8
Hx of hyperlipidemia.   -c/w Atorvastatin 40mg Pt with Hx of T2DM. At home, patient uses sliding scale require 20-40 units before each meal. HbA1c in November 2021 6.8.  -c/w Gallo   -continue to monitor FSG and adjust insulin regimen accordingly

## 2021-12-03 NOTE — ED PROVIDER NOTE - CLINICAL SUMMARY MEDICAL DECISION MAKING FREE TEXT BOX
70 y/o M sent to the ED for symptomatic pleural effusion, progressively worsening as outpatient, requiring thoracentesis planned for today. Pt has been holding eliquis. Pt slightly SOB, hypoxic to 90% w/ decreased breath sounds and perioral cyanosis. Will require O2, labs, CXR, EKG, and admission for pleural effusion drainage.

## 2021-12-03 NOTE — H&P ADULT - PROBLEM SELECTOR PLAN 6
Pt w/ history of hiatal hernia.   -c/w  home medication pantoprazole 40mg qd Pt with hx of CLL. Follows with oncology at [facility]. CT neck soft tissue from 11/10 showing widespread lymphadenopathy in neck and chest most consistent with lymphoproliferative disorder, query relapse of CLL and new compared to PET-CT from 5 years prior. Pt underwent Lymph node biopsy on 11/12. Patient now noted to have R sided supraclavicular lymph node. Pt with chronic leukocytosis 2/2 CLL. WBC 17.48.   -consider heme/onc consult for further evaluation   -continue to monitor

## 2021-12-03 NOTE — H&P ADULT - HISTORY OF PRESENT ILLNESS
69 yo male PMHx AF (Eliquis), hiatal hernia, CAD s/p CABG (2005), R renal txp (tacro), Obesity, DM2 (A1C 6.7), HTN, HLD, BPH, CLL, MGUS, referred to the ED by Dr Leyva for possible thoracentesis with Dr Lora of R sided pleural effusion seen on 10/29 CT. Patient seen by Dr. Mishra on 12/01/21 and was holing eliquis since then in anticipation for procedure. Pt was recently admitted to [facility] in 11/2021 for cervical lymphadenopathy biopsy and was also recently addmited in July 2021, diagnosed w/ PNA discharged with supplemental O2 as needed. Reports O2 between 90-97 at home and uses supplemental O2 if pulse ox read is low, but has not used it for SOB.  LE edema worse over the past several months for which his PCP increased furosemide dosing.  Reports he has always used 2 pillows at night, denying orthopnea, and wheezing at night. Patient denies snoring, waking up at night but suffers from narcolepsy and recently attended a sleep study for JAYANT. Denies dysphagia. Denies chest pain, cough, fever, night sweats, weight loss, nausea, diarrhea, dysuria, sick contacts.    ED Vitals: /75, HR 85, SpO2 90% on RA --> 96% on 2L NC, T 97.6  ED Labs: WBC 17.48, Hgb 8.9, Plt 151, INR 1.40, Na 129, K 5.1, Cl 95, BUN 29, Cr 1.16, Alk Phos 138   CXR preliminary read showing worsening R sided pleural effusion when compared to CXR from 11/8/21 (previous admission)   ED Interventions:   69 yo male PMHx AF (Eliquis), hiatal hernia, CAD s/p CABG (2005), R renal txp (tacro), Obesity, DM2 (A1C 6.7), HTN, HLD, BPH, CLL, MGUS, referred to the ED by Dr Leyva for possible thoracentesis with Dr Lora of R sided pleural effusion seen on 10/29 CT. Patient seen by Dr. Mishra on 12/01/21 and was holing eliquis since then in anticipation for procedure. Pt was recently admitted to [facility] in 11/2021 for cervical lymphadenopathy biopsy and was also recently addmited in July 2021, diagnosed w/ PNA discharged with supplemental O2 as needed. Reports O2 between 90-95 at home and uses supplemental O2 if pulse ox read is low, but has not used it for SOB. Yesterday, he noted his SpO2 to be around 90% so he used 2L NC at home.  Patient states his LE edema has worsened, L>R, with edema extending up into his thigh. Pt reports that he always uses 3 pillows at night and he admits to SORIA with activity. He denies orthopnea, chest pain, cough, fever, night sweats, weight loss, nausea, diarrhea, dysuria, sick contacts.    ED Vitals: /75, HR 85, SpO2 90% on RA --> 96% on 2L NC, T 97.6  ED Labs: WBC 17.48, Hgb 8.9, Plt 151, INR 1.40, Na 129, K 5.1, Cl 95, BUN 29, Cr 1.16, Alk Phos 138   CXR preliminary read showing worsening R sided pleural effusion when compared to CXR from 11/8/21 (previous admission)   ED Interventions:   69 yo male PMHx AF (Eliquis), hiatal hernia, CAD s/p CABG (2005), R renal txp (tacro), Obesity, DM2 (A1C 6.7), HTN, HLD, BPH, CLL, MGUS, referred to the ED by Dr Leyva for possible thoracentesis with Dr Lora of R sided pleural effusion seen on 10/29 CT. Patient seen by Dr. Mishra on 12/01/21 and was holing eliquis since then in anticipation for procedure. Pt was recently admitted to [facility] in 11/2021 for cervical lymphadenopathy biopsy and was also recently addmited in July 2021, diagnosed w/ PNA discharged with supplemental O2 as needed. Reports O2 between 90-95 at home and uses supplemental O2 if pulse ox read is low, but has not used it for SOB. Yesterday, he noted his SpO2 to be around 90% so he used 2L NC at home.  Patient states his LE edema has worsened, L>R, with edema extending up into his thigh. Pt reports that he always uses 3 pillows at night and he admits to SORIA with activity. He denies orthopnea, chest pain, cough, fever, night sweats, weight loss, nausea, diarrhea, dysuria, sick contacts.    ED Vitals: /75, HR 85, SpO2 90% on RA --> 96% on 2L NC, T 97.6  ED Labs: WBC 17.48, Hgb 8.9, Plt 151, INR 1.40, Na 129, K 5.1, Cl 95, BUN 29, Cr 1.16, Alk Phos 138   CXR preliminary read showing worsening R sided pleural effusion when compared to CXR from 11/8/21 (previous admission)   ED Interventions: none   71 yo male PMHx AF (Eliquis), hiatal hernia, CAD s/p CABG (2005), R renal txp (tacrolimus and valganciclovir), Obesity, DM2 (A1C 6.8 in 11/2021), HTN, HLD, CLL referred to the ED by Dr Leyva for thoracentesis with Dr Lora of R sided pleural effusion seen on 10/29 CT. Pt also has chronic leukocytosis 2/2 CLL diagnosis. Patient seen by Dr. Mishra on 12/01/21 and has been holing eliquis since then in anticipation for procedure. Pt was recently admitted to [facility] in July 2021, diagnosed w/ PNA discharged with supplemental O2 as needed and again in November 2021 where he underwent L sided deep cervical lymph node biopsy . Reports O2 between 90-95 at home and uses supplemental O2 if pulse ox read is low, but has not used it for SOB. Yesterday, he noted his SpO2 to be around 90% so he used 2L NC at home.  Patient states his LE edema has worsened, L>R, with edema extending up into his thigh. Pt reports that he always uses 3 pillows at night and he admits to SORIA with activity. He denies orthopnea, chest pain, cough, fever, night sweats, weight loss, nausea, diarrhea, dysuria, sick contacts. Being admitted for procedure w/ Dr. Lora.     ED Vitals: /75, HR 85, SpO2 90% on RA --> 96% on 2L NC, T 97.6  ED Labs: WBC 17.48, Hgb 8.9, Plt 151, INR 1.40, Na 129, K 5.1, Cl 95, BUN 29, Cr 1.16, Alk Phos 138   CXR preliminary read showing worsening R sided pleural effusion when compared to CXR from 11/8/21 (previous admission)   ED Interventions: none

## 2021-12-03 NOTE — H&P ADULT - PROBLEM SELECTOR PLAN 3
Pt has hx of chronic hypoxemia, sent home with home O2 from hospitalization in July 2021. Patient monitors O2 saturations at home with monitor and utilizes O2 when he notices saturations to be below 90% SpO2. Pt having increasing dyspnea with exertion.   -continue to monitor O2 saturations, currently on 2L NC   -wean as tolerated

## 2021-12-03 NOTE — ED PROVIDER NOTE - MUSCULOSKELETAL, MLM
Spine appears normal, range of motion is not limited, no muscle or joint tenderness, + BLE edema w/o calf tenderness, 2+ pitting to knees, strong dp/pt pulses

## 2021-12-03 NOTE — H&P ADULT - PROBLEM SELECTOR PLAN 10
F:  E: replete as needed, keep K>4, Mg>2  D: Consistent Carb Diet    DVT Proph: Holding i/s/o procedure today. resume s/p thoracentesis  GI Proph: Hx of HTN. Pt on Norvasc 10mg PO qd at home  -c/w norvasc 10mg PO

## 2021-12-03 NOTE — CONSULT NOTE ADULT - ATTENDING COMMENTS
post txp with stable renal fxn-- creat at baseline  quite fluid overloaded-- appears not be nephrotic sx with nl serum albumin  echo, abd sono if not done to r/o cirrhosis and check dopplers LE   IV diuretics to diurese - can start 40 IV q12 and assess response  free water restrict and follow na  immunosuppression as abpove post txp with stable renal fxn-- creat at baseline  quite fluid overloaded-- appears not be nephrotic sx with nl serum albumin  echo, abd sono if not done to r/o cirrhosis and check dopplers LE   IV diuretics to diurese - can start 40 IV q12 and assess response  free water restrict and follow na with hypervolemic hyponatremia  immunosuppression as abpove

## 2021-12-03 NOTE — H&P ADULT - PROBLEM SELECTOR PLAN 2
Patient has history of LE edema and takes Imdur 30mg po qd at home. Patient noticed increasing LLE over the past week with edema extending into his thigh  -c/w lasix 40mg IV q6 during hospitalization, resume imdur PRN   -f/u LE dopplers to r/o DVT Patient has history of LE edema and takes Imdur 30mg po qd at home. Patient noticed increasing LLE over the past week with edema extending up into his thigh  -c/w lasix 40mg IV q6 during hospitalization, resume imdur PRN   -f/u LE dopplers to r/o DVT

## 2021-12-04 LAB
ANION GAP SERPL CALC-SCNC: 8 MMOL/L — SIGNIFICANT CHANGE UP (ref 5–17)
BUN SERPL-MCNC: 31 MG/DL — HIGH (ref 7–23)
CALCIUM SERPL-MCNC: 9.3 MG/DL — SIGNIFICANT CHANGE UP (ref 8.4–10.5)
CHLORIDE SERPL-SCNC: 93 MMOL/L — LOW (ref 96–108)
CO2 SERPL-SCNC: 25 MMOL/L — SIGNIFICANT CHANGE UP (ref 22–31)
CREAT SERPL-MCNC: 1.26 MG/DL — SIGNIFICANT CHANGE UP (ref 0.5–1.3)
GLUCOSE BLDC GLUCOMTR-MCNC: 168 MG/DL — HIGH (ref 70–99)
GLUCOSE BLDC GLUCOMTR-MCNC: 244 MG/DL — HIGH (ref 70–99)
GLUCOSE BLDC GLUCOMTR-MCNC: 258 MG/DL — HIGH (ref 70–99)
GLUCOSE BLDC GLUCOMTR-MCNC: 333 MG/DL — HIGH (ref 70–99)
GLUCOSE SERPL-MCNC: 236 MG/DL — HIGH (ref 70–99)
HCT VFR BLD CALC: 30.2 % — LOW (ref 39–50)
HGB BLD-MCNC: 9 G/DL — LOW (ref 13–17)
MAGNESIUM SERPL-MCNC: 2 MG/DL — SIGNIFICANT CHANGE UP (ref 1.6–2.6)
MCHC RBC-ENTMCNC: 27.6 PG — SIGNIFICANT CHANGE UP (ref 27–34)
MCHC RBC-ENTMCNC: 29.8 GM/DL — LOW (ref 32–36)
MCV RBC AUTO: 92.6 FL — SIGNIFICANT CHANGE UP (ref 80–100)
NRBC # BLD: 0 /100 WBCS — SIGNIFICANT CHANGE UP (ref 0–0)
PHOSPHATE SERPL-MCNC: 4.7 MG/DL — HIGH (ref 2.5–4.5)
PLATELET # BLD AUTO: 152 K/UL — SIGNIFICANT CHANGE UP (ref 150–400)
POTASSIUM SERPL-MCNC: 4.9 MMOL/L — SIGNIFICANT CHANGE UP (ref 3.5–5.3)
POTASSIUM SERPL-SCNC: 4.9 MMOL/L — SIGNIFICANT CHANGE UP (ref 3.5–5.3)
RBC # BLD: 3.26 M/UL — LOW (ref 4.2–5.8)
RBC # FLD: 16.2 % — HIGH (ref 10.3–14.5)
SODIUM SERPL-SCNC: 126 MMOL/L — LOW (ref 135–145)
WBC # BLD: 15.68 K/UL — HIGH (ref 3.8–10.5)
WBC # FLD AUTO: 15.68 K/UL — HIGH (ref 3.8–10.5)

## 2021-12-04 PROCEDURE — 99232 SBSQ HOSP IP/OBS MODERATE 35: CPT | Mod: GC

## 2021-12-04 PROCEDURE — 99221 1ST HOSP IP/OBS SF/LOW 40: CPT

## 2021-12-04 PROCEDURE — 99232 SBSQ HOSP IP/OBS MODERATE 35: CPT

## 2021-12-04 PROCEDURE — 99233 SBSQ HOSP IP/OBS HIGH 50: CPT

## 2021-12-04 RX ORDER — ENOXAPARIN SODIUM 100 MG/ML
100 INJECTION SUBCUTANEOUS EVERY 12 HOURS
Refills: 0 | Status: COMPLETED | OUTPATIENT
Start: 2021-12-04 | End: 2021-12-05

## 2021-12-04 RX ORDER — FUROSEMIDE 40 MG
40 TABLET ORAL EVERY 12 HOURS
Refills: 0 | Status: DISCONTINUED | OUTPATIENT
Start: 2021-12-04 | End: 2021-12-08

## 2021-12-04 RX ORDER — TACROLIMUS 5 MG/1
2 CAPSULE ORAL
Refills: 0 | Status: DISCONTINUED | OUTPATIENT
Start: 2021-12-04 | End: 2021-12-10

## 2021-12-04 RX ORDER — INSULIN GLARGINE 100 [IU]/ML
10 INJECTION, SOLUTION SUBCUTANEOUS AT BEDTIME
Refills: 0 | Status: DISCONTINUED | OUTPATIENT
Start: 2021-12-04 | End: 2021-12-07

## 2021-12-04 RX ORDER — ALPRAZOLAM 0.25 MG
0.25 TABLET ORAL ONCE
Refills: 0 | Status: DISCONTINUED | OUTPATIENT
Start: 2021-12-04 | End: 2021-12-04

## 2021-12-04 RX ORDER — VALGANCICLOVIR 450 MG/1
450 TABLET, FILM COATED ORAL EVERY 24 HOURS
Refills: 0 | Status: DISCONTINUED | OUTPATIENT
Start: 2021-12-05 | End: 2021-12-10

## 2021-12-04 RX ORDER — INSULIN LISPRO 100/ML
10 VIAL (ML) SUBCUTANEOUS ONCE
Refills: 0 | Status: COMPLETED | OUTPATIENT
Start: 2021-12-04 | End: 2021-12-04

## 2021-12-04 RX ORDER — TACROLIMUS 5 MG/1
1 CAPSULE ORAL
Refills: 0 | Status: DISCONTINUED | OUTPATIENT
Start: 2021-12-04 | End: 2021-12-10

## 2021-12-04 RX ADMIN — Medication 2: at 07:51

## 2021-12-04 RX ADMIN — AMLODIPINE BESYLATE 10 MILLIGRAM(S): 2.5 TABLET ORAL at 09:56

## 2021-12-04 RX ADMIN — Medication 10 UNIT(S): at 03:20

## 2021-12-04 RX ADMIN — PANTOPRAZOLE SODIUM 40 MILLIGRAM(S): 20 TABLET, DELAYED RELEASE ORAL at 09:56

## 2021-12-04 RX ADMIN — VALGANCICLOVIR 450 MILLIGRAM(S): 450 TABLET, FILM COATED ORAL at 09:56

## 2021-12-04 RX ADMIN — ENOXAPARIN SODIUM 100 MILLIGRAM(S): 100 INJECTION SUBCUTANEOUS at 21:57

## 2021-12-04 RX ADMIN — ATORVASTATIN CALCIUM 40 MILLIGRAM(S): 80 TABLET, FILM COATED ORAL at 21:57

## 2021-12-04 RX ADMIN — ENOXAPARIN SODIUM 100 MILLIGRAM(S): 100 INJECTION SUBCUTANEOUS at 11:48

## 2021-12-04 RX ADMIN — Medication 4: at 18:05

## 2021-12-04 RX ADMIN — Medication 0.25 MILLIGRAM(S): at 04:02

## 2021-12-04 RX ADMIN — Medication 10 UNIT(S): at 11:48

## 2021-12-04 RX ADMIN — TACROLIMUS 2 MILLIGRAM(S): 5 CAPSULE ORAL at 09:56

## 2021-12-04 RX ADMIN — Medication 40 MILLIGRAM(S): at 16:50

## 2021-12-04 RX ADMIN — Medication 20 MILLIGRAM(S): at 01:18

## 2021-12-04 RX ADMIN — TAMSULOSIN HYDROCHLORIDE 0.4 MILLIGRAM(S): 0.4 CAPSULE ORAL at 21:56

## 2021-12-04 RX ADMIN — TACROLIMUS 1 MILLIGRAM(S): 5 CAPSULE ORAL at 21:56

## 2021-12-04 NOTE — PROGRESS NOTE ADULT - SUBJECTIVE AND OBJECTIVE BOX
Patient is a 71y Male seen and evaluated at bedside doing well this morning; states he is breathing much better. States he refused to do labs today. Labs from yesterday noted for sodium of 129, creatinine of 1.16 and K of 5.1. Stressed importance that we need to monitor his labs while on the medication he is receiving. States team can try for blood later in the day.       Meds:    amLODIPine   Tablet 10 daily  atorvastatin 40 at bedtime  dextrose 40% Gel 15 once  dextrose 5%. 1000 <Continuous>  dextrose 5%. 1000 <Continuous>  dextrose 50% Injectable 25 once  dextrose 50% Injectable 12.5 once  dextrose 50% Injectable 25 once  enoxaparin Injectable 100 every 12 hours  furosemide   Injectable 20 every 12 hours  glucagon  Injectable 1 once  insulin glargine Injectable (LANTUS) 10 at bedtime  insulin lispro (ADMELOG) corrective regimen sliding scale  Before meals and at bedtime  pantoprazole    Tablet 40 before breakfast  tacrolimus 2 <User Schedule>  tacrolimus 1 <User Schedule>  tamsulosin 0.4 at bedtime  valGANciclovir 450 daily      T(C): , Max: 36.5 (12-03-21 @ 21:02)  T(F): , Max: 97.7 (12-03-21 @ 21:02)  HR: 75 (12-03-21 @ 21:02)  BP: 145/81 (12-03-21 @ 21:02)  BP(mean): --  RR: 19 (12-03-21 @ 21:02)  SpO2: 98% (12-03-21 @ 21:02)  Wt(kg): --    Height (cm): 167.6 (12-03 @ 21:02)  Weight (kg): 101.7 (12-03 @ 21:02)  BMI (kg/m2): 36.2 (12-03 @ 21:02)  BSA (m2): 2.1 (12-03 @ 21:02)    Review of Systems:  ROS negative except as per HPI      PHYSICAL EXAM:  GENERAL: Alert, awake, oriented x3 on NC  HEENT: EOMI, neck supple, no JVP  CHEST/LUNG: Left chest CTA, reduced airflow at Right bases  HEART: Regular rate and rhythm, no murmur  ABDOMEN: Soft, nontender, non distended  EXTREMITIES: +4 pitting edema to thighs    LABS:                        8.9    17.48 )-----------( 151      ( 03 Dec 2021 09:23 )             30.2     12-03    129<L>  |  95<L>  |  29<H>  ----------------------------<  139<H>  5.1   |  24  |  1.16    Ca    9.4      03 Dec 2021 09:23    TPro  7.5  /  Alb  4.4  /  TBili  0.7  /  DBili  x   /  AST  13  /  ALT  9<L>  /  AlkPhos  138<H>  12-03      PT/INR - ( 03 Dec 2021 09:23 )   PT: 16.5 sec;   INR: 1.40          PTT - ( 03 Dec 2021 09:23 )  PTT:39.3 sec          RADIOLOGY & ADDITIONAL STUDIES:

## 2021-12-04 NOTE — PROGRESS NOTE ADULT - ASSESSMENT
69 yo male PMHx AF (Eliquis), hiatal hernia, CAD s/p CABG (2005), R renal txp (tacro), Obesity, DM2 (A1C 6.7), HTN, HLD, BPH, CLL, MGUS, referred to the ED by Dr Leyva for possible thoracentesis with Dr Lora of R sided pleural effusion seen on 10/29 CT. Pt being followed by Dr. Lora and renal team during admission. Pt had not taken any meds in AM of 12/3 prior to admission -- scheduled for medications to be given in hospital. Patient to undergo R sided thoracentesis on 12/3 with Dr. Lora. Pt started on IV Lasix 40mg q6hour. LE dopplers ordered for LE edema.

## 2021-12-04 NOTE — PROGRESS NOTE ADULT - PROBLEM SELECTOR PLAN 8
Pt with Hx of T2DM. At home, patient uses sliding scale require 20-40 units before each meal. HbA1c in November 2021 6.8.  -c/w Gallo   -continue to monitor FSG and adjust insulin regimen accordingly

## 2021-12-04 NOTE — PROGRESS NOTE ADULT - SUBJECTIVE AND OBJECTIVE BOX
Interval Events: Reviewed  Patient seen and examined at bedside.    Patient is a 71y old  Male who presents with a chief complaint of dyspnea on exertion (03 Dec 2021 18:48)  no acute event overnight, pt refused vitals in morning.       PAST MEDICAL & SURGICAL HISTORY:  Barretts esophagus    CRI (chronic renal insufficiency)    DM (diabetes mellitus)    GERD (gastroesophageal reflux disease)    HTN (hypertension)    Monoclonal gammopathy    Mandibular abscess    Paroxysmal atrial fibrillation    Benign prostatic hyperplasia, presence of lower urinary tract symptoms unspecified, unspecified morphology    Other hyperlipidemia    Hyperlipidemia    S/P CABG (coronary artery bypass graft)  15 years    History of surgery  cyst removal    Mandibular abscess        MEDICATIONS:  Pulmonary:    Antimicrobials:  valGANciclovir 450 milliGRAM(s) Oral daily    Anticoagulants:    Cardiac:  amLODIPine   Tablet 10 milliGRAM(s) Oral daily  furosemide   Injectable 20 milliGRAM(s) IV Push every 12 hours  tamsulosin 0.4 milliGRAM(s) Oral at bedtime      Allergies    No Known Allergies    Intolerances        Vital Signs Last 24 Hrs  T(C): 36.5 (03 Dec 2021 21:02), Max: 36.5 (03 Dec 2021 21:02)  T(F): 97.7 (03 Dec 2021 21:02), Max: 97.7 (03 Dec 2021 21:02)  HR: 75 (03 Dec 2021 21:02) (74 - 85)  BP: 145/81 (03 Dec 2021 21:02) (144/75 - 167/90)  BP(mean): --  RR: 19 (03 Dec 2021 21:02) (18 - 20)  SpO2: 98% (03 Dec 2021 21:02) (90% - 98%)        Review of Systems:   •	General: negative  •	Skin/Breast: negative  •	Ophthalmologic: negative  •	ENMT: negative  •	Respiratory and Thorax: negative  •	Cardiovascular: negative  •	Gastrointestinal: negative  •	Genitourinary: negative  •	Musculoskeletal: negative  •	Neurological: negative  •	Psychiatric: negative  •	Hematology/Lymphatics: negative  •	Endocrine: negative  •	Allergic/Immunologic: negative    Physical Exam:   Constitutional: laying in bed on 2 L NC, speaking in full sentences   Eyes: PERRL, EOMI, clear conjunctiva  ENT: no nasal discharge; no oropharyngeal erythema or exudates; MMM  Respiratory: reduced breath sounds in R lower lung base, no wheezing noted   Cardiac: +S1/S2; RRR; no M/R/G  Gastrointestinal: distended abdomen, non-tender, no rebound tenderness, no guarding noted   Extremities: WWP, +2 pitting edema in RLE extending from shin into foot; +2 pitting edema in LLE extending from mid-thigh into foot   Musculoskeletal: NROM x4; no joint swelling, tenderness or erythema  Lymphatic: supraclavicular lymphadenopathy   Neurologic: AAOx3; CNII-XII grossly intact; no focal deficits  Psychiatric: affect and characteristics of appearance, verbalizations, behaviors are appropriate    LABS:      CBC Full  -  ( 03 Dec 2021 09:23 )  WBC Count : 17.48 K/uL  RBC Count : 3.27 M/uL  Hemoglobin : 8.9 g/dL  Hematocrit : 30.2 %  Platelet Count - Automated : 151 K/uL  Mean Cell Volume : 92.4 fl  Mean Cell Hemoglobin : 27.2 pg  Mean Cell Hemoglobin Concentration : 29.5 gm/dL  Auto Neutrophil # : 4.89 K/uL  Auto Lymphocyte # : 12.24 K/uL  Auto Monocyte # : 0.17 K/uL  Auto Eosinophil # : 0.00 K/uL  Auto Basophil # : 0.17 K/uL  Auto Neutrophil % : 28.0 %  Auto Lymphocyte % : 70.0 %  Auto Monocyte % : 1.0 %  Auto Eosinophil % : 0.0 %  Auto Basophil % : 1.0 %    12-03    129<L>  |  95<L>  |  29<H>  ----------------------------<  139<H>  5.1   |  24  |  1.16    Ca    9.4      03 Dec 2021 09:23    TPro  7.5  /  Alb  4.4  /  TBili  0.7  /  DBili  x   /  AST  13  /  ALT  9<L>  /  AlkPhos  138<H>  12-03    PT/INR - ( 03 Dec 2021 09:23 )   PT: 16.5 sec;   INR: 1.40          PTT - ( 03 Dec 2021 09:23 )  PTT:39.3 sec                    RADIOLOGY & ADDITIONAL STUDIES (The following images were personally reviewed):  Lucas:                                     No  Urine output:                       adequate  DVT prophylaxis:                 Yes  Flattus:                                  Yes  Bowel movement:              No

## 2021-12-04 NOTE — PROGRESS NOTE ADULT - ASSESSMENT
Assessment/Plan:   70 yo male PMHx ESRD s/p transplant, AF (Eliquis), hiatal hernia, CAD s/p CABG (2005), Obesity, DM2 (A1C 6.7), HTN, HLD, BPH, CLL, MGUS p/w volume overload. Renal consulted.     #ESRD s/p transplant 2017  Tacrolimus 2mg qAM and 1mg qHS; on Valcyte  -Creat at baseline    #Hypervolemia  -Check UA, Urine protein/creatinine ratio to r/o NS; albumin wnl  -Needs strict I/O's recorded  -Fluid restrict to 1L of free water per day  -Agree with IV lasix 20mg Q12h; however need urine output to help determine if adequate dosing. Pt himself states he feels much better    #Hyponatremia  - likely related to hypervolaemia  - management as above    #BP  -normotensive    #Anemia  - check iron panel and ferritin, may benefit from epo  Lytes acceptable    Daily weights, strict I&Os, renally dose meds, renal diet Assessment/Plan:   72 yo male PMHx ESRD s/p transplant, AF (Eliquis), hiatal hernia, CAD s/p CABG (2005), Obesity, DM2 (A1C 6.7), HTN, HLD, BPH, CLL, MGUS p/w volume overload. Renal consulted.     #ESRD s/p transplant 2017  Tacrolimus 2mg qAM and 1mg qHS; on Valcyte  -Creat at baseline    #Hypervolemia  -Check UA, urine sodium, urine creatinine and urea nitrogen + Urine protein/creatinine ratio to r/o NS; albumin wnl  -Needs strict I/O's recorded  -Fluid restrict to 1L of free water per day  -Agree with IV lasix 20mg Q12h; however need urine output to help determine if adequate dosing. Pt himself states he feels much better    #Hyponatremia likely from SIADH  -Check urine labs as listed above  - likely related to hypervolaemia  - management as above    #BP  -normotensive    #Anemia  - check iron panel and ferritin, may benefit from epo  Lytes acceptable    Daily weights, strict I&Os, renally dose meds, renal diet Assessment/Plan:   72 yo male PMHx ESRD s/p transplant, AF (Eliquis), hiatal hernia, CAD s/p CABG (2005), Obesity, DM2 (A1C 6.7), HTN, HLD, BPH, CLL, MGUS p/w volume overload. Renal consulted.     #ESRD s/p transplant 2017  Tacrolimus 2mg qAM and 1mg qHS; on Valcyte  -Creat at baseline    #Hypervolemia  -Check UA, urine sodium, urine creatinine and urea nitrogen + Urine protein/creatinine ratio to r/o NS; albumin wnl  -Needs strict I/O's recorded  -Fluid restrict to 1L of free water per day  -Agree with IV lasix 20mg Q12h; however need urine output to help determine if adequate dosing. Pt himself states he feels much better    #Hyponatremia likely from SIADH  -Check urine labs as listed above  - likely related to hypervolaemia  -Start Urena 15mg PO BID  - management as above    #BP  -normotensive    #Anemia  - check iron panel and ferritin, may benefit from epo  Lytes acceptable    Daily weights, strict I&Os, renally dose meds, renal diet

## 2021-12-04 NOTE — CONSULT NOTE ADULT - SUBJECTIVE AND OBJECTIVE BOX
HPI:  71 yo male PMHx AF (Eliquis), hiatal hernia, CAD s/p CABG (2005), R renal txp (tacrolimus and valganciclovir), Obesity, DM2 (A1C 6.8 in 11/2021), HTN, HLD, CLL referred to the ED by Dr Leyva for thoracentesis with Dr Lora of R sided pleural effusion seen on 10/29 CT. Pt also has chronic leukocytosis 2/2 CLL diagnosis. Patient seen by Dr. Mishra on 12/01/21 and has been holing eliquis since then in anticipation for procedure. Pt was recently admitted to [facility] in July 2021, diagnosed w/ PNA discharged with supplemental O2 as needed and again in November 2021 where he underwent L sided deep cervical lymph node biopsy . Reports O2 between 90-95 at home and uses supplemental O2 if pulse ox read is low, but has not used it for SOB. Yesterday, he noted his SpO2 to be around 90% so he used 2L NC at home.  Patient states his LE edema has worsened, L>R, with edema extending up into his thigh. Pt reports that he always uses 3 pillows at night and he admits to SORIA with activity. He denies orthopnea, chest pain, cough, fever, night sweats, weight loss, nausea, diarrhea, dysuria, sick contacts. Being admitted for procedure w/ Dr. Lora.     ED Vitals: /75, HR 85, SpO2 90% on RA --> 96% on 2L NC, T 97.6  ED Labs: WBC 17.48, Hgb 8.9, Plt 151, INR 1.40, Na 129, K 5.1, Cl 95, BUN 29, Cr 1.16, Alk Phos 138   CXR preliminary read showing worsening R sided pleural effusion when compared to CXR from 11/8/21 (previous admission)   ED Interventions: none   (03 Dec 2021 11:48)      ROS: A 10-point review of systems was otherwise negative.    PAST MEDICAL & SURGICAL HISTORY:  Barretts esophagus    CRI (chronic renal insufficiency)    DM (diabetes mellitus)    GERD (gastroesophageal reflux disease)    HTN (hypertension)    Monoclonal gammopathy    Mandibular abscess    Paroxysmal atrial fibrillation    Benign prostatic hyperplasia, presence of lower urinary tract symptoms unspecified, unspecified morphology    Other hyperlipidemia    Hyperlipidemia    S/P CABG (coronary artery bypass graft)  15 years    History of surgery  cyst removal    Mandibular abscess      SOCIAL HISTORY:  FAMILY HISTORY:    ALLERGIES: 	  No Known Allergies          MEDICATIONS:  amLODIPine   Tablet 10 milliGRAM(s) Oral daily  atorvastatin 40 milliGRAM(s) Oral at bedtime  dextrose 40% Gel 15 Gram(s) Oral once  dextrose 5%. 1000 milliLiter(s) IV Continuous <Continuous>  dextrose 5%. 1000 milliLiter(s) IV Continuous <Continuous>  dextrose 50% Injectable 12.5 Gram(s) IV Push once  dextrose 50% Injectable 25 Gram(s) IV Push once  dextrose 50% Injectable 25 Gram(s) IV Push once  enoxaparin Injectable 100 milliGRAM(s) SubCutaneous every 12 hours  furosemide   Injectable 40 milliGRAM(s) IV Push every 12 hours  glucagon  Injectable 1 milliGRAM(s) IntraMuscular once  insulin glargine Injectable (LANTUS) 10 Unit(s) SubCutaneous at bedtime  insulin lispro (ADMELOG) corrective regimen sliding scale   SubCutaneous Before meals and at bedtime  pantoprazole    Tablet 40 milliGRAM(s) Oral before breakfast  tacrolimus 2 milliGRAM(s) Oral <User Schedule>  tacrolimus 1 milliGRAM(s) Oral <User Schedule>  tamsulosin 0.4 milliGRAM(s) Oral at bedtime  valGANciclovir 450 milliGRAM(s) Oral every 24 hours      PHYSICAL EXAM:  T(C): 36.8 (12-04-21 @ 14:27), Max: 36.8 (12-04-21 @ 14:27)  HR: 71 (12-04-21 @ 14:27) (71 - 71)  BP: 124/73 (12-04-21 @ 14:27) (124/73 - 124/73)  RR: 17 (12-04-21 @ 14:27) (17 - 17)  SpO2: 93% (12-04-21 @ 14:27) (93% - 93%)  Wt(kg): --    GEN: Awake, comfortable. NAD. sitting on the side of the bed.  HEENT: NCAT, PERRL, EOMI. Mucosa moist. No JVD.   RESP: decreased breath sounds bibasilar, crackles   CV: RRR, normal s1/s2. No m/r/g.  ABD: Soft, NTND. BS+  EXT: Warm. 2+-3+ b/l LE edema upto thighs .   NEURO: AAOx3. No focal deficits.    I&O's Summary      LABS:	 	                        9.0    15.68 )-----------( 152      ( 04 Dec 2021 10:48 )             30.2     12-04    126<L>  |  93<L>  |  31<H>  ----------------------------<  236<H>  4.9   |  25  |  1.26    Ca    9.3      04 Dec 2021 10:48  Phos  4.7     12-04  Mg     2.0     12-04    TPro  7.5  /  Alb  4.4  /  TBili  0.7  /  DBili  x   /  AST  13  /  ALT  9<L>  /  AlkPhos  138<H>  12-03

## 2021-12-04 NOTE — PROVIDER CONTACT NOTE (OTHER) - SITUATION
fingerstick check per pt request since his sugar on his own machine was showing  more than 300. MD Keenan made aware

## 2021-12-04 NOTE — CONSULT NOTE ADULT - TIME BILLING
-Pt seen and examined  -VSS, NAD, volume overloaded on exam, 3+ pitting edema b/l to the knees  -dCHF - acute on chronic dCHF exacerbation, significant vol overload, stable from resp standpoint, responding to IV diuresis - increase Lasix to 40 IV BID - needs multiple days of diuresis. Monitor and replete electrolytes; Montior Is&Os, discussed w/ pt. improtance of using urinal to track Is&Os  -AF - auto-rate controlled; Eliquis held for Thoracentesis, now on Lovenox 100 BID in preparation for IR guided thoracentesis on Monday  -R Pl. effusion - loculated, attempted thoracentesis but unable to aspirate; Plan for IR guided Thoracentesis on Monday  -Will continue to follow    Erin Middleton MD  Cardiology Attending

## 2021-12-05 LAB
ALBUMIN MFR SERPL ELPH: 57.6 %
ALBUMIN SERPL ELPH-MCNC: 4.3 G/DL — SIGNIFICANT CHANGE UP (ref 3.3–5)
ALBUMIN SERPL-MCNC: 4.1 G/DL
ALBUMIN/GLOB SERPL: 1.3 RATIO
ALP SERPL-CCNC: 136 U/L — HIGH (ref 40–120)
ALPHA1 GLOB MFR SERPL ELPH: 5.1 %
ALPHA1 GLOB SERPL ELPH-MCNC: 0.4 G/DL
ALPHA2 GLOB MFR SERPL ELPH: 10.3 %
ALPHA2 GLOB SERPL ELPH-MCNC: 0.7 G/DL
ALT FLD-CCNC: 8 U/L — LOW (ref 10–45)
ANION GAP SERPL CALC-SCNC: 8 MMOL/L — SIGNIFICANT CHANGE UP (ref 5–17)
ANISOCYTOSIS BLD QL: SLIGHT — SIGNIFICANT CHANGE UP
APTT BLD: 41.7 SEC — HIGH (ref 27.5–35.5)
AST SERPL-CCNC: 15 U/L — SIGNIFICANT CHANGE UP (ref 10–40)
B-GLOBULIN MFR SERPL ELPH: 10.1 %
B-GLOBULIN SERPL ELPH-MCNC: 0.7 G/DL
BASOPHILS # BLD AUTO: 0 K/UL — SIGNIFICANT CHANGE UP (ref 0–0.2)
BASOPHILS NFR BLD AUTO: 0 % — SIGNIFICANT CHANGE UP (ref 0–2)
BILIRUB SERPL-MCNC: 0.6 MG/DL — SIGNIFICANT CHANGE UP (ref 0.2–1.2)
BLD GP AB SCN SERPL QL: NEGATIVE — SIGNIFICANT CHANGE UP
BUN SERPL-MCNC: 31 MG/DL — HIGH (ref 7–23)
CALCIUM SERPL-MCNC: 9.3 MG/DL — SIGNIFICANT CHANGE UP (ref 8.4–10.5)
CHLORIDE SERPL-SCNC: 96 MMOL/L — SIGNIFICANT CHANGE UP (ref 96–108)
CO2 SERPL-SCNC: 27 MMOL/L — SIGNIFICANT CHANGE UP (ref 22–31)
CREAT SERPL-MCNC: 1.27 MG/DL — SIGNIFICANT CHANGE UP (ref 0.5–1.3)
DACRYOCYTES BLD QL SMEAR: SLIGHT — SIGNIFICANT CHANGE UP
DEPRECATED KAPPA LC FREE/LAMBDA SER: 0.48 RATIO
ELLIPTOCYTES BLD QL SMEAR: SLIGHT — SIGNIFICANT CHANGE UP
EOSINOPHIL # BLD AUTO: 0.13 K/UL — SIGNIFICANT CHANGE UP (ref 0–0.5)
EOSINOPHIL NFR BLD AUTO: 0.9 % — SIGNIFICANT CHANGE UP (ref 0–6)
GAMMA GLOB FLD ELPH-MCNC: 1.2 G/DL
GAMMA GLOB MFR SERPL ELPH: 16.9 %
GIANT PLATELETS BLD QL SMEAR: PRESENT — SIGNIFICANT CHANGE UP
GLUCOSE BLDC GLUCOMTR-MCNC: 110 MG/DL — HIGH (ref 70–99)
GLUCOSE BLDC GLUCOMTR-MCNC: 180 MG/DL — HIGH (ref 70–99)
GLUCOSE BLDC GLUCOMTR-MCNC: 200 MG/DL — HIGH (ref 70–99)
GLUCOSE BLDC GLUCOMTR-MCNC: 271 MG/DL — HIGH (ref 70–99)
GLUCOSE SERPL-MCNC: 176 MG/DL — HIGH (ref 70–99)
HCT VFR BLD CALC: 29.4 % — LOW (ref 39–50)
HGB BLD-MCNC: 8.8 G/DL — LOW (ref 13–17)
HYPOCHROMIA BLD QL: SLIGHT — SIGNIFICANT CHANGE UP
IGA 24H UR QL IFE: NORMAL
IGA SER QL IEP: 174 MG/DL
IGG SER QL IEP: 1390 MG/DL
IGM SER QL IEP: 14 MG/DL
INR BLD: 1.32 — HIGH (ref 0.88–1.16)
INTERPRETATION SERPL IEP-IMP: NORMAL
KAPPA LC CSF-MCNC: 9.24 MG/DL
KAPPA LC SERPL-MCNC: 4.41 MG/DL
LYMPHOCYTES # BLD AUTO: 11.85 K/UL — HIGH (ref 1–3.3)
LYMPHOCYTES # BLD AUTO: 80.7 % — HIGH (ref 13–44)
M PROTEIN MFR SERPL ELPH: 3.3 %
M PROTEIN SPEC IFE-MCNC: NORMAL
MAGNESIUM SERPL-MCNC: 1.9 MG/DL — SIGNIFICANT CHANGE UP (ref 1.6–2.6)
MANUAL SMEAR VERIFICATION: SIGNIFICANT CHANGE UP
MCHC RBC-ENTMCNC: 27.7 PG — SIGNIFICANT CHANGE UP (ref 27–34)
MCHC RBC-ENTMCNC: 29.9 GM/DL — LOW (ref 32–36)
MCV RBC AUTO: 92.5 FL — SIGNIFICANT CHANGE UP (ref 80–100)
MICROCYTES BLD QL: SLIGHT — SIGNIFICANT CHANGE UP
MONOCLON BAND OBS SERPL: 0.2 G/DL
MONOCYTES # BLD AUTO: 0.25 K/UL — SIGNIFICANT CHANGE UP (ref 0–0.9)
MONOCYTES NFR BLD AUTO: 1.7 % — LOW (ref 2–14)
MPO AB + PR3 PNL SER: NORMAL
NEUTROPHILS # BLD AUTO: 2.45 K/UL — SIGNIFICANT CHANGE UP (ref 1.8–7.4)
NEUTROPHILS NFR BLD AUTO: 16.7 % — LOW (ref 43–77)
OSMOLALITY SERPL: 280 MOSM/KG — SIGNIFICANT CHANGE UP (ref 280–301)
PHOSPHATE SERPL-MCNC: 4.5 MG/DL — SIGNIFICANT CHANGE UP (ref 2.5–4.5)
PLAT MORPH BLD: ABNORMAL
PLATELET # BLD AUTO: 145 K/UL — LOW (ref 150–400)
POIKILOCYTOSIS BLD QL AUTO: SLIGHT — SIGNIFICANT CHANGE UP
POTASSIUM SERPL-MCNC: 4.5 MMOL/L — SIGNIFICANT CHANGE UP (ref 3.5–5.3)
POTASSIUM SERPL-SCNC: 4.5 MMOL/L — SIGNIFICANT CHANGE UP (ref 3.5–5.3)
PROT SERPL-MCNC: 7.2 G/DL
PROT SERPL-MCNC: 7.2 G/DL
PROT SERPL-MCNC: 7.5 G/DL — SIGNIFICANT CHANGE UP (ref 6–8.3)
PROTHROM AB SERPL-ACNC: 15.6 SEC — HIGH (ref 10.6–13.6)
RBC # BLD: 3.18 M/UL — LOW (ref 4.2–5.8)
RBC # FLD: 16.2 % — HIGH (ref 10.3–14.5)
RBC BLD AUTO: ABNORMAL
RH IG SCN BLD-IMP: POSITIVE — SIGNIFICANT CHANGE UP
SMUDGE CELLS # BLD: PRESENT — SIGNIFICANT CHANGE UP
SODIUM SERPL-SCNC: 131 MMOL/L — LOW (ref 135–145)
TOTAL IGE SMQN RAST: <2 KU/L
WBC # BLD: 14.69 K/UL — HIGH (ref 3.8–10.5)
WBC # FLD AUTO: 14.69 K/UL — HIGH (ref 3.8–10.5)

## 2021-12-05 PROCEDURE — 99232 SBSQ HOSP IP/OBS MODERATE 35: CPT | Mod: GC

## 2021-12-05 PROCEDURE — 99232 SBSQ HOSP IP/OBS MODERATE 35: CPT

## 2021-12-05 RX ORDER — INSULIN LISPRO 100/ML
6 VIAL (ML) SUBCUTANEOUS ONCE
Refills: 0 | Status: COMPLETED | OUTPATIENT
Start: 2021-12-05 | End: 2021-12-05

## 2021-12-05 RX ORDER — UREA 15 G
15 POWDER IN PACKET (EA) ORAL
Refills: 0 | Status: DISCONTINUED | OUTPATIENT
Start: 2021-12-05 | End: 2021-12-07

## 2021-12-05 RX ORDER — INSULIN HUMAN 100 [IU]/ML
7 INJECTION, SOLUTION SUBCUTANEOUS ONCE
Refills: 0 | Status: COMPLETED | OUTPATIENT
Start: 2021-12-05 | End: 2021-12-05

## 2021-12-05 RX ADMIN — INSULIN HUMAN 7 UNIT(S): 100 INJECTION, SOLUTION SUBCUTANEOUS at 00:55

## 2021-12-05 RX ADMIN — ENOXAPARIN SODIUM 100 MILLIGRAM(S): 100 INJECTION SUBCUTANEOUS at 10:30

## 2021-12-05 RX ADMIN — Medication 2: at 18:01

## 2021-12-05 RX ADMIN — TACROLIMUS 1 MILLIGRAM(S): 5 CAPSULE ORAL at 22:02

## 2021-12-05 RX ADMIN — Medication 15 GRAM(S): at 18:01

## 2021-12-05 RX ADMIN — Medication 2: at 22:10

## 2021-12-05 RX ADMIN — PANTOPRAZOLE SODIUM 40 MILLIGRAM(S): 20 TABLET, DELAYED RELEASE ORAL at 07:07

## 2021-12-05 RX ADMIN — ATORVASTATIN CALCIUM 40 MILLIGRAM(S): 80 TABLET, FILM COATED ORAL at 22:10

## 2021-12-05 RX ADMIN — AMLODIPINE BESYLATE 10 MILLIGRAM(S): 2.5 TABLET ORAL at 10:29

## 2021-12-05 RX ADMIN — TAMSULOSIN HYDROCHLORIDE 0.4 MILLIGRAM(S): 0.4 CAPSULE ORAL at 22:10

## 2021-12-05 RX ADMIN — INSULIN GLARGINE 10 UNIT(S): 100 INJECTION, SOLUTION SUBCUTANEOUS at 22:12

## 2021-12-05 RX ADMIN — Medication 40 MILLIGRAM(S): at 01:13

## 2021-12-05 RX ADMIN — VALGANCICLOVIR 450 MILLIGRAM(S): 450 TABLET, FILM COATED ORAL at 10:30

## 2021-12-05 RX ADMIN — TACROLIMUS 2 MILLIGRAM(S): 5 CAPSULE ORAL at 10:29

## 2021-12-05 RX ADMIN — Medication 8: at 00:55

## 2021-12-05 RX ADMIN — Medication 40 MILLIGRAM(S): at 15:46

## 2021-12-05 RX ADMIN — Medication 6 UNIT(S): at 19:42

## 2021-12-05 NOTE — PROGRESS NOTE ADULT - SUBJECTIVE AND OBJECTIVE BOX
Patient is a 71y Male seen and evaluated at bedside doing well this morning. He states his breathing is better today compared to yesterday. No labs from this morning as pt refused       Meds:    amLODIPine   Tablet 10 daily  atorvastatin 40 at bedtime  dextrose 40% Gel 15 once  dextrose 5%. 1000 <Continuous>  dextrose 5%. 1000 <Continuous>  dextrose 50% Injectable 25 once  dextrose 50% Injectable 12.5 once  dextrose 50% Injectable 25 once  furosemide   Injectable 40 every 12 hours  glucagon  Injectable 1 once  insulin glargine Injectable (LANTUS) 10 at bedtime  insulin lispro (ADMELOG) corrective regimen sliding scale  Before meals and at bedtime  pantoprazole    Tablet 40 before breakfast  tacrolimus 2 <User Schedule>  tacrolimus 1 <User Schedule>  tamsulosin 0.4 at bedtime  urea Oral Powder 15 two times a day  valGANciclovir 450 every 24 hours      T(C): , Max: 36.8 (12-04-21 @ 14:27)  T(F): , Max: 98.3 (12-04-21 @ 14:27)  HR: 65 (12-05-21 @ 07:00)  BP: 126/66 (12-05-21 @ 07:00)  BP(mean): --  RR: 19 (12-05-21 @ 07:00)  SpO2: 94% (12-05-21 @ 07:00)  Wt(kg): --        Review of Systems:  ROS negative except as per HPI      PHYSICAL EXAM:  GENERAL: Alert, awake, oriented x3 on NC  HEENT: EOMI, neck supple, no JVP  CHEST/LUNG: Left chest CTA, reduced airflow at Right bases  HEART: Regular rate and rhythm, no murmur  ABDOMEN: Soft, nontender, non distended  EXTREMITIES: +3 pitting edema to thighs      LABS:                        9.0    15.68 )-----------( 152      ( 04 Dec 2021 10:48 )             30.2     12-04    126<L>  |  93<L>  |  31<H>  ----------------------------<  236<H>  4.9   |  25  |  1.26    Ca    9.3      04 Dec 2021 10:48  Phos  4.7     12-04  Mg     2.0     12-04      Osmolality, Serum: 280 mosm/kg [280 - 301] (12-05 @ 08:48)              RADIOLOGY & ADDITIONAL STUDIES:

## 2021-12-05 NOTE — PROGRESS NOTE ADULT - SUBJECTIVE AND OBJECTIVE BOX
Interval Events: Reviewed  Patient seen and examined at bedside.    Patient is a 71y old  Male who presents with a chief complaint of dyspnea on exertion (04 Dec 2021 21:51)  no acute event overnight, elevated       PAST MEDICAL & SURGICAL HISTORY:  Barretts esophagus    CRI (chronic renal insufficiency)    DM (diabetes mellitus)    GERD (gastroesophageal reflux disease)    HTN (hypertension)    Monoclonal gammopathy    Mandibular abscess    Paroxysmal atrial fibrillation    Benign prostatic hyperplasia, presence of lower urinary tract symptoms unspecified, unspecified morphology    Other hyperlipidemia    Hyperlipidemia    S/P CABG (coronary artery bypass graft)  15 years    History of surgery  cyst removal    Mandibular abscess        MEDICATIONS:  Pulmonary:    Antimicrobials:  valGANciclovir 450 milliGRAM(s) Oral every 24 hours    Anticoagulants:  enoxaparin Injectable 100 milliGRAM(s) SubCutaneous every 12 hours    Cardiac:  amLODIPine   Tablet 10 milliGRAM(s) Oral daily  furosemide   Injectable 40 milliGRAM(s) IV Push every 12 hours  tamsulosin 0.4 milliGRAM(s) Oral at bedtime      Allergies    No Known Allergies    Intolerances        Vital Signs Last 24 Hrs  T(C): 36.6 (05 Dec 2021 07:00), Max: 36.8 (04 Dec 2021 14:27)  T(F): 97.9 (05 Dec 2021 07:00), Max: 98.3 (04 Dec 2021 14:27)  HR: 65 (05 Dec 2021 07:00) (65 - 74)  BP: 126/66 (05 Dec 2021 07:00) (124/73 - 144/82)  BP(mean): --  RR: 19 (05 Dec 2021 07:00) (17 - 19)  SpO2: 94% (05 Dec 2021 07:00) (92% - 94%)        Review of Systems:   •	General: negative  •	Skin/Breast: negative  •	Ophthalmologic: negative  •	ENMT: negative  •	Respiratory and Thorax: negative  •	Cardiovascular: negative  •	Gastrointestinal: negative  •	Genitourinary: negative  •	Musculoskeletal: negative  •	Neurological: negative  •	Psychiatric: negative  •	Hematology/Lymphatics: negative  •	Endocrine: negative  •	Allergic/Immunologic: negative    Physical Exam:   • Constitutional:	obese, NAD  • Eyes:	EOMI; PERRL; no drainage or redness  • ENMT:	No oral lesions; no gross abnormalities  • Neck	no thyromegaly or nodules  • Breasts:	not examined  • Back:	No deformity or limitation of movement  • Respiratory:	Breath Sounds equal & clear to auscultation, no accessory muscle use  • Cardiovascular:	Regular rate & rhythm, normal S1, S2; no murmurs, gallops or rubs; no S3, S4  • Gastrointestinal:	Soft, non-tender, no hepatosplenomegaly, normal bowel sounds  • Genitourinary:	not examined  • Rectal: not examined  • Extremities:	No cyanosis, clubbing or edema  • Vascular:	Equal and normal pulses (dorsalis pedis)  • Neurologica:l	not examined  • Skin:	No lesions; no rash  • Lymph Nodes:	No lymphadedenopathy  • Musculoskeletal:	No joint pain, swelling or deformity; no limitation of movement        LABS:      CBC Full  -  ( 04 Dec 2021 10:48 )  WBC Count : 15.68 K/uL  RBC Count : 3.26 M/uL  Hemoglobin : 9.0 g/dL  Hematocrit : 30.2 %  Platelet Count - Automated : 152 K/uL  Mean Cell Volume : 92.6 fl  Mean Cell Hemoglobin : 27.6 pg  Mean Cell Hemoglobin Concentration : 29.8 gm/dL  Auto Neutrophil # : x  Auto Lymphocyte # : x  Auto Monocyte # : x  Auto Eosinophil # : x  Auto Basophil # : x  Auto Neutrophil % : x  Auto Lymphocyte % : x  Auto Monocyte % : x  Auto Eosinophil % : x  Auto Basophil % : x    12-04    126<L>  |  93<L>  |  31<H>  ----------------------------<  236<H>  4.9   |  25  |  1.26    Ca    9.3      04 Dec 2021 10:48  Phos  4.7     12-04  Mg     2.0     12-04    TPro  7.5  /  Alb  4.4  /  TBili  0.7  /  DBili  x   /  AST  13  /  ALT  9<L>  /  AlkPhos  138<H>  12-03    PT/INR - ( 03 Dec 2021 09:23 )   PT: 16.5 sec;   INR: 1.40          PTT - ( 03 Dec 2021 09:23 )  PTT:39.3 sec                    RADIOLOGY & ADDITIONAL STUDIES (The following images were personally reviewed):  Lucas:                                     No  Urine output:                       adequate  DVT prophylaxis:                 Yes  Flattus:                                  Yes  Bowel movement:              No

## 2021-12-05 NOTE — PROGRESS NOTE ADULT - SUBJECTIVE AND OBJECTIVE BOX
** INCOMPLETE **    OVERNIGHT EVENTS:     SUBJECTIVE:    VITAL SIGNS:  Vital Signs Last 24 Hrs  T(C): 36.6 (05 Dec 2021 07:00), Max: 36.8 (04 Dec 2021 14:27)  T(F): 97.9 (05 Dec 2021 07:00), Max: 98.3 (04 Dec 2021 14:27)  HR: 65 (05 Dec 2021 07:00) (65 - 74)  BP: 126/66 (05 Dec 2021 07:00) (124/73 - 144/82)  BP(mean): --  RR: 19 (05 Dec 2021 07:00) (17 - 19)  SpO2: 94% (05 Dec 2021 07:00) (92% - 94%)    PHYSICAL EXAM:  General: NAD; speaking in full sentences  HEENT: NC/AT; PERRL; EOMI; MMM  Neck: supple; no JVD  Cardiac: RRR; +S1/S2  Pulm: CTA B/L; no W/R/R  GI: soft, NT/ND, +BS  Extremities: WWP; no edema, clubbing or cyanosis  Vasc: 2+ radial, DP pulses B/L  Neuro: AAOx3; no focal deficits    MEDICATIONS:  MEDICATIONS  (STANDING):  amLODIPine   Tablet 10 milliGRAM(s) Oral daily  atorvastatin 40 milliGRAM(s) Oral at bedtime  dextrose 40% Gel 15 Gram(s) Oral once  dextrose 5%. 1000 milliLiter(s) (50 mL/Hr) IV Continuous <Continuous>  dextrose 5%. 1000 milliLiter(s) (100 mL/Hr) IV Continuous <Continuous>  dextrose 50% Injectable 25 Gram(s) IV Push once  dextrose 50% Injectable 12.5 Gram(s) IV Push once  dextrose 50% Injectable 25 Gram(s) IV Push once  enoxaparin Injectable 100 milliGRAM(s) SubCutaneous every 12 hours  furosemide   Injectable 40 milliGRAM(s) IV Push every 12 hours  glucagon  Injectable 1 milliGRAM(s) IntraMuscular once  insulin glargine Injectable (LANTUS) 10 Unit(s) SubCutaneous at bedtime  insulin lispro (ADMELOG) corrective regimen sliding scale   SubCutaneous Before meals and at bedtime  pantoprazole    Tablet 40 milliGRAM(s) Oral before breakfast  tacrolimus 2 milliGRAM(s) Oral <User Schedule>  tacrolimus 1 milliGRAM(s) Oral <User Schedule>  tamsulosin 0.4 milliGRAM(s) Oral at bedtime  valGANciclovir 450 milliGRAM(s) Oral every 24 hours    MEDICATIONS  (PRN):      ALLERGIES:  Allergies    No Known Allergies    Intolerances        LABS:                        9.0    15.68 )-----------( 152      ( 04 Dec 2021 10:48 )             30.2     12-04    126<L>  |  93<L>  |  31<H>  ----------------------------<  236<H>  4.9   |  25  |  1.26    Ca    9.3      04 Dec 2021 10:48  Phos  4.7     12-04  Mg     2.0     12-04    TPro  7.5  /  Alb  4.4  /  TBili  0.7  /  DBili  x   /  AST  13  /  ALT  9<L>  /  AlkPhos  138<H>  12-03    PT/INR - ( 03 Dec 2021 09:23 )   PT: 16.5 sec;   INR: 1.40          PTT - ( 03 Dec 2021 09:23 )  PTT:39.3 sec    RADIOLOGY & ADDITIONAL TESTS: Reviewed. OVERNIGHT EVENTS:   Patient refused lantus overnight. Patient was given 8U per iSS     SUBJECTIVE:  Patient seen and examined at bedside, resting comfortably in bed, and does not appear to be in any acute distress. Patient states he is very upset at how his glucose is not being adequately controlled. Patient states the lasix has been helping with his SOB. Patient denies any recent fevers, chills, nausea, vomiting, headache, chest pain, abdominal pain, genitourinary sx, extremity pain or swelling.     VITAL SIGNS:  Vital Signs Last 24 Hrs  T(C): 36.6 (05 Dec 2021 07:00), Max: 36.8 (04 Dec 2021 14:27)  T(F): 97.9 (05 Dec 2021 07:00), Max: 98.3 (04 Dec 2021 14:27)  HR: 65 (05 Dec 2021 07:00) (65 - 74)  BP: 126/66 (05 Dec 2021 07:00) (124/73 - 144/82)  BP(mean): --  RR: 19 (05 Dec 2021 07:00) (17 - 19)  SpO2: 94% (05 Dec 2021 07:00) (92% - 94%)    PHYSICAL EXAM:  Constitutional: laying in bed on 2L NC, speaking in full sentences   Eyes: PERRL, EOMI, clear conjunctiva  ENT: no nasal discharge; no oropharyngeal erythema or exudates; MMM, supraclavicular lymphadenopathy   Respiratory: reduced breath sounds in R lower lung base, no wheezing noted   Cardiac: +S1/S2; RRR; no M/R/G  Gastrointestinal: distended abdomen, non-tender, no rebound tenderness, no guarding noted   Extremities: WWP, +2 pitting edema in RLE extending from shin into foot; +2 pitting edema in LLE extending from mid-thigh into foot; no joint swelling, tenderness or erythema  Neurologic: AAOx3; CNII-XII grossly intact; no focal deficits    MEDICATIONS:  MEDICATIONS  (STANDING):  amLODIPine   Tablet 10 milliGRAM(s) Oral daily  atorvastatin 40 milliGRAM(s) Oral at bedtime  dextrose 40% Gel 15 Gram(s) Oral once  dextrose 5%. 1000 milliLiter(s) (50 mL/Hr) IV Continuous <Continuous>  dextrose 5%. 1000 milliLiter(s) (100 mL/Hr) IV Continuous <Continuous>  dextrose 50% Injectable 25 Gram(s) IV Push once  dextrose 50% Injectable 12.5 Gram(s) IV Push once  dextrose 50% Injectable 25 Gram(s) IV Push once  enoxaparin Injectable 100 milliGRAM(s) SubCutaneous every 12 hours  furosemide   Injectable 40 milliGRAM(s) IV Push every 12 hours  glucagon  Injectable 1 milliGRAM(s) IntraMuscular once  insulin glargine Injectable (LANTUS) 10 Unit(s) SubCutaneous at bedtime  insulin lispro (ADMELOG) corrective regimen sliding scale   SubCutaneous Before meals and at bedtime  pantoprazole    Tablet 40 milliGRAM(s) Oral before breakfast  tacrolimus 2 milliGRAM(s) Oral <User Schedule>  tacrolimus 1 milliGRAM(s) Oral <User Schedule>  tamsulosin 0.4 milliGRAM(s) Oral at bedtime  valGANciclovir 450 milliGRAM(s) Oral every 24 hours    MEDICATIONS  (PRN):      ALLERGIES:  Allergies    No Known Allergies    Intolerances      LABS:                        9.0    15.68 )-----------( 152      ( 04 Dec 2021 10:48 )             30.2     12-04    126<L>  |  93<L>  |  31<H>  ----------------------------<  236<H>  4.9   |  25  |  1.26    Ca    9.3      04 Dec 2021 10:48  Phos  4.7     12-04  Mg     2.0     12-04    TPro  7.5  /  Alb  4.4  /  TBili  0.7  /  DBili  x   /  AST  13  /  ALT  9<L>  /  AlkPhos  138<H>  12-03    PT/INR - ( 03 Dec 2021 09:23 )   PT: 16.5 sec;   INR: 1.40          PTT - ( 03 Dec 2021 09:23 )  PTT:39.3 sec    RADIOLOGY & ADDITIONAL TESTS: Reviewed.

## 2021-12-05 NOTE — PROGRESS NOTE ADULT - ASSESSMENT
Assessment/Plan:   70 yo male PMHx ESRD s/p transplant, AF (Eliquis), hiatal hernia, CAD s/p CABG (2005), Obesity, DM2 (A1C 6.7), HTN, HLD, BPH, CLL, MGUS p/w volume overload. Renal consulted for transplant and volume management    #ESRD s/p transplant 2017  Tacrolimus 2mg qAM and 1mg qHS; on Valcyte  -Creat at baseline    #Hypervolemia  Oxygen status improving.   -Check UA, urine sodium, urine creatinine and urea nitrogen + Urine protein/creatinine ratio to r/o NS; albumin wnl  -Needs strict I/O's recorded  -Fluid restrict to 1L of free water per day  -Agree with IV lasix 20mg Q12h; however need urine output to help determine if adequate dosing. Pt himself states he feels much better    #Hyponatremia likely from SIADH  -Check urine labs as listed above  - likely related to hypervolemia  - c/w Urena 15mg PO BID  - management as above    #BP  -normotensive    #Anemia  - check iron panel and ferritin, may benefit from epo  Lytes acceptable    Daily weights, strict I&Os, renally dose meds, renal diet

## 2021-12-05 NOTE — PROGRESS NOTE ADULT - PROBLEM SELECTOR PLAN 8
Pt with Hx of T2DM. At home, patient uses sliding scale require 20-40 units before each meal. HbA1c in November 2021 6.8.  -c/w Gallo   -continue to monitor FSG and adjust insulin regimen accordingly  ** Patient took his own home insulin today. Patient refused FSG as he already has a monitor

## 2021-12-06 ENCOUNTER — TRANSCRIPTION ENCOUNTER (OUTPATIENT)
Age: 71
End: 2021-12-06

## 2021-12-06 ENCOUNTER — RESULT REVIEW (OUTPATIENT)
Age: 71
End: 2021-12-06

## 2021-12-06 LAB
ALBUMIN FLD-MCNC: 2.8 G/DL — SIGNIFICANT CHANGE UP
ALDOSTERONE SERUM: 27.7 NG/DL
ANION GAP SERPL CALC-SCNC: 11 MMOL/L — SIGNIFICANT CHANGE UP (ref 5–17)
BASOPHILS # BLD AUTO: 0 K/UL — SIGNIFICANT CHANGE UP (ref 0–0.2)
BASOPHILS NFR BLD AUTO: 0 % — SIGNIFICANT CHANGE UP (ref 0–2)
BUN SERPL-MCNC: 35 MG/DL — HIGH (ref 7–23)
CALCIUM SERPL-MCNC: 9.3 MG/DL — SIGNIFICANT CHANGE UP (ref 8.4–10.5)
CHLORIDE SERPL-SCNC: 92 MMOL/L — LOW (ref 96–108)
CO2 SERPL-SCNC: 25 MMOL/L — SIGNIFICANT CHANGE UP (ref 22–31)
COLLAGEN CTX SERPL-MCNC: 660 PG/ML
CREAT SERPL-MCNC: 1.21 MG/DL — SIGNIFICANT CHANGE UP (ref 0.5–1.3)
DACRYOCYTES BLD QL SMEAR: SLIGHT — SIGNIFICANT CHANGE UP
EOSINOPHIL # BLD AUTO: 0 K/UL — SIGNIFICANT CHANGE UP (ref 0–0.5)
EOSINOPHIL NFR BLD AUTO: 0 % — SIGNIFICANT CHANGE UP (ref 0–6)
GIANT PLATELETS BLD QL SMEAR: PRESENT — SIGNIFICANT CHANGE UP
GLUCOSE BLDC GLUCOMTR-MCNC: 143 MG/DL — HIGH (ref 70–99)
GLUCOSE BLDC GLUCOMTR-MCNC: 147 MG/DL — HIGH (ref 70–99)
GLUCOSE BLDC GLUCOMTR-MCNC: 149 MG/DL — HIGH (ref 70–99)
GLUCOSE BLDC GLUCOMTR-MCNC: 265 MG/DL — HIGH (ref 70–99)
GLUCOSE BLDC GLUCOMTR-MCNC: 306 MG/DL — HIGH (ref 70–99)
GLUCOSE FLD-MCNC: 147 MG/DL — SIGNIFICANT CHANGE UP
GLUCOSE SERPL-MCNC: 145 MG/DL — HIGH (ref 70–99)
GRAM STN FLD: SIGNIFICANT CHANGE UP
HCT VFR BLD CALC: 29.5 % — LOW (ref 39–50)
HGB BLD-MCNC: 8.7 G/DL — LOW (ref 13–17)
HYPOCHROMIA BLD QL: SLIGHT — SIGNIFICANT CHANGE UP
LDH SERPL L TO P-CCNC: 94 U/L — SIGNIFICANT CHANGE UP
LYMPHOCYTES # BLD AUTO: 13.86 K/UL — HIGH (ref 1–3.3)
LYMPHOCYTES # BLD AUTO: 85.9 % — HIGH (ref 13–44)
MAGNESIUM SERPL-MCNC: 1.9 MG/DL — SIGNIFICANT CHANGE UP (ref 1.6–2.6)
MANUAL SMEAR VERIFICATION: SIGNIFICANT CHANGE UP
MCHC RBC-ENTMCNC: 27.3 PG — SIGNIFICANT CHANGE UP (ref 27–34)
MCHC RBC-ENTMCNC: 29.5 GM/DL — LOW (ref 32–36)
MCV RBC AUTO: 92.5 FL — SIGNIFICANT CHANGE UP (ref 80–100)
MONOCYTES # BLD AUTO: 0.15 K/UL — SIGNIFICANT CHANGE UP (ref 0–0.9)
MONOCYTES NFR BLD AUTO: 0.9 % — LOW (ref 2–14)
NEUTROPHILS # BLD AUTO: 1.99 K/UL — SIGNIFICANT CHANGE UP (ref 1.8–7.4)
NEUTROPHILS NFR BLD AUTO: 12.3 % — LOW (ref 43–77)
OVALOCYTES BLD QL SMEAR: SLIGHT — SIGNIFICANT CHANGE UP
PHOSPHATE SERPL-MCNC: 4.3 MG/DL — SIGNIFICANT CHANGE UP (ref 2.5–4.5)
PLAT MORPH BLD: ABNORMAL
PLATELET # BLD AUTO: 134 K/UL — LOW (ref 150–400)
POIKILOCYTOSIS BLD QL AUTO: SLIGHT — SIGNIFICANT CHANGE UP
POLYCHROMASIA BLD QL SMEAR: SIGNIFICANT CHANGE UP
POTASSIUM SERPL-MCNC: 4.6 MMOL/L — SIGNIFICANT CHANGE UP (ref 3.5–5.3)
POTASSIUM SERPL-SCNC: 4.6 MMOL/L — SIGNIFICANT CHANGE UP (ref 3.5–5.3)
PROT FLD-MCNC: 4.3 G/DL — SIGNIFICANT CHANGE UP
RBC # BLD: 3.19 M/UL — LOW (ref 4.2–5.8)
RBC # FLD: 16.2 % — HIGH (ref 10.3–14.5)
RBC BLD AUTO: ABNORMAL
SARS-COV-2 RNA SPEC QL NAA+PROBE: SIGNIFICANT CHANGE UP
SODIUM SERPL-SCNC: 128 MMOL/L — LOW (ref 135–145)
SPECIMEN SOURCE FLD: SIGNIFICANT CHANGE UP
SPECIMEN SOURCE: SIGNIFICANT CHANGE UP
VARIANT LYMPHS # BLD: 0.9 % — SIGNIFICANT CHANGE UP (ref 0–6)
WBC # BLD: 16.14 K/UL — HIGH (ref 3.8–10.5)
WBC # FLD AUTO: 16.14 K/UL — HIGH (ref 3.8–10.5)

## 2021-12-06 PROCEDURE — 88305 TISSUE EXAM BY PATHOLOGIST: CPT | Mod: 26

## 2021-12-06 PROCEDURE — 88342 IMHCHEM/IMCYTCHM 1ST ANTB: CPT | Mod: 26,59

## 2021-12-06 PROCEDURE — 32557 INSERT CATH PLEURA W/ IMAGE: CPT | Mod: RT

## 2021-12-06 PROCEDURE — 99232 SBSQ HOSP IP/OBS MODERATE 35: CPT

## 2021-12-06 PROCEDURE — 99233 SBSQ HOSP IP/OBS HIGH 50: CPT

## 2021-12-06 PROCEDURE — 88341 IMHCHEM/IMCYTCHM EA ADD ANTB: CPT | Mod: 26,59

## 2021-12-06 PROCEDURE — 88360 TUMOR IMMUNOHISTOCHEM/MANUAL: CPT | Mod: 26

## 2021-12-06 PROCEDURE — 71045 X-RAY EXAM CHEST 1 VIEW: CPT | Mod: 26

## 2021-12-06 PROCEDURE — 99152 MOD SED SAME PHYS/QHP 5/>YRS: CPT

## 2021-12-06 PROCEDURE — 88112 CYTOPATH CELL ENHANCE TECH: CPT | Mod: 26

## 2021-12-06 PROCEDURE — 99447 NTRPROF PH1/NTRNET/EHR 11-20: CPT

## 2021-12-06 RX ORDER — ACETAMINOPHEN 500 MG
650 TABLET ORAL EVERY 6 HOURS
Refills: 0 | Status: DISCONTINUED | OUTPATIENT
Start: 2021-12-06 | End: 2021-12-10

## 2021-12-06 RX ORDER — PANTOPRAZOLE 40 MG/1
40 TABLET, DELAYED RELEASE ORAL
Qty: 90 | Refills: 1 | Status: DISCONTINUED | COMMUNITY
Start: 2020-12-31 | End: 2021-12-06

## 2021-12-06 RX ORDER — OXYCODONE AND ACETAMINOPHEN 5; 325 MG/1; MG/1
1 TABLET ORAL EVERY 6 HOURS
Refills: 0 | Status: DISCONTINUED | OUTPATIENT
Start: 2021-12-06 | End: 2021-12-10

## 2021-12-06 RX ADMIN — Medication 15 GRAM(S): at 18:18

## 2021-12-06 RX ADMIN — Medication 8: at 23:54

## 2021-12-06 RX ADMIN — AMLODIPINE BESYLATE 10 MILLIGRAM(S): 2.5 TABLET ORAL at 08:48

## 2021-12-06 RX ADMIN — OXYCODONE AND ACETAMINOPHEN 1 TABLET(S): 5; 325 TABLET ORAL at 22:17

## 2021-12-06 RX ADMIN — PANTOPRAZOLE SODIUM 40 MILLIGRAM(S): 20 TABLET, DELAYED RELEASE ORAL at 06:38

## 2021-12-06 RX ADMIN — TAMSULOSIN HYDROCHLORIDE 0.4 MILLIGRAM(S): 0.4 CAPSULE ORAL at 21:31

## 2021-12-06 RX ADMIN — Medication 40 MILLIGRAM(S): at 13:08

## 2021-12-06 RX ADMIN — TACROLIMUS 1 MILLIGRAM(S): 5 CAPSULE ORAL at 21:31

## 2021-12-06 RX ADMIN — INSULIN GLARGINE 10 UNIT(S): 100 INJECTION, SOLUTION SUBCUTANEOUS at 23:55

## 2021-12-06 RX ADMIN — OXYCODONE AND ACETAMINOPHEN 1 TABLET(S): 5; 325 TABLET ORAL at 23:20

## 2021-12-06 RX ADMIN — TACROLIMUS 2 MILLIGRAM(S): 5 CAPSULE ORAL at 08:48

## 2021-12-06 RX ADMIN — Medication 40 MILLIGRAM(S): at 01:18

## 2021-12-06 RX ADMIN — VALGANCICLOVIR 450 MILLIGRAM(S): 450 TABLET, FILM COATED ORAL at 08:52

## 2021-12-06 RX ADMIN — ATORVASTATIN CALCIUM 40 MILLIGRAM(S): 80 TABLET, FILM COATED ORAL at 21:31

## 2021-12-06 NOTE — PROGRESS NOTE ADULT - SUBJECTIVE AND OBJECTIVE BOX
INTERVAL HISTORY:  No dyspnea but using oxygen.  Reports better breathing with bending over.  No chest discomfort.    MEDICATIONS:  MEDICATIONS  (STANDING):  amLODIPine   Tablet 10 milliGRAM(s) Oral daily  atorvastatin 40 milliGRAM(s) Oral at bedtime  dextrose 40% Gel 15 Gram(s) Oral once  dextrose 5%. 1000 milliLiter(s) (50 mL/Hr) IV Continuous <Continuous>  dextrose 5%. 1000 milliLiter(s) (100 mL/Hr) IV Continuous <Continuous>  dextrose 50% Injectable 25 Gram(s) IV Push once  dextrose 50% Injectable 12.5 Gram(s) IV Push once  dextrose 50% Injectable 25 Gram(s) IV Push once  furosemide   Injectable 40 milliGRAM(s) IV Push every 12 hours  glucagon  Injectable 1 milliGRAM(s) IntraMuscular once  insulin glargine Injectable (LANTUS) 10 Unit(s) SubCutaneous at bedtime  insulin lispro (ADMELOG) corrective regimen sliding scale   SubCutaneous Before meals and at bedtime  pantoprazole    Tablet 40 milliGRAM(s) Oral before breakfast  tacrolimus 2 milliGRAM(s) Oral <User Schedule>  tacrolimus 1 milliGRAM(s) Oral <User Schedule>  tamsulosin 0.4 milliGRAM(s) Oral at bedtime  urea Oral Powder 15 Gram(s) Oral two times a day  valGANciclovir 450 milliGRAM(s) Oral every 24 hours      REVIEW OF SYSTEMS:  CONSTITUTIONAL: No fever, no weight loss, no fatigue  PULMONARY: No cough, no wheezing, chills no hemoptysis; No Shortness of Breath  CARDIOVASCULAR: No chest pain, no palpitations, no syncope, dizziness, +leg swelling  GASTROINTESTINAL: No abdominal pain. No nausea, no  vomiting, no hematemesis; No diarrhea, no constipation. No melena, no hematochezia.  GENITOURINARY: No dysuria, no frequency, no hematuria, no incontinence  SKIN: No itching, no burning, no rashes, no lesions     PHYSICAL EXAM:  T(C): 36.6 (12-06-21 @ 06:01), Max: 36.7 (12-05-21 @ 14:15)  HR: 74 (12-06-21 @ 06:01) (63 - 74)  BP: 158/78 (12-06-21 @ 06:01) (135/78 - 158/78)  RR: 18 (12-06-21 @ 06:01) (18 - 18)  SpO2: 93% (12-06-21 @ 06:01) (93% - 96%)  Wt(kg): --  I&O's Summary      Appearance:  No deformities, normal appearance	  HEENT:   Normal oral mucosa, PERRL, EOMI	  Neck: No jvd , no masses  Lymphatic: No  lymphadenopathy  Pulmonary: Lungs decreased BS on the R  Cardiovascular: Normal S1 S2, No JVD, No  murmur, +edema to posterior thigh  Abdomen:  Soft, Non-tender, + BS  Skin: No rashes, No ecchymoses	  Extremities:  No clubbing or cyanosis  MSK: Normal range of motion, no joint swelling    LABS:		  CARDIAC MARKERS:                         8.7    16.14 )-----------( 134      ( 06 Dec 2021 06:47 )             29.5   12-06    128<L>  |  92<L>  |  35<H>  ----------------------------<  145<H>  4.6   |  25  |  1.21    Ca    9.3      06 Dec 2021 06:47  Phos  4.3     12-06  Mg     1.9     12-06    TPro  7.5  /  Alb  4.3  /  TBili  0.6  /  DBili  x   /  AST  15  /  ALT  8<L>  /  AlkPhos  136<H>  12-05    proBNP:  Lipid Profile:  HgA1c:  TSH:  PT/INR - ( 05 Dec 2021 10:57 )   PT: 15.6 sec;   INR: 1.32          PTT - ( 05 Dec 2021 10:57 )  PTT:41.7 sec    TELEMETRY: 	      QTC     ECG:  	   QTC         RADIOLOGY:   DIAGNOSTIC TESTING: [ ] Echocardiogram: [ ]  Catheterization: [ ] Stress Test:    PMH, medications, allergies Chart notes, vital signs, laboratory data, and radiology studies reviewed.    ASSESSMENT/PLAN: 	  Congestive heart failure/right-sided pleural effusion etiology likely diastolic dysfunction, kidney disease and dietary noncompliance.  The patient declines input and output measurement.  He reports an improvement with Lasix.  Continue Lasix IV twice a day.  He has severe edema and it will take time to diurese him.      Coronary artery disease–the patient has no chest discomfort.  The EKG shows T wave abnormalities which is nonspecific.  Nuclear stress test last month showed no ischemia and normal ejection fraction.  Continue anticoagulation.    Atrial fibrillation–chronic.  The heart rate is good.  Continue anticoagulation.    Chronic kidney disease–creatinine is stable.    Anemia hemoglobin is adequate.–      Discussed with Dr. Lora nursing staff.

## 2021-12-06 NOTE — DISCHARGE NOTE PROVIDER - NSDCMRMEDTOKEN_GEN_ALL_CORE_FT
Eliquis 5 mg oral tablet: 1 tab(s) orally 2 times a day  Flomax 0.4 mg oral capsule: 1 cap(s) orally once a day (at bedtime)  furosemide 40 mg oral tablet: 1 tab(s) orally once a day  HumaLOG 100 units/mL subcutaneous solution: Using as sliding scale based on pre-meal BG three times a day, usually between 24-32 units.   Imdur 30 mg oral tablet, extended release: 1 tab(s) orally once a day (in the morning)  Norvasc 10 mg oral tablet: 1 tab(s) orally once a day  pantoprazole 20 mg oral delayed release tablet: 1 tab(s) orally once a day  rosuvastatin 10 mg oral tablet: 1 tab(s) orally once a day (at bedtime)  tacrolimus 1 mg oral capsule: 2 cap(s) orally in AM  1 caps in PM  valGANciclovir 450 mg oral tablet: 1 tab(s) orally once a day  Xanax 0.25 mg oral tablet: 1 tab(s) orally every 8 hours, As Needed   Eliquis 5 mg oral tablet: 1 tab(s) orally 2 times a day  Flomax 0.4 mg oral capsule: 1 cap(s) orally once a day (at bedtime)  furosemide 40 mg oral tablet: 1 tab(s) orally every 12 hours   HumaLOG 100 units/mL subcutaneous solution: Using as sliding scale based on pre-meal BG three times a day, usually between 24-32 units.   Imdur 30 mg oral tablet, extended release: 1 tab(s) orally once a day (in the morning)  Norvasc 10 mg oral tablet: 1 tab(s) orally once a day  pantoprazole 20 mg oral delayed release tablet: 1 tab(s) orally once a day  rosuvastatin 10 mg oral tablet: 1 tab(s) orally once a day (at bedtime)  tacrolimus 1 mg oral capsule: 2 cap(s) orally in AM  1 caps in PM  valGANciclovir 450 mg oral tablet: 1 tab(s) orally once a day  Xanax 0.25 mg oral tablet: 1 tab(s) orally every 8 hours, As Needed   Eliquis 5 mg oral tablet: 1 tab(s) orally 2 times a day  Flomax 0.4 mg oral capsule: 1 cap(s) orally once a day (at bedtime)  HumaLOG 100 units/mL subcutaneous solution: 15 unit(s) subcutaneous 3 times a day (before meals), As Needed   Imdur 30 mg oral tablet, extended release: 1 tab(s) orally once a day (in the morning)  Lantus Solostar Pen 100 units/mL subcutaneous solution: 30 unit(s) subcutaneous once a day (at bedtime)   Norvasc 10 mg oral tablet: 1 tab(s) orally once a day  pantoprazole 20 mg oral delayed release tablet: 1 tab(s) orally once a day  rosuvastatin 10 mg oral tablet: 1 tab(s) orally once a day (at bedtime)  tacrolimus 1 mg oral capsule: 2 cap(s) orally in AM  1 caps in PM  valGANciclovir 450 mg oral tablet: 1 tab(s) orally once a day  Xanax 0.25 mg oral tablet: 1 tab(s) orally every 8 hours, As Needed   Eliquis 5 mg oral tablet: 1 tab(s) orally 2 times a day  Flomax 0.4 mg oral capsule: 1 cap(s) orally once a day (at bedtime)  HumaLOG KwikPen 100 units/mL injectable solution: 15 unit(s) injectable 3 times a day   Imdur 30 mg oral tablet, extended release: 1 tab(s) orally once a day (in the morning)  Lantus Solostar Pen 100 units/mL subcutaneous solution: 30 unit(s) subcutaneous once a day (at bedtime)   Lasix 40 mg oral tablet: 1 tab(s) orally 2 times a day   Norvasc 10 mg oral tablet: 1 tab(s) orally once a day  pantoprazole 20 mg oral delayed release tablet: 1 tab(s) orally once a day  rosuvastatin 10 mg oral tablet: 1 tab(s) orally once a day (at bedtime)  tacrolimus 1 mg oral capsule: 2 cap(s) orally in AM  1 caps in PM  valGANciclovir 450 mg oral tablet: 1 tab(s) orally once a day  Xanax 0.25 mg oral tablet: 1 tab(s) orally every 8 hours, As Needed

## 2021-12-06 NOTE — DISCHARGE NOTE PROVIDER - NSDCCAREPROVSEEN_GEN_ALL_CORE_FT
Marie Lora Modified Advancement Flap Text: The defect edges were debeveled with a #15 scalpel blade.  Given the location of the defect, shape of the defect and the proximity to free margins a modified advancement flap was deemed most appropriate.  Using a sterile surgical marker, an appropriate advancement flap was drawn incorporating the defect and placing the expected incisions within the relaxed skin tension lines where possible.    The area thus outlined was incised deep to adipose tissue with a #15 scalpel blade.  The skin margins were undermined to an appropriate distance in all directions utilizing iris scissors.

## 2021-12-06 NOTE — CONSULT NOTE ADULT - SUBJECTIVE AND OBJECTIVE BOX
69 yo male PMHx AF (Eliquis), hiatal hernia, CAD s/p CABG (2005), R renal txp (tacrolimus and valganciclovir), Obesity, DM2 (A1C 6.8 in 11/2021), HTN, HLD, CLL referred to the ED by Dr. Leyva for thoracentesis with Dr. Lora of R sided pleural effusion seen on 10/29 CT. Pt also has chronic leukocytosis 2/2 CLL diagnosis. Patient seen by Dr. Mishra on 12/01/21 and has been holing eliquis since then in anticipation for procedure. On 12/4 he presented to [facility] for planned thoracentesis with Dr. Lora 12/3. Procedure aborted after unable to aspirate pleural fluid. IR consulted for right chest tube placement.       Clinical History: DYSPNEA ON EFFORT; PLEURAL EFFUSION, RIGHT    H/o or current diagnosis of HF- ACEI/ARB contraindication unknown    H/o or current diagnosis of HF- Contraindication to ACEI/ARBs    H/o or current diagnosis of HF- ACEI/ARB contraindication unknown    Handoff    MEWS Score    Barretts esophagus    CRI (chronic renal insufficiency)    DM (diabetes mellitus)    GERD (gastroesophageal reflux disease)    HTN (hypertension)    Monoclonal gammopathy    Mandibular abscess    Paroxysmal atrial fibrillation    Benign prostatic hyperplasia, presence of lower urinary tract symptoms unspecified, unspecified morphology    Other hyperlipidemia    Hyperlipidemia    Dyspnea on effort    Pleural effusion, right    Hypertension    Hyperlipidemia    History of renal transplant    Lower extremity edema    Diabetes mellitus    Nutrition, metabolism, and development symptoms    Chronic respiratory failure with hypoxia    CLL (chronic lymphocytic leukemia)    Hiatal hernia with GERD    Atrial fibrillation    No significant past surgical history    S/P CABG (coronary artery bypass graft)    History of surgery    Mandibular abscess    POST OP    20    Pleural effusion, right    [email or web address]        Meds:amLODIPine   Tablet 10 milliGRAM(s) Oral daily  atorvastatin 40 milliGRAM(s) Oral at bedtime  dextrose 40% Gel 15 Gram(s) Oral once  dextrose 5%. 1000 milliLiter(s) IV Continuous <Continuous>  dextrose 5%. 1000 milliLiter(s) IV Continuous <Continuous>  dextrose 50% Injectable 25 Gram(s) IV Push once  dextrose 50% Injectable 12.5 Gram(s) IV Push once  dextrose 50% Injectable 25 Gram(s) IV Push once  furosemide   Injectable 40 milliGRAM(s) IV Push every 12 hours  glucagon  Injectable 1 milliGRAM(s) IntraMuscular once  insulin glargine Injectable (LANTUS) 10 Unit(s) SubCutaneous at bedtime  insulin lispro (ADMELOG) corrective regimen sliding scale   SubCutaneous Before meals and at bedtime  pantoprazole    Tablet 40 milliGRAM(s) Oral before breakfast  tacrolimus 2 milliGRAM(s) Oral <User Schedule>  tacrolimus 1 milliGRAM(s) Oral <User Schedule>  tamsulosin 0.4 milliGRAM(s) Oral at bedtime  urea Oral Powder 15 Gram(s) Oral two times a day  valGANciclovir 450 milliGRAM(s) Oral every 24 hours      Allergies:No Known Allergies        Labs:                           8.7    16.14 )-----------( 134      ( 06 Dec 2021 06:47 )             29.5     PT/INR - ( 05 Dec 2021 10:57 )   PT: 15.6 sec;   INR: 1.32          PTT - ( 05 Dec 2021 10:57 )  PTT:41.7 sec  12-06    128<L>  |  92<L>  |  35<H>  ----------------------------<  145<H>  4.6   |  25  |  1.21    Ca    9.3      06 Dec 2021 06:47  Phos  4.3     12-06  Mg     1.9     12-06    TPro  7.5  /  Alb  4.3  /  TBili  0.6  /  DBili  x   /  AST  15  /  ALT  8<L>  /  AlkPhos  136<H>  12-05          Imaging Findings: right pleural effusion

## 2021-12-06 NOTE — DISCHARGE NOTE PROVIDER - CARE PROVIDERS DIRECT ADDRESSES
,[email or web address] [email or web address] ,[email or web address],[email or web address] ,[email or web address],DirectAddress_Unknown

## 2021-12-06 NOTE — PROGRESS NOTE ADULT - ASSESSMENT
70 yo male PMHx ESRD s/p transplant, AF (Eliquis), hiatal hernia, CAD s/p CABG (2005), Obesity, DM2 (A1C 6.7), HTN, HLD, BPH, CLL, MGUS p/w volume overload. Renal consulted for transplant and volume management    #ESRD s/p transplant 2017  Tacrolimus 2mg qAM and 1mg qHS; on Valcyte  -Creat at baseline    #Hypervolemia  Oxygen status improving   -consider RUQ US r/o cirrhosis   -pending UA, urine sodium, urine creatinine and urea nitrogen + Urine protein/creatinine ratio to r/o NS; albumin wnl  -strict I/O's   -Fluid restrict to 1L of free water per day  -Agree with IV lasix 40mg Q12h     #Hyponatremia likely from SIADH  -Check urine labs as listed above  -likely related to hypervolemia  -c/w Urena 15mg PO BID    #BP  -normotensive    #Anemia  - check iron panel and ferritin, may benefit from epo    Thank you for the opportunity to participate in the care of your patient. The nephrology service remains available to assist with any questions or concerns. Please feel free to reach us by paging the on-call nephrology fellow for urgent issues or as below.     Toney Marshall M.D.   PGY-5, Nephrology Fellow   C: [phone]   P: [phone]

## 2021-12-06 NOTE — CONSULT NOTE ADULT - ASSESSMENT
Assessment/Plan:   70 yo male PMHx ESRD s/p transplant, AF (Eliquis), hiatal hernia, CAD s/p CABG (2005), Obesity, DM2 (A1C 6.7), HTN, HLD, BPH, CLL, MGUS p/w volume overload. Renal consulted.     ESRD s/p transplant 2017  Tacrolimus 2mg qAM and 1mg qHS; on Valcyte  Creat at baseline    Hypervolaemic  Check UA, Urine protein/creatinine ratio to r/o NS; albumin wnl  F/u pulm for thoracentesis  Start lasix 40mg q6h to target net negative 2L/24hrs    Hyponatraemia- likely related to hypervolaemia- management as above    BP: normotensive  Anaemia- check iron panel and ferritin, may benefit from epo  Lytes acceptable    Daily weights, strict I&Os, renally dose meds, renal diet    Thank you for the opportunity to participate in the care of your patient. The nephrology service remains available to assist with any questions or concerns. Please feel free to reach us by paging the on-call nephrology fellow for urgent issues or as below.     Monique Laurent M.D.   PGY-5  Pager: [phone]
Assessment: 69 yo male PMHx AF (Eliquis), hiatal hernia, CAD s/p CABG (2005), R renal txp (tacrolimus and valganciclovir), Obesity, DM2 (A1C 6.8 in 11/2021), HTN, HLD, CLL referred to the ED by Dr. Leyva for thoracentesis with Dr. Lora of R sided pleural effusion seen on 10/29 CT. Pt also has chronic leukocytosis 2/2 CLL diagnosis. Patient seen by Dr. Mishra on 12/01/21 and has been holing eliquis since then in anticipation for procedure. On 12/4 he presented to [facility] for planned thoracentesis with Dr. Lora 12/3. Procedure aborted after unable to aspirate pleural fluid. IR consulted for right chest tube placement. Case reviewed with Dr. Joaquin, plan for right chest tube placement with sedation.     Recommendations NPO at midnight, hold lovenox, please ensure covid swab is within last 72 hours.     Communicated with: primary team
69 yo male PMHx AF (Eliquis), hiatal hernia, CAD s/p CABG (2005), R renal txp (tacro), Obesity, DM2 (A1C 6.7), HTN, HLD, BPH, CLL, MGUS, referred to the ED by Dr Leyva for possible thoracentesis with Dr Lora of R sided pleural effusion seen on 10/29 CT. Pt being followed by Dr. Lora and renal team during admission. Pt had not taken any meds in AM of 12/3 prior to admission -- scheduled for medications to be given in hospital. Patient to undergo R sided thoracentesis on 12/3 with Dr. Lora. Cardiology was consulted to assist with fluid management.     # acute on chronic HFpEF  echo 10/28/21  1. Normal left ventricular size, mild LVH, grade II DD, mildly dilated RV, reduced RV function, biatrial enlargement, trace AR, PASP 50 mmHg, trivial pericardial effusion.  Ph physical exam patient appears fluid overloaded. Per patient, he's been urinating 'a lot', but refusing urinal, preventing adequate UOP monitoring. Patient reports feeling much better after lasix IV were initiated.    - Increase Lasix 20 mg IV bid to Lasix 40 mg IV bid  - monitor UOP, daily weights, fluid restriction  - awaiting diagnostic and therapeutic thoracentesis    we will follow      D/w Dr. Middleton     
Assesment:  69 yo male PMHx AF (Eliquis), hiatal hernia, CAD s/p CABG (2005), R renal txp (tacrolimus and valganciclovir), Obesity, DM2 (A1C 6.8 in 11/2021), HTN, HLD, CLL referred to the ED by Dr Leyva for thoracentesis with Dr Lora of R sided pleural effusion seen on 10/29 CT. Pt also has chronic leukocytosis 2/2 CLL diagnosis. Patient seen by Dr. Mishra on 12/01/21 and has been holing eliquis since then in anticipation for procedure. Pt was recently admitted to [facility] in July 2021, diagnosed w/ PNA discharged with supplemental O2 as needed and again in November 2021 where he underwent L sided deep cervical lymph node biopsy . Reports O2 between 90-95 at home and uses supplemental O2 if pulse ox read is low, but has not used it for SOB. Yesterday, he noted his SpO2 to be around 90% so he used 2L NC at home.  Patient states his LE edema has worsened, L>R, with edema extending up into his thigh. Pt reports that he always uses 3 pillows at night and he admits to SORIA with activity. He denies orthopnea, chest pain, cough, fever, night sweats, weight loss, nausea, diarrhea, dysuria, sick contacts. On 12/4 he presented to [facility] for planned thoracentesis with pulmonology. Procedure aborted after unable to aspirate pleural fluid. Thoracic surgery (Dr. Zhang) consulted for evaluation.     Plan:  Problem 1: R pleural effusion  - Case discussed with Dr. Zhang  - Patient is s/p attempted thoracentesis with inability to aspirate pleural fluid during procedure  - Underwent CT chest tonight, pending official read  - Will continue to follow     Problem 2: LE edema  - Continue IV diuresis while admitted per primary team    Problem 3: Atrial fibrillation  - Patient with AF, on eliquis  - Hold eliquis pending possible intervention for pleural effusion  - Can consider heparin gtt while inpatient   - Management per primary team    Problem 4: Diabetes  - COntinue MISS  - Monitor FS, adjust regimen as indicated     I have reviewed clinical labs tests and reports, radiology tests and reports, as well as old patient medical records, and discussed with the refering physician.

## 2021-12-06 NOTE — DISCHARGE NOTE PROVIDER - NSDCFUSCHEDAPPT_GEN_ALL_CORE_FT
HERB BROWN ; 01/05/2022 ; P Cardio Vasc [address] HERB BROWN ; 01/05/2022 ; NPP Cardio Vasc [address]  HERB BROWN ; 01/13/2022 ; NPP Nephro [address]  HERB BROWN ; 12/16/2021 ; NPP Nephro [address]   HERB BROWN ; 01/05/2022 ; NPP Cardio Vasc [address] HERB BROWN ; 12/16/2021 ; NPP Nephro [address] HERB Mao ; 01/04/2022 ; NPP Endocrin [address] HERB Mao ; 01/05/2022 ; NPP Cardio Vasc [address] HERB BROWN ; 12/16/2021 ; NPP Nephro [address]   HERB BROWN ; 01/04/2022 ; NPP Endocrin [address] [place] [address]  HERB BROWN ; 01/05/2022 ; NPP Cardio Vasc [address] [facility]  HERB BROWN ; 01/06/2022 ; NPP PulmMed [address] HERB BROWN ; 12/16/2021 ; NPP Nephro [address] [place] [address]  HERB BROWN ; 12/22/2021 ; NPP Endocrin [address]  HERB BROWN ; 01/04/2022 ; NPP Endocrin [address]  HERB BROWN ; 01/05/2022 ; NPP Cardio Vasc [address] [facility]  HERB BROWN ; 01/06/2022 ; NPP PulmMed [address]

## 2021-12-06 NOTE — PROGRESS NOTE ADULT - SUBJECTIVE AND OBJECTIVE BOX
Patient is a 71y Male seen and evaluated at bedside.   Cr is stable  BP is acceptable  Denies CP SOB     Meds:    amLODIPine   Tablet 10 daily  atorvastatin 40 at bedtime  dextrose 40% Gel 15 once  dextrose 5%. 1000 <Continuous>  dextrose 5%. 1000 <Continuous>  dextrose 50% Injectable 25 once  dextrose 50% Injectable 12.5 once  dextrose 50% Injectable 25 once  furosemide   Injectable 40 every 12 hours  glucagon  Injectable 1 once  insulin glargine Injectable (LANTUS) 10 at bedtime  insulin lispro (ADMELOG) corrective regimen sliding scale  Before meals and at bedtime  pantoprazole    Tablet 40 before breakfast  tacrolimus 2 <User Schedule>  tacrolimus 1 <User Schedule>  tamsulosin 0.4 at bedtime  urea Oral Powder 15 two times a day  valGANciclovir 450 every 24 hours      T(C): , Max: 36.7 (12-05-21 @ 14:15)  T(F): , Max: 98 (12-05-21 @ 14:15)  HR: 72 (12-06-21 @ 12:33)  BP: 145/71 (12-06-21 @ 12:33)  BP(mean): --  RR: 17 (12-06-21 @ 12:33)  SpO2: 94% (12-06-21 @ 12:33)  Wt(kg): --      Review of Systems:  ROS negative except as per HPI      PHYSICAL EXAM:  GENERAL: Alert, awake, oriented x3 in NAD on NC 1L O2   HEENT: neck supple, no JVP  CHEST/LUNG: Left chest CTA, reduced airflow at Right base w/crackles   HEART: Regular rate and rhythm, no murmur  ABDOMEN: Soft, nontender, non distended  EXTREMITIES: +2 pitting edema to thighs    LABS:                        8.7    16.14 )-----------( 134      ( 06 Dec 2021 06:47 )             29.5     12-06    128<L>  |  92<L>  |  35<H>  ----------------------------<  145<H>  4.6   |  25  |  1.21    Ca    9.3      06 Dec 2021 06:47  Phos  4.3     12-06  Mg     1.9     12-06    TPro  7.5  /  Alb  4.3  /  TBili  0.6  /  DBili  x   /  AST  15  /  ALT  8<L>  /  AlkPhos  136<H>  12-05      PT/INR - ( 05 Dec 2021 10:57 )   PT: 15.6 sec;   INR: 1.32          PTT - ( 05 Dec 2021 10:57 )  PTT:41.7 sec          RADIOLOGY & ADDITIONAL STUDIES:           Patient is a 71y Male seen and evaluated at bedside.   Cr is stable  BP is acceptable  Denies CP SOB , feeling much better with diuresis     Meds:    amLODIPine   Tablet 10 daily  atorvastatin 40 at bedtime  dextrose 40% Gel 15 once  dextrose 5%. 1000 <Continuous>  dextrose 5%. 1000 <Continuous>  dextrose 50% Injectable 25 once  dextrose 50% Injectable 12.5 once  dextrose 50% Injectable 25 once  furosemide   Injectable 40 every 12 hours  glucagon  Injectable 1 once  insulin glargine Injectable (LANTUS) 10 at bedtime  insulin lispro (ADMELOG) corrective regimen sliding scale  Before meals and at bedtime  pantoprazole    Tablet 40 before breakfast  tacrolimus 2 <User Schedule>  tacrolimus 1 <User Schedule>  tamsulosin 0.4 at bedtime  urea Oral Powder 15 two times a day  valGANciclovir 450 every 24 hours      T(C): , Max: 36.7 (12-05-21 @ 14:15)  T(F): , Max: 98 (12-05-21 @ 14:15)  HR: 72 (12-06-21 @ 12:33)  BP: 145/71 (12-06-21 @ 12:33)  BP(mean): --  RR: 17 (12-06-21 @ 12:33)  SpO2: 94% (12-06-21 @ 12:33)  Wt(kg): --      Review of Systems:  ROS negative except as per HPI      PHYSICAL EXAM:  GENERAL: Alert, awake, oriented x3 in NAD on NC 1L O2   HEENT: neck supple, no JVP  CHEST/LUNG: Left chest CTA, reduced airflow at Right base w/crackles   HEART: Regular rate and rhythm, no murmur  ABDOMEN: Soft, nontender, non distended  EXTREMITIES: +2 pitting edema to thighs    LABS:                        8.7    16.14 )-----------( 134      ( 06 Dec 2021 06:47 )             29.5     12-06    128<L>  |  92<L>  |  35<H>  ----------------------------<  145<H>  4.6   |  25  |  1.21    Ca    9.3      06 Dec 2021 06:47  Phos  4.3     12-06  Mg     1.9     12-06    TPro  7.5  /  Alb  4.3  /  TBili  0.6  /  DBili  x   /  AST  15  /  ALT  8<L>  /  AlkPhos  136<H>  12-05      PT/INR - ( 05 Dec 2021 10:57 )   PT: 15.6 sec;   INR: 1.32          PTT - ( 05 Dec 2021 10:57 )  PTT:41.7 sec          RADIOLOGY & ADDITIONAL STUDIES:

## 2021-12-06 NOTE — DISCHARGE NOTE PROVIDER - NPI NUMBER (FOR SYSADMIN USE ONLY) :
[[id number] [[id number]],[[id number]],[[id number]] [[id number]],[[id number]],[[id number]] [[id number]],[[id number]],[[id number]],[[id number]] [[id number]],[[id number]],[[id number]]

## 2021-12-06 NOTE — DISCHARGE NOTE PROVIDER - CARE PROVIDER_API CALL
Kofi Leyva)  Internal Medicine; Nephrology  [address], [address] [address]  Phone: [phone]  Fax: [phone]  Follow Up Time:     Marie Lora)  Critical Care Medicine; Pulmonary Disease  [address], [address] [place] [address]  Phone: [phone]  Fax: [phone]  Follow Up Time:    Kofi Leyva (MD)  Internal Medicine; Nephrology  [address], [address] [address]  Phone: [phone]  Fax: [phone]  Scheduled Appointment: 01/13/2022 01:00 PM    Marie Lora)  Critical Care Medicine; Pulmonary Disease  [address], [address] [place] [address]  Phone: [phone]  Fax: [phone]  Follow Up Time:    Kofi Leyva)  Internal Medicine; Nephrology  [address], [address]  [place] [address]  Phone: [phone]  Fax: [phone]  Scheduled Appointment: 01/13/2022 01:00 PM    Marie Lora)  Critical Care Medicine; Pulmonary Disease  [address], [address] [place] [address]  Phone: [phone]  Fax: [phone]  Follow Up Time:     Erik Valentin)  EndocrinologyMetabDiabetes; Internal Medicine  [address] [address]  Phone: [phone]  Fax: [phone]  Follow Up Time:    Kofi Leyva)  Internal Medicine; Nephrology  [address], [address]  [place] [address]  Phone: [phone]  Fax: [phone]  Scheduled Appointment: 01/13/2022 01:00 PM    Marie Lora)  Critical Care Medicine; Pulmonary Disease  [address], [address] [place] [address]  Phone: [phone]  Fax: [phone]  Follow Up Time:     King Oshea)  [address]  [address]  [place] [address]  Phone: [phone]  Fax: [phone]  Scheduled Appointment: 01/05/2022 11:00 AM   Kofi Leyva)  Internal Medicine; Nephrology  [address], [address]  [place] [address]  Phone: [phone]  Fax: [phone]  Scheduled Appointment: 01/13/2022 01:00 PM    Marie Lora)  Critical Care Medicine; Pulmonary Disease  [address], [address] [place] [address]  Phone: [phone]  Fax: [phone]  Follow Up Time:     King Oshea)  [address]  [address], [address]  [place] [address]  Phone: [phone]  Fax: [phone]  Scheduled Appointment: 01/05/2022 11:00 AM    ERINN GRACIA  Endocrinology, Diabetes and Metabolism  Phone: ()-  Fax: ()-  Scheduled Appointment: 12/22/2021   Kofi Leyva)  Internal Medicine; Nephrology  [address], [address] [place] [address]  Phone: [phone]  Fax: [phone]  Established Patient  Scheduled Appointment: 01/16/2022 01:00 PM    Marie Lora)  Critical Care Medicine; Pulmonary Disease  [address], [address] [place] [address]  Phone: [phone]  Fax: [phone]  Established Patient  Scheduled Appointment: 01/03/2022    ERINN GRACIA  Endocrinology, Diabetes and Metabolism  Phone: ()-  Fax: ()-  Scheduled Appointment: 12/22/2021 10:00 AM   Kofi Leyva)  Internal Medicine; Nephrology  [address], [address] [place] [address]  Phone: [phone]  Fax: [phone]  Established Patient  Scheduled Appointment: 12/16/2022 01:00 PM    Marie Lora)  Critical Care Medicine; Pulmonary Disease  [address], [address] [place] [address]  Phone: [phone]  Fax: [phone]  Established Patient  Scheduled Appointment: 01/03/2022    ERINN GRACIA  Endocrinology, Diabetes and Metabolism  Phone: ()-  Fax: ()-  Scheduled Appointment: 12/22/2021 10:00 AM

## 2021-12-06 NOTE — DISCHARGE NOTE PROVIDER - PROVIDER TOKENS
PROVIDER:[TOKEN:[[id number]]],PROVIDER:[TOKEN:[[id number]]] PROVIDER:[TOKEN:[[id number]],SCHEDULEDAPPT:[01/13/2022],SCHEDULEDAPPTTIME:[01:00 PM]],PROVIDER:[TOKEN:[[id number]]] PROVIDER:[TOKEN:[[id number]],SCHEDULEDAPPT:[01/13/2022],SCHEDULEDAPPTTIME:[01:00 PM]],PROVIDER:[TOKEN:[[id number]]],PROVIDER:[TOKEN:[[id number]]] PROVIDER:[TOKEN:[[id number]],SCHEDULEDAPPT:[01/13/2022],SCHEDULEDAPPTTIME:[01:00 PM]],PROVIDER:[TOKEN:[[id number]]],PROVIDER:[TOKEN:[[id number]],SCHEDULEDAPPT:[01/05/2022],SCHEDULEDAPPTTIME:[11:00 AM]] PROVIDER:[TOKEN:[[id number]],SCHEDULEDAPPT:[01/13/2022],SCHEDULEDAPPTTIME:[01:00 PM]],PROVIDER:[TOKEN:[[id number]]],PROVIDER:[TOKEN:[[id number]],SCHEDULEDAPPT:[01/05/2022],SCHEDULEDAPPTTIME:[11:00 AM]],PROVIDER:[TOKEN:[[id number]],SCHEDULEDAPPT:[12/22/2021]] PROVIDER:[TOKEN:[[id number]],SCHEDULEDAPPT:[01/16/2022],SCHEDULEDAPPTTIME:[01:00 PM],ESTABLISHEDPATIENT:[T]],PROVIDER:[TOKEN:[[id number]],SCHEDULEDAPPT:[01/03/2022],ESTABLISHEDPATIENT:[T]],PROVIDER:[TOKEN:[[id number]],SCHEDULEDAPPT:[12/22/2021],SCHEDULEDAPPTTIME:[10:00 AM]] PROVIDER:[TOKEN:[[id number]],SCHEDULEDAPPT:[12/16/2022],SCHEDULEDAPPTTIME:[01:00 PM],ESTABLISHEDPATIENT:[T]],PROVIDER:[TOKEN:[[id number]],SCHEDULEDAPPT:[01/03/2022],ESTABLISHEDPATIENT:[T]],PROVIDER:[TOKEN:[[id number]],SCHEDULEDAPPT:[12/22/2021],SCHEDULEDAPPTTIME:[10:00 AM]]

## 2021-12-06 NOTE — DISCHARGE NOTE PROVIDER - NSDCFUADDAPPT_GEN_ALL_CORE_FT
Please bring your Insurance card, Photo ID, Covid-19 vaccination card (if applicable) and Discharge paperwork to the following appointment:    (1) Please follow up with your Nephrology Provider, Dr. Kofi Leyva at [address] [place] [address], [address] [id number] 01/13/2022 at 1:00pm.    Appointment was scheduled by Ms. ANTON Lamb, Referral Coordinator.   Please bring your Insurance card, Photo ID, Covid-19 vaccination card (if applicable) and Discharge paperwork to the following appointment:    (1) Please follow up with your Nephrology Provider, Dr. Kofi Leyva at [address] [place] [address], [address] [id number] 01/13/2022 at 1:00pm.    Appointment was scheduled by Ms. ANTON Lamb, Referral Coordinator.    2. Please call [phone] to make an appointment with Dr. Lora (Pulmonary and Critical Care)    3. Please call [phone] if you would change your Endocrinologist and in order to make an appointment with Dr. Valentin (Endocrinology)  Please bring your Insurance card, Photo ID, Covid-19 vaccination card (if applicable) and Discharge paperwork to the following appointment:    (1) Please follow up with your Nephrology Provider, Dr. Kofi Leyva at [address], [address] [id number] 01/13/2022 at 1:00pm.    Appointment was scheduled by Ms. ANTON Lamb, Referral Coordinator.    (2) Please note that the office of your Pulmonary Medicine Provider, Dr. Marie Lora will contact you from phone [phone] to schedule an appointment briefly following your hospital discharge.    Appointment was facilitated by Ms. ANTON Lamb, Referral Coordinator.    (3) Please call [phone] if you would change your Endocrinologist and in order to make an appointment with Dr. Valentin (Endocrinology)  Please bring your Insurance card, Photo ID, Covid-19 vaccination card (if applicable) and Discharge paperwork to the following appointments:    (1) Please follow up with your Endocrinology Provider, Dr. King Oshea at [address], [address] [address] on 01/04/2022 at 11:00am.    Appointment was scheduled by Ms. ANTON Lamb, Referral Coordinator.    (2) Please follow up with your Nephrology Provider, Dr. Kofi Leyva at [address], [facility] [place] [address] 01/13/2022 at 1:00pm.    Appointment was scheduled by Ms. ANTON Lamb, Referral Coordinator.    (3) Please note that the office of your Pulmonary Medicine Provider, Dr. Marie Lora will contact you from phone [phone] to schedule an appointment briefly following your hospital discharge.    Appointment was facilitated by Ms. ANTON Lamb, Referral Coordinator.   Please bring your Insurance card, Photo ID, Covid-19 vaccination card (if applicable) and Discharge paperwork to the following appointments:    (1) Please follow up with your Endocrinology Provider, Dr. Galvan    Appointment was scheduled by Ms. ANTON Lamb, Referral Coordinator.    (2) Please follow up with your Nephrology Provider, Dr. Kofi Leyva at [address] [place] [id number] 01/13/2022 at 1:00pm.    Appointment was scheduled by Ms. ANTON Lamb, Referral Coordinator.    (3) Please note that the office of your Pulmonary Medicine Provider, Dr. Marie Lora will contact you from phone [phone] to schedule an appointment briefly following your hospital discharge.    Appointment was facilitated by Ms. ANTON Lamb, Referral Coordinator.

## 2021-12-06 NOTE — DISCHARGE NOTE PROVIDER - HOSPITAL COURSE
#Discharge: do not delete    69 yo male PMHx AF (Eliquis), hiatal hernia, CAD s/p CABG (2005), R renal txp (tacrolimus + valganciclovir), DM2 (A1C 6.7), HTN, HLD, CLL,  referred to the ED by Dr Leyva for thoracentesis with Dr Lora of R sided pleural effusion seen on 10/29 CT. Patient seen by Dr. Mishra on 12/01/21 and was holing eliquis since then in anticipation for procedure. Pt underwent lymph node biopsy in November 2021. Pt undergoing thoracentesis w/ Dr. Lora on 12/03/21.  Inpatient treatment course:     Problem List/Main Diagnoses:   #Pleural effusion, right.   CT Chest on 10/29 showing interval enlargement of moderate R pleural effusion w/ R basilar atelectasis. CXR from 11/08 showing stable pleural effusion. CXR on admission on 12/3 showing interval increase in R sided pleural effusion. Pt seen by Dr. Mishra outpatient on 12/01/21 and stopped Eliquis at that time. Pt admitted to undergo thoracentesis for R sided pleural effusion.  -c/w IV lasix 40mg q12hr  -f/u pulmonology recs - Attempted thoracentesis w/ Dr. Lora by pulm at bedside, however unsuccessful  -planned for IR guided pigtail placement  -resume anticoagulation and anti-hypertensives s/p IR procedure.    #Lower extremity edema.   ·  Plan: Patient has history of LE edema and takes Imdur 30mg po qd at home. Patient noticed increasing LLE over the past week with edema extending up into his thigh  -c/w lasix 40mg IV q12 during hospitalization, resume imdur PRN   -f/u LE dopplers to r/o DVT,  negative DVT of LE, bilateral LE edema.    #Chronic respiratory failure with hypoxia.   ·  Plan: Pt has hx of chronic hypoxemia, sent home with home O2 from hospitalization in July 2021. Patient monitors O2 saturations at home with monitor and utilizes O2 when he notices saturations to be below 90% SpO2. Pt having increasing dyspnea with exertion.   -continue to monitor O2 saturations, currently on 2L NC   -wean as tolerated.    #History of renal transplant.   ·  Plan: Hx of renal transplant in 2017 at [place]. Pt on Tacrolimus and Valganciclovir at home.  -c/w Tacrolimus, 2mg in AM, 1mg in PM  -c/w valganciclovir 450mg PO qd   -Renal following -- f/u recs.    #Atrial fibrillation.   ·  Plan: Pt w/ hx of atrial fibrillation. Pt on Eliquis outpatient. Patient has been holding Eliquis since 12/01/21 for thoracentesis. Initially transitioned to Lovenox, but held for IR procedure.  -c/w anticoagulation s/p IR procedure.    #CLL (chronic lymphocytic leukemia).   ·  Plan: Pt with hx of CLL. Follows with oncology at [facility]. CT neck soft tissue from 11/10 showing widespread lymphadenopathy in neck and chest most consistent with lymphoproliferative disorder, query relapse of CLL and new compared to PET-CT from 5 years prior. Pt underwent Lymph node biopsy on 11/12. Patient now noted to have R sided supraclavicular lymph node. Pt with chronic leukocytosis 2/2 CLL. WBC 17.48.   -consider heme/onc consult for further evaluation   -continue to monitor.    #Hiatal hernia with GERD.   ·  Plan: Pt w/ history of hiatal hernia.   -c/w  home medication pantoprazole 40mg qd.    #Diabetes mellitus.   ·  Plan: Pt with Hx of T2DM. At home, patient uses sliding scale require 20-40 units before each meal. HbA1c in November 2021 6.8.  -c/w Gallo   -continue to monitor FSG and adjust insulin regimen accordingly  ** Patient took his own home insulin today. Patient refused FSG as he already has a monitor.    #Hyperlipidemia.   -c/w Atorvastatin 40mg    #Hypertension.   -c/w norvasc 10mg PO    New medications/therapies:   New lines/hardware:  Labs to be followed outpatient:   Exam to be followed outpatient:     Discharge plan: discharge to ______     #Discharge: do not delete    71 yo male PMHx AF (Eliquis), hiatal hernia, CAD s/p CABG (2005), R renal txp (tacrolimus + valganciclovir), DM2 (A1C 6.7), HTN, HLD, CLL,  referred to the ED by Dr Leyva for thoracentesis with Dr Lora of R sided pleural effusion seen on 10/29 CT. Patient seen by Dr. Mishra on 12/01/21 and was holing eliquis since then in anticipation for procedure. Pt underwent lymph node biopsy in November 2021. Pt undergoing thoracentesis w/ Dr. Lora on 12/03/21.  Inpatient treatment course:     Problem List/Main Diagnoses:   #Pleural effusion, right.   CT Chest on 10/29 showing interval enlargement of moderate R pleural effusion w/ R basilar atelectasis. CXR from 11/08 showing stable pleural effusion. CXR on admission on 12/3 showing interval increase in R sided pleural effusion. Pt seen by Dr. Mishra outpatient on 12/01/21 and stopped Eliquis at that time. Pt admitted to undergo thoracentesis for R sided pleural effusion.  -IR guided pigtail placement (12/6)  -Patient refused CT chest today  -C/w IV lasix 40mg to qd  -C/w Lovenox 100mg q12  -Repeat CXR today  -No drainage overnight  -Consider chest tube removal.    #Lower extremity edema.   ·  Plan: Patient has history of LE edema and takes Imdur 30mg po qd at home. Patient noticed increasing LLE over the past week with edema extending up into his thigh  -c/w lasix 40mg IV q12 during hospitalization, resume imdur PRN   -f/u LE dopplers to r/o DVT,  negative DVT of LE, bilateral LE edema.    #Chronic respiratory failure with hypoxia.   ·  Plan: Pt has hx of chronic hypoxemia, sent home with home O2 from hospitalization in July 2021. Patient monitors O2 saturations at home with monitor and utilizes O2 when he notices saturations to be below 90% SpO2. Pt having increasing dyspnea with exertion.   -continue to monitor O2 saturations, currently on 2L NC   -wean as tolerated.    #History of renal transplant.   ·  Plan: Hx of renal transplant in 2017 at [place]. Pt on Tacrolimus and Valganciclovir at home.  -c/w Tacrolimus, 2mg in AM, 1mg in PM  -c/w valganciclovir 450mg PO qd   -Renal following -- f/u recs.    #Atrial fibrillation.   ·  Plan: Pt w/ hx of atrial fibrillation. Pt on Eliquis outpatient. Patient has been holding Eliquis since 12/01/21 for thoracentesis. Initially transitioned to Lovenox, but held for IR procedure.  -c/w anticoagulation s/p IR procedure.    #CLL (chronic lymphocytic leukemia).   ·  Plan: Pt with hx of CLL. Follows with oncology at [facility]. CT neck soft tissue from 11/10 showing widespread lymphadenopathy in neck and chest most consistent with lymphoproliferative disorder, query relapse of CLL and new compared to PET-CT from 5 years prior. Pt underwent Lymph node biopsy on 11/12. Patient now noted to have R sided supraclavicular lymph node. Pt with chronic leukocytosis 2/2 CLL. WBC 17.48.   -consider heme/onc consult for further evaluation   -continue to monitor.    #Hiatal hernia with GERD.   ·  Plan: Pt w/ history of hiatal hernia.   -c/w  home medication pantoprazole 40mg qd.    #Diabetes mellitus.   ·  Plan: Pt with Hx of T2DM. At home, patient uses sliding scale require 20-40 units before each meal. HbA1c in November 2021 6.8.  -c/w Gallo   -continue to monitor FSG and adjust insulin regimen accordingly  ** Patient took his own home insulin today. Patient refused FSG as he already has a monitor.    #Hyperlipidemia.   -c/w Atorvastatin 40mg    #Hypertension.   -c/w norvasc 10mg PO    New medications/therapies:   New lines/hardware:  Labs to be followed outpatient:   Exam to be followed outpatient:     Discharge plan: discharge to ______     #Discharge: do not delete    71 yo male PMHx AF (Eliquis), hiatal hernia, CAD s/p CABG (2005), R renal txp (tacrolimus + valganciclovir), DM2 (A1C 6.7), HTN, HLD, CLL,  referred to the ED by Dr Leyva for thoracentesis with Dr Lora of R sided pleural effusion seen on 10/29 CT. Patient seen by Dr. Mishra on 12/01/21 and was holing eliquis since then in anticipation for procedure. Pt underwent lymph node biopsy in November 2021. Pt undergoing thoracentesis w/ Dr. Lora on 12/03/21.  Inpatient treatment course:     Problem List/Main Diagnoses:   #Pleural effusion, right.   CT Chest on 10/29 showing interval enlargement of moderate R pleural effusion w/ R basilar atelectasis. CXR from 11/08 showing stable pleural effusion. CXR on admission on 12/3 showing interval increase in R sided pleural effusion. Pt seen by Dr. Mishra outpatient on 12/01/21 and stopped Eliquis at that time. Pt admitted to undergo thoracentesis for R sided pleural effusion.  -IR guided pigtail placement (12/6)  -Patient refused CT chest today  -C/w IV lasix 40mg to qd  -C/w Lovenox 100mg q12  -Repeat CXR today  -No drainage overnight  -Consider chest tube removal.    #Lower extremity edema.   ·  Plan: Patient has history of LE edema and takes Imdur 30mg po qd at home. Patient noticed increasing LLE over the past week with edema extending up into his thigh  -f/u LE dopplers to r/o DVT,  negative DVT of LE, bilateral LE edema  -c/w Imdur 30mg   -Decreased IV lasix 40mg to qd  -Right Heart Cath planned for today.    #Chronic respiratory failure with hypoxia.   ·  Plan: Pt has hx of chronic hypoxemia, sent home with home O2 from hospitalization in July 2021. Patient monitors O2 saturations at home with monitor and utilizes O2 when he notices saturations to be below 90% SpO2. Pt having increasing dyspnea with exertion.   -continue to monitor O2 saturations, comfortable on room air.    #History of renal transplant.   ·  Plan: Hx of renal transplant in 2017 at [place]. Pt on Tacrolimus and Valganciclovir at home.  -c/w Tacrolimus, 2mg in AM, 1mg in PM  -c/w valganciclovir 450mg PO qd   -Renal following -- f/u recs.    #Atrial fibrillation.   ·  Plan: Pt w/ hx of atrial fibrillation. Pt on Eliquis outpatient. Patient has been holding Eliquis since 12/01/21 for thoracentesis. Initially transitioned to Lovenox, but held for IR procedure.  - c/w Lovenox 100mg q12.    #CLL (chronic lymphocytic leukemia).   ·  Plan: Pt with hx of CLL. Follows with oncology at [facility]. CT neck soft tissue from 11/10 showing widespread lymphadenopathy in neck and chest most consistent with lymphoproliferative disorder, query relapse of CLL and new compared to PET-CT from 5 years prior. Pt underwent Lymph node biopsy on 11/12. Patient now noted to have R sided supraclavicular lymph node. Pt with chronic leukocytosis 2/2 CLL. WBC 17.48.   -consider heme/onc consult for further evaluation   -continue to monitor.    #Hiatal hernia with GERD.   ·  Plan: Pt w/ history of hiatal hernia.   -c/w  home medication pantoprazole 40mg qd.    #Diabetes mellitus.   ·  Plan: Pt with Hx of T2DM. At home, patient uses sliding scale require 20-40 units before each meal. HbA1c in November 2021 6.8.  -c/w Gallo   -continue to monitor FSG and adjust insulin regimen accordingly    #Hyperlipidemia.   -c/w Atorvastatin 40mg    #Hypertension.   -c/w norvasc 10mg PO    New medications/therapies:   New lines/hardware:  Labs to be followed outpatient:   Exam to be followed outpatient:     Discharge plan: discharge to ______     #Discharge: do not delete    71 yo male PMHx AF (Eliquis), hiatal hernia, CAD s/p CABG (2005), R renal txp (tacrolimus + valganciclovir), DM2 (A1C 6.7), HTN, HLD, CLL,  referred to the ED by Dr Leyva for thoracentesis with Dr Lora of R sided pleural effusion seen on 10/29 CT. Patient seen by Dr. Mishra on 12/01/21 and was holing eliquis since then in anticipation for procedure. Pt underwent lymph node biopsy in November 2021. Pt undergoing thoracentesis w/ Dr. Lora on 12/03/21.  Inpatient treatment course:     Problem List/Main Diagnoses:   #Pleural effusion, right.   CT Chest on 10/29 showing interval enlargement of moderate R pleural effusion w/ R basilar atelectasis. CXR from 11/08 showing stable pleural effusion. CXR on admission on 12/3 showing interval increase in R sided pleural effusion. Pt seen by Dr. Mishra outpatient on 12/01/21 and stopped Eliquis at that time. Pt admitted to undergo thoracentesis for R sided pleural effusion.  -IR guided pigtail placement (12/6)  -Patient refused CT chest today  -C/w IV lasix 40mg to qd  -C/w Lovenox 100mg q12  -Repeat CXR today  -No drainage overnight  -R chest tube removed    #Lower extremity edema.   ·  Plan: Patient has history of LE edema and takes Imdur 30mg po qd at home. Patient noticed increasing LLE over the past week with edema extending up into his thigh  -f/u LE dopplers to r/o DVT,  negative DVT of LE, bilateral LE edema  -c/w Imdur 30mg   -Decreased IV lasix 40mg to qd  -Right Heart Cath planned for today.    #Chronic respiratory failure with hypoxia.   ·  Plan: Pt has hx of chronic hypoxemia, sent home with home O2 from hospitalization in July 2021. Patient monitors O2 saturations at home with monitor and utilizes O2 when he notices saturations to be below 90% SpO2. Pt having increasing dyspnea with exertion.   -continue to monitor O2 saturations, comfortable on room air.    #History of renal transplant.   ·  Plan: Hx of renal transplant in 2017 at [place]. Pt on Tacrolimus and Valganciclovir at home.  -c/w Tacrolimus, 2mg in AM, 1mg in PM  -c/w valganciclovir 450mg PO qd   -Renal following -- f/u recs.    #Atrial fibrillation.   ·  Plan: Pt w/ hx of atrial fibrillation. Pt on Eliquis outpatient. Patient has been holding Eliquis since 12/01/21 for thoracentesis. Initially transitioned to Lovenox, but held for IR procedure.  - c/w Lovenox 100mg q12.    #CLL (chronic lymphocytic leukemia).   ·  Plan: Pt with hx of CLL. Follows with oncology at [facility]. CT neck soft tissue from 11/10 showing widespread lymphadenopathy in neck and chest most consistent with lymphoproliferative disorder, query relapse of CLL and new compared to PET-CT from 5 years prior. Pt underwent Lymph node biopsy on 11/12. Patient now noted to have R sided supraclavicular lymph node. Pt with chronic leukocytosis 2/2 CLL. WBC 17.48.   -consider heme/onc consult for further evaluation   -continue to monitor.    #Hiatal hernia with GERD.   ·  Plan: Pt w/ history of hiatal hernia.   -c/w  home medication pantoprazole 40mg qd.    #Diabetes mellitus.   ·  Plan: Pt with Hx of T2DM. At home, patient uses sliding scale require 20-40 units before each meal. HbA1c in November 2021 6.8.  -c/w Gallo   -continue to monitor FSG and adjust insulin regimen accordingly    #Hyperlipidemia.   -c/w Atorvastatin 40mg    #Hypertension.   -c/w norvasc 10mg PO    New medications/therapies:   New lines/hardware:  Labs to be followed outpatient:   Exam to be followed outpatient:     Discharge plan: discharge to Home     #Discharge: do not delete    71 yo male PMHx AF (Eliquis), hiatal hernia, CAD s/p CABG (2005), R renal txp (tacrolimus + valganciclovir), DM2 (A1C 6.7), HTN, HLD, CLL,  referred to the ED by Dr Leyva for thoracentesis with Dr Lora of R sided pleural effusion seen on 10/29 CT. Patient seen by Dr. Mishra on 12/01/21 and was holing eliquis since then in anticipation for procedure. Pt underwent lymph node biopsy in November 2021. Pt undergoing thoracentesis w/ Dr. Lora on 12/03/21.  Inpatient treatment course:     Problem List/Main Diagnoses:   #Pleural effusion, right.   CT Chest on 10/29 showing interval enlargement of moderate R pleural effusion w/ R basilar atelectasis. CXR from 11/08 showing stable pleural effusion. CXR on admission on 12/3 showing interval increase in R sided pleural effusion. Pt seen by Dr. Mishra outpatient on 12/01/21 and stopped Eliquis at that time. Pt admitted to undergo thoracentesis for R sided pleural effusion.  -IR guided pigtail placement (12/6)  -Patient refused CT chest today  -C/w IV lasix 40mg to qd  -C/w Lovenox 100mg q12  -Repeat CXR today  -No drainage overnight  -R chest tube removed    #Lower extremity edema.   ·  Plan: Patient has history of LE edema and takes Imdur 30mg po qd at home. Patient noticed increasing LLE over the past week with edema extending up into his thigh  -f/u LE dopplers to r/o DVT,  negative DVT of LE, bilateral LE edema  -c/w Imdur 30mg   -Decreased IV lasix 40mg to qd  -Right Heart Cath planned for today.    #Chronic respiratory failure with hypoxia.   ·  Plan: Pt has hx of chronic hypoxemia, sent home with home O2 from hospitalization in July 2021. Patient monitors O2 saturations at home with monitor and utilizes O2 when he notices saturations to be below 90% SpO2. Pt having increasing dyspnea with exertion.   -continue to monitor O2 saturations, comfortable on room air.    #History of renal transplant.   ·  Plan: Hx of renal transplant in 2017 at [place]. Pt on Tacrolimus and Valganciclovir at home.  -c/w Tacrolimus, 2mg in AM, 1mg in PM  -c/w valganciclovir 450mg PO qd   -Renal following -- f/u recs.    #Atrial fibrillation.   ·  Plan: Pt w/ hx of atrial fibrillation. Pt on Eliquis outpatient. Patient has been holding Eliquis since 12/01/21 for thoracentesis. Initially transitioned to Lovenox, but held for IR procedure.  - c/w Lovenox 100mg q12.    #CLL (chronic lymphocytic leukemia).   ·  Plan: Pt with hx of CLL. Follows with oncology at [facility]. CT neck soft tissue from 11/10 showing widespread lymphadenopathy in neck and chest most consistent with lymphoproliferative disorder, query relapse of CLL and new compared to PET-CT from 5 years prior. Pt underwent Lymph node biopsy on 11/12. Patient now noted to have R sided supraclavicular lymph node. Pt with chronic leukocytosis 2/2 CLL. WBC 17.48.   -consider heme/onc consult for further evaluation   -continue to monitor.    #Hiatal hernia with GERD.   ·  Plan: Pt w/ history of hiatal hernia.   -c/w  home medication pantoprazole 40mg qd.    #Diabetes mellitus.   ·  Plan: Pt with Hx of T2DM. At home, patient uses sliding scale require 20-40 units before each meal. HbA1c in November 2021 6.8.  -c/w Gallo   -continue to monitor FSG and adjust insulin regimen accordingly    #Hyperlipidemia.   -c/w Atorvastatin 40mg    #Hypertension.   -c/w norvasc 10mg PO    #S/p RHC   - RHC 12/09/21:    mild pulmonary HTN with normal PVR, normal CO and mildy decreased CI, and associated elevated PAWP and sgnificant V waves.  Overal represents precapillary pHTN, notably hypoxic intermittently thoughout procedure. High suspicion for JAYANT, that may contribute to pHTN. Required O2 titration for hypoxia as he is hypoxic at rest and home sleep studiy for JAYANT, R IJ out, bed rest until 6PM, can resume lovenox tonight as per Dr Ma    New medications/therapies:   New lines/hardware:  Labs to be followed outpatient:   Exam to be followed outpatient:     Discharge plan: discharge to Home     #Discharge: do not delete    69 yo male PMHx AF (Eliquis), hiatal hernia, CAD s/p CABG (2005), R renal txp (tacrolimus + valganciclovir), DM2 (A1C 6.7), HTN, HLD, CLL,  referred to the ED by Dr Leyva for thoracentesis with Dr Lora of R sided pleural effusion seen on 10/29 CT. Patient seen by Dr. Mishra on 12/01/21 and was holing eliquis since then in anticipation for procedure. Pt underwent lymph node biopsy in November 2021. Pt undergoing thoracentesis w/ Dr. Lora on 12/03/21.  Inpatient treatment course:     Problem List/Main Diagnoses:   #Pleural effusion, right.   CT Chest on 10/29 showing interval enlargement of moderate R pleural effusion w/ R basilar atelectasis. CXR from 11/08 showing stable pleural effusion. CXR on admission on 12/3 showing interval increase in R sided pleural effusion. Pt seen by Dr. Mishra outpatient on 12/01/21 and stopped Eliquis at that time. Pt admitted to undergo thoracentesis for R sided pleural effusion.  -IR guided pigtail placement (12/6)  -Patient refused CT chest today  -C/w IV lasix 40mg to qd  -C/w Lovenox 100mg q12  -Repeat CXR today  -No drainage overnight  -R chest tube removed    #Lower extremity edema.   ·  Plan: Patient has history of LE edema and takes Imdur 30mg po qd at home. Patient noticed increasing LLE over the past week with edema extending up into his thigh  -f/u LE dopplers to r/o DVT,  negative DVT of LE, bilateral LE edema  -c/w Imdur 30mg   -c/w Lasix 80mg BID  -Right Heart Cath planned for today.    #Chronic respiratory failure with hypoxia.   ·  Plan: Pt has hx of chronic hypoxemia, sent home with home O2 from hospitalization in July 2021. Patient monitors O2 saturations at home with monitor and utilizes O2 when he notices saturations to be below 90% SpO2. Pt having increasing dyspnea with exertion.   -continue to monitor O2 saturations, comfortable on room air.    #History of renal transplant.   ·  Plan: Hx of renal transplant in 2017 at [place]. Pt on Tacrolimus and Valganciclovir at home.  -c/w Tacrolimus, 2mg in AM, 1mg in PM  -c/w valganciclovir 450mg PO qd   -Renal following -- f/u recs.    #Atrial fibrillation.   ·  Plan: Pt w/ hx of atrial fibrillation. Pt on Eliquis outpatient. Patient has been holding Eliquis since 12/01/21 for thoracentesis. Initially transitioned to Lovenox, but held for IR procedure.  - c/w Lovenox 100mg q12.    #CLL (chronic lymphocytic leukemia).   ·  Plan: Pt with hx of CLL. Follows with oncology at [facility]. CT neck soft tissue from 11/10 showing widespread lymphadenopathy in neck and chest most consistent with lymphoproliferative disorder, query relapse of CLL and new compared to PET-CT from 5 years prior. Pt underwent Lymph node biopsy on 11/12. Patient now noted to have R sided supraclavicular lymph node. Pt with chronic leukocytosis 2/2 CLL. WBC 17.48.   -consider heme/onc consult for further evaluation   -continue to monitor.    #Hiatal hernia with GERD.   ·  Plan: Pt w/ history of hiatal hernia.   -c/w  home medication pantoprazole 40mg qd.    #Diabetes mellitus.   ·  Plan: Pt with Hx of T2DM. At home, patient uses sliding scale require 20-40 units before each meal. HbA1c in November 2021 6.8.  -c/w Galol   -continue to monitor FSG and adjust insulin regimen accordingly    #Hyperlipidemia.   -c/w Atorvastatin 40mg    #Hypertension.   -c/w norvasc 10mg PO    #S/p RHC   - RHC 12/09/21:    mild pulmonary HTN with normal PVR, normal CO and mildy decreased CI, and associated elevated PAWP and sgnificant V waves.  Overal represents precapillary pHTN, notably hypoxic intermittently thoughout procedure. High suspicion for JAYANT, that may contribute to pHTN. Required O2 titration for hypoxia as he is hypoxic at rest and home sleep studiy for JAYANT, R IJ out, bed rest until 6PM, can resume lovenox tonight as per Dr Ma    New medications/therapies:   New lines/hardware:  Labs to be followed outpatient:   Exam to be followed outpatient:     Discharge plan: discharge to Home     #Discharge: do not delete    71 yo male PMHx AF (Eliquis), hiatal hernia, CAD s/p CABG (2005), R renal txp (tacrolimus + valganciclovir), DM2 (A1C 6.7), HTN, HLD, CLL,  referred to the ED by Dr Leyva for thoracentesis with Dr Lora of R sided pleural effusion seen on 10/29 CT. Patient seen by Dr. Mishra on 12/01/21 and was holing eliquis since then in anticipation for procedure. Pt underwent lymph node biopsy in November 2021. Pt undergoing thoracentesis w/ Dr. Lora on 12/03/21.  Inpatient treatment course:     Problem List/Main Diagnoses:   #Pleural effusion, right.   CT Chest on 10/29 showing interval enlargement of moderate R pleural effusion w/ R basilar atelectasis. CXR from 11/08 showing stable pleural effusion. CXR on admission on 12/3 showing interval increase in R sided pleural effusion. Pt seen by Dr. Mishra outpatient on 12/01/21 and stopped Eliquis at that time. Pt admitted to undergo thoracentesis for R sided pleural effusion.  -IR guided pigtail placement (12/6)  -Patient refused CT chest today  -C/w IV lasix 40mg to qd  -C/w Lovenox 100mg q12  -Repeat CXR today  -No drainage overnight  -R chest tube removed    #Lower extremity edema.   ·  Plan: Patient has history of LE edema and takes Imdur 30mg po qd at home. Patient noticed increasing LLE over the past week with edema extending up into his thigh  -f/u LE dopplers to r/o DVT,  negative DVT of LE, bilateral LE edema  -c/w Imdur 30mg   -c/w Lasix 80mg BID  -Right Heart Cath planned for today.    #Chronic respiratory failure with hypoxia.   ·  Plan: Pt has hx of chronic hypoxemia, sent home with home O2 from hospitalization in July 2021. Patient monitors O2 saturations at home with monitor and utilizes O2 when he notices saturations to be below 90% SpO2. Pt having increasing dyspnea with exertion.   -continue to monitor O2 saturations, comfortable on room air.    #History of renal transplant.   ·  Plan: Hx of renal transplant in 2017 at [facility]. Pt on Tacrolimus and Valganciclovir at home.  -c/w Tacrolimus, 2mg in AM, 1mg in PM  -c/w valganciclovir 450mg PO qd   -Renal following -- f/u recs.    #Atrial fibrillation.   ·  Plan: Pt w/ hx of atrial fibrillation. Pt on Eliquis outpatient. Patient has been holding Eliquis since 12/01/21 for thoracentesis. Initially transitioned to Lovenox, but held for IR procedure.  - c/w Lovenox 100mg q12.    #CLL (chronic lymphocytic leukemia).   ·  Plan: Pt with hx of CLL. Follows with oncology at [facility]. CT neck soft tissue from 11/10 showing widespread lymphadenopathy in neck and chest most consistent with lymphoproliferative disorder, query relapse of CLL and new compared to PET-CT from 5 years prior. Pt underwent Lymph node biopsy on 11/12. Patient now noted to have R sided supraclavicular lymph node. Pt with chronic leukocytosis 2/2 CLL. WBC 17.48.   -consider heme/onc consult for further evaluation   -continue to monitor.    #Hiatal hernia with GERD.   ·  Plan: Pt w/ history of hiatal hernia.   -c/w  home medication pantoprazole 40mg qd.    #Diabetes mellitus.   ·  Plan: Pt with Hx of T2DM. At home, patient uses sliding scale require 20-40 units before each meal. HbA1c in November 2021 6.8.  -c/w Lantus 30U, Lispro 15U  -continue to monitor FSG and adjust insulin regimen accordingly    #Hyperlipidemia.   -c/w Atorvastatin 40mg    #Hypertension.   -c/w norvasc 10mg PO    #S/p RHC   - RHC 12/09/21:    mild pulmonary HTN with normal PVR, normal CO and mildy decreased CI, and associated elevated PAWP and sgnificant V waves.  Overal represents precapillary pHTN, notably hypoxic intermittently thoughout procedure. High suspicion for JAYANT, that may contribute to pHTN. Required O2 titration for hypoxia as he is hypoxic at rest and home sleep studiy for JAYANT, R IJ out, bed rest until 6PM, can resume lovenox tonight as per Dr Ma    New medications/therapies: Lantus 30U, Lispro 15U premeal, Lasix 40mg BID  New lines/hardware: None  Labs to be followed outpatient: None  Exam to be followed outpatient: None    Discharge plan: discharge to Home

## 2021-12-06 NOTE — DISCHARGE NOTE PROVIDER - NSDCCPCAREPLAN_GEN_ALL_CORE_FT
PRINCIPAL DISCHARGE DIAGNOSIS  Diagnosis: Pleural effusion, right  Assessment and Plan of Treatment: Pleural effusion is fluid that can accumulate around the lungs due to poor pumping by the heart or by inflammation. Symptoms include cough, sharp chest pain, or shortness of breath. Treatments include antibiotics, water pills (diuretics), and removal of the fluid. In your case, we gave you Lasix (diuretics) and put in a R lung chest tube to remove the fluid. After removal of the fluid, your breathing has significantly improved. We will analyze the fluid for infections, malignancy, etc. Please follow up with your Primary Care Physician within 1-2 weeks after discharge.         SECONDARY DISCHARGE DIAGNOSES  Diagnosis: Diabetes mellitus  Assessment and Plan of Treatment: You have a diagnosis of type 2 diabetes (sometimes called type 2 "diabetes mellitus"). This is a disorder that disrupts the way your body uses sugar. All the cells in your body need sugar to work normally. Sugar gets into the cells with the help of a hormone called insulin. If there is not enough insulin, or if the body stops responding to insulin, sugar builds up in the blood. That is what happens to people with diabetes. Type 2 diabetes usually causes no symptoms. When symptoms do occur, they include the need to urinate often, intense thirst and blurry vision. Even though type 2 diabetes might not make you feel sick, it can cause serious problems over time, if it is not treated.  In addition to maintaining an active lifestyle, losing weight, eating right, and not smoking it is important to take your diabetes medications as directed to control your blood sugar and prevent the possible complications from this disease. Please take your medications as prescibed. Please follow up with your Primary Care Physician within 1-2 weeks after discharge.    Diagnosis: Pleural effusion, right  Assessment and Plan of Treatment:

## 2021-12-07 LAB
ALP BONE SERPL-MCNC: 20.3 UG/L
ANION GAP SERPL CALC-SCNC: 8 MMOL/L — SIGNIFICANT CHANGE UP (ref 5–17)
ANISOCYTOSIS BLD QL: SLIGHT — SIGNIFICANT CHANGE UP
APPEARANCE UR: CLEAR — SIGNIFICANT CHANGE UP
B PERT IGG+IGM PNL SER: SIGNIFICANT CHANGE UP
BASOPHILS # BLD AUTO: 0 K/UL — SIGNIFICANT CHANGE UP (ref 0–0.2)
BASOPHILS NFR BLD AUTO: 0 % — SIGNIFICANT CHANGE UP (ref 0–2)
BILIRUB UR-MCNC: NEGATIVE — SIGNIFICANT CHANGE UP
BUN SERPL-MCNC: 43 MG/DL — HIGH (ref 7–23)
CALCIUM SERPL-MCNC: 9.1 MG/DL — SIGNIFICANT CHANGE UP (ref 8.4–10.5)
CHLORIDE SERPL-SCNC: 96 MMOL/L — SIGNIFICANT CHANGE UP (ref 96–108)
CO2 SERPL-SCNC: 27 MMOL/L — SIGNIFICANT CHANGE UP (ref 22–31)
COLOR FLD: SIGNIFICANT CHANGE UP
COLOR SPEC: YELLOW — SIGNIFICANT CHANGE UP
CREAT ?TM UR-MCNC: 48 MG/DL — SIGNIFICANT CHANGE UP
CREAT SERPL-MCNC: 1.35 MG/DL — HIGH (ref 0.5–1.3)
DACRYOCYTES BLD QL SMEAR: SLIGHT — SIGNIFICANT CHANGE UP
DIFF PNL FLD: NEGATIVE — SIGNIFICANT CHANGE UP
EOSINOPHIL # BLD AUTO: 0 K/UL — SIGNIFICANT CHANGE UP (ref 0–0.5)
EOSINOPHIL NFR BLD AUTO: 0 % — SIGNIFICANT CHANGE UP (ref 0–6)
FERRITIN SERPL-MCNC: 88 NG/ML — SIGNIFICANT CHANGE UP (ref 30–400)
FLUID INTAKE SUBSTANCE CLASS: SIGNIFICANT CHANGE UP
FLUID SEGMENTED GRANULOCYTES: 7 % — SIGNIFICANT CHANGE UP
GIANT PLATELETS BLD QL SMEAR: PRESENT — SIGNIFICANT CHANGE UP
GLUCOSE BLDC GLUCOMTR-MCNC: 174 MG/DL — HIGH (ref 70–99)
GLUCOSE BLDC GLUCOMTR-MCNC: 214 MG/DL — HIGH (ref 70–99)
GLUCOSE BLDC GLUCOMTR-MCNC: 224 MG/DL — HIGH (ref 70–99)
GLUCOSE BLDC GLUCOMTR-MCNC: 235 MG/DL — HIGH (ref 70–99)
GLUCOSE BLDC GLUCOMTR-MCNC: 243 MG/DL — HIGH (ref 70–99)
GLUCOSE SERPL-MCNC: 216 MG/DL — HIGH (ref 70–99)
GLUCOSE UR QL: NEGATIVE — SIGNIFICANT CHANGE UP
HCT VFR BLD CALC: 30.7 % — LOW (ref 39–50)
HGB BLD-MCNC: 8.9 G/DL — LOW (ref 13–17)
HYPOCHROMIA BLD QL: SLIGHT — SIGNIFICANT CHANGE UP
IRON SATN MFR SERPL: 18 % — SIGNIFICANT CHANGE UP (ref 16–55)
IRON SATN MFR SERPL: 49 UG/DL — SIGNIFICANT CHANGE UP (ref 45–165)
KETONES UR-MCNC: NEGATIVE — SIGNIFICANT CHANGE UP
LEUKOCYTE ESTERASE UR-ACNC: NEGATIVE — SIGNIFICANT CHANGE UP
LYMPHOCYTES # BLD AUTO: 11.42 K/UL — HIGH (ref 1–3.3)
LYMPHOCYTES # BLD AUTO: 81.4 % — HIGH (ref 13–44)
LYMPHOCYTES # FLD: 91 % — SIGNIFICANT CHANGE UP
MACROCYTES BLD QL: SLIGHT — SIGNIFICANT CHANGE UP
MAGNESIUM SERPL-MCNC: 2 MG/DL — SIGNIFICANT CHANGE UP (ref 1.6–2.6)
MANUAL SMEAR VERIFICATION: SIGNIFICANT CHANGE UP
MCHC RBC-ENTMCNC: 26.9 PG — LOW (ref 27–34)
MCHC RBC-ENTMCNC: 29 GM/DL — LOW (ref 32–36)
MCV RBC AUTO: 92.7 FL — SIGNIFICANT CHANGE UP (ref 80–100)
MESOTHL CELL # FLD: 1 % — SIGNIFICANT CHANGE UP
MICROCYTES BLD QL: SLIGHT — SIGNIFICANT CHANGE UP
MONOCYTES # BLD AUTO: 0 K/UL — SIGNIFICANT CHANGE UP (ref 0–0.9)
MONOCYTES NFR BLD AUTO: 0 % — LOW (ref 2–14)
MONOS+MACROS # FLD: 1 % — SIGNIFICANT CHANGE UP
NEUTROPHILS # BLD AUTO: 2.12 K/UL — SIGNIFICANT CHANGE UP (ref 1.8–7.4)
NEUTROPHILS NFR BLD AUTO: 15.1 % — LOW (ref 43–77)
NITRITE UR-MCNC: NEGATIVE — SIGNIFICANT CHANGE UP
OSMOLALITY UR: 371 MOSM/KG — SIGNIFICANT CHANGE UP (ref 300–900)
OTHER CELLS FLD MANUAL: 0 % — SIGNIFICANT CHANGE UP
OVALOCYTES BLD QL SMEAR: SLIGHT — SIGNIFICANT CHANGE UP
PH UR: 5.5 — SIGNIFICANT CHANGE UP (ref 5–8)
PHOSPHATE SERPL-MCNC: 5.1 MG/DL — HIGH (ref 2.5–4.5)
PLAT MORPH BLD: ABNORMAL
PLATELET # BLD AUTO: 155 K/UL — SIGNIFICANT CHANGE UP (ref 150–400)
POIKILOCYTOSIS BLD QL AUTO: SLIGHT — SIGNIFICANT CHANGE UP
POLYCHROMASIA BLD QL SMEAR: SIGNIFICANT CHANGE UP
POTASSIUM SERPL-MCNC: 5 MMOL/L — SIGNIFICANT CHANGE UP (ref 3.5–5.3)
POTASSIUM SERPL-SCNC: 5 MMOL/L — SIGNIFICANT CHANGE UP (ref 3.5–5.3)
PROT ?TM UR-MCNC: <4 MG/DL — SIGNIFICANT CHANGE UP (ref 0–12)
PROT UR-MCNC: NEGATIVE MG/DL — SIGNIFICANT CHANGE UP
PROT/CREAT UR-RTO: SIGNIFICANT CHANGE UP (ref 0–0.2)
RBC # BLD: 3.31 M/UL — LOW (ref 4.2–5.8)
RBC # FLD: 16.3 % — HIGH (ref 10.3–14.5)
RBC BLD AUTO: ABNORMAL
RCV VOL RI: HIGH /UL (ref 0–0)
SODIUM SERPL-SCNC: 131 MMOL/L — LOW (ref 135–145)
SODIUM UR-SCNC: 65 MMOL/L — SIGNIFICANT CHANGE UP
SP GR SPEC: 1.01 — SIGNIFICANT CHANGE UP (ref 1–1.03)
SPECIMEN SOURCE FLD: SIGNIFICANT CHANGE UP
TIBC SERPL-MCNC: 269 UG/DL — SIGNIFICANT CHANGE UP (ref 220–430)
TOTAL NUCLEATED CELL COUNT, BODY FLUID: 3291 /UL — SIGNIFICANT CHANGE UP
TRANSFERRIN SERPL-MCNC: 254 MG/DL — SIGNIFICANT CHANGE UP (ref 200–360)
TUBE TYPE: SIGNIFICANT CHANGE UP
UIBC SERPL-MCNC: 220 UG/DL — SIGNIFICANT CHANGE UP (ref 110–370)
UROBILINOGEN FLD QL: 0.2 E.U./DL — SIGNIFICANT CHANGE UP
UUN UR-MCNC: 493 MG/DL — SIGNIFICANT CHANGE UP
VARIANT LYMPHS # BLD: 3.5 % — SIGNIFICANT CHANGE UP (ref 0–6)
WBC # BLD: 14.03 K/UL — HIGH (ref 3.8–10.5)
WBC # FLD AUTO: 14.03 K/UL — HIGH (ref 3.8–10.5)

## 2021-12-07 PROCEDURE — 99232 SBSQ HOSP IP/OBS MODERATE 35: CPT

## 2021-12-07 PROCEDURE — 99231 SBSQ HOSP IP/OBS SF/LOW 25: CPT

## 2021-12-07 PROCEDURE — 99232 SBSQ HOSP IP/OBS MODERATE 35: CPT | Mod: GC

## 2021-12-07 RX ORDER — ENOXAPARIN SODIUM 100 MG/ML
100 INJECTION SUBCUTANEOUS EVERY 12 HOURS
Refills: 0 | Status: DISCONTINUED | OUTPATIENT
Start: 2021-12-07 | End: 2021-12-08

## 2021-12-07 RX ORDER — INSULIN LISPRO 100/ML
4 VIAL (ML) SUBCUTANEOUS ONCE
Refills: 0 | Status: COMPLETED | OUTPATIENT
Start: 2021-12-07 | End: 2021-12-07

## 2021-12-07 RX ORDER — ISOSORBIDE MONONITRATE 60 MG/1
30 TABLET, EXTENDED RELEASE ORAL DAILY
Refills: 0 | Status: DISCONTINUED | OUTPATIENT
Start: 2021-12-07 | End: 2021-12-10

## 2021-12-07 RX ORDER — APIXABAN 2.5 MG/1
5 TABLET, FILM COATED ORAL EVERY 12 HOURS
Refills: 0 | Status: DISCONTINUED | OUTPATIENT
Start: 2021-12-07 | End: 2021-12-07

## 2021-12-07 RX ORDER — INSULIN GLARGINE 100 [IU]/ML
15 INJECTION, SOLUTION SUBCUTANEOUS AT BEDTIME
Refills: 0 | Status: DISCONTINUED | OUTPATIENT
Start: 2021-12-07 | End: 2021-12-08

## 2021-12-07 RX ADMIN — TACROLIMUS 2 MILLIGRAM(S): 5 CAPSULE ORAL at 08:39

## 2021-12-07 RX ADMIN — Medication 40 MILLIGRAM(S): at 01:04

## 2021-12-07 RX ADMIN — OXYCODONE AND ACETAMINOPHEN 1 TABLET(S): 5; 325 TABLET ORAL at 19:13

## 2021-12-07 RX ADMIN — PANTOPRAZOLE SODIUM 40 MILLIGRAM(S): 20 TABLET, DELAYED RELEASE ORAL at 05:15

## 2021-12-07 RX ADMIN — VALGANCICLOVIR 450 MILLIGRAM(S): 450 TABLET, FILM COATED ORAL at 08:40

## 2021-12-07 RX ADMIN — OXYCODONE AND ACETAMINOPHEN 1 TABLET(S): 5; 325 TABLET ORAL at 05:15

## 2021-12-07 RX ADMIN — INSULIN GLARGINE 15 UNIT(S): 100 INJECTION, SOLUTION SUBCUTANEOUS at 22:11

## 2021-12-07 RX ADMIN — OXYCODONE AND ACETAMINOPHEN 1 TABLET(S): 5; 325 TABLET ORAL at 06:09

## 2021-12-07 RX ADMIN — Medication 4: at 12:32

## 2021-12-07 RX ADMIN — ENOXAPARIN SODIUM 100 MILLIGRAM(S): 100 INJECTION SUBCUTANEOUS at 18:01

## 2021-12-07 RX ADMIN — ISOSORBIDE MONONITRATE 30 MILLIGRAM(S): 60 TABLET, EXTENDED RELEASE ORAL at 12:33

## 2021-12-07 RX ADMIN — Medication 15 GRAM(S): at 05:16

## 2021-12-07 RX ADMIN — Medication 4: at 08:21

## 2021-12-07 RX ADMIN — TAMSULOSIN HYDROCHLORIDE 0.4 MILLIGRAM(S): 0.4 CAPSULE ORAL at 21:10

## 2021-12-07 RX ADMIN — TACROLIMUS 1 MILLIGRAM(S): 5 CAPSULE ORAL at 21:10

## 2021-12-07 RX ADMIN — Medication 4 UNIT(S): at 17:25

## 2021-12-07 RX ADMIN — ATORVASTATIN CALCIUM 40 MILLIGRAM(S): 80 TABLET, FILM COATED ORAL at 21:10

## 2021-12-07 RX ADMIN — Medication 4 UNIT(S): at 04:32

## 2021-12-07 RX ADMIN — Medication 2: at 22:11

## 2021-12-07 RX ADMIN — AMLODIPINE BESYLATE 10 MILLIGRAM(S): 2.5 TABLET ORAL at 05:15

## 2021-12-07 RX ADMIN — OXYCODONE AND ACETAMINOPHEN 1 TABLET(S): 5; 325 TABLET ORAL at 20:11

## 2021-12-07 RX ADMIN — Medication 40 MILLIGRAM(S): at 13:37

## 2021-12-07 RX ADMIN — Medication 4: at 17:25

## 2021-12-07 NOTE — DIETITIAN INITIAL EVALUATION ADULT. - OTHER INFO
71 yo male PMHx AF (Eliquis), hiatal hernia, CAD s/p CABG (2005), R renal txp (tacro), Obesity, DM2 (A1C 6.7), HTN, HLD, BPH, CLL, MGUS, referred to the ED by Dr Leyva for possible thoracentesis with Dr Lora of R sided pleural effusion seen on 10/29 CT. Pt being followed by Dr. Lora and renal team during admission. Pt had not taken any meds in AM of 12/3 prior to admission -- scheduled for medications to be given in hospital. Patient to undergo R sided thoracentesis on 12/3 with Dr. Lora. Pt started on IV Lasix 40mg q6hour. LE dopplers ordered for LE edema Patient had a right chest tube placed on 12/6. Output: 200ccs of fluid/24hrs.No N/V/D/C  or pain .BM 12/6,Skin intact PU wise. Noted weight history: 11/7/2021:102.3kg,Present weight:101.7kg with 1-2+pitting edema,UBW:90kg(pre-edema),IBW:142lbs(64.5kg).158% of IBW.BMI:36.2.Eating % of prescribed diet.Patient has been extensively educated on prescribed diet. Compliance on D/C questionable.

## 2021-12-07 NOTE — DIETITIAN INITIAL EVALUATION ADULT. - PROBLEM SELECTOR PLAN 2
Patient has history of LE edema and takes Imdur 30mg po qd at home. Patient noticed increasing LLE over the past week with edema extending up into his thigh  -c/w lasix 40mg IV q6 during hospitalization, resume imdur PRN   -f/u LE dopplers to r/o DVT

## 2021-12-07 NOTE — DIETITIAN INITIAL EVALUATION ADULT. - ORAL INTAKE PTA/DIET HISTORY
Patient claims during each hospitalization that he is aware of restrictions and claims he avoids sodium/sugar sources.Suspected fair compliance

## 2021-12-07 NOTE — PROGRESS NOTE ADULT - SUBJECTIVE AND OBJECTIVE BOX
Patient discussed on morning rounds with Dr. Zhang    s/p IR pigtail    SUBJECTIVE ASSESSMENT:  71y Male seen and examined. s/p IR pigtail        Vital Signs Last 24 Hrs  T(C): 36.4 (07 Dec 2021 12:12), Max: 36.9 (07 Dec 2021 05:57)  T(F): 97.6 (07 Dec 2021 12:12), Max: 98.4 (07 Dec 2021 05:57)  HR: 60 (07 Dec 2021 12:12) (60 - 77)  BP: 120/64 (07 Dec 2021 12:12) (120/64 - 121/70)  BP(mean): --  RR: 17 (07 Dec 2021 12:12) (17 - 18)  SpO2: 96% (07 Dec 2021 12:12) (93% - 97%)  I&O's Detail    07 Dec 2021 07:01  -  07 Dec 2021 17:20  --------------------------------------------------------  IN:  Total IN: 0 mL    OUT:    Chest Tube (mL): 200 mL  Total OUT: 200 mL    Total NET: -200 mL        PHYSICAL EXAM:  Appearance: No acute distress.  Neurologic: AAOx3, no AMS or focal deficits.  Responds appropriately to verbal and physical stimuli; exhibits purposeful movement in all extremities.  Cardiovascular: RRR  Respiratory: No acute respiratory distress, left CTA,   Gastrointestinal:  Soft, non-tender, non-distended, + BS.	  Extremities: warm, well perfused. bilateral LE pitting edema.      LABS:                        8.9    14.03 )-----------( 155      ( 07 Dec 2021 07:34 )             30.7       COUMADIN:  no            131<L>  |  96  |  43<H>  ----------------------------<  216<H>  5.0   |  27  |  1.35<H>    Ca    9.1      07 Dec 2021 07:34  Phos  5.1     12-  Mg     2.0     12-        Urinalysis Basic - ( 07 Dec 2021 15:57 )    Color: Yellow / Appearance: Clear / S.015 / pH: x  Gluc: x / Ketone: NEGATIVE  / Bili: Negative / Urobili: 0.2 E.U./dL   Blood: x / Protein: NEGATIVE mg/dL / Nitrite: NEGATIVE   Leuk Esterase: NEGATIVE / RBC: x / WBC x   Sq Epi: x / Non Sq Epi: x / Bacteria: x        MEDICATIONS  (STANDING):  amLODIPine   Tablet 10 milliGRAM(s) Oral daily  atorvastatin 40 milliGRAM(s) Oral at bedtime  dextrose 40% Gel 15 Gram(s) Oral once  dextrose 5%. 1000 milliLiter(s) (50 mL/Hr) IV Continuous <Continuous>  dextrose 5%. 1000 milliLiter(s) (100 mL/Hr) IV Continuous <Continuous>  dextrose 50% Injectable 25 Gram(s) IV Push once  dextrose 50% Injectable 12.5 Gram(s) IV Push once  dextrose 50% Injectable 25 Gram(s) IV Push once  enoxaparin Injectable 100 milliGRAM(s) SubCutaneous every 12 hours  furosemide   Injectable 40 milliGRAM(s) IV Push every 12 hours  glucagon  Injectable 1 milliGRAM(s) IntraMuscular once  insulin glargine Injectable (LANTUS) 15 Unit(s) SubCutaneous at bedtime  insulin lispro (ADMELOG) corrective regimen sliding scale   SubCutaneous Before meals and at bedtime  insulin lispro Injectable (ADMELOG). 4 Unit(s) SubCutaneous once  isosorbide   mononitrate ER Tablet (IMDUR) 30 milliGRAM(s) Oral daily  pantoprazole    Tablet 40 milliGRAM(s) Oral before breakfast  tacrolimus 2 milliGRAM(s) Oral <User Schedule>  tacrolimus 1 milliGRAM(s) Oral <User Schedule>  tamsulosin 0.4 milliGRAM(s) Oral at bedtime  valGANciclovir 450 milliGRAM(s) Oral every 24 hours    MEDICATIONS  (PRN):  acetaminophen     Tablet .. 650 milliGRAM(s) Oral every 6 hours PRN Mild Pain (1 - 3)  oxycodone    5 mG/acetaminophen 325 mG 1 Tablet(s) Oral every 6 hours PRN Moderate Pain (4 - 6)        RADIOLOGY & ADDITIONAL TESTS:

## 2021-12-07 NOTE — DIETITIAN INITIAL EVALUATION ADULT. - PROBLEM SELECTOR PLAN 6
Pt with hx of CLL. Follows with oncology at [facility]. CT neck soft tissue from 11/10 showing widespread lymphadenopathy in neck and chest most consistent with lymphoproliferative disorder, query relapse of CLL and new compared to PET-CT from 5 years prior. Pt underwent Lymph node biopsy on 11/12. Patient now noted to have R sided supraclavicular lymph node. Pt with chronic leukocytosis 2/2 CLL. WBC 17.48.   -consider heme/onc consult for further evaluation   -continue to monitor

## 2021-12-07 NOTE — PROGRESS NOTE ADULT - PROBLEM SELECTOR PLAN 8
Pt with Hx of T2DM. At home, patient uses sliding scale require 20-40 units before each meal. HbA1c in November 2021 6.8.  -c/w ISS   -continue to monitor FSG and adjust insulin regimen accordingly  -Increased Lantus to 15U

## 2021-12-07 NOTE — PROGRESS NOTE ADULT - SUBJECTIVE AND OBJECTIVE BOX
Patient is a 71y Male seen and evaluated at bedside.   Cr is stable  BP is acceptable  diuresing  K 5 noted  Na now 131        Meds:    acetaminophen     Tablet .. 650 every 6 hours PRN  amLODIPine   Tablet 10 daily  atorvastatin 40 at bedtime  dextrose 40% Gel 15 once  dextrose 5%. 1000 <Continuous>  dextrose 5%. 1000 <Continuous>  dextrose 50% Injectable 25 once  dextrose 50% Injectable 12.5 once  dextrose 50% Injectable 25 once  enoxaparin Injectable 100 every 12 hours  furosemide   Injectable 40 every 12 hours  glucagon  Injectable 1 once  insulin glargine Injectable (LANTUS) 15 at bedtime  insulin lispro (ADMELOG) corrective regimen sliding scale  Before meals and at bedtime  isosorbide   mononitrate ER Tablet (IMDUR) 30 daily  oxycodone    5 mG/acetaminophen 325 mG 1 every 6 hours PRN  pantoprazole    Tablet 40 before breakfast  tacrolimus 2 <User Schedule>  tacrolimus 1 <User Schedule>  tamsulosin 0.4 at bedtime  urea Oral Powder 15 two times a day  valGANciclovir 450 every 24 hours      T(C): , Max: 36.9 (12-07-21 @ 05:57)  T(F): , Max: 98.4 (12-07-21 @ 05:57)  HR: 62 (12-07-21 @ 05:57)  BP: 120/74 (12-07-21 @ 05:57)  BP(mean): --  RR: 18 (12-07-21 @ 05:57)  SpO2: 97% (12-07-21 @ 05:57)  Wt(kg): --    12-07 @ 07:01  -  12-07 @ 10:43  --------------------------------------------------------  IN: 0 mL / OUT: 200 mL / NET: -200 mL        Review of Systems:  ROS negative except as per HPI      PHYSICAL EXAM:  GENERAL: Alert, awake, oriented x3 in NAD on RA  HEENT: neck supple, no JVP  CHEST/LUNG: CTAB, +chest tube   HEART: Regular rate and rhythm, no murmur  ABDOMEN: Soft, nontender, non distended  EXTREMITIES: +1-2 pitting edema to thighs      LABS:                        8.9    14.03 )-----------( 155      ( 07 Dec 2021 07:34 )             30.7     12-07    131<L>  |  96  |  43<H>  ----------------------------<  216<H>  5.0   |  27  |  1.35<H>    Ca    9.1      07 Dec 2021 07:34  Phos  5.1     12-07  Mg     2.0     12-07    TPro  7.5  /  Alb  4.3  /  TBili  0.6  /  DBili  x   /  AST  15  /  ALT  8<L>  /  AlkPhos  136<H>  12-05      PT/INR - ( 05 Dec 2021 10:57 )   PT: 15.6 sec;   INR: 1.32          PTT - ( 05 Dec 2021 10:57 )  PTT:41.7 sec          RADIOLOGY & ADDITIONAL STUDIES:

## 2021-12-07 NOTE — PROGRESS NOTE ADULT - ASSESSMENT
Problem 1: R pleural effusion  - Case discussed with Dr. Zhang  - s/p  IR guided pleural pigtail catheter placement on 12/6/21 with 200 cc serous fluid  - Fluid sent for cytology and culture.   - Please obtain non contrast chest CT  - Thoracic Surgery team will continue to follow     I have reviewed clinical labs tests and reports, radiology tests and reports, as well as old patient medical records, and discussed with the refering physician.

## 2021-12-07 NOTE — PROGRESS NOTE ADULT - SUBJECTIVE AND OBJECTIVE BOX
INTERVAL HISTORY:  Patient with chest tube in place.  Feels less dyspnea but activity is limited.    MEDICATIONS:  MEDICATIONS  (STANDING):  amLODIPine   Tablet 10 milliGRAM(s) Oral daily  apixaban 5 milliGRAM(s) Oral every 12 hours  atorvastatin 40 milliGRAM(s) Oral at bedtime  dextrose 40% Gel 15 Gram(s) Oral once  dextrose 5%. 1000 milliLiter(s) (50 mL/Hr) IV Continuous <Continuous>  dextrose 5%. 1000 milliLiter(s) (100 mL/Hr) IV Continuous <Continuous>  dextrose 50% Injectable 25 Gram(s) IV Push once  dextrose 50% Injectable 12.5 Gram(s) IV Push once  dextrose 50% Injectable 25 Gram(s) IV Push once  furosemide   Injectable 40 milliGRAM(s) IV Push every 12 hours  glucagon  Injectable 1 milliGRAM(s) IntraMuscular once  insulin glargine Injectable (LANTUS) 10 Unit(s) SubCutaneous at bedtime  insulin lispro (ADMELOG) corrective regimen sliding scale   SubCutaneous Before meals and at bedtime  pantoprazole    Tablet 40 milliGRAM(s) Oral before breakfast  tacrolimus 2 milliGRAM(s) Oral <User Schedule>  tacrolimus 1 milliGRAM(s) Oral <User Schedule>  tamsulosin 0.4 milliGRAM(s) Oral at bedtime  urea Oral Powder 15 Gram(s) Oral two times a day  valGANciclovir 450 milliGRAM(s) Oral every 24 hours      REVIEW OF SYSTEMS:  CONSTITUTIONAL: No fever, no weight loss, no fatigue  PULMONARY: No cough, no wheezing, chills no hemoptysis; No Shortness of Breath  CARDIOVASCULAR: No chest pain, no palpitations, no syncope, dizziness, no leg swelling  GASTROINTESTINAL: No abdominal pain. No nausea, no  vomiting, no hematemesis; No diarrhea, no constipation. No melena, no hematochezia.  GENITOURINARY: No dysuria, no frequency, no hematuria, no incontinence  SKIN: No itching, no burning, no rashes, no lesions     PHYSICAL EXAM:  T(C): 36.9 (12-07-21 @ 05:57), Max: 36.9 (12-07-21 @ 05:57)  HR: 62 (12-07-21 @ 05:57) (62 - 77)  BP: 120/74 (12-07-21 @ 05:57) (120/74 - 145/71)  RR: 18 (12-07-21 @ 05:57) (17 - 18)  SpO2: 97% (12-07-21 @ 05:57) (93% - 97%)  Wt(kg): --  I&O's Summary    07 Dec 2021 07:01  -  07 Dec 2021 07:58  --------------------------------------------------------  IN: 0 mL / OUT: 200 mL / NET: -200 mL        Appearance:  No deformities, normal appearance	  HEENT:   Normal oral mucosa, PERRL, EOMI	  Neck: No jvd , no masses  Lymphatic: No  lymphadenopathy  Pulmonary: Lungs clear to auscultation and percussion  Cardiovascular: Normal S1 S2, No JVD, No  murmur, + edema to thigh  Abdomen:  Soft, Non-tender, + BS  Skin: No rashes, No ecchymoses	  Extremities:  No clubbing or cyanosis  MSK: Normal range of motion, no joint swelling    LABS:		  CARDIAC MARKERS:                         8.9    14.03 )-----------( 155      ( 07 Dec 2021 07:34 )             30.7   12-06    128<L>  |  92<L>  |  35<H>  ----------------------------<  145<H>  4.6   |  25  |  1.21    Ca    9.3      06 Dec 2021 06:47  Phos  4.3     12-06  Mg     1.9     12-06    TPro  7.5  /  Alb  4.3  /  TBili  0.6  /  DBili  x   /  AST  15  /  ALT  8<L>  /  AlkPhos  136<H>  12-05    proBNP:  Lipid Profile:  HgA1c:  TSH:  PT/INR - ( 05 Dec 2021 10:57 )   PT: 15.6 sec;   INR: 1.32          PTT - ( 05 Dec 2021 10:57 )  PTT:41.7 sec    Culture - Body Fluid with Gram Stain (collected 12-06-21 @ 16:41)  Source: Pleural Fl right pleural fluid  Gram Stain (12-06-21 @ 18:07):    No organisms seen    Rare to few White blood cells      TELEMETRY: 	      QTC     ECG:  	   QTC         RADIOLOGY:   DIAGNOSTIC TESTING: [ ] Echocardiogram: [ ]  Catheterization: [ ] Stress Test:    PMH, medications, allergies Chart notes, vital signs, laboratory data, and radiology studies reviewed.    ASSESSMENT/PLAN: 	  Congestive heart failure/right-sided pleural effusion-CHF etiology likely diastolic dysfunction, kidney disease and dietary noncompliance.  The patient declines input and output measurement.  He reports an improvement with Lasix.  Exam is improved. Continue Lasix IV twice a day.  He has severe edema and it will take time to diurese him. Chest tube was placed.  The effusion is a transudate by LDH and an exudate by albumin.    Coronary artery disease–the patient has no chest discomfort.  The EKG shows T wave abnormalities which is nonspecific.  Nuclear stress test last month showed no ischemia and normal ejection fraction.  Continue anticoagulation.    Atrial fibrillation–chronic.  The heart rate is good.  Continue anticoagulation.    Chronic kidney disease    Anemia hemoglobin is adequate.      Discussed with nursing staff.

## 2021-12-07 NOTE — PROGRESS NOTE ADULT - ASSESSMENT
72 yo male PMHx ESRD s/p transplant, AF (Eliquis), hiatal hernia, CAD s/p CABG (2005), Obesity, DM2 (A1C 6.7), HTN, HLD, BPH, CLL, MGUS p/w volume overload. Renal consulted for transplant and volume management    #ESRD s/p transplant 2017  Tacrolimus 2mg qAM and 1mg qHS; on Valcyte  -Creat at baseline    #Hypervolemia  Oxygen status improved   -consider RUQ US r/o cirrhosis   -pending UA, urine sodium, urine creatinine and urea nitrogen + Urine protein/creatinine ratio to r/o NS; albumin wnl  -strict I/O's   -Fluid restrict to 1L of free water per day  -Agree with IV lasix 40mg Q12h     #Hyponatremia unclear etiology   -Check urine as listed above  -likely related to hypervolemia  -stop Urena 15BID     #BP  -normotensive    #Anemia  - %sat 18, recommend IV iron 200mg daily x5d and EPO 10k u subQ weekly     Thank you for the opportunity to participate in the care of your patient. The nephrology service remains available to assist with any questions or concerns. Please feel free to reach us by paging the on-call nephrology fellow for urgent issues or as below.     Toney Marshall M.D.   PGY-5, Nephrology Fellow   C: [phone]   P: [phone]

## 2021-12-07 NOTE — DIETITIAN INITIAL EVALUATION ADULT. - PROBLEM SELECTOR PLAN 4
Hx of renal transplant in 2017 at [place]. Pt on Tacrolimus and Valganciclovir at home.  -c/w Tacrolimus, 2mg in AM, 1mg in PM  -c/w valganciclovir 450mg PO qd   -Renal following -- f/u recs

## 2021-12-07 NOTE — DIETITIAN INITIAL EVALUATION ADULT. - PROBLEM SELECTOR PLAN 5
Pt w/ hx of atrial fibrillation. Pt on Eliquis outpatient. Patient has been holding Eliquis since 12/01/21 for thoracentesis today.  -c/w anticoagulation s/p procedure

## 2021-12-08 LAB
ANION GAP SERPL CALC-SCNC: 11 MMOL/L — SIGNIFICANT CHANGE UP (ref 5–17)
ANISOCYTOSIS BLD QL: SLIGHT — SIGNIFICANT CHANGE UP
BASOPHILS # BLD AUTO: 0 K/UL — SIGNIFICANT CHANGE UP (ref 0–0.2)
BASOPHILS NFR BLD AUTO: 0 % — SIGNIFICANT CHANGE UP (ref 0–2)
BUN SERPL-MCNC: 52 MG/DL — HIGH (ref 7–23)
CALCIUM SERPL-MCNC: 9.3 MG/DL — SIGNIFICANT CHANGE UP (ref 8.4–10.5)
CHLORIDE SERPL-SCNC: 93 MMOL/L — LOW (ref 96–108)
CO2 SERPL-SCNC: 25 MMOL/L — SIGNIFICANT CHANGE UP (ref 22–31)
CREAT SERPL-MCNC: 1.4 MG/DL — HIGH (ref 0.5–1.3)
DACRYOCYTES BLD QL SMEAR: SIGNIFICANT CHANGE UP
EOSINOPHIL # BLD AUTO: 0 K/UL — SIGNIFICANT CHANGE UP (ref 0–0.5)
EOSINOPHIL NFR BLD AUTO: 0 % — SIGNIFICANT CHANGE UP (ref 0–6)
GIANT PLATELETS BLD QL SMEAR: PRESENT — SIGNIFICANT CHANGE UP
GLUCOSE BLDC GLUCOMTR-MCNC: 140 MG/DL — HIGH (ref 70–99)
GLUCOSE BLDC GLUCOMTR-MCNC: 213 MG/DL — HIGH (ref 70–99)
GLUCOSE BLDC GLUCOMTR-MCNC: 268 MG/DL — HIGH (ref 70–99)
GLUCOSE BLDC GLUCOMTR-MCNC: 78 MG/DL — SIGNIFICANT CHANGE UP (ref 70–99)
GLUCOSE SERPL-MCNC: 213 MG/DL — HIGH (ref 70–99)
HCT VFR BLD CALC: 31.1 % — LOW (ref 39–50)
HGB BLD-MCNC: 9.4 G/DL — LOW (ref 13–17)
HYPOCHROMIA BLD QL: SLIGHT — SIGNIFICANT CHANGE UP
LYMPHOCYTES # BLD AUTO: 13.71 K/UL — HIGH (ref 1–3.3)
LYMPHOCYTES # BLD AUTO: 81.1 % — HIGH (ref 13–44)
MACROCYTES BLD QL: SLIGHT — SIGNIFICANT CHANGE UP
MAGNESIUM SERPL-MCNC: 1.9 MG/DL — SIGNIFICANT CHANGE UP (ref 1.6–2.6)
MANUAL SMEAR VERIFICATION: SIGNIFICANT CHANGE UP
MCHC RBC-ENTMCNC: 27.6 PG — SIGNIFICANT CHANGE UP (ref 27–34)
MCHC RBC-ENTMCNC: 30.2 GM/DL — LOW (ref 32–36)
MCV RBC AUTO: 91.5 FL — SIGNIFICANT CHANGE UP (ref 80–100)
MICROCYTES BLD QL: SLIGHT — SIGNIFICANT CHANGE UP
MONOCYTES # BLD AUTO: 0.61 K/UL — SIGNIFICANT CHANGE UP (ref 0–0.9)
MONOCYTES NFR BLD AUTO: 3.6 % — SIGNIFICANT CHANGE UP (ref 2–14)
NEUTROPHILS # BLD AUTO: 2.59 K/UL — SIGNIFICANT CHANGE UP (ref 1.8–7.4)
NEUTROPHILS NFR BLD AUTO: 15.3 % — LOW (ref 43–77)
OVALOCYTES BLD QL SMEAR: SLIGHT — SIGNIFICANT CHANGE UP
PHOSPHATE SERPL-MCNC: 5 MG/DL — HIGH (ref 2.5–4.5)
PLAT MORPH BLD: ABNORMAL
PLATELET # BLD AUTO: 163 K/UL — SIGNIFICANT CHANGE UP (ref 150–400)
POIKILOCYTOSIS BLD QL AUTO: SIGNIFICANT CHANGE UP
POLYCHROMASIA BLD QL SMEAR: SLIGHT — SIGNIFICANT CHANGE UP
POTASSIUM SERPL-MCNC: 4.5 MMOL/L — SIGNIFICANT CHANGE UP (ref 3.5–5.3)
POTASSIUM SERPL-SCNC: 4.5 MMOL/L — SIGNIFICANT CHANGE UP (ref 3.5–5.3)
RBC # BLD: 3.4 M/UL — LOW (ref 4.2–5.8)
RBC # FLD: 16.3 % — HIGH (ref 10.3–14.5)
RBC BLD AUTO: ABNORMAL
SCHISTOCYTES BLD QL AUTO: SLIGHT — SIGNIFICANT CHANGE UP
SMUDGE CELLS # BLD: PRESENT — SIGNIFICANT CHANGE UP
SODIUM SERPL-SCNC: 129 MMOL/L — LOW (ref 135–145)
SPHEROCYTES BLD QL SMEAR: SLIGHT — SIGNIFICANT CHANGE UP
WBC # BLD: 16.9 K/UL — HIGH (ref 3.8–10.5)
WBC # FLD AUTO: 16.9 K/UL — HIGH (ref 3.8–10.5)

## 2021-12-08 PROCEDURE — 99222 1ST HOSP IP/OBS MODERATE 55: CPT | Mod: GC

## 2021-12-08 PROCEDURE — 99232 SBSQ HOSP IP/OBS MODERATE 35: CPT

## 2021-12-08 RX ORDER — INSULIN GLARGINE 100 [IU]/ML
22 INJECTION, SOLUTION SUBCUTANEOUS AT BEDTIME
Refills: 0 | Status: DISCONTINUED | OUTPATIENT
Start: 2021-12-08 | End: 2021-12-08

## 2021-12-08 RX ORDER — SODIUM CHLORIDE 9 MG/ML
1000 INJECTION, SOLUTION INTRAVENOUS
Refills: 0 | Status: DISCONTINUED | OUTPATIENT
Start: 2021-12-08 | End: 2021-12-10

## 2021-12-08 RX ORDER — ACETAMINOPHEN 500 MG
650 TABLET ORAL ONCE
Refills: 0 | Status: COMPLETED | OUTPATIENT
Start: 2021-12-08 | End: 2021-12-08

## 2021-12-08 RX ORDER — ALPRAZOLAM 0.25 MG
0.25 TABLET ORAL THREE TIMES A DAY
Refills: 0 | Status: DISCONTINUED | OUTPATIENT
Start: 2021-12-08 | End: 2021-12-10

## 2021-12-08 RX ORDER — DEXTROSE 50 % IN WATER 50 %
12.5 SYRINGE (ML) INTRAVENOUS ONCE
Refills: 0 | Status: DISCONTINUED | OUTPATIENT
Start: 2021-12-08 | End: 2021-12-10

## 2021-12-08 RX ORDER — IRON SUCROSE 20 MG/ML
200 INJECTION, SOLUTION INTRAVENOUS EVERY 24 HOURS
Refills: 0 | Status: DISCONTINUED | OUTPATIENT
Start: 2021-12-08 | End: 2021-12-10

## 2021-12-08 RX ORDER — ENOXAPARIN SODIUM 100 MG/ML
100 INJECTION SUBCUTANEOUS ONCE
Refills: 0 | Status: COMPLETED | OUTPATIENT
Start: 2021-12-08 | End: 2021-12-08

## 2021-12-08 RX ORDER — INSULIN LISPRO 100/ML
VIAL (ML) SUBCUTANEOUS
Refills: 0 | Status: DISCONTINUED | OUTPATIENT
Start: 2021-12-08 | End: 2021-12-10

## 2021-12-08 RX ORDER — INSULIN LISPRO 100/ML
5 VIAL (ML) SUBCUTANEOUS ONCE
Refills: 0 | Status: COMPLETED | OUTPATIENT
Start: 2021-12-08 | End: 2021-12-08

## 2021-12-08 RX ORDER — DEXTROSE 50 % IN WATER 50 %
25 SYRINGE (ML) INTRAVENOUS ONCE
Refills: 0 | Status: DISCONTINUED | OUTPATIENT
Start: 2021-12-08 | End: 2021-12-10

## 2021-12-08 RX ORDER — FUROSEMIDE 40 MG
40 TABLET ORAL DAILY
Refills: 0 | Status: DISCONTINUED | OUTPATIENT
Start: 2021-12-08 | End: 2021-12-10

## 2021-12-08 RX ORDER — INSULIN GLARGINE 100 [IU]/ML
25 INJECTION, SOLUTION SUBCUTANEOUS AT BEDTIME
Refills: 0 | Status: DISCONTINUED | OUTPATIENT
Start: 2021-12-08 | End: 2021-12-09

## 2021-12-08 RX ORDER — INSULIN LISPRO 100/ML
4 VIAL (ML) SUBCUTANEOUS ONCE
Refills: 0 | Status: COMPLETED | OUTPATIENT
Start: 2021-12-08 | End: 2021-12-08

## 2021-12-08 RX ORDER — INSULIN LISPRO 100/ML
VIAL (ML) SUBCUTANEOUS AT BEDTIME
Refills: 0 | Status: DISCONTINUED | OUTPATIENT
Start: 2021-12-08 | End: 2021-12-10

## 2021-12-08 RX ORDER — INSULIN LISPRO 100/ML
15 VIAL (ML) SUBCUTANEOUS
Refills: 0 | Status: DISCONTINUED | OUTPATIENT
Start: 2021-12-08 | End: 2021-12-10

## 2021-12-08 RX ORDER — DEXTROSE 50 % IN WATER 50 %
15 SYRINGE (ML) INTRAVENOUS ONCE
Refills: 0 | Status: DISCONTINUED | OUTPATIENT
Start: 2021-12-08 | End: 2021-12-10

## 2021-12-08 RX ADMIN — Medication 4 UNIT(S): at 09:36

## 2021-12-08 RX ADMIN — Medication 0.25 MILLIGRAM(S): at 03:12

## 2021-12-08 RX ADMIN — Medication 4: at 13:08

## 2021-12-08 RX ADMIN — Medication 5 UNIT(S): at 13:08

## 2021-12-08 RX ADMIN — TAMSULOSIN HYDROCHLORIDE 0.4 MILLIGRAM(S): 0.4 CAPSULE ORAL at 21:53

## 2021-12-08 RX ADMIN — Medication 40 MILLIGRAM(S): at 01:01

## 2021-12-08 RX ADMIN — OXYCODONE AND ACETAMINOPHEN 1 TABLET(S): 5; 325 TABLET ORAL at 22:22

## 2021-12-08 RX ADMIN — ISOSORBIDE MONONITRATE 30 MILLIGRAM(S): 60 TABLET, EXTENDED RELEASE ORAL at 11:54

## 2021-12-08 RX ADMIN — IRON SUCROSE 110 MILLIGRAM(S): 20 INJECTION, SOLUTION INTRAVENOUS at 12:16

## 2021-12-08 RX ADMIN — AMLODIPINE BESYLATE 10 MILLIGRAM(S): 2.5 TABLET ORAL at 05:56

## 2021-12-08 RX ADMIN — Medication 6: at 09:35

## 2021-12-08 RX ADMIN — VALGANCICLOVIR 450 MILLIGRAM(S): 450 TABLET, FILM COATED ORAL at 10:39

## 2021-12-08 RX ADMIN — TACROLIMUS 2 MILLIGRAM(S): 5 CAPSULE ORAL at 12:16

## 2021-12-08 RX ADMIN — OXYCODONE AND ACETAMINOPHEN 1 TABLET(S): 5; 325 TABLET ORAL at 21:52

## 2021-12-08 RX ADMIN — ENOXAPARIN SODIUM 100 MILLIGRAM(S): 100 INJECTION SUBCUTANEOUS at 18:40

## 2021-12-08 RX ADMIN — Medication 40 MILLIGRAM(S): at 10:03

## 2021-12-08 RX ADMIN — INSULIN GLARGINE 25 UNIT(S): 100 INJECTION, SOLUTION SUBCUTANEOUS at 22:58

## 2021-12-08 RX ADMIN — PANTOPRAZOLE SODIUM 40 MILLIGRAM(S): 20 TABLET, DELAYED RELEASE ORAL at 05:56

## 2021-12-08 RX ADMIN — ENOXAPARIN SODIUM 100 MILLIGRAM(S): 100 INJECTION SUBCUTANEOUS at 05:56

## 2021-12-08 RX ADMIN — TACROLIMUS 1 MILLIGRAM(S): 5 CAPSULE ORAL at 21:53

## 2021-12-08 RX ADMIN — ATORVASTATIN CALCIUM 40 MILLIGRAM(S): 80 TABLET, FILM COATED ORAL at 21:53

## 2021-12-08 NOTE — PROGRESS NOTE ADULT - SUBJECTIVE AND OBJECTIVE BOX
INTERVAL HISTORY:      MEDICATIONS:  MEDICATIONS  (STANDING):  amLODIPine   Tablet 10 milliGRAM(s) Oral daily  atorvastatin 40 milliGRAM(s) Oral at bedtime  dextrose 40% Gel 15 Gram(s) Oral once  dextrose 5%. 1000 milliLiter(s) (50 mL/Hr) IV Continuous <Continuous>  dextrose 5%. 1000 milliLiter(s) (100 mL/Hr) IV Continuous <Continuous>  dextrose 50% Injectable 25 Gram(s) IV Push once  dextrose 50% Injectable 12.5 Gram(s) IV Push once  dextrose 50% Injectable 25 Gram(s) IV Push once  enoxaparin Injectable 100 milliGRAM(s) SubCutaneous every 12 hours  furosemide   Injectable 40 milliGRAM(s) IV Push every 12 hours  glucagon  Injectable 1 milliGRAM(s) IntraMuscular once  insulin glargine Injectable (LANTUS) 22 Unit(s) SubCutaneous at bedtime  insulin lispro (ADMELOG) corrective regimen sliding scale   SubCutaneous Before meals and at bedtime  insulin lispro Injectable (ADMELOG). 4 Unit(s) SubCutaneous once  isosorbide   mononitrate ER Tablet (IMDUR) 30 milliGRAM(s) Oral daily  pantoprazole    Tablet 40 milliGRAM(s) Oral before breakfast  tacrolimus 2 milliGRAM(s) Oral <User Schedule>  tacrolimus 1 milliGRAM(s) Oral <User Schedule>  tamsulosin 0.4 milliGRAM(s) Oral at bedtime  valGANciclovir 450 milliGRAM(s) Oral every 24 hours      REVIEW OF SYSTEMS:  CONSTITUTIONAL: No fever, no weight loss, no fatigue  PULMONARY: No cough, no wheezing, chills no hemoptysis; No Shortness of Breath  CARDIOVASCULAR: No chest pain, no palpitations, no syncope, dizziness, +leg swelling  GASTROINTESTINAL: No abdominal pain. No nausea, no  vomiting, no hematemesis; No diarrhea, no constipation. No melena, no hematochezia.  GENITOURINARY: No dysuria, no frequency, no hematuria, no incontinence  SKIN: No itching, no burning, no rashes, no lesions     PHYSICAL EXAM:  T(C): 36.5 (21 @ 06:04), Max: 36.5 (21 @ 06:04)  HR: 74 (21 @ 06:04) (60 - 74)  BP: 133/67 (21 @ 06:04) (113/70 - 133/67)  RR: 17 (21 @ 06:04) (16 - 17)  SpO2: 97% (21 @ 06:04) (96% - 97%)  Wt(kg): --  I&O's Summary    07 Dec 2021 07:01  -  08 Dec 2021 07:00  --------------------------------------------------------  IN: 0 mL / OUT: 300 mL / NET: -300 mL    08 Dec 2021 07:01  -  08 Dec 2021 08:10  --------------------------------------------------------  IN: 0 mL / OUT: 260 mL / NET: -260 mL        Appearance:  No deformities, normal appearance	  HEENT:   Normal oral mucosa, PERRL, EOMI	  Neck: No jvd , no masses  Lymphatic: No  lymphadenopathy  Pulmonary: Lungs clear to auscultation and percussion  Cardiovascular: Normal S1 S2, No JVD, No  murmur, +edema to knee	  Abdomen:  Soft, Non-tender, + BS  Skin: No rashes, No ecchymoses	  Extremities:  No clubbing or cyanosis  MSK: Normal range of motion, no joint swelling    LABS:		  CARDIAC MARKERS:                         8.9    14.03 )-----------( 155      ( 07 Dec 2021 07:34 )             30.7   12-07    131<L>  |  96  |  43<H>  ----------------------------<  216<H>  5.0   |  27  |  1.35<H>    Ca    9.1      07 Dec 2021 07:34  Phos  5.1     12-07  Mg     2.0     12-07      proBNP:  Lipid Profile:  HgA1c:  TSH:      Culture - Body Fluid with Gram Stain (collected 21 @ 16:41)  Source: Pleural Fl right pleural fluid  Gram Stain (21 @ 18:07):    No organisms seen    Rare to few White blood cells  Preliminary Report (21 @ 08:52):    No growth to date    Urinalysis Basic - ( 07 Dec 2021 15:57 )    Color: Yellow / Appearance: Clear / S.015 / pH: x  Gluc: x / Ketone: NEGATIVE  / Bili: Negative / Urobili: 0.2 E.U./dL   Blood: x / Protein: NEGATIVE mg/dL / Nitrite: NEGATIVE   Leuk Esterase: NEGATIVE / RBC: x / WBC x   Sq Epi: x / Non Sq Epi: x / Bacteria: x      TELEMETRY: 	      QTC     ECG:  	   QTC         RADIOLOGY:   DIAGNOSTIC TESTING: [ ] Echocardiogram: [ ]  Catheterization: [ ] Stress Test:    PMH, medications, allergies Chart notes, vital signs, laboratory data, and radiology studies reviewed.    ASSESSMENT/PLAN: 	  Congestive heart failure/right-sided pleural effusion-CHF etiology likely diastolic dysfunction, kidney disease and dietary noncompliance.  The patient declines input and output measurement.  He reports an improvement with Lasix.  Exam is improved. Continue Lasix IV twice a day.   Chest tube was placed. Plan for CT.  The effusion is a transudate by LDH and an exudate by albumin.    Coronary artery disease–the patient has no chest discomfort.  The EKG shows T wave abnormalities which is nonspecific.  Nuclear stress test last month showed no ischemia and normal ejection fraction.  Continue anticoagulation.    Atrial fibrillation–chronic.  The heart rate is good.  Continue anticoagulation with enoxaparin.    Chronic kidney disease    Anemia hemoglobin is adequate.      Discussed with nursing staff.

## 2021-12-08 NOTE — PROGRESS NOTE ADULT - SUBJECTIVE AND OBJECTIVE BOX
** INCOMPLETE **    OVERNIGHT EVENTS:     SUBJECTIVE:    VITAL SIGNS:  Vital Signs Last 24 Hrs  T(C): 36.5 (08 Dec 2021 06:04), Max: 36.5 (08 Dec 2021 06:04)  T(F): 97.7 (08 Dec 2021 06:04), Max: 97.7 (08 Dec 2021 06:04)  HR: 74 (08 Dec 2021 06:04) (60 - 74)  BP: 133/67 (08 Dec 2021 06:04) (113/70 - 133/67)  BP(mean): --  RR: 17 (08 Dec 2021 06:04) (16 - 17)  SpO2: 97% (08 Dec 2021 06:04) (96% - 97%)    PHYSICAL EXAM:  General: NAD; speaking in full sentences  HEENT: NC/AT; PERRL; EOMI; MMM  Neck: supple; no JVD  Cardiac: RRR; +S1/S2  Pulm: CTA B/L; no W/R/R  GI: soft, NT/ND, +BS  Extremities: WWP; no edema, clubbing or cyanosis  Vasc: 2+ radial, DP pulses B/L  Neuro: AAOx3; no focal deficits    MEDICATIONS:  MEDICATIONS  (STANDING):  amLODIPine   Tablet 10 milliGRAM(s) Oral daily  atorvastatin 40 milliGRAM(s) Oral at bedtime  dextrose 40% Gel 15 Gram(s) Oral once  dextrose 5%. 1000 milliLiter(s) (50 mL/Hr) IV Continuous <Continuous>  dextrose 5%. 1000 milliLiter(s) (100 mL/Hr) IV Continuous <Continuous>  dextrose 50% Injectable 25 Gram(s) IV Push once  dextrose 50% Injectable 12.5 Gram(s) IV Push once  dextrose 50% Injectable 25 Gram(s) IV Push once  enoxaparin Injectable 100 milliGRAM(s) SubCutaneous every 12 hours  furosemide   Injectable 40 milliGRAM(s) IV Push every 12 hours  glucagon  Injectable 1 milliGRAM(s) IntraMuscular once  insulin glargine Injectable (LANTUS) 15 Unit(s) SubCutaneous at bedtime  insulin lispro (ADMELOG) corrective regimen sliding scale   SubCutaneous Before meals and at bedtime  isosorbide   mononitrate ER Tablet (IMDUR) 30 milliGRAM(s) Oral daily  pantoprazole    Tablet 40 milliGRAM(s) Oral before breakfast  tacrolimus 2 milliGRAM(s) Oral <User Schedule>  tacrolimus 1 milliGRAM(s) Oral <User Schedule>  tamsulosin 0.4 milliGRAM(s) Oral at bedtime  valGANciclovir 450 milliGRAM(s) Oral every 24 hours    MEDICATIONS  (PRN):  acetaminophen     Tablet .. 650 milliGRAM(s) Oral every 6 hours PRN Mild Pain (1 - 3)  ALPRAZolam 0.25 milliGRAM(s) Oral three times a day PRN anxiety  oxycodone    5 mG/acetaminophen 325 mG 1 Tablet(s) Oral every 6 hours PRN Moderate Pain (4 - 6)      ALLERGIES:  Allergies    No Known Allergies    Intolerances        LABS:                        8.9    14.03 )-----------( 155      ( 07 Dec 2021 07:34 )             30.7     12-07    131<L>  |  96  |  43<H>  ----------------------------<  216<H>  5.0   |  27  |  1.35<H>    Ca    9.1      07 Dec 2021 07:34  Phos  5.1     12-07  Mg     2.0     12-07          RADIOLOGY & ADDITIONAL TESTS: Reviewed. OVERNIGHT EVENTS:   No acute events overnight.     SUBJECTIVE:  Patient seen and examined at bedside, resting comfortably in bed, and does not appear to be in any acute distress. Patient states the chest tube is "annoying" and wants it taken out. Explained to patient that we need to evaluate the amount of drainage. Denies any recent fevers, chills, nausea, vomiting, headache, acute sob, chest pain, abdominal pain, genitourinary sx, extremity pain or swelling.     VITAL SIGNS:  Vital Signs Last 24 Hrs  T(C): 36.5 (08 Dec 2021 06:04), Max: 36.5 (08 Dec 2021 06:04)  T(F): 97.7 (08 Dec 2021 06:04), Max: 97.7 (08 Dec 2021 06:04)  HR: 74 (08 Dec 2021 06:04) (60 - 74)  BP: 133/67 (08 Dec 2021 06:04) (113/70 - 133/67)  BP(mean): --  RR: 17 (08 Dec 2021 06:04) (16 - 17)  SpO2: 97% (08 Dec 2021 06:04) (96% - 97%)    PHYSICAL EXAM:  General: NAD; speaking in full sentences  HEENT: NC/AT; PERRL; EOMI; MMM  Neck: supple; no JVD  Cardiac: RRR; +S1/S2  Pulm: Left side CTA; no W/R/R, dec. breath sounds R lung, R pigtail placed, draining yellowish colored fluid.  GI: soft, +distended, +tympanic, NT, +BSx4  Extremities: WWP; + lower ext 3+ pitting edema from ankle to knee  Vasc: 2+ radial, DP pulses B/L  Neuro: AAOx3; no focal deficits    MEDICATIONS:  MEDICATIONS  (STANDING):  amLODIPine   Tablet 10 milliGRAM(s) Oral daily  atorvastatin 40 milliGRAM(s) Oral at bedtime  dextrose 40% Gel 15 Gram(s) Oral once  dextrose 5%. 1000 milliLiter(s) (50 mL/Hr) IV Continuous <Continuous>  dextrose 5%. 1000 milliLiter(s) (100 mL/Hr) IV Continuous <Continuous>  dextrose 50% Injectable 25 Gram(s) IV Push once  dextrose 50% Injectable 12.5 Gram(s) IV Push once  dextrose 50% Injectable 25 Gram(s) IV Push once  enoxaparin Injectable 100 milliGRAM(s) SubCutaneous every 12 hours  furosemide   Injectable 40 milliGRAM(s) IV Push every 12 hours  glucagon  Injectable 1 milliGRAM(s) IntraMuscular once  insulin glargine Injectable (LANTUS) 15 Unit(s) SubCutaneous at bedtime  insulin lispro (ADMELOG) corrective regimen sliding scale   SubCutaneous Before meals and at bedtime  isosorbide   mononitrate ER Tablet (IMDUR) 30 milliGRAM(s) Oral daily  pantoprazole    Tablet 40 milliGRAM(s) Oral before breakfast  tacrolimus 2 milliGRAM(s) Oral <User Schedule>  tacrolimus 1 milliGRAM(s) Oral <User Schedule>  tamsulosin 0.4 milliGRAM(s) Oral at bedtime  valGANciclovir 450 milliGRAM(s) Oral every 24 hours    MEDICATIONS  (PRN):  acetaminophen     Tablet .. 650 milliGRAM(s) Oral every 6 hours PRN Mild Pain (1 - 3)  ALPRAZolam 0.25 milliGRAM(s) Oral three times a day PRN anxiety  oxycodone    5 mG/acetaminophen 325 mG 1 Tablet(s) Oral every 6 hours PRN Moderate Pain (4 - 6)      ALLERGIES:  Allergies    No Known Allergies    Intolerances        LABS:                        8.9    14.03 )-----------( 155      ( 07 Dec 2021 07:34 )             30.7     12-07    131<L>  |  96  |  43<H>  ----------------------------<  216<H>  5.0   |  27  |  1.35<H>    Ca    9.1      07 Dec 2021 07:34  Phos  5.1     12-07  Mg     2.0     12-07          RADIOLOGY & ADDITIONAL TESTS: Reviewed.

## 2021-12-08 NOTE — CONSULT NOTE ADULT - CONSULT REASON
R pleural effusion
Fluid overload
hyperglycemia
ESRD s/p transplant
request for right chest tube placement

## 2021-12-08 NOTE — CONSULT NOTE ADULT - ATTENDING COMMENTS
Pt seen on rounds this afternoon.  71-yr old man with extensive PMH--CAD (s/p CABG), AF, previous renal transplant (for ESRD secondary to diabetic nephropathy), CLL, and type 2 DM.  Has 20+-year history of DM, on insulin since his renal transplant.  Currently takes 24-32 units of Humalog pre-meal, but no basal insulin--"It doesn't work for me."  Last A1c was in fact target range, though he is unable to describe a glycemic pattern, and although he has a Dexcom, it does not transmit to his phone so we were unable to look at his data via [facility].  He is quite insulin-resistant, but has been started on only 4-5 units of lispro premeal in the hospital.  Was given 15 Lantus last night but glucose haris to 268 this morning.  Exam notable for marked obesity, mild decrease in breath sounds at the right base, and 3-4+ tense LE edema  --Will increase his standing pre-meal insulin to 15 units.    --Increase the Lantus to 25 units  --He clearly does need basal insulin--his glucoses will rise overnight if his bedtime glucose is in the low 100 range and he does not take any insulin.  (Will take as much as 20 units Humalog at bedtime for a FS of 180-200 and does not become hypoglycemic.  --HIs hyponatremia is clearly dilutional, and should respond to loop diuretics.  Need to check TFTs.

## 2021-12-08 NOTE — CONSULT NOTE ADULT - SUBJECTIVE AND OBJECTIVE BOX
HPI: 69 yo male PMHx AF (Eliquis), hiatal hernia, CAD s/p CABG (), R renal txp (tacrolimus and valganciclovir), Obesity, DM2 (A1C 6.8 in 2021), HTN, HLD, CLL referred to the ED by Dr Leyva for thoracentesis with Dr Lora of R sided pleural effusion seen on 10/29 CT. Pt also has chronic leukocytosis 2/2 CLL diagnosis. Patient seen by Dr. Mishra on 21 and has been holing eliquis since then in anticipation for procedure. Pt was recently admitted to [facility] in 2021, diagnosed w/ PNA discharged with supplemental O2 as needed and again in 2021 where he underwent L sided deep cervical lymph node biopsy . Reports O2 between 90-95 at home and uses supplemental O2 if pulse ox read is low, but has not used it for SOB. Yesterday, he noted his SpO2 to be around 90% so he used 2L NC at home.  Patient states his LE edema has worsened, L>R, with edema extending up into his thigh. Pt reports that he always uses 3 pillows at night and he admits to SORIA with activity. He denies orthopnea, chest pain, cough, fever, night sweats, weight loss, nausea, diarrhea, dysuria, sick contacts. Being admitted for procedure w/ Dr. Lora.     ED Vitals: /75, HR 85, SpO2 90% on RA --> 96% on 2L NC, T 97.6  ED Labs: WBC 17.48, Hgb 8.9, Plt 151, INR 1.40, Na 129, K 5.1, Cl 95, BUN 29, Cr 1.16, Alk Phos 138   CXR preliminary read showing worsening R sided pleural effusion when compared to CXR from 21 (previous admission)     Patient s/p failed thoracentesis and had pigtail catheter placed  with IR. Patient with now chest tube in place.   Endocrinology consulted in setting of hyperglycemia. Patient has been refusing insulin at times during hospital stay, has attempted to use own insulin pens as well. Insulin pens were given to family to take home.       Age at Dx:  How dx:  Hx and duration of insulin:  Current Therapy:  Hx of hypoglycemia  Hx of DKA/HHS?    Home FSG:  Fasting  Lunch  Dinner  Bed    Hx of other regimens  Complications:  Outpatient Endo:            FH:  DM:  Thyroid:  Autoimmune:  Other:    SH:  Smoking  Etoh:  Recreational Drugs:  Social Life:    Current Meds:  acetaminophen     Tablet .. 650 milliGRAM(s) Oral every 6 hours PRN  ALPRAZolam 0.25 milliGRAM(s) Oral three times a day PRN  amLODIPine   Tablet 10 milliGRAM(s) Oral daily  atorvastatin 40 milliGRAM(s) Oral at bedtime  dextrose 40% Gel 15 Gram(s) Oral once  dextrose 5%. 1000 milliLiter(s) IV Continuous <Continuous>  dextrose 5%. 1000 milliLiter(s) IV Continuous <Continuous>  dextrose 50% Injectable 25 Gram(s) IV Push once  dextrose 50% Injectable 12.5 Gram(s) IV Push once  dextrose 50% Injectable 25 Gram(s) IV Push once  enoxaparin Injectable 100 milliGRAM(s) SubCutaneous every 12 hours  furosemide   Injectable 40 milliGRAM(s) IV Push daily  glucagon  Injectable 1 milliGRAM(s) IntraMuscular once  insulin glargine Injectable (LANTUS) 22 Unit(s) SubCutaneous at bedtime  insulin lispro (ADMELOG) corrective regimen sliding scale   SubCutaneous Before meals and at bedtime  insulin lispro Injectable (ADMELOG). 5 Unit(s) SubCutaneous once  iron sucrose IVPB 200 milliGRAM(s) IV Intermittent every 24 hours  isosorbide   mononitrate ER Tablet (IMDUR) 30 milliGRAM(s) Oral daily  oxycodone    5 mG/acetaminophen 325 mG 1 Tablet(s) Oral every 6 hours PRN  pantoprazole    Tablet 40 milliGRAM(s) Oral before breakfast  tacrolimus 2 milliGRAM(s) Oral <User Schedule>  tacrolimus 1 milliGRAM(s) Oral <User Schedule>  tamsulosin 0.4 milliGRAM(s) Oral at bedtime  valGANciclovir 450 milliGRAM(s) Oral every 24 hours      Allergies:  No Known Allergies      ROS:  Denies the following except as indicated.    General: weight loss/weight gain, decreased appetite, fatigue  Eyes: Blurry vision, double vision, visual changes  ENT: Throat pain, changes in voice,   CV: palpitations, SOB, CP, cough  GI: NVD, difficulty swallowing, abdominal pain  : polyuria, dysuria  Endo: abnormal menses, temperature intolerance, decreased libido  MSK: weakness, joint pain  Skin: rash, dryness, diaphoresis  Heme: Easy bruising,bleeding  Neuro: HA, dizziness, lightheadedness, numbness tingling  Psych: Anxiety, Depression    Vital Signs Last 24 Hrs  T(C): 36.5 (08 Dec 2021 12:26), Max: 36.5 (08 Dec 2021 06:04)  T(F): 97.7 (08 Dec 2021 12:26), Max: 97.7 (08 Dec 2021 06:04)  HR: 68 (08 Dec 2021 12:) (68 - 74)  BP: 132/77 (08 Dec 2021 12:) (113/70 - 133/67)  BP(mean): --  RR: 18 (08 Dec 2021 12:) (16 - 18)  SpO2: 97% (08 Dec 2021 12:) (97% - 97%)  Height (cm): 167.6 ( @ 21:02)  Weight (kg): 101.7 ( @ 21:02)  BMI (kg/m2): 36.2 ( @ 21:02)      Constitutional: wn/wd in NAD.   HEENT: NCAT, MMM, OP clear, EOMI, , no proptosis or lid retraction  Neck: no thyromegaly or palpable thyroid nodules   Respiratory: lungs CTAB.  Cardiovascular: regular rhythm, normal S1 and S2, no audible murmurs, no peripheral edema  GI: soft, NT/ND, no masses/HSM appreciated.  Neurology: no tremors, DTR 2+  Skin: no visible rashes/lesions  Psychiatric: AAO x 3, normal affect/mood.  Ext: radial pulses intact, DP pulses intact, extremities warm, no cyanosis, clubbing or edema.       LABS:                        9.4    16.90 )-----------( 163      ( 08 Dec 2021 08:37 )             31.1     12    129<L>  |  93<L>  |  52<H>  ----------------------------<  213<H>  4.5   |  25  |  1.40<H>    Ca    9.3      08 Dec 2021 08:37  Phos  5.0     12  Mg     1.9     12-        Urinalysis Basic - ( 07 Dec 2021 15:57 )    Color: Yellow / Appearance: Clear / S.015 / pH: x  Gluc: x / Ketone: NEGATIVE  / Bili: Negative / Urobili: 0.2 E.U./dL   Blood: x / Protein: NEGATIVE mg/dL / Nitrite: NEGATIVE   Leuk Esterase: NEGATIVE / RBC: x / WBC x   Sq Epi: x / Non Sq Epi: x / Bacteria: x        Thyroid Stimulating Hormone, Serum: 0.932 ( @ 06:23)      RADIOLOGY & ADDITIONAL STUDIES:  CAPILLARY BLOOD GLUCOSE      POCT Blood Glucose.: 268 mg/dL (08 Dec 2021 08:50)  POCT Blood Glucose.: 174 mg/dL (07 Dec 2021 21:52)  POCT Blood Glucose.: 214 mg/dL (07 Dec 2021 17:15)        A/P:71y Male    1.  DM  Please continue lantus       units at night.    Please continue lispro      units before each meal.    Please continue lispro moderate dose sliding scale four times daily with meals and at bedtime    Pt's fingerstick glucose goal is 100-180    Will continue to monitor     For discharge, pt can continue    Pt can follow up at discharge with [facility] by calling  to make an appointment.   Will discuss case with Dr. Fleming  and update primary team HPI: 72 yo male PMHx AF (Eliquis), hiatal hernia, CAD s/p CABG (), R renal txp (tacrolimus and valganciclovir), Obesity, DM2 (A1C 6.8 in 2021), HTN, HLD, CLL referred to the ED by Dr Leyva for thoracentesis with Dr Lroa of R sided pleural effusion seen on 10/29 CT. Pt also has chronic leukocytosis 2/2 CLL diagnosis. Patient seen by Dr. Mishra on 21 and has been holing eliquis since then in anticipation for procedure. Pt was recently admitted to [facility] in 2021, diagnosed w/ PNA discharged with supplemental O2 as needed and again in 2021 where he underwent L sided deep cervical lymph node biopsy . Reports O2 between 90-95 at home and uses supplemental O2 if pulse ox read is low, but has not used it for SOB. Yesterday, he noted his SpO2 to be around 90% so he used 2L NC at home.  Patient states his LE edema has worsened, L>R, with edema extending up into his thigh. Pt reports that he always uses 3 pillows at night and he admits to SORIA with activity. He denies orthopnea, chest pain, cough, fever, night sweats, weight loss, nausea, diarrhea, dysuria, sick contacts. Being admitted for procedure w/ Dr. Lora.     ED Vitals: /75, HR 85, SpO2 90% on RA --> 96% on 2L NC, T 97.6  ED Labs: WBC 17.48, Hgb 8.9, Plt 151, INR 1.40, Na 129, K 5.1, Cl 95, BUN 29, Cr 1.16, Alk Phos 138   CXR preliminary read showing worsening R sided pleural effusion when compared to CXR from 21 (previous admission)     Patient s/p failed thoracentesis and had pigtail catheter placed  with IR. Patient with now chest tube in place.   Endocrinology consulted in setting of hyperglycemia. Patient has been refusing insulin at times during hospital stay, has attempted to use own insulin pens as well. Insulin pens were given to family to take home.       Dx: Patient was diagnosed 20+ years ago and started on oral medications. He says he was introduced to insulin around the time of his kidney transplant and has only been on insulin for the past 4 years. Was on Lantus and humalog but believes that lantus does not work for him so he only resumed humalog  Current Therapy: 24-32units Humalog TID with meals, Metformin 500mg ER , takes on average 24units with each meal  Hx of hypoglycemia: has intermittent lows during the week sometimes at lunch or dinner, with numbers in the 60s.   Hx of DKA/HHS? never    Home FSG:   Fasting 130-150s  Lunch 60-150s  Dinner 60-150s  Bed    Hx of other regimens  Complications: neuropathy, nephropathy, retinopathy - sees retinal specialist regularly   Outpatient Endo: Dr. Gemma Murillo            FH:  DM: mother diabetic      SH:  Smoking nonsmoker  Etoh: nondrinker      Current Meds:  acetaminophen     Tablet .. 650 milliGRAM(s) Oral every 6 hours PRN  ALPRAZolam 0.25 milliGRAM(s) Oral three times a day PRN  amLODIPine   Tablet 10 milliGRAM(s) Oral daily  atorvastatin 40 milliGRAM(s) Oral at bedtime  dextrose 40% Gel 15 Gram(s) Oral once  dextrose 5%. 1000 milliLiter(s) IV Continuous <Continuous>  dextrose 5%. 1000 milliLiter(s) IV Continuous <Continuous>  dextrose 50% Injectable 25 Gram(s) IV Push once  dextrose 50% Injectable 12.5 Gram(s) IV Push once  dextrose 50% Injectable 25 Gram(s) IV Push once  enoxaparin Injectable 100 milliGRAM(s) SubCutaneous every 12 hours  furosemide   Injectable 40 milliGRAM(s) IV Push daily  glucagon  Injectable 1 milliGRAM(s) IntraMuscular once  insulin glargine Injectable (LANTUS) 22 Unit(s) SubCutaneous at bedtime  insulin lispro (ADMELOG) corrective regimen sliding scale   SubCutaneous Before meals and at bedtime  insulin lispro Injectable (ADMELOG). 5 Unit(s) SubCutaneous once  iron sucrose IVPB 200 milliGRAM(s) IV Intermittent every 24 hours  isosorbide   mononitrate ER Tablet (IMDUR) 30 milliGRAM(s) Oral daily  oxycodone    5 mG/acetaminophen 325 mG 1 Tablet(s) Oral every 6 hours PRN  pantoprazole    Tablet 40 milliGRAM(s) Oral before breakfast  tacrolimus 2 milliGRAM(s) Oral <User Schedule>  tacrolimus 1 milliGRAM(s) Oral <User Schedule>  tamsulosin 0.4 milliGRAM(s) Oral at bedtime  valGANciclovir 450 milliGRAM(s) Oral every 24 hours      Allergies:  No Known Allergies      ROS:  Denies the following except as indicated.    General: weight loss/weight gain, decreased appetite, fatigue  Eyes: Blurry vision, double vision, visual changes  ENT: Throat pain, changes in voice,   CV: palpitations, SOB, CP, cough  GI: NVD, difficulty swallowing, abdominal pain  : polyuria, dysuria  Endo: abnormal menses, temperature intolerance, decreased libido  MSK: weakness, joint pain  Skin: rash, dryness, diaphoresis  Heme: Easy bruising,bleeding  Neuro: HA, dizziness, lightheadedness, numbness tingling  Psych: Anxiety, Depression    Vital Signs Last 24 Hrs  T(C): 36.5 (08 Dec 2021 12:26), Max: 36.5 (08 Dec 2021 06:04)  T(F): 97.7 (08 Dec 2021 12:), Max: 97.7 (08 Dec 2021 06:04)  HR: 68 (08 Dec 2021 12:) (68 - 74)  BP: 132/77 (08 Dec 2021 12:) (113/70 - 133/67)  BP(mean): --  RR: 18 (08 Dec 2021 12:) (16 - 18)  SpO2: 97% (08 Dec 2021 12:) (97% - 97%)  Height (cm): 167.6 ( @ 21:02)  Weight (kg): 101.7 ( @ 21:02)  BMI (kg/m2): 36.2 ( @ 21:02)      Constitutional: NAD.   HEENT: NCAT, MMM, OP clear, EOMI, , no proptosis or lid retraction  Neck: no thyromegaly or palpable thyroid nodules   Respiratory:  Chest tube in place   Cardiovascular: regular rhythm, normal S1 and S2, no audible murmurs, no peripheral edema  GI: soft, NT/ND, no masses/HSM appreciated.  Neurology: no tremors, DTR 2+  Skin: no visible rashes/lesions  Psychiatric: AAO x 3, normal affect/mood.  Ext: radial pulses intact, DP pulses intact, extremities warm, no cyanosis, clubbing or edema.       LABS:                        9.4    16.90 )-----------( 163      ( 08 Dec 2021 08:37 )             31.1     12-08    129<L>  |  93<L>  |  52<H>  ----------------------------<  213<H>  4.5   |  25  |  1.40<H>    Ca    9.3      08 Dec 2021 08:37  Phos  5.0     12-  Mg     1.9     12-08        Urinalysis Basic - ( 07 Dec 2021 15:57 )    Color: Yellow / Appearance: Clear / S.015 / pH: x  Gluc: x / Ketone: NEGATIVE  / Bili: Negative / Urobili: 0.2 E.U./dL   Blood: x / Protein: NEGATIVE mg/dL / Nitrite: NEGATIVE   Leuk Esterase: NEGATIVE / RBC: x / WBC x   Sq Epi: x / Non Sq Epi: x / Bacteria: x        Thyroid Stimulating Hormone, Serum: 0.932 ( @ 06:23)      RADIOLOGY & ADDITIONAL STUDIES:  CAPILLARY BLOOD GLUCOSE      POCT Blood Glucose.: 268 mg/dL (08 Dec 2021 08:50)  POCT Blood Glucose.: 174 mg/dL (07 Dec 2021 21:52)  POCT Blood Glucose.: 214 mg/dL (07 Dec 2021 17:15)        A/P:72 yo male PMHx AF (Eliquis), hiatal hernia, CAD s/p CABG (), R renal txp (tacro), Obesity, DM2 (A1C 6.7), HTN, HLD, BPH, CLL, MGUS, referred to the ED by Dr Leyva for possible thoracentesis for R pleural effusion, failed bedside thoracentesis, now s/p R pigtail placement by IR and requiring further management.    A1C: 6.8%  Wt:101.7kg  Cr: 1.4  GFR:51    1.  DM type 2-  appears controlled but may have frequent lows at home.   Please continue lantus   25  units at night.    Please continue lispro  15    units before each meal.    Please continue lispro moderate dose sliding scale four times daily with meals and at bedtime  Continue with consistent carb diet, advised to order sufficient carb at each meal    Pt's fingerstick glucose goal is 100-180    Will continue to monitor     For discharge, pt can continue    Pt can follow up at discharge with Dr. Murillo  Will discuss case with Dr. Valentin  and update primary team HPI: 72 yo male PMHx AF (Eliquis), hiatal hernia, CAD s/p CABG (), R renal txp (tacrolimus and valganciclovir), Obesity, DM2 (A1C 6.8 in 2021), HTN, HLD, CLL referred to the ED by Dr Leyva for thoracentesis with Dr Lora of R sided pleural effusion seen on 10/29 CT. Pt also has chronic leukocytosis 2/2 CLL diagnosis. Patient seen by Dr. Mishra on 21 and has been holing eliquis since then in anticipation for procedure. Pt was recently admitted to [facility] in 2021, diagnosed w/ PNA discharged with supplemental O2 as needed and again in 2021 where he underwent L sided deep cervical lymph node biopsy . Reports O2 between 90-95 at home and uses supplemental O2 if pulse ox read is low, but has not used it for SOB. Yesterday, he noted his SpO2 to be around 90% so he used 2L NC at home.  Patient states his LE edema has worsened, L>R, with edema extending up into his thigh. Pt reports that he always uses 3 pillows at night and he admits to SORIA with activity. He denies orthopnea, chest pain, cough, fever, night sweats, weight loss, nausea, diarrhea, dysuria, sick contacts. Being admitted for procedure w/ Dr. Lora.     ED Vitals: /75, HR 85, SpO2 90% on RA --> 96% on 2L NC, T 97.6  ED Labs: WBC 17.48, Hgb 8.9, Plt 151, INR 1.40, Na 129, K 5.1, Cl 95, BUN 29, Cr 1.16, Alk Phos 138   CXR preliminary read showing worsening R sided pleural effusion when compared to CXR from 21 (previous admission)     Patient s/p failed thoracentesis and had pigtail catheter placed  with IR. Patient with now chest tube in place.   Endocrinology consulted in setting of hyperglycemia. Patient has been refusing insulin at times during hospital stay, has attempted to use own insulin pens as well. Insulin pens were given to family to take home.       Dx: Patient was diagnosed 20+ years ago and started on oral medications. He says he was introduced to insulin around the time of his kidney transplant and has only been on insulin for the past 4 years. Was on Lantus and humalog but believes that lantus does not work for him so he only resumed humalog  Current Therapy: 24-32units Humalog TID with meals, Metformin 500mg ER , takes on average 24units with each meal  Hx of hypoglycemia: has intermittent lows during the week sometimes at lunch or dinner, with numbers in the 60s.   Hx of DKA/HHS? never    Home FSG:   Fasting 130-150s  Lunch 60-150s  Dinner 60-150s  Bed    Hx of other regimens  Complications: neuropathy, nephropathy, retinopathy - sees retinal specialist regularly   Outpatient Endo: Dr. Gemma Murillo            FH:  DM: mother diabetic      SH:  Smoking nonsmoker  Etoh: nondrinker      Current Meds:  acetaminophen     Tablet .. 650 milliGRAM(s) Oral every 6 hours PRN  ALPRAZolam 0.25 milliGRAM(s) Oral three times a day PRN  amLODIPine   Tablet 10 milliGRAM(s) Oral daily  atorvastatin 40 milliGRAM(s) Oral at bedtime  dextrose 40% Gel 15 Gram(s) Oral once  dextrose 5%. 1000 milliLiter(s) IV Continuous <Continuous>  dextrose 5%. 1000 milliLiter(s) IV Continuous <Continuous>  dextrose 50% Injectable 25 Gram(s) IV Push once  dextrose 50% Injectable 12.5 Gram(s) IV Push once  dextrose 50% Injectable 25 Gram(s) IV Push once  enoxaparin Injectable 100 milliGRAM(s) SubCutaneous every 12 hours  furosemide   Injectable 40 milliGRAM(s) IV Push daily  glucagon  Injectable 1 milliGRAM(s) IntraMuscular once  insulin glargine Injectable (LANTUS) 22 Unit(s) SubCutaneous at bedtime  insulin lispro (ADMELOG) corrective regimen sliding scale   SubCutaneous Before meals and at bedtime  insulin lispro Injectable (ADMELOG). 5 Unit(s) SubCutaneous once  iron sucrose IVPB 200 milliGRAM(s) IV Intermittent every 24 hours  isosorbide   mononitrate ER Tablet (IMDUR) 30 milliGRAM(s) Oral daily  oxycodone    5 mG/acetaminophen 325 mG 1 Tablet(s) Oral every 6 hours PRN  pantoprazole    Tablet 40 milliGRAM(s) Oral before breakfast  tacrolimus 2 milliGRAM(s) Oral <User Schedule>  tacrolimus 1 milliGRAM(s) Oral <User Schedule>  tamsulosin 0.4 milliGRAM(s) Oral at bedtime  valGANciclovir 450 milliGRAM(s) Oral every 24 hours      Allergies:  No Known Allergies      ROS:  Denies the following except as indicated.    General: weight loss/weight gain, decreased appetite, fatigue  Eyes: Blurry vision, double vision, visual changes  ENT: Throat pain, changes in voice,   CV: palpitations, SOB, CP, cough  GI: NVD, difficulty swallowing, abdominal pain  : polyuria, dysuria  Endo: abnormal menses, temperature intolerance, decreased libido  MSK: weakness, joint pain  Skin: rash, dryness, diaphoresis  Heme: Easy bruising,bleeding  Neuro: HA, dizziness, lightheadedness, numbness tingling  Psych: Anxiety, Depression    Vital Signs Last 24 Hrs  T(C): 36.5 (08 Dec 2021 12:26), Max: 36.5 (08 Dec 2021 06:04)  T(F): 97.7 (08 Dec 2021 12:), Max: 97.7 (08 Dec 2021 06:04)  HR: 68 (08 Dec 2021 12:) (68 - 74)  BP: 132/77 (08 Dec 2021 12:) (113/70 - 133/67)  BP(mean): --  RR: 18 (08 Dec 2021 12:) (16 - 18)  SpO2: 97% (08 Dec 2021 12:) (97% - 97%)  Height (cm): 167.6 ( @ 21:02)  Weight (kg): 101.7 ( @ 21:02)  BMI (kg/m2): 36.2 ( @ 21:02)      Constitutional: NAD.   HEENT: NCAT, MMM, OP clear, EOMI, , no proptosis or lid retraction  Neck: no thyromegaly or palpable thyroid nodules   Respiratory:  Chest tube in place   Cardiovascular: regular rhythm, normal S1 and S2, no audible murmurs, 3+ peripheral edema  GI: soft, NT/ND, no masses/HSM appreciated.  Neurology: no tremors, DTR 2+  Skin: no visible rashes/lesions  Psychiatric: AAO x 3, normal affect/mood.  Ext: radial pulses intact, DP pulses intact, extremities warm, no cyanosis, clubbing or edema.       LABS:                        9.4    16.90 )-----------( 163      ( 08 Dec 2021 08:37 )             31.1     12-08    129<L>  |  93<L>  |  52<H>  ----------------------------<  213<H>  4.5   |  25  |  1.40<H>    Ca    9.3      08 Dec 2021 08:37  Phos  5.0     12-  Mg     1.9     12-08        Urinalysis Basic - ( 07 Dec 2021 15:57 )    Color: Yellow / Appearance: Clear / S.015 / pH: x  Gluc: x / Ketone: NEGATIVE  / Bili: Negative / Urobili: 0.2 E.U./dL   Blood: x / Protein: NEGATIVE mg/dL / Nitrite: NEGATIVE   Leuk Esterase: NEGATIVE / RBC: x / WBC x   Sq Epi: x / Non Sq Epi: x / Bacteria: x        Thyroid Stimulating Hormone, Serum: 0.932 ( @ 06:23)      RADIOLOGY & ADDITIONAL STUDIES:  CAPILLARY BLOOD GLUCOSE      POCT Blood Glucose.: 268 mg/dL (08 Dec 2021 08:50)  POCT Blood Glucose.: 174 mg/dL (07 Dec 2021 21:52)  POCT Blood Glucose.: 214 mg/dL (07 Dec 2021 17:15)        A/P:72 yo male PMHx AF (Eliquis), hiatal hernia, CAD s/p CABG (), R renal txp (tacro), Obesity, DM2 (A1C 6.7), HTN, HLD, BPH, CLL, MGUS, referred to the ED by Dr Leyva for possible thoracentesis for R pleural effusion, failed bedside thoracentesis, now s/p R pigtail placement by IR and requiring further management.    A1C: 6.8%  Wt:101.7kg  Cr: 1.4  GFR:51    1.  DM type 2-  appears controlled but may have frequent lows at home.   Please continue lantus   25  units at night.    Please continue lispro  15    units before each meal.    Please continue lispro moderate dose sliding scale four times daily with meals and at bedtime  Continue with consistent carb diet, advised to order sufficient carb at each meal    Pt's fingerstick glucose goal is 100-180    Will continue to monitor     For discharge, pt can continue    Pt can follow up at discharge with Dr. Murillo  Will discuss case with Dr. Valentin  and update primary team

## 2021-12-08 NOTE — PROGRESS NOTE ADULT - ASSESSMENT
69 yo male PMHx AF (Eliquis), hiatal hernia, CAD s/p CABG (2005), R renal txp (tacro), Obesity, DM2 (A1C 6.7), HTN, HLD, BPH, CLL, MGUS, referred to the ED by Dr Leyva for possible thoracentesis for R pleural effusion, failed bedside thoracentesis, now s/p R pigtail placement by IR and requiring further management.

## 2021-12-09 LAB
ANION GAP SERPL CALC-SCNC: 11 MMOL/L — SIGNIFICANT CHANGE UP (ref 5–17)
ANISOCYTOSIS BLD QL: SLIGHT — SIGNIFICANT CHANGE UP
APTT BLD: 44 SEC — HIGH (ref 27.5–35.5)
BASOPHILS # BLD AUTO: 0.16 K/UL — SIGNIFICANT CHANGE UP (ref 0–0.2)
BASOPHILS NFR BLD AUTO: 1 % — SIGNIFICANT CHANGE UP (ref 0–2)
BLD GP AB SCN SERPL QL: NEGATIVE — SIGNIFICANT CHANGE UP
BUN SERPL-MCNC: 46 MG/DL — HIGH (ref 7–23)
CALCIUM SERPL-MCNC: 9.2 MG/DL — SIGNIFICANT CHANGE UP (ref 8.4–10.5)
CHLORIDE SERPL-SCNC: 91 MMOL/L — LOW (ref 96–108)
CO2 SERPL-SCNC: 26 MMOL/L — SIGNIFICANT CHANGE UP (ref 22–31)
CREAT SERPL-MCNC: 1.48 MG/DL — HIGH (ref 0.5–1.3)
DACRYOCYTES BLD QL SMEAR: SLIGHT — SIGNIFICANT CHANGE UP
EOSINOPHIL # BLD AUTO: 0 K/UL — SIGNIFICANT CHANGE UP (ref 0–0.5)
EOSINOPHIL NFR BLD AUTO: 0 % — SIGNIFICANT CHANGE UP (ref 0–6)
GLUCOSE BLDC GLUCOMTR-MCNC: 155 MG/DL — HIGH (ref 70–99)
GLUCOSE BLDC GLUCOMTR-MCNC: 157 MG/DL — HIGH (ref 70–99)
GLUCOSE SERPL-MCNC: 158 MG/DL — HIGH (ref 70–99)
HCT VFR BLD CALC: 30.2 % — LOW (ref 39–50)
HGB BLD-MCNC: 8.8 G/DL — LOW (ref 13–17)
HYPOCHROMIA BLD QL: SLIGHT — SIGNIFICANT CHANGE UP
INR BLD: 1.19 — HIGH (ref 0.88–1.16)
LYMPHOCYTES # BLD AUTO: 13.38 K/UL — HIGH (ref 1–3.3)
LYMPHOCYTES # BLD AUTO: 85.7 % — HIGH (ref 13–44)
MACROCYTES BLD QL: SLIGHT — SIGNIFICANT CHANGE UP
MAGNESIUM SERPL-MCNC: 2.2 MG/DL — SIGNIFICANT CHANGE UP (ref 1.6–2.6)
MANUAL SMEAR VERIFICATION: SIGNIFICANT CHANGE UP
MCHC RBC-ENTMCNC: 26.9 PG — LOW (ref 27–34)
MCHC RBC-ENTMCNC: 29.1 GM/DL — LOW (ref 32–36)
MCV RBC AUTO: 92.4 FL — SIGNIFICANT CHANGE UP (ref 80–100)
MICROCYTES BLD QL: SLIGHT — SIGNIFICANT CHANGE UP
MONOCYTES # BLD AUTO: 0 K/UL — SIGNIFICANT CHANGE UP (ref 0–0.9)
MONOCYTES NFR BLD AUTO: 0 % — LOW (ref 2–14)
NEUTROPHILS # BLD AUTO: 2.08 K/UL — SIGNIFICANT CHANGE UP (ref 1.8–7.4)
NEUTROPHILS NFR BLD AUTO: 13.3 % — LOW (ref 43–77)
NON-GYNECOLOGICAL CYTOLOGY STUDY: SIGNIFICANT CHANGE UP
OVALOCYTES BLD QL SMEAR: SLIGHT — SIGNIFICANT CHANGE UP
PHOSPHATE SERPL-MCNC: 4.5 MG/DL — SIGNIFICANT CHANGE UP (ref 2.5–4.5)
PLAT MORPH BLD: ABNORMAL
PLATELET # BLD AUTO: 150 K/UL — SIGNIFICANT CHANGE UP (ref 150–400)
POIKILOCYTOSIS BLD QL AUTO: SLIGHT — SIGNIFICANT CHANGE UP
POLYCHROMASIA BLD QL SMEAR: SLIGHT — SIGNIFICANT CHANGE UP
POTASSIUM SERPL-MCNC: 4.6 MMOL/L — SIGNIFICANT CHANGE UP (ref 3.5–5.3)
POTASSIUM SERPL-SCNC: 4.6 MMOL/L — SIGNIFICANT CHANGE UP (ref 3.5–5.3)
PROTHROM AB SERPL-ACNC: 14.2 SEC — HIGH (ref 10.6–13.6)
RBC # BLD: 3.27 M/UL — LOW (ref 4.2–5.8)
RBC # FLD: 16.4 % — HIGH (ref 10.3–14.5)
RBC BLD AUTO: ABNORMAL
RH IG SCN BLD-IMP: POSITIVE — SIGNIFICANT CHANGE UP
SARS-COV-2 RNA SPEC QL NAA+PROBE: SIGNIFICANT CHANGE UP
SMUDGE CELLS # BLD: PRESENT — SIGNIFICANT CHANGE UP
SODIUM SERPL-SCNC: 128 MMOL/L — LOW (ref 135–145)
T4 FREE SERPL-MCNC: 1.29 NG/DL — SIGNIFICANT CHANGE UP (ref 0.93–1.7)
TSH SERPL-MCNC: 4.23 UIU/ML — HIGH (ref 0.27–4.2)
WBC # BLD: 15.61 K/UL — HIGH (ref 3.8–10.5)
WBC # FLD AUTO: 15.61 K/UL — HIGH (ref 3.8–10.5)

## 2021-12-09 PROCEDURE — 99232 SBSQ HOSP IP/OBS MODERATE 35: CPT | Mod: GC

## 2021-12-09 PROCEDURE — 71045 X-RAY EXAM CHEST 1 VIEW: CPT | Mod: 26

## 2021-12-09 PROCEDURE — 99232 SBSQ HOSP IP/OBS MODERATE 35: CPT

## 2021-12-09 PROCEDURE — 93451 RIGHT HEART CATH: CPT | Mod: 26,GC

## 2021-12-09 PROCEDURE — 99233 SBSQ HOSP IP/OBS HIGH 50: CPT

## 2021-12-09 PROCEDURE — 99152 MOD SED SAME PHYS/QHP 5/>YRS: CPT | Mod: GC

## 2021-12-09 RX ORDER — INSULIN GLARGINE 100 [IU]/ML
30 INJECTION, SOLUTION SUBCUTANEOUS AT BEDTIME
Refills: 0 | Status: DISCONTINUED | OUTPATIENT
Start: 2021-12-09 | End: 2021-12-10

## 2021-12-09 RX ORDER — ENOXAPARIN SODIUM 100 MG/ML
100 INJECTION SUBCUTANEOUS ONCE
Refills: 0 | Status: COMPLETED | OUTPATIENT
Start: 2021-12-09 | End: 2021-12-09

## 2021-12-09 RX ADMIN — ATORVASTATIN CALCIUM 40 MILLIGRAM(S): 80 TABLET, FILM COATED ORAL at 22:23

## 2021-12-09 RX ADMIN — AMLODIPINE BESYLATE 10 MILLIGRAM(S): 2.5 TABLET ORAL at 06:45

## 2021-12-09 RX ADMIN — VALGANCICLOVIR 450 MILLIGRAM(S): 450 TABLET, FILM COATED ORAL at 11:38

## 2021-12-09 RX ADMIN — ISOSORBIDE MONONITRATE 30 MILLIGRAM(S): 60 TABLET, EXTENDED RELEASE ORAL at 11:38

## 2021-12-09 RX ADMIN — ENOXAPARIN SODIUM 100 MILLIGRAM(S): 100 INJECTION SUBCUTANEOUS at 20:23

## 2021-12-09 RX ADMIN — PANTOPRAZOLE SODIUM 40 MILLIGRAM(S): 20 TABLET, DELAYED RELEASE ORAL at 06:46

## 2021-12-09 RX ADMIN — TACROLIMUS 2 MILLIGRAM(S): 5 CAPSULE ORAL at 09:52

## 2021-12-09 RX ADMIN — IRON SUCROSE 110 MILLIGRAM(S): 20 INJECTION, SOLUTION INTRAVENOUS at 11:38

## 2021-12-09 RX ADMIN — Medication 40 MILLIGRAM(S): at 06:46

## 2021-12-09 RX ADMIN — INSULIN GLARGINE 30 UNIT(S): 100 INJECTION, SOLUTION SUBCUTANEOUS at 22:26

## 2021-12-09 RX ADMIN — TACROLIMUS 1 MILLIGRAM(S): 5 CAPSULE ORAL at 22:22

## 2021-12-09 RX ADMIN — Medication 2: at 08:43

## 2021-12-09 RX ADMIN — TAMSULOSIN HYDROCHLORIDE 0.4 MILLIGRAM(S): 0.4 CAPSULE ORAL at 22:24

## 2021-12-09 NOTE — PROGRESS NOTE ADULT - SUBJECTIVE AND OBJECTIVE BOX
INTERVAL HISTORY:  The patient is inactive but he is not short of breath. He describes his urine output is moderate.    MEDICATIONS:  MEDICATIONS  (STANDING):  amLODIPine   Tablet 10 milliGRAM(s) Oral daily  atorvastatin 40 milliGRAM(s) Oral at bedtime  dextrose 40% Gel 15 Gram(s) Oral once  dextrose 5%. 1000 milliLiter(s) (50 mL/Hr) IV Continuous <Continuous>  dextrose 5%. 1000 milliLiter(s) (100 mL/Hr) IV Continuous <Continuous>  dextrose 50% Injectable 25 Gram(s) IV Push once  dextrose 50% Injectable 12.5 Gram(s) IV Push once  dextrose 50% Injectable 25 Gram(s) IV Push once  furosemide   Injectable 40 milliGRAM(s) IV Push daily  glucagon  Injectable 1 milliGRAM(s) IntraMuscular once  insulin glargine Injectable (LANTUS) 25 Unit(s) SubCutaneous at bedtime  insulin lispro (ADMELOG) corrective regimen sliding scale   SubCutaneous three times a day before meals  insulin lispro (ADMELOG) corrective regimen sliding scale   SubCutaneous at bedtime  insulin lispro Injectable (ADMELOG) 15 Unit(s) SubCutaneous three times a day before meals  iron sucrose IVPB 200 milliGRAM(s) IV Intermittent every 24 hours  isosorbide   mononitrate ER Tablet (IMDUR) 30 milliGRAM(s) Oral daily  pantoprazole    Tablet 40 milliGRAM(s) Oral before breakfast  tacrolimus 2 milliGRAM(s) Oral <User Schedule>  tacrolimus 1 milliGRAM(s) Oral <User Schedule>  tamsulosin 0.4 milliGRAM(s) Oral at bedtime  valGANciclovir 450 milliGRAM(s) Oral every 24 hours      REVIEW OF SYSTEMS:  CONSTITUTIONAL: No fever, no weight loss, no fatigue  PULMONARY: No cough, no wheezing, chills no hemoptysis; No Shortness of Breath  CARDIOVASCULAR: No chest pain, no palpitations, no syncope, dizziness, +leg swelling  GASTROINTESTINAL: No abdominal pain. No nausea, no  vomiting, no hematemesis; No diarrhea, no constipation. No melena, no hematochezia.  GENITOURINARY: No dysuria, no frequency, no hematuria, no incontinence  SKIN: No itching, no burning, no rashes, no lesions     PHYSICAL EXAM:  T(C): 36.5 (21 @ 06:37), Max: 36.7 (21 @ 22:40)  HR: 65 (21 @ 06:37) (65 - 81)  BP: 122/73 (21 @ 06:37) (122/73 - 135/80)  RR: 18 (21 @ 06:37) (17 - 18)  SpO2: 98% (21 @ 06:37) (96% - 98%)  Wt(kg): --  I&O's Summary    08 Dec 2021 07:01  -  09 Dec 2021 07:00  --------------------------------------------------------  IN: 0 mL / OUT: 260 mL / NET: -260 mL        Appearance:  No deformities, normal appearance	  HEENT:   Normal oral mucosa, PERRL, EOMI	  Neck: No jvd , no masses  Lymphatic: No  lymphadenopathy  Pulmonary: Lungs clear to auscultation and percussion  Cardiovascular: Normal S1 S2, No JVD, No  murmur, No edema	  Abdomen:  Soft, Non-tender, + BS  Skin: No rashes, No ecchymoses	  Extremities:  No clubbing or cyanosis  MSK: Normal range of motion, no joint swelling    LABS:		  CARDIAC MARKERS:                         8.8    15.61 )-----------( 150      ( 09 Dec 2021 07:06 )             30.2   12    128<L>  |  91<L>  |  46<H>  ----------------------------<  158<H>  4.6   |  26  |  1.48<H>    Ca    9.2      09 Dec 2021 07:06  Phos  4.5       Mg     2.2           proBNP:  Lipid Profile:  HgA1c:  TSH:  PT/INR - ( 09 Dec 2021 07:06 )   PT: 14.2 sec;   INR: 1.19          PTT - ( 09 Dec 2021 07:06 )  PTT:44.0 sec    Culture - Body Fluid with Gram Stain (collected 21 @ 16:41)  Source: Pleural Fl right pleural fluid  Gram Stain (21 @ 18:07):    No organisms seen    Rare to few White blood cells  Preliminary Report (21 @ 08:52):    No growth to date    Urinalysis Basic - ( 07 Dec 2021 15:57 )    Color: Yellow / Appearance: Clear / S.015 / pH: x  Gluc: x / Ketone: NEGATIVE  / Bili: Negative / Urobili: 0.2 E.U./dL   Blood: x / Protein: NEGATIVE mg/dL / Nitrite: NEGATIVE   Leuk Esterase: NEGATIVE / RBC: x / WBC x   Sq Epi: x / Non Sq Epi: x / Bacteria: x      TELEMETRY: 	      QTC     ECG:  	   QTC         RADIOLOGY:   DIAGNOSTIC TESTING: [ ] Echocardiogram: [ ]  Catheterization: [ ] Stress Test:    PMH, medications, allergies Chart notes, vital signs, laboratory data, and radiology studies reviewed.    ASSESSMENT/PLAN: 	  Edema/right-sided pleural effusion/pulmonary hypertension/hypoxia on admission-CHF etiology likely diastolic dysfunction, kidney disease and dietary noncompliance.  The patient declines input and output measurement.  He reports moderate urine output.  Exam is stable. BUN and creatinine have risen. Continue Lasix IV once a day.   Chest tube is in place.  The effusion is a transudate by LDH and an exudate by albumin. Right heart cath today. Procedure discussed with patient. Check BNP. Prior BNP moderately high.     Coronary artery disease–the patient has no chest discomfort.  The EKG shows T wave abnormalities which is nonspecific.  Nuclear stress test last month showed no ischemia and normal ejection fraction.  Continue anticoagulation when possible.    Atrial fibrillation–chronic.  The heart rate is good.  Continue anticoagulation when no procedures are planned.    Chronic kidney disease    Anemia- hemoglobin is adequate.      Discussed with Dr. Lora.

## 2021-12-09 NOTE — PROGRESS NOTE ADULT - SUBJECTIVE AND OBJECTIVE BOX
Interventional Cardiology PA Precath Note      HPI: 72 yo male PMHx AF (Eliquis, last dose   ), hiatal hernia, CAD s/p CABG (2005), R renal txp (on tacro), Obesity, DM2 (A1C 6.7), HTN, HLD, BPH, CLL, MGUS, admitted on r R pleural effusion, failed bedside thoracentesis, now s/p R pigtail placement by IR and requiring further management          PMH:  Barretts esophagus     Benign prostatic hyperplasia, presence of lower urinary tract symptoms unspecified, unspecified morphology     CRI (chronic renal insufficiency)     DM (diabetes mellitus)     GERD (gastroesophageal reflux disease)     HTN (hypertension)     Hyperlipidemia     Mandibular abscess     Monoclonal gammopathy     Other hyperlipidemia     Paroxysmal atrial fibrillation.     PAST SURGICAL HISTORY:  History of surgery cyst removal    Mandibular abscess     S/P CABG (coronary artery bypass graft) 15 years.      ALL: NKDA, NKFA. Denies shellfish/Contrast dye allergy.  SocHX: Denies EtoH/TOB/IVDU  FHx: NC  MEDS:   acetaminophen     Tablet .. 650 milliGRAM(s) Oral every 6 hours PRN  ALPRAZolam 0.25 milliGRAM(s) Oral three times a day PRN  amLODIPine   Tablet 10 milliGRAM(s) Oral daily  atorvastatin 40 milliGRAM(s) Oral at bedtime  dextrose 40% Gel 15 Gram(s) Oral once  dextrose 5%. 1000 milliLiter(s) IV Continuous <Continuous>  dextrose 5%. 1000 milliLiter(s) IV Continuous <Continuous>  dextrose 50% Injectable 25 Gram(s) IV Push once  dextrose 50% Injectable 12.5 Gram(s) IV Push once  dextrose 50% Injectable 25 Gram(s) IV Push once  furosemide   Injectable 40 milliGRAM(s) IV Push daily  glucagon  Injectable 1 milliGRAM(s) IntraMuscular once  insulin glargine Injectable (LANTUS) 25 Unit(s) SubCutaneous at bedtime  insulin lispro (ADMELOG) corrective regimen sliding scale   SubCutaneous three times a day before meals  insulin lispro (ADMELOG) corrective regimen sliding scale   SubCutaneous at bedtime  insulin lispro Injectable (ADMELOG) 15 Unit(s) SubCutaneous three times a day before meals  iron sucrose IVPB 200 milliGRAM(s) IV Intermittent every 24 hours  isosorbide   mononitrate ER Tablet (IMDUR) 30 milliGRAM(s) Oral daily  oxycodone    5 mG/acetaminophen 325 mG 1 Tablet(s) Oral every 6 hours PRN  pantoprazole    Tablet 40 milliGRAM(s) Oral before breakfast  tacrolimus 2 milliGRAM(s) Oral <User Schedule>  tacrolimus 1 milliGRAM(s) Oral <User Schedule>  tamsulosin 0.4 milliGRAM(s) Oral at bedtime  valGANciclovir 450 milliGRAM(s) Oral every 24 hours    T(C): 36.5 (12-09-21 @ 06:37), Max: 36.7 (12-08-21 @ 22:40)  HR: 65 (12-09-21 @ 06:37) (65 - 81)  BP: 122/73 (12-09-21 @ 06:37) (122/73 - 135/80)  RR: 18 (12-09-21 @ 06:37) (18 - 18)  SpO2: 98% (12-09-21 @ 06:37) (96% - 98%)  Wt(kg): --  HEENT: NCAT, EOMI, PERRLA  NECK: No JVD, No carotid bruits B/L, +2 Carotid pulses B/L  PULM:  CTA B/L No W/R/R  CARD: RRR, +S1, +S2, No M/R/G  ABD: ND, +BS, NT, no masses  EXT: Warm, pedal edema yes/no, pitting/non-pitting  NEURO: A & O x 3, no focal neurologic deficits                            8.8    15.61 )-----------( 150      ( 09 Dec 2021 07:06 )             30.2     12-09    128<L>  |  91<L>  |  46<H>  ----------------------------<  158<H>  4.6   |  26  |  1.48<H>    Ca    9.2      09 Dec 2021 07:06  Phos  4.5     12-09  Mg     2.2     12-09            EKG:					  ASA _____				Mallampati class: _________	            Anginal Class: _________  A/P:        Sedation Plan: none  Patient Is Suitable Candidate For Sedation: no    Risks & benefits of procedure and sedation and risks and benefits for the alternative therapy have been explained to the patient in layman’s terms including but not limited to: allergic reaction, bleeding, infection, arrhythmia, respiratory compromise, renal and vascular compromise, limb damage, MI, CVA, emergent CABG/Vascular Surgery and death. Informed consent obtained and in chart. Interventional Cardiology PA Precath Note      HPI: 72 yo male PMHx AF (Eliquis, last dose   ), hiatal hernia, CAD s/p CABG (2005), R renal txp (on tacro), Obesity, DM2 (A1C 6.7), HTN, HLD, BPH, CLL, MGUS, admitted on r R pleural effusion, failed bedside thoracentesis, now s/p R pigtail placement by IR and requiring further management. Patient is also with chronic hypoxemia and LE edema, getting IV lasix diuresis 40mg IV daily in hospital.  Given patient's symptoms and to further guide medical management, patient is now referred for RHC with thermodilution via R IJ.          PMH:  Barretts esophagus     Benign prostatic hyperplasia, presence of lower urinary tract symptoms unspecified, unspecified morphology     CRI (chronic renal insufficiency)     DM (diabetes mellitus)     GERD (gastroesophageal reflux disease)     HTN (hypertension)     Hyperlipidemia     Mandibular abscess     Monoclonal gammopathy     Other hyperlipidemia     Paroxysmal atrial fibrillation.     PAST SURGICAL HISTORY:  History of surgery cyst removal    Mandibular abscess     S/P CABG (coronary artery bypass graft) 15 years.      ALL: NKDA, NKFA. Denies shellfish/Contrast dye allergy.  SocHX: Denies EtoH/TOB/IVDU  FHx: NC  MEDS:   acetaminophen     Tablet .. 650 milliGRAM(s) Oral every 6 hours PRN  ALPRAZolam 0.25 milliGRAM(s) Oral three times a day PRN  amLODIPine   Tablet 10 milliGRAM(s) Oral daily  atorvastatin 40 milliGRAM(s) Oral at bedtime  dextrose 40% Gel 15 Gram(s) Oral once  dextrose 5%. 1000 milliLiter(s) IV Continuous <Continuous>  dextrose 5%. 1000 milliLiter(s) IV Continuous <Continuous>  dextrose 50% Injectable 25 Gram(s) IV Push once  dextrose 50% Injectable 12.5 Gram(s) IV Push once  dextrose 50% Injectable 25 Gram(s) IV Push once  furosemide   Injectable 40 milliGRAM(s) IV Push daily  glucagon  Injectable 1 milliGRAM(s) IntraMuscular once  insulin glargine Injectable (LANTUS) 25 Unit(s) SubCutaneous at bedtime  insulin lispro (ADMELOG) corrective regimen sliding scale   SubCutaneous three times a day before meals  insulin lispro (ADMELOG) corrective regimen sliding scale   SubCutaneous at bedtime  insulin lispro Injectable (ADMELOG) 15 Unit(s) SubCutaneous three times a day before meals  iron sucrose IVPB 200 milliGRAM(s) IV Intermittent every 24 hours  isosorbide   mononitrate ER Tablet (IMDUR) 30 milliGRAM(s) Oral daily  oxycodone    5 mG/acetaminophen 325 mG 1 Tablet(s) Oral every 6 hours PRN  pantoprazole    Tablet 40 milliGRAM(s) Oral before breakfast  tacrolimus 2 milliGRAM(s) Oral <User Schedule>  tacrolimus 1 milliGRAM(s) Oral <User Schedule>  tamsulosin 0.4 milliGRAM(s) Oral at bedtime  valGANciclovir 450 milliGRAM(s) Oral every 24 hours    T(C): 36.5 (12-09-21 @ 06:37), Max: 36.7 (12-08-21 @ 22:40)  HR: 65 (12-09-21 @ 06:37) (65 - 81)  BP: 122/73 (12-09-21 @ 06:37) (122/73 - 135/80)  RR: 18 (12-09-21 @ 06:37) (18 - 18)  SpO2: 98% (12-09-21 @ 06:37) (96% - 98%)    General: NAD; speaking in full sentences  HEENT: NC/AT; PERRL; EOMI; MMM  Neck: supple; no JVD  Cardiac: RRR; +S1/S2  Pulm: +crackles at bases bilaterally, R pigtail in place, no drainage overnight  GI: soft, +distended, +tympanic, NT, +BSx4  Extremities: WWP; 3+ pitting edema from ankle to knee  Vasc: 2+ radial, DP pulses B/L  Neuro: AAOx3; no focal deficits                              8.8    15.61 )-----------( 150      ( 09 Dec 2021 07:06 )             30.2     12-09    128<L>  |  91<L>  |  46<H>  ----------------------------<  158<H>  4.6   |  26  |  1.48<H>    Ca    9.2      09 Dec 2021 07:06  Phos  4.5     12-09  Mg     2.2     12-09            EKG:					  ASA _____				Mallampati class: _________	            Anginal Class: _________  A/P:        Sedation Plan: none  Patient Is Suitable Candidate For Sedation: no    Risks & benefits of procedure and sedation and risks and benefits for the alternative therapy have been explained to the patient in layman’s terms including but not limited to: allergic reaction, bleeding, infection, arrhythmia, respiratory compromise, renal and vascular compromise, limb damage, MI, CVA, emergent CABG/Vascular Surgery and death. Informed consent obtained and in chart. Interventional Cardiology PA Precath Note      HPI: 70 yo male PMHx AF (Eliquis, last dose   ), hiatal hernia, CAD s/p CABG (2005), R renal txp (on tacro), Obesity, DM2 (A1C 6.7), HTN, HLD, BPH, CLL, MGUS, admitted on 12/03/21 with  R pleural effusion, failed bedside thoracentesis, s/p R pigtail placement by IR that was taken out 12/09. Patient is also with chronic hypoxemia and LE edema, getting IV lasix diuresis 40mg IV daily in hospital.  Given patient's symptoms and to further guide medical management, patient is now referred for RHC with thermodilution via R IJ.        PMH:  Barretts esophagus     Benign prostatic hyperplasia, presence of lower urinary tract symptoms unspecified, unspecified morphology     CRI (chronic renal insufficiency)     DM (diabetes mellitus)     GERD (gastroesophageal reflux disease)     HTN (hypertension)     Hyperlipidemia     Mandibular abscess     Monoclonal gammopathy     Other hyperlipidemia     Paroxysmal atrial fibrillation.     PAST SURGICAL HISTORY:  History of surgery cyst removal    Mandibular abscess     S/P CABG (coronary artery bypass graft) 15 years.      ALL: NKDA, NKFA. Denies shellfish/Contrast dye allergy.  SocHX: Denies EtoH/TOB/IVDU  FHx: [facility], [place] MEDS:   acetaminophen     Tablet .. 650 milliGRAM(s) Oral every 6 hours PRN  ALPRAZolam 0.25 milliGRAM(s) Oral three times a day PRN  amLODIPine   Tablet 10 milliGRAM(s) Oral daily  atorvastatin 40 milliGRAM(s) Oral at bedtime  dextrose 40% Gel 15 Gram(s) Oral once  dextrose 5%. 1000 milliLiter(s) IV Continuous <Continuous>  dextrose 5%. 1000 milliLiter(s) IV Continuous <Continuous>  dextrose 50% Injectable 25 Gram(s) IV Push once  dextrose 50% Injectable 12.5 Gram(s) IV Push once  dextrose 50% Injectable 25 Gram(s) IV Push once  furosemide   Injectable 40 milliGRAM(s) IV Push daily  glucagon  Injectable 1 milliGRAM(s) IntraMuscular once  insulin glargine Injectable (LANTUS) 25 Unit(s) SubCutaneous at bedtime  insulin lispro (ADMELOG) corrective regimen sliding scale   SubCutaneous three times a day before meals  insulin lispro (ADMELOG) corrective regimen sliding scale   SubCutaneous at bedtime  insulin lispro Injectable (ADMELOG) 15 Unit(s) SubCutaneous three times a day before meals  iron sucrose IVPB 200 milliGRAM(s) IV Intermittent every 24 hours  isosorbide   mononitrate ER Tablet (IMDUR) 30 milliGRAM(s) Oral daily  oxycodone    5 mG/acetaminophen 325 mG 1 Tablet(s) Oral every 6 hours PRN  pantoprazole    Tablet 40 milliGRAM(s) Oral before breakfast  tacrolimus 2 milliGRAM(s) Oral <User Schedule>  tacrolimus 1 milliGRAM(s) Oral <User Schedule>  tamsulosin 0.4 milliGRAM(s) Oral at bedtime  valGANciclovir 450 milliGRAM(s) Oral every 24 hours    T(C): 36.5 (12-09-21 @ 06:37), Max: 36.7 (12-08-21 @ 22:40)  HR: 65 (12-09-21 @ 06:37) (65 - 81)  BP: 122/73 (12-09-21 @ 06:37) (122/73 - 135/80)  RR: 18 (12-09-21 @ 06:37) (18 - 18)  SpO2: 98% (12-09-21 @ 06:37) (96% - 98%)    General: NAD; speaking in full sentences  HEENT: NC/AT; PERRL; EOMI; MMM  Neck: supple; no JVD  Cardiac: irreg irreg S1/S2  Pulm: +crackles at bases bilaterally, R>L, R chest tube out dressing d/c/i  GI: soft, +distended, +tympanic, NT, +BSx4  Extremities: WWP; 3+ pitting edema from ankle to knee  Vasc: 2+ radial, DP pulses B/L 1+  Neuro: AAOx3; no focal deficits      EKG 12/3/21: A fib  VR at 90 b/m                        8.8    15.61 )-----------( 150      ( 09 Dec 2021 07:06 )             30.2     12-09    128<L>  |  91<L>  |  46<H>  ----------------------------<  158<H>  4.6   |  26  |  1.48<H>    Ca    9.2      09 Dec 2021 07:06  Phos  4.5     12-09  Mg     2.2     12-09            EKG 12/03/21 : A fib VR 90    				  ASA III_____				Mallampati class: _IV________	            Anginal Class: _I________    A/P:  70 yo male PMHx AF (Eliquis, last dose   ), hiatal hernia, CAD s/p CABG (2005), R renal txp (on tacro), Obesity, DM2 (A1C 6.7), HTN, HLD, BPH, CLL, MGUS, admitted on 12/03/21 with  R pleural effusion, failed bedside thoracentesis, s/p R pigtail placement by IR that was taken out 12/09. Patient is also with chronic hypoxemia and LE edema, getting IV lasix diuresis 40mg IV daily in hospital.  Given patient's symptoms and to further guide medical management, patient is now referred for RHC with thermodilution via R IJ.        Sedation Plan: none, possible moderate sedation  Patient Is Suitable Candidate For Sedation: yes    Risks & benefits of procedure and sedation and risks and benefits for the alternative therapy have been explained to the patient in layman’s terms including but not limited to: allergic reaction, bleeding, infection, arrhythmia, respiratory compromise, renal and vascular compromise, limb damage, MI, CVA, emergent CABG/Vascular Surgery and death. Informed consent obtained and in chart. Interventional Cardiology PA Precath Note      HPI: 70 yo male PMHx AF (Eliquis, last dose 12/01/21, has been on lovenox in hospital), hiatal hernia, CAD s/p CABG (2005), R renal txp (on tacro), Obesity, DM2 (A1C 6.7), HTN, HLD, BPH, CLL, MGUS, admitted on 12/03/21 with  R pleural effusion, failed bedside thoracentesis, s/p R pigtail placement by IR that was taken out 12/09. Patient is also with chronic LE edema and  pulmonary HTN  undergoing IV lasix diuresis in hospital.  Given patient's symptoms and to further guide medical management, patient is now referred for RHC with thermodilution via R IJ.    ROS +  b/l LE  edema extending up to thigs, uses 3 pillows at night, + SORIA with minimal activity. He denies syncope, palpitations,  chest pain, cough, fever, night sweats.    Outpatient NST a few weeks ago - negative for ischemia    ECHO TTE 10/28/21  1. Normal left ventricular size.   2. Mild symmetric left ventricular hypertrophy.   3. Normal left ventricular systolic function.   4. Grade II left ventricular diastolic dysfunction.   5. Mildly dilated right ventricular size.   6. Reduced right ventricular systolic function.   7. Biatrial enlargement.   8. Trace aortic regurgitation.   9. Aortic sclerosis without significant stenosis.  10. Pulmonary artery systolic pressure is 50 mmHg.  11. Trivial pericardial effusion.  12. Compared to the previous TTE performed on 7/8/2021, the PASP is higher. Otherwise no significant change.      PMH:  Barretts esophagus     Benign prostatic hyperplasia, presence of lower urinary tract symptoms unspecified, unspecified morphology     CRI (chronic renal insufficiency)     DM (diabetes mellitus)     GERD (gastroesophageal reflux disease)     HTN (hypertension)     Hyperlipidemia     Mandibular abscess     Monoclonal gammopathy     Other hyperlipidemia     Paroxysmal atrial fibrillation.     PAST SURGICAL HISTORY:  History of surgery cyst removal    Mandibular abscess     S/P CABG (coronary artery bypass graft) 15 years.      ALL: NKDA, NKFA. Denies shellfish/Contrast dye allergy.  SocHX: Denies EtoH/TOB/IVDU  FHx: [facility] MEDS:   acetaminophen     Tablet .. 650 milliGRAM(s) Oral every 6 hours PRN  ALPRAZolam 0.25 milliGRAM(s) Oral three times a day PRN  amLODIPine   Tablet 10 milliGRAM(s) Oral daily  atorvastatin 40 milliGRAM(s) Oral at bedtime  dextrose 40% Gel 15 Gram(s) Oral once  dextrose 5%. 1000 milliLiter(s) IV Continuous <Continuous>  dextrose 5%. 1000 milliLiter(s) IV Continuous <Continuous>  dextrose 50% Injectable 25 Gram(s) IV Push once  dextrose 50% Injectable 12.5 Gram(s) IV Push once  dextrose 50% Injectable 25 Gram(s) IV Push once  furosemide   Injectable 40 milliGRAM(s) IV Push daily  glucagon  Injectable 1 milliGRAM(s) IntraMuscular once  insulin glargine Injectable (LANTUS) 25 Unit(s) SubCutaneous at bedtime  insulin lispro (ADMELOG) corrective regimen sliding scale   SubCutaneous three times a day before meals  insulin lispro (ADMELOG) corrective regimen sliding scale   SubCutaneous at bedtime  insulin lispro Injectable (ADMELOG) 15 Unit(s) SubCutaneous three times a day before meals  iron sucrose IVPB 200 milliGRAM(s) IV Intermittent every 24 hours  isosorbide   mononitrate ER Tablet (IMDUR) 30 milliGRAM(s) Oral daily  oxycodone    5 mG/acetaminophen 325 mG 1 Tablet(s) Oral every 6 hours PRN  pantoprazole    Tablet 40 milliGRAM(s) Oral before breakfast  tacrolimus 2 milliGRAM(s) Oral <User Schedule>  tacrolimus 1 milliGRAM(s) Oral <User Schedule>  tamsulosin 0.4 milliGRAM(s) Oral at bedtime  valGANciclovir 450 milliGRAM(s) Oral every 24 hours    T(C): 36.5 (12-09-21 @ 06:37), Max: 36.7 (12-08-21 @ 22:40)  HR: 65 (12-09-21 @ 06:37) (65 - 81)  BP: 122/73 (12-09-21 @ 06:37) (122/73 - 135/80)  RR: 18 (12-09-21 @ 06:37) (18 - 18)  SpO2: 98% (12-09-21 @ 06:37) (96% - 98%)    General: NAD; speaking in full sentences  HEENT: NC/AT; PERRL; EOMI; MMM  Neck: supple; no JVD  Cardiac: irreg irreg S1/S2  Pulm: +crackles at bases bilaterally, R>L, R chest tube out dressing d/c/i  GI: soft, +distended, +tympanic, NT, +BSx4  Extremities: WWP; 3+ pitting edema from ankle to knee  Vasc: 2+ radial, DP pulses B/L 1+  Neuro: AAOx3; no focal deficits      EKG 12/3/21: A fib  VR at 90 b/m                        8.8    15.61 )-----------( 150      ( 09 Dec 2021 07:06 )             30.2     12-09    128<L>  |  91<L>  |  46<H>  ----------------------------<  158<H>  4.6   |  26  |  1.48<H>    Ca    9.2      09 Dec 2021 07:06  Phos  4.5     12-09  Mg     2.2     12-09          EKG 12/03/21 : A fib VR 90    				  ASA III_____				Mallampati class: _IV________	            Anginal Class: _I________    A/P:  70 yo male PMHx AF (Eliquis, last dose 12/1/21, has been on lovenox in hospital), hiatal hernia, CAD s/p CABG (2005), R renal txp (on tacro), Obesity, DM2 (A1C 6.7), HTN, HLD, BPH, CLL, MGUS, admitted on 12/03/21 with  R pleural effusion, failed bedside thoracentesis, s/p R pigtail placement by IR that was taken out 12/09. Patient is also with chronic LE edema, pulmonary HTN and hypoxia.   Given patient's symptoms and to further guide medical management, patient is now referred for RHC with thermodilution via R IJ.    for RHC with thermodilution   consented, consent in chart      Sedation Plan: none to  possible moderate sedation  Patient Is Suitable Candidate For Sedation: yes    Risks & benefits of procedure and sedation and risks and benefits for the alternative therapy have been explained to the patient in layman’s terms including but not limited to: allergic reaction, bleeding, infection, arrhythmia, respiratory compromise, renal and vascular compromise, limb damage, MI, CVA, emergent CABG/Vascular Surgery and death. Informed consent obtained and in chart. Interventional Cardiology PA Precath Note      HPI: 72 yo male PMHx AF (Eliquis, last dose 12/01/21, has been on lovenox in hospital, last lovenox 12/08 at 18:00), hiatal hernia, CAD s/p CABG (2005), R renal txp (on tacro), Obesity, DM2 (A1C 6.7), HTN, HLD, BPH, CLL, MGUS, admitted on 12/03/21 with  R pleural effusion, failed bedside thoracentesis, s/p R pigtail placement by IR that was taken out 12/09. Patient is also with chronic LE edema and  pulmonary HTN  undergoing IV lasix diuresis in hospital.  Given patient's symptoms and to further guide medical management, patient is now referred for RHC with thermodilution via R IJ.    ROS +  b/l LE  edema extending up to thigs, uses 3 pillows at night, + SORIA with minimal activity. He denies syncope, palpitations,  chest pain, cough, fever, night sweats.    Outpatient NST a few weeks ago - negative for ischemia    ECHO TTE 10/28/21  1. Normal left ventricular size.   2. Mild symmetric left ventricular hypertrophy.   3. Normal left ventricular systolic function.   4. Grade II left ventricular diastolic dysfunction.   5. Mildly dilated right ventricular size.   6. Reduced right ventricular systolic function.   7. Biatrial enlargement.   8. Trace aortic regurgitation.   9. Aortic sclerosis without significant stenosis.  10. Pulmonary artery systolic pressure is 50 mmHg.  11. Trivial pericardial effusion.  12. Compared to the previous TTE performed on 7/8/2021, the PASP is higher. Otherwise no significant change.      PMH:  Barretts esophagus     Benign prostatic hyperplasia, presence of lower urinary tract symptoms unspecified, unspecified morphology     CRI (chronic renal insufficiency)     DM (diabetes mellitus)     GERD (gastroesophageal reflux disease)     HTN (hypertension)     Hyperlipidemia     Mandibular abscess     Monoclonal gammopathy     Other hyperlipidemia     Paroxysmal atrial fibrillation.     PAST SURGICAL HISTORY:  History of surgery cyst removal    Mandibular abscess     S/P CABG (coronary artery bypass graft) 15 years.      ALL: NKDA, NKFA. Denies shellfish/Contrast dye allergy.  SocHX: Denies EtoH/TOB/IVDU  FHx: [facility] MEDS:   acetaminophen     Tablet .. 650 milliGRAM(s) Oral every 6 hours PRN  ALPRAZolam 0.25 milliGRAM(s) Oral three times a day PRN  amLODIPine   Tablet 10 milliGRAM(s) Oral daily  atorvastatin 40 milliGRAM(s) Oral at bedtime  dextrose 40% Gel 15 Gram(s) Oral once  dextrose 5%. 1000 milliLiter(s) IV Continuous <Continuous>  dextrose 5%. 1000 milliLiter(s) IV Continuous <Continuous>  dextrose 50% Injectable 25 Gram(s) IV Push once  dextrose 50% Injectable 12.5 Gram(s) IV Push once  dextrose 50% Injectable 25 Gram(s) IV Push once  furosemide   Injectable 40 milliGRAM(s) IV Push daily  glucagon  Injectable 1 milliGRAM(s) IntraMuscular once  insulin glargine Injectable (LANTUS) 25 Unit(s) SubCutaneous at bedtime  insulin lispro (ADMELOG) corrective regimen sliding scale   SubCutaneous three times a day before meals  insulin lispro (ADMELOG) corrective regimen sliding scale   SubCutaneous at bedtime  insulin lispro Injectable (ADMELOG) 15 Unit(s) SubCutaneous three times a day before meals  iron sucrose IVPB 200 milliGRAM(s) IV Intermittent every 24 hours  isosorbide   mononitrate ER Tablet (IMDUR) 30 milliGRAM(s) Oral daily  oxycodone    5 mG/acetaminophen 325 mG 1 Tablet(s) Oral every 6 hours PRN  pantoprazole    Tablet 40 milliGRAM(s) Oral before breakfast  tacrolimus 2 milliGRAM(s) Oral <User Schedule>  tacrolimus 1 milliGRAM(s) Oral <User Schedule>  tamsulosin 0.4 milliGRAM(s) Oral at bedtime  valGANciclovir 450 milliGRAM(s) Oral every 24 hours    T(C): 36.5 (12-09-21 @ 06:37), Max: 36.7 (12-08-21 @ 22:40)  HR: 65 (12-09-21 @ 06:37) (65 - 81)  BP: 122/73 (12-09-21 @ 06:37) (122/73 - 135/80)  RR: 18 (12-09-21 @ 06:37) (18 - 18)  SpO2: 98% (12-09-21 @ 06:37) (96% - 98%)    General: NAD; speaking in full sentences  HEENT: NC/AT; PERRL; EOMI; MMM  Neck: supple; no JVD  Cardiac: irreg irreg S1/S2  Pulm: +crackles at bases bilaterally, R>L, R chest tube out dressing d/c/i  GI: soft, +distended, +tympanic, NT, +BSx4  Extremities: WWP; 3+ pitting edema from ankle to knee  Vasc: 2+ radial, DP pulses B/L 1+  Neuro: AAOx3; no focal deficits      EKG 12/3/21: A fib  VR at 90 b/m                        8.8    15.61 )-----------( 150      ( 09 Dec 2021 07:06 )             30.2     12-09    128<L>  |  91<L>  |  46<H>  ----------------------------<  158<H>  4.6   |  26  |  1.48<H>    Ca    9.2      09 Dec 2021 07:06  Phos  4.5     12-09  Mg     2.2     12-09          EKG 12/03/21 : A fib VR 90    				  ASA III_____				Mallampati class: _IV________	            Anginal Class: _I________    A/P:  72 yo male PMHx AF (Eliquis, last dose 12/1/21, has been on lovenox in hospital,  last lovenox 12/08 at 18:00), hiatal hernia, CAD s/p CABG (2005), R renal txp (on tacro), Obesity, DM2 (A1C 6.7), HTN, HLD, BPH, CLL, MGUS, admitted on 12/03/21 with  R pleural effusion, failed bedside thoracentesis, s/p R pigtail placement by IR that was taken out 12/09. Patient is also with chronic LE edema, pulmonary HTN and hypoxia.   Given patient's symptoms and to further guide medical management, patient is now referred for RHC with thermodilution via R IJ.    for RHC with thermodilution   consented, consent in chart      Sedation Plan: none to  possible moderate sedation  Patient Is Suitable Candidate For Sedation: yes    Risks & benefits of procedure and sedation and risks and benefits for the alternative therapy have been explained to the patient in layman’s terms including but not limited to: allergic reaction, bleeding, infection, arrhythmia, respiratory compromise, renal and vascular compromise, limb damage, MI, CVA, emergent CABG/Vascular Surgery and death. Informed consent obtained and in chart.

## 2021-12-09 NOTE — PROGRESS NOTE ADULT - ATTENDING COMMENTS
I: HTN controlled. HGB 9..  Stable creatinine. Hyponatremia.   A: Anemia stable. Stable CKD. Likely SIAD.   P: Continue BP meds. Follow SCr. Follow HGB. Fluid restrict.
cont diuresis for fluid overload --? etiology of edema   consider abd sono r/o cirrhosis  hypervolemic hyponatremia-- free water restrict, diurese, and follow na-- not sure ure-na needed for hypervolemic hyponatremia though may add some osmolar diuresis so ok for now
continue diuresis  fluid restrict 1 L   follow lytes , volume
creat slt up with diuresis but acceptable-- still very overloaded   na had improved but falling   still significant edema  reinforced free water restriction   consider increase in lasix to BID
I: HTN uncontrolled. HGB 8.9.  Stable creatinine.   A: Anemia stable. Stable CKD.  P: Continue BP meds. Follow SCr. Follow HGB.
Pt seen on rounds this afternoon post cath, which apparently showed mild pulmonary hypertension.  Exam still notable for 3+ lower extremity edema, though improved.  Glucoses haris somewhat overnight (140-to 157) with 25 Lantus, but remained stable in mid-100 range today while he was NPO.  We pointed out to him that "Lantus does work for him," but increased dose to 30 units for tonight  Will leave the premeal at 15 units, but it is possible that he needs even less than this  Discussed diet and Trulicity therapy, with suggestion that the GLP-1 be restarted--prob as outpatient.   His abdominal discomfort from the drug is mostly if he tries to "eat through" the mild nausea and satiety.  Agree with Dr. Finney that the Lasix should be increased back to 40 mg BID.  A rise in creatinine is expected with his diuresis since the pre-diuresis creatinine was "diluted" by the volume overload

## 2021-12-09 NOTE — PROGRESS NOTE ADULT - SUBJECTIVE AND OBJECTIVE BOX
Interventional Cardiology PA SDA Discharge Note    s/p RHC with thermodilution 12/09/21:     Patient without complaints.  hypoxic to 83 % on RA in Holding area post RHC --> NC O2 placed   rest of VSS    Ext:    R IJ catheter out, dressing clean/dry/intact, area without hematoma         A/P:  72 yo male PMHx AF (Eliquis, last dose 12/1/21, has been on lovenox in hospital,  last lovenox 12/08 at 18:00), hiatal hernia, CAD s/p CABG (2005), R renal txp (on tacro), Obesity, DM2 (A1C 6.7), HTN, HLD, BPH, CLL, MGUS, admitted on 12/03/21 with  R pleural effusion, failed bedside thoracentesis, s/p R pigtail placement by IR that was taken out 12/09. Patient is also with chronic LE edema, pulmonary HTN and hypoxia.   Given patient's symptoms and to further guide medical management, patient is now s/p  RHC  with findings as above.  Best rest for 1 hours in cath lab holding area prior to going  back to floor.  Can resume AC tonight as d/w Dr Ma.                   Interventional Cardiology PA SDA Discharge Note    s/p RHC  12/09/21 via RIJ :    RAP - 5mmHg  RV - 47/0, EDP 6mmHg  PA - 45/24, mPAP 33mmHg  PAWP - 18mmHg, with V waves to 27mmHg (confirmed with PA sat of 93%)  CO (thermodilution) - 5.27/2.52  CO (assumed Carloz) - 6.43/3.07  TPG - 15mmHg  DPG - 6mmHg  PVR (Td) - 2.84 BARRETT    Mild pulmonary hypertension with normal PVR, normal CO with mildly decreased CI, and associated elevated PAWP with significant V waves. Overall represents precapillary pulmonary hypertension, notably hypoxic throughout procedure intermittently. High suspicion for JAYANT which may also contribute to pulmonary hypertension. Requires oxygen titration as he is hypoxic at rest and home sleep study to assess for JAYANT.      Patient without complaints.  hypoxic to 83 % on RA in Holding area post RHC --> NC O2 placed   rest of VSS    Ext:    R IJ catheter out, dressing clean/dry/intact, area without hematoma         A/P:  72 yo male PMHx AF (Eliquis, last dose 12/1/21, has been on lovenox in hospital,  last lovenox 12/08 at 18:00), hiatal hernia, CAD s/p CABG (2005), R renal txp (on tacro), Obesity, DM2 (A1C 6.7), HTN, HLD, BPH, CLL, MGUS, admitted on 12/03/21 with  R pleural effusion, failed bedside thoracentesis, s/p R pigtail placement by IR that was taken out 12/09. Patient is also with chronic LE edema, pulmonary HTN and hypoxia.   Given patient's symptoms and to further guide medical management, patient is now s/p  RHC  with findings as above.  Best rest for 1 hours in cath lab holding area prior to going  back to floor.  Can resume AC tonight as d/w Dr Ma.

## 2021-12-09 NOTE — PROGRESS NOTE ADULT - ASSESSMENT
70 yo male PMHx ESRD s/p transplant, AF (Eliquis), hiatal hernia, CAD s/p CABG (2005), Obesity, DM2 (A1C 6.7), HTN, HLD, BPH, CLL, MGUS p/w volume overload. Renal consulted for transplant and volume management    #ESRD s/p transplant 2017  Tacrolimus 2mg qAM and 1mg qHS; on Valcyte  -Creat at baseline    #Hypervolemia  Oxygen status improved   -consider RUQ US r/o cirrhosis   -pending UA, urine sodium, urine creatinine and urea nitrogen + Urine protein/creatinine ratio to r/o NS; albumin wnl  -strict I/O's   -Fluid restrict to 1L of free water per day, re-iterated   -Agree with IV lasix 40mg daily     #Hyponatremia unclear etiology   -Check urine as listed above  -likely related to hypervolemia  -stop Urena 15BID     #BP  -normotensive    #Anemia  - %sat 18, recommend IV iron 200mg daily x5d and EPO 10k u subQ weekly     Thank you for the opportunity to participate in the care of your patient. The nephrology service remains available to assist with any questions or concerns. Please feel free to reach us by paging the on-call nephrology fellow for urgent issues or as below.     Toney Marshall M.D.   PGY-5, Nephrology Fellow   C: [phone]   P: [phone]

## 2021-12-09 NOTE — PROGRESS NOTE ADULT - SUBJECTIVE AND OBJECTIVE BOX
INTERVAL HPI/OVERNIGHT EVENTS:    Patient is a 71y old  Male who presents with a chief complaint of dyspnea on exertion (09 Dec 2021 08:20)      Pt reports the following symptoms:    CONSTITUTIONAL:  Negative fever or chills, feels well, good appetite  EYES:  Negative  blurry vision or double vision  CARDIOVASCULAR:  Negative for chest pain or palpitations  RESPIRATORY:  Negative for cough, wheezing, or SOB   GASTROINTESTINAL:  Negative for nausea, vomiting, diarrhea, constipation, or abdominal pain  GENITOURINARY:  Negative frequency, urgency or dysuria  NEUROLOGIC:  No headache, confusion, dizziness, lightheadedness    MEDICATIONS  (STANDING):  amLODIPine   Tablet 10 milliGRAM(s) Oral daily  atorvastatin 40 milliGRAM(s) Oral at bedtime  dextrose 40% Gel 15 Gram(s) Oral once  dextrose 5%. 1000 milliLiter(s) (50 mL/Hr) IV Continuous <Continuous>  dextrose 5%. 1000 milliLiter(s) (100 mL/Hr) IV Continuous <Continuous>  dextrose 50% Injectable 25 Gram(s) IV Push once  dextrose 50% Injectable 12.5 Gram(s) IV Push once  dextrose 50% Injectable 25 Gram(s) IV Push once  furosemide   Injectable 40 milliGRAM(s) IV Push daily  glucagon  Injectable 1 milliGRAM(s) IntraMuscular once  insulin glargine Injectable (LANTUS) 25 Unit(s) SubCutaneous at bedtime  insulin lispro (ADMELOG) corrective regimen sliding scale   SubCutaneous three times a day before meals  insulin lispro (ADMELOG) corrective regimen sliding scale   SubCutaneous at bedtime  insulin lispro Injectable (ADMELOG) 15 Unit(s) SubCutaneous three times a day before meals  iron sucrose IVPB 200 milliGRAM(s) IV Intermittent every 24 hours  isosorbide   mononitrate ER Tablet (IMDUR) 30 milliGRAM(s) Oral daily  pantoprazole    Tablet 40 milliGRAM(s) Oral before breakfast  tacrolimus 2 milliGRAM(s) Oral <User Schedule>  tacrolimus 1 milliGRAM(s) Oral <User Schedule>  tamsulosin 0.4 milliGRAM(s) Oral at bedtime  valGANciclovir 450 milliGRAM(s) Oral every 24 hours    MEDICATIONS  (PRN):  acetaminophen     Tablet .. 650 milliGRAM(s) Oral every 6 hours PRN Mild Pain (1 - 3)  ALPRAZolam 0.25 milliGRAM(s) Oral three times a day PRN anxiety  oxycodone    5 mG/acetaminophen 325 mG 1 Tablet(s) Oral every 6 hours PRN Moderate Pain (4 - 6)      PHYSICAL EXAM  Vital Signs Last 24 Hrs  T(C): 36.5 (09 Dec 2021 06:37), Max: 36.7 (08 Dec 2021 22:40)  T(F): 97.7 (09 Dec 2021 06:37), Max: 98 (08 Dec 2021 22:40)  HR: 65 (09 Dec 2021 06:37) (65 - 81)  BP: 122/73 (09 Dec 2021 06:37) (122/73 - 135/80)  BP(mean): --  RR: 18 (09 Dec 2021 06:37) (18 - 18)  SpO2: 98% (09 Dec 2021 06:37) (96% - 98%)    Constitutional: wn/wd in NAD.   HEENT: NCAT, MMM, OP clear, EOMI, , no proptosis or lid retraction  Neck: no thyromegaly or palpable thyroid nodules   Respiratory: lungs CTAB.  Cardiovascular: regular rhythm, normal S1 and S2, no audible murmurs, no peripheral edema  GI: soft, NT/ND, no masses/HSM appreciated.  Neurology: no tremors, DTR 2+  Skin: no visible rashes/lesions  Psychiatric: AAO x 3, normal affect/mood.    LABS:                        8.8    15.61 )-----------( 150      ( 09 Dec 2021 07:06 )             30.2     12-    128<L>  |  91<L>  |  46<H>  ----------------------------<  158<H>  4.6   |  26  |  1.48<H>    Ca    9.2      09 Dec 2021 07:06  Phos  4.5     12-  Mg     2.2     12-09      PT/INR - ( 09 Dec 2021 07:06 )   PT: 14.2 sec;   INR: 1.19          PTT - ( 09 Dec 2021 07:06 )  PTT:44.0 sec  Urinalysis Basic - ( 07 Dec 2021 15:57 )    Color: Yellow / Appearance: Clear / S.015 / pH: x  Gluc: x / Ketone: NEGATIVE  / Bili: Negative / Urobili: 0.2 E.U./dL   Blood: x / Protein: NEGATIVE mg/dL / Nitrite: NEGATIVE   Leuk Esterase: NEGATIVE / RBC: x / WBC x   Sq Epi: x / Non Sq Epi: x / Bacteria: x      Thyroid Stimulating Hormone, Serum: 0.932 uIU/mL ( @ 06:23)      HbA1C:         Insulin Sliding Scale requirements X 24 Hours:      RADIOLOGY & ADDITIONAL TESTS:      A/P:72 yo male PMHx AF (Eliquis), hiatal hernia, CAD s/p CABG (), R renal txp (tacro), Obesity, DM2 (A1C 6.7), HTN, HLD, BPH, CLL, MGUS, referred to the ED by Dr Leyva for possible thoracentesis for R pleural effusion, failed bedside thoracentesis, now s/p R pigtail placement by IR and requiring further management.    A1C: 6.8%  Wt:101.7kg  Cr: 1.4  GFR:51    1.  DM type 2-  appears controlled but may have frequent lows at home.   Please continue lantus    units at night.    Please continue lispro      units before each meal.    Please continue lispro moderate dose sliding scale four times daily with meals and at bedtime  Continue with consistent carb diet, advised to order sufficient carb at each meal    Pt's fingerstick glucose goal is 100-180    Will continue to monitor     For discharge, pt can continue    Pt can follow up at discharge with Dr. Murillo  Will discuss case with Dr. Valentin  and update primary team   INTERVAL HPI/OVERNIGHT EVENTS:    Patient is a 71y old  Male who presents with a chief complaint of dyspnea on exertion (09 Dec 2021 08:20)  Patient went for right heart cath today, was NPO today. Patient did not receive insulin for dinner last night.   Ate sandwich after cath today, no insulin given.     Pt reports the following symptoms:    CONSTITUTIONAL:  Negative fever or chills, feels well, good appetite  EYES:  Negative  blurry vision or double vision  CARDIOVASCULAR:  Negative for chest pain or palpitations  RESPIRATORY:  Negative for cough, wheezing, or SOB   GASTROINTESTINAL:  Negative for nausea, vomiting, diarrhea, constipation, or abdominal pain  GENITOURINARY:  Negative frequency, urgency or dysuria  NEUROLOGIC:  No headache, confusion, dizziness, lightheadedness    MEDICATIONS  (STANDING):  amLODIPine   Tablet 10 milliGRAM(s) Oral daily  atorvastatin 40 milliGRAM(s) Oral at bedtime  dextrose 40% Gel 15 Gram(s) Oral once  dextrose 5%. 1000 milliLiter(s) (50 mL/Hr) IV Continuous <Continuous>  dextrose 5%. 1000 milliLiter(s) (100 mL/Hr) IV Continuous <Continuous>  dextrose 50% Injectable 25 Gram(s) IV Push once  dextrose 50% Injectable 12.5 Gram(s) IV Push once  dextrose 50% Injectable 25 Gram(s) IV Push once  furosemide   Injectable 40 milliGRAM(s) IV Push daily  glucagon  Injectable 1 milliGRAM(s) IntraMuscular once  insulin glargine Injectable (LANTUS) 25 Unit(s) SubCutaneous at bedtime  insulin lispro (ADMELOG) corrective regimen sliding scale   SubCutaneous three times a day before meals  insulin lispro (ADMELOG) corrective regimen sliding scale   SubCutaneous at bedtime  insulin lispro Injectable (ADMELOG) 15 Unit(s) SubCutaneous three times a day before meals  iron sucrose IVPB 200 milliGRAM(s) IV Intermittent every 24 hours  isosorbide   mononitrate ER Tablet (IMDUR) 30 milliGRAM(s) Oral daily  pantoprazole    Tablet 40 milliGRAM(s) Oral before breakfast  tacrolimus 2 milliGRAM(s) Oral <User Schedule>  tacrolimus 1 milliGRAM(s) Oral <User Schedule>  tamsulosin 0.4 milliGRAM(s) Oral at bedtime  valGANciclovir 450 milliGRAM(s) Oral every 24 hours    MEDICATIONS  (PRN):  acetaminophen     Tablet .. 650 milliGRAM(s) Oral every 6 hours PRN Mild Pain (1 - 3)  ALPRAZolam 0.25 milliGRAM(s) Oral three times a day PRN anxiety  oxycodone    5 mG/acetaminophen 325 mG 1 Tablet(s) Oral every 6 hours PRN Moderate Pain (4 - 6)      PHYSICAL EXAM  Vital Signs Last 24 Hrs  T(C): 36.5 (09 Dec 2021 06:37), Max: 36.7 (08 Dec 2021 22:40)  T(F): 97.7 (09 Dec 2021 06:37), Max: 98 (08 Dec 2021 22:40)  HR: 65 (09 Dec 2021 06:37) (65 - 81)  BP: 122/73 (09 Dec 2021 06:37) (122/73 - 135/80)  BP(mean): --  RR: 18 (09 Dec 2021 06:37) (18 - 18)  SpO2: 98% (09 Dec 2021 06:37) (96% - 98%)    Constitutional: NAD.   HEENT: NCAT, MMM, OP clear, EOMI, , no proptosis or lid retraction  Neck: no thyromegaly or palpable thyroid nodules   Respiratory: lungs CTAB.  Cardiovascular: regular rhythm, normal S1 and S2, no audible murmurs, + peripheral edema  GI: soft, NT/ND, no masses/HSM appreciated.  Neurology: no tremors, DTR 2+  Skin: no visible rashes/lesions  Psychiatric: AAO x 3, normal affect/mood.    LABS:                        8.8    15.61 )-----------( 150      ( 09 Dec 2021 07:06 )             30.2     12-    128<L>  |  91<L>  |  46<H>  ----------------------------<  158<H>  4.6   |  26  |  1.48<H>    Ca    9.2      09 Dec 2021 07:06  Phos  4.5     12-  Mg     2.2     12-09      PT/INR - ( 09 Dec 2021 07:06 )   PT: 14.2 sec;   INR: 1.19          PTT - ( 09 Dec 2021 07:06 )  PTT:44.0 sec  Urinalysis Basic - ( 07 Dec 2021 15:57 )    Color: Yellow / Appearance: Clear / S.015 / pH: x  Gluc: x / Ketone: NEGATIVE  / Bili: Negative / Urobili: 0.2 E.U./dL   Blood: x / Protein: NEGATIVE mg/dL / Nitrite: NEGATIVE   Leuk Esterase: NEGATIVE / RBC: x / WBC x   Sq Epi: x / Non Sq Epi: x / Bacteria: x      Thyroid Stimulating Hormone, Serum: 0.932 uIU/mL ( @ 06:23)      HbA1C:         Insulin Sliding Scale requirements X 24 Hours:      RADIOLOGY & ADDITIONAL TESTS:      A/P:70 yo male PMHx AF (Eliquis), hiatal hernia, CAD s/p CABG (), R renal txp (tacro), Obesity, DM2 (A1C 6.7), HTN, HLD, BPH, CLL, MGUS, referred to the ED by Dr Leyva for possible thoracentesis for R pleural effusion, failed bedside thoracentesis, now s/p R pigtail placement by IR and requiring further management.    A1C: 6.8%  Wt:101.7kg  Cr: 1.4  GFR:51    1.  DM type 2-  appears controlled but may have frequent lows at home.   Please continue lantus  30  units at night.    Please continue lispro  15    units before each meal.    Please continue lispro moderate dose sliding scale four times daily with meals and at bedtime  Continue with consistent carb diet, advised to order sufficient carb at each meal    Pt's fingerstick glucose goal is 100-180    Will continue to monitor     For discharge, pt can continue    Can follow up with [facility] by calling [phone]   Will discuss case with Dr. Valentin  and update primary team

## 2021-12-09 NOTE — CHART NOTE - NSCHARTNOTEFT_GEN_A_CORE
69 yo male PMHx AF (Eliquis), hiatal hernia, CAD s/p CABG (2005), R renal txp (tacrolimus and valganciclovir), Obesity, DM2 (A1C 6.8 in 11/2021), HTN, HLD, CLL referred to the ED by Dr Leyva for thoracentesis with Dr Lora of R sided pleural effusion seen on 10/29 CT. Pt also has chronic leukocytosis 2/2 CLL diagnosis. Patient seen by Dr. Mishra on 12/01/21 and has been holing eliquis since then in anticipation for procedure. Pt was recently admitted to [facility] in July 2021, diagnosed w/ PNA discharged with supplemental O2 as needed and again in November 2021 where he underwent L sided deep cervical lymph node biopsy . Reports O2 between 90-95 at home and uses supplemental O2 if pulse ox read is low, but has not used it for SOB. Yesterday, he noted his SpO2 to be around 90% so he used 2L NC at home.  Patient states his LE edema has worsened, L>R, with edema extending up into his thigh. Pt reports that he always uses 3 pillows at night and he admits to SORIA with activity. He denies orthopnea, chest pain, cough, fever, night sweats, weight loss, nausea, diarrhea, dysuria, sick contacts. On 12/4 he presented to [facility] for planned thoracentesis with pulmonology. Procedure aborted after unable to aspirate pleural fluid. Thoracic surgery (Dr. Zhang) consulted for evaluation.     Plan:  Problem 1: R pleural effusion  - Case discussed with Dr. Zhang  - Patient is s/p attempted thoracentesis with inability to aspirate pleural fluid during procedure  - CT Chest completed, results reviewed with Dr. Zhang  - Recommend IR guided pleural pigtail catheter placement.   - Would send pleural fluid for cytology and culture.   - Thoracic Surgery team will continue to follow     I have reviewed clinical labs tests and reports, radiology tests and reports, as well as old patient medical records, and discussed with the refering physician.
Patient counseled extensively on the need to take Lantus 10U at bedtime by multiple providers. FS persistently in the 300s. Adamantly refusing to take any Lantus as he was told by his out-patient endocrinologist that it does not work for him. Patient instead requesting to have 30-40U of Lispro TID, which is per patient closer to his home regimen of 24-32U. Amenable to receiving regular insulin, so ordered for 7U of regular insulin with the plan to re-check fingerstick to see if patient needs any additional insulin. Explained to patient that we will try to reach his out-patient endocrinologist in the AM.
Patient is a 69 yo male PMHx AF (Eliquis), hiatal hernia, CAD s/p CABG (2005), R renal txp (tacrolimus and valganciclovir), Obesity, DM2 (A1C 6.8 in 11/2021), HTN, HLD, CLL referred to the ED by Dr. Leyva for thoracentesis with Dr. Lora of R sided pleural effusion seen on 10/29 CT. Patient had a right chest tube placed on 12/6.     Output: 200ccs of fluid/24hrs    IR will continue to follow.
Patient is a 71 yo male PMHx AF (Eliquis), hiatal hernia, CAD s/p CABG (2005), R renal txp (tacrolimus and valganciclovir), Obesity, DM2 (A1C 6.8 in 11/2021), HTN, HLD, CLL referred to the ED by Dr. Leyva for thoracentesis with Dr. Lora of R sided pleural effusion seen on 10/29 CT. Patient had a right chest tube placed on 12/6.     Output: 260ccs of fluid/24hrs    IR will continue to follow.
Patient still insists on 40 units of insulin premeal tid. Discussed with patient extensively the need to use basal bolus for optimal glucose control and the risk involved with not adhering to basal bolus regimen but patient insists on taking 40 units insulin tid. Verbalizes understanding but says that basal bolus doesn't work for him. Coverage given using sliding scale over night with one time dose of 4 units of insulin based on .
RHC report:    RAP - 5mmHg  RV - 47/0, EDP 6mmHg  PA - 45/24, mPAP 33mmHg  PAWP - 18mmHg, with V waves to 27mmHg (confirmed with PA sat of 93%)  CO (thermodilution) - 5.27/2.52  CO (assumed Carloz) - 6.43/3.07  TPG - 15mmHg  DPG - 6mmHg  PVR (Td) - 2.84 BARRETT    Mild pulmonary hypertension with normal PVR, normal CO with mildly decreased CI, and associated elevated PAWP with significant V waves. Overall represents precapillary pulmonary hypertension, notably hypoxic throughout procedure intermittently. High suspicion for JAYANT which may also contribute to pulmonary hypertension. Requires oxygen titration as he is hypoxic at rest and home sleep study to assess for JAYANT.
Infectious Diseases Anti-infective Approval Note    Medication: valganciclovir  Dose*: 450mg  Route: PO  Frequency: daily  Duration**: 2d--primary team to reconcile indication with transplant nephrologist on Monday    *Dose may be adjusted as needed for alterations in renal function.    **Reflects duration of approval and not necessarily duration of therapy     THIS IS NOT AN INFECTIOUS DISEASES CONSULTATION

## 2021-12-09 NOTE — PROGRESS NOTE ADULT - SUBJECTIVE AND OBJECTIVE BOX
** INCOMPLETE **    OVERNIGHT EVENTS:     SUBJECTIVE:    VITAL SIGNS:  Vital Signs Last 24 Hrs  T(C): 36.5 (09 Dec 2021 06:37), Max: 36.7 (08 Dec 2021 22:40)  T(F): 97.7 (09 Dec 2021 06:37), Max: 98 (08 Dec 2021 22:40)  HR: 65 (09 Dec 2021 06:37) (65 - 81)  BP: 122/73 (09 Dec 2021 06:37) (122/73 - 135/80)  BP(mean): --  RR: 18 (09 Dec 2021 06:37) (17 - 18)  SpO2: 98% (09 Dec 2021 06:37) (96% - 98%)    PHYSICAL EXAM:  General: NAD; speaking in full sentences  HEENT: NC/AT; PERRL; EOMI; MMM  Neck: supple; no JVD  Cardiac: RRR; +S1/S2  Pulm: CTA B/L; no W/R/R  GI: soft, NT/ND, +BS  Extremities: WWP; no edema, clubbing or cyanosis  Vasc: 2+ radial, DP pulses B/L  Neuro: AAOx3; no focal deficits    MEDICATIONS:  MEDICATIONS  (STANDING):  amLODIPine   Tablet 10 milliGRAM(s) Oral daily  atorvastatin 40 milliGRAM(s) Oral at bedtime  dextrose 40% Gel 15 Gram(s) Oral once  dextrose 5%. 1000 milliLiter(s) (50 mL/Hr) IV Continuous <Continuous>  dextrose 5%. 1000 milliLiter(s) (100 mL/Hr) IV Continuous <Continuous>  dextrose 50% Injectable 25 Gram(s) IV Push once  dextrose 50% Injectable 12.5 Gram(s) IV Push once  dextrose 50% Injectable 25 Gram(s) IV Push once  furosemide   Injectable 40 milliGRAM(s) IV Push daily  glucagon  Injectable 1 milliGRAM(s) IntraMuscular once  insulin glargine Injectable (LANTUS) 25 Unit(s) SubCutaneous at bedtime  insulin lispro (ADMELOG) corrective regimen sliding scale   SubCutaneous three times a day before meals  insulin lispro (ADMELOG) corrective regimen sliding scale   SubCutaneous at bedtime  insulin lispro Injectable (ADMELOG) 15 Unit(s) SubCutaneous three times a day before meals  iron sucrose IVPB 200 milliGRAM(s) IV Intermittent every 24 hours  isosorbide   mononitrate ER Tablet (IMDUR) 30 milliGRAM(s) Oral daily  pantoprazole    Tablet 40 milliGRAM(s) Oral before breakfast  tacrolimus 2 milliGRAM(s) Oral <User Schedule>  tacrolimus 1 milliGRAM(s) Oral <User Schedule>  tamsulosin 0.4 milliGRAM(s) Oral at bedtime  valGANciclovir 450 milliGRAM(s) Oral every 24 hours    MEDICATIONS  (PRN):  acetaminophen     Tablet .. 650 milliGRAM(s) Oral every 6 hours PRN Mild Pain (1 - 3)  ALPRAZolam 0.25 milliGRAM(s) Oral three times a day PRN anxiety  oxycodone    5 mG/acetaminophen 325 mG 1 Tablet(s) Oral every 6 hours PRN Moderate Pain (4 - 6)      ALLERGIES:  Allergies    No Known Allergies    Intolerances        LABS:                        8.8    15.61 )-----------( 150      ( 09 Dec 2021 07:06 )             30.2     12-09    128<L>  |  91<L>  |  46<H>  ----------------------------<  158<H>  4.6   |  26  |  1.48<H>    Ca    9.2      09 Dec 2021 07:06  Phos  4.5     12-09  Mg     2.2     12-09      PT/INR - ( 09 Dec 2021 07:06 )   PT: 14.2 sec;   INR: 1.19          PTT - ( 09 Dec 2021 07:06 )  PTT:44.0 sec    RADIOLOGY & ADDITIONAL TESTS: Reviewed. OVERNIGHT EVENTS:   No acute events overnight.     SUBJECTIVE:  Patient seen and examined at bedside, resting comfortably in bed, and does not appear to be in any acute distress. Patient states the chest tube is giving him significant discomfort and would like it removed as soon as possible. Patient denies any recent fevers, chills, nausea, vomiting, headache, acute sob, chest pain, abdominal pain, genitourinary sx, extremity pain or swelling.     VITAL SIGNS:  Vital Signs Last 24 Hrs  T(C): 36.5 (09 Dec 2021 06:37), Max: 36.7 (08 Dec 2021 22:40)  T(F): 97.7 (09 Dec 2021 06:37), Max: 98 (08 Dec 2021 22:40)  HR: 65 (09 Dec 2021 06:37) (65 - 81)  BP: 122/73 (09 Dec 2021 06:37) (122/73 - 135/80)  BP(mean): --  RR: 18 (09 Dec 2021 06:37) (17 - 18)  SpO2: 98% (09 Dec 2021 06:37) (96% - 98%)    PHYSICAL EXAM:  General: NAD; speaking in full sentences  HEENT: NC/AT; PERRL; EOMI; MMM  Neck: supple; no JVD  Cardiac: RRR; +S1/S2  Pulm: +crackles at bases bilaterally, R pigtail in place, no drainage overnight  GI: soft, +distended, +tympanic, NT, +BSx4  Extremities: WWP; 3+ pitting edema from ankle to knee  Vasc: 2+ radial, DP pulses B/L  Neuro: AAOx3; no focal deficits    MEDICATIONS:  MEDICATIONS  (STANDING):  amLODIPine   Tablet 10 milliGRAM(s) Oral daily  atorvastatin 40 milliGRAM(s) Oral at bedtime  dextrose 40% Gel 15 Gram(s) Oral once  dextrose 5%. 1000 milliLiter(s) (50 mL/Hr) IV Continuous <Continuous>  dextrose 5%. 1000 milliLiter(s) (100 mL/Hr) IV Continuous <Continuous>  dextrose 50% Injectable 25 Gram(s) IV Push once  dextrose 50% Injectable 12.5 Gram(s) IV Push once  dextrose 50% Injectable 25 Gram(s) IV Push once  furosemide   Injectable 40 milliGRAM(s) IV Push daily  glucagon  Injectable 1 milliGRAM(s) IntraMuscular once  insulin glargine Injectable (LANTUS) 25 Unit(s) SubCutaneous at bedtime  insulin lispro (ADMELOG) corrective regimen sliding scale   SubCutaneous three times a day before meals  insulin lispro (ADMELOG) corrective regimen sliding scale   SubCutaneous at bedtime  insulin lispro Injectable (ADMELOG) 15 Unit(s) SubCutaneous three times a day before meals  iron sucrose IVPB 200 milliGRAM(s) IV Intermittent every 24 hours  isosorbide   mononitrate ER Tablet (IMDUR) 30 milliGRAM(s) Oral daily  pantoprazole    Tablet 40 milliGRAM(s) Oral before breakfast  tacrolimus 2 milliGRAM(s) Oral <User Schedule>  tacrolimus 1 milliGRAM(s) Oral <User Schedule>  tamsulosin 0.4 milliGRAM(s) Oral at bedtime  valGANciclovir 450 milliGRAM(s) Oral every 24 hours    MEDICATIONS  (PRN):  acetaminophen     Tablet .. 650 milliGRAM(s) Oral every 6 hours PRN Mild Pain (1 - 3)  ALPRAZolam 0.25 milliGRAM(s) Oral three times a day PRN anxiety  oxycodone    5 mG/acetaminophen 325 mG 1 Tablet(s) Oral every 6 hours PRN Moderate Pain (4 - 6)      ALLERGIES:  Allergies    No Known Allergies    Intolerances        LABS:                        8.8    15.61 )-----------( 150      ( 09 Dec 2021 07:06 )             30.2     12-09    128<L>  |  91<L>  |  46<H>  ----------------------------<  158<H>  4.6   |  26  |  1.48<H>    Ca    9.2      09 Dec 2021 07:06  Phos  4.5     12-09  Mg     2.2     12-09      PT/INR - ( 09 Dec 2021 07:06 )   PT: 14.2 sec;   INR: 1.19          PTT - ( 09 Dec 2021 07:06 )  PTT:44.0 sec    RADIOLOGY & ADDITIONAL TESTS: Reviewed.

## 2021-12-09 NOTE — CHART NOTE - NSCHARTNOTESELECT_GEN_ALL_CORE
Anti-infective approval/Event Note
Event Note
Thoracic Surgery/Off Service Note

## 2021-12-09 NOTE — PROGRESS NOTE ADULT - PROBLEM SELECTOR PLAN 8
Pt with Hx of T2DM. At home, patient uses sliding scale require 20-40 units before each meal. HbA1c in November 2021 6.8.  -c/w ISS   -continue to monitor FSG and adjust insulin regimen accordingly  -Increased Lantus to 15U Pt with Hx of T2DM. At home, patient uses sliding scale require 20-40 units before each meal. HbA1c in November 2021 6.8.  -c/w mISS   -Endo consulted - f/u recs  -Lantus 25U/Lispro 15U

## 2021-12-09 NOTE — PROGRESS NOTE ADULT - SUBJECTIVE AND OBJECTIVE BOX
Patient is a 71y Male seen and evaluated at bedside.   Cr is stable  BP is acceptable  diuresing  K now improved   Na slightly lower        Meds:    acetaminophen     Tablet .. 650 every 6 hours PRN  ALPRAZolam 0.25 three times a day PRN  amLODIPine   Tablet 10 daily  atorvastatin 40 at bedtime  dextrose 40% Gel 15 once  dextrose 5%. 1000 <Continuous>  dextrose 5%. 1000 <Continuous>  dextrose 50% Injectable 25 once  dextrose 50% Injectable 12.5 once  dextrose 50% Injectable 25 once  furosemide   Injectable 40 daily  glucagon  Injectable 1 once  insulin glargine Injectable (LANTUS) 25 at bedtime  insulin lispro (ADMELOG) corrective regimen sliding scale  three times a day before meals  insulin lispro (ADMELOG) corrective regimen sliding scale  at bedtime  insulin lispro Injectable (ADMELOG) 15 three times a day before meals  iron sucrose IVPB 200 every 24 hours  isosorbide   mononitrate ER Tablet (IMDUR) 30 daily  oxycodone    5 mG/acetaminophen 325 mG 1 every 6 hours PRN  pantoprazole    Tablet 40 before breakfast  tacrolimus 2 <User Schedule>  tacrolimus 1 <User Schedule>  tamsulosin 0.4 at bedtime  valGANciclovir 450 every 24 hours      T(C): , Max: 36.7 (21 @ 22:40)  T(F): , Max: 98.1 (21 @ 14:34)  HR: 70 (21 @ 14:34)  BP: 133/74 (21 @ 14:34)  BP(mean): --  RR: 17 (21 @ 14:34)  SpO2: 95% (21 @ 14:34)  Wt(kg): --     @ 07:01  -   @ 07:00  --------------------------------------------------------  IN: 0 mL / OUT: 260 mL / NET: -260 mL        Review of Systems:  ROS negative except as per HPI      PHYSICAL EXAM:  GENERAL: Alert, awake, oriented x3 in NAD on 1l NC O2   HEENT: neck supple, no JVP  CHEST/LUNG: CTAB, +chest tube   HEART: Regular rate and rhythm, no murmur  ABDOMEN: Soft, nontender, non distended  EXTREMITIES: +1-2 pitting edema to thighs        LABS:                        8.8    15.61 )-----------( 150      ( 09 Dec 2021 07:06 )             30.2         128<L>  |  91<L>  |  46<H>  ----------------------------<  158<H>  4.6   |  26  |  1.48<H>    Ca    9.2      09 Dec 2021 07:06  Phos  4.5       Mg     2.2             PT/INR - ( 09 Dec 2021 07:06 )   PT: 14.2 sec;   INR: 1.19          PTT - ( 09 Dec 2021 07:06 )  PTT:44.0 sec  Urinalysis Basic - ( 07 Dec 2021 15:57 )    Color: Yellow / Appearance: Clear / S.015 / pH: x  Gluc: x / Ketone: NEGATIVE  / Bili: Negative / Urobili: 0.2 E.U./dL   Blood: x / Protein: NEGATIVE mg/dL / Nitrite: NEGATIVE   Leuk Esterase: NEGATIVE / RBC: x / WBC x   Sq Epi: x / Non Sq Epi: x / Bacteria: x      Creatinine, Random Urine: 48 mg/dL ( @ 15:57)  Protein/Creatinine Ratio Calculation: Unable to calculate ( @ 15:57)  Sodium, Random Urine: 65 mmol/L ( @ 15:57)  Osmolality, Random Urine: 371 mosm/kg ( @ 15:57)        RADIOLOGY & ADDITIONAL STUDIES:           Patient is a 71y Male seen and evaluated at bedside.   Cr is stable  BP is acceptable  diuresing  K now improved   Na slightly lower        Meds:    acetaminophen     Tablet .. 650 every 6 hours PRN  ALPRAZolam 0.25 three times a day PRN  amLODIPine   Tablet 10 daily  atorvastatin 40 at bedtime  dextrose 40% Gel 15 once  dextrose 5%. 1000 <Continuous>  dextrose 5%. 1000 <Continuous>  dextrose 50% Injectable 25 once  dextrose 50% Injectable 12.5 once  dextrose 50% Injectable 25 once  furosemide   Injectable 40 daily  glucagon  Injectable 1 once  insulin glargine Injectable (LANTUS) 25 at bedtime  insulin lispro (ADMELOG) corrective regimen sliding scale  three times a day before meals  insulin lispro (ADMELOG) corrective regimen sliding scale  at bedtime  insulin lispro Injectable (ADMELOG) 15 three times a day before meals  iron sucrose IVPB 200 every 24 hours  isosorbide   mononitrate ER Tablet (IMDUR) 30 daily  oxycodone    5 mG/acetaminophen 325 mG 1 every 6 hours PRN  pantoprazole    Tablet 40 before breakfast  tacrolimus 2 <User Schedule>  tacrolimus 1 <User Schedule>  tamsulosin 0.4 at bedtime  valGANciclovir 450 every 24 hours      T(C): , Max: 36.7 (21 @ 22:40)  T(F): , Max: 98.1 (21 @ 14:34)  HR: 70 (21 @ 14:34)  BP: 133/74 (21 @ 14:34)  BP(mean): --  RR: 17 (21 @ 14:34)  SpO2: 95% (21 @ 14:34)  Wt(kg): --     @ 07:01  -   @ 07:00  --------------------------------------------------------  IN: 0 mL / OUT: 260 mL / NET: -260 mL        Review of Systems:  ROS negative except as per HPI      PHYSICAL EXAM:  GENERAL: Alert, awake, oriented x3 in NAD on 1l NC O2   HEENT: neck supple, no JVP  CHEST/LUNG: CTAB, +chest tube   HEART: Regular rate and rhythm, no murmur  ABDOMEN: Soft, nontender, non distended  EXTREMITIES: +2-3 pitting edema to thighs        LABS:                        8.8    15.61 )-----------( 150      ( 09 Dec 2021 07:06 )             30.2         128<L>  |  91<L>  |  46<H>  ----------------------------<  158<H>  4.6   |  26  |  1.48<H>    Ca    9.2      09 Dec 2021 07:06  Phos  4.5       Mg     2.2             PT/INR - ( 09 Dec 2021 07:06 )   PT: 14.2 sec;   INR: 1.19          PTT - ( 09 Dec 2021 07:06 )  PTT:44.0 sec  Urinalysis Basic - ( 07 Dec 2021 15:57 )    Color: Yellow / Appearance: Clear / S.015 / pH: x  Gluc: x / Ketone: NEGATIVE  / Bili: Negative / Urobili: 0.2 E.U./dL   Blood: x / Protein: NEGATIVE mg/dL / Nitrite: NEGATIVE   Leuk Esterase: NEGATIVE / RBC: x / WBC x   Sq Epi: x / Non Sq Epi: x / Bacteria: x      Creatinine, Random Urine: 48 mg/dL ( @ 15:57)  Protein/Creatinine Ratio Calculation: Unable to calculate ( @ 15:57)  Sodium, Random Urine: 65 mmol/L ( @ 15:57)  Osmolality, Random Urine: 371 mosm/kg ( @ 15:57)        RADIOLOGY & ADDITIONAL STUDIES:

## 2021-12-10 ENCOUNTER — TRANSCRIPTION ENCOUNTER (OUTPATIENT)
Age: 71
End: 2021-12-10

## 2021-12-10 VITALS — OXYGEN SATURATION: 98 % | RESPIRATION RATE: 18 BRPM

## 2021-12-10 DIAGNOSIS — E11.9 TYPE 2 DIABETES MELLITUS WITHOUT COMPLICATIONS: ICD-10-CM

## 2021-12-10 LAB
ANION GAP SERPL CALC-SCNC: 6 MMOL/L — SIGNIFICANT CHANGE UP (ref 5–17)
ANISOCYTOSIS BLD QL: SLIGHT — SIGNIFICANT CHANGE UP
APTT BLD: 45.7 SEC — HIGH (ref 27.5–35.5)
BASOPHILS # BLD AUTO: 0 K/UL — SIGNIFICANT CHANGE UP (ref 0–0.2)
BASOPHILS NFR BLD AUTO: 0 % — SIGNIFICANT CHANGE UP (ref 0–2)
BUN SERPL-MCNC: 40 MG/DL — HIGH (ref 7–23)
C1Q IMMUNE COMPLEX: <1.2 UG EQ/ML
C3D IMMUNE COMPLEXES: 7.8 UG EQ/ML
CALCIUM SERPL-MCNC: 9.2 MG/DL — SIGNIFICANT CHANGE UP (ref 8.4–10.5)
CHLORIDE SERPL-SCNC: 95 MMOL/L — LOW (ref 96–108)
CO2 SERPL-SCNC: 28 MMOL/L — SIGNIFICANT CHANGE UP (ref 22–31)
CREAT SERPL-MCNC: 1.38 MG/DL — HIGH (ref 0.5–1.3)
DACRYOCYTES BLD QL SMEAR: SLIGHT — SIGNIFICANT CHANGE UP
EOSINOPHIL # BLD AUTO: 0 K/UL — SIGNIFICANT CHANGE UP (ref 0–0.5)
EOSINOPHIL NFR BLD AUTO: 0 % — SIGNIFICANT CHANGE UP (ref 0–6)
GLUCOSE BLDC GLUCOMTR-MCNC: 271 MG/DL — HIGH (ref 70–99)
GLUCOSE SERPL-MCNC: 201 MG/DL — HIGH (ref 70–99)
HCT VFR BLD CALC: 30.4 % — LOW (ref 39–50)
HGB BLD-MCNC: 8.8 G/DL — LOW (ref 13–17)
HYPOCHROMIA BLD QL: SLIGHT — SIGNIFICANT CHANGE UP
INR BLD: 1.22 — HIGH (ref 0.88–1.16)
LG PLATELETS BLD QL AUTO: SLIGHT — SIGNIFICANT CHANGE UP
LYMPHOCYTES # BLD AUTO: 12.05 K/UL — HIGH (ref 1–3.3)
LYMPHOCYTES # BLD AUTO: 82 % — HIGH (ref 13–44)
MACROCYTES BLD QL: SLIGHT — SIGNIFICANT CHANGE UP
MAGNESIUM SERPL-MCNC: 2 MG/DL — SIGNIFICANT CHANGE UP (ref 1.6–2.6)
MANUAL SMEAR VERIFICATION: SIGNIFICANT CHANGE UP
MCHC RBC-ENTMCNC: 26.9 PG — LOW (ref 27–34)
MCHC RBC-ENTMCNC: 28.9 GM/DL — LOW (ref 32–36)
MCV RBC AUTO: 93 FL — SIGNIFICANT CHANGE UP (ref 80–100)
METHYLMALONATE SERPL-SCNC: 339 NMOL/L
MICROCYTES BLD QL: SLIGHT — SIGNIFICANT CHANGE UP
MONOCYTES # BLD AUTO: 0.29 K/UL — SIGNIFICANT CHANGE UP (ref 0–0.9)
MONOCYTES NFR BLD AUTO: 2 % — SIGNIFICANT CHANGE UP (ref 2–14)
NEUTROPHILS # BLD AUTO: 2.35 K/UL — SIGNIFICANT CHANGE UP (ref 1.8–7.4)
NEUTROPHILS NFR BLD AUTO: 16 % — LOW (ref 43–77)
NRBC # BLD: 0 /100 — SIGNIFICANT CHANGE UP (ref 0–0)
NRBC # BLD: SIGNIFICANT CHANGE UP /100 WBCS (ref 0–0)
OVALOCYTES BLD QL SMEAR: SLIGHT — SIGNIFICANT CHANGE UP
PHOSPHATE SERPL-MCNC: 4.3 MG/DL — SIGNIFICANT CHANGE UP (ref 2.5–4.5)
PLAT MORPH BLD: ABNORMAL
PLATELET # BLD AUTO: 140 K/UL — LOW (ref 150–400)
POIKILOCYTOSIS BLD QL AUTO: SLIGHT — SIGNIFICANT CHANGE UP
POLYCHROMASIA BLD QL SMEAR: SLIGHT — SIGNIFICANT CHANGE UP
POTASSIUM SERPL-MCNC: 4.9 MMOL/L — SIGNIFICANT CHANGE UP (ref 3.5–5.3)
POTASSIUM SERPL-SCNC: 4.9 MMOL/L — SIGNIFICANT CHANGE UP (ref 3.5–5.3)
PROTHROM AB SERPL-ACNC: 14.5 SEC — HIGH (ref 10.6–13.6)
RBC # BLD: 3.27 M/UL — LOW (ref 4.2–5.8)
RBC # FLD: 16.4 % — HIGH (ref 10.3–14.5)
RBC BLD AUTO: ABNORMAL
SODIUM SERPL-SCNC: 129 MMOL/L — LOW (ref 135–145)
SPHEROCYTES BLD QL SMEAR: SLIGHT — SIGNIFICANT CHANGE UP
WBC # BLD: 14.7 K/UL — HIGH (ref 3.8–10.5)
WBC # FLD AUTO: 14.7 K/UL — HIGH (ref 3.8–10.5)

## 2021-12-10 PROCEDURE — 99239 HOSP IP/OBS DSCHRG MGMT >30: CPT

## 2021-12-10 PROCEDURE — 99231 SBSQ HOSP IP/OBS SF/LOW 25: CPT

## 2021-12-10 RX ORDER — INSULIN LISPRO 100/ML
15 VIAL (ML) SUBCUTANEOUS
Qty: 10 | Refills: 2
Start: 2021-12-10 | End: 2022-03-09

## 2021-12-10 RX ORDER — FUROSEMIDE 40 MG
1 TABLET ORAL
Qty: 0 | Refills: 0 | DISCHARGE

## 2021-12-10 RX ORDER — INSULIN LISPRO 100/ML
15 VIAL (ML) SUBCUTANEOUS
Qty: 1350 | Refills: 0
Start: 2021-12-10 | End: 2022-01-08

## 2021-12-10 RX ORDER — APIXABAN 2.5 MG/1
5 TABLET, FILM COATED ORAL EVERY 12 HOURS
Refills: 0 | Status: DISCONTINUED | OUTPATIENT
Start: 2021-12-10 | End: 2021-12-10

## 2021-12-10 RX ORDER — ENOXAPARIN SODIUM 100 MG/ML
30 INJECTION SUBCUTANEOUS
Qty: 5 | Refills: 2
Start: 2021-12-10 | End: 2022-03-09

## 2021-12-10 RX ORDER — INSULIN LISPRO 100/ML
40 VIAL (ML) SUBCUTANEOUS
Qty: 0 | Refills: 0 | DISCHARGE

## 2021-12-10 RX ORDER — FUROSEMIDE 40 MG
1 TABLET ORAL
Qty: 60 | Refills: 0
Start: 2021-12-10 | End: 2022-01-08

## 2021-12-10 RX ORDER — FUROSEMIDE 40 MG
1 TABLET ORAL
Qty: 30 | Refills: 0
Start: 2021-12-10

## 2021-12-10 RX ADMIN — TACROLIMUS 2 MILLIGRAM(S): 5 CAPSULE ORAL at 09:01

## 2021-12-10 RX ADMIN — Medication 40 MILLIGRAM(S): at 06:24

## 2021-12-10 RX ADMIN — Medication 15 UNIT(S): at 08:11

## 2021-12-10 RX ADMIN — Medication 6: at 08:10

## 2021-12-10 RX ADMIN — AMLODIPINE BESYLATE 10 MILLIGRAM(S): 2.5 TABLET ORAL at 06:24

## 2021-12-10 RX ADMIN — VALGANCICLOVIR 450 MILLIGRAM(S): 450 TABLET, FILM COATED ORAL at 09:01

## 2021-12-10 RX ADMIN — APIXABAN 5 MILLIGRAM(S): 2.5 TABLET, FILM COATED ORAL at 09:01

## 2021-12-10 RX ADMIN — OXYCODONE AND ACETAMINOPHEN 1 TABLET(S): 5; 325 TABLET ORAL at 10:06

## 2021-12-10 RX ADMIN — PANTOPRAZOLE SODIUM 40 MILLIGRAM(S): 20 TABLET, DELAYED RELEASE ORAL at 06:24

## 2021-12-10 NOTE — PROGRESS NOTE ADULT - PROBLEM SELECTOR PLAN 8
Pt with Hx of T2DM. At home, patient uses sliding scale require 20-40 units before each meal. HbA1c in November 2021 6.8.  -c/w mISS   -Endo consulted - f/u recs  -Lantus 25U/Lispro 15U

## 2021-12-10 NOTE — PROGRESS NOTE ADULT - SUBJECTIVE AND OBJECTIVE BOX
** INCOMPLETE **    OVERNIGHT EVENTS:     SUBJECTIVE:    VITAL SIGNS:  Vital Signs Last 24 Hrs  T(C): 36.7 (09 Dec 2021 20:30), Max: 36.7 (09 Dec 2021 14:34)  T(F): 98.1 (09 Dec 2021 20:30), Max: 98.1 (09 Dec 2021 14:34)  HR: 77 (09 Dec 2021 20:30) (70 - 77)  BP: 136/68 (09 Dec 2021 20:30) (133/74 - 136/68)  BP(mean): --  RR: 18 (10 Dec 2021 09:39) (17 - 18)  SpO2: 98% (10 Dec 2021 09:39) (95% - 98%)    PHYSICAL EXAM:  General: NAD; speaking in full sentences  HEENT: NC/AT; PERRL; EOMI; MMM  Neck: supple; no JVD  Cardiac: RRR; +S1/S2  Pulm: CTA B/L; no W/R/R  GI: soft, NT/ND, +BS  Extremities: WWP; no edema, clubbing or cyanosis  Vasc: 2+ radial, DP pulses B/L  Neuro: AAOx3; no focal deficits    MEDICATIONS:  MEDICATIONS  (STANDING):  amLODIPine   Tablet 10 milliGRAM(s) Oral daily  apixaban 5 milliGRAM(s) Oral every 12 hours  atorvastatin 40 milliGRAM(s) Oral at bedtime  dextrose 40% Gel 15 Gram(s) Oral once  dextrose 5%. 1000 milliLiter(s) (50 mL/Hr) IV Continuous <Continuous>  dextrose 5%. 1000 milliLiter(s) (100 mL/Hr) IV Continuous <Continuous>  dextrose 50% Injectable 25 Gram(s) IV Push once  dextrose 50% Injectable 12.5 Gram(s) IV Push once  dextrose 50% Injectable 25 Gram(s) IV Push once  furosemide   Injectable 40 milliGRAM(s) IV Push daily  glucagon  Injectable 1 milliGRAM(s) IntraMuscular once  insulin glargine Injectable (LANTUS) 30 Unit(s) SubCutaneous at bedtime  insulin lispro (ADMELOG) corrective regimen sliding scale   SubCutaneous three times a day before meals  insulin lispro (ADMELOG) corrective regimen sliding scale   SubCutaneous at bedtime  insulin lispro Injectable (ADMELOG) 15 Unit(s) SubCutaneous three times a day before meals  iron sucrose IVPB 200 milliGRAM(s) IV Intermittent every 24 hours  isosorbide   mononitrate ER Tablet (IMDUR) 30 milliGRAM(s) Oral daily  pantoprazole    Tablet 40 milliGRAM(s) Oral before breakfast  tacrolimus 2 milliGRAM(s) Oral <User Schedule>  tacrolimus 1 milliGRAM(s) Oral <User Schedule>  tamsulosin 0.4 milliGRAM(s) Oral at bedtime  valGANciclovir 450 milliGRAM(s) Oral every 24 hours    MEDICATIONS  (PRN):  acetaminophen     Tablet .. 650 milliGRAM(s) Oral every 6 hours PRN Mild Pain (1 - 3)  ALPRAZolam 0.25 milliGRAM(s) Oral three times a day PRN anxiety  oxycodone    5 mG/acetaminophen 325 mG 1 Tablet(s) Oral every 6 hours PRN Moderate Pain (4 - 6)      ALLERGIES:  Allergies    No Known Allergies    Intolerances        LABS:                        8.8    14.70 )-----------( 140      ( 10 Dec 2021 05:56 )             30.4     12-10    129<L>  |  95<L>  |  40<H>  ----------------------------<  201<H>  4.9   |  28  |  1.38<H>    Ca    9.2      10 Dec 2021 05:56  Phos  4.3     12-10  Mg     2.0     12-10      PT/INR - ( 10 Dec 2021 05:56 )   PT: 14.5 sec;   INR: 1.22          PTT - ( 10 Dec 2021 05:56 )  PTT:45.7 sec    RADIOLOGY & ADDITIONAL TESTS: Reviewed. OVERNIGHT EVENTS:   No acute events overnight. Resumed Lovenox overnight.     SUBJECTIVE:  Patient seen and examined at bedside, resting comfortably in bed, and does not appear to be in any acute distress. Patient denies any recent fevers, chills, nausea, vomiting, headache, acute sob, chest pain, abdominal pain, genitourinary sx, extremity pain or swelling.     VITAL SIGNS:  Vital Signs Last 24 Hrs  T(C): 36.7 (09 Dec 2021 20:30), Max: 36.7 (09 Dec 2021 14:34)  T(F): 98.1 (09 Dec 2021 20:30), Max: 98.1 (09 Dec 2021 14:34)  HR: 77 (09 Dec 2021 20:30) (70 - 77)  BP: 136/68 (09 Dec 2021 20:30) (133/74 - 136/68)  BP(mean): --  RR: 18 (10 Dec 2021 09:39) (17 - 18)  SpO2: 98% (10 Dec 2021 09:39) (95% - 98%)    PHYSICAL EXAM:  General: NAD; speaking in full sentences  HEENT: NC/AT; PERRL; EOMI; MMM  Neck: supple; no JVD  Cardiac: RRR; +S1/S2  Pulm: +crackles at bases bilaterally, R pigtail in place, no drainage overnight  GI: soft, +distended, +tympanic, NT, +BSx4  Extremities: WWP; 3+ pitting edema from ankle to knee  Vasc: 2+ radial, DP pulses B/L  Neuro: AAOx3; no focal deficits    MEDICATIONS:  MEDICATIONS  (STANDING):  amLODIPine   Tablet 10 milliGRAM(s) Oral daily  apixaban 5 milliGRAM(s) Oral every 12 hours  atorvastatin 40 milliGRAM(s) Oral at bedtime  dextrose 40% Gel 15 Gram(s) Oral once  dextrose 5%. 1000 milliLiter(s) (50 mL/Hr) IV Continuous <Continuous>  dextrose 5%. 1000 milliLiter(s) (100 mL/Hr) IV Continuous <Continuous>  dextrose 50% Injectable 25 Gram(s) IV Push once  dextrose 50% Injectable 12.5 Gram(s) IV Push once  dextrose 50% Injectable 25 Gram(s) IV Push once  furosemide   Injectable 40 milliGRAM(s) IV Push daily  glucagon  Injectable 1 milliGRAM(s) IntraMuscular once  insulin glargine Injectable (LANTUS) 30 Unit(s) SubCutaneous at bedtime  insulin lispro (ADMELOG) corrective regimen sliding scale   SubCutaneous three times a day before meals  insulin lispro (ADMELOG) corrective regimen sliding scale   SubCutaneous at bedtime  insulin lispro Injectable (ADMELOG) 15 Unit(s) SubCutaneous three times a day before meals  iron sucrose IVPB 200 milliGRAM(s) IV Intermittent every 24 hours  isosorbide   mononitrate ER Tablet (IMDUR) 30 milliGRAM(s) Oral daily  pantoprazole    Tablet 40 milliGRAM(s) Oral before breakfast  tacrolimus 2 milliGRAM(s) Oral <User Schedule>  tacrolimus 1 milliGRAM(s) Oral <User Schedule>  tamsulosin 0.4 milliGRAM(s) Oral at bedtime  valGANciclovir 450 milliGRAM(s) Oral every 24 hours    MEDICATIONS  (PRN):  acetaminophen     Tablet .. 650 milliGRAM(s) Oral every 6 hours PRN Mild Pain (1 - 3)  ALPRAZolam 0.25 milliGRAM(s) Oral three times a day PRN anxiety  oxycodone    5 mG/acetaminophen 325 mG 1 Tablet(s) Oral every 6 hours PRN Moderate Pain (4 - 6)      ALLERGIES:  Allergies    No Known Allergies    Intolerances        LABS:                        8.8    14.70 )-----------( 140      ( 10 Dec 2021 05:56 )             30.4     12-10    129<L>  |  95<L>  |  40<H>  ----------------------------<  201<H>  4.9   |  28  |  1.38<H>    Ca    9.2      10 Dec 2021 05:56  Phos  4.3     12-10  Mg     2.0     12-10      PT/INR - ( 10 Dec 2021 05:56 )   PT: 14.5 sec;   INR: 1.22          PTT - ( 10 Dec 2021 05:56 )  PTT:45.7 sec    RADIOLOGY & ADDITIONAL TESTS: Reviewed.

## 2021-12-10 NOTE — PROGRESS NOTE ADULT - PROBLEM SELECTOR PLAN 9
Hx of hyperlipidemia.   -c/w Atorvastatin 40mg

## 2021-12-10 NOTE — PROGRESS NOTE ADULT - PROBLEM SELECTOR PROBLEM 4
History of renal transplant

## 2021-12-10 NOTE — PROGRESS NOTE ADULT - PROBLEM SELECTOR PLAN 10
Hx of HTN. Pt on Norvasc 10mg PO qd at home  -c/w norvasc 10mg PO
Hx of HTN. Pt on Norvasc 10mg PO qd at home  -c/w norvasc 10mg PO
Hx of HTN. Pt on Norvasc 10mg PO qd at home  -c/w norvasc 10mg PO  -c/w Imdur 30mg PO
Hx of HTN. Pt on Norvasc 10mg PO qd at home  -c/w norvasc 10mg PO
Hx of HTN. Pt on Norvasc 10mg PO qd at home  -c/w norvasc 10mg PO  - restarted Imdur 30mg PO
Hx of HTN. Pt on Norvasc 10mg PO qd at home  -c/w norvasc 10mg PO  -c/w Imdur 30mg PO
Hx of HTN. Pt on Norvasc 10mg PO qd at home  -c/w norvasc 10mg PO  -c/w Imdur 30mg PO
Hx of HTN. Pt on Norvasc 10mg PO qd at home  -c/w norvasc 10mg PO

## 2021-12-10 NOTE — PROGRESS NOTE ADULT - PROBLEM SELECTOR PROBLEM 7
Hiatal hernia with GERD

## 2021-12-10 NOTE — DISCHARGE NOTE NURSING/CASE MANAGEMENT/SOCIAL WORK - NSDCFUADDAPPT_GEN_ALL_CORE_FT
Please bring your Insurance card, Photo ID, Covid-19 vaccination card (if applicable) and Discharge paperwork to the following appointments:    (1) Please follow up with your Endocrinology Provider, Dr. King Oshea at [address], [address] [address] on 01/04/2022 at 11:00am.    Appointment was scheduled by Ms. ANTON Lamb, Referral Coordinator.    (2) Please follow up with your Nephrology Provider, Dr. Kofi Leyva at [address], [facility] [address], [place] [address]es 01/13/2022 at 1:00pm.    Appointment was scheduled by Ms. ANTON Lamb, Referral Coordinator.    (3) Please note that the office of your Pulmonary Medicine Provider, Dr. Marie Lora will contact you from phone [phone] to schedule an appointment briefly following your hospital discharge.    Appointment was facilitated by Ms. ANTON Lamb, Referral Coordinator.

## 2021-12-10 NOTE — DISCHARGE NOTE NURSING/CASE MANAGEMENT/SOCIAL WORK - PATIENT PORTAL LINK FT
You can access the FollowMyHealth Patient Portal offered by [facility] by registering at the following website: [email or web address]. By joining [employer]s FollowMyHealth portal, you will also be able to view your health information using other applications (apps) compatible with our system.

## 2021-12-10 NOTE — PROGRESS NOTE ADULT - PROBLEM SELECTOR PLAN 5
Pt w/ hx of atrial fibrillation. Pt on Eliquis outpatient. Patient has been holding Eliquis since 12/01/21 for thoracentesis today.  -c/w anticoagulation s/p procedure
Pt w/ hx of atrial fibrillation. Pt on Eliquis outpatient. Patient has been holding Eliquis since 12/01/21 for thoracentesis. Initially transitioned to Lovenox, but held for IR procedure.  - c/w Lovenox 100mg q12
Pt w/ hx of atrial fibrillation. Pt on Eliquis outpatient. Patient has been holding Eliquis since 12/01/21 for thoracentesis. Initially transitioned to Lovenox, but held for IR procedure.  -c/w anticoagulation s/p IR procedure
Pt w/ hx of atrial fibrillation. Pt on Eliquis outpatient. Patient has been holding Eliquis since 12/01/21 for thoracentesis. Initially transitioned to Lovenox, but held for IR procedure.  -c/w anticoagulation s/p IR procedure
Pt w/ hx of atrial fibrillation. Pt on Eliquis outpatient. Patient has been holding Eliquis since 12/01/21 for thoracentesis today.  -c/w anticoagulation s/p procedure
Pt w/ hx of atrial fibrillation. Pt on Eliquis outpatient. Patient has been holding Eliquis since 12/01/21 for thoracentesis. Initially transitioned to Lovenox, but held for IR procedure.  - c/w Lovenox 100mg q12
Pt w/ hx of atrial fibrillation. Pt on Eliquis outpatient. Patient has been holding Eliquis since 12/01/21 for thoracentesis today.  -c/w anticoagulation s/p procedure
Pt w/ hx of atrial fibrillation. Pt on Eliquis outpatient. Patient has been holding Eliquis since 12/01/21 for thoracentesis. Initially transitioned to Lovenox, but held for IR procedure.  - c/w Lovenox 100mg q12

## 2021-12-10 NOTE — PROGRESS NOTE ADULT - PROBLEM SELECTOR PROBLEM 1
Pleural effusion, right
Pleural effusion, right
DM (diabetes mellitus)
Pleural effusion, right

## 2021-12-10 NOTE — PROGRESS NOTE ADULT - SUBJECTIVE AND OBJECTIVE BOX
INTERVAL HISTORY:  The patient has no dyspnea.  He reports moderate urine output.    MEDICATIONS:  MEDICATIONS  (STANDING):  amLODIPine   Tablet 10 milliGRAM(s) Oral daily  atorvastatin 40 milliGRAM(s) Oral at bedtime  dextrose 40% Gel 15 Gram(s) Oral once  dextrose 5%. 1000 milliLiter(s) (50 mL/Hr) IV Continuous <Continuous>  dextrose 5%. 1000 milliLiter(s) (100 mL/Hr) IV Continuous <Continuous>  dextrose 50% Injectable 25 Gram(s) IV Push once  dextrose 50% Injectable 12.5 Gram(s) IV Push once  dextrose 50% Injectable 25 Gram(s) IV Push once  furosemide   Injectable 40 milliGRAM(s) IV Push daily  glucagon  Injectable 1 milliGRAM(s) IntraMuscular once  insulin glargine Injectable (LANTUS) 30 Unit(s) SubCutaneous at bedtime  insulin lispro (ADMELOG) corrective regimen sliding scale   SubCutaneous three times a day before meals  insulin lispro (ADMELOG) corrective regimen sliding scale   SubCutaneous at bedtime  insulin lispro Injectable (ADMELOG) 15 Unit(s) SubCutaneous three times a day before meals  iron sucrose IVPB 200 milliGRAM(s) IV Intermittent every 24 hours  isosorbide   mononitrate ER Tablet (IMDUR) 30 milliGRAM(s) Oral daily  pantoprazole    Tablet 40 milliGRAM(s) Oral before breakfast  tacrolimus 2 milliGRAM(s) Oral <User Schedule>  tacrolimus 1 milliGRAM(s) Oral <User Schedule>  tamsulosin 0.4 milliGRAM(s) Oral at bedtime  valGANciclovir 450 milliGRAM(s) Oral every 24 hours      REVIEW OF SYSTEMS:  CONSTITUTIONAL: No fever, no weight loss, no fatigue  PULMONARY: No cough, no wheezing, chills no hemoptysis; No Shortness of Breath  CARDIOVASCULAR: No chest pain, no palpitations, no syncope, dizziness, no leg swelling  GASTROINTESTINAL: No abdominal pain. No nausea, no  vomiting, no hematemesis; No diarrhea, no constipation. No melena, no hematochezia.  GENITOURINARY: No dysuria, no frequency, no hematuria, no incontinence  SKIN: No itching, no burning, no rashes, no lesions     PHYSICAL EXAM:  T(C): 36.7 (12-09-21 @ 20:30), Max: 36.7 (12-09-21 @ 14:34)  HR: 77 (12-09-21 @ 20:30) (70 - 77)  BP: 136/68 (12-09-21 @ 20:30) (133/74 - 136/68)  RR: 18 (12-09-21 @ 20:30) (17 - 18)  SpO2: 95% (12-09-21 @ 20:30) (95% - 95%)  Wt(kg): --  I&O's Summary      Appearance:  No deformities, normal appearance	  HEENT:   Normal oral mucosa, PERRL, EOMI	  Neck: No jvd , no masses  Lymphatic: No  lymphadenopathy  Pulmonary: Lungs clear to auscultation and percussion  Cardiovascular: Normal S1 S2, No JVD, No  murmur, +edema 3/4 lower leg	  Abdomen:  Soft, Non-tender, + BS  Skin: No rashes, No ecchymoses	  Extremities:  No clubbing or cyanosis  MSK: Normal range of motion, no joint swelling    LABS:		  CARDIAC MARKERS:                         8.8    14.70 )-----------( 140      ( 10 Dec 2021 05:56 )             30.4   12-10    129<L>  |  95<L>  |  40<H>  ----------------------------<  201<H>  4.9   |  28  |  1.38<H>    Ca    9.2      10 Dec 2021 05:56  Phos  4.3     12-10  Mg     2.0     12-10      proBNP:  Lipid Profile:  HgA1c:  TSH:  PT/INR - ( 10 Dec 2021 05:56 )   PT: 14.5 sec;   INR: 1.22          PTT - ( 10 Dec 2021 05:56 )  PTT:45.7 sec    Culture - Body Fluid with Gram Stain (collected 12-06-21 @ 16:41)  Source: Pleural Fl right pleural fluid  Gram Stain (12-06-21 @ 18:07):    No organisms seen    Rare to few White blood cells  Preliminary Report (12-07-21 @ 08:52):    No growth to date      TELEMETRY: 	      QTC     ECG:  	   QTC         RADIOLOGY:   DIAGNOSTIC TESTING: [ ] Echocardiogram: [ ]  Catheterization: [ ] Stress Test:    PMH, medications, allergies Chart notes, vital signs, laboratory data, and radiology studies reviewed.    ASSESSMENT/PLAN:   CHF/right-sided pleural effusion/pulmonary hypertension/hypoxia on admission-CHF etiology likely diastolic dysfunction, kidney disease and dietary noncompliance.  The patient declines input and output measurement.  He reports moderate urine output.  Exam is stable. BUN and creatinine have dropped. Continue diuresis.   Chest tube is out.   Right heart cath shows elevated wedge pressure and pulmonary hypertension. Results discussed with patient. Check BNP to correlate with cath result. No hypoxia for 24 hours on RA. Rx JAYANT.    Coronary artery disease–the patient has no chest discomfort.  The EKG shows T wave abnormalities which is nonspecific.  Nuclear stress test last month showed no ischemia and normal ejection fraction.  Restart anticoagulation.    Atrial fibrillation–chronic.  The heart rate is good.  Restart anticoagulation.    Chronic kidney disease    Anemia- hemoglobin is adequate.      Discussed with nursing staff.

## 2021-12-10 NOTE — PROGRESS NOTE ADULT - PROBLEM SELECTOR PLAN 1
CT Chest on 10/29 showing interval enlargement of moderate R pleural effusion w/ R basilar atelectasis. CXR from 11/08 showing stable pleural effusion. CXR on admission on 12/3 showing interval increase in R sided pleural effusion. Pt seen by Dr. Mishra outpatient on 12/01/21 and stopped Eliquis at that time. Pt admitted to undergo thoracentesis for R sided pleural effusion.  -c/w IV lasix 40mg q6hr  -f/u pulmonology recs s/p Thoracentesis with Dr. Lora   -resume anticoagulation and anti-hypertensives s/p thoracentesis
appears controlled but may have frequent lows at home.   Please continue lantus  30  units at night.    Please continue lispro  15    units before each meal.    Please continue lispro moderate dose sliding scale four times daily with meals and at bedtime  Continue with consistent carb diet, advised to order sufficient carb at each meal    Pt's fingerstick glucose goal is 100-180    Will continue to monitor     For discharge, pt can continue Lantus 30 units at bedtime and humalog 15 units before meals    Can follow up with [facility] with Dr Galvan on Wednesday Dec 22 at 9:30AM.  Will discuss case with Dr. Valentin  and update primary team
CT Chest on 10/29 showing interval enlargement of moderate R pleural effusion w/ R basilar atelectasis. CXR from 11/08 showing stable pleural effusion. CXR on admission on 12/3 showing interval increase in R sided pleural effusion. Pt seen by Dr. Mishra outpatient on 12/01/21 and stopped Eliquis at that time. Pt admitted to undergo thoracentesis for R sided pleural effusion.  -c/w IV lasix 40mg q12hr  -f/u pulmonology recs s/p Thoracentesis with Dr. Lora   -resume anticoagulation and anti-hypertensives s/p thoracentesis
CT Chest on 10/29 showing interval enlargement of moderate R pleural effusion w/ R basilar atelectasis. CXR from 11/08 showing stable pleural effusion. CXR on admission on 12/3 showing interval increase in R sided pleural effusion. Pt seen by Dr. Mishra outpatient on 12/01/21 and stopped Eliquis at that time. Pt admitted to undergo thoracentesis for R sided pleural effusion.  -c/w IV lasix 40mg q12hr  -f/u pulmonology recs - Attempted thoracentesis w/ Dr. Lora by pulm at bedside, however unsuccessful  -IR guided pigtail placement (12/6)  -resume anticoagulation and anti-hypertensives s/p IR procedure
CT Chest on 10/29 showing interval enlargement of moderate R pleural effusion w/ R basilar atelectasis. CXR from 11/08 showing stable pleural effusion. CXR on admission on 12/3 showing interval increase in R sided pleural effusion. Pt seen by Dr. Mishra outpatient on 12/01/21 and stopped Eliquis at that time. Pt admitted to undergo thoracentesis for R sided pleural effusion.  -c/w IV lasix 40mg q6hr  -f/u pulmonology recs s/p Thoracentesis with Dr. Lora   -resume anticoagulation and anti-hypertensives s/p thoracentesis
CT Chest on 10/29 showing interval enlargement of moderate R pleural effusion w/ R basilar atelectasis. CXR from 11/08 showing stable pleural effusion. CXR on admission on 12/3 showing interval increase in R sided pleural effusion. Pt seen by Dr. Mishra outpatient on 12/01/21 and stopped Eliquis at that time. Pt admitted to undergo thoracentesis for R sided pleural effusion. Attempted thoracentesis at bedside unsuccessful.  -IR guided pigtail placement (12/6)  -Patient refused CT chest today  -C/w IV lasix 40mg to qd  -C/w Lovenox 100mg q12  -Repeat CXR today  -No drainage overnight  -Consider chest tube removal  -Fluid reveals no mesothelioma cells, pending cell block - follow OP
CT Chest on 10/29 showing interval enlargement of moderate R pleural effusion w/ R basilar atelectasis. CXR from 11/08 showing stable pleural effusion. CXR on admission on 12/3 showing interval increase in R sided pleural effusion. Pt seen by Dr. Mishra outpatient on 12/01/21 and stopped Eliquis at that time. Pt admitted to undergo thoracentesis for R sided pleural effusion.  -c/w IV lasix 40mg q12hr  -f/u pulmonology recs - Attempted thoracentesis w/ Dr. Lora by pulm at bedside, however unsuccessful  -planned for IR guided pigtail placement  -resume anticoagulation and anti-hypertensives s/p IR procedure
CT Chest on 10/29 showing interval enlargement of moderate R pleural effusion w/ R basilar atelectasis. CXR from 11/08 showing stable pleural effusion. CXR on admission on 12/3 showing interval increase in R sided pleural effusion. Pt seen by Dr. Mishra outpatient on 12/01/21 and stopped Eliquis at that time. Pt admitted to undergo thoracentesis for R sided pleural effusion. Attempted thoracentesis at bedside unsuccessful.  -IR guided pigtail placement (12/6)  -Patient refused CT chest today  -C/w IV lasix 40mg to qd  -C/w Lovenox 100mg q12  -Repeat CXR today  -No drainage overnight  -Consider chest tube removal
CT Chest on 10/29 showing interval enlargement of moderate R pleural effusion w/ R basilar atelectasis. CXR from 11/08 showing stable pleural effusion. CXR on admission on 12/3 showing interval increase in R sided pleural effusion. Pt seen by Dr. Mishra outpatient on 12/01/21 and stopped Eliquis at that time. Pt admitted to undergo thoracentesis for R sided pleural effusion. Attempted thoracentesis at bedside unsuccessful.  -IR guided pigtail placement (12/6)  -Patient refused CT chest today  -Decreased IV lasix 40mg to qd  -C/w Lovenox 100mg q12

## 2021-12-10 NOTE — PROGRESS NOTE ADULT - PROBLEM SELECTOR PLAN 4
Hx of renal transplant in 2017 at [place]. Pt on Tacrolimus and Valganciclovir at home.  -c/w Tacrolimus, 2mg in AM, 1mg in PM  -c/w valganciclovir 450mg PO qd   -Renal following -- f/u recs
Hx of renal transplant in 2017 at [place]. Pt on Tacrolimus and Valganciclovir at home.  -c/w Tacrolimus, 2mg in AM, 1mg in PM  -c/w valganciclovir 450mg PO qd   -Renal following -- f/u recs
Hx of renal transplant in 2017 at [place]. Pt on Tacrolimus and Valganciclovir at home.  -c/w Tacrolimus, 2mg in AM, 1mg in PM  -c/w valganciclovir 450mg PO qd   -Renal following -- f/u recs
Hx of renal transplant in 2017 at [place]. Pt on Tacrolimus and Valganciclovir at home.  -c/w Tacrolimus, 2mg in AM, 1mg in PM  -c/w valganciclovir 450mg PO qd   -Renal following -- f/u recs
Hx of renal transplant in 2017 at [place]. Pt on Tacrolimus and Valganciclovir at home.  -c/w Tacrolimus, 2mg in AM, 1mg in PM  -c/w valganciclovir 450mg PO qd   -Renal following -- f/u recs
Hx of renal transplant in 2017 at [place]. Pt on Tacrolimus and Valganciclovir at home.  -c/w Tacrolimus, 2mg in AM, 1mg in PM  -c/w valganciclovir 450mg PO qd   -Renal following -- f/u recs
Hx of renal transplant in 2017 at [place]. Pt on Tacrolimus and Valganciclovir at home.  -c/w Tacrolimus, 2mg in AM, 1mg in PM  -c/w valganciclovir 450mg PO qd   -Renal following -- f/u recs
Hx of renal transplant in 2017 at [place]. Pt on Tacrolimus and Valganciclovir at home.  -c/w Tacrolimus, 2mg in AM, 1mg in PM  -c/w valganciclovir 450mg PO qd   -Renal following -- f/u recs

## 2021-12-10 NOTE — PROGRESS NOTE ADULT - PROBLEM SELECTOR PROBLEM 3
Chronic respiratory failure with hypoxia

## 2021-12-10 NOTE — PROGRESS NOTE ADULT - TIME BILLING
Insulin adjustment
Patient seen and examined with house-staff during bedside rounds.  Resident note read, including vitals, physical findings, laboratory data, and radiological reports.   Revisions included below.  Direct personal management at bed side and extensive interpretation of the data.  Plan was outlined and discussed in details with the housestaff.  Decision making of high complexity  Action taken for acute disease activity to reflect the level of care provided:  - medication reconciliation  - review laboratory data  I discussed the case in details with the patient and Dr. Melecio Barber.  There is no drainage from overnight.  The checks x-ray was unremarkable and were removed chest tube.  The patient is planned to have a right heart catheterization today.  The creatinine is 1.4 after decreasing the diuretics and might need to adjust.  The leg edema is less..  Patient will need daily weights.  The cardiac status is stable.  I called pathology to inquired about the cellblock and cytology and still not assigned and will follow
Patient seen and examined with house-staff during bedside rounds.  Resident note read, including vitals, physical findings, laboratory data, and radiological reports.   Revisions included below.  Direct personal management at bed side and extensive interpretation of the data.  Plan was outlined and discussed in details with the housestaff.  Decision making of high complexity  Action taken for acute disease activity to reflect the level of care provided:  - medication reconciliation  - review laboratory data  continue CT drainage already drained 1500  culture negative to date  transudative in nature as per LDH  await cytology and cell block  continue lasix  cr increased  I will discuss RHC
Patient seen and examined with house-staff during bedside rounds.  Resident note read, including vitals, physical findings, laboratory data, and radiological reports.   Revisions included below.  Direct personal management at bed side and extensive interpretation of the data.  Plan was outlined and discussed in details with the housestaff.  Decision making of high complexity  Action taken for acute disease activity to reflect the level of care provided:  - medication reconciliation  - review laboratory data  Is doing very well.  Chest tube was removed.  The right heart catheterization was reviewed patient is clinically stable will be discharged on 40 mg of Lasix twice a day.  I instructed the patient to weigh himself on a daily basis and follow-up with Dr. Marta Barber on the creatinine.  Await cellblock to rule out lymphoma.  I discussed with the patient the session desaturation in supine position can we use 4 L of oxygen when asleep.  I discussed with the sleep specialist and he will follow the patient this week regarding the CPAP.

## 2021-12-10 NOTE — PROGRESS NOTE ADULT - ASSESSMENT
70 yo male PMHx AF (Eliquis), hiatal hernia, CAD s/p CABG (2005), R renal txp (tacro), Obesity, DM2 (A1C 6.7), HTN, HLD, BPH, CLL, MGUS, referred to the ED by Dr Leyva for possible thoracentesis for R pleural effusion, failed bedside thoracentesis, now s/p R pigtail placement by IR and requiring further management.    A1C: 6.8%  Wt:101.7kg  Cr: 1.4  GFR:51

## 2021-12-10 NOTE — PROGRESS NOTE ADULT - SUBJECTIVE AND OBJECTIVE BOX
INTERVAL HPI/OVERNIGHT EVENTS:    Patient is a 71y old  Male who presents with a chief complaint of dyspnea on exertion (09 Dec 2021 08:20)  Patient went for right heart cath yesterday, and was NPO during day yesterday. Planned for discharge today. Feeling well.       FSG & insulin:  Yesterday:  Dinner no FSG documented.   Bedtime . Lantus 30   Ate tea biscuits in the middle of the night.  Today:  Breakfast . Lispro 15+6. Ate eggs and potatoes (small portion)    Pt reports the following symptoms:    CONSTITUTIONAL:  Negative fever or chills, feels well, good appetite  EYES:  Negative  blurry vision or double vision  CARDIOVASCULAR:  Negative for chest pain or palpitations  RESPIRATORY:  Negative for cough, wheezing, or SOB   GASTROINTESTINAL:  Negative for nausea, vomiting, diarrhea, constipation, or abdominal pain  GENITOURINARY:  Negative frequency, urgency or dysuria  NEUROLOGIC:  No headache, confusion, dizziness, lightheadedness      MEDICATIONS  (STANDING):  amLODIPine   Tablet 10 milliGRAM(s) Oral daily  apixaban 5 milliGRAM(s) Oral every 12 hours  atorvastatin 40 milliGRAM(s) Oral at bedtime  dextrose 40% Gel 15 Gram(s) Oral once  dextrose 5%. 1000 milliLiter(s) (50 mL/Hr) IV Continuous <Continuous>  dextrose 5%. 1000 milliLiter(s) (100 mL/Hr) IV Continuous <Continuous>  dextrose 50% Injectable 25 Gram(s) IV Push once  dextrose 50% Injectable 12.5 Gram(s) IV Push once  dextrose 50% Injectable 25 Gram(s) IV Push once  furosemide   Injectable 40 milliGRAM(s) IV Push daily  glucagon  Injectable 1 milliGRAM(s) IntraMuscular once  insulin glargine Injectable (LANTUS) 30 Unit(s) SubCutaneous at bedtime  insulin lispro (ADMELOG) corrective regimen sliding scale   SubCutaneous three times a day before meals  insulin lispro (ADMELOG) corrective regimen sliding scale   SubCutaneous at bedtime  insulin lispro Injectable (ADMELOG) 15 Unit(s) SubCutaneous three times a day before meals  iron sucrose IVPB 200 milliGRAM(s) IV Intermittent every 24 hours  isosorbide   mononitrate ER Tablet (IMDUR) 30 milliGRAM(s) Oral daily  pantoprazole    Tablet 40 milliGRAM(s) Oral before breakfast  tacrolimus 2 milliGRAM(s) Oral <User Schedule>  tacrolimus 1 milliGRAM(s) Oral <User Schedule>  tamsulosin 0.4 milliGRAM(s) Oral at bedtime  valGANciclovir 450 milliGRAM(s) Oral every 24 hours    MEDICATIONS  (PRN):  acetaminophen     Tablet .. 650 milliGRAM(s) Oral every 6 hours PRN Mild Pain (1 - 3)  ALPRAZolam 0.25 milliGRAM(s) Oral three times a day PRN anxiety  oxycodone    5 mG/acetaminophen 325 mG 1 Tablet(s) Oral every 6 hours PRN Moderate Pain (4 - 6)      PHYSICAL EXAM  Vital Signs Last 24 Hrs  T(C): 36.7 (09 Dec 2021 20:30), Max: 36.7 (09 Dec 2021 14:34)  T(F): 98.1 (09 Dec 2021 20:30), Max: 98.1 (09 Dec 2021 14:34)  HR: 77 (09 Dec 2021 20:30) (70 - 77)  BP: 136/68 (09 Dec 2021 20:30) (133/74 - 136/68)  BP(mean): --  RR: 18 (10 Dec 2021 09:39) (17 - 18)  SpO2: 98% (10 Dec 2021 09:39) (95% - 98%)    Constitutional: NAD. obese  HEENT: NCAT, MMM, OP clear, EOMI, no proptosis or lid retraction  Neck: no thyromegaly or palpable thyroid nodules   Respiratory: lungs CTAB.  Cardiovascular: regular rhythm, normal S1 and S2, no audible murmurs, no peripheral edema  GI: soft, NT/ND, no masses/HSM appreciated.  Neurology: no tremors, DTR 2+  Skin: no visible rashes/lesions. no acanthosis nigricans. no lipohypertrophy. no cushing's stigmata.  Psychiatric: AAO x 3, normal affect/mood.    LABS:                        8.8    14.70 )-----------( 140      ( 10 Dec 2021 05:56 )             30.4     12-10    129<L>  |  95<L>  |  40<H>  ----------------------------<  201<H>  4.9   |  28  |  1.38<H>    Ca    9.2      10 Dec 2021 05:56  Phos  4.3     12-10  Mg     2.0     12-10      PT/INR - ( 10 Dec 2021 05:56 )   PT: 14.5 sec;   INR: 1.22          PTT - ( 10 Dec 2021 05:56 )  PTT:45.7 sec    Thyroid Stimulating Hormone, Serum: 4.230 uIU/mL (12-09 @ 07:06)  Thyroid Stimulating Hormone, Serum: 0.932 uIU/mL (07-07 @ 06:23)      HbA1C:   CAPILLARY BLOOD GLUCOSE      POCT Blood Glucose.: 271 mg/dL (10 Dec 2021 08:01)  POCT Blood Glucose.: 155 mg/dL (09 Dec 2021 22:12)

## 2021-12-10 NOTE — PROGRESS NOTE ADULT - PROBLEM SELECTOR PLAN 6
Pt with hx of CLL. Follows with oncology at [facility]. CT neck soft tissue from 11/10 showing widespread lymphadenopathy in neck and chest most consistent with lymphoproliferative disorder, query relapse of CLL and new compared to PET-CT from 5 years prior. Pt underwent Lymph node biopsy on 11/12. Patient now noted to have R sided supraclavicular lymph node. Pt with chronic leukocytosis 2/2 CLL. WBC 17.48.   -consider heme/onc consult for further evaluation   -continue to monitor
Pt with hx of CLL. Follows with oncology at [facility]. CT neck soft tissue from 11/10 showing widespread lymphadenopathy in neck and chest most consistent with lymphoproliferative disorder, query relapse of CLL and new compared to PET-CT from 5 years prior. Pt underwent Lymph node biopsy on 11/12. Patient now noted to have R sided supraclavicular lymph node. Pt with chronic leukocytosis 2/2 CLL. WBC 17.48.   -consider heme/onc consult for further evaluation   -continue to monitor
Pt with hx of CLL. Follows with oncology at [facility]. CT neck soft tissue from 11/10 showing widespread lymphadenopathy in neck and chest most consistent with lymphoproliferative disorder, query relapse of CLL and new compared to PET-CT from 5 years prior. Pt underwent Lymph node biopsy on 11/12. Patient now noted to have R sided supraclavicular lymph node. Pt with chronic leukocytosis 2/2 CLL. WBC 17.48.   -consider heme/onc consult for further evaluation   -continue to monitor
Pt with hx of CLL. Follows with oncology at [facility]. CT neck soft tissue from 11/10 showing widespread lymphadenopathy in neck and chest most consistent with lymphoproliferative disorder, query relapse of CLL and new compared to PET-CT from 5 years prior. Pt underwent Lymph node biopsy on 11/12. Patient now noted to have R sided supraclavicular lymph node. Pt with chronic leukocytosis 2/2 CLL. WBC 17.48.   -consider heme/onc consult for further evaluation   -continue to monitor
Pt with hx of CLL. Follows with oncology at [facility]. CT neck soft tissue from 11/10 showing widespread lymphadenopathy in neck and chest most consistent with lymphoproliferative disorder, query relapse of CLL and new compared to PET-CT from 5 years prior. Pt underwent Lymph node biopsy on 11/12. Patient now noted to have R sided supraclavicular lymph node. Pt with chronic leukocytosis 2/2 CLL. WBC 17.48.   -consider heme/onc consult for further evaluation   -continue to monitor
Pt with hx of CLL. Follows with oncology at [facility]. CT neck soft tissue from 11/10 showing widespread lymphadenopathy in neck and chest most consistent with lymphoproliferative disorder, query relapse of CLL and new compared to PET-CT from 5 years prior. Pt underwent Lymph node biopsy on 11/12. Patient now noted to have R sided supraclavicular lymph node. Pt with chronic leukocytosis 2/2 CLL. WBC 17.48.   -consider heme/onc consult for further evaluation   -continue to monitor
Pt with hx of CLL. Follows with oncology at [facility]. CT neck soft tissue from 11/10 showing widespread lymphadenopathy in neck and chest most consistent with lymphoproliferative disorder, query relapse of CLL and new compared to PET-CT from 5 years prior. Pt underwent Lymph node biopsy on 11/12. Patient now noted to have R sided supraclavicular lymph node. Pt with chronic leukocytosis 2/2 CLL. WBC 17.48.   -consider heme/onc consult for further evaluation   -continue to monitor
Pt with hx of CLL. Follows with oncology at [facility]. CT neck soft tissue from 11/10 showing widespread lymphadenopathy in neck and chest most consistent with lymphoproliferative disorder, query relapse of CLL and new compared to PET-CT from 5 years prior. Pt underwent Lymph node biopsy on 11/12. Patient now noted to have R sided supraclavicular lymph node. Pt with chronic leukocytosis 2/2 CLL. WBC 17.48.   -consider heme/onc consult for further evaluation   -continue to monitor

## 2021-12-10 NOTE — DISCHARGE NOTE NURSING/CASE MANAGEMENT/SOCIAL WORK - NSDCPEFALRISK_GEN_ALL_CORE
For information on Fall & Injury Prevention, visit: [email or web address] OR  [email or web address] OR  https://www.cdc.gov/steadi/patient.html

## 2021-12-10 NOTE — PROGRESS NOTE ADULT - PROBLEM SELECTOR PLAN 11
F: none   E: replete as needed, keep K>4, Mg>2  D: Consistent Carb Diet  DVT Proph: Holding i/s/o procedure today. resume s/p thoracentesis  GI Proph: Protonix 40mg qd  Dispo: [facility]
F: none   E: replete as needed, keep K>4, Mg>2  D: Consistent Carb Diet  DVT Proph: Holding i/s/o IR procedure today. resume s/p IR pigtail placement  GI Proph: Protonix 40mg qd  Dispo: [facility]
F: none   E: replete as needed, keep K>4, Mg>2  D: Consistent Carb Diet  DVT Proph: Lovenox  GI Proph: Protonix 40mg qd  Dispo: [facility]
F: none   E: replete as needed, keep K>4, Mg>2  D: Consistent Carb Diet    DVT Proph: Holding i/s/o procedure today. resume s/p thoracentesis  GI Proph: Protonix 40mg qd    Dispo: [facility]
F: none   E: replete as needed, keep K>4, Mg>2  D: Consistent Carb Diet    DVT Proph: Holding i/s/o procedure today. resume s/p thoracentesis  GI Proph: Protonix 40mg qd    Dispo: [facility]
F: none   E: replete as needed, keep K>4, Mg>2  D: Consistent Carb Diet  DVT Proph: Holding i/s/o IR procedure today. resume s/p IR pigtail placement  GI Proph: Protonix 40mg qd  Dispo: [facility]
F: none   E: replete as needed, keep K>4, Mg>2  D: Consistent Carb Diet  DVT Proph: Lovenox  GI Proph: Protonix 40mg qd  Dispo: [facility]
F: none   E: replete as needed, keep K>4, Mg>2  D: Consistent Carb Diet  DVT Proph: Lovenox  GI Proph: Protonix 40mg qd  Dispo: [facility]

## 2021-12-10 NOTE — PROGRESS NOTE ADULT - PROBLEM SELECTOR PLAN 3
Pt has hx of chronic hypoxemia, sent home with home O2 from hospitalization in July 2021. Patient monitors O2 saturations at home with monitor and utilizes O2 when he notices saturations to be below 90% SpO2. Pt having increasing dyspnea with exertion.   -continue to monitor O2 saturations, currently on 2L NC   -wean as tolerated
Pt has hx of chronic hypoxemia, sent home with home O2 from hospitalization in July 2021. Patient monitors O2 saturations at home with monitor and utilizes O2 when he notices saturations to be below 90% SpO2. Pt having increasing dyspnea with exertion.   -continue to monitor O2 saturations, comfortable on room air
Pt has hx of chronic hypoxemia, sent home with home O2 from hospitalization in July 2021. Patient monitors O2 saturations at home with monitor and utilizes O2 when he notices saturations to be below 90% SpO2. Pt having increasing dyspnea with exertion.   -continue to monitor O2 saturations, comfortable on room air
Pt has hx of chronic hypoxemia, sent home with home O2 from hospitalization in July 2021. Patient monitors O2 saturations at home with monitor and utilizes O2 when he notices saturations to be below 90% SpO2. Pt having increasing dyspnea with exertion.   -continue to monitor O2 saturations, currently on 2L NC   -wean as tolerated
Pt has hx of chronic hypoxemia, sent home with home O2 from hospitalization in July 2021. Patient monitors O2 saturations at home with monitor and utilizes O2 when he notices saturations to be below 90% SpO2. Pt having increasing dyspnea with exertion.   -continue to monitor O2 saturations, comfortable on room air
Pt has hx of chronic hypoxemia, sent home with home O2 from hospitalization in July 2021. Patient monitors O2 saturations at home with monitor and utilizes O2 when he notices saturations to be below 90% SpO2. Pt having increasing dyspnea with exertion.   -continue to monitor O2 saturations, currently on 2L NC   -wean as tolerated

## 2021-12-10 NOTE — DISCHARGE NOTE NURSING/CASE MANAGEMENT/SOCIAL WORK - WILL THE PATIENT ACCEPT THE PFIZER COVID-19 VACCINE IF ELIGIBLE AND IT IS AVAILABLE?
Patient arrived to unit from E.R. alert and oriented. Vs stable. Patient rating pain 8/10. Medicated for pain (SEE MAR). Call bell within reach. No signs of distress noted. Not applicable

## 2021-12-10 NOTE — PROGRESS NOTE ADULT - PROBLEM SELECTOR PLAN 2
Patient has history of LE edema and takes Imdur 30mg po qd at home. Patient noticed increasing LLE over the past week with edema extending up into his thigh  -c/w lasix 40mg IV q12 during hospitalization, resume imdur PRN   -f/u LE dopplers to r/o DVT,  negative DVT of LE, bilateral LE edema  -Consider Right Heart Cath
Patient has history of LE edema and takes Imdur 30mg po qd at home. Patient noticed increasing LLE over the past week with edema extending up into his thigh  -f/u LE dopplers to r/o DVT,  negative DVT of LE, bilateral LE edema  -c/w Imdur 30mg   -Decreased IV lasix 40mg to qd  -Right Heart Cath - mild pulmonary HTN with normal PVR, normal CO and mildy decreased CI, and associated elevated PAWP and sgnificant V waves.  Overal represents precapillary pHTN, notably hypoxic intermittently thoughout procedure. High suspicion for JAYANT, that may contribute to pHTN. Required O2 titration for hypoxia as he is hypoxic at rest and home sleep studiy for JAYANT, R IJ out, bed rest until 6PM, can resume lovenox tonight as per Dr Ma
Patient has history of LE edema and takes Imdur 30mg po qd at home. Patient noticed increasing LLE over the past week with edema extending up into his thigh  -c/w lasix 40mg IV q12 during hospitalization, resume imdur PRN   -f/u LE dopplers to r/o DVT,  negative DVT of LE, bilateral LE edema
Patient has history of LE edema and takes Imdur 30mg po qd at home. Patient noticed increasing LLE over the past week with edema extending up into his thigh  -f/u LE dopplers to r/o DVT,  negative DVT of LE, bilateral LE edema  -c/w Imdur 30mg   -Decreased IV lasix 40mg to qd  -Right Heart Cath planned for today
Patient has history of LE edema and takes Imdur 30mg po qd at home. Patient noticed increasing LLE over the past week with edema extending up into his thigh  -c/w lasix 40mg IV q6 during hospitalization, resume imdur PRN   -f/u LE dopplers to r/o DVT,  negative DVT of LE, bilateral LE edema
Patient has history of LE edema and takes Imdur 30mg po qd at home. Patient noticed increasing LLE over the past week with edema extending up into his thigh  -c/w lasix 40mg IV q6 during hospitalization, resume imdur PRN   -f/u LE dopplers to r/o DVT,  negative DVT of LE, bilateral LE edema
Patient has history of LE edema and takes Imdur 30mg po qd at home. Patient noticed increasing LLE over the past week with edema extending up into his thigh  -f/u LE dopplers to r/o DVT,  negative DVT of LE, bilateral LE edema  -c/w Imdur 30mg   -Decreased IV lasix 40mg to qd  -Consider Right Heart Cath
Patient has history of LE edema and takes Imdur 30mg po qd at home. Patient noticed increasing LLE over the past week with edema extending up into his thigh  -c/w lasix 40mg IV q6 during hospitalization, resume imdur PRN   -f/u LE dopplers to r/o DVT,  negative DVT of LE, bilateral LE edema

## 2021-12-10 NOTE — PROGRESS NOTE ADULT - PROBLEM SELECTOR PLAN 7
Pt w/ history of hiatal hernia.   -c/w  home medication pantoprazole 40mg qd

## 2021-12-11 LAB
CULTURE RESULTS: NO GROWTH — SIGNIFICANT CHANGE UP
SPECIMEN SOURCE: SIGNIFICANT CHANGE UP

## 2021-12-15 ENCOUNTER — TRANSCRIPTION ENCOUNTER (OUTPATIENT)
Age: 71
End: 2021-12-15

## 2021-12-16 ENCOUNTER — LABORATORY RESULT (OUTPATIENT)
Age: 71
End: 2021-12-16

## 2021-12-16 ENCOUNTER — NON-APPOINTMENT (OUTPATIENT)
Age: 71
End: 2021-12-16

## 2021-12-16 ENCOUNTER — APPOINTMENT (OUTPATIENT)
Dept: NEPHROLOGY | Facility: CLINIC | Age: 71
End: 2021-12-16
Payer: MEDICARE

## 2021-12-16 VITALS — WEIGHT: 217 LBS | HEART RATE: 66 BPM | BODY MASS INDEX: 35.02 KG/M2 | OXYGEN SATURATION: 96 % | TEMPERATURE: 97.9 F

## 2021-12-16 VITALS — HEART RATE: 72 BPM | SYSTOLIC BLOOD PRESSURE: 120 MMHG | DIASTOLIC BLOOD PRESSURE: 60 MMHG

## 2021-12-16 DIAGNOSIS — I13.0 HYPERTENSIVE HEART AND CHRONIC KIDNEY DISEASE WITH HEART FAILURE AND STAGE 1 THROUGH STAGE 4 CHRONIC KIDNEY DISEASE, OR UNSPECIFIED CHRONIC KIDNEY DISEASE: ICD-10-CM

## 2021-12-16 DIAGNOSIS — E11.65 TYPE 2 DIABETES MELLITUS WITH HYPERGLYCEMIA: ICD-10-CM

## 2021-12-16 DIAGNOSIS — Z94.0 KIDNEY TRANSPLANT STATUS: ICD-10-CM

## 2021-12-16 DIAGNOSIS — N40.0 BENIGN PROSTATIC HYPERPLASIA WITHOUT LOWER URINARY TRACT SYMPTOMS: ICD-10-CM

## 2021-12-16 DIAGNOSIS — E11.22 TYPE 2 DIABETES MELLITUS WITH DIABETIC CHRONIC KIDNEY DISEASE: ICD-10-CM

## 2021-12-16 DIAGNOSIS — I48.20 CHRONIC ATRIAL FIBRILLATION, UNSPECIFIED: ICD-10-CM

## 2021-12-16 DIAGNOSIS — Z79.01 LONG TERM (CURRENT) USE OF ANTICOAGULANTS: ICD-10-CM

## 2021-12-16 DIAGNOSIS — E87.1 HYPO-OSMOLALITY AND HYPONATREMIA: ICD-10-CM

## 2021-12-16 DIAGNOSIS — E11.40 TYPE 2 DIABETES MELLITUS WITH DIABETIC NEUROPATHY, UNSPECIFIED: ICD-10-CM

## 2021-12-16 DIAGNOSIS — N18.9 CHRONIC KIDNEY DISEASE, UNSPECIFIED: ICD-10-CM

## 2021-12-16 DIAGNOSIS — Z79.4 LONG TERM (CURRENT) USE OF INSULIN: ICD-10-CM

## 2021-12-16 DIAGNOSIS — Z99.81 DEPENDENCE ON SUPPLEMENTAL OXYGEN: ICD-10-CM

## 2021-12-16 DIAGNOSIS — E66.9 OBESITY, UNSPECIFIED: ICD-10-CM

## 2021-12-16 DIAGNOSIS — C91.10 CHRONIC LYMPHOCYTIC LEUKEMIA OF B-CELL TYPE NOT HAVING ACHIEVED REMISSION: ICD-10-CM

## 2021-12-16 DIAGNOSIS — E11.319 TYPE 2 DIABETES MELLITUS WITH UNSPECIFIED DIABETIC RETINOPATHY WITHOUT MACULAR EDEMA: ICD-10-CM

## 2021-12-16 DIAGNOSIS — Z91.11 PATIENT'S NONCOMPLIANCE WITH DIETARY REGIMEN: ICD-10-CM

## 2021-12-16 DIAGNOSIS — J90 PLEURAL EFFUSION, NOT ELSEWHERE CLASSIFIED: ICD-10-CM

## 2021-12-16 DIAGNOSIS — J98.11 ATELECTASIS: ICD-10-CM

## 2021-12-16 DIAGNOSIS — K44.9 DIAPHRAGMATIC HERNIA WITHOUT OBSTRUCTION OR GANGRENE: ICD-10-CM

## 2021-12-16 DIAGNOSIS — I50.33 ACUTE ON CHRONIC DIASTOLIC (CONGESTIVE) HEART FAILURE: ICD-10-CM

## 2021-12-16 DIAGNOSIS — Z79.899 OTHER LONG TERM (CURRENT) DRUG THERAPY: ICD-10-CM

## 2021-12-16 DIAGNOSIS — D47.2 MONOCLONAL GAMMOPATHY: ICD-10-CM

## 2021-12-16 DIAGNOSIS — Z98.890 OTHER SPECIFIED POSTPROCEDURAL STATES: ICD-10-CM

## 2021-12-16 DIAGNOSIS — E88.81 METABOLIC SYNDROME AND OTHER INSULIN RESISTANCE: ICD-10-CM

## 2021-12-16 DIAGNOSIS — D64.9 ANEMIA, UNSPECIFIED: ICD-10-CM

## 2021-12-16 DIAGNOSIS — K22.70 BARRETT'S ESOPHAGUS WITHOUT DYSPLASIA: ICD-10-CM

## 2021-12-16 DIAGNOSIS — I27.20 PULMONARY HYPERTENSION, UNSPECIFIED: ICD-10-CM

## 2021-12-16 DIAGNOSIS — Z95.1 PRESENCE OF AORTOCORONARY BYPASS GRAFT: ICD-10-CM

## 2021-12-16 DIAGNOSIS — K21.9 GASTRO-ESOPHAGEAL REFLUX DISEASE WITHOUT ESOPHAGITIS: ICD-10-CM

## 2021-12-16 DIAGNOSIS — J96.11 CHRONIC RESPIRATORY FAILURE WITH HYPOXIA: ICD-10-CM

## 2021-12-16 DIAGNOSIS — E78.49 OTHER HYPERLIPIDEMIA: ICD-10-CM

## 2021-12-16 DIAGNOSIS — I25.10 ATHEROSCLEROTIC HEART DISEASE OF NATIVE CORONARY ARTERY WITHOUT ANGINA PECTORIS: ICD-10-CM

## 2021-12-16 PROCEDURE — 36415 COLL VENOUS BLD VENIPUNCTURE: CPT

## 2021-12-16 PROCEDURE — 99496 TRANSJ CARE MGMT HIGH F2F 7D: CPT | Mod: 25

## 2021-12-16 NOTE — END OF VISIT
[Time Spent: ___ minutes] : I have spent [unfilled] minutes of time on the encounter. [FreeTextEntry3] : Documented by Jessie Lockwood acting as a scribe for MARITO Coleman on 12/16/2021.\par

## 2021-12-16 NOTE — HISTORY OF PRESENT ILLNESS
[FreeTextEntry1] : The patient is a 71 year old male presenting today 6 days s/p recent discharge on 12/10/21 after admission necessitated due to SOB due to fluid overload, pulmonary HTN and pleural effusion in setting of multiple complex issues including renal transplant, post transplant lymph adenopathy, CLL, and progressive weight gain and SOB. During his admission he received IV diuresis, thoracentesis removing 1.8L of right chest fluid (found to have no evidence of malignancy) and right heart cath showing high PAWP even after diuresis and evidence of mitral regurgitation and moderate pulmonary HTN. He was discharged with the plan to transition his inpatient care to an outpatient center. In this week after discharge, pt is much more mobile and comfortable with marked improvement in his prior severely limiting SORIA. He is here today for follow-up evaluation and active reassessment of HTN, CAD, DM, HLD, s/p CABG, AFib, h/o renal transplant, and monoclonal B cell abnormality. \par \par The pt was admitted to [facility] 12/03/21 for thoracentesis with Dr. Lora and right heart cath. He was discharged in stable condition to home 12/10/21. He has an appointment for f/u with Dr. Lora 01/03/22. See also in record cath report 12/09/21 by Dr. Encinas (PAWP 18, with V-waves to 27, right atrial pressure 5, PA pressure 45/24). EKG 12/03/21 in the hospital revealed atrial fib, incomplete right bundle branches, ST-T wave changes. \par \par The patient is feeling generally well today s/p thoracentesis 12/03/21. He reports that his daytime O2 sat has been WNL. He also reports that his sleep has improved with nightly O2, though he still sleeps only 3-4 hours. His weight has gone down from 226lbs last visit to 217lbs today. He has not yet received COVID booster dose.\par \par Labs: 12/01/21: positive COVID spike domain antibody with nucleocapsid, BNP 1691, Na 128, Cl 93, glucose 229, BUN 27, creatinine 1.12, eGFR 66, IgG Lambda Band Identified, TSH 4.36, , Vit D 25-OH 14.3, HgbA1c 7.1%, HDL 33, Fe 31, iron sat 10%, WBC 13.02, RBC 3.25, HGB 9.0, HCT 30.6%,  \par \par Current medications: Flomax 0.4mg QPM, Humalog 15u TID, Imdur ER 30mg QD, Lantus 30u QPM, furosemide 40mg QD, amlodipine 10mg QD, pantoprazole 20mg QD, rosuvastatin 10mg QD, tacrolimus 2mg QAM + 1mg QPM, valganciclovir 450mg QD, Xanax 0.25mg prn\par

## 2021-12-16 NOTE — ASSESSMENT
[FreeTextEntry1] : Plan:\par 1) HTN: BP acceptable today on current regimen, but fluid overloaded on exam. Will confer with pt's care team and consider increasing his diuretic. Will continue patient's current antihypertensive medications in the interim and will reassess pressure and regimen at next evaluation. Given the importance of his cardiac limitations on his overall symptoms, will explore with cardiology possible intervention regarding atrial fibrillation and MR.\par 2) Transplant medications: patient to continue on Prograf and Valcyte. Pt will continue f/u [facility] transplant team.\par 3) H/o atrial flutter: Continue to f/u with Dr. Encinas in cardiology for rhythm evaluation.\par 4) Hyperglycemia: Patient's most recent HbA1C of 7.1% and glucose of 229 is elevated. Will reassess HbA1C with labs. Pt scheduled for visit with his endocrinologist 12/22/21.\par 5) Weight Gain: The patient is now down 9lbs since last visit. To continue making active changes in his diet and increasing exercise moderately as tolerated with continued goal of gradual weight loss. Pt is scheduled for appointment with his endocrinologist Dr. Galvan 12/22/21.\par 6) Fluid overload: patient with 1-2+ bilateral LE edema today on physical exam. Will continue to monitor at future evaluations due to risk of exacerbation of renal failure, and consider increasing his diuretic if creatinine stabilizes.\par 7) Pt to continue with nightly oxygen.\par 8) Will discuss with Dr. Huffman if it is advisable for pt to receive COVID booster dose.\par \par No changes to medications at this time.\par \par Labs were drawn at outside labs and patient will return in 2-3 weeks for a follow-up appointment.

## 2021-12-19 LAB
24R-OH-CALCIDIOL SERPL-MCNC: 32.3 PG/ML
25(OH)D3 SERPL-MCNC: 16.4 NG/ML
ALBUMIN SERPL ELPH-MCNC: 4.6 G/DL
ALDOSTERONE SERUM: 8.9 NG/DL
ALP BLD-CCNC: 145 U/L
ALT SERPL-CCNC: 16 U/L
ANION GAP SERPL CALC-SCNC: 14 MMOL/L
APPEARANCE: CLEAR
AST SERPL-CCNC: 18 U/L
BACTERIA: NEGATIVE
BASOPHILS # BLD AUTO: 0 K/UL
BASOPHILS NFR BLD AUTO: 0 %
BILIRUB SERPL-MCNC: 0.4 MG/DL
BILIRUBIN URINE: NEGATIVE
BLOOD URINE: NEGATIVE
BUN SERPL-MCNC: 27 MG/DL
C3 SERPL-MCNC: 98 MG/DL
C4 SERPL-MCNC: 30 MG/DL
CALCIUM SERPL-MCNC: 9.5 MG/DL
CALCIUM SERPL-MCNC: 9.5 MG/DL
CHLORIDE SERPL-SCNC: 94 MMOL/L
CHOLEST SERPL-MCNC: 83 MG/DL
CO2 SERPL-SCNC: 24 MMOL/L
COLOR: NORMAL
COVID-19 NUCLEOCAPSID  GAM ANTIBODY INTERPRETATION: POSITIVE
COVID-19 SPIKE DOMAIN ANTIBODY INTERPRETATION: POSITIVE
CREAT SERPL-MCNC: 1.32 MG/DL
CREAT SPEC-SCNC: 36 MG/DL
CREAT/PROT UR: 0.3 RATIO
CRP SERPL-MCNC: 4 MG/L
CYSTATIN C SERPL-MCNC: 1.95 MG/L
EOSINOPHIL # BLD AUTO: 0 K/UL
EOSINOPHIL NFR BLD AUTO: 0 %
ERYTHROCYTE [SEDIMENTATION RATE] IN BLOOD BY WESTERGREN METHOD: 32 MM/HR
ESTIMATED AVERAGE GLUCOSE: 154 MG/DL
FERRITIN SERPL-MCNC: 176 NG/ML
FOLATE SERPL-MCNC: 8.8 NG/ML
GFR/BSA.PRED SERPLBLD CYS-BASED-ARV: 31 ML/MIN
GLUCOSE QUALITATIVE U: NEGATIVE
GLUCOSE SERPL-MCNC: 91 MG/DL
HBA1C MFR BLD HPLC: 7 %
HBV SURFACE AB SER QL: NONREACTIVE
HBV SURFACE AG SER QL: NONREACTIVE
HCT VFR BLD CALC: 33.1 %
HCV AB SER QL: NONREACTIVE
HCV S/CO RATIO: 0.07 S/CO
HCYS SERPL-MCNC: 18.7 UMOL/L
HDLC SERPL-MCNC: 33 MG/DL
HGB BLD-MCNC: 9.8 G/DL
HYALINE CASTS: 0 /LPF
IRON SATN MFR SERPL: 12 %
IRON SERPL-MCNC: 38 UG/DL
KETONES URINE: NEGATIVE
LDLC SERPL CALC-MCNC: 31 MG/DL
LEUKOCYTE ESTERASE URINE: NEGATIVE
LYMPHOCYTES # BLD AUTO: 15.87 K/UL
LYMPHOCYTES NFR BLD AUTO: 77.6 %
MAGNESIUM SERPL-MCNC: 1.9 MG/DL
MAN DIFF?: NORMAL
MCHC RBC-ENTMCNC: 28.2 PG
MCHC RBC-ENTMCNC: 29.6 GM/DL
MCV RBC AUTO: 95.1 FL
MICROSCOPIC-UA: NORMAL
MONOCYTES # BLD AUTO: 0 K/UL
MONOCYTES NFR BLD AUTO: 0 %
NEUTROPHILS # BLD AUTO: 2.99 K/UL
NEUTROPHILS NFR BLD AUTO: 14.6 %
NITRITE URINE: NEGATIVE
NONHDLC SERPL-MCNC: 50 MG/DL
NT-PROBNP SERPL-MCNC: 1789 PG/ML
PARATHYROID HORMONE INTACT: 166 PG/ML
PH URINE: 6
PHOSPHATE SERPL-MCNC: 3.3 MG/DL
PLATELET # BLD AUTO: 191 K/UL
POTASSIUM SERPL-SCNC: 4.7 MMOL/L
PROT SERPL-MCNC: 7.2 G/DL
PROT UR-MCNC: 11 MG/DL
PROTEIN URINE: NEGATIVE
RBC # BLD: 3.48 M/UL
RBC # FLD: 17.5 %
RED BLOOD CELLS URINE: 0 /HPF
RENIN PLASMA: 9.5 PG/ML
RHEUMATOID FACT SER QL: <10 IU/ML
SARS-COV-2 AB SERPL IA-ACNC: >250 U/ML
SARS-COV-2 AB SERPL QL IA: 1.55 INDEX
SODIUM SERPL-SCNC: 132 MMOL/L
SPECIFIC GRAVITY URINE: 1.01
SQUAMOUS EPITHELIAL CELLS: 0 /HPF
TIBC SERPL-MCNC: 314 UG/DL
TRIGL SERPL-MCNC: 95 MG/DL
UIBC SERPL-MCNC: 276 UG/DL
URATE SERPL-MCNC: 8.7 MG/DL
UROBILINOGEN URINE: NORMAL
VIT B12 SERPL-MCNC: 515 PG/ML
WBC # FLD AUTO: 20.45 K/UL
WHITE BLOOD CELLS URINE: 0 /HPF

## 2021-12-21 RX ORDER — FUROSEMIDE 40 MG/1
40 TABLET ORAL
Qty: 30 | Refills: 5 | Status: DISCONTINUED | COMMUNITY
Start: 2020-08-14 | End: 2021-12-21

## 2021-12-22 ENCOUNTER — APPOINTMENT (OUTPATIENT)
Dept: OTOLARYNGOLOGY | Facility: CLINIC | Age: 71
End: 2021-12-22
Payer: MEDICARE

## 2021-12-22 ENCOUNTER — APPOINTMENT (OUTPATIENT)
Dept: ENDOCRINOLOGY | Facility: CLINIC | Age: 71
End: 2021-12-22
Payer: MEDICARE

## 2021-12-22 VITALS
BODY MASS INDEX: 35.2 KG/M2 | SYSTOLIC BLOOD PRESSURE: 156 MMHG | HEIGHT: 66 IN | HEART RATE: 74 BPM | WEIGHT: 219 LBS | DIASTOLIC BLOOD PRESSURE: 73 MMHG

## 2021-12-22 DIAGNOSIS — H92.02 OTALGIA, LEFT EAR: ICD-10-CM

## 2021-12-22 DIAGNOSIS — M26.609 UNSPECIFIED TEMPOROMANDIBULAR JOINT DISORDER: ICD-10-CM

## 2021-12-22 LAB
ALBUMIN MFR SERPL ELPH: 59.4 %
ALBUMIN SERPL-MCNC: 4.3 G/DL
ALBUMIN/GLOB SERPL: 1.5 RATIO
ALP BONE SERPL-MCNC: 20.4 UG/L
ALPHA1 GLOB MFR SERPL ELPH: 4.7 %
ALPHA1 GLOB SERPL ELPH-MCNC: 0.3 G/DL
ALPHA2 GLOB MFR SERPL ELPH: 10.3 %
ALPHA2 GLOB SERPL ELPH-MCNC: 0.7 G/DL
ANA SER IF-ACNC: NEGATIVE
B-GLOBULIN MFR SERPL ELPH: 9.7 %
B-GLOBULIN SERPL ELPH-MCNC: 0.7 G/DL
DEPRECATED KAPPA LC FREE/LAMBDA SER: 0.5 RATIO
GAMMA GLOB FLD ELPH-MCNC: 1.1 G/DL
GAMMA GLOB MFR SERPL ELPH: 15.9 %
IGA 24H UR QL IFE: NORMAL
IGA SER QL IEP: 161 MG/DL
IGG SER QL IEP: 1255 MG/DL
IGM SER QL IEP: 15 MG/DL
INTERPRETATION SERPL IEP-IMP: NORMAL
KAPPA LC CSF-MCNC: 8.66 MG/DL
KAPPA LC SERPL-MCNC: 4.34 MG/DL
M PROTEIN MFR SERPL ELPH: 2.6 %
M PROTEIN SPEC IFE-MCNC: NORMAL
MONOCLON BAND OBS SERPL: 0.2 G/DL
MPO AB + PR3 PNL SER: NORMAL
PROT SERPL-MCNC: 7.2 G/DL
PROT SERPL-MCNC: 7.2 G/DL

## 2021-12-22 PROCEDURE — 86900 BLOOD TYPING SEROLOGIC ABO: CPT

## 2021-12-22 PROCEDURE — 86850 RBC ANTIBODY SCREEN: CPT

## 2021-12-22 PROCEDURE — 82803 BLOOD GASES ANY COMBINATION: CPT

## 2021-12-22 PROCEDURE — 99285 EMERGENCY DEPT VISIT HI MDM: CPT

## 2021-12-22 PROCEDURE — 99215 OFFICE O/P EST HI 40 MIN: CPT | Mod: 25,GC

## 2021-12-22 PROCEDURE — 84295 ASSAY OF SERUM SODIUM: CPT

## 2021-12-22 PROCEDURE — 85025 COMPLETE CBC W/AUTO DIFF WBC: CPT

## 2021-12-22 PROCEDURE — 85730 THROMBOPLASTIN TIME PARTIAL: CPT

## 2021-12-22 PROCEDURE — U0003: CPT

## 2021-12-22 PROCEDURE — C9399: CPT

## 2021-12-22 PROCEDURE — 82962 GLUCOSE BLOOD TEST: CPT

## 2021-12-22 PROCEDURE — 99205 OFFICE O/P NEW HI 60 MIN: CPT | Mod: 25,GC

## 2021-12-22 PROCEDURE — 80048 BASIC METABOLIC PNL TOTAL CA: CPT

## 2021-12-22 PROCEDURE — 97161 PT EVAL LOW COMPLEX 20 MIN: CPT

## 2021-12-22 PROCEDURE — 84100 ASSAY OF PHOSPHORUS: CPT

## 2021-12-22 PROCEDURE — 82330 ASSAY OF CALCIUM: CPT

## 2021-12-22 PROCEDURE — 88360 TUMOR IMMUNOHISTOCHEM/MANUAL: CPT

## 2021-12-22 PROCEDURE — 83735 ASSAY OF MAGNESIUM: CPT

## 2021-12-22 PROCEDURE — 31231 NASAL ENDOSCOPY DX: CPT | Mod: 58

## 2021-12-22 PROCEDURE — 94760 N-INVAS EAR/PLS OXIMETRY 1: CPT

## 2021-12-22 PROCEDURE — 71045 X-RAY EXAM CHEST 1 VIEW: CPT

## 2021-12-22 PROCEDURE — 83880 ASSAY OF NATRIURETIC PEPTIDE: CPT

## 2021-12-22 PROCEDURE — 94726 PLETHYSMOGRAPHY LUNG VOLUMES: CPT

## 2021-12-22 PROCEDURE — 85610 PROTHROMBIN TIME: CPT

## 2021-12-22 PROCEDURE — 70490 CT SOFT TISSUE NECK W/O DYE: CPT

## 2021-12-22 PROCEDURE — 86901 BLOOD TYPING SEROLOGIC RH(D): CPT

## 2021-12-22 PROCEDURE — 88341 IMHCHEM/IMCYTCHM EA ADD ANTB: CPT

## 2021-12-22 PROCEDURE — 36415 COLL VENOUS BLD VENIPUNCTURE: CPT

## 2021-12-22 PROCEDURE — U0005: CPT

## 2021-12-22 PROCEDURE — 94060 EVALUATION OF WHEEZING: CPT

## 2021-12-22 PROCEDURE — 86769 SARS-COV-2 COVID-19 ANTIBODY: CPT

## 2021-12-22 PROCEDURE — 88305 TISSUE EXAM BY PATHOLOGIST: CPT

## 2021-12-22 PROCEDURE — 99214 OFFICE O/P EST MOD 30 MIN: CPT | Mod: 24,25

## 2021-12-22 PROCEDURE — 88331 PATH CONSLTJ SURG 1 BLK 1SPC: CPT

## 2021-12-22 PROCEDURE — 87635 SARS-COV-2 COVID-19 AMP PRB: CPT

## 2021-12-22 PROCEDURE — 83036 HEMOGLOBIN GLYCOSYLATED A1C: CPT

## 2021-12-22 PROCEDURE — 80197 ASSAY OF TACROLIMUS: CPT

## 2021-12-22 PROCEDURE — 94729 DIFFUSING CAPACITY: CPT

## 2021-12-22 PROCEDURE — 84132 ASSAY OF SERUM POTASSIUM: CPT

## 2021-12-22 PROCEDURE — 87040 BLOOD CULTURE FOR BACTERIA: CPT

## 2021-12-22 NOTE — PHYSICAL EXAM
[Nasal Endoscopy Performed] : nasal endoscopy was performed, see procedure section for findings [de-identified] : edema  [Normal] : mucosa is normal [Midline] : trachea located in midline position

## 2021-12-22 NOTE — HISTORY OF PRESENT ILLNESS
[FreeTextEntry1] : Patient returns today c/o ear pain , nasal congestion . Feels like he has water in ear  started in right ear now on left ear . ear is clogged .   No tinnitus or pressure in ear.

## 2021-12-22 NOTE — HISTORY OF PRESENT ILLNESS
[FreeTextEntry1] : 70 yo male PMHx AF (Eliquis), hiatal hernia, CAD s/p CABG (2005), R renal txp (tacrolimus + valganciclovir), DM2 (A1C 6.7), HTN, HLD, CLL here to establish care for Diabetes and weight loss. \par \par Seen at [facility] 12/08/21: was admitted for right plueral effusion s/p thoracentesis. \par Seen for inpatient hyperglycemia management by Endocrine team. \par Below taken from consult note\par Dx: Patient was diagnosed 20+ years ago and started on oral medications. He says he was introduced to insulin around the time of his kidney transplant and has only been on insulin for the past 4 years. Was on Lantus and humalog but believes that lantus does not work for him so he only resumed humalog\par Prior Therapy: 24-32units Humalog TID with meals, Metformin 500mg ER , took on average 24units with each meal\par Hx of hypoglycemia: has intermittent lows during the week sometimes at lunch or dinner, with numbers in the 60s. \par Hx of DKA/HHS? never\par \par Home FSG: \par Fasting 130-150s\par Lunch 60-150s\par Dinner 60-150s\par Bed\par \par Hx of other regimens\par Complications: neuropathy, nephropathy, retinopathy - sees retinal specialist regularly \par Previous Outpatient Endo: Dr. Gemma Murillo ([facility])\par \par FH:\par DM: mother diabetic\par SH:\par Smoking nonsmoker\par Etoh: nondrinker\par \par \par Patient was discharged from [facility] on Lantus 30 units at bedtime and humalog 15 units before meals\par \par Interval HPI:\par Patient reports compliance with new insulin regimen.\par He reports rarely ever going above 200 on his darshana. \par He does reports a few episodes of low sugar which he contributed to the lack of carbohydrates at the meal.\par He says he has change his diet completely.\par Has been having low fat and low carb meals. \par He has lost weight which he states may be the reduction in the edema.\par He is following with nephrology and pulmonology. \par He is inquiring about restarting GLP-1\par \par \par

## 2021-12-22 NOTE — ADDENDUM
[FreeTextEntry1] : Attending:  Pt seen with Dr. Galvan.  Is known to me from inpatient consultation 2 weeks ago.  Glucoses have improved considerably and on smaller doses of insulin than he was previously taking, almost certainly due to improved dietary compliance.  He has also lost weight, though some of this is from further  resolution of his edema.\par Will continue his current insulin regimen.\par Will restart the Trulicity, but only at 1.5 mg/week--he complained of nausea on the 3 mg dose, though this seemed to be only on the day after he took the injection.  He will also take the injections on Sun or Mon--rather than before the Sabbath, when he tends to eat larger meals (Friday night and Saturday midday)\par \praveen Valentin MD

## 2021-12-22 NOTE — ASSESSMENT
[Long Term Vascular Complications] : long term vascular complications of diabetes [Carbohydrate Consistent Diet] : carbohydrate consistent diet [Importance of Diet and Exercise] : importance of diet and exercise to improve glycemic control, achieve weight loss and improve cardiovascular health [Exercise/Effect on Glucose] : exercise/effect on glucose [Hypoglycemia Management] : hypoglycemia management [Action and use of Insulin] : action and use of short and long-acting insulin [Self Monitoring of Blood Glucose] : self monitoring of blood glucose [Insulin Self-Administration] : insulin self-administration [Injection Technique, Storage, Sharps Disposal] : injection technique, storage, and sharps disposal [Retinopathy Screening] : Patient was referred to ophthalmology for retinopathy screening [Weight Loss] : weight loss [FreeTextEntry1] : 71 year old male with hx of diabetes type 2 on insulin therapy\par \par \par - DM2\par A1C 12/08/21 inpatient 6.7%\par resume Lantus 30 units at bedtime\par 15 units Lispro pre-meal, advised to reduce to 8-10 units if having lower carbohydrates at the meal\par Goal <140 2 hours postprandial\par Will start GLP-1 - resume trulicity at 1.5mg dose \par was previously on 3mg dose but stopped taking it.\par Advised to try taking Trulicity on Monday or Sunday instead of fridays. \par \par -Obesity class 2\par WIll restart GLP-1\par Trulicity at 1.5mg dose\par \par -HLD \par resume Rosuvastatin 10mg\par Lipid panel reviewed \par \par discussed case with Dr. Valentin

## 2021-12-22 NOTE — PROCEDURE
[Flexible Endoscope] : examined with the flexible endoscope [Congested] : congested [Normal] : the paranasal sinuses had no abnormalities [FreeTextEntry6] : empty nose, decreased turbinates

## 2021-12-23 LAB — METHYLMALONATE SERPL-SCNC: 350 NMOL/L

## 2021-12-24 LAB — COLLAGEN CTX SERPL-MCNC: 767 PG/ML

## 2021-12-30 PROCEDURE — 81003 URINALYSIS AUTO W/O SCOPE: CPT

## 2021-12-30 PROCEDURE — 93970 EXTREMITY STUDY: CPT

## 2021-12-30 PROCEDURE — C1729: CPT

## 2021-12-30 PROCEDURE — C1894: CPT

## 2021-12-30 PROCEDURE — 88360 TUMOR IMMUNOHISTOCHEM/MANUAL: CPT

## 2021-12-30 PROCEDURE — 86900 BLOOD TYPING SEROLOGIC ABO: CPT

## 2021-12-30 PROCEDURE — 99285 EMERGENCY DEPT VISIT HI MDM: CPT | Mod: 25

## 2021-12-30 PROCEDURE — 83540 ASSAY OF IRON: CPT

## 2021-12-30 PROCEDURE — 93005 ELECTROCARDIOGRAM TRACING: CPT

## 2021-12-30 PROCEDURE — 82042 OTHER SOURCE ALBUMIN QUAN EA: CPT

## 2021-12-30 PROCEDURE — 99153 MOD SED SAME PHYS/QHP EA: CPT

## 2021-12-30 PROCEDURE — U0003: CPT

## 2021-12-30 PROCEDURE — 85730 THROMBOPLASTIN TIME PARTIAL: CPT

## 2021-12-30 PROCEDURE — 87205 SMEAR GRAM STAIN: CPT

## 2021-12-30 PROCEDURE — 71045 X-RAY EXAM CHEST 1 VIEW: CPT

## 2021-12-30 PROCEDURE — 83550 IRON BINDING TEST: CPT

## 2021-12-30 PROCEDURE — 80053 COMPREHEN METABOLIC PANEL: CPT

## 2021-12-30 PROCEDURE — 82570 ASSAY OF URINE CREATININE: CPT

## 2021-12-30 PROCEDURE — 83615 LACTATE (LD) (LDH) ENZYME: CPT

## 2021-12-30 PROCEDURE — 71250 CT THORAX DX C-: CPT

## 2021-12-30 PROCEDURE — 85610 PROTHROMBIN TIME: CPT

## 2021-12-30 PROCEDURE — 84443 ASSAY THYROID STIM HORMONE: CPT

## 2021-12-30 PROCEDURE — 84300 ASSAY OF URINE SODIUM: CPT

## 2021-12-30 PROCEDURE — 83930 ASSAY OF BLOOD OSMOLALITY: CPT

## 2021-12-30 PROCEDURE — C1887: CPT

## 2021-12-30 PROCEDURE — 36415 COLL VENOUS BLD VENIPUNCTURE: CPT

## 2021-12-30 PROCEDURE — 88341 IMHCHEM/IMCYTCHM EA ADD ANTB: CPT

## 2021-12-30 PROCEDURE — 84439 ASSAY OF FREE THYROXINE: CPT

## 2021-12-30 PROCEDURE — 83935 ASSAY OF URINE OSMOLALITY: CPT

## 2021-12-30 PROCEDURE — 88112 CYTOPATH CELL ENHANCE TECH: CPT

## 2021-12-30 PROCEDURE — U0005: CPT

## 2021-12-30 PROCEDURE — 82728 ASSAY OF FERRITIN: CPT

## 2021-12-30 PROCEDURE — 84540 ASSAY OF URINE/UREA-N: CPT

## 2021-12-30 PROCEDURE — 82945 GLUCOSE OTHER FLUID: CPT

## 2021-12-30 PROCEDURE — 87075 CULTR BACTERIA EXCEPT BLOOD: CPT

## 2021-12-30 PROCEDURE — 87635 SARS-COV-2 COVID-19 AMP PRB: CPT

## 2021-12-30 PROCEDURE — 87070 CULTURE OTHR SPECIMN AEROBIC: CPT

## 2021-12-30 PROCEDURE — 86850 RBC ANTIBODY SCREEN: CPT

## 2021-12-30 PROCEDURE — 32557 INSERT CATH PLEURA W/ IMAGE: CPT

## 2021-12-30 PROCEDURE — C1769: CPT

## 2021-12-30 PROCEDURE — 99152 MOD SED SAME PHYS/QHP 5/>YRS: CPT

## 2021-12-30 PROCEDURE — 82962 GLUCOSE BLOOD TEST: CPT

## 2021-12-30 PROCEDURE — 88305 TISSUE EXAM BY PATHOLOGIST: CPT

## 2021-12-30 PROCEDURE — 84156 ASSAY OF PROTEIN URINE: CPT

## 2021-12-30 PROCEDURE — 86901 BLOOD TYPING SEROLOGIC RH(D): CPT

## 2021-12-30 PROCEDURE — 84466 ASSAY OF TRANSFERRIN: CPT

## 2021-12-30 PROCEDURE — 80048 BASIC METABOLIC PNL TOTAL CA: CPT

## 2021-12-30 PROCEDURE — 84157 ASSAY OF PROTEIN OTHER: CPT

## 2021-12-30 PROCEDURE — 84100 ASSAY OF PHOSPHORUS: CPT

## 2021-12-30 PROCEDURE — 89051 BODY FLUID CELL COUNT: CPT

## 2021-12-30 PROCEDURE — 85025 COMPLETE CBC W/AUTO DIFF WBC: CPT

## 2021-12-30 PROCEDURE — 83735 ASSAY OF MAGNESIUM: CPT

## 2021-12-30 PROCEDURE — 85027 COMPLETE CBC AUTOMATED: CPT

## 2022-01-01 ENCOUNTER — LABORATORY RESULT (OUTPATIENT)
Age: 72
End: 2022-01-01

## 2022-01-01 ENCOUNTER — TRANSCRIPTION ENCOUNTER (OUTPATIENT)
Age: 72
End: 2022-01-01

## 2022-01-01 ENCOUNTER — APPOINTMENT (OUTPATIENT)
Dept: PULMONOLOGY | Facility: CLINIC | Age: 72
End: 2022-01-01

## 2022-01-01 ENCOUNTER — INPATIENT (INPATIENT)
Facility: HOSPITAL | Age: 72
LOS: 1 days | Discharge: HOME | DRG: 291 | End: 2022-11-24
Payer: MEDICARE

## 2022-01-01 ENCOUNTER — APPOINTMENT (OUTPATIENT)
Dept: HEART AND VASCULAR | Facility: CLINIC | Age: 72
End: 2022-01-01
Payer: MEDICARE

## 2022-01-01 ENCOUNTER — APPOINTMENT (OUTPATIENT)
Dept: NEPHROLOGY | Facility: CLINIC | Age: 72
End: 2022-01-01

## 2022-01-01 ENCOUNTER — APPOINTMENT (OUTPATIENT)
Dept: OTOLARYNGOLOGY | Facility: CLINIC | Age: 72
End: 2022-01-01

## 2022-01-01 ENCOUNTER — NON-APPOINTMENT (OUTPATIENT)
Age: 72
End: 2022-01-01

## 2022-01-01 ENCOUNTER — APPOINTMENT (OUTPATIENT)
Dept: ENDOCRINOLOGY | Facility: CLINIC | Age: 72
End: 2022-01-01

## 2022-01-01 ENCOUNTER — OUTPATIENT (OUTPATIENT)
Dept: OUTPATIENT SERVICES | Facility: HOSPITAL | Age: 72
LOS: 1 days | End: 2022-01-01

## 2022-01-01 ENCOUNTER — EMERGENCY (EMERGENCY)
Facility: HOSPITAL | Age: 72
LOS: 1 days | Discharge: HOME | End: 2022-01-01
Attending: EMERGENCY MEDICINE | Admitting: EMERGENCY MEDICINE
Payer: MEDICARE

## 2022-01-01 ENCOUNTER — EMERGENCY (EMERGENCY)
Facility: HOSPITAL | Age: 72
LOS: 1 days | Discharge: HOME | End: 2022-01-01
Attending: EMERGENCY MEDICINE | Admitting: EMERGENCY MEDICINE

## 2022-01-01 ENCOUNTER — APPOINTMENT (OUTPATIENT)
Dept: NUCLEAR MEDICINE | Facility: HOSPITAL | Age: 72
End: 2022-01-01

## 2022-01-01 ENCOUNTER — APPOINTMENT (OUTPATIENT)
Dept: HEART AND VASCULAR | Facility: CLINIC | Age: 72
End: 2022-01-01

## 2022-01-01 ENCOUNTER — RESULT REVIEW (OUTPATIENT)
Age: 72
End: 2022-01-01

## 2022-01-01 ENCOUNTER — APPOINTMENT (OUTPATIENT)
Dept: NEPHROLOGY | Facility: CLINIC | Age: 72
End: 2022-01-01
Payer: MEDICARE

## 2022-01-01 ENCOUNTER — OUTPATIENT (OUTPATIENT)
Dept: OUTPATIENT SERVICES | Facility: HOSPITAL | Age: 72
LOS: 1 days | Discharge: HOME | End: 2022-01-01
Payer: MEDICARE

## 2022-01-01 ENCOUNTER — APPOINTMENT (OUTPATIENT)
Dept: CT IMAGING | Facility: CLINIC | Age: 72
End: 2022-01-01
Payer: MEDICARE

## 2022-01-01 ENCOUNTER — INPATIENT (INPATIENT)
Facility: HOSPITAL | Age: 72
LOS: 6 days | Discharge: HOME | DRG: 981 | End: 2022-07-27
Attending: INTERNAL MEDICINE | Admitting: INTERNAL MEDICINE
Payer: MEDICARE

## 2022-01-01 ENCOUNTER — APPOINTMENT (OUTPATIENT)
Dept: MRI IMAGING | Facility: HOSPITAL | Age: 72
End: 2022-01-01

## 2022-01-01 ENCOUNTER — OUTPATIENT (OUTPATIENT)
Dept: OUTPATIENT SERVICES | Facility: HOSPITAL | Age: 72
LOS: 1 days | End: 2022-01-01
Payer: MEDICARE

## 2022-01-01 ENCOUNTER — APPOINTMENT (OUTPATIENT)
Dept: CT IMAGING | Facility: CLINIC | Age: 72
End: 2022-01-01

## 2022-01-01 ENCOUNTER — INPATIENT (INPATIENT)
Facility: HOSPITAL | Age: 72
LOS: 2 days | Discharge: HOME | DRG: 982 | End: 2022-09-21
Attending: SPECIALIST
Payer: MEDICARE

## 2022-01-01 ENCOUNTER — RX RENEWAL (OUTPATIENT)
Age: 72
End: 2022-01-01

## 2022-01-01 ENCOUNTER — RESULT CHARGE (OUTPATIENT)
Age: 72
End: 2022-01-01

## 2022-01-01 ENCOUNTER — APPOINTMENT (OUTPATIENT)
Dept: OTOLARYNGOLOGY | Facility: CLINIC | Age: 72
End: 2022-01-01
Payer: MEDICARE

## 2022-01-01 VITALS
HEART RATE: 73 BPM | OXYGEN SATURATION: 95 % | TEMPERATURE: 98 F | SYSTOLIC BLOOD PRESSURE: 131 MMHG | RESPIRATION RATE: 18 BRPM | DIASTOLIC BLOOD PRESSURE: 83 MMHG

## 2022-01-01 VITALS
OXYGEN SATURATION: 94 % | SYSTOLIC BLOOD PRESSURE: 139 MMHG | TEMPERATURE: 97 F | RESPIRATION RATE: 20 BRPM | HEIGHT: 66 IN | HEART RATE: 85 BPM | WEIGHT: 199.96 LBS | DIASTOLIC BLOOD PRESSURE: 77 MMHG

## 2022-01-01 VITALS
TEMPERATURE: 97.4 F | OXYGEN SATURATION: 98 % | BODY MASS INDEX: 33.09 KG/M2 | WEIGHT: 205 LBS | RESPIRATION RATE: 20 BRPM | HEART RATE: 84 BPM

## 2022-01-01 VITALS
HEART RATE: 74 BPM | RESPIRATION RATE: 17 BRPM | DIASTOLIC BLOOD PRESSURE: 79 MMHG | OXYGEN SATURATION: 97 % | TEMPERATURE: 98 F | SYSTOLIC BLOOD PRESSURE: 143 MMHG

## 2022-01-01 VITALS
HEART RATE: 76 BPM | RESPIRATION RATE: 18 BRPM | OXYGEN SATURATION: 99 % | HEIGHT: 66 IN | TEMPERATURE: 98 F | SYSTOLIC BLOOD PRESSURE: 141 MMHG | WEIGHT: 218.92 LBS | DIASTOLIC BLOOD PRESSURE: 68 MMHG

## 2022-01-01 VITALS
TEMPERATURE: 98 F | RESPIRATION RATE: 18 BRPM | DIASTOLIC BLOOD PRESSURE: 71 MMHG | HEART RATE: 72 BPM | OXYGEN SATURATION: 98 % | SYSTOLIC BLOOD PRESSURE: 126 MMHG

## 2022-01-01 VITALS — HEART RATE: 82 BPM | TEMPERATURE: 97.9 F | WEIGHT: 220 LBS | OXYGEN SATURATION: 95 % | BODY MASS INDEX: 35.51 KG/M2

## 2022-01-01 VITALS
WEIGHT: 219 LBS | TEMPERATURE: 97.3 F | BODY MASS INDEX: 35.2 KG/M2 | HEIGHT: 66 IN | SYSTOLIC BLOOD PRESSURE: 134 MMHG | DIASTOLIC BLOOD PRESSURE: 73 MMHG | HEART RATE: 73 BPM

## 2022-01-01 VITALS
OXYGEN SATURATION: 93 % | SYSTOLIC BLOOD PRESSURE: 115 MMHG | DIASTOLIC BLOOD PRESSURE: 69 MMHG | TEMPERATURE: 97.9 F | HEIGHT: 66 IN | WEIGHT: 219 LBS | BODY MASS INDEX: 35.2 KG/M2 | HEART RATE: 79 BPM

## 2022-01-01 VITALS
DIASTOLIC BLOOD PRESSURE: 59 MMHG | SYSTOLIC BLOOD PRESSURE: 132 MMHG | DIASTOLIC BLOOD PRESSURE: 66 MMHG | HEART RATE: 93 BPM | SYSTOLIC BLOOD PRESSURE: 110 MMHG

## 2022-01-01 VITALS
WEIGHT: 215 LBS | DIASTOLIC BLOOD PRESSURE: 68 MMHG | BODY MASS INDEX: 34.7 KG/M2 | HEART RATE: 72 BPM | SYSTOLIC BLOOD PRESSURE: 138 MMHG

## 2022-01-01 VITALS
BODY MASS INDEX: 32.14 KG/M2 | WEIGHT: 200 LBS | HEART RATE: 85 BPM | OXYGEN SATURATION: 91 % | SYSTOLIC BLOOD PRESSURE: 120 MMHG | DIASTOLIC BLOOD PRESSURE: 70 MMHG | HEIGHT: 66 IN

## 2022-01-01 VITALS
OXYGEN SATURATION: 94 % | WEIGHT: 220 LBS | HEART RATE: 86 BPM | SYSTOLIC BLOOD PRESSURE: 146 MMHG | BODY MASS INDEX: 35.36 KG/M2 | DIASTOLIC BLOOD PRESSURE: 56 MMHG | TEMPERATURE: 97.9 F | HEIGHT: 66 IN

## 2022-01-01 VITALS — TEMPERATURE: 97 F

## 2022-01-01 VITALS
OXYGEN SATURATION: 95 % | HEIGHT: 66 IN | WEIGHT: 224 LBS | SYSTOLIC BLOOD PRESSURE: 122 MMHG | TEMPERATURE: 97.7 F | BODY MASS INDEX: 36 KG/M2 | HEART RATE: 89 BPM | DIASTOLIC BLOOD PRESSURE: 65 MMHG

## 2022-01-01 VITALS — WEIGHT: 212 LBS | BODY MASS INDEX: 34.22 KG/M2 | HEART RATE: 83 BPM | OXYGEN SATURATION: 96 % | TEMPERATURE: 97.7 F

## 2022-01-01 VITALS — HEART RATE: 103 BPM | BODY MASS INDEX: 36.15 KG/M2 | TEMPERATURE: 98.7 F | OXYGEN SATURATION: 91 % | WEIGHT: 224 LBS

## 2022-01-01 VITALS
SYSTOLIC BLOOD PRESSURE: 117 MMHG | HEART RATE: 92 BPM | WEIGHT: 218 LBS | BODY MASS INDEX: 35.19 KG/M2 | DIASTOLIC BLOOD PRESSURE: 60 MMHG

## 2022-01-01 VITALS
HEIGHT: 66 IN | SYSTOLIC BLOOD PRESSURE: 123 MMHG | WEIGHT: 220 LBS | HEART RATE: 72 BPM | BODY MASS INDEX: 35.36 KG/M2 | DIASTOLIC BLOOD PRESSURE: 58 MMHG

## 2022-01-01 VITALS — SYSTOLIC BLOOD PRESSURE: 144 MMHG | DIASTOLIC BLOOD PRESSURE: 69 MMHG

## 2022-01-01 VITALS
WEIGHT: 195.99 LBS | DIASTOLIC BLOOD PRESSURE: 73 MMHG | SYSTOLIC BLOOD PRESSURE: 139 MMHG | OXYGEN SATURATION: 95 % | HEIGHT: 66 IN | HEART RATE: 75 BPM | TEMPERATURE: 98 F | RESPIRATION RATE: 17 BRPM

## 2022-01-01 VITALS
SYSTOLIC BLOOD PRESSURE: 125 MMHG | DIASTOLIC BLOOD PRESSURE: 80 MMHG | RESPIRATION RATE: 16 BRPM | TEMPERATURE: 98 F | HEART RATE: 95 BPM | OXYGEN SATURATION: 97 %

## 2022-01-01 VITALS
HEIGHT: 66 IN | BODY MASS INDEX: 32.47 KG/M2 | HEART RATE: 79 BPM | DIASTOLIC BLOOD PRESSURE: 72 MMHG | SYSTOLIC BLOOD PRESSURE: 124 MMHG | WEIGHT: 202 LBS | OXYGEN SATURATION: 91 %

## 2022-01-01 VITALS
HEIGHT: 66 IN | WEIGHT: 205 LBS | TEMPERATURE: 97.3 F | OXYGEN SATURATION: 96 % | DIASTOLIC BLOOD PRESSURE: 73 MMHG | SYSTOLIC BLOOD PRESSURE: 148 MMHG | RESPIRATION RATE: 12 BRPM | HEART RATE: 87 BPM | BODY MASS INDEX: 32.95 KG/M2

## 2022-01-01 VITALS — HEART RATE: 96 BPM | DIASTOLIC BLOOD PRESSURE: 60 MMHG | SYSTOLIC BLOOD PRESSURE: 120 MMHG

## 2022-01-01 VITALS — HEART RATE: 80 BPM | SYSTOLIC BLOOD PRESSURE: 120 MMHG | DIASTOLIC BLOOD PRESSURE: 64 MMHG

## 2022-01-01 VITALS — BODY MASS INDEX: 33.13 KG/M2 | WEIGHT: 205.26 LBS

## 2022-01-01 VITALS
OXYGEN SATURATION: 94 % | TEMPERATURE: 98 F | HEIGHT: 66 IN | RESPIRATION RATE: 16 BRPM | HEART RATE: 80 BPM | WEIGHT: 218.04 LBS | DIASTOLIC BLOOD PRESSURE: 71 MMHG | SYSTOLIC BLOOD PRESSURE: 133 MMHG

## 2022-01-01 VITALS — HEART RATE: 108 BPM | DIASTOLIC BLOOD PRESSURE: 50 MMHG | SYSTOLIC BLOOD PRESSURE: 118 MMHG

## 2022-01-01 VITALS — BODY MASS INDEX: 34.7 KG/M2 | HEART RATE: 72 BPM | TEMPERATURE: 97.8 F | OXYGEN SATURATION: 97 % | WEIGHT: 215 LBS

## 2022-01-01 VITALS — DIASTOLIC BLOOD PRESSURE: 66 MMHG | HEART RATE: 87 BPM | SYSTOLIC BLOOD PRESSURE: 136 MMHG

## 2022-01-01 VITALS
DIASTOLIC BLOOD PRESSURE: 67 MMHG | SYSTOLIC BLOOD PRESSURE: 140 MMHG | BODY MASS INDEX: 35.35 KG/M2 | WEIGHT: 219 LBS | HEART RATE: 79 BPM

## 2022-01-01 VITALS — SYSTOLIC BLOOD PRESSURE: 127 MMHG | DIASTOLIC BLOOD PRESSURE: 71 MMHG

## 2022-01-01 VITALS — HEIGHT: 66 IN | BODY MASS INDEX: 32.95 KG/M2 | WEIGHT: 205 LBS

## 2022-01-01 VITALS — OXYGEN SATURATION: 94 %

## 2022-01-01 VITALS — OXYGEN SATURATION: 96 %

## 2022-01-01 VITALS — BODY MASS INDEX: 32.02 KG/M2 | WEIGHT: 198.41 LBS

## 2022-01-01 DIAGNOSIS — Z95.1 PRESENCE OF AORTOCORONARY BYPASS GRAFT: Chronic | ICD-10-CM

## 2022-01-01 DIAGNOSIS — I50.30 UNSPECIFIED DIASTOLIC (CONGESTIVE) HEART FAILURE: ICD-10-CM

## 2022-01-01 DIAGNOSIS — G47.33 OBSTRUCTIVE SLEEP APNEA (ADULT) (PEDIATRIC): ICD-10-CM

## 2022-01-01 DIAGNOSIS — Z86.39 PERSONAL HISTORY OF OTHER ENDOCRINE, NUTRITIONAL AND METABOLIC DISEASE: ICD-10-CM

## 2022-01-01 DIAGNOSIS — Z98.890 OTHER SPECIFIED POSTPROCEDURAL STATES: Chronic | ICD-10-CM

## 2022-01-01 DIAGNOSIS — Z79.01 LONG TERM (CURRENT) USE OF ANTICOAGULANTS: ICD-10-CM

## 2022-01-01 DIAGNOSIS — N18.5 CHRONIC KIDNEY DISEASE, STAGE 5: ICD-10-CM

## 2022-01-01 DIAGNOSIS — Z29.9 ENCOUNTER FOR PROPHYLACTIC MEASURES, UNSPECIFIED: ICD-10-CM

## 2022-01-01 DIAGNOSIS — C91.10 CHRONIC LYMPHOCYTIC LEUKEMIA OF B-CELL TYPE NOT HAVING ACHIEVED REMISSION: ICD-10-CM

## 2022-01-01 DIAGNOSIS — M27.2 INFLAMMATORY CONDITIONS OF JAWS: Chronic | ICD-10-CM

## 2022-01-01 DIAGNOSIS — K21.9 GASTRO-ESOPHAGEAL REFLUX DISEASE WITHOUT ESOPHAGITIS: ICD-10-CM

## 2022-01-01 DIAGNOSIS — I13.0 HYPERTENSIVE HEART AND CHRONIC KIDNEY DISEASE WITH HEART FAILURE AND STAGE 1 THROUGH STAGE 4 CHRONIC KIDNEY DISEASE, OR UNSPECIFIED CHRONIC KIDNEY DISEASE: ICD-10-CM

## 2022-01-01 DIAGNOSIS — D47.2 MONOCLONAL GAMMOPATHY: ICD-10-CM

## 2022-01-01 DIAGNOSIS — N17.9 ACUTE KIDNEY FAILURE, UNSPECIFIED: ICD-10-CM

## 2022-01-01 DIAGNOSIS — D69.59 OTHER SECONDARY THROMBOCYTOPENIA: ICD-10-CM

## 2022-01-01 DIAGNOSIS — I13.2 HYPERTENSIVE HEART AND CHRONIC KIDNEY DISEASE WITH HEART FAILURE AND WITH STAGE 5 CHRONIC KIDNEY DISEASE, OR END STAGE RENAL DISEASE: ICD-10-CM

## 2022-01-01 DIAGNOSIS — H93.8X2 OTHER SPECIFIED DISORDERS OF LEFT EAR: ICD-10-CM

## 2022-01-01 DIAGNOSIS — R94.31 ABNORMAL ELECTROCARDIOGRAM [ECG] [EKG]: ICD-10-CM

## 2022-01-01 DIAGNOSIS — M27.40 UNSPECIFIED CYST OF JAW: ICD-10-CM

## 2022-01-01 DIAGNOSIS — R06.09 OTHER FORMS OF DYSPNEA: ICD-10-CM

## 2022-01-01 DIAGNOSIS — I50.33 ACUTE ON CHRONIC DIASTOLIC (CONGESTIVE) HEART FAILURE: ICD-10-CM

## 2022-01-01 DIAGNOSIS — K22.70 BARRETT'S ESOPHAGUS WITHOUT DYSPLASIA: ICD-10-CM

## 2022-01-01 DIAGNOSIS — Z95.1 PRESENCE OF AORTOCORONARY BYPASS GRAFT: ICD-10-CM

## 2022-01-01 DIAGNOSIS — I48.20 CHRONIC ATRIAL FIBRILLATION, UNSPECIFIED: ICD-10-CM

## 2022-01-01 DIAGNOSIS — I48.92 UNSPECIFIED ATRIAL FLUTTER: ICD-10-CM

## 2022-01-01 DIAGNOSIS — E66.01 MORBID (SEVERE) OBESITY DUE TO EXCESS CALORIES: ICD-10-CM

## 2022-01-01 DIAGNOSIS — N18.9 CHRONIC KIDNEY DISEASE, UNSPECIFIED: ICD-10-CM

## 2022-01-01 DIAGNOSIS — I48.0 PAROXYSMAL ATRIAL FIBRILLATION: ICD-10-CM

## 2022-01-01 DIAGNOSIS — J90 PLEURAL EFFUSION, NOT ELSEWHERE CLASSIFIED: ICD-10-CM

## 2022-01-01 DIAGNOSIS — I27.20 PULMONARY HYPERTENSION, UNSPECIFIED: ICD-10-CM

## 2022-01-01 DIAGNOSIS — C76.0 MALIGNANT NEOPLASM OF HEAD, FACE AND NECK: ICD-10-CM

## 2022-01-01 DIAGNOSIS — K44.9 DIAPHRAGMATIC HERNIA WITHOUT OBSTRUCTION OR GANGRENE: ICD-10-CM

## 2022-01-01 DIAGNOSIS — E11.22 TYPE 2 DIABETES MELLITUS WITH DIABETIC CHRONIC KIDNEY DISEASE: ICD-10-CM

## 2022-01-01 DIAGNOSIS — J96.11 CHRONIC RESPIRATORY FAILURE WITH HYPOXIA: ICD-10-CM

## 2022-01-01 DIAGNOSIS — T45.1X5A OTHER SECONDARY THROMBOCYTOPENIA: ICD-10-CM

## 2022-01-01 DIAGNOSIS — R22.0 LOCALIZED SWELLING, MASS AND LUMP, HEAD: ICD-10-CM

## 2022-01-01 DIAGNOSIS — R04.0 EPISTAXIS: ICD-10-CM

## 2022-01-01 DIAGNOSIS — I25.10 ATHEROSCLEROTIC HEART DISEASE OF NATIVE CORONARY ARTERY WITHOUT ANGINA PECTORIS: ICD-10-CM

## 2022-01-01 DIAGNOSIS — I48.19 OTHER PERSISTENT ATRIAL FIBRILLATION: ICD-10-CM

## 2022-01-01 DIAGNOSIS — C91.11 CHRONIC LYMPHOCYTIC LEUKEMIA OF B-CELL TYPE IN REMISSION: ICD-10-CM

## 2022-01-01 DIAGNOSIS — D63.1 CHRONIC KIDNEY DISEASE, STAGE 5: ICD-10-CM

## 2022-01-01 DIAGNOSIS — Z94.0 KIDNEY TRANSPLANT STATUS: ICD-10-CM

## 2022-01-01 DIAGNOSIS — E66.9 OBESITY, UNSPECIFIED: ICD-10-CM

## 2022-01-01 DIAGNOSIS — I48.91 UNSPECIFIED ATRIAL FIBRILLATION: ICD-10-CM

## 2022-01-01 DIAGNOSIS — K59.00 CONSTIPATION, UNSPECIFIED: ICD-10-CM

## 2022-01-01 DIAGNOSIS — E87.1 HYPO-OSMOLALITY AND HYPONATREMIA: ICD-10-CM

## 2022-01-01 DIAGNOSIS — E78.5 HYPERLIPIDEMIA, UNSPECIFIED: ICD-10-CM

## 2022-01-01 DIAGNOSIS — R42 DIZZINESS AND GIDDINESS: ICD-10-CM

## 2022-01-01 DIAGNOSIS — Z86.79 PERSONAL HISTORY OF OTHER DISEASES OF THE CIRCULATORY SYSTEM: ICD-10-CM

## 2022-01-01 DIAGNOSIS — Z86.018 PERSONAL HISTORY OF OTHER BENIGN NEOPLASM: ICD-10-CM

## 2022-01-01 DIAGNOSIS — D64.9 ANEMIA, UNSPECIFIED: ICD-10-CM

## 2022-01-01 DIAGNOSIS — I50.9 HEART FAILURE, UNSPECIFIED: ICD-10-CM

## 2022-01-01 DIAGNOSIS — R06.02 SHORTNESS OF BREATH: ICD-10-CM

## 2022-01-01 DIAGNOSIS — R09.81 NASAL CONGESTION: ICD-10-CM

## 2022-01-01 DIAGNOSIS — Z98.818 OTHER DENTAL PROCEDURE STATUS: ICD-10-CM

## 2022-01-01 DIAGNOSIS — F40.240 CLAUSTROPHOBIA: ICD-10-CM

## 2022-01-01 DIAGNOSIS — G47.00 INSOMNIA, UNSPECIFIED: ICD-10-CM

## 2022-01-01 DIAGNOSIS — Z79.4 LONG TERM (CURRENT) USE OF INSULIN: ICD-10-CM

## 2022-01-01 DIAGNOSIS — E11.9 TYPE 2 DIABETES MELLITUS WITHOUT COMPLICATIONS: ICD-10-CM

## 2022-01-01 DIAGNOSIS — R09.02 HYPOXEMIA: ICD-10-CM

## 2022-01-01 DIAGNOSIS — N40.0 BENIGN PROSTATIC HYPERPLASIA WITHOUT LOWER URINARY TRACT SYMPTOMS: ICD-10-CM

## 2022-01-01 DIAGNOSIS — I87.2 VENOUS INSUFFICIENCY (CHRONIC) (PERIPHERAL): ICD-10-CM

## 2022-01-01 DIAGNOSIS — R68.84 JAW PAIN: ICD-10-CM

## 2022-01-01 DIAGNOSIS — M27.8 OTHER SPECIFIED DISEASES OF JAWS: ICD-10-CM

## 2022-01-01 DIAGNOSIS — M79.89 OTHER SPECIFIED SOFT TISSUE DISORDERS: ICD-10-CM

## 2022-01-01 DIAGNOSIS — E78.00 PURE HYPERCHOLESTEROLEMIA, UNSPECIFIED: ICD-10-CM

## 2022-01-01 DIAGNOSIS — I10 ESSENTIAL (PRIMARY) HYPERTENSION: ICD-10-CM

## 2022-01-01 DIAGNOSIS — D16.5 BENIGN NEOPLASM OF LOWER JAW BONE: ICD-10-CM

## 2022-01-01 DIAGNOSIS — Z79.899 OTHER LONG TERM (CURRENT) DRUG THERAPY: ICD-10-CM

## 2022-01-01 DIAGNOSIS — C41.0 MALIGNANT NEOPLASM OF BONES OF SKULL AND FACE: ICD-10-CM

## 2022-01-01 DIAGNOSIS — C85.90 NON-HODGKIN LYMPHOMA, UNSPECIFIED, UNSPECIFIED SITE: ICD-10-CM

## 2022-01-01 DIAGNOSIS — F41.9 ANXIETY DISORDER, UNSPECIFIED: ICD-10-CM

## 2022-01-01 DIAGNOSIS — Z20.822 CONTACT WITH AND (SUSPECTED) EXPOSURE TO COVID-19: ICD-10-CM

## 2022-01-01 DIAGNOSIS — Z95.5 PRESENCE OF CORONARY ANGIOPLASTY IMPLANT AND GRAFT: ICD-10-CM

## 2022-01-01 DIAGNOSIS — J96.01 ACUTE RESPIRATORY FAILURE WITH HYPOXIA: ICD-10-CM

## 2022-01-01 DIAGNOSIS — N18.2 CHRONIC KIDNEY DISEASE, STAGE 2 (MILD): ICD-10-CM

## 2022-01-01 DIAGNOSIS — D72.820 LYMPHOCYTOSIS (SYMPTOMATIC): ICD-10-CM

## 2022-01-01 DIAGNOSIS — I26.99 OTHER PULMONARY EMBOLISM W/OUT ACUTE COR PULMONALE: ICD-10-CM

## 2022-01-01 DIAGNOSIS — J34.89 OTHER SPECIFIED DISORDERS OF NOSE AND NASAL SINUSES: ICD-10-CM

## 2022-01-01 DIAGNOSIS — Z86.711 PERSONAL HISTORY OF PULMONARY EMBOLISM: ICD-10-CM

## 2022-01-01 DIAGNOSIS — Z85.89 PERSONAL HISTORY OF MALIGNANT NEOPLASM OF OTHER ORGANS AND SYSTEMS: ICD-10-CM

## 2022-01-01 DIAGNOSIS — J44.9 CHRONIC OBSTRUCTIVE PULMONARY DISEASE, UNSPECIFIED: ICD-10-CM

## 2022-01-01 LAB
-  CEFTRIAXONE: SIGNIFICANT CHANGE UP
-  CEFTRIAXONE: SIGNIFICANT CHANGE UP
-  CLINDAMYCIN: SIGNIFICANT CHANGE UP
-  ERYTHROMYCIN: SIGNIFICANT CHANGE UP
-  LEVOFLOXACIN: SIGNIFICANT CHANGE UP
-  LEVOFLOXACIN: SIGNIFICANT CHANGE UP
-  PENICILLIN: SIGNIFICANT CHANGE UP
-  VANCOMYCIN: SIGNIFICANT CHANGE UP
24R-OH-CALCIDIOL SERPL-MCNC: 27.5 PG/ML
24R-OH-CALCIDIOL SERPL-MCNC: 42.1 PG/ML
24R-OH-CALCIDIOL SERPL-MCNC: 43.7 PG/ML
24R-OH-CALCIDIOL SERPL-MCNC: 48.5 PG/ML
25(OH)D3 SERPL-MCNC: 45.9 NG/ML
25(OH)D3 SERPL-MCNC: 52.1 NG/ML
25(OH)D3 SERPL-MCNC: 52.7 NG/ML
25(OH)D3 SERPL-MCNC: 69.9 NG/ML
A1C WITH ESTIMATED AVERAGE GLUCOSE RESULT: 6.7 % — HIGH (ref 4–5.6)
A1C WITH ESTIMATED AVERAGE GLUCOSE RESULT: 6.9 % — HIGH (ref 4–5.6)
A1C WITH ESTIMATED AVERAGE GLUCOSE RESULT: 7.5 % — HIGH (ref 4–5.6)
ALBUMIN MFR SERPL ELPH: 58.5 %
ALBUMIN MFR SERPL ELPH: 59.5 %
ALBUMIN MFR SERPL ELPH: 59.8 %
ALBUMIN MFR SERPL ELPH: 61.1 %
ALBUMIN SERPL ELPH-MCNC: 4.2 G/DL — SIGNIFICANT CHANGE UP (ref 3.3–5)
ALBUMIN SERPL ELPH-MCNC: 4.4 G/DL — SIGNIFICANT CHANGE UP (ref 3.3–5)
ALBUMIN SERPL ELPH-MCNC: 4.5 G/DL — SIGNIFICANT CHANGE UP (ref 3.3–5)
ALBUMIN SERPL ELPH-MCNC: 4.5 G/DL — SIGNIFICANT CHANGE UP (ref 3.3–5)
ALBUMIN SERPL ELPH-MCNC: 4.6 G/DL — SIGNIFICANT CHANGE UP (ref 3.3–5)
ALBUMIN SERPL ELPH-MCNC: 4.7 G/DL — SIGNIFICANT CHANGE UP (ref 3.3–5)
ALBUMIN SERPL ELPH-MCNC: 4.8 G/DL
ALBUMIN SERPL ELPH-MCNC: 4.8 G/DL — SIGNIFICANT CHANGE UP (ref 3.3–5)
ALBUMIN SERPL ELPH-MCNC: 4.9 G/DL
ALBUMIN SERPL ELPH-MCNC: 5 G/DL
ALBUMIN SERPL ELPH-MCNC: 5 G/DL — SIGNIFICANT CHANGE UP (ref 3.3–5)
ALBUMIN SERPL ELPH-MCNC: 5.2 G/DL
ALBUMIN SERPL-MCNC: 4.4 G/DL
ALBUMIN SERPL-MCNC: 4.4 G/DL
ALBUMIN SERPL-MCNC: 4.5 G/DL
ALBUMIN SERPL-MCNC: 4.6 G/DL
ALBUMIN/GLOB SERPL: 1.4 RATIO
ALBUMIN/GLOB SERPL: 1.5 RATIO
ALBUMIN/GLOB SERPL: 1.5 RATIO
ALBUMIN/GLOB SERPL: 1.6 RATIO
ALDOSTERONE SERUM: 13 NG/DL
ALDOSTERONE SERUM: 19.9 NG/DL
ALP BLD-CCNC: 137 U/L
ALP BLD-CCNC: 144 U/L
ALP BLD-CCNC: 144 U/L
ALP BLD-CCNC: 154 U/L
ALP BONE SERPL-MCNC: 22.6 UG/L
ALP BONE SERPL-MCNC: 24.8 UG/L
ALP BONE SERPL-MCNC: 26.4 UG/L
ALP BONE SERPL-MCNC: 30.2 UG/L
ALP SERPL-CCNC: 128 U/L — HIGH (ref 40–120)
ALP SERPL-CCNC: 129 U/L — HIGH (ref 40–120)
ALP SERPL-CCNC: 133 U/L — HIGH (ref 40–120)
ALP SERPL-CCNC: 143 U/L — HIGH (ref 40–120)
ALP SERPL-CCNC: 150 U/L — HIGH (ref 40–120)
ALP SERPL-CCNC: 151 U/L — HIGH (ref 40–120)
ALP SERPL-CCNC: 151 U/L — HIGH (ref 40–120)
ALP SERPL-CCNC: 153 U/L — HIGH (ref 40–120)
ALPHA1 GLOB MFR SERPL ELPH: 4.3 %
ALPHA1 GLOB MFR SERPL ELPH: 4.4 %
ALPHA1 GLOB MFR SERPL ELPH: 4.6 %
ALPHA1 GLOB MFR SERPL ELPH: 5.1 %
ALPHA1 GLOB SERPL ELPH-MCNC: 0.3 G/DL
ALPHA1 GLOB SERPL ELPH-MCNC: 0.4 G/DL
ALPHA2 GLOB MFR SERPL ELPH: 10.2 %
ALPHA2 GLOB MFR SERPL ELPH: 10.3 %
ALPHA2 GLOB MFR SERPL ELPH: 8.9 %
ALPHA2 GLOB MFR SERPL ELPH: 9.9 %
ALPHA2 GLOB SERPL ELPH-MCNC: 0.7 G/DL
ALPHA2 GLOB SERPL ELPH-MCNC: 0.7 G/DL
ALPHA2 GLOB SERPL ELPH-MCNC: 0.8 G/DL
ALPHA2 GLOB SERPL ELPH-MCNC: 0.8 G/DL
ALT FLD-CCNC: 10 U/L — SIGNIFICANT CHANGE UP (ref 10–45)
ALT FLD-CCNC: 11 U/L — SIGNIFICANT CHANGE UP (ref 10–45)
ALT FLD-CCNC: 11 U/L — SIGNIFICANT CHANGE UP (ref 10–45)
ALT FLD-CCNC: 15 U/L — SIGNIFICANT CHANGE UP (ref 10–45)
ALT FLD-CCNC: 15 U/L — SIGNIFICANT CHANGE UP (ref 10–45)
ALT FLD-CCNC: 16 U/L — SIGNIFICANT CHANGE UP (ref 10–45)
ALT FLD-CCNC: 17 U/L — SIGNIFICANT CHANGE UP (ref 10–45)
ALT FLD-CCNC: 7 U/L — LOW (ref 10–45)
ALT SERPL-CCNC: 11 U/L
ALT SERPL-CCNC: 14 U/L
ALT SERPL-CCNC: 15 U/L
ALT SERPL-CCNC: 17 U/L
ANA PAT FLD IF-IMP: ABNORMAL
ANA SER IF-ACNC: ABNORMAL
ANA SER IF-ACNC: NEGATIVE
ANION GAP SERPL CALC-SCNC: 10 MMOL/L — SIGNIFICANT CHANGE UP (ref 5–17)
ANION GAP SERPL CALC-SCNC: 11 MMOL/L — SIGNIFICANT CHANGE UP (ref 5–17)
ANION GAP SERPL CALC-SCNC: 12 MMOL/L — SIGNIFICANT CHANGE UP (ref 5–17)
ANION GAP SERPL CALC-SCNC: 13 MMOL/L — SIGNIFICANT CHANGE UP (ref 5–17)
ANION GAP SERPL CALC-SCNC: 13 MMOL/L — SIGNIFICANT CHANGE UP (ref 5–17)
ANION GAP SERPL CALC-SCNC: 14 MMOL/L
ANION GAP SERPL CALC-SCNC: 14 MMOL/L
ANION GAP SERPL CALC-SCNC: 14 MMOL/L — SIGNIFICANT CHANGE UP (ref 5–17)
ANION GAP SERPL CALC-SCNC: 15 MMOL/L — SIGNIFICANT CHANGE UP (ref 5–17)
ANION GAP SERPL CALC-SCNC: 16 MMOL/L
ANION GAP SERPL CALC-SCNC: 16 MMOL/L
ANION GAP SERPL CALC-SCNC: 16 MMOL/L — SIGNIFICANT CHANGE UP (ref 5–17)
ANION GAP SERPL CALC-SCNC: 16 MMOL/L — SIGNIFICANT CHANGE UP (ref 5–17)
ANION GAP SERPL CALC-SCNC: 17 MMOL/L
ANION GAP SERPL CALC-SCNC: 17 MMOL/L — SIGNIFICANT CHANGE UP (ref 5–17)
ANION GAP SERPL CALC-SCNC: 8 MMOL/L — SIGNIFICANT CHANGE UP (ref 5–17)
ANION GAP SERPL CALC-SCNC: 9 MMOL/L — SIGNIFICANT CHANGE UP (ref 5–17)
ANION GAP SERPL CALC-SCNC: 9 MMOL/L — SIGNIFICANT CHANGE UP (ref 5–17)
ANISOCYTOSIS BLD QL: SIGNIFICANT CHANGE UP
ANISOCYTOSIS BLD QL: SIGNIFICANT CHANGE UP
ANISOCYTOSIS BLD QL: SLIGHT — SIGNIFICANT CHANGE UP
APPEARANCE UR: CLEAR — SIGNIFICANT CHANGE UP
APPEARANCE: CLEAR
APTT BLD: 36.5 SEC — HIGH (ref 27.5–35.5)
APTT BLD: 39.2 SEC — HIGH (ref 27.5–35.5)
APTT BLD: 40.8 SEC — HIGH (ref 27.5–35.5)
APTT BLD: 41 SEC — HIGH (ref 27.5–35.5)
APTT BLD: 42.8 SEC — HIGH (ref 27.5–35.5)
AST SERPL-CCNC: 15 U/L — SIGNIFICANT CHANGE UP (ref 10–40)
AST SERPL-CCNC: 15 U/L — SIGNIFICANT CHANGE UP (ref 10–40)
AST SERPL-CCNC: 16 U/L — SIGNIFICANT CHANGE UP (ref 10–40)
AST SERPL-CCNC: 17 U/L — SIGNIFICANT CHANGE UP (ref 10–40)
AST SERPL-CCNC: 18 U/L — SIGNIFICANT CHANGE UP (ref 10–40)
AST SERPL-CCNC: 20 U/L — SIGNIFICANT CHANGE UP (ref 10–40)
AST SERPL-CCNC: 20 U/L — SIGNIFICANT CHANGE UP (ref 10–40)
AST SERPL-CCNC: 21 U/L
AST SERPL-CCNC: 21 U/L
AST SERPL-CCNC: 22 U/L
AST SERPL-CCNC: 22 U/L — SIGNIFICANT CHANGE UP (ref 10–40)
AST SERPL-CCNC: 23 U/L
B-GLOBULIN MFR SERPL ELPH: 10 %
B-GLOBULIN MFR SERPL ELPH: 10.1 %
B-GLOBULIN MFR SERPL ELPH: 9.9 %
B-GLOBULIN MFR SERPL ELPH: 9.9 %
B-GLOBULIN SERPL ELPH-MCNC: 0.7 G/DL
B-GLOBULIN SERPL ELPH-MCNC: 0.7 G/DL
B-GLOBULIN SERPL ELPH-MCNC: 0.8 G/DL
B-GLOBULIN SERPL ELPH-MCNC: 0.8 G/DL
BACTERIA: NEGATIVE
BASOPHILS # BLD AUTO: 0 K/UL
BASOPHILS # BLD AUTO: 0 K/UL — SIGNIFICANT CHANGE UP (ref 0–0.2)
BASOPHILS # BLD AUTO: 0.24 K/UL — HIGH (ref 0–0.2)
BASOPHILS # BLD AUTO: 0.29 K/UL — HIGH (ref 0–0.2)
BASOPHILS # BLD AUTO: 0.36 K/UL — HIGH (ref 0–0.2)
BASOPHILS NFR BLD AUTO: 0 %
BASOPHILS NFR BLD AUTO: 0 % — SIGNIFICANT CHANGE UP (ref 0–2)
BASOPHILS NFR BLD AUTO: 0.9 % — SIGNIFICANT CHANGE UP (ref 0–2)
BILIRUB SERPL-MCNC: 0.5 MG/DL
BILIRUB SERPL-MCNC: 0.6 MG/DL
BILIRUB SERPL-MCNC: 0.6 MG/DL — SIGNIFICANT CHANGE UP (ref 0.2–1.2)
BILIRUB SERPL-MCNC: 0.7 MG/DL — SIGNIFICANT CHANGE UP (ref 0.2–1.2)
BILIRUB SERPL-MCNC: 0.7 MG/DL — SIGNIFICANT CHANGE UP (ref 0.2–1.2)
BILIRUB SERPL-MCNC: 0.8 MG/DL — SIGNIFICANT CHANGE UP (ref 0.2–1.2)
BILIRUB UR-MCNC: NEGATIVE — SIGNIFICANT CHANGE UP
BILIRUBIN URINE: NEGATIVE
BLD GP AB SCN SERPL QL: NEGATIVE — SIGNIFICANT CHANGE UP
BLOOD URINE: NEGATIVE
BUN SERPL-MCNC: 26 MG/DL — HIGH (ref 7–23)
BUN SERPL-MCNC: 29 MG/DL — HIGH (ref 7–23)
BUN SERPL-MCNC: 30 MG/DL — HIGH (ref 7–23)
BUN SERPL-MCNC: 31 MG/DL
BUN SERPL-MCNC: 31 MG/DL — HIGH (ref 7–23)
BUN SERPL-MCNC: 32 MG/DL — HIGH (ref 7–23)
BUN SERPL-MCNC: 32 MG/DL — HIGH (ref 7–23)
BUN SERPL-MCNC: 33 MG/DL — HIGH (ref 7–23)
BUN SERPL-MCNC: 34 MG/DL — HIGH (ref 7–23)
BUN SERPL-MCNC: 34 MG/DL — HIGH (ref 7–23)
BUN SERPL-MCNC: 35 MG/DL — HIGH (ref 7–23)
BUN SERPL-MCNC: 36 MG/DL — HIGH (ref 7–23)
BUN SERPL-MCNC: 36 MG/DL — HIGH (ref 7–23)
BUN SERPL-MCNC: 37 MG/DL — HIGH (ref 7–23)
BUN SERPL-MCNC: 37 MG/DL — HIGH (ref 7–23)
BUN SERPL-MCNC: 39 MG/DL — HIGH (ref 7–23)
BUN SERPL-MCNC: 40 MG/DL — HIGH (ref 7–23)
BUN SERPL-MCNC: 41 MG/DL — HIGH (ref 7–23)
BUN SERPL-MCNC: 42 MG/DL
BUN SERPL-MCNC: 43 MG/DL
BUN SERPL-MCNC: 43 MG/DL — HIGH (ref 7–23)
BUN SERPL-MCNC: 44 MG/DL — HIGH (ref 7–23)
BUN SERPL-MCNC: 45 MG/DL
BUN SERPL-MCNC: 46 MG/DL — HIGH (ref 7–23)
BUN SERPL-MCNC: 48 MG/DL — HIGH (ref 7–23)
BUN SERPL-MCNC: 59 MG/DL
BURR CELLS BLD QL SMEAR: SLIGHT — SIGNIFICANT CHANGE UP
C1Q IMMUNE COMPLEX: <1.2 UG EQ/ML
C3 SERPL-MCNC: 104 MG/DL
C3 SERPL-MCNC: 121 MG/DL
C3 SERPL-MCNC: 94 MG/DL
C3 SERPL-MCNC: 98 MG/DL
C4 SERPL-MCNC: 27 MG/DL
C4 SERPL-MCNC: 30 MG/DL
C4 SERPL-MCNC: 30 MG/DL
C4 SERPL-MCNC: 31 MG/DL
CALCIUM SERPL-MCNC: 8.8 MG/DL — SIGNIFICANT CHANGE UP (ref 8.4–10.5)
CALCIUM SERPL-MCNC: 8.8 MG/DL — SIGNIFICANT CHANGE UP (ref 8.4–10.5)
CALCIUM SERPL-MCNC: 8.9 MG/DL — SIGNIFICANT CHANGE UP (ref 8.4–10.5)
CALCIUM SERPL-MCNC: 8.9 MG/DL — SIGNIFICANT CHANGE UP (ref 8.4–10.5)
CALCIUM SERPL-MCNC: 9 MG/DL — SIGNIFICANT CHANGE UP (ref 8.4–10.5)
CALCIUM SERPL-MCNC: 9.1 MG/DL
CALCIUM SERPL-MCNC: 9.1 MG/DL
CALCIUM SERPL-MCNC: 9.1 MG/DL — SIGNIFICANT CHANGE UP (ref 8.4–10.5)
CALCIUM SERPL-MCNC: 9.2 MG/DL — SIGNIFICANT CHANGE UP (ref 8.4–10.5)
CALCIUM SERPL-MCNC: 9.3 MG/DL
CALCIUM SERPL-MCNC: 9.3 MG/DL — SIGNIFICANT CHANGE UP (ref 8.4–10.5)
CALCIUM SERPL-MCNC: 9.4 MG/DL
CALCIUM SERPL-MCNC: 9.4 MG/DL
CALCIUM SERPL-MCNC: 9.4 MG/DL — SIGNIFICANT CHANGE UP (ref 8.4–10.5)
CALCIUM SERPL-MCNC: 9.5 MG/DL
CALCIUM SERPL-MCNC: 9.5 MG/DL
CALCIUM SERPL-MCNC: 9.5 MG/DL — SIGNIFICANT CHANGE UP (ref 8.4–10.5)
CALCIUM SERPL-MCNC: 9.5 MG/DL — SIGNIFICANT CHANGE UP (ref 8.4–10.5)
CALCIUM SERPL-MCNC: 9.8 MG/DL — SIGNIFICANT CHANGE UP (ref 8.4–10.5)
CHLORIDE SERPL-SCNC: 100 MMOL/L — SIGNIFICANT CHANGE UP (ref 96–108)
CHLORIDE SERPL-SCNC: 86 MMOL/L — LOW (ref 96–108)
CHLORIDE SERPL-SCNC: 87 MMOL/L — LOW (ref 96–108)
CHLORIDE SERPL-SCNC: 88 MMOL/L — LOW (ref 96–108)
CHLORIDE SERPL-SCNC: 89 MMOL/L
CHLORIDE SERPL-SCNC: 89 MMOL/L — LOW (ref 96–108)
CHLORIDE SERPL-SCNC: 90 MMOL/L — LOW (ref 96–108)
CHLORIDE SERPL-SCNC: 91 MMOL/L
CHLORIDE SERPL-SCNC: 92 MMOL/L — LOW (ref 96–108)
CHLORIDE SERPL-SCNC: 93 MMOL/L
CHLORIDE SERPL-SCNC: 93 MMOL/L
CHLORIDE SERPL-SCNC: 93 MMOL/L — LOW (ref 96–108)
CHLORIDE SERPL-SCNC: 93 MMOL/L — LOW (ref 96–108)
CHLORIDE SERPL-SCNC: 95 MMOL/L
CHLORIDE SERPL-SCNC: 95 MMOL/L — LOW (ref 96–108)
CHLORIDE SERPL-SCNC: 96 MMOL/L — SIGNIFICANT CHANGE UP (ref 96–108)
CHLORIDE SERPL-SCNC: 97 MMOL/L — SIGNIFICANT CHANGE UP (ref 96–108)
CHOLEST SERPL-MCNC: 104 MG/DL
CHOLEST SERPL-MCNC: 108 MG/DL
CHOLEST SERPL-MCNC: 109 MG/DL
CHOLEST SERPL-MCNC: 94 MG/DL — SIGNIFICANT CHANGE UP
CHOLEST SERPL-MCNC: 96 MG/DL
CK MB CFR SERPL CALC: 2 NG/ML — SIGNIFICANT CHANGE UP (ref 0–6.7)
CK MB CFR SERPL CALC: 2.1 NG/ML — SIGNIFICANT CHANGE UP (ref 0–6.7)
CK MB CFR SERPL CALC: 2.2 NG/ML — SIGNIFICANT CHANGE UP (ref 0–6.7)
CK SERPL-CCNC: 46 U/L — SIGNIFICANT CHANGE UP (ref 30–200)
CK SERPL-CCNC: 47 U/L — SIGNIFICANT CHANGE UP (ref 30–200)
CK SERPL-CCNC: 50 U/L — SIGNIFICANT CHANGE UP (ref 30–200)
CO2 SERPL-SCNC: 19 MMOL/L — LOW (ref 22–31)
CO2 SERPL-SCNC: 19 MMOL/L — LOW (ref 22–31)
CO2 SERPL-SCNC: 20 MMOL/L
CO2 SERPL-SCNC: 21 MMOL/L — LOW (ref 22–31)
CO2 SERPL-SCNC: 22 MMOL/L — SIGNIFICANT CHANGE UP (ref 22–31)
CO2 SERPL-SCNC: 22 MMOL/L — SIGNIFICANT CHANGE UP (ref 22–31)
CO2 SERPL-SCNC: 23 MMOL/L — SIGNIFICANT CHANGE UP (ref 22–31)
CO2 SERPL-SCNC: 23 MMOL/L — SIGNIFICANT CHANGE UP (ref 22–31)
CO2 SERPL-SCNC: 24 MMOL/L
CO2 SERPL-SCNC: 24 MMOL/L
CO2 SERPL-SCNC: 25 MMOL/L — SIGNIFICANT CHANGE UP (ref 22–31)
CO2 SERPL-SCNC: 26 MMOL/L
CO2 SERPL-SCNC: 26 MMOL/L
CO2 SERPL-SCNC: 26 MMOL/L — SIGNIFICANT CHANGE UP (ref 22–31)
CO2 SERPL-SCNC: 27 MMOL/L — SIGNIFICANT CHANGE UP (ref 22–31)
CO2 SERPL-SCNC: 28 MMOL/L — SIGNIFICANT CHANGE UP (ref 22–31)
CO2 SERPL-SCNC: 29 MMOL/L — SIGNIFICANT CHANGE UP (ref 22–31)
COLLAGEN CTX SERPL-MCNC: 1002 PG/ML
COLLAGEN CTX SERPL-MCNC: 561 PG/ML
COLLAGEN CTX SERPL-MCNC: 725 PG/ML
COLLAGEN CTX SERPL-MCNC: 912 PG/ML
COLOR SPEC: YELLOW — SIGNIFICANT CHANGE UP
COLOR: COLORLESS
COLOR: NORMAL
COLOR: NORMAL
CREAT ?TM UR-MCNC: 46 MG/DL — SIGNIFICANT CHANGE UP
CREAT ?TM UR-MCNC: 48 MG/DL — SIGNIFICANT CHANGE UP
CREAT SERPL-MCNC: 1.04 MG/DL — SIGNIFICANT CHANGE UP (ref 0.5–1.3)
CREAT SERPL-MCNC: 1.06 MG/DL — SIGNIFICANT CHANGE UP (ref 0.5–1.3)
CREAT SERPL-MCNC: 1.09 MG/DL — SIGNIFICANT CHANGE UP (ref 0.5–1.3)
CREAT SERPL-MCNC: 1.15 MG/DL — SIGNIFICANT CHANGE UP (ref 0.5–1.3)
CREAT SERPL-MCNC: 1.21 MG/DL — SIGNIFICANT CHANGE UP (ref 0.5–1.3)
CREAT SERPL-MCNC: 1.21 MG/DL — SIGNIFICANT CHANGE UP (ref 0.5–1.3)
CREAT SERPL-MCNC: 1.23 MG/DL — SIGNIFICANT CHANGE UP (ref 0.5–1.3)
CREAT SERPL-MCNC: 1.29 MG/DL — SIGNIFICANT CHANGE UP (ref 0.5–1.3)
CREAT SERPL-MCNC: 1.32 MG/DL — HIGH (ref 0.5–1.3)
CREAT SERPL-MCNC: 1.33 MG/DL — HIGH (ref 0.5–1.3)
CREAT SERPL-MCNC: 1.33 MG/DL — HIGH (ref 0.5–1.3)
CREAT SERPL-MCNC: 1.34 MG/DL — HIGH (ref 0.5–1.3)
CREAT SERPL-MCNC: 1.36 MG/DL — HIGH (ref 0.5–1.3)
CREAT SERPL-MCNC: 1.37 MG/DL — HIGH (ref 0.5–1.3)
CREAT SERPL-MCNC: 1.38 MG/DL
CREAT SERPL-MCNC: 1.4 MG/DL — HIGH (ref 0.5–1.3)
CREAT SERPL-MCNC: 1.41 MG/DL — HIGH (ref 0.5–1.3)
CREAT SERPL-MCNC: 1.41 MG/DL — HIGH (ref 0.5–1.3)
CREAT SERPL-MCNC: 1.42 MG/DL — HIGH (ref 0.5–1.3)
CREAT SERPL-MCNC: 1.5 MG/DL — HIGH (ref 0.5–1.3)
CREAT SERPL-MCNC: 1.51 MG/DL — HIGH (ref 0.5–1.3)
CREAT SERPL-MCNC: 1.52 MG/DL — HIGH (ref 0.5–1.3)
CREAT SERPL-MCNC: 1.53 MG/DL — HIGH (ref 0.5–1.3)
CREAT SERPL-MCNC: 1.55 MG/DL
CREAT SERPL-MCNC: 1.55 MG/DL — HIGH (ref 0.5–1.3)
CREAT SERPL-MCNC: 1.56 MG/DL — HIGH (ref 0.5–1.3)
CREAT SERPL-MCNC: 1.59 MG/DL — HIGH (ref 0.5–1.3)
CREAT SERPL-MCNC: 1.65 MG/DL
CREAT SERPL-MCNC: 1.72 MG/DL
CREAT SERPL-MCNC: 1.75 MG/DL
CREAT SPEC-SCNC: 24 MG/DL
CREAT/PROT UR: NORMAL RATIO
CRP SERPL-MCNC: 10 MG/L
CRP SERPL-MCNC: 12 MG/L
CRP SERPL-MCNC: 7 MG/L
CULTURE RESULTS: SIGNIFICANT CHANGE UP
CYSTATIN C SERPL-MCNC: 1.88 MG/L
CYSTATIN C SERPL-MCNC: 1.98 MG/L
CYSTATIN C SERPL-MCNC: 2.1 MG/L
CYSTATIN C SERPL-MCNC: 2.2 MG/L
DACRYOCYTES BLD QL SMEAR: SIGNIFICANT CHANGE UP
DACRYOCYTES BLD QL SMEAR: SLIGHT — SIGNIFICANT CHANGE UP
DEPRECATED KAPPA LC FREE/LAMBDA SER: 0.38 RATIO
DEPRECATED KAPPA LC FREE/LAMBDA SER: 0.41 RATIO
DEPRECATED KAPPA LC FREE/LAMBDA SER: 0.43 RATIO
DEPRECATED KAPPA LC FREE/LAMBDA SER: 0.47 RATIO
DIFF PNL FLD: NEGATIVE — SIGNIFICANT CHANGE UP
EGFR: 41 ML/MIN/1.73M2
EGFR: 42 ML/MIN/1.73M2
EGFR: 44 ML/MIN/1.73M2
EGFR: 46 ML/MIN/1.73M2 — LOW
EGFR: 47 ML/MIN/1.73M2 — LOW
EGFR: 48 ML/MIN/1.73M2
EGFR: 48 ML/MIN/1.73M2 — LOW
EGFR: 49 ML/MIN/1.73M2 — LOW
EGFR: 50 ML/MIN/1.73M2 — LOW
EGFR: 53 ML/MIN/1.73M2 — LOW
EGFR: 54 ML/MIN/1.73M2 — LOW
EGFR: 55 ML/MIN/1.73M2
EGFR: 55 ML/MIN/1.73M2 — LOW
EGFR: 56 ML/MIN/1.73M2 — LOW
EGFR: 57 ML/MIN/1.73M2 — LOW
EGFR: 58 ML/MIN/1.73M2 — LOW
EGFR: 60 ML/MIN/1.73M2 — SIGNIFICANT CHANGE UP
EGFR: 63 ML/MIN/1.73M2 — SIGNIFICANT CHANGE UP
EGFR: 64 ML/MIN/1.73M2 — SIGNIFICANT CHANGE UP
EGFR: 64 ML/MIN/1.73M2 — SIGNIFICANT CHANGE UP
EGFR: 68 ML/MIN/1.73M2 — SIGNIFICANT CHANGE UP
EGFR: 73 ML/MIN/1.73M2 — SIGNIFICANT CHANGE UP
EGFR: 76 ML/MIN/1.73M2 — SIGNIFICANT CHANGE UP
EGFR: 77 ML/MIN/1.73M2 — SIGNIFICANT CHANGE UP
ELLIPTOCYTES BLD QL SMEAR: SLIGHT — SIGNIFICANT CHANGE UP
ELLIPTOCYTES BLD QL SMEAR: SLIGHT — SIGNIFICANT CHANGE UP
EOSINOPHIL # BLD AUTO: 0 K/UL
EOSINOPHIL # BLD AUTO: 0 K/UL — SIGNIFICANT CHANGE UP (ref 0–0.5)
EOSINOPHIL NFR BLD AUTO: 0 %
EOSINOPHIL NFR BLD AUTO: 0 % — SIGNIFICANT CHANGE UP (ref 0–6)
ERYTHROCYTE [SEDIMENTATION RATE] IN BLOOD BY WESTERGREN METHOD: 35 MM/HR
ERYTHROCYTE [SEDIMENTATION RATE] IN BLOOD BY WESTERGREN METHOD: 37 MM/HR
ERYTHROCYTE [SEDIMENTATION RATE] IN BLOOD BY WESTERGREN METHOD: 37 MM/HR
ESTIMATED AVERAGE GLUCOSE: 146 MG/DL — HIGH (ref 68–114)
ESTIMATED AVERAGE GLUCOSE: 148 MG/DL
ESTIMATED AVERAGE GLUCOSE: 151 MG/DL — HIGH (ref 68–114)
ESTIMATED AVERAGE GLUCOSE: 154 MG/DL
ESTIMATED AVERAGE GLUCOSE: 157 MG/DL
ESTIMATED AVERAGE GLUCOSE: 169 MG/DL — HIGH (ref 68–114)
ESTIMATED AVERAGE GLUCOSE: 180 MG/DL
FERRITIN SERPL-MCNC: 110 NG/ML
FERRITIN SERPL-MCNC: 83 NG/ML
FERRITIN SERPL-MCNC: 91 NG/ML
FERRITIN SERPL-MCNC: 96 NG/ML
FOLATE SERPL-MCNC: 10.2 NG/ML
FOLATE SERPL-MCNC: 11.9 NG/ML
FOLATE SERPL-MCNC: 5.9 NG/ML
FOLATE SERPL-MCNC: 5.9 NG/ML
GAMMA GLOB FLD ELPH-MCNC: 1.1 G/DL
GAMMA GLOB FLD ELPH-MCNC: 1.2 G/DL
GAMMA GLOB FLD ELPH-MCNC: 1.2 G/DL
GAMMA GLOB FLD ELPH-MCNC: 1.3 G/DL
GAMMA GLOB MFR SERPL ELPH: 15.1 %
GAMMA GLOB MFR SERPL ELPH: 15.7 %
GAMMA GLOB MFR SERPL ELPH: 15.8 %
GAMMA GLOB MFR SERPL ELPH: 16.9 %
GFR/BSA.PRED SERPLBLD CYS-BASED-ARV: 26 ML/MIN/1.73M2
GFR/BSA.PRED SERPLBLD CYS-BASED-ARV: 28 ML/MIN/1.73M2
GFR/BSA.PRED SERPLBLD CYS-BASED-ARV: 30 ML/MIN/1.73M2
GFR/BSA.PRED SERPLBLD CYS-BASED-ARV: 32 ML/MIN/1.73M2
GIANT PLATELETS BLD QL SMEAR: PRESENT — SIGNIFICANT CHANGE UP
GIANT PLATELETS BLD QL SMEAR: PRESENT — SIGNIFICANT CHANGE UP
GLUCOSE BLDC GLUCOMTR-MCNC: 132 MG/DL — HIGH (ref 70–99)
GLUCOSE BLDC GLUCOMTR-MCNC: 132 MG/DL — HIGH (ref 70–99)
GLUCOSE BLDC GLUCOMTR-MCNC: 133 MG/DL — HIGH (ref 70–99)
GLUCOSE BLDC GLUCOMTR-MCNC: 136 MG/DL — HIGH (ref 70–99)
GLUCOSE BLDC GLUCOMTR-MCNC: 137
GLUCOSE BLDC GLUCOMTR-MCNC: 140 MG/DL — HIGH (ref 70–99)
GLUCOSE BLDC GLUCOMTR-MCNC: 141 MG/DL — HIGH (ref 70–99)
GLUCOSE BLDC GLUCOMTR-MCNC: 141 MG/DL — HIGH (ref 70–99)
GLUCOSE BLDC GLUCOMTR-MCNC: 144
GLUCOSE BLDC GLUCOMTR-MCNC: 147 MG/DL — HIGH (ref 70–99)
GLUCOSE BLDC GLUCOMTR-MCNC: 150 MG/DL — HIGH (ref 70–99)
GLUCOSE BLDC GLUCOMTR-MCNC: 187 MG/DL — HIGH (ref 70–99)
GLUCOSE BLDC GLUCOMTR-MCNC: 189 MG/DL — HIGH (ref 70–99)
GLUCOSE BLDC GLUCOMTR-MCNC: 190 MG/DL — HIGH (ref 70–99)
GLUCOSE BLDC GLUCOMTR-MCNC: 190 MG/DL — HIGH (ref 70–99)
GLUCOSE BLDC GLUCOMTR-MCNC: 193 MG/DL — HIGH (ref 70–99)
GLUCOSE BLDC GLUCOMTR-MCNC: 194 MG/DL — HIGH (ref 70–99)
GLUCOSE BLDC GLUCOMTR-MCNC: 199 MG/DL — HIGH (ref 70–99)
GLUCOSE BLDC GLUCOMTR-MCNC: 203 MG/DL — HIGH (ref 70–99)
GLUCOSE BLDC GLUCOMTR-MCNC: 205 MG/DL — HIGH (ref 70–99)
GLUCOSE BLDC GLUCOMTR-MCNC: 205 MG/DL — HIGH (ref 70–99)
GLUCOSE BLDC GLUCOMTR-MCNC: 206 MG/DL — HIGH (ref 70–99)
GLUCOSE BLDC GLUCOMTR-MCNC: 210 MG/DL — HIGH (ref 70–99)
GLUCOSE BLDC GLUCOMTR-MCNC: 216 MG/DL — HIGH (ref 70–99)
GLUCOSE BLDC GLUCOMTR-MCNC: 232 MG/DL — HIGH (ref 70–99)
GLUCOSE BLDC GLUCOMTR-MCNC: 234 MG/DL — HIGH (ref 70–99)
GLUCOSE BLDC GLUCOMTR-MCNC: 234 MG/DL — HIGH (ref 70–99)
GLUCOSE BLDC GLUCOMTR-MCNC: 236 MG/DL — HIGH (ref 70–99)
GLUCOSE BLDC GLUCOMTR-MCNC: 243 MG/DL — HIGH (ref 70–99)
GLUCOSE BLDC GLUCOMTR-MCNC: 246 MG/DL — HIGH (ref 70–99)
GLUCOSE BLDC GLUCOMTR-MCNC: 260 MG/DL — HIGH (ref 70–99)
GLUCOSE BLDC GLUCOMTR-MCNC: 303 MG/DL — HIGH (ref 70–99)
GLUCOSE QUALITATIVE U: NEGATIVE
GLUCOSE SERPL-MCNC: 101 MG/DL — HIGH (ref 70–99)
GLUCOSE SERPL-MCNC: 106 MG/DL
GLUCOSE SERPL-MCNC: 108 MG/DL
GLUCOSE SERPL-MCNC: 125 MG/DL — HIGH (ref 70–99)
GLUCOSE SERPL-MCNC: 132 MG/DL
GLUCOSE SERPL-MCNC: 132 MG/DL — HIGH (ref 70–99)
GLUCOSE SERPL-MCNC: 135 MG/DL — HIGH (ref 70–99)
GLUCOSE SERPL-MCNC: 136 MG/DL — HIGH (ref 70–99)
GLUCOSE SERPL-MCNC: 137 MG/DL — HIGH (ref 70–99)
GLUCOSE SERPL-MCNC: 137 MG/DL — HIGH (ref 70–99)
GLUCOSE SERPL-MCNC: 138 MG/DL — HIGH (ref 70–99)
GLUCOSE SERPL-MCNC: 139 MG/DL — HIGH (ref 70–99)
GLUCOSE SERPL-MCNC: 140 MG/DL
GLUCOSE SERPL-MCNC: 142 MG/DL — HIGH (ref 70–99)
GLUCOSE SERPL-MCNC: 147 MG/DL — HIGH (ref 70–99)
GLUCOSE SERPL-MCNC: 151 MG/DL — HIGH (ref 70–99)
GLUCOSE SERPL-MCNC: 160 MG/DL — HIGH (ref 70–99)
GLUCOSE SERPL-MCNC: 172 MG/DL — HIGH (ref 70–99)
GLUCOSE SERPL-MCNC: 189 MG/DL — HIGH (ref 70–99)
GLUCOSE SERPL-MCNC: 190 MG/DL — HIGH (ref 70–99)
GLUCOSE SERPL-MCNC: 192 MG/DL — HIGH (ref 70–99)
GLUCOSE SERPL-MCNC: 204 MG/DL — HIGH (ref 70–99)
GLUCOSE SERPL-MCNC: 211 MG/DL — HIGH (ref 70–99)
GLUCOSE SERPL-MCNC: 213 MG/DL — HIGH (ref 70–99)
GLUCOSE SERPL-MCNC: 235 MG/DL — HIGH (ref 70–99)
GLUCOSE SERPL-MCNC: 237 MG/DL — HIGH (ref 70–99)
GLUCOSE SERPL-MCNC: 240 MG/DL — HIGH (ref 70–99)
GLUCOSE SERPL-MCNC: 248 MG/DL — HIGH (ref 70–99)
GLUCOSE SERPL-MCNC: 70 MG/DL
GLUCOSE SERPL-MCNC: 86 MG/DL — SIGNIFICANT CHANGE UP (ref 70–99)
GLUCOSE UR QL: NEGATIVE — SIGNIFICANT CHANGE UP
GRAM STN FLD: SIGNIFICANT CHANGE UP
HBA1C MFR BLD HPLC: 6.5
HBA1C MFR BLD HPLC: 6.8 %
HBA1C MFR BLD HPLC: 7 %
HBA1C MFR BLD HPLC: 7.1 %
HBA1C MFR BLD HPLC: 7.9 %
HBV SURFACE AB SER QL: NONREACTIVE
HBV SURFACE AG SER QL: NONREACTIVE
HCT VFR BLD CALC: 25.6 % — LOW (ref 39–50)
HCT VFR BLD CALC: 25.6 % — LOW (ref 39–50)
HCT VFR BLD CALC: 25.9 % — LOW (ref 39–50)
HCT VFR BLD CALC: 27 % — LOW (ref 39–50)
HCT VFR BLD CALC: 33.2 % — LOW (ref 39–50)
HCT VFR BLD CALC: 33.3 % — LOW (ref 39–50)
HCT VFR BLD CALC: 33.5 % — LOW (ref 39–50)
HCT VFR BLD CALC: 33.7 % — LOW (ref 39–50)
HCT VFR BLD CALC: 33.7 % — LOW (ref 39–50)
HCT VFR BLD CALC: 33.8 % — LOW (ref 39–50)
HCT VFR BLD CALC: 33.9 %
HCT VFR BLD CALC: 34 % — LOW (ref 39–50)
HCT VFR BLD CALC: 34.1 % — LOW (ref 39–50)
HCT VFR BLD CALC: 34.1 % — LOW (ref 39–50)
HCT VFR BLD CALC: 34.7 % — LOW (ref 39–50)
HCT VFR BLD CALC: 35.3 % — LOW (ref 39–50)
HCT VFR BLD CALC: 36.3 %
HCT VFR BLD CALC: 36.3 % — LOW (ref 39–50)
HCT VFR BLD CALC: 36.6 % — LOW (ref 39–50)
HCT VFR BLD CALC: 37.5 % — LOW (ref 39–50)
HCT VFR BLD CALC: 37.6 %
HCT VFR BLD CALC: 37.8 %
HCT VFR BLD CALC: 39 % — SIGNIFICANT CHANGE UP (ref 39–50)
HCV AB SER QL: NONREACTIVE
HCV S/CO RATIO: 0.08 S/CO
HCV S/CO RATIO: 0.08 S/CO
HCV S/CO RATIO: 0.09 S/CO
HCV S/CO RATIO: 0.1 S/CO
HCYS SERPL-MCNC: 19.6 UMOL/L
HCYS SERPL-MCNC: 19.8 UMOL/L
HCYS SERPL-MCNC: 21.2 UMOL/L
HCYS SERPL-MCNC: 25 UMOL/L
HDLC SERPL-MCNC: 23 MG/DL
HDLC SERPL-MCNC: 27 MG/DL
HDLC SERPL-MCNC: 30 MG/DL
HDLC SERPL-MCNC: 30 MG/DL — LOW
HDLC SERPL-MCNC: 40 MG/DL
HGB BLD-MCNC: 10 G/DL — LOW (ref 13–17)
HGB BLD-MCNC: 10.1 G/DL
HGB BLD-MCNC: 10.3 G/DL — LOW (ref 13–17)
HGB BLD-MCNC: 10.4 G/DL — LOW (ref 13–17)
HGB BLD-MCNC: 10.6 G/DL
HGB BLD-MCNC: 10.6 G/DL — LOW (ref 13–17)
HGB BLD-MCNC: 10.7 G/DL — LOW (ref 13–17)
HGB BLD-MCNC: 10.7 G/DL — LOW (ref 13–17)
HGB BLD-MCNC: 11.2 G/DL
HGB BLD-MCNC: 11.2 G/DL — LOW (ref 13–17)
HGB BLD-MCNC: 11.4 G/DL — LOW (ref 13–17)
HGB BLD-MCNC: 11.4 G/DL — LOW (ref 13–17)
HGB BLD-MCNC: 11.5 G/DL
HGB BLD-MCNC: 12.1 G/DL — LOW (ref 13–17)
HGB BLD-MCNC: 7.7 G/DL — LOW (ref 13–17)
HGB BLD-MCNC: 7.8 G/DL — LOW (ref 13–17)
HGB BLD-MCNC: 7.9 G/DL — LOW (ref 13–17)
HGB BLD-MCNC: 8.4 G/DL — LOW (ref 13–17)
HYALINE CASTS: 0 /LPF
HYALINE CASTS: 0 /LPF
HYALINE CASTS: 1 /LPF
HYPOCHROMIA BLD QL: SLIGHT — SIGNIFICANT CHANGE UP
IGA SER QL IEP: 149 MG/DL
IGA SER QL IEP: 158 MG/DL
IGA SER QL IEP: 158 MG/DL
IGA SER QL IEP: 165 MG/DL
IGG SER QL IEP: 1335 MG/DL
IGG SER QL IEP: 1355 MG/DL
IGG SER QL IEP: 1362 MG/DL
IGG SER QL IEP: 1413 MG/DL
IGM SER QL IEP: 15 MG/DL
IGM SER QL IEP: 18 MG/DL
IGM SER QL IEP: 19 MG/DL
IGM SER QL IEP: 21 MG/DL
INR BLD: 1.22 — HIGH (ref 0.88–1.16)
INR BLD: 1.22 — HIGH (ref 0.88–1.16)
INR BLD: 1.27 — HIGH (ref 0.88–1.16)
INR BLD: 1.5 — HIGH (ref 0.88–1.16)
INR BLD: 1.59 — HIGH (ref 0.88–1.16)
INR BLD: 1.94 — HIGH (ref 0.88–1.16)
INTERPRETATION SERPL IEP-IMP: NORMAL
IRON SATN MFR SERPL: 11 %
IRON SATN MFR SERPL: 13 %
IRON SERPL-MCNC: 45 UG/DL
IRON SERPL-MCNC: 48 UG/DL
IRON SERPL-MCNC: 48 UG/DL
IRON SERPL-MCNC: 50 UG/DL
ISTAT INR: 1.2 — HIGH (ref 0.88–1.16)
ISTAT PT: 14.2 SEC — HIGH (ref 10–12.9)
ISTAT VENOUS BE: 1 MMOL/L — SIGNIFICANT CHANGE UP (ref -2–3)
ISTAT VENOUS GLUCOSE: 113 MG/DL — HIGH (ref 70–99)
ISTAT VENOUS HCO3: 26 MMOL/L — SIGNIFICANT CHANGE UP (ref 23–28)
ISTAT VENOUS HEMATOCRIT: 33 % — LOW (ref 39–50)
ISTAT VENOUS HEMOGLOBIN: 11.2 GM/DL — LOW (ref 13–17)
ISTAT VENOUS IONIZED CALCIUM: 1.11 MMOL/L — LOW (ref 1.12–1.3)
ISTAT VENOUS PCO2: 40 MMHG — LOW (ref 41–51)
ISTAT VENOUS PH: 7.41 — SIGNIFICANT CHANGE UP (ref 7.31–7.41)
ISTAT VENOUS PO2: <66 MMHG — SIGNIFICANT CHANGE UP (ref 35–40)
ISTAT VENOUS POTASSIUM: 4.7 MMOL/L — SIGNIFICANT CHANGE UP (ref 3.5–5.3)
ISTAT VENOUS SO2: 71 % — SIGNIFICANT CHANGE UP
ISTAT VENOUS SODIUM: 133 MMOL/L — LOW (ref 135–145)
ISTAT VENOUS TCO2: 27 MMOL/L — SIGNIFICANT CHANGE UP (ref 22–31)
KAPPA LC CSF-MCNC: 11.87 MG/DL
KAPPA LC CSF-MCNC: 12.58 MG/DL
KAPPA LC CSF-MCNC: 13.74 MG/DL
KAPPA LC CSF-MCNC: 9.33 MG/DL
KAPPA LC SERPL-MCNC: 4.34 MG/DL
KAPPA LC SERPL-MCNC: 5.16 MG/DL
KAPPA LC SERPL-MCNC: 5.16 MG/DL
KAPPA LC SERPL-MCNC: 5.17 MG/DL
KETONES UR-MCNC: NEGATIVE — SIGNIFICANT CHANGE UP
KETONES URINE: NEGATIVE
LDLC SERPL CALC-MCNC: 54 MG/DL
LDLC SERPL CALC-MCNC: 55 MG/DL
LDLC SERPL CALC-MCNC: 55 MG/DL
LDLC SERPL CALC-MCNC: 57 MG/DL
LEUKOCYTE ESTERASE UR-ACNC: NEGATIVE — SIGNIFICANT CHANGE UP
LEUKOCYTE ESTERASE URINE: NEGATIVE
LIPID PNL WITH DIRECT LDL SERPL: 42 MG/DL — SIGNIFICANT CHANGE UP
LYMPHOCYTES # BLD AUTO: 22.54 K/UL — HIGH (ref 1–3.3)
LYMPHOCYTES # BLD AUTO: 24.5 K/UL — HIGH (ref 1–3.3)
LYMPHOCYTES # BLD AUTO: 25.05 K/UL
LYMPHOCYTES # BLD AUTO: 25.08 K/UL — HIGH (ref 1–3.3)
LYMPHOCYTES # BLD AUTO: 27.35 K/UL
LYMPHOCYTES # BLD AUTO: 27.76 K/UL — HIGH (ref 1–3.3)
LYMPHOCYTES # BLD AUTO: 29.14 K/UL
LYMPHOCYTES # BLD AUTO: 30.59 K/UL — HIGH (ref 1–3.3)
LYMPHOCYTES # BLD AUTO: 31.73 K/UL
LYMPHOCYTES # BLD AUTO: 34.74 K/UL — HIGH (ref 1–3.3)
LYMPHOCYTES # BLD AUTO: 35.53 K/UL — HIGH (ref 1–3.3)
LYMPHOCYTES # BLD AUTO: 35.69 K/UL — HIGH (ref 1–3.3)
LYMPHOCYTES # BLD AUTO: 83.6 % — HIGH (ref 13–44)
LYMPHOCYTES # BLD AUTO: 86.6 % — HIGH (ref 13–44)
LYMPHOCYTES # BLD AUTO: 86.9 % — HIGH (ref 13–44)
LYMPHOCYTES # BLD AUTO: 87.6 % — HIGH (ref 13–44)
LYMPHOCYTES # BLD AUTO: 88.4 % — HIGH (ref 13–44)
LYMPHOCYTES # BLD AUTO: 91 % — HIGH (ref 13–44)
LYMPHOCYTES # BLD AUTO: 92.3 % — HIGH (ref 13–44)
LYMPHOCYTES # BLD AUTO: 93 % — HIGH (ref 13–44)
LYMPHOCYTES NFR BLD AUTO: 82 %
LYMPHOCYTES NFR BLD AUTO: 85 %
LYMPHOCYTES NFR BLD AUTO: 87.2 %
LYMPHOCYTES NFR BLD AUTO: 88 %
M PROTEIN MFR SERPL ELPH: 2.9 %
M PROTEIN MFR SERPL ELPH: 3 %
M PROTEIN MFR SERPL ELPH: 3.5 %
M PROTEIN MFR SERPL ELPH: 3.9 %
M PROTEIN SPEC IFE-MCNC: NORMAL
MACROCYTES BLD QL: SLIGHT — SIGNIFICANT CHANGE UP
MAGNESIUM SERPL-MCNC: 1.4 MG/DL — LOW (ref 1.6–2.6)
MAGNESIUM SERPL-MCNC: 1.4 MG/DL — LOW (ref 1.6–2.6)
MAGNESIUM SERPL-MCNC: 1.6 MG/DL — SIGNIFICANT CHANGE UP (ref 1.6–2.6)
MAGNESIUM SERPL-MCNC: 1.7 MG/DL — SIGNIFICANT CHANGE UP (ref 1.6–2.6)
MAGNESIUM SERPL-MCNC: 1.8 MG/DL
MAGNESIUM SERPL-MCNC: 1.8 MG/DL — SIGNIFICANT CHANGE UP (ref 1.6–2.6)
MAGNESIUM SERPL-MCNC: 1.9 MG/DL
MAGNESIUM SERPL-MCNC: 1.9 MG/DL
MAGNESIUM SERPL-MCNC: 1.9 MG/DL — SIGNIFICANT CHANGE UP (ref 1.6–2.6)
MAGNESIUM SERPL-MCNC: 2 MG/DL
MAGNESIUM SERPL-MCNC: 2 MG/DL — SIGNIFICANT CHANGE UP (ref 1.6–2.6)
MAGNESIUM SERPL-MCNC: 2.1 MG/DL — SIGNIFICANT CHANGE UP (ref 1.6–2.6)
MAGNESIUM SERPL-MCNC: 2.1 MG/DL — SIGNIFICANT CHANGE UP (ref 1.6–2.6)
MAGNESIUM SERPL-MCNC: 2.3 MG/DL — SIGNIFICANT CHANGE UP (ref 1.6–2.6)
MAN DIFF?: NORMAL
MANUAL SMEAR VERIFICATION: SIGNIFICANT CHANGE UP
MCHC RBC-ENTMCNC: 26 PG — LOW (ref 27–34)
MCHC RBC-ENTMCNC: 26.1 PG — LOW (ref 27–34)
MCHC RBC-ENTMCNC: 26.2 PG
MCHC RBC-ENTMCNC: 26.2 PG — LOW (ref 27–34)
MCHC RBC-ENTMCNC: 26.2 PG — LOW (ref 27–34)
MCHC RBC-ENTMCNC: 26.3 PG — LOW (ref 27–34)
MCHC RBC-ENTMCNC: 26.4 PG — LOW (ref 27–34)
MCHC RBC-ENTMCNC: 26.4 PG — LOW (ref 27–34)
MCHC RBC-ENTMCNC: 26.5 PG — LOW (ref 27–34)
MCHC RBC-ENTMCNC: 26.5 PG — LOW (ref 27–34)
MCHC RBC-ENTMCNC: 26.6 PG
MCHC RBC-ENTMCNC: 26.6 PG — LOW (ref 27–34)
MCHC RBC-ENTMCNC: 26.7 PG
MCHC RBC-ENTMCNC: 26.7 PG — LOW (ref 27–34)
MCHC RBC-ENTMCNC: 26.7 PG — LOW (ref 27–34)
MCHC RBC-ENTMCNC: 26.8 PG — LOW (ref 27–34)
MCHC RBC-ENTMCNC: 28 PG — SIGNIFICANT CHANGE UP (ref 27–34)
MCHC RBC-ENTMCNC: 28.2 PG — SIGNIFICANT CHANGE UP (ref 27–34)
MCHC RBC-ENTMCNC: 28.2 PG — SIGNIFICANT CHANGE UP (ref 27–34)
MCHC RBC-ENTMCNC: 28.5 PG — SIGNIFICANT CHANGE UP (ref 27–34)
MCHC RBC-ENTMCNC: 28.9 PG
MCHC RBC-ENTMCNC: 29.2 GM/DL
MCHC RBC-ENTMCNC: 29.6 GM/DL
MCHC RBC-ENTMCNC: 29.8 GM/DL
MCHC RBC-ENTMCNC: 30.1 GM/DL — LOW (ref 32–36)
MCHC RBC-ENTMCNC: 30.1 GM/DL — LOW (ref 32–36)
MCHC RBC-ENTMCNC: 30.3 GM/DL — LOW (ref 32–36)
MCHC RBC-ENTMCNC: 30.4 GM/DL — LOW (ref 32–36)
MCHC RBC-ENTMCNC: 30.5 GM/DL — LOW (ref 32–36)
MCHC RBC-ENTMCNC: 30.6 GM/DL
MCHC RBC-ENTMCNC: 30.6 GM/DL — LOW (ref 32–36)
MCHC RBC-ENTMCNC: 30.8 GM/DL — LOW (ref 32–36)
MCHC RBC-ENTMCNC: 30.9 GM/DL — LOW (ref 32–36)
MCHC RBC-ENTMCNC: 31 GM/DL — LOW (ref 32–36)
MCHC RBC-ENTMCNC: 31 GM/DL — LOW (ref 32–36)
MCHC RBC-ENTMCNC: 31.1 GM/DL — LOW (ref 32–36)
MCHC RBC-ENTMCNC: 31.2 GM/DL — LOW (ref 32–36)
MCHC RBC-ENTMCNC: 31.4 GM/DL — LOW (ref 32–36)
MCHC RBC-ENTMCNC: 31.4 GM/DL — LOW (ref 32–36)
MCV RBC AUTO: 84.6 FL — SIGNIFICANT CHANGE UP (ref 80–100)
MCV RBC AUTO: 85.1 FL — SIGNIFICANT CHANGE UP (ref 80–100)
MCV RBC AUTO: 85.3 FL — SIGNIFICANT CHANGE UP (ref 80–100)
MCV RBC AUTO: 85.6 FL — SIGNIFICANT CHANGE UP (ref 80–100)
MCV RBC AUTO: 85.6 FL — SIGNIFICANT CHANGE UP (ref 80–100)
MCV RBC AUTO: 85.7 FL — SIGNIFICANT CHANGE UP (ref 80–100)
MCV RBC AUTO: 85.7 FL — SIGNIFICANT CHANGE UP (ref 80–100)
MCV RBC AUTO: 85.8 FL — SIGNIFICANT CHANGE UP (ref 80–100)
MCV RBC AUTO: 86 FL — SIGNIFICANT CHANGE UP (ref 80–100)
MCV RBC AUTO: 86.1 FL — SIGNIFICANT CHANGE UP (ref 80–100)
MCV RBC AUTO: 86.1 FL — SIGNIFICANT CHANGE UP (ref 80–100)
MCV RBC AUTO: 86.3 FL — SIGNIFICANT CHANGE UP (ref 80–100)
MCV RBC AUTO: 86.5 FL — SIGNIFICANT CHANGE UP (ref 80–100)
MCV RBC AUTO: 86.7 FL — SIGNIFICANT CHANGE UP (ref 80–100)
MCV RBC AUTO: 86.8 FL
MCV RBC AUTO: 86.8 FL — SIGNIFICANT CHANGE UP (ref 80–100)
MCV RBC AUTO: 89.9 FL
MCV RBC AUTO: 90 FL
MCV RBC AUTO: 91.5 FL — SIGNIFICANT CHANGE UP (ref 80–100)
MCV RBC AUTO: 92.4 FL — SIGNIFICANT CHANGE UP (ref 80–100)
MCV RBC AUTO: 92.5 FL — SIGNIFICANT CHANGE UP (ref 80–100)
MCV RBC AUTO: 93.1 FL — SIGNIFICANT CHANGE UP (ref 80–100)
MCV RBC AUTO: 97.1 FL
METHOD TYPE: SIGNIFICANT CHANGE UP
METHYLMALONATE SERPL-SCNC: 259 NMOL/L
METHYLMALONATE SERPL-SCNC: 266 NMOL/L
METHYLMALONATE SERPL-SCNC: 277 NMOL/L
METHYLMALONATE SERPL-SCNC: 364 NMOL/L
MICROCYTES BLD QL: SLIGHT — SIGNIFICANT CHANGE UP
MICROSCOPIC-UA: NORMAL
MONOCLON BAND OBS SERPL: 0.2 G/DL
MONOCLON BAND OBS SERPL: 0.2 G/DL
MONOCLON BAND OBS SERPL: 0.3 G/DL
MONOCLON BAND OBS SERPL: 0.3 G/DL
MONOCYTES # BLD AUTO: 0 K/UL — SIGNIFICANT CHANGE UP (ref 0–0.9)
MONOCYTES # BLD AUTO: 0.27 K/UL
MONOCYTES # BLD AUTO: 0.29 K/UL — SIGNIFICANT CHANGE UP (ref 0–0.9)
MONOCYTES # BLD AUTO: 0.36 K/UL — SIGNIFICANT CHANGE UP (ref 0–0.9)
MONOCYTES # BLD AUTO: 0.48 K/UL — SIGNIFICANT CHANGE UP (ref 0–0.9)
MONOCYTES # BLD AUTO: 0.73 K/UL — SIGNIFICANT CHANGE UP (ref 0–0.9)
MONOCYTES # BLD AUTO: 1.08 K/UL
MONOCYTES # BLD AUTO: 1.09 K/UL — HIGH (ref 0–0.9)
MONOCYTES # BLD AUTO: 1.53 K/UL
MONOCYTES # BLD AUTO: 1.93 K/UL
MONOCYTES NFR BLD AUTO: 0 % — LOW (ref 2–14)
MONOCYTES NFR BLD AUTO: 0.8 %
MONOCYTES NFR BLD AUTO: 0.9 % — LOW (ref 2–14)
MONOCYTES NFR BLD AUTO: 0.9 % — LOW (ref 2–14)
MONOCYTES NFR BLD AUTO: 1.8 % — LOW (ref 2–14)
MONOCYTES NFR BLD AUTO: 2.7 % — SIGNIFICANT CHANGE UP (ref 2–14)
MONOCYTES NFR BLD AUTO: 2.7 % — SIGNIFICANT CHANGE UP (ref 2–14)
MONOCYTES NFR BLD AUTO: 3 %
MONOCYTES NFR BLD AUTO: 5 %
MONOCYTES NFR BLD AUTO: 6 %
NEUTROPHILS # BLD AUTO: 1.21 K/UL — LOW (ref 1.8–7.4)
NEUTROPHILS # BLD AUTO: 2.09 K/UL — SIGNIFICANT CHANGE UP (ref 1.8–7.4)
NEUTROPHILS # BLD AUTO: 2.25 K/UL
NEUTROPHILS # BLD AUTO: 2.3 K/UL — SIGNIFICANT CHANGE UP (ref 1.8–7.4)
NEUTROPHILS # BLD AUTO: 3.07 K/UL — SIGNIFICANT CHANGE UP (ref 1.8–7.4)
NEUTROPHILS # BLD AUTO: 3.21 K/UL — SIGNIFICANT CHANGE UP (ref 1.8–7.4)
NEUTROPHILS # BLD AUTO: 3.23 K/UL — SIGNIFICANT CHANGE UP (ref 1.8–7.4)
NEUTROPHILS # BLD AUTO: 3.25 K/UL
NEUTROPHILS # BLD AUTO: 3.36 K/UL
NEUTROPHILS # BLD AUTO: 3.69 K/UL — SIGNIFICANT CHANGE UP (ref 1.8–7.4)
NEUTROPHILS # BLD AUTO: 4.01 K/UL
NEUTROPHILS # BLD AUTO: 4.99 K/UL — SIGNIFICANT CHANGE UP (ref 1.8–7.4)
NEUTROPHILS NFR BLD AUTO: 11 %
NEUTROPHILS NFR BLD AUTO: 12 %
NEUTROPHILS NFR BLD AUTO: 12.2 % — LOW (ref 43–77)
NEUTROPHILS NFR BLD AUTO: 13.7 % — LOW (ref 43–77)
NEUTROPHILS NFR BLD AUTO: 3.6 % — LOW (ref 43–77)
NEUTROPHILS NFR BLD AUTO: 7 %
NEUTROPHILS NFR BLD AUTO: 7 % — LOW (ref 43–77)
NEUTROPHILS NFR BLD AUTO: 7.7 % — LOW (ref 43–77)
NEUTROPHILS NFR BLD AUTO: 8 % — LOW (ref 43–77)
NEUTROPHILS NFR BLD AUTO: 8 % — LOW (ref 43–77)
NEUTROPHILS NFR BLD AUTO: 9 %
NEUTROPHILS NFR BLD AUTO: 9.7 % — LOW (ref 43–77)
NEUTS BAND # BLD: 0.9 % — SIGNIFICANT CHANGE UP (ref 0–8)
NITRITE UR-MCNC: NEGATIVE — SIGNIFICANT CHANGE UP
NITRITE URINE: NEGATIVE
NON HDL CHOLESTEROL: 64 MG/DL — SIGNIFICANT CHANGE UP
NONHDLC SERPL-MCNC: 69 MG/DL
NONHDLC SERPL-MCNC: 73 MG/DL
NONHDLC SERPL-MCNC: 77 MG/DL
NONHDLC SERPL-MCNC: 77 MG/DL
NRBC # BLD: 0 /100 WBCS — SIGNIFICANT CHANGE UP (ref 0–0)
NT-PROBNP SERPL-MCNC: 1270 PG/ML
NT-PROBNP SERPL-MCNC: 1448 PG/ML
NT-PROBNP SERPL-MCNC: 1485 PG/ML
NT-PROBNP SERPL-MCNC: 1500 PG/ML
NT-PROBNP SERPL-MCNC: 1881 PG/ML
NT-PROBNP SERPL-SCNC: 1170 PG/ML — HIGH (ref 0–300)
NT-PROBNP SERPL-SCNC: 1547 PG/ML — HIGH (ref 0–300)
NT-PROBNP SERPL-SCNC: 3494 PG/ML — HIGH (ref 0–300)
ORGANISM # SPEC MICROSCOPIC CNT: SIGNIFICANT CHANGE UP
OSMOLALITY SERPL: 279 MOSM/KG — LOW (ref 280–301)
OSMOLALITY SERPL: 286 MOSMOL/KG
OSMOLALITY SERPL: 295 MOSMOL/KG
OSMOLALITY UR: 215 MOSM/KG
OSMOLALITY UR: 233 MOSM/KG — LOW (ref 300–900)
OSMOLALITY UR: 242 MOSM/KG — LOW (ref 300–900)
OVALOCYTES BLD QL SMEAR: SIGNIFICANT CHANGE UP
OVALOCYTES BLD QL SMEAR: SLIGHT — SIGNIFICANT CHANGE UP
PARATHYROID HORMONE INTACT: 114 PG/ML
PARATHYROID HORMONE INTACT: 169 PG/ML
PARATHYROID HORMONE INTACT: 197 PG/ML
PARATHYROID HORMONE INTACT: 221 PG/ML
PH UR: 6 — SIGNIFICANT CHANGE UP (ref 5–8)
PH URINE: 6.5
PH URINE: 7
PH URINE: 7
PHOSPHATE SERPL-MCNC: 3.2 MG/DL — SIGNIFICANT CHANGE UP (ref 2.5–4.5)
PHOSPHATE SERPL-MCNC: 3.6 MG/DL — SIGNIFICANT CHANGE UP (ref 2.5–4.5)
PHOSPHATE SERPL-MCNC: 3.6 MG/DL — SIGNIFICANT CHANGE UP (ref 2.5–4.5)
PHOSPHATE SERPL-MCNC: 3.9 MG/DL — SIGNIFICANT CHANGE UP (ref 2.5–4.5)
PHOSPHATE SERPL-MCNC: 3.9 MG/DL — SIGNIFICANT CHANGE UP (ref 2.5–4.5)
PHOSPHATE SERPL-MCNC: 4 MG/DL
PHOSPHATE SERPL-MCNC: 4 MG/DL
PHOSPHATE SERPL-MCNC: 4 MG/DL — SIGNIFICANT CHANGE UP (ref 2.5–4.5)
PHOSPHATE SERPL-MCNC: 4.1 MG/DL — SIGNIFICANT CHANGE UP (ref 2.5–4.5)
PHOSPHATE SERPL-MCNC: 4.2 MG/DL
PHOSPHATE SERPL-MCNC: 4.2 MG/DL — SIGNIFICANT CHANGE UP (ref 2.5–4.5)
PHOSPHATE SERPL-MCNC: 4.3 MG/DL
PHOSPHATE SERPL-MCNC: 4.3 MG/DL — SIGNIFICANT CHANGE UP (ref 2.5–4.5)
PHOSPHATE SERPL-MCNC: 4.6 MG/DL — HIGH (ref 2.5–4.5)
PHOSPHATE SERPL-MCNC: 4.7 MG/DL — HIGH (ref 2.5–4.5)
PHOSPHATE SERPL-MCNC: 4.7 MG/DL — HIGH (ref 2.5–4.5)
PLAT MORPH BLD: ABNORMAL
PLAT MORPH BLD: NORMAL — SIGNIFICANT CHANGE UP
PLAT MORPH BLD: NORMAL — SIGNIFICANT CHANGE UP
PLATELET # BLD AUTO: 104 K/UL — LOW (ref 150–400)
PLATELET # BLD AUTO: 110 K/UL — LOW (ref 150–400)
PLATELET # BLD AUTO: 111 K/UL — LOW (ref 150–400)
PLATELET # BLD AUTO: 115 K/UL — LOW (ref 150–400)
PLATELET # BLD AUTO: 144 K/UL — LOW (ref 150–400)
PLATELET # BLD AUTO: 165 K/UL
PLATELET # BLD AUTO: 166 K/UL — SIGNIFICANT CHANGE UP (ref 150–400)
PLATELET # BLD AUTO: 169 K/UL — SIGNIFICANT CHANGE UP (ref 150–400)
PLATELET # BLD AUTO: 171 K/UL
PLATELET # BLD AUTO: 174 K/UL — SIGNIFICANT CHANGE UP (ref 150–400)
PLATELET # BLD AUTO: 174 K/UL — SIGNIFICANT CHANGE UP (ref 150–400)
PLATELET # BLD AUTO: 176 K/UL — SIGNIFICANT CHANGE UP (ref 150–400)
PLATELET # BLD AUTO: 178 K/UL — SIGNIFICANT CHANGE UP (ref 150–400)
PLATELET # BLD AUTO: 178 K/UL — SIGNIFICANT CHANGE UP (ref 150–400)
PLATELET # BLD AUTO: 180 K/UL — SIGNIFICANT CHANGE UP (ref 150–400)
PLATELET # BLD AUTO: 182 K/UL — SIGNIFICANT CHANGE UP (ref 150–400)
PLATELET # BLD AUTO: 183 K/UL
PLATELET # BLD AUTO: 184 K/UL — SIGNIFICANT CHANGE UP (ref 150–400)
PLATELET # BLD AUTO: 188 K/UL — SIGNIFICANT CHANGE UP (ref 150–400)
PLATELET # BLD AUTO: 190 K/UL
PLATELET # BLD AUTO: 192 K/UL — SIGNIFICANT CHANGE UP (ref 150–400)
PLATELET # BLD AUTO: 193 K/UL — SIGNIFICANT CHANGE UP (ref 150–400)
PLATELET # BLD AUTO: 197 K/UL — SIGNIFICANT CHANGE UP (ref 150–400)
POCT ISTAT CREATININE: 1.5 MG/DL — HIGH (ref 0.5–1.3)
POIKILOCYTOSIS BLD QL AUTO: SLIGHT — SIGNIFICANT CHANGE UP
POLYCHROMASIA BLD QL SMEAR: SIGNIFICANT CHANGE UP
POLYCHROMASIA BLD QL SMEAR: SIGNIFICANT CHANGE UP
POLYCHROMASIA BLD QL SMEAR: SLIGHT — SIGNIFICANT CHANGE UP
POTASSIUM SERPL-MCNC: 4.1 MMOL/L — SIGNIFICANT CHANGE UP (ref 3.5–5.3)
POTASSIUM SERPL-MCNC: 4.1 MMOL/L — SIGNIFICANT CHANGE UP (ref 3.5–5.3)
POTASSIUM SERPL-MCNC: 4.2 MMOL/L — SIGNIFICANT CHANGE UP (ref 3.5–5.3)
POTASSIUM SERPL-MCNC: 4.2 MMOL/L — SIGNIFICANT CHANGE UP (ref 3.5–5.3)
POTASSIUM SERPL-MCNC: 4.3 MMOL/L — SIGNIFICANT CHANGE UP (ref 3.5–5.3)
POTASSIUM SERPL-MCNC: 4.3 MMOL/L — SIGNIFICANT CHANGE UP (ref 3.5–5.3)
POTASSIUM SERPL-MCNC: 4.4 MMOL/L — SIGNIFICANT CHANGE UP (ref 3.5–5.3)
POTASSIUM SERPL-MCNC: 4.5 MMOL/L — SIGNIFICANT CHANGE UP (ref 3.5–5.3)
POTASSIUM SERPL-MCNC: 4.5 MMOL/L — SIGNIFICANT CHANGE UP (ref 3.5–5.3)
POTASSIUM SERPL-MCNC: 4.6 MMOL/L — SIGNIFICANT CHANGE UP (ref 3.5–5.3)
POTASSIUM SERPL-MCNC: 4.7 MMOL/L — SIGNIFICANT CHANGE UP (ref 3.5–5.3)
POTASSIUM SERPL-MCNC: 4.9 MMOL/L — SIGNIFICANT CHANGE UP (ref 3.5–5.3)
POTASSIUM SERPL-MCNC: 4.9 MMOL/L — SIGNIFICANT CHANGE UP (ref 3.5–5.3)
POTASSIUM SERPL-MCNC: 5 MMOL/L — SIGNIFICANT CHANGE UP (ref 3.5–5.3)
POTASSIUM SERPL-MCNC: 5.1 MMOL/L — SIGNIFICANT CHANGE UP (ref 3.5–5.3)
POTASSIUM SERPL-MCNC: 5.6 MMOL/L — HIGH (ref 3.5–5.3)
POTASSIUM SERPL-MCNC: 5.7 MMOL/L — HIGH (ref 3.5–5.3)
POTASSIUM SERPL-SCNC: 4.1 MMOL/L — SIGNIFICANT CHANGE UP (ref 3.5–5.3)
POTASSIUM SERPL-SCNC: 4.1 MMOL/L — SIGNIFICANT CHANGE UP (ref 3.5–5.3)
POTASSIUM SERPL-SCNC: 4.2 MMOL/L
POTASSIUM SERPL-SCNC: 4.2 MMOL/L — SIGNIFICANT CHANGE UP (ref 3.5–5.3)
POTASSIUM SERPL-SCNC: 4.2 MMOL/L — SIGNIFICANT CHANGE UP (ref 3.5–5.3)
POTASSIUM SERPL-SCNC: 4.3 MMOL/L — SIGNIFICANT CHANGE UP (ref 3.5–5.3)
POTASSIUM SERPL-SCNC: 4.3 MMOL/L — SIGNIFICANT CHANGE UP (ref 3.5–5.3)
POTASSIUM SERPL-SCNC: 4.4 MMOL/L
POTASSIUM SERPL-SCNC: 4.4 MMOL/L — SIGNIFICANT CHANGE UP (ref 3.5–5.3)
POTASSIUM SERPL-SCNC: 4.5 MMOL/L — SIGNIFICANT CHANGE UP (ref 3.5–5.3)
POTASSIUM SERPL-SCNC: 4.5 MMOL/L — SIGNIFICANT CHANGE UP (ref 3.5–5.3)
POTASSIUM SERPL-SCNC: 4.6 MMOL/L
POTASSIUM SERPL-SCNC: 4.6 MMOL/L — SIGNIFICANT CHANGE UP (ref 3.5–5.3)
POTASSIUM SERPL-SCNC: 4.7 MMOL/L
POTASSIUM SERPL-SCNC: 4.7 MMOL/L — SIGNIFICANT CHANGE UP (ref 3.5–5.3)
POTASSIUM SERPL-SCNC: 4.9 MMOL/L — SIGNIFICANT CHANGE UP (ref 3.5–5.3)
POTASSIUM SERPL-SCNC: 4.9 MMOL/L — SIGNIFICANT CHANGE UP (ref 3.5–5.3)
POTASSIUM SERPL-SCNC: 5 MMOL/L — SIGNIFICANT CHANGE UP (ref 3.5–5.3)
POTASSIUM SERPL-SCNC: 5.1 MMOL/L
POTASSIUM SERPL-SCNC: 5.1 MMOL/L — SIGNIFICANT CHANGE UP (ref 3.5–5.3)
POTASSIUM SERPL-SCNC: 5.6 MMOL/L — HIGH (ref 3.5–5.3)
POTASSIUM SERPL-SCNC: 5.7 MMOL/L — HIGH (ref 3.5–5.3)
POTASSIUM UR-SCNC: 39 MMOL/L — SIGNIFICANT CHANGE UP
PROMYELOCYTES # FLD: 0.9 % — HIGH (ref 0–0)
PROT ?TM UR-MCNC: 4 MG/DL — SIGNIFICANT CHANGE UP (ref 0–12)
PROT SERPL-MCNC: 6.9 G/DL — SIGNIFICANT CHANGE UP (ref 6–8.3)
PROT SERPL-MCNC: 7.2 G/DL — SIGNIFICANT CHANGE UP (ref 6–8.3)
PROT SERPL-MCNC: 7.3 G/DL — SIGNIFICANT CHANGE UP (ref 6–8.3)
PROT SERPL-MCNC: 7.4 G/DL
PROT SERPL-MCNC: 7.5 G/DL
PROT SERPL-MCNC: 7.5 G/DL — SIGNIFICANT CHANGE UP (ref 6–8.3)
PROT SERPL-MCNC: 7.5 G/DL — SIGNIFICANT CHANGE UP (ref 6–8.3)
PROT SERPL-MCNC: 7.6 G/DL
PROT SERPL-MCNC: 7.9 G/DL — SIGNIFICANT CHANGE UP (ref 6–8.3)
PROT UR-MCNC: <4 MG/DL
PROT UR-MCNC: NEGATIVE MG/DL — SIGNIFICANT CHANGE UP
PROT/CREAT UR-RTO: 0.1 RATIO — SIGNIFICANT CHANGE UP (ref 0–0.2)
PROTEIN URINE: NEGATIVE
PROTHROM AB SERPL-ACNC: 14.5 SEC — HIGH (ref 10.5–13.4)
PROTHROM AB SERPL-ACNC: 14.6 SEC — HIGH (ref 10.5–13.4)
PROTHROM AB SERPL-ACNC: 15.1 SEC — HIGH (ref 10.5–13.4)
PROTHROM AB SERPL-ACNC: 17.9 SEC — HIGH (ref 10.5–13.4)
PROTHROM AB SERPL-ACNC: 19 SEC — HIGH (ref 10.5–13.4)
PROTHROM AB SERPL-ACNC: 23.3 SEC — HIGH (ref 10.5–13.4)
RBC # BLD: 2.75 M/UL — LOW (ref 4.2–5.8)
RBC # BLD: 2.77 M/UL — LOW (ref 4.2–5.8)
RBC # BLD: 2.8 M/UL — LOW (ref 4.2–5.8)
RBC # BLD: 2.95 M/UL — LOW (ref 4.2–5.8)
RBC # BLD: 3.49 M/UL
RBC # BLD: 3.84 M/UL — LOW (ref 4.2–5.8)
RBC # BLD: 3.89 M/UL — LOW (ref 4.2–5.8)
RBC # BLD: 3.89 M/UL — LOW (ref 4.2–5.8)
RBC # BLD: 3.93 M/UL — LOW (ref 4.2–5.8)
RBC # BLD: 3.93 M/UL — LOW (ref 4.2–5.8)
RBC # BLD: 3.94 M/UL — LOW (ref 4.2–5.8)
RBC # BLD: 3.95 M/UL — LOW (ref 4.2–5.8)
RBC # BLD: 3.96 M/UL — LOW (ref 4.2–5.8)
RBC # BLD: 3.96 M/UL — LOW (ref 4.2–5.8)
RBC # BLD: 4.04 M/UL
RBC # BLD: 4.05 M/UL — LOW (ref 4.2–5.8)
RBC # BLD: 4.07 M/UL — LOW (ref 4.2–5.8)
RBC # BLD: 4.2 M/UL
RBC # BLD: 4.27 M/UL — SIGNIFICANT CHANGE UP (ref 4.2–5.8)
RBC # BLD: 4.29 M/UL — SIGNIFICANT CHANGE UP (ref 4.2–5.8)
RBC # BLD: 4.33 M/UL
RBC # BLD: 4.36 M/UL — SIGNIFICANT CHANGE UP (ref 4.2–5.8)
RBC # BLD: 4.57 M/UL — SIGNIFICANT CHANGE UP (ref 4.2–5.8)
RBC # FLD: 16.6 %
RBC # FLD: 17.1 % — HIGH (ref 10.3–14.5)
RBC # FLD: 17.2 % — HIGH (ref 10.3–14.5)
RBC # FLD: 17.3 % — HIGH (ref 10.3–14.5)
RBC # FLD: 17.3 % — HIGH (ref 10.3–14.5)
RBC # FLD: 17.4 % — HIGH (ref 10.3–14.5)
RBC # FLD: 17.9 %
RBC # FLD: 18.4 %
RBC # FLD: 18.6 % — HIGH (ref 10.3–14.5)
RBC # FLD: 18.7 %
RBC # FLD: 18.7 % — HIGH (ref 10.3–14.5)
RBC # FLD: 19 % — HIGH (ref 10.3–14.5)
RBC # FLD: 21.1 % — HIGH (ref 10.3–14.5)
RBC # FLD: 21.3 % — HIGH (ref 10.3–14.5)
RBC # FLD: 21.3 % — HIGH (ref 10.3–14.5)
RBC # FLD: 21.7 % — HIGH (ref 10.3–14.5)
RBC BLD AUTO: ABNORMAL
RED BLOOD CELLS URINE: 0 /HPF
RED BLOOD CELLS URINE: 0 /HPF
RED BLOOD CELLS URINE: 1 /HPF
RENIN PLASMA: 11.8 PG/ML
RENIN PLASMA: 16.1 PG/ML
RH IG SCN BLD-IMP: POSITIVE — SIGNIFICANT CHANGE UP
RHEUMATOID FACT SER QL: 11 IU/ML
RHEUMATOID FACT SER QL: 14 IU/ML
SARS-COV-2 RNA SPEC QL NAA+PROBE: NEGATIVE — SIGNIFICANT CHANGE UP
SARS-COV-2 RNA SPEC QL NAA+PROBE: SIGNIFICANT CHANGE UP
SCHISTOCYTES BLD QL AUTO: SLIGHT — SIGNIFICANT CHANGE UP
SMUDGE CELLS # BLD: PRESENT — SIGNIFICANT CHANGE UP
SODIUM SERPL-SCNC: 124 MMOL/L — LOW (ref 135–145)
SODIUM SERPL-SCNC: 125 MMOL/L — LOW (ref 135–145)
SODIUM SERPL-SCNC: 126 MMOL/L — LOW (ref 135–145)
SODIUM SERPL-SCNC: 126 MMOL/L — LOW (ref 135–145)
SODIUM SERPL-SCNC: 127 MMOL/L — LOW (ref 135–145)
SODIUM SERPL-SCNC: 128 MMOL/L — LOW (ref 135–145)
SODIUM SERPL-SCNC: 129 MMOL/L — LOW (ref 135–145)
SODIUM SERPL-SCNC: 129 MMOL/L — LOW (ref 135–145)
SODIUM SERPL-SCNC: 130 MMOL/L
SODIUM SERPL-SCNC: 130 MMOL/L — LOW (ref 135–145)
SODIUM SERPL-SCNC: 130 MMOL/L — LOW (ref 135–145)
SODIUM SERPL-SCNC: 131 MMOL/L
SODIUM SERPL-SCNC: 131 MMOL/L — LOW (ref 135–145)
SODIUM SERPL-SCNC: 132 MMOL/L
SODIUM SERPL-SCNC: 132 MMOL/L — LOW (ref 135–145)
SODIUM SERPL-SCNC: 133 MMOL/L
SODIUM SERPL-SCNC: 133 MMOL/L — LOW (ref 135–145)
SODIUM SERPL-SCNC: 134 MMOL/L
SODIUM SERPL-SCNC: 134 MMOL/L — LOW (ref 135–145)
SODIUM UR-SCNC: 40 MMOL/L — SIGNIFICANT CHANGE UP
SODIUM UR-SCNC: 43 MMOL/L — SIGNIFICANT CHANGE UP
SP GR SPEC: <=1.005 — SIGNIFICANT CHANGE UP (ref 1–1.03)
SPECIFIC GRAVITY URINE: 1.01
SPECIMEN SOURCE: SIGNIFICANT CHANGE UP
SPHEROCYTES BLD QL SMEAR: SLIGHT — SIGNIFICANT CHANGE UP
SQUAMOUS EPITHELIAL CELLS: 0 /HPF
SURGICAL PATHOLOGY STUDY: SIGNIFICANT CHANGE UP
T3FREE SERPL-MCNC: 2.48 PG/ML
T3FREE SERPL-MCNC: 2.6 PG/ML
T3FREE SERPL-MCNC: 2.6 PG/ML
T3FREE SERPL-MCNC: 2.66 PG/ML
T3RU NFR SERPL: 0.9 TBI
T3RU NFR SERPL: 1 TBI
T3RU NFR SERPL: 1 TBI
T3RU NFR SERPL: 1.1 TBI
T4 FREE SERPL-MCNC: 1.5 NG/DL
T4 FREE SERPL-MCNC: 1.5 NG/DL
T4 FREE SERPL-MCNC: 1.6 NG/DL
T4 FREE SERPL-MCNC: 1.8 NG/DL
T4 SERPL-MCNC: 8.4 UG/DL
T4 SERPL-MCNC: 9 UG/DL
T4 SERPL-MCNC: 9.1 UG/DL
T4 SERPL-MCNC: 9.4 UG/DL
TACROLIMUS SERPL-MCNC: 7.8 NG/ML — SIGNIFICANT CHANGE UP
TACROLIMUS SERPL-MCNC: 8.5 NG/ML
TACROLIMUS SERPL-MCNC: 8.6 NG/ML
THYROGLOB AB SERPL-ACNC: <20 IU/ML
THYROPEROXIDASE AB SERPL IA-ACNC: <10 IU/ML
TIBC SERPL-MCNC: 357 UG/DL
TIBC SERPL-MCNC: 381 UG/DL
TIBC SERPL-MCNC: 394 UG/DL
TIBC SERPL-MCNC: 397 UG/DL
TRIGL SERPL-MCNC: 103 MG/DL
TRIGL SERPL-MCNC: 111 MG/DL — SIGNIFICANT CHANGE UP
TRIGL SERPL-MCNC: 117 MG/DL
TRIGL SERPL-MCNC: 70 MG/DL
TRIGL SERPL-MCNC: 92 MG/DL
TROPONIN T SERPL-MCNC: 0.01 NG/ML — SIGNIFICANT CHANGE UP (ref 0–0.01)
TROPONIN T SERPL-MCNC: 0.01 NG/ML — SIGNIFICANT CHANGE UP (ref 0–0.01)
TROPONIN T SERPL-MCNC: 0.02 NG/ML — HIGH (ref 0–0.01)
TSH SERPL-ACNC: 2.99 UIU/ML
TSH SERPL-ACNC: 3.6 UIU/ML
TSH SERPL-ACNC: 4.1 UIU/ML
TSH SERPL-ACNC: 6.43 UIU/ML
UIBC SERPL-MCNC: 309 UG/DL
UIBC SERPL-MCNC: 332 UG/DL
UIBC SERPL-MCNC: 344 UG/DL
UIBC SERPL-MCNC: 351 UG/DL
URATE SERPL-MCNC: 10.5 MG/DL
URATE SERPL-MCNC: 11.3 MG/DL
URATE SERPL-MCNC: 11.9 MG/DL
URATE SERPL-MCNC: 12.6 MG/DL
UROBILINOGEN FLD QL: 0.2 E.U./DL — SIGNIFICANT CHANGE UP
UROBILINOGEN URINE: NORMAL
UUN UR-MCNC: 136 MG/DL — SIGNIFICANT CHANGE UP
UUN UR-MCNC: 234 MG/DL — SIGNIFICANT CHANGE UP
UUN UR-MCNC: 284 MG/DL — SIGNIFICANT CHANGE UP
VARIANT LYMPHS # BLD: 0.9 % — SIGNIFICANT CHANGE UP (ref 0–6)
VARIANT LYMPHS # BLD: 0.9 % — SIGNIFICANT CHANGE UP (ref 0–6)
VARIANT LYMPHS # BLD: 1.8 % — SIGNIFICANT CHANGE UP (ref 0–6)
VARIANT LYMPHS # BLD: 3.6 % — SIGNIFICANT CHANGE UP (ref 0–6)
VIT B12 SERPL-MCNC: 521 PG/ML
VIT B12 SERPL-MCNC: 781 PG/ML
VIT B12 SERPL-MCNC: 807 PG/ML
VIT B12 SERPL-MCNC: 827 PG/ML
WBC # BLD: 23.19 K/UL — HIGH (ref 3.8–10.5)
WBC # BLD: 23.63 K/UL — HIGH (ref 3.8–10.5)
WBC # BLD: 24.29 K/UL — HIGH (ref 3.8–10.5)
WBC # BLD: 26.38 K/UL — HIGH (ref 3.8–10.5)
WBC # BLD: 26.86 K/UL — HIGH (ref 3.8–10.5)
WBC # BLD: 26.92 K/UL — HIGH (ref 3.8–10.5)
WBC # BLD: 26.96 K/UL — HIGH (ref 3.8–10.5)
WBC # BLD: 27.17 K/UL — HIGH (ref 3.8–10.5)
WBC # BLD: 28.32 K/UL — HIGH (ref 3.8–10.5)
WBC # BLD: 29.27 K/UL — HIGH (ref 3.8–10.5)
WBC # BLD: 29.28 K/UL — HIGH (ref 3.8–10.5)
WBC # BLD: 31.69 K/UL — HIGH (ref 3.8–10.5)
WBC # BLD: 32.89 K/UL — HIGH (ref 3.8–10.5)
WBC # BLD: 35.5 K/UL — HIGH (ref 3.8–10.5)
WBC # BLD: 39.96 K/UL — HIGH (ref 3.8–10.5)
WBC # BLD: 40.12 K/UL — CRITICAL HIGH (ref 3.8–10.5)
WBC # BLD: 40.37 K/UL — CRITICAL HIGH (ref 3.8–10.5)
WBC # BLD: 40.89 K/UL — CRITICAL HIGH (ref 3.8–10.5)
WBC # BLD: 42.93 K/UL — CRITICAL HIGH (ref 3.8–10.5)
WBC # FLD AUTO: 23.19 K/UL — HIGH (ref 3.8–10.5)
WBC # FLD AUTO: 23.63 K/UL — HIGH (ref 3.8–10.5)
WBC # FLD AUTO: 24.29 K/UL — HIGH (ref 3.8–10.5)
WBC # FLD AUTO: 26.38 K/UL — HIGH (ref 3.8–10.5)
WBC # FLD AUTO: 26.86 K/UL — HIGH (ref 3.8–10.5)
WBC # FLD AUTO: 26.92 K/UL — HIGH (ref 3.8–10.5)
WBC # FLD AUTO: 26.96 K/UL — HIGH (ref 3.8–10.5)
WBC # FLD AUTO: 27.17 K/UL — HIGH (ref 3.8–10.5)
WBC # FLD AUTO: 28.32 K/UL — HIGH (ref 3.8–10.5)
WBC # FLD AUTO: 29.27 K/UL — HIGH (ref 3.8–10.5)
WBC # FLD AUTO: 29.28 K/UL — HIGH (ref 3.8–10.5)
WBC # FLD AUTO: 30.55 K/UL
WBC # FLD AUTO: 31.69 K/UL — HIGH (ref 3.8–10.5)
WBC # FLD AUTO: 32.18 K/UL
WBC # FLD AUTO: 32.89 K/UL — HIGH (ref 3.8–10.5)
WBC # FLD AUTO: 33.42 K/UL
WBC # FLD AUTO: 35.5 K/UL — HIGH (ref 3.8–10.5)
WBC # FLD AUTO: 36.06 K/UL
WBC # FLD AUTO: 39.96 K/UL — HIGH (ref 3.8–10.5)
WBC # FLD AUTO: 40.12 K/UL — CRITICAL HIGH (ref 3.8–10.5)
WBC # FLD AUTO: 40.37 K/UL — CRITICAL HIGH (ref 3.8–10.5)
WBC # FLD AUTO: 40.89 K/UL — CRITICAL HIGH (ref 3.8–10.5)
WBC # FLD AUTO: 42.93 K/UL — CRITICAL HIGH (ref 3.8–10.5)
WHITE BLOOD CELLS URINE: 0 /HPF

## 2022-01-01 PROCEDURE — 99214 OFFICE O/P EST MOD 30 MIN: CPT

## 2022-01-01 PROCEDURE — 36415 COLL VENOUS BLD VENIPUNCTURE: CPT

## 2022-01-01 PROCEDURE — 84133 ASSAY OF URINE POTASSIUM: CPT

## 2022-01-01 PROCEDURE — 99233 SBSQ HOSP IP/OBS HIGH 50: CPT

## 2022-01-01 PROCEDURE — 82947 ASSAY GLUCOSE BLOOD QUANT: CPT

## 2022-01-01 PROCEDURE — 83036 HEMOGLOBIN GLYCOSYLATED A1C: CPT | Mod: QW

## 2022-01-01 PROCEDURE — 93000 ELECTROCARDIOGRAM COMPLETE: CPT

## 2022-01-01 PROCEDURE — 30901 CONTROL OF NOSEBLEED: CPT | Mod: 50,59

## 2022-01-01 PROCEDURE — 99239 HOSP IP/OBS DSCHRG MGMT >30: CPT

## 2022-01-01 PROCEDURE — 85730 THROMBOPLASTIN TIME PARTIAL: CPT

## 2022-01-01 PROCEDURE — 99223 1ST HOSP IP/OBS HIGH 75: CPT | Mod: GC

## 2022-01-01 PROCEDURE — 86901 BLOOD TYPING SEROLOGIC RH(D): CPT

## 2022-01-01 PROCEDURE — 99214 OFFICE O/P EST MOD 30 MIN: CPT | Mod: 25

## 2022-01-01 PROCEDURE — 86900 BLOOD TYPING SEROLOGIC ABO: CPT

## 2022-01-01 PROCEDURE — 88344 IMHCHEM/IMCYTCHM EA MLT ANTB: CPT | Mod: 26

## 2022-01-01 PROCEDURE — 83036 HEMOGLOBIN GLYCOSYLATED A1C: CPT

## 2022-01-01 PROCEDURE — 87635 SARS-COV-2 COVID-19 AMP PRB: CPT

## 2022-01-01 PROCEDURE — 82570 ASSAY OF URINE CREATININE: CPT

## 2022-01-01 PROCEDURE — 93306 TTE W/DOPPLER COMPLETE: CPT | Mod: 26

## 2022-01-01 PROCEDURE — 31231 NASAL ENDOSCOPY DX: CPT

## 2022-01-01 PROCEDURE — 85027 COMPLETE CBC AUTOMATED: CPT

## 2022-01-01 PROCEDURE — 88331 PATH CONSLTJ SURG 1 BLK 1SPC: CPT

## 2022-01-01 PROCEDURE — 85025 COMPLETE CBC W/AUTO DIFF WBC: CPT

## 2022-01-01 PROCEDURE — 82550 ASSAY OF CK (CPK): CPT

## 2022-01-01 PROCEDURE — G1004: CPT

## 2022-01-01 PROCEDURE — 85014 HEMATOCRIT: CPT

## 2022-01-01 PROCEDURE — U0003: CPT

## 2022-01-01 PROCEDURE — 80053 COMPREHEN METABOLIC PANEL: CPT

## 2022-01-01 PROCEDURE — 93306 TTE W/DOPPLER COMPLETE: CPT

## 2022-01-01 PROCEDURE — 88344 IMHCHEM/IMCYTCHM EA MLT ANTB: CPT

## 2022-01-01 PROCEDURE — 84484 ASSAY OF TROPONIN QUANT: CPT

## 2022-01-01 PROCEDURE — 99214 OFFICE O/P EST MOD 30 MIN: CPT | Mod: 25,GC

## 2022-01-01 PROCEDURE — 84132 ASSAY OF SERUM POTASSIUM: CPT

## 2022-01-01 PROCEDURE — 86850 RBC ANTIBODY SCREEN: CPT

## 2022-01-01 PROCEDURE — 70486 CT MAXILLOFACIAL W/O DYE: CPT | Mod: 26,MH

## 2022-01-01 PROCEDURE — ZZZZZ: CPT

## 2022-01-01 PROCEDURE — 99222 1ST HOSP IP/OBS MODERATE 55: CPT

## 2022-01-01 PROCEDURE — 99291 CRITICAL CARE FIRST HOUR: CPT

## 2022-01-01 PROCEDURE — 83935 ASSAY OF URINE OSMOLALITY: CPT

## 2022-01-01 PROCEDURE — 83735 ASSAY OF MAGNESIUM: CPT

## 2022-01-01 PROCEDURE — 88313 SPECIAL STAINS GROUP 2: CPT | Mod: 26

## 2022-01-01 PROCEDURE — 88341 IMHCHEM/IMCYTCHM EA ADD ANTB: CPT | Mod: 26,59

## 2022-01-01 PROCEDURE — 99213 OFFICE O/P EST LOW 20 MIN: CPT | Mod: 25

## 2022-01-01 PROCEDURE — 92550 TYMPANOMETRY & REFLEX THRESH: CPT

## 2022-01-01 PROCEDURE — 70540 MRI ORBIT/FACE/NECK W/O DYE: CPT | Mod: 26,MH

## 2022-01-01 PROCEDURE — 85610 PROTHROMBIN TIME: CPT

## 2022-01-01 PROCEDURE — 80048 BASIC METABOLIC PNL TOTAL CA: CPT

## 2022-01-01 PROCEDURE — 84100 ASSAY OF PHOSPHORUS: CPT

## 2022-01-01 PROCEDURE — 71046 X-RAY EXAM CHEST 2 VIEWS: CPT | Mod: 26

## 2022-01-01 PROCEDURE — 88305 TISSUE EXAM BY PATHOLOGIST: CPT

## 2022-01-01 PROCEDURE — 99223 1ST HOSP IP/OBS HIGH 75: CPT

## 2022-01-01 PROCEDURE — 70540 MRI ORBIT/FACE/NECK W/O DYE: CPT | Mod: MH

## 2022-01-01 PROCEDURE — 83880 ASSAY OF NATRIURETIC PEPTIDE: CPT

## 2022-01-01 PROCEDURE — 82553 CREATINE MB FRACTION: CPT

## 2022-01-01 PROCEDURE — 99232 SBSQ HOSP IP/OBS MODERATE 35: CPT | Mod: GC

## 2022-01-01 PROCEDURE — 71046 X-RAY EXAM CHEST 2 VIEWS: CPT

## 2022-01-01 PROCEDURE — 82330 ASSAY OF CALCIUM: CPT

## 2022-01-01 PROCEDURE — 99285 EMERGENCY DEPT VISIT HI MDM: CPT | Mod: 25

## 2022-01-01 PROCEDURE — 71045 X-RAY EXAM CHEST 1 VIEW: CPT | Mod: 26

## 2022-01-01 PROCEDURE — C1889: CPT

## 2022-01-01 PROCEDURE — 87075 CULTR BACTERIA EXCEPT BLOOD: CPT

## 2022-01-01 PROCEDURE — 84295 ASSAY OF SERUM SODIUM: CPT

## 2022-01-01 PROCEDURE — 70490 CT SOFT TISSUE NECK W/O DYE: CPT | Mod: MG

## 2022-01-01 PROCEDURE — 84540 ASSAY OF URINE/UREA-N: CPT

## 2022-01-01 PROCEDURE — U0005: CPT

## 2022-01-01 PROCEDURE — 92557 COMPREHENSIVE HEARING TEST: CPT

## 2022-01-01 PROCEDURE — 88305 TISSUE EXAM BY PATHOLOGIST: CPT | Mod: 26

## 2022-01-01 PROCEDURE — 99232 SBSQ HOSP IP/OBS MODERATE 35: CPT

## 2022-01-01 PROCEDURE — 70486 CT MAXILLOFACIAL W/O DYE: CPT | Mod: 26,MG

## 2022-01-01 PROCEDURE — 99214 OFFICE O/P EST MOD 30 MIN: CPT | Mod: 95

## 2022-01-01 PROCEDURE — 70543 MRI ORBT/FAC/NCK W/O &W/DYE: CPT | Mod: 26

## 2022-01-01 PROCEDURE — 97162 PT EVAL MOD COMPLEX 30 MIN: CPT

## 2022-01-01 PROCEDURE — 96374 THER/PROPH/DIAG INJ IV PUSH: CPT

## 2022-01-01 PROCEDURE — 87184 SC STD DISK METHOD PER PLATE: CPT

## 2022-01-01 PROCEDURE — 99443: CPT | Mod: 95

## 2022-01-01 PROCEDURE — 99215 OFFICE O/P EST HI 40 MIN: CPT | Mod: 25

## 2022-01-01 PROCEDURE — 88342 IMHCHEM/IMCYTCHM 1ST ANTB: CPT | Mod: 26,59

## 2022-01-01 PROCEDURE — 87070 CULTURE OTHR SPECIMN AEROBIC: CPT

## 2022-01-01 PROCEDURE — 99233 SBSQ HOSP IP/OBS HIGH 50: CPT | Mod: GC

## 2022-01-01 PROCEDURE — 94660 CPAP INITIATION&MGMT: CPT

## 2022-01-01 PROCEDURE — 92960 CARDIOVERSION ELECTRIC EXT: CPT

## 2022-01-01 PROCEDURE — 99285 EMERGENCY DEPT VISIT HI MDM: CPT

## 2022-01-01 PROCEDURE — 80197 ASSAY OF TACROLIMUS: CPT

## 2022-01-01 PROCEDURE — 81003 URINALYSIS AUTO W/O SCOPE: CPT

## 2022-01-01 PROCEDURE — 99213 OFFICE O/P EST LOW 20 MIN: CPT

## 2022-01-01 PROCEDURE — 93005 ELECTROCARDIOGRAM TRACING: CPT

## 2022-01-01 PROCEDURE — 88360 TUMOR IMMUNOHISTOCHEM/MANUAL: CPT | Mod: 26

## 2022-01-01 PROCEDURE — 84156 ASSAY OF PROTEIN URINE: CPT

## 2022-01-01 PROCEDURE — 93010 ELECTROCARDIOGRAM REPORT: CPT

## 2022-01-01 PROCEDURE — 83930 ASSAY OF BLOOD OSMOLALITY: CPT

## 2022-01-01 PROCEDURE — 82962 GLUCOSE BLOOD TEST: CPT

## 2022-01-01 PROCEDURE — 70486 CT MAXILLOFACIAL W/O DYE: CPT | Mod: MG

## 2022-01-01 PROCEDURE — 88313 SPECIAL STAINS GROUP 2: CPT

## 2022-01-01 PROCEDURE — A9585: CPT

## 2022-01-01 PROCEDURE — 84300 ASSAY OF URINE SODIUM: CPT

## 2022-01-01 PROCEDURE — C9399: CPT

## 2022-01-01 PROCEDURE — 71045 X-RAY EXAM CHEST 1 VIEW: CPT

## 2022-01-01 PROCEDURE — 99283 EMERGENCY DEPT VISIT LOW MDM: CPT | Mod: 25

## 2022-01-01 PROCEDURE — 88307 TISSUE EXAM BY PATHOLOGIST: CPT | Mod: 26

## 2022-01-01 PROCEDURE — 70490 CT SOFT TISSUE NECK W/O DYE: CPT | Mod: 26,MG

## 2022-01-01 PROCEDURE — 99221 1ST HOSP IP/OBS SF/LOW 40: CPT

## 2022-01-01 PROCEDURE — 88341 IMHCHEM/IMCYTCHM EA ADD ANTB: CPT

## 2022-01-01 PROCEDURE — 97530 THERAPEUTIC ACTIVITIES: CPT

## 2022-01-01 PROCEDURE — 99284 EMERGENCY DEPT VISIT MOD MDM: CPT | Mod: FS

## 2022-01-01 PROCEDURE — 99284 EMERGENCY DEPT VISIT MOD MDM: CPT

## 2022-01-01 PROCEDURE — 88331 PATH CONSLTJ SURG 1 BLK 1SPC: CPT | Mod: 26

## 2022-01-01 PROCEDURE — 88307 TISSUE EXAM BY PATHOLOGIST: CPT

## 2022-01-01 PROCEDURE — 99284 EMERGENCY DEPT VISIT MOD MDM: CPT | Mod: 25

## 2022-01-01 PROCEDURE — 82565 ASSAY OF CREATININE: CPT

## 2022-01-01 PROCEDURE — 82803 BLOOD GASES ANY COMBINATION: CPT

## 2022-01-01 PROCEDURE — 70543 MRI ORBT/FAC/NCK W/O &W/DYE: CPT

## 2022-01-01 PROCEDURE — 87181 SC STD AGAR DILUTION PER AGT: CPT

## 2022-01-01 PROCEDURE — 97116 GAIT TRAINING THERAPY: CPT

## 2022-01-01 PROCEDURE — 88300 SURGICAL PATH GROSS: CPT

## 2022-01-01 PROCEDURE — 88300 SURGICAL PATH GROSS: CPT | Mod: 26

## 2022-01-01 PROCEDURE — 80061 LIPID PANEL: CPT

## 2022-01-01 PROCEDURE — 88360 TUMOR IMMUNOHISTOCHEM/MANUAL: CPT

## 2022-01-01 DEVICE — LUKENS BONE WAX 2.5G: Type: IMPLANTABLE DEVICE | Site: RIGHT | Status: FUNCTIONAL

## 2022-01-01 DEVICE — SURGICEL 4 X 8": Type: IMPLANTABLE DEVICE | Site: RIGHT | Status: FUNCTIONAL

## 2022-01-01 DEVICE — SURGIFOAM PAD 8CM X 12.5CM X 10MM (100): Type: IMPLANTABLE DEVICE | Site: RIGHT | Status: FUNCTIONAL

## 2022-01-01 DEVICE — SURGIFLO HEMOSTATIC MATRIX KIT: Type: IMPLANTABLE DEVICE | Site: RIGHT | Status: FUNCTIONAL

## 2022-01-01 RX ORDER — OXYCODONE HYDROCHLORIDE 5 MG/1
5 TABLET ORAL ONCE
Refills: 0 | Status: DISCONTINUED | OUTPATIENT
Start: 2022-01-01 | End: 2022-01-01

## 2022-01-01 RX ORDER — ACETAMINOPHEN 500 MG
325 TABLET ORAL ONCE
Refills: 0 | Status: COMPLETED | OUTPATIENT
Start: 2022-01-01 | End: 2022-01-01

## 2022-01-01 RX ORDER — DEXTROSE 50 % IN WATER 50 %
15 SYRINGE (ML) INTRAVENOUS ONCE
Refills: 0 | Status: DISCONTINUED | OUTPATIENT
Start: 2022-01-01 | End: 2022-01-01

## 2022-01-01 RX ORDER — ACETAMINOPHEN 500 MG
975 TABLET ORAL ONCE
Refills: 0 | Status: COMPLETED | OUTPATIENT
Start: 2022-01-01 | End: 2022-01-01

## 2022-01-01 RX ORDER — TACROLIMUS 5 MG/1
1 CAPSULE ORAL
Refills: 0 | Status: DISCONTINUED | OUTPATIENT
Start: 2022-01-01 | End: 2022-01-01

## 2022-01-01 RX ORDER — TAMSULOSIN HYDROCHLORIDE 0.4 MG/1
0.4 CAPSULE ORAL
Refills: 0 | Status: DISCONTINUED | OUTPATIENT
Start: 2022-01-01 | End: 2022-01-01

## 2022-01-01 RX ORDER — AMLODIPINE BESYLATE 5 MG/1
5 TABLET ORAL DAILY
Qty: 90 | Refills: 3 | Status: ACTIVE | COMMUNITY
Start: 2022-01-01 | End: 1900-01-01

## 2022-01-01 RX ORDER — SODIUM CHLORIDE 0.65 %
1 AEROSOL, SPRAY (ML) NASAL
Refills: 0 | Status: DISCONTINUED | OUTPATIENT
Start: 2022-01-01 | End: 2022-01-01

## 2022-01-01 RX ORDER — AMLODIPINE BESYLATE 2.5 MG/1
10 TABLET ORAL DAILY
Refills: 0 | Status: DISCONTINUED | OUTPATIENT
Start: 2022-01-01 | End: 2022-01-01

## 2022-01-01 RX ORDER — INSULIN LISPRO 100/ML
5 VIAL (ML) SUBCUTANEOUS
Refills: 0 | Status: DISCONTINUED | OUTPATIENT
Start: 2022-01-01 | End: 2022-01-01

## 2022-01-01 RX ORDER — INSULIN LISPRO 100/ML
4 VIAL (ML) SUBCUTANEOUS ONCE
Refills: 0 | Status: DISCONTINUED | OUTPATIENT
Start: 2022-01-01 | End: 2022-01-01

## 2022-01-01 RX ORDER — MAGNESIUM OXIDE 400 MG ORAL TABLET 241.3 MG
400 TABLET ORAL
Refills: 0 | Status: DISCONTINUED | OUTPATIENT
Start: 2022-01-01 | End: 2022-01-01

## 2022-01-01 RX ORDER — TACROLIMUS 5 MG/1
1 CAPSULE ORAL AT BEDTIME
Refills: 0 | Status: DISCONTINUED | OUTPATIENT
Start: 2022-01-01 | End: 2022-01-01

## 2022-01-01 RX ORDER — MAGNESIUM OXIDE 400 MG ORAL TABLET 241.3 MG
400 TABLET ORAL
Refills: 0 | Status: COMPLETED | OUTPATIENT
Start: 2022-01-01 | End: 2022-01-01

## 2022-01-01 RX ORDER — TRAMADOL HYDROCHLORIDE 50 MG/1
50 TABLET, COATED ORAL
Qty: 60 | Refills: 0 | Status: ACTIVE | COMMUNITY
Start: 2022-01-01 | End: 1900-01-01

## 2022-01-01 RX ORDER — FUROSEMIDE 40 MG
60 TABLET ORAL EVERY 12 HOURS
Refills: 0 | Status: DISCONTINUED | OUTPATIENT
Start: 2022-01-01 | End: 2022-01-01

## 2022-01-01 RX ORDER — FUROSEMIDE 40 MG
80 TABLET ORAL EVERY 12 HOURS
Refills: 0 | Status: DISCONTINUED | OUTPATIENT
Start: 2022-01-01 | End: 2022-01-01

## 2022-01-01 RX ORDER — TORSEMIDE 20 MG/1
20 TABLET ORAL
Qty: 180 | Refills: 3 | Status: ACTIVE | COMMUNITY
Start: 2022-01-01 | End: 1900-01-01

## 2022-01-01 RX ORDER — CALCIUM CARBONATE 500(1250)
2 TABLET ORAL ONCE
Refills: 0 | Status: COMPLETED | OUTPATIENT
Start: 2022-01-01 | End: 2022-01-01

## 2022-01-01 RX ORDER — SODIUM CHLORIDE 9 MG/ML
1000 INJECTION, SOLUTION INTRAVENOUS
Refills: 0 | Status: DISCONTINUED | OUTPATIENT
Start: 2022-01-01 | End: 2022-01-01

## 2022-01-01 RX ORDER — HEPARIN SODIUM 5000 [USP'U]/ML
5000 INJECTION INTRAVENOUS; SUBCUTANEOUS EVERY 8 HOURS
Refills: 0 | Status: DISCONTINUED | OUTPATIENT
Start: 2022-01-01 | End: 2022-01-01

## 2022-01-01 RX ORDER — INSULIN LISPRO 100/ML
10 VIAL (ML) SUBCUTANEOUS
Refills: 0 | Status: DISCONTINUED | OUTPATIENT
Start: 2022-01-01 | End: 2022-01-01

## 2022-01-01 RX ORDER — FUROSEMIDE 40 MG
40 TABLET ORAL EVERY 12 HOURS
Refills: 0 | Status: DISCONTINUED | OUTPATIENT
Start: 2022-01-01 | End: 2022-01-01

## 2022-01-01 RX ORDER — TACROLIMUS 5 MG/1
2 CAPSULE ORAL
Refills: 0 | Status: DISCONTINUED | OUTPATIENT
Start: 2022-01-01 | End: 2022-01-01

## 2022-01-01 RX ORDER — SODIUM CHLORIDE 5 G/100ML
500 INJECTION, SOLUTION INTRAVENOUS
Refills: 0 | Status: DISCONTINUED | OUTPATIENT
Start: 2022-01-01 | End: 2022-01-01

## 2022-01-01 RX ORDER — INFLUENZA VIRUS VACCINE 15; 15; 15; 15 UG/.5ML; UG/.5ML; UG/.5ML; UG/.5ML
0.7 SUSPENSION INTRAMUSCULAR ONCE
Refills: 0 | Status: DISCONTINUED | OUTPATIENT
Start: 2022-01-01 | End: 2022-01-01

## 2022-01-01 RX ORDER — AMLODIPINE BESYLATE 2.5 MG/1
5 TABLET ORAL EVERY 24 HOURS
Refills: 0 | Status: DISCONTINUED | OUTPATIENT
Start: 2022-01-01 | End: 2022-01-01

## 2022-01-01 RX ORDER — INSULIN LISPRO 100/ML
8 VIAL (ML) SUBCUTANEOUS
Refills: 0 | Status: DISCONTINUED | OUTPATIENT
Start: 2022-01-01 | End: 2022-01-01

## 2022-01-01 RX ORDER — DEXTROSE 50 % IN WATER 50 %
25 SYRINGE (ML) INTRAVENOUS ONCE
Refills: 0 | Status: DISCONTINUED | OUTPATIENT
Start: 2022-01-01 | End: 2022-01-01

## 2022-01-01 RX ORDER — OXYCODONE AND ACETAMINOPHEN 2.5; 325 MG/1; MG/1
2.5-325 TABLET ORAL EVERY 6 HOURS
Qty: 40 | Refills: 0 | Status: ACTIVE | COMMUNITY
Start: 2022-01-01 | End: 1900-01-01

## 2022-01-01 RX ORDER — ISOSORBIDE MONONITRATE 60 MG/1
30 TABLET, EXTENDED RELEASE ORAL DAILY
Refills: 0 | Status: DISCONTINUED | OUTPATIENT
Start: 2022-01-01 | End: 2022-01-01

## 2022-01-01 RX ORDER — ISOSORBIDE MONONITRATE 60 MG/1
1 TABLET, EXTENDED RELEASE ORAL
Qty: 0 | Refills: 0 | DISCHARGE

## 2022-01-01 RX ORDER — ALPRAZOLAM 0.5 MG/1
0.5 TABLET ORAL
Qty: 60 | Refills: 3 | Status: ACTIVE | COMMUNITY
Start: 2022-02-06 | End: 1900-01-01

## 2022-01-01 RX ORDER — TAMSULOSIN HYDROCHLORIDE 0.4 MG/1
1 CAPSULE ORAL
Qty: 0 | Refills: 0 | DISCHARGE
Start: 2022-01-01

## 2022-01-01 RX ORDER — INSULIN GLARGINE 100 [IU]/ML
22 INJECTION, SOLUTION SUBCUTANEOUS AT BEDTIME
Refills: 0 | Status: DISCONTINUED | OUTPATIENT
Start: 2022-01-01 | End: 2022-01-01

## 2022-01-01 RX ORDER — AMLODIPINE BESYLATE 2.5 MG/1
5 TABLET ORAL DAILY
Refills: 0 | Status: DISCONTINUED | OUTPATIENT
Start: 2022-01-01 | End: 2022-01-01

## 2022-01-01 RX ORDER — ACETAMINOPHEN 500 MG
1000 TABLET ORAL ONCE
Refills: 0 | Status: COMPLETED | OUTPATIENT
Start: 2022-01-01 | End: 2022-01-01

## 2022-01-01 RX ORDER — BENZOCAINE AND MENTHOL 5; 1 G/100ML; G/100ML
1 LIQUID ORAL
Refills: 0 | Status: DISCONTINUED | OUTPATIENT
Start: 2022-01-01 | End: 2022-01-01

## 2022-01-01 RX ORDER — ATORVASTATIN CALCIUM 80 MG/1
40 TABLET, FILM COATED ORAL AT BEDTIME
Refills: 0 | Status: DISCONTINUED | OUTPATIENT
Start: 2022-01-01 | End: 2022-01-01

## 2022-01-01 RX ORDER — SODIUM CHLORIDE 0.65 %
1 AEROSOL, SPRAY (ML) NASAL DAILY
Refills: 0 | Status: DISCONTINUED | OUTPATIENT
Start: 2022-01-01 | End: 2022-01-01

## 2022-01-01 RX ORDER — ONDANSETRON 8 MG/1
8 TABLET, FILM COATED ORAL EVERY 8 HOURS
Refills: 0 | Status: DISCONTINUED | OUTPATIENT
Start: 2022-01-01 | End: 2022-01-01

## 2022-01-01 RX ORDER — OXYCODONE HYDROCHLORIDE 5 MG/1
10 TABLET ORAL EVERY 6 HOURS
Refills: 0 | Status: DISCONTINUED | OUTPATIENT
Start: 2022-01-01 | End: 2022-01-01

## 2022-01-01 RX ORDER — LEVOCETIRIZINE DIHYDROCHLORIDE 5 MG/1
5 TABLET ORAL DAILY
Qty: 1 | Refills: 3 | Status: ACTIVE | COMMUNITY
Start: 2022-01-01 | End: 1900-01-01

## 2022-01-01 RX ORDER — FLUTICASONE PROPIONATE 50 UG/1
50 SPRAY, METERED NASAL DAILY
Qty: 1 | Refills: 6 | Status: DISCONTINUED | COMMUNITY
Start: 2021-12-22 | End: 2022-01-01

## 2022-01-01 RX ORDER — INSULIN LISPRO 100/ML
VIAL (ML) SUBCUTANEOUS
Refills: 0 | Status: DISCONTINUED | OUTPATIENT
Start: 2022-01-01 | End: 2022-01-01

## 2022-01-01 RX ORDER — ACETAMINOPHEN 500 MG
650 TABLET ORAL EVERY 6 HOURS
Refills: 0 | Status: DISCONTINUED | OUTPATIENT
Start: 2022-01-01 | End: 2022-01-01

## 2022-01-01 RX ORDER — DEXTROSE 50 % IN WATER 50 %
12.5 SYRINGE (ML) INTRAVENOUS ONCE
Refills: 0 | Status: DISCONTINUED | OUTPATIENT
Start: 2022-01-01 | End: 2022-01-01

## 2022-01-01 RX ORDER — INSULIN LISPRO 100/ML
18 VIAL (ML) SUBCUTANEOUS
Qty: 0 | Refills: 0 | DISCHARGE

## 2022-01-01 RX ORDER — TACROLIMUS 5 MG/1
2 CAPSULE ORAL ONCE
Refills: 0 | Status: COMPLETED | OUTPATIENT
Start: 2022-01-01 | End: 2022-01-01

## 2022-01-01 RX ORDER — GLUCAGON INJECTION, SOLUTION 0.5 MG/.1ML
1 INJECTION, SOLUTION SUBCUTANEOUS ONCE
Refills: 0 | Status: DISCONTINUED | OUTPATIENT
Start: 2022-01-01 | End: 2022-01-01

## 2022-01-01 RX ORDER — TACROLIMUS 5 MG/1
2 CAPSULE ORAL DAILY
Refills: 0 | Status: DISCONTINUED | OUTPATIENT
Start: 2022-01-01 | End: 2022-01-01

## 2022-01-01 RX ORDER — FUROSEMIDE 40 MG
80 TABLET ORAL THREE TIMES A DAY
Refills: 0 | Status: DISCONTINUED | OUTPATIENT
Start: 2022-01-01 | End: 2022-01-01

## 2022-01-01 RX ORDER — MAGNESIUM SULFATE 500 MG/ML
2 VIAL (ML) INJECTION ONCE
Refills: 0 | Status: COMPLETED | OUTPATIENT
Start: 2022-01-01 | End: 2022-01-01

## 2022-01-01 RX ORDER — FUROSEMIDE 40 MG
80 TABLET ORAL ONCE
Refills: 0 | Status: COMPLETED | OUTPATIENT
Start: 2022-01-01 | End: 2022-01-01

## 2022-01-01 RX ORDER — AMPICILLIN SODIUM AND SULBACTAM SODIUM 250; 125 MG/ML; MG/ML
3 INJECTION, POWDER, FOR SUSPENSION INTRAMUSCULAR; INTRAVENOUS EVERY 6 HOURS
Refills: 0 | Status: COMPLETED | OUTPATIENT
Start: 2022-01-01 | End: 2022-01-01

## 2022-01-01 RX ORDER — INSULIN GLARGINE 100 [IU]/ML
30 INJECTION, SOLUTION SUBCUTANEOUS AT BEDTIME
Refills: 0 | Status: DISCONTINUED | OUTPATIENT
Start: 2022-01-01 | End: 2022-01-01

## 2022-01-01 RX ORDER — PEN NEEDLE, DIABETIC 29 G X1/2"
32G X 4 MM NEEDLE, DISPOSABLE MISCELLANEOUS
Qty: 4 | Refills: 4 | Status: ACTIVE | COMMUNITY
Start: 2022-01-01 | End: 1900-01-01

## 2022-01-01 RX ORDER — ALPRAZOLAM 0.25 MG
0.25 TABLET ORAL EVERY 8 HOURS
Refills: 0 | Status: DISCONTINUED | OUTPATIENT
Start: 2022-01-01 | End: 2022-01-01

## 2022-01-01 RX ORDER — OXYCODONE HYDROCHLORIDE 5 MG/1
5 TABLET ORAL EVERY 6 HOURS
Refills: 0 | Status: DISCONTINUED | OUTPATIENT
Start: 2022-01-01 | End: 2022-01-01

## 2022-01-01 RX ORDER — PANTOPRAZOLE SODIUM 20 MG/1
40 TABLET, DELAYED RELEASE ORAL
Refills: 0 | Status: DISCONTINUED | OUTPATIENT
Start: 2022-01-01 | End: 2022-01-01

## 2022-01-01 RX ORDER — SEMAGLUTIDE 1.34 MG/ML
2 INJECTION, SOLUTION SUBCUTANEOUS
Qty: 1 | Refills: 3 | Status: ACTIVE | COMMUNITY
Start: 2022-01-01 | End: 1900-01-01

## 2022-01-01 RX ORDER — POLYETHYLENE GLYCOL 3350 17 G/17G
17 POWDER, FOR SOLUTION ORAL DAILY
Refills: 0 | Status: DISCONTINUED | OUTPATIENT
Start: 2022-01-01 | End: 2022-01-01

## 2022-01-01 RX ORDER — AMPICILLIN SODIUM AND SULBACTAM SODIUM 250; 125 MG/ML; MG/ML
3 INJECTION, POWDER, FOR SUSPENSION INTRAMUSCULAR; INTRAVENOUS EVERY 6 HOURS
Refills: 0 | Status: DISCONTINUED | OUTPATIENT
Start: 2022-01-01 | End: 2022-01-01

## 2022-01-01 RX ORDER — ACETAMINOPHEN 500 MG
975 TABLET ORAL EVERY 6 HOURS
Refills: 0 | Status: DISCONTINUED | OUTPATIENT
Start: 2022-01-01 | End: 2022-01-01

## 2022-01-01 RX ORDER — PANTOPRAZOLE SODIUM 20 MG/1
20 TABLET, DELAYED RELEASE ORAL EVERY 24 HOURS
Refills: 0 | Status: DISCONTINUED | OUTPATIENT
Start: 2022-01-01 | End: 2022-01-01

## 2022-01-01 RX ORDER — MORPHINE SULFATE 50 MG/1
1 CAPSULE, EXTENDED RELEASE ORAL ONCE
Refills: 0 | Status: DISCONTINUED | OUTPATIENT
Start: 2022-01-01 | End: 2022-01-01

## 2022-01-01 RX ORDER — APIXABAN 2.5 MG/1
5 TABLET, FILM COATED ORAL EVERY 12 HOURS
Refills: 0 | Status: DISCONTINUED | OUTPATIENT
Start: 2022-01-01 | End: 2022-01-01

## 2022-01-01 RX ORDER — FLUTICASONE PROPIONATE 50 UG/1
50 SPRAY, METERED NASAL DAILY
Qty: 1 | Refills: 6 | Status: ACTIVE | COMMUNITY
Start: 2022-01-01 | End: 1900-01-01

## 2022-01-01 RX ORDER — INSULIN GLARGINE 100 [IU]/ML
100 INJECTION, SOLUTION SUBCUTANEOUS
Qty: 3 | Refills: 3 | Status: ACTIVE | COMMUNITY

## 2022-01-01 RX ORDER — AMPICILLIN SODIUM AND SULBACTAM SODIUM 250; 125 MG/ML; MG/ML
3 INJECTION, POWDER, FOR SUSPENSION INTRAMUSCULAR; INTRAVENOUS ONCE
Refills: 0 | Status: COMPLETED | OUTPATIENT
Start: 2022-01-01 | End: 2022-01-01

## 2022-01-01 RX ORDER — TRAMADOL HYDROCHLORIDE 50 MG/1
25 TABLET ORAL EVERY 6 HOURS
Refills: 0 | Status: DISCONTINUED | OUTPATIENT
Start: 2022-01-01 | End: 2022-01-01

## 2022-01-01 RX ORDER — ZOLPIDEM TARTRATE 10 MG/1
10 TABLET ORAL
Qty: 30 | Refills: 0 | Status: ACTIVE | COMMUNITY
Start: 2022-01-01 | End: 1900-01-01

## 2022-01-01 RX ORDER — TACROLIMUS 5 MG/1
2 CAPSULE ORAL EVERY 24 HOURS
Refills: 0 | Status: DISCONTINUED | OUTPATIENT
Start: 2022-01-01 | End: 2022-01-01

## 2022-01-01 RX ORDER — SENNA PLUS 8.6 MG/1
2 TABLET ORAL AT BEDTIME
Refills: 0 | Status: DISCONTINUED | OUTPATIENT
Start: 2022-01-01 | End: 2022-01-01

## 2022-01-01 RX ORDER — ALPRAZOLAM 0.25 MG/1
0.25 TABLET ORAL
Qty: 45 | Refills: 0 | Status: ACTIVE | COMMUNITY
Start: 2020-03-27 | End: 1900-01-01

## 2022-01-01 RX ORDER — INSULIN GLARGINE 100 [IU]/ML
15 INJECTION, SOLUTION SUBCUTANEOUS AT BEDTIME
Refills: 0 | Status: DISCONTINUED | OUTPATIENT
Start: 2022-01-01 | End: 2022-01-01

## 2022-01-01 RX ORDER — CHLORHEXIDINE GLUCONATE 213 G/1000ML
15 SOLUTION TOPICAL
Refills: 0 | Status: DISCONTINUED | OUTPATIENT
Start: 2022-01-01 | End: 2022-01-01

## 2022-01-01 RX ORDER — HYDROMORPHONE HYDROCHLORIDE 2 MG/ML
0.5 INJECTION INTRAMUSCULAR; INTRAVENOUS; SUBCUTANEOUS EVERY 6 HOURS
Refills: 0 | Status: DISCONTINUED | OUTPATIENT
Start: 2022-01-01 | End: 2022-01-01

## 2022-01-01 RX ORDER — AMLODIPINE BESYLATE 2.5 MG/1
1 TABLET ORAL
Qty: 0 | Refills: 0 | DISCHARGE

## 2022-01-01 RX ORDER — FUROSEMIDE 40 MG
80 TABLET ORAL
Refills: 0 | Status: DISCONTINUED | OUTPATIENT
Start: 2022-01-01 | End: 2022-01-01

## 2022-01-01 RX ORDER — ISOSORBIDE MONONITRATE 60 MG/1
1 TABLET, EXTENDED RELEASE ORAL
Qty: 0 | Refills: 0 | DISCHARGE
Start: 2022-01-01

## 2022-01-01 RX ORDER — LACTULOSE 10 G/15ML
10 SOLUTION ORAL DAILY
Refills: 0 | Status: DISCONTINUED | OUTPATIENT
Start: 2022-01-01 | End: 2022-01-01

## 2022-01-01 RX ORDER — BNT162B2 ORIGINAL AND OMICRON BA.4/BA.5 .1125; .1125 MG/2.25ML; MG/2.25ML
0.3 INJECTION, SUSPENSION INTRAMUSCULAR ONCE
Refills: 0 | Status: DISCONTINUED | OUTPATIENT
Start: 2022-01-01 | End: 2022-01-01

## 2022-01-01 RX ORDER — TAMSULOSIN HYDROCHLORIDE 0.4 MG/1
0.4 CAPSULE ORAL AT BEDTIME
Refills: 0 | Status: DISCONTINUED | OUTPATIENT
Start: 2022-01-01 | End: 2022-01-01

## 2022-01-01 RX ORDER — ONDANSETRON 8 MG/1
4 TABLET, FILM COATED ORAL EVERY 8 HOURS
Refills: 0 | Status: DISCONTINUED | OUTPATIENT
Start: 2022-01-01 | End: 2022-01-01

## 2022-01-01 RX ORDER — ROSUVASTATIN CALCIUM 5 MG/1
1 TABLET ORAL
Qty: 0 | Refills: 0 | DISCHARGE

## 2022-01-01 RX ORDER — ISOSORBIDE MONONITRATE 60 MG/1
30 TABLET, EXTENDED RELEASE ORAL EVERY 24 HOURS
Refills: 0 | Status: DISCONTINUED | OUTPATIENT
Start: 2022-01-01 | End: 2022-01-01

## 2022-01-01 RX ORDER — CHLORHEXIDINE GLUCONATE 213 G/1000ML
1 SOLUTION TOPICAL
Refills: 0 | Status: DISCONTINUED | OUTPATIENT
Start: 2022-01-01 | End: 2022-01-01

## 2022-01-01 RX ORDER — ZOLPIDEM TARTRATE 10 MG/1
5 TABLET ORAL ONCE
Refills: 0 | Status: DISCONTINUED | OUTPATIENT
Start: 2022-01-01 | End: 2022-01-01

## 2022-01-01 RX ORDER — LANOLIN ALCOHOL/MO/W.PET/CERES
3 CREAM (GRAM) TOPICAL AT BEDTIME
Refills: 0 | Status: DISCONTINUED | OUTPATIENT
Start: 2022-01-01 | End: 2022-01-01

## 2022-01-01 RX ORDER — MAGNESIUM SULFATE 500 MG/ML
2 VIAL (ML) INJECTION ONCE
Refills: 0 | Status: DISCONTINUED | OUTPATIENT
Start: 2022-01-01 | End: 2022-01-01

## 2022-01-01 RX ORDER — MAGNESIUM OXIDE 400 MG ORAL TABLET 241.3 MG
400 TABLET ORAL ONCE
Refills: 0 | Status: COMPLETED | OUTPATIENT
Start: 2022-01-01 | End: 2022-01-01

## 2022-01-01 RX ORDER — INSULIN LISPRO 100 [IU]/ML
100 INJECTION, SOLUTION INTRAVENOUS; SUBCUTANEOUS
Qty: 2 | Refills: 3 | Status: ACTIVE | COMMUNITY
Start: 2018-03-07 | End: 1900-01-01

## 2022-01-01 RX ORDER — INSULIN GLARGINE 100 [IU]/ML
30 INJECTION, SOLUTION SUBCUTANEOUS
Qty: 0 | Refills: 0 | DISCHARGE
Start: 2022-01-01

## 2022-01-01 RX ORDER — PILOCARPINE HYDROCHLORIDE 5 MG/1
5 TABLET, FILM COATED ORAL
Qty: 90 | Refills: 0 | Status: DISCONTINUED | COMMUNITY
Start: 2022-02-06 | End: 2022-01-01

## 2022-01-01 RX ORDER — LEVOCETIRIZINE DIHYDROCHLORIDE 5 MG/1
5 TABLET ORAL DAILY
Qty: 1 | Refills: 3 | Status: DISCONTINUED | COMMUNITY
Start: 2021-12-22 | End: 2022-01-01

## 2022-01-01 RX ORDER — POLYETHYLENE GLYCOL 3350 17 G/17G
17 POWDER, FOR SOLUTION ORAL
Qty: 238 | Refills: 0
Start: 2022-01-01 | End: 2022-01-01

## 2022-01-01 RX ORDER — AMPICILLIN SODIUM AND SULBACTAM SODIUM 250; 125 MG/ML; MG/ML
INJECTION, POWDER, FOR SUSPENSION INTRAMUSCULAR; INTRAVENOUS
Refills: 0 | Status: COMPLETED | OUTPATIENT
Start: 2022-01-01 | End: 2022-01-01

## 2022-01-01 RX ORDER — FUROSEMIDE 40 MG
80 TABLET ORAL EVERY 8 HOURS
Refills: 0 | Status: DISCONTINUED | OUTPATIENT
Start: 2022-01-01 | End: 2022-01-01

## 2022-01-01 RX ORDER — MAGNESIUM SULFATE 500 MG/ML
1 VIAL (ML) INJECTION ONCE
Refills: 0 | Status: COMPLETED | OUTPATIENT
Start: 2022-01-01 | End: 2022-01-01

## 2022-01-01 RX ORDER — ALBUMIN HUMAN 25 %
250 VIAL (ML) INTRAVENOUS ONCE
Refills: 0 | Status: DISCONTINUED | OUTPATIENT
Start: 2022-01-01 | End: 2022-01-01

## 2022-01-01 RX ORDER — VALGANCICLOVIR HYDROCHLORIDE 450 MG/1
TABLET ORAL
Refills: 0 | Status: DISCONTINUED | COMMUNITY
End: 2022-01-01

## 2022-01-01 RX ORDER — TRAMADOL HYDROCHLORIDE 50 MG/1
25 TABLET ORAL ONCE
Refills: 0 | Status: DISCONTINUED | OUTPATIENT
Start: 2022-01-01 | End: 2022-01-01

## 2022-01-01 RX ORDER — LANOLIN ALCOHOL/MO/W.PET/CERES
5 CREAM (GRAM) TOPICAL AT BEDTIME
Refills: 0 | Status: DISCONTINUED | OUTPATIENT
Start: 2022-01-01 | End: 2022-01-01

## 2022-01-01 RX ORDER — ROSUVASTATIN CALCIUM 5 MG/1
1 TABLET ORAL
Qty: 0 | Refills: 0 | DISCHARGE
Start: 2022-01-01

## 2022-01-01 RX ORDER — BLOOD-GLUCOSE SENSOR
EACH MISCELLANEOUS
Qty: 5 | Refills: 3 | Status: ACTIVE | COMMUNITY
Start: 2022-01-26 | End: 1900-01-01

## 2022-01-01 RX ORDER — ALPRAZOLAM 0.25 MG
0.25 TABLET ORAL ONCE
Refills: 0 | Status: DISCONTINUED | OUTPATIENT
Start: 2022-01-01 | End: 2022-01-01

## 2022-01-01 RX ORDER — CHLORHEXIDINE GLUCONATE 213 G/1000ML
15 SOLUTION TOPICAL
Qty: 210 | Refills: 0
Start: 2022-01-01 | End: 2022-01-01

## 2022-01-01 RX ORDER — BACITRACIN ZINC 500 [USP'U]/G
500 OINTMENT TOPICAL 3 TIMES DAILY
Qty: 1 | Refills: 3 | Status: ACTIVE | COMMUNITY
Start: 2022-01-01 | End: 1900-01-01

## 2022-01-01 RX ORDER — OXYCODONE AND ACETAMINOPHEN 5; 325 MG/1; MG/1
1 TABLET ORAL
Qty: 6 | Refills: 0
Start: 2022-01-01

## 2022-01-01 RX ORDER — SODIUM ZIRCONIUM CYCLOSILICATE 10 G/10G
10 POWDER, FOR SUSPENSION ORAL ONCE
Refills: 0 | Status: COMPLETED | OUTPATIENT
Start: 2022-01-01 | End: 2022-01-01

## 2022-01-01 RX ORDER — SODIUM CHLORIDE 9 MG/ML
1000 INJECTION INTRAMUSCULAR; INTRAVENOUS; SUBCUTANEOUS
Refills: 0 | Status: DISCONTINUED | OUTPATIENT
Start: 2022-01-01 | End: 2022-01-01

## 2022-01-01 RX ORDER — FUROSEMIDE 40 MG/1
40 TABLET ORAL
Qty: 90 | Refills: 3 | Status: DISCONTINUED | COMMUNITY
Start: 2022-01-04 | End: 2022-01-01

## 2022-01-01 RX ORDER — TACROLIMUS 5 MG/1
1 CAPSULE ORAL EVERY 24 HOURS
Refills: 0 | Status: DISCONTINUED | OUTPATIENT
Start: 2022-01-01 | End: 2022-01-01

## 2022-01-01 RX ORDER — SENNA PLUS 8.6 MG/1
2 TABLET ORAL
Qty: 28 | Refills: 0
Start: 2022-01-01 | End: 2022-01-01

## 2022-01-01 RX ORDER — DOXYCYCLINE HYCLATE 100 MG/1
100 CAPSULE ORAL
Qty: 20 | Refills: 1 | Status: DISCONTINUED | COMMUNITY
Start: 2022-01-04 | End: 2022-01-01

## 2022-01-01 RX ORDER — FUROSEMIDE 40 MG
60 TABLET ORAL DAILY
Refills: 0 | Status: DISCONTINUED | OUTPATIENT
Start: 2022-01-01 | End: 2022-01-01

## 2022-01-01 RX ORDER — ATORVASTATIN CALCIUM 80 MG/1
1 TABLET, FILM COATED ORAL
Qty: 0 | Refills: 0 | DISCHARGE
Start: 2022-01-01

## 2022-01-01 RX ORDER — APIXABAN 2.5 MG/1
5 TABLET, FILM COATED ORAL ONCE
Refills: 0 | Status: COMPLETED | OUTPATIENT
Start: 2022-01-01 | End: 2022-01-01

## 2022-01-01 RX ADMIN — APIXABAN 5 MILLIGRAM(S): 2.5 TABLET, FILM COATED ORAL at 17:42

## 2022-01-01 RX ADMIN — Medication 975 MILLIGRAM(S): at 10:15

## 2022-01-01 RX ADMIN — ATORVASTATIN CALCIUM 40 MILLIGRAM(S): 80 TABLET, FILM COATED ORAL at 22:00

## 2022-01-01 RX ADMIN — TACROLIMUS 1 MILLIGRAM(S): 5 CAPSULE ORAL at 23:11

## 2022-01-01 RX ADMIN — AMPICILLIN SODIUM AND SULBACTAM SODIUM 200 GRAM(S): 250; 125 INJECTION, POWDER, FOR SUSPENSION INTRAMUSCULAR; INTRAVENOUS at 08:49

## 2022-01-01 RX ADMIN — Medication 60 MILLIGRAM(S): at 09:24

## 2022-01-01 RX ADMIN — AMPICILLIN SODIUM AND SULBACTAM SODIUM 200 GRAM(S): 250; 125 INJECTION, POWDER, FOR SUSPENSION INTRAMUSCULAR; INTRAVENOUS at 23:21

## 2022-01-01 RX ADMIN — Medication 4: at 11:50

## 2022-01-01 RX ADMIN — OXYCODONE HYDROCHLORIDE 5 MILLIGRAM(S): 5 TABLET ORAL at 11:14

## 2022-01-01 RX ADMIN — Medication 10 UNIT(S): at 17:24

## 2022-01-01 RX ADMIN — HYDROMORPHONE HYDROCHLORIDE 0.5 MILLIGRAM(S): 2 INJECTION INTRAMUSCULAR; INTRAVENOUS; SUBCUTANEOUS at 02:02

## 2022-01-01 RX ADMIN — Medication 60 MILLIGRAM(S): at 22:45

## 2022-01-01 RX ADMIN — AMPICILLIN SODIUM AND SULBACTAM SODIUM 200 GRAM(S): 250; 125 INJECTION, POWDER, FOR SUSPENSION INTRAMUSCULAR; INTRAVENOUS at 06:09

## 2022-01-01 RX ADMIN — Medication 10 UNIT(S): at 08:55

## 2022-01-01 RX ADMIN — Medication 80 MILLIGRAM(S): at 23:30

## 2022-01-01 RX ADMIN — Medication 975 MILLIGRAM(S): at 13:48

## 2022-01-01 RX ADMIN — TACROLIMUS 1 MILLIGRAM(S): 5 CAPSULE ORAL at 22:19

## 2022-01-01 RX ADMIN — Medication 25 GRAM(S): at 10:09

## 2022-01-01 RX ADMIN — Medication 0.25 MILLIGRAM(S): at 19:10

## 2022-01-01 RX ADMIN — TACROLIMUS 1 MILLIGRAM(S): 5 CAPSULE ORAL at 22:46

## 2022-01-01 RX ADMIN — ISOSORBIDE MONONITRATE 30 MILLIGRAM(S): 60 TABLET, EXTENDED RELEASE ORAL at 11:24

## 2022-01-01 RX ADMIN — TAMSULOSIN HYDROCHLORIDE 0.4 MILLIGRAM(S): 0.4 CAPSULE ORAL at 21:42

## 2022-01-01 RX ADMIN — Medication 2: at 06:58

## 2022-01-01 RX ADMIN — AMLODIPINE BESYLATE 5 MILLIGRAM(S): 2.5 TABLET ORAL at 09:01

## 2022-01-01 RX ADMIN — Medication 5 MILLIGRAM(S): at 23:18

## 2022-01-01 RX ADMIN — APIXABAN 5 MILLIGRAM(S): 2.5 TABLET, FILM COATED ORAL at 09:25

## 2022-01-01 RX ADMIN — LACTULOSE 10 GRAM(S): 10 SOLUTION ORAL at 23:11

## 2022-01-01 RX ADMIN — Medication 4: at 12:47

## 2022-01-01 RX ADMIN — Medication 650 MILLIGRAM(S): at 05:59

## 2022-01-01 RX ADMIN — Medication 325 MILLIGRAM(S): at 13:10

## 2022-01-01 RX ADMIN — Medication 10 UNIT(S): at 12:00

## 2022-01-01 RX ADMIN — TAMSULOSIN HYDROCHLORIDE 0.4 MILLIGRAM(S): 0.4 CAPSULE ORAL at 22:55

## 2022-01-01 RX ADMIN — TACROLIMUS 1 MILLIGRAM(S): 5 CAPSULE ORAL at 21:58

## 2022-01-01 RX ADMIN — Medication 975 MILLIGRAM(S): at 09:19

## 2022-01-01 RX ADMIN — HYDROMORPHONE HYDROCHLORIDE 0.5 MILLIGRAM(S): 2 INJECTION INTRAMUSCULAR; INTRAVENOUS; SUBCUTANEOUS at 20:58

## 2022-01-01 RX ADMIN — ISOSORBIDE MONONITRATE 30 MILLIGRAM(S): 60 TABLET, EXTENDED RELEASE ORAL at 11:36

## 2022-01-01 RX ADMIN — Medication 2: at 23:08

## 2022-01-01 RX ADMIN — TRAMADOL HYDROCHLORIDE 25 MILLIGRAM(S): 50 TABLET ORAL at 07:43

## 2022-01-01 RX ADMIN — AMLODIPINE BESYLATE 10 MILLIGRAM(S): 2.5 TABLET ORAL at 11:12

## 2022-01-01 RX ADMIN — INSULIN GLARGINE 30 UNIT(S): 100 INJECTION, SOLUTION SUBCUTANEOUS at 22:55

## 2022-01-01 RX ADMIN — AMLODIPINE BESYLATE 10 MILLIGRAM(S): 2.5 TABLET ORAL at 11:31

## 2022-01-01 RX ADMIN — PANTOPRAZOLE SODIUM 20 MILLIGRAM(S): 20 TABLET, DELAYED RELEASE ORAL at 11:36

## 2022-01-01 RX ADMIN — AMPICILLIN SODIUM AND SULBACTAM SODIUM 200 GRAM(S): 250; 125 INJECTION, POWDER, FOR SUSPENSION INTRAMUSCULAR; INTRAVENOUS at 18:09

## 2022-01-01 RX ADMIN — Medication 0.25 MILLIGRAM(S): at 17:51

## 2022-01-01 RX ADMIN — AMPICILLIN SODIUM AND SULBACTAM SODIUM 200 GRAM(S): 250; 125 INJECTION, POWDER, FOR SUSPENSION INTRAMUSCULAR; INTRAVENOUS at 00:08

## 2022-01-01 RX ADMIN — ISOSORBIDE MONONITRATE 30 MILLIGRAM(S): 60 TABLET, EXTENDED RELEASE ORAL at 11:31

## 2022-01-01 RX ADMIN — Medication 10 UNIT(S): at 07:29

## 2022-01-01 RX ADMIN — ATORVASTATIN CALCIUM 40 MILLIGRAM(S): 80 TABLET, FILM COATED ORAL at 21:36

## 2022-01-01 RX ADMIN — Medication 80 MILLIGRAM(S): at 05:36

## 2022-01-01 RX ADMIN — ATORVASTATIN CALCIUM 40 MILLIGRAM(S): 80 TABLET, FILM COATED ORAL at 22:46

## 2022-01-01 RX ADMIN — Medication 10 UNIT(S): at 12:10

## 2022-01-01 RX ADMIN — Medication 975 MILLIGRAM(S): at 18:00

## 2022-01-01 RX ADMIN — TACROLIMUS 2 MILLIGRAM(S): 5 CAPSULE ORAL at 10:25

## 2022-01-01 RX ADMIN — HEPARIN SODIUM 5000 UNIT(S): 5000 INJECTION INTRAVENOUS; SUBCUTANEOUS at 21:24

## 2022-01-01 RX ADMIN — Medication 0.25 MILLIGRAM(S): at 15:30

## 2022-01-01 RX ADMIN — APIXABAN 5 MILLIGRAM(S): 2.5 TABLET, FILM COATED ORAL at 22:44

## 2022-01-01 RX ADMIN — Medication 60 MILLIGRAM(S): at 19:22

## 2022-01-01 RX ADMIN — MORPHINE SULFATE 1 MILLIGRAM(S): 50 CAPSULE, EXTENDED RELEASE ORAL at 00:11

## 2022-01-01 RX ADMIN — ATORVASTATIN CALCIUM 40 MILLIGRAM(S): 80 TABLET, FILM COATED ORAL at 21:35

## 2022-01-01 RX ADMIN — CHLORHEXIDINE GLUCONATE 1 APPLICATION(S): 213 SOLUTION TOPICAL at 06:10

## 2022-01-01 RX ADMIN — ATORVASTATIN CALCIUM 40 MILLIGRAM(S): 80 TABLET, FILM COATED ORAL at 22:55

## 2022-01-01 RX ADMIN — AMLODIPINE BESYLATE 10 MILLIGRAM(S): 2.5 TABLET ORAL at 11:26

## 2022-01-01 RX ADMIN — Medication 1 APPLICATION(S): at 00:32

## 2022-01-01 RX ADMIN — TACROLIMUS 1 MILLIGRAM(S): 5 CAPSULE ORAL at 21:29

## 2022-01-01 RX ADMIN — Medication 80 MILLIGRAM(S): at 21:29

## 2022-01-01 RX ADMIN — SENNA PLUS 2 TABLET(S): 8.6 TABLET ORAL at 04:29

## 2022-01-01 RX ADMIN — Medication 4: at 23:47

## 2022-01-01 RX ADMIN — APIXABAN 5 MILLIGRAM(S): 2.5 TABLET, FILM COATED ORAL at 06:36

## 2022-01-01 RX ADMIN — Medication 975 MILLIGRAM(S): at 09:47

## 2022-01-01 RX ADMIN — MORPHINE SULFATE 1 MILLIGRAM(S): 50 CAPSULE, EXTENDED RELEASE ORAL at 15:50

## 2022-01-01 RX ADMIN — Medication 650 MILLIGRAM(S): at 23:18

## 2022-01-01 RX ADMIN — Medication 6: at 00:19

## 2022-01-01 RX ADMIN — Medication 650 MILLIGRAM(S): at 17:45

## 2022-01-01 RX ADMIN — MAGNESIUM OXIDE 400 MG ORAL TABLET 400 MILLIGRAM(S): 241.3 TABLET ORAL at 18:03

## 2022-01-01 RX ADMIN — ISOSORBIDE MONONITRATE 30 MILLIGRAM(S): 60 TABLET, EXTENDED RELEASE ORAL at 09:01

## 2022-01-01 RX ADMIN — Medication 80 MILLIGRAM(S): at 05:32

## 2022-01-01 RX ADMIN — CHLORHEXIDINE GLUCONATE 15 MILLILITER(S): 213 SOLUTION TOPICAL at 05:56

## 2022-01-01 RX ADMIN — TACROLIMUS 2 MILLIGRAM(S): 5 CAPSULE ORAL at 09:02

## 2022-01-01 RX ADMIN — Medication 2: at 18:54

## 2022-01-01 RX ADMIN — Medication 5 MILLIGRAM(S): at 12:21

## 2022-01-01 RX ADMIN — MAGNESIUM OXIDE 400 MG ORAL TABLET 400 MILLIGRAM(S): 241.3 TABLET ORAL at 10:13

## 2022-01-01 RX ADMIN — Medication 1 SPRAY(S): at 03:02

## 2022-01-01 RX ADMIN — Medication 4: at 12:27

## 2022-01-01 RX ADMIN — Medication 650 MILLIGRAM(S): at 11:10

## 2022-01-01 RX ADMIN — ATORVASTATIN CALCIUM 40 MILLIGRAM(S): 80 TABLET, FILM COATED ORAL at 21:29

## 2022-01-01 RX ADMIN — SODIUM CHLORIDE 50 MILLILITER(S): 9 INJECTION INTRAMUSCULAR; INTRAVENOUS; SUBCUTANEOUS at 22:51

## 2022-01-01 RX ADMIN — TACROLIMUS 2 MILLIGRAM(S): 5 CAPSULE ORAL at 10:15

## 2022-01-01 RX ADMIN — Medication 975 MILLIGRAM(S): at 01:26

## 2022-01-01 RX ADMIN — TAMSULOSIN HYDROCHLORIDE 0.4 MILLIGRAM(S): 0.4 CAPSULE ORAL at 21:33

## 2022-01-01 RX ADMIN — Medication 975 MILLIGRAM(S): at 00:23

## 2022-01-01 RX ADMIN — TAMSULOSIN HYDROCHLORIDE 0.4 MILLIGRAM(S): 0.4 CAPSULE ORAL at 21:26

## 2022-01-01 RX ADMIN — Medication 975 MILLIGRAM(S): at 13:18

## 2022-01-01 RX ADMIN — Medication 975 MILLIGRAM(S): at 22:23

## 2022-01-01 RX ADMIN — Medication 80 MILLIGRAM(S): at 22:57

## 2022-01-01 RX ADMIN — ZOLPIDEM TARTRATE 5 MILLIGRAM(S): 10 TABLET ORAL at 00:05

## 2022-01-01 RX ADMIN — TACROLIMUS 1 MILLIGRAM(S): 5 CAPSULE ORAL at 21:36

## 2022-01-01 RX ADMIN — TACROLIMUS 1 MILLIGRAM(S): 5 CAPSULE ORAL at 23:31

## 2022-01-01 RX ADMIN — INSULIN GLARGINE 15 UNIT(S): 100 INJECTION, SOLUTION SUBCUTANEOUS at 22:00

## 2022-01-01 RX ADMIN — Medication 80 MILLIGRAM(S): at 13:44

## 2022-01-01 RX ADMIN — MORPHINE SULFATE 1 MILLIGRAM(S): 50 CAPSULE, EXTENDED RELEASE ORAL at 23:41

## 2022-01-01 RX ADMIN — APIXABAN 5 MILLIGRAM(S): 2.5 TABLET, FILM COATED ORAL at 10:13

## 2022-01-01 RX ADMIN — TACROLIMUS 2 MILLIGRAM(S): 5 CAPSULE ORAL at 14:02

## 2022-01-01 RX ADMIN — Medication 25 GRAM(S): at 14:09

## 2022-01-01 RX ADMIN — Medication 400 MILLIGRAM(S): at 16:19

## 2022-01-01 RX ADMIN — BENZOCAINE AND MENTHOL 1 LOZENGE: 5; 1 LIQUID ORAL at 18:04

## 2022-01-01 RX ADMIN — Medication 0.25 MILLIGRAM(S): at 23:11

## 2022-01-01 RX ADMIN — HEPARIN SODIUM 5000 UNIT(S): 5000 INJECTION INTRAVENOUS; SUBCUTANEOUS at 15:49

## 2022-01-01 RX ADMIN — Medication 325 MILLIGRAM(S): at 10:05

## 2022-01-01 RX ADMIN — TAMSULOSIN HYDROCHLORIDE 0.4 MILLIGRAM(S): 0.4 CAPSULE ORAL at 22:46

## 2022-01-01 RX ADMIN — AMPICILLIN SODIUM AND SULBACTAM SODIUM 200 GRAM(S): 250; 125 INJECTION, POWDER, FOR SUSPENSION INTRAMUSCULAR; INTRAVENOUS at 13:02

## 2022-01-01 RX ADMIN — Medication 975 MILLIGRAM(S): at 10:03

## 2022-01-01 RX ADMIN — Medication 0.25 MILLIGRAM(S): at 23:52

## 2022-01-01 RX ADMIN — SODIUM CHLORIDE 50 MILLILITER(S): 5 INJECTION, SOLUTION INTRAVENOUS at 20:37

## 2022-01-01 RX ADMIN — AMLODIPINE BESYLATE 10 MILLIGRAM(S): 2.5 TABLET ORAL at 10:13

## 2022-01-01 RX ADMIN — SENNA PLUS 2 TABLET(S): 8.6 TABLET ORAL at 00:47

## 2022-01-01 RX ADMIN — Medication 4: at 12:01

## 2022-01-01 RX ADMIN — Medication 80 MILLIGRAM(S): at 17:31

## 2022-01-01 RX ADMIN — Medication 8 UNIT(S): at 09:00

## 2022-01-01 RX ADMIN — Medication 10 UNIT(S): at 08:48

## 2022-01-01 RX ADMIN — POLYETHYLENE GLYCOL 3350 17 GRAM(S): 17 POWDER, FOR SOLUTION ORAL at 09:01

## 2022-01-01 RX ADMIN — Medication 975 MILLIGRAM(S): at 06:54

## 2022-01-01 RX ADMIN — Medication 5 UNIT(S): at 23:00

## 2022-01-01 RX ADMIN — Medication 10 UNIT(S): at 12:27

## 2022-01-01 RX ADMIN — Medication 80 MILLIGRAM(S): at 05:42

## 2022-01-01 RX ADMIN — Medication 4: at 06:53

## 2022-01-01 RX ADMIN — APIXABAN 5 MILLIGRAM(S): 2.5 TABLET, FILM COATED ORAL at 21:10

## 2022-01-01 RX ADMIN — Medication 100 GRAM(S): at 09:00

## 2022-01-01 RX ADMIN — TAMSULOSIN HYDROCHLORIDE 0.4 MILLIGRAM(S): 0.4 CAPSULE ORAL at 21:29

## 2022-01-01 RX ADMIN — Medication 975 MILLIGRAM(S): at 09:38

## 2022-01-01 RX ADMIN — Medication 4: at 21:52

## 2022-01-01 RX ADMIN — APIXABAN 5 MILLIGRAM(S): 2.5 TABLET, FILM COATED ORAL at 09:48

## 2022-01-01 RX ADMIN — TACROLIMUS 1 MILLIGRAM(S): 5 CAPSULE ORAL at 21:39

## 2022-01-01 RX ADMIN — Medication 975 MILLIGRAM(S): at 20:30

## 2022-01-01 RX ADMIN — AMPICILLIN SODIUM AND SULBACTAM SODIUM 200 GRAM(S): 250; 125 INJECTION, POWDER, FOR SUSPENSION INTRAMUSCULAR; INTRAVENOUS at 19:03

## 2022-01-01 RX ADMIN — Medication 80 MILLIGRAM(S): at 20:36

## 2022-01-01 RX ADMIN — ISOSORBIDE MONONITRATE 30 MILLIGRAM(S): 60 TABLET, EXTENDED RELEASE ORAL at 12:08

## 2022-01-01 RX ADMIN — AMPICILLIN SODIUM AND SULBACTAM SODIUM 200 GRAM(S): 250; 125 INJECTION, POWDER, FOR SUSPENSION INTRAMUSCULAR; INTRAVENOUS at 05:55

## 2022-01-01 RX ADMIN — POLYETHYLENE GLYCOL 3350 17 GRAM(S): 17 POWDER, FOR SOLUTION ORAL at 11:36

## 2022-01-01 RX ADMIN — SODIUM ZIRCONIUM CYCLOSILICATE 10 GRAM(S): 10 POWDER, FOR SUSPENSION ORAL at 23:50

## 2022-01-01 RX ADMIN — OXYCODONE HYDROCHLORIDE 5 MILLIGRAM(S): 5 TABLET ORAL at 11:35

## 2022-01-01 RX ADMIN — Medication 650 MILLIGRAM(S): at 16:45

## 2022-01-01 RX ADMIN — APIXABAN 5 MILLIGRAM(S): 2.5 TABLET, FILM COATED ORAL at 21:44

## 2022-01-01 RX ADMIN — Medication 80 MILLIGRAM(S): at 06:54

## 2022-01-01 RX ADMIN — ISOSORBIDE MONONITRATE 30 MILLIGRAM(S): 60 TABLET, EXTENDED RELEASE ORAL at 09:19

## 2022-01-01 RX ADMIN — Medication 8: at 11:18

## 2022-01-01 RX ADMIN — SENNA PLUS 2 TABLET(S): 8.6 TABLET ORAL at 22:44

## 2022-01-01 RX ADMIN — SODIUM CHLORIDE 50 MILLILITER(S): 5 INJECTION, SOLUTION INTRAVENOUS at 13:44

## 2022-01-01 RX ADMIN — Medication 975 MILLIGRAM(S): at 05:55

## 2022-01-01 RX ADMIN — POLYETHYLENE GLYCOL 3350 17 GRAM(S): 17 POWDER, FOR SOLUTION ORAL at 22:43

## 2022-01-01 RX ADMIN — TRAMADOL HYDROCHLORIDE 25 MILLIGRAM(S): 50 TABLET ORAL at 16:37

## 2022-01-01 RX ADMIN — AMLODIPINE BESYLATE 10 MILLIGRAM(S): 2.5 TABLET ORAL at 09:19

## 2022-01-01 RX ADMIN — ISOSORBIDE MONONITRATE 30 MILLIGRAM(S): 60 TABLET, EXTENDED RELEASE ORAL at 13:02

## 2022-01-01 RX ADMIN — Medication 2: at 11:12

## 2022-01-01 RX ADMIN — Medication 4: at 23:31

## 2022-01-01 RX ADMIN — LACTULOSE 10 GRAM(S): 10 SOLUTION ORAL at 09:01

## 2022-01-01 RX ADMIN — Medication 325 MILLIGRAM(S): at 12:21

## 2022-01-01 RX ADMIN — Medication 650 MILLIGRAM(S): at 07:42

## 2022-01-01 RX ADMIN — HEPARIN SODIUM 5000 UNIT(S): 5000 INJECTION INTRAVENOUS; SUBCUTANEOUS at 06:10

## 2022-01-01 RX ADMIN — Medication 0.25 MILLIGRAM(S): at 21:44

## 2022-01-01 RX ADMIN — MORPHINE SULFATE 1 MILLIGRAM(S): 50 CAPSULE, EXTENDED RELEASE ORAL at 10:43

## 2022-01-01 RX ADMIN — Medication 0.25 MILLIGRAM(S): at 14:01

## 2022-01-01 RX ADMIN — AMLODIPINE BESYLATE 10 MILLIGRAM(S): 2.5 TABLET ORAL at 12:08

## 2022-01-01 RX ADMIN — ISOSORBIDE MONONITRATE 30 MILLIGRAM(S): 60 TABLET, EXTENDED RELEASE ORAL at 11:26

## 2022-01-01 RX ADMIN — Medication 8 UNIT(S): at 23:09

## 2022-01-01 RX ADMIN — Medication 975 MILLIGRAM(S): at 20:01

## 2022-01-01 RX ADMIN — CHLORHEXIDINE GLUCONATE 15 MILLILITER(S): 213 SOLUTION TOPICAL at 22:13

## 2022-01-01 RX ADMIN — Medication 5 UNIT(S): at 18:04

## 2022-01-01 RX ADMIN — Medication 4: at 17:31

## 2022-01-01 RX ADMIN — POLYETHYLENE GLYCOL 3350 17 GRAM(S): 17 POWDER, FOR SOLUTION ORAL at 21:12

## 2022-01-01 RX ADMIN — Medication 5 UNIT(S): at 12:21

## 2022-01-01 RX ADMIN — TACROLIMUS 2 MILLIGRAM(S): 5 CAPSULE ORAL at 10:02

## 2022-01-01 RX ADMIN — Medication 0.25 MILLIGRAM(S): at 02:41

## 2022-01-01 RX ADMIN — Medication 650 MILLIGRAM(S): at 00:18

## 2022-01-01 RX ADMIN — Medication 2: at 09:24

## 2022-01-01 RX ADMIN — TAMSULOSIN HYDROCHLORIDE 0.4 MILLIGRAM(S): 0.4 CAPSULE ORAL at 21:24

## 2022-01-01 RX ADMIN — ISOSORBIDE MONONITRATE 30 MILLIGRAM(S): 60 TABLET, EXTENDED RELEASE ORAL at 10:13

## 2022-01-01 RX ADMIN — APIXABAN 5 MILLIGRAM(S): 2.5 TABLET, FILM COATED ORAL at 11:24

## 2022-01-01 RX ADMIN — ATORVASTATIN CALCIUM 40 MILLIGRAM(S): 80 TABLET, FILM COATED ORAL at 21:42

## 2022-01-01 RX ADMIN — AMPICILLIN SODIUM AND SULBACTAM SODIUM 200 GRAM(S): 250; 125 INJECTION, POWDER, FOR SUSPENSION INTRAMUSCULAR; INTRAVENOUS at 13:17

## 2022-01-01 RX ADMIN — MAGNESIUM OXIDE 400 MG ORAL TABLET 400 MILLIGRAM(S): 241.3 TABLET ORAL at 07:47

## 2022-01-01 RX ADMIN — INSULIN GLARGINE 30 UNIT(S): 100 INJECTION, SOLUTION SUBCUTANEOUS at 23:55

## 2022-01-01 RX ADMIN — Medication 40 MILLIGRAM(S): at 05:26

## 2022-01-01 RX ADMIN — Medication 2 TABLET(S): at 13:41

## 2022-01-01 RX ADMIN — MAGNESIUM OXIDE 400 MG ORAL TABLET 400 MILLIGRAM(S): 241.3 TABLET ORAL at 08:49

## 2022-01-01 RX ADMIN — TAMSULOSIN HYDROCHLORIDE 0.4 MILLIGRAM(S): 0.4 CAPSULE ORAL at 22:00

## 2022-01-01 RX ADMIN — Medication 80 MILLIGRAM(S): at 16:16

## 2022-01-01 RX ADMIN — AMLODIPINE BESYLATE 5 MILLIGRAM(S): 2.5 TABLET ORAL at 11:24

## 2022-01-01 RX ADMIN — Medication 40 MILLIGRAM(S): at 17:15

## 2022-01-01 RX ADMIN — AMPICILLIN SODIUM AND SULBACTAM SODIUM 200 GRAM(S): 250; 125 INJECTION, POWDER, FOR SUSPENSION INTRAMUSCULAR; INTRAVENOUS at 17:15

## 2022-01-01 RX ADMIN — ATORVASTATIN CALCIUM 40 MILLIGRAM(S): 80 TABLET, FILM COATED ORAL at 21:26

## 2022-01-01 RX ADMIN — MAGNESIUM OXIDE 400 MG ORAL TABLET 400 MILLIGRAM(S): 241.3 TABLET ORAL at 19:47

## 2022-01-01 RX ADMIN — TRAMADOL HYDROCHLORIDE 25 MILLIGRAM(S): 50 TABLET ORAL at 17:07

## 2022-01-01 RX ADMIN — HYDROMORPHONE HYDROCHLORIDE 0.5 MILLIGRAM(S): 2 INJECTION INTRAMUSCULAR; INTRAVENOUS; SUBCUTANEOUS at 21:15

## 2022-01-01 RX ADMIN — SENNA PLUS 2 TABLET(S): 8.6 TABLET ORAL at 22:46

## 2022-01-01 RX ADMIN — SENNA PLUS 2 TABLET(S): 8.6 TABLET ORAL at 21:10

## 2022-01-01 RX ADMIN — Medication 5 UNIT(S): at 08:49

## 2022-01-01 RX ADMIN — Medication 1000 MILLIGRAM(S): at 16:38

## 2022-01-01 RX ADMIN — CHLORHEXIDINE GLUCONATE 1 APPLICATION(S): 213 SOLUTION TOPICAL at 05:56

## 2022-01-01 RX ADMIN — Medication 975 MILLIGRAM(S): at 04:16

## 2022-01-01 RX ADMIN — AMPICILLIN SODIUM AND SULBACTAM SODIUM 200 GRAM(S): 250; 125 INJECTION, POWDER, FOR SUSPENSION INTRAMUSCULAR; INTRAVENOUS at 00:17

## 2022-01-01 RX ADMIN — TACROLIMUS 2 MILLIGRAM(S): 5 CAPSULE ORAL at 09:19

## 2022-01-01 RX ADMIN — Medication 80 MILLIGRAM(S): at 15:17

## 2022-01-01 RX ADMIN — MORPHINE SULFATE 1 MILLIGRAM(S): 50 CAPSULE, EXTENDED RELEASE ORAL at 16:15

## 2022-01-01 RX ADMIN — Medication 650 MILLIGRAM(S): at 10:13

## 2022-01-01 RX ADMIN — Medication 80 MILLIGRAM(S): at 13:19

## 2022-01-01 RX ADMIN — Medication 2 MILLIGRAM(S): at 14:30

## 2022-01-01 RX ADMIN — Medication 10 UNIT(S): at 17:30

## 2022-01-01 RX ADMIN — CHLORHEXIDINE GLUCONATE 15 MILLILITER(S): 213 SOLUTION TOPICAL at 19:03

## 2022-01-01 RX ADMIN — ATORVASTATIN CALCIUM 40 MILLIGRAM(S): 80 TABLET, FILM COATED ORAL at 21:24

## 2022-01-01 RX ADMIN — Medication 5 MILLIGRAM(S): at 12:20

## 2022-01-01 RX ADMIN — Medication 4: at 07:29

## 2022-01-01 RX ADMIN — Medication 5 UNIT(S): at 11:50

## 2022-01-01 RX ADMIN — TACROLIMUS 2 MILLIGRAM(S): 5 CAPSULE ORAL at 09:46

## 2022-01-01 RX ADMIN — Medication 975 MILLIGRAM(S): at 21:23

## 2022-01-01 RX ADMIN — TACROLIMUS 1 MILLIGRAM(S): 5 CAPSULE ORAL at 21:38

## 2022-01-01 RX ADMIN — ISOSORBIDE MONONITRATE 30 MILLIGRAM(S): 60 TABLET, EXTENDED RELEASE ORAL at 11:12

## 2022-01-01 RX ADMIN — Medication 975 MILLIGRAM(S): at 08:38

## 2022-01-01 RX ADMIN — TAMSULOSIN HYDROCHLORIDE 0.4 MILLIGRAM(S): 0.4 CAPSULE ORAL at 22:43

## 2022-01-01 RX ADMIN — Medication 40 MILLIGRAM(S): at 08:49

## 2022-01-01 RX ADMIN — Medication 40 MILLIGRAM(S): at 18:27

## 2022-01-01 RX ADMIN — HEPARIN SODIUM 5000 UNIT(S): 5000 INJECTION INTRAVENOUS; SUBCUTANEOUS at 13:18

## 2022-01-01 RX ADMIN — Medication 80 MILLIGRAM(S): at 09:20

## 2022-01-01 RX ADMIN — MAGNESIUM OXIDE 400 MG ORAL TABLET 400 MILLIGRAM(S): 241.3 TABLET ORAL at 10:24

## 2022-01-01 RX ADMIN — Medication 975 MILLIGRAM(S): at 06:24

## 2022-01-01 RX ADMIN — AMLODIPINE BESYLATE 10 MILLIGRAM(S): 2.5 TABLET ORAL at 13:02

## 2022-01-01 RX ADMIN — TACROLIMUS 1 MILLIGRAM(S): 5 CAPSULE ORAL at 23:25

## 2022-01-01 RX ADMIN — Medication 0.25 MILLIGRAM(S): at 00:13

## 2022-01-01 RX ADMIN — TACROLIMUS 2 MILLIGRAM(S): 5 CAPSULE ORAL at 09:25

## 2022-01-01 RX ADMIN — Medication 4: at 23:55

## 2022-01-01 RX ADMIN — HEPARIN SODIUM 5000 UNIT(S): 5000 INJECTION INTRAVENOUS; SUBCUTANEOUS at 05:42

## 2022-01-01 RX ADMIN — PANTOPRAZOLE SODIUM 40 MILLIGRAM(S): 20 TABLET, DELAYED RELEASE ORAL at 11:24

## 2022-01-01 RX ADMIN — TACROLIMUS 2 MILLIGRAM(S): 5 CAPSULE ORAL at 10:06

## 2022-01-01 RX ADMIN — HYDROMORPHONE HYDROCHLORIDE 0.5 MILLIGRAM(S): 2 INJECTION INTRAMUSCULAR; INTRAVENOUS; SUBCUTANEOUS at 03:04

## 2022-01-01 RX ADMIN — TACROLIMUS 2 MILLIGRAM(S): 5 CAPSULE ORAL at 09:42

## 2022-01-01 RX ADMIN — TAMSULOSIN HYDROCHLORIDE 0.4 MILLIGRAM(S): 0.4 CAPSULE ORAL at 21:36

## 2022-01-01 RX ADMIN — Medication 60 MILLIGRAM(S): at 09:01

## 2022-01-01 RX ADMIN — Medication 975 MILLIGRAM(S): at 18:30

## 2022-01-01 RX ADMIN — APIXABAN 5 MILLIGRAM(S): 2.5 TABLET, FILM COATED ORAL at 10:05

## 2022-01-01 RX ADMIN — Medication 10 UNIT(S): at 11:11

## 2022-01-01 RX ADMIN — CHLORHEXIDINE GLUCONATE 15 MILLILITER(S): 213 SOLUTION TOPICAL at 06:10

## 2022-01-01 RX ADMIN — Medication 0.25 MILLIGRAM(S): at 23:24

## 2022-01-01 RX ADMIN — HEPARIN SODIUM 5000 UNIT(S): 5000 INJECTION INTRAVENOUS; SUBCUTANEOUS at 21:29

## 2022-01-01 RX ADMIN — PANTOPRAZOLE SODIUM 20 MILLIGRAM(S): 20 TABLET, DELAYED RELEASE ORAL at 09:02

## 2022-01-01 RX ADMIN — HEPARIN SODIUM 5000 UNIT(S): 5000 INJECTION INTRAVENOUS; SUBCUTANEOUS at 13:44

## 2022-01-01 RX ADMIN — TAMSULOSIN HYDROCHLORIDE 0.4 MILLIGRAM(S): 0.4 CAPSULE ORAL at 21:10

## 2022-01-01 RX ADMIN — MAGNESIUM OXIDE 400 MG ORAL TABLET 400 MILLIGRAM(S): 241.3 TABLET ORAL at 17:18

## 2022-01-01 RX ADMIN — INSULIN GLARGINE 30 UNIT(S): 100 INJECTION, SOLUTION SUBCUTANEOUS at 23:48

## 2022-01-01 RX ADMIN — Medication 325 MILLIGRAM(S): at 11:05

## 2022-01-01 RX ADMIN — TACROLIMUS 2 MILLIGRAM(S): 5 CAPSULE ORAL at 11:36

## 2022-01-01 RX ADMIN — TACROLIMUS 1 MILLIGRAM(S): 5 CAPSULE ORAL at 21:27

## 2022-01-04 ENCOUNTER — LABORATORY RESULT (OUTPATIENT)
Age: 72
End: 2022-01-04

## 2022-01-04 ENCOUNTER — APPOINTMENT (OUTPATIENT)
Dept: HEART AND VASCULAR | Facility: CLINIC | Age: 72
End: 2022-01-04
Payer: MEDICARE

## 2022-01-04 ENCOUNTER — APPOINTMENT (OUTPATIENT)
Dept: NEPHROLOGY | Facility: CLINIC | Age: 72
End: 2022-01-04
Payer: MEDICARE

## 2022-01-04 ENCOUNTER — APPOINTMENT (OUTPATIENT)
Dept: ENDOCRINOLOGY | Facility: CLINIC | Age: 72
End: 2022-01-04

## 2022-01-04 VITALS — HEART RATE: 64 BPM | TEMPERATURE: 98.2 F | OXYGEN SATURATION: 92 % | WEIGHT: 216 LBS | BODY MASS INDEX: 34.86 KG/M2

## 2022-01-04 VITALS
OXYGEN SATURATION: 95 % | DIASTOLIC BLOOD PRESSURE: 81 MMHG | WEIGHT: 216 LBS | HEART RATE: 76 BPM | SYSTOLIC BLOOD PRESSURE: 135 MMHG | HEIGHT: 66 IN | TEMPERATURE: 97.2 F | BODY MASS INDEX: 34.72 KG/M2

## 2022-01-04 VITALS — SYSTOLIC BLOOD PRESSURE: 126 MMHG | DIASTOLIC BLOOD PRESSURE: 65 MMHG | HEART RATE: 88 BPM

## 2022-01-04 VITALS — DIASTOLIC BLOOD PRESSURE: 74 MMHG | SYSTOLIC BLOOD PRESSURE: 126 MMHG | HEART RATE: 83 BPM

## 2022-01-04 DIAGNOSIS — J11.1 INFLUENZA DUE TO UNIDENTIFIED INFLUENZA VIRUS WITH OTHER RESPIRATORY MANIFESTATIONS: ICD-10-CM

## 2022-01-04 DIAGNOSIS — L03.116 CELLULITIS OF LEFT LOWER LIMB: ICD-10-CM

## 2022-01-04 DIAGNOSIS — R94.39 ABNORMAL RESULT OF OTHER CARDIOVASCULAR FUNCTION STUDY: ICD-10-CM

## 2022-01-04 DIAGNOSIS — R07.89 OTHER CHEST PAIN: ICD-10-CM

## 2022-01-04 PROCEDURE — 99214 OFFICE O/P EST MOD 30 MIN: CPT | Mod: 25

## 2022-01-04 PROCEDURE — 93000 ELECTROCARDIOGRAM COMPLETE: CPT

## 2022-01-04 PROCEDURE — 36415 COLL VENOUS BLD VENIPUNCTURE: CPT

## 2022-01-04 RX ORDER — OXICONAZOLE NITRATE 10 MG/G
1 CREAM TOPICAL
Qty: 90 | Refills: 0 | Status: COMPLETED | COMMUNITY
Start: 2021-05-13

## 2022-01-04 RX ORDER — FAMOTIDINE 40 MG/1
40 TABLET, FILM COATED ORAL
Qty: 30 | Refills: 3 | Status: COMPLETED | COMMUNITY
Start: 2021-12-22 | End: 2022-01-04

## 2022-01-04 RX ORDER — DULAGLUTIDE 1.5 MG/.5ML
1.5 INJECTION, SOLUTION SUBCUTANEOUS
Qty: 4 | Refills: 5 | Status: COMPLETED | COMMUNITY
Start: 2021-12-22 | End: 2022-01-04

## 2022-01-04 RX ORDER — OSELTAMIVIR PHOSPHATE 30 MG/1
30 CAPSULE ORAL
Qty: 10 | Refills: 0 | Status: COMPLETED | COMMUNITY
Start: 2021-12-23 | End: 2022-01-04

## 2022-01-04 RX ORDER — FLUTICASONE PROPIONATE 50 UG/1
50 SPRAY, METERED NASAL DAILY
Qty: 1 | Refills: 3 | Status: COMPLETED | COMMUNITY
Start: 2020-12-31 | End: 2022-01-04

## 2022-01-04 RX ORDER — FUROSEMIDE 40 MG/1
40 TABLET ORAL TWICE DAILY
Qty: 60 | Refills: 0 | Status: COMPLETED | COMMUNITY
End: 2022-01-04

## 2022-01-04 RX ORDER — AZITHROMYCIN 250 MG/1
250 TABLET, FILM COATED ORAL
Qty: 6 | Refills: 0 | Status: COMPLETED | COMMUNITY
Start: 2021-12-22 | End: 2022-01-04

## 2022-01-04 NOTE — ASSESSMENT
[FreeTextEntry1] : Plan:\par 1) HTN: BP acceptable today on current regimen, though fluid overloaded on exam. Will therefore increase furosemide to 80mg QAM + 40mg QPM and continue all other current antihypertensive medications. Pt to monitor weight at home and lose 1-2lbs per day. Will reassess pressure and regimen at next evaluation. \par 2) Transplant medications: patient to continue on Prograf and Valcyte. Pt will continue f/u [place] transplant team. Will intend to get AM predose Prograf level next week at the time of his f/u.\par 3) H/o atrial flutter: Continue to f/u with Dr. Encinas in cardiology for rhythm evaluation.\par 4) Hyperglycemia: Patient's most recent HbA1C of 7.0%. Will reassess HbA1C with labs. \par 5) Weight Gain: The patient is now down 3 lbs since last visit. To continue making active changes in his diet and increasing exercise moderately as tolerated with continued goal of gradual weight loss. \par 6) Sleep apnea: Pt will be starting use of nightly CPAP machine. Pt to continue with nightly oxygen until then.\par 7) Bilateral LE erythema with LLE bacterial infection: Will start 10-day course of doxycycline to be taken Q12H. Pt instructed not the have dairy while on doxycycline.\par \par Changes to medications: Increase furosemide to 80mg QAM + 40mg QPM.\par \par Labs were drawn and patient will return next week for a follow-up appointment.

## 2022-01-04 NOTE — HISTORY OF PRESENT ILLNESS
[FreeTextEntry1] : The patient is a 71 year old male presenting today for follow-up evaluation and active reassessment of HTN, CAD, DM, HLD, s/p CABG, AFib, h/o renal transplant, and monoclonal B cell abnormality. \par \par The patient has had f/u with ENT doctor Dr. Sanabria. He has also had initial consult with endocrinologist Dr. Galvan. He did not take Prograf this morning but took it last night. \par \par The patient is feeling generally well today. He presents with left pedal pain and erythema. He reports his blood glucose has been well controlled since using glucose monitor. He reports that he has received his CPAP machine and will soon be instructed on how to use it. He has been continuing nightly oxygen in the interim as previously instructed. The pt has not yet received COVID booster dose and intends to receive it in near future.\par \par See prior note on 12/16/21 for details related to his complicated hospitalization and possible lymphoma.\par \par Labs 12/16/21: positive COVID spike domain antibody with nucleocapsid, eGFR by Cystatin C 31, Cystatin C 1.95, BNP 1789, IgG Lambda Band Identified, Na 132, Cl 94, BUN 27, creatinine 1.32, eGFR 54, homocysteine 18.7, HDL 33, Fe 38, iron sat 12%, , sed rate 32, Vit D 25-OH 16.4, HgbA1c 7.0%, WBC 20.45, RBC 3.48, HGB 9.8, HCT 33.1%, protein/creat ratio 0.3, \par \par Current medications: Flomax 0.4mg QPM, Humalog 15u TID, Imdur ER 30mg QD, Lantus 30u QPM, furosemide 40mg BID, amlodipine 10mg QD, pantoprazole 20mg QD, rosuvastatin 10mg QD, tacrolimus 2mg QAM + 1mg QPM, valganciclovir 450mg QD, Xanax 0.25mg prn\par

## 2022-01-04 NOTE — ADDENDUM
[FreeTextEntry1] : All medical record entries made by the Scribe were at my, MARITO Coleman's direction and personally dictated by me on 01/04/2022. I have received the chart and agree that the record accurately reflects my personal performance of the history, physical exam, assessment, and plan. I have also personally directed, reviewed, and agreed with the chart.\par

## 2022-01-04 NOTE — PHYSICAL EXAM
[General Appearance - Alert] : alert [General Appearance - In No Acute Distress] : in no acute distress [Sclera] : the sclera and conjunctiva were normal [PERRL With Normal Accommodation] : pupils were equal in size, round, and reactive to light [Extraocular Movements] : extraocular movements were intact [Outer Ear] : the ears and nose were normal in appearance [Hearing Threshold Finger Rub Not [place]] : hearing was normal [Neck Appearance] : the appearance of the neck was normal [Neck Cervical Mass (___cm)] : no neck mass was observed [Jugular Venous Distention Increased] : there was no jugular-venous distention [Thyroid Diffuse Enlargement] : the thyroid was not enlarged [Thyroid Nodule] : there were no palpable thyroid nodules [Auscultation Breath Sounds / Voice Sounds] : lungs were clear to auscultation bilaterally [Heart Rate And Rhythm] : heart rate was normal and rhythm regular [Heart Sounds] : normal S1 and S2 [Heart Sounds Gallop] : no gallops [Murmurs] : no murmurs [Heart Sounds Pericardial Friction Rub] : no pericardial rub [Bowel Sounds] : normal bowel sounds [Abdomen Soft] : soft [Abdomen Tenderness] : non-tender [] : no hepato-splenomegaly [Abdomen Mass (___ Cm)] : no abdominal mass palpated [No CVA Tenderness] : no ~M costovertebral angle tenderness [No Spinal Tenderness] : no spinal tenderness [Abnormal Walk] : normal gait [Involuntary Movements] : no involuntary movements were seen [Musculoskeletal - Swelling] : no joint swelling seen [Motor Tone] : muscle strength and tone were normal [No Focal Deficits] : no focal deficits [Oriented To Time, Place, And Person] : oriented to person, place, and time [Impaired Insight] : insight and judgment were intact [Affect] : the affect was normal [FreeTextEntry1] : bilateral pedal erythema, LLE with warmth, RLE without warmth

## 2022-01-04 NOTE — END OF VISIT
[Time Spent: ___ minutes] : I have spent [unfilled] minutes of time on the encounter. [FreeTextEntry3] : Documented by Jessie Lockwood acting as a scribe for MARITO Coleman on 01/04/2022.\par

## 2022-01-05 ENCOUNTER — APPOINTMENT (OUTPATIENT)
Dept: HEART AND VASCULAR | Facility: CLINIC | Age: 72
End: 2022-01-05

## 2022-01-05 LAB — GLUCOSE BLDC GLUCOMTR-MCNC: 101

## 2022-01-06 ENCOUNTER — APPOINTMENT (OUTPATIENT)
Dept: PULMONOLOGY | Facility: CLINIC | Age: 72
End: 2022-01-06
Payer: MEDICARE

## 2022-01-06 LAB
24R-OH-CALCIDIOL SERPL-MCNC: 36.8 PG/ML
25(OH)D3 SERPL-MCNC: 16.3 NG/ML
ALBUMIN SERPL ELPH-MCNC: 4.7 G/DL
ALDOSTERONE SERUM: 20.3 NG/DL
ALP BLD-CCNC: 147 U/L
ALT SERPL-CCNC: 10 U/L
ANA SER IF-ACNC: NEGATIVE
ANION GAP SERPL CALC-SCNC: 13 MMOL/L
APPEARANCE: CLEAR
AST SERPL-CCNC: 14 U/L
BACTERIA: NEGATIVE
BASOPHILS # BLD AUTO: 0 K/UL
BASOPHILS NFR BLD AUTO: 0 %
BILIRUB SERPL-MCNC: 0.5 MG/DL
BILIRUBIN URINE: NEGATIVE
BLOOD URINE: NEGATIVE
BUN SERPL-MCNC: 28 MG/DL
C3 SERPL-MCNC: 122 MG/DL
C4 SERPL-MCNC: 38 MG/DL
CALCIUM SERPL-MCNC: 9.4 MG/DL
CALCIUM SERPL-MCNC: 9.4 MG/DL
CHLORIDE SERPL-SCNC: 95 MMOL/L
CHOLEST SERPL-MCNC: 91 MG/DL
CO2 SERPL-SCNC: 25 MMOL/L
COLOR: NORMAL
CREAT SERPL-MCNC: 1.25 MG/DL
CYSTATIN C SERPL-MCNC: 1.86 MG/L
EOSINOPHIL # BLD AUTO: 0 K/UL
EOSINOPHIL NFR BLD AUTO: 0 %
ESTIMATED AVERAGE GLUCOSE: 163 MG/DL
FERRITIN SERPL-MCNC: 163 NG/ML
FOLATE SERPL-MCNC: 8.5 NG/ML
GFR/BSA.PRED SERPLBLD CYS-BASED-ARV: 34 ML/MIN
GLUCOSE QUALITATIVE U: NEGATIVE
GLUCOSE SERPL-MCNC: 66 MG/DL
HBA1C MFR BLD HPLC: 7.3 %
HBV SURFACE AB SER QL: NONREACTIVE
HBV SURFACE AG SER QL: NONREACTIVE
HCT VFR BLD CALC: 33.2 %
HCV AB SER QL: NONREACTIVE
HCV S/CO RATIO: 0.08 S/CO
HCYS SERPL-MCNC: 16.1 UMOL/L
HDLC SERPL-MCNC: 37 MG/DL
HGB BLD-MCNC: 9.7 G/DL
HYALINE CASTS: 0 /LPF
IRON SATN MFR SERPL: 11 %
IRON SERPL-MCNC: 31 UG/DL
KETONES URINE: NEGATIVE
LDLC SERPL CALC-MCNC: 44 MG/DL
LEUKOCYTE ESTERASE URINE: NEGATIVE
LYMPHOCYTES # BLD AUTO: 14.88 K/UL
LYMPHOCYTES NFR BLD AUTO: 80.7 %
MAGNESIUM SERPL-MCNC: 2.2 MG/DL
MAN DIFF?: NORMAL
MCHC RBC-ENTMCNC: 27.2 PG
MCHC RBC-ENTMCNC: 29.2 GM/DL
MCV RBC AUTO: 93 FL
MICROSCOPIC-UA: NORMAL
MONOCYTES # BLD AUTO: 0 K/UL
MONOCYTES NFR BLD AUTO: 0 %
MPO AB + PR3 PNL SER: NORMAL
NEUTROPHILS # BLD AUTO: 2.27 K/UL
NEUTROPHILS NFR BLD AUTO: 12.3 %
NITRITE URINE: NEGATIVE
NONHDLC SERPL-MCNC: 54 MG/DL
NT-PROBNP SERPL-MCNC: 1537 PG/ML
PARATHYROID HORMONE INTACT: 166 PG/ML
PH URINE: 5
PHOSPHATE SERPL-MCNC: 3.7 MG/DL
PLATELET # BLD AUTO: 205 K/UL
POTASSIUM SERPL-SCNC: 4.6 MMOL/L
PROT SERPL-MCNC: 7.4 G/DL
PROTEIN URINE: NEGATIVE
RBC # BLD: 3.57 M/UL
RBC # FLD: 16.5 %
RED BLOOD CELLS URINE: 0 /HPF
RENIN PLASMA: 19 PG/ML
RHEUMATOID FACT SER QL: 11 IU/ML
SODIUM SERPL-SCNC: 133 MMOL/L
SPECIFIC GRAVITY URINE: 1.01
SQUAMOUS EPITHELIAL CELLS: 0 /HPF
T3FREE SERPL-MCNC: 2.55 PG/ML
T3RU NFR SERPL: 1 TBI
T4 FREE SERPL-MCNC: 1.5 NG/DL
T4 SERPL-MCNC: 8.3 UG/DL
THYROGLOB AB SERPL-ACNC: <20 IU/ML
THYROPEROXIDASE AB SERPL IA-ACNC: <10 IU/ML
TIBC SERPL-MCNC: 278 UG/DL
TRIGL SERPL-MCNC: 50 MG/DL
TSH SERPL-ACNC: 3.58 UIU/ML
UIBC SERPL-MCNC: 247 UG/DL
URATE SERPL-MCNC: 9.1 MG/DL
UROBILINOGEN URINE: NORMAL
VIT B12 SERPL-MCNC: 563 PG/ML
WBC # FLD AUTO: 18.44 K/UL
WHITE BLOOD CELLS URINE: 0 /HPF

## 2022-01-06 PROCEDURE — 99443: CPT | Mod: 95

## 2022-01-06 NOTE — ASSESSMENT
[FreeTextEntry1] : Obstructive sleep apnea\par \par The patient is clinically stable.  The patient did not receive the instruction from the provider company regarding his CPAP mask but he is sleeping with the oxygen.  We will contact the provider regarding sending the technician to set up the machine.  Right pleural effusion\par \par The patient underwent ultrasound-guided thoracentesis and the site culture was negative for malignant cells are the cellblock was negative for lymphoma.  We will follow-up on reaccumulation of the pleural fluid.\par \par Pulmonary embolism\par \par The patient is on anticoagulation with no clinical evidence neither of failure of therapy nor bleeding complication and he is compliant with the medication.\par \par Pulmonary hypertension\par \par Is related to pulmonary emboli obstructive sleep apnea renal insufficiency and diastolic dysfunction.  No evidence of cor pulmonale and is stable at this point.\par \par The renal function improved.  We will follow-up and in the office with chest x-ray in 1 month.  Patient has mediastinal adenopathy and which related to his CLL.  Will continue to follow-up with serial CT scans the patient follow-up with the leukemia service at [facility]

## 2022-01-06 NOTE — REASON FOR VISIT
[Home] : at home, [unfilled] , at the time of the visit. [Medical Office: ([facility])___] : at the medical office located in  [Follow-Up] : a follow-up visit

## 2022-01-06 NOTE — REVIEW OF SYSTEMS
[Lower Ext Edema] : lower extremity edema [Rash] : rash [Change In Color Of Skin] : change in skin color [Fever] : no fever [Chills] : no chills [SOB] : no shortness of breath [Dyspnea on exertion] : not dyspnea during exertion [Chest Discomfort] : no chest discomfort [Leg Claudication] : no intermittent leg claudication [Palpitations] : no palpitations [Syncope] : no syncope [Cough] : no cough [Dizziness] : no dizziness [Numbness (Hypoesthesia)] : no numbness [Weakness] : no weakness [Speech Disturbance] : no speech disturbance [Easy Bleeding] : no tendency for easy bleeding

## 2022-01-06 NOTE — DISCUSSION/SUMMARY
[FreeTextEntry1] : 71 year old male with PMHX of CAD sp CABG (2002) , AFIB / Flutter ( on Eliquis ), Renal Transplant ( on tacrolimus + valganciclovir) DMII, HTN and PVD here follow up after recent hospitalization at [facility] 12/03 - 12/10/2021 for right sided pleural effusion. \par \par CAD sp CABG: Currently not reporting any dyspnea on exertion or chest pains. Pharm NST ( 10/2021 ) revealed normal perfusion. EKG AFIB rate controlled at 76 bpm. TWI similar to previous EKGS. Continue with Statin therapy and IMDUR 30mg.\par CHF: Stable. Last EF 60% ( Echo 10/2021) Lasix noted to be increased by PCP. Will follow. \par AFIB / FLUTTER: Currently rate controlled. He reports no symptoms and tolerating the Eliquis 5mg BID well.\par HLD: Controlled. LDL 31 (12/2021). Goal LDL < 70. Continue with Rosuvastatin 10mg.\par EDEMA: Improved but present now with possible cellulitis. Patient has follow up with PCP after this visit and will address. \par \par Follow up in 2 months\par I, Dr. Paulina Encinas personally performed the evaluation and management services for this patient who presents today with a changed problem.  The evaluation and management includes conducting the examination assessing all conditions and establishing a new plan of care.  Today my ACP Francisca Gandhi was here and recorded the evaluation and management services.  The patient was hospitalized for shortness of breath.  He improved after chest tube placement.  He walks 2 blocks or 5 stairs steps without difficulty.  His Lasix will be changed to 80 mg daily.

## 2022-01-06 NOTE — PHYSICAL EXAM
[Well Developed] : well developed [Well Nourished] : well nourished [No Acute Distress] : no acute distress [Normal Conjunctiva] : normal conjunctiva [No Carotid Bruit] : no carotid bruit [Normal S1, S2] : normal S1, S2 [No Murmur] : no murmur [Good Air Entry] : good air entry [No Respiratory Distress] : no respiratory distress  [Normal Gait] : normal gait [Moves all extremities] : moves all extremities [No Focal Deficits] : no focal deficits [Normal Speech] : normal speech [Alert and Oriented] : alert and oriented [Normal memory] : normal memory [de-identified] : Crackles noted on right lower lung area w reduced BS [de-identified] : 1+ pedal edema up to knees with significant erythema, mild tenderness and heat of the LLE when compared to RLE

## 2022-01-06 NOTE — HISTORY OF PRESENT ILLNESS
[FreeTextEntry1] : 71 year old male with PMHX of CAD sp CABG (2002) , AFIB / Flutter ( on Eliquis ), Renal Transplant ( on tacrolimus + valganciclovir) DMII, HTN and PVD here follow up after recent hospitalization at [facility] 12/03 - 12/10/2021 for right sided pleural effusion. \par \par Patient noted to have R sided pleural effusion on CT (10/2021). Patient noted to have episodes of hypoxia and worsening effusion on admission (12/3/2021). Thoracentesis done. Right sided cath done revealing elevated wedge pressure. Concerns of JAYANT. Leg swelling noted during admission. Venous US revealed negative for DVT.\par \par Since discharge, patient is feels significantly better. He feels he can ambulate without dyspnea. He still has issues with breathing at rest at night. He is waiting for set up of his CPAP machine. He has been taking 4L of oxygen during sleep. He feels this helps. His leg swelling has improved but still present now with increased redness and heat of the LLE for the past 1 week. He denies any trauma to the leg or recent fevers. He has been taking Furosemide 40mg BID since discharge. \par \par He continues to deny any chest pains, palpitations, dizziness or episodes of syncope.

## 2022-01-06 NOTE — HISTORY OF PRESENT ILLNESS
[Never] : never [TextBox_4] : he is doing well  His breathing is better.  He saw Dr Tello twice.  He had the flu and was home for the last 10 days

## 2022-01-09 LAB
ALP BONE SERPL-MCNC: 19 UG/L
COLLAGEN CTX SERPL-MCNC: 891 PG/ML

## 2022-01-10 LAB
ALBUMIN MFR SERPL ELPH: 55.6 %
ALBUMIN SERPL-MCNC: 4.1 G/DL
ALBUMIN/GLOB SERPL: 1.2 RATIO
ALPHA1 GLOB MFR SERPL ELPH: 5.5 %
ALPHA1 GLOB SERPL ELPH-MCNC: 0.4 G/DL
ALPHA2 GLOB MFR SERPL ELPH: 12.7 %
ALPHA2 GLOB SERPL ELPH-MCNC: 0.9 G/DL
B-GLOBULIN MFR SERPL ELPH: 9.7 %
B-GLOBULIN SERPL ELPH-MCNC: 0.7 G/DL
DEPRECATED KAPPA LC FREE/LAMBDA SER: 0.52 RATIO
GAMMA GLOB FLD ELPH-MCNC: 1.2 G/DL
GAMMA GLOB MFR SERPL ELPH: 16.5 %
IGA SER QL IEP: 187 MG/DL
IGG SER QL IEP: 1600 MG/DL
IGM SER QL IEP: 16 MG/DL
INTERPRETATION SERPL IEP-IMP: NORMAL
KAPPA LC CSF-MCNC: 10.12 MG/DL
KAPPA LC SERPL-MCNC: 5.31 MG/DL
M PROTEIN MFR SERPL ELPH: 3.7 %
M PROTEIN SPEC IFE-MCNC: NORMAL
METHYLMALONATE SERPL-SCNC: 410 NMOL/L
MONOCLON BAND OBS SERPL: 0.3 G/DL
PROT SERPL-MCNC: 7.4 G/DL
PROT SERPL-MCNC: 7.4 G/DL

## 2022-01-11 ENCOUNTER — LABORATORY RESULT (OUTPATIENT)
Age: 72
End: 2022-01-11

## 2022-01-11 ENCOUNTER — APPOINTMENT (OUTPATIENT)
Dept: NEPHROLOGY | Facility: CLINIC | Age: 72
End: 2022-01-11
Payer: MEDICARE

## 2022-01-11 VITALS — DIASTOLIC BLOOD PRESSURE: 79 MMHG | HEART RATE: 77 BPM | SYSTOLIC BLOOD PRESSURE: 151 MMHG

## 2022-01-11 VITALS — TEMPERATURE: 99 F | WEIGHT: 214 LBS | BODY MASS INDEX: 34.54 KG/M2

## 2022-01-11 VITALS — DIASTOLIC BLOOD PRESSURE: 63 MMHG | HEART RATE: 77 BPM | SYSTOLIC BLOOD PRESSURE: 132 MMHG

## 2022-01-11 VITALS — DIASTOLIC BLOOD PRESSURE: 67 MMHG | HEART RATE: 73 BPM | SYSTOLIC BLOOD PRESSURE: 145 MMHG

## 2022-01-11 VITALS — DIASTOLIC BLOOD PRESSURE: 74 MMHG | HEART RATE: 73 BPM | SYSTOLIC BLOOD PRESSURE: 145 MMHG

## 2022-01-11 VITALS — HEART RATE: 88 BPM | SYSTOLIC BLOOD PRESSURE: 159 MMHG | DIASTOLIC BLOOD PRESSURE: 77 MMHG

## 2022-01-11 PROCEDURE — 99215 OFFICE O/P EST HI 40 MIN: CPT | Mod: 25

## 2022-01-11 PROCEDURE — 36415 COLL VENOUS BLD VENIPUNCTURE: CPT

## 2022-01-11 NOTE — ADDENDUM
[FreeTextEntry1] : All medical record entries made by the Scribe were at my, MARITO Coleman's direction and personally dictated by me on 01/11/2022. I have received the chart and agree that the record accurately reflects my personal performance of the history, physical exam, assessment, and plan. I have also personally directed, reviewed, and agreed with the chart.\par

## 2022-01-11 NOTE — PHYSICAL EXAM
[General Appearance - Alert] : alert [General Appearance - In No Acute Distress] : in no acute distress [Sclera] : the sclera and conjunctiva were normal [PERRL With Normal Accommodation] : pupils were equal in size, round, and reactive to light [Extraocular Movements] : extraocular movements were intact [Outer Ear] : the ears and nose were normal in appearance [Hearing Threshold Finger Rub Not [place]] : hearing was normal [Neck Appearance] : the appearance of the neck was normal [Neck Cervical Mass (___cm)] : no neck mass was observed [Jugular Venous Distention Increased] : there was no jugular-venous distention [Thyroid Diffuse Enlargement] : the thyroid was not enlarged [Thyroid Nodule] : there were no palpable thyroid nodules [Auscultation Breath Sounds / Voice Sounds] : lungs were clear to auscultation bilaterally [Heart Rate And Rhythm] : heart rate was normal and rhythm regular [Heart Sounds] : normal S1 and S2 [Heart Sounds Gallop] : no gallops [Murmurs] : no murmurs [Heart Sounds Pericardial Friction Rub] : no pericardial rub [Bowel Sounds] : normal bowel sounds [Abdomen Soft] : soft [Abdomen Tenderness] : non-tender [Abdomen Mass (___ Cm)] : no abdominal mass palpated [Abnormal Walk] : normal gait [Involuntary Movements] : no involuntary movements were seen [Musculoskeletal - Swelling] : no joint swelling seen [Motor Tone] : muscle strength and tone were normal [Skin Color & Pigmentation] : normal skin color and pigmentation [Skin Turgor] : normal skin turgor [] : no rash [No Focal Deficits] : no focal deficits [Oriented To Time, Place, And Person] : oriented to person, place, and time [Impaired Insight] : insight and judgment were intact [Affect] : the affect was normal [FreeTextEntry1] : bilateral 1+  LE edema

## 2022-01-11 NOTE — HISTORY OF PRESENT ILLNESS
[FreeTextEntry1] : The patient is a 71 year old male presenting today for follow-up evaluation and active reassessment of HTN, CAD, DM, HLD, s/p CABG, AFib, h/o renal transplant, and monoclonal B cell abnormality. \par \par The patient has had f/u with pulmonologist Dr. Lora.\par \par The patient is feeling generally well today. He reports that his O2 saturation at home is generally 95-96. He reports his BPs have been generally 130s systolic. \par \par Labs 12/16/21: positive COVID spike domain antibody with nucleocapsid, IgG Lambda Band Identified, BNP 1537, eGFR by Cystatin C 34, Cystatin C 1.86, Vit D 25-OH 16.3, Na 133, Cl 95, glucose 66, BUN 28, creatinine 1.25, eGFR 58, homocysteine 16.1, HDL 37, Fe 31, iron sat 11%, uric acid 9.1, , HgbA1c 7.3%, WBC 18.44, RBC 3.57, HGB 9.7, HCT 33.2%\par \par Current medications: Flomax 0.4mg QPM, Humalog 15u TID, Imdur ER 30mg QD, Lantus 30u QPM, furosemide 80mg QAM + 40mg QPM, amlodipine 10mg QD, pantoprazole 20mg QD, rosuvastatin 10mg QD, tacrolimus 2mg QAM + 1mg QPM, valganciclovir 450mg QD, Xanax 0.25mg prn\par - on 10-day course of doxycycline \par

## 2022-01-11 NOTE — ASSESSMENT
[FreeTextEntry1] : Plan:\par 1) HTN: BP initially elevated during intense emotional conversation, though generally lowered over the course of the visit. Will continue patient's current antihypertensive medications and will reassess pressure and regimen at next evaluation. May plan to increase diuretic pending labs.\par 2) Transplant medications: patient to continue on Prograf and Valcyte. Pt will continue f/u [place] transplant team. Will check AM predose Prograf level today.\par 3) H/o atrial flutter: Continue to f/u with Dr. Encinas in cardiology for rhythm evaluation.\par 4) Hyperglycemia: Patient's most recent HbA1C of 7.3%. Will reassess HbA1C with labs. \par 5) Weight Gain: The patient is now down 2 lbs since last visit. To continue making active changes in his diet and increasing exercise moderately as tolerated with continued goal of gradual weight loss. \par 6) Sleep apnea: Pt will be starting use of nightly CPAP machine. Pt to continue with nightly oxygen until then.\par 7) The patient's renal function has deteriorated measurably, and his BP is elevated today. His oxygen level today is in low 90s. In view of these factors, pt was advised against going on vacation tomorrow to [place] until above questions are answered. He has reported he found a new doctor in [place] and requests I speak with him, though I have informed the pt that I am not yet comfortable with his departure at this time. Will discuss with pt further pending labs.\par \par No changes to medications. \par \par Labs were drawn and patient will return pending labs for a follow-up appointment. \par \par ADDENDUM: After leaving this office, pt went to Dr. Huffman for f/u where he had Newell Jimbo instrument record the following vital signs : /64, pulse 77, O2 sat 96%, temperature of 97.7. Pt forwarded a picture of these recordings.

## 2022-01-11 NOTE — END OF VISIT
[Time Spent: ___ minutes] : I have spent [unfilled] minutes of time on the encounter. [FreeTextEntry3] : Documented by Jessie Lockwood acting as a scribe for MARITO Coleman on 01/11/2022.\par

## 2022-01-12 LAB
25(OH)D3 SERPL-MCNC: 15.8 NG/ML
ALBUMIN SERPL ELPH-MCNC: 4.4 G/DL
ALP BLD-CCNC: 138 U/L
ALT SERPL-CCNC: 9 U/L
ANA SER IF-ACNC: NEGATIVE
ANION GAP SERPL CALC-SCNC: 14 MMOL/L
APPEARANCE: CLEAR
AST SERPL-CCNC: 14 U/L
BACTERIA: NEGATIVE
BASOPHILS # BLD AUTO: 0 K/UL
BASOPHILS NFR BLD AUTO: 0 %
BILIRUB SERPL-MCNC: 0.4 MG/DL
BILIRUBIN URINE: NEGATIVE
BLOOD URINE: NEGATIVE
BUN SERPL-MCNC: 33 MG/DL
C3 SERPL-MCNC: 112 MG/DL
C4 SERPL-MCNC: 30 MG/DL
CALCIUM SERPL-MCNC: 9 MG/DL
CALCIUM SERPL-MCNC: 9 MG/DL
CHLORIDE SERPL-SCNC: 94 MMOL/L
CHOLEST SERPL-MCNC: 95 MG/DL
CO2 SERPL-SCNC: 23 MMOL/L
COLOR: NORMAL
CREAT SERPL-MCNC: 1.61 MG/DL
CREAT SPEC-SCNC: 69 MG/DL
CREAT/PROT UR: 0.1 RATIO
CYSTATIN C SERPL-MCNC: 1.98 MG/L
EOSINOPHIL # BLD AUTO: 0 K/UL
EOSINOPHIL NFR BLD AUTO: 0 %
ESTIMATED AVERAGE GLUCOSE: 169 MG/DL
FERRITIN SERPL-MCNC: 122 NG/ML
FOLATE SERPL-MCNC: 8.8 NG/ML
GFR/BSA.PRED SERPLBLD CYS-BASED-ARV: 30 ML/MIN
GLUCOSE QUALITATIVE U: NEGATIVE
GLUCOSE SERPL-MCNC: 164 MG/DL
HBA1C MFR BLD HPLC: 7.5 %
HBV SURFACE AB SER QL: NONREACTIVE
HBV SURFACE AG SER QL: NONREACTIVE
HCT VFR BLD CALC: 32.8 %
HCV AB SER QL: NONREACTIVE
HCV S/CO RATIO: 0.09 S/CO
HCYS SERPL-MCNC: 18 UMOL/L
HDLC SERPL-MCNC: 36 MG/DL
HGB BLD-MCNC: 9.7 G/DL
HYALINE CASTS: 0 /LPF
IGA 24H UR QL IFE: NORMAL
IRON SATN MFR SERPL: 17 %
IRON SERPL-MCNC: 46 UG/DL
KETONES URINE: NEGATIVE
LDLC SERPL CALC-MCNC: 43 MG/DL
LEUKOCYTE ESTERASE URINE: NEGATIVE
LYMPHOCYTES # BLD AUTO: 19.71 K/UL
LYMPHOCYTES NFR BLD AUTO: 87 %
MAGNESIUM SERPL-MCNC: 1.9 MG/DL
MAN DIFF?: NORMAL
MCHC RBC-ENTMCNC: 27.4 PG
MCHC RBC-ENTMCNC: 29.6 GM/DL
MCV RBC AUTO: 92.7 FL
MICROSCOPIC-UA: NORMAL
MONOCYTES # BLD AUTO: 0 K/UL
MONOCYTES NFR BLD AUTO: 0 %
MPO AB + PR3 PNL SER: NORMAL
NEUTROPHILS # BLD AUTO: 2.95 K/UL
NEUTROPHILS NFR BLD AUTO: 13 %
NITRITE URINE: NEGATIVE
NONHDLC SERPL-MCNC: 59 MG/DL
NT-PROBNP SERPL-MCNC: 1302 PG/ML
PARATHYROID HORMONE INTACT: 222 PG/ML
PH URINE: 6
PHOSPHATE SERPL-MCNC: 3.8 MG/DL
PLATELET # BLD AUTO: 198 K/UL
POTASSIUM SERPL-SCNC: 4.4 MMOL/L
PROT SERPL-MCNC: 7.2 G/DL
PROT UR-MCNC: 6 MG/DL
PROTEIN URINE: NEGATIVE
RBC # BLD: 3.54 M/UL
RBC # FLD: 17.1 %
RED BLOOD CELLS URINE: 1 /HPF
RHEUMATOID FACT SER QL: 10 IU/ML
SODIUM SERPL-SCNC: 131 MMOL/L
SPECIFIC GRAVITY URINE: 1.01
SQUAMOUS EPITHELIAL CELLS: 0 /HPF
T3FREE SERPL-MCNC: 2.93 PG/ML
T3RU NFR SERPL: 1 TBI
T4 FREE SERPL-MCNC: 1.5 NG/DL
T4 SERPL-MCNC: 8 UG/DL
TACROLIMUS SERPL-MCNC: 5.8 NG/ML
THYROGLOB AB SERPL-ACNC: <20 IU/ML
THYROPEROXIDASE AB SERPL IA-ACNC: <10 IU/ML
TIBC SERPL-MCNC: 268 UG/DL
TRIGL SERPL-MCNC: 81 MG/DL
TSH SERPL-ACNC: 3.5 UIU/ML
UIBC SERPL-MCNC: 222 UG/DL
URATE SERPL-MCNC: 10.4 MG/DL
UROBILINOGEN URINE: NORMAL
VIT B12 SERPL-MCNC: 579 PG/ML
WBC # FLD AUTO: 22.66 K/UL
WHITE BLOOD CELLS URINE: 0 /HPF

## 2022-01-16 LAB
ALP BONE SERPL-MCNC: 20.7 UG/L
COLLAGEN CTX SERPL-MCNC: 1042 PG/ML

## 2022-01-17 LAB
ALBUMIN MFR SERPL ELPH: 56.7 %
ALBUMIN SERPL-MCNC: 4.1 G/DL
ALBUMIN/GLOB SERPL: 1.3 RATIO
ALPHA1 GLOB MFR SERPL ELPH: 5 %
ALPHA1 GLOB SERPL ELPH-MCNC: 0.4 G/DL
ALPHA2 GLOB MFR SERPL ELPH: 11.4 %
ALPHA2 GLOB SERPL ELPH-MCNC: 0.8 G/DL
B-GLOBULIN MFR SERPL ELPH: 9.6 %
B-GLOBULIN SERPL ELPH-MCNC: 0.7 G/DL
DEPRECATED KAPPA LC FREE/LAMBDA SER: 0.5 RATIO
GAMMA GLOB FLD ELPH-MCNC: 1.2 G/DL
GAMMA GLOB MFR SERPL ELPH: 17.3 %
IGA SER QL IEP: 177 MG/DL
IGG SER QL IEP: 1324 MG/DL
IGM SER QL IEP: 16 MG/DL
INTERPRETATION SERPL IEP-IMP: NORMAL
KAPPA LC CSF-MCNC: 10.01 MG/DL
KAPPA LC SERPL-MCNC: 4.99 MG/DL
M PROTEIN MFR SERPL ELPH: 3.4 %
M PROTEIN SPEC IFE-MCNC: NORMAL
MONOCLON BAND OBS SERPL: 0.2 G/DL
PROT SERPL-MCNC: 7.2 G/DL
PROT SERPL-MCNC: 7.2 G/DL

## 2022-01-19 LAB — METHYLMALONATE SERPL-SCNC: 536 NMOL/L

## 2022-01-26 RX ORDER — BLOOD-GLUCOSE TRANSMITTER
EACH MISCELLANEOUS
Qty: 1 | Refills: 3 | Status: ACTIVE | COMMUNITY
Start: 2022-01-26 | End: 1900-01-01

## 2022-01-26 RX ORDER — DULAGLUTIDE 1.5 MG/.5ML
1.5 INJECTION, SOLUTION SUBCUTANEOUS
Qty: 4 | Refills: 5 | Status: ACTIVE | COMMUNITY
Start: 2022-01-26 | End: 1900-01-01

## 2022-01-31 ENCOUNTER — NON-APPOINTMENT (OUTPATIENT)
Age: 72
End: 2022-01-31

## 2022-02-03 ENCOUNTER — LABORATORY RESULT (OUTPATIENT)
Age: 72
End: 2022-02-03

## 2022-02-03 ENCOUNTER — APPOINTMENT (OUTPATIENT)
Dept: NEPHROLOGY | Facility: CLINIC | Age: 72
End: 2022-02-03
Payer: MEDICARE

## 2022-02-03 VITALS — DIASTOLIC BLOOD PRESSURE: 60 MMHG | SYSTOLIC BLOOD PRESSURE: 118 MMHG | HEART RATE: 84 BPM

## 2022-02-03 VITALS — DIASTOLIC BLOOD PRESSURE: 50 MMHG | HEART RATE: 72 BPM | SYSTOLIC BLOOD PRESSURE: 116 MMHG

## 2022-02-03 VITALS — TEMPERATURE: 98 F | OXYGEN SATURATION: 97 % | BODY MASS INDEX: 34.54 KG/M2 | WEIGHT: 214 LBS | HEART RATE: 99 BPM

## 2022-02-03 VITALS — DIASTOLIC BLOOD PRESSURE: 60 MMHG | HEART RATE: 84 BPM | SYSTOLIC BLOOD PRESSURE: 122 MMHG

## 2022-02-03 PROCEDURE — 36415 COLL VENOUS BLD VENIPUNCTURE: CPT

## 2022-02-03 PROCEDURE — 99214 OFFICE O/P EST MOD 30 MIN: CPT | Mod: 25

## 2022-02-03 NOTE — ADDENDUM
[FreeTextEntry1] : All medical record entries made by the Scribe were at my, MARITO Coleman's direction and personally dictated by me on 02/03/2022. I have received the chart and agree that the record accurately reflects my personal performance of the history, physical exam, assessment, and plan. I have also personally directed, reviewed, and agreed with the chart.\par

## 2022-02-03 NOTE — END OF VISIT
[Time Spent: ___ minutes] : I have spent [unfilled] minutes of time on the encounter. [FreeTextEntry3] : Documented by Jessie Lockwood acting as a scribe for MARITO Coleman on 02/03/2022.\par

## 2022-02-03 NOTE — HISTORY OF PRESENT ILLNESS
[FreeTextEntry1] : The patient is a 71 year old male presenting today for follow-up evaluation and active reassessment of HTN, CAD, DM, HLD, s/p CABG, AFib, h/o renal transplant, and monoclonal B cell abnormality. \par \par The patient had recent f/u with Dr. Encinas and had EKG 01/31/22 that revealed rhythm irregularity (perhaps atrial fibrillation) and right bundle branch block. He has a PET scan scheduled for next Monday. \par \par The patient is feeling generally well today and denies any new medical complaints. He denies any SOB or difficulty walking. He reports he has good appetite but is watching his diet. He has reportedly lost 2 lbs since last visit. The patient reports he has been ambulating more frequently. \par \par Labs 01/11/22: eGFR by Cystatin C 30, Cystatin C 1.98, homocysteine 18.0, , Vit D 25-OH 15.8, HDL 36, uric acid 10.4, Na 131, Cl 94, glucose 164, creatinine 1.61, eGFR 43, IgG Lambda Band Identified, WBC 22.66, RBC 3.54, HGB 9.7, HCT 32.8%, BNP 1302, HgbA1c 7.5%, \par \par Current medications: Flomax 0.4mg QPM, Humalog 15u TID, Imdur ER 30mg QD, Lantus 30u QPM, furosemide 80mg QAM + 40mg QPM, amlodipine 10mg QD, pantoprazole 20mg QD, rosuvastatin 10mg QD, tacrolimus 2mg QAM + 1mg QPM, valganciclovir 450mg QD, Xanax 0.25mg prn\par

## 2022-02-03 NOTE — ASSESSMENT
[FreeTextEntry1] : Plan:\par 1) HTN: BP acceptable today on current regimen. Will therefore continue patient's current antihypertensive medications and will reassess pressure and regimen at next evaluation. \par 2) Transplant medications: patient to continue on Prograf and Valcyte. Pt will continue f/u [place] transplant team.\par 3) H/o atrial flutter: Continue to f/u with Dr. Encinas in cardiology for rhythm evaluation.\par 4) Hyperglycemia: Patient's most recent HbA1C of 7.5%. Will reassess HbA1C with labs. \par 5) Weight Gain: The patient is reportedly down 2 lbs since last visit, though unchanged on office scale. To continue making active changes in his diet and increasing exercise moderately as tolerated with continued goal of gradual weight loss. \par 6) Patient advised to wait to return to [place] until after I have reviewed the results of his Monday PET scan with his care team. Have asked him to have Dr. Huffman contact me.\par \par No changes to medications. \par \par Labs were drawn and patient will return in approximately 1 month for a follow-up appointment.

## 2022-02-03 NOTE — PHYSICAL EXAM
[General Appearance - Alert] : alert [General Appearance - In No Acute Distress] : in no acute distress [Sclera] : the sclera and conjunctiva were normal [PERRL With Normal Accommodation] : pupils were equal in size, round, and reactive to light [Extraocular Movements] : extraocular movements were intact [Outer Ear] : the ears and nose were normal in appearance [Hearing Threshold Finger Rub Not [place]] : hearing was normal [Neck Appearance] : the appearance of the neck was normal [Neck Cervical Mass (___cm)] : no neck mass was observed [Jugular Venous Distention Increased] : there was no jugular-venous distention [Thyroid Diffuse Enlargement] : the thyroid was not enlarged [Thyroid Nodule] : there were no palpable thyroid nodules [Heart Sounds] : normal S1 and S2 [Heart Sounds Gallop] : no gallops [Murmurs] : no murmurs [Heart Sounds Pericardial Friction Rub] : no pericardial rub [No CVA Tenderness] : no ~M costovertebral angle tenderness [No Spinal Tenderness] : no spinal tenderness [Abnormal Walk] : normal gait [Involuntary Movements] : no involuntary movements were seen [Musculoskeletal - Swelling] : no joint swelling seen [Motor Tone] : muscle strength and tone were normal [Skin Color & Pigmentation] : normal skin color and pigmentation [Skin Turgor] : normal skin turgor [] : no rash [No Focal Deficits] : no focal deficits [Oriented To Time, Place, And Person] : oriented to person, place, and time [Impaired Insight] : insight and judgment were intact [Affect] : the affect was normal [FreeTextEntry1] : protuberant soft abdomen

## 2022-02-04 LAB
24R-OH-CALCIDIOL SERPL-MCNC: 62.1 PG/ML
25(OH)D3 SERPL-MCNC: 48.9 NG/ML
ALBUMIN SERPL ELPH-MCNC: 5.1 G/DL
ALDOSTERONE SERUM: 17.8 NG/DL
ALP BLD-CCNC: 141 U/L
ALT SERPL-CCNC: 14 U/L
ANION GAP SERPL CALC-SCNC: 19 MMOL/L
APPEARANCE: CLEAR
AST SERPL-CCNC: 19 U/L
BACTERIA: NEGATIVE
BASOPHILS # BLD AUTO: 0.05 K/UL
BASOPHILS NFR BLD AUTO: 0.3 %
BILIRUB SERPL-MCNC: 0.6 MG/DL
BILIRUBIN URINE: NEGATIVE
BLOOD URINE: NEGATIVE
BUN SERPL-MCNC: 39 MG/DL
C3 SERPL-MCNC: 104 MG/DL
C4 SERPL-MCNC: 28 MG/DL
CALCIUM SERPL-MCNC: 9.6 MG/DL
CALCIUM SERPL-MCNC: 9.6 MG/DL
CHLORIDE SERPL-SCNC: 93 MMOL/L
CHOLEST SERPL-MCNC: 100 MG/DL
CO2 SERPL-SCNC: 21 MMOL/L
COLOR: NORMAL
CREAT SERPL-MCNC: 1.41 MG/DL
CRP SERPL-MCNC: 3 MG/L
CYSTATIN C SERPL-MCNC: 2.12 MG/L
EOSINOPHIL # BLD AUTO: 0.03 K/UL
EOSINOPHIL NFR BLD AUTO: 0.2 %
ERYTHROCYTE [SEDIMENTATION RATE] IN BLOOD BY WESTERGREN METHOD: 60 MM/HR
ESTIMATED AVERAGE GLUCOSE: 163 MG/DL
FERRITIN SERPL-MCNC: 100 NG/ML
FOLATE SERPL-MCNC: 10.8 NG/ML
GFR/BSA.PRED SERPLBLD CYS-BASED-ARV: 28 ML/MIN/1.73M2
GLUCOSE QUALITATIVE U: NEGATIVE
GLUCOSE SERPL-MCNC: 157 MG/DL
HBA1C MFR BLD HPLC: 7.3 %
HCT VFR BLD CALC: 34.6 %
HCYS SERPL-MCNC: 22.1 UMOL/L
HDLC SERPL-MCNC: 37 MG/DL
HGB BLD-MCNC: 10.5 G/DL
HYALINE CASTS: 1 /LPF
IMM GRANULOCYTES NFR BLD AUTO: 0.2 %
IRON SATN MFR SERPL: 13 %
IRON SERPL-MCNC: 42 UG/DL
KETONES URINE: NEGATIVE
LDLC SERPL CALC-MCNC: 38 MG/DL
LEUKOCYTE ESTERASE URINE: NEGATIVE
LYMPHOCYTES # BLD AUTO: 15.47 K/UL
LYMPHOCYTES NFR BLD AUTO: 80.4 %
MAGNESIUM SERPL-MCNC: 1.9 MG/DL
MAN DIFF?: NORMAL
MCHC RBC-ENTMCNC: 28.3 PG
MCHC RBC-ENTMCNC: 30.3 GM/DL
MCV RBC AUTO: 93.3 FL
MICROSCOPIC-UA: NORMAL
MONOCYTES # BLD AUTO: 1.31 K/UL
MONOCYTES NFR BLD AUTO: 6.8 %
NEUTROPHILS # BLD AUTO: 2.35 K/UL
NEUTROPHILS NFR BLD AUTO: 12.1 %
NITRITE URINE: NEGATIVE
NONHDLC SERPL-MCNC: 63 MG/DL
NT-PROBNP SERPL-MCNC: 1558 PG/ML
PARATHYROID HORMONE INTACT: 135 PG/ML
PH URINE: 5.5
PHOSPHATE SERPL-MCNC: 4.7 MG/DL
PLATELET # BLD AUTO: 164 K/UL
POTASSIUM SERPL-SCNC: 4.7 MMOL/L
PROT SERPL-MCNC: 8 G/DL
PROTEIN URINE: NEGATIVE
RBC # BLD: 3.71 M/UL
RBC # FLD: 18.2 %
RED BLOOD CELLS URINE: 0 /HPF
RHEUMATOID FACT SER QL: 12 IU/ML
SODIUM SERPL-SCNC: 133 MMOL/L
SPECIFIC GRAVITY URINE: 1.01
SQUAMOUS EPITHELIAL CELLS: 0 /HPF
T3FREE SERPL-MCNC: 2.58 PG/ML
T3RU NFR SERPL: 1 TBI
T4 FREE SERPL-MCNC: 1.5 NG/DL
T4 SERPL-MCNC: 9.2 UG/DL
TIBC SERPL-MCNC: 328 UG/DL
TRIGL SERPL-MCNC: 125 MG/DL
TSH SERPL-ACNC: 3.04 UIU/ML
UIBC SERPL-MCNC: 286 UG/DL
URATE SERPL-MCNC: 11 MG/DL
UROBILINOGEN URINE: NORMAL
VIT B12 SERPL-MCNC: 613 PG/ML
WBC # FLD AUTO: 19.24 K/UL
WHITE BLOOD CELLS URINE: 0 /HPF

## 2022-02-06 LAB
CREAT SPEC-SCNC: 73 MG/DL
CREAT/PROT UR: 0.1 RATIO
HBV SURFACE AB SER QL: NONREACTIVE
HBV SURFACE AG SER QL: NONREACTIVE
HCV AB SER QL: NONREACTIVE
HCV S/CO RATIO: 0.12 S/CO
PROT UR-MCNC: 4 MG/DL
THYROGLOB AB SERPL-ACNC: <20 IU/ML
THYROPEROXIDASE AB SERPL IA-ACNC: <10 IU/ML

## 2022-02-08 LAB — ALP BONE SERPL-MCNC: 23.4 UG/L

## 2022-02-10 ENCOUNTER — APPOINTMENT (OUTPATIENT)
Dept: PULMONOLOGY | Facility: CLINIC | Age: 72
End: 2022-02-10

## 2022-02-10 LAB
ALBUMIN MFR SERPL ELPH: 57.3 %
ALBUMIN SERPL-MCNC: 4.6 G/DL
ALBUMIN/GLOB SERPL: 1.4 RATIO
ALPHA1 GLOB MFR SERPL ELPH: 4.4 %
ALPHA1 GLOB SERPL ELPH-MCNC: 0.4 G/DL
ALPHA2 GLOB MFR SERPL ELPH: 10.1 %
ALPHA2 GLOB SERPL ELPH-MCNC: 0.8 G/DL
ANA SER IF-ACNC: NEGATIVE
B-GLOBULIN MFR SERPL ELPH: 9.8 %
B-GLOBULIN SERPL ELPH-MCNC: 0.8 G/DL
COLLAGEN CTX SERPL-MCNC: 674 PG/ML
DEPRECATED KAPPA LC FREE/LAMBDA SER: 0.48 RATIO
GAMMA GLOB FLD ELPH-MCNC: 1.5 G/DL
GAMMA GLOB MFR SERPL ELPH: 18.4 %
IGA SER QL IEP: 177 MG/DL
IGG SER QL IEP: 1445 MG/DL
IGM SER QL IEP: 15 MG/DL
INTERPRETATION SERPL IEP-IMP: NORMAL
KAPPA LC CSF-MCNC: 12.6 MG/DL
KAPPA LC SERPL-MCNC: 6.11 MG/DL
M PROTEIN MFR SERPL ELPH: 4.1 %
M PROTEIN SPEC IFE-MCNC: NORMAL
METHYLMALONATE SERPL-SCNC: 364 NMOL/L
MONOCLON BAND OBS SERPL: 0.3 G/DL
PROT SERPL-MCNC: 8 G/DL
PROT SERPL-MCNC: 8 G/DL

## 2022-03-07 NOTE — CONSULT NOTE ADULT - ASSESSMENT
69 yo M with a past medical history of renal transplant (on tacrolimus, unk date), DM2, HTN, MGUS, pAF, BPH, HLD, CAD (s/p CABG 15 years ago), presents for 3-4 days of worsening cough. Patient states that he has a productive cough with brownish colored sputum. Found to have acute hypoxic respiratory failure and sepsis 2/2 likely multifocal pneumonia.    # Sepsis  Patient with 4/4 SIRS criteria (febrile, tachycardic, tachypneic, and WBC) likely 2/2 multifocal pneumonia. Patient with recent Z-pack use, but no known past hospitalizations or other antibiotics in past 3 months. Labs in ED notable for lactate 1.1 and VBG 7.33, CO2 38, HCO3 20. UA with specific gravity 1.030.  - s/p 3L NS, Vancomycin, and Zosyn  - Recommend continuing Vancomycin  - Recommend starting Cefepime  - f/u Tacrolimus level  - f/u urine strep and legionella  - f/u MRSA swab  - f/u blood culture and sputum culture  - Nephrology consult for collateral on tacrolimus and if patient is on other immunosuppressive agents (Dr. Leyva and Dr. Preciado)  - Strict I's and O's with q6h bladder scans    # Acute hypoxic respiratory failure  Likely 2/2 multifocal pneumonia. Increasing work of breathing in ED requring escalating O2 requirements from nasal canula to NRB to BIPAP  - Antibiotic management as above  - c/w BIPAP and wean to HFNC as tolerated  - Bronchodilator PRN  - Strict I's and O's  - Daily CXR    # Renal Transplant  - History of renal transplant on unknown date. Reported tacrolimus use. Unknown if other immunosuppressives being use. Patient reports creatinine baseline 1.0.  - Nephrology consult for collateral on tacrolimus and if patient is on other immunosuppressive agents (Dr. Leyva and Dr. Preciado)  - Strict I's and O's with q6h bladder scans    Dispo: Medical ICU    Discussed with attending intensivist Dr. Harrison. Recommendations are considered final after attending attestation. Shift Summary:    Vitals with min/max:       Vital Last Value 24 Hour Range   Temperature 97.8 °F (36.6 °C) (03/07/22 1153) Temp  Min: 97.8 °F (36.6 °C)  Max: 99.4 °F (37.4 °C)   Pulse 79 (03/07/22 1430) Pulse  Min: 52  Max: 97   Respiratory (!) 35 (03/07/22 1430) Resp  Min: 19  Max: 55   Non-Invasive  Blood Pressure (!) 141/99 (03/07/22 1425) BP  Min: 118/63  Max: 158/131   Pulse Oximetry 98 % (03/07/22 1430) SpO2  Min: 96 %  Max: 99 %   Arterial   Blood Pressure   No data recorded        Neuro status: A&O x  1 Pt is oriented to person. Pt disoriented to situation, time, and place. Pt is becoming more agitated, fidgety, and restless throughout the shift. Pt will obey commands after multiple attempts and redirection. Pt is having auditory, visual, and tactile hallucinations. Neurology is consulted.    Cardiac: NSR and can be sinus loree in the 50s. Pt denies chest pain.     Respiratory: Room Air Pt's breathing is unlabored. Pt denies SOB. Lung sounds are clear and equal bilaterally.     : Simmons:    Simmons in place, pt denies any abnormal symptoms. Urine appears to be concentrated. UTI is suspected from U/A.    GI: Bowel Tones:   Normoactive LBM on 3/6. Pt's abdomen is soft, nontender.    Incisions & Skin: Pt has bruises on right eye, right shoulder blade, right hip, and scattered in various places throughout the arms and legs. BLE red, linear scratches. Scabbed lesion on bottom lip.    Diet: Cardiac diet. Pt not swallowing due to AMS.    Activity:  Pt unable to ambulate due to AMS and precedex gtt    Neurovascular: All extremities are pink and warm. All pulses were felt as a 2+. Pt is experiencing numbness, tingling, and itching throughout all extremities.     Pain: Pt is wincing, grimacing, and gaurding. Patient continues to say \"Ow,\" however, he will not tell me where his pain is.     Current infusions: Precedex gtt at 0.6. D5LR with 20 K infusing at 75 mL/hr.     Pertinent labs: Potassium of 4.    MD  communications: Neurology consult. Tele Psych visit today.    Plan of care/upcoming events:  Continue to monitor mental status.

## 2022-03-09 ENCOUNTER — APPOINTMENT (OUTPATIENT)
Dept: ENDOCRINOLOGY | Facility: CLINIC | Age: 72
End: 2022-03-09

## 2022-03-23 ENCOUNTER — APPOINTMENT (OUTPATIENT)
Dept: NEPHROLOGY | Facility: CLINIC | Age: 72
End: 2022-03-23
Payer: MEDICARE

## 2022-03-23 VITALS — BODY MASS INDEX: 35.35 KG/M2 | OXYGEN SATURATION: 95 % | HEART RATE: 104 BPM | WEIGHT: 219 LBS | TEMPERATURE: 98.1 F

## 2022-03-23 VITALS — HEART RATE: 84 BPM | SYSTOLIC BLOOD PRESSURE: 130 MMHG | DIASTOLIC BLOOD PRESSURE: 76 MMHG

## 2022-03-23 VITALS — SYSTOLIC BLOOD PRESSURE: 130 MMHG | DIASTOLIC BLOOD PRESSURE: 80 MMHG

## 2022-03-23 DIAGNOSIS — R53.83 OTHER FATIGUE: ICD-10-CM

## 2022-03-23 DIAGNOSIS — I49.49 OTHER PREMATURE DEPOLARIZATION: ICD-10-CM

## 2022-03-23 PROCEDURE — 99214 OFFICE O/P EST MOD 30 MIN: CPT | Mod: 25

## 2022-03-23 NOTE — PHYSICAL EXAM
[General Appearance - Alert] : alert [General Appearance - In No Acute Distress] : in no acute distress [Sclera] : the sclera and conjunctiva were normal [Outer Ear] : the ears and nose were normal in appearance [Hearing Threshold Finger Rub Not [place]] : hearing was normal [Neck Appearance] : the appearance of the neck was normal [Neck Cervical Mass (___cm)] : no neck mass was observed [Jugular Venous Distention Increased] : there was no jugular-venous distention [Thyroid Diffuse Enlargement] : the thyroid was not enlarged [Thyroid Nodule] : there were no palpable thyroid nodules [Auscultation Breath Sounds / Voice Sounds] : lungs were clear to auscultation bilaterally [Heart Rate And Rhythm] : heart rate was normal and rhythm regular [Heart Sounds] : normal S1 and S2 [Heart Sounds Gallop] : no gallops [Murmurs] : no murmurs [Heart Sounds Pericardial Friction Rub] : no pericardial rub [Bowel Sounds] : normal bowel sounds [Abdomen Soft] : soft [Abdomen Tenderness] : non-tender [Abdomen Mass (___ Cm)] : no abdominal mass palpated [No CVA Tenderness] : no ~M costovertebral angle tenderness [No Spinal Tenderness] : no spinal tenderness [Abnormal Walk] : normal gait [Involuntary Movements] : no involuntary movements were seen [Musculoskeletal - Swelling] : no joint swelling seen [Motor Tone] : muscle strength and tone were normal [Skin Color & Pigmentation] : normal skin color and pigmentation [Skin Turgor] : normal skin turgor [] : no rash [No Focal Deficits] : no focal deficits [Oriented To Time, Place, And Person] : oriented to person, place, and time [Impaired Insight] : insight and judgment were intact [Affect] : the affect was normal [FreeTextEntry1] :  bilateral LE edema

## 2022-03-23 NOTE — END OF VISIT
[Time Spent: ___ minutes] : I have spent [unfilled] minutes of time on the encounter. [FreeTextEntry3] :  Documented by Abdi Yung acting as a scribe for Dr. Kofi Leyva on 03/23/2022.

## 2022-03-23 NOTE — HISTORY OF PRESENT ILLNESS
[FreeTextEntry1] : The patient is a 71 year old male presenting today for follow-up evaluation and active reassessment of HTN, CAD, DM, HLD, s/p CABG, AFib, h/o renal transplant, and monoclonal B cell abnormality. \par \par The patient is generally feeling well. He saw Dr. Huffman yesterday and presents recent labs. He has recently gained 5 lbs since last visit. He recently saw Dr. Radha Sanchez and he inquires about his valganciclovir treatment. He denies any SORIA. \par \par -No recent cardiac f/u with Dr. Schuster.\par -Patient intends to travel to [place] for visit to mother and family occasion.\par \par \par \par Labs  03/22/2022:\par Total protein 7.3, Albumin 4.15, M spike 0.29 g/dL, calcium 9.1, gamma gtp 16, SGPT 13, Total bilirubin 0.8, direct  bilirubin 0.4, Creatinine 1.23, CO2 28.8, K 5.1, Immunoelectrophoresis IgG lambda, early monoclonal protein, Uric acid 10.7,\par \par Labs 02/03/2022:\par WBC 19.24, RBC 3.71, HGB 10.5, HCT 34.6%, ESR 60, HBA1C 7.3%, Homocysteine 22.1, BNP 1558, eGFR by Cystatin C 28, Cystatin C 2.12,  IgG Lambda Band Identified, , HDL 37, Iron 42, Iron sat 13%, Na 133, Cl 93, Co2 21, Glucose 157, BUN 39, Creatinine 1.41, eGFR 50, Phosporus 4.7.\par \par Current medications: Flomax 0.4mg QPM, Humalog 15u TID, Imdur ER 30mg QD, Lantus 30u QPM, furosemide 80mg QAM + 40mg QPM, amlodipine 10mg QD, pantoprazole 20mg QD, rosuvastatin 10mg QD, tacrolimus 2mg QAM + 1mg QPM, valganciclovir 450mg QD, Xanax 0.25mg prn\par

## 2022-03-23 NOTE — ADDENDUM
[FreeTextEntry1] : All medical record entries made by the Scribe were at my, Dr. Kofi Leyva, direction and personally dictated by me on 03/23/2022.  I have reviewed the chart and agree that the record accurately reflects my personal performance of the history, physical exam, assessment and plan. I have also personally directed, reviewed, and agreed with the chart.

## 2022-03-29 RX ORDER — ISOSORBIDE MONONITRATE 30 MG/1
30 TABLET, EXTENDED RELEASE ORAL
Qty: 90 | Refills: 2 | Status: ACTIVE | COMMUNITY
Start: 2021-05-10 | End: 1900-01-01

## 2022-04-14 NOTE — ED ADULT NURSE NOTE - NS ED NURSE LEVEL OF CONSCIOUSNESS MENTAL STATUS
[address] [id number]  History and Physical Update    Pt Name: Arpit Lang  MRN: [id number]  YOB: 1971  Date of evaluation: 4/14/2022    I have examined the patient and reviewed the H&P/Consult and there are no changes to the patient or plans.       Sachin Rodgers DPM,FACFAS  Electronically signed 4/14/2022 at 7:42 AM
Awake/Alert/Cooperative

## 2022-05-03 NOTE — ASSESSMENT
[FreeTextEntry1] : Plan:\par 1) HTN: BP is acceptable in office today. Will continue to monitor pressures and regimen at next visit.\par 2) Transplant medications: patient to continue on Prograf and Valcyte. Pt will continue f/u [place] transplant team.\par 3) Renal function: Most recent creatinine of 1.41, eGFR of 50. will continue to monitor function with CMP due to risk for progression of renal failure and will maintain current therapy. .\par 4) Weight management: Have informed patient to continue to work on weight loss, as his current weight gain might be contributing to his volume overload and his increased SOB. Patient is interested in a sleeve gastrectomy operation, which I informed him we can discuss at a later date. He should continue with conservative weight loss through diet and exercise.\par 5) A fib and decompensated heart failure: Physical exam with irregular irregular heart rate as well as bibasilar rales, possible egophony at right base, decreased tactile fremitus. I spoke to Dr. Encinas, we considered relative benefits of monitored inpatient therapy vs. increase in outpatient diuretic therapy. We decided to give a brief trial to outpatient therapy and will do reevaluation in 36 hours.\par 6) Fluid overload: Patient to discontinue furosemide and to take torsemide 80mg BID. Advised patient to weigh himself twice day and to contact my office if he loses more than 3 lbs in one day.\par 7) EKG done today demonstrates atrial fibrillation, right bundle branch block, T wave abnormality, consider inferolateral ischemia or digitalis effect\par \par Changes to Medications: Patient will take torsemide 80mg QAM + 80mg QPM instead of furosemide.\par \par Patient will return in 2 days for a follow-up appointment after he sees Dr. Encinas.

## 2022-05-03 NOTE — END OF VISIT
[FreeTextEntry3] : This note was written by Ernestine Veronica on 05/03/2022 acting as scribe for Dr. Leyva.

## 2022-05-03 NOTE — HISTORY OF PRESENT ILLNESS
[FreeTextEntry1] : The patient is a 71 year old male presenting today for follow-up evaluation and active reassessment of HTN, CAD, DM, HLD, s/p CABG, AFib, h/o renal transplant, and monoclonal B cell abnormality. \par \par The patient is generally feeling okay but reports weight gain and increased SOB. He was diagnosed with sleep apnea and reports he just received a CPAP machine 2 days ago. Patient endorses interest in having a sleeve gastrectomy operation for weight loss. \par \par Labs 03/22/2022:\par Total protein 7.3, Albumin 4.15, M spike 0.29 g/dL, calcium 9.1, gamma gtp 16, SGPT 13, Total bilirubin 0.8, direct bilirubin 0.4, Creatinine 1.23, CO2 28.8, K 5.1, Immunoelectrophoresis IgG lambda, early monoclonal protein, Uric acid 10.7\par \par Current medications: Flomax 0.4mg QPM, Humalog 15u TID, Imdur ER 30mg QD, Lantus 30u QPM, furosemide 80mg QAM + 40mg QPM, amlodipine 10mg QD, pantoprazole 20mg QD, rosuvastatin 10mg QD, tacrolimus 2mg QAM + 1mg QPM, valganciclovir 450mg QD, Xanax 0.25mg prn

## 2022-05-03 NOTE — PHYSICAL EXAM
[General Appearance - Alert] : alert [General Appearance - In No Acute Distress] : in no acute distress [Sclera] : the sclera and conjunctiva were normal [PERRL With Normal Accommodation] : pupils were equal in size, round, and reactive to light [Extraocular Movements] : extraocular movements were intact [Outer Ear] : the ears and nose were normal in appearance [Examination Of The Oral Cavity] : the lips and gums were normal [Neck Appearance] : the appearance of the neck was normal [Edema] : there was no peripheral edema [Bowel Sounds] : normal bowel sounds [Abdomen Soft] : soft [Abdomen Tenderness] : non-tender [Abdomen Mass (___ Cm)] : no abdominal mass palpated [No CVA Tenderness] : no ~M costovertebral angle tenderness [No Spinal Tenderness] : no spinal tenderness [Abnormal Walk] : normal gait [Nail Clubbing] : no clubbing  or cyanosis of the fingernails [Musculoskeletal - Swelling] : no joint swelling seen [Motor Tone] : muscle strength and tone were normal [Skin Color & Pigmentation] : normal skin color and pigmentation [Skin Turgor] : normal skin turgor [No Focal Deficits] : no focal deficits [Oriented To Time, Place, And Person] : oriented to person, place, and time [Impaired Insight] : insight and judgment were intact [Affect] : the affect was normal [] : no respiratory distress [FreeTextEntry1] : pulse irregularly irregular

## 2022-05-05 NOTE — PHYSICAL EXAM
[Well Developed] : well developed [Well Nourished] : well nourished [No Acute Distress] : no acute distress [Normal Conjunctiva] : normal conjunctiva [No Carotid Bruit] : no carotid bruit [Normal S1, S2] : normal S1, S2 [No Murmur] : no murmur [Clear Lung Fields] : clear lung fields [Good Air Entry] : good air entry [No Respiratory Distress] : no respiratory distress  [Normal Gait] : normal gait [No Edema] : no edema [Moves all extremities] : moves all extremities [No Focal Deficits] : no focal deficits [Normal Speech] : normal speech [Alert and Oriented] : alert and oriented [Normal memory] : normal memory

## 2022-05-06 NOTE — DISCUSSION/SUMMARY
[FreeTextEntry1] : 71 year old male with PMHX of CAD sp CABG (2002) , AFIB / Flutter ( on Eliquis ), Renal Transplant ( on tacrolimus + valganciclovir) DMII, HTN and PVD here follow up and echocardiogram. \par \par CAD sp CABG: Currently reporting dyspnea on exertion. Symptoms have improved after increasing diuretic. Last NST ( 10/2019 ) normal perfusion. High risk CAD not likely. Continue with Imdur 30 mg and Statin. \par CHF: Recent exacerbation of fluid overload in the past 2 weeks. Patient has lost 6 lbs and is reporting improvement of swelling and dyspnea in the past few days while switched to Toresemide 80mg BID. Blood work revealed improvement of BNP from 1485 to 1270. Will continue with Torsemide at 40mg QD and have him return in 1 month for evaluation. \par AFIB / FLUTTER: EKG today revealed AFIB 77 with no changes. Patient has been noted to be in AFIB since 2019, doubt recent exacerbation of fluid overload from the AFIB. Will continue with Eliquis 5mg BID and send for EP consult. \par HLD: Controlled. Goal LDL < 70. Continue with Rosuvastatin 10mg.\par EDEMA: Improved to 1+ bilaterally with noted venous stasis. Continue to monitor. \par \par Follow up in 1 month\par I, Dr. Paulina Encinas personally performed the evaluation and management services for this patient who presents today with a changed problem.  The evaluation and management includes conducting the examination assessing all conditions and establishing a new plan of care.  Today my ACP Francisca Gandhi was here and recorded the evaluation and management services.  Patient had worsening in CHF as above.  The echocardiogram shows his ejection fraction is normal and unchanged.

## 2022-05-06 NOTE — REVIEW OF SYSTEMS
[Dyspnea on exertion] : dyspnea during exertion [Lower Ext Edema] : lower extremity edema [Cough] : cough [Fever] : no fever [Chills] : no chills [SOB] : no shortness of breath [Chest Discomfort] : no chest discomfort [Leg Claudication] : no intermittent leg claudication [Palpitations] : no palpitations [Orthopnea] : no orthopnea [PND] : no PND [Syncope] : no syncope [Nausea] : no nausea [Vomiting] : no vomiting [Diarrhea] : diarrhea [Dizziness] : no dizziness [Numbness (Hypoesthesia)] : no numbness [Weakness] : no weakness [Speech Disturbance] : no speech disturbance [Easy Bleeding] : no tendency for easy bleeding

## 2022-05-06 NOTE — HISTORY OF PRESENT ILLNESS
[FreeTextEntry1] : 71 year old male with PMHX of CAD sp CABG (2002) , AFIB / Flutter ( on Eliquis ), Renal Transplant ( on tacrolimus + valganciclovir) DMII, HTN and PVD here follow up and echocardiogram. \par \par Since last visit, he was noticing swelling around edema and abdomen with worsened dyspnea on exertion for the past 2 weeks. He was seen by Dr Leyva and switched from  lasix 80mg AM and 40mg PM to Torsemide 80mg QD. He has increased his urination. He has lost 6 lbs in the last few days. He reports improvement of swelling and his dyspnea but not back to his baseline. He was able to walk from front of building to today's appointment without complications. \par \par He remains to sleep on his side without complications. He just received  his CPAP however its not adjusted yet. He is still taking supplemental O2 at 2% via NC.\par \par Denies any palpitations, dizziness or syncope. \par \par He reports erythema of both legs however not as severe as when he was treated for bilateral cellulitis back in January 2022.\par \par

## 2022-05-24 NOTE — HISTORY OF PRESENT ILLNESS
[FreeTextEntry1] : This is a 72 y/o M with a past medical history significant for HTN, HLD, DM II, CAD s/p CABG and atrial fibrillation/flutter. He is s/p renal transplant in the fall of 2019 with a complicated post-operative course. He was on Eliquis 2.5 mg BID which was increased to 5 mg BID. We have last seen him in June 2020 when he had asymptomatic AFib and after discussing all options he opted for a rate control strategy. He was referred back to us given recent worsening of shortness of breath. He had worsening shortness of breath over the last 6-9 months compared to his baseline with a hospital admission about 6 months ago for CHFe with pleural effusion s/p thoracentesis. He has managed to remain close to dry weight with some adjustment in his diuretic regimen but still feels short of breath with minimal exertion. His LVEF has remained normal. He has sleep apnea and awaits CPAP machine. He has been adherent to his medications. He denied palpitations, CP, dizziness, near-syncope, or syncope. \par \par PMH, PSH, FH and SH reviewed\par Allergies and meds reviewed\par

## 2022-05-24 NOTE — CARDIOLOGY SUMMARY
[de-identified] : ECG 5/23/2022: AFib, HR 81, RBBB, non specific ST-T changes [de-identified] : TTE 5/5/2022: \par moderately dilated LA, LA volume index 42 cc/m2, mild concentric LVH, normal LV systolic function, indeterminate diastolic function, mild TR, no pericardial effusion

## 2022-05-24 NOTE — REVIEW OF SYSTEMS
[Negative] : Heme/Lymph [FreeTextEntry2] : see HPI [FreeTextEntry5] : see HPI [FreeTextEntry6] : see HPI

## 2022-05-24 NOTE — DISCUSSION/SUMMARY
[FreeTextEntry1] : This is a 72 y/o M with a past medical history significant for HTN, HLD, DM II, CAD s/p CABG and atrial fibrillation/flutter. He is s/p renal transplant in the fall of 2019 with a complicated post-operative course. He was on Eliquis 2.5 mg BID which was increased to 5 mg BID. We have last seen him in June 2020 when he had asymptomatic AFib and after discussing all options he opted for a rate control strategy. He was referred back to us given recent worsening of shortness of breath. He had worsening shortness of breath over the last 6-9 months compared to his baseline with a hospital admission about 6 months ago for CHFe with pleural effusion s/p thoracentesis. He has managed to remain close to dry weight with some adjustment in his diuretic regimen but still feels short of breath with minimal exertion. His LVEF has remained normal. He has sleep apnea and awaits CPAP machine.\par \par 1- Worsening shortness of breath:\par - we discussed that his shortness of breath is probably multi-factorial, between JAYANT, probably diastolic dysfunction, AFib. It is difficult to assume AFib is the primary reason for his symptoms, but it is possible that having new diastolic dysfunction and being in AFib worsened his shortness of breath. Meanwhile he will start using his CPAP. \par \par 2- Persistent AFib:\par - He has been in AFib for at least 3 years, but recent echo with moderate LA size.  We discussed the role of restoring sinus rhythm to see if he feels better and that would suggest rhythm control strategy with ablation/AAD/Both as a long term option. He is agreeable, we will schedule him for cardioversion Wednesday 5/25/2022, he's been on un-interrupted anti-coagulation, no need for SUKHDEEP.\par \par 3- Conduction disease;\par - he has new RBBB on his ECG, which has developed over the last 1-1.5 years. After restoring sinus rhythm we will get him an event monitor for 2 weeks to assess for other subclinical arrhythmias \par \par

## 2022-05-25 PROBLEM — Z86.39 HISTORY OF DIABETES MELLITUS: Status: RESOLVED | Noted: 2021-10-27 | Resolved: 2022-01-01

## 2022-06-17 NOTE — PHYSICAL EXAM
[Well Developed] : well developed [Well Nourished] : well nourished [No Acute Distress] : no acute distress [Normal Conjunctiva] : normal conjunctiva [No Carotid Bruit] : no carotid bruit [Normal S1, S2] : normal S1, S2 [No Murmur] : no murmur [Good Air Entry] : good air entry [No Respiratory Distress] : no respiratory distress  [Normal Gait] : normal gait [Venous stasis] : venous stasis [Moves all extremities] : moves all extremities [No Focal Deficits] : no focal deficits [Normal Speech] : normal speech [Alert and Oriented] : alert and oriented [Normal memory] : normal memory [de-identified] : Crackles noted on right lower lung area w reduced BS [de-identified] : 2+ pedal edema up to knees with significant erythema, hyperpigmentation

## 2022-06-17 NOTE — DISCUSSION/SUMMARY
[FreeTextEntry1] : The patient has edema and is dizzy.  He has reduced his torsemide.  EKG shows atrial fibrillation, right bundle branch block and T wave abnormalities.  The atrial fibrillation has recurred.  The risks benefits and alternatives to atrial fibrillation ablation were discussed and the patient will see Dr. Schuster.  The edema is worse.  Creatinine is improved.  The patient will take torsemide 20 mg alternating with 40 mg.  Patient and son's questions were answered.

## 2022-06-17 NOTE — REVIEW OF SYSTEMS
[Dyspnea on exertion] : dyspnea during exertion [Lower Ext Edema] : lower extremity edema [Heartburn] : heartburn [Dizziness] : dizziness [Negative] : Heme/Lymph

## 2022-06-20 NOTE — HISTORY OF PRESENT ILLNESS
[FreeTextEntry1] : This is a 70 y/o M with a past medical history significant for HTN, HLD, DM II, CAD s/p CABG and atrial fibrillation/flutter. He is s/p renal transplant in the fall of 2019 with a complicated post-operative course. He was on Eliquis 2.5 mg BID which was increased to 5 mg BID. We have last seen him in June 2020 when he had asymptomatic AFib and after discussing all options he opted for a rate control strategy. He was referred back to us given recent worsening of shortness of breath. He had worsening shortness of breath over the last 6-9 months compared to his baseline with a hospital admission about 6 months ago for CHFe with pleural effusion s/p thoracentesis. He has managed to remain close to dry weight with some adjustment in his diuretic regimen but still feels short of breath with minimal exertion. His LVEF has remained normal. He has sleep apnea and awaits CPAP machine. He has been adherent to his medications. He denied palpitations, CP, dizziness, near-syncope, or syncope. \par \par He is s/p DCCV to [facility] on 5/25/22.  Feels that his breathing has improved a bit, although still feels need to take a deep breath at times.  Ambulates with a cane.  \par \par Reports recent finding of mass in his jaw- undergoing work-up with Dr. Torres.

## 2022-06-20 NOTE — DISCUSSION/SUMMARY
[FreeTextEntry1] : This is a 70 y/o M with a past medical history significant for HTN, HLD, DM II, CAD s/p CABG and atrial fibrillation/flutter. He is s/p renal transplant in the fall of 2019 with a complicated post-operative course. He was on Eliquis 2.5 mg BID which was increased to 5 mg BID. He had worsening shortness of breath over the last 6-9 months compared to his baseline with a hospital admission about 6 months ago for CHF with pleural effusion s/p thoracentesis. He has managed to remain close to dry weight with some adjustment in his diuretic regimen but still feels short of breath with minimal exertion. His LVEF has remained normal. He has sleep apnea and awaits CPAP machine.  Given his history of HFpEF, we discussed the indication for restoration and maintenance of sinus rhythm with ablation.  However, we will await work-up for recent finding of mass in his jaw prior to repeat attempts at rhythm management.  New RBBB on ECG without any presyncope/syncope; we will continue to monitor.  He will return for follow-up in 1-2 months or sooner as needed.

## 2022-06-20 NOTE — CARDIOLOGY SUMMARY
[de-identified] : 6/20/22 AFib @ 82 bpm, RBBB\par ECG 5/23/2022: AFib, HR 81, RBBB, non specific ST-T changes [de-identified] : TTE 5/5/2022: \par moderately dilated LA, LA volume index 42 cc/m2, mild concentric LVH, normal LV systolic function, indeterminate diastolic function, mild TR, no pericardial effusion

## 2022-06-26 NOTE — HISTORY OF PRESENT ILLNESS
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   pt has had recent riglht facial pain, and was found by dr nesbitt to have apparent recurrence of initial mass.   he also has h/o post transplant lymphoma.     the record of office visit for reevaluation and preoperative assessment is recorded in Chart Notes entered on June 21,2022.

## 2022-06-26 NOTE — ASSESSMENT
[FreeTextEntry1] : pt with recurrence of apparent mandibular tumor, post transplant lymphoproliferative abnormality, renal transplant, chf, htn, and obesity, with preop note entered into Chart Notes on 6/21/22.

## 2022-07-10 PROBLEM — R22.0 FACIAL MASS: Status: RESOLVED | Noted: 2022-01-01 | Resolved: 2022-01-01

## 2022-07-13 NOTE — ASSESSMENT
[FreeTextEntry1] : 71M with JAYANT, history of PE on Eliquis, history of PH, R pleural effusion (s/p pigtail 12/2021, exudative) and CHF.\par \par 1. Dyspnea on exertion: multifactorial dyspnea in setting of obesity and underlying CHF, however at this visit presents with fluid overload as evidenced on CXR and LE edema. Will double furosemide to 80mg (40mg BID) and will re-evaluate symptoms- patient will reach out over the weekend regarding symptoms and if symptoms continue he was instructed to go to the ED for inpatient diuretics. \par \par 2. R pleural effusion, lymphocyte predominant 91% (cyto consistent with inflammation), exudative. R fluid has recurred and is partly in the setting of fluid overload, will repeat the CXR next week or if patient is admitted inpatient to evaluate size of the effusion.\par \par 3. JAYANT: Has severe JAYANT on HST as above, would benefit from CPAP and instructed of this. He received his machine, he does not want to start it until after the surgery. \par \par 4. PE: Continue Eliquis with hold perioperatively for 48 hours\par \par 5. Preoperative optimization: Patient is not currently optimized for surgery in the setting of fluid overload, will optimize volume status and perioperatively he will need to be treated with nighttime CPAP inpatient and likely will need to be extubated to NIV post anesthesia.\par \par Will re-evaluate symptoms by end of the week to assess response to increased diuretics.\par \par

## 2022-07-13 NOTE — ASSESSMENT
[FreeTextEntry1] : 71 year old male with hx of diabetes type 2 on insulin therapy\par \par \par - DM2\par A1C POCT 6.5%\par resume Lantus 30 units at bedtime\par 15 units Lispro pre-meal, advised to reduce to 8-10 units if having lower carbohydrates at the meal\par Send Rx for more units per meal incase to have sufficient supply. \par Goal <140 2 hours postprandial\par Will start GLP-1 - trial of ozempic. \par consider SGLT2 at next visit for both kidney protective and cardiac protective and to aid in glycemic control. \par was previously on trulicity which he did not tolerate. \par \par -Obesity class 2\par WIll restart GLP-1\par consider SGLT2 at next visit if intolerant to GLP-1\par \par -HLD \par resume Rosuvastatin 10mg\par Lipid panel reviewed \par \par case discussed with Dr. Clayton\par

## 2022-07-13 NOTE — REASON FOR VISIT
[Follow-Up] : a follow-up visit [Shortness of Breath] : shortness of breath [Pre-op Risk Stratification] : pre-op risk stratification

## 2022-07-13 NOTE — PHYSICAL EXAM
[No Acute Distress] : no acute distress [Normal Oropharynx] : normal oropharynx [Normal Appearance] : normal appearance [No Neck Mass] : no neck mass [Normal Rate/Rhythm] : normal rate/rhythm [Normal S1, S2] : normal s1, s2 [No Murmurs] : no murmurs [No Resp Distress] : no resp distress [No Abnormalities] : no abnormalities [Benign] : benign [No Clubbing] : no clubbing [2+ Pitting] : 2+ pitting [Normal Color/ Pigmentation] : normal color/ pigmentation [No Focal Deficits] : no focal deficits [Oriented x3] : oriented x3 [Normal Affect] : normal affect [TextBox_68] : dec breath sounds R base [TextBox_99] : ambulates with cane

## 2022-07-13 NOTE — END OF VISIT
[] : Fellow [FreeTextEntry3] : I discussed the case with the primary physician.  And the family.  The patient has reaccumulation of right pleural effusion and the chest x-ray is consistent plus the clinical picture with fluid overload.  The patient is being evaluated for surgery.  Patient will be admitted over the weekend I increase the Lasix to 40 mg twice daily. [Time Spent: ___ minutes] : I have spent [unfilled] minutes of time on the encounter.

## 2022-07-13 NOTE — HISTORY OF PRESENT ILLNESS
[FreeTextEntry1] : 72 yo male PMHx AF (Eliquis), hiatal hernia, CAD s/p CABG (2005), R renal txp (tacrolimus + valganciclovir), DM2 (A1C 6.7), HTN, HLD, CLL here for follow up  Diabetes and weight loss. \par \par Seen at [facility] 12/08/21: was admitted for right plueral effusion s/p thoracentesis. \par Seen for inpatient hyperglycemia management by Endocrine team. \par Below taken from consult note\par Dx: Patient was diagnosed 20+ years ago and started on oral medications. He says he was introduced to insulin around the time of his kidney transplant and has only been on insulin for the past 4 years. Was on Lantus and humalog but believes that lantus does not work for him so he only resumed humalog\par Prior Therapy: 24-32units Humalog TID with meals, Metformin 500mg ER , took on average 24units with each meal\par Hx of hypoglycemia: has intermittent lows during the week sometimes at lunch or dinner, with numbers in the 60s. \par Hx of DKA/HHS? never\par \par Home FSG: \par Fasting 130-150s\par Lunch 60-150s\par Dinner 60-150s\par Bed\par \par Hx of other regimens\par Complications: neuropathy, nephropathy, retinopathy - sees retinal specialist regularly \par Previous Outpatient Endo: Dr. Gemma Murillo ([facility])\par \par FH:\par DM: mother diabetic\par SH:\par Smoking nonsmoker\par Etoh: nondrinker\par \par \par Patient was discharged from [facility] on Lantus 30 units at bedtime and humalog 15 units before meals\par \par Interval HPI:\par Current regimen: 24units Humalog TID with meals, stopped taking his Lantus\par Patient states over the past 3 weeks he has stopped taking his lantus all together because he believed it didn’t work. \par However he states now even when he eats nothing his sugars increase to 200s during the day. He has been slowly increasing his humalog dosing to keep up with the sugars. \par Denies any hypoglycemia\par Patient still interested in losing weight, did not tolerate trulicity, would like to try a different GLP-1.\par Patient has a tumor of the jaw which he will be removing July 25,2022. \par Agreeable to restarting Lantus and Lispro regimen. \par

## 2022-07-13 NOTE — HISTORY OF PRESENT ILLNESS
[TextBox_4] : 71M with JAYANT, history of PE on Eliquis, history of PH, R pleural effusion (s/p pigtail 12/2021, exudative) and CHF.\par \par 1/6/2022: he is doing well His breathing is better. He saw Dr Tello twice. He had the flu and was home for the last 10 days. \par \par 7/13/2022: Patient presents for pre-operative optimization prior to planned surgery of cyst removal by Dr. Lynn on 7/25/2022. He also complains of shortness of breath. Regarding his shortness of breath, it is worse when he stands, ambulates, or bends down. Improved with rest. No chest pain. Taking his medications as prescribed including 40mg lasix. Was seen by Dr. Mishra 2 weeks ago, did not have LE edema at that time. Currently complaining of LE swelling. Received Payal CPAP machine but has not started using it because he needs the surgery soon so he wants to start after. [ESS] : 9

## 2022-07-13 NOTE — ADDENDUM
[FreeTextEntry1] : Attending endocrinologist.\par The patient was seen with DR. Galvan.\par He is a 71 y.o. male with a h/o type 2 DM, s/p kidney transplant.\par He is on preprandial Humalog schedule, has declined basal insulin repeatedly in the past - thinks that basal insulin is not working for him.\par He denies hypoglycemia.\par He would like to lose weight.\par Lengthy discussion today re the use of basal/bolus insulin.\par Will initiate Lantus.\par Consider adding SGLT2 inhibitor in the future - information provided to the patient.\par Agree with Dr. Galvan's assessment and plan.\par

## 2022-07-13 NOTE — REVIEW OF SYSTEMS
[Fatigue] : fatigue [Dyspnea] : dyspnea [SOB on Exertion] : sob on exertion [Abdominal Pain] : abdominal pain [Fever] : no fever [Cough] : no cough [Sputum] : no sputum

## 2022-07-17 NOTE — ED PROVIDER NOTE - OBJECTIVE STATEMENT
70 y/o m hx afib (eliquis), hiatal hernia, CAD s/p CABG, renal transplant (tacrolimus & valganciclovir), DM, HTN, HLD, CLL presents stating he saw his pulmonologist this past week Dr. Lora and was told he had fluid in his right lung again.  Pt had a pleural effusion last year which had to be drained by Dr. Lora.  Pt stating Dr. Lora increased his lasix during the visit last week and he feels like the pressure in his chest has decreased and he is breathing easier, also lost weight this past week.  Pt is concerned it needs to be drained again.  Denies fever, chills, cough, CP, all other ROS negative.

## 2022-07-17 NOTE — ED PROVIDER NOTE - NSFOLLOWUPINSTRUCTIONS_ED_ALL_ED_FT
Hyponatremia      Hyponatremia is when the amount of salt (sodium) in your blood is too low. When salt levels are low, your body may take in extra water. This can cause swelling throughout the body. The swelling often affects the brain.      What are the causes?    This condition may be caused by:  •Certain medical problems or conditions.      •Vomiting a lot.      •Having watery poop (diarrhea) often.      •Certain medicines or illegal drugs.      •Not having enough water in the body (dehydration).      •Drinking too much water.       •Eating a diet that is low in salt.      •Large burns on your body.       •Too much sweating.        What increases the risk?    You are more likely to get this condition if you:  •Have long-term (chronic) kidney disease.      •Have heart failure.       •Have a medical condition that causes you to have watery poop often.      •Do very hard exercises.      •Take medicines that affect the amount of salt is in your blood.        What are the signs or symptoms?    Symptoms of this condition include:  •Headache.       •Feeling like you may vomit (nausea).      •Vomiting.      •Being very tired (lethargic).      •Muscle weakness and cramps.      •Not wanting to eat as much as normal (loss of appetite).      •Feeling weak or light-headed.       Severe symptoms of this condition include:  •Confusion.       •Feeling restless (agitation).      •Having a fast heart rate.      •Passing out (fainting).      •Seizures.       •Coma.        How is this treated?     Treatment for this condition depends on the cause. Treatment may include:  •Getting fluids through an IV tube that is put into one of your veins.      •Taking medicines to fix the salt levels in your blood. If medicines are causing the problem, your medicines will need to be changed.      •Limiting how much water or fluid you take in.      •Monitoring in the hospital to watch your symptoms.        Follow these instructions at home:     •Take over-the-counter and prescription medicines only as told by your doctor. Many medicines can make this condition worse. Talk with your doctor about any medicines that you are taking.    •Eat and drink exactly as you are told by your doctor.  •Eat only the foods you are told to eat.      •Limit how much fluid you take.        • Do not drink alcohol.      •Keep all follow-up visits as told by your doctor. This is important.        Contact a doctor if:    •You feel more like you may vomit.      •You feel more tired.      •Your headache gets worse.      •You feel more confused.      •You feel weaker.      •Your symptoms go away and then they come back.      •You have trouble following the diet instructions.        Get help right away if:    •You have a seizure.      •You pass out.      •You keep having watery poop.      •You keep vomiting.        Summary    •Hyponatremia is when the amount of salt in your blood is too low.      •When salt levels are low, you can have swelling throughout the body. The swelling mostly affects the brain.      •Treatment depends on the cause. Treatment may include getting IV fluids, medicines, or not drinking as much fluid.      This information is not intended to replace advice given to you by your health care provider. Make sure you discuss any questions you have with your health care provider.

## 2022-07-17 NOTE — ED ADULT NURSE NOTE - NSIMPLEMENTINTERV_GEN_ALL_ED
Implemented All Universal Safety Interventions:  [place] to call system. Call bell, personal items and telephone within reach. Instruct patient to call for assistance. Room bathroom lighting operational. Non-slip footwear when patient is off stretcher. Physically safe environment: no spills, clutter or unnecessary equipment. Stretcher in lowest position, wheels locked, appropriate side rails in place.

## 2022-07-17 NOTE — ED ADULT TRIAGE NOTE - CHIEF COMPLAINT QUOTE
a&ox4 walked in c.o SORIA. sent in by MD Lora to have "fluid drained from my lungs." denies SOB, CP, cough, fevers, currently. denies swelling to lower legs. PMH ESRD, Afib, CAD, DM, HTN.

## 2022-07-17 NOTE — ED PROVIDER NOTE - NS ED ATTENDING STATEMENT MOD
This was a shared visit with the SHANNON. I reviewed and verified the documentation and independently performed the documented:

## 2022-07-17 NOTE — ED PROVIDER NOTE - PATIENT PORTAL LINK FT
You can access the FollowMyHealth Patient Portal offered by [facility] by registering at the following website: [email or web address]. By joining [facility]’s FollowMyHealth portal, you will also be able to view your health information using other applications (apps) compatible with our system.

## 2022-07-17 NOTE — ED PROVIDER NOTE - CLINICAL SUMMARY MEDICAL DECISION MAKING FREE TEXT BOX
70 y/o m hx afib (eliquis), hiatal hernia, CAD s/p CABG, renal transplant (tacrolimus & valganciclovir), DM, HTN, HLD, CLL presents c/o recurrent pleural effusion to right lung, concerned it needs to be drained.  VSS in ED, pt in no resp distress.  CXR shows right pleural effusion, noted to have wbc 32, likely 2/2 CLL, has been elevated in the past, no signs of active infection.  Sodium is 126, creatinine 1.51. 72 y/o m hx afib (eliquis), hiatal hernia, CAD s/p CABG, renal transplant (tacrolimus & valganciclovir), DM, HTN, HLD, CLL presents c/o recurrent pleural effusion to right lung, concerned it needs to be drained.  VSS in ED, pt in no resp distress.  CXR shows right pleural effusion, noted to have wbc 32, likely 2/2 CLL, has been elevated in the past, no signs of active infection.  Sodium is 126, creatinine 1.51.  Dr. Lora evaluated pt in the ED and decision made for pt to be d/c, will fluid restrict, has close f/u with Stacia Lora and Orlin, advised to return to ED if having sob.

## 2022-07-17 NOTE — ED PROVIDER NOTE - ATTENDING APP SHARED VISIT CONTRIBUTION OF CARE
Attending Statement: I have personally performed a diagnostic evaluation on this patient. I have reviewed the ACP note and agree with the history, exam and plan of care, except as noted.     Attending Contribution to Care:  71M w hx afib (eliquis), hiatal hernia, CAD s/p CABG, renal transplant (tacrolimus & valganciclovir), DM, HTN, HLD, CLL presents c/o recurrent pleural effusion to right lung.  VSS in ED, pt in no resp distress. Agree with PA exam. CXR shows right pleural effusion, noted to have wbc 32, likely 2/2 CLL, has been elevated in the past, no signs of active infection.  Sodium is 126, creatinine 1.51.  Dr. Lora evaluated pt in the ED and decision made for pt to be d/c, pt wishes to go home, pt also does not want to be admitted. will fluid restrict, has close f/u with Stacia Lroa and Orlin, advised to return to ED if having sob.

## 2022-07-17 NOTE — ED ADULT NURSE NOTE - OBJECTIVE STATEMENT
Pt is a 72 y/o male A&Ox4 in NAD ambulatory with steady gait sent in by MD Lora to "have fluid drained from my lung." Pt endorses SORIA. Pt denies chest pain, SOB, fever/chills, cough. Pt talking in clear, full sentences, respirations even and unlabored.

## 2022-07-19 PROBLEM — E87.1 HYPONATREMIA: Status: RESOLVED | Noted: 2018-08-29 | Resolved: 2022-01-01

## 2022-07-20 NOTE — ED PROVIDER NOTE - CLINICAL SUMMARY MEDICAL DECISION MAKING FREE TEXT BOX
JOSÉ Brooks, Attending: note by attg.    H/P as above.    Worsening of recurrence of R sided plef 2/2 to HF.    Stable on 2LNC. No need for NIPPV. Plan for IV diuresis and pcp/pulm discussion. Likely admit. Do think acute ACS.

## 2022-07-20 NOTE — PROGRESS NOTE ADULT - PROBLEM SELECTOR PLAN 2
Pt volume overloaded on exam, however, denies any weight gain or exacerbation of chronic SORIA, SOB, and orthopnea symptoms. Endorses using 2-3 pillows at night. States that symptoms are chronic with intermittent improvement. BNP 1547, Trop .002    Per HF team:  - repeat TTE  - Lasix 80 IV BID with goal net neg ~2-3L  - Monitor pleural effusion and consider drainage if worsening  - Imdur, norvasc -hx R pleural effusion, s/p thoracentesis 12/2021, follows with Dr Lora  -CXR shows R pleural effusion  -currently satting well on RA  -c/w diuresis as above  -f/u Dr Lora rec's

## 2022-07-20 NOTE — H&P ADULT - PROBLEM SELECTOR PLAN 2
- repeat TTE  - Lasix 80 IV now, plan for 80 IV BID with goal net neg ~2-3L  - Will follow R pleural effusion and consider drainage if it does not resolve  - heme onc consult for lympocytic predominant leukocytosis  - med rec and continue home cardiac meds Imdur, norvasc  - Patient's cardiologist Dr. Paulina Encinas made aware  - recommend diuresis prior to elective jaw surgery - repeat TTE  - Lasix 80 IV BID with goal net neg ~2-3L  - Will follow R pleural effusion and consider drainage if it does not resolve  - heme onc consult for lympocytic predominant leukocytosis  - med rec and continue home cardiac meds Imdur, norvasc  - Patient's cardiologist Dr. Paulina Encinas made aware  - recommend diuresis prior to elective jaw surgery Pt volume overloaded on exam, however, denies any weight gain or exacerbation of chronic SORIA, SOB, and orthopnea symptoms. Endorses using 2-3 pillows at night. States that symptoms are chronic with intermittent improvement. BNP 1547, Trop .002    Per HF team:  - repeat TTE  - Lasix 80 IV BID with goal net neg ~2-3L  - Monitor pleural effusion and consider drainage if worsening  - Imdur, norvasc

## 2022-07-20 NOTE — H&P ADULT - ASSESSMENT
69 y/o M w/ PMH of afib (on Eliquis), HFpEF on (on lasix), CAD s/p CABG (2005), R renal transplant (on tacrolimus and valganciclovir), DM2, HTN, HLD, CLL (w/ chronic leukocytosis), Rt pleural effusion (s/p thoracentesis 12/21), p/w SORIA, orthopnea, and SOB, 2/2 CHF exacerbation w/ recurrent rt pleural effusion. Pt being followed by Dr. Lora. 69 y/o M w/ PMH of afib (on Eliquis), HFpEF on (on lasix), CAD s/p CABG (2005), R renal transplant (on tacrolimus and valganciclovir), DM2, HTN, HLD, CLL (w/ chronic leukocytosis), Rt pleural effusion (s/p thoracentesis 12/21), p/w SORIA, orthopnea, and SOB, 2/2 volume overload w/ recurrent rt pleural effusion. Pt being followed by Dr. Lora. Schedule for elective right jaw cyst removal on 7/25/22.

## 2022-07-20 NOTE — PROGRESS NOTE ADULT - PROBLEM SELECTOR PLAN 4
Hypervolemic hyponatremia, urine osm 242, urine sodium 40, calculated serum osm 273, in the setting of HFpEF.    - c/w IV diuresis   - trend BMP Hx of hyponatiremia  -Na 127  -Likely hypervolemic hyponatremia, urine osm 242, urine sodium 40, calculated serum osm 273, in the setting of HFpEF  -expect to improve with diuresis

## 2022-07-20 NOTE — ED PROVIDER NOTE - CARE PLAN
Principal Discharge DX:	Pleural effusion on right  Secondary Diagnosis:	Chronic systolic congestive heart failure   1

## 2022-07-20 NOTE — H&P ADULT - PROBLEM SELECTOR PLAN 1
CT Chest on 10/29 showing interval enlargement of moderate R pleural effusion w/ R basilar atelectasis. CXR from 11/08 showing stable pleural effusion. CXR on admission on 12/3 showing interval increase in R sided pleural effusion. Pt seen by Dr. Mishra outpatient on 12/01/21 and stopped Eliquis at that time. Pt admitted to undergo thoracentesis for R sided pleural effusion.  -c/w IV lasix 40mg q6hr  -f/u pulmonology recs s/p Thoracentesis with Dr. Lora   -resume anticoagulation and anti-hypertensives s/p thoracentesis. CXR .....  -c/w IV lasix 80mg BID  -f/u pulmonology recs   -resume anticoagulation and anti-hypertensives   - f/u HF recs CXR shows recurrent rt pleural effusion, s/p thoracentesis in 12/21. Pt is volume overloaded with 2+ pitting edema in b/l LE, and abdominal distension w/ dullness to percussion. Likely due to HFpEF exacerbation.    -c/w IV lasix 80mg BID  -f/u pulmonology recs   -resume eliquis 5 mg BID, norvasc 10 mg qd, imdur 30 mg ER qd  - f/u HF recs

## 2022-07-20 NOTE — PROGRESS NOTE ADULT - PROBLEM SELECTOR PLAN 1
CXR shows recurrent rt pleural effusion, s/p thoracentesis in 12/21. Pt is volume overloaded with 2+ pitting edema in b/l LE, and abdominal distension w/ dullness to percussion. Likely due to HFpEF exacerbation.    -c/w IV lasix 80mg BID  -f/u pulmonology recs   -resume eliquis 5 mg BID, norvasc 10 mg qd, imdur 30 mg ER qd  - f/u HF recs Hx HFpEF and recurrent right pleural effusion (s/p thoracentesis 12/21), p/w SOB when he sits up, abdominal distension, worsening LE edema 2/2 volume overload  -Exam on admission: No JVD, lungs w/ rales b/l in bases, and decreased breath sounds in RLL. Abdominal distension. 3+ pitting edema in LE to knees b/l. Satting 90% on RA  -currently satting well on RA  -BNP 1547  -CXR shows recurrent rt pleural effusion  -TTE 5/2022: Normal LV size/systolic fx. Mild LVH. Indeterminant diastolic fx (Grade II DD 10/2021). Mod dilated LA. Mild dilated RA. Normal RV size/systolic fx. Normal pulmonary pressures  -TTE ordred  -on Torsemide 40mg qd at home, recently increased to BID dosing for acute decompensation w/ inadequate response  -Pt given lasix 80mg IVP x 1 in ED  -c/w lasix 80mg IVP BID  -No ACE/ARB/ARNI 2/2 renal dz  -currently -550cc, but states some urine wasn't recorded when brought to [facility]  -Core measures  -strict I&O's, daily weight, fluid restriction

## 2022-07-20 NOTE — CONSULT NOTE ADULT - ASSESSMENT
71M PMH AF, HTN, DM, obesity, CAD (CABG '05), renal transplant, CLL, hx R pleural effusion who presents after urged by PCP with concern for volume overload. He stable SORIA (cannot state ET), stable orthopnea (2-3 pillow) and no weight gain.     RHC 12/9/21: RA 5/ RV 47/0, PA 45/24/33, PCWP 18 with V to 27. CO 5.27/CI 2.52 (TD). CO 6.43/CI 3.07 (Carloz). TPG 15, DPG 6. PVR 2.84 jackson (TD)    #HFpEF: appears to have stable symptoms although appears quite overloaded on exam which may be chronic  - repeat TTE  - Lasix 80 IV now, plan for 80 IV BID with goal net neg ~2-3L  - Will follow R pleural effusion and consider drainage if it does not resolve  - heme onc consult for lympocytic predominant leukocytosis  - med rec and continue home cardiac meds Imdur, norvasc  - Patient's cardiologist Dr. Paulina Encinas made aware  - recommend diuresis prior to elective jaw surgery    d/w HF attending  Benny Guzman MD PGY6

## 2022-07-20 NOTE — ED PROVIDER NOTE - PROGRESS NOTE DETAILS
JOSÉ Brooks, Attending: spoke to Dr. Leyva and Dr. Lora. Agree with plan for diuresis, admission, HF team consult. JOSÉ Brooks, Attending: HF team saw pt, agree with 80 lasix IV. WBC elevation noted. Similar to last visit- suspect likely due to CLL. No marked signs of infectious process.

## 2022-07-20 NOTE — PROGRESS NOTE ADULT - SUBJECTIVE AND OBJECTIVE BOX
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 Pt was seen outpatient recently by Dr Lora, who increased his home torsemide 40mg QD to BID with inadequate response. Pt is planned for removal of R jaw mass on 7/25/22 w/ Dr Lynn, and needs medical optimization prior to this. In the ED, pt found be satting 90% RA, place on 2L and now satting well. EKG revealed rate controlled afib w/ TWI in III, aVF, V3-V6. Labs revealed WBC 31K, Na 127, BUN/Cr 30/1.52, Trop 0.02, BNP 1547. CXR revealed R pleural effusion. Pt was admitted to medicine service, and given Lasix 80mg IVP x 1. Pt now transferred to [facility] cardiac telemetry for further optimization prior to R jaw mass removal on 7/25      Subjective Assessment:  Pt currently denies chills, CP, SOB, palpitations, orthopnea, n/v.      ROS Negative except as per Subjective and HPI  	  MEDICATIONS:  amLODIPine   Tablet 10 milliGRAM(s) Oral daily  furosemide   Injectable 80 milliGRAM(s) IV Push two times a day  furosemide   Injectable 80 milliGRAM(s) IV Push once  isosorbide   mononitrate ER Tablet (IMDUR) 30 milliGRAM(s) Oral daily  tamsulosin 0.4 milliGRAM(s) Oral at bedtime  acetaminophen     Tablet .. 650 milliGRAM(s) Oral every 6 hours PRN  ALPRAZolam 0.25 milliGRAM(s) Oral every 8 hours PRN  melatonin 3 milliGRAM(s) Oral at bedtime PRN  ondansetron Injectable 4 milliGRAM(s) IV Push every 8 hours PRN  atorvastatin 40 milliGRAM(s) Oral at bedtime  dextrose 50% Injectable 25 Gram(s) IV Push once  dextrose 50% Injectable 12.5 Gram(s) IV Push once  dextrose 50% Injectable 25 Gram(s) IV Push once  dextrose Oral Gel 15 Gram(s) Oral once PRN  glucagon  Injectable 1 milliGRAM(s) IntraMuscular once  insulin lispro (ADMELOG) corrective regimen sliding scale   SubCutaneous three times a day before meals  insulin lispro Injectable (ADMELOG) 5 Unit(s) SubCutaneous three times a day before meals  apixaban 5 milliGRAM(s) Oral every 12 hours  dextrose 5%. 1000 milliLiter(s) IV Continuous <Continuous>  dextrose 5%. 1000 milliLiter(s) IV Continuous <Continuous>        [PHYSICAL EXAM:  TELEMETRY:  T(C): 36.4 (07-20-22 @ 21:55), Max: 36.8 (07-20-22 @ 14:24)  HR: 75 (07-20-22 @ 22:29) (67 - 78)  BP: 128/68 (07-20-22 @ 22:29) (122/58 - 150/78)  RR: 18 (07-20-22 @ 22:29) (16 - 18)  SpO2: 94% (07-20-22 @ 22:29) (90% - 99%)  Wt(kg): --  I&O's Summary    20 Jul 2022 07:01  -  20 Jul 2022 22:35  --------------------------------------------------------  IN: 0 mL / OUT: 550 mL / NET: -550 mL      Height (cm): 167.6 (07-20 @ 14:24)  Weight (kg): 99.3 (07-20 @ 14:24)  BMI (kg/m2): 35.4 (07-20 @ 14:24)  BSA (m2): 2.08 (07-20 @ 14:24)  Lucas:  Central/PICC/Mid Line:                                         Appearance: Normal	  HEENT:   Normal oral mucosa, PERRL, EOMI	  Neck: Supple, + JVD/ - JVD; Carotid Bruit   Cardiovascular: Normal S1 S2, No JVD, No murmurs,   Respiratory: Lungs clear to auscultation/Decreased Breath Sounds/No Rales, Rhonchi, Wheezing	  Gastrointestinal:  Soft, Non-tender, + BS	  Skin: No rashes, No ecchymoses, No cyanosis  Extremities: Normal range of motion, No clubbing, cyanosis or edema  Vascular: Peripheral pulses palpable 2+ bilaterally  Neurologic: Non-focal  Psychiatry: A & O x 3, Mood & affect appropriate      	    ECG:  	  RADIOLOGY:   DIAGNOSTIC TESTING:  [ ] Echocardiogram:  [ ]  Catheterization:  [ ] Stress Test:    [ ] SUKHDEEP  OTHER: 	    LABS:	 	  CARDIAC MARKERS:                                  10.4   31.69 )-----------( 193      ( 20 Jul 2022 14:57 )             33.7     07-20    127<L>  |  88<L>  |  30<H>  ----------------------------<  142<H>  4.9   |  28  |  1.52<H>    Ca    9.2      20 Jul 2022 14:57    TPro  7.3  /  Alb  4.5  /  TBili  0.6  /  DBili  x   /  AST  18  /  ALT  16  /  AlkPhos  150<H>  07-20    proBNP: Serum Pro-Brain Natriuretic Peptide: 1547 pg/mL (07-20 @ 14:57)    Lipid Profile:   HgA1c:   TSH:   PT/INR - ( 20 Jul 2022 14:57 )   PT: 17.9 sec;   INR: 1.50          PTT - ( 20 Jul 2022 14:57 )  PTT:41.0 sec    ASSESSMENT/PLAN: 	        DVT ppx:  Dispo:     Interventional Cardiology PA Adult Medicine Stepover Note    Hospital Course:  71 y/o M w/ PMH of afib (on Eliquis, DCCV 5/2022), hx PE, HFpEF, CAD s/p CABG (2005), R renal transplant (on tacrolimus, follows w/ Dr Leyva), hx pHTN, 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 Pt was seen outpatient recently by Dr Lora, who increased his home torsemide 40mg QD to BID with inadequate response. Pt is planned for removal of R jaw mass on 7/25/22 w/ Dr Lynn, and needs medical optimization prior to this. In the ED, pt found be satting 90% RA, place on 2L and now satting well. EKG revealed rate controlled afib w/ TWI in III, aVF, V3-V6. Labs revealed WBC 31K, Na 127, BUN/Cr 30/1.52, Trop 0.02, BNP 1547. CXR revealed R pleural effusion. Pt was admitted to medicine service, and given Lasix 80mg IVP x 1. Pt now transferred to [facility] cardiac telemetry for further optimization prior to R jaw mass removal on 7/25      Subjective Assessment:  Pt currently states he feels well. States he only gets SOB when he sits up 2/2 his abdomen size. Denies chills, CP, palpitations, orthopnea, n/v.      ROS Negative except as per Subjective and HPI  	  MEDICATIONS:  amLODIPine   Tablet 10 milliGRAM(s) Oral daily  furosemide   Injectable 80 milliGRAM(s) IV Push two times a day  furosemide   Injectable 80 milliGRAM(s) IV Push once  isosorbide   mononitrate ER Tablet (IMDUR) 30 milliGRAM(s) Oral daily  tamsulosin 0.4 milliGRAM(s) Oral at bedtime  acetaminophen     Tablet .. 650 milliGRAM(s) Oral every 6 hours PRN  ALPRAZolam 0.25 milliGRAM(s) Oral every 8 hours PRN  melatonin 3 milliGRAM(s) Oral at bedtime PRN  ondansetron Injectable 4 milliGRAM(s) IV Push every 8 hours PRN  atorvastatin 40 milliGRAM(s) Oral at bedtime  dextrose 50% Injectable 25 Gram(s) IV Push once  dextrose 50% Injectable 12.5 Gram(s) IV Push once  dextrose 50% Injectable 25 Gram(s) IV Push once  dextrose Oral Gel 15 Gram(s) Oral once PRN  glucagon  Injectable 1 milliGRAM(s) IntraMuscular once  insulin lispro (ADMELOG) corrective regimen sliding scale   SubCutaneous three times a day before meals  insulin lispro Injectable (ADMELOG) 5 Unit(s) SubCutaneous three times a day before meals  apixaban 5 milliGRAM(s) Oral every 12 hours  dextrose 5%. 1000 milliLiter(s) IV Continuous <Continuous>  dextrose 5%. 1000 milliLiter(s) IV Continuous <Continuous>        [PHYSICAL EXAM:  TELEMETRY:  T(C): 36.4 (07-20-22 @ 21:55), Max: 36.8 (07-20-22 @ 14:24)  HR: 75 (07-20-22 @ 22:29) (67 - 78)  BP: 128/68 (07-20-22 @ 22:29) (122/58 - 150/78)  RR: 18 (07-20-22 @ 22:29) (16 - 18)  SpO2: 94% (07-20-22 @ 22:29) (90% - 99%)  Wt(kg): --  I&O's Summary    20 Jul 2022 07:01  -  20 Jul 2022 22:35  --------------------------------------------------------  IN: 0 mL / OUT: 550 mL / NET: -550 mL      Height (cm): 167.6 (07-20 @ 14:24)  Weight (kg): 99.3 (07-20 @ 14:24)  BMI (kg/m2): 35.4 (07-20 @ 14:24)  BSA (m2): 2.08 (07-20 @ 14:24)  Lucas:  Central/PICC/Mid Line:                                         Appearance: Normal	  HEENT:   Normal oral mucosa, PERRL, EOMI	  Neck: Supple, + JVD/ - JVD; Carotid Bruit   Cardiovascular: Normal S1 S2, No JVD, No murmurs,   Respiratory: Lungs clear to auscultation/Decreased Breath Sounds/No Rales, Rhonchi, Wheezing	  Gastrointestinal:  Soft, Non-tender, + BS	  Skin: No rashes, No ecchymoses, No cyanosis  Extremities: Normal range of motion, No clubbing, cyanosis or edema  Vascular: Peripheral pulses palpable 2+ bilaterally  Neurologic: Non-focal  Psychiatry: A & O x 3, Mood & affect appropriate      	    ECG:  	  RADIOLOGY:   DIAGNOSTIC TESTING:  [ ] Echocardiogram:  [ ]  Catheterization:  [ ] Stress Test:    [ ] SUKHDEEP  OTHER: 	    LABS:	 	  CARDIAC MARKERS:                                  10.4   31.69 )-----------( 193      ( 20 Jul 2022 14:57 )             33.7     07-20    127<L>  |  88<L>  |  30<H>  ----------------------------<  142<H>  4.9   |  28  |  1.52<H>    Ca    9.2      20 Jul 2022 14:57    TPro  7.3  /  Alb  4.5  /  TBili  0.6  /  DBili  x   /  AST  18  /  ALT  16  /  AlkPhos  150<H>  07-20    proBNP: Serum Pro-Brain Natriuretic Peptide: 1547 pg/mL (07-20 @ 14:57)    Lipid Profile:   HgA1c:   TSH:   PT/INR - ( 20 Jul 2022 14:57 )   PT: 17.9 sec;   INR: 1.50          PTT - ( 20 Jul 2022 14:57 )  PTT:41.0 sec    ASSESSMENT/PLAN: 	        DVT ppx:  Dispo:     Interventional Cardiology PA Adult Medicine Stepover Note    Hospital Course:  69 y/o M w/ PMH of afib (on Eliquis, DCCV 5/2022), hx PE, HFpEF, CAD s/p CABG (2005), R renal transplant (on tacrolimus, follows w/ Dr Leyva), hx pHTN, 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 Pt was seen outpatient recently by Dr Lora, who increased his home torsemide 40mg QD to BID with inadequate response. Pt is planned for removal of R jaw mass on 7/25/22 w/ Dr Lynn, and needs medical optimization prior to this. In the ED, pt found be satting 90% RA, place on 2L and now satting well. EKG revealed rate controlled afib w/ TWI in III, aVF, V3-V6. Labs revealed WBC 31K, Na 127, BUN/Cr 30/1.52, Trop 0.02, BNP 1547. CXR revealed R pleural effusion. Pt was admitted to medicine service, and given Lasix 80mg IVP x 1. Pt now transferred to [facility] cardiac telemetry for further optimization prior to R jaw mass removal on 7/25      Subjective Assessment:  Pt currently states he feels well. States he only gets SOB when he sits up 2/2 his abdomen size. Denies chills, CP, palpitations, orthopnea, n/v.      ROS Negative except as per Subjective and HPI  	  MEDICATIONS:  amLODIPine   Tablet 10 milliGRAM(s) Oral daily  furosemide   Injectable 80 milliGRAM(s) IV Push two times a day  furosemide   Injectable 80 milliGRAM(s) IV Push once  isosorbide   mononitrate ER Tablet (IMDUR) 30 milliGRAM(s) Oral daily  tamsulosin 0.4 milliGRAM(s) Oral at bedtime  acetaminophen     Tablet .. 650 milliGRAM(s) Oral every 6 hours PRN  ALPRAZolam 0.25 milliGRAM(s) Oral every 8 hours PRN  melatonin 3 milliGRAM(s) Oral at bedtime PRN  ondansetron Injectable 4 milliGRAM(s) IV Push every 8 hours PRN  atorvastatin 40 milliGRAM(s) Oral at bedtime  dextrose 50% Injectable 25 Gram(s) IV Push once  dextrose 50% Injectable 12.5 Gram(s) IV Push once  dextrose 50% Injectable 25 Gram(s) IV Push once  dextrose Oral Gel 15 Gram(s) Oral once PRN  glucagon  Injectable 1 milliGRAM(s) IntraMuscular once  insulin lispro (ADMELOG) corrective regimen sliding scale   SubCutaneous three times a day before meals  insulin lispro Injectable (ADMELOG) 5 Unit(s) SubCutaneous three times a day before meals  apixaban 5 milliGRAM(s) Oral every 12 hours  dextrose 5%. 1000 milliLiter(s) IV Continuous <Continuous>  dextrose 5%. 1000 milliLiter(s) IV Continuous <Continuous>        [PHYSICAL EXAM:  TELEMETRY:  T(C): 36.4 (07-20-22 @ 21:55), Max: 36.8 (07-20-22 @ 14:24)  HR: 75 (07-20-22 @ 22:29) (67 - 78)  BP: 128/68 (07-20-22 @ 22:29) (122/58 - 150/78)  RR: 18 (07-20-22 @ 22:29) (16 - 18)  SpO2: 94% (07-20-22 @ 22:29) (90% - 99%)  Wt(kg): --  I&O's Summary    20 Jul 2022 07:01  -  20 Jul 2022 22:35  --------------------------------------------------------  IN: 0 mL / OUT: 550 mL / NET: -550 mL      Height (cm): 167.6 (07-20 @ 14:24)  Weight (kg): 99.3 (07-20 @ 14:24)  BMI (kg/m2): 35.4 (07-20 @ 14:24)  BSA (m2): 2.08 (07-20 @ 14:24)  Lucas:  Central/PICC/Mid Line:                                         Appearance: laying in bed comfortably  HEENT: R sided, firm mass from R mandible to the level of the mastoid process. No erythema  Neck: Supple, - JVD  Cardiovascular: Irregularly irregular rhythm. No murmurs  Respiratory: Speaking full sentences. Rales in bases b/l w/ dec breath sounds in RLL  Gastrointestinal: Distended, firm. BS+ in all 4 quadrants. No TTP.   Skin: No rashes, No ecchymoses, No cyanosis  Extremities: 3+ pitting edema in LE to knees b/l  Neurologic: Non-focal  Psychiatry: A & O x 3, Mood & affect appropriate      	    ECG:  	  RADIOLOGY:   DIAGNOSTIC TESTING:  [ ] Echocardiogram:  [ ]  Catheterization:  [ ] Stress Test:    [ ] SUKHDEEP  OTHER: 	    LABS:	 	  CARDIAC MARKERS:                                  10.4   31.69 )-----------( 193      ( 20 Jul 2022 14:57 )             33.7     07-20    127<L>  |  88<L>  |  30<H>  ----------------------------<  142<H>  4.9   |  28  |  1.52<H>    Ca    9.2      20 Jul 2022 14:57    TPro  7.3  /  Alb  4.5  /  TBili  0.6  /  DBili  x   /  AST  18  /  ALT  16  /  AlkPhos  150<H>  07-20    proBNP: Serum Pro-Brain Natriuretic Peptide: 1547 pg/mL (07-20 @ 14:57)    Lipid Profile:   HgA1c:   TSH:   PT/INR - ( 20 Jul 2022 14:57 )   PT: 17.9 sec;   INR: 1.50          PTT - ( 20 Jul 2022 14:57 )  PTT:41.0 sec    ASSESSMENT/PLAN: 	        DVT ppx:  Dispo:     Interventional Cardiology PA Adult Medicine Stepover Note    Hospital Course:  70 y/o M w/ PMH of afib (on Eliquis, DCCV 5/2022), hx PE, HFpEF, CAD s/p CABG (2005), R renal transplant (on tacrolimus, follows w/ Dr Leyva), hx pHTN, 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 Pt was seen outpatient recently by Dr Lora, who increased his home torsemide 40mg QD to BID with inadequate response. Pt is planned for removal of R jaw mass on 7/25/22 w/ Dr Lynn, and needs medical optimization prior to this. In the ED, pt found be satting 90% RA, place on 2L and now satting well. EKG revealed rate controlled afib w/ TWI in III, aVF, V3-V6. Labs revealed WBC 31K, Na 127, BUN/Cr 30/1.52, Trop 0.02, BNP 1547. CXR revealed R pleural effusion. Pt was admitted to medicine service, and given Lasix 80mg IVP x 1. Pt now transferred to [facility] cardiac telemetry for further optimization prior to R jaw mass removal on 7/25      Subjective Assessment:  Pt currently states he feels well. States he only gets SOB when he sits up 2/2 his abdomen size. Denies chills, CP, palpitations, orthopnea, n/v.      ROS Negative except as per Subjective and HPI  	  MEDICATIONS:  amLODIPine   Tablet 10 milliGRAM(s) Oral daily  furosemide   Injectable 80 milliGRAM(s) IV Push two times a day  furosemide   Injectable 80 milliGRAM(s) IV Push once  isosorbide   mononitrate ER Tablet (IMDUR) 30 milliGRAM(s) Oral daily  tamsulosin 0.4 milliGRAM(s) Oral at bedtime  acetaminophen     Tablet .. 650 milliGRAM(s) Oral every 6 hours PRN  ALPRAZolam 0.25 milliGRAM(s) Oral every 8 hours PRN  melatonin 3 milliGRAM(s) Oral at bedtime PRN  ondansetron Injectable 4 milliGRAM(s) IV Push every 8 hours PRN  atorvastatin 40 milliGRAM(s) Oral at bedtime  dextrose 50% Injectable 25 Gram(s) IV Push once  dextrose 50% Injectable 12.5 Gram(s) IV Push once  dextrose 50% Injectable 25 Gram(s) IV Push once  dextrose Oral Gel 15 Gram(s) Oral once PRN  glucagon  Injectable 1 milliGRAM(s) IntraMuscular once  insulin lispro (ADMELOG) corrective regimen sliding scale   SubCutaneous three times a day before meals  insulin lispro Injectable (ADMELOG) 5 Unit(s) SubCutaneous three times a day before meals  apixaban 5 milliGRAM(s) Oral every 12 hours  dextrose 5%. 1000 milliLiter(s) IV Continuous <Continuous>  dextrose 5%. 1000 milliLiter(s) IV Continuous <Continuous>        [PHYSICAL EXAM:  TELEMETRY:  T(C): 36.4 (07-20-22 @ 21:55), Max: 36.8 (07-20-22 @ 14:24)  HR: 75 (07-20-22 @ 22:29) (67 - 78)  BP: 128/68 (07-20-22 @ 22:29) (122/58 - 150/78)  RR: 18 (07-20-22 @ 22:29) (16 - 18)  SpO2: 94% (07-20-22 @ 22:29) (90% - 99%)  Wt(kg): --  I&O's Summary    20 Jul 2022 07:01  -  20 Jul 2022 22:35  --------------------------------------------------------  IN: 0 mL / OUT: 550 mL / NET: -550 mL      Height (cm): 167.6 (07-20 @ 14:24)  Weight (kg): 99.3 (07-20 @ 14:24)  BMI (kg/m2): 35.4 (07-20 @ 14:24)  BSA (m2): 2.08 (07-20 @ 14:24)  Lucas:  Central/PICC/Mid Line:                                         Appearance: laying in bed comfortably  HEENT: R sided, firm mass from R mandible to the level of the mastoid process. No erythema  Neck: Supple, - JVD  Cardiovascular: Irregularly irregular rhythm. No murmurs  Respiratory: Speaking full sentences. Rales in bases b/l w/ dec breath sounds in RLL  Gastrointestinal: Distended, firm. BS+ in all 4 quadrants. No TTP.   Skin: No rashes, No ecchymoses, No cyanosis  Extremities: 3+ pitting edema in LE to knees b/l  Neurologic: Non-focal  Psychiatry: A & O x 3, Mood & affect appropriate      	    ECG:  	  RADIOLOGY:   DIAGNOSTIC TESTING:  [ ] Echocardiogram:  [ ]  Catheterization:  [ ] Stress Test:    [ ] SUKHDEEP  OTHER: 	    LABS:	 	  CARDIAC MARKERS:                                  10.4   31.69 )-----------( 193      ( 20 Jul 2022 14:57 )             33.7     07-20    127<L>  |  88<L>  |  30<H>  ----------------------------<  142<H>  4.9   |  28  |  1.52<H>    Ca    9.2      20 Jul 2022 14:57    TPro  7.3  /  Alb  4.5  /  TBili  0.6  /  DBili  x   /  AST  18  /  ALT  16  /  AlkPhos  150<H>  07-20    proBNP: Serum Pro-Brain Natriuretic Peptide: 1547 pg/mL (07-20 @ 14:57)    Lipid Profile:   HgA1c:   TSH:   PT/INR - ( 20 Jul 2022 14:57 )   PT: 17.9 sec;   INR: 1.50          PTT - ( 20 Jul 2022 14:57 )  PTT:41.0 sec    ASSESSMENT/PLAN: 	        DVT ppx:  Dispo:

## 2022-07-20 NOTE — H&P ADULT - PROBLEM SELECTOR PLAN 4
Hypervolemic Hypervolemic hyponatremia, urine osm 242, urine sodium 40, calculated serum osm 273, in the setting of HFpEF.    - c/w IV diuresis   - trend BMP

## 2022-07-20 NOTE — ED ADULT TRIAGE NOTE - CHIEF COMPLAINT QUOTE
Pt sent to ED by Dr. Lora due to fluid in lungs. Pt endorses some SOB, states he's supposed to have jaw surgery and they wanted "this to get straightened out first." "Also please call Dr. tran he wanted to be notified when I got here."

## 2022-07-20 NOTE — PROGRESS NOTE ADULT - PROBLEM SELECTOR PLAN 12
F: tolerating PO, no IVF  E: replete K<4, Mg<2  N: Complex carbohydrate    VTE Prophylaxis: eliquis 5 mg BID  GI: not needed  C: Full Code  D: F On rosuvastatin 10 mg qd    -c/w atorvastatin 40mg qd as TIC  -f/u lipid panel On rosuvastatin 10 mg qd    -c/w atorvastatin 40mg qd as TIC  -f/u lipid panel      VTE PPx: Eliquis, consider starting heparin gtt if eliquis has to be held >48h for surgery considering afib and hx PE  Dispo: cards for diuresis, R jaw mass removal Monday 7/25  PT eval ordered  Pt is Full code

## 2022-07-20 NOTE — PROGRESS NOTE ADULT - PROBLEM SELECTOR PLAN 7
Transplant in 2017 at [place]. Pt on Tacrolimus, previously on valgancyclovir, but d/c.    -c/w Tacrolimus, 2mg in AM, 1mg in PM Transplant in 2017 at [place]. Pt on Tacrolimus, previously on valgancyclovir, but d/c.  -BUN/Cr 30/1.52  -follows w/ Dr Leyva  -c/w Tacrolimus, 2mg in AM, 1mg in PM  -avoid nephrotoxic agents  -monitor Cr w/ diuresis

## 2022-07-20 NOTE — ED PROVIDER NOTE - PHYSICAL EXAMINATION
General: alert, conversant, looks well, vitals reassuring  Head: atraumatic, normocephalic  Eyes: PERRL, EOMI, no scleral icterus  ENT: no epistaxis, moist mucous membranes, normal phonation, airway patent, R jaw mass felt, no fluctuance   Neck: full ROM, trachea midline   CV: RRR, no murmurs, HDS  Pulm: diminished, nearly absent, R base, no wheezing, L side clear, spo2 97 on room air, mildly orthopneic   GI: abd soft, non tender, no guarding/rebound/masses  Back: normal ROM, no midline ttp, no signs of trauma  Extremities: normal ROM, joints stable, distal pulses intact, 2+ peripheral edema  Neuro: alert, oriented x3, moving all extremities, interactive, normal speech/memory/gait  Derm: warm, dry, normal color, no rash/wounds

## 2022-07-20 NOTE — PATIENT PROFILE ADULT - FALL HARM RISK - HARM RISK INTERVENTIONS

## 2022-07-20 NOTE — H&P ADULT - NSHPREVIEWOFSYSTEMS_GEN_ALL_CORE
REVIEW OF SYSTEMS:    CONSTITUTIONAL: No weakness, fevers or chills  EYES/ENT: No visual changes;  No vertigo or throat pain   NECK: No pain or stiffness  RESPIRATORY: shortness of breath with exertion   CARDIOVASCULAR: No chest pain or palpitations  GASTROINTESTINAL: No abdominal or epigastric pain. No nausea, vomiting, or hematemesis; No diarrhea or constipation. No melena or hematochezia.  GENITOURINARY: No dysuria, frequency or hematuria  EXTREMITIES: no LE edema  NEUROLOGICAL: No numbness or weakness REVIEW OF SYSTEMS:  CONSTITUTIONAL: ++ weakness, no fevers, chills, or changes in weight  EYES/ENT: No visual changes;  No vertigo or throat pain   NECK: No pain or stiffness  RESPIRATORY: No cough, wheezing, hemoptysis; No shortness of breath  CARDIOVASCULAR: no chest pain or palpitations  GASTROINTESTINAL: No abdominal or epigastric pain. No nausea, vomiting, or hematemesis; No diarrhea or constipation. No melena or hematochezia.  GENITOURINARY: No dysuria, frequency or hematuria  NEUROLOGICAL: No numbness or weakness  SKIN: No itching, rashes  EXTREMITIES: + lower extremity edema

## 2022-07-20 NOTE — PATIENT PROFILE ADULT - VISION (WITH CORRECTIVE LENSES IF THE PATIENT USUALLY WEARS THEM):
Ambulatory
Partially impaired: cannot see medication labels or newsprint, but can see obstacles in path, and the surrounding layout; can count fingers at arm's length

## 2022-07-20 NOTE — H&P ADULT - NSHPLABSRESULTS_GEN_ALL_CORE
LABS:                         10.4   31.69 )-----------( 193      ( 20 Jul 2022 14:57 )             33.7     07-20    127<L>  |  88<L>  |  30<H>  ----------------------------<  142<H>  4.9   |  28  |  1.52<H>    Ca    9.2      20 Jul 2022 14:57    TPro  7.3  /  Alb  4.5  /  TBili  0.6  /  DBili  x   /  AST  18  /  ALT  16  /  AlkPhos  150<H>  07-20    PT/INR - ( 20 Jul 2022 14:57 )   PT: 17.9 sec;   INR: 1.50          PTT - ( 20 Jul 2022 14:57 )  PTT:41.0 sec    CARDIAC MARKERS ( 20 Jul 2022 14:57 )  x     / 0.02 ng/mL / x     / x     / x          Serum Pro-Brain Natriuretic Peptide: 1547 pg/mL (07-20 @ 14:57)        RADIOLOGY, EKG & ADDITIONAL TESTS: Reviewed.

## 2022-07-20 NOTE — PROGRESS NOTE ADULT - ASSESSMENT
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, now returned), JAYANT (has not started CPAP yet), CLL (w/ chronic leukocytosis), DM2, HTN, HLD, Rt pleural effusion (s/p thoracentesis 12/21, follow w/ Dr Lora) p/w SORIA, orthopnea, and SOB, 2/2 volume overload w/ recurrent rt pleural effusion, now transferred to [facility] for further diuresis prior to elective right jaw mass removal on 7/25/22 w/ Dr Lynn 71 y/o M w/ PMH of afib (on Eliquis, DCCV 5/2022), hx PE, HFpEF, CAD s/p CABG (2005), R renal transplant (on tacrolimus, follows w/ Dr Leyva), hx pHTN, jaw mass (s/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, now returned), JAYANT (has not started CPAP yet), CLL (w/ chronic leukocytosis), DM2, HTN, HLD, Rt pleural effusion (s/p thoracentesis 12/21, follow w/ Dr Lora) p/w SOB when he sits up, abdominal distension, worsening LE edema 2/2 volume overload w/ recurrent rt pleural effusion, now transferred to [facility] for further diuresis prior to elective right jaw mass removal on 7/25/22 w/ Dr Lynn 70 y/o M w/ PMH of afib (on Eliquis, DCCV 5/2022), hx PE, HFpEF, CAD s/p CABG (2005), R renal transplant (on tacrolimus, follows w/ Dr Leyva), hx pHTN, jaw mass (s/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

## 2022-07-20 NOTE — CONSULT NOTE ADULT - SUBJECTIVE AND OBJECTIVE BOX
71M PMH AF, HTN, DM, obesity, CAD (CABG '05), renal transplant, CLL, hx R pleural effusion who presents after urged by PCP with concern for volume overload. He stable SORIA (cannot state ET), stable orthopnea (2-3 pillow) and no weight gain. Denies cp or palpitations. Does endorse infrequent episodes of lightheadedness.      HPI:    PAST MEDICAL & SURGICAL HISTORY:  Barretts esophagus      CRI (chronic renal insufficiency)      DM (diabetes mellitus)      GERD (gastroesophageal reflux disease)      HTN (hypertension)      Monoclonal gammopathy      Mandibular abscess      Paroxysmal atrial fibrillation      Benign prostatic hyperplasia, presence of lower urinary tract symptoms unspecified, unspecified morphology      Other hyperlipidemia      Hyperlipidemia      S/P CABG (coronary artery bypass graft)  15 years      History of surgery  cyst removal      Mandibular abscess      ALLERGIES/INTOLERANCES:  No Known Allergies    HOME MEDICATIONS:    INPATIENT MEDICATIONS:  furosemide   Injectable 80 milliGRAM(s) IV Push once          REVIEW OF SYSTEMS:    CONSTITUTIONAL: No weakness, F/C, wt loss/gain  EYES: No visual changes/disturbances  ENMT: No dry mouth, no vertigo  NECK: No pain or stiffness  RESPIRATORY: No cough, wheezing, hemoptysis; No shortness of breath  CARDIOVASCULAR: No chest pain, palpitations, lightheadedness/dizziness, LOC, or leg swelling  GASTROINTESTINAL: No abdominal or epigastric pain. No N/V/D/C. No melena, hematochezia, or hematemesis.  GENITOURINARY: No dysuria, increased frequency, hematuria, or incontinence  NEUROLOGICAL: No lightheadedness/dizziness, LOC, headaches, numbness or weakness  MUSCULOSKELETAL: No joint pain or swelling; No muscle, back, or extremity pain  SKIN: No itching, burning, rashes, or lesions   ENDOCRINE: No heat or cold intolerance; No hair loss  HEME/LYMPH: No easy bruising, or bleeding gums  PSYCHIATRIC: No depression, anxiety, mood swings, or difficulty sleeping    [ ] All other review of systems are negative unless indicated above.  [ ] Unable to obtain due to:    PHYSICAL EXAM:    T(C): 36.8 (07-20-22 @ 14:24), Max: 36.8 (07-20-22 @ 14:24)  HR: 76 (07-20-22 @ 14:24) (76 - 76)  BP: 141/68 (07-20-22 @ 14:24) (141/68 - 141/68)  RR: 18 (07-20-22 @ 14:24) (18 - 18)  SpO2: 99% (07-20-22 @ 14:24) (99% - 99%)  Wt(kg): --    I&O's Summary  NAD  JVD to mid neck  decreased breath sounds at R base  Irregular, normal rate no m/r/g  2+ LE edema b/l, wwp      TELEMETRY: 	      ECG:  	  	  LABS:                        10.4   31.69 )-----------( 193      ( 20 Jul 2022 14:57 )             33.7     07-20    127<L>  |  88<L>  |  30<H>  ----------------------------<  142<H>  4.9   |  28  |  1.52<H>    Ca    9.2      20 Jul 2022 14:57    TPro  7.3  /  Alb  4.5  /  TBili  0.6  /  DBili  x   /  AST  18  /  ALT  16  /  AlkPhos  150<H>  07-20      Lipid Profile:   HgA1c:   TSH:     CARDIAC MARKERS:          proBNP: Serum Pro-Brain Natriuretic Peptide: 1547 pg/mL (07-20 @ 14:57)  Serum Pro-Brain Natriuretic Peptide: 1676 pg/mL (07-17 @ 10:57)      RADIOLOGY:      ASSESSMENT/PLAN: 71M PMH AF, HTN, DM, obesity, CAD (CABG '05), renal transplant, CLL, hx R pleural effusion who presents after urged by PCP with concern for volume overload. He stable SORIA (cannot state ET), stable orthopnea (2-3 pillow) and no weight gain.     #HFpEF: appears to have stable symptoms although appears quite overloaded on exam which may be chronic  - repeat TTE  - Lasix 80 IV now, plan for 80 IV BID with goal net neg ~2-3L  - Will follow R pleural effusion and consider drainage if it does not resolve  - heme onc consult for lympocytic predominant leukocytosis  - med rec and continue home cardiac meds Imdur, norvasc  - Patient's cardiologist Dr. Paulina Encinas made aware  - recommend diuresis prior to elective jaw surgery    d/w HF attending  Benny Guzman MD PGY6 71M PMH AF, HTN, DM, obesity, CAD (CABG '05), renal transplant, CLL, hx R pleural effusion who presents after urged by PCP with concern for volume overload. He stable SORIA (cannot state ET), stable orthopnea (2-3 pillow) and no weight gain. Denies cp or palpitations. Does endorse infrequent episodes of lightheadedness.      HPI:    PAST MEDICAL & SURGICAL HISTORY:  Barretts esophagus      CRI (chronic renal insufficiency)      DM (diabetes mellitus)      GERD (gastroesophageal reflux disease)      HTN (hypertension)      Monoclonal gammopathy      Mandibular abscess      Paroxysmal atrial fibrillation      Benign prostatic hyperplasia, presence of lower urinary tract symptoms unspecified, unspecified morphology      Other hyperlipidemia      Hyperlipidemia      S/P CABG (coronary artery bypass graft)  15 years      History of surgery  cyst removal      Mandibular abscess      ALLERGIES/INTOLERANCES:  No Known Allergies    HOME MEDICATIONS:    INPATIENT MEDICATIONS:  furosemide   Injectable 80 milliGRAM(s) IV Push once          REVIEW OF SYSTEMS:    CONSTITUTIONAL: No weakness, F/C, wt loss/gain  EYES: No visual changes/disturbances  ENMT: No dry mouth, no vertigo  NECK: No pain or stiffness  RESPIRATORY: No cough, wheezing, hemoptysis; No shortness of breath  CARDIOVASCULAR: No chest pain, palpitations, lightheadedness/dizziness, LOC, or leg swelling  GASTROINTESTINAL: No abdominal or epigastric pain. No N/V/D/C. No melena, hematochezia, or hematemesis.  GENITOURINARY: No dysuria, increased frequency, hematuria, or incontinence  NEUROLOGICAL: No lightheadedness/dizziness, LOC, headaches, numbness or weakness  MUSCULOSKELETAL: No joint pain or swelling; No muscle, back, or extremity pain  SKIN: No itching, burning, rashes, or lesions   ENDOCRINE: No heat or cold intolerance; No hair loss  HEME/LYMPH: No easy bruising, or bleeding gums  PSYCHIATRIC: No depression, anxiety, mood swings, or difficulty sleeping    [ ] All other review of systems are negative unless indicated above.  [ ] Unable to obtain due to:    PHYSICAL EXAM:    T(C): 36.8 (07-20-22 @ 14:24), Max: 36.8 (07-20-22 @ 14:24)  HR: 76 (07-20-22 @ 14:24) (76 - 76)  BP: 141/68 (07-20-22 @ 14:24) (141/68 - 141/68)  RR: 18 (07-20-22 @ 14:24) (18 - 18)  SpO2: 99% (07-20-22 @ 14:24) (99% - 99%)  Wt(kg): --    I&O's Summary  NAD  JVD to mid neck  decreased breath sounds at R base  Irregular, normal rate no m/r/g  2+ LE edema b/l, wwp      TELEMETRY: 	      ECG:  	  	  LABS:                        10.4   31.69 )-----------( 193      ( 20 Jul 2022 14:57 )             33.7     07-20    127<L>  |  88<L>  |  30<H>  ----------------------------<  142<H>  4.9   |  28  |  1.52<H>    Ca    9.2      20 Jul 2022 14:57    TPro  7.3  /  Alb  4.5  /  TBili  0.6  /  DBili  x   /  AST  18  /  ALT  16  /  AlkPhos  150<H>  07-20      Lipid Profile:   HgA1c:   TSH:     CARDIAC MARKERS:          proBNP: Serum Pro-Brain Natriuretic Peptide: 1547 pg/mL (07-20 @ 14:57)  Serum Pro-Brain Natriuretic Peptide: 1676 pg/mL (07-17 @ 10:57)      RADIOLOGY:      ASSESSMENT/PLAN: 71M PMH AF, HTN, DM, obesity, CAD (CABG '05), renal transplant, CLL, hx R pleural effusion who presents after urged by PCP with concern for volume overload. He stable SORIA (cannot state ET), stable orthopnea (2-3 pillow) and no weight gain.     RHC 12/9/21: RA 5/ RV 47/0, PA 45/24/33, PCWP 18 with V to 27. CO 5.27/CI 2.52 (TD). CO 6.43/CI 3.07 (Carloz). TPG 15, DPG 6. PVR 2.84 jackson (TD)    #HFpEF: appears to have stable symptoms although appears quite overloaded on exam which may be chronic  - repeat TTE  - Lasix 80 IV now, plan for 80 IV BID with goal net neg ~2-3L  - Will follow R pleural effusion and consider drainage if it does not resolve  - heme onc consult for lympocytic predominant leukocytosis  - med rec and continue home cardiac meds Imdur, norvasc  - Patient's cardiologist Dr. Paulina Encinas made aware  - recommend diuresis prior to elective jaw surgery    d/w HF attending  Benny Guzman MD PGY6 71M PMH AF, HTN, DM, obesity, CAD (CABG '05), renal transplant, CLL, hx R pleural effusion who presents after urged by PCP with concern for volume overload. He stable SORIA (cannot state ET), stable orthopnea (2-3 pillow) and no weight gain. Denies cp or palpitations. Does endorse infrequent episodes of lightheadedness.      HPI:    PAST MEDICAL & SURGICAL HISTORY:  Barretts esophagus      CRI (chronic renal insufficiency)      DM (diabetes mellitus)      GERD (gastroesophageal reflux disease)      HTN (hypertension)      Monoclonal gammopathy      Mandibular abscess      Paroxysmal atrial fibrillation      Benign prostatic hyperplasia, presence of lower urinary tract symptoms unspecified, unspecified morphology      Other hyperlipidemia      Hyperlipidemia      S/P CABG (coronary artery bypass graft)  15 years      History of surgery  cyst removal      Mandibular abscess      ALLERGIES/INTOLERANCES:  No Known Allergies    HOME MEDICATIONS:    INPATIENT MEDICATIONS:  furosemide   Injectable 80 milliGRAM(s) IV Push once          REVIEW OF SYSTEMS:    CONSTITUTIONAL: No weakness, F/C, wt loss/gain  EYES: No visual changes/disturbances  ENMT: No dry mouth, no vertigo  NECK: No pain or stiffness  RESPIRATORY: No cough, wheezing, hemoptysis; No shortness of breath  CARDIOVASCULAR: No chest pain, palpitations, lightheadedness/dizziness, LOC, or leg swelling  GASTROINTESTINAL: No abdominal or epigastric pain. No N/V/D/C. No melena, hematochezia, or hematemesis.  GENITOURINARY: No dysuria, increased frequency, hematuria, or incontinence  NEUROLOGICAL: No lightheadedness/dizziness, LOC, headaches, numbness or weakness  MUSCULOSKELETAL: No joint pain or swelling; No muscle, back, or extremity pain  SKIN: No itching, burning, rashes, or lesions   ENDOCRINE: No heat or cold intolerance; No hair loss  HEME/LYMPH: No easy bruising, or bleeding gums  PSYCHIATRIC: No depression, anxiety, mood swings, or difficulty sleeping    [ ] All other review of systems are negative unless indicated above.  [ ] Unable to obtain due to:    PHYSICAL EXAM:    T(C): 36.8 (07-20-22 @ 14:24), Max: 36.8 (07-20-22 @ 14:24)  HR: 76 (07-20-22 @ 14:24) (76 - 76)  BP: 141/68 (07-20-22 @ 14:24) (141/68 - 141/68)  RR: 18 (07-20-22 @ 14:24) (18 - 18)  SpO2: 99% (07-20-22 @ 14:24) (99% - 99%)  Wt(kg): --    I&O's Summary  NAD  JVD to mid neck  decreased breath sounds at R base  Irregular, normal rate no m/r/g  2+ LE edema b/l, wwp      TELEMETRY: 	      ECG:  	  	  LABS:                        10.4   31.69 )-----------( 193      ( 20 Jul 2022 14:57 )             33.7     07-20    127<L>  |  88<L>  |  30<H>  ----------------------------<  142<H>  4.9   |  28  |  1.52<H>    Ca    9.2      20 Jul 2022 14:57    TPro  7.3  /  Alb  4.5  /  TBili  0.6  /  DBili  x   /  AST  18  /  ALT  16  /  AlkPhos  150<H>  07-20      Lipid Profile:   HgA1c:   TSH:     CARDIAC MARKERS:          proBNP: Serum Pro-Brain Natriuretic Peptide: 1547 pg/mL (07-20 @ 14:57)  Serum Pro-Brain Natriuretic Peptide: 1676 pg/mL (07-17 @ 1

## 2022-07-20 NOTE — H&P ADULT - PROBLEM SELECTOR PLAN 10
On home insulin, humalog 15U TID. Pt previously on lantus 30 mg, but states he has been noncompliant.    - MISS  - lispro 10U TID

## 2022-07-20 NOTE — H&P ADULT - TIME BILLING
Patient seen and examined with house-staff during bedside rounds.  Resident note read, including vitals, physical findings, laboratory data, and radiological reports.   Revisions included below.  Direct personal management at bed side and extensive interpretation of the data.  Plan was outlined and discussed in details with the housestaff.  Decision making of high complexity  Action taken for acute disease activity to reflect the level of care provided:  - medication reconciliation  - review laboratory data      The patient is well-known to me.  The patient was discharged from the emergency room after he refused admission on the weekend.  The sodium increased slightly.  The patient's chest x-ray shows slight improvement in the evidence of fluid overload.  Patient need optimization for surgery on Monday.  I discussed the case with his the heart failure has services.  Increase diuretics.  Follow sodium.  Noninvasive ventilation at night.

## 2022-07-20 NOTE — PROGRESS NOTE ADULT - PROBLEM SELECTOR PLAN 11
On rosuvastatin 10 mg qd    -c/w home meds On home insulin, humalog 15U TID. Pt previously on lantus 30 mg, but states he has been noncompliant.    - MISS  - lispro 10U TID  - f/u A1C On home insulin, humalog 15U TID. Pt previously on lantus 30 mg, but states he has been noncompliant.    - MISS  - lispro 5U TID  - f/u A1C

## 2022-07-20 NOTE — PROGRESS NOTE ADULT - PROBLEM SELECTOR PLAN 9
On norvasc 5 10 mg Qd    - c/w home meds Severe JAYANT, follows w/ Dr Lora  -originally refusing CPAP until after his jaw mass procedure, now willing to try w/ nasal mask only  -CPAP ordered

## 2022-07-20 NOTE — PROGRESS NOTE ADULT - PROBLEM SELECTOR PLAN 10
On home insulin, humalog 15U TID. Pt previously on lantus 30 mg, but states he has been noncompliant.    - MISS  - lispro 10U TID -c/w home meds amlodipine 10mg daily, Imdur 30mg daily  -c/w lasix IVP as above

## 2022-07-20 NOTE — PROGRESS NOTE ADULT - PROBLEM SELECTOR PLAN 5
Follows with oncology at [facility]. No lymphadenopathy appreciated on physical exam. Pt with chronic leukocytosis, 17.2 in 12/21, now 31.6. Afebrile, no other signs or symptoms of infectious process. Leukocytosis likely due to CLL.    - trend CBC  - possible heme/onc consult in AM Follows with oncology at [facility]. No lymphadenopathy appreciated on physical exam. Pt with chronic leukocytosis, 17.2 in 12/21, now 31.6. Afebrile, no other signs or symptoms of infectious process. Leukocytosis likely due to CLL.  - trend CBC  - possible heme/onc consult in AM

## 2022-07-20 NOTE — H&P ADULT - PROBLEM SELECTOR PLAN 6
On eliquis 5 mg On eliquis 5 mg BID at home. Currently asymptomatic, no chest or tachycardia. Pt in afib on monitor during encounter. EKG from ED also shows afib.    - c/w eliquis, hold for 72 hours prior to elective surgery on 7/25 On eliquis 5 mg BID at home. Currently asymptomatic, no chest or tachycardia. Pt in afib on monitor during encounter. EKG from ED also shows afib and RBBB.     - c/w eliquis, hold for 72 hours prior to elective surgery on 7/25

## 2022-07-20 NOTE — H&P ADULT - PROBLEM SELECTOR PLAN 7
Hx of renal transplant in 2017 at [place]. Pt on Tacrolimus and Valganciclovir at home.  -c/w Tacrolimus, 2mg in AM, 1mg in PM  -c/w valganciclovir 450mg PO qd   -Renal following -- f/u recs. Transplant in 2017 at [place]. Pt on Tacrolimus, previously on valgancyclovir, but d/c.    -c/w Tacrolimus, 2mg in AM, 1mg in PM

## 2022-07-20 NOTE — H&P ADULT - NSHPPHYSICALEXAM_GEN_ALL_CORE
Vital Signs Last 24 Hrs  T(C): 36.8 (20 Jul 2022 14:24), Max: 36.8 (20 Jul 2022 14:24)  T(F): 98.2 (20 Jul 2022 14:24), Max: 98.2 (20 Jul 2022 14:24)  HR: 67 (20 Jul 2022 16:46) (67 - 78)  BP: 122/58 (20 Jul 2022 16:46) (122/58 - 141/68)  BP(mean): --  RR: 16 (20 Jul 2022 16:46) (16 - 18)  SpO2: 94% (20 Jul 2022 16:46) (90% - 99%)    Parameters below as of 20 Jul 2022 16:46  Patient On (Oxygen Delivery Method): nasal cannula  O2 Flow (L/min): 2      PHYSICAL EXAM:  Constitutional: laying in bed on 2 L NC, speaking in full sentences, NAD  Eyes: PER, EOMI, clear conjunctiva  ENT: no nasal discharge; no oropharyngeal erythema or exudates; MMM  Respiratory: reduced breath sounds in R lower lung base, no wheezing noted, CTA in all other lung fields  Cardiac: +S1/S2; RRR; no M/R/G  Gastrointestinal: non-distended, non-tender, no rebound tenderness, no guarding noted   Extremities: WWP, no peripheral edema.  Musculoskeletal: NROM x4; no joint swelling, tenderness or erythema  Lymphatic: no lymphadenopathy   Neurologic: AAOx3; CNII-XII grossly intact; no focal deficits  Psychiatric: affect and characteristics of appearance, verbalizations, behaviors are appropriate Vital Signs Last 24 Hrs  T(C): 36.8 (20 Jul 2022 14:24), Max: 36.8 (20 Jul 2022 14:24)  T(F): 98.2 (20 Jul 2022 14:24), Max: 98.2 (20 Jul 2022 14:24)  HR: 67 (20 Jul 2022 16:46) (67 - 78)  BP: 122/58 (20 Jul 2022 16:46) (122/58 - 141/68)  BP(mean): --  RR: 16 (20 Jul 2022 16:46) (16 - 18)  SpO2: 94% (20 Jul 2022 16:46) (90% - 99%)      VITAL SIGNS:  T(C): 36.8 (07-20-22 @ 14:24), Max: 36.8 (07-20-22 @ 14:24)  T(F): 98.2 (07-20-22 @ 14:24), Max: 98.2 (07-20-22 @ 14:24)  HR: 67 (07-20-22 @ 16:46) (67 - 78)  BP: 122/58 (07-20-22 @ 16:46) (122/58 - 141/68)  BP(mean): --  RR: 16 (07-20-22 @ 16:46) (16 - 18)  SpO2: 94% (07-20-22 @ 16:46) (90% - 99%)  Wt(kg): --    PHYSICAL EXAM:  Constitutional: Obese elderly male sitting in chair on 2L NC; NAD  Head: NC/AT  Eyes: PER, EOMI, anicteric sclera  ENT: no nasal discharge; uvula midline, no oropharyngeal erythema or exudates; MMM  Neck: supple; no JVD   Respiratory: reduced breath sounds in the right lower lung field. mild crackles in b/l upper fields  Cardiac: +S1/S2; Regular rate, irregular rhythm; no M/R/G  Gastrointestinal: abdomen distended, diffuse dullness to percussion, nontender; no rebound or guarding  Back: spine midline, no bony tenderness or step-offs; no CVAT B/L  Extremities: WWP, no clubbing or cyanosis; 2+ pitting edema in the b/l LE up to below the knee.  Musculoskeletal: NROM x4; no joint swelling, tenderness or erythema  Vascular: 2+ radial, DP pulses B/L  Dermatologic: skin warm, dry and intact; no rashes, wounds, or scars  Neurologic: AAOx3; CNII-XII grossly intact; no focal deficits  Psychiatric: affect and characteristics of appearance, verbalizations, behaviors are appropriate

## 2022-07-20 NOTE — PROGRESS NOTE ADULT - PROBLEM SELECTOR PLAN 6
On eliquis 5 mg BID at home. Currently asymptomatic, no chest or tachycardia. Pt in afib on monitor during encounter. EKG from ED also shows afib and RBBB.     - c/w eliquis, hold for 72 hours prior to elective surgery on 7/25 s/p DCCV w/ Dr Schuster 5/2022  -currently in rate controlled afib, current HR 70s  -c/w Eliquis 5mg BID, not on rate control med at home  -d/w Dr Lynn's team in am for how long to hold a/c prior to procedure  history of afib/DCCV/ablation?

## 2022-07-20 NOTE — H&P ADULT - PROBLEM SELECTOR PLAN 12
F: tolerating PO, no IVF  E: replete K<4, Mg<2  N: Complex carbohydrate    VTE Prophylaxis: eliquis 5 mg BID  GI: not needed  C: Full Code  D: F

## 2022-07-20 NOTE — H&P ADULT - PROBLEM SELECTOR PLAN 5
WBC.... Follows with oncology at [facility]. No lymphadenopathy appreciated on physical exam. Pt with chronic leukocytosis, 17.2 in 12/21, now 31.6. Afebrile, no other signs or symptoms of infectious process. Leukocytosis likely due to CLL.    - trend CBC  - possible heme/onc consult in AM

## 2022-07-20 NOTE — H&P ADULT - PROBLEM SELECTOR PLAN 3
Pt has hx of chronic hypoxemia, sent home with home O2 from hospitalization in July 2021. Patient monitors O2 saturations at home with monitor and utilizes O2 when he notices saturations to be below 90% SpO2. Pt having increasing dyspnea with exertion.   -continue to monitor O2 saturations, currently on 2L NC   -wean as tolerated. Hx of chronic hypoxemia, on home O2, however, pt is only compliant in the evenings. Was recommended for CPAP, patient only uses 2L NC in the evening. Patient monitors O2 saturations at home with monitor and utilizes O2 when he notices saturations to be below 90% SpO2.    -continue to monitor O2 saturations, currently on 2L NC

## 2022-07-20 NOTE — PROGRESS NOTE ADULT - PROBLEM SELECTOR PLAN 3
Hx of chronic hypoxemia, on home O2, however, pt is only compliant in the evenings. Was recommended for CPAP, patient only uses 2L NC in the evening. Patient monitors O2 saturations at home with monitor and utilizes O2 when he notices saturations to be below 90% SpO2.    -continue to monitor O2 saturations, currently on 2L NC Hx R jaw mass, s/p resection ~2-3 years ago w/ Dr Lynn  -mass returned Hx R jaw mass, s/p resection ~2-3 years ago w/ Dr Lynn  -mass returned recently, now pt planned for removal w/ Dr Lynn Monday 7/25  -d/w Dr Lynn in am regarding holding a/c

## 2022-07-20 NOTE — H&P ADULT - NSHPSOCIALHISTORY_GEN_ALL_CORE
Pt lives at home with his wife  Non-smoker, non-drinker, no illicit drug use, no herbal supplements or OTC vitamins   Pt works as an art seller Pt lives at home with his wife  Non-smoker, non-drinker, no illicit drug use  Pt works as an art seller

## 2022-07-20 NOTE — H&P ADULT - HISTORY OF PRESENT ILLNESS
**INCOMPLETE NOTE****    71 y/o M w/ PMH of afib (on Eliquis), HFpEF on (on lasix), CAD s/p CABG (2005), R renal transplant (on tacrolimus and valganciclovir), DM2, HTN, HLD, CLL (w/ chronic leukocytosis), Rt pleural effusion (s/p thoracentesis 12/21), p/w SORIA, orthopnea, and SOB. Pt states his symptoms began to worsen.....days ago and was found to have a recurrent rt sided pleural effusion as an outpatient. Pt states compliance w/ home meds, including torsemide 40 mg.  Reports SpO2 between .... at home,  He denies any chest pain, cough, fever, night sweats, weight loss, nausea, diarrhea, dysuria, sick contacts. Pt being admitted for pre-op optimization and diuresis, in preparation for rt jaw cyst removal next week.        **INCOMPLETE NOTE****    69 y/o M w/ PMH of afib (on Eliquis), HFpEF on (on torsemide), CAD s/p CABG (on rosuvastatin), R renal transplant (on tacrolimus), DM2 (humalog 15 U TID w/ meals), HTN (norvasc 10 mg), HLD (rosuvastatin), CLL (w/ chronic leukocytosis), Rt pleural effusion (s/p thoracentesis 12/21), p/w SORIA, orthopnea, and SOB for several months w/ . Pt states his symptoms began to worsen.....days ago and was found to have a recurrent rt sided pleural effusion as an outpatient. Pt states compliance w/ home meds, including torsemide 40 mg.  Reports SpO2 between .... at home,  He denies any chest pain, cough, fever, night sweats, weight loss, nausea, diarrhea, dysuria, sick contacts. Pt being admitted for pre-op optimization and diuresis, in preparation for rt jaw cyst removal next week.        **INCOMPLETE NOTE****    69 y/o M w/ PMH of afib (on Eliquis), HFpEF on (on torsemide), CAD s/p CABG (on rosuvastatin), R renal transplant (on tacrolimus), DM2 (humalog 15 U TID w/ meals), HTN (norvasc 10 mg), HLD (rosuvastatin), CLL (w/ chronic leukocytosis), Rt pleural effusion (s/p thoracentesis 12/21), p/w SORIA, orthopnea, and SOB for several months which intermittently worsen then improve Pt was found to have a recurrent rt sided pleural effusion as an outpatient and was started on torsemide PO. Pt states compliance w/ home meds.  Reports SpO2 between 88-96 at home while resting or laying down. He denies any chest pain, cough, fever, night sweats, weight loss, nausea, diarrhea, dysuria, sick contacts. Pt being admitted for pre-op optimization and diuresis, in preparation for rt jaw cyst removal next week.

## 2022-07-20 NOTE — ED ADULT NURSE NOTE - OBJECTIVE STATEMENT
71 y.o M a&ox4 walked in from triage c.o SOB. x weeks of SOB with exertion. PT has a jaw surgery next week was sent in by MD jay to have fluids drained pre-surgery. PT placed on 2 L NC, PT States "I use oxygen at home as needed." Denies CP, fevers, chills, n/v/d, cough, sore throat. Placed on CCM, NSR. speaking in clear, full coherent sentences. airway patent breathing unlabored.

## 2022-07-20 NOTE — ED PROVIDER NOTE - OBJECTIVE STATEMENT
71 yoM, PMHX of CAD, s/p remote CABG, HFrEF on diuretics, renal transplant on immunosuppressives, DM, HTN, CLL, HLD, prior R sided pleural effusion p/w dyspnea, orthopnea and feeling like he has reaccumulated. Required thoracentesis in Dec 2021 for this effusion. Complaint with torsemide which is 40 mg BID (increased last week after recurrence of effusion). Sent in by PCP and pulm for optimization and diuresis inpatient given has large surgery planned next week on R jaw for recurrent of jaw cyst. Pt denies f/c, chest pain, covid exposures. Uses supple O2 at night. Told he likely needs CPAP.

## 2022-07-20 NOTE — PROGRESS NOTE ADULT - PROBLEM SELECTOR PLAN 8
On home pantoprazole 20 mg qd    - c/w home med s/p CABG 2005, NST in fall 2021 negative per cardiology notes on last [facility] admission  -Denies any CP  -Trop 0.02  -EKG w/ rate controlled afib, RBBB, TWI III, aVF, V3-V6. Similar to previous  -c/w lipitor 40mg daily as TIC for home crestor 10mg daily  -f/u lipid panel  -f/u echo

## 2022-07-21 NOTE — PROGRESS NOTE ADULT - PROBLEM SELECTOR PLAN 8
Pt with Hx of T2DM. At home, patient uses sliding scale require 20-40 units before each meal. HbA1c in November 2021 6.8.  -c/w mISS   -Endo consulted - f/u recs  -Lantus 15U/Lispro 15U

## 2022-07-21 NOTE — PROGRESS NOTE ADULT - PROBLEM SELECTOR PLAN 4
Hx of renal transplant in 2017 at [place]. Pt on Tacrolimus and Valganciclovir at home.  -c/w Tacrolimus, 2mg in AM, 1mg in PM  -c/w valganciclovir 450mg PO qd   -Renal following -- f/u recs

## 2022-07-21 NOTE — DIETITIAN INITIAL EVALUATION ADULT - OTHER CALCULATIONS
5'6''  pounds +-10%  Wt 218 pounds, BMI 35.4, %CVV=761  IBW used to calculate energy needs due to pt's current body weight exceeding 120% of IBW  Adjust for age, Renal/CHF, Risk for malnutrition, Pending OR; fluids per team   * Rec upper end EER

## 2022-07-21 NOTE — DIETITIAN INITIAL EVALUATION ADULT - OTHER INFO
69 y/o M PMH of afib (on Eliquis), HFpEF, CAD s/p CABG, R renal transplant (on tacrolimus), DM2, HTN, HLD, CLL, Rt pleural effusion (s/p thoracentesis 12/21), p/w SORIA, orthopnea, and SOB for several months which intermittently worsen then improve. Pt was found to have a recurrent rt sided pleural effusion as an outpatient and was started on torsemide. Pt being admitted for pre-op optimization and diuresis, in preparation for R jaw cyst removal next week 7/25. Heart failure consulted for concern of decompensated heart failure exacerbation.    Pt consulted to be seen today. Pt on 5UR, wife at bedside. Seemed to have limited concern for RD visit. Pt with Jaw cyst, Pending SX-reports needing softer food d/t issues chewing. Wife has been making homemade soup (no salt/broth/stock used). Able to tolerate eggs, tuna. Not taking ONS/does not seemt to want now. Decreased POx2-3 weeks d/t issues chewing. Reports wt loss however reports he had been trying to follow diet and lose wt prior to having issues chewing. In total lost ~14 pounds. Deferred NFPE. Unable to DX malnutrition at this time, however remains at High Nutrition Risk if PO cont to be limited and unintended wt loss to cont. Attempted to review diet education in regards to need for good intake, soft protein foods as well as currently ordered fluid restriction. However limited concern noted. NKFA. Current diet order for low sodium/consCHO diet+1000ml fluid restriction. Reports he has been ordering foods that he can tolerate and denied need for softer vs easy to chew diet. No pain. Isac 20. 1+Gen and 2+BL leg edema. BM+ 7/20.   Labs: POCT 243 234, Na 125, BUN/Cr 29/1.41, glucose 192, Mg 1.4, A1c 6.7%, HDL 20, BNP 1547.   Please see below for nutritions recommendations.

## 2022-07-21 NOTE — PROGRESS NOTE ADULT - SUBJECTIVE AND OBJECTIVE BOX
Heart Failure Consult Note    INTERVAL EVENTS: No CP, no SOB. Reports increased UOP after diuresis o/n.   TELEMETRY: AFib, rates as low as 45-49    PAST MEDICAL & SURGICAL HISTORY:  Barretts esophagus    CRI (chronic renal insufficiency)    DM (diabetes mellitus)    GERD (gastroesophageal reflux disease)    HTN (hypertension)    Monoclonal gammopathy    Mandibular abscess    Paroxysmal atrial fibrillation    Benign prostatic hyperplasia, presence of lower urinary tract symptoms unspecified, unspecified morphology    Other hyperlipidemia    Hyperlipidemia    No significant past surgical history    S/P CABG (coronary artery bypass graft)  15 years    History of surgery  cyst removal    Mandibular abscess        MEDICATIONS  (STANDING):  amLODIPine   Tablet 10 milliGRAM(s) Oral daily  apixaban 5 milliGRAM(s) Oral every 12 hours  atorvastatin 40 milliGRAM(s) Oral at bedtime  dextrose 5%. 1000 milliLiter(s) (50 mL/Hr) IV Continuous <Continuous>  dextrose 5%. 1000 milliLiter(s) (100 mL/Hr) IV Continuous <Continuous>  dextrose 50% Injectable 25 Gram(s) IV Push once  dextrose 50% Injectable 12.5 Gram(s) IV Push once  dextrose 50% Injectable 25 Gram(s) IV Push once  furosemide   Injectable 80 milliGRAM(s) IV Push two times a day  glucagon  Injectable 1 milliGRAM(s) IntraMuscular once  insulin lispro (ADMELOG) corrective regimen sliding scale   SubCutaneous Before meals and at bedtime  insulin lispro Injectable (ADMELOG) 10 Unit(s) SubCutaneous three times a day before meals  isosorbide   mononitrate ER Tablet (IMDUR) 30 milliGRAM(s) Oral daily  tacrolimus 1 milliGRAM(s) Oral at bedtime  tacrolimus 2 milliGRAM(s) Oral daily  tamsulosin 0.4 milliGRAM(s) Oral at bedtime    MEDICATIONS  (PRN):  acetaminophen     Tablet .. 650 milliGRAM(s) Oral every 6 hours PRN Temp greater or equal to 38C (100.4F), Mild Pain (1 - 3)  ALPRAZolam 0.25 milliGRAM(s) Oral every 8 hours PRN anxiety  dextrose Oral Gel 15 Gram(s) Oral once PRN Blood Glucose LESS THAN 70 milliGRAM(s)/deciliter  melatonin 3 milliGRAM(s) Oral at bedtime PRN Insomnia  ondansetron Injectable 4 milliGRAM(s) IV Push every 8 hours PRN Nausea and/or Vomiting    Vital Signs Last 24 Hrs  T(C): 36.6 (21 Jul 2022 14:03), Max: 36.6 (20 Jul 2022 19:37)  T(F): 97.9 (21 Jul 2022 14:03), Max: 97.9 (20 Jul 2022 19:37)  HR: 67 (21 Jul 2022 12:29) (65 - 78)  BP: 128/60 (21 Jul 2022 12:29) (122/58 - 150/78)  BP(mean): 87 (21 Jul 2022 12:29) (87 - 97)  RR: 18 (21 Jul 2022 12:29) (16 - 22)  SpO2: 92% (21 Jul 2022 12:29) (92% - 98%)    Parameters below as of 21 Jul 2022 12:29  Patient On (Oxygen Delivery Method): room air        PHYSICAL EXAM:  GEN: Awake, alert. NAD.   HEENT: JVP elevated  CV: RRR. Normal S1/S2. No m/r/g.  ABD: Soft. NT/ND. BS+  EXT: 2+ LE edema, warm.   NEURO: AAOx3. No focal deficits.     LABS:                        10.3   26.96 )-----------( 178      ( 21 Jul 2022 07:10 )             33.7     07-21    125<L>  |  86<L>  |  29<H>  ----------------------------<  192<H>  4.2   |  29  |  1.41<H>    Ca    9.0      21 Jul 2022 07:10  Phos  4.0     07-21  Mg     1.4     07-21    TPro  7.2  /  Alb  4.4  /  TBili  0.6  /  DBili  x   /  AST  20  /  ALT  15  /  AlkPhos  151<H>  07-21    CARDIAC MARKERS ( 20 Jul 2022 14:57 )  x     / 0.02 ng/mL / x     / x     / x          PT/INR - ( 20 Jul 2022 14:57 )   PT: 17.9 sec;   INR: 1.50          PTT - ( 20 Jul 2022 14:57 )  PTT:41.0 sec    I&O's Summary    20 Jul 2022 07:01  -  21 Jul 2022 07:00  --------------------------------------------------------  IN: 0 mL / OUT: 3160 mL / NET: -3160 mL    21 Jul 2022 07:01  -  21 Jul 2022 14:45  --------------------------------------------------------  IN: 480 mL / OUT: 900 mL / NET: -420 mL      RADIOLOGY & ADDITIONAL STUDIES:    EKG:

## 2022-07-21 NOTE — PHYSICAL THERAPY INITIAL EVALUATION ADULT - ADDITIONAL COMMENTS
Pt states he lives with wife in elevator building with 3 ADAM. Pt has SC but does not use and was independent with ADLs PTA.

## 2022-07-21 NOTE — DIETITIAN INITIAL EVALUATION ADULT - PERTINENT MEDS FT
Placed order and printed for new rx for levothyroixine   Back down to 75 mcg   Will need TSh in 8 weeks   Order placed for that  [facility] 
[facility]s [facility] at [phone]
MEDICATIONS  (STANDING):  amLODIPine   Tablet 10 milliGRAM(s) Oral daily  apixaban 5 milliGRAM(s) Oral every 12 hours  atorvastatin 40 milliGRAM(s) Oral at bedtime  dextrose 5%. 1000 milliLiter(s) (50 mL/Hr) IV Continuous <Continuous>  dextrose 5%. 1000 milliLiter(s) (100 mL/Hr) IV Continuous <Continuous>  dextrose 50% Injectable 25 Gram(s) IV Push once  dextrose 50% Injectable 12.5 Gram(s) IV Push once  dextrose 50% Injectable 25 Gram(s) IV Push once  furosemide   Injectable 80 milliGRAM(s) IV Push two times a day  glucagon  Injectable 1 milliGRAM(s) IntraMuscular once  insulin glargine Injectable (LANTUS) 22 Unit(s) SubCutaneous at bedtime  insulin lispro (ADMELOG) corrective regimen sliding scale   SubCutaneous Before meals and at bedtime  insulin lispro Injectable (ADMELOG) 10 Unit(s) SubCutaneous three times a day before meals  isosorbide   mononitrate ER Tablet (IMDUR) 30 milliGRAM(s) Oral daily  tacrolimus 1 milliGRAM(s) Oral at bedtime  tacrolimus 2 milliGRAM(s) Oral daily  tamsulosin 0.4 milliGRAM(s) Oral at bedtime    MEDICATIONS  (PRN):  acetaminophen     Tablet .. 650 milliGRAM(s) Oral every 6 hours PRN Temp greater or equal to 38C (100.4F), Mild Pain (1 - 3)  ALPRAZolam 0.25 milliGRAM(s) Oral every 8 hours PRN anxiety  dextrose Oral Gel 15 Gram(s) Oral once PRN Blood Glucose LESS THAN 70 milliGRAM(s)/deciliter  melatonin 3 milliGRAM(s) Oral at bedtime PRN Insomnia  ondansetron Injectable 4 milliGRAM(s) IV Push every 8 hours PRN Nausea and/or Vomiting

## 2022-07-21 NOTE — PROGRESS NOTE ADULT - PROBLEM SELECTOR PLAN 10
-c/w home meds amlodipine 10mg daily, Imdur 30mg daily  -c/w lasix IVP as above -c/w home meds amlodipine 10mg daily, Imdur 30mg daily  -c/w Lasix IV as above

## 2022-07-21 NOTE — PROGRESS NOTE ADULT - PROBLEM SELECTOR PLAN 2
-hx R pleural effusion, s/p thoracentesis 12/2021, follows with Dr Lora  -CXR shows R pleural effusion  -currently satting well on RA  -c/w diuresis as above  -f/u Dr Lora rec's -hx R pleural effusion, s/p thoracentesis 12/2021, follows with Pulm Dr Lora  -CXR shows reaccumulated R pleural effusion. Repeat CXR w/ improving effusion  -c/w diuresis as above  -F/u Dr Lora rec's

## 2022-07-21 NOTE — PROGRESS NOTE ADULT - PROBLEM SELECTOR PLAN 12
On rosuvastatin 10 mg qd    -c/w atorvastatin 40mg qd as TIC  -f/u lipid panel      VTE PPx: Eliquis, consider starting heparin gtt if eliquis has to be held >48h for surgery considering afib and hx PE  Dispo: cards for diuresis, R jaw mass removal Monday 7/25  PT eval ordered  Pt is Full code

## 2022-07-21 NOTE — DIETITIAN INITIAL EVALUATION ADULT - NS FNS DIET ORDER
Diet, Regular:   Consistent Carbohydrate {Evening Snack} (CSTCHOSN)  1000mL Fluid Restriction ([id number])  Low Sodium (07-21-22 @ 08:11)

## 2022-07-21 NOTE — CONSULT NOTE ADULT - ATTENDING COMMENTS
I agree with the fellow's findings and plans as written above with the following additions/amendments:    Seen and examined at bedside. Discussed transplant, medications, and current optimization prior to surgery. Given edema may have poor wound healing if not diuresed well, will be aggressive prior to surgery while hopefully sparing and further kidney injury. Monitoring closely. Otherwise patient in NAD, nontender allograft without thrill or bruit. Further recs as above
71/M PMH pAF with recent unsuccessful DCCV, COPD/ JAYANT on nightly O2, HTN, DM, obesity, CAD (CABG '05), renal transplant, CLL, hx R pleural effusion s/p thoracentesis, with chronic symptoms of fluid overload. recently seen in pulm clinic for pre op assessment and noted to be volume overloaded. increased torsemide 40mg with good response. now presented to ER as per his nephrologist, who he saw yesterday.     vitals: 70's, /70.   JVP elevated  reduced BS at rt base  b/l 2 + LE oedema    labs:                        10.3   26.96 )-----------( 178      ( 21 Jul 2022 07:10 )             33.7   07-21    125<L>  |  86<L>  |  29<H>  ----------------------------<  192<H>  4.2   |  29  |  1.41<H>    Ca    9.0      21 Jul 2022 07:10  Phos  4.0     07-21  Mg     1.4     07-21    TPro  7.2  /  Alb  4.4  /  TBili  0.6  /  DBili  x   /  AST  20  /  ALT  15  /  AlkPhos  151<H>  07-21    pro BNP: 1547    Plan:  Lasix 80 IV BID  A.fib rate controlled, cont NOAC  cont imdur  can optimize his antihypertensives  nephro follow up for renal transplant  Heme onc follow up with elevated WBC to 30k and h/o CLL    discussed the plan with Pulm Dr. Lora

## 2022-07-21 NOTE — CONSULT NOTE ADULT - SUBJECTIVE AND OBJECTIVE BOX
HPI:      69 y/o M w/ PMH of afib on elequis  HFpEF,  CAD s/p CABG (2005) ,  R renal transplant 2017 (on tacrolimus), DM2  HTN  HLD , CLL, MGUS,  was  sent in by PCP and pulm for optimization and diuresis inpatient given has large surgery planned next week on R jaw for recurrent of jaw cyst  Pt being admitted for pre-op optimization and diuresis, in preparation for rt jaw cyst removal next week.  Nephrology consulted for volume overload.    (20 Jul 2022 17:18)      PAST MEDICAL & SURGICAL HISTORY:  Barretts esophagus      CRI (chronic renal insufficiency)      DM (diabetes mellitus)      GERD (gastroesophageal reflux disease)      HTN (hypertension)      Monoclonal gammopathy      Mandibular abscess      Paroxysmal atrial fibrillation      Benign prostatic hyperplasia, presence of lower urinary tract symptoms unspecified, unspecified morphology      Other hyperlipidemia      Hyperlipidemia      S/P CABG (coronary artery bypass graft)  15 years      History of surgery  cyst removal      Mandibular abscess            Allergies:  No Known Allergies      Home Medications:   acetaminophen     Tablet .. 650 milliGRAM(s) Oral every 6 hours PRN  ALPRAZolam 0.25 milliGRAM(s) Oral every 8 hours PRN  amLODIPine   Tablet 10 milliGRAM(s) Oral daily  apixaban 5 milliGRAM(s) Oral every 12 hours  atorvastatin 40 milliGRAM(s) Oral at bedtime  dextrose 5%. 1000 milliLiter(s) IV Continuous <Continuous>  dextrose 5%. 1000 milliLiter(s) IV Continuous <Continuous>  dextrose 50% Injectable 25 Gram(s) IV Push once  dextrose 50% Injectable 12.5 Gram(s) IV Push once  dextrose 50% Injectable 25 Gram(s) IV Push once  dextrose Oral Gel 15 Gram(s) Oral once PRN  furosemide   Injectable 80 milliGRAM(s) IV Push two times a day  glucagon  Injectable 1 milliGRAM(s) IntraMuscular once  insulin lispro (ADMELOG) corrective regimen sliding scale   SubCutaneous Before meals and at bedtime  insulin lispro Injectable (ADMELOG) 10 Unit(s) SubCutaneous three times a day before meals  isosorbide   mononitrate ER Tablet (IMDUR) 30 milliGRAM(s) Oral daily  melatonin 3 milliGRAM(s) Oral at bedtime PRN  ondansetron Injectable 4 milliGRAM(s) IV Push every 8 hours PRN  tacrolimus 1 milliGRAM(s) Oral at bedtime  tacrolimus 2 milliGRAM(s) Oral daily  tamsulosin 0.4 milliGRAM(s) Oral at bedtime      Hospital Medications:   MEDICATIONS  (STANDING):  amLODIPine   Tablet 10 milliGRAM(s) Oral daily  apixaban 5 milliGRAM(s) Oral every 12 hours  atorvastatin 40 milliGRAM(s) Oral at bedtime  dextrose 5%. 1000 milliLiter(s) (50 mL/Hr) IV Continuous <Continuous>  dextrose 5%. 1000 milliLiter(s) (100 mL/Hr) IV Continuous <Continuous>  dextrose 50% Injectable 25 Gram(s) IV Push once  dextrose 50% Injectable 12.5 Gram(s) IV Push once  dextrose 50% Injectable 25 Gram(s) IV Push once  furosemide   Injectable 80 milliGRAM(s) IV Push two times a day  glucagon  Injectable 1 milliGRAM(s) IntraMuscular once  insulin lispro (ADMELOG) corrective regimen sliding scale   SubCutaneous Before meals and at bedtime  insulin lispro Injectable (ADMELOG) 10 Unit(s) SubCutaneous three times a day before meals  isosorbide   mononitrate ER Tablet (IMDUR) 30 milliGRAM(s) Oral daily  tacrolimus 1 milliGRAM(s) Oral at bedtime  tacrolimus 2 milliGRAM(s) Oral daily  tamsulosin 0.4 milliGRAM(s) Oral at bedtime      SOCIAL HISTORY:  Denies ETOh, Smoking    Family History:  FAMILY HISTORY:  No pertinent family history in first degree relatives          VITALS:  T(F): 97.6 (07-21-22 @ 10:24), Max: 98.2 (07-20-22 @ 14:24)  HR: 70 (07-21-22 @ 08:40)  BP: 138/62 (07-21-22 @ 08:40)  RR: 18 (07-21-22 @ 08:40)  SpO2: 95% (07-21-22 @ 08:40)  Wt(kg): --    07-20 @ 07:01  -  07-21 @ 07:00  --------------------------------------------------------  IN: 0 mL / OUT: 3160 mL / NET: -3160 mL    07-21 @ 07:01  -  07-21 @ 12:12  --------------------------------------------------------  IN: 240 mL / OUT: 400 mL / NET: -160 mL      Height (cm): 167.6 (07-20 @ 14:24)  Weight (kg): 99.3 (07-20 @ 14:24)  BMI (kg/m2): 35.4 (07-20 @ 14:24)  BSA (m2): 2.08 (07-20 @ 14:24)  CAPILLARY BLOOD GLUCOSE      POCT Blood Glucose.: 234 mg/dL (20 Jul 2022 22:49)      Review of Systems:  CONSTITUTIONAL: No fever or chills.  RESPIRATORY: No shortness of breath, cough  CARDIOVASCULAR: No Chest pain, shortness of breath, palpitations  GASTROINTESTINAL: No abdominal pain, nausea, vomiting, diarrhea  GENITOURINARY: No urinary frequency, gross hematuria, dysuria  NEUROLOGICAL: No headache, weakness  SKIN: No rash or skin lesion  MUSCULOSKELETAL: No swelling  Psych: Denies suicidal or homicidal ideation    PHYSICAL EXAM:  GENERAL: Alert, awake, oriented x3, No acute distress sitting comfortably on chair   HEENT: FADY, EOMI, neck supple, no JVP  CHEST/LUNG: decreased breath sounds with crackles heard b/l   HEART: Regular rate and rhythm, no murmur, no gallops, no rub   ABDOMEN: Soft, nontender, non distended, renal transplant, no thrill, not tender or warm to touch   : No flank or supra pubic tenderness.  EXTREMITIES: 2+ pitting edema b/l LE   Neurology: AAOx3, no focal neurological deficit, follows commands   SKIN: No rash or skin lesion     LABS:  07-21    125<L>  |  86<L>  |  29<H>  ----------------------------<  192<H>  4.2   |  29  |  1.41<H>    Ca    9.0      21 Jul 2022 07:10  Phos  4.0     07-21  Mg     1.4     07-21    TPro  7.2  /  Alb  4.4  /  TBili  0.6  /  DBili      /  AST  20  /  ALT  15  /  AlkPhos  151<H>  07-21    Creatinine Trend: 1.41 <--, 1.59 <--, 1.52 <--, 1.51 <--                        10.3   26.96 )-----------( 178      ( 21 Jul 2022 07:10 )             33.7     Urine Studies:  Urinalysis Basic - ( 17 Jul 2022 18:20 )    Color: Yellow / Appearance: Clear / SG: <=1.005 / pH:   Gluc:  / Ketone: NEGATIVE  / Bili: Negative / Urobili: 0.2 E.U./dL   Blood:  / Protein: NEGATIVE mg/dL / Nitrite: NEGATIVE   Leuk Esterase: NEGATIVE / RBC:  / WBC    Sq Epi:  / Non Sq Epi:  / Bacteria:       Sodium, Random Urine: 64 mmol/L (07-21 @ 05:30)  Osmolality, Random Urine: 203 mosm/kg (07-21 @ 05:30)  Creatinine, Random Urine: 20 mg/dL (07-21 @ 05:30)  Osmolality, Random Urine: 242 mosm/kg (07-20 @ 16:26)  Sodium, Random Urine: 40 mmol/L (07-20 @ 16:25)  Creatinine, Random Urine: 46 mg/dL (07-20 @ 16:25)  Protein/Creatinine Ratio Calculation: 0.1 Ratio (07-20 @ 16:25)  Potassium, Random Urine: 39 mmol/L (07-20 @ 16:25)  Sodium, Random Urine: 43 mmol/L (07-17 @ 18:20)  Creatinine, Random Urine: 48 mg/dL (07-17 @ 18:20)  Osmolality, Random Urine: 233 mosm/kg (07-17 @ 18:20)               HPI:      71 y/o M w/ PMH of afib on elequis  HFpEF,  CAD s/p CABG (2005) ,  R renal transplant 2017 (on tacrolimus), DM2  HTN  HLD , CLL, MGUS,  was  sent in by PCP and pulm for optimization and diuresis inpatient given has large surgery planned next week on R jaw for recurrent of jaw cyst  Pt being admitted for pre-op optimization and diuresis, in preparation for rt jaw cyst removal next week.  Nephrology consulted for volume overload.    (20 Jul 2022 17:18)      PAST MEDICAL & SURGICAL HISTORY:  Barretts esophagus      CRI (chronic renal insufficiency)      DM (diabetes mellitus)      GERD (gastroesophageal reflux disease)      HTN (hypertension)      Monoclonal gammopathy      Mandibular abscess      Paroxysmal atrial fibrillation      Benign prostatic hyperplasia, presence of lower urinary tract symptoms unspecified, unspecified morphology      Other hyperlipidemia      Hyperlipidemia      S/P CABG (coronary artery bypass graft)  15 years      History of surgery  cyst removal      Mandibular abscess            Allergies:  No Known Allergies      Home Medications:   acetaminophen     Tablet .. 650 milliGRAM(s) Oral every 6 hours PRN  ALPRAZolam 0.25 milliGRAM(s) Oral every 8 hours PRN  amLODIPine   Tablet 10 milliGRAM(s) Oral daily  apixaban 5 milliGRAM(s) Oral every 12 hours  atorvastatin 40 milliGRAM(s) Oral at bedtime  dextrose 5%. 1000 milliLiter(s) IV Continuous <Continuous>  dextrose 5%. 1000 milliLiter(s) IV Continuous <Continuous>  dextrose 50% Injectable 25 Gram(s) IV Push once  dextrose 50% Injectable 12.5 Gram(s) IV Push once  dextrose 50% Injectable 25 Gram(s) IV Push once  dextrose Oral Gel 15 Gram(s) Oral once PRN  furosemide   Injectable 80 milliGRAM(s) IV Push two times a day  glucagon  Injectable 1 milliGRAM(s) IntraMuscular once  insulin lispro (ADMELOG) corrective regimen sliding scale   SubCutaneous Before meals and at bedtime  insulin lispro Injectable (ADMELOG) 10 Unit(s) SubCutaneous three times a day before meals  isosorbide   mononitrate ER Tablet (IMDUR) 30 milliGRAM(s) Oral daily  melatonin 3 milliGRAM(s) Oral at bedtime PRN  ondansetron Injectable 4 milliGRAM(s) IV Push every 8 hours PRN  tacrolimus 1 milliGRAM(s) Oral at bedtime  tacrolimus 2 milliGRAM(s) Oral daily  tamsulosin 0.4 milliGRAM(s) Oral at bedtime      Hospital Medications:   MEDICATIONS  (STANDING):  amLODIPine   Tablet 10 milliGRAM(s) Oral daily  apixaban 5 milliGRAM(s) Oral every 12 hours  atorvastatin 40 milliGRAM(s) Oral at bedtime  dextrose 5%. 1000 milliLiter(s) (50 mL/Hr) IV Continuous <Continuous>  dextrose 5%. 1000 milliLiter(s) (100 mL/Hr) IV Continuous <Continuous>  dextrose 50% Injectable 25 Gram(s) IV Push once  dextrose 50% Injectable 12.5 Gram(s) IV Push once  dextrose 50% Injectable 25 Gram(s) IV Push once  furosemide   Injectable 80 milliGRAM(s) IV Push two times a day  glucagon  Injectable 1 milliGRAM(s) IntraMuscular once  insulin lispro (ADMELOG) corrective regimen sliding scale   SubCutaneous Before meals and at bedtime  insulin lispro Injectable (ADMELOG) 10 Unit(s) SubCutaneous three times a day before meals  isosorbide   mononitrate ER Tablet (IMDUR) 30 milliGRAM(s) Oral daily  tacrolimus 1 milliGRAM(s) Oral at bedtime  tacrolimus 2 milliGRAM(s) Oral daily  tamsulosin 0.4 milliGRAM(s) Oral at bedtime      SOCIAL HISTORY:  Denies ETOh, Smoking    Family History:  FAMILY HISTORY:  No pertinent family history in first degree relatives          VITALS:  T(F): 97.6 (07-21-22 @ 10:24), Max: 98.2 (07-20-22 @ 14:24)  HR: 70 (07-21-22 @ 08:40)  BP: 138/62 (07-21-22 @ 08:40)  RR: 18 (07-21-22 @ 08:40)  SpO2: 95% (07-21-22 @ 08:40)  Wt(kg): --    07-20 @ 07:01  -  07-21 @ 07:00  --------------------------------------------------------  IN: 0 mL / OUT: 3160 mL / NET: -3160 mL    07-21 @ 07:01  -  07-21 @ 12:12  --------------------------------------------------------  IN: 240 mL / OUT: 400 mL / NET: -160 mL      Height (cm): 167.6 (07-20 @ 14:24)  Weight (kg): 99.3 (07-20 @ 14:24)  BMI (kg/m2): 35.4 (07-20 @ 14:24)  BSA (m2): 2.08 (07-20 @ 14:24)  CAPILLARY BLOOD GLUCOSE      POCT Blood Glucose.: 234 mg/dL (20 Jul 2022 22:49)      Review of Systems:  CONSTITUTIONAL: No fever or chills.  RESPIRATORY: No shortness of breath, cough  CARDIOVASCULAR: No Chest pain, shortness of breath, palpitations  GASTROINTESTINAL: No abdominal pain, nausea, vomiting, diarrhea  GENITOURINARY: No urinary frequency, gross hematuria, dysuria  NEUROLOGICAL: No headache, weakness  SKIN: No rash or skin lesion  MUSCULOSKELETAL: No swelling  Psych: Denies suicidal or homicidal ideation    PHYSICAL EXAM:  GENERAL: Alert, awake, oriented x3, No acute distress sitting comfortably on chair   HEENT: FADY, EOMI, neck supple, no JVP  CHEST/LUNG: decreased breath sounds with crackles heard b/l   HEART: Regular rate and rhythm, no murmur, no gallops, no rub   ABDOMEN: Soft, nontender, non distended, renal transplant, no thrill, not tender or warm to touch   : No flank or supra pubic tenderness.  EXTREMITIES: 2+ pitting edema b/l LE   Neurology: AAOx3, no focal neurological deficit, follows commands   SKIN: No rash or skin lesion     LABS:  07-21    125<L>  |  86<L>  |  29<H>  ----------------------------<  192<H>  4.2   |  29  |  1.41<H>    Ca    9.0      21 Jul 2022 07:10  Phos  4.0     07-21  Mg     1.4     07-21    TPro  7.2  /  Alb  4.4  /  TBili  0.6  /  DBili      /  AST  20  /  ALT  15  /  AlkPhos  151<H>  07-21    Creatinine Trend: 1.41 <--, 1.59 <--, 1.52 <--, 1.51 <--                        10.3   26.96 )-----------( 178      ( 21 Jul 2022 07:10 )             33.7     Urine Studies:  Urinalysis Basic - ( 17 Jul 2022 18:20 )    Color: Yellow / Appearance: Clear / SG: <=1.005 / pH:   Gluc:  / Ketone: NEGATIVE  / Bili: Negative / Urobili: 0.2 E.U./dL   Blood:  / Protein: NEGATIVE mg/dL / Nitrite: NEGATIVE   Leuk Esterase: NEGATIVE / RBC:  / WBC    Sq Epi:  / Non Sq Epi:  / Bacteria:       Sodium, Random Urine: 64 mmol/L (07-21 @ 05:30)  Osmolality, Random Urine: 203 mosm/kg (07-21 @ 05:30)  Creatinine, Random Urine: 20 mg/dL (07-21 @ 05:30)  Osmolality, Random Urine: 242 mosm/kg (07-20 @ 16:26)  Sodium, Random Urine: 40 mmol/L (07-20 @ 16:25)  Creatinine, Random Urine: 46 mg/dL (07-20 @ 16:25)  Protein/Creatinine Ratio Calculation: 0.1 Ratio (07-20 @ 16:25)  Potassium, Random Urine: 39 mmol/L (07-20 @ 16:25)  Sodium, Random Urine: 43 mmol/L (07-17 @ 18:20)  Creatinine, Random Urine: 48 mg/dL (07-17 @ 18:20)  Osmolality, Random Urine: 233 mosm/kg (07-17 @ 18:20)      Creatinine, Serum: 1.41 mg/dL (07-21-22 @ 07:10)  Creatinine, Serum: 1.59 mg/dL (07-21-22 @ 00:43)  Creatinine, Serum: 1.52 mg/dL (07-20-22 @ 14:57)  Creatinine, Serum: 1.51 mg/dL (07-17-22 @ 10:57)  Creatinine, Serum: 1.38 mg/dL (12-10-21 @ 05:56)  Creatinine, Serum: 1.48 mg/dL (12-09-21 @ 07:06)  Creatinine, Serum: 1.40 mg/dL (12-08-21 @ 08:37)  Creatinine, Serum: 1.35 mg/dL (12-07-21 @ 07:34)  Creatinine, Serum: 1.21 mg/dL (12-06-21 @ 06:47)  Creatinine, Serum: 1.27 mg/dL (12-05-21 @ 10:57)           HPI:      69 y/o M w/ PMH of afib on elequis  HFpEF,  CAD s/p CABG (2005) ,  R renal transplant 2017 (on tacrolimus), DM2  HTN  HLD , CLL, MGUS,  was  sent in by PCP and pulm for optimization and diuresis inpatient given has large surgery planned next week on R jaw for recurrent of jaw cyst  Pt being admitted for pre-op optimization and diuresis, in preparation for rt jaw cyst removal next week.  Nephrology consulted for volume overload.    (20 Jul 2022 17:18)      PAST MEDICAL & SURGICAL HISTORY:  Barretts esophagus      CRI (chronic renal insufficiency)      DM (diabetes mellitus)      GERD (gastroesophageal reflux disease)      HTN (hypertension)      Monoclonal gammopathy      Mandibular abscess      Paroxysmal atrial fibrillation      Benign prostatic hyperplasia, presence of lower urinary tract symptoms unspecified, unspecified morphology      Other hyperlipidemia      Hyperlipidemia      S/P CABG (coronary artery bypass graft)  15 years      History of surgery  cyst removal      Mandibular abscess            Allergies:  No Known Allergies      Home Medications:   acetaminophen     Tablet .. 650 milliGRAM(s) Oral every 6 hours PRN  ALPRAZolam 0.25 milliGRAM(s) Oral every 8 hours PRN  amLODIPine   Tablet 10 milliGRAM(s) Oral daily  apixaban 5 milliGRAM(s) Oral every 12 hours  atorvastatin 40 milliGRAM(s) Oral at bedtime  dextrose 5%. 1000 milliLiter(s) IV Continuous <Continuous>  dextrose 5%. 1000 milliLiter(s) IV Continuous <Continuous>  dextrose 50% Injectable 25 Gram(s) IV Push once  dextrose 50% Injectable 12.5 Gram(s) IV Push once  dextrose 50% Injectable 25 Gram(s) IV Push once  dextrose Oral Gel 15 Gram(s) Oral once PRN  furosemide   Injectable 80 milliGRAM(s) IV Push two times a day  glucagon  Injectable 1 milliGRAM(s) IntraMuscular once  insulin lispro (ADMELOG) corrective regimen sliding scale   SubCutaneous Before meals and at bedtime  insulin lispro Injectable (ADMELOG) 10 Unit(s) SubCutaneous three times a day before meals  isosorbide   mononitrate ER Tablet (IMDUR) 30 milliGRAM(s) Oral daily  melatonin 3 milliGRAM(s) Oral at bedtime PRN  ondansetron Injectable 4 milliGRAM(s) IV Push every 8 hours PRN  tacrolimus 1 milliGRAM(s) Oral at bedtime  tacrolimus 2 milliGRAM(s) Oral daily  tamsulosin 0.4 milliGRAM(s) Oral at bedtime      Hospital Medications:   MEDICATIONS  (STANDING):  amLODIPine   Tablet 10 milliGRAM(s) Oral daily  apixaban 5 milliGRAM(s) Oral every 12 hours  atorvastatin 40 milliGRAM(s) Oral at bedtime  dextrose 5%. 1000 milliLiter(s) (50 mL/Hr) IV Continuous <Continuous>  dextrose 5%. 1000 milliLiter(s) (100 mL/Hr) IV Continuous <Continuous>  dextrose 50% Injectable 25 Gram(s) IV Push once  dextrose 50% Injectable 12.5 Gram(s) IV Push once  dextrose 50% Injectable 25 Gram(s) IV Push once  furosemide   Injectable 80 milliGRAM(s) IV Push two times a day  glucagon  Injectable 1 milliGRAM(s) IntraMuscular once  insulin lispro (ADMELOG) corrective regimen sliding scale   SubCutaneous Before meals and at bedtime  insulin lispro Injectable (ADMELOG) 10 Unit(s) SubCutaneous three times a day before meals  isosorbide   mononitrate ER Tablet (IMDUR) 30 milliGRAM(s) Oral daily  tacrolimus 1 milliGRAM(s) Oral at bedtime  tacrolimus 2 milliGRAM(s) Oral daily  tamsulosin 0.4 milliGRAM(s) Oral at bedtime      SOCIAL HISTORY:  Denies ETOh, Smoking    Family History:  FAMILY HISTORY:  No pertinent family history in first degree relatives          VITALS:  T(F): 97.6 (07-21-22 @ 10:24), Max: 98.2 (07-20-22 @ 14:24)  HR: 70 (07-21-22 @ 08:40)  BP: 138/62 (07-21-22 @ 08:40)  RR: 18 (07-21-22 @ 08:40)  SpO2: 95% (07-21-22 @ 08:40)  Wt(kg): --    07-20 @ 07:01  -  07-21 @ 07:00  --------------------------------------------------------  IN: 0 mL / OUT: 3160 mL / NET: -3160 mL    07-21 @ 07:01  -  07-21 @ 12:12  --------------------------------------------------------  IN: 240 mL / OUT: 400 mL / NET: -160 mL      Height (cm): 167.6 (07-20 @ 14:24)  Weight (kg): 99.3 (07-20 @ 14:24)  BMI (kg/m2): 35.4 (07-20 @ 14:24)  BSA (m2): 2.08 (07-20 @ 14:24)  CAPILLARY BLOOD GLUCOSE      POCT Blood Glucose.: 234 mg/dL (20 Jul 2022 22:49)      Review of Systems:  CONSTITUTIONAL: No fever or chills.  RESPIRATORY: No shortness of breath, cough  CARDIOVASCULAR: No Chest pain, shortness of breath, palpitations  GASTROINTESTINAL: No abdominal pain, nausea, vomiting, diarrhea  GENITOURINARY: No urinary frequency, gross hematuria, dysuria  NEUROLOGICAL: No headache, weakness  SKIN: No rash or skin lesion  MUSCULOSKELETAL: No swelling  Psych: Denies suicidal or homicidal ideation    PHYSICAL EXAM:  GENERAL: Alert, awake, oriented x3, No acute distress sitting comfortably on chair   HEENT: FADY, EOMI, neck supple, no JVP  CHEST/LUNG: decreased breath sounds with crackles heard b/l   HEART: iregular rate and rhythm, no murmur, no gallops, no rub   ABDOMEN: Soft, nontender, non distended, renal transplant, no thrill, not tender or warm to touch   : No flank or supra pubic tenderness.  EXTREMITIES: 2+ pitting edema b/l LE   Neurology: AAOx3, no focal neurological deficit, follows commands   SKIN: No rash or skin lesion     LABS:  07-21    125<L>  |  86<L>  |  29<H>  ----------------------------<  192<H>  4.2   |  29  |  1.41<H>    Ca    9.0      21 Jul 2022 07:10  Phos  4.0     07-21  Mg     1.4     07-21    TPro  7.2  /  Alb  4.4  /  TBili  0.6  /  DBili      /  AST  20  /  ALT  15  /  AlkPhos  151<H>  07-21    Creatinine Trend: 1.41 <--, 1.59 <--, 1.52 <--, 1.51 <--                        10.3   26.96 )-----------( 178      ( 21 Jul 2022 07:10 )             33.7     Urine Studies:  Urinalysis Basic - ( 17 Jul 2022 18:20 )    Color: Yellow / Appearance: Clear / SG: <=1.005 / pH:   Gluc:  / Ketone: NEGATIVE  / Bili: Negative / Urobili: 0.2 E.U./dL   Blood:  / Protein: NEGATIVE mg/dL / Nitrite: NEGATIVE   Leuk Esterase: NEGATIVE / RBC:  / WBC    Sq Epi:  / Non Sq Epi:  / Bacteria:       Sodium, Random Urine: 64 mmol/L (07-21 @ 05:30)  Osmolality, Random Urine: 203 mosm/kg (07-21 @ 05:30)  Creatinine, Random Urine: 20 mg/dL (07-21 @ 05:30)  Osmolality, Random Urine: 242 mosm/kg (07-20 @ 16:26)  Sodium, Random Urine: 40 mmol/L (07-20 @ 16:25)  Creatinine, Random Urine: 46 mg/dL (07-20 @ 16:25)  Protein/Creatinine Ratio Calculation: 0.1 Ratio (07-20 @ 16:25)  Potassium, Random Urine: 39 mmol/L (07-20 @ 16:25)  Sodium, Random Urine: 43 mmol/L (07-17 @ 18:20)  Creatinine, Random Urine: 48 mg/dL (07-17 @ 18:20)  Osmolality, Random Urine: 233 mosm/kg (07-17 @ 18:20)      Creatinine, Serum: 1.41 mg/dL (07-21-22 @ 07:10)  Creatinine, Serum: 1.59 mg/dL (07-21-22 @ 00:43)  Creatinine, Serum: 1.52 mg/dL (07-20-22 @ 14:57)  Creatinine, Serum: 1.51 mg/dL (07-17-22 @ 10:57)  Creatinine, Serum: 1.38 mg/dL (12-10-21 @ 05:56)  Creatinine, Serum: 1.48 mg/dL (12-09-21 @ 07:06)  Creatinine, Serum: 1.40 mg/dL (12-08-21 @ 08:37)  Creatinine, Serum: 1.35 mg/dL (12-07-21 @ 07:34)  Creatinine, Serum: 1.21 mg/dL (12-06-21 @ 06:47)  Creatinine, Serum: 1.27 mg/dL (12-05-21 @ 10:57)

## 2022-07-21 NOTE — PHYSICAL THERAPY INITIAL EVALUATION ADULT - PERTINENT HX OF CURRENT PROBLEM, REHAB EVAL
71 y/o M w/ PMH of afib (on Eliquis), HFpEF on (on lasix), CAD s/p CABG (2005), R renal transplant (on tacrolimus and valganciclovir), DM2, HTN, HLD, CLL (w/ chronic leukocytosis), Rt pleural effusion (s/p thoracentesis 12/21), p/w SORIA, orthopnea, and SOB, 2/2 volume overload w/ recurrent rt pleural effusion. Pt being followed by Dr. Lora. Schedule for elective right jaw cyst removal on 7/25/22.

## 2022-07-21 NOTE — PROGRESS NOTE ADULT - SUBJECTIVE AND OBJECTIVE BOX
CARDIOLOGY NP PROGRESS NOTE    Subjective: Pt seen and examined at bedside. Reports feeling well and improvement in SOB and LE swelling. Denies chest pain, SOB at rest, lightheadedness, dizziness, palpitations, fever, chills.  Remainder ROS otherwise negative.    Overnight Events: transferred from medicine service to cardiac tele.     TELEMETRY: Afib 50-60s    EKG: Afib 70s w/ RBBB      VITAL SIGNS:  T(C): 36.4 (07-21-22 @ 10:24), Max: 36.8 (07-20-22 @ 14:24)  HR: 70 (07-21-22 @ 08:40) (65 - 78)  BP: 138/62 (07-21-22 @ 08:40) (122/58 - 150/78)  RR: 18 (07-21-22 @ 08:40) (16 - 22)  SpO2: 95% (07-21-22 @ 08:40) (90% - 99%)  Wt(kg): --    I&O's Summary    20 Jul 2022 07:01  -  21 Jul 2022 07:00  --------------------------------------------------------  IN: 0 mL / OUT: 3160 mL / NET: -3160 mL    21 Jul 2022 07:01  -  21 Jul 2022 12:28  --------------------------------------------------------  IN: 240 mL / OUT: 400 mL / NET: -160 mL          PHYSICAL EXAM:    General: A/ox 3, No acute Distress  Neck: Supple, + JVD  Cardiac: Irregularly irregular, S1 S2, No M/R/G  Pulmonary: Lungs diminished dm, Breathing unlabored on 2L NC, No Rhonchi/Rales/Wheezing  Abdomen: Large, Softly distended, Non -tender, +BS x 4 quads  Extremities: No Rashes, 3+ dm LE pitting edema  Neuro: A/o x 3, No focal deficits          LABS:                          10.3   26.96 )-----------( 178      ( 21 Jul 2022 07:10 )             33.7                              07-21    125<L>  |  86<L>  |  29<H>  ----------------------------<  192<H>  4.2   |  29  |  1.41<H>    Ca    9.0      21 Jul 2022 07:10  Phos  4.0     07-21  Mg     1.4     07-21    TPro  7.2  /  Alb  4.4  /  TBili  0.6  /  DBili  x   /  AST  20  /  ALT  15  /  AlkPhos  151<H>  07-21    LIVER FUNCTIONS - ( 21 Jul 2022 07:10 )  Alb: 4.4 g/dL / Pro: 7.2 g/dL / ALK PHOS: 151 U/L / ALT: 15 U/L / AST: 20 U/L / GGT: x          PT/INR - ( 20 Jul 2022 14:57 )   PT: 17.9 sec;   INR: 1.50          PTT - ( 20 Jul 2022 14:57 )  PTT:41.0 sec  CAPILLARY BLOOD GLUCOSE      POCT Blood Glucose.: 243 mg/dL (21 Jul 2022 12:19)  POCT Blood Glucose.: 234 mg/dL (20 Jul 2022 22:49)    CARDIAC MARKERS ( 20 Jul 2022 14:57 )  x     / 0.02 ng/mL / x     / x     / x              Allergies:  No Known Allergies    MEDICATIONS  (STANDING):  amLODIPine   Tablet 10 milliGRAM(s) Oral daily  apixaban 5 milliGRAM(s) Oral every 12 hours  atorvastatin 40 milliGRAM(s) Oral at bedtime  dextrose 5%. 1000 milliLiter(s) (50 mL/Hr) IV Continuous <Continuous>  dextrose 5%. 1000 milliLiter(s) (100 mL/Hr) IV Continuous <Continuous>  dextrose 50% Injectable 25 Gram(s) IV Push once  dextrose 50% Injectable 12.5 Gram(s) IV Push once  dextrose 50% Injectable 25 Gram(s) IV Push once  furosemide   Injectable 80 milliGRAM(s) IV Push two times a day  glucagon  Injectable 1 milliGRAM(s) IntraMuscular once  insulin lispro (ADMELOG) corrective regimen sliding scale   SubCutaneous Before meals and at bedtime  insulin lispro Injectable (ADMELOG) 10 Unit(s) SubCutaneous three times a day before meals  isosorbide   mononitrate ER Tablet (IMDUR) 30 milliGRAM(s) Oral daily  tacrolimus 1 milliGRAM(s) Oral at bedtime  tacrolimus 2 milliGRAM(s) Oral daily  tamsulosin 0.4 milliGRAM(s) Oral at bedtime    MEDICATIONS  (PRN):  acetaminophen     Tablet .. 650 milliGRAM(s) Oral every 6 hours PRN Temp greater or equal to 38C (100.4F), Mild Pain (1 - 3)  ALPRAZolam 0.25 milliGRAM(s) Oral every 8 hours PRN anxiety  dextrose Oral Gel 15 Gram(s) Oral once PRN Blood Glucose LESS THAN 70 milliGRAM(s)/deciliter  melatonin 3 milliGRAM(s) Oral at bedtime PRN Insomnia  ondansetron Injectable 4 milliGRAM(s) IV Push every 8 hours PRN Nausea and/or Vomiting        DIAGNOSTIC TESTS:     < from: TTE Echo Complete w/o Contrast w/ Doppler (07.21.22 @ 10:31) >  CONCLUSIONS:   1. Normal left and right ventricular size and systolic function.   2. Mild symmetric left ventricular hypertrophy.   3. Severely dilated left atrium.   4. Mild mitral regurgitation.   5. Mild tricuspid regurgitation.   6. Aortic sclerosis without significant stenosis.   7. No evidence of pulmonary hypertension, pulmonary artery systolic pressure is 28 mmHg.   8. No pericardial effusion.    < end of copied text >

## 2022-07-21 NOTE — PROGRESS NOTE ADULT - PROBLEM SELECTOR PLAN 11
F: none   E: replete as needed, keep K>4, Mg>2  D: Consistent Carb Diet  DVT Proph: Lovenox  GI Proph: Protonix 40mg qd  Dispo: [facility]

## 2022-07-21 NOTE — PROGRESS NOTE ADULT - PROBLEM SELECTOR PLAN 7
Transplant in 2017 at [place]. Pt on Tacrolimus, previously on valgancyclovir, but d/c.  -BUN/Cr 30/1.52  -follows w/ Dr Leyva  -c/w Tacrolimus, 2mg in AM, 1mg in PM  -avoid nephrotoxic agents  -monitor Cr w/ diuresis ESRD s/p Right Renal transplant in 2017 at [place]. Pt on Tacrolimus, previously on valgancyclovir, but d/c'ed. Follows w/ Dr Leyva  -Crea baseline 1.2 - 1.6. Currently stable   -Renal following. F/u recs  -c/w Tacrolimus, 2mg in AM, 1mg in PM  -F/u Tacrolimus level in AM  -avoid nephrotoxic agents  -monitor Cr w/ diuresis

## 2022-07-21 NOTE — PROGRESS NOTE ADULT - PROBLEM SELECTOR PLAN 6
s/p DCCV w/ Dr Schuster 5/2022  -currently in rate controlled afib, current HR 70s  -c/w Eliquis 5mg BID, not on rate control med at home  -d/w Dr Lynn's team in am for how long to hold a/c prior to procedure  history of afib/DCCV/ablation? s/p DCCV w/ Dr Schuster 5/2022.  -currently in rate controlled Afib, current HR 50-70s  -c/w Eliquis 5mg BID, not on rate control med at home  -d/w Dr Rose's team in am for how long to hold a/c prior to procedure S/p DCCV w/ Dr Schuster 5/2022. CHADsVASC 5  -currently in rate controlled Afib, current HR 50-70s  -c/w Eliquis 5mg BID, not on rate control med at home  -d/w Dr Rose's team in am for how long to hold a/c prior to procedure

## 2022-07-21 NOTE — PROGRESS NOTE ADULT - ASSESSMENT
ASSESSMENT:  71M PMH AFib, DM, CAD (CABG in 2005), Renal transplant, HTN, DM, CLL presented with orhtopnea, SORIA and LE edema. Heart failure consulted for concern of decompensated heart failure exacerbation.    Cardiac Studies:  RHC 12/9/21: RA 5/ RV 47/0, PA 45/24/33, PCWP 18 with V to 27. CO 5.27/CI 2.52 (TD). CO 6.43/CI 3.07 (Carloz). TPG 15, DPG 6. PVR 2.84 jackson (TD)    PLAN:  HFpEF  Repeat TTE demonstrated Mild LVH, Severely dilated LA, LV/RV function normal PASP 28mmHg  Net Neg -3.1L over last 24 hours, remains overloaded  - Continue diuresis with lasix 80mg IV BID  - Antihypertensive regimen with amlodipine 10mg, Imdur 30  - Standing daily weights with strict I/O  - Pending further diuresis prior to jaw surgery  - Monitor electrolytes to maintain K>4 and Mg>2    AFib  Remains in slow Afib, not currently on rate control.   CHADsVASC 4, currently on eliquis, per primary team in the setting of upcoming surgery  -Monitor telemetry    Discussed with Dr. Murphy, HF attending.

## 2022-07-21 NOTE — PROGRESS NOTE ADULT - ASSESSMENT
72 y/o M w/ PMH of afib (on Eliquis, DCCV 5/2022), hx PE, HFpEF, CAD s/p CABG (2005), R renal transplant (on tacrolimus, follows w/ Dr Leyva), hx pHTN, jaw mass (s/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, now returned), JAYANT (has not started CPAP yet), CLL (w/ chronic leukocytosis), DM2, HTN, HLD, Rt pleural effusion (s/p thoracentesis 12/21, follow w/ Dr Lora) p/w SOB when he sits up, abdominal distension, worsening LE edema 2/2 volume overload w/ recurrent rt pleural effusion, now transferred to [facility] for further diuresis prior to elective right jaw mass removal on 7/25/22 w/ Dr Lynn 70 y/o M w/ PMH of Afib (on Eliquis, s/p DCCV 5/2022), hx PE, HFpEF (EF 65%), CAD s/p CABG (2005), R renal transplant (on tacrolimus, follows w/ Dr Leyva), hx pHTN, jaw mass (s/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, now returned), JAYANT (has not started CPAP yet), CLL (w/ chronic leukocytosis), DM2, HTN, HLD, Rt pleural effusion (s/p thoracentesis 12/21, follow w/ Dr Lora), present w/ SOB w/ minimal exertion, abdominal distension, worsening LE edema 2/2 volume overload w/ recurrent R pleural effusion. Transferred from medicine to cardiac tele for acute on chronic HFpEF exacerbation, on IV diuresis and optimization prior to elective right jaw cyst removal on 7/25/22 w/ Dr Rose.

## 2022-07-21 NOTE — PROGRESS NOTE ADULT - PROBLEM SELECTOR PLAN 6
Pt with hx of CLL. Follows with oncology at [facility]. CT neck soft tissue from 11/10 showing widespread lymphadenopathy in neck and chest most consistent with lymphoproliferative disorder, query relapse of CLL and new compared to PET-CT from 5 years prior. Pt underwent Lymph node biopsy on 11/12. Patient now noted to have R sided supraclavicular lymph node. Pt with chronic leukocytosis 2/2 CLL. WBC 17.48.   -consider heme/onc consult for further evaluation   -continue to monitor

## 2022-07-21 NOTE — DIETITIAN INITIAL EVALUATION ADULT - PERTINENT LABORATORY DATA
07-21    125<L>  |  86<L>  |  29<H>  ----------------------------<  192<H>  4.2   |  29  |  1.41<H>    Ca    9.0      21 Jul 2022 07:10  Phos  4.0     07-21  Mg     1.4     07-21    TPro  7.2  /  Alb  4.4  /  TBili  0.6  /  DBili  x   /  AST  20  /  ALT  15  /  AlkPhos  151<H>  07-21  POCT Blood Glucose.: 243 mg/dL (07-21-22 @ 12:19)  A1C with Estimated Average Glucose Result: 6.7 % (07-21-22 @ 07:10)  A1C with Estimated Average Glucose Result: 6.8 % (11-09-21 @ 06:13)

## 2022-07-21 NOTE — PROGRESS NOTE ADULT - PROBLEM SELECTOR PLAN 4
Hx of hyponatiremia  -Na 127  -Likely hypervolemic hyponatremia, urine osm 242, urine sodium 40, calculated serum osm 273, in the setting of HFpEF  -expect to improve with diuresis Hx of Chronic Hypervolemic Hyponatremia w/ Na in low 130s range  -Na 127 -> 126 -> 125   -Continue with diuresis as above, 1L fluid restriction  -Renal following. F/u recs

## 2022-07-21 NOTE — PROGRESS NOTE ADULT - PROBLEM SELECTOR PLAN 1
Hx HFpEF and recurrent right pleural effusion (s/p thoracentesis 12/21), p/w SOB when he sits up, abdominal distension, worsening LE edema 2/2 volume overload  -Exam on admission: No JVD, lungs w/ rales b/l in bases, and decreased breath sounds in RLL. Abdominal distension. 3+ pitting edema in LE to knees b/l. Satting 90% on RA  -currently satting well on RA  -BNP 1547  -CXR shows recurrent rt pleural effusion  -TTE 5/2022: Normal LV size/systolic fx. Mild LVH. Indeterminant diastolic fx (Grade II DD 10/2021). Mod dilated LA. Mild dilated RA. Normal RV size/systolic fx. Normal pulmonary pressures  -TTE ordred  -on Torsemide 40mg qd at home, recently increased to BID dosing for acute decompensation w/ inadequate response  -Pt given lasix 80mg IVP x 1 in ED  -c/w lasix 80mg IVP BID  -No ACE/ARB/ARNI 2/2 renal dz  -currently -550cc, but states some urine wasn't recorded when brought to [facility]  -Core measures  -strict I&O's, daily weight, fluid restriction Hx HFpEF and recurrent right pleural effusion (s/p thoracentesis 12/21), p/w SOB when he sits up, abdominal distension, worsening LE edema 2/2 volume overload. Reports have been sleeping in chair and drinks a lot of fluids at baseline.   -BNP 1547. Trop 0.02 (unchanged since previous admissions)  -Currently on 2L NC satting 97%,  satting 90% on RA. Wean off NC 2L O2 as tolerated  -CXR shows recurrent R pleural effusion, improving w/ diuresis  -TTE 5/2022: Normal LV size/systolic fx. Mild LVH. Indeterminant diastolic fx (Grade II DD 10/2021). Mod dilated LA. Mild dilated RA. Normal RV size/systolic fx. Normal pulmonary pressures  -Repeat TTE 7/21/22: EF 65%, mild conc LVH, severely dilated LA, mild MR/TR  -on Torsemide 40mg qd at home, recently increased to BID dosing for acute decompensation w/ inadequate response  -c/w diuresis w/ Lasix 80mg IV BID. Net neg 3L over last 24hrs  -No ACE/ARB/ARNI 2/2 renal dz  -HF Core measures, strict I&O's, daily weight, 1L fluid restriction

## 2022-07-21 NOTE — CONSULT NOTE ADULT - ASSESSMENT
70 yo male PMHx ESRD s/p transplant 2017, AF (Eliquis), hiatal hernia, CAD s/p CABG (2005), Obesity, DM2  HTN, HLD, BPH, CLL, MGUS p/w volume overload sent by PCP for optimization for right jaw cyst . Renal consulted for help with diuresis.         Elevated Cr.  Createnine at  1.41, baseline Cr. at 1.1  most likely due to volume overload with component of diuretic effect   FEUrea consistent with pre-renal etiology   Physical exam consistent with volume overload   Urine out put 3.5L in 24 hours     Recommend  Fluid Restriction   Daily weights   Strict In and out    Agree with lasix 80mg BID     -Plan to keep as net negative as possible until day of surgery   Daily BMP with magnesium and phosphorus   Monitor Urine out put   Continue and encourage to use CPAP     ESRD s/p Right Renal transplant   C/w tacrolimus 2mg AM and 1mg PM  Please follow up Tacrolimus level with AM labs tomorrow      Chronic Hypervolemic Hyponatremia   Continue with diuresis  70 yo male PMHx ESRD s/p transplant 2017, AF (Eliquis), hiatal hernia, CAD s/p CABG (2005), Obesity, DM2  HTN, HLD, BPH, CLL, MGUS p/w volume overload sent by PCP for optimization for right jaw cyst . Renal consulted for help with diuresis.       LATRICIA on CKD, ESRD s/p renal transplant; baseline CKD 2  Elevated Cr.  Createnine at  1.41, baseline Cr. at 1.1  most likely due to volume overload with component of diuretic effect  Physical exam consistent with volume overload   Urine out put 3.5L in 24 hours     Recommend  Fluid Restriction   Daily weights   Strict In and out    Agree with lasix 80mg BID     -Plan to keep as net negative as possible until day of surgery   BID BMP with magnesium and phosphorus while diuresing aggressively  Monitor Urine out put   Continue and encourage to use CPAP     ESRD s/p Right Renal transplant   C/w tacrolimus 2mg AM and 1mg PM  Please follow up Tacrolimus level with AM labs tomorrow  (Labs must be prior to AM dose)    Chronic Hypervolemic Hyponatremia   Continue with diuresis as above, 1 L fluid restriction

## 2022-07-21 NOTE — PROGRESS NOTE ADULT - PROBLEM SELECTOR PLAN 11
On home insulin, humalog 15U TID. Pt previously on lantus 30 mg, but states he has been noncompliant.    - MISS  - lispro 5U TID  - f/u A1C On home insulin Humalog 15U TID. Pt previously on Lantus 30 mg, but states he has been noncompliant.  - MISS  - c/w premeal lispro 10U TID, MISS  - A1C 6.7    VTE PPx: Eliquis, consider starting heparin gtt if eliquis has to be held >48h for surgery considering afib and hx PE  Dispo: cards for diuresis, R jaw mass removal Monday 7/25  -PT eval pending  Full code

## 2022-07-21 NOTE — PROGRESS NOTE ADULT - PROBLEM SELECTOR PLAN 5
Follows with oncology at [facility]. No lymphadenopathy appreciated on physical exam. Pt with chronic leukocytosis, 17.2 in 12/21, now 31.6. Afebrile, no other signs or symptoms of infectious process. Leukocytosis likely due to CLL.  - trend CBC  - possible heme/onc consult in AM Follows with oncology at [facility]. No lymphadenopathy appreciated on physical exam. Pt with chronic leukocytosis, 17.2 in 12/21, now 31.6. Afebrile, no other signs or symptoms of infectious process. Leukocytosis likely due to CLL.  - Monitor CBC

## 2022-07-21 NOTE — PROGRESS NOTE ADULT - PROBLEM SELECTOR PLAN 8
s/p CABG 2005, NST in fall 2021 negative per cardiology notes on last [facility] admission  -Denies any CP  -Trop 0.02  -EKG w/ rate controlled afib, RBBB, TWI III, aVF, V3-V6. Similar to previous  -c/w lipitor 40mg daily as TIC for home crestor 10mg daily  -f/u lipid panel  -f/u echo s/p CABG 2005, NST in fall 2021 negative per cardiology notes on last [facility] admission  -Denies any CP  -EKG w/ rate controlled Afib, RBBB, TWI III, aVF, V3-V6. Similar to previous  -c/w Lipitor 40mg daily as TIC for home Crestor 10mg daily  -Lipid panel WNL, LDL 42  -Echo as above

## 2022-07-21 NOTE — PHYSICAL THERAPY INITIAL EVALUATION ADULT - GENERAL OBSERVATIONS, REHAB EVAL
Pt received OOB in chair +hep lock +tele +RA. PT received consent to treat from PARVEEN wilkes. Pt was left as received with call bell in reach, VSS, and in NAD.

## 2022-07-21 NOTE — PROGRESS NOTE ADULT - SUBJECTIVE AND OBJECTIVE BOX
Interval Events: Reviewed  Patient seen and examined at bedside.    Patient is a 71y old  Male who presents with a chief complaint of PLEURAL EFFUSION ON RIGHT     (21 Jul 2022 15:59)  he is doing better and urinating a lot      PAST MEDICAL & SURGICAL HISTORY:  Barretts esophagus      CRI (chronic renal insufficiency)      DM (diabetes mellitus)      GERD (gastroesophageal reflux disease)      HTN (hypertension)      Monoclonal gammopathy      Mandibular abscess      Paroxysmal atrial fibrillation      Benign prostatic hyperplasia, presence of lower urinary tract symptoms unspecified, unspecified morphology      Other hyperlipidemia      Hyperlipidemia      S/P CABG (coronary artery bypass graft)  15 years      History of surgery  cyst removal      Mandibular abscess          MEDICATIONS:  Pulmonary:    Antimicrobials:    Anticoagulants:  apixaban 5 milliGRAM(s) Oral every 12 hours    Cardiac:  amLODIPine   Tablet 10 milliGRAM(s) Oral daily  furosemide   Injectable 80 milliGRAM(s) IV Push two times a day  isosorbide   mononitrate ER Tablet (IMDUR) 30 milliGRAM(s) Oral daily  tamsulosin 0.4 milliGRAM(s) Oral at bedtime      Allergies    No Known Allergies    Intolerances        Vital Signs Last 24 Hrs  T(C): 36.6 (21 Jul 2022 18:00), Max: 36.6 (20 Jul 2022 22:29)  T(F): 97.9 (21 Jul 2022 18:00), Max: 97.9 (20 Jul 2022 22:29)  HR: 60 (21 Jul 2022 16:53) (60 - 75)  BP: 140/63 (21 Jul 2022 16:53) (128/60 - 150/78)  BP(mean): 91 (21 Jul 2022 16:53) (87 - 97)  RR: 18 (21 Jul 2022 16:53) (16 - 22)  SpO2: 92% (21 Jul 2022 16:53) (92% - 97%)    Parameters below as of 21 Jul 2022 16:53  Patient On (Oxygen Delivery Method): nasal cannula  O2 Flow (L/min): 2      07-20 @ 07:01  -  07-21 @ 07:00  --------------------------------------------------------  IN: 0 mL / OUT: 3160 mL / NET: -3160 mL    07-21 @ 07:01  -  07-21 @ 21:38  --------------------------------------------------------  IN: 480 mL / OUT: 1300 mL / NET: -820 mL          Review of Systems:   •	General: negative  •	Skin/Breast: negative  •	Ophthalmologic: negative  •	ENMT: negative  •	Respiratory and Thorax: negative  •	Cardiovascular: negative  •	Gastrointestinal: negative  •	Genitourinary: negative  •	Musculoskeletal: negative  •	Neurological: negative  •	Psychiatric: negative  •	Hematology/Lymphatics: negative  •	Endocrine: negative  •	Allergic/Immunologic: negative    Physical Exam:   • Constitutional:	refer to the dietion /Nutritionist note  • Eyes:	EOMI; PERRL; no drainage or redness  • ENMT:	No oral lesions; no gross abnormalities  • Neck	No bruits; no thyromegaly or nodules  • Breasts:	not examined  • Back:	No deformity or limitation of movement  • Respiratory:	Breath Sounds equal & clear to percussion & auscultation, no accessory muscle use  • Cardiovascular:	Regular rate & rhythm, normal S1, S2; no murmurs, gallops or rubs; no S3, S4  • Gastrointestinal:	Soft, non-tender, no hepatosplenomegaly, normal bowel sounds  • Genitourinary:	not examined  • Rectal: not examined  • Extremities:	No cyanosis, clubbing or edema  • Vascular:	Equal and normal pulses (carotid, femoral, dorsalis pedis)  • Neurologica:l	not examined  • Skin:	No lesions; no rash  • Lymph Nodes:	No lymphadedenopathy  • Musculoskeletal:	No joint pain, swelling or deformity; no limitation of movement        LABS:      CBC Full  -  ( 21 Jul 2022 07:10 )  WBC Count : 26.96 K/uL  RBC Count : 3.93 M/uL  Hemoglobin : 10.3 g/dL  Hematocrit : 33.7 %  Platelet Count - Automated : 178 K/uL  Mean Cell Volume : 85.8 fl  Mean Cell Hemoglobin : 26.2 pg  Mean Cell Hemoglobin Concentration : 30.6 gm/dL  Auto Neutrophil # : 3.69 K/uL  Auto Lymphocyte # : 22.54 K/uL  Auto Monocyte # : 0.73 K/uL  Auto Eosinophil # : 0.00 K/uL  Auto Basophil # : 0.00 K/uL  Auto Neutrophil % : 13.7 %  Auto Lymphocyte % : 83.6 %  Auto Monocyte % : 2.7 %  Auto Eosinophil % : 0.0 %  Auto Basophil % : 0.0 %    07-21    127<L>  |  87<L>  |  34<H>  ----------------------------<  125<H>  4.3   |  29  |  1.55<H>    Ca    9.2      21 Jul 2022 18:49  Phos  4.2     07-21  Mg     1.8     07-21    TPro  7.2  /  Alb  4.4  /  TBili  0.6  /  DBili  x   /  AST  20  /  ALT  15  /  AlkPhos  151<H>  07-21    PT/INR - ( 20 Jul 2022 14:57 )   PT: 17.9 sec;   INR: 1.50          PTT - ( 20 Jul 2022 14:57 )  PTT:41.0 sec                    RADIOLOGY & ADDITIONAL STUDIES (The following images were personally reviewed):

## 2022-07-21 NOTE — DIETITIAN INITIAL EVALUATION ADULT - ADD RECOMMEND
1. DASH TLC CONSCHO diet.   2. Monitor %PO intake, Diet tolerance.   >> Add oral nutrition supplements PRN If pt agreeable.  >> Add soft vs easy to Chew PRN If pt agreeable.  3. GI/pain per team. Monitor Skin, Wts, Labs.  4. RD to remain available for additional nutrition interventions as needed.   * High Nutrition Risk.

## 2022-07-21 NOTE — GOALS OF CARE CONVERSATION - ADVANCED CARE PLANNING - CONVERSATION DETAILS
Discussed pt current diagnoses and treatment plans in detail. Discussed code status, pt is full code

## 2022-07-21 NOTE — PROGRESS NOTE ADULT - PROBLEM SELECTOR PLAN 3
Hx R jaw mass, s/p resection ~2-3 years ago w/ Dr Lynn  -mass returned recently, now pt planned for removal w/ Dr Lynn Monday 7/25  -d/w Dr Lynn in am regarding holding a/c Hx R jaw cyst/mass, s/p resection ~2-3 years ago w/ Dr Hugo Rose  -Cyst returned recently, now pt planned for elective R jaw cyst removal w/ Dr Rose Monday 7/25  -d/w Dr Rose regarding requirement of holding a/c Hx R jaw cyst/mass, s/p resection ~2-3 years ago w/ Dr Hugo Rose  -Cyst returned recently, now pt planned for elective R jaw mass removal w/ Dr Rose Monday 7/25  -d/w Dr Rose regarding requirement of holding a/c

## 2022-07-21 NOTE — PROGRESS NOTE ADULT - PROBLEM SELECTOR PLAN 9
Severe JAYANT, follows w/ Dr Lora  -originally refusing CPAP until after his jaw mass procedure, now willing to try w/ nasal mask only  -CPAP ordered Severe JAYANT, follows w/ Dr Lora  -originally refusing CPAP until after his jaw mass procedure, now willing to try w/ nasal mask only  -CPAP qHS ordered

## 2022-07-22 NOTE — PROGRESS NOTE ADULT - SUBJECTIVE AND OBJECTIVE BOX
Interval Events: Reviewed  Patient seen and examined at bedside.    Patient is a 71y old  Male who presents with a chief complaint of Chronic Hypoxic Respiratory Failure, HFpEF (22 Jul 2022 14:03)    he is better and losing weight the leg is less swollen  PAST MEDICAL & SURGICAL HISTORY:  Barretts esophagus      CRI (chronic renal insufficiency)      DM (diabetes mellitus)      GERD (gastroesophageal reflux disease)      HTN (hypertension)      Monoclonal gammopathy      Mandibular abscess      Paroxysmal atrial fibrillation      Benign prostatic hyperplasia, presence of lower urinary tract symptoms unspecified, unspecified morphology      Other hyperlipidemia      Hyperlipidemia      S/P CABG (coronary artery bypass graft)  15 years      History of surgery  cyst removal      Mandibular abscess          MEDICATIONS:  Pulmonary:    Antimicrobials:    Anticoagulants:  apixaban 5 milliGRAM(s) Oral once    Cardiac:  amLODIPine   Tablet 10 milliGRAM(s) Oral daily  furosemide   Injectable 80 milliGRAM(s) IV Push two times a day  furosemide   Injectable 80 milliGRAM(s) IV Push once  isosorbide   mononitrate ER Tablet (IMDUR) 30 milliGRAM(s) Oral daily  tamsulosin 0.4 milliGRAM(s) Oral at bedtime      Allergies    No Known Allergies    Intolerances        Vital Signs Last 24 Hrs  T(C): 36.3 (22 Jul 2022 18:00), Max: 36.4 (21 Jul 2022 22:00)  T(F): 97.4 (22 Jul 2022 18:00), Max: 97.6 (21 Jul 2022 22:00)  HR: 64 (22 Jul 2022 17:56) (61 - 85)  BP: 129/60 (22 Jul 2022 17:56) (126/64 - 154/67)  BP(mean): 96 (22 Jul 2022 05:22) (88 - 96)  RR: 18 (22 Jul 2022 17:56) (16 - 18)  SpO2: 95% (22 Jul 2022 17:56) (94% - 98%)    Parameters below as of 22 Jul 2022 17:56  Patient On (Oxygen Delivery Method): room air        07-21 @ 07:01  -  07-22 @ 07:00  --------------------------------------------------------  IN: 480 mL / OUT: 2400 mL / NET: -1920 mL    07-22 @ 07:01  - 07-22 @ 19:30  --------------------------------------------------------  IN: 360 mL / OUT: 1050 mL / NET: -690 mL          Review of Systems:   •	General: negative  •	Skin/Breast: negative  •	Ophthalmologic: negative  •	ENMT: negative  •	Respiratory and Thorax: negative  •	Cardiovascular: negative  •	Gastrointestinal: negative  •	Genitourinary: negative  •	Musculoskeletal: negative  •	Neurological: negative  •	Psychiatric: negative  •	Hematology/Lymphatics: negative  •	Endocrine: negative  •	Allergic/Immunologic: negative    Physical Exam:   • Constitutional:	refer to the dietion /Nutritionist note  • Eyes:	EOMI; PERRL; no drainage or redness  • ENMT:	No oral lesions; no gross abnormalities  • Neck	No bruits; no thyromegaly or nodules  • Breasts:	not examined  • Back:	No deformity or limitation of movement  • Respiratory:	Breath Sounds equal & clear to percussion & auscultation, no accessory muscle use  • Cardiovascular:	Regular rate & rhythm, normal S1, S2; no murmurs, gallops or rubs; no S3, S4  • Gastrointestinal:	Soft, non-tender, no hepatosplenomegaly, normal bowel sounds  • Genitourinary:	not examined  • Rectal: not examined  • Extremities:	No cyanosis, clubbing or edema  • Vascular:	Equal and normal pulses (carotid, femoral, dorsalis pedis)  • Neurologica:l	not examined  • Skin:	No lesions; no rash  • Lymph Nodes:	No lymphadedenopathy  • Musculoskeletal:	No joint pain, swelling or deformity; no limitation of movement        LABS:      CBC Full  -  ( 22 Jul 2022 07:51 )  WBC Count : 27.17 K/uL  RBC Count : 4.05 M/uL  Hemoglobin : 10.7 g/dL  Hematocrit : 34.7 %  Platelet Count - Automated : 184 K/uL  Mean Cell Volume : 85.7 fl  Mean Cell Hemoglobin : 26.4 pg  Mean Cell Hemoglobin Concentration : 30.8 gm/dL  Auto Neutrophil # : 2.09 K/uL  Auto Lymphocyte # : 25.08 K/uL  Auto Monocyte # : 0.00 K/uL  Auto Eosinophil # : 0.00 K/uL  Auto Basophil # : 0.00 K/uL  Auto Neutrophil % : 7.7 %  Auto Lymphocyte % : 92.3 %  Auto Monocyte % : 0.0 %  Auto Eosinophil % : 0.0 %  Auto Basophil % : 0.0 %    07-22    127<L>  |  88<L>  |  34<H>  ----------------------------<  204<H>  4.1   |  25  |  1.40<H>    Ca    9.4      22 Jul 2022 07:51  Phos  4.1     07-22  Mg     1.7     07-22    TPro  7.5  /  Alb  4.6  /  TBili  0.6  /  DBili  x   /  AST  20  /  ALT  15  /  AlkPhos  151<H>  07-22                        RADIOLOGY & ADDITIONAL STUDIES (The following images were personally reviewed):

## 2022-07-22 NOTE — PROGRESS NOTE ADULT - SUBJECTIVE AND OBJECTIVE BOX
Patient is a 71y Male seen and evaluated at bedside. Patient in no acute distress and does not offer any complaints. Patient awake sitting in chair ordering breakfast. Patient not on oxygen, VSS.       Meds:    acetaminophen     Tablet .. 650 every 6 hours PRN  ALPRAZolam 0.25 every 8 hours PRN  amLODIPine   Tablet 10 daily  apixaban 5 every 12 hours  atorvastatin 40 at bedtime  dextrose 5%. 1000 <Continuous>  dextrose 5%. 1000 <Continuous>  dextrose 50% Injectable 25 once  dextrose 50% Injectable 12.5 once  dextrose 50% Injectable 25 once  dextrose Oral Gel 15 once PRN  furosemide   Injectable 80 two times a day  glucagon  Injectable 1 once  insulin glargine Injectable (LANTUS) 15 at bedtime  insulin lispro (ADMELOG) corrective regimen sliding scale  Before meals and at bedtime  insulin lispro Injectable (ADMELOG) 10 three times a day before meals  isosorbide   mononitrate ER Tablet (IMDUR) 30 daily  magnesium sulfate  IVPB 2 once  melatonin 3 at bedtime PRN  ondansetron Injectable 4 every 8 hours PRN  polyethylene glycol 3350 17 daily PRN  senna 2 at bedtime PRN  tacrolimus 1 at bedtime  tacrolimus 2 daily  tamsulosin 0.4 at bedtime      T(C): , Max: 36.6 (07-21-22 @ 14:03)  T(F): , Max: 97.9 (07-21-22 @ 14:03)  HR: 70 (07-22-22 @ 05:25)  BP: 154/67 (07-22-22 @ 05:22)  BP(mean): 96 (07-22-22 @ 05:22)  RR: 18 (07-22-22 @ 05:25)  SpO2: 95% (07-22-22 @ 05:25)  Wt(kg): --    07-21 @ 07:01  -  07-22 @ 07:00  --------------------------------------------------------  IN: 480 mL / OUT: 2400 mL / NET: -1920 mL          Review of Systems:  ROS negative except as per HPI      PHYSICAL EXAM:  GENERAL: No acute distress sitting comfortably on chair   HEENT: FADY, EOMI, neck supple, no JVP  CHEST/LUNG: decreased breath sounds with crackles heard b/l   HEART: irregular rate and rhythm, no murmur, no gallops, no rub   ABDOMEN: Soft, nontender, non distended, renal transplant, no thrill, not tender or warm to touch   : No flank or supra pubic tenderness.  EXTREMITIES: 2+ pitting edema b/l LE   Neurology: Awake and alert , no focal neurological deficit, follows commands   SKIN: No rash or skin lesion       IN: 480  OUT: 2400   Net: -1920    Weight: 99.5KG standing       LABS:                        10.7   27.17 )-----------( 184      ( 22 Jul 2022 07:51 )             34.7     07-22    x   |  x   |  x   ----------------------------<  204<H>  x    |  25  |  x     Ca    9.4      22 Jul 2022 07:51  Phos  4.1     07-22  Mg     1.7     07-22    TPro  7.2  /  Alb  4.4  /  TBili  0.6  /  DBili  x   /  AST  20  /  ALT  15  /  AlkPhos  151<H>  07-21    Osmolality, Serum: 279 mosm/kg *L* [280 - 301] (07-22 @ 07:51)    PT/INR - ( 20 Jul 2022 14:57 )   PT: 17.9 sec;   INR: 1.50          PTT - ( 20 Jul 2022 14:57 )  PTT:41.0 sec    Sodium, Random Urine: 64 mmol/L (07-21 @ 05:30)  Osmolality, Random Urine: 203 mosm/kg (07-21 @ 05:30)  Creatinine, Random Urine: 20 mg/dL (07-21 @ 05:30)  Osmolality, Random Urine: 242 mosm/kg (07-20 @ 16:26)  Sodium, Random Urine: 40 mmol/L (07-20 @ 16:25)  Creatinine, Random Urine: 46 mg/dL (07-20 @ 16:25)  Protein/Creatinine Ratio Calculation: 0.1 Ratio (07-20 @ 16:25)  Potassium, Random Urine: 39 mmol/L (07-20 @ 16:25)        RADIOLOGY & ADDITIONAL STUDIES:           Patient is a 71y Male seen and evaluated at bedside. Patient in no acute distress and does not offer any complaints. Patient awake sitting in chair ordering breakfast. Feels much better since having some diuresis, hopes to continue over the weekend. Patient not on oxygen, VSS.       Meds:    acetaminophen     Tablet .. 650 every 6 hours PRN  ALPRAZolam 0.25 every 8 hours PRN  amLODIPine   Tablet 10 daily  apixaban 5 every 12 hours  atorvastatin 40 at bedtime  dextrose 5%. 1000 <Continuous>  dextrose 5%. 1000 <Continuous>  dextrose 50% Injectable 25 once  dextrose 50% Injectable 12.5 once  dextrose 50% Injectable 25 once  dextrose Oral Gel 15 once PRN  furosemide   Injectable 80 two times a day  glucagon  Injectable 1 once  insulin glargine Injectable (LANTUS) 15 at bedtime  insulin lispro (ADMELOG) corrective regimen sliding scale  Before meals and at bedtime  insulin lispro Injectable (ADMELOG) 10 three times a day before meals  isosorbide   mononitrate ER Tablet (IMDUR) 30 daily  magnesium sulfate  IVPB 2 once  melatonin 3 at bedtime PRN  ondansetron Injectable 4 every 8 hours PRN  polyethylene glycol 3350 17 daily PRN  senna 2 at bedtime PRN  tacrolimus 1 at bedtime  tacrolimus 2 daily  tamsulosin 0.4 at bedtime      T(C): , Max: 36.6 (07-21-22 @ 14:03)  T(F): , Max: 97.9 (07-21-22 @ 14:03)  HR: 70 (07-22-22 @ 05:25)  BP: 154/67 (07-22-22 @ 05:22)  BP(mean): 96 (07-22-22 @ 05:22)  RR: 18 (07-22-22 @ 05:25)  SpO2: 95% (07-22-22 @ 05:25)  Wt(kg): --    07-21 @ 07:01  -  07-22 @ 07:00  --------------------------------------------------------  IN: 480 mL / OUT: 2400 mL / NET: -1920 mL          Review of Systems:  ROS negative except as per HPI      PHYSICAL EXAM:  GENERAL: No acute distress sitting comfortably on chair   HEENT: FADY, EOMI, neck supple, no JVP  CHEST/LUNG: decreased breath sounds with crackles heard b/l   HEART: irregular rate and rhythm, no murmur, no gallops, no rub   ABDOMEN: Soft, nontender, non distended, renal transplant, no thrill, not tender or warm to touch   : No flank or supra pubic tenderness.  EXTREMITIES: 2+ pitting edema b/l LE   Neurology: Awake and alert , no focal neurological deficit, follows commands   SKIN: No rash or skin lesion       IN: 480  OUT: 2400   Net: -1920    Weight: 99.5KG standing       LABS:                        10.7   27.17 )-----------( 184      ( 22 Jul 2022 07:51 )             34.7     07-22    x   |  x   |  x   ----------------------------<  204<H>  x    |  25  |  x     Ca    9.4      22 Jul 2022 07:51  Phos  4.1     07-22  Mg     1.7     07-22    TPro  7.2  /  Alb  4.4  /  TBili  0.6  /  DBili  x   /  AST  20  /  ALT  15  /  AlkPhos  151<H>  07-21    Osmolality, Serum: 279 mosm/kg *L* [280 - 301] (07-22 @ 07:51)    PT/INR - ( 20 Jul 2022 14:57 )   PT: 17.9 sec;   INR: 1.50          PTT - ( 20 Jul 2022 14:57 )  PTT:41.0 sec    Sodium, Random Urine: 64 mmol/L (07-21 @ 05:30)  Osmolality, Random Urine: 203 mosm/kg (07-21 @ 05:30)  Creatinine, Random Urine: 20 mg/dL (07-21 @ 05:30)  Osmolality, Random Urine: 242 mosm/kg (07-20 @ 16:26)  Sodium, Random Urine: 40 mmol/L (07-20 @ 16:25)  Creatinine, Random Urine: 46 mg/dL (07-20 @ 16:25)  Protein/Creatinine Ratio Calculation: 0.1 Ratio (07-20 @ 16:25)  Potassium, Random Urine: 39 mmol/L (07-20 @ 16:25)        RADIOLOGY & ADDITIONAL STUDIES:    Creatinine, Serum: 1.40 mg/dL (07-22-22 @ 07:51)  Creatinine, Serum: 1.55 mg/dL (07-21-22 @ 18:49)  Creatinine, Serum: 1.41 mg/dL (07-21-22 @ 07:10)  Creatinine, Serum: 1.59 mg/dL (07-21-22 @ 00:43)  Creatinine, Serum: 1.52 mg/dL (07-20-22 @ 14:57)  Creatinine, Serum: 1.51 mg/dL (07-17-22 @ 10:57)  Creatinine, Serum: 1.38 mg/dL (12-10-21 @ 05:56)  Creatinine, Serum: 1.48 mg/dL (12-09-21 @ 07:06)  Creatinine, Serum: 1.40 mg/dL (12-08-21 @ 08:37)  Creatinine, Serum: 1.35 mg/dL (12-07-21 @ 07:34)      Tacrolimus (), Serum (07.22.22 @ 07:51)

## 2022-07-22 NOTE — PROGRESS NOTE ADULT - PROBLEM SELECTOR PLAN 6
S/p DCCV w/ Dr Schuster 5/2022. CHADsVASC 5  -currently in rate controlled Afib, current HR 50-70s  -c/w Eliquis 5mg BID, not on rate control med at home  -d/w Dr Rose's team in am for how long to hold a/c prior to procedure S/p DCCV w/ Dr Schuster 5/2022. CHADsVASC 5  -currently in rate controlled Afib w/ HR 50-70s. Not on rate control agents at home  -c/w Eliquis 5mg BID, last dose tonight 7/22  - No need for AC bridging prior to surgery

## 2022-07-22 NOTE — PROGRESS NOTE ADULT - PROBLEM SELECTOR PLAN 8
s/p CABG 2005, NST in fall 2021 negative per cardiology notes on last [facility] admission  -Denies any CP  -EKG w/ rate controlled Afib, RBBB, TWI III, aVF, V3-V6. Similar to previous  -c/w Lipitor 40mg daily as TIC for home Crestor 10mg daily  -Lipid panel WNL, LDL 42  -Echo as above s/p CABG 2005, NST in fall 2021 negative per cardiology notes on last [facility] admission.  -Denies any CP  -EKG w/ rate controlled Afib, RBBB, TWI III, aVF, V3-V6. Similar to previous  -c/w Lipitor 40mg daily as TIC for home Crestor 10mg daily  -Lipid panel WNL, LDL 42  -Echo as above

## 2022-07-22 NOTE — PROGRESS NOTE ADULT - ASSESSMENT
ASSESSMENT:  71M PMH AFib, DM, CAD (CABG in 2005), Renal transplant, HTN, DM, CLL presented with orhtopnea, SORIA and LE edema. Heart failure consulted for concern of decompensated heart failure exacerbation.    Cardiac Studies:  C 12/9/21: RA 5/ RV 47/0, PA 45/24/33, PCWP 18 with V to 27. CO 5.27/CI 2.52 (TD). CO 6.43/CI 3.07 (Carloz). TPG 15, DPG 6. PVR 2.84 jackson (TD)    PLAN:  HFpEF  Repeat TTE demonstrated Mild LVH, Severely dilated LA, LV/RV function normal PASP 28mmHg  Net Neg -3.1L over last 24 hours, remains overloaded  - Continue diuresis with lasix 80mg IV BID and give additional dose of lasix 80mg  - Antihypertensive regimen with amlodipine 10mg, Imdur 30mg daily. Add ARB if ok from renal standpoint, if not can use hydralazine for BP control  - Standing daily weights with strict I/O  - Pending further diuresis prior to jaw surgery  - Monitor electrolytes to maintain K>4 and Mg>2    AFib  Remains in slow Afib, not currently on rate control.   CHADsVASC 4, currently on eliquis, per primary team in the setting of upcoming surgery  -Monitor telemetry    Discussed with Dr. Murphy, HF attending. ASSESSMENT:  71M PMH AFib, DM, CAD (CABG in 2005), Renal transplant, HTN, DM, CLL presented with orhtopnea, SORIA and LE edema. Heart failure consulted for concern of decompensated heart failure exacerbation.    Cardiac Studies:  RHC 12/9/21: RA 5/ RV 47/0, PA 45/24/33, PCWP 18 with V to 27. CO 5.27/CI 2.52 (TD). CO 6.43/CI 3.07 (Carloz). TPG 15, DPG 6. PVR 2.84 jackson (TD)    PLAN:  HFpEF  Repeat TTE demonstrated Mild LVH, Severely dilated LA, LV/RV function normal PASP 28mmHg  Net Neg -1.9L over last 24 hours, remains mildly overloaded  - Continue diuresis with lasix 80mg IV BID and give additional dose of lasix 80mg and plan to transition to torsemide 40mg BID over weekend/when euvolemic  - Antihypertensive regimen with amlodipine 10mg, Imdur 30mg daily. Add ARB if ok from renal standpoint, if not can use hydralazine for BP control  - Standing daily weights with strict I/O  - Pending further diuresis prior to jaw surgery  - Monitor electrolytes to maintain K>4 and Mg>2    AFib  Remains in slow Afib, not currently on rate control.   CHADsVASC 4, currently on eliquis, per primary team in the setting of upcoming surgery  -Monitor telemetry    Discussed with Dr. Murphy, HF attending.

## 2022-07-22 NOTE — PROGRESS NOTE ADULT - PROBLEM SELECTOR PLAN 9
Severe JAYANT, follows w/ Dr Lora  -originally refusing CPAP until after his jaw mass procedure, now willing to try w/ nasal mask only  -CPAP qHS ordered Severe JAYANT, follows w/ Dr Lora  -Initially refusing CPAP until after his jaw mass procedure, tried nasal mask and was able to tolerate few hrs  -CPAP qHS ordered, but pt currently refusing to continue using inhospital

## 2022-07-22 NOTE — PROGRESS NOTE ADULT - SUBJECTIVE AND OBJECTIVE BOX
Heart Failure Consult Note    INTERVAL EVENTS: Sleeping, no complaints.  TELEMETRY: AFib, occassional PVC    PAST MEDICAL & SURGICAL HISTORY:  Barretts esophagus    CRI (chronic renal insufficiency)    DM (diabetes mellitus)    GERD (gastroesophageal reflux disease)    HTN (hypertension)    Monoclonal gammopathy    Mandibular abscess    Paroxysmal atrial fibrillation    Benign prostatic hyperplasia, presence of lower urinary tract symptoms unspecified, unspecified morphology    Other hyperlipidemia    Hyperlipidemia    No significant past surgical history    S/P CABG (coronary artery bypass graft)  15 years    History of surgery  cyst removal    Mandibular abscess        MEDICATIONS  (STANDING):  amLODIPine   Tablet 10 milliGRAM(s) Oral daily  apixaban 5 milliGRAM(s) Oral every 12 hours  atorvastatin 40 milliGRAM(s) Oral at bedtime  dextrose 5%. 1000 milliLiter(s) (50 mL/Hr) IV Continuous <Continuous>  dextrose 5%. 1000 milliLiter(s) (100 mL/Hr) IV Continuous <Continuous>  dextrose 50% Injectable 25 Gram(s) IV Push once  dextrose 50% Injectable 12.5 Gram(s) IV Push once  dextrose 50% Injectable 25 Gram(s) IV Push once  furosemide   Injectable 80 milliGRAM(s) IV Push two times a day  furosemide   Injectable 80 milliGRAM(s) IV Push once  glucagon  Injectable 1 milliGRAM(s) IntraMuscular once  insulin glargine Injectable (LANTUS) 30 Unit(s) SubCutaneous at bedtime  insulin lispro (ADMELOG) corrective regimen sliding scale   SubCutaneous Before meals and at bedtime  insulin lispro Injectable (ADMELOG) 10 Unit(s) SubCutaneous three times a day before meals  isosorbide   mononitrate ER Tablet (IMDUR) 30 milliGRAM(s) Oral daily  magnesium oxide 400 milliGRAM(s) Oral two times a day with meals  tacrolimus 1 milliGRAM(s) Oral at bedtime  tacrolimus 2 milliGRAM(s) Oral daily  tamsulosin 0.4 milliGRAM(s) Oral at bedtime    MEDICATIONS  (PRN):  acetaminophen     Tablet .. 650 milliGRAM(s) Oral every 6 hours PRN Temp greater or equal to 38C (100.4F), Mild Pain (1 - 3)  ALPRAZolam 0.25 milliGRAM(s) Oral every 8 hours PRN anxiety  dextrose Oral Gel 15 Gram(s) Oral once PRN Blood Glucose LESS THAN 70 milliGRAM(s)/deciliter  melatonin 3 milliGRAM(s) Oral at bedtime PRN Insomnia  ondansetron Injectable 4 milliGRAM(s) IV Push every 8 hours PRN Nausea and/or Vomiting  polyethylene glycol 3350 17 Gram(s) Oral daily PRN Constipation  senna 2 Tablet(s) Oral at bedtime PRN Constipation    Vital Signs Last 24 Hrs  T(C): 36.4 (22 Jul 2022 10:13), Max: 36.6 (21 Jul 2022 18:00)  T(F): 97.6 (22 Jul 2022 10:13), Max: 97.9 (21 Jul 2022 18:00)  HR: 64 (22 Jul 2022 08:54) (60 - 85)  BP: 143/66 (22 Jul 2022 08:42) (126/64 - 154/67)  BP(mean): 96 (22 Jul 2022 05:22) (88 - 96)  RR: 18 (22 Jul 2022 08:42) (16 - 18)  SpO2: 95% (22 Jul 2022 08:54) (92% - 97%)    Parameters below as of 22 Jul 2022 08:54  Patient On (Oxygen Delivery Method): room air      PHYSICAL EXAM:  GEN: Awake, alert. NAD.   HEENT: No JVD  RESP: CTA b/l  CV: Irregular  ABD: Soft.   EXT: Warm, 1-2+  NEURO: AAOx3    LABS:                        10.7   27.17 )-----------( 184      ( 22 Jul 2022 07:51 )             34.7     07-22    127<L>  |  88<L>  |  34<H>  ----------------------------<  204<H>  4.1   |  25  |  1.40<H>    Ca    9.4      22 Jul 2022 07:51  Phos  4.1     07-22  Mg     1.7     07-22    TPro  7.5  /  Alb  4.6  /  TBili  0.6  /  DBili  x   /  AST  20  /  ALT  15  /  AlkPhos  151<H>  07-22    CARDIAC MARKERS ( 20 Jul 2022 14:57 )  x     / 0.02 ng/mL / x     / x     / x          PT/INR - ( 20 Jul 2022 14:57 )   PT: 17.9 sec;   INR: 1.50          PTT - ( 20 Jul 2022 14:57 )  PTT:41.0 sec    I&O's Summary    21 Jul 2022 07:01  -  22 Jul 2022 07:00  --------------------------------------------------------  IN: 480 mL / OUT: 2400 mL / NET: -1920 mL    22 Jul 2022 07:01  -  22 Jul 2022 14:04  --------------------------------------------------------  IN: 180 mL / OUT: 450 mL / NET: -270 mL      RADIOLOGY & ADDITIONAL STUDIES:    EKG:

## 2022-07-22 NOTE — PROGRESS NOTE ADULT - PROBLEM SELECTOR PLAN 1
Hx HFpEF and recurrent right pleural effusion (s/p thoracentesis 12/21), p/w SOB when he sits up, abdominal distension, worsening LE edema 2/2 volume overload. Reports have been sleeping in chair and drinks a lot of fluids at baseline.   -BNP 1547. Trop 0.02 (unchanged since previous admissions)  -Currently on 2L NC satting 97%,  satting 90% on RA. Wean off NC 2L O2 as tolerated  -CXR shows recurrent R pleural effusion, improving w/ diuresis  -TTE 5/2022: Normal LV size/systolic fx. Mild LVH. Indeterminant diastolic fx (Grade II DD 10/2021). Mod dilated LA. Mild dilated RA. Normal RV size/systolic fx. Normal pulmonary pressures  -Repeat TTE 7/21/22: EF 65%, mild conc LVH, severely dilated LA, mild MR/TR  -on Torsemide 40mg qd at home, recently increased to BID dosing for acute decompensation w/ inadequate response  -c/w diuresis w/ Lasix 80mg IV BID. Net neg 3L over last 24hrs  -No ACE/ARB/ARNI 2/2 renal dz  -HF Core measures, strict I&O's, daily weight, 1L fluid restriction Hx HFpEF and recurrent right pleural effusion (s/p thoracentesis 12/21), p/w SOB when he sits up, abdominal distension, worsening LE edema 2/2 volume overload. Reports have been sleeping in chair and drinks a lot of fluids at baseline. Recently increased Torsemide 40 QD to BID outpt by Dr Lora w/o adequate response.  -BNP 1547. Trop 0.02 (unchanged since previous admissions)  -Weaned off NC 2L O2, satting 95% on RA   -CXR shows recurrent R pleural effusion, improving w/ diuresis  -Echo 7/21/22: EF 65%, mild conc LVH, severely dilated LA, mild MR/TR  -c/w diuresis w/ Lasix 80mg IV BID. Net neg 3L over last 24hrs  -No ACE/ARB/ARNI for now while actively diuresing per Renal  -HF Core measures, strict I&O's, daily weight, 1L fluid restriction Hx HFpEF and recurrent right pleural effusion (s/p thoracentesis 12/21), p/w SOB when he sits up, abdominal distension, worsening LE edema 2/2 volume overload. Reports have been sleeping in chair and drinks a lot of fluids at baseline. Recently increased Torsemide 40 QD to BID outpt by Dr Lora w/o adequate response.  -BNP 1547. Trop 0.02 (unchanged since previous admissions)  -Weaned off NC 2L O2, satting 95% on RA   -CXR shows recurrent R pleural effusion, improving w/ diuresis  -Echo 7/21/22: EF 65%, mild conc LVH, severely dilated LA, mild MR/TR  -c/w diuresis w/ Lasix 80mg IV BID. Will give extra dose of Lasix 80mg IV tonight per HF.   -Net neg 2L over last 24hrs, - 5L during hospitalization so far  -No ACE/ARB/ARNI for now while actively diuresing per Renal  -HF Core measures, strict I&O's, daily weight, 1L fluid restriction

## 2022-07-22 NOTE — PROGRESS NOTE ADULT - PROBLEM SELECTOR PLAN 5
Follows with oncology at [facility]. No lymphadenopathy appreciated on physical exam. Pt with chronic leukocytosis, 17.2 in 12/21, now 31.6. Afebrile, no other signs or symptoms of infectious process. Leukocytosis likely due to CLL.  - Monitor CBC Follows with oncology at [facility]. No lymphadenopathy appreciated on physical exam. Pt with chronic leukocytosis 17.2 in 12/21, now 31.6. Afebrile, no other signs or symptoms of infectious process. Leukocytosis likely due to CLL.  - Monitor CBC

## 2022-07-22 NOTE — PROGRESS NOTE ADULT - PROBLEM SELECTOR PLAN 5
Pt w/ hx of atrial fibrillation. Pt on Eliquis outpatient.   Rate is controlled ECHO reviewed.  AC is on hold for the OR

## 2022-07-22 NOTE — PROGRESS NOTE ADULT - PROBLEM SELECTOR PLAN 6
Pt with hx of CLL. Follows with oncology at [facility]. CT neck soft tissue from 11/10 showing widespread lymphadenopathy in neck and chest most consistent with lymphoproliferative disorder, query relapse of CLL and new compared to PET-CT from 5 years prior. Pt underwent Lymph node biopsy on 11/12. Patient now noted to have R sided supraclavicular lymph node. Pt with chronic leukocytosis 2/2 CLL. WBC 17.48.   -consider heme/onc consult for further evaluation   -continue to monitor

## 2022-07-22 NOTE — PROGRESS NOTE ADULT - PROBLEM SELECTOR PLAN 11
On home insulin Humalog 15U TID. Pt previously on Lantus 30 mg, but states he has been noncompliant.  - MISS  - c/w premeal lispro 10U TID, MISS  - A1C 6.7    VTE PPx: Eliquis, consider starting heparin gtt if eliquis has to be held >48h for surgery considering afib and hx PE  Dispo: cards for diuresis, R jaw mass removal Monday 7/25  -PT eval pending  Full code On home insulin Humalog 15U TID. Pt previously on Lantus 30 mg, but states he has been noncompliant.  - Resume Lantus 30U qHS  - c/w premeal lispro 10U TID, MISS  - A1C 6.7    VTE PPx: Eliquis   Dispo: admit to cardiac tele for optimization prior to R jaw mass removal Monday 7/25  -PT rec no skilled PT needs  Full code

## 2022-07-22 NOTE — PROGRESS NOTE ADULT - PROBLEM SELECTOR PLAN 4
Hx of Chronic Hypervolemic Hyponatremia w/ Na in low 130s range  -Na 127 -> 126 -> 125   -Continue with diuresis as above, 1L fluid restriction  -Renal following. F/u recs Hx of Chronic Hypervolemic Hyponatremia w/ Na in low 130s range.  -Na 127 -> 125 -> 127  -Continue with diuresis as above, 1L fluid restriction  -Renal following. F/u recs

## 2022-07-22 NOTE — PROGRESS NOTE ADULT - SUBJECTIVE AND OBJECTIVE BOX
CARDIOLOGY NP PROGRESS NOTE    Subjective:   Remainder ROS otherwise negative.    Overnight Events:     TELEMETRY:    EKG:      VITAL SIGNS:  T(C): 36.4 (07-22-22 @ 10:13), Max: 36.6 (07-21-22 @ 14:03)  HR: 64 (07-22-22 @ 08:54) (60 - 85)  BP: 143/66 (07-22-22 @ 08:42) (126/64 - 154/67)  RR: 18 (07-22-22 @ 08:42) (16 - 18)  SpO2: 95% (07-22-22 @ 08:54) (92% - 97%)  Wt(kg): --    I&O's Summary    21 Jul 2022 07:01  -  22 Jul 2022 07:00  --------------------------------------------------------  IN: 480 mL / OUT: 2400 mL / NET: -1920 mL    22 Jul 2022 07:01  -  22 Jul 2022 13:50  --------------------------------------------------------  IN: 180 mL / OUT: 450 mL / NET: -270 mL          PHYSICAL EXAM:    General: A/ox 3, No acute Distress  Neck: Supple, NO JVD  Cardiac: S1 S2, No M/R/G  Pulmonary: CTAB, Breathing unlabored, No Rhonchi/Rales/Wheezing  Abdomen: Soft, Non -tender, +BS x 4 quads  Extremities: No Rashes, No edema  Neuro: A/o x 3, No focal deficits          LABS:                          10.7   27.17 )-----------( 184      ( 22 Jul 2022 07:51 )             34.7                              07-22    127<L>  |  88<L>  |  34<H>  ----------------------------<  204<H>  4.1   |  25  |  1.40<H>    Ca    9.4      22 Jul 2022 07:51  Phos  4.1     07-22  Mg     1.7     07-22    TPro  7.5  /  Alb  4.6  /  TBili  0.6  /  DBili  x   /  AST  20  /  ALT  15  /  AlkPhos  151<H>  07-22    LIVER FUNCTIONS - ( 22 Jul 2022 07:51 )  Alb: 4.6 g/dL / Pro: 7.5 g/dL / ALK PHOS: 151 U/L / ALT: 15 U/L / AST: 20 U/L / GGT: x                                 PT/INR - ( 20 Jul 2022 14:57 )   PT: 17.9 sec;   INR: 1.50          PTT - ( 20 Jul 2022 14:57 )  PTT:41.0 sec  CAPILLARY BLOOD GLUCOSE      POCT Blood Glucose.: 141 mg/dL (22 Jul 2022 11:14)  POCT Blood Glucose.: 216 mg/dL (22 Jul 2022 06:42)  POCT Blood Glucose.: 133 mg/dL (21 Jul 2022 21:50)  POCT Blood Glucose.: 147 mg/dL (21 Jul 2022 17:09)    CARDIAC MARKERS ( 20 Jul 2022 14:57 )  x     / 0.02 ng/mL / x     / x     / x              Allergies:  No Known Allergies    MEDICATIONS  (STANDING):  amLODIPine   Tablet 10 milliGRAM(s) Oral daily  apixaban 5 milliGRAM(s) Oral every 12 hours  atorvastatin 40 milliGRAM(s) Oral at bedtime  dextrose 5%. 1000 milliLiter(s) (50 mL/Hr) IV Continuous <Continuous>  dextrose 5%. 1000 milliLiter(s) (100 mL/Hr) IV Continuous <Continuous>  dextrose 50% Injectable 25 Gram(s) IV Push once  dextrose 50% Injectable 12.5 Gram(s) IV Push once  dextrose 50% Injectable 25 Gram(s) IV Push once  furosemide   Injectable 80 milliGRAM(s) IV Push two times a day  furosemide   Injectable 80 milliGRAM(s) IV Push once  glucagon  Injectable 1 milliGRAM(s) IntraMuscular once  insulin glargine Injectable (LANTUS) 30 Unit(s) SubCutaneous at bedtime  insulin lispro (ADMELOG) corrective regimen sliding scale   SubCutaneous Before meals and at bedtime  insulin lispro Injectable (ADMELOG) 10 Unit(s) SubCutaneous three times a day before meals  isosorbide   mononitrate ER Tablet (IMDUR) 30 milliGRAM(s) Oral daily  magnesium oxide 400 milliGRAM(s) Oral two times a day with meals  tacrolimus 1 milliGRAM(s) Oral at bedtime  tacrolimus 2 milliGRAM(s) Oral daily  tamsulosin 0.4 milliGRAM(s) Oral at bedtime    MEDICATIONS  (PRN):  acetaminophen     Tablet .. 650 milliGRAM(s) Oral every 6 hours PRN Temp greater or equal to 38C (100.4F), Mild Pain (1 - 3)  ALPRAZolam 0.25 milliGRAM(s) Oral every 8 hours PRN anxiety  dextrose Oral Gel 15 Gram(s) Oral once PRN Blood Glucose LESS THAN 70 milliGRAM(s)/deciliter  melatonin 3 milliGRAM(s) Oral at bedtime PRN Insomnia  ondansetron Injectable 4 milliGRAM(s) IV Push every 8 hours PRN Nausea and/or Vomiting  polyethylene glycol 3350 17 Gram(s) Oral daily PRN Constipation  senna 2 Tablet(s) Oral at bedtime PRN Constipation        DIAGNOSTIC TESTS:        CARDIOLOGY NP PROGRESS NOTE    Subjective: Pt seen and examined at bedside. Reports feeling improvement in SOB. Denies chest pain, sob, lightheadedness, dizziness, palpitations, fever, chills.  Remainder ROS otherwise negative.    Overnight Events: Net negative 1.9L over last 24hrs.    TELEMETRY: Afib 60-80s, occasional PVCs         VITAL SIGNS:  T(C): 36.4 (07-22-22 @ 10:13), Max: 36.6 (07-21-22 @ 14:03)  HR: 64 (07-22-22 @ 08:54) (60 - 85)  BP: 143/66 (07-22-22 @ 08:42) (126/64 - 154/67)  RR: 18 (07-22-22 @ 08:42) (16 - 18)  SpO2: 95% (07-22-22 @ 08:54) (92% - 97%)  Wt(kg): --    I&O's Summary    21 Jul 2022 07:01  -  22 Jul 2022 07:00  --------------------------------------------------------  IN: 480 mL / OUT: 2400 mL / NET: -1920 mL    22 Jul 2022 07:01  -  22 Jul 2022 13:50  --------------------------------------------------------  IN: 180 mL / OUT: 450 mL / NET: -270 mL          PHYSICAL EXAM:      General: A/ox 3, No acute Distress  Neck: Supple, + JVD  Cardiac: Irregularly irregular, S1 S2, No M/R/G  Pulmonary: Lungs diminished dm, Breathing unlabored on RA, No Rhonchi/Rales/Wheezing  Abdomen: Large, Softly distended, Non -tender, +BS x 4 quads  Extremities: No Rashes, 2+ dm LE pitting edema (improving)  Neuro: A/o x 3, No focal deficits          LABS:                          10.7   27.17 )-----------( 184      ( 22 Jul 2022 07:51 )             34.7                              07-22    127<L>  |  88<L>  |  34<H>  ----------------------------<  204<H>  4.1   |  25  |  1.40<H>    Ca    9.4      22 Jul 2022 07:51  Phos  4.1     07-22  Mg     1.7     07-22    TPro  7.5  /  Alb  4.6  /  TBili  0.6  /  DBili  x   /  AST  20  /  ALT  15  /  AlkPhos  151<H>  07-22    LIVER FUNCTIONS - ( 22 Jul 2022 07:51 )  Alb: 4.6 g/dL / Pro: 7.5 g/dL / ALK PHOS: 151 U/L / ALT: 15 U/L / AST: 20 U/L / GGT: x         PT/INR - ( 20 Jul 2022 14:57 )   PT: 17.9 sec;   INR: 1.50          PTT - ( 20 Jul 2022 14:57 )  PTT:41.0 sec  CAPILLARY BLOOD GLUCOSE      POCT Blood Glucose.: 141 mg/dL (22 Jul 2022 11:14)  POCT Blood Glucose.: 216 mg/dL (22 Jul 2022 06:42)  POCT Blood Glucose.: 133 mg/dL (21 Jul 2022 21:50)  POCT Blood Glucose.: 147 mg/dL (21 Jul 2022 17:09)    CARDIAC MARKERS ( 20 Jul 2022 14:57 )  x     / 0.02 ng/mL / x     / x     / x              Allergies:  No Known Allergies    MEDICATIONS  (STANDING):  amLODIPine   Tablet 10 milliGRAM(s) Oral daily  apixaban 5 milliGRAM(s) Oral every 12 hours  atorvastatin 40 milliGRAM(s) Oral at bedtime  dextrose 5%. 1000 milliLiter(s) (50 mL/Hr) IV Continuous <Continuous>  dextrose 5%. 1000 milliLiter(s) (100 mL/Hr) IV Continuous <Continuous>  dextrose 50% Injectable 25 Gram(s) IV Push once  dextrose 50% Injectable 12.5 Gram(s) IV Push once  dextrose 50% Injectable 25 Gram(s) IV Push once  furosemide   Injectable 80 milliGRAM(s) IV Push two times a day  furosemide   Injectable 80 milliGRAM(s) IV Push once  glucagon  Injectable 1 milliGRAM(s) IntraMuscular once  insulin glargine Injectable (LANTUS) 30 Unit(s) SubCutaneous at bedtime  insulin lispro (ADMELOG) corrective regimen sliding scale   SubCutaneous Before meals and at bedtime  insulin lispro Injectable (ADMELOG) 10 Unit(s) SubCutaneous three times a day before meals  isosorbide   mononitrate ER Tablet (IMDUR) 30 milliGRAM(s) Oral daily  magnesium oxide 400 milliGRAM(s) Oral two times a day with meals  tacrolimus 1 milliGRAM(s) Oral at bedtime  tacrolimus 2 milliGRAM(s) Oral daily  tamsulosin 0.4 milliGRAM(s) Oral at bedtime    MEDICATIONS  (PRN):  acetaminophen     Tablet .. 650 milliGRAM(s) Oral every 6 hours PRN Temp greater or equal to 38C (100.4F), Mild Pain (1 - 3)  ALPRAZolam 0.25 milliGRAM(s) Oral every 8 hours PRN anxiety  dextrose Oral Gel 15 Gram(s) Oral once PRN Blood Glucose LESS THAN 70 milliGRAM(s)/deciliter  melatonin 3 milliGRAM(s) Oral at bedtime PRN Insomnia  ondansetron Injectable 4 milliGRAM(s) IV Push every 8 hours PRN Nausea and/or Vomiting  polyethylene glycol 3350 17 Gram(s) Oral daily PRN Constipation  senna 2 Tablet(s) Oral at bedtime PRN Constipation        DIAGNOSTIC TESTS:

## 2022-07-22 NOTE — PROGRESS NOTE ADULT - PROBLEM SELECTOR PLAN 3
Hx R jaw cyst/mass, s/p resection ~2-3 years ago w/ Dr Hugo Rose  -Cyst returned recently, now pt planned for elective R jaw mass removal w/ Dr Rose Monday 7/25  -d/w Dr Rose regarding requirement of holding a/c Hx R jaw cyst/mass, s/p resection ~2-3 years ago w/ Head & Neck surgeon Dr Hugo Rose.  -Cyst returned recently, was planned for elective R jaw mass removal w/ Dr Rose Monday 7/25. Will optimize prior to surgery

## 2022-07-22 NOTE — PROGRESS NOTE ADULT - PROBLEM SELECTOR PLAN 4
Hx of renal transplant in 2017 at [place]. Pt on Tacrolimus and Valganciclovir at home.  -c/w Tacrolimus, 2mg in AM, 1mg in PM  -c/w valganciclovir 450mg PO qd   -Renal following -- f/u recs

## 2022-07-22 NOTE — PROGRESS NOTE ADULT - ASSESSMENT
70 y/o M w/ PMH of Afib (on Eliquis, s/p DCCV 5/2022), hx PE, HFpEF (EF 65%), CAD s/p CABG (2005), R renal transplant (on tacrolimus, follows w/ Dr Leyva), hx pHTN, jaw mass (s/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, now returned), JAYANT (has not started CPAP yet), CLL (w/ chronic leukocytosis), DM2, HTN, HLD, Rt pleural effusion (s/p thoracentesis 12/21, follow w/ Dr Lora), present w/ SOB w/ minimal exertion, abdominal distension, worsening LE edema 2/2 volume overload w/ recurrent R pleural effusion. Transferred from medicine to cardiac tele for acute on chronic HFpEF exacerbation, on IV diuresis and optimization prior to elective right jaw cyst removal on 7/25/22 w/ Dr Rose. 72 y/o M w/ PMH of Afib (on Eliquis, s/p DCCV 5/2022), hx PE, HFpEF (EF 65%), CAD s/p CABG (2005), R renal transplant (on tacrolimus, follows w/ Dr Leyva), hx pHTN, jaw mass (s/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, now returned), JAYANT (has not started CPAP yet), CLL (w/ chronic leukocytosis), DM2, HTN, HLD, Rt pleural effusion (s/p thoracentesis 12/21, follow w/ Dr Lora), present w/ SOB w/ minimal exertion, abdominal distension, worsening LE edema 2/2 volume overload w/ recurrent R pleural effusion. Transferred from medicine to cardiac tele for acute on chronic HFpEF exacerbation, on IV diuresis and optimization prior to elective right jaw mass removal on 7/25/22 w/ Dr Rose. HF, Renal, Pulm following.

## 2022-07-22 NOTE — PROGRESS NOTE ADULT - ASSESSMENT
70 yo male PMHx ESRD s/p transplant 2017, AF (Eliquis), hiatal hernia, CAD s/p CABG (2005), Obesity, DM2  HTN, HLD, BPH, CLL, MGUS p/w volume overload sent by PCP for optimization for right jaw cyst . Renal consulted for help with diuresis.       LATRICIA on CKD, ESRD s/p renal transplant; baseline CKD 2  Elevated Cr.  Creatinine at  1.41, baseline Cr. at 1.1  most likely due to volume overload with component of diuretic effect  Physical exam shows decreased edema in LE, off oxygen   Urine out put  2.4L in 24 hours   Weight 99.5KG standing     Recommend  Fluid Restriction   Daily weights   Strict In and out    C/w lasix 80mg BID     -Plan to keep as net negative as possible until day of surgery   BID BMP with magnesium and phosphorus while diuresing aggressively  Keep mag >2 and Potassium >4   Monitor Urine out put   Continue and encourage to use CPAP     ESRD s/p Right Renal transplant   C/w tacrolimus 2mg AM and 1mg PM  Please follow up Tacrolimus level with AM labs tomorrow  (Labs must be prior to AM dose)    Chronic Hypervolemic Hyponatremia   Continue with diuresis as above, 1 L fluid restriction 70 yo male PMHx ESRD s/p transplant 2017, AF (Eliquis), hiatal hernia, CAD s/p CABG (2005), Obesity, DM2  HTN, HLD, BPH, CLL, MGUS p/w volume overload sent by PCP for optimization for right jaw cyst . Renal consulted for help with diuresis.       LATRICIA on CKD, ESRD s/p renal transplant; baseline CKD 2  Elevated Cr.  Creatinine at  1.41, baseline Cr. at 1.1  most likely due to volume overload with component of diuretic effect  Physical exam shows decreased edema in LE, off oxygen   Urine out put  2.4L in 24 hours   Weight 99.5KG standing     Recommend  Fluid Restriction   Daily weights   Strict In and out    Increase Lasix to 80mg TID      -Plan to keep around 1.5-2L net  negative as possible until day of surgery   BID BMP with magnesium and phosphorus while diuresing aggressively  Keep mag >2 and Potassium >4   Monitor Urine out put   Continue and encourage to use CPAP     ESRD s/p Right Renal transplant   C/w tacrolimus 2mg AM and 1mg PM  Please follow up Tacrolimus level with AM labs tomorrow     Chronic Hypervolemic Hyponatremia   Continue with diuresis as above, 1 L fluid restriction   72 yo male PMHx ESRD s/p transplant 2017, AF (Eliquis), hiatal hernia, CAD s/p CABG (2005), Obesity, DM2  HTN, HLD, BPH, CLL, MGUS p/w volume overload sent by PCP for optimization for right jaw cyst . Renal consulted for help with diuresis.       LATRICIA on CKD, ESRD s/p renal transplant; baseline CKD 2  Elevated Cr.  Creatinine at  1.41, baseline Cr. at 1.1  most likely due to volume overload with component of diuretic effect  Physical exam shows decreased edema in LE, off oxygen   Urine out put  2.4L in 24 hours   Weight 99.5KG standing     Recommend  Fluid Restriction   Daily weights   Strict In and out    Increase Lasix to 80mg TID      -Plan to keep around 1.5-2L net  negative as possible until day of surgery   BID BMP with magnesium and phosphorus while diuresing aggressively  Keep mag >2 and Potassium >4   Monitor Urine out put   Continue and encourage to use CPAP     ESRD s/p Right Renal transplant   C/w tacrolimus 2mg AM and 1mg PM  Please follow up Tacrolimus level with AM labs tomorrow     Chronic Hypervolemic Hyponatremia   Continue with diuresis as above, 1 L fluid restriction    HTN - continue to hold RAASi while diuresing aggressively, believe BP will improve with further diuresis

## 2022-07-22 NOTE — PROGRESS NOTE ADULT - PROBLEM SELECTOR PLAN 7
ESRD s/p Right Renal transplant in 2017 at [place]. Pt on Tacrolimus, previously on valgancyclovir, but d/c'ed. Follows w/ Dr Leyva  -Crea baseline 1.2 - 1.6. Currently stable   -Renal following. F/u recs  -c/w Tacrolimus, 2mg in AM, 1mg in PM  -F/u Tacrolimus level in AM  -avoid nephrotoxic agents  -monitor Cr w/ diuresis ESRD s/p Right Renal transplant in 2017 at [place]. Pt on Tacrolimus, previously on valgancyclovir, but d/c'ed. Follows w/ Dr Leyva  -Crea baseline 1.2 - 1.6. Currently stable   -Renal following. F/u recs  -c/w Tacrolimus, 2mg in AM, 1mg in PM  -Tacrolimus level WNL  -avoid nephrotoxic agents  -monitor Cr w/ diuresis

## 2022-07-22 NOTE — PROGRESS NOTE ADULT - ASSESSMENT
71 yo male PMHx AF (Eliquis), hiatal hernia, CAD s/p CABG (2005), R renal txp (tacro), Obesity, DM2 (A1C 6.7), HTN, HLD, BPH, CLL, MGUS, referred to the ED by Dr Leyva for possible thoracentesis for R pleural effusion, failed bedside thoracentesis, now s/p R pigtail placement by IR and requiring further management.

## 2022-07-23 NOTE — PROGRESS NOTE ADULT - SUBJECTIVE AND OBJECTIVE BOX
Interval Events: Reviewed  Patient seen and examined at bedside.    Patient is a 71y old  Male who presents with a chief complaint of Chronic Hypoxic Respiratory Failure, HFpEF (22 Jul 2022 19:29)  no acute event overnight, 2L NC 98%, refused CPAP      PAST MEDICAL & SURGICAL HISTORY:  Barretts esophagus      CRI (chronic renal insufficiency)      DM (diabetes mellitus)      GERD (gastroesophageal reflux disease)      HTN (hypertension)      Monoclonal gammopathy      Mandibular abscess      Paroxysmal atrial fibrillation      Benign prostatic hyperplasia, presence of lower urinary tract symptoms unspecified, unspecified morphology      Other hyperlipidemia      Hyperlipidemia      S/P CABG (coronary artery bypass graft)  15 years      History of surgery  cyst removal      Mandibular abscess          MEDICATIONS:  Pulmonary:    Antimicrobials:    Anticoagulants:    Cardiac:  amLODIPine   Tablet 10 milliGRAM(s) Oral daily  furosemide   Injectable 80 milliGRAM(s) IV Push two times a day  isosorbide   mononitrate ER Tablet (IMDUR) 30 milliGRAM(s) Oral daily  tamsulosin 0.4 milliGRAM(s) Oral at bedtime      Allergies    No Known Allergies    Intolerances        Vital Signs Last 24 Hrs  T(C): 36.6 (23 Jul 2022 06:01), Max: 36.6 (23 Jul 2022 06:01)  T(F): 97.8 (23 Jul 2022 06:01), Max: 97.8 (23 Jul 2022 06:01)  HR: 68 (23 Jul 2022 06:32) (60 - 80)  BP: 151/68 (23 Jul 2022 00:35) (129/60 - 151/68)  BP(mean): --  RR: 18 (23 Jul 2022 06:32) (18 - 18)  SpO2: 98% (23 Jul 2022 06:32) (94% - 99%)    Parameters below as of 23 Jul 2022 06:32  Patient On (Oxygen Delivery Method): nasal cannula  O2 Flow (L/min): 2      07-21 @ 07:01  -  07-22 @ 07:00  --------------------------------------------------------  IN: 480 mL / OUT: 2400 mL / NET: -1920 mL    07-22 @ 07:01  -  07-23 @ 06:40  --------------------------------------------------------  IN: 540 mL / OUT: 2600 mL / NET: -2060 mL          Review of Systems:   •	General: negative  •	Skin/Breast: negative  •	Ophthalmologic: negative  •	ENMT: negative  •	Respiratory and Thorax: negative  •	Cardiovascular: negative  •	Gastrointestinal: negative  •	Genitourinary: negative  •	Musculoskeletal: negative  •	Neurological: negative  •	Psychiatric: negative  •	Hematology/Lymphatics: negative  •	Endocrine: negative  •	Allergic/Immunologic: negative    Physical Exam:   • Constitutional:	elderly male, NAD  • Eyes:	EOMI; PERRL; no drainage or redness  • ENMT:	No oral lesions; no gross abnormalities  • Neck	 no thyromegaly or nodules  • Breasts:	not examined  • Back:	No deformity or limitation of movement  • Respiratory:	Breath Sounds equal & clear to auscultation, no accessory muscle use  • Cardiovascular:	Regular rate & rhythm, normal S1, S2; no murmurs, gallops or rubs; no S3, S4  • Gastrointestinal:	Soft, non-tender, no hepatosplenomegaly, normal bowel sounds  • Genitourinary:	not examined  • Rectal: not examined  • Extremities:	No cyanosis, clubbing, positive b/l LE edema   • Vascular:	Equal and normal pulses (dorsalis pedis)  • Neurologica:l	not examined  • Skin:	No lesions; no rash  • Lymph Nodes:	No lymphadedenopathy  • Musculoskeletal:	No joint pain, swelling or deformity; no limitation of movement        LABS:      CBC Full  -  ( 22 Jul 2022 07:51 )  WBC Count : 27.17 K/uL  RBC Count : 4.05 M/uL  Hemoglobin : 10.7 g/dL  Hematocrit : 34.7 %  Platelet Count - Automated : 184 K/uL  Mean Cell Volume : 85.7 fl  Mean Cell Hemoglobin : 26.4 pg  Mean Cell Hemoglobin Concentration : 30.8 gm/dL  Auto Neutrophil # : 2.09 K/uL  Auto Lymphocyte # : 25.08 K/uL  Auto Monocyte # : 0.00 K/uL  Auto Eosinophil # : 0.00 K/uL  Auto Basophil # : 0.00 K/uL  Auto Neutrophil % : 7.7 %  Auto Lymphocyte % : 92.3 %  Auto Monocyte % : 0.0 %  Auto Eosinophil % : 0.0 %  Auto Basophil % : 0.0 %    07-22    127<L>  |  88<L>  |  34<H>  ----------------------------<  204<H>  4.1   |  25  |  1.40<H>    Ca    9.4      22 Jul 2022 07:51  Phos  4.1     07-22  Mg     1.7     07-22    TPro  7.5  /  Alb  4.6  /  TBili  0.6  /  DBili  x   /  AST  20  /  ALT  15  /  AlkPhos  151<H>  07-22                        RADIOLOGY & ADDITIONAL STUDIES (The following images were personally reviewed):  Lucas:                                     No  Urine output:                       adequate  DVT prophylaxis:                 Yes  Flattus:                                  Yes  Bowel movement:              No

## 2022-07-23 NOTE — PROGRESS NOTE ADULT - PROBLEM SELECTOR PLAN 2
Hx R pleural effusion, s/p thoracentesis 12/2021, follows with Renea Lora  - CXR on admit shows reaccumulated R pleural effusion. Repeat CXR w/ improving effusion  - Renea, Dr. Lora following, appreciate recs.   - c/w IV Lasix 80mg BID Hx R pleural effusion, s/p thoracentesis 12/2021, follows with Renea Lora  - CXR on admit shows reaccumulated R pleural effusion. Repeat CXR w/ improving effusion  - Renea, Dr. Lora following, appreciate recs.   - c/w IV Lasix 80mg TID

## 2022-07-23 NOTE — PROGRESS NOTE ADULT - PROBLEM SELECTOR PLAN 5
Follows with oncology at [place]. No lymphadenopathy appreciated on PE. Has chronic Leukocytosis.   - WBC 26.38 today- remains stable Afebrile and non-toxic appearing without s/sx infectious process. Likely 2/2 CLL

## 2022-07-23 NOTE — PROGRESS NOTE ADULT - PROBLEM SELECTOR PLAN 1
P/W SOB/LE edema. BNP 1457. Trop 0.02 (unchanged from previous). Recently increased Torsemide 40 QD to BID outpt by Dr Lora w/o adequate response.  - ECHO 7/21/22: EF 65%, mild conc LVH, severely dilated LA, mild MR/TR  - c/w IV Lasix 80mg BID   - No ACE/ARB/ARNI for now while actively diuresing per Renal  - Net neg 2L/24H and 7L since admission   - HF Core measures, strict I&O's, daily weight, 1L fluid restriction P/W SOB/LE edema. BNP 1457. Trop 0.02 (unchanged from previous). Recently increased Torsemide 40 QD to BID outpt by Dr Lora w/o adequate response.  - ECHO 7/21/22: EF 65%, mild conc LVH, severely dilated LA, mild MR/TR  - c/w IV Lasix 80mg TID   - No ACE/ARB/ARNI for now while actively diuresing per Renal  - Net neg 2L/24H and 7L since admission   - HF Core measures, strict I&O's, daily weight, 1L fluid restriction

## 2022-07-23 NOTE — PROGRESS NOTE ADULT - PROBLEM SELECTOR PLAN 9
Severe JAYANT, follows w/ Dr Lora  - Initially refusing CPAP until after his jaw mass procedure, tried nasal mask and was able to tolerate few hrs  - CPAP qHS ordered, but pt currently refusing to continue using in hospital

## 2022-07-23 NOTE — PROGRESS NOTE ADULT - PROBLEM SELECTOR PLAN 11
- hgba1c 6.7  - blood sugars 200s-260s  - c/w Lantus 30U, Lispro 10U TID pre-meal and MISS       F: No IVF  N: DASH/TLC/DM diet  E: Replete lytes PRN K<4, Mg<2  P: DVT PPX: on HSQ  C: FULL CODE  Dispo: Admit to cardiac tele for optimization prior to R jaw mass removal Monday 7/25

## 2022-07-23 NOTE — PROGRESS NOTE ADULT - PROBLEM SELECTOR PLAN 7
ESRD s/p Right Renal transplant 2017 @ [facility]. Follows w // Autumn. Baseline Creat 1.2-1.6   - Creat 1.33 today   - Renal following, appreciate recs.   - Tacrolimus level WNL. c/w Tacrolimus 2mg in AM and 1mg in PM  - renally dose medications and avoid nephrotoxic agents

## 2022-07-23 NOTE — PROGRESS NOTE ADULT - PROBLEM SELECTOR PLAN 3
Hx R jaw cyst/mass, s/p resection ~2-3 years ago w/ Head & Neck surgeon Dr Hugo Rose.  - Cyst returned recently, was planned for elective R jaw mass removal w/ Dr Rose Monday 7/25. Plan to optimize prior to surgery Hx R jaw cyst/mass, s/p resection ~2-3 years ago w/ Head & Neck surgeon Dr Hugo Rose.  - pending MR Orbit, Face, and/or Neck w/w/o IV Cont  - Cyst returned recently, was planned for elective R jaw mass removal w/ Dr Rose Monday 7/25. Plan to optimize prior to surgery

## 2022-07-23 NOTE — PROGRESS NOTE ADULT - PROBLEM SELECTOR PLAN 6
Pt with hx of CLL. Follows with oncology at [facility]. CT neck soft tissue from 11/10 showing widespread lymphadenopathy in neck and chest most consistent with lymphoproliferative disorder, query relapse of CLL and new compared to PET-CT from 5 years prior. Pt underwent Lymph node biopsy on 11/12. Patient now noted to have R sided supraclavicular lymph node. Pt with chronic leukocytosis 2/2 CLL. WBC 17.48.   -consider heme/onc consult for further evaluation   -continue to monitor

## 2022-07-23 NOTE — PROGRESS NOTE ADULT - PROBLEM SELECTOR PLAN 4
Hx of renal transplant in 2017 at [place]. Pt on Tacrolimus and Valganciclovir at home.  -c/w Tacrolimus, 2mg in AM, 1mg in PM  -c/w valganciclovir 450mg PO qd   -Renal following -- f/u recs

## 2022-07-23 NOTE — PROGRESS NOTE ADULT - SUBJECTIVE AND OBJECTIVE BOX
Patient is a 71y Male seen and evaluated at bedside. No acute distress does not offer any complaints. VSS, Cr down trending.       Meds:    acetaminophen     Tablet .. 650 every 6 hours PRN  ALPRAZolam 0.25 every 8 hours PRN  amLODIPine   Tablet 10 daily  atorvastatin 40 at bedtime  dextrose 5%. 1000 <Continuous>  dextrose 5%. 1000 <Continuous>  dextrose 50% Injectable 25 once  dextrose 50% Injectable 12.5 once  dextrose 50% Injectable 25 once  dextrose Oral Gel 15 once PRN  furosemide   Injectable 80 two times a day  glucagon  Injectable 1 once  heparin   Injectable 5000 every 8 hours  insulin glargine Injectable (LANTUS) 30 at bedtime  insulin lispro (ADMELOG) corrective regimen sliding scale  Before meals and at bedtime  insulin lispro Injectable (ADMELOG) 10 three times a day before meals  isosorbide   mononitrate ER Tablet (IMDUR) 30 daily  melatonin 3 at bedtime PRN  ondansetron Injectable 4 every 8 hours PRN  polyethylene glycol 3350 17 daily PRN  senna 2 at bedtime PRN  tacrolimus 1 at bedtime  tacrolimus 2 daily  tamsulosin 0.4 at bedtime      T(C): , Max: 36.6 (07-23-22 @ 06:01)  T(F): , Max: 97.9 (07-23-22 @ 10:02)  HR: 66 (07-23-22 @ 11:59)  BP: 125/60 (07-23-22 @ 11:59)  BP(mean): --  RR: 18 (07-23-22 @ 11:59)  SpO2: 98% (07-23-22 @ 11:59)  Wt(kg): --    07-22 @ 07:01  -  07-23 @ 07:00  --------------------------------------------------------  IN: 540 mL / OUT: 2600 mL / NET: -2060 mL    07-23 @ 07:01  -  07-23 @ 13:39  --------------------------------------------------------  IN: 236 mL / OUT: 325 mL / NET: -89 mL          Review of Systems:  ROS negative except as per HPI      PHYSICAL EXAM:  GENERAL: No acute distress sitting comfortably on chair, moist mucus membranes   HEENT: FADY, EOMI, neck supple, no JVP  CHEST/LUNG: decreased breath sounds with crackles heard b/l   HEART: irregular rate and rhythm, no murmur, no gallops, no rub   ABDOMEN: Soft, nontender, non distended, renal transplant, no thrill, not tender or warm to touch   : No flank or supra pubic tenderness.  EXTREMITIES: 2+ pitting edema b/l LE   Neurology: Awake and alert , no focal neurological deficit, follows commands   SKIN: No rash or skin lesion, euvolemic, good skin turgor       IN: 540  OUT: 2600   Net: -2060    Weight on 7/22: 99.5KG standing-----> 7/23. 98.9KG     LABS:                        10.4   26.38 )-----------( 182      ( 23 Jul 2022 06:39 )             34.1     07-23    128<L>  |  90<L>  |  40<H>  ----------------------------<  235<H>  4.4   |  27  |  1.33<H>    Ca    9.1      23 Jul 2022 06:39  Phos  4.6     07-23  Mg     1.9     07-23    TPro  7.5  /  Alb  4.6  /  TBili  0.6  /  DBili  x   /  AST  20  /  ALT  15  /  AlkPhos  151<H>  07-22                RADIOLOGY & ADDITIONAL STUDIES:           Patient is a 71y Male seen and evaluated at bedside. No acute distress does not offer any complaints. VSS, Cr down trending. Feels has much more fluid to remove, not close to dry weight, happy to hear about improved sCr       Meds:    acetaminophen     Tablet .. 650 every 6 hours PRN  ALPRAZolam 0.25 every 8 hours PRN  amLODIPine   Tablet 10 daily  atorvastatin 40 at bedtime  dextrose 5%. 1000 <Continuous>  dextrose 5%. 1000 <Continuous>  dextrose 50% Injectable 25 once  dextrose 50% Injectable 12.5 once  dextrose 50% Injectable 25 once  dextrose Oral Gel 15 once PRN  furosemide   Injectable 80 two times a day  glucagon  Injectable 1 once  heparin   Injectable 5000 every 8 hours  insulin glargine Injectable (LANTUS) 30 at bedtime  insulin lispro (ADMELOG) corrective regimen sliding scale  Before meals and at bedtime  insulin lispro Injectable (ADMELOG) 10 three times a day before meals  isosorbide   mononitrate ER Tablet (IMDUR) 30 daily  melatonin 3 at bedtime PRN  ondansetron Injectable 4 every 8 hours PRN  polyethylene glycol 3350 17 daily PRN  senna 2 at bedtime PRN  tacrolimus 1 at bedtime  tacrolimus 2 daily  tamsulosin 0.4 at bedtime      T(C): , Max: 36.6 (07-23-22 @ 06:01)  T(F): , Max: 97.9 (07-23-22 @ 10:02)  HR: 66 (07-23-22 @ 11:59)  BP: 125/60 (07-23-22 @ 11:59)  BP(mean): --  RR: 18 (07-23-22 @ 11:59)  SpO2: 98% (07-23-22 @ 11:59)  Wt(kg): --    07-22 @ 07:01  -  07-23 @ 07:00  --------------------------------------------------------  IN: 540 mL / OUT: 2600 mL / NET: -2060 mL    07-23 @ 07:01  -  07-23 @ 13:39  --------------------------------------------------------  IN: 236 mL / OUT: 325 mL / NET: -89 mL          Review of Systems:  ROS negative except as per HPI      PHYSICAL EXAM:  GENERAL: No acute distress sitting comfortably on chair, moist mucus membranes   HEENT: FADY, EOMI, neck supple, no JVP  CHEST/LUNG: decreased breath sounds with crackles heard b/l   HEART: irregular rate and rhythm, no murmur, no gallops, no rub   ABDOMEN: Soft, nontender, non distended, renal transplant, no thrill, not tender or warm to touch   : No flank or supra pubic tenderness.  EXTREMITIES: 2+ pitting edema b/l LE, slightly improved  Neurology: Awake and alert , no focal neurological deficit, follows commands   SKIN: No rash or skin lesion, euvolemic, good skin turgor       IN: 540  OUT: 2600   Net: -2060    Weight on 7/22: 99.5KG standing-----> 7/23. 98.9KG     LABS:                        10.4   26.38 )-----------( 182      ( 23 Jul 2022 06:39 )             34.1     07-23    128<L>  |  90<L>  |  40<H>  ----------------------------<  235<H>  4.4   |  27  |  1.33<H>    Ca    9.1      23 Jul 2022 06:39  Phos  4.6     07-23  Mg     1.9     07-23    TPro  7.5  /  Alb  4.6  /  TBili  0.6  /  DBili  x   /  AST  20  /  ALT  15  /  AlkPhos  151<H>  07-22                RADIOLOGY & ADDITIONAL STUDIES:

## 2022-07-23 NOTE — PROGRESS NOTE ADULT - PROBLEM SELECTOR PLAN 4
Hx of Chronic Hypervolemic Hyponatremia w/ Na in low 130s range. Na 127 on admission   - Na 128 today  - Renal following, appreciate recs.   - c/w IV diuresis as above and 1L fluid restriction

## 2022-07-23 NOTE — PROGRESS NOTE ADULT - SUBJECTIVE AND OBJECTIVE BOX
CARDIOLOGY NP PROGRESS NOTE    Subjective:  Received pt awake in chair in NAD. States breathing has improved. Denies CP, dizziness/diaphoresis, n/v, palpitations.  Remainder ROS otherwise negative.    Overnight Events: Refused Eliquis     TELEMETRY: A-fib HR 60s          VITAL SIGNS:  T(C): 36.6 (07-23-22 @ 06:01), Max: 36.6 (07-23-22 @ 06:01)  HR: 58 (07-23-22 @ 08:24) (58 - 80)  BP: 157/69 (07-23-22 @ 08:24) (129/60 - 157/69)  RR: 18 (07-23-22 @ 08:24) (18 - 18)  SpO2: 99% (07-23-22 @ 08:24) (94% - 99%)  Wt(kg): --    I&O's Summary    22 Jul 2022 07:01  -  23 Jul 2022 07:00  --------------------------------------------------------  IN: 540 mL / OUT: 2600 mL / NET: -2060 mL          PHYSICAL EXAM:    General: A/ox 3, No acute Distress  Neck: Supple, NO JVD  Cardiac: Irregular RR.   Pulmonary: Mild bibasilar Rales. Breathing unlabored on 2L NC.   Abdomen: Soft, Non -tender, +BS x 4 quads  Extremities: No Rashes, +2 pitting edema BL LE   Neuro: A/o x 3, No focal deficits          LABS:                          10.4   26.38 )-----------( 182      ( 23 Jul 2022 06:39 )             34.1                              07-23    128<L>  |  90<L>  |  40<H>  ----------------------------<  235<H>  4.4   |  27  |  1.33<H>    Ca    9.1      23 Jul 2022 06:39  Phos  4.6     07-23  Mg     1.9     07-23    TPro  7.5  /  Alb  4.6  /  TBili  0.6  /  DBili  x   /  AST  20  /  ALT  15  /  AlkPhos  151<H>  07-22    LIVER FUNCTIONS - ( 22 Jul 2022 07:51 )  Alb: 4.6 g/dL / Pro: 7.5 g/dL / ALK PHOS: 151 U/L / ALT: 15 U/L / AST: 20 U/L / GGT: x                                   CAPILLARY BLOOD GLUCOSE      POCT Blood Glucose.: 206 mg/dL (23 Jul 2022 06:36)  POCT Blood Glucose.: 260 mg/dL (22 Jul 2022 22:42)  POCT Blood Glucose.: 210 mg/dL (22 Jul 2022 16:43)  POCT Blood Glucose.: 141 mg/dL (22 Jul 2022 11:14)            Allergies:  No Known Allergies    MEDICATIONS  (STANDING):  amLODIPine   Tablet 10 milliGRAM(s) Oral daily  atorvastatin 40 milliGRAM(s) Oral at bedtime  dextrose 5%. 1000 milliLiter(s) (50 mL/Hr) IV Continuous <Continuous>  dextrose 5%. 1000 milliLiter(s) (100 mL/Hr) IV Continuous <Continuous>  dextrose 50% Injectable 25 Gram(s) IV Push once  dextrose 50% Injectable 12.5 Gram(s) IV Push once  dextrose 50% Injectable 25 Gram(s) IV Push once  furosemide   Injectable 80 milliGRAM(s) IV Push two times a day  glucagon  Injectable 1 milliGRAM(s) IntraMuscular once  heparin   Injectable 5000 Unit(s) SubCutaneous every 8 hours  insulin glargine Injectable (LANTUS) 30 Unit(s) SubCutaneous at bedtime  insulin lispro (ADMELOG) corrective regimen sliding scale   SubCutaneous Before meals and at bedtime  insulin lispro Injectable (ADMELOG) 10 Unit(s) SubCutaneous three times a day before meals  isosorbide   mononitrate ER Tablet (IMDUR) 30 milliGRAM(s) Oral daily  tacrolimus 1 milliGRAM(s) Oral at bedtime  tacrolimus 2 milliGRAM(s) Oral daily  tamsulosin 0.4 milliGRAM(s) Oral at bedtime    MEDICATIONS  (PRN):  acetaminophen     Tablet .. 650 milliGRAM(s) Oral every 6 hours PRN Temp greater or equal to 38C (100.4F), Mild Pain (1 - 3)  ALPRAZolam 0.25 milliGRAM(s) Oral every 8 hours PRN anxiety  dextrose Oral Gel 15 Gram(s) Oral once PRN Blood Glucose LESS THAN 70 milliGRAM(s)/deciliter  melatonin 3 milliGRAM(s) Oral at bedtime PRN Insomnia  ondansetron Injectable 4 milliGRAM(s) IV Push every 8 hours PRN Nausea and/or Vomiting  polyethylene glycol 3350 17 Gram(s) Oral daily PRN Constipation  senna 2 Tablet(s) Oral at bedtime PRN Constipation        DIAGNOSTIC TESTS:

## 2022-07-23 NOTE — PROGRESS NOTE ADULT - PROBLEM SELECTOR PLAN 6
S/p DCCV w/ Dr Schuster 5/2022. [facility] 5  - Tele- Afib HR 60s   - Eliquis dc'd 7/22 in anticipation for OR 7/25. No need for AC bridging prior to surgery as per surgery S/p DCCV w/ Dr Schuster 5/2022. [facility] 5  - Tele- Afib HR 60s   - Eliquis dc'd 7/22 in anticipation for OR 7/25. No need for AC bridging prior to surgery as per surgery- ordered for prophylaxis Hep SQ

## 2022-07-23 NOTE — PROGRESS NOTE ADULT - ASSESSMENT
71 y/oM PMHx Afib (on Eliquis, s/p DCCV 5/2022), hx PE, HFpEF (EF 65%), CAD s/p CABG (2005), R renal transplant (on tacrolimus, follows w/ Dr Leyva), hx pHTN, jaw mass (s/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, now returned), JAYANT (has not started CPAP yet), CLL (w/ chronic leukocytosis), DM2, HTN, HLD, Rt pleural effusion (s/p thoracentesis 12/21, follow w/ Dr Lora) presented 7/20/22 w/SOB and worsening LE edema, initially admitted to medicine w/recurrent R pleural effusion and transferred to tele for acute on chronic HFpEF exacerbation, on IV diuresis with plan for optimization prior to elective right jaw mass removal on 7/25/22 w/ Dr Rose. HF, Renal, Pulm following.

## 2022-07-23 NOTE — PROGRESS NOTE ADULT - PROBLEM SELECTOR PLAN 8
s/p CABG 2005, NST Fall 2021 negative per cardiology notes on last admission. Remains CP free & EKG unchanged from prior   - Lipid Panel WNL, LDL 42.   - c/w Atorvastatin 40mg

## 2022-07-23 NOTE — PROGRESS NOTE ADULT - ASSESSMENT
72 yo male PMHx ESRD s/p transplant 2017, AF (Eliquis), hiatal hernia, CAD s/p CABG (2005), Obesity, DM2  HTN, HLD, BPH, CLL, MGUS p/w volume overload sent by PCP for optimization for right jaw cyst . Renal consulted for help with diuresis.       LATRICIA on CKD, ESRD s/p renal transplant; baseline CKD 2  Elevated Cr.  Creatinine at  1.33, baseline Cr. at 1.1, given that Cr. down trending with aggrieve diuresis gives idea of CRS and would continue with further diuresis for now   most likely due to volume overload with component of diuretic effect  Physical exam shows decreased edema in LE, off oxygen   Urine out put  2.6L in 24 hours   Weight 98.9KG standing     Recommend  Fluid Restriction   Daily weights   Strict In and out    Continue with  IV Lasix to 80mg TID      -Plan to keep around 1.5-2L net  negative as possible until day of surgery   BID BMP with magnesium and phosphorus while diuresing aggressively  Keep mag >2 and Potassium >4   Monitor Urine out put   Continue and encourage to use CPAP     ESRD s/p Right Renal transplant   C/w tacrolimus 2mg AM and 1mg PM  TAC level WNL      Chronic Hypervolemic Hyponatremia (Improving)   Continue with diuresis as above, 1 L fluid restriction    HTN - continue to hold RAASi and while diuresing aggressively, believe BP will improve with further diuresis    Hold of to PO diuretics until after surgery.      72 yo male PMHx ESRD s/p transplant 2017, AF (Eliquis), hiatal hernia, CAD s/p CABG (2005), Obesity, DM2  HTN, HLD, BPH, CLL, MGUS p/w volume overload sent by PCP for optimization for right jaw cyst . Renal consulted for help with diuresis, transplant      LATRICIA on CKD, ESRD s/p renal transplant; baseline CKD 2  Elevated Cr.  Creatinine at  1.33, baseline Cr. at 1.1, given that Cr. down trending with aggrieve diuresis gives idea of CRS and would continue with further diuresis for now   Physical exam shows decreased edema in LE, off oxygen   Urine out put  2.6L in 24 hours   Weight 98.9KG standing     Recommend  Fluid Restriction   Daily weights   Strict In and out    Continue with  IV Lasix to 80mg TID      -Plan to keep around 1.5-2L net  negative as possible until day of surgery   BID BMP with magnesium and phosphorus while diuresing aggressively  Keep mag >2 and Potassium >4   Monitor Urine out put   Continue and encourage to use CPAP     ESRD s/p Right Renal transplant   C/w tacrolimus 2mg AM and 1mg PM  TAC level WNL      Chronic Hypervolemic Hyponatremia (Improving)   Continue with diuresis as above, 1 L fluid restriction    HTN - continue to hold RAASi and while diuresing aggressively, believe BP will improve with further diuresis    Hold off conversion to PO diuretics until after surgery.

## 2022-07-24 NOTE — PROGRESS NOTE ADULT - PROBLEM SELECTOR PLAN 5
--Care established at [facility]; Has chronic leukocytosis, pt remains stable, afebrile, and non-toxic – no s/sx of infectious process.

## 2022-07-24 NOTE — PROGRESS NOTE ADULT - PROBLEM SELECTOR PLAN 3
--R jaw cyst/mass; s/p resection ~2-3yrs ago (surgeon: Dr. Hugo Rose).    --Cyst returned recently; planned for elective R jaw mass resection 7/25/22 (w/ Dr. Rose).    --PLAN: pt need MR Orbit/Face/Neck w/ and w/o con; pt initially refused by amenable now. Pt to have MRI today 7/24/22 (will need ativan).

## 2022-07-24 NOTE — PROGRESS NOTE ADULT - PROBLEM SELECTOR PLAN 2
--Hx of R pleural effusion; s/p thoracentesis 12/2021 (follows w/ Pulm Dr. Lora).   --Serial CXRs since admit showing improvement of effusion.    --Continue IV diuresis as above.

## 2022-07-24 NOTE — PROGRESS NOTE ADULT - SUBJECTIVE AND OBJECTIVE BOX
Patient is a 71y Male seen and evaluated at bedside. Patient does not offer any complaints at this time. VSS. Plan for procedure tomorrow.       Meds:    acetaminophen     Tablet .. 650 every 6 hours PRN  ALPRAZolam 0.25 every 8 hours PRN  amLODIPine   Tablet 10 daily  atorvastatin 40 at bedtime  dextrose 5%. 1000 <Continuous>  dextrose 5%. 1000 <Continuous>  dextrose 50% Injectable 25 once  dextrose 50% Injectable 12.5 once  dextrose 50% Injectable 25 once  dextrose Oral Gel 15 once PRN  furosemide   Injectable 80 every 8 hours  glucagon  Injectable 1 once  heparin   Injectable 5000 every 8 hours  insulin glargine Injectable (LANTUS) 30 at bedtime  insulin lispro (ADMELOG) corrective regimen sliding scale  Before meals and at bedtime  insulin lispro Injectable (ADMELOG) 10 three times a day before meals  isosorbide   mononitrate ER Tablet (IMDUR) 30 daily  melatonin 3 at bedtime PRN  ondansetron Injectable 4 every 8 hours PRN  polyethylene glycol 3350 17 daily PRN  senna 2 at bedtime PRN  sodium chloride 3%. 500 <Continuous>  tacrolimus 1 at bedtime  tacrolimus 2 daily  tamsulosin 0.4 at bedtime      T(C): , Max: 36.4 (07-23-22 @ 14:12)  T(F): , Max: 97.6 (07-23-22 @ 21:11)  HR: 61 (07-24-22 @ 09:29)  BP: 139/65 (07-24-22 @ 09:29)  BP(mean): 94 (07-24-22 @ 09:29)  RR: 19 (07-24-22 @ 09:29)  SpO2: 97% (07-24-22 @ 09:29)  Wt(kg): --    07-23 @ 07:01  -  07-24 @ 07:00  --------------------------------------------------------  IN: 416 mL / OUT: 2450 mL / NET: -2034 mL    07-24 @ 07:01  -  07-24 @ 13:13  --------------------------------------------------------  IN: 175 mL / OUT: 900 mL / NET: -725 mL          Review of Systems:  ROS negative except as per HPI      PHYSICAL EXAM:  GENERAL: No acute distress sitting comfortably on chair, moist mucus membranes   HEENT: FADY, EOMI, neck supple, no JVP  CHEST/LUNG: decreased breath sounds with crackles heard b/l   HEART: irregular rate and rhythm, no murmur, no gallops, no rub   ABDOMEN: Soft, nontender, non distended, renal transplant, no thrill, not tender or warm to touch   : No flank or supra pubic tenderness.  EXTREMITIES: 2+ pitting edema b/l LE, slightly improved  Neurology: Awake and alert , no focal neurological deficit, follows commands   SKIN: No rash or skin lesion, euvolemic, good skin turgor     IN: 416  OUT: 2450  Negative 2034        LABS:                        10.4   29.27 )-----------( 197      ( 24 Jul 2022 05:45 )             34.0     07-24    124<L>  |  86<L>  |  41<H>  ----------------------------<  147<H>  4.2   |  27  |  1.36<H>    Ca    9.4      24 Jul 2022 05:45  Phos  4.6     07-23  Mg     1.9     07-24                  RADIOLOGY & ADDITIONAL STUDIES:           Patient is a 71y Male seen and evaluated at bedside. Patient does not offer any complaints at this time. VSS. Plan for procedure tomorrow.       Meds:    acetaminophen     Tablet .. 650 every 6 hours PRN  ALPRAZolam 0.25 every 8 hours PRN  amLODIPine   Tablet 10 daily  atorvastatin 40 at bedtime  dextrose 5%. 1000 <Continuous>  dextrose 5%. 1000 <Continuous>  dextrose 50% Injectable 25 once  dextrose 50% Injectable 12.5 once  dextrose 50% Injectable 25 once  dextrose Oral Gel 15 once PRN  furosemide   Injectable 80 every 8 hours  glucagon  Injectable 1 once  heparin   Injectable 5000 every 8 hours  insulin glargine Injectable (LANTUS) 30 at bedtime  insulin lispro (ADMELOG) corrective regimen sliding scale  Before meals and at bedtime  insulin lispro Injectable (ADMELOG) 10 three times a day before meals  isosorbide   mononitrate ER Tablet (IMDUR) 30 daily  melatonin 3 at bedtime PRN  ondansetron Injectable 4 every 8 hours PRN  polyethylene glycol 3350 17 daily PRN  senna 2 at bedtime PRN  sodium chloride 3%. 500 <Continuous>  tacrolimus 1 at bedtime  tacrolimus 2 daily  tamsulosin 0.4 at bedtime      T(C): , Max: 36.4 (07-23-22 @ 14:12)  T(F): , Max: 97.6 (07-23-22 @ 21:11)  HR: 61 (07-24-22 @ 09:29)  BP: 139/65 (07-24-22 @ 09:29)  BP(mean): 94 (07-24-22 @ 09:29)  RR: 19 (07-24-22 @ 09:29)  SpO2: 97% (07-24-22 @ 09:29)  Wt(kg): --    07-23 @ 07:01  -  07-24 @ 07:00  --------------------------------------------------------  IN: 416 mL / OUT: 2450 mL / NET: -2034 mL    07-24 @ 07:01  -  07-24 @ 13:13  --------------------------------------------------------  IN: 175 mL / OUT: 900 mL / NET: -725 mL          Review of Systems:  ROS negative except as per HPI      PHYSICAL EXAM:  GENERAL: No acute distress sitting comfortably on chair, moist mucus membranes   HEENT: FADY, EOMI, neck supple, no JVP  CHEST/LUNG: decreased breath sounds with crackles heard b/l   HEART: irregular rate and rhythm, no murmur, no gallops, no rub   ABDOMEN: Soft, nontender, non distended, renal transplant, no thrill, not tender or warm to touch   : No flank or supra pubic tenderness.  EXTREMITIES: 2+ pitting edema b/l LE, slightly improved  Neurology: Awake and alert , no focal neurological deficit, follows commands   SKIN: No rash or skin lesion, euvolemic, good skin turgor     IN: 416  OUT: 2450  Negative 2034        LABS:                        10.4   29.27 )-----------( 197      ( 24 Jul 2022 05:45 )             34.0     07-24    124<L>  |  86<L>  |  41<H>  ----------------------------<  147<H>  4.2   |  27  |  1.36<H>    Ca    9.4      24 Jul 2022 05:45  Phos  4.6     07-23  Mg     1.9     07-24                  RADIOLOGY & ADDITIONAL STUDIES:      Creatinine, Serum: 1.36 mg/dL (07-24-22 @ 05:45)  Creatinine, Serum: 1.56 mg/dL (07-23-22 @ 20:32)  Creatinine, Serum: 1.33 mg/dL (07-23-22 @ 06:39)  Creatinine, Serum: 1.40 mg/dL (07-22-22 @ 07:51)  Creatinine, Serum: 1.55 mg/dL (07-21-22 @ 18:49)  Creatinine, Serum: 1.41 mg/dL (07-21-22 @ 07:10)  Creatinine, Serum: 1.59 mg/dL (07-21-22 @ 00:43)  Creatinine, Serum: 1.52 mg/dL (07-20-22 @ 14:57)  Creatinine, Serum: 1.51 mg/dL (07-17-22 @ 10:57)  Creatinine, Serum: 1.38 mg/dL (12-10-21 @ 05:56)

## 2022-07-24 NOTE — PROGRESS NOTE ADULT - PROBLEM SELECTOR PLAN 8
--s/p CABG 2005; NST Fall of 2021 (-) per cardiology notes on last admit.    --CONT: Atorvastatin 40mg PO QHS.

## 2022-07-24 NOTE — PROGRESS NOTE ADULT - PROBLEM SELECTOR PLAN 4
--Chronic hypervolemic hyponatremia w/ Na in low 130s; Na 127 on admit, Na 124 today.    --Nephrology following.    --PLAN: today 7/24/22, pt to receive hypertonic saline 3% 100cc IV x1 w/ afternoon Lasix dose; f/u BMP 6pm, and consider additional dose of hypertonic saline w/ evening dose if patient still hyponatremic (Na < 130). --Chronic hypervolemic hyponatremia w/ Na in low 130s; Na 127 on admit, Na 124 today.    --Nephrology following.    --PLAN: today 7/24/22, pt to receive hypertonic saline 3% 100cc IV x1 w/ afternoon Lasix dose; f/u BMP 4pm, and consider additional dose of hypertonic saline w/ evening dose if patient still hyponatremic (Na < 130); BID BMP/Mg. F/U 8pm BMP as well to check status of hyponatremia.

## 2022-07-24 NOTE — CHART NOTE - NSCHARTNOTEFT_GEN_A_CORE
ENT requesting STAT MRI with contrast prior to OR tomorrow. As patient has claustrophobia, will likely need anxiolytics ahead of time. ENT requesting STAT MRI face/orbits with contrast prior to OR tomorrow. As patient has claustrophobia, will likely need anxiolytics ahead of time.

## 2022-07-24 NOTE — PROGRESS NOTE ADULT - ASSESSMENT
70 yo male PMHx ESRD s/p transplant 2017, AF (Eliquis), hiatal hernia, CAD s/p CABG (2005), Obesity, DM2  HTN, HLD, BPH, CLL, MGUS p/w volume overload sent by PCP for optimization for right jaw cyst . Renal consulted for help with diuresis, transplant      LATRICIA on CKD, ESRD s/p renal transplant; baseline CKD 2  Elevated Cr.  Creatinine at  1.36, baseline Cr. at 1.1, given that Cr. down trending with aggrieve diuresis gives idea of CRS and would continue with further diuresis for now   Physical exam shows decreased edema in LE, off oxygen   Urine out put  2.4L in 24 hours   Weight 98.9KG standing     Recommend  Fluid Restriction   Daily weights   Strict In and out    Continue with  IV Lasix to 80mg TID      -Plan to keep around 1.5-2L net  negative as possible until day of surgery   Please give 100cc of Hypertonic saline as it will help with further diuresis as you have a greater salt load for water to follow. This will also help with hyponatremia                     Please repeat labs at 2PM and if low will give another 100cc of hypertonic and repeat labs at 8PM   BID BMP with magnesium and phosphorus while diuresing aggressively  Keep mag >2 and Potassium >4   Monitor Urine out put   Continue and encourage to use CPAP     ESRD s/p Right Renal transplant   C/w tacrolimus 2mg AM and 1mg PM  TAC level WNL      Chronic Hypervolemic Hyponatremia (Improving)   Continue with diuresis as above, 1 L fluid restriction  Further decrease due to patient not following strict diet and drinking more water   Give 100cc of hypertonic saline as mentioned above, Please repeat labs at 2PM and if still low will give another 100cc and repeat labs again at 8PM     HTN - continue to hold RAASi and while diuresing aggressively, believe BP will improve with further diuresis    Hold off conversion to PO diuretics until after surgery. 70 yo male PMHx ESRD s/p transplant 2017, AF (Eliquis), hiatal hernia, CAD s/p CABG (2005), Obesity, DM2  HTN, HLD, BPH, CLL, MGUS p/w volume overload sent by PCP for optimization for right jaw cyst . Renal consulted for help with diuresis, transplant      LATRICIA on CKD, ESRD s/p renal transplant; baseline CKD 2  Elevated Cr.  Creatinine at  1.36, baseline Cr. at 1.1, given that Cr. down trending with aggrieve diuresis gives idea of CRS and would continue with further diuresis for now   Physical exam shows decreased edema in LE, off oxygen   Urine out put  2.4L in 24 hours   Weight 98.9KG standing     Recommend  Fluid Restriction   Daily weights   Strict In and out    Continue with  IV Lasix to 80mg TID      -Plan to keep around 1.5-2L net  negative as possible until day of surgery   Please give 100cc of Hypertonic saline as it will help with further diuresis as you have a greater salt load for water to follow. This will also help with hyponatremia                     Please repeat labs at 2PM and if low (<130) will give another 100cc of hypertonic and repeat labs at 8PM   BID BMP with magnesium and phosphorus while diuresing aggressively  Keep mag >2 and Potassium >4   Monitor Urine out put   Continue and encourage to use CPAP     ESRD s/p Right Renal transplant   C/w tacrolimus 2mg AM and 1mg PM  TAC level WNL, next check post op    Chronic Hypervolemic Hyponatremia (Improving)   Continue with diuresis as above, 1 L fluid restriction  Further decrease due to patient not following strict diet and drinking more water   Give 100cc of hypertonic saline as mentioned above, Please repeat labs at 2PM and if still low will give another 100cc and repeat labs again at 8PM     HTN - continue to hold RAASi and while diuresing aggressively, believe BP will improve with further diuresis    Hold off conversion to PO diuretics until after surgery.

## 2022-07-24 NOTE — PROGRESS NOTE ADULT - PROBLEM SELECTOR PLAN 7
--Hx of ESRD now s/p right renal transplant 2017 ([facility]); follows w/ Dr. Leyva.    --Baseline Cr 1.2-1.6; Cr 1.36 today.    --Tacrolimus level WNL; continue w/ Tacrolimus 2mg qAM, and Tacrolimus 1mg qPM.

## 2022-07-24 NOTE — PROGRESS NOTE ADULT - PROBLEM SELECTOR PLAN 11
--HgA1c 6.7; -260s.    --CONT: Lantus 30U SQ QHS, Lispro 10U TID pre meal, and mISS.      Diet: DASH/TLC/DM diet; NPO after MN   DVT ppx:on HSQ   Dispo: R jaw mass resection planned for tomorrow 7/25/22, pending MRI completion today.

## 2022-07-24 NOTE — PROGRESS NOTE ADULT - PROBLEM SELECTOR PLAN 6
--Remains in Afib w/ HR 60-70s; s/p DCCV w/ Dr. Schuster 5/2022.    --Eliquis DISCONTINUED in anticipation for OR 7/25/22; no need for AC bridge prior to surgery as per surgery.

## 2022-07-24 NOTE — PROGRESS NOTE ADULT - PROBLEM SELECTOR PLAN 4
Hx of renal transplant in 2017 at [facility]. Pt on Tacrolimus and Valganciclovir at home.  -c/w Tacrolimus, 2mg in AM, 1mg in PM  -c/w valganciclovir 450mg PO qd   -Renal following -- f/u recs

## 2022-07-24 NOTE — PROGRESS NOTE ADULT - NUTRITIONAL ASSESSMENT
Problem/Plan - 1:     ·  Problem: Acute on chronic heart failure with preserved ejection fraction (HFpEF).      ·  Plan:     --On admit: SOB/LE edema, BNP 1457, Trop T 0.02 (unchanged from previous).      --HOME: Torsemide 40mg BID (recently increased by Dr. Lora w/o adequate response).      --TTE (7/21/22): EF 65%, mild cLVH, severe LA dilation, mild MR/TR.      --CONT: Lasix 80mg IV TID.      --I/O: -2L/24hr, -9L/total stay; goal –2.5L/day.      --Remains w/ decreased lung sounds R side; bibasilar rales.            Problem/Plan - 2:     ·  Problem: Pleural effusion, right.      ·  Plan:      --Hx of R pleural effusion; s/p thoracentesis 12/2021 (follows w/ Pulm Dr. Lora).     --Serial CXRs since admit showing improvement of effusion.      --Continue IV diuresis as above.            Problem/Plan - 3:     ·  Problem: Jaw mass.      ·  Plan:      --R jaw cyst/mass; s/p resection ~2-3yrs ago (surgeon: Dr. Hugo Rose).      --Cyst returned recently; planned for elective R jaw mass resection 7/25/22 (w/ Dr. Rose).      --PLAN: pt need MR Orbit/Face/Neck w/ and w/o con; pt initially refused by amenable now. Pt to have MRI today 7/24/22 (will need ativan).            Problem/Plan - 4:     ·  Problem: Hyponatremia.      ·  Plan:      --Chornic hypervolemic hyponatremia w/ Na in low 130s; Na 127 on admit, Na 124 today.      --Nephrology following.      --PLAN: today 7/24/22, pt to receive hypertonic saline 3% 100cc IV x1 w/ afternoon Lasix dose; f/u BMP 6pm, and consider additional dose of hypertonic saline w/ evening dose if patient still hyponatremic (Na < 130).            Problem/Plan - 5:     ·  Problem: CLL (chronic lymphocytic leukemia).      ·  Plan:      --Care established at [facility]; Has chronic leukocytosis, pt remains stable, afebrile, and non-toxic – no s/sx of infectious process.            Problem/Plan - 6:     ·  Problem: Chronic atrial fibrillation.      ·  Plan:      --Remains in Afib w/ HR 60-70s; s/p DCCV w/ Dr. Schuster 5/2022.      --Eliquis DISCONTINUED in anticipation for OR 7/25/22; no need for AC bridge prior to surgery as per surgery.                 Problem/Plan - 7:     ·  Problem: History of renal transplant.      ·  Plan:      --Hx of ESRD now s/p right renal transplant 2017 ([facility]); follows w/ Dr. Leyva.      --Baseline Cr 1.2-1.6; Cr 1.36 today.      --Tacrolimus level WNL; continue w/ Tacrolimus 2mg qAM, and Tacrolimus 1mg qPM.            Problem/Plan - 8:     ·  Problem: CAD (coronary artery disease).      ·  Plan:      --s/p CABG 2005; NST Fall of 2021 (-) per cardiology notes on last admit.      --CONT: Atorvastatin 40mg PO QHS.          Problem/Plan - 9:   ·  Problem: JAYANT (obstructive sleep apnea).    ·  Plan:    --Severe JAYANT, follows w/ Dr. Lora.    --Patient refusing nocturnal CPAP.        Problem/Plan - 10:   ·  Problem: HTN (hypertension).    ·  Plan;    ---150s.    --CONT: Amlodipine 10mg PO QD and Imdur 30mg PO QD.        Problem/Plan - 11:   ·  Problem: DM (diabetes mellitus).    ·  Plan:\   --HgA1c 6.7; -260s.    --CONT: Lantus 30U SQ QHS, Lispro 10U TID pre meal, and mISS.          Diet: DASH/TLC/DM diet; NPO after MN   DVT ppx:on HSQ   Dispo: R jaw mass resection planned for tomorrow 7/25/22, pending MRI completion today.

## 2022-07-24 NOTE — PROGRESS NOTE ADULT - SUBJECTIVE AND OBJECTIVE BOX
Interventional Cardiology PA Adult Progress Note    Subjective Assessment:  	  MEDICATIONS:  amLODIPine   Tablet 10 milliGRAM(s) Oral daily  furosemide   Injectable 80 milliGRAM(s) IV Push three times a day  isosorbide   mononitrate ER Tablet (IMDUR) 30 milliGRAM(s) Oral daily  tamsulosin 0.4 milliGRAM(s) Oral at bedtime  acetaminophen     Tablet .. 650 milliGRAM(s) Oral every 6 hours PRN  ALPRAZolam 0.25 milliGRAM(s) Oral every 8 hours PRN  melatonin 3 milliGRAM(s) Oral at bedtime PRN  ondansetron Injectable 4 milliGRAM(s) IV Push every 8 hours PRN  polyethylene glycol 3350 17 Gram(s) Oral daily PRN  senna 2 Tablet(s) Oral at bedtime PRN  atorvastatin 40 milliGRAM(s) Oral at bedtime  dextrose 50% Injectable 25 Gram(s) IV Push once  dextrose 50% Injectable 12.5 Gram(s) IV Push once  dextrose 50% Injectable 25 Gram(s) IV Push once  dextrose Oral Gel 15 Gram(s) Oral once PRN  glucagon  Injectable 1 milliGRAM(s) IntraMuscular once  insulin glargine Injectable (LANTUS) 30 Unit(s) SubCutaneous at bedtime  insulin lispro (ADMELOG) corrective regimen sliding scale   SubCutaneous Before meals and at bedtime  insulin lispro Injectable (ADMELOG) 10 Unit(s) SubCutaneous three times a day before meals  dextrose 5%. 1000 milliLiter(s) IV Continuous <Continuous>  dextrose 5%. 1000 milliLiter(s) IV Continuous <Continuous>  heparin   Injectable 5000 Unit(s) SubCutaneous every 8 hours  tacrolimus 1 milliGRAM(s) Oral at bedtime  tacrolimus 2 milliGRAM(s) Oral daily      PHYSICAL EXAM:  TELEMETRY:  T(C): 36 (07-24-22 @ 04:39), Max: 36.6 (07-23-22 @ 10:02)  HR: 70 (07-24-22 @ 05:41) (58 - 71)  BP: 152/68 (07-24-22 @ 05:41) (119/59 - 157/69)  RR: 18 (07-24-22 @ 05:41) (18 - 18)  SpO2: 99% (07-24-22 @ 05:41) (97% - 99%)  Wt(kg): --  I&O's Summary    23 Jul 2022 07:01  -  24 Jul 2022 07:00  --------------------------------------------------------  IN: 416 mL / OUT: 2450 mL / NET: -2034 mL      General: A/ox 3, No acute Distress  Neck: Supple, NO JVD  Cardiac: Irregular RR.   Pulmonary: Mild bibasilar Rales. Breathing unlabored on 2L NC.   Abdomen: Soft, Non -tender, +BS x 4 quads  Extremities: No Rashes, +2 pitting edema BL LE   Neuro: A/o x 3, No focal deficits      LABS:	 	  CARDIAC MARKERS:                        10.4   29.27 )-----------( 197      ( 24 Jul 2022 05:45 )             34.0     124<L>  |  86<L>  |  41<H>  ----------------------------<  147<H>  4.2   |  27  |  1.36<H>    Ca    9.4      24 Jul 2022 05:45  Phos  4.6     07-23  Mg     1.9     07-24  TPro  7.5  /  Alb  4.6  /  TBili  0.6  /  DBili  x   /  AST  20  /  ALT  15  /  AlkPhos  151<H>  07-22     Interventional Cardiology PA Adult Progress Note    Subjective Assessment: Seen at bedside. Patient endorsing a lot of anxiety about MRI. Pt refused MRI previously, but not is amenable. Understands plan for the day for MRI and OR tomorrow w/ Dr. Rose.   	  MEDICATIONS:  amLODIPine   Tablet 10 milliGRAM(s) Oral daily  furosemide   Injectable 80 milliGRAM(s) IV Push three times a day  isosorbide   mononitrate ER Tablet (IMDUR) 30 milliGRAM(s) Oral daily  tamsulosin 0.4 milliGRAM(s) Oral at bedtime  acetaminophen     Tablet .. 650 milliGRAM(s) Oral every 6 hours PRN  ALPRAZolam 0.25 milliGRAM(s) Oral every 8 hours PRN  melatonin 3 milliGRAM(s) Oral at bedtime PRN  ondansetron Injectable 4 milliGRAM(s) IV Push every 8 hours PRN  polyethylene glycol 3350 17 Gram(s) Oral daily PRN  senna 2 Tablet(s) Oral at bedtime PRN  atorvastatin 40 milliGRAM(s) Oral at bedtime  dextrose 50% Injectable 25 Gram(s) IV Push once  dextrose 50% Injectable 12.5 Gram(s) IV Push once  dextrose 50% Injectable 25 Gram(s) IV Push once  dextrose Oral Gel 15 Gram(s) Oral once PRN  glucagon  Injectable 1 milliGRAM(s) IntraMuscular once  insulin glargine Injectable (LANTUS) 30 Unit(s) SubCutaneous at bedtime  insulin lispro (ADMELOG) corrective regimen sliding scale   SubCutaneous Before meals and at bedtime  insulin lispro Injectable (ADMELOG) 10 Unit(s) SubCutaneous three times a day before meals  dextrose 5%. 1000 milliLiter(s) IV Continuous <Continuous>  dextrose 5%. 1000 milliLiter(s) IV Continuous <Continuous>  heparin   Injectable 5000 Unit(s) SubCutaneous every 8 hours  tacrolimus 1 milliGRAM(s) Oral at bedtime  tacrolimus 2 milliGRAM(s) Oral daily      PHYSICAL EXAM:  TELEMETRY:  T(C): 36 (07-24-22 @ 04:39), Max: 36.6 (07-23-22 @ 10:02)  HR: 70 (07-24-22 @ 05:41) (58 - 71)  BP: 152/68 (07-24-22 @ 05:41) (119/59 - 157/69)  RR: 18 (07-24-22 @ 05:41) (18 - 18)  SpO2: 99% (07-24-22 @ 05:41) (97% - 99%)  Wt(kg): --  I&O's Summary    23 Jul 2022 07:01  -  24 Jul 2022 07:00  --------------------------------------------------------  IN: 416 mL / OUT: 2450 mL / NET: -2034 mL      General: A/ox 3, No acute Distress  Neck: Supple, NO JVD  Cardiac: Irregular RR.   Pulmonary: Mild bibasilar Rales. Breathing unlabored on 2L NC.   Abdomen: Soft, Non -tender, +BS x 4 quads  Extremities: No Rashes, +2 pitting edema BL LE   Neuro: A/o x 3, No focal deficits      LABS:	 	  CARDIAC MARKERS:                        10.4   29.27 )-----------( 197      ( 24 Jul 2022 05:45 )             34.0     124<L>  |  86<L>  |  41<H>  ----------------------------<  147<H>  4.2   |  27  |  1.36<H>    Ca    9.4      24 Jul 2022 05:45  Phos  4.6     07-23  Mg     1.9     07-24  TPro  7.5  /  Alb  4.6  /  TBili  0.6  /  DBili  x   /  AST  20  /  ALT  15  /  AlkPhos  151<H>  07-22

## 2022-07-24 NOTE — PROGRESS NOTE ADULT - PROBLEM SELECTOR PLAN 6
Pt with hx of CLL. Follows with oncology at [facility]. CT neck soft tissue from 11/10 showing widespread lymphadenopathy in neck and chest most consistent with lymphoproliferative disorder, query relapse of CLL and new compared to PET-CT from 5 years prior. Pt underwent Lymph node biopsy on 11/12. Patient now noted to have R sided supraclavicular lymph node. Pt with chronic leukocytosis 2/2 CLL. WBC 17.48.   -consider heme/onc consult for further evaluation   -continue to monitor

## 2022-07-24 NOTE — PROGRESS NOTE ADULT - PROBLEM SELECTOR PLAN 1
--On admit: SOB/LE edema, BNP 1457, Trop T 0.02 (unchanged from previous).    --HOME: Torsemide 40mg BID (recently increased by Dr. Lora w/o adequate response).    --TTE (7/21/22): EF 65%, mild cLVH, severe LA dilation, mild MR/TR.    --CONT: Lasix 80mg IV TID.    --I/O: -2L/24hr, -9L/total stay; goal –2.5L/day.    --Remains w/ decreased lung sounds R side; bibasilar rales.

## 2022-07-25 NOTE — PROGRESS NOTE ADULT - PROBLEM SELECTOR PLAN 7
--Hx of ESRD now s/p right renal transplant 2017 ([facility]); follows w/ Dr. Leyva.    --Baseline Cr 1.2-1.6; Cr 1.29 today.    --Tacrolimus level WNL; continue w/ Tacrolimus 2mg qAM, and Tacrolimus 1mg qPM.

## 2022-07-25 NOTE — PROGRESS NOTE ADULT - PROBLEM SELECTOR PLAN 11
--HgA1c 6.7; -260s.    --CONT: Lantus 30U SQ QHS, Lispro 10U TID pre meal, and mISS.      Diet: DASH/TLC/DM diet; NPO after MN   DVT ppx:on HSQ   Dispo: Plan for DC tomr 7/25/22, pt euvolemic on exam

## 2022-07-25 NOTE — PACU DISCHARGE NOTE - COMMENTS
patient for transfer to [facility]. a/o x4 / hob elevated 45 degrees, ice pack right chheek , medicated w/ pain medicine. tolerated. O2 at 40% face tent. hemodynamically stable. d/c to [facility] bed on a stretcher. report given to wili chauhan .patient for transfer to [facility]. a/o x4 / hob elevated 45 degrees, ice pack right chheek , medicated w/ pain medicine. tolerated. O2 at 40% face tent. hemodynamically stable. d/c to [facility] bed on a stretcher.

## 2022-07-25 NOTE — CHART NOTE - NSCHARTNOTEFT_GEN_A_CORE
Pt was taken to OR before cardiology PA arrival this morning. Pt not examined this morning by Cardiology team. Pt is planned to be admitted to [facility] after the case. Cardio consult service was notified and will continue to follow patient.

## 2022-07-25 NOTE — PROGRESS NOTE ADULT - PROBLEM SELECTOR PLAN 6
"Chief Complaints and History of Present Illnesses   Patient presents with     Exotropia Follow Up     No new complaints.  Didn't get glasses from last year's visit.   Review of systems for the eyes was negative other than the pertinent positives and negatives noted in the HPI.  History is obtained from the patient and parents.    Referring provider: Referred Self     Primary care: [facility]         Assessment   Dennys Dawn is a 3 year old male who presents with:       ICD-10-CM    1. 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   Will give glasses prescription today.  REcheck in 3-4 months.       Further details of the management plan can be found in the \"Patient Instructions\" section which was printed and given to the patient at checkout.  Return in about 3 months (around 9/6/2019).   Attending Physician Attestation:  Complete documentation of historical and exam elements from today's encounter can be found in the full encounter summary report (not reduplicated in this progress note).  I personally obtained the chief complaint(s) and history of present illness.  I confirmed and edited as necessary the review of systems, past medical/surgical history, family history, social history, and examination findings as documented by others; and I examined the patient myself.  I personally reviewed the relevant tests, images, and reports as documented above.  I formulated and edited as necessary the assessment and plan and discussed the findings and management plan with the patient and family. - Sharon Peterson MD 6/6/2019 5:24 PM     " --Remains in Afib w/ HR 60-70s; s/p DCCV w/ Dr. Schuster 5/2022.    --Eliquis DISCONTINUED in anticipation for OR 7/25/22; no need for AC bridge prior to surgery as per surgery. Pending AC clearance from ENT

## 2022-07-25 NOTE — BRIEF OPERATIVE NOTE - OPERATION/FINDINGS
Right masseter space opened where significant amounts of purulent drainage was noted possible epidermoid. Area cleaned out multiple times, with specimens sent to permanent & frozen.

## 2022-07-25 NOTE — PROGRESS NOTE ADULT - PROBLEM SELECTOR PLAN 5
--Care established at [facility]; Has chronic leukocytosis, pt remains stable, afebrile, and non-toxic – no s/sx of infectious process.

## 2022-07-25 NOTE — PROGRESS NOTE ADULT - SUBJECTIVE AND OBJECTIVE BOX
INTERVAL EVENTS:    PAST MEDICAL & SURGICAL HISTORY:  Barretts esophagus    CRI (chronic renal insufficiency)    DM (diabetes mellitus)    GERD (gastroesophageal reflux disease)    HTN (hypertension)    Monoclonal gammopathy    Mandibular abscess    Paroxysmal atrial fibrillation    Benign prostatic hyperplasia, presence of lower urinary tract symptoms unspecified, unspecified morphology    Other hyperlipidemia    Hyperlipidemia    No significant past surgical history    S/P CABG (coronary artery bypass graft)  15 years    History of surgery  cyst removal    Mandibular abscess        MEDICATIONS  (STANDING):  sodium chloride 3%. 500 milliLiter(s) (50 mL/Hr) IV Continuous <Continuous>    MEDICATIONS  (PRN):    T(F): 96.8 (07-25-22 @ 05:20), Max: 97.3 (07-24-22 @ 13:01)  HR: 61 (07-25-22 @ 05:31) (61 - 71)  BP: 125/60 (07-25-22 @ 06:35) (125/59 - 139/65)  BP(mean): 87 (07-25-22 @ 05:31) (85 - 94)  ABP: --  ABP(mean): --  RR: 18 (07-25-22 @ 05:31) (18 - 20)  SpO2: 99% (07-25-22 @ 05:31) (96% - 99%)    I/O Detail 24H    24 Jul 2022 07:01  -  25 Jul 2022 07:00  --------------------------------------------------------  IN:    Oral Fluid: 355 mL    sodium chloride 3%: 100 mL  Total IN: 455 mL    OUT:    Voided (mL): 1650 mL  Total OUT: 1650 mL    Total NET: -1195 mL          PHYSICAL EXAM:  GEN: NAD  HEENT: EOMI   RESP: CTA b/l  CV: RRR. Normal S1/S2. No m/r/g.  ABD: soft, non-distended  EXT: No edema   NEURO: alert and attentive    LABS:  CBC 07-24-22 @ 05:45                        10.4   29.27 )-----------( 197                   34.0       Hgb trend: 10.4 <-- , 10.4 <--   WBC trend: 29.27 <-- , 26.38 <--       CMP 07-24-22 @ 18:25    127<L>  |  88<L>  |  37<H>  ----------------------------<  160<H>  4.1   |  28  |  1.41<H>    Ca    9.2      07-24-22 @ 18:25  Mg     1.9     07-24        Serum Cr trend: 1.41 <-- , 1.36 <-- , 1.56 <-- , 1.33 <--         Cardiac Markers           STUDIES:

## 2022-07-25 NOTE — PROGRESS NOTE ADULT - PROBLEM SELECTOR PLAN 4
--Chronic hypervolemic hyponatremia w/ Na in low 130s; Na 127 on admit, Na 129 today.    --Nephrology following.    - F/U 8pm BMP as well to check status of hyponatremia.  --PLAN: Yesterday 7/24/22, pt to receive hypertonic saline 3% 100cc IV x1 w/ afternoon Lasix dose; f/u BMP 4pm, and consider additional dose of hypertonic saline w/ evening dose if patient still hyponatremic (Na < 130); BID BMP/Mg.

## 2022-07-25 NOTE — PROGRESS NOTE ADULT - PROBLEM SELECTOR PLAN 2
--Hx of R pleural effusion; s/p thoracentesis 12/2021 (follows w/ Pulm Dr. Lora).   --Serial CXRs since admit showing improvement of effusion although still with chronic effusion   --Continue IV diuresis as above.

## 2022-07-25 NOTE — PROGRESS NOTE ADULT - PROBLEM SELECTOR PLAN 1
--On admit: SOB/LE edema, BNP 1457, Trop T 0.02 (unchanged from previous), repeat   --Transition from IV Lasix to now Torsemide PO 40mg BID (recently increased by Dr. Lora w/o adequate response).    --TTE (7/21/22): EF 65%, mild cLVH, severe LA dilation, mild MR/TR.    --I/O: -.5/12hr, -10L/total stay; goal –2.5L/day.    --Remains w/ decreased lung sounds R side; bibasilar rales, improving on exam

## 2022-07-25 NOTE — PROGRESS NOTE ADULT - ASSESSMENT
71 y/oM PMHx Afib (on Eliquis, s/p DCCV 5/2022), hx PE, HFpEF (EF 65%), CAD s/p CABG (2005), R renal transplant (on tacrolimus, follows w/ Dr Leyva), hx pHTN, jaw mass (s/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, now returned), JAYANT (has not started CPAP yet), CLL (w/ chronic leukocytosis), DM2, HTN, HLD, Rt pleural effusion (s/p thoracentesis 12/21, follow w/ Dr Lora) presented 7/20/22 w/SOB and worsening LE edema, initially admitted to medicine w/recurrent R pleural effusion and transferred to tele for acute on chronic HFpEF exacerbation, on IV diuresis with plan for optimization prior to elective right jaw mass removal on 7/25/22 w/ Dr Rose. HF, Renal, Pulm following.         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 Pt had drainage of right jaw cyst 7/25 with ENT no resection done. HF, Renal, Pulm following.

## 2022-07-25 NOTE — PROGRESS NOTE ADULT - PROBLEM SELECTOR PLAN 3
--R jaw cyst/mass; s/p resection ~2-3yrs ago (surgeon: Dr. Hugo Rose).    --Cyst returned recently; pt had drainage of cyst done in OR (w/ Dr. Rose), no resection at this time and drain placed.   --PLAN: Pt had MR Orbit/Face/Neck w/ and w/o con done prior to procedure

## 2022-07-25 NOTE — PROGRESS NOTE ADULT - SUBJECTIVE AND OBJECTIVE BOX
Interventional Cardiology PA Adult Progress Note    Subjective Assessment:  	  MEDICATIONS:  amLODIPine   Tablet 10 milliGRAM(s) Oral daily  isosorbide   mononitrate ER Tablet (IMDUR) 30 milliGRAM(s) Oral daily  tamsulosin 0.4 milliGRAM(s) Oral at bedtime  acetaminophen     Tablet .. 650 milliGRAM(s) Oral every 6 hours PRN  ALPRAZolam 0.25 milliGRAM(s) Oral every 8 hours PRN  melatonin 3 milliGRAM(s) Oral at bedtime PRN  bisacodyl 5 milliGRAM(s) Oral every 12 hours PRN  senna 2 Tablet(s) Oral at bedtime PRN  atorvastatin 40 milliGRAM(s) Oral at bedtime  insulin glargine Injectable (LANTUS) 30 Unit(s) SubCutaneous at bedtime  insulin lispro Injectable (ADMELOG) 5 Unit(s) SubCutaneous three times a day before meals  magnesium oxide 400 milliGRAM(s) Oral two times a day with meals  tacrolimus 1 milliGRAM(s) Oral at bedtime      PHYSICAL EXAM:  TELEMETRY:  T(C): 36.6 (07-25-22 @ 09:56), Max: 36.6 (07-25-22 @ 09:56)  HR: 64 (07-25-22 @ 10:56) (61 - 80)  BP: 127/58 (07-25-22 @ 10:56) (125/59 - 139/65)  RR: 22 (07-25-22 @ 10:56) (15 - 25)  SpO2: 96% (07-25-22 @ 10:56) (93% - 100%)  Wt(kg): --  I&O's Summary    24 Jul 2022 07:01  -  25 Jul 2022 07:00  --------------------------------------------------------  IN: 455 mL / OUT: 1650 mL / NET: -1195 mL    25 Jul 2022 07:01  -  25 Jul 2022 11:16  --------------------------------------------------------  IN: 50 mL / OUT: 0 mL / NET: 50 mL      General: A/ox 3, No acute Distress  Neck: Supple, NO JVD  Cardiac: Irregular RR.   Pulmonary: Mild bibasilar Rales. Breathing unlabored on 2L NC.   Abdomen: Soft, Non -tender, +BS x 4 quads  Extremities: No Rashes, +2 pitting edema BL LE   Neuro: A/o x 3, No focal deficits      LABS:	 	  CARDIAC MARKERS:                        10.4   29.27 )-----------( 197      ( 24 Jul 2022 05:45 )             34.0     127<L>  |  88<L>  |  37<H>  ----------------------------<  160<H>  4.1   |  28  |  1.41<H>    Ca    9.2      24 Jul 2022 18:25  Mg     1.9     07-24   INCOMPLETE    OVERNIGHT EVENTS:  No acute events overnight.    SUBJECTIVE / INTERVAL HPI: Patient seen and examined at bedside. Pt Denies CP, SOB, dizziness/lightheadedness, palpitations, notes improving SOB and with no concerns at this time.    12 point ROS otherwise negative    VITAL SIGNS:  Vital Signs Last 24 Hrs  T(C): 36.1 (25 Jul 2022 16:10), Max: 36.6 (25 Jul 2022 09:56)  T(F): 96.9 (25 Jul 2022 16:10), Max: 97.8 (25 Jul 2022 09:56)  HR: 71 (25 Jul 2022 16:34) (61 - 80)  BP: 137/64 (25 Jul 2022 16:34) (125/59 - 138/64)  BP(mean): 93 (25 Jul 2022 10:11) (85 - 93)  RR: 18 (25 Jul 2022 16:34) (15 - 25)  SpO2: 98% (25 Jul 2022 16:34) (93% - 100%)    Parameters below as of 25 Jul 2022 16:34  Patient On (Oxygen Delivery Method): nasal cannula  O2 Flow (L/min): 2        I&O's Summary    24 Jul 2022 07:01  -  25 Jul 2022 07:00  --------------------------------------------------------  IN: 455 mL / OUT: 1650 mL / NET: -1195 mL    25 Jul 2022 07:01  -  25 Jul 2022 18:13  --------------------------------------------------------  IN: 210 mL / OUT: 800 mL / NET: -590 mL          PHYSICAL EXAM:    General: sitting up in bed, NAD  HEENT: conjunctiva clear; MMM  Neck: supple, no JVD  Cardiovascular: RRR, no murmurs  Respiratory: CTA B/L  Gastrointestinal: soft, NT/ND, +BS  Extremities: WWP, no edema or cyanosis  Vascular: Peripheral pulses palpable 2+ bilaterally/ carotid 2+ b/l, no bruits; radial 2+ b/l; femoral 2+ b/l no bruits; DP/PT 2+ b/l  Neurological: AAOx3, no focal deficits    MEDICATIONS:  MEDICATIONS  (STANDING):  amLODIPine   Tablet 10 milliGRAM(s) Oral daily  atorvastatin 40 milliGRAM(s) Oral at bedtime  insulin glargine Injectable (LANTUS) 30 Unit(s) SubCutaneous at bedtime  insulin lispro Injectable (ADMELOG) 5 Unit(s) SubCutaneous three times a day before meals  isosorbide   mononitrate ER Tablet (IMDUR) 30 milliGRAM(s) Oral daily  magnesium oxide 400 milliGRAM(s) Oral two times a day with meals  tacrolimus 1 milliGRAM(s) Oral at bedtime  tamsulosin 0.4 milliGRAM(s) Oral at bedtime  torsemide 40 milliGRAM(s) Oral every 12 hours    MEDICATIONS  (PRN):  acetaminophen     Tablet .. 650 milliGRAM(s) Oral every 6 hours PRN Temp greater or equal to 38C (100.4F), Mild Pain (1 - 3)  ALPRAZolam 0.25 milliGRAM(s) Oral every 8 hours PRN anxiety  benzocaine 15 mG/menthol 3.6 mG Lozenge 1 Lozenge Oral every 3 hours PRN Sore Throat  bisacodyl 5 milliGRAM(s) Oral every 12 hours PRN Constipation  melatonin 3 milliGRAM(s) Oral at bedtime PRN Insomnia  senna 2 Tablet(s) Oral at bedtime PRN Constipation      LABS:                        10.0   26.86 )-----------( 174      ( 25 Jul 2022 15:01 )             33.2       07-25    129<L>  |  89<L>  |  35<H>  ----------------------------<  138<H>  4.4   |  27  |  1.29    Ca    9.0      25 Jul 2022 15:01  Phos  4.0     07-25  Mg     1.6     07-25                  TELEMETRY: Afib     RADIOLOGY & ADDITIONAL TESTS: Reviewed. CARDIOLOGY PROGRESS NOTE     OVERNIGHT EVENTS:  No acute events overnight.    SUBJECTIVE / INTERVAL HPI: Patient seen and examined at bedside. Pt Denies CP, SOB, dizziness/lightheadedness, palpitations, notes improving SOB and with no concerns at this time.    12 point ROS otherwise negative    VITAL SIGNS:  Vital Signs Last 24 Hrs  T(C): 36.1 (25 Jul 2022 16:10), Max: 36.6 (25 Jul 2022 09:56)  T(F): 96.9 (25 Jul 2022 16:10), Max: 97.8 (25 Jul 2022 09:56)  HR: 71 (25 Jul 2022 16:34) (61 - 80)  BP: 137/64 (25 Jul 2022 16:34) (125/59 - 138/64)  BP(mean): 93 (25 Jul 2022 10:11) (85 - 93)  RR: 18 (25 Jul 2022 16:34) (15 - 25)  SpO2: 98% (25 Jul 2022 16:34) (93% - 100%)    Parameters below as of 25 Jul 2022 16:34  Patient On (Oxygen Delivery Method): nasal cannula  O2 Flow (L/min): 2        I&O's Summary    24 Jul 2022 07:01  -  25 Jul 2022 07:00  --------------------------------------------------------  IN: 455 mL / OUT: 1650 mL / NET: -1195 mL    25 Jul 2022 07:01  -  25 Jul 2022 18:13  --------------------------------------------------------  IN: 210 mL / OUT: 800 mL / NET: -590 mL          PHYSICAL EXAM:    General: sitting up in bed, NAD  HEENT: conjunctiva clear; MMM  Neck: supple, no JVD  Cardiovascular: RRR, no murmurs  Respiratory: CTA B/L  Gastrointestinal: soft, NT/ND, +BS  Extremities: WWP, no edema or cyanosis  Vascular: Peripheral pulses palpable 2+ bilaterally/ carotid 2+ b/l, no bruits; radial 2+ b/l; femoral 2+ b/l no bruits; DP/PT 2+ b/l  Neurological: AAOx3, no focal deficits    MEDICATIONS:  MEDICATIONS  (STANDING):  amLODIPine   Tablet 10 milliGRAM(s) Oral daily  atorvastatin 40 milliGRAM(s) Oral at bedtime  insulin glargine Injectable (LANTUS) 30 Unit(s) SubCutaneous at bedtime  insulin lispro Injectable (ADMELOG) 5 Unit(s) SubCutaneous three times a day before meals  isosorbide   mononitrate ER Tablet (IMDUR) 30 milliGRAM(s) Oral daily  magnesium oxide 400 milliGRAM(s) Oral two times a day with meals  tacrolimus 1 milliGRAM(s) Oral at bedtime  tamsulosin 0.4 milliGRAM(s) Oral at bedtime  torsemide 40 milliGRAM(s) Oral every 12 hours    MEDICATIONS  (PRN):  acetaminophen     Tablet .. 650 milliGRAM(s) Oral every 6 hours PRN Temp greater or equal to 38C (100.4F), Mild Pain (1 - 3)  ALPRAZolam 0.25 milliGRAM(s) Oral every 8 hours PRN anxiety  benzocaine 15 mG/menthol 3.6 mG Lozenge 1 Lozenge Oral every 3 hours PRN Sore Throat  bisacodyl 5 milliGRAM(s) Oral every 12 hours PRN Constipation  melatonin 3 milliGRAM(s) Oral at bedtime PRN Insomnia  senna 2 Tablet(s) Oral at bedtime PRN Constipation      LABS:                        10.0   26.86 )-----------( 174      ( 25 Jul 2022 15:01 )             33.2       07-25    129<L>  |  89<L>  |  35<H>  ----------------------------<  138<H>  4.4   |  27  |  1.29    Ca    9.0      25 Jul 2022 15:01  Phos  4.0     07-25  Mg     1.6     07-25                  TELEMETRY: Afib     RADIOLOGY & ADDITIONAL TESTS: Reviewed.

## 2022-07-26 NOTE — PROGRESS NOTE ADULT - PROBLEM SELECTOR PLAN 4
Hx of renal transplant in 2017 at [place]. Pt on Tacrolimus and Valganciclovir at home.  -c/w Tacrolimus, 2mg in AM, 1mg in PM  -c/w valganciclovir 450mg PO qd   -Renal following -- f/u recs

## 2022-07-26 NOTE — CHART NOTE - NSCHARTNOTEFT_GEN_A_CORE
Admitting Diagnosis:   Patient is a 71y old  Male who presents with a chief complaint of Chronic Hypoxic Respiratory Failure, HFpEF (2022 11:02)      PAST MEDICAL & SURGICAL HISTORY:  Barretts esophagus      CRI (chronic renal insufficiency)      DM (diabetes mellitus)      GERD (gastroesophageal reflux disease)      HTN (hypertension)      Monoclonal gammopathy      Mandibular abscess      Paroxysmal atrial fibrillation      Benign prostatic hyperplasia, presence of lower urinary tract symptoms unspecified, unspecified morphology      Other hyperlipidemia      Hyperlipidemia      S/P CABG (coronary artery bypass graft)  15 years      History of surgery  cyst removal      Mandibular abscess          Current Nutrition Order:  Diet, DASH/TLC:  Sodium & Cholesterol Restricted  Consistent Carbohydrate {No Snacks} (CSTCHO)  Soft and Bite Sized (SOFTBTSZ)  1000mL Fluid Restriction ([id number])  Kosher (22 @ 14:32)    PO Intake: Good (%) [   ]  Fair (50-75%) [   ] Poor (<25%) [  X]    GI Issues:   +BM , senna and dulcolax ordered     Pain:  +R cheek Pain     Skin Integrity:  Isac 20, No pressure ulcers- SX site noted   1+Edema (right leg; left leg; left ankle; right ankle; left foot; right foot)     Labs:       127<L>  |  89<L>  |  35<H>  ----------------------------<  137<H>  4.5   |  27  |  1.32<H>    Ca    8.9      2022 05:30  Phos  4.2     -  Mg     1.7     -      CAPILLARY BLOOD GLUCOSE      POCT Blood Glucose.: 303 mg/dL (2022 11:09)      Medications:  MEDICATIONS  (STANDING):  amLODIPine   Tablet 10 milliGRAM(s) Oral daily  atorvastatin 40 milliGRAM(s) Oral at bedtime  insulin glargine Injectable (LANTUS) 30 Unit(s) SubCutaneous at bedtime  insulin lispro (ADMELOG) corrective regimen sliding scale   SubCutaneous Before meals and at bedtime  insulin lispro Injectable (ADMELOG) 5 Unit(s) SubCutaneous three times a day before meals  isosorbide   mononitrate ER Tablet (IMDUR) 30 milliGRAM(s) Oral daily  magnesium oxide 400 milliGRAM(s) Oral three times a day with meals  tacrolimus 2 milliGRAM(s) Oral daily  tacrolimus 1 milliGRAM(s) Oral at bedtime  tamsulosin 0.4 milliGRAM(s) Oral at bedtime  torsemide 40 milliGRAM(s) Oral every 12 hours    MEDICATIONS  (PRN):  acetaminophen     Tablet .. 650 milliGRAM(s) Oral every 6 hours PRN Temp greater or equal to 38C (100.4F), Mild Pain (1 - 3)  ALPRAZolam 0.25 milliGRAM(s) Oral every 8 hours PRN anxiety  benzocaine 15 mG/menthol 3.6 mG Lozenge 1 Lozenge Oral every 3 hours PRN Sore Throat  bisacodyl 5 milliGRAM(s) Oral every 12 hours PRN Constipation  melatonin 3 milliGRAM(s) Oral at bedtime PRN Insomnia  senna 2 Tablet(s) Oral at bedtime PRN Constipation  sodium chloride 0.65% Nasal 1 Spray(s) Both Nostrils five times a day PRN Nasal Congestion      5'6''  pounds +-10%  Wt 218 pounds, BMI 35.4, %LIO=853    Weight Change:    217.1 pounds   218 pounds  ** Wts varying, assume in setting of CHF/edema shifts     Estimated energy needs:   IBW used to calculate energy needs due to pt's current body weight exceeding 120% of IBW  Adjust for age, Renal/CHF, Risk for malnutrition, OR; fluids per team   25-30kcal/k-1950kcal/day   1.0-1.2gm/k-78gm prot/day   * Rec upper end EER    Subjective: 71 y/o M PMH of afib (on Eliquis), HFpEF, CAD s/p CABG, R renal transplant (on tacrolimus), DM2, HTN, HLD, CLL, Rt pleural effusion (s/p thoracentesis ), p/w SORIA, orthopnea, and SOB for several months which intermittently worsen then improve. Pt was found to have a recurrent rt sided pleural effusion as an outpatient and was started on torsemide. Pt being admitted for pre-op optimization and diuresis, in preparation for R jaw cyst removal next week . Pt transferred to [facility] for acute on chronic HFpEF exacerbation. Since, Right masseter space opened where significant amounts of purulent drainage was noted possible epidermoid, Area cleaned out multiple times, with specimens sent to permanent & frozen . No resection at this time and drain placed, per IDR plan to remove pending .     Pt seen on 5. Pt seemed to be more engaged in RD visit toady comapred to prior visit. Breakfast at bedisde, consumed ~25% of meal (ceral, apple sauce and yougrt). Pt cont to c/o of issues chewing d/t Jaw however reports pain/swelling has been improving since Sx/admit. Pt does not usually take ONS PTA, seemed more agreeale to trying today.   Please see below for nutritions recommendations.    Prior PES: Inadequate Oral Intake RT decreased ability for meals d/t issues chewing AEB decreased PO x2-3weeks PTA  >> on going at this time  Goal: Pt will meet at least 75% of nutrient needs with good tolerance    Recommendations:  1. Cont with diet.  2. Add oral nutrition supplements, Glucerna x/1day 220kcal/10gm prot per 1 can.  3. Monitor %PO intake, Diet tolerance.   >> Should pt be noted with s/s of issues swallowing, recommend NPO/SLP.  4. Labs: monitor BMP, CBC, glucose, lytes, trend renal indices, POCT.  5. Monitor Skin, Wts, Pain, GI.  6. RD to remain available for additional nutrition interventions as needed.     Education: Encouraged PO intake during meals & reviewed importance. Spoke about soft prot foods, oral nutrition supplements. Reviewed fluid restriction. Understanding stated, provide additional motivation as needed.     Risk Level: High [ X  ] Moderate [   ] Low [   ]

## 2022-07-26 NOTE — PROGRESS NOTE ADULT - PROBLEM SELECTOR PLAN 4
--Chronic hypervolemic hyponatremia w/ Na in low 130s; Na 127 on admit 129 chronic hyponatremic pt asymptomatic    --Nephrology following recommended c/w diuresis Torsemide 40mg BID. Pt creatine downtrending, LATRICIA most likely due to CRS --Chronic hypervolemic hyponatremia w/ Na in low 130s; Na 127 on admit, stable now 127-129, chronic hyponatremic pt remains asymptomatic    --Nephrology following recommended c/w diuresis Torsemide 40mg BID. Pt creatine downtrending, LATRICIA most likely due to CRS

## 2022-07-26 NOTE — PROGRESS NOTE ADULT - PROBLEM SELECTOR PLAN 5
--Care established at [facility]; Has chronic leukocytosis, pt remains stable, afebrile, and non-toxic – no s/sx of infectious process.

## 2022-07-26 NOTE — PROGRESS NOTE ADULT - PROBLEM SELECTOR PLAN 1
--On admit: SOB/LE edema, BNP 1457, Trop T 0.02 (unchanged from previous), repeat neg  --Transition from IV Lasix to now Torsemide PO 40mg BID (recently increased by Dr. Lora w/o adequate response).    --TTE (7/21/22): EF 65%, mild cLVH, severe LA dilation, mild MR/TR.    --I/O: -.5/12hr, -12L/total stay; goal –2.5L/day.    --Improving on exam some decreased lung sounds R side of chronic effusion; pt with LE edema improved --On admit: SOB/LE edema, BNP 1457, Trop T 0.02 (unchanged from previous), repeat neg  --Transition from IV Lasix to now Torsemide PO 40mg BID (recently increased by Dr. Lora w/o adequate response).    --TTE (7/21/22): EF 65%, mild cLVH, severe LA dilation, mild MR/TR.    --I/O: -1.6 24hrs, -12L/total stay; goal –2.5L/day.    --Improving on exam some decreased lung sounds R side of chronic effusion; pt with LE edema improved

## 2022-07-26 NOTE — PROGRESS NOTE ADULT - ASSESSMENT
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 Pt had drainage of right jaw cyst 7/25 with ENT no resection done, penrose in place.       PLAN:   - plan to start Unasyn today, d/c on Augmentin   - Stay today for abx, plan to d/c penrose tomorrow  -After Penrose is d/c (7/27) Plan to resume eliquis   - Follow up in [place] Monday   - Discussed with Dr. Garzon who agrees     Disposition: Home 7/27   [facility] at [phone] with any questions/concerns.    Blanche Cadet PA-C  07-26-22 @ 08:20

## 2022-07-26 NOTE — PROGRESS NOTE ADULT - SUBJECTIVE AND OBJECTIVE BOX
Interval Events: Reviewed  Patient seen and examined at bedside.  he is better  Patient is a 71y old  Male who presents with a chief complaint of Chronic Hypoxic Respiratory Failure, HFpEF (26 Jul 2022 13:17)      PAST MEDICAL & SURGICAL HISTORY:  Barretts esophagus      CRI (chronic renal insufficiency)      DM (diabetes mellitus)      GERD (gastroesophageal reflux disease)      HTN (hypertension)      Monoclonal gammopathy      Mandibular abscess      Paroxysmal atrial fibrillation      Benign prostatic hyperplasia, presence of lower urinary tract symptoms unspecified, unspecified morphology      Other hyperlipidemia      Hyperlipidemia      S/P CABG (coronary artery bypass graft)  15 years      History of surgery  cyst removal      Mandibular abscess          MEDICATIONS:  Pulmonary:    Antimicrobials:  ampicillin/sulbactam  IVPB 3 Gram(s) IV Intermittent every 6 hours  ampicillin/sulbactam  IVPB        Anticoagulants:    Cardiac:  amLODIPine   Tablet 10 milliGRAM(s) Oral daily  isosorbide   mononitrate ER Tablet (IMDUR) 30 milliGRAM(s) Oral daily  tamsulosin 0.4 milliGRAM(s) Oral at bedtime  torsemide 40 milliGRAM(s) Oral every 12 hours      Allergies    No Known Allergies    Intolerances        Vital Signs Last 24 Hrs  T(C): 36 (26 Jul 2022 21:33), Max: 36.5 (26 Jul 2022 05:12)  T(F): 96.8 (26 Jul 2022 21:33), Max: 97.7 (26 Jul 2022 05:12)  HR: 63 (26 Jul 2022 20:23) (63 - 98)  BP: 123/59 (26 Jul 2022 20:23) (123/59 - 138/67)  BP(mean): 85 (26 Jul 2022 20:23) (85 - 87)  RR: 18 (26 Jul 2022 20:23) (17 - 18)  SpO2: 92% (26 Jul 2022 16:07) (92% - 99%)    Parameters below as of 26 Jul 2022 20:23  Patient On (Oxygen Delivery Method): room air        07-25 @ 07:01  -  07-26 @ 07:00  --------------------------------------------------------  IN: 550 mL / OUT: 2225 mL / NET: -1675 mL    07-26 @ 07:01 - 07-26 @ 21:44  --------------------------------------------------------  IN: 540 mL / OUT: 700 mL / NET: -160 mL          Review of Systems:   •	General: negative  •	Skin/Breast: negative  •	Ophthalmologic: negative  •	ENMT: negative  •	Respiratory and Thorax: negative  •	Cardiovascular: negative  •	Gastrointestinal: negative  •	Genitourinary: negative  •	Musculoskeletal: negative  •	Neurological: negative  •	Psychiatric: negative  •	Hematology/Lymphatics: negative  •	Endocrine: negative  •	Allergic/Immunologic: negative    Physical Exam:   • Constitutional:	refer to the dietion /Nutritionist note  • Eyes:	EOMI; PERRL; no drainage or redness  • ENMT:	No oral lesions; no gross abnormalities  • Neck	No bruits; no thyromegaly or nodules  • Breasts:	not examined  • Back:	No deformity or limitation of movement  • Respiratory:	Breath Sounds equal & clear to percussion & auscultation, no accessory muscle use  • Cardiovascular:	Regular rate & rhythm, normal S1, S2; no murmurs, gallops or rubs; no S3, S4  • Gastrointestinal:	Soft, non-tender, no hepatosplenomegaly, normal bowel sounds  • Genitourinary:	not examined  • Rectal: not examined  • Extremities:	No cyanosis, clubbing or edema  • Vascular:	Equal and normal pulses (carotid, femoral, dorsalis pedis)  • Neurologica:l	not examined  • Skin:	No lesions; no rash  • Lymph Nodes:	No lymphadedenopathy  • Musculoskeletal:	No joint pain, swelling or deformity; no limitation of movement        LABS:      CBC Full  -  ( 26 Jul 2022 05:30 )  WBC Count : 28.32 K/uL  RBC Count : 3.93 M/uL  Hemoglobin : 10.4 g/dL  Hematocrit : 34.1 %  Platelet Count - Automated : 188 K/uL  Mean Cell Volume : 86.8 fl  Mean Cell Hemoglobin : 26.5 pg  Mean Cell Hemoglobin Concentration : 30.5 gm/dL  Auto Neutrophil # : x  Auto Lymphocyte # : x  Auto Monocyte # : x  Auto Eosinophil # : x  Auto Basophil # : x  Auto Neutrophil % : x  Auto Lymphocyte % : x  Auto Monocyte % : x  Auto Eosinophil % : x  Auto Basophil % : x    07-26    127<L>  |  89<L>  |  35<H>  ----------------------------<  137<H>  4.5   |  27  |  1.32<H>    Ca    8.9      26 Jul 2022 05:30  Phos  4.2     07-26  Mg     1.7     07-26                      Culture Results:   Culture in progress (07-25 @ 09:05)  Culture Results:   Rare Streptococcus anginosus  Susceptibility to follow. (07-25 @ 09:05)  Culture Results:   Rare Streptococcus viridans group  Identification and susceptibility to follow. (07-25 @ 09:05)  Culture Results:   Culture in progress (07-25 @ 09:05)      RADIOLOGY & ADDITIONAL STUDIES (The following images were personally reviewed):

## 2022-07-26 NOTE — PROGRESS NOTE ADULT - ASSESSMENT
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 Pt had drainage of right jaw cyst 7/25 with ENT no resection done. HF, Renal, Pulm following. Pt cleared to be DC home tomr after drain removed

## 2022-07-26 NOTE — PROGRESS NOTE ADULT - PROBLEM SELECTOR PLAN 6
Pt with hx of CLL. Follows with oncology at [facility]. CT neck soft tissue from 11/10 showing widespread lymphadenopathy in neck and chest most consistent with lymphoproliferative disorder, query relapse of CLL and new compared to PET-CT from 5 years prior. Pt underwent Lymph node biopsy on 11/12. Patient now noted to have R sided supraclavicular lymph node. Pt with chronic leukocytosis 2/2 CLL. WBC 17.48.   -consider heme/onc consult for further evaluation   -continue to monitor

## 2022-07-26 NOTE — PROGRESS NOTE ADULT - NS ATTEND AMEND GEN_ALL_CORE FT
Initial attending contact date 7/21/22     . See PA note written above for details. I reviewed the PA documentation. I have personally seen and examined this patient. I reviewed vitals, labs, medications, cardiac studies, and additional imaging. I agree with the above PA's findings and plans as written above with the following additions/statements.    72 y/o M w/ PMH of afib (on Eliquis, DCCV 5/2022), hx PE, HFpEF, CAD s/p CABG (2005), R renal transplant (on tacrolimus, follows w/ Dr Leyva), hx pHTN, jaw mass (s/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, now returned), JAYANT (has not started CPAP yet), CLL (w/ chronic leukocytosis), DM2, HTN, HLD, Rt pleural effusion (s/p thoracentesis 12/21, follow w/ Dr Lora) admitted with acute on chronic diastolic HF with plan for R jaw mass removal 7/25   -ECHO EF 65, mild MR, TR  - - 12 L with IV diuresis since admission  - Close to euvolemic on exam, CXR with chronic small R pleural effusion , overall improved  -Transitioned to home torsemide 40 mg po bid   -s/p purulent drainage from jaw this am, no resection performed   -CAD status stable - ? not on ASA, cont statin   -BP controlled, cont amlodipine and imdur  -chronic A fib: rate controlled. Resume eliquis when cleared by ENT team  -Plan for DC home 7/27 when drain removed by ENT
Initial attending contact date 7/21/22     . See PA note written above for details. I reviewed the PA documentation. I have personally seen and examined this patient. I reviewed vitals, labs, medications, cardiac studies, and additional imaging. I agree with the above PA's findings and plans as written above with the following additions/statements.    70 y/o M w/ PMH of afib (on Eliquis, DCCV 5/2022), hx PE, HFpEF, CAD s/p CABG (2005), R renal transplant (on tacrolimus, follows w/ Dr Leyva), hx pHTN, jaw mass (s/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, now returned), JAYANT (has not started CPAP yet), CLL (w/ chronic leukocytosis), DM2, HTN, HLD, Rt pleural effusion (s/p thoracentesis 12/21, follow w/ Dr Lora) admitted with acute on chronic diastolic HF with plan for R jaw mass removal 7/25   -ECHO EF 65, mild MR, TR  - - 10 L with IV diuresis since admission  - Close to euvolemic on exam, CXR with chronic small R pleural effusion , overall improved  -Transition to home torsemide 40 mg po bid   -s/p purulent drainage from jaw this am, no resection performed   -CAD status stable - ? not on ASA, cont statin   -BP controlled, cont amlodipine and imdur  -chronic A fib: rate controlled. Resume eliquis when cleared by ENT team  -Plan for DC home 7/26
Initial attending contact date 7/21/22     . See PA note written above for details. I reviewed the PA documentation. I have personally seen and examined this patient. I reviewed vitals, labs, medications, cardiac studies, and additional imaging. I agree with the above PA's findings and plans as written above with the following additions/statements.    72 y/o M w/ PMH of afib (on Eliquis, DCCV 5/2022), hx PE, HFpEF, CAD s/p CABG (2005), R renal transplant (on tacrolimus, follows w/ Dr Leyva), hx pHTN, jaw mass (s/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, now returned), JAYANT (has not started CPAP yet), CLL (w/ chronic leukocytosis), DM2, HTN, HLD, Rt pleural effusion (s/p thoracentesis 12/21, follow w/ Dr Lora) admitted with acute on chronic diastolic HF with plan for R jaw mass removal 7/25   -ECHO EF 65, mild MR, TR  - Still with bibasilar rales and LE edema  - -5.3 L last 24 hours   -Cont lasix 80m g IV bid   -CHF measures with strict Is and Os  -CAD status stable - ? not on ASA, cont statin   -BP controlled, cont amlodipine and imdur  -chronic A fib: rate controlled. Last dose eliquis tonight. No need for bridging  -Will likely be optimized for OR monday   -Hyponatremia secondary to active CHF, expect improvement with diuresis.
Initial attending contact date 7/21/22     . See PA note written above for details. I reviewed the PA documentation. I have personally seen and examined this patient. I reviewed vitals, labs, medications, cardiac studies, and additional imaging. I agree with the above PA's findings and plans as written above with the following additions/statements.    70 y/o M w/ PMH of afib (on Eliquis, DCCV 5/2022), hx PE, HFpEF, CAD s/p CABG (2005), R renal transplant (on tacrolimus, follows w/ Dr Leyva), hx pHTN, jaw mass (s/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, now returned), JAYANT (has not started CPAP yet), CLL (w/ chronic leukocytosis), DM2, HTN, HLD, Rt pleural effusion (s/p thoracentesis 12/21, follow w/ Dr Lora) admitted with acute on chronic diastolic HF with plan for R jaw mass removal 7/25   -Fluid overloaded on exam  - responding well to IV diuresis, -3.1 last 12 hours   -Cont lasix 80m g IV bid   -CHF measures with strict Is and Os  -ECHO pending  -CAD status stable - ? not on ASA, cont statin   -BP controlled, cont amlodipine and imdur  -chronic A fib: rate controlled, currently on eliquis. To discuss with ENT if eliquis needs to be held prior to OR  -Hyponatremia secondary to active CHF, expect improvement with diuresis

## 2022-07-26 NOTE — PROGRESS NOTE ADULT - SUBJECTIVE AND OBJECTIVE BOX
OTOLARYNGOLOGY (ENT) PROGRESS NOTE    PATIENT: HERB BROWN  MRN: [id number]  : 52  [id number]  DATE OF SERVICE:  22  			         ID:HERB BROWN is a  71yMalePOD 1 s/p drainage of right jaw cyst .     Subjective/ Interval:   : Patient examined bedside, penrose in place, no overnight events, plan to  start Eliquis tomorrow after d/c penrose    ALLERGIES:  No Known Allergies      MEDICATIONS:  Antiinfectives:     IV fluids:  magnesium oxide 400 milliGRAM(s) Oral two times a day with meals    Hematologic/Anticoagulation:    Pain medications/Neuro:  acetaminophen     Tablet .. 650 milliGRAM(s) Oral every 6 hours PRN  ALPRAZolam 0.25 milliGRAM(s) Oral every 8 hours PRN  melatonin 3 milliGRAM(s) Oral at bedtime PRN    Endocrine Medications:   atorvastatin 40 milliGRAM(s) Oral at bedtime  insulin glargine Injectable (LANTUS) 30 Unit(s) SubCutaneous at bedtime  insulin lispro (ADMELOG) corrective regimen sliding scale   SubCutaneous Before meals and at bedtime  insulin lispro Injectable (ADMELOG) 5 Unit(s) SubCutaneous three times a day before meals    All other standing medications:   amLODIPine   Tablet 10 milliGRAM(s) Oral daily  isosorbide   mononitrate ER Tablet (IMDUR) 30 milliGRAM(s) Oral daily  tacrolimus 2 milliGRAM(s) Oral daily  tacrolimus 1 milliGRAM(s) Oral at bedtime  tamsulosin 0.4 milliGRAM(s) Oral at bedtime  torsemide 40 milliGRAM(s) Oral every 12 hours    All other PRN medications:  benzocaine 15 mG/menthol 3.6 mG Lozenge 1 Lozenge Oral every 3 hours PRN  bisacodyl 5 milliGRAM(s) Oral every 12 hours PRN  senna 2 Tablet(s) Oral at bedtime PRN  sodium chloride 0.65% Nasal 1 Spray(s) Both Nostrils five times a day PRN    Vital Signs Last 24 Hrs  T(C): 36.5 (2022 05:12), Max: 36.6 (2022 09:56)  T(F): 97.7 (2022 05:12), Max: 97.8 (2022 09:56)  HR: 68 (2022 05:25) (56 - 80)  BP: 132/60 (2022 05:25) (127/58 - 138/64)  BP(mean): 87 (2022 05:25) (87 - 93)  RR: 17 (2022 05:25) (15 - 25)  SpO2: 98% (2022 05:25) (93% - 100%)    Parameters below as of 2022 05:25  Patient On (Oxygen Delivery Method): nasal cannula w/ humidification  O2 Flow (L/min): 2         @ 07:01  -   @ 07:00  --------------------------------------------------------  IN:    Oral Fluid: 550 mL  Total IN: 550 mL    OUT:    Voided (mL): 2225 mL  Total OUT: 2225 mL    Total NET: -1675 mL        PHYSICAL EXAM:  GEN: appears stated age, NAD  NEURO: alert & oriented x 4/  HEENT: intraoral penrose in place  Pulm: normal respiratory excursions, not tachypneic, no labored breathing  Abd: non-distended  Ext: moving all four extremities, no peripheral edema noted       LABS                       10.4   28.32 )-----------( 188      ( 2022 05:30 )             34.1        127<L>  |  89<L>  |  35<H>  ----------------------------<  137<H>  4.5   |  27  |  1.32<H>    Ca    8.9      2022 05:30  Phos  4.2       Mg     1.7              Endocrine Panel-  Calcium, Total Serum: 8.9 mg/dL ( @ 05:30)  Calcium, Total Serum: 9.0 mg/dL ( @ 20:38)  Calcium, Total Serum: 9.0 mg/dL ( @ 15:01)        MICROBIOLOGY:  Culture - Tissue with Gram Stain (collected 22 @ 09:05)  Source: .Tissue 4. Right [place] Tissue Culture  Gram Stain (22 @ 16:37):    No organisms seen    Few WBC's    Culture - Surgical Swab (collected 22 @ 09:05)  Source: .Surgical Swab 3. Right [place] #3  Gram Stain (22 @ 16:34):    No organisms seen    Few WBC's    Culture - Surgical Swab (collected 22 @ 09:05)  Source: .Surgical Swab 2.  Right [place] #2  Gram Stain (22 @ 16:39):    No organisms seen    Few WBC's    Culture - Surgical Swab (collected 22 @ 09:05)  Source: .Surgical Swab 1. Right [place] #1  Gram Stain (22 @ 16:39):    No organisms seen    Few WBC's          Culture - Tissue with Gram Stain (collected 22 @ 09:05)  Source: .Tissue 4. Right [place] [facility] Tissue Culture  Gram Stain (22 @ 16:37):    No organisms seen    Few WBC's    Culture - Surgical Swab (collected 22 @ 09:05)  Source: .Surgical Swab 3. Right [place] #3  Gram Stain (22 @ 16:34):    No organisms seen    Few WBC's    Culture - Surgical Swab (collected 22 @ 09:05)  Source: .Surgical Swab 2.  Right [place] #2  Gram Stain (22 @ 16:39):    No organisms seen    Few WBC's    Culture - Surgical Swab (collected 22 @ 09:05)  Source: .Surgical Swab 1. Right [place] #1  Gram Stain (22 @ 16:39):    No organisms seen    Few WBC's

## 2022-07-26 NOTE — PROGRESS NOTE ADULT - ASSESSMENT
72 yo male PMHx ESRD s/p transplant 2017, AF (Eliquis), hiatal hernia, CAD s/p CABG (2005), Obesity, DM2  HTN, HLD, BPH, CLL, MGUS p/w volume overload sent by PCP for optimization for right jaw cyst . Renal consulted for help with diuresis, transplant      LATRICIA on CKD, ESRD s/p renal transplant; baseline CKD 2  Elevated Cr.  Creatinine at  1.32, baseline Cr. at 1.1, given that Cr. down trending with aggrieve diuresis gives idea of CRS and would continue with further diuresis for now   Urine out put  2.2L in 24 hours   Weight 98.5KG standing     Recommend  Fluid Restriction- 1L    Daily weights   Strict In and out  Start patient on 40mg PO Torsemide BID   Keep mag >2 and Potassium >4   Monitor Urine out put   Continue and encourage to use CPAP     ESRD s/p Right Renal transplant   C/w tacrolimus 2mg AM and 1mg PM      Chronic Hypervolemic Hyponatremia (stable )   Continue with diuresis as above, 1 L fluid restriction

## 2022-07-26 NOTE — PROGRESS NOTE ADULT - SUBJECTIVE AND OBJECTIVE BOX
Patient is a 71y Male seen and evaluated at bedside. No acute distress, does not offer any complaints. Resting comfortably in bed. VSS, Kidney function at baseline.       Meds:    acetaminophen     Tablet .. 650 every 6 hours PRN  ALPRAZolam 0.25 every 8 hours PRN  amLODIPine   Tablet 10 daily  atorvastatin 40 at bedtime  benzocaine 15 mG/menthol 3.6 mG Lozenge 1 every 3 hours PRN  bisacodyl 5 every 12 hours PRN  insulin glargine Injectable (LANTUS) 30 at bedtime  insulin lispro (ADMELOG) corrective regimen sliding scale  Before meals and at bedtime  insulin lispro Injectable (ADMELOG) 5 three times a day before meals  isosorbide   mononitrate ER Tablet (IMDUR) 30 daily  magnesium oxide 400 three times a day with meals  melatonin 3 at bedtime PRN  senna 2 at bedtime PRN  sodium chloride 0.65% Nasal 1 five times a day PRN  tacrolimus 2 daily  tacrolimus 1 at bedtime  tamsulosin 0.4 at bedtime  torsemide 40 every 12 hours      T(C): , Max: 36.5 (07-26-22 @ 05:12)  T(F): , Max: 97.7 (07-26-22 @ 05:12)  HR: 70 (07-26-22 @ 10:28)  BP: 126/58 (07-26-22 @ 10:28)  BP(mean): 87 (07-26-22 @ 05:25)  RR: 18 (07-26-22 @ 08:10)  SpO2: 99% (07-26-22 @ 08:10)  Wt(kg): --    07-25 @ 07:01  -  07-26 @ 07:00  --------------------------------------------------------  IN: 550 mL / OUT: 2225 mL / NET: -1675 mL    07-26 @ 07:01  -  07-26 @ 11:02  --------------------------------------------------------  IN: 180 mL / OUT: 175 mL / NET: 5 mL          Review of Systems:  ROS negative except as per HPI      PHYSICAL EXAM:  GENERAL: No acute distress sitting comfortably on chair, moist mucus membranes   HEENT: FADY, EOMI, neck supple, no JVP  CHEST/LUNG: decreased breath sounds  HEART: irregular rate and rhythm, no murmur, no gallops, no rub   ABDOMEN: Soft, nontender, non distended, renal transplant, no thrill, not tender or warm to touch   : No flank or supra pubic tenderness.  EXTREMITIES: 2+ pitting edema b/l LE, slightly improved  Neurology: Awake and alert , no focal neurological deficit, follows commands   SKIN: No rash or skin lesion, euvolemic, good skin turgor     LABS:                        10.4   28.32 )-----------( 188      ( 26 Jul 2022 05:30 )             34.1     07-26    127<L>  |  89<L>  |  35<H>  ----------------------------<  137<H>  4.5   |  27  |  1.32<H>    Ca    8.9      26 Jul 2022 05:30  Phos  4.2     07-26  Mg     1.7     07-26                  RADIOLOGY & ADDITIONAL STUDIES:

## 2022-07-26 NOTE — PROGRESS NOTE ADULT - SUBJECTIVE AND OBJECTIVE BOX
INCOMPLETE    OVERNIGHT EVENTS:  No acute events overnight.    SUBJECTIVE / INTERVAL HPI: Patient seen and examined at bedside.    Denies CP, SOB, dizziness/lightheadedness, palpitations    12 point ROS otherwise negative    VITAL SIGNS:  Vital Signs Last 24 Hrs  T(C): 36.3 (26 Jul 2022 08:50), Max: 36.5 (26 Jul 2022 05:12)  T(F): 97.3 (26 Jul 2022 08:50), Max: 97.7 (26 Jul 2022 05:12)  HR: 87 (26 Jul 2022 11:28) (56 - 90)  BP: 132/59 (26 Jul 2022 11:28) (124/58 - 138/67)  BP(mean): 87 (26 Jul 2022 05:25) (87 - 87)  RR: 18 (26 Jul 2022 08:10) (16 - 18)  SpO2: 99% (26 Jul 2022 08:10) (97% - 99%)    Parameters below as of 26 Jul 2022 08:10  Patient On (Oxygen Delivery Method): nasal cannula  O2 Flow (L/min): 2        I&O's Summary    25 Jul 2022 07:01  -  26 Jul 2022 07:00  --------------------------------------------------------  IN: 550 mL / OUT: 2225 mL / NET: -1675 mL    26 Jul 2022 07:01  -  26 Jul 2022 13:17  --------------------------------------------------------  IN: 180 mL / OUT: 400 mL / NET: -220 mL          PHYSICAL EXAM:    General: sitting up in bed, NAD  HEENT: conjunctiva clear; MMM  Neck: supple, no JVD  Cardiovascular: RRR, no murmurs  Respiratory: CTA B/L  Gastrointestinal: soft, NT/ND, +BS  Extremities: WWP, no edema or cyanosis  Vascular: Peripheral pulses palpable 2+ bilaterally/ carotid 2+ b/l, no bruits; radial 2+ b/l; femoral 2+ b/l no bruits; DP/PT 2+ b/l  Neurological: AAOx3, no focal deficits    MEDICATIONS:  MEDICATIONS  (STANDING):  amLODIPine   Tablet 10 milliGRAM(s) Oral daily  ampicillin/sulbactam  IVPB 3 Gram(s) IV Intermittent every 6 hours  ampicillin/sulbactam  IVPB      atorvastatin 40 milliGRAM(s) Oral at bedtime  insulin glargine Injectable (LANTUS) 30 Unit(s) SubCutaneous at bedtime  insulin lispro (ADMELOG) corrective regimen sliding scale   SubCutaneous Before meals and at bedtime  insulin lispro Injectable (ADMELOG) 5 Unit(s) SubCutaneous three times a day before meals  isosorbide   mononitrate ER Tablet (IMDUR) 30 milliGRAM(s) Oral daily  magnesium oxide 400 milliGRAM(s) Oral three times a day with meals  tacrolimus 2 milliGRAM(s) Oral daily  tacrolimus 1 milliGRAM(s) Oral at bedtime  tamsulosin 0.4 milliGRAM(s) Oral at bedtime  torsemide 40 milliGRAM(s) Oral every 12 hours    MEDICATIONS  (PRN):  acetaminophen     Tablet .. 650 milliGRAM(s) Oral every 6 hours PRN Temp greater or equal to 38C (100.4F), Mild Pain (1 - 3)  ALPRAZolam 0.25 milliGRAM(s) Oral every 8 hours PRN anxiety  benzocaine 15 mG/menthol 3.6 mG Lozenge 1 Lozenge Oral every 3 hours PRN Sore Throat  bisacodyl 5 milliGRAM(s) Oral every 12 hours PRN Constipation  melatonin 3 milliGRAM(s) Oral at bedtime PRN Insomnia  senna 2 Tablet(s) Oral at bedtime PRN Constipation  sodium chloride 0.65% Nasal 1 Spray(s) Both Nostrils five times a day PRN Nasal Congestion      LABS:                        10.4   28.32 )-----------( 188      ( 26 Jul 2022 05:30 )             34.1       07-26    127<L>  |  89<L>  |  35<H>  ----------------------------<  137<H>  4.5   |  27  |  1.32<H>    Ca    8.9      26 Jul 2022 05:30  Phos  4.2     07-26  Mg     1.7     07-26                  TELEMETRY:    RADIOLOGY & ADDITIONAL TESTS: Reviewed. OVERNIGHT EVENTS:  No acute events overnight.  HPI: Patient seen and examined at bedside. Denies CP, SOB, dizziness/lightheadedness, palpitations. Pt appears well with no issues and notes he feels his swelling and SOB has improved since his admission.    12 point ROS otherwise negative    VITAL SIGNS:  Vital Signs Last 24 Hrs  T(C): 36.3 (26 Jul 2022 08:50), Max: 36.5 (26 Jul 2022 05:12)  T(F): 97.3 (26 Jul 2022 08:50), Max: 97.7 (26 Jul 2022 05:12)  HR: 87 (26 Jul 2022 11:28) (56 - 90)  BP: 132/59 (26 Jul 2022 11:28) (124/58 - 138/67)  BP(mean): 87 (26 Jul 2022 05:25) (87 - 87)  RR: 18 (26 Jul 2022 08:10) (16 - 18)  SpO2: 99% (26 Jul 2022 08:10) (97% - 99%)    Parameters below as of 26 Jul 2022 08:10  Patient On (Oxygen Delivery Method): nasal cannula  O2 Flow (L/min): 2        I&O's Summary    25 Jul 2022 07:01  -  26 Jul 2022 07:00  --------------------------------------------------------  IN: 550 mL / OUT: 2225 mL / NET: -1675 mL    26 Jul 2022 07:01  -  26 Jul 2022 13:17  --------------------------------------------------------  IN: 180 mL / OUT: 400 mL / NET: -220 mL          PHYSICAL EXAM:    General: sitting up in bed, NAD  HEENT: conjunctiva clear; MMM  Neck: supple, no JVD  Cardiovascular: Irregular, no murmurs  Respiratory: CTA B/L  Gastrointestinal: soft, NT/ND, +BS  Extremities: WWP, no edema or cyanosis  Vascular: Peripheral pulses palpable 2+ bilaterally/ carotid 2+ b/l, no bruits; radial 2+ b/l; femoral 2+ b/l no bruits; DP/PT 2+ b/l  Neurological: AAOx3, no focal deficits    MEDICATIONS:  MEDICATIONS  (STANDING):  amLODIPine   Tablet 10 milliGRAM(s) Oral daily  ampicillin/sulbactam  IVPB 3 Gram(s) IV Intermittent every 6 hours  ampicillin/sulbactam  IVPB      atorvastatin 40 milliGRAM(s) Oral at bedtime  insulin glargine Injectable (LANTUS) 30 Unit(s) SubCutaneous at bedtime  insulin lispro (ADMELOG) corrective regimen sliding scale   SubCutaneous Before meals and at bedtime  insulin lispro Injectable (ADMELOG) 5 Unit(s) SubCutaneous three times a day before meals  isosorbide   mononitrate ER Tablet (IMDUR) 30 milliGRAM(s) Oral daily  magnesium oxide 400 milliGRAM(s) Oral three times a day with meals  tacrolimus 2 milliGRAM(s) Oral daily  tacrolimus 1 milliGRAM(s) Oral at bedtime  tamsulosin 0.4 milliGRAM(s) Oral at bedtime  torsemide 40 milliGRAM(s) Oral every 12 hours    MEDICATIONS  (PRN):  acetaminophen     Tablet .. 650 milliGRAM(s) Oral every 6 hours PRN Temp greater or equal to 38C (100.4F), Mild Pain (1 - 3)  ALPRAZolam 0.25 milliGRAM(s) Oral every 8 hours PRN anxiety  benzocaine 15 mG/menthol 3.6 mG Lozenge 1 Lozenge Oral every 3 hours PRN Sore Throat  bisacodyl 5 milliGRAM(s) Oral every 12 hours PRN Constipation  melatonin 3 milliGRAM(s) Oral at bedtime PRN Insomnia  senna 2 Tablet(s) Oral at bedtime PRN Constipation  sodium chloride 0.65% Nasal 1 Spray(s) Both Nostrils five times a day PRN Nasal Congestion      LABS:                        10.4   28.32 )-----------( 188      ( 26 Jul 2022 05:30 )             34.1       07-26    127<L>  |  89<L>  |  35<H>  ----------------------------<  137<H>  4.5   |  27  |  1.32<H>    Ca    8.9      26 Jul 2022 05:30  Phos  4.2     07-26  Mg     1.7     07-26                  TELEMETRY: Afib rate controlled with no events on tele     RADIOLOGY & ADDITIONAL TESTS: Reviewed.

## 2022-07-26 NOTE — PROGRESS NOTE ADULT - PROBLEM SELECTOR PLAN 3
--R jaw cyst/mass; s/p resection ~2-3yrs ago (surgeon: Dr. Hugo Rose).    --Cyst returned recently; pt had drainage of cyst done in OR (w/ Dr. Rose), no resection at this time and drain placed. Plan for drain to be removed tomr 7/27 by ENT and then okay to restart eliquis and be DC.   - Pt given prophylactic Abx Unasyn today plan for 7 day course of augmentin outpt (need dose) --R jaw cyst/mass; s/p resection ~2-3yrs ago (surgeon: Dr. Hugo Rose).    --Cyst returned recently; pt had drainage of cyst done in OR 7/25 (w/ Dr. Rose), no resection at this time and drain placed. Plan for drain to be removed tomr 7/27 by ENT and then okay to restart eliquis and be DC.   - Pt given prophylactic Abx Unasyn today plan for 7 day course of augmentin outpt (need dose)

## 2022-07-26 NOTE — PROGRESS NOTE ADULT - PROBLEM SELECTOR PLAN 7
--Hx of ESRD now s/p right renal transplant 2017 ([facility]); follows w/ Dr. Leyva.    --Baseline Cr 1.2-1.6; Cr 1.29 today.    --Tacrolimus level WNL; continue w/ Tacrolimus 2mg qAM, and Tacrolimus 1mg qPM. --Hx of ESRD now s/p right renal transplant 2017 ([facility]); follows w/ Dr. Leyva.    --Baseline Cr 1.2-1.6; Cr 1.32 today.    --Tacrolimus level WNL; continue w/ Tacrolimus 2mg qAM, and Tacrolimus 1mg qPM.

## 2022-07-26 NOTE — PROGRESS NOTE ADULT - PROBLEM SELECTOR PLAN 10
---150s.    --CONT: Amlodipine 10mg PO QD and Imdur 30mg PO QD. ---150s.    --CONT: Amlodipine 10mg PO QD and Imdur 30mg PO QD, toresemide 40 PO BID

## 2022-07-26 NOTE — PROGRESS NOTE ADULT - PROBLEM SELECTOR PLAN 2
--Hx of R pleural effusion; s/p thoracentesis 12/2021 (follows w/ Pulm Dr. Lora cleared by Dr. Lora)   --Serial CXRs since admit showing improvement of effusion although still with chronic effusion   --Continue IV diuresis as above --Hx of R pleural effusion; s/p thoracentesis 12/2021 (follows w/ Pulm Dr. Lora)  - Pt cleared by Dr. Lora for DC no need to thoracentesis for effusion)   --Serial CXRs since admit showing improvement of effusion although still with chronic effusion   --Continue with PO diuresis torsemide 40mg BID

## 2022-07-26 NOTE — PROGRESS NOTE ADULT - PROBLEM SELECTOR PLAN 11
--HgA1c 6.7; -260s.    --CONT: Lantus 30U SQ QHS, Lispro 10U TID pre meal, and mISS.      Diet: DASH/TLC/DM diet; NPO after MN   DVT ppx:on HSQ   Dispo: Plan for DC tomr 7/25/22, pt euvolemic on exam --HgA1c 6.7; -260s.    --CONT: Lantus 30U SQ QHS, Lispro 10U TID pre meal, and mISS.      Diet: DASH/TLC/DM diet  DVT ppx:on HSQ   Dispo: Plan for DC tomr 7/27/22, pt euvolemic on exam

## 2022-07-26 NOTE — PROGRESS NOTE ADULT - PROBLEM SELECTOR PLAN 6
--Remains in Afib w/ HR 60-70s; s/p DCCV w/ Dr. Schuster 5/2022.    --Eliquis DISCONTINUED in anticipation for OR 7/25/22; no need for AC bridge prior to surgery as per surgery.   - Eliquis to be restarted 7/27 after drain removed as per ENT --Remains in Afib w/ HR 60-70s; s/p DCCV w/ Dr. Schuster 5/2022.    --Eliquis DISCONTINUED in anticipation for OR 7/25/22;   - Eliquis to be restarted 7/27 after drain removed as per ENT

## 2022-07-27 NOTE — PROGRESS NOTE ADULT - PROBLEM SELECTOR PROBLEM 7
History of renal transplant
Hiatal hernia with GERD
Hiatal hernia with GERD
History of renal transplant
Hiatal hernia with GERD
History of renal transplant

## 2022-07-27 NOTE — PROGRESS NOTE ADULT - ASSESSMENT
72 yo male PMHx ESRD s/p transplant 2017, AF (Eliquis), hiatal hernia, CAD s/p CABG (2005), Obesity, DM2  HTN, HLD, BPH, CLL, MGUS p/w volume overload sent by PCP for optimization for right jaw cyst . Renal consulted for help with diuresis, transplant      LATRICIA on CKD, ESRD s/p renal transplant; baseline CKD 2  Elevated Cr.  Creatinine at  1.32, baseline Cr. at 1.1, given that Cr. down trending with aggrieve diuresis gives idea of CRS and would continue with further diuresis for now, close monitoring outpatient   Urine out put ~1.5L in 24 hours  Monitor weight daily    Recommend  Fluid Restriction- 1L    Daily weights   Strict In and out  Start patient on 40mg PO Torsemide BID   Keep mag >2 and Potassium >4   Monitor Urine out put   Continue and encourage to use CPAP     ESRD s/p Right Renal transplant   C/w tacrolimus 2mg AM and 1mg PM      Chronic Hypervolemic Hyponatremia (stable )   Continue with diuresis as above, 1 L fluid restriction

## 2022-07-27 NOTE — DISCHARGE NOTE PROVIDER - NSDCMRMEDTOKEN_GEN_ALL_CORE_FT
Eliquis 5 mg oral tablet: 1 tab(s) orally 2 times a day  Flomax 0.4 mg oral capsule: 1 cap(s) orally once a day (at bedtime)  HumaLOG KwikPen 100 units/mL injectable solution: 15 unit(s) injectable 3 times a day   Imdur 30 mg oral tablet, extended release: 1 tab(s) orally once a day (in the morning)  Lasix 40 mg oral tablet: 1 tab(s) orally 2 times a day   Norvasc 10 mg oral tablet: 1 tab(s) orally once a day  pantoprazole 20 mg oral delayed release tablet: 1 tab(s) orally once a day  rosuvastatin 10 mg oral tablet: 1 tab(s) orally once a day (at bedtime)  tacrolimus 1 mg oral capsule: 2 cap(s) orally in AM  1 caps in PM  Xanax 0.25 mg oral tablet: 1 tab(s) orally every 8 hours, As Needed   atorvastatin 40 mg oral tablet: 1 tab(s) orally once a day (at bedtime)  Eliquis 5 mg oral tablet: 1 tab(s) orally 2 times a day  HumaLOG KwikPen 100 units/mL injectable solution: 15 unit(s) injectable 3 times a day   insulin glargine 100 units/mL subcutaneous solution: 30 unit(s) subcutaneous once a day (at bedtime)  isosorbide mononitrate 30 mg oral tablet, extended release: 1 tab(s) orally once a day  Norvasc 10 mg oral tablet: 1 tab(s) orally once a day  pantoprazole 20 mg oral delayed release tablet: 1 tab(s) orally once a day  rosuvastatin 10 mg oral tablet: 1 tab(s) orally once a day (at bedtime)  tacrolimus 1 mg oral capsule: 2 cap(s) orally in AM  1 caps in PM  tamsulosin 0.4 mg oral capsule: 1 cap(s) orally once a day (at bedtime)  torsemide 40 mg oral tablet: 1 tab(s) orally every 12 hours  Xanax 0.25 mg oral tablet: 1 tab(s) orally every 8 hours, As Needed   Eliquis 5 mg oral tablet: 1 tab(s) orally 2 times a day  HumaLOG KwikPen 100 units/mL injectable solution: 15 unit(s) injectable 3 times a day   insulin glargine 100 units/mL subcutaneous solution: 30 unit(s) subcutaneous once a day (at bedtime)  isosorbide mononitrate 30 mg oral tablet, extended release: 1 tab(s) orally once a day  Norvasc 10 mg oral tablet: 1 tab(s) orally once a day  pantoprazole 20 mg oral delayed release tablet: 1 tab(s) orally once a day  rosuvastatin 10 mg oral tablet: 1 tab(s) orally once a day  tacrolimus 1 mg oral capsule: 2 cap(s) orally in AM  1 caps in PM  tamsulosin 0.4 mg oral capsule: 1 cap(s) orally once a day (at bedtime)  torsemide 40 mg oral tablet: 1 tab(s) orally every 12 hours  Xanax 0.25 mg oral tablet: 1 tab(s) orally every 8 hours, As Needed   amoxicillin-clavulanate 875 mg-125 mg oral tablet: 1 tab(s) orally 2 times a day for 10 days  Eliquis 5 mg oral tablet: 1 tab(s) orally 2 times a day  insulin glargine 100 units/mL subcutaneous solution: 30 unit(s) subcutaneous once a day (at bedtime)  isosorbide mononitrate 30 mg oral tablet, extended release: 1 tab(s) orally once a day  Norvasc 10 mg oral tablet: 1 tab(s) orally once a day  pantoprazole 20 mg oral delayed release tablet: 1 tab(s) orally once a day  rosuvastatin 10 mg oral tablet: 1 tab(s) orally once a day  tacrolimus 1 mg oral capsule: 2 cap(s) orally in AM  1 caps in PM  tamsulosin 0.4 mg oral capsule: 1 cap(s) orally once a day (at bedtime)  torsemide 40 mg oral tablet: 1 tab(s) orally every 12 hours  Xanax 0.25 mg oral tablet: 1 tab(s) orally every 8 hours, As Needed   amoxicillin-clavulanate 875 mg-125 mg oral tablet: 1 tab(s) orally 2 times a day for 10 days  Eliquis 5 mg oral tablet: 1 tab(s) orally 2 times a day  insulin glargine 100 units/mL subcutaneous solution: 30 unit(s) subcutaneous once a day (at bedtime)  isosorbide mononitrate 30 mg oral tablet, extended release: 1 tab(s) orally once a day  Norvasc 10 mg oral tablet: 1 tab(s) orally once a day  pantoprazole 20 mg oral delayed release tablet: 1 tab(s) orally once a day  rosuvastatin 10 mg oral tablet: 1 tab(s) orally once a day  tacrolimus 1 mg oral capsule: 2 cap(s) orally in AM  1 caps in PM  tamsulosin 0.4 mg oral capsule: 1 cap(s) orally once a day (at bedtime)  torsemide 40 mg oral tablet: 1 tab(s) orally every 12 hours   Xanax 0.25 mg oral tablet: 1 tab(s) orally every 8 hours, As Needed

## 2022-07-27 NOTE — PROGRESS NOTE ADULT - SUBJECTIVE AND OBJECTIVE BOX
NEPHROLOGY PROGRESS NOTE    Subjective: Patient seen and examined at bedside. Long discussion regarding d/c planning, need for conitnued monitoring and continued diuresis, goal weight loss of about 2lbs per day which it seems patient was able to get with torsemide 40 BID. Will be monitored closely outpatient with Dr. Leyva. Patient and wife comfortable with plan, no further questions, thankful with care.     [OBJECTIVE]:    Vital Signs:  T(F): , Max: 97.8 (07-27-22 @ 08:32)  HR:  (63 - 98)  BP:  (116/63 - 136/64)  BP(mean):  (84 - 91)  RR:  (18 - 18)  SpO2:  (92% - 95%)  Wt(kg): --  CVP(cm H2O): --      07-26 @ 07:01  -  07-27 @ 07:00  --------------------------------------------------------  IN: 540 mL / OUT: 1900 mL / NET: -1360 mL    07-27 @ 07:01  -  07-27 @ 12:13  --------------------------------------------------------  IN: 180 mL / OUT: 700 mL / NET: -520 mL      CAPILLARY BLOOD GLUCOSE      POCT Blood Glucose.: 234 mg/dL (27 Jul 2022 11:39)    .  VITAL SIGNS:  T(F): 97.8 (07-27-22 @ 08:32), Max: 97.8 (07-27-22 @ 08:32)  HR: 75 (07-27-22 @ 11:20) (63 - 98)  BP: 122/57 (07-27-22 @ 11:20) (116/63 - 136/64)  BP(mean): 91 (07-27-22 @ 06:22) (84 - 91)  RR: 18 (07-27-22 @ 11:20) (18 - 18)  SpO2: 95% (07-27-22 @ 11:20) (92% - 95%)    PHYSICAL EXAM:  Constitutional: Resting comfortably in bed; NAD  HEENT:  EOMI, anicteric sclera; MMM  Respiratory: CTA B/L; no W/R/R, no accessory muscle use  Cardiac: +S1/S2; RRR; no M/R/G  Gastrointestinal: soft, NT/ND; no rebound or guarding; nontender allograft site without bruit or thrill  Extremities: WWP,wrapped; 2+ peripheral edema  Dermatologic: skin warm, dry and intact; no rashes, wounds, or scars    Medications:  MEDICATIONS  (STANDING):  amLODIPine   Tablet 10 milliGRAM(s) Oral daily  apixaban 5 milliGRAM(s) Oral every 12 hours  atorvastatin 40 milliGRAM(s) Oral at bedtime  insulin glargine Injectable (LANTUS) 30 Unit(s) SubCutaneous at bedtime  insulin lispro (ADMELOG) corrective regimen sliding scale   SubCutaneous Before meals and at bedtime  insulin lispro Injectable (ADMELOG) 5 Unit(s) SubCutaneous three times a day before meals  isosorbide   mononitrate ER Tablet (IMDUR) 30 milliGRAM(s) Oral daily  tacrolimus 2 milliGRAM(s) Oral daily  tacrolimus 1 milliGRAM(s) Oral at bedtime  tamsulosin 0.4 milliGRAM(s) Oral at bedtime  torsemide 40 milliGRAM(s) Oral every 12 hours    MEDICATIONS  (PRN):  ALPRAZolam 0.25 milliGRAM(s) Oral every 8 hours PRN anxiety  benzocaine 15 mG/menthol 3.6 mG Lozenge 1 Lozenge Oral every 3 hours PRN Sore Throat  bisacodyl 5 milliGRAM(s) Oral every 12 hours PRN Constipation  melatonin 3 milliGRAM(s) Oral at bedtime PRN Insomnia  senna 2 Tablet(s) Oral at bedtime PRN Constipation  sodium chloride 0.65% Nasal 1 Spray(s) Both Nostrils five times a day PRN Nasal Congestion      Allergies:  Allergies    No Known Allergies    Intolerances        Labs:                        10.4   29.28 )-----------( 166      ( 27 Jul 2022 06:49 )             33.5     07-27    127<L>  |  88<L>  |  37<H>  ----------------------------<  151<H>  4.4   |  27  |  1.34<H>    Ca    9.3      27 Jul 2022 06:49  Phos  4.2     07-26  Mg     1.8     07-27            Radiology and other tests:

## 2022-07-27 NOTE — DISCHARGE NOTE PROVIDER - NSDCFUSCHEDAPPT_GEN_ALL_CORE_FT
[facility]  [facility] [address]  Scheduled Appointment: 08/10/2022     Kofi Leyva  [facility]  NEPHRO [address]  Scheduled Appointment: 08/03/2022    [facility]  ENDOCRIN [address]  Scheduled Appointment: 08/10/2022     Marie Lora  [facility]  PULMMED [address]  Scheduled Appointment: 08/22/2022    Arnold Sanabria  [facility]  OTOLARYNG [address] [facility]  Scheduled Appointment: 08/25/2022    Kofi Leyva  [facility]  NEPHRO [address]  Scheduled Appointment: 08/29/2022    [facility]  ENDOCRIN [address]  Scheduled Appointment: 09/09/2022

## 2022-07-27 NOTE — DISCHARGE NOTE NURSING/CASE MANAGEMENT/SOCIAL WORK - PATIENT PORTAL LINK FT
You can access the FollowMyHealth Patient Portal offered by [facility] by registering at the following website: [email or web address]. By joining [facility] [facility]’s FollowMyHealth portal, you will also be able to view your health information using other applications (apps) compatible with our system.

## 2022-07-27 NOTE — PROGRESS NOTE ADULT - PROBLEM SELECTOR PROBLEM 4
Hyponatremia
Hyponatremia
History of renal transplant
Hyponatremia
History of renal transplant
Hyponatremia
History of renal transplant
Hyponatremia

## 2022-07-27 NOTE — PROGRESS NOTE ADULT - SUBJECTIVE AND OBJECTIVE BOX
Interval Events: Reviewed  Patient seen and examined at bedside.    Patient is a 71y old  Male who presents with a chief complaint of Chronic Hypoxic Respiratory Failure, HFpEF (26 Jul 2022 21:44)  he is doing better    PAST MEDICAL & SURGICAL HISTORY:  Barretts esophagus      CRI (chronic renal insufficiency)      DM (diabetes mellitus)      GERD (gastroesophageal reflux disease)      HTN (hypertension)      Monoclonal gammopathy      Mandibular abscess      Paroxysmal atrial fibrillation      Benign prostatic hyperplasia, presence of lower urinary tract symptoms unspecified, unspecified morphology      Other hyperlipidemia      Hyperlipidemia      S/P CABG (coronary artery bypass graft)  15 years      History of surgery  cyst removal      Mandibular abscess          MEDICATIONS:  Pulmonary:    Antimicrobials:    Anticoagulants:  apixaban 5 milliGRAM(s) Oral every 12 hours    Cardiac:  amLODIPine   Tablet 10 milliGRAM(s) Oral daily  isosorbide   mononitrate ER Tablet (IMDUR) 30 milliGRAM(s) Oral daily  tamsulosin 0.4 milliGRAM(s) Oral at bedtime  torsemide 40 milliGRAM(s) Oral every 12 hours      Allergies    No Known Allergies    Intolerances        Vital Signs Last 24 Hrs  T(C): 36.6 (27 Jul 2022 08:32), Max: 36.6 (27 Jul 2022 08:32)  T(F): 97.8 (27 Jul 2022 08:32), Max: 97.8 (27 Jul 2022 08:32)  HR: 74 (27 Jul 2022 08:20) (63 - 98)  BP: 136/64 (27 Jul 2022 08:20) (116/63 - 136/64)  BP(mean): 91 (27 Jul 2022 06:22) (84 - 91)  RR: 18 (27 Jul 2022 08:20) (18 - 18)  SpO2: 93% (27 Jul 2022 08:20) (92% - 95%)    Parameters below as of 27 Jul 2022 08:20  Patient On (Oxygen Delivery Method): room air        07-26 @ 07:01 - 07-27 @ 07:00  --------------------------------------------------------  IN: 540 mL / OUT: 1900 mL / NET: -1360 mL    07-27 @ 07:01 - 07-27 @ 10:41  --------------------------------------------------------  IN: 180 mL / OUT: 0 mL / NET: 180 mL          Review of Systems:   •	General: negative  •	Skin/Breast: negative  •	Ophthalmologic: negative  •	ENMT: negative  •	Respiratory and Thorax: negative  •	Cardiovascular: negative  •	Gastrointestinal: negative  •	Genitourinary: negative  •	Musculoskeletal: negative  •	Neurological: negative  •	Psychiatric: negative  •	Hematology/Lymphatics: negative  •	Endocrine: negative  •	Allergic/Immunologic: negative    Physical Exam:   • Constitutional:	refer to the dietion /Nutritionist note  • Eyes:	EOMI; PERRL; no drainage or redness  • ENMT:	No oral lesions; no gross abnormalities  • Neck	No bruits; no thyromegaly or nodules  • Breasts:	not examined  • Back:	No deformity or limitation of movement  • Respiratory:	Breath Sounds equal & clear to percussion & auscultation, no accessory muscle use  • Cardiovascular:	Regular rate & rhythm, normal S1, S2; no murmurs, gallops or rubs; no S3, S4  • Gastrointestinal:	Soft, non-tender, no hepatosplenomegaly, normal bowel sounds  • Genitourinary:	not examined  • Rectal: not examined  • Extremities:	No cyanosis, clubbing or edema  • Vascular:	Equal and normal pulses (carotid, femoral, dorsalis pedis)  • Neurologica:l	not examined  • Skin:	No lesions; no rash  • Lymph Nodes:	No lymphadedenopathy  • Musculoskeletal:	No joint pain, swelling or deformity; no limitation of movement        LABS:      CBC Full  -  ( 27 Jul 2022 06:49 )  WBC Count : 29.28 K/uL  RBC Count : 3.89 M/uL  Hemoglobin : 10.4 g/dL  Hematocrit : 33.5 %  Platelet Count - Automated : 166 K/uL  Mean Cell Volume : 86.1 fl  Mean Cell Hemoglobin : 26.7 pg  Mean Cell Hemoglobin Concentration : 31.0 gm/dL  Auto Neutrophil # : x  Auto Lymphocyte # : x  Auto Monocyte # : x  Auto Eosinophil # : x  Auto Basophil # : x  Auto Neutrophil % : x  Auto Lymphocyte % : x  Auto Monocyte % : x  Auto Eosinophil % : x  Auto Basophil % : x    07-27    127<L>  |  88<L>  |  37<H>  ----------------------------<  151<H>  4.4   |  27  |  1.34<H>    Ca    9.3      27 Jul 2022 06:49  Phos  4.2     07-26  Mg     1.8     07-27                          RADIOLOGY & ADDITIONAL STUDIES (The following images were personally reviewed):

## 2022-07-27 NOTE — DISCHARGE NOTE NURSING/CASE MANAGEMENT/SOCIAL WORK - NSDCPEFALRISK_GEN_ALL_CORE
For information on Fall & Injury Prevention, visit: [email or web address] OR  [email or web address] OR  https://www.cdc.gov/steadi/patient.html

## 2022-07-27 NOTE — PROGRESS NOTE ADULT - SUBJECTIVE AND OBJECTIVE BOX
OTOLARYNGOLOGY (ENT) PROGRESS NOTE    PATIENT: HERB BROWN  MRN: [id number]  : [birth year]  [id number]  DATE OF SERVICE:  22  			         ID: HERB BROWN is a  71yMalePOD 1 s/p drainage of right jaw cyst .     Subjective/ Interval:   : Patient examined bedside, penrose in place, no overnight events, plan to  start Eliquis tomorrow after d/c penrose  : PAtient examined bedside, penrose removed, plan do d/c today     ALLERGIES:  No Known Allergies      MEDICATIONS:  Antiinfectives:     IV fluids:    Hematologic/Anticoagulation:  apixaban 5 milliGRAM(s) Oral every 12 hours    Pain medications/Neuro:  ALPRAZolam 0.25 milliGRAM(s) Oral every 8 hours PRN  melatonin 3 milliGRAM(s) Oral at bedtime PRN    Endocrine Medications:   atorvastatin 40 milliGRAM(s) Oral at bedtime  insulin glargine Injectable (LANTUS) 30 Unit(s) SubCutaneous at bedtime  insulin lispro (ADMELOG) corrective regimen sliding scale   SubCutaneous Before meals and at bedtime  insulin lispro Injectable (ADMELOG) 5 Unit(s) SubCutaneous three times a day before meals    All other standing medications:   amLODIPine   Tablet 10 milliGRAM(s) Oral daily  isosorbide   mononitrate ER Tablet (IMDUR) 30 milliGRAM(s) Oral daily  tacrolimus 2 milliGRAM(s) Oral daily  tacrolimus 1 milliGRAM(s) Oral at bedtime  tamsulosin 0.4 milliGRAM(s) Oral at bedtime  torsemide 40 milliGRAM(s) Oral every 12 hours    All other PRN medications:  benzocaine 15 mG/menthol 3.6 mG Lozenge 1 Lozenge Oral every 3 hours PRN  bisacodyl 5 milliGRAM(s) Oral every 12 hours PRN  senna 2 Tablet(s) Oral at bedtime PRN  sodium chloride 0.65% Nasal 1 Spray(s) Both Nostrils five times a day PRN    Vital Signs Last 24 Hrs  T(C): 36.6 (2022 08:32), Max: 36.6 (2022 08:32)  T(F): 97.8 (2022 08:32), Max: 97.8 (2022 08:32)  HR: 74 (2022 08:20) (63 - 98)  BP: 136/64 (2022 08:20) (116/63 - 136/64)  BP(mean): 91 (2022 06:22) (84 - 91)  RR: 18 (2022 08:20) (18 - 18)  SpO2: 93% (2022 08:20) (92% - 95%)    Parameters below as of 2022 08:20  Patient On (Oxygen Delivery Method): room air           @ : @ 07:00  --------------------------------------------------------  IN:    Oral Fluid: 540 mL  Total IN: 540 mL    OUT:    Voided (mL): 1900 mL  Total OUT: 1900 mL    Total NET: -1360 mL       @ : @ 10:56  --------------------------------------------------------  IN:    Oral Fluid: 180 mL  Total IN: 180 mL    OUT:  Total OUT: 0 mL    Total NET: 180 mL      PHYSICAL EXAM:  GEN: appears stated age, NAD  NEURO: alert & oriented x 4/  HEENT: intraoral penrose removed  Pulm: normal respiratory excursions, not tachypneic, no labored breathing  Abd: non-distended  Ext: moving all four extremities, no peripheral edema noted    LABS                       10.4   29.28 )-----------( 166      ( 2022 06:49 )             33.5    0727    127<L>  |  88<L>  |  37<H>  ----------------------------<  151<H>  4.4   |  27  |  1.34<H>    Ca    9.3      2022 06:49  Phos  4.2     07-26  Mg     1.8             Endocrine Panel-  Calcium, Total Serum: 9.3 mg/dL ( @ 06:49)      MICROBIOLOGY:  Culture - Tissue with Gram Stain (collected 22 @ 09:05)  Source: .Tissue 4. Right [place] Tissue Culture  Gram Stain (22 @ 16:37):    No organisms seen    Few WBC's  Preliminary Report (22 @ 09:07):    Culture in progress    Culture - Surgical Swab (collected 22 @ 09:05)  Source: .Surgical Swab 3. Right [place] #3  Gram Stain (22 @ 16:34):    No organisms seen    Few WBC's  Preliminary Report (22 @ 12:46):    Rare Streptococcus viridans group    Identification and susceptibility to follow.    Culture - Surgical Swab (collected 22 @ 09:05)  Source: .Surgical Swab 2.  Right [place] Space #2  Gram Stain (22 @ 16:39):    No organisms seen    Few WBC's  Preliminary Report (22 @ 11:51):    Rare Streptococcus anginosus    Susceptibility to follow.    Culture - Surgical Swab (collected 22 @ 09:05)  Source: .Surgical Swab 1. Right [place] #1  Gram Stain (22 @ 16:39):    No organisms seen    Few WBC's  Preliminary Report (22 @ 08:56):    Culture in progress      Culture Results:   Culture in progress (22 @ 09:05)  Culture Results:   Rare Streptococcus anginosus  Susceptibility to follow. (22 @ 09:05)  Culture Results:   Rare Streptococcus viridans group  Identification and susceptibility to follow. (22 @ 09:05)  Culture Results:   Culture in progress (22 @ 09:05)      Culture - Tissue with Gram Stain (collected 22 @ 09:05)  Source: .Tissue 4. Right [place] Space Tissue Culture  Gram Stain (22 @ 16:37):    No organisms seen    Few WBC's  Preliminary Report (22 @ 09:07):    Culture in progress    Culture - Surgical Swab (collected 22 @ 09:05)  Source: .Surgical Swab 3. Right [place] #3  Gram Stain (22 @ 16:34):    No organisms seen    Few WBC's  Preliminary Report (22 @ 12:46):    Rare Streptococcus viridans group    Identification and susceptibility to follow.    Culture - Surgical Swab (collected 22 @ 09:05)  Source: .Surgical Swab 2.  Right [place] Space #2  Gram Stain (22 @ 16:39):    No organisms seen    Few WBC's  Preliminary Report (22 @ 11:51):    Rare Streptococcus anginosus    Susceptibility to follow.    Culture - Surgical Swab (collected 22 @ 09:05)  Source: .Surgical Swab 1. Right [place] Space #1  Gram Stain (22 @ 16:39):    No organisms seen    Few WBC's  Preliminary Report (22 @ 08:56):    Culture in progress

## 2022-07-27 NOTE — PROGRESS NOTE ADULT - PROVIDER SPECIALTY LIST ADULT
ENT
Nephrology
Heart Failure
Intervent Cardiology
Intervent Cardiology
Nephrology
Nephrology
Cardiology
ENT
Heart Failure
Nephrology
Nephrology
Cardiology
Cardiology
Internal Medicine
Internal Medicine
Cardiology
Internal Medicine
Internal Medicine
Cardiology
Cardiology
Internal Medicine
Internal Medicine

## 2022-07-27 NOTE — PROGRESS NOTE ADULT - PROBLEM SELECTOR PLAN 4
Hx of renal transplant in 2017 at [facility]. Pt on Tacrolimus and Valganciclovir at home.  -c/w Tacrolimus, 2mg in AM, 1mg in PM  -c/w valganciclovir 450mg PO qd   -Renal following -- f/u recs  follow Na and Cr as outpatient.  both are stable

## 2022-07-27 NOTE — PROGRESS NOTE ADULT - ASSESSMENT
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 Pt had drainage of right jaw cyst 7/25 with ENT no resection done, penrose removed.       PLAN:   -  d/c on Augmentin   - resume eliquis   - Follow up in [place] Monday   - Discussed with Dr. Garzon who agrees     Disposition: Home   [facility] at [phone] with any questions/concerns.    Blanche Cadet PA-C  07-27-22 @ 10:56

## 2022-07-27 NOTE — PROGRESS NOTE ADULT - PROBLEM SELECTOR PROBLEM 5
CLL (chronic lymphocytic leukemia)
Atrial fibrillation
Atrial fibrillation
CLL (chronic lymphocytic leukemia)
Atrial fibrillation
CLL (chronic lymphocytic leukemia)

## 2022-07-27 NOTE — PROGRESS NOTE ADULT - PROBLEM SELECTOR PROBLEM 6
Chronic atrial fibrillation
Chronic atrial fibrillation
CLL (chronic lymphocytic leukemia)
Chronic atrial fibrillation
CLL (chronic lymphocytic leukemia)
Chronic atrial fibrillation
CLL (chronic lymphocytic leukemia)
Chronic atrial fibrillation

## 2022-07-27 NOTE — DISCHARGE NOTE PROVIDER - CARE PROVIDER_API CALL
Paulina Encinas)  Cardiovascular Disease; Internal Medicine  [address] [place] [address]  Phone: [phone]  Fax: [phone]  Follow Up Time: 2 weeks    Marie Lora)  Critical Care Medicine; Pulmonary Disease  [address], [address] [place] [address]  Phone: [phone]  Fax: [phone]  Follow Up Time:     Kofi Leyva)  Internal Medicine; Nephrology  [address], [address]  [place] [address]  Phone: [phone]  Fax: [phone]  Follow Up Time:    Paulina Encinas)  Cardiovascular Disease; Internal Medicine  [address] [place] [address]  Phone: [phone]  Fax: [phone]  Follow Up Time: 2 weeks    Marie Lora)  Critical Care Medicine; Pulmonary Disease  [address], [address] [place] [address]  Phone: [phone]  Fax: [phone]  Follow Up Time:     Kofi Leyva)  Internal Medicine; Nephrology  [address], [address]  [place] [address]  Phone: [phone]  Fax: [phone]  Follow Up Time:     Hugo Rose)  Otolaryngology  [address] [facility], [address]th [place] [address]  Phone: [phone]  Fax: [phone]  Follow Up Time:

## 2022-07-27 NOTE — PROGRESS NOTE ADULT - TIME BILLING
As above; Coordination of care with primary team, discussed d/c planning, diuresis  This excludes any time spent on separate procedures or teaching.
as noted above
As above; Coordination of care with primary team, discussed diuresis, d/c planning  This excludes any time spent on separate procedures or teaching.
As above; Coordination of care with primary team, discussed medications, diuresis  This excludes any time spent on separate procedures or teaching.
As above; Coordination of care with primary team, discussed diuretic management  This excludes any time spent on separate procedures or teaching.
As above; Coordination of care with primary team, discussed LATRICIA, tacro level, hyponatremia, diuresis   This excludes any time spent on separate procedures or teaching.
Patient seen and examined with house-staff during bedside rounds.  Resident note read, including vitals, physical findings, laboratory data, and radiological reports.   Revisions included below.  Direct personal management at bed side and extensive interpretation of the data.  Plan was outlined and discussed in details with the housestaff.  Decision making of high complexity  Action taken for acute disease activity to reflect the level of care provided:  - medication reconciliation  - review laboratory data  Finding from the OR reviewed.  The culture grew Streptococcus.  Patient was started on Unasyn.  Will discuss with ENT tomorrow
Patient seen and examined with house-staff during bedside rounds.  Resident note read, including vitals, physical findings, laboratory data, and radiological reports.   Revisions included below.  Direct personal management at bed side and extensive interpretation of the data.  Plan was outlined and discussed in details with the housestaff.  Decision making of high complexity  Action taken for acute disease activity to reflect the level of care provided:  - medication reconciliation  - review laboratory data  discussed with ENT and for OR MOnday.  Cleared from my point of view
Patient seen and examined with house-staff during bedside rounds.  Resident note read, including vitals, physical findings, laboratory data, and radiological reports.   Revisions included below.  Direct personal management at bed side and extensive interpretation of the data.  Plan was outlined and discussed in details with the housestaff.  Decision making of high complexity  Action taken for acute disease activity to reflect the level of care provided:  - medication reconciliation  - review laboratory data  will discuss with ENT and improved with diuresis  follow on hyponatremia
as noted above
Patient seen and examined with house-staff during bedside rounds.  Resident note read, including vitals, physical findings, laboratory data, and radiological reports.   Revisions included below.  Direct personal management at bed side and extensive interpretation of the data.  Plan was outlined and discussed in details with the housestaff.  Decision making of high complexity  Action taken for acute disease activity to reflect the level of care provided:  - medication reconciliation  - review laboratory data  he will be dc on augmentin and follow with ENT
as noted above
as noted above

## 2022-07-27 NOTE — DISCHARGE NOTE PROVIDER - NSDCCPCAREPLAN_GEN_ALL_CORE_FT
PRINCIPAL DISCHARGE DIAGNOSIS  Diagnosis: Acute on chronic heart failure with preserved ejection fraction (HFpEF)  Assessment and Plan of Treatment: You have a history of heart failure and should continue your daily Torsemide 40mg daily. You should weigh yourself daily and if you notice a weight gain of more than 2-3 pounds in 2 days, shortness of breath, or lower extremity swelling you should contact your doctor immediately. Please restrict your fluid intake to 1-2L daily.      SECONDARY DISCHARGE DIAGNOSES  Diagnosis: Pleural effusion, right  Assessment and Plan of Treatment: You have a chronic pleural effusion for which you should follow up with your outpt provider    Diagnosis: HTN (hypertension)  Assessment and Plan of Treatment: BP has been well controlled please continue with imdur and norvasc     PRINCIPAL DISCHARGE DIAGNOSIS  Diagnosis: Acute on chronic heart failure with preserved ejection fraction (HFpEF)  Assessment and Plan of Treatment: You have a history of heart failure and should continue your daily Torsemide 40mg twice daily. You should weigh yourself daily and if you notice a weight gain of more than 2-3 pounds in 2 days, shortness of breath, or lower extremity swelling you should contact your doctor immediately. Please restrict your fluid intake to 1-2L daily.      SECONDARY DISCHARGE DIAGNOSES  Diagnosis: Pleural effusion, right  Assessment and Plan of Treatment: You have a chronic pleural effusion for which you should follow up with your outpt provider it is stable at this time    Diagnosis: HTN (hypertension)  Assessment and Plan of Treatment: BP has been well controlled please continue with imdur and norvasc as prescribed    Diagnosis: Stage 5 chronic kidney disease not on chronic dialysis  Assessment and Plan of Treatment: Follow up with Dr. sweeney     PRINCIPAL DISCHARGE DIAGNOSIS  Diagnosis: Acute on chronic heart failure with preserved ejection fraction (HFpEF)  Assessment and Plan of Treatment: You have a history of heart failure and should continue your daily Torsemide 40mg twice daily. You should weigh yourself daily and if you notice a weight gain of more than 2-3 pounds in 2 days, shortness of breath, or lower extremity swelling you should contact your doctor immediately. Please restrict your fluid intake to 1-2L daily.      SECONDARY DISCHARGE DIAGNOSES  Diagnosis: Pleural effusion, right  Assessment and Plan of Treatment: You have a chronic pleural effusion for which you should follow up with your outpt provider it is stable at this time    Diagnosis: HTN (hypertension)  Assessment and Plan of Treatment: BP has been well controlled please continue with imdur and norvasc as prescribed    Diagnosis: Stage 5 chronic kidney disease not on chronic dialysis  Assessment and Plan of Treatment: Follow up with Dr. sweeney    Diagnosis: Cyst of jaw  Assessment and Plan of Treatment: You had a cyst of your jaw drained by Dr. Garzon no resection was done, penrose drain was placed and removed. Please take Augmentin 850 BID for 10 day course. Please follow up in [place] Monday with Dr. Rose     PRINCIPAL DISCHARGE DIAGNOSIS  Diagnosis: Acute on chronic heart failure with preserved ejection fraction (HFpEF)  Assessment and Plan of Treatment: You have a history of heart failure and should continue your daily Torsemide 40mg twice daily. You should weigh yourself daily and if you notice a weight gain of more than 2-3 pounds in 2 days, shortness of breath, or lower extremity swelling you should contact your doctor immediately. Please restrict your fluid intake to 1-2L daily.      SECONDARY DISCHARGE DIAGNOSES  Diagnosis: Pleural effusion, right  Assessment and Plan of Treatment: You have a chronic pleural effusion for which you should follow up with your outpt provider it is stable at this time    Diagnosis: HTN (hypertension)  Assessment and Plan of Treatment: BP has been well controlled please continue with imdur and norvasc as prescribed    Diagnosis: Stage 5 chronic kidney disease not on chronic dialysis  Assessment and Plan of Treatment: Follow up with Dr. sweeney    Diagnosis: Cyst of jaw  Assessment and Plan of Treatment: You had a cyst of your jaw drained by Dr. Garzon no resection was done, penrose drain was placed and removed. Please take Augmentin 875 twice for 10 day course. Please follow up in [place] Monday with Dr. Rose

## 2022-07-27 NOTE — DISCHARGE NOTE PROVIDER - PROVIDER TOKENS
PROVIDER:[TOKEN:[[id number]],FOLLOWUP:[2 weeks]],PROVIDER:[TOKEN:[[id number]]],PROVIDER:[TOKEN:[[id number]]] PROVIDER:[TOKEN:[[id number]],FOLLOWUP:[2 weeks]],PROVIDER:[TOKEN:[[id number]]],PROVIDER:[TOKEN:[[id number]]],PROVIDER:[TOKEN:[[id number]]]

## 2022-07-27 NOTE — PROGRESS NOTE ADULT - PROBLEM SELECTOR PLAN 6
Pt with hx of CLL. Follows with oncology at [facility]. CT neck soft tissue from 11/10 showing widespread lymphadenopathy in neck and chest most consistent with lymphoproliferative disorder, query relapse of CLL and new compared to PET-CT from 5 years prior. Pt underwent Lymph node biopsy on 11/12. Patient now noted to have R sided supraclavicular lymph node. Pt with chronic leukocytosis 2/2 CLL. WBC 17.48.   -consider heme/onc consult for further evaluation   -continue to monitor

## 2022-07-27 NOTE — DISCHARGE NOTE PROVIDER - CARE PROVIDERS DIRECT ADDRESSES
,[email or web address]

## 2022-07-27 NOTE — DISCHARGE NOTE PROVIDER - HOSPITAL COURSE
Pt is a 81 y/o M w/ PMH of afib (on Eliquis), HFpEF on (on torsemide), CAD s/p CABG (on rosuvastatin), R renal transplant (on tacrolimus), DM2 (humalog 15 U TID w/ meals), HTN (norvasc 10 mg), HLD (rosuvastatin), CLL (w/ chronic leukocytosis), Rt pleural effusion (s/p thoracentesis 12/21), p/w SORIA, orthopnea, and SOB for several months which intermittently worsen then improve Pt was found to have a recurrent rt sided pleural effusion as an outpatient and was started on torsemide PO. Pt states compliance w/ home meds.  Reports SpO2 between 88-96 at home while resting or laying down. He denies any chest pain, cough, fever, night sweats, weight loss, nausea, diarrhea, dysuria, sick contacts. Pt being admitted for pre-op optimization and diuresis, in preparation for rt jaw cyst removal next week.     On admission pt had CXR shows recurrent rt pleural effusion, s/p thoracentesis in 12/21. Pt is volume overloaded with 2+ pitting edema in b/l LE, and abdominal distension w/ dullness to percussion. Likely due to HFpEF exacerbation. Pt was seen by Hf team who started on IV lasix 80mg BID for diuresis with goal -1.5-2L daily, resume his eliquis 5 mg BID, norvasc 10 mg qd, imdur 30 mg ER qd. Pt is a 81 y/o M w/ PMH of afib (on Eliquis), HFpEF on (on torsemide), CAD s/p CABG (on rosuvastatin), R renal transplant (on tacrolimus), DM2 (humalog 15 U TID w/ meals), HTN (norvasc 10 mg), HLD (rosuvastatin), CLL (w/ chronic leukocytosis), Rt pleural effusion (s/p thoracentesis 12/21), p/w SORIA, orthopnea, and SOB for several months which intermittently worsen then improve Pt was found to have a recurrent rt sided pleural effusion as an outpatient and was started on torsemide PO. Pt states compliance w/ home meds.  Reports SpO2 between 88-96 at home while resting or laying down. He denies any chest pain, cough, fever, night sweats, weight loss, nausea, diarrhea, dysuria, sick contacts. Pt being admitted for pre-op optimization and diuresis, in preparation for rt jaw cyst removal next week.     On admission pt had CXR shows recurrent rt pleural effusion, s/p thoracentesis in 12/21. Pt is volume overloaded with 2+ pitting edema in b/l LE, and abdominal distension w/ dullness to percussion. Likely due to HFpEF exacerbation. Pt was seen by Hf team who started on IV lasix 80mg BID for diuresis with goal -1.5-2L daily, resume his eliquis 5 mg BID, norvasc 10 mg qd, imdur 30 mg ER qd. Renal team consulted for LATRICIA on CKD, ESRD s/p renal transplant; baseline CKD 2. Elevated Cr at 1.41, baseline (1.1) most likely due to volume overload with component of diuretic effect. Physical exam consistent with volume overload.  Renal team Agreed with lasix 80mg BID Plan to keep as net negative as possible until day of surgery. ESRD s/p Right Renal transplant. Pt with stable with Chronic Hypervolemic Hyponatremia -129.     Pt on 7/21 had ECHO: EF 65, mild MR, TR. Pt was tolerating diuresis well, CXR with less congestion still with small chronic R pleural effusion, reports of symptom improvement, and close euvolemic on exam. Pt was net negative 12 L since his admission and was transitioned to PO Torsemide 40mg BID 7/26 with no issues. BP has been well controlled, cont amlodipine and imdur. Pt with chronic Afib has been rate controlled and is on eliquis for AC. Medicine saw patient and noted stable pleural effusion of the right side and no indication for thoracentesis at this point as he is responding to diuresis. Pt during admission saw ENT where he is now s/p purulent drainage of right jaw cyst 7/25 with Dr. Duran salazar resection was done, penrose drain was placed 7/26 and removed 7/27. Pt given Unasyn post procedure and placed on Augmentin for outpt 10 day course, eliquis was held until drain removed restarted 7/27. Pt will follow up in [place] Monday with Dr. Duran salazar resection performed    Pt seen and examined at bedside this AM without any complaints or events overnight, VSS, labs and telemetry reviewed and pt stable for discharge as discussed with Dr. Elizalde. Pt has received appropriate discharge instructions, including medication regimen and will follow up.                 Pt is a 79 y/o M w/ PMH of afib (on Eliquis), HFpEF on (on torsemide), CAD s/p CABG (on rosuvastatin), R renal transplant (on tacrolimus), DM2 (humalog 15 U TID w/ meals), HTN (norvasc 10 mg), HLD (rosuvastatin), CLL (w/ chronic leukocytosis), Rt pleural effusion p/w SORIA, orthopnea, and SOB for several months which intermittently worsen then improve Pt was found to have a recurrent rt sided pleural effusion as an outpatient and was started on torsemide PO. Pt states compliance w/ home meds.  Reports SpO2 between 88-96 at home while resting or laying down. He denies any chest pain, cough, fever, night sweats, weight loss, nausea, diarrhea, dysuria, sick contacts. Pt being admitted for pre-op optimization rt jaw cyst     On admission pt had CXR shows recurrent rt pleural effusion, s/p thoracentesis in 12/21. Pt is volume overloaded with 2+ pitting edema in b/l LE, and abdominal distension w/ dullness to percussion. Likely due to HFpEF exacerbation. Pt was seen by Hf team who started on IV lasix 80mg BID for diuresis with goal -1.5-2L daily, resume his eliquis 5 mg BID, norvasc 10 mg qd, imdur 30 mg ER qd. Renal team consulted for LATRICIA on CKD, ESRD s/p renal transplant; baseline CKD 2. Elevated Cr at 1.41, baseline (1.1) most likely due to volume overload with component of diuretic effect. Physical exam consistent with volume overload.  Renal team Agreed with lasix 80mg BID Plan to keep as net negative as possible until day of surgery. ESRD s/p Right Renal transplant. Pt with stable with Chronic Hypervolemic Hyponatremia -129.     Pt on 7/21 had ECHO: EF 65, mild MR, TR. Pt was tolerating diuresis well, CXR with less congestion still with small chronic R pleural effusion, reports of symptom improvement, and close euvolemic on exam. Pt was net negative 12 L since his admission and was started on PO Torsemide 40mg BID (was on home dose QD)  7/26 with no issues. BP has been well controlled, cont amlodipine and imdur. Pt with chronic Afib has been rate controlled and is on eliquis for AC. Medicine saw patient and noted stable pleural effusion of the right side and no indication for thoracentesis at this point as he is responding to diuresis. Pt during admission saw ENT where he is now s/p purulent drainage of right jaw cyst 7/25 with Dr. Duran salazar resection was done, penrose drain was placed 7/26 and removed 7/27. Pt given Unasyn post procedure and placed on Augmentin for outpt 10 day course, eliquis was held until drain removed restarted 7/27. Pt will follow up in [place] Monday with Dr. Duran salazar resection performed    Pt seen and examined at bedside this AM without any complaints or events overnight, VSS, labs and telemetry reviewed and pt stable for discharge as discussed with Dr. Elizalde. Pt has received appropriate discharge instructions, including medication regimen and will follow up.                 Pt is a 79 y/o M w/ PMH of afib (on Eliquis), HFpEF on (on torsemide), CAD s/p CABG (on rosuvastatin), R renal transplant (on tacrolimus), DM2 (humalog 15 U TID w/ meals), HTN (norvasc 10 mg), HLD (rosuvastatin), CLL (w/ chronic leukocytosis), Rt pleural effusion p/w SORIA, orthopnea, and SOB for several months which intermittently worsen then improve Pt was found to have a recurrent rt sided pleural effusion as an outpatient and was started on torsemide PO. Pt states compliance w/ home meds.  Reports SpO2 between 88-96 at home while resting or laying down. He denies any chest pain, cough, fever, night sweats, weight loss, nausea, diarrhea, dysuria, sick contacts. Pt being admitted for pre-op optimization rt jaw cyst     On admission pt had CXR shows recurrent rt pleural effusion, s/p thoracentesis in 12/21. Pt is volume overloaded with 2+ pitting edema in b/l LE, and abdominal distension w/ dullness to percussion. Likely due to HFpEF exacerbation. Pt was seen by Hf team who started on IV lasix 80mg BID for diuresis with goal -1.5-2L daily, resume his eliquis 5 mg BID, norvasc 10 mg qd, imdur 30 mg ER qd. Renal team consulted for LATRICIA on CKD, ESRD s/p renal transplant; baseline CKD 2. Elevated Cr at 1.41, baseline (1.1) most likely due to volume overload with component of diuretic effect. Physical exam consistent with volume overload.  Renal team Agreed with lasix 80mg BID Plan to keep as net negative as possible until day of surgery. ESRD s/p Right Renal transplant. Pt with stable with Chronic Hypervolemic Hyponatremia -129.     Pt on 7/21 had ECHO: EF 65, mild MR, TR. Pt was tolerating diuresis well, CXR with less congestion still with small chronic R pleural effusion, reports of symptom improvement, and close euvolemic on exam. Pt was net negative 12 L since his admission and was started on PO Torsemide 40mg BID (was on home dose QD)  7/26 with no issues. BP has been well controlled, cont amlodipine and imdur. Pt with chronic Afib has been rate controlled and is on eliquis for AC. Medicine saw patient and noted stable pleural effusion of the right side and no indication for thoracentesis at this point as he is responding to diuresis. Pt during admission saw ENT where he is now s/p purulent drainage of right jaw cyst 7/25 with Dr. Garzon no resection was done, penrose drain was placed 7/26 and removed 7/27. Pt given Unasyn post procedure and placed on Augmentin for outpt 10 day course, eliquis was held until drain removed restarted 7/27. Pt will follow up in [place] Monday with Dr. Rose no resection performed    Pt seen and examined at bedside this AM without any complaints or events overnight, VSS, labs and telemetry reviewed and pt stable for discharge as discussed with Dr. Elizalde. Pt has received appropriate discharge instructions, including medication regimen and will follow up.

## 2022-07-27 NOTE — PROGRESS NOTE ADULT - REASON FOR ADMISSION
Chronic Hypoxic Respiratory Failure, HFpEF

## 2022-07-29 PROBLEM — I87.2 VENOUS STASIS DERMATITIS OF BOTH LOWER EXTREMITIES: Status: ACTIVE | Noted: 2019-08-01

## 2022-07-29 NOTE — DISCUSSION/SUMMARY
[FreeTextEntry1] : The patient has had severe congestive heart failure.  He was diuresed a great deal and still has fluid on his exam.  EKG shows atrial fibrillation right bundle branch block and T wave abnormalities.  He will continue torsemide 40 mg twice a day.  His atrial fibrillation has recurred after cardioversion.  He will consult Dr. Schuster about ablation. [EKG obtained to assist in diagnosis and management of assessed problem(s)] : EKG obtained to assist in diagnosis and management of assessed problem(s)

## 2022-07-29 NOTE — PHYSICAL EXAM
[Well Developed] : well developed [Well Nourished] : well nourished [No Acute Distress] : no acute distress [Normal Conjunctiva] : normal conjunctiva [No Carotid Bruit] : no carotid bruit [Normal S1, S2] : normal S1, S2 [No Murmur] : no murmur [Good Air Entry] : good air entry [No Respiratory Distress] : no respiratory distress  [Normal Gait] : normal gait [Venous stasis] : venous stasis [Moves all extremities] : moves all extremities [No Focal Deficits] : no focal deficits [Normal Speech] : normal speech [Alert and Oriented] : alert and oriented [Normal memory] : normal memory [de-identified] : right lower lung area w reduced BS [de-identified] : 1+ pedal edema up to 2/3 knees with significant erythema, hyperpigmentation [de-identified] : Lower leg erythema and hyperpigmentation

## 2022-08-03 PROBLEM — N18.5 ANEMIA OF CHRONIC RENAL FAILURE, STAGE 5: Status: ACTIVE | Noted: 2017-01-09

## 2022-08-04 NOTE — ED ADULT TRIAGE NOTE - HEIGHT IN FEET
Patient found to be obstipated on CT scan  symptoms have improved   continue movatik for opioid induced constipation 5

## 2022-08-10 PROBLEM — E66.01 CLASS 2 SEVERE OBESITY DUE TO EXCESS CALORIES WITH SERIOUS COMORBIDITY AND BODY MASS INDEX (BMI) OF 36.0 TO 36.9 IN ADULT: Status: ACTIVE | Noted: 2021-12-22

## 2022-08-11 NOTE — END OF VISIT
[] : Fellow [FreeTextEntry3] : 71yoM h/o T2DM on basal bolus insulin, CKD3, secondary hyperPTH. Has a habit of not taking basal insulin. Usually takes humalog 18-24u qAc, occasionally lantus 30 u. Denies hypoglycemia at home. Recommended to start Ozempic in July 2022. Had jaw abscess drained recently. Because of this pt did not take Ozempic. \par \par Agree with Dr. Galvan’s plan for Humalog 15-18u qAc, Lantus 30u qhs, and starting Ozempic.

## 2022-08-11 NOTE — ASSESSMENT
[FreeTextEntry1] : 71 year old male with hx of diabetes type 2 on insulin therapy\par \par \par - DM2\par A1C Serum 7.1%\par resume Lantus 30 units at bedtime- educated about the need for lantus\par 15 units Lispro pre-meal, advised to reduce to 8-10 units if having lower carbohydrates at the meal. Increase to 18 if 15units is insufficient for meals. \par Send Rx for more units per meal incase to have sufficient supply. \par Goal <140 2 hours postprandial\par - will start trial of ozempic. \par consider SGLT2 at next visit for both kidney protective and cardiac protective and to aid in glycemic control. \par was previously on trulicity which he did not tolerate. \par \par -Obesity class 2\par GLP-1- Ozempic \par consider SGLT2 at next visit if intolerant to GLP-1\par \par -HLD \par resume Rosuvastatin 10mg\par Lipid panel reviewed \par \par case discussed with Dr. Carmona \par . \par \par

## 2022-08-11 NOTE — HISTORY OF PRESENT ILLNESS
[FreeTextEntry1] : 70 yo male PMHx AF (Eliquis), hiatal hernia, CAD s/p CABG (2005), R renal txp (tacrolimus + valganciclovir), DM2 (A1C 7.1), HTN, HLD, CLL here for follow up Diabetes and weight loss. \par \par Seen at [facility] 12/08/21: was admitted for right plueral effusion s/p thoracentesis. \par Seen for inpatient hyperglycemia management by Endocrine team. \par \par Below taken from consult note:\par Dx: Patient was diagnosed 20+ years ago and started on oral medications. He says he was introduced to insulin around the time of his kidney transplant and has only been on insulin for the past 4 years. Was on Lantus and humalog but believes that lantus does not work for him so he only resumed humalog\par Prior Therapy: 24-32units Humalog TID with meals, Metformin 500mg ER , took on average 24units with each meal\par Hx of hypoglycemia: has intermittent lows during the week sometimes at lunch or dinner, with numbers in the 60s. \par Hx of DKA/HHS? never\par Complications: neuropathy, nephropathy, retinopathy - sees retinal specialist regularly \par Previous Outpatient Endo: Dr. Gemma Murillo ([place])\par \par FH:\par DM: mother diabetic\par SH:\par Smoking nonsmoker\par Etoh: nondrinker\par \par Patient was discharged from [facility] on Lantus 30 units at bedtime and humalog 15 units before meals\par \par \par \par Interval HPI:\par Current regimen: 30 units Lantus  18-24units Humalog \par He continues to believe that Lantus is not making a difference and has not been taking it regularly. \par However he states still now even when he eats nothing his sugars increase to 200s during the day.\par Denies any hypoglycemia\par Patient still interested in losing weight, prescribed ozempic at last visit, has not started it yet. \par Had procedure of the Jaw which initially thought to be a tumor was per patient states found to be a lot of fluid and pus collection, sent for pathology. \par He was hesitant to start any new medication with his procedure. \par He has lost 4 pounds since last visit due to eating softer food 2/2 jaw pain.\par \par He reports he will start ozempic this weekend. \par \par

## 2022-08-22 PROBLEM — I26.99 ACUTE PULMONARY EMBOLISM: Status: ACTIVE | Noted: 2022-01-04

## 2022-08-22 NOTE — HISTORY OF PRESENT ILLNESS
[Never] : never [TextBox_4] : He is doing well.  He is controlling his diet.  He lost weight he weighed 205 today.  Legs are not swollen.  He does not use the CPAP at night.  He used the oxygen. [ESS] : 0

## 2022-08-22 NOTE — ASSESSMENT
[FreeTextEntry1] : Obstructive sleep apnea\par \par As you reviewed the previous sleep study and he has sleep apnea and desaturates on night.  The patient will require oxygen supplementation.  Patient using the oxygen at night instead of the CPAP as the CPAP is not tolerated.  I would repeat the sleep study once the weight loss plateau.\par \par PE\par \par The patient is on anticoagulation with no clinical evidence neither failure of therapy no bleeding.  He is to continue on anticoagulation indefinitely.\par \par Pulmonary hypertension\par \par Is related to group 2 and 3 and 4.  The patient is on anticoagulation.  The patient is on  oxygen supplementation at night.  Patient lost weight and third spacing with diuresis.  The last creatinine is 1.6.  No evidence of acute right heart failure\par \par Right pleural effusion\par \par Patient is small pleural effusion from the x-ray in July.  The patient improved with diuresis.\par \par I discussed the case with Dr. Lopez and will decrease torsemide to a total of 80 mg alternating with a total of 40 mg a day.

## 2022-08-25 PROBLEM — R09.81 NASAL CONGESTION: Status: ACTIVE | Noted: 2021-12-22

## 2022-08-25 PROBLEM — J34.89 NASAL OBSTRUCTION: Status: ACTIVE | Noted: 2020-12-31

## 2022-08-25 PROBLEM — C76.0: Status: ACTIVE | Noted: 2022-01-01

## 2022-08-25 PROBLEM — H93.8X2 PRESSURE SENSATION IN LEFT EAR: Status: ACTIVE | Noted: 2022-01-01

## 2022-08-25 NOTE — PHYSICAL EXAM
[Nasal Endoscopy Performed] : nasal endoscopy was performed, see procedure section for findings [Normal] : mucosa is normal [Midline] : trachea located in midline position [de-identified] : edema

## 2022-08-25 NOTE — HISTORY OF PRESENT ILLNESS
[FreeTextEntry1] : Patient following up today on nasal congestion, Patient states he feels much better .  He gets congested and dry but not as much as before. He also  c/o clogged ears .  Nasal congestion is improving.

## 2022-08-25 NOTE — PHYSICAL EXAM
[Nasal Endoscopy Performed] : nasal endoscopy was performed, see procedure section for findings [Normal] : mucosa is normal [Midline] : trachea located in midline position [de-identified] : edema

## 2022-08-29 PROBLEM — N18.5 CHRONIC KIDNEY DISEASE, STAGE 5: Status: ACTIVE | Noted: 2017-01-05

## 2022-09-18 NOTE — H&P ADULT - NSHPSOCIALHISTORY_GEN_ALL_CORE
Pt lives at home with his wife  Non-smoker, non-drinker, no illicit drug use  Pt works as an art seller

## 2022-09-18 NOTE — H&P ADULT - NSICDXPASTMEDICALHX_GEN_ALL_CORE_FT
PAST MEDICAL HISTORY:  Barretts esophagus     Benign prostatic hyperplasia, presence of lower urinary tract symptoms unspecified, unspecified morphology     CRI (chronic renal insufficiency)     DM (diabetes mellitus)     GERD (gastroesophageal reflux disease)     HTN (hypertension)     Hyperlipidemia     Mandibular abscess     Monoclonal gammopathy     Paroxysmal atrial fibrillation

## 2022-09-18 NOTE — H&P ADULT - NSICDXPASTSURGICALHX_GEN_ALL_CORE_FT
PAST SURGICAL HISTORY:  History of surgery cyst removal    Mandibular abscess     S/P CABG (coronary artery bypass graft) 20 years

## 2022-09-18 NOTE — H&P ADULT - HISTORY OF PRESENT ILLNESS
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 Pt had drainage of right jaw cyst 7/25 with ENT no resection done, penrose in place. Patient had 3 days of worsening R cheek erythema, swelling and pain w/ pain in R jaw. Pt denies headache, dizziness, nausea, vomiting, cough, SOB, CP, palpitations, abdo pain, LUTS, LE swelling.

## 2022-09-18 NOTE — PATIENT PROFILE ADULT - FUNCTIONAL ASSESSMENT - BASIC MOBILITY 6.
3-calculated by average/Not able to assess (calculate score using [facility] averaging method)

## 2022-09-18 NOTE — H&P ADULT - PROBLEM SELECTOR PLAN 8
Plan:  F: none  E: replete K<4, Mg<2  N: consistent carbs, NPO @ midnight  VTE Prophylaxis: None  GI: home pepcid  C: Full Code  D: [facility]

## 2022-09-18 NOTE — H&P ADULT - NSHPLABSRESULTS_GEN_ALL_CORE
LABS:                          11.4   40.89 )-----------( 174      ( 18 Sep 2022 17:04 )             36.3     09-18    128<L>  |  92<L>  |  43<H>  ----------------------------<  101<H>  4.6   |  23  |  1.42<H>    Ca    9.4      18 Sep 2022 17:04  Phos  4.2     09-18  Mg     2.0     09-18    TPro  7.5  /  Alb  4.8  /  TBili  0.6  /  DBili  x   /  AST  16  /  ALT  11  /  AlkPhos  129<H>  09-18    LIVER FUNCTIONS - ( 18 Sep 2022 17:04 )  Alb: 4.8 g/dL / Pro: 7.5 g/dL / ALK PHOS: 129 U/L / ALT: 11 U/L / AST: 16 U/L / GGT: x           PT/INR - ( 18 Sep 2022 17:04 )   PT: 15.1 sec;   INR: 1.27

## 2022-09-18 NOTE — H&P ADULT - PROBLEM SELECTOR PLAN 3
Pt has hx of Type 2 DM. On home ozempic and insulin humalog 15-24u 2x daily.  - hold humalog preoperatively  - hold mISS  - hold ozempic  -Consistent Carb Diet    -if pt becomes hypoglycemic, consider 25-50% reduction of insulin dose since nutritional intake likely more restricted in hospital setting

## 2022-09-18 NOTE — H&P ADULT - ASSESSMENT
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

## 2022-09-18 NOTE — PATIENT PROFILE ADULT - FALL HARM RISK - HARM RISK INTERVENTIONS

## 2022-09-18 NOTE — PATIENT PROFILE ADULT - HOW PATIENT ADDRESSED, PROFILE
The Patient was educated on frequent/proper hand-washing techniques, Standard precautions, avoid crowds and persons with known infections and staying current with immunizations. The Patient was given time and space to ask questions. Adonis Ashton

## 2022-09-18 NOTE — H&P ADULT - PROBLEM SELECTOR PLAN 4
Hx of HTN managed w/ isosorbide mononitrate 30mg QD, amlodipine 10mg QD, torsemide 40mg BID.  - cont isosorbide mononitrate and amlodipine  - hold torsemide for surgery

## 2022-09-18 NOTE — H&P ADULT - PROBLEM SELECTOR PLAN 1
Hx of R jaw cyst. Pt had drainage of right jaw cyst 7/25 with ENT no resection done, penrose in place. Patient had 3 days of worsening R cheek erythema, swelling and pain w/ pain in R jaw.  - operative management under ENT tomorrow

## 2022-09-19 NOTE — BRIEF OPERATIVE NOTE - OPERATION/FINDINGS
Procedure: Right transoral approach for partial tumor resection of right  space and right infratemporal fossa, dental extraction  Pre-op diagnosis: Right  space mass  Post-op diagnosis: Right  space mass

## 2022-09-19 NOTE — DISCHARGE NOTE PROVIDER - HOSPITAL COURSE
#Discharge: do not delete    Patient is __ yo M/F with past medical history of _____ presented with _____, found to have _____ (one liner)    Hospital course (by problem):     Patient was discharged to: (home/[facility]/acute rehab/hospice, etc, and with what services – home health PT/RN? 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    9/19: AVSS, patient feels well this morning. No reports of pain.  9/20: AVSS, no acute overnight events.  9/21: AVSS, no acute overnight events.    On day of discharge, patient was afebrile, hemodynamically stable, tolerating PO, and at baseline ambulation. He was cleared for discharge home with instructions to f/u with Dr. Rose.

## 2022-09-19 NOTE — CONSULT NOTE ADULT - SUBJECTIVE AND OBJECTIVE BOX
HPI: 71yMale    PMH:     MEDS:  Allergies    No Known Allergies    Intolerances        ICU Vital Signs Last 24 Hrs  T(F): 97.2 (09-19-22 @ 15:24), Max: 98.4 (09-19-22 @ 05:05)  HR: 68 (09-19-22 @ 15:53) (61 - 75)  BP: 152/70 (09-19-22 @ 15:53) (109/71 - 169/70)  BP(mean): 100 (09-19-22 @ 15:53) (97 - 106)  ABP: 161/59 (09-19-22 @ 15:53)  RR: 20 (09-19-22 @ 15:53) (15 - 20)  SpO2: 98% (09-19-22 @ 15:53) (84% - 98%)    PHYSICAL EXAM:   Neurological: AAOx3, CNII-XII intact,  strength 5/5 b/l  ENT: mucus membrane moist  Cardiovascular: RRR  Respiratory: CTA  Gastrointestinal: soft, NT, ND, BS+  Extremities: warm, no dependent edema  Vascular: no cyanosis/erythema  Skin: no rashes  MSK: no joint swelling.   LABS:    09-19    132<L>  |  93<L>  |  43<H>  ----------------------------<  137<H>  4.5   |  22  |  1.50<H>    Ca    9.8      19 Sep 2022 05:30  Phos  4.0     09-19  Mg     2.1     09-19    TPro  7.9  /  Alb  5.0  /  TBili  0.7  /  DBili  x   /  AST  15  /  ALT  11  /  AlkPhos  143<H>  09-19  LIVER FUNCTIONS - ( 19 Sep 2022 05:30 )  Alb: 5.0 g/dL / Pro: 7.9 g/dL / ALK PHOS: 143 U/L / ALT: 11 U/L / AST: 15 U/L / GGT: x                               12.1   40.37 )-----------( 180      ( 19 Sep 2022 05:30 )             39.0   PT/INR - ( 19 Sep 2022 05:30 )   PT: 14.5 sec;   INR: 1.22          PTT - ( 19 Sep 2022 05:30 )  PTT:36.5 sec  CAPILLARY BLOOD GLUCOSE      POCT Blood Glucose.: 205 mg/dL (19 Sep 2022 15:33)  POCT Blood Glucose.: 140 mg/dL (19 Sep 2022 10:02)    Lucas:	  [ ] None	[ ] Daily Lucas Order Placed	   Indication:	  [ ] Strict I and O's    [ ] Obstruction     [ ] Incontinence + Stage 3 or 4 Decubitus  Central Line:  [ ] None	   [ ]  Medication / TPN Administration     [ ] No Peripheral IV          HPI: 71yMale PMHx Afib (on Eliquis, s/p DCCV 5/2022), hx PE, HFpEF (EF 65%), CAD s/p CABG (2005), R renal transplant (on tacrolimus, follows w/ Dr Leyva), hx pHTN, jaw mass (s/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 pt transferred to SICU post-op for airway monitoring due to manipulation of tongue intraop    PMH: as above     NKDA      ICU Vital Signs Last 24 Hrs  T(F): 97.2 (09-19-22 @ 15:24), Max: 98.4 (09-19-22 @ 05:05)  HR: 68 (09-19-22 @ 15:53) (61 - 75)  BP: 152/70 (09-19-22 @ 15:53) (109/71 - 169/70)  BP(mean): 100 (09-19-22 @ 15:53) (97 - 106)  ABP: 161/59 (09-19-22 @ 15:53)  RR: 20 (09-19-22 @ 15:53) (15 - 20)  SpO2: 98% (09-19-22 @ 15:53) (84% - 98%)    PHYSICAL EXAM:   Neurological: AAOx3, CNII-XII intact,  strength 5/5 b/l  ENT: mucus membrane moist, mild bleeding intraorally.   Cardiovascular: RRR  Respiratory: CTA  Gastrointestinal: soft, NT, ND, BS+  Extremities: warm, no dependent edema  Vascular: no cyanosis/erythema  Skin: no rashes  MSK: no joint swelling.       LABS:    09-19    132<L>  |  93<L>  |  43<H>  ----------------------------<  137<H>  4.5   |  22  |  1.50<H>    Ca    9.8      19 Sep 2022 05:30  Phos  4.0     09-19  Mg     2.1     09-19    TPro  7.9  /  Alb  5.0  /  TBili  0.7  /  DBili  x   /  AST  15  /  ALT  11  /  AlkPhos  143<H>  09-19  LIVER FUNCTIONS - ( 19 Sep 2022 05:30 )  Alb: 5.0 g/dL / Pro: 7.9 g/dL / ALK PHOS: 143 U/L / ALT: 11 U/L / AST: 15 U/L / GGT: x                               12.1   40.37 )-----------( 180      ( 19 Sep 2022 05:30 )             39.0   PT/INR - ( 19 Sep 2022 05:30 )   PT: 14.5 sec;   INR: 1.22          PTT - ( 19 Sep 2022 05:30 )  PTT:36.5 sec  CAPILLARY BLOOD GLUCOSE      POCT Blood Glucose.: 205 mg/dL (19 Sep 2022 15:33)  POCT Blood Glucose.: 140 mg/dL (19 Sep 2022 10:02)    Lucas:	  [ ] None	[x ] Daily Lucas Order Placed	   Indication:	  [x ] Strict I and O's    [ ] Obstruction     [ ] Incontinence + Stage 3 or 4 Decubitus  Central Line:  [x ] None	   [ ]  Medication / TPN Administration     [ ] No Peripheral IV

## 2022-09-19 NOTE — CONSULT NOTE ADULT - ASSESSMENT
71M w/ AFib on Eliquis s/p DCCV 5/22, DM, GERD, HTN, pulm HTN, PE, HFpEF of 65%, CAD s/p CABG ('05), BPH, ESRD s/p R renal transplant on Tac, JAYANT, CLL, PSHx R jaw cyst drainage w/ worsening erythema. admitted for elective Right transoral approach for partial tumor resection of right  space and right infratemporal fossa, dental extraction, pt transferred to SICU post-op for airway monitoring due to manipulation of tongue intraop.       NEURO: Tylenol, Zofran PRN  CV: MAPs >65, HTN, holding: amlodipine, imdur., restart BP meds if BP high. holding: rosuvastatin, AFib, holding: Eliquis  PULM: no resp distress on face tent  GI/FEN: CLD, advance as tolerated.   : elmore. Tacrolimus, torsemide, tamsulosin  ENDO: ISS  ID: [email or web address]   PPX: SCDs, hold SQH tonight.   LINES: PIVs,   WOUNDS/DRAINS: intraoral wounds  Dispo: SICU. likely stepdown tomorrow

## 2022-09-19 NOTE — DIETITIAN INITIAL EVALUATION ADULT - PERTINENT MEDS FT
MEDICATIONS  (STANDING):  influenza  Vaccine (HIGH DOSE) 0.7 milliLiter(s) IntraMuscular once    MEDICATIONS  (PRN):

## 2022-09-19 NOTE — PROGRESS NOTE ADULT - PROBLEM SELECTOR PLAN 1
Hx of R jaw cyst. Pt had drainage of right jaw cyst 7/25 with ENT no resection done, penrose in place. Patient had 3 days of worsening R cheek erythema, swelling and pain w/ pain in R jaw.  - operative management under ENT tomorrow Hx of R  space mass. Pt had drainage of right jaw cyst 7/25 with ENT, no resection done, penrose in place. Patient had 3 days of worsening R cheek erythema, swelling and pain w/ pain in R jaw.  - operative management under ENT today

## 2022-09-19 NOTE — DISCHARGE NOTE PROVIDER - NSDCFUADDINST_GEN_ALL_CORE_FT
ENT Discharge Instructions    ENT follow up appointment:  - please call the office to schedule a follow up appointment with Dr. Rose    *Please call your doctor or nurse practitioner if you have increased pain, swelling, redness, or drainage from the incision site.  *Avoid swimming and baths until your follow-up appointment.  *You may shower, and wash surgical incisions with a mild soap and warm water. Gently pat the area dry.  *If you have steri-strips, they will fall off on their own. Please remove any remaining strips 7-10 days after surgery.    Activity:  - fatigue is common after surgery, rest if you feel tired   -Please get plenty of rest, continue to ambulate several times per day, and drink adequate amounts of fluids.   -Avoid lifting weights greater than 5-10 lbs until you follow-up with your surgeon, who will instruct you further regarding activity restrictions.  -Avoid driving or operating heavy machinery while taking pain medications.  - Walking is recommended, ambulate as tolerated    skullbase precautions: no nose blowing, sneeze with mouth open, no drinking out of a straw, no straining        Pain Expectations:  - pain after surgery is expected  - please take pain meds as prescribed     Medications: Please resume all regular home medications unless specifically advised not to take a particular medication. Also, please take any new medications as prescribed.   - pain medications can cause constipation, you should eat a high fiber diet and may take a stool softener while on pain meds   - Avoid taking Advil (ibuprofen), Motrin (naproxen) for pain as they can cause bleeding       Warning Signs:  Please call your doctor or nurse practitioner if you experience the following:  *You experience new chest pain, pressure, squeezing or tightness.  *New or worsening cough, shortness of breath, or wheeze.  *If you are vomiting and cannot keep down fluids or your medications.  *You are getting dehydrated due to continued vomiting, diarrhea, or other reasons. Signs of dehydration include dry mouth, rapid heartbeat, or feeling dizzy or faint when standing.  *Your pain is not improving within 8-12 hours or is not gone within 24 hours.  *You have shaking chills, or fever greater than 101.5 degrees Fahrenheit or 38 degrees Celsius.  *Any change in your symptoms, or any new symptoms that concern you.     PLEASE CALL THE OFFICE WITH ANY QUESTIONS OR CONCERNS ENT Discharge Instructions    ENT follow up appointment:  - please call the office to schedule a follow up appointment with Dr. Rose  - Please make an appointment to follow up with Dr. Lora as an outpatient    *Please call your doctor or nurse practitioner if you have increased pain, swelling, redness, or drainage from the incision site.  *Avoid swimming and baths until your follow-up appointment.  *You may shower, and wash surgical incisions with a mild soap and warm water. Gently pat the area dry.  *If you have steri-strips, they will fall off on their own. Please remove any remaining strips 7-10 days after surgery.    Activity:  - fatigue is common after surgery, rest if you feel tired   -Please get plenty of rest, continue to ambulate several times per day, and drink adequate amounts of fluids.   -Avoid lifting weights greater than 5-10 lbs until you follow-up with your surgeon, who will instruct you further regarding activity restrictions.  -Avoid driving or operating heavy machinery while taking pain medications.  - Walking is recommended, ambulate as tolerated    skullbase precautions: no nose blowing, sneeze with mouth open, no drinking out of a straw, no straining        Pain Expectations:  - pain after surgery is expected  - please take pain meds as prescribed     Medications: Please resume all regular home medications unless specifically advised not to take a particular medication. Also, please take any new medications as prescribed.   - pain medications can cause constipation, you should eat a high fiber diet and may take a stool softener while on pain meds   - Avoid taking Advil (ibuprofen), Motrin (naproxen) for pain as they can cause bleeding       Warning Signs:  Please call your doctor or nurse practitioner if you experience the following:  *You experience new chest pain, pressure, squeezing or tightness.  *New or worsening cough, shortness of breath, or wheeze.  *If you are vomiting and cannot keep down fluids or your medications.  *You are getting dehydrated due to continued vomiting, diarrhea, or other reasons. Signs of dehydration include dry mouth, rapid heartbeat, or feeling dizzy or faint when standing.  *Your pain is not improving within 8-12 hours or is not gone within 24 hours.  *You have shaking chills, or fever greater than 101.5 degrees Fahrenheit or 38 degrees Celsius.  *Any change in your symptoms, or any new symptoms that concern you.     PLEASE CALL THE OFFICE WITH ANY QUESTIONS OR CONCERNS

## 2022-09-19 NOTE — DISCHARGE NOTE PROVIDER - NSDCMRMEDTOKEN_GEN_ALL_CORE_FT
amLODIPine 5 mg oral tablet: 1 tab(s) orally once a day  Eliquis 5 mg oral tablet: 1 tab(s) orally 2 times a day  isosorbide mononitrate 30 mg oral tablet, extended release: 1 tab(s) orally once a day  pantoprazole 20 mg oral delayed release tablet: 1 tab(s) orally once a day  rosuvastatin 10 mg oral tablet: 1 tab(s) orally once a day  tacrolimus 1 mg oral capsule: 2 cap(s) orally in AM  1 caps in PM  tamsulosin 0.4 mg oral capsule: 1 cap(s) orally once a day (at bedtime)  torsemide 20 mg oral tablet: orally 2 times a day  Xanax 0.25 mg oral tablet: 1 tab(s) orally every 8 hours, As Needed   amLODIPine 5 mg oral tablet: 1 tab(s) orally once a day  Eliquis 5 mg oral tablet: 1 tab(s) orally 2 times a day  isosorbide mononitrate 30 mg oral tablet, extended release: 1 tab(s) orally once a day  pantoprazole 20 mg oral delayed release tablet: 1 tab(s) orally once a day  Peridex 0.12% mucous membrane liquid: 15 milliliter(s) orally 2 times a day   rosuvastatin 10 mg oral tablet: 1 tab(s) orally once a day  tacrolimus 1 mg oral capsule: 2 cap(s) orally in AM  1 caps in PM  tamsulosin 0.4 mg oral capsule: 1 cap(s) orally once a day (at bedtime)  torsemide 20 mg oral tablet: orally 2 times a day  Xanax 0.25 mg oral tablet: 1 tab(s) orally every 8 hours, As Needed   amLODIPine 5 mg oral tablet: 1 tab(s) orally once a day  Eliquis 5 mg oral tablet: 1 tab(s) orally 2 times a day  isosorbide mononitrate 30 mg oral tablet, extended release: 1 tab(s) orally once a day  oxycodone-acetaminophen 5 mg-325 mg oral tablet: 1 tab(s) orally prn MDD:MDD: 4 tabs. Take 1 tab every 6 hours as needed for severe pain   pantoprazole 20 mg oral delayed release tablet: 1 tab(s) orally once a day  Peridex 0.12% mucous membrane liquid: 15 milliliter(s) orally 2 times a day   rosuvastatin 10 mg oral tablet: 1 tab(s) orally once a day  tacrolimus 1 mg oral capsule: 2 cap(s) orally in AM  1 caps in PM  tamsulosin 0.4 mg oral capsule: 1 cap(s) orally once a day (at bedtime)  torsemide 20 mg oral tablet: orally 2 times a day  Xanax 0.25 mg oral tablet: 1 tab(s) orally every 8 hours, As Needed

## 2022-09-19 NOTE — DIETITIAN INITIAL EVALUATION ADULT - OTHER CALCULATIONS
lbs. %%. IBW used to calculate energy needs due to pt's current body weight exceeding 120% of IBW. Needs are adjusted for age and wt, post op demands for healing, hx of CHF. Fluids per team

## 2022-09-19 NOTE — DISCHARGE NOTE PROVIDER - NSDCFUSCHEDAPPT_GEN_ALL_CORE_FT
Marie Lora  [facility] Physician [facility]  PULMMED [address] [facility] [address]  Scheduled Appointment: 10/24/2022    Arnold Sanabria  [facility] Physician [facility]  OTOLARYNG [address] [facility]  Scheduled Appointment: 10/27/2022

## 2022-09-19 NOTE — PROGRESS NOTE ADULT - SUBJECTIVE AND OBJECTIVE BOX
INTERVAL HPI/OVERNIGHT EVENTS:  Patient was seen and examined at bedside. As per nurse and patient, no o/n events, patient resting comfortably. No complaints at this time. Patient denies: fever, chills, dizziness, weakness, HA, Changes in vision, CP, palpitations, SOB, cough, N/V/D/C, dysuria, changes in bowel movements, LE edema. ROS otherwise negative.    VITAL SIGNS:  T(F): 98.4 (09-19-22 @ 05:05)  HR: 64 (09-19-22 @ 05:05)  BP: 139/69 (09-19-22 @ 05:05)  RR: 18 (09-19-22 @ 05:05)  SpO2: 94% (09-19-22 @ 05:05)  Wt(kg): --    PHYSICAL EXAM:    Constitutional: NAD  HEENT: PERRL, EOMI, sclera non-icteric, neck supple, trachea midline, no masses, no JVD, MMM  Respiratory: CTA b/l, good air entry b/l, no wheezing, no rhonchi, no rales, without accessory muscle use and no intercostal retractions  Cardiovascular: RRR, normal S1S2, no M/R/G  Gastrointestinal: soft, NTND, no masses palpable, BS normal  Extremities: Warm, well perfused, pulses equal bilateral upper and lower extremities, no edema, no clubbing  Neurological: AAOx3, CN Grossly intact  Skin: Normal temperature, warm, dry    MEDICATIONS  (STANDING):  amLODIPine   Tablet 5 milliGRAM(s) Oral daily  atorvastatin 40 milliGRAM(s) Oral at bedtime  influenza  Vaccine (HIGH DOSE) 0.7 milliLiter(s) IntraMuscular once  isosorbide   mononitrate ER Tablet (IMDUR) 30 milliGRAM(s) Oral daily  pantoprazole    Tablet 40 milliGRAM(s) Oral before breakfast  tacrolimus 1 milliGRAM(s) Oral <User Schedule>  tacrolimus 2 milliGRAM(s) Oral <User Schedule>  tamsulosin 0.4 milliGRAM(s) Oral <User Schedule>    MEDICATIONS  (PRN):  acetaminophen     Tablet .. 975 milliGRAM(s) Oral every 6 hours PRN Temp greater or equal to 38C (100.4F), Mild Pain (1 - 3)  ALPRAZolam 0.25 milliGRAM(s) Oral every 8 hours PRN anxiety  traMADol 25 milliGRAM(s) Oral every 6 hours PRN Moderate Pain (4 - 6)      Allergies    No Known Allergies    Intolerances        LABS:                        11.4   40.89 )-----------( 174      ( 18 Sep 2022 17:04 )             36.3     09-18    128<L>  |  92<L>  |  43<H>  ----------------------------<  101<H>  4.6   |  23  |  1.42<H>    Ca    9.4      18 Sep 2022 17:04  Phos  4.2     09-18  Mg     2.0     09-18    TPro  7.5  /  Alb  4.8  /  TBili  0.6  /  DBili  x   /  AST  16  /  ALT  11  /  AlkPhos  129<H>  09-18    PT/INR - ( 18 Sep 2022 17:04 )   PT: 15.1 sec;   INR: 1.27                  RADIOLOGY & ADDITIONAL TESTS:  Reviewed INTERVAL HPI/OVERNIGHT EVENTS:  Patient was seen and examined at bedside. As per nurse and patient, no o/n events. C/o having to repeat COVID test o/a preoperatively.     VITAL SIGNS:  T(F): 98.4 (09-19-22 @ 05:05)  HR: 64 (09-19-22 @ 05:05)  BP: 139/69 (09-19-22 @ 05:05)  RR: 18 (09-19-22 @ 05:05)  SpO2: 94% (09-19-22 @ 05:05)  Wt(kg): --    PHYSICAL EXAM:  Constitutional: Obese elderly male sitting in chair; NAD  HEENT: PERRL, EOMI, sclera non-icteric, neck supple, trachea midline, no masses, no JVD, MMM, erythematous, swollen R cheek, tender R jaw  Respiratory: CTA b/l, good air entry b/l, no wheezing, no rhonchi, no rales, without accessory muscle use and no intercostal retractions  Cardiovascular: RRR, normal S1S2, no M/R/G  Gastrointestinal: soft, NTND, no masses palpable, BS normal  Extremities: Warm, well perfused, pulses equal bilateral upper and lower extremities, no edema, no clubbing  Neurological: AAOx3, CN Grossly intact  Skin: Normal temperature, warm, dry, brownish skin changes observed around ankles b/l consistent w/ chronic venous insufficiency    MEDICATIONS  (STANDING):  amLODIPine   Tablet 5 milliGRAM(s) Oral daily  atorvastatin 40 milliGRAM(s) Oral at bedtime  influenza  Vaccine (HIGH DOSE) 0.7 milliLiter(s) IntraMuscular once  isosorbide   mononitrate ER Tablet (IMDUR) 30 milliGRAM(s) Oral daily  pantoprazole    Tablet 40 milliGRAM(s) Oral before breakfast  tacrolimus 1 milliGRAM(s) Oral <User Schedule>  tacrolimus 2 milliGRAM(s) Oral <User Schedule>  tamsulosin 0.4 milliGRAM(s) Oral <User Schedule>    MEDICATIONS  (PRN):  acetaminophen     Tablet .. 975 milliGRAM(s) Oral every 6 hours PRN Temp greater or equal to 38C (100.4F), Mild Pain (1 - 3)  ALPRAZolam 0.25 milliGRAM(s) Oral every 8 hours PRN anxiety  traMADol 25 milliGRAM(s) Oral every 6 hours PRN Moderate Pain (4 - 6)      Allergies    No Known Allergies    Intolerances        LABS:                        11.4   40.89 )-----------( 174      ( 18 Sep 2022 17:04 )             36.3     09-18    128<L>  |  92<L>  |  43<H>  ----------------------------<  101<H>  4.6   |  23  |  1.42<H>    Ca    9.4      18 Sep 2022 17:04  Phos  4.2     09-18  Mg     2.0     09-18    TPro  7.5  /  Alb  4.8  /  TBili  0.6  /  DBili  x   /  AST  16  /  ALT  11  /  AlkPhos  129<H>  09-18    PT/INR - ( 18 Sep 2022 17:04 )   PT: 15.1 sec;   INR: 1.27                  RADIOLOGY & ADDITIONAL TESTS:  Reviewed ****TRANSFER FROM [facility] TO ENT SERVICE****    INTERVAL HPI/OVERNIGHT EVENTS:  Patient was seen and examined at bedside. As per nurse and patient, no o/n events. C/o having to repeat COVID test o/a preoperatively.     VITAL SIGNS:  T(F): 98.4 (09-19-22 @ 05:05)  HR: 64 (09-19-22 @ 05:05)  BP: 139/69 (09-19-22 @ 05:05)  RR: 18 (09-19-22 @ 05:05)  SpO2: 94% (09-19-22 @ 05:05)  Wt(kg): --    PHYSICAL EXAM:  Constitutional: Obese elderly male sitting in chair; NAD  HEENT: PERRL, EOMI, sclera non-icteric, neck supple, trachea midline, no masses, no JVD, MMM, erythematous, swollen R cheek, tender R jaw  Respiratory: CTA b/l, good air entry b/l, no wheezing, no rhonchi, no rales, without accessory muscle use and no intercostal retractions  Cardiovascular: RRR, normal S1S2, no M/R/G  Gastrointestinal: soft, NTND, no masses palpable, BS normal  Extremities: Warm, well perfused, pulses equal bilateral upper and lower extremities, no edema, no clubbing  Neurological: AAOx3, CN Grossly intact  Skin: Normal temperature, warm, dry, brownish skin changes observed around ankles b/l consistent w/ chronic venous insufficiency    MEDICATIONS  (STANDING):  amLODIPine   Tablet 5 milliGRAM(s) Oral daily  atorvastatin 40 milliGRAM(s) Oral at bedtime  influenza  Vaccine (HIGH DOSE) 0.7 milliLiter(s) IntraMuscular once  isosorbide   mononitrate ER Tablet (IMDUR) 30 milliGRAM(s) Oral daily  pantoprazole    Tablet 40 milliGRAM(s) Oral before breakfast  tacrolimus 1 milliGRAM(s) Oral <User Schedule>  tacrolimus 2 milliGRAM(s) Oral <User Schedule>  tamsulosin 0.4 milliGRAM(s) Oral <User Schedule>    MEDICATIONS  (PRN):  acetaminophen     Tablet .. 975 milliGRAM(s) Oral every 6 hours PRN Temp greater or equal to 38C (100.4F), Mild Pain (1 - 3)  ALPRAZolam 0.25 milliGRAM(s) Oral every 8 hours PRN anxiety  traMADol 25 milliGRAM(s) Oral every 6 hours PRN Moderate Pain (4 - 6)      Allergies    No Known Allergies    Intolerances        LABS:                        11.4   40.89 )-----------( 174      ( 18 Sep 2022 17:04 )             36.3     09-18    128<L>  |  92<L>  |  43<H>  ----------------------------<  101<H>  4.6   |  23  |  1.42<H>    Ca    9.4      18 Sep 2022 17:04  Phos  4.2     09-18  Mg     2.0     09-18    TPro  7.5  /  Alb  4.8  /  TBili  0.6  /  DBili  x   /  AST  16  /  ALT  11  /  AlkPhos  129<H>  09-18    PT/INR - ( 18 Sep 2022 17:04 )   PT: 15.1 sec;   INR: 1.27                  RADIOLOGY & ADDITIONAL TESTS:  Reviewed

## 2022-09-19 NOTE — PROGRESS NOTE ADULT - PROBLEM SELECTOR PLAN 8
Plan:  F: none  E: replete K<4, Mg<2  N: consistent carbs, NPO @ midnight  VTE Prophylaxis: None  GI: home pepcid  C: Full Code  D: [email or web address] F: none  E: replete K<4, Mg<2  N: consistent carbs  VTE Prophylaxis: None  GI: home pepcid  C: Full Code  D: [facility]

## 2022-09-19 NOTE — DIETITIAN INITIAL EVALUATION ADULT - PERTINENT LABORATORY DATA
09-19    132<L>  |  93<L>  |  43<H>  ----------------------------<  137<H>  4.5   |  22  |  1.50<H>    Ca    9.8      19 Sep 2022 05:30  Phos  4.0     09-19  Mg     2.1     09-19    TPro  7.9  /  Alb  5.0  /  TBili  0.7  /  DBili  x   /  AST  15  /  ALT  11  /  AlkPhos  143<H>  09-19  POCT Blood Glucose.: 140 mg/dL (09-19-22 @ 10:02)  A1C with Estimated Average Glucose Result: 6.9 % (09-19-22 @ 05:30)  A1C with Estimated Average Glucose Result: 6.7 % (07-21-22 @ 07:10)  A1C with Estimated Average Glucose Result: 6.8 % (11-09-21 @ 06:13)

## 2022-09-19 NOTE — DIETITIAN INITIAL EVALUATION ADULT - OTHER INFO
71 y/oM PMHx Afib (on Eliquis, s/p DCCV 5/2022), hx PE, HFpEF (EF 65%), CAD s/p CABG (2005), R renal transplant (on tacrolimus, follows w/ Dr Leyva), hx pHTN, jaw mass (s/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, now returned), JAYANT (has not started CPAP yet), CLL (w/ chronic leukocytosis), DM2, HTN, HLD,  who presented for pre-op assessment for management of a R jaw cyst under ENT surgery.    Attempted to see pt x 2 today but pt not seen in room with plan for OR today for R jaw cyst. Limited information obtained. RD consulted for decreased PO PTA, likely 2/2 jaw pain as pt is adm for R jaw cyst. Pt currently NPO for procedure, previously on kosher pureed diet. Noted previous adm in 7/26, weighed at 217 lbs, CBW of 199 lbs, indicates wt change of 18 lb/8%. ?accuracy as noted previous adm with 1+ edema to multiple extremities. No noted pain reported, no reported n/v/d. Skin: Isac 21, no PU or edema noted. RD to obtain further nutrition information/diet hx at follow up. RD to follow per protocol.

## 2022-09-19 NOTE — PROGRESS NOTE ADULT - ASSESSMENT
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 HERB BROWN is a71 y/oM PMHx Afib (on Eliquis, s/p DCCV 5/2022), hx PE, HFpEF (EF 65%), CAD s/p CABG (2005), R renal transplant (on tacrolimus, follows w/ Dr Leyva), hx pHTN, jaw mass (s/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, now returned), JAYANT (has not started CPAP yet), CLL (w/ chronic leukocytosis), DM2, HTN, HLD,  who presented for pre-op assessment for management of a Right  space mass under ENT surgery.

## 2022-09-19 NOTE — DISCHARGE NOTE PROVIDER - CARE PROVIDER_API CALL
Hugo Rose)  Otolaryngology  [address] [place] [address], [address] [place] [address]  Phone: [phone]  Fax: [phone]  Follow Up Time:

## 2022-09-19 NOTE — DIETITIAN INITIAL EVALUATION ADULT - ADD RECOMMEND
1. NPO, diet adv as medically feasible, rec continue with kosher restrictions, rec SLP eval prior to diet adv to assess for most appropriate consistency  2. Monitor BM, pain regimen per team  3. Monitor BMP, BG, POCT q6hrs, Na, BUN, Cr, lytes, replete prn  4. diet edu prn  5. Obtain further nutrition/diet hx from pt at follow up

## 2022-09-19 NOTE — PRE-OP CHECKLIST - SURGICAL CONSENT
[facility] encounter  HEMATOLOGY/ONCOLOGY OFFICE VISIT    CHIEF COMPLAINT:   Cancer (MGUS (monoclonal gammopathy of unknown significance))      HISTORY OF PRESENT ILLNESS:  Jorge Alberto Preston is a 86 year old male who presents for evaluation of MM, MDS.    He has been having a lot of fatigue over the last few weeks.  No associated fevers.  No alleviating factors.    He has been on the revlimid for the last 2 weeks.    No pains today.    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  Cytogenetics revealed a normal myeloma fish panel.  However, there was 5q minus on the conventional cytogenetics.  PET negative  10/5/2020: started lenalidomide                 Cancer Staging Summary for Jorge Alberto Preston     None          PAST HISTORIES:  Past medical history, surgical history, family history, and social history were reviewed and updated.    MEDICATIONS / ALLERGIES:  Medications and allergies were reviewed and updated.    Review of systems:  Constitutional: Negative for fever, chills, change in appetite. No fatigue.  Skin: Negative for rash or wounds.   HEENT: Negative for eye drainage, rhinorrhea, ear pain, sore throat or neck pain.  Respiratory: Negative for cough, wheezing or shortness of breath.    Cardiovascular: Negative for chest pain, chest pressure, palpitations or diaphoresis.   Gastrointestinal: Negative for nausea, vomiting, diarrhea, abdominal pain, black or tarry stools.  Genitourinary: Negative for dysuria, urgency, frequency, hematuria or flank pain.  Extremities: Negative for joint swelling or  joint pain.  Neurologic: Negative for change in sensory or motor function.  Negative for headache, change in gait, vertigo, vision or speech.  Endocrine: Negative for heat or cold intolerance, weight loss or gain.  Hematological: Negative for bleeding, bruising or lymphadenopathy.  Psychiatric: Negative for change in affect, anxiety, depression, insomnia, mentation or sleep disturbance.    PHYSICAL EXAM:  Vital Signs:   Oncology Encounter Vitals [10/19/20 1012]   ONC OP Encounter Vitals Group      BP (!) 149/65      Heart Rate (!) 52      Resp       Temp 97.2 °F (36.2 °C)      Temp src Skin      SpO2 98 %      Weight 190 lb 12.8 oz (86.5 kg)      Height       Pain Score  0      Pain Location       Pain Education?       BSA (Calculated - m2) - [employer]       BMI (Calculated)      ECOG Performance Status:   ECOG [10/19/20 1012]   ECOG Performance Status 1     General: The patient is alert, well-developed, well-nourished, no distress.  Skin: Warm, normal color, normal texture, normal turgor and without rash.  Head: Normocephalic, atraumatic.  Neck: Supple with no significant adenopathy.  Eyes: Normal conjunctivae and sclerae. Pupils equal, round, reactive to light and accommodation. Extraocular movements intact.  ENT: Mucous membranes are moist. Normal nose,mouth and throat.  Cardiovascular: Regular rate and rhythm, no murmurs, gallops or rubs.  Respiratory: Normal respiratory effort. Clear to auscultation. No wheezes, rales, or rhonchi.  Abdomen: Abdomen is soft and non-tender with active bowel sounds. There is no detected organomegaly, masses, or ascites.  Extremities: No clubbing, no cyanosis, no edema. Normal muscle tone and development bilaterally.  Lymph: No cervical, supraclavicular, axillary, inguinal lymphadenopathy.  Neurologic: Motor strength normal. Coordination normal. No tremor noted.  Psychiatric: Cooperative. Appropriate mood and affect. Normal judgment.    DATA REVIEWED:  Laboratory  Data:  Recent Labs   Lab 10/19/20  0954  07/15/20  1629   WBC 8.5   < > 6.4   RBC 2.81*   < > 2.75*   HGB 10.6*   < > 10.3*   HCT 31.6*   < > 31.7*   .5*   < > 115.3*      < > 313   Absolute Neutrophils  --   --  3.6   Absolute Lymphocytes  --   --  1.7   Absolute Monocytes  --   --  0.7   Absolute Eosinophils   --   --  0.2   Absolute Basophils  --   --  0.1    < > = values in this interval not displayed.     Recent Labs   Lab 10/19/20  0954 09/22/20  1119   Glucose 107* 87   Sodium 141 138   Potassium 3.1* 4.2   Chloride 101 105   Carbon Dioxide 29 27   BUN 26* 23*   Creatinine 1.54* 1.32*   Calcium 8.5 8.7   Magnesium  --  1.8   Protein, Total 7.0 7.2   Albumin 3.4* 3.6   GOT/AST 13 14   Alkaline Phosphatase 54 50   GPT 18 20     Recent Labs   Lab 10/19/20  0954   Anion Gap 14   Globulin 3.6   Bilirubin, Total 0.7       Imaging Studies:      ASSESSMENT AND PLAN:  1. MM:  He has smoldering disease.  Doing well on lenalidomide for concurrent MDS with 5q-.  We will continue to monitor closely for toxicities.    F/u 2 weeks with labs.    Discussed oral chemotherapy adherence and side effects with patient.  Patient is taking medication as prescribed.    The patient, son indicated understanding of the diagnosis and agreed with the plan of care. The patient is encouraged to call between now and next visit with any problems, questions or concerns that arise.   done

## 2022-09-20 NOTE — PROGRESS NOTE ADULT - SUBJECTIVE AND OBJECTIVE BOX
Interval Events: Reviewed  Patient seen and examined at bedside.    Patient is a 71y old  Male who presents with a chief complaint of Preop (20 Sep 2022 14:04)  he is better sand no cp    PAST MEDICAL & SURGICAL HISTORY:  Barretts esophagus      CRI (chronic renal insufficiency)      DM (diabetes mellitus)      GERD (gastroesophageal reflux disease)      HTN (hypertension)      Monoclonal gammopathy      Mandibular abscess      Paroxysmal atrial fibrillation      Benign prostatic hyperplasia, presence of lower urinary tract symptoms unspecified, unspecified morphology      Hyperlipidemia      Barretts esophagus      S/P CABG (coronary artery bypass graft)  20 years      History of surgery  cyst removal      Mandibular abscess          MEDICATIONS:  Pulmonary:    Antimicrobials:  ampicillin/sulbactam  IVPB 3 Gram(s) IV Intermittent every 6 hours    Anticoagulants:  heparin   Injectable 5000 Unit(s) SubCutaneous every 8 hours    Cardiac:  amLODIPine   Tablet 5 milliGRAM(s) Oral every 24 hours  isosorbide   mononitrate ER Tablet (IMDUR) 30 milliGRAM(s) Oral every 24 hours  tamsulosin 0.4 milliGRAM(s) Oral <User Schedule>      Allergies    No Known Allergies    Intolerances        Vital Signs Last 24 Hrs  T(C): 36.8 (20 Sep 2022 18:16), Max: 36.8 (20 Sep 2022 18:16)  T(F): 98.2 (20 Sep 2022 18:16), Max: 98.2 (20 Sep 2022 18:16)  HR: 78 (20 Sep 2022 12:15) (61 - 78)  BP: 130/68 (20 Sep 2022 12:15) (127/64 - 161/69)  BP(mean): 86 (20 Sep 2022 12:15) (86 - 115)  RR: 21 (20 Sep 2022 12:15) (17 - 22)  SpO2: 97% (20 Sep 2022 12:15) (89% - 98%)    Parameters below as of 20 Sep 2022 12:15  Patient On (Oxygen Delivery Method): nasal cannula  O2 Flow (L/min): 3      09-19 @ 07:01  -  09-20 @ 07:00  --------------------------------------------------------  IN: 175 mL / OUT: 1755 mL / NET: -1580 mL          Review of Systems:   •	General: negative  •	Skin/Breast: negative  •	Ophthalmologic: negative  •	ENMT: negative  •	Respiratory and Thorax: negative  •	Cardiovascular: negative  •	Gastrointestinal: negative  •	Genitourinary: negative  •	Musculoskeletal: negative  •	Neurological: negative  •	Psychiatric: negative  •	Hematology/Lymphatics: negative  •	Endocrine: negative  •	Allergic/Immunologic: negative    Physical Exam:   • Constitutional:	refer to the dietion /Nutritionist note  • Eyes:	EOMI; PERRL; no drainage or redness  • ENMT:	No oral lesions; no gross abnormalities  • Neck	No bruits; no thyromegaly or nodules  • Breasts:	not examined  • Back:	No deformity or limitation of movement  • Respiratory:	Breath Sounds equal & clear to percussion & auscultation, no accessory muscle use  • Cardiovascular:	Regular rate & rhythm, normal S1, S2; no murmurs, gallops or rubs; no S3, S4  • Gastrointestinal:	Soft, non-tender, no hepatosplenomegaly, normal bowel sounds  • Genitourinary:	not examined  • Rectal: not examined  • Extremities:	No cyanosis, clubbing or edema  • Vascular:	Equal and normal pulses (carotid, femoral, dorsalis pedis)  • Neurologica:l	not examined  • Skin:	No lesions; no rash  • Lymph Nodes:	No lymphadedenopathy  • Musculoskeletal:	No joint pain, swelling or deformity; no limitation of movement        LABS:      CBC Full  -  ( 20 Sep 2022 04:41 )  WBC Count : 42.93 K/uL  RBC Count : 4.27 M/uL  Hemoglobin : 11.2 g/dL  Hematocrit : 36.6 %  Platelet Count - Automated : 169 K/uL  Mean Cell Volume : 85.7 fl  Mean Cell Hemoglobin : 26.2 pg  Mean Cell Hemoglobin Concentration : 30.6 gm/dL  Auto Neutrophil # : x  Auto Lymphocyte # : x  Auto Monocyte # : x  Auto Eosinophil # : x  Auto Basophil # : x  Auto Neutrophil % : x  Auto Lymphocyte % : x  Auto Monocyte % : x  Auto Eosinophil % : x  Auto Basophil % : x    09-20    133<L>  |  100  |  43<H>  ----------------------------<  213<H>  5.1   |  21<L>  |  1.21    Ca    9.1      20 Sep 2022 04:41  Phos  4.7     09-20  Mg     2.1     09-20    TPro  7.9  /  Alb  5.0  /  TBili  0.7  /  DBili  x   /  AST  15  /  ALT  11  /  AlkPhos  143<H>  09-19    PT/INR - ( 19 Sep 2022 17:10 )   PT: 14.6 sec;   INR: 1.22          PTT - ( 19 Sep 2022 17:10 )  PTT:39.2 sec                Culture Results:   Moderate Streptococcus species Identification to follow.  Culture in progress (09-19 @ 13:47)      RADIOLOGY & ADDITIONAL STUDIES (The following images were personally reviewed):

## 2022-09-20 NOTE — PROGRESS NOTE ADULT - ASSESSMENT
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    PLAN:  - Step down today  - Resume Eliquis 9/21  - TOV 6pm  - D/c home likely 9/21    [facility] at [phone] with any questions/concerns.

## 2022-09-20 NOTE — PROGRESS NOTE ADULT - SUBJECTIVE AND OBJECTIVE BOX
OTOLARYNGOLOGY (ENT) PROGRESS NOTE    PATIENT: HERB BROWN  MRN: [id number]  : 52  [id number]  DATE OF SERVICE:  22  			         ID: 71M PMH Afib (on Eliquis), hx PE, HFpEF, CAD, R renal transplant (on tacrolimus), hx pHTN, jaw mass (s/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, now returned), JAYANT, CLL, DM2, HTN, HLD s/p aborted transoral partial R  space / infratemporal fossa mass resection, dental extraction on .    Subjective/ Interval:  : AVSS, patient feels well this morning. No reports of pain.    ALLERGIES:  No Known Allergies    MEDICATIONS:  Antiinfectives:   ampicillin/sulbactam  IVPB 3 Gram(s) IV Intermittent every 6 hours    IV fluids:    Hematologic/Anticoagulation:  heparin   Injectable 5000 Unit(s) SubCutaneous every 8 hours    Pain medications/Neuro:  acetaminophen     Tablet .. 975 milliGRAM(s) Oral every 6 hours    Endocrine Medications:   atorvastatin 40 milliGRAM(s) Oral at bedtime  insulin lispro (ADMELOG) corrective regimen sliding scale   SubCutaneous Before meals and at bedtime    All other standing medications:   amLODIPine   Tablet 5 milliGRAM(s) Oral every 24 hours  chlorhexidine 0.12% Liquid 15 milliLiter(s) Swish and Spit two times a day  chlorhexidine 2% Cloths 1 Application(s) Topical <User Schedule>  influenza  Vaccine (HIGH DOSE) 0.7 milliLiter(s) IntraMuscular once  isosorbide   mononitrate ER Tablet (IMDUR) 30 milliGRAM(s) Oral every 24 hours  pantoprazole    Tablet 40 milliGRAM(s) Oral before breakfast  tacrolimus 2 milliGRAM(s) Oral <User Schedule>  tacrolimus 1 milliGRAM(s) Oral <User Schedule>  tamsulosin 0.4 milliGRAM(s) Oral <User Schedule>    All other PRN medications:    Vital Signs Last 24 Hrs  T(C): 35.8 (20 Sep 2022 12:00), Max: 36.2 (19 Sep 2022 15:24)  T(F): 96.5 (20 Sep 2022 12:00), Max: 97.2 (19 Sep 2022 15:24)  HR: 78 (20 Sep 2022 12:15) (61 - 78)  BP: 130/68 (20 Sep 2022 12:15) (127/64 - 169/70)  BP(mean): 86 (20 Sep 2022 12:15) (86 - 122)  RR: 21 (20 Sep 2022 12:15) (15 - 22)  SpO2: 97% (20 Sep 2022 12:15) (84% - 99%)    Parameters below as of 20 Sep 2022 12:15  Patient On (Oxygen Delivery Method): nasal cannula  O2 Flow (L/min): 3     @ 07:01  -   @ 07:00  --------------------------------------------------------  IN:    Oral Fluid: 75 mL    sodium chloride 0.9%: 100 mL  Total IN: 175 mL    OUT:    Indwelling Catheter - Urethral (mL): 1755 mL  Total OUT: 1755 mL    Total NET: -1580 mL    PHYSICAL EXAM:  GEN: appears stated age, NAD  NEURO: alert & oriented x 4/4  HEENT: face symmetric, oropharynx moist without exudates, CN VII/XI/XII intact. Incisions c/d/i, no bleeding  CVS: regular rate   Pulm: normal respiratory excursions, not tachypneic, no labored breathing  Abd: non-distended  Ext: moving all four extremities, no peripheral edema noted     LABS                       11.2   42.93 )-----------( 169      ( 20 Sep 2022 04:41 )             36.6    09-20    133<L>  |  100  |  43<H>  ----------------------------<  213<H>  5.1   |  21<L>  |  1.21    Ca    9.1      20 Sep 2022 04:41  Phos  4.7       Mg     2.1     -    TPro  7.9  /  Alb  5.0  /  TBili  0.7  /  DBili  x   /  AST  15  /  ALT  11  /  AlkPhos  143<H>        Coagulation Studies-   PT/INR - ( 19 Sep 2022 17:10 )   PT: 14.6 sec;   INR: 1.22          PTT - ( 19 Sep 2022 17:10 )  PTT:39.2 sec    Endocrine Panel-  Calcium, Total Serum: 9.1 mg/dL ( @ 04:41)  Calcium, Total Serum: 8.9 mg/dL ( @ 21:37)  Calcium, Total Serum: 9.1 mg/dL ( @ 17:10)      MICROBIOLOGY:  Culture - Surgical Swab (collected 22 @ 13:47)  Source: .Surgical Swab OR-ORAL FISTULA CULTURE  Gram Stain (22 @ 17:53):    Rare WBC's    Rare Gram Negative Rods  Preliminary Report (22 @ 12:06):    Moderate Streptococcus species Identification to follow.    Culture in progress    Culture Results:   Moderate Streptococcus species Identification to follow.  Culture in progress (22 @ 13:47)    Culture - Surgical Swab (collected 22 @ 13:47)  Source: .Surgical Swab OR-ORAL FISTULA CULTURE  Gram Stain (22 @ 17:53):    Rare WBC's    Rare Gram Negative Rods  Preliminary Report (22 @ 12:06):    Moderate Streptococcus species Identification to follow.    Culture in progress

## 2022-09-20 NOTE — PROGRESS NOTE ADULT - PROBLEM SELECTOR PLAN 1
Hx of R  space mass. Pt had drainage of right jaw cyst 7/25 with ENT, no resection done, penrose in place. Patient had 3 days of worsening R cheek erythema, swelling and pain w/ pain in R jaw.  - await for pathology and will follow with ENT.  Culture is positive and will continue antibiotic

## 2022-09-20 NOTE — PROGRESS NOTE ADULT - ASSESSMENT
Assessment: 71M w/ AFib on Eliquis s/p DCCV 5/22, DM, GERD, HTN, pulm HTN, PE, HFpEF of 65%, CAD s/p CABG ('05), BPH, ESRD s/p LRKT ([facility]) 2017 on [place], JAYANT, CLL, PSHx R jaw cyst drainage w/ worsening erythema. Admitted for elective Right transoral approach for partial tumor resection of right  space and right infratemporal fossa, however case was aborted due to extensive disease. Patient was transferred to SICU post-op for airway monitoring due to intra-oral manipulation. Remains hemodynamically stable, handling secretions and tolerating diet. Plan to advance diet to CCD and SDU.     Plan:   NEURO: Tylenol, Zofran PRN  CV: MAPs >65, HTN- home Imdur, Norvasc; HLD- rosuvastatin, AFib- holding: Eliquis  PULM: face tent  GI/FEN: CC kosher diet   : TOV @6pm; S/P LDKT 2017- Continue home dose FK 2/1; Hx BPH- tamsulosin; holding torsemide in scarlett-op setting    ENDO: mISS  ID: Unasyn (9/18--)  PPX: SCDs, SQH; holding home Eliquis per-op   LINES: PIVs,   WOUNDS/DRAINS: None  PT/OT: Not ordered, ambulating  Dispo: SDU

## 2022-09-20 NOTE — PROGRESS NOTE ADULT - SUBJECTIVE AND OBJECTIVE BOX
24 Hour Events: ***      PAST MEDICAL & SURGICAL HISTORY:  Barretts esophagus      CRI (chronic renal insufficiency)      DM (diabetes mellitus)      GERD (gastroesophageal reflux disease)      HTN (hypertension)      Monoclonal gammopathy      Mandibular abscess      Paroxysmal atrial fibrillation      Benign prostatic hyperplasia, presence of lower urinary tract symptoms unspecified, unspecified morphology      Hyperlipidemia      Barretts esophagus      S/P CABG (coronary artery bypass graft)  20 years      History of surgery  cyst removal      Mandibular abscess        Allergies    No Known Allergies    Intolerances      MEDICATIONS  (STANDING):  acetaminophen     Tablet .. 975 milliGRAM(s) Oral every 6 hours  amLODIPine   Tablet 5 milliGRAM(s) Oral every 24 hours  ampicillin/sulbactam  IVPB 3 Gram(s) IV Intermittent every 6 hours  atorvastatin 40 milliGRAM(s) Oral at bedtime  chlorhexidine 0.12% Liquid 15 milliLiter(s) Swish and Spit two times a day  chlorhexidine 2% Cloths 1 Application(s) Topical <User Schedule>  heparin   Injectable 5000 Unit(s) SubCutaneous every 8 hours  influenza  Vaccine (HIGH DOSE) 0.7 milliLiter(s) IntraMuscular once  insulin lispro (ADMELOG) corrective regimen sliding scale   SubCutaneous Before meals and at bedtime  isosorbide   mononitrate ER Tablet (IMDUR) 30 milliGRAM(s) Oral every 24 hours  pantoprazole    Tablet 40 milliGRAM(s) Oral before breakfast  tacrolimus 2 milliGRAM(s) Oral <User Schedule>  tacrolimus 1 milliGRAM(s) Oral <User Schedule>  tamsulosin 0.4 milliGRAM(s) Oral <User Schedule>    MEDICATIONS  (PRN):        Physical Exam:   General: Well apperaing, resting comfortably in bed in no acute distress   Neuro: Grossly intact bilaterally   HEENT: Normocephalic, atraumatic, trachea midline, no JVD   Chest:   Heart: Regular S1/S2, no murmurs rubs or gallops   Lungs: Unlabored breathing on ***; Clear to auscultation bilaterally, no adventitious sounds   Abdomen: Soft, non-distended, normoactive bowel sounds throughout, no tenderness to palpation in all 4 quadrants   Upper Extremities: No edema, freely mobile bilaterally   Lower Extremities: No edema, SCDs in place, feet warm bilaterally   Skin: Warm, non-diaphoretic throughout       Labs:                         11.2   42.93 )-----------( 169      ( 20 Sep 2022 04:41 )             36.6     09-20    133<L>  |  100  |  43<H>  ----------------------------<  213<H>  5.1   |  21<L>  |  1.21    Ca    9.1      20 Sep 2022 04:41  Phos  4.7     09-20  Mg     2.1     09-20    TPro  7.9  /  Alb  5.0  /  TBili  0.7  /  DBili  x   /  AST  15  /  ALT  11  /  AlkPhos  143<H>  09-19    CAPILLARY BLOOD GLUCOSE      POCT Blood Glucose.: 199 mg/dL (20 Sep 2022 06:53)  POCT Blood Glucose.: 203 mg/dL (19 Sep 2022 21:31)  POCT Blood Glucose.: 205 mg/dL (19 Sep 2022 15:33)    CARDIAC MARKERS ( 19 Sep 2022 21:37 )  x     / 0.01 ng/mL / x     / x     / x      CARDIAC MARKERS ( 19 Sep 2022 17:10 )  x     / 0.01 ng/mL / x     / x     / x          PT/INR - ( 19 Sep 2022 17:10 )   PT: 14.6 sec;   INR: 1.22          PTT - ( 19 Sep 2022 17:10 )  PTT:39.2 sec  COVID-19 PCR: Negative (19 Sep 2022 10:12)  COVID-19 PCR: NotDetec (24 Jul 2022 17:48)  COVID-19 PCR: Negative (20 Jul 2022 15:10)        Culture - Surgical Swab (collected 19 Sep 2022 13:47)  Source: .Surgical Swab OR-ORAL FISTULA CULTURE  Gram Stain (19 Sep 2022 17:53):    Rare WBC's    Rare Gram Negative Rods  Preliminary Report (20 Sep 2022 09:02):    Culture in progress        Vital Signs:   Vital Signs Last 24 Hrs  T(C): 36.2 (19 Sep 2022 15:24), Max: 36.6 (19 Sep 2022 11:17)  T(F): 97.2 (19 Sep 2022 15:24), Max: 97.2 (19 Sep 2022 15:24)  HR: 70 (20 Sep 2022 10:10) (61 - 72)  BP: 139/60 (20 Sep 2022 10:10) (127/64 - 169/70)  BP(mean): 87 (20 Sep 2022 10:10) (86 - 122)  RR: 21 (20 Sep 2022 10:10) (15 - 22)  SpO2: 97% (20 Sep 2022 10:10) (84% - 99%)    Parameters below as of 20 Sep 2022 07:27  Patient On (Oxygen Delivery Method): face tent    O2 Concentration (%): 40      Input/Output:   I&O's Detail    19 Sep 2022 07:01  -  20 Sep 2022 07:00  --------------------------------------------------------  IN:    Oral Fluid: 75 mL    sodium chloride 0.9%: 100 mL  Total IN: 175 mL    OUT:    Indwelling Catheter - Urethral (mL): 1755 mL  Total OUT: 1755 mL    Total NET: -1580 mL        Daily Height in cm: 167.6 (19 Sep 2022 11:17)    Daily     Height (cm): 167.6 (09-19-22 @ 11:17)  Weight (kg): 90.3 (09-19-22 @ 11:17)  BMI (kg/m2): 32.1 (09-19-22 @ 11:17)  BSA (m2): 2 (09-19-22 @ 11:17) 24 Hour Events: Transferred to SICU post aborted OR due to extensive unresectable disease. Admitted for monitoring due to intra-oral manipulation       PAST MEDICAL & SURGICAL HISTORY:  Barretts esophagus  CRI (chronic renal insufficiency)  DM (diabetes mellitus)  GERD (gastroesophageal reflux disease)  HTN (hypertension)  Monoclonal gammopathy  Mandibular abscess  Paroxysmal atrial fibrillation  Benign prostatic hyperplasia, presence of lower urinary tract symptoms unspecified, unspecified morphology  Hyperlipidemia  Barretts esophagus  S/P CABG (coronary artery bypass graft)-20 years  History of surgery-cyst removal  Mandibular abscess      Allergies  No Known Allergies      MEDICATIONS  (STANDING):  acetaminophen     Tablet .. 975 milliGRAM(s) Oral every 6 hours  amLODIPine   Tablet 5 milliGRAM(s) Oral every 24 hours  ampicillin/sulbactam  IVPB 3 Gram(s) IV Intermittent every 6 hours  atorvastatin 40 milliGRAM(s) Oral at bedtime  chlorhexidine 0.12% Liquid 15 milliLiter(s) Swish and Spit two times a day  chlorhexidine 2% Cloths 1 Application(s) Topical <User Schedule>  heparin   Injectable 5000 Unit(s) SubCutaneous every 8 hours  influenza  Vaccine (HIGH DOSE) 0.7 milliLiter(s) IntraMuscular once  insulin lispro (ADMELOG) corrective regimen sliding scale   SubCutaneous Before meals and at bedtime  isosorbide   mononitrate ER Tablet (IMDUR) 30 milliGRAM(s) Oral every 24 hours  pantoprazole    Tablet 40 milliGRAM(s) Oral before breakfast  tacrolimus 2 milliGRAM(s) Oral <User Schedule>  tacrolimus 1 milliGRAM(s) Oral <User Schedule>  tamsulosin 0.4 milliGRAM(s) Oral <User Schedule>    MEDICATIONS  (PRN):        Physical Exam:   General: Well appearing elderly male, resting comfortably in bed in no acute distress   Neuro: Grossly intact bilaterally   HEENT: Normocephalic, atraumatic, trachea midline, no JVD; scant dried blood on lips   Chest: Equal rise and fall of chest  Heart: Regular S1/S2, no murmurs rubs or gallops   Lungs: Unlabored breathing on RA; Clear to auscultation bilaterally, no adventitious sounds   Abdomen: Soft, non-distended, normoactive bowel sounds throughout, no tenderness to palpation in all 4 quadrants   Upper Extremities: No edema, freely mobile bilaterally   Lower Extremities: No edema, SCDs in place, feet warm bilaterally   Skin: Warm, non-diaphoretic throughout       Labs:                         11.2   42.93 )-----------( 169      ( 20 Sep 2022 04:41 )             36.6     09-20    133<L>  |  100  |  43<H>  ----------------------------<  213<H>  5.1   |  21<L>  |  1.21    Ca    9.1      20 Sep 2022 04:41  Phos  4.7     09-20  Mg     2.1     09-20    TPro  7.9  /  Alb  5.0  /  TBili  0.7  /  DBili  x   /  AST  15  /  ALT  11  /  AlkPhos  143<H>  09-19    CAPILLARY BLOOD GLUCOSE  POCT Blood Glucose.: 199 mg/dL (20 Sep 2022 06:53)  POCT Blood Glucose.: 203 mg/dL (19 Sep 2022 21:31)  POCT Blood Glucose.: 205 mg/dL (19 Sep 2022 15:33)    CARDIAC MARKERS ( 19 Sep 2022 21:37 )  x     / 0.01 ng/mL / x     / x     / x      CARDIAC MARKERS ( 19 Sep 2022 17:10 )  x     / 0.01 ng/mL / x     / x     / x          PT/INR - ( 19 Sep 2022 17:10 )   PT: 14.6 sec;   INR: 1.22     PTT - ( 19 Sep 2022 17:10 )  PTT:39.2 sec    COVID-19 PCR: Negative (19 Sep 2022 10:12)  COVID-19 PCR: NotDetec (24 Jul 2022 17:48)  COVID-19 PCR: Negative (20 Jul 2022 15:10)      Culture - Surgical Swab (collected 19 Sep 2022 13:47)  Source: .Surgical Swab OR-ORAL FISTULA CULTURE  Gram Stain (19 Sep 2022 17:53):    Rare WBC's    Rare Gram Negative Rods  Preliminary Report (20 Sep 2022 09:02):    Culture in progress        Vital Signs:   Vital Signs Last 24 Hrs  T(C): 36.2 (19 Sep 2022 15:24), Max: 36.6 (19 Sep 2022 11:17)  T(F): 97.2 (19 Sep 2022 15:24), Max: 97.2 (19 Sep 2022 15:24)  HR: 70 (20 Sep 2022 10:10) (61 - 72)  BP: 139/60 (20 Sep 2022 10:10) (127/64 - 169/70)  BP(mean): 87 (20 Sep 2022 10:10) (86 - 122)  RR: 21 (20 Sep 2022 10:10) (15 - 22)  SpO2: 97% (20 Sep 2022 10:10) (84% - 99%)    Parameters below as of 20 Sep 2022 07:27  Patient On (Oxygen Delivery Method): face tent    O2 Concentration (%): 40      Input/Output:   I&O's Detail    19 Sep 2022 07:01  -  20 Sep 2022 07:00  --------------------------------------------------------  IN:    Oral Fluid: 75 mL    sodium chloride 0.9%: 100 mL  Total IN: 175 mL    OUT:    Indwelling Catheter - Urethral (mL): 1755 mL  Total OUT: 1755 mL    Total NET: -1580 mL    Daily Height in cm: 167.6 (19 Sep 2022 11:17)    Daily     Height (cm): 167.6 (09-19-22 @ 11:17)  Weight (kg): 90.3 (09-19-22 @ 11:17)  BMI (kg/m2): 32.1 (09-19-22 @ 11:17)  BSA (m2): 2 (09-19-22 @ 11:17) 24 Hour Events: Transferred to SICU post aborted OR due to extensive unresectable disease. Admitted for monitoring due to intra-oral manipulation. Tolerated CLD overnight without coughing, painful swallowing, handling secretions. Remained hemodynamically stable, patient reports "I feel like a prisoner with all these attachments".       PAST MEDICAL & SURGICAL HISTORY:  Barretts esophagus  CRI (chronic renal insufficiency)  DM (diabetes mellitus)  GERD (gastroesophageal reflux disease)  HTN (hypertension)  Monoclonal gammopathy  Mandibular abscess  Paroxysmal atrial fibrillation  Benign prostatic hyperplasia, presence of lower urinary tract symptoms unspecified, unspecified morphology  Hyperlipidemia  Barretts esophagus  S/P CABG (coronary artery bypass graft)-20 years  History of surgery-cyst removal  Mandibular abscess      Allergies  No Known Allergies      MEDICATIONS  (STANDING):  acetaminophen     Tablet .. 975 milliGRAM(s) Oral every 6 hours  amLODIPine   Tablet 5 milliGRAM(s) Oral every 24 hours  ampicillin/sulbactam  IVPB 3 Gram(s) IV Intermittent every 6 hours  atorvastatin 40 milliGRAM(s) Oral at bedtime  chlorhexidine 0.12% Liquid 15 milliLiter(s) Swish and Spit two times a day  chlorhexidine 2% Cloths 1 Application(s) Topical <User Schedule>  heparin   Injectable 5000 Unit(s) SubCutaneous every 8 hours  influenza  Vaccine (HIGH DOSE) 0.7 milliLiter(s) IntraMuscular once  insulin lispro (ADMELOG) corrective regimen sliding scale   SubCutaneous Before meals and at bedtime  isosorbide   mononitrate ER Tablet (IMDUR) 30 milliGRAM(s) Oral every 24 hours  pantoprazole    Tablet 40 milliGRAM(s) Oral before breakfast  tacrolimus 2 milliGRAM(s) Oral <User Schedule>  tacrolimus 1 milliGRAM(s) Oral <User Schedule>  tamsulosin 0.4 milliGRAM(s) Oral <User Schedule>    MEDICATIONS  (PRN):        Physical Exam:   General: Well appearing elderly male, resting comfortably in bed in no acute distress   Neuro: Grossly intact bilaterally   HEENT: Normocephalic, atraumatic, trachea midline, no JVD; scant dried blood on lips   Chest: Equal rise and fall of chest  Heart: Regular S1/S2, no murmurs rubs or gallops   Lungs: Unlabored breathing on RA; Clear to auscultation bilaterally, no adventitious sounds   Abdomen: Soft, non-distended, normoactive bowel sounds throughout, no tenderness to palpation in all 4 quadrants   Upper Extremities: No edema, freely mobile bilaterally   Lower Extremities: No edema, SCDs in place, feet warm bilaterally   Skin: Warm, non-diaphoretic throughout       Labs:                         11.2   42.93 )-----------( 169      ( 20 Sep 2022 04:41 )             36.6     09-20    133<L>  |  100  |  43<H>  ----------------------------<  213<H>  5.1   |  21<L>  |  1.21    Ca    9.1      20 Sep 2022 04:41  Phos  4.7     09-20  Mg     2.1     09-20    TPro  7.9  /  Alb  5.0  /  TBili  0.7  /  DBili  x   /  AST  15  /  ALT  11  /  AlkPhos  143<H>  09-19    CAPILLARY BLOOD GLUCOSE  POCT Blood Glucose.: 199 mg/dL (20 Sep 2022 06:53)  POCT Blood Glucose.: 203 mg/dL (19 Sep 2022 21:31)  POCT Blood Glucose.: 205 mg/dL (19 Sep 2022 15:33)    CARDIAC MARKERS ( 19 Sep 2022 21:37 )  x     / 0.01 ng/mL / x     / x     / x      CARDIAC MARKERS ( 19 Sep 2022 17:10 )  x     / 0.01 ng/mL / x     / x     / x          PT/INR - ( 19 Sep 2022 17:10 )   PT: 14.6 sec;   INR: 1.22     PTT - ( 19 Sep 2022 17:10 )  PTT:39.2 sec    COVID-19 PCR: Negative (19 Sep 2022 10:12)  COVID-19 PCR: NotDetec (24 Jul 2022 17:48)  COVID-19 PCR: Negative (20 Jul 2022 15:10)      Culture - Surgical Swab (collected 19 Sep 2022 13:47)  Source: .Surgical Swab OR-ORAL FISTULA CULTURE  Gram Stain (19 Sep 2022 17:53):    Rare WBC's    Rare Gram Negative Rods  Preliminary Report (20 Sep 2022 09:02):    Culture in progress        Vital Signs:   Vital Signs Last 24 Hrs  T(C): 36.2 (19 Sep 2022 15:24), Max: 36.6 (19 Sep 2022 11:17)  T(F): 97.2 (19 Sep 2022 15:24), Max: 97.2 (19 Sep 2022 15:24)  HR: 70 (20 Sep 2022 10:10) (61 - 72)  BP: 139/60 (20 Sep 2022 10:10) (127/64 - 169/70)  BP(mean): 87 (20 Sep 2022 10:10) (86 - 122)  RR: 21 (20 Sep 2022 10:10) (15 - 22)  SpO2: 97% (20 Sep 2022 10:10) (84% - 99%)    Parameters below as of 20 Sep 2022 07:27  Patient On (Oxygen Delivery Method): face tent    O2 Concentration (%): 40      Input/Output:   I&O's Detail    19 Sep 2022 07:01  -  20 Sep 2022 07:00  --------------------------------------------------------  IN:    Oral Fluid: 75 mL    sodium chloride 0.9%: 100 mL  Total IN: 175 mL    OUT:    Indwelling Catheter - Urethral (mL): 1755 mL  Total OUT: 1755 mL    Total NET: -1580 mL    Daily Height in cm: 167.6 (19 Sep 2022 11:17)    Daily     Height (cm): 167.6 (09-19-22 @ 11:17)  Weight (kg): 90.3 (09-19-22 @ 11:17)  BMI (kg/m2): 32.1 (09-19-22 @ 11:17)  BSA (m2): 2 (09-19-22 @ 11:17)

## 2022-09-20 NOTE — PROGRESS NOTE ADULT - ASSESSMENT
HERB BROWN is a71 y/oM PMHx Afib (on Eliquis, s/p DCCV 5/2022), hx PE, HFpEF (EF 65%), CAD s/p CABG (2005), R renal transplant (on tacrolimus, follows w/ Dr Leyva), hx pHTN, jaw mass (s/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, now returned), JAYANT (has not started CPAP yet), CLL (w/ chronic leukocytosis), DM2, HTN, HLD,  who presented for pre-op assessment for management of a Right  space mass under ENT surgery.

## 2022-09-20 NOTE — PROVIDER CONTACT NOTE (OTHER) - ASSESSMENT
Pt. refusing finger taken using Accu-chek. Pt. has continuous blood sugar monitoring device inserted at the LUQ of abdomen. Pt. reports current Blood glucose to be 160 mg/dL based on device. Pt. refuses insulin due to not eating, despite education on other cause of hyperglycemia (stress). Billy REHMAN) paged and aware of pt.'s refusal.

## 2022-09-20 NOTE — PROGRESS NOTE ADULT - PROBLEM SELECTOR PLAN 8
F: none  E: replete K<4, Mg<2  N: consistent carbs  VTE Prophylaxis: None  GI: home pepcid  C: Full Code  D: [facility]

## 2022-09-21 NOTE — PROGRESS NOTE ADULT - PROBLEM SELECTOR PLAN 6
Pt with ESRD w/ R renal transplant on tacrolimus 2mg AM and 1mg PM  - cont tacrolimus

## 2022-09-21 NOTE — PROGRESS NOTE ADULT - SUBJECTIVE AND OBJECTIVE BOX
OTOLARYNGOLOGY (ENT) PROGRESS NOTE    PATIENT: HERB BROWN  MRN: [id number]  : 52  [id number]  DATE OF SERVICE:  22  			         ID: 71M PMH Afib (on Eliquis), hx PE, HFpEF, CAD, R renal transplant (on tacrolimus), hx pHTN, jaw mass (s/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, now returned), JAYANT, CLL, DM2, HTN, HLD s/p aborted transoral partial R  space / infratemporal fossa mass resection, dental extraction on .    Subjective/ Interval:  : AVSS, patient feels well this morning. No reports of pain.  : AVSS, no acute overnight events.  : AVSS, no acute overnight events.    ALLERGIES:  No Known Allergies    MEDICATIONS:  Antiinfectives:   ampicillin/sulbactam  IVPB 3 Gram(s) IV Intermittent every 6 hours    IV fluids:    Hematologic/Anticoagulation:  apixaban 5 milliGRAM(s) Oral every 12 hours    Pain medications/Neuro:  acetaminophen     Tablet .. 975 milliGRAM(s) Oral every 6 hours  HYDROmorphone  Injectable 0.5 milliGRAM(s) IV Push every 6 hours PRN    Endocrine Medications:   atorvastatin 40 milliGRAM(s) Oral at bedtime  insulin lispro (ADMELOG) corrective regimen sliding scale   SubCutaneous Before meals and at bedtime    All other standing medications:   amLODIPine   Tablet 5 milliGRAM(s) Oral every 24 hours  chlorhexidine 0.12% Liquid 15 milliLiter(s) Swish and Spit two times a day  chlorhexidine 2% Cloths 1 Application(s) Topical <User Schedule>  influenza  Vaccine (HIGH DOSE) 0.7 milliLiter(s) IntraMuscular once  isosorbide   mononitrate ER Tablet (IMDUR) 30 milliGRAM(s) Oral every 24 hours  pantoprazole    Tablet 40 milliGRAM(s) Oral before breakfast  tacrolimus 2 milliGRAM(s) Oral <User Schedule>  tacrolimus 1 milliGRAM(s) Oral <User Schedule>  tamsulosin 0.4 milliGRAM(s) Oral <User Schedule>    All other PRN medications:    Vital Signs Last 24 Hrs  T(C): 35.9 (21 Sep 2022 09:24), Max: 36.8 (20 Sep 2022 18:16)  T(F): 96.6 (21 Sep 2022 09:24), Max: 98.2 (20 Sep 2022 18:16)  HR: 88 (21 Sep 2022 00:23) (66 - 88)  BP: 116/58 (21 Sep 2022 00:23) (110/58 - 146/65)  BP(mean): 83 (21 Sep 2022 00:) (76 - 93)  RR: 18 (21 Sep 2022 00:23) (18 - 22)  SpO2: 96% (21 Sep 2022 00:23) (95% - 97%)    Parameters below as of 21 Sep 2022 00:23  Patient On (Oxygen Delivery Method): nasal cannula  O2 Flow (L/min): 2     @ 07:  -   @ 07:00  --------------------------------------------------------  IN:    IV PiggyBack: 200 mL    Oral Fluid: 440 mL  Total IN: 640 mL    OUT:    Voided (mL): 325 mL  Total OUT: 325 mL    Total NET: 315 mL     @ 07:01  -   @ 09:46  --------------------------------------------------------  IN:  Total IN: 0 mL    OUT:    Voided (mL): 200 mL  Total OUT: 200 mL    Total NET: -200 mL    PHYSICAL EXAM:  GEN: appears stated age, NAD  NEURO: alert & oriented x 4/  HEENT: face symmetric, oropharynx moist without exudates, CN VII/XI/XII intact. Incisions c/d/i, no bleeding  CVS: regular rate   Pulm: normal respiratory excursions, not tachypneic, no labored breathing  Abd: non-distended  Ext: moving all four extremities, no peripheral edema noted    LABS                       10.7   35.50 )-----------( 144      ( 21 Sep 2022 05:30 )             35.3        129<L>  |  96  |  48<H>  ----------------------------<  172<H>  5.0   |  23  |  1.53<H>    Ca    9.1      21 Sep 2022 05:30  Phos  3.9       Mg     2.3         Coagulation Studies-   PT/INR - ( 19 Sep 2022 17:10 )   PT: 14.6 sec;   INR: 1.22          PTT - ( 19 Sep 2022 17:10 )  PTT:39.2 sec    Endocrine Panel-  Calcium, Total Serum: 9.1 mg/dL ( @ 05:30)    MICROBIOLOGY:  Culture - Surgical Swab (collected 22 @ 13:47)  Source: .Surgical Swab OR-ORAL FISTULA CULTURE  Gram Stain (22 @ 17:53):    Rare WBC's    Rare Gram Negative Rods  Preliminary Report (22 @ 12:06):    Moderate Streptococcus species Identification to follow.    Culture in progress    Culture Results:   Moderate Streptococcus species Identification to follow.  Culture in progress (22 @ 13:47)    Culture - Surgical Swab (collected 22 @ 13:47)  Source: .Surgical Swab OR-ORAL FISTULA CULTURE  Gram Stain (22 @ 17:53):    Rare WBC's    Rare Gram Negative Rods  Preliminary Report (22 @ 12:06):    Moderate Streptococcus species Identification to follow.    Culture in progress

## 2022-09-21 NOTE — PROGRESS NOTE ADULT - PROBLEM SELECTOR PROBLEM 6
CRI (chronic renal insufficiency)

## 2022-09-21 NOTE — PROGRESS NOTE ADULT - PROBLEM SELECTOR PLAN 8
F: none  E: replete K<4, Mg<2  N: consistent carbs  VTE Prophylaxis: None  GI: home pepcid  C: Full Code  D: [facility]

## 2022-09-21 NOTE — DISCHARGE NOTE NURSING/CASE MANAGEMENT/SOCIAL WORK - PATIENT PORTAL LINK FT
You can access the FollowMyHealth Patient Portal offered by [facility] by registering at the following website: [email or web address]. By joining [email or web address]’s FollowMyHealth portal, you will also be able to view your health information using other applications (apps) compatible with our system.

## 2022-09-21 NOTE — PROGRESS NOTE ADULT - TIME BILLING
Patient seen and examined with house-staff during bedside rounds.  Resident note read, including vitals, physical findings, laboratory data, and radiological reports.   Revisions included below.  Direct personal management at bed side and extensive interpretation of the data.  Plan was outlined and discussed in details with the housestaff.  Decision making of high complexity  Action taken for acute disease activity to reflect the level of care provided:  - medication reconciliation  - review laboratory data  he is stable postop and can be transferred to monitor bed.  Discussed with ENT
Patient seen and examined with house-staff during bedside rounds.  Resident note read, including vitals, physical findings, laboratory data, and radiological reports.   Revisions included below.  Direct personal management at bed side and extensive interpretation of the data.  Plan was outlined and discussed in details with the housestaff.  Decision making of high complexity  Action taken for acute disease activity to reflect the level of care provided:  - medication reconciliation  - review laboratory data
Patient seen and examined with house-staff during bedside rounds.  Resident note read, including vitals, physical findings, laboratory data, and radiological reports.   Revisions included below.  Direct personal management at bed side and extensive interpretation of the data.  Plan was outlined and discussed in details with the housestaff.  Decision making of high complexity  Action taken for acute disease activity to reflect the level of care provided:  - medication reconciliation  - review laboratory data  Left    The patient was seen earlier today.  Patient is clinically stable.  Patient was cleared with diuresis.  The renal function is stable.  The sodium is higher than it was prior to the last admission.  The chest x-ray improvement and decrease in the congestion and resolution of the right effusion.  The patient is off anticoagulation for 3 days.  Patient is optimized for the surgery.  EKG was unchanged.  I reviewed the last echocardiogram and he was cleared by cardiology on last admission.    I saw the patient postoperatively and the patient did not have the resection but biopsy was taken.  He will be observed overnight in the ICU and if stable will transfer to the floor.  We will start anticoagulation more if okay with ENT.  Discussed with family

## 2022-09-21 NOTE — PROGRESS NOTE ADULT - PROBLEM SELECTOR PLAN 3
Pt has hx of Type 2 DM. On home ozempic and insulin humalog 15-24u 2x daily.  - hold humalog preoperatively  - hold mISS  - hold ozempic  -Consistent Carb Diet    -Continue insulin sliding scale. Maintain her blood sugar in the range between 140- 180, and not below 70. Monitor for hypoglycemia. Monitor blood sugar with advancing diet. Hold oral hypoglycemic agents during hospitalization. Adjust insulin.
Pt has hx of Type 2 DM. On home ozempic and insulin humalog 15-24u 2x daily.  - hold humalog preoperatively  - hold mISS  - hold ozempic  -Consistent Carb Diet    -if pt becomes hypoglycemic, consider 25-50% reduction of insulin dose since nutritional intake likely more restricted in hospital setting
Pt has hx of Type 2 DM. On home ozempic and insulin humalog 15-24u 2x daily.  - hold humalog preoperatively  - hold mISS  - hold ozempic  -Consistent Carb Diet    -Continue insulin sliding scale. Maintain her blood sugar in the range between 140- 180, and not below 70. Monitor for hypoglycemia. Monitor blood sugar with advancing diet. Hold oral hypoglycemic agents during hospitalization. Adjust insulin.

## 2022-09-21 NOTE — PROGRESS NOTE ADULT - ASSESSMENT
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    PLAN:  - Resume Eliquis 9/21  - D/c home today    [facility] ENT at [phone] with any questions/concerns.

## 2022-09-21 NOTE — PROVIDER CONTACT NOTE (OTHER) - SITUATION
Pt. refused CBG reading.
Covering RN went to get vs on pt; pt refused was angry because he was awaken' RN explained pt is in tele floor we do vs Q4; pt still agitated explained he made last night shift nurse know no more vs

## 2022-09-21 NOTE — PROGRESS NOTE ADULT - SUBJECTIVE AND OBJECTIVE BOX
Interval Events: Reviewed  Patient seen and examined at bedside.    Patient is a 71y old  Male who presents with a chief complaint of Preop (21 Sep 2022 09:45)  he is doing well and wants to go home    PAST MEDICAL & SURGICAL HISTORY:  Barretts esophagus      CRI (chronic renal insufficiency)      DM (diabetes mellitus)      GERD (gastroesophageal reflux disease)      HTN (hypertension)      Monoclonal gammopathy      Mandibular abscess      Paroxysmal atrial fibrillation      Benign prostatic hyperplasia, presence of lower urinary tract symptoms unspecified, unspecified morphology      Hyperlipidemia      Barretts esophagus      S/P CABG (coronary artery bypass graft)  20 years      History of surgery  cyst removal      Mandibular abscess          MEDICATIONS:  Pulmonary:    Antimicrobials:  ampicillin/sulbactam  IVPB 3 Gram(s) IV Intermittent every 6 hours    Anticoagulants:  apixaban 5 milliGRAM(s) Oral every 12 hours    Cardiac:  amLODIPine   Tablet 5 milliGRAM(s) Oral every 24 hours  isosorbide   mononitrate ER Tablet (IMDUR) 30 milliGRAM(s) Oral every 24 hours  tamsulosin 0.4 milliGRAM(s) Oral <User Schedule>      Allergies    No Known Allergies    Intolerances        Vital Signs Last 24 Hrs  T(C): 35.9 (21 Sep 2022 09:24), Max: 36.8 (20 Sep 2022 18:16)  T(F): 96.6 (21 Sep 2022 09:24), Max: 98.2 (20 Sep 2022 18:16)  HR: 88 (21 Sep 2022 00:23) (74 - 88)  BP: 116/58 (21 Sep 2022 00:23) (110/58 - 124/60)  BP(mean): 83 (21 Sep 2022 00:23) (76 - 87)  RR: 18 (21 Sep 2022 00:23) (18 - 22)  SpO2: 96% (21 Sep 2022 00:23) (96% - 97%)    Parameters below as of 21 Sep 2022 00:23  Patient On (Oxygen Delivery Method): nasal cannula  O2 Flow (L/min): 2      09-20 @ 07:01  -  09-21 @ 07:00  --------------------------------------------------------  IN: 640 mL / OUT: 325 mL / NET: 315 mL    09-21 @ 07:01 - 09-21 @ 12:49  --------------------------------------------------------  IN: 0 mL / OUT: 200 mL / NET: -200 mL          Review of Systems:   •	General: negative  •	Skin/Breast: negative  •	Ophthalmologic: negative  •	ENMT: negative  •	Respiratory and Thorax: negative  •	Cardiovascular: negative  •	Gastrointestinal: negative  •	Genitourinary: negative  •	Musculoskeletal: negative  •	Neurological: negative  •	Psychiatric: negative  •	Hematology/Lymphatics: negative  •	Endocrine: negative  •	Allergic/Immunologic: negative    Physical Exam:   • Constitutional:	refer to the dietion /Nutritionist note  • Eyes:	EOMI; PERRL; no drainage or redness  • ENMT:	No oral lesions; no gross abnormalities  • Neck	No bruits; no thyromegaly or nodules  • Breasts:	not examined  • Back:	No deformity or limitation of movement  • Respiratory:	Breath Sounds equal & clear to percussion & auscultation, no accessory muscle use  • Cardiovascular:	Regular rate & rhythm, normal S1, S2; no murmurs, gallops or rubs; no S3, S4  • Gastrointestinal:	Soft, non-tender, no hepatosplenomegaly, normal bowel sounds  • Genitourinary:	not examined  • Rectal: not examined  • Extremities:	No cyanosis, clubbing or edema  • Vascular:	Equal and normal pulses (carotid, femoral, dorsalis pedis)  • Neurologica:l	not examined  • Skin:	No lesions; no rash  • Lymph Nodes:	No lymphadedenopathy  • Musculoskeletal:	No joint pain, swelling or deformity; no limitation of movement        LABS:      CBC Full  -  ( 21 Sep 2022 05:30 )  WBC Count : 35.50 K/uL  RBC Count : 4.07 M/uL  Hemoglobin : 10.7 g/dL  Hematocrit : 35.3 %  Platelet Count - Automated : 144 K/uL  Mean Cell Volume : 86.7 fl  Mean Cell Hemoglobin : 26.3 pg  Mean Cell Hemoglobin Concentration : 30.3 gm/dL  Auto Neutrophil # : x  Auto Lymphocyte # : x  Auto Monocyte # : x  Auto Eosinophil # : x  Auto Basophil # : x  Auto Neutrophil % : x  Auto Lymphocyte % : x  Auto Monocyte % : x  Auto Eosinophil % : x  Auto Basophil % : x    09-21    129<L>  |  96  |  48<H>  ----------------------------<  172<H>  5.0   |  23  |  1.53<H>    Ca    9.1      21 Sep 2022 05:30  Phos  3.9     09-21  Mg     2.3     09-21      PT/INR - ( 19 Sep 2022 17:10 )   PT: 14.6 sec;   INR: 1.22          PTT - ( 19 Sep 2022 17:10 )  PTT:39.2 sec                Culture Results:   Moderate Streptococcus species Identification to follow.  Culture in progress (09-19 @ 13:47)      RADIOLOGY & ADDITIONAL STUDIES (The following images were personally reviewed):

## 2022-09-21 NOTE — PROGRESS NOTE ADULT - PROBLEM SELECTOR PLAN 7
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

## 2022-09-21 NOTE — DISCHARGE NOTE NURSING/CASE MANAGEMENT/SOCIAL WORK - NSDCPEFALRISK_GEN_ALL_CORE
For information on Fall & Injury Prevention, visit: [email or web address] OR  [email or web address] OR  https://www.cdc.gov/steadi/patient.html

## 2022-09-21 NOTE — PROVIDER CONTACT NOTE (OTHER) - BACKGROUND
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
Pt. s/p aborted partial tumor resection of R  space and R infratemporal fossa.

## 2022-09-21 NOTE — PROGRESS NOTE ADULT - PROBLEM SELECTOR PLAN 4
Hx of HTN managed w/ isosorbide mononitrate 30mg QD, amlodipine 10mg QD, torsemide 40mg BID.  - cont isosorbide mononitrate and amlodipine  -  BP is controlled and he is off diuretics and need to restart tomorrow
Hx of HTN managed w/ isosorbide mononitrate 30mg QD, amlodipine 10mg QD, torsemide 40mg BID.  - cont isosorbide mononitrate and amlodipine  - hold torsemide for surgery
Hx of HTN managed w/ isosorbide mononitrate 30mg QD, amlodipine 10mg QD, torsemide 40mg BID.  - cont isosorbide mononitrate and amlodipine  -  BP is controlled and to restart diuretics

## 2022-09-21 NOTE — PROGRESS NOTE ADULT - PROBLEM SELECTOR PLAN 5
Hx of HLD on rosuvastatin 10mg QD  - cont w/ pharmaceutical interchange to atorvastatin 40mg QD

## 2022-09-21 NOTE — PROGRESS NOTE ADULT - PROBLEM SELECTOR PLAN 2
Pt with hx of Afib on 5mg Eliquis BID.   - restarted on AC  rate is controlled
Pt with hx of Afib on 5mg Eliquis BID.   - hold eliquis for operation
Pt with hx of Afib on 5mg Eliquis BID.   - hold eliquis until cleared by ENT  rate is controlled

## 2022-09-22 NOTE — ED PROVIDER NOTE - INPATIENT RESIDENT/ACP NOTIFIED
----- Message from Mike Trimble sent at 9/22/2022 12:59 PM CDT -----  Type:  Sooner Apoointment Request    Caller is requesting a sooner appointment.  Caller declined first available appointment listed below.  Caller will not accept being placed on the waitlist and is requesting a message be sent to doctor.  Name of Caller:Henrique Cain     When is the first available appointment?no available appointments     Symptoms:Needs to have SI joints check     Would the patient rather a call back or a response via Zoomoramaner? Call back     Best Call Back Number: (mobile) [phone] pt wife Ella     Additional Information:       
LVM to schedule appointment.   
icu team

## 2022-09-25 NOTE — ED PROVIDER NOTE - PHYSICAL EXAMINATION
CONST: obese nontoxic NAD speaking in full sentences  HEAD: atraumatic  EYES: conjunctivae clear  ENT: likely postop changes R jaw w/ blood clots in sites of dental extractions, evolving overlying ecchymoses and mld swelling reportedly improved per family, no rash, oropharynx clear, mmm  NECK: supple/FROM, nttp  CARD: rrr no murmurs  CHEST: ctab no r/r/w  ABD: soft, nd, nttp, no rebound/guarding  EXT: FROM, symmetric distal pulses intact  SKIN: warm, dry, no rash, cap refill <2sec  NEURO: a+ox3, 5/5 strength x4, gross sensation intact x4, baseline gait

## 2022-09-25 NOTE — ED PROVIDER NOTE - PROGRESS NOTE DETAILS
ent consulted. agrees w/ labs and ct imaging. will see pt. ent reccs ntd at this time. continue augmentin and keep outpatient fu on wed, 9/28/22. may call office in AM to get appt sooner if prefer.    pain controlled. tolerating po. feels safe going home.

## 2022-09-25 NOTE — ED PROVIDER NOTE - OBJECTIVE STATEMENT
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 daughter called dr yip who referred them to ED. no acute worsening in R facial bruising/swelling. no fever/chills, no ha/dizziness, no neck stiffness, no uri/cough, no cp/sob, no abd pain/n/v, no rash, no trauma, no etoh-dpt/ivdu.     ent: elena yip

## 2022-09-25 NOTE — CONSULT NOTE ADULT - SUBJECTIVE AND OBJECTIVE BOX
OTOLARYNGOLOGY (ENT) CONSULTATION NOTE    PATIENT: HERB BROWN     MRN: [id number]       : 52  DATE OF ADMISSION:22  DATE OF SERVICE:  22 @ 23:34    CHIEF COMPLAINT: post-op jaw pain    HISTORY OF PRESENT ILLNESS:  HERB BROWN  is a 71y Male who presents with complaints of right jaw pain s/p right transoral approach for partial tumor resection of right  space and right infratemporal fossa, dental extraction on . Now POD6. Patient was doing well immediately post-op but worsened 2 days ago. Pain is intermittent and patient will occasionally have small clots to spit up. Denies foul-tasting discharge from surgical wound. No difficulty with PO intake compared to baseline, no dyspnea. No fevers. Patient uses prescribed pain medication intermittently; daughter states that she believes he may benefit from more frequent use. Started on augmentin by Dr. Dial 2 days ago.       PAST MEDICAL HISTORY:  Barretts esophagus    CRI (chronic renal insufficiency)    DM (diabetes mellitus)    GERD (gastroesophageal reflux disease)    HTN (hypertension)    Monoclonal gammopathy    Mandibular abscess    Paroxysmal atrial fibrillation    Benign prostatic hyperplasia, presence of lower urinary tract symptoms unspecified, unspecified morphology    Other hyperlipidemia    Hyperlipidemia    Barretts esophagus        CURRENT MEDICATIONS       HOME MEDICATIONS:  amLODIPine 5 mg oral tablet  Eliquis 5 mg oral tablet  isosorbide mononitrate 30 mg oral tablet, extended release  rosuvastatin 10 mg oral tablet  tacrolimus 1 mg oral capsule  tamsulosin 0.4 mg oral capsule  torsemide 20 mg oral tablet  Xanax 0.25 mg oral tablet      ALLERGIES:  No Known Allergies    SOCIAL HISTORY: Pertinent included in HPI   FAMILY HISTORY  No pertinent family history in first degree relatives        SURGICAL HISTORY:  No significant past surgical history    S/P CABG (coronary artery bypass graft)    History of surgery    Mandibular abscess        REVIEW OF SYSTEMS: The patient was asked and responded to a review of systems regarding the following symptoms: constitutional, eye, ears, nose, mouth, throat, cardiovascular, respiratory. Pertinent factors have been included in the HPI.     PHYSICAL EXAMINATION:    Vital Signs:  T(C): 36.9 (22 @ 19:23), Max: 36.9 (22 @ 19:23)  HR: 75 (22 @ 19:23) (75 - 75)  BP: 139/73 (09-25-22 @ 19:23) (139/73 - 139/73)  RR: 17 (22 @ 19:23) (17 - 17)  SpO2: 95% (22 @ 19:23) (95% - 95%)    PHYSICAL EXAM:    CONSTITUTIONAL: Well nourished, well developed, NON-DYSMORPHIC, and in no acute distress.    HEAD: normocephalic, atraumatic.  EARS: The right/left pinna was normal.   NOSE: Normal external nose. Anterior nasal cavity patent with no obstruction. Inferior turbinates normally sized.  ORAL CAVITY/OROPHARYNX: Well-healing incision to right mandibular gingiva extending posteriorly to the retromolar trigone, nontender, nonedematous, no purulent discharge. Scant bleeding with deep palpation of the right cheek and submandibular space. No active hemorrhage otherwise.   NECK: No cervical lymphadenopathy  RESPIRATORY: Respirations unlabored, no increased work of breathing with use of accessory muscles and retractions. No stridor.  CARDIAC: Warm extremities, no cyanosis.               10.4   40.12 )-----------( 192      ( 25 Sep 2022 21:12 )             33.3        130<L>  |  93<L>  |  26<H>  ----------------------------<  189<H>  5.0   |  22  |  1.21    Ca    9.5      25 Sep 2022 21:12    TPro  7.3  /  Alb  4.5  /  TBili  0.8  /  DBili  x   /  AST  15  /  ALT  7<L>  /  AlkPhos  128<H>     PT/INR - ( 25 Sep 2022 21:12 )   PT: 19.0 sec;   INR: 1.59          PTT - ( 25 Sep 2022 21:12 )  PTT:40.8 [id number]

## 2022-09-25 NOTE — ED PROVIDER NOTE - CLINICAL SUMMARY MEDICAL DECISION MAKING FREE TEXT BOX
avss. nontoxic. NAD. no systemic sx. airway intact. no leukocytosis vs significant anemia vs electrolyte abnl. noncon ct head/maxillofacial limited (renal transplant) but no sig acute abnl. ent consulted. ent reccs ntd at this time. continue augmentin and keep outpatient fu on wed, 9/28/22. may call office in AM to get appt sooner if prefer. pain controlled. tolerating po. feels safe going home. will dc w/ outpatient ent fu. strict return precautions. pt/daughter agrees w/ plan. questions answered.

## 2022-09-25 NOTE — ED ADULT NURSE NOTE - OBJECTIVE STATEMENT
71y male presents to ED c/o post op complication. Pt had surgery to jaw last week and returned home on Wednesday. On Friday and Saturday pt has had worsening pain and swelling to right side of jaw and was referred to ED. Speaking in full and complete sentences. Bruising noted to right side of face. A&Ox4.

## 2022-09-25 NOTE — ED PROVIDER NOTE - PATIENT PORTAL LINK FT
You can access the FollowMyHealth Patient Portal offered by [facility] by registering at the following website: [email or web address]. By joining [employer]’s FollowMyHealth portal, you will also be able to view your health information using other applications (apps) compatible with our system.

## 2022-09-25 NOTE — CONSULT NOTE ADULT - ASSESSMENT
ASSESSMENT/PLAN:    IMPRESSION: HERB BROWN  is a 71y Male with right jaw pain s/p right transoral approach for partial tumor resection of right  space and right infratemporal fossa, dental extraction on 9/19. Now POD6. CT max/fac without obvious hematoma or abscess.    RECOMMENDATIONS:  - discharge with strict return precautions for fever, worsening edema, hemorrhage  - follow up with Dr. Rose as scheduled  - consider increased frequency of pain medications  - continue with augmentin per Dr. Dial  - page ENT with questions/concerns  - case discussed and imaging reviewed with  (fellow)

## 2022-09-26 PROBLEM — K22.70 BARRETT'S ESOPHAGUS WITHOUT DYSPLASIA: Chronic | Status: ACTIVE | Noted: 2022-01-01

## 2022-09-28 PROBLEM — R94.31 ABNORMAL EKG: Status: ACTIVE | Noted: 2018-06-18

## 2022-10-06 NOTE — DISCHARGE NOTE NURSING/CASE MANAGEMENT/SOCIAL WORK - NSDCPEELIQUISFU_GEN_ALL_CORE
Postprocedure Follow Up Call    PROCEDURE:   LEFT KNEE COOLED RADIOFREQUENCY  1. Superolateral genicular branch from the vastus lateralis   2. Superomedial genicular branch from the vastus medialis   3. Inferomedial genicular branch from the saphenous nerve     Date of procedure: 10/05/2022    PAIN LEVEL TODAY: 0/10    % IMPROVEMENT: 100%    ANY CONCERNS POST PROCEDURE (INCLUDING RED FLAG SYMPTOMS):    Patient had no concerns.  He stated that he is walking great and wishes he would have done this 25 years ago.     Patient denies any redness, swelling, fever, sudden/acute-onset weakness, bladder/bowel changes, saddle anesthesia.    Patient is aware to call the [facility] or present to their nearest urgent care/ER for any new concerning symptoms.  
Go for blood tests as directed. Your doctor will do lab tests at regular visits to monitor the effects of this medicine. Please follow up with your doctor and keep your health care provider appointments.

## 2022-11-11 NOTE — END OF VISIT
Addended by: MARY TRAN on: 11/11/2022 10:48 AM     Modules accepted: Orders     [Time Spent: ___ minutes] : I have spent [unfilled] minutes of time on the encounter. [>50% of the face to face encounter time was spent on counseling and/or coordination of care for ___] : Greater than 50% of the face to face encounter time was spent on counseling and/or coordination of care for [unfilled]

## 2022-11-13 NOTE — PROGRESS NOTE ADULT - PROBLEM SELECTOR PLAN 2
74 y/o F with pmhx of COPD on home O2(3L) with severe pHTN, MVR/TVR , AF s/p ablation,  OHS/MIGUEL on home biPAP, HTN, HLD who presents with one week of vomiting with poor oral intake also complaining of SOB and chest pain.  She also states she has been taking prednisone on/off for the past 3 months for chest tightness and does endorse some weight gain.     In the ED pt found to have A Flutter and given Amio 400 po and Dig loaded.     #PULM  - Currently on home 3-4 L NC, No exp wheezing   - CXR likely fluid OL  - Continue proair inhalation  - c/w prednisone 5mg since on chronically for 3 months    AFLutter  -  s/p ELIZABETH/DCCV 11/4/22, currently junctional rhythm/ectopic atrial rhythm 40s - patient reports feeling ?weak  - Discontinue Metoprolol and Cardizem  - Adjust Amiodarone load to 200 mg BID for now  -as per EP , no PPM at present , pt can be dced   MVR/TV replacement off AC  - echo technically difficult, unable to assess due to habitus and positioning  - per chart review under last name Nancy MRN [id number] - mechanical valve replacement 1999, was previously on coumadin but stopped in 09/2021 after worsening anemia, concern for GI bleed & epistaxis.   - continue with ac  -  ENDO  - A1c 10.7  - FS ~100-130  -adjust insulin for optimal blood sugar control    Hyperkalemia- resolved    waiting for MILLIE     -hx R pleural effusion, s/p thoracentesis 12/2021, follows with Pulm Dr Lora  -CXR shows reaccumulated R pleural effusion. Repeat CXR w/ improving effusion  -c/w diuresis as above  -F/u Dr Lora rec's -Hx R pleural effusion, s/p thoracentesis 12/2021, follows with Pulm Dr Lora  -CXR on admit shows reaccumulated R pleural effusion. Repeat CXR w/ improving effusion  -c/w diuresis as above  -F/u Dr Lora rec's

## 2022-11-16 PROBLEM — R04.0 EPISTAXIS: Status: ACTIVE | Noted: 2022-01-01

## 2022-11-16 NOTE — PROGRESS NOTE ADULT - PROBLEM SELECTOR PLAN 9
No signs of rejection. Home regimen below. October Cr 1.1   -Tacrolimus 2mg AM, 1mg PM  -Valgancyclovir 450 QD
Dr rogelio Lala [phone]
Dr Serina Lala [phone]

## 2022-11-16 NOTE — HISTORY OF PRESENT ILLNESS
[de-identified] : Patient presents today c/o epistaxis.  Patient states he has been having a few episodes of epistaxis.  He has been able to stop the bleeding within 10-15 minutes.  He admits to taking blood thinners although he he has not taken them in the last 2 days. Pt is on chemo and has low platelet. He stopped Eliquis

## 2022-11-16 NOTE — PROCEDURE
[Epistaxis] : evaluation of epistaxis [None] : none [Rigid Endoscope] : examined with a rigid endoscope [Normal] : the paranasal sinuses had no abnormalities

## 2022-11-17 PROBLEM — F41.9 ANXIETY: Status: ACTIVE | Noted: 2020-03-27

## 2022-11-17 NOTE — HISTORY OF PRESENT ILLNESS
[FreeTextEntry1] : The patient is a 71 year male being seen for a follow up. He has a previous history of HTN, HLD, DM II, CAD s/p CABG and atrial fibrillation/flutter. He is s/p renal transplant in the fall of 2019 with a complicated post-operative course. \par \par The patient began chemotherapy a month ago at [facility] with Dr. Skaggs and Dr. 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  Patient notes he is constipated.\par \par The patient had an operation on 9/19/22 outlined below:\par -------------------------------------------------------------------------------------------------------------------------------------\par Operative Findings	Procedure: Right transoral approach for partial tumor \par resection of right  space and right infratemporal fossa, dental \par extraction \par Pre-op diagnosis: Right  space mass \par Post-op diagnosis: Right  space mass \par 71M PMH Afib (on Eliquis), hx PE, HFpEF, CAD, R renal transplant (on \par tacrolimus), hx pHTN, jaw mass (s/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, now returned), \par JAYANT, CLL, DM2, HTN, HLD s/p aborted transoral partial R  space / \par infratemporal fossa mass resection, dental extraction on 9/19. \par \par 9/19: AVSS, patient feels well this morning. No reports of pain. \par 9/20: AVSS, no acute overnight events. \par 9/21: AVSS, no acute overnight events. \par \par On day of discharge, patient was afebrile, hemodynamically stable, tolerating \par PO, and at baseline ambulation. He was cleared for discharge home with \par instructions to f/u with Dr. Rose. \par \par Med Reconciliation: \par Medication Reconciliation Status	Admission Reconciliation is Completed \par Discharge Reconciliation is Completed \par Discharge Medications	amLODIPine 5 mg oral tablet: 1 tab(s) orally once a day \par Eliquis 5 mg oral tablet: 1 tab(s) orally 2 times a day \par isosorbide mononitrate 30 mg oral tablet, extended release: 1 tab(s) orally \par once a day \par oxycodone-acetaminophen 5 mg-325 mg oral tablet: 1 tab(s) orally prn MDD:MDD: 4 \par tabs. Take 1 tab every 6 hours as needed for severe pain \par pantoprazole 20 mg oral delayed release tablet: 1 tab(s) orally once a day \par Peridex 0.12% mucous membrane liquid: 15 milliliter(s) orally 2 times a day \par rosuvastatin 10 mg oral tablet: 1 tab(s) orally once a day \par tacrolimus 1 mg oral capsule: 2 cap(s) orally in AM \par 1 caps in PM \par tamsulosin 0.4 mg oral capsule: 1 cap(s) orally once a day (at bedtime) \par torsemide 20 mg oral tablet: orally 2 times a day \par Xanax 0.25 mg oral tablet: 1 tab(s) orally every 8 hours, As Needed \par ------------------------------------------------------------------------------------------------------------------------------------\par \par LABS 11/14/22:\par \par wbc 14.6, hemoglobin 8.6, hematocrit 28.3, platelets 43,000 (so no chemo), automatic ANC 1.0\par \par comprehensive metabolic panel: sodium 133, k 4.4, chloride 98, carbon dioxide 28, BUN 38, creatinine 1.3, glucose 144, calcium 8.6, albumin 3.8, magnesium 1.7 \par \par EKG 11/8/22: a.fib right RBBB, t wave abnormality\par \par urinalysis 11/8: urine specific gravity 1012, pH 6, wbc 0 \par \par otherwise all negative \par \par Current medications: All the same as discharge note except not taking oxycodone-acetaminophen and XanaxPRN, and he is only taking one anti-rejection drug , tacrolimus, as mycophenolate has been stopped some time ago.

## 2022-11-17 NOTE — PHYSICAL EXAM
[General Appearance - Alert] : alert [General Appearance - In No Acute Distress] : in no acute distress [Sclera] : the sclera and conjunctiva were normal [Extraocular Movements] : extraocular movements were intact [Neck Appearance] : the appearance of the neck was normal [Neck Cervical Mass (___cm)] : no neck mass was observed [Auscultation Breath Sounds / Voice Sounds] : lungs were clear to auscultation bilaterally [Lungs Percussion] : the lungs were normal to percussion [Heart Rate And Rhythm] : heart rate was normal and rhythm regular [Heart Sounds Gallop] : no gallops [Heart Sounds] : normal S1 and S2 [Murmurs] : no murmurs [Heart Sounds Pericardial Friction Rub] : no pericardial rub [Bowel Sounds] : normal bowel sounds [Abdomen Soft] : soft [Abdomen Tenderness] : non-tender [] : no hepato-splenomegaly [Abdomen Mass (___ Cm)] : no abdominal mass palpated [Cervical Lymph Nodes Enlarged Anterior Bilaterally] : anterior cervical [No CVA Tenderness] : no ~M costovertebral angle tenderness [No Spinal Tenderness] : no spinal tenderness [Motor Tone] : muscle strength and tone were normal [No Focal Deficits] : no focal deficits [Oriented To Time, Place, And Person] : oriented to person, place, and time [Impaired Insight] : insight and judgment were intact [Affect] : the affect was normal [Outer Ear] : the ears and nose were normal in appearance [Hearing Threshold Finger Rub Not Forrest] : hearing was normal [FreeTextEntry1] : alert, awake, no focal deficits

## 2022-11-17 NOTE — REVIEW OF SYSTEMS
[FreeTextEntry1] : generally better with less oral pain, improved ability to eat, and general increase in activity.  usual oxygenation reportedly mid 90's.  he's concerned about increase in fluid retention in lower extremities.

## 2022-11-17 NOTE — ASSESSMENT
[FreeTextEntry1] : HERB BROWN was seen for a follow up on Nov 17 2022  4:30PM.\par \par 1) Patient is stable and his medication is unchanged.\par \par 2) Patient is currently walking with a cane, but he is taking Eliquis and his platelet count is low, and his family was advised to be watchful. Patient was advised to put a walker by his bedside for nocturia and around his house. If there is any question about his stability with a cane, he is advised to use a Rollator. \par \par 3) Patient is advised to weigh himself every day and to tell [facility] to send labs.\par \par 4) Patient is advised to take Fiber One, Miralax, and stool softeners for his constipation. The patient has stopped painkillers. Doctors at [facility] said they contributed to his constipation. He is advised to seek a GI doctor at [facility] for his constipation. He inquired about the possibility of stopping his amlodipine and increasing his diuretics, which may be a consideration if the physicians who now see him regularly see volume overload to be an important problem for his current status.   He was encouraged to discuss with his [facility] team the progress of his weight, exam, chest xrays, oxygenation, renal function, bp and symptomatic status, and if such a  change would be warranted it would be supported by these data. but we emphasized that with clear lungs, no distress, and leg edema which is mild, and low bp especially standing, in a patient who occasionally reports mild dizziness, that every change must be carefully considered and fully shared by all his treatment team.   he is welcome to return here in coming weeks as needed, and he will request all the data to be forwarded here from [facility].

## 2022-11-22 PROBLEM — M79.89 LEG SWELLING: Status: ACTIVE | Noted: 2017-01-03

## 2022-11-22 NOTE — H&P ADULT - PROBLEM SELECTOR PLAN 12
F: none   E: replete PRN  N: consistent carb, DASH  GI: PPI  DVT: on Eliquis 5mg BID  Code: Full  Dispo: FABIANO

## 2022-11-22 NOTE — H&P ADULT - PROBLEM SELECTOR PLAN 1
Patient with shortness of breath found to have acute hypoxic respiratory failure (AHRF) etiology likely HFpEF exacerbation complicated by right-sided pleural effusion.  At baseline intermittently uses 2L NC at home, now with increased O2 demand.  No evidence of infection, patient afebrile without cough.  PE unlikely no reported chest pain, tachycardia, other hemodynamic instability.    - Continue supplemental oxygen, wean as tolerated  - Treat CHF exacerbation as below

## 2022-11-22 NOTE — ED ADULT TRIAGE NOTE - CHIEF COMPLAINT QUOTE
A&ox4 wheeled in from MD jay's office c.o SOB. reports bilateral lower leg swelling, dyspnea upon exertion and "fluid on the lungs." hypoxic 84% on room air in office. PMH of CHF.

## 2022-11-22 NOTE — H&P ADULT - PROBLEM SELECTOR PLAN 7
Patient with history of Type 2 DM, home medications insulin humalog 15-24u 2x daily.  - Start mISS, hold home meds  - Consistent Carb Diet Patient with history of hyperlipidemia, home medication Rosuvastatin 10mg daily.  - Continue Atorvastatin 40mg daily    # Coronary artery disease.   Patient with history of CAD, s/p CABG 2005, home medication Rosuvastatin 10mg daily..   - Continue Atorvastatin 40mg daily Patient with history of hyperlipidemia, home medication Rosuvastatin 10mg daily.  - Continue Atorvastatin 40mg daily    # Coronary artery disease.   Patient with history of CAD, s/p CABG 2005, home medication Rosuvastatin 10mg daily.  Currently no chest pain, trop 0.02, EKG without ischemic changes.    - Continue Atorvastatin 40mg daily

## 2022-11-22 NOTE — ED ADULT NURSE NOTE - OBJECTIVE STATEMENT
Pt A&Ox4, ambulatory with steady gait, speaking in clear/full sentences, no acute distress, vital signs stable. Pt sent in by Dr Lora for increasing SOB. Pt saturating 84% on RA. Pt placed on 2L NC now saturating 94%. Pt has hx of CKD and CHF. Per pt, sent in for "lasix."

## 2022-11-22 NOTE — ED PROVIDER NOTE - CLINICAL SUMMARY MEDICAL DECISION MAKING FREE TEXT BOX
Pt with vascular congestion sent by Dr. Lora for admission  - 84% on RA improved with 2 L, bnp elevated 3000s, cxr congested bilaterally, lasix 60mg IV given.  Will discuss with Dr. Lora. Pt with vascular congestion sent by Dr. Lora for admission  - 84% on RA improved with 2 L, bnp elevated 3000s, cxr congested bilaterally, lasix 60mg IV given.  Will discuss with Dr. Lora.    Dr. Lora agrees on admission for IV diuresis -  Discussed with JOSE RAFAEL

## 2022-11-22 NOTE — H&P ADULT - NSHPPHYSICALEXAM_GEN_ALL_CORE
VITALS:   T(C): 36.3 (11-22-22 @ 21:06), Max: 36.3 (11-22-22 @ 16:24)  HR: 84 (11-22-22 @ 21:06) (84 - 85)  BP: 131/72 (11-22-22 @ 21:06) (131/72 - 139/77)  RR: 17 (11-22-22 @ 21:06) (17 - 20)  SpO2: 94% (11-22-22 @ 21:06) (94% - 94%)    GENERAL: no acute distress, lying in bed comfortably  HEAD:  Atraumatic, normocephalic  EYES: EOMI, PERRLA, conjunctiva and sclera clear  ENT: Moist mucous membranes  NECK: Supple, no JVD  HEART: Regular rate and rhythm, no murmurs, rubs, or gallops  LUNGS: Unlabored respirations. Slight crackles and decreased breath sounds right and left lung bases  ABDOMEN: Soft, nontender, nondistended, +BS  EXTREMITIES: 2+ pitting edema to knee  NERVOUS SYSTEM:  A&Ox3, no focal deficits   SKIN: No rashes or lesions

## 2022-11-22 NOTE — H&P ADULT - PROBLEM SELECTOR PLAN 9
Patient with chronic renal insufficiency, s/p R renal transplant, on tacrolimus 2mg AM and 1mg PM  - Continue home medications Patient with history of CLL, follows with oncology at [facility]. No lymphadenopathy appreciated on physical exam. Pt with chronic leukocytosis. Afebrile, no other signs or symptoms of infectious process. Leukocytosis likely due to CLL.  - Trend CBC

## 2022-11-22 NOTE — ED ADULT NURSE NOTE - NSICDXPASTMEDICALHX_GEN_ALL_CORE_FT
PAST MEDICAL HISTORY:  Barretts esophagus     Barretts esophagus     Benign prostatic hyperplasia, presence of lower urinary tract symptoms unspecified, unspecified morphology     CRI (chronic renal insufficiency)     DM (diabetes mellitus)     GERD (gastroesophageal reflux disease)     HTN (hypertension)     Hyperlipidemia     Mandibular abscess     Monoclonal gammopathy     Paroxysmal atrial fibrillation

## 2022-11-22 NOTE — H&P ADULT - PROBLEM SELECTOR PLAN 4
Patient with history of Afib, home medication 5mg Eliquis BID.  - Continue home medication Hgb on admission 8.4 (baseline 10) likely ALEXANDER vs AOCD in setting of renal insufficiency. Currently no signs of active bleeding (no hematochezia, melena, hemoptysis, hematuria)  - trend CBC, maintain active T&S  - transfuse if Hgb <7    # Hyponatremia  Patient with Na 131 on admission which is around his baseline. Etiology likely Torsemide use.    - Continue to monitor BMP

## 2022-11-22 NOTE — H&P ADULT - PROBLEM SELECTOR PLAN 8
Patient with history of CLL, follows with oncology at [facility]. No lymphadenopathy appreciated on physical exam. Pt with chronic leukocytosis. Afebrile, no other signs or symptoms of infectious process. Leukocytosis likely due to CLL.  - Trend CBC Patient with history of Type 2 DM, home medications insulin humalog 15-24u 2x daily.  - Start mISS, hold home meds  - Consistent Carb Diet    # GERD  Patient with history of GERD complicated by Garcia's esophagus, home medication Pantoprazole 40mg QD  - Continue home medications    # Constipation  Patient with worsening severe constipation, may be contributing to hypoxia.   - Continue Miralax, Senna, Lactulose

## 2022-11-22 NOTE — H&P ADULT - PROBLEM SELECTOR PLAN 6
Patient with history of hyperlipidemia, home medication Rosuvastatin 10mg daily.  - Continue Atorvastatin 40mg daily    # GERD  Patient with history of GERD complicated by Garcia's esophagus, home medication Pantoprazole 40mg QD  - Continue home medications    # Constipation  Patient with worsening severe constipation, may be contributing to hypoxia.   - Continue Miralax, Senna, Lactulose Patient with history of hypertension, home medications isosorbide mononitrate 30mg QD, amlodipine 5mg QD, torsemide 20mg BID.  - Continue isosorbide mononitrate 30mg QD, amlodipine 5mg QD  - Hold Torsemide for now since patient will be getting Lasix 60mg IV BID

## 2022-11-22 NOTE — H&P ADULT - NSHPLABSRESULTS_GEN_ALL_CORE
8.4    26.92 )-----------( 104      ( 22 Nov 2022 17:49 )             27.0     11-22    131<L>  |  95<L>  |  35<H>  ----------------------------<  136<H>  4.6   |  27  |  1.15    Ca    9.1      22 Nov 2022 17:49  Phos  3.6     11-22  Mg     1.8     11-22    TPro  6.9  /  Alb  4.2  /  TBili  0.7  /  DBili  x   /  AST  17  /  ALT  10  /  AlkPhos  133<H>  11-22

## 2022-11-22 NOTE — ED ADULT NURSE REASSESSMENT NOTE - NS ED NURSE REASSESS COMMENT FT1
Patient received from day shift RN, patient verbalized improvement in breathing when sitting up on chair and oxygenation (NC 2L). Awaiting for transport.

## 2022-11-22 NOTE — H&P ADULT - PROBLEM SELECTOR PLAN 5
Patient with history of hypertension, home medications isosorbide mononitrate 30mg QD, amlodipine 5mg QD, torsemide 20mg BID.  - Continue isosorbide mononitrate 30mg QD, amlodipine 5mg QD  - Hold Torsemide for now since patient will be getting Lasix 60mg IV BID Patient with history of Afib, home medication 5mg Eliquis BID.  - Continue home medication

## 2022-11-22 NOTE — H&P ADULT - PROBLEM SELECTOR PLAN 11
F: none   E: replete PRN  N: consistent carb, DASH  GI: PPI  DVT: on Eliquis 5mg BID  Code: Full  Dispo: FABIANO Patient with history jaw mass s/p resection (~2019) w/ recurrence, recently hospitalized (9/18/22-9/20/22) for transoral partial R  space/infratemporal fossa mass resection/dental extraction.  Follows with oncologist at [facility], was recently given few chemo doses but decision made to stop chemo and proceed with radiation therapy.   - Followup with oncologist.

## 2022-11-22 NOTE — H&P ADULT - HISTORY OF PRESENT ILLNESS
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 Lora's office for shortness of breath.  Pt with hypoxia 84% on RA.  Pt states he started feeling more short of breath over past several days with difficulty lying flat.  Pt has been compliant with his medications.  No fever or infectious symptoms.    ED Course:  Vitals: Temp: 97.4, HR 85, /77, RR 20, SaO2: 94% 2L NC  Labs: WBC: 26.92, Hgb 8.4, Plt 104, Na 131, K 4.6, Cl 95, BUN 35, Scr 1.15, Alk phos 133, Troponin 0.02, Pro-BNP 3,494  EKG: Afib rate 87  CXR: Moderate right-sided pleural effusion. Cardiomegaly.  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 Lora's office for shortness of breath and hypoxia.  Patient reports worsening shortness of breath for several days, worsened by lying flat, he uses multiple pillows to sleep at baseline.  Also reports worsening bilateral leg swelling.  He reports compliance with his medication.  No recently illness, no sick contacts, no surgery or period of immobility.  ROS positive for constipation for several days.  Otherwise does not report any n/v, fever, chills, cough, chest pain, palpitations, abdominal pain, diarrhea, dysuria, urgency, frequency.     ED Course:  Vitals: Temp: 97.4, HR 85, /77, RR 20, SaO2: 94% 2L NC  Labs: WBC: 26.92, Hgb 8.4, Plt 104, Na 131, K 4.6, Cl 95, BUN 35, Scr 1.15, Alk phos 133, Troponin 0.02, Pro-BNP 3,494  EKG: Afib rate 87  CXR: Moderate right-sided pleural effusion. Cardiomegaly.  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 Lora's office for shortness of breath and hypoxia (84% on room air).  Patient reports worsening shortness of breath for several days, uses multiple pillows to sleep at baseline.  Also reports worsening bilateral leg swelling.  He reports compliance with his medication.  No recently illness, no sick contacts, no surgery or period of immobility.  ROS positive for constipation for several days.  Otherwise does not report any n/v, fever, chills, cough, chest pain, palpitations, abdominal pain, diarrhea, dysuria, urgency, frequency.     ED Course:  Vitals: Temp: 97.4, HR 85, /77, RR 20, SaO2: 94% 2L NC  Labs: WBC: 26.92, Hgb 8.4, Plt 104, Na 131, K 4.6, Cl 95, BUN 35, Scr 1.15, Alk phos 133, Troponin 0.02, Pro-BNP 3,494  EKG: Afib rate 87  CXR: Moderate right-sided pleural effusion. Cardiomegaly.  Intervention: Lasix IV 60

## 2022-11-22 NOTE — H&P ADULT - PROBLEM SELECTOR PLAN 3
Patient with history of HFpEF presenting with dyspnea on exertion, orthopnea found to be hypoxic to 84% on room air, right sided pleural effusion likely due to acute exacerbation.  TTE 7/2022 with mild concentric LVH, EF 65%, no regional wall motion abnormalities. ACS unlikely troponin 0.02, patient without chest pain, EKG without ischemic changes.  On exam patient with 2+ pitting edema bilateral LE. BNP 3,494 (baseline 1,500).  Home medications Torsemide 20mg po BID.    - Continue IV Lasix 60mg BID  - Consider HF consult in AM for optimization of diuretic  - strict I/Os, monitor UOP carefully  - daily weights

## 2022-11-22 NOTE — PATIENT PROFILE ADULT - FALL HARM RISK - HARM RISK INTERVENTIONS

## 2022-11-22 NOTE — H&P ADULT - PROBLEM SELECTOR PLAN 10
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  Follows with oncologist at [facility], was recently given few chemo doses but decision made to stop chemo and proceed with radiation therapy.   - Followup with oncologist. Patient with chronic renal insufficiency, s/p R renal transplant, on tacrolimus 2mg AM and 1mg PM  - Continue home medications

## 2022-11-22 NOTE — H&P ADULT - PROBLEM SELECTOR PLAN 2
Pleural effusion, right.   CXR shows right pleural effusion, previous thoracentesis in 12/21 with recurrent effusion.  Etiology likely HFpEF exacerbation.    - Treat CHF exacerbation as below   - Consider pulm consult if no improvement

## 2022-11-22 NOTE — ED PROVIDER NOTE - OBJECTIVE STATEMENT
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 Lora's office for shortness of breath.  Pt with hypoxia 84% on RA. 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 Lora's office for shortness of breath.  Pt with hypoxia 84% on RA.  Pt states he started feeling more short of breath over past several days with difficulty lying flat.  Pt has been compliant with his medications.  No fever or infectious symptoms.

## 2022-11-22 NOTE — REVIEW OF SYSTEMS
[Dyspnea] : dyspnea [SOB on Exertion] : sob on exertion [Edema] : edema [Orthopnea] : orthopnea [Negative] : Endocrine

## 2022-11-23 NOTE — PROGRESS NOTE ADULT - PROBLEM SELECTOR PLAN 4
Hgb on admission 8.4 (baseline 10) likely ALEXANDER vs AOCD in setting of renal insufficiency. Currently no signs of active bleeding (no hematochezia, melena, hemoptysis, hematuria)  - trend CBC, maintain active T&S  - transfuse if Hgb <7

## 2022-11-23 NOTE — DISCHARGE NOTE PROVIDER - PROVIDER TOKENS
PROVIDER:[TOKEN:[[id number]],SCHEDULEDAPPT:[12/14/2022],SCHEDULEDAPPTTIME:[11:30 AM],ESTABLISHEDPATIENT:[T]] PROVIDER:[TOKEN:[[id number]],SCHEDULEDAPPT:[12/14/2022],SCHEDULEDAPPTTIME:[11:30 AM],ESTABLISHEDPATIENT:[T]],PROVIDER:[TOKEN:[[id number]],ESTABLISHEDPATIENT:[T]]

## 2022-11-23 NOTE — DISCHARGE NOTE PROVIDER - NSDCMRMEDTOKEN_GEN_ALL_CORE_FT
amLODIPine 5 mg oral tablet: 1 tab(s) orally once a day  Eliquis 5 mg oral tablet: 1 tab(s) orally 2 times a day  HumaLOG KwikPen 100 units/mL injectable solution: 18-24 unit(s) injectable 2 times a day  isosorbide mononitrate 30 mg oral tablet, extended release: 1 tab(s) orally once a day  pantoprazole 20 mg oral delayed release tablet: 1 tab(s) orally once a day  rosuvastatin 10 mg oral tablet: 1 tab(s) orally once a day  tacrolimus 1 mg oral capsule: 2 cap(s) orally in AM  1 caps in PM  tamsulosin 0.4 mg oral capsule: 1 cap(s) orally once a day (at bedtime)  torsemide 20 mg oral tablet: orally 2 times a day  Xanax 0.25 mg oral tablet: 1 tab(s) orally every 8 hours, As Needed   amLODIPine 5 mg oral tablet: 1 tab(s) orally once a day  Eliquis 5 mg oral tablet: 1 tab(s) orally 2 times a day  HumaLOG KwikPen 100 units/mL injectable solution: 15-24 unit(s) injectable 3 times a day before meals  isosorbide mononitrate 30 mg oral tablet, extended release: 1 tab(s) orally once a day  pantoprazole 20 mg oral delayed release tablet: 1 tab(s) orally once a day  rosuvastatin 10 mg oral tablet: 1 tab(s) orally once a day  tacrolimus 1 mg oral capsule: 2 cap(s) orally in AM  1 caps in PM  tamsulosin 0.4 mg oral capsule: 1 cap(s) orally once a day (at bedtime)  torsemide 20 mg oral tablet: orally 2 times a day  Xanax 0.25 mg oral tablet: 1 tab(s) orally every 8 hours, As Needed   amLODIPine 5 mg oral tablet: 1 tab(s) orally once a day  Eliquis 5 mg oral tablet: 1 tab(s) orally 2 times a day  HumaLOG KwikPen 100 units/mL injectable solution: 15-24 unit(s) injectable 3 times a day before meals  isosorbide mononitrate 30 mg oral tablet, extended release: 1 tab(s) orally once a day  pantoprazole 20 mg oral delayed release tablet: 1 tab(s) orally once a day  polyethylene glycol 3350 oral powder for reconstitution: 17 gram(s) orally once a day  rosuvastatin 10 mg oral tablet: 1 tab(s) orally once a day  senna leaf extract oral tablet: 2 tab(s) orally once a day (at bedtime)  tacrolimus 1 mg oral capsule: 2 cap(s) orally in AM  1 caps in PM  tamsulosin 0.4 mg oral capsule: 1 cap(s) orally once a day (at bedtime)  torsemide 40 mg oral tablet: 1 tab(s) orally 2 times a day   Xanax 0.25 mg oral tablet: 1 tab(s) orally every 8 hours, As Needed

## 2022-11-23 NOTE — ASSESSMENT
[FreeTextEntry1] : #Hypoxemia\par #SOB\par #CHF\par \par Mr. Ashton has swelling peripherally and concerns for volume overload with rales on exam. Spoke to Dr. Leyva and agrees that patient needs to double his Torsemide dose. He was taken off oxygen in the office and his oxygen saturation dropped to the mid 80s. Patient does not have home oxygen and due to the prolonged process of obtaining it (with insurance authorization) we will send the patient to the ED to be admitted for volume overload with hypoxemia likely requiring diuresis. Spoke to the patient and his son and they are in agreement that this would be safer than sending him home without any oxygen and significant shortness of breath. Will transport to ER for further evaluation.

## 2022-11-23 NOTE — PROGRESS NOTE ADULT - PROBLEM SELECTOR PLAN 2
CXR shows right pleural effusion, previous thoracentesis in 12/21 with recurrent effusion. Etiology likely HFpEF exacerbation.    - Treat CHF exacerbation as below

## 2022-11-23 NOTE — DISCHARGE NOTE PROVIDER - CARE PROVIDER_API CALL
Kofi Leyva (MD)  Internal Medicine; Nephrology  [address], [place] [address]  Phone: [phone]  Fax: [phone]  Established Patient  Scheduled Appointment: 12/14/2022 11:30 AM   Kofi Leyva (MD)  Internal Medicine; Nephrology  [address], [address] [address]  Phone: [phone]  Fax: [phone]  Established Patient  Scheduled Appointment: 12/14/2022 11:30 AM    Marie Lora)  Critical Care Medicine; Pulmonary Disease  [address], [address] [place] [id number]  Phone: [phone]  Fax: [phone]  Established Patient  Follow Up Time:

## 2022-11-23 NOTE — PHYSICAL EXAM
[No Acute Distress] : no acute distress [Normal Oropharynx] : normal oropharynx [Normal Appearance] : normal appearance [No Neck Mass] : no neck mass [Normal Rate/Rhythm] : normal rate/rhythm [Normal S1, S2] : normal s1, s2 [No Murmurs] : no murmurs [No Resp Distress] : no resp distress [Rales] : rales [No Abnormalities] : no abnormalities [Benign] : benign [Normal Gait] : normal gait [Not Tender] : not tender [No Clubbing] : no clubbing [No Cyanosis] : no cyanosis [No Edema] : no edema [FROM] : FROM [Normal Color/ Pigmentation] : normal color/ pigmentation [No Focal Deficits] : no focal deficits [Normal Affect] : normal affect [Oriented x3] : oriented x3 [TextBox_54] : 3+ pitting edema up to the thighs [TextBox_68] : mild crackles at the bases with poor air movement [TextBox_89] : mildly distended

## 2022-11-23 NOTE — PROGRESS NOTE ADULT - PROBLEM SELECTOR PLAN 9
Patient with history of CLL, follows with oncology at [facility]. No lymphadenopathy appreciated on physical exam. Pt with chronic leukocytosis. Afebrile, no other signs or symptoms of infectious process. Leukocytosis likely due to CLL.  - monitor WBC

## 2022-11-23 NOTE — DISCHARGE NOTE PROVIDER - NSDCCPCAREPLAN_GEN_ALL_CORE_FT
PRINCIPAL DISCHARGE DIAGNOSIS  Diagnosis: Acute exacerbation of CHF (congestive heart failure)  Assessment and Plan of Treatment: Heart failure — sometimes known as congestive heart failure — occurs when the heart muscle doesn't pump blood as well as it should. When this happens, blood often backs up and fluid can build up in the lungs, causing shortness of breath.  Certain heart conditions, such as narrowed arteries in the heart (coronary artery disease) or high blood pressure, gradually leave the heart too weak or stiff to fill and pump blood properly.  Proper treatment can improve the signs and symptoms of heart failure and may help some people live longer. Lifestyle changes — such as losing weight, exercising, reducing salt (sodium) in your diet and managing stress — can improve your quality of life.  One way to prevent heart failure is to prevent and control conditions that can cause it, such as coronary artery disease, high blood pressure, diabetes and obesity.  Weigh yourself daily. If you gain 3lbs in 3 days, or 5lbs in a week call your Health Care Provider. Eat a low sodium diet (less than 2 grams of sodium a day).  Call your Health Care Provider if you have any swelling or increased swelling in your feet, ankles, and/or stomach. Take all of your medication as directed.  If you become dizzy call your Health Care Provider. If you experience chest pain or shortness of breath, call an ambulance and come to the emergency department. Please follow-up with your primary care provider within 1-2 weeks of being discharged from the hospital for furhter management of your heart failure.

## 2022-11-23 NOTE — DISCHARGE NOTE PROVIDER - CARE PROVIDERS DIRECT ADDRESSES
[email or web address]

## 2022-11-23 NOTE — PROGRESS NOTE ADULT - SUBJECTIVE AND OBJECTIVE BOX
OVERNIGHT EVENTS: No acute overnight events.    SUBJECTIVE:  Patient seen and examined at bedside. Reports abdominal discomfort and bloating, but no other complaints at this time. He had a small BM overnight and a normal BM this morning. Denies fever, headache, CP, SOB, abd pain, n/v/c/d.    Vital Signs Last 12 Hrs  T(F): 97.9 (11-23-22 @ 16:25), Max: 98.3 (11-23-22 @ 09:36)  HR: 81 (11-23-22 @ 16:25) (81 - 111)  BP: 136/75 (11-23-22 @ 16:25) (129/71 - 136/75)  BP(mean): --  RR: 17 (11-23-22 @ 16:25) (16 - 22)  SpO2: 96% (11-23-22 @ 16:25) (88% - 97%)  I&O's Summary    22 Nov 2022 07:01  -  23 Nov 2022 07:00  --------------------------------------------------------  IN: 0 mL / OUT: 600 mL / NET: -600 mL    23 Nov 2022 07:01  -  23 Nov 2022 17:34  --------------------------------------------------------  IN: 0 mL / OUT: 1400 mL / NET: -1400 mL        PHYSICAL EXAM:  Constitutional: NAD, comfortable sitting in chair.  HEENT: NC/AT, EOMI, no conjunctival pallor or scleral icterus, MMM  Neck: Supple  Respiratory: CTA B/L. No w/r/r.   Cardiovascular: RRR, normal S1 and S2, no m/r/g.   Gastrointestinal: soft nontender, moderate abd distention.   Extremities: wwp; no cyanosis, clubbing or edema.   Vascular: intact distal pulses.  Neurological: AAOx3, no focal deficits.        LABS:                        7.7    24.29 )-----------( 111      ( 23 Nov 2022 15:20 )             25.6     11-23    131<L>  |  95<L>  |  32<H>  ----------------------------<  237<H>  4.7   |  28  |  1.06    Ca    8.8      23 Nov 2022 12:04  Phos  3.2     11-23  Mg     1.7     11-23    TPro  6.9  /  Alb  4.2  /  TBili  0.7  /  DBili  x   /  AST  17  /  ALT  10  /  AlkPhos  133<H>  11-22            RADIOLOGY & ADDITIONAL TESTS:    MEDICATIONS  (STANDING):  amLODIPine   Tablet 5 milliGRAM(s) Oral every 24 hours  apixaban 5 milliGRAM(s) Oral every 12 hours  atorvastatin 40 milliGRAM(s) Oral at bedtime  dextrose 5%. 1000 milliLiter(s) (50 mL/Hr) IV Continuous <Continuous>  dextrose 5%. 1000 milliLiter(s) (100 mL/Hr) IV Continuous <Continuous>  dextrose 50% Injectable 25 Gram(s) IV Push once  dextrose 50% Injectable 12.5 Gram(s) IV Push once  dextrose 50% Injectable 25 Gram(s) IV Push once  furosemide   Injectable 60 milliGRAM(s) IV Push every 12 hours  glucagon  Injectable 1 milliGRAM(s) IntraMuscular once  influenza  Vaccine (HIGH DOSE) 0.7 milliLiter(s) IntraMuscular once  insulin lispro (ADMELOG) corrective regimen sliding scale   SubCutaneous Before meals and at bedtime  isosorbide   mononitrate ER Tablet (IMDUR) 30 milliGRAM(s) Oral every 24 hours  lactulose Syrup 10 Gram(s) Oral daily  pantoprazole    Tablet 20 milliGRAM(s) Oral every 24 hours  polyethylene glycol 3350 17 Gram(s) Oral daily  senna 2 Tablet(s) Oral at bedtime  tacrolimus 1 milliGRAM(s) Oral every 24 hours  tacrolimus 2 milliGRAM(s) Oral every 24 hours  tamsulosin 0.4 milliGRAM(s) Oral at bedtime    MEDICATIONS  (PRN):  ALPRAZolam 0.25 milliGRAM(s) Oral every 8 hours PRN Anxiety  dextrose Oral Gel 15 Gram(s) Oral once PRN Blood Glucose LESS THAN 70 milliGRAM(s)/deciliter  melatonin 5 milliGRAM(s) Oral at bedtime PRN Insomnia  sodium chloride 0.65% Nasal 1 Spray(s) Both Nostrils daily PRN Nasal Congestion

## 2022-11-23 NOTE — DISCHARGE NOTE PROVIDER - NSDCFUADDAPPT_GEN_ALL_CORE_FT
Please call Dr. Lora's office to schedule a follow-up appointment within 1 week of being discharged from the hospital.

## 2022-11-23 NOTE — PROGRESS NOTE ADULT - PROBLEM SELECTOR PLAN 3
Patient with history of HFpEF presenting with dyspnea on exertion, orthopnea found to be hypoxic to 84% on room air, right sided pleural effusion likely due to acute exacerbation.  TTE 7/2022 with mild concentric LVH, EF 65%, no regional wall motion abnormalities. ACS unlikely troponin 0.02, patient without chest pain, EKG without ischemic changes.  On exam patient with 2+ pitting edema bilateral LE. BNP 3,494 (baseline 1,500).  Home medications Torsemide 20mg po BID.   - Continue Lasix 60mg IV BID  - strict I/Os, monitor UOP carefully  - daily weights      # Hyponatremia  Patient with Na 131 on admission which is around his baseline. Etiology likely Torsemide use.    - Continue to monitor BMP Patient with history of HFpEF presenting with dyspnea on exertion, orthopnea found to be hypoxic to 84% on room air, right sided pleural effusion likely due to acute exacerbation.  TTE 7/2022 with mild concentric LVH, EF 65%, no regional wall motion abnormalities. ACS unlikely troponin 0.02, patient without chest pain, EKG without ischemic changes.  On exam patient with 2+ pitting edema bilateral LE. BNP 3,494 (baseline 1,500).  Home medications Torsemide 20mg po BID. Patient's ambulatory SpO2 desatted from 91% to 84% on RA.  - Continue Lasix 60mg IV BID  - strict I/Os, monitor UOP carefully  - daily weights  - discharge on 2L home O2      # Hyponatremia  Patient with Na 131 on admission which is around his baseline. Etiology likely Torsemide use.    - Continue to monitor BMP

## 2022-11-23 NOTE — PROGRESS NOTE ADULT - PROBLEM SELECTOR PLAN 7
Patient with history of hyperlipidemia, home medication Rosuvastatin 10mg daily.  - Continue Atorvastatin 40mg daily    # Coronary artery disease.   Patient with history of CAD, s/p CABG 2005, home medication Rosuvastatin 10mg daily.  Currently no chest pain, trop 0.02, EKG without ischemic changes.    - Continue Atorvastatin 40mg daily

## 2022-11-23 NOTE — DISCHARGE NOTE PROVIDER - NSDCFUSCHEDAPPT_GEN_ALL_CORE_FT
Kofi Leyva  [facility]  NEPHRO [address]  Scheduled Appointment: 12/14/2022

## 2022-11-23 NOTE — PROGRESS NOTE ADULT - PROBLEM/PLAN-10
Gilbert Meza,  This patient didn't get a flu shot. She told me she was getting it at work next week.  Maybe this is was entered under her instead of that other patient I sent you?  Christiane DISPLAY PLAN FREE TEXT

## 2022-11-23 NOTE — DISCHARGE NOTE PROVIDER - NSDCCAREPROVSEEN_GEN_ALL_CORE_FT
Marie Lora Marie Lora A  Patient seen and examined with house-staff during bedside rounds.  Resident note read, including vitals, physical findings, laboratory data, and radiological reports.   Revisions included below.  Direct personal management at bed side and extensive interpretation of the data.  Plan was outlined and discussed in details with the housestaff.  Decision making of high complexity  Action taken for acute disease activity to reflect the level of care provided:  - medication reconciliation  - review laboratory data  he is stable  dc home  Toresimide 40 mg bid  will be seen in office this week

## 2022-11-23 NOTE — PROGRESS NOTE ADULT - TIME BILLING
Patient seen and examined with house-staff during bedside rounds.  Resident note read, including vitals, physical findings, laboratory data, and radiological reports.   Revisions included below.  Direct personal management at bed side and extensive interpretation of the data.  Plan was outlined and discussed in details with the housestaff.  Decision making of high complexity  Action taken for acute disease activity to reflect the level of care provided:  - medication reconciliation  - review laboratory data      Patient was sent to the ER from my office.  At that time patient presented with dyspnea on exertion and hypoxemia.  The oxygen saturation at rest off oxygen was 91%.  The patient desaturated to 83% with exertion in my office.  The patient will given 2 L of oxygen oxygen saturation at rest was 93% with exertion was 91%.    The condition is related to fluid overload as his diuretic regimen decreased and the patient was on chemotherapy and his regimen included systemic steroids.  Patient is admitted and was started on IV Lasix.  The patient improved and yesterday.  Continue IV diuresis.  Patient will need home oxygen.  Daily weight.

## 2022-11-23 NOTE — DISCHARGE NOTE PROVIDER - HOSPITAL COURSE
#Discharge: do not delete    HERB BROWN is a 71y Male with a past medical history of _____    Presented with _____, found to have _____    Problem List/Main Diagnoses (system-based):   #     #     #    Patient was discharged to: (home/[facility]/acute rehab/hospice, etc. 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 Lora's office for shortness of breath and hypoxia (84% on room air) 2/2 HF exacerbation, admitted for diuresis.      Problem List/Main Diagnoses:     #Acute respiratory failure with hypoxia.   Patient with shortness of breath found to have acute hypoxic respiratory failure (AHRF) etiology likely HFpEF exacerbation complicated by right-sided pleural effusion.  At baseline intermittently uses 2L NC at home, now with increased O2 demand.  No evidence of infection, patient afebrile without cough.  PE unlikely no reported chest pain, tachycardia, other hemodynamic instability.    - see below    #Pleural effusion.   CXR shows right pleural effusion, previous thoracentesis in 12/21 with recurrent effusion. Etiology likely HFpEF exacerbation.    - see below    #Acute exacerbation of CHF (congestive heart failure).   Patient with history of HFpEF presenting with dyspnea on exertion, orthopnea found to be hypoxic to 84% on room air, right sided pleural effusion likely due to acute exacerbation.  TTE 7/2022 with mild concentric LVH, EF 65%, no regional wall motion abnormalities. ACS unlikely troponin 0.02, patient without chest pain, EKG without ischemic changes.  On exam patient with 2+ pitting edema bilateral LE. BNP 3,494 (baseline 1,500).  Home medications Torsemide 20mg po BID. Patient's ambulatory SpO2 desatted from 91% to 84% on RA.  - diuresed with IV lasix during admission  - discharge on 2L home O2    # Hyponatremia  Patient with Na 131 on admission which is around his baseline. Etiology likely Torsemide use.    - Continue to monitor BMP.    #Anemia.   Hgb on admission 8.4 (baseline 10) likely ALEXANDER vs AOCD in setting of renal insufficiency. Currently no signs of active bleeding (no hematochezia, melena, hemoptysis, hematuria)  - no signs of bleeding, H&H stable  - follow-up with PCP    #Chronic atrial fibrillation.   Patient with history of Afib, home medication 5mg Eliquis BID.  - Continue eliquis 5 mg PO BID.    #Hypertension.   Patient with history of hypertension, home medications isosorbide mononitrate 30mg QD, amlodipine 5mg QD, torsemide 20mg BID.  - continue imdur 30mg PO QD  - continue amlodipine 5mg PO QD  - held torsemide and diuresed with IV lasix during admission  - restart torsemide 20 mg BID on discharge??    #Hyperlipidemia.   Patient with history of hyperlipidemia, home medication Rosuvastatin 10mg daily.  - Continue Atorvastatin 40mg daily    # Coronary artery disease.   Patient with history of CAD, s/p CABG 2005, home medication Rosuvastatin 10mg daily.  Currently no chest pain, trop 0.02, EKG without ischemic changes.    - Continue Atorvastatin 40mg daily.    #Diabetes mellitus.   Patient with history of Type 2 DM, home medications insulin humalog 15-24u 2x daily.  - continued insulin lispro with sliding scale during admission  - continue home insulin regimen on discharge    #GERD  Patient with history of GERD complicated by Garcia's esophagus, home medication Pantoprazole 20mg QD  - Continue protonix 20 mg PO qd    #Constipation  Patient with worsening severe constipation, may be contributing to hypoxia. Pt had a normal BM 11/23 AM.  - Continue Miralax  - continue Senna  - continue Lactulose.    #CLL (chronic lymphocytic leukemia).   Patient with history of CLL, follows with oncology at [facility]. No lymphadenopathy appreciated on physical exam. Pt with chronic leukocytosis. Afebrile, no other signs or symptoms of infectious process. Leukocytosis likely due to CLL.  - followup with heme/onc as outpatient    #History of renal transplant.   Patient with chronic renal insufficiency, s/p R renal transplant, on tacrolimus 2mg AM and 1mg PM  - Continue tacrolimus 2mg AM and 1mg PM.    #Jaw cancer.   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  Follows with oncologist at [facility], was recently given few chemo doses but decision made to stop chemo and proceed with radiation therapy.   - Followup with oncologist as outpatient    Patient was discharged to: (home/[facility]/acute rehab/hospice, etc. 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 Lora's office for shortness of breath and hypoxia (84% on room air) 2/2 HF exacerbation, admitted for diuresis.      Problem List/Main Diagnoses:     #Acute respiratory failure with hypoxia.   Patient with shortness of breath found to have acute hypoxic respiratory failure (AHRF) etiology likely HFpEF exacerbation complicated by right-sided pleural effusion.  At baseline intermittently uses 2L NC at home, now with increased O2 demand.  No evidence of infection, patient afebrile without cough.  PE unlikely no reported chest pain, tachycardia, other hemodynamic instability.    - see below    #Pleural effusion.   CXR shows right pleural effusion, previous thoracentesis in 12/21 with recurrent effusion. Etiology likely HFpEF exacerbation.    - see below    #Acute exacerbation of CHF (congestive heart failure).   Patient with history of HFpEF presenting with dyspnea on exertion, orthopnea found to be hypoxic to 84% on room air, right sided pleural effusion likely due to acute exacerbation.  TTE 7/2022 with mild concentric LVH, EF 65%, no regional wall motion abnormalities. ACS unlikely troponin 0.02, patient without chest pain, EKG without ischemic changes.  On exam patient with 2+ pitting edema bilateral LE. BNP 3,494 (baseline 1,500).  Home medications Torsemide 20mg po BID. Patient's ambulatory SpO2 desatted from 91% to 84% on RA.  - diuresed with IV lasix during admission  - discharge on 2L home O2    # Hyponatremia  Patient with Na 131 on admission which is around his baseline. Etiology likely Torsemide use.    - Continue to monitor BMP.    #Anemia.   Hgb on admission 8.4 (baseline 10) likely ALEXANDER vs AOCD in setting of renal insufficiency. Currently no signs of active bleeding (no hematochezia, melena, hemoptysis, hematuria)  - no signs of bleeding, H&H stable  - follow-up with PCP    #Chronic atrial fibrillation.   Patient with history of Afib, home medication 5mg Eliquis BID.  - Continue eliquis 5 mg PO BID.    #Hypertension.   Patient with history of hypertension, home medications isosorbide mononitrate 30mg QD, amlodipine 5mg QD, torsemide 20mg BID.  - continue imdur 30mg PO QD  - continue amlodipine 5mg PO QD  - held torsemide and diuresed with IV lasix during admission  - restart torsemide 40 mg BID on discharge    #Hyperlipidemia.   Patient with history of hyperlipidemia, home medication Rosuvastatin 10mg daily.  - Continue Atorvastatin 40mg daily    # Coronary artery disease.   Patient with history of CAD, s/p CABG 2005, home medication Rosuvastatin 10mg daily.  Currently no chest pain, trop 0.02, EKG without ischemic changes.    - Continue Atorvastatin 40mg daily.    #Diabetes mellitus.   Patient with history of Type 2 DM, home medications insulin humalog 15-24u 2x daily.  - continued insulin lispro with sliding scale during admission  - continue home insulin regimen on discharge    #GERD  Patient with history of GERD complicated by Garcia's esophagus, home medication Pantoprazole 20mg QD  - Continue protonix 20 mg PO qd    #Constipation  Patient with worsening severe constipation, may be contributing to hypoxia. Pt had a normal BM 11/23 AM.  - Continue Miralax  - continue Senna  - continue Lactulose.    #CLL (chronic lymphocytic leukemia).   Patient with history of CLL, follows with oncology at [facility]. No lymphadenopathy appreciated on physical exam. Pt with chronic leukocytosis. Afebrile, no other signs or symptoms of infectious process. Leukocytosis likely due to CLL.  - followup with heme/onc as outpatient    #History of renal transplant.   Patient with chronic renal insufficiency, s/p R renal transplant, on tacrolimus 2mg AM and 1mg PM  - Continue tacrolimus 2mg AM and 1mg PM.    #Jaw cancer.   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  Follows with oncologist at [facility], was recently given few chemo doses but decision made to stop chemo and proceed with radiation therapy.   - Followup with oncologist as outpatient    Patient was discharged to: (home/[facility]/acute rehab/hospice, etc. and w/ home health/home PT/RN/home O2)    New medications:   Changes to old medications:  Medications that were stopped:    Items to follow up as outpatient:

## 2022-11-23 NOTE — PROGRESS NOTE ADULT - PROBLEM SELECTOR PLAN 6
Patient with history of hypertension, home medications isosorbide mononitrate 30mg QD, amlodipine 5mg QD, torsemide 20mg BID.  - continue imdur 30mg PO QD  - continue amlodipine 5mg PO QD  - Hold Torsemide for now since patient is on Lasix 60mg IV BID

## 2022-11-23 NOTE — HISTORY OF PRESENT ILLNESS
[Obstructive Sleep Apnea] : obstructive sleep apnea [TextBox_4] : Mr. Ashton presents to the clinic today for follow up. He states over the last several months he's had increasing swelling of the lower extremities as well as worsening SOB with exertion. He states that he has not had to use his home oxygen in months but about 3 days ago he noted his O2 saturation dropping to the 80s so he has been on oxygen. Denies any recent fevers, chills, or infections. He has received his COVID booster but has not received flu vaccine. He feels like his belly may have fluid.  [ESS] : 3

## 2022-11-23 NOTE — PROGRESS NOTE ADULT - PROBLEM SELECTOR PLAN 11
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  Follows with oncologist at [facility], was recently given few chemo doses but decision made to stop chemo and proceed with radiation therapy.   - Followup with oncologist

## 2022-11-23 NOTE — PROGRESS NOTE ADULT - PROBLEM SELECTOR PLAN 8
Patient with history of Type 2 DM, home medications insulin humalog 15-24u 2x daily.  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 Pt had a normal BM 11/23 AM.  - Continue Miralax  - continue Senna  - continue Lactulose

## 2022-11-24 NOTE — DISCHARGE NOTE NURSING/CASE MANAGEMENT/SOCIAL WORK - NSDCFUADDAPPT_GEN_ALL_CORE_FT
18
Please call Dr. Lora's office to schedule a follow-up appointment within 1 week of being discharged from the hospital.

## 2022-11-24 NOTE — DISCHARGE NOTE NURSING/CASE MANAGEMENT/SOCIAL WORK - NSDCPEFALRISK_GEN_ALL_CORE
For information on Fall & Injury Prevention, visit: [email or web address] OR  [email or web address] OR  https://www.cdc.gov/steadi/patient.html

## 2022-11-24 NOTE — DISCHARGE NOTE NURSING/CASE MANAGEMENT/SOCIAL WORK - PATIENT PORTAL LINK FT
You can access the FollowMyHealth Patient Portal offered by [facility] by registering at the following website: [email or web address]. By joining [facility]’s FollowMyHealth portal, you will also be able to view your health information using other applications (apps) compatible with our system.

## 2022-11-30 PROBLEM — J90 PLEURAL EFFUSION ON RIGHT: Status: ACTIVE | Noted: 2021-11-24

## 2022-11-30 NOTE — HISTORY OF PRESENT ILLNESS
[Never] : never [TextBox_4] : he is slightly better.   His abdomen is still distended.  Legs are still swollen.  he is on 3l/min oxygen.  he is taking the water pills [ESS] : 0

## 2022-11-30 NOTE — ASSESSMENT
[FreeTextEntry1] : Dyspnea on exertion  I\par  discussed the case in detail with the patient.  The still has edema of the lower extremities and he is to increase the torsemide to 80 mg in the morning and 40 mg at night.  I reviewed the labs from the [facility] and the creatinine is normal.  We will follow on daily weight.  The patient had an echocardiogram at the hospital in 10/21 which was consistent with pulmonary hypertension, and diastolic dysfunction but the ECHO from July did not reveale PH    If the abdominal distention does not improve then I will proceed with CT scan of the abdomen.  \par \par Obstructive sleep apnea  Is not wearing the CPAP mask\par \par   Hypoxemia\par   The oxygen saturation in the room air at rest lowest is 87%.  He is to continue on oxygen supplementation and wean as tolerated  \par \par Right pleural effusion  No evidence of recurrence  Pulmonary hypertension is related to fluid overload and obstructive sleep apnea.\par Discussed the case in details with the patient and the son.  I informed them the need to be compliant with the diuretics and increase activity and daily basis.  The patient should walk at least twice a day down the block

## 2022-11-30 NOTE — PHYSICAL EXAM
[No Acute Distress] : no acute distress [Normal Oropharynx] : normal oropharynx [Normal Appearance] : normal appearance [No Neck Mass] : no neck mass [Normal Rate/Rhythm] : normal rate/rhythm [Normal S1, S2] : normal s1, s2 [No Murmurs] : no murmurs [No Resp Distress] : no resp distress [Rales] : rales [No Abnormalities] : no abnormalities [Benign] : benign [Not Tender] : not tender [Normal Gait] : normal gait [No Clubbing] : no clubbing [No Cyanosis] : no cyanosis [No Edema] : no edema [FROM] : FROM [Normal Color/ Pigmentation] : normal color/ pigmentation [No Focal Deficits] : no focal deficits [Oriented x3] : oriented x3 [Normal Affect] : normal affect [TextBox_54] : 3+ pitting edema up to the thighs [TextBox_68] : mild crackles at the bases with poor air movement [TextBox_89] : mildly distended

## 2022-12-09 PROBLEM — G47.33 OSA (OBSTRUCTIVE SLEEP APNEA): Status: ACTIVE | Noted: 2021-08-04

## 2022-12-09 NOTE — REASON FOR VISIT
[Home] : at home, [unfilled] , at the time of the visit. [Medical Office: ([facility])___] : at the medical office located in  [Verbal consent obtained from patient] : the patient, [unfilled] [Follow-Up] : a follow-up visit

## 2022-12-09 NOTE — HISTORY OF PRESENT ILLNESS
[Never] : never [TextBox_4] : .  He lost weight.  Still using the oxygen 1 L to 1-1/2.  His oxygen saturation is good.  He is losing weight.  The legs not swollen as much [ESS] : 0

## 2022-12-14 PROBLEM — R09.02 HYPOXEMIA: Status: ACTIVE | Noted: 2022-01-01

## 2022-12-14 PROBLEM — C85.90 LYMPHOMA: Status: ACTIVE | Noted: 2021-10-27

## 2022-12-14 PROBLEM — I50.9 CHF, LEFT VENTRICULAR: Status: ACTIVE | Noted: 2022-01-04

## 2022-12-14 PROBLEM — D69.59 CHEMOTHERAPY-INDUCED THROMBOCYTOPENIA: Status: ACTIVE | Noted: 2022-01-01

## 2022-12-14 PROBLEM — I48.91 ATRIAL FIBRILLATION: Status: ACTIVE | Noted: 2022-01-01

## 2022-12-14 PROBLEM — N18.9 CHRONIC RENAL INSUFFICIENCY: Status: ACTIVE | Noted: 2021-12-16

## 2022-12-14 PROBLEM — E78.00 HYPERCHOLESTEROLEMIA: Status: ACTIVE | Noted: 2020-08-13

## 2023-01-01 ENCOUNTER — APPOINTMENT (OUTPATIENT)
Dept: NEPHROLOGY | Facility: CLINIC | Age: 73
End: 2023-01-01

## 2023-01-01 ENCOUNTER — INPATIENT (INPATIENT)
Facility: HOSPITAL | Age: 73
LOS: 4 days | Discharge: NURSING, REHAB OR LONG-TERM CARE | DRG: 871 | End: 2023-03-30
Payer: MEDICARE

## 2023-01-01 ENCOUNTER — TRANSCRIPTION ENCOUNTER (OUTPATIENT)
Age: 73
End: 2023-01-01

## 2023-01-01 ENCOUNTER — INPATIENT (INPATIENT)
Facility: HOSPITAL | Age: 73
LOS: 0 days | Discharge: HOME | DRG: 543 | End: 2023-04-05
Payer: COMMERCIAL

## 2023-01-01 ENCOUNTER — RESULT REVIEW (OUTPATIENT)
Age: 73
End: 2023-01-01

## 2023-01-01 ENCOUNTER — INPATIENT (INPATIENT)
Facility: HOSPITAL | Age: 73
LOS: 7 days | Discharge: NURSING, REHAB OR LONG-TERM CARE | DRG: 291 | End: 2023-03-16
Payer: MEDICARE

## 2023-01-01 ENCOUNTER — APPOINTMENT (OUTPATIENT)
Dept: NEPHROLOGY | Facility: CLINIC | Age: 73
End: 2023-01-01
Payer: MEDICARE

## 2023-01-01 ENCOUNTER — APPOINTMENT (OUTPATIENT)
Age: 73
End: 2023-01-01

## 2023-01-01 ENCOUNTER — APPOINTMENT (OUTPATIENT)
Dept: HEART AND VASCULAR | Facility: CLINIC | Age: 73
End: 2023-01-01

## 2023-01-01 ENCOUNTER — APPOINTMENT (OUTPATIENT)
Dept: OTOLARYNGOLOGY | Facility: CLINIC | Age: 73
End: 2023-01-01
Payer: MEDICARE

## 2023-01-01 VITALS
HEART RATE: 89 BPM | DIASTOLIC BLOOD PRESSURE: 74 MMHG | OXYGEN SATURATION: 94 % | WEIGHT: 158.07 LBS | RESPIRATION RATE: 18 BRPM | SYSTOLIC BLOOD PRESSURE: 121 MMHG | TEMPERATURE: 98 F

## 2023-01-01 VITALS
OXYGEN SATURATION: 94 % | RESPIRATION RATE: 40 BRPM | HEART RATE: 124 BPM | TEMPERATURE: 105 F | DIASTOLIC BLOOD PRESSURE: 56 MMHG | WEIGHT: 145.06 LBS | SYSTOLIC BLOOD PRESSURE: 104 MMHG

## 2023-01-01 VITALS
DIASTOLIC BLOOD PRESSURE: 65 MMHG | TEMPERATURE: 98 F | OXYGEN SATURATION: 97 % | SYSTOLIC BLOOD PRESSURE: 124 MMHG | RESPIRATION RATE: 18 BRPM | HEART RATE: 80 BPM

## 2023-01-01 VITALS
HEIGHT: 62 IN | WEIGHT: 134.92 LBS | SYSTOLIC BLOOD PRESSURE: 102 MMHG | RESPIRATION RATE: 17 BRPM | DIASTOLIC BLOOD PRESSURE: 55 MMHG | TEMPERATURE: 98 F | HEART RATE: 97 BPM | OXYGEN SATURATION: 94 %

## 2023-01-01 VITALS
OXYGEN SATURATION: 99 % | HEART RATE: 77 BPM | RESPIRATION RATE: 17 BRPM | TEMPERATURE: 98 F | SYSTOLIC BLOOD PRESSURE: 118 MMHG | DIASTOLIC BLOOD PRESSURE: 61 MMHG

## 2023-01-01 VITALS
DIASTOLIC BLOOD PRESSURE: 66 MMHG | TEMPERATURE: 99 F | OXYGEN SATURATION: 96 % | SYSTOLIC BLOOD PRESSURE: 109 MMHG | RESPIRATION RATE: 18 BRPM | HEART RATE: 83 BPM

## 2023-01-01 VITALS — BODY MASS INDEX: 27.44 KG/M2 | WEIGHT: 170 LBS

## 2023-01-01 DIAGNOSIS — N17.9 ACUTE KIDNEY FAILURE, UNSPECIFIED: ICD-10-CM

## 2023-01-01 DIAGNOSIS — R73.9 HYPERGLYCEMIA, UNSPECIFIED: ICD-10-CM

## 2023-01-01 DIAGNOSIS — C80.1 MALIGNANT (PRIMARY) NEOPLASM, UNSPECIFIED: ICD-10-CM

## 2023-01-01 DIAGNOSIS — J34.0 ABSCESS, FURUNCLE AND CARBUNCLE OF NOSE: ICD-10-CM

## 2023-01-01 DIAGNOSIS — J90 PLEURAL EFFUSION, NOT ELSEWHERE CLASSIFIED: ICD-10-CM

## 2023-01-01 DIAGNOSIS — Z94.0 KIDNEY TRANSPLANT STATUS: ICD-10-CM

## 2023-01-01 DIAGNOSIS — E87.6 HYPOKALEMIA: ICD-10-CM

## 2023-01-01 DIAGNOSIS — M27.2 INFLAMMATORY CONDITIONS OF JAWS: Chronic | ICD-10-CM

## 2023-01-01 DIAGNOSIS — E11.65 TYPE 2 DIABETES MELLITUS WITH HYPERGLYCEMIA: ICD-10-CM

## 2023-01-01 DIAGNOSIS — I50.33 ACUTE ON CHRONIC DIASTOLIC (CONGESTIVE) HEART FAILURE: ICD-10-CM

## 2023-01-01 DIAGNOSIS — J96.01 ACUTE RESPIRATORY FAILURE WITH HYPOXIA: ICD-10-CM

## 2023-01-01 DIAGNOSIS — J91.0 MALIGNANT PLEURAL EFFUSION: ICD-10-CM

## 2023-01-01 DIAGNOSIS — E78.5 HYPERLIPIDEMIA, UNSPECIFIED: ICD-10-CM

## 2023-01-01 DIAGNOSIS — C91.10 CHRONIC LYMPHOCYTIC LEUKEMIA OF B-CELL TYPE NOT HAVING ACHIEVED REMISSION: ICD-10-CM

## 2023-01-01 DIAGNOSIS — E11.51 TYPE 2 DIABETES MELLITUS WITH DIABETIC PERIPHERAL ANGIOPATHY W/OUT GANGRENE: ICD-10-CM

## 2023-01-01 DIAGNOSIS — N39.0 URINARY TRACT INFECTION, SITE NOT SPECIFIED: ICD-10-CM

## 2023-01-01 DIAGNOSIS — K59.00 CONSTIPATION, UNSPECIFIED: ICD-10-CM

## 2023-01-01 DIAGNOSIS — Z95.1 PRESENCE OF AORTOCORONARY BYPASS GRAFT: Chronic | ICD-10-CM

## 2023-01-01 DIAGNOSIS — N18.9 CHRONIC KIDNEY DISEASE, UNSPECIFIED: ICD-10-CM
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
## 2023-01-01 DIAGNOSIS — C76.0 MALIGNANT NEOPLASM OF HEAD, FACE AND NECK: ICD-10-CM

## 2023-01-01 DIAGNOSIS — R13.10 DYSPHAGIA, UNSPECIFIED: ICD-10-CM

## 2023-01-01 DIAGNOSIS — R65.20 SEVERE SEPSIS WITHOUT SEPTIC SHOCK: ICD-10-CM

## 2023-01-01 DIAGNOSIS — I25.10 ATHEROSCLEROTIC HEART DISEASE OF NATIVE CORONARY ARTERY WITHOUT ANGINA PECTORIS: ICD-10-CM

## 2023-01-01 DIAGNOSIS — D47.2 MONOCLONAL GAMMOPATHY: ICD-10-CM

## 2023-01-01 DIAGNOSIS — Z98.890 OTHER SPECIFIED POSTPROCEDURAL STATES: Chronic | ICD-10-CM

## 2023-01-01 DIAGNOSIS — E87.1 HYPO-OSMOLALITY AND HYPONATREMIA: ICD-10-CM

## 2023-01-01 DIAGNOSIS — R63.8 OTHER SYMPTOMS AND SIGNS CONCERNING FOOD AND FLUID INTAKE: ICD-10-CM

## 2023-01-01 DIAGNOSIS — A41.9 SEPSIS, UNSPECIFIED ORGANISM: ICD-10-CM

## 2023-01-01 DIAGNOSIS — T86.12 KIDNEY TRANSPLANT FAILURE: ICD-10-CM

## 2023-01-01 DIAGNOSIS — E11.22 TYPE 2 DIABETES MELLITUS WITH DIABETIC CHRONIC KIDNEY DISEASE: ICD-10-CM

## 2023-01-01 DIAGNOSIS — E11.9 TYPE 2 DIABETES MELLITUS WITHOUT COMPLICATIONS: ICD-10-CM

## 2023-01-01 DIAGNOSIS — C06.9 MALIGNANT NEOPLASM OF MOUTH, UNSPECIFIED: ICD-10-CM

## 2023-01-01 DIAGNOSIS — J69.0 PNEUMONITIS DUE TO INHALATION OF FOOD AND VOMIT: ICD-10-CM

## 2023-01-01 DIAGNOSIS — I48.91 UNSPECIFIED ATRIAL FIBRILLATION: ICD-10-CM

## 2023-01-01 DIAGNOSIS — D64.9 ANEMIA, UNSPECIFIED: ICD-10-CM

## 2023-01-01 DIAGNOSIS — K21.9 GASTRO-ESOPHAGEAL REFLUX DISEASE WITHOUT ESOPHAGITIS: ICD-10-CM

## 2023-01-01 DIAGNOSIS — R62.7 ADULT FAILURE TO THRIVE: ICD-10-CM

## 2023-01-01 DIAGNOSIS — I11.0 HYPERTENSIVE HEART DISEASE WITH HEART FAILURE: ICD-10-CM

## 2023-01-01 DIAGNOSIS — R10.9 UNSPECIFIED ABDOMINAL PAIN: ICD-10-CM

## 2023-01-01 DIAGNOSIS — K55.069 ACUTE INFARCTION OF INTESTINE, PART AND EXTENT UNSPECIFIED: ICD-10-CM

## 2023-01-01 DIAGNOSIS — Z85.89 PERSONAL HISTORY OF MALIGNANT NEOPLASM OF OTHER ORGANS AND SYSTEMS: ICD-10-CM

## 2023-01-01 DIAGNOSIS — C85.90 NON-HODGKIN LYMPHOMA, UNSPECIFIED, UNSPECIFIED SITE: ICD-10-CM

## 2023-01-01 DIAGNOSIS — Z29.9 ENCOUNTER FOR PROPHYLACTIC MEASURES, UNSPECIFIED: ICD-10-CM

## 2023-01-01 DIAGNOSIS — E87.5 HYPERKALEMIA: ICD-10-CM

## 2023-01-01 DIAGNOSIS — G47.33 OBSTRUCTIVE SLEEP APNEA (ADULT) (PEDIATRIC): ICD-10-CM

## 2023-01-01 DIAGNOSIS — R16.1 SPLENOMEGALY, NOT ELSEWHERE CLASSIFIED: ICD-10-CM

## 2023-01-01 DIAGNOSIS — I48.20 CHRONIC ATRIAL FIBRILLATION, UNSPECIFIED: ICD-10-CM

## 2023-01-01 DIAGNOSIS — K12.30 ORAL MUCOSITIS (ULCERATIVE), UNSPECIFIED: ICD-10-CM

## 2023-01-01 DIAGNOSIS — R33.9 RETENTION OF URINE, UNSPECIFIED: ICD-10-CM

## 2023-01-01 DIAGNOSIS — K22.70 BARRETT'S ESOPHAGUS WITHOUT DYSPLASIA: ICD-10-CM

## 2023-01-01 DIAGNOSIS — Z79.01 LONG TERM (CURRENT) USE OF ANTICOAGULANTS: ICD-10-CM

## 2023-01-01 DIAGNOSIS — Z96.41 PRESENCE OF INSULIN PUMP (EXTERNAL) (INTERNAL): ICD-10-CM

## 2023-01-01 DIAGNOSIS — D47.Z1 POST-TRANSPLANT LYMPHOPROLIFERATIVE DISORDER (PTLD): ICD-10-CM

## 2023-01-01 DIAGNOSIS — R22.0 LOCALIZED SWELLING, MASS AND LUMP, HEAD: ICD-10-CM

## 2023-01-01 DIAGNOSIS — R60.0 LOCALIZED EDEMA: ICD-10-CM

## 2023-01-01 DIAGNOSIS — Z95.1 PRESENCE OF AORTOCORONARY BYPASS GRAFT: ICD-10-CM

## 2023-01-01 DIAGNOSIS — I10 ESSENTIAL (PRIMARY) HYPERTENSION: ICD-10-CM

## 2023-01-01 DIAGNOSIS — D63.1 CHRONIC KIDNEY DISEASE, UNSPECIFIED: ICD-10-CM

## 2023-01-01 DIAGNOSIS — J32.9 CHRONIC SINUSITIS, UNSPECIFIED: ICD-10-CM

## 2023-01-01 DIAGNOSIS — Z79.4 LONG TERM (CURRENT) USE OF INSULIN: ICD-10-CM

## 2023-01-01 DIAGNOSIS — I27.20 PULMONARY HYPERTENSION, UNSPECIFIED: ICD-10-CM

## 2023-01-01 DIAGNOSIS — I50.30 UNSPECIFIED DIASTOLIC (CONGESTIVE) HEART FAILURE: ICD-10-CM

## 2023-01-01 DIAGNOSIS — E44.0 MODERATE PROTEIN-CALORIE MALNUTRITION: ICD-10-CM

## 2023-01-01 DIAGNOSIS — I50.9 HEART FAILURE, UNSPECIFIED: ICD-10-CM

## 2023-01-01 DIAGNOSIS — Z00.00 ENCOUNTER FOR GENERAL ADULT MEDICAL EXAMINATION WITHOUT ABNORMAL FINDINGS: ICD-10-CM

## 2023-01-01 DIAGNOSIS — N18.30 CHRONIC KIDNEY DISEASE, STAGE 3 UNSPECIFIED: ICD-10-CM

## 2023-01-01 DIAGNOSIS — I25.10 ATHEROSCLEROTIC HEART DISEASE OF NATIVE CORONARY ARTERY W/OUT ANGINA PECTORIS: ICD-10-CM

## 2023-01-01 DIAGNOSIS — I48.0 PAROXYSMAL ATRIAL FIBRILLATION: ICD-10-CM

## 2023-01-01 LAB
% ALBUMIN: 56.2 % — SIGNIFICANT CHANGE UP
% ALPHA 1: 9.2 % — SIGNIFICANT CHANGE UP
% ALPHA 2: 14.6 % — SIGNIFICANT CHANGE UP
% BETA: 9.7 % — SIGNIFICANT CHANGE UP
% GAMMA: 10.3 % — SIGNIFICANT CHANGE UP
% M SPIKE: 2.1 % — SIGNIFICANT CHANGE UP
A1C WITH ESTIMATED AVERAGE GLUCOSE RESULT: 6 % — HIGH (ref 4–5.6)
ALBUMIN FLD-MCNC: 1.4 G/DL — SIGNIFICANT CHANGE UP
ALBUMIN SERPL ELPH-MCNC: 2.4 G/DL — LOW (ref 3.3–5)
ALBUMIN SERPL ELPH-MCNC: 2.4 G/DL — LOW (ref 3.3–5)
ALBUMIN SERPL ELPH-MCNC: 2.5 G/DL — LOW (ref 3.3–5)
ALBUMIN SERPL ELPH-MCNC: 2.5 G/DL — LOW (ref 3.3–5)
ALBUMIN SERPL ELPH-MCNC: 2.6 G/DL — LOW (ref 3.3–5)
ALBUMIN SERPL ELPH-MCNC: 2.6 G/DL — LOW (ref 3.6–5.5)
ALBUMIN SERPL ELPH-MCNC: 2.7 G/DL — LOW (ref 3.3–5)
ALBUMIN SERPL ELPH-MCNC: 2.8 G/DL — LOW (ref 3.3–5)
ALBUMIN SERPL ELPH-MCNC: 2.9 G/DL — LOW (ref 3.3–5)
ALBUMIN SERPL ELPH-MCNC: 2.9 G/DL — LOW (ref 3.3–5)
ALBUMIN SERPL ELPH-MCNC: 3 G/DL — LOW (ref 3.3–5)
ALBUMIN SERPL ELPH-MCNC: 3.1 G/DL — LOW (ref 3.3–5)
ALBUMIN SERPL ELPH-MCNC: 3.2 G/DL — LOW (ref 3.3–5)
ALBUMIN/GLOB SERPL ELPH: 1.3 RATIO — SIGNIFICANT CHANGE UP
ALP SERPL-CCNC: 100 U/L — SIGNIFICANT CHANGE UP (ref 40–120)
ALP SERPL-CCNC: 100 U/L — SIGNIFICANT CHANGE UP (ref 40–120)
ALP SERPL-CCNC: 101 U/L — SIGNIFICANT CHANGE UP (ref 40–120)
ALP SERPL-CCNC: 102 U/L — SIGNIFICANT CHANGE UP (ref 40–120)
ALP SERPL-CCNC: 103 U/L — SIGNIFICANT CHANGE UP (ref 40–120)
ALP SERPL-CCNC: 104 U/L — SIGNIFICANT CHANGE UP (ref 40–120)
ALP SERPL-CCNC: 104 U/L — SIGNIFICANT CHANGE UP (ref 40–120)
ALP SERPL-CCNC: 107 U/L — SIGNIFICANT CHANGE UP (ref 40–120)
ALP SERPL-CCNC: 107 U/L — SIGNIFICANT CHANGE UP (ref 40–120)
ALP SERPL-CCNC: 110 U/L — SIGNIFICANT CHANGE UP (ref 40–120)
ALP SERPL-CCNC: 117 U/L — SIGNIFICANT CHANGE UP (ref 40–120)
ALP SERPL-CCNC: 119 U/L — SIGNIFICANT CHANGE UP (ref 40–120)
ALP SERPL-CCNC: 124 U/L — HIGH (ref 40–120)
ALP SERPL-CCNC: 91 U/L — SIGNIFICANT CHANGE UP (ref 40–120)
ALPHA1 GLOB SERPL ELPH-MCNC: 0.4 G/DL — SIGNIFICANT CHANGE UP (ref 0.1–0.4)
ALPHA2 GLOB SERPL ELPH-MCNC: 0.7 G/DL — SIGNIFICANT CHANGE UP (ref 0.5–1)
ALT FLD-CCNC: 10 U/L — SIGNIFICANT CHANGE UP (ref 10–45)
ALT FLD-CCNC: 10 U/L — SIGNIFICANT CHANGE UP (ref 10–45)
ALT FLD-CCNC: 11 U/L — SIGNIFICANT CHANGE UP (ref 10–45)
ALT FLD-CCNC: 11 U/L — SIGNIFICANT CHANGE UP (ref 10–45)
ALT FLD-CCNC: 12 U/L — SIGNIFICANT CHANGE UP (ref 10–45)
ALT FLD-CCNC: 12 U/L — SIGNIFICANT CHANGE UP (ref 10–45)
ALT FLD-CCNC: 13 U/L — SIGNIFICANT CHANGE UP (ref 10–45)
ALT FLD-CCNC: 14 U/L — SIGNIFICANT CHANGE UP (ref 10–45)
ALT FLD-CCNC: 15 U/L — SIGNIFICANT CHANGE UP (ref 10–45)
ALT FLD-CCNC: 16 U/L — SIGNIFICANT CHANGE UP (ref 10–45)
ALT FLD-CCNC: 16 U/L — SIGNIFICANT CHANGE UP (ref 10–45)
ALT FLD-CCNC: 8 U/L — LOW (ref 10–45)
ALT FLD-CCNC: 8 U/L — LOW (ref 10–45)
ALT FLD-CCNC: 9 U/L — LOW (ref 10–45)
ANION GAP SERPL CALC-SCNC: 10 MMOL/L — SIGNIFICANT CHANGE UP (ref 5–17)
ANION GAP SERPL CALC-SCNC: 11 MMOL/L — SIGNIFICANT CHANGE UP (ref 5–17)
ANION GAP SERPL CALC-SCNC: 12 MMOL/L — SIGNIFICANT CHANGE UP (ref 5–17)
ANION GAP SERPL CALC-SCNC: 13 MMOL/L — SIGNIFICANT CHANGE UP (ref 5–17)
ANION GAP SERPL CALC-SCNC: 14 MMOL/L — SIGNIFICANT CHANGE UP (ref 5–17)
ANION GAP SERPL CALC-SCNC: 15 MMOL/L — SIGNIFICANT CHANGE UP (ref 5–17)
ANION GAP SERPL CALC-SCNC: 16 MMOL/L — SIGNIFICANT CHANGE UP (ref 5–17)
ANION GAP SERPL CALC-SCNC: 5 MMOL/L — SIGNIFICANT CHANGE UP (ref 5–17)
ANION GAP SERPL CALC-SCNC: 5 MMOL/L — SIGNIFICANT CHANGE UP (ref 5–17)
ANION GAP SERPL CALC-SCNC: 7 MMOL/L — SIGNIFICANT CHANGE UP (ref 5–17)
ANION GAP SERPL CALC-SCNC: 7 MMOL/L — SIGNIFICANT CHANGE UP (ref 5–17)
ANION GAP SERPL CALC-SCNC: 8 MMOL/L — SIGNIFICANT CHANGE UP (ref 5–17)
ANION GAP SERPL CALC-SCNC: 8 MMOL/L — SIGNIFICANT CHANGE UP (ref 5–17)
ANION GAP SERPL CALC-SCNC: 9 MMOL/L — SIGNIFICANT CHANGE UP (ref 5–17)
ANION GAP SERPL CALC-SCNC: 9 MMOL/L — SIGNIFICANT CHANGE UP (ref 5–17)
ANISOCYTOSIS BLD QL: SIGNIFICANT CHANGE UP
ANISOCYTOSIS BLD QL: SLIGHT — SIGNIFICANT CHANGE UP
APPEARANCE UR: ABNORMAL
APPEARANCE UR: CLEAR — SIGNIFICANT CHANGE UP
APTT BLD: 33.8 SEC — SIGNIFICANT CHANGE UP (ref 27.5–35.5)
APTT BLD: 34.8 SEC — SIGNIFICANT CHANGE UP (ref 27.5–35.5)
APTT BLD: 34.9 SEC — SIGNIFICANT CHANGE UP (ref 27.5–35.5)
APTT BLD: 35.6 SEC — HIGH (ref 27.5–35.5)
APTT BLD: 36.6 SEC — HIGH (ref 27.5–35.5)
APTT BLD: 36.9 SEC — HIGH (ref 27.5–35.5)
APTT BLD: 38.9 SEC — HIGH (ref 27.5–35.5)
AST SERPL-CCNC: 11 U/L — SIGNIFICANT CHANGE UP (ref 10–40)
AST SERPL-CCNC: 11 U/L — SIGNIFICANT CHANGE UP (ref 10–40)
AST SERPL-CCNC: 13 U/L — SIGNIFICANT CHANGE UP (ref 10–40)
AST SERPL-CCNC: 14 U/L — SIGNIFICANT CHANGE UP (ref 10–40)
AST SERPL-CCNC: 14 U/L — SIGNIFICANT CHANGE UP (ref 10–40)
AST SERPL-CCNC: 21 U/L — SIGNIFICANT CHANGE UP (ref 10–40)
AST SERPL-CCNC: 21 U/L — SIGNIFICANT CHANGE UP (ref 10–40)
AST SERPL-CCNC: 24 U/L — SIGNIFICANT CHANGE UP (ref 10–40)
AST SERPL-CCNC: 25 U/L — SIGNIFICANT CHANGE UP (ref 10–40)
AST SERPL-CCNC: 26 U/L — SIGNIFICANT CHANGE UP (ref 10–40)
B PERT IGG+IGM PNL SER: SIGNIFICANT CHANGE UP
B-GLOBULIN SERPL ELPH-MCNC: 0.4 G/DL — LOW (ref 0.5–1)
B-OH-BUTYR SERPL-SCNC: 0.1 MMOL/L — SIGNIFICANT CHANGE UP
B-OH-BUTYR SERPL-SCNC: 0.3 MMOL/L — SIGNIFICANT CHANGE UP
B2 GLYCOPROT1 AB SER QL: NEGATIVE — SIGNIFICANT CHANGE UP
B2 MICROGLOB SERPL-MCNC: 10.3 MG/L — HIGH (ref 0.8–2.2)
BACTERIA # UR AUTO: ABNORMAL /HPF
BACTERIA # UR AUTO: PRESENT /HPF
BASO STIPL BLD QL SMEAR: PRESENT — SIGNIFICANT CHANGE UP
BASOPHILS # BLD AUTO: 0 K/UL — SIGNIFICANT CHANGE UP (ref 0–0.2)
BASOPHILS # BLD AUTO: 0.01 K/UL — SIGNIFICANT CHANGE UP (ref 0–0.2)
BASOPHILS # BLD AUTO: 0.02 K/UL — SIGNIFICANT CHANGE UP (ref 0–0.2)
BASOPHILS # BLD AUTO: 0.03 K/UL — SIGNIFICANT CHANGE UP (ref 0–0.2)
BASOPHILS NFR BLD AUTO: 0 % — SIGNIFICANT CHANGE UP (ref 0–2)
BASOPHILS NFR BLD AUTO: 0.1 % — SIGNIFICANT CHANGE UP (ref 0–2)
BASOPHILS NFR BLD AUTO: 0.2 % — SIGNIFICANT CHANGE UP (ref 0–2)
BILIRUB SERPL-MCNC: 0.4 MG/DL — SIGNIFICANT CHANGE UP (ref 0.2–1.2)
BILIRUB SERPL-MCNC: 0.4 MG/DL — SIGNIFICANT CHANGE UP (ref 0.2–1.2)
BILIRUB SERPL-MCNC: 0.5 MG/DL — SIGNIFICANT CHANGE UP (ref 0.2–1.2)
BILIRUB SERPL-MCNC: 0.6 MG/DL — SIGNIFICANT CHANGE UP (ref 0.2–1.2)
BILIRUB SERPL-MCNC: 0.7 MG/DL — SIGNIFICANT CHANGE UP (ref 0.2–1.2)
BILIRUB SERPL-MCNC: 0.7 MG/DL — SIGNIFICANT CHANGE UP (ref 0.2–1.2)
BILIRUB UR-MCNC: NEGATIVE — SIGNIFICANT CHANGE UP
BILIRUB UR-MCNC: NEGATIVE — SIGNIFICANT CHANGE UP
BLASTS # FLD: 1 % — HIGH (ref 0–0)
BLD GP AB SCN SERPL QL: NEGATIVE — SIGNIFICANT CHANGE UP
BUN SERPL-MCNC: 11 MG/DL — SIGNIFICANT CHANGE UP (ref 7–23)
BUN SERPL-MCNC: 11 MG/DL — SIGNIFICANT CHANGE UP (ref 7–23)
BUN SERPL-MCNC: 14 MG/DL — SIGNIFICANT CHANGE UP (ref 7–23)
BUN SERPL-MCNC: 15 MG/DL — SIGNIFICANT CHANGE UP (ref 7–23)
BUN SERPL-MCNC: 16 MG/DL — SIGNIFICANT CHANGE UP (ref 7–23)
BUN SERPL-MCNC: 24 MG/DL — HIGH (ref 7–23)
BUN SERPL-MCNC: 24 MG/DL — HIGH (ref 7–23)
BUN SERPL-MCNC: 36 MG/DL — HIGH (ref 7–23)
BUN SERPL-MCNC: 36 MG/DL — HIGH (ref 7–23)
BUN SERPL-MCNC: 44 MG/DL — HIGH (ref 7–23)
BUN SERPL-MCNC: 46 MG/DL — HIGH (ref 7–23)
BUN SERPL-MCNC: 49 MG/DL — HIGH (ref 7–23)
BUN SERPL-MCNC: 51 MG/DL — HIGH (ref 7–23)
BUN SERPL-MCNC: 56 MG/DL — HIGH (ref 7–23)
BUN SERPL-MCNC: 59 MG/DL — HIGH (ref 7–23)
BUN SERPL-MCNC: 59 MG/DL — HIGH (ref 7–23)
BUN SERPL-MCNC: 64 MG/DL — HIGH (ref 7–23)
BUN SERPL-MCNC: 9 MG/DL — SIGNIFICANT CHANGE UP (ref 7–23)
BURR CELLS BLD QL SMEAR: PRESENT — SIGNIFICANT CHANGE UP
BURR CELLS BLD QL SMEAR: PRESENT — SIGNIFICANT CHANGE UP
CALCIUM SERPL-MCNC: 8.1 MG/DL — LOW (ref 8.4–10.5)
CALCIUM SERPL-MCNC: 8.2 MG/DL — LOW (ref 8.4–10.5)
CALCIUM SERPL-MCNC: 8.2 MG/DL — LOW (ref 8.4–10.5)
CALCIUM SERPL-MCNC: 8.3 MG/DL — LOW (ref 8.4–10.5)
CALCIUM SERPL-MCNC: 8.4 MG/DL — SIGNIFICANT CHANGE UP (ref 8.4–10.5)
CALCIUM SERPL-MCNC: 8.5 MG/DL — SIGNIFICANT CHANGE UP (ref 8.4–10.5)
CALCIUM SERPL-MCNC: 8.5 MG/DL — SIGNIFICANT CHANGE UP (ref 8.4–10.5)
CALCIUM SERPL-MCNC: 8.6 MG/DL — SIGNIFICANT CHANGE UP (ref 8.4–10.5)
CALCIUM SERPL-MCNC: 8.7 MG/DL — SIGNIFICANT CHANGE UP (ref 8.4–10.5)
CALCIUM SERPL-MCNC: 8.8 MG/DL — SIGNIFICANT CHANGE UP (ref 8.4–10.5)
CALCIUM SERPL-MCNC: 9 MG/DL — SIGNIFICANT CHANGE UP (ref 8.4–10.5)
CALCIUM SERPL-MCNC: 9 MG/DL — SIGNIFICANT CHANGE UP (ref 8.4–10.5)
CARDIOLIPIN AB SER-ACNC: NEGATIVE — SIGNIFICANT CHANGE UP
CARDIOLIPIN AB SER-ACNC: NEGATIVE — SIGNIFICANT CHANGE UP
CHLORIDE SERPL-SCNC: 100 MMOL/L — SIGNIFICANT CHANGE UP (ref 96–108)
CHLORIDE SERPL-SCNC: 102 MMOL/L — SIGNIFICANT CHANGE UP (ref 96–108)
CHLORIDE SERPL-SCNC: 93 MMOL/L — LOW (ref 96–108)
CHLORIDE SERPL-SCNC: 94 MMOL/L — LOW (ref 96–108)
CHLORIDE SERPL-SCNC: 95 MMOL/L — LOW (ref 96–108)
CHLORIDE SERPL-SCNC: 96 MMOL/L — SIGNIFICANT CHANGE UP (ref 96–108)
CHLORIDE SERPL-SCNC: 96 MMOL/L — SIGNIFICANT CHANGE UP (ref 96–108)
CHLORIDE SERPL-SCNC: 97 MMOL/L — SIGNIFICANT CHANGE UP (ref 96–108)
CHLORIDE SERPL-SCNC: 99 MMOL/L — SIGNIFICANT CHANGE UP (ref 96–108)
CHOLEST FLD-MCNC: 24 MG/DL — SIGNIFICANT CHANGE UP
CHOLEST SERPL-MCNC: 113 MG/DL — SIGNIFICANT CHANGE UP
CO2 SERPL-SCNC: 18 MMOL/L — LOW (ref 22–31)
CO2 SERPL-SCNC: 18 MMOL/L — LOW (ref 22–31)
CO2 SERPL-SCNC: 21 MMOL/L — LOW (ref 22–31)
CO2 SERPL-SCNC: 21 MMOL/L — LOW (ref 22–31)
CO2 SERPL-SCNC: 22 MMOL/L — SIGNIFICANT CHANGE UP (ref 22–31)
CO2 SERPL-SCNC: 23 MMOL/L — SIGNIFICANT CHANGE UP (ref 22–31)
CO2 SERPL-SCNC: 25 MMOL/L — SIGNIFICANT CHANGE UP (ref 22–31)
CO2 SERPL-SCNC: 25 MMOL/L — SIGNIFICANT CHANGE UP (ref 22–31)
CO2 SERPL-SCNC: 26 MMOL/L — SIGNIFICANT CHANGE UP (ref 22–31)
CO2 SERPL-SCNC: 27 MMOL/L — SIGNIFICANT CHANGE UP (ref 22–31)
CO2 SERPL-SCNC: 27 MMOL/L — SIGNIFICANT CHANGE UP (ref 22–31)
CO2 SERPL-SCNC: 28 MMOL/L — SIGNIFICANT CHANGE UP (ref 22–31)
CO2 SERPL-SCNC: 29 MMOL/L — SIGNIFICANT CHANGE UP (ref 22–31)
CO2 SERPL-SCNC: 29 MMOL/L — SIGNIFICANT CHANGE UP (ref 22–31)
COLOR FLD: YELLOW — SIGNIFICANT CHANGE UP
COLOR SPEC: YELLOW — SIGNIFICANT CHANGE UP
COLOR SPEC: YELLOW — SIGNIFICANT CHANGE UP
CONFIRM APTT STACLOT: POSITIVE
CREAT ?TM UR-MCNC: 73 MG/DL — SIGNIFICANT CHANGE UP
CREAT FLD-MCNC: 0.62 MG/DL — SIGNIFICANT CHANGE UP
CREAT SERPL-MCNC: 0.59 MG/DL — SIGNIFICANT CHANGE UP (ref 0.5–1.3)
CREAT SERPL-MCNC: 0.59 MG/DL — SIGNIFICANT CHANGE UP (ref 0.5–1.3)
CREAT SERPL-MCNC: 0.6 MG/DL — SIGNIFICANT CHANGE UP (ref 0.5–1.3)
CREAT SERPL-MCNC: 0.61 MG/DL — SIGNIFICANT CHANGE UP (ref 0.5–1.3)
CREAT SERPL-MCNC: 0.62 MG/DL — SIGNIFICANT CHANGE UP (ref 0.5–1.3)
CREAT SERPL-MCNC: 0.8 MG/DL — SIGNIFICANT CHANGE UP (ref 0.5–1.3)
CREAT SERPL-MCNC: 0.9 MG/DL — SIGNIFICANT CHANGE UP (ref 0.5–1.3)
CREAT SERPL-MCNC: 0.97 MG/DL — SIGNIFICANT CHANGE UP (ref 0.5–1.3)
CREAT SERPL-MCNC: 1 MG/DL — SIGNIFICANT CHANGE UP (ref 0.5–1.3)
CREAT SERPL-MCNC: 1.06 MG/DL — SIGNIFICANT CHANGE UP (ref 0.5–1.3)
CREAT SERPL-MCNC: 1.09 MG/DL — SIGNIFICANT CHANGE UP (ref 0.5–1.3)
CREAT SERPL-MCNC: 1.12 MG/DL — SIGNIFICANT CHANGE UP (ref 0.5–1.3)
CREAT SERPL-MCNC: 1.13 MG/DL — SIGNIFICANT CHANGE UP (ref 0.5–1.3)
CREAT SERPL-MCNC: 1.17 MG/DL — SIGNIFICANT CHANGE UP (ref 0.5–1.3)
CREAT SERPL-MCNC: 1.17 MG/DL — SIGNIFICANT CHANGE UP (ref 0.5–1.3)
CREAT SERPL-MCNC: 1.3 MG/DL — SIGNIFICANT CHANGE UP (ref 0.5–1.3)
CREAT SERPL-MCNC: 1.32 MG/DL — HIGH (ref 0.5–1.3)
CREAT SERPL-MCNC: 1.32 MG/DL — HIGH (ref 0.5–1.3)
CULTURE RESULTS: NO GROWTH — SIGNIFICANT CHANGE UP
CULTURE RESULTS: SIGNIFICANT CHANGE UP
DACRYOCYTES BLD QL SMEAR: SLIGHT — SIGNIFICANT CHANGE UP
DAT POLY-SP REAG RBC QL: NEGATIVE — SIGNIFICANT CHANGE UP
DIFF PNL FLD: ABNORMAL
DIFF PNL FLD: NEGATIVE — SIGNIFICANT CHANGE UP
DNA PLOIDY SPEC FC-IMP: SIGNIFICANT CHANGE UP
DRVVT RATIO: 0.53 RATIO — SIGNIFICANT CHANGE UP (ref 0–1.03)
DRVVT SCREEN TO CONFIRM RATIO: SIGNIFICANT CHANGE UP
EGFR: 102 ML/MIN/1.73M2 — SIGNIFICANT CHANGE UP
EGFR: 103 ML/MIN/1.73M2 — SIGNIFICANT CHANGE UP
EGFR: 103 ML/MIN/1.73M2 — SIGNIFICANT CHANGE UP
EGFR: 104 ML/MIN/1.73M2 — SIGNIFICANT CHANGE UP
EGFR: 104 ML/MIN/1.73M2 — SIGNIFICANT CHANGE UP
EGFR: 58 ML/MIN/1.73M2 — LOW
EGFR: 58 ML/MIN/1.73M2 — LOW
EGFR: 59 ML/MIN/1.73M2 — LOW
EGFR: 67 ML/MIN/1.73M2 — SIGNIFICANT CHANGE UP
EGFR: 67 ML/MIN/1.73M2 — SIGNIFICANT CHANGE UP
EGFR: 69 ML/MIN/1.73M2 — SIGNIFICANT CHANGE UP
EGFR: 70 ML/MIN/1.73M2 — SIGNIFICANT CHANGE UP
EGFR: 73 ML/MIN/1.73M2 — SIGNIFICANT CHANGE UP
EGFR: 75 ML/MIN/1.73M2 — SIGNIFICANT CHANGE UP
EGFR: 80 ML/MIN/1.73M2 — SIGNIFICANT CHANGE UP
EGFR: 83 ML/MIN/1.73M2 — SIGNIFICANT CHANGE UP
EGFR: 91 ML/MIN/1.73M2 — SIGNIFICANT CHANGE UP
EGFR: 95 ML/MIN/1.73M2 — SIGNIFICANT CHANGE UP
ELLIPTOCYTES BLD QL SMEAR: SLIGHT — SIGNIFICANT CHANGE UP
ELLIPTOCYTES BLD QL SMEAR: SLIGHT — SIGNIFICANT CHANGE UP
EOSINOPHIL # BLD AUTO: 0 K/UL — SIGNIFICANT CHANGE UP (ref 0–0.5)
EOSINOPHIL # BLD AUTO: 0.01 K/UL — SIGNIFICANT CHANGE UP (ref 0–0.5)
EOSINOPHIL # BLD AUTO: 0.02 K/UL — SIGNIFICANT CHANGE UP (ref 0–0.5)
EOSINOPHIL NFR BLD AUTO: 0 % — SIGNIFICANT CHANGE UP (ref 0–6)
EOSINOPHIL NFR BLD AUTO: 0.1 % — SIGNIFICANT CHANGE UP (ref 0–6)
EOSINOPHIL NFR BLD AUTO: 0.1 % — SIGNIFICANT CHANGE UP (ref 0–6)
EPI CELLS # UR: SIGNIFICANT CHANGE UP /HPF (ref 0–5)
EPI CELLS # UR: SIGNIFICANT CHANGE UP /HPF (ref 0–5)
ESTIMATED AVERAGE GLUCOSE: 126 MG/DL — HIGH (ref 68–114)
FERRITIN SERPL-MCNC: 360 NG/ML — SIGNIFICANT CHANGE UP (ref 30–400)
FIBRINOGEN PPP-MCNC: 664 MG/DL — HIGH (ref 200–445)
FLUID INTAKE SUBSTANCE CLASS: SIGNIFICANT CHANGE UP
FOLATE SERPL-MCNC: 12.5 NG/ML — SIGNIFICANT CHANGE UP
GAMMA GLOBULIN: 0.5 G/DL — LOW (ref 0.6–1.6)
GAS PNL BLDA: SIGNIFICANT CHANGE UP
GIANT PLATELETS BLD QL SMEAR: PRESENT — SIGNIFICANT CHANGE UP
GIANT PLATELETS BLD QL SMEAR: PRESENT — SIGNIFICANT CHANGE UP
GLUCOSE BLDC GLUCOMTR-MCNC: 101 MG/DL — HIGH (ref 70–99)
GLUCOSE BLDC GLUCOMTR-MCNC: 101 MG/DL — HIGH (ref 70–99)
GLUCOSE BLDC GLUCOMTR-MCNC: 103 MG/DL — HIGH (ref 70–99)
GLUCOSE BLDC GLUCOMTR-MCNC: 107 MG/DL — HIGH (ref 70–99)
GLUCOSE BLDC GLUCOMTR-MCNC: 110 MG/DL — HIGH (ref 70–99)
GLUCOSE BLDC GLUCOMTR-MCNC: 115 MG/DL — HIGH (ref 70–99)
GLUCOSE BLDC GLUCOMTR-MCNC: 121 MG/DL — HIGH (ref 70–99)
GLUCOSE BLDC GLUCOMTR-MCNC: 125 MG/DL — HIGH (ref 70–99)
GLUCOSE BLDC GLUCOMTR-MCNC: 126 MG/DL — HIGH (ref 70–99)
GLUCOSE BLDC GLUCOMTR-MCNC: 136 MG/DL — HIGH (ref 70–99)
GLUCOSE BLDC GLUCOMTR-MCNC: 144 MG/DL — HIGH (ref 70–99)
GLUCOSE BLDC GLUCOMTR-MCNC: 150 MG/DL — HIGH (ref 70–99)
GLUCOSE BLDC GLUCOMTR-MCNC: 151 MG/DL — HIGH (ref 70–99)
GLUCOSE BLDC GLUCOMTR-MCNC: 155 MG/DL — HIGH (ref 70–99)
GLUCOSE BLDC GLUCOMTR-MCNC: 156 MG/DL — HIGH (ref 70–99)
GLUCOSE BLDC GLUCOMTR-MCNC: 162 MG/DL — HIGH (ref 70–99)
GLUCOSE BLDC GLUCOMTR-MCNC: 166 MG/DL — HIGH (ref 70–99)
GLUCOSE BLDC GLUCOMTR-MCNC: 167 MG/DL — HIGH (ref 70–99)
GLUCOSE BLDC GLUCOMTR-MCNC: 172 MG/DL — HIGH (ref 70–99)
GLUCOSE BLDC GLUCOMTR-MCNC: 176 MG/DL — HIGH (ref 70–99)
GLUCOSE BLDC GLUCOMTR-MCNC: 177 MG/DL — HIGH (ref 70–99)
GLUCOSE BLDC GLUCOMTR-MCNC: 178 MG/DL — HIGH (ref 70–99)
GLUCOSE BLDC GLUCOMTR-MCNC: 180 MG/DL — HIGH (ref 70–99)
GLUCOSE BLDC GLUCOMTR-MCNC: 182 MG/DL — HIGH (ref 70–99)
GLUCOSE BLDC GLUCOMTR-MCNC: 182 MG/DL — HIGH (ref 70–99)
GLUCOSE BLDC GLUCOMTR-MCNC: 183 MG/DL — HIGH (ref 70–99)
GLUCOSE BLDC GLUCOMTR-MCNC: 186 MG/DL — HIGH (ref 70–99)
GLUCOSE BLDC GLUCOMTR-MCNC: 186 MG/DL — HIGH (ref 70–99)
GLUCOSE BLDC GLUCOMTR-MCNC: 187 MG/DL — HIGH (ref 70–99)
GLUCOSE BLDC GLUCOMTR-MCNC: 191 MG/DL — HIGH (ref 70–99)
GLUCOSE BLDC GLUCOMTR-MCNC: 193 MG/DL — HIGH (ref 70–99)
GLUCOSE BLDC GLUCOMTR-MCNC: 198 MG/DL — HIGH (ref 70–99)
GLUCOSE BLDC GLUCOMTR-MCNC: 216 MG/DL — HIGH (ref 70–99)
GLUCOSE BLDC GLUCOMTR-MCNC: 216 MG/DL — HIGH (ref 70–99)
GLUCOSE BLDC GLUCOMTR-MCNC: 224 MG/DL — HIGH (ref 70–99)
GLUCOSE BLDC GLUCOMTR-MCNC: 246 MG/DL — HIGH (ref 70–99)
GLUCOSE BLDC GLUCOMTR-MCNC: 288 MG/DL — HIGH (ref 70–99)
GLUCOSE BLDC GLUCOMTR-MCNC: 289 MG/DL — HIGH (ref 70–99)
GLUCOSE BLDC GLUCOMTR-MCNC: 289 MG/DL — HIGH (ref 70–99)
GLUCOSE BLDC GLUCOMTR-MCNC: 472 MG/DL — CRITICAL HIGH (ref 70–99)
GLUCOSE BLDC GLUCOMTR-MCNC: 494 MG/DL — CRITICAL HIGH (ref 70–99)
GLUCOSE BLDC GLUCOMTR-MCNC: 499 MG/DL — CRITICAL HIGH (ref 70–99)
GLUCOSE BLDC GLUCOMTR-MCNC: 85 MG/DL — SIGNIFICANT CHANGE UP (ref 70–99)
GLUCOSE BLDC GLUCOMTR-MCNC: 90 MG/DL — SIGNIFICANT CHANGE UP (ref 70–99)
GLUCOSE BLDC GLUCOMTR-MCNC: 96 MG/DL — SIGNIFICANT CHANGE UP (ref 70–99)
GLUCOSE FLD-MCNC: 175 MG/DL — SIGNIFICANT CHANGE UP
GLUCOSE SERPL-MCNC: 116 MG/DL — HIGH (ref 70–99)
GLUCOSE SERPL-MCNC: 145 MG/DL — HIGH (ref 70–99)
GLUCOSE SERPL-MCNC: 163 MG/DL — HIGH (ref 70–99)
GLUCOSE SERPL-MCNC: 164 MG/DL — HIGH (ref 70–99)
GLUCOSE SERPL-MCNC: 171 MG/DL — HIGH (ref 70–99)
GLUCOSE SERPL-MCNC: 184 MG/DL — HIGH (ref 70–99)
GLUCOSE SERPL-MCNC: 193 MG/DL — HIGH (ref 70–99)
GLUCOSE SERPL-MCNC: 198 MG/DL — HIGH (ref 70–99)
GLUCOSE SERPL-MCNC: 218 MG/DL — HIGH (ref 70–99)
GLUCOSE SERPL-MCNC: 238 MG/DL — HIGH (ref 70–99)
GLUCOSE SERPL-MCNC: 300 MG/DL — HIGH (ref 70–99)
GLUCOSE SERPL-MCNC: 316 MG/DL — HIGH (ref 70–99)
GLUCOSE SERPL-MCNC: 476 MG/DL — CRITICAL HIGH (ref 70–99)
GLUCOSE SERPL-MCNC: 562 MG/DL — CRITICAL HIGH (ref 70–99)
GLUCOSE SERPL-MCNC: 76 MG/DL — SIGNIFICANT CHANGE UP (ref 70–99)
GLUCOSE SERPL-MCNC: 85 MG/DL — SIGNIFICANT CHANGE UP (ref 70–99)
GLUCOSE SERPL-MCNC: 98 MG/DL — SIGNIFICANT CHANGE UP (ref 70–99)
GLUCOSE SERPL-MCNC: 98 MG/DL — SIGNIFICANT CHANGE UP (ref 70–99)
GLUCOSE UR QL: >=1000
GLUCOSE UR QL: NEGATIVE — SIGNIFICANT CHANGE UP
GRAM STN FLD: SIGNIFICANT CHANGE UP
HAPTOGLOB SERPL-MCNC: SIGNIFICANT CHANGE UP MG/DL (ref 34–200)
HCT VFR BLD CALC: 24.9 % — LOW (ref 39–50)
HCT VFR BLD CALC: 25.8 % — LOW (ref 39–50)
HCT VFR BLD CALC: 27.1 % — LOW (ref 39–50)
HCT VFR BLD CALC: 27.3 % — LOW (ref 39–50)
HCT VFR BLD CALC: 27.4 % — LOW (ref 39–50)
HCT VFR BLD CALC: 27.5 % — LOW (ref 39–50)
HCT VFR BLD CALC: 28.3 % — LOW (ref 39–50)
HCT VFR BLD CALC: 28.5 % — LOW (ref 39–50)
HCT VFR BLD CALC: 28.7 % — LOW (ref 39–50)
HCT VFR BLD CALC: 28.9 % — LOW (ref 39–50)
HCT VFR BLD CALC: 29.6 % — LOW (ref 39–50)
HCT VFR BLD CALC: 30.3 % — LOW (ref 39–50)
HCT VFR BLD CALC: 31.5 % — LOW (ref 39–50)
HCT VFR BLD CALC: 31.7 % — LOW (ref 39–50)
HDLC SERPL-MCNC: 35 MG/DL — LOW
HEMATOPATHOLOGY REPORT: SIGNIFICANT CHANGE UP
HGB BLD-MCNC: 10 G/DL — LOW (ref 13–17)
HGB BLD-MCNC: 7.6 G/DL — LOW (ref 13–17)
HGB BLD-MCNC: 7.9 G/DL — LOW (ref 13–17)
HGB BLD-MCNC: 8.2 G/DL — LOW (ref 13–17)
HGB BLD-MCNC: 8.3 G/DL — LOW (ref 13–17)
HGB BLD-MCNC: 8.4 G/DL — LOW (ref 13–17)
HGB BLD-MCNC: 8.6 G/DL — LOW (ref 13–17)
HGB BLD-MCNC: 8.7 G/DL — LOW (ref 13–17)
HGB BLD-MCNC: 8.7 G/DL — LOW (ref 13–17)
HGB BLD-MCNC: 8.9 G/DL — LOW (ref 13–17)
HGB BLD-MCNC: 8.9 G/DL — LOW (ref 13–17)
HGB BLD-MCNC: 9.1 G/DL — LOW (ref 13–17)
HGB BLD-MCNC: 9.2 G/DL — LOW (ref 13–17)
HGB BLD-MCNC: 9.5 G/DL — LOW (ref 13–17)
HYPOCHROMIA BLD QL: SIGNIFICANT CHANGE UP
HYPOCHROMIA BLD QL: SLIGHT — SIGNIFICANT CHANGE UP
IMM GRANULOCYTES NFR BLD AUTO: 0.1 % — SIGNIFICANT CHANGE UP (ref 0–0.9)
IMM GRANULOCYTES NFR BLD AUTO: 0.1 % — SIGNIFICANT CHANGE UP (ref 0–0.9)
IMM GRANULOCYTES NFR BLD AUTO: 0.2 % — SIGNIFICANT CHANGE UP (ref 0–0.9)
IMM GRANULOCYTES NFR BLD AUTO: 0.3 % — SIGNIFICANT CHANGE UP (ref 0–0.9)
IMM GRANULOCYTES NFR BLD AUTO: 0.3 % — SIGNIFICANT CHANGE UP (ref 0–0.9)
INR BLD: 1.42 — HIGH (ref 0.88–1.16)
INR BLD: 1.46 — HIGH (ref 0.88–1.16)
INR BLD: 1.47 — HIGH (ref 0.88–1.16)
INR BLD: 1.5 — HIGH (ref 0.88–1.16)
INR BLD: 1.51 — HIGH (ref 0.88–1.16)
INR BLD: 1.56 — HIGH (ref 0.88–1.16)
INR BLD: 2.49 — HIGH (ref 0.88–1.16)
INTERPRETATION SERPL IFE-IMP: SIGNIFICANT CHANGE UP
IRON SATN MFR SERPL: 27 % — SIGNIFICANT CHANGE UP (ref 16–55)
IRON SATN MFR SERPL: 37 UG/DL — LOW (ref 45–165)
JAK2 P.V617F BLD/T QL: SIGNIFICANT CHANGE UP
KETONES UR-MCNC: NEGATIVE — SIGNIFICANT CHANGE UP
KETONES UR-MCNC: NEGATIVE — SIGNIFICANT CHANGE UP
LACTATE SERPL-SCNC: 0.7 MMOL/L — SIGNIFICANT CHANGE UP (ref 0.5–2)
LACTATE SERPL-SCNC: 1.9 MMOL/L — SIGNIFICANT CHANGE UP (ref 0.5–2)
LDH SERPL L TO P-CCNC: 120 U/L — SIGNIFICANT CHANGE UP
LDH SERPL L TO P-CCNC: 184 U/L — SIGNIFICANT CHANGE UP (ref 50–242)
LDH SERPL L TO P-CCNC: SIGNIFICANT CHANGE UP U/L (ref 50–242)
LDH SERPL L TO P-CCNC: SIGNIFICANT CHANGE UP U/L (ref 50–242)
LEUKOCYTE ESTERASE UR-ACNC: ABNORMAL
LEUKOCYTE ESTERASE UR-ACNC: ABNORMAL
LIDOCAIN IGE QN: 25 U/L — SIGNIFICANT CHANGE UP (ref 7–60)
LIPID PNL WITH DIRECT LDL SERPL: 55 MG/DL — SIGNIFICANT CHANGE UP
LYMPHOCYTES # BLD AUTO: 10.15 K/UL — HIGH (ref 1–3.3)
LYMPHOCYTES # BLD AUTO: 11.29 K/UL — HIGH (ref 1–3.3)
LYMPHOCYTES # BLD AUTO: 11.94 K/UL — HIGH (ref 1–3.3)
LYMPHOCYTES # BLD AUTO: 11.99 K/UL — HIGH (ref 1–3.3)
LYMPHOCYTES # BLD AUTO: 12.18 K/UL — HIGH (ref 1–3.3)
LYMPHOCYTES # BLD AUTO: 12.46 K/UL — HIGH (ref 1–3.3)
LYMPHOCYTES # BLD AUTO: 12.57 K/UL — HIGH (ref 1–3.3)
LYMPHOCYTES # BLD AUTO: 12.69 K/UL — HIGH (ref 1–3.3)
LYMPHOCYTES # BLD AUTO: 14.79 K/UL — HIGH (ref 1–3.3)
LYMPHOCYTES # BLD AUTO: 15.56 K/UL — HIGH (ref 1–3.3)
LYMPHOCYTES # BLD AUTO: 15.75 K/UL — HIGH (ref 1–3.3)
LYMPHOCYTES # BLD AUTO: 18.02 K/UL — HIGH (ref 1–3.3)
LYMPHOCYTES # BLD AUTO: 23.44 K/UL — HIGH (ref 1–3.3)
LYMPHOCYTES # BLD AUTO: 23.98 K/UL — HIGH (ref 1–3.3)
LYMPHOCYTES # BLD AUTO: 24.62 K/UL — HIGH (ref 1–3.3)
LYMPHOCYTES # BLD AUTO: 72.5 % — HIGH (ref 13–44)
LYMPHOCYTES # BLD AUTO: 74.3 % — HIGH (ref 13–44)
LYMPHOCYTES # BLD AUTO: 75.7 % — HIGH (ref 13–44)
LYMPHOCYTES # BLD AUTO: 75.9 % — HIGH (ref 13–44)
LYMPHOCYTES # BLD AUTO: 77.9 % — HIGH (ref 13–44)
LYMPHOCYTES # BLD AUTO: 78.8 % — HIGH (ref 13–44)
LYMPHOCYTES # BLD AUTO: 79.2 % — HIGH (ref 13–44)
LYMPHOCYTES # BLD AUTO: 79.7 % — HIGH (ref 13–44)
LYMPHOCYTES # BLD AUTO: 82.9 % — HIGH (ref 13–44)
LYMPHOCYTES # BLD AUTO: 83.9 % — HIGH (ref 13–44)
LYMPHOCYTES # BLD AUTO: 84 % — HIGH (ref 13–44)
LYMPHOCYTES # BLD AUTO: 84.6 % — HIGH (ref 13–44)
LYMPHOCYTES # BLD AUTO: 85 % — HIGH (ref 13–44)
LYMPHOCYTES # BLD AUTO: 85.6 % — HIGH (ref 13–44)
LYMPHOCYTES # BLD AUTO: 86.9 % — HIGH (ref 13–44)
LYMPHOCYTES # FLD: 89 % — SIGNIFICANT CHANGE UP
M-SPIKE: 0.1 G/DL — HIGH (ref 0–0)
MACROCYTES BLD QL: SIGNIFICANT CHANGE UP
MACROCYTES BLD QL: SLIGHT — SIGNIFICANT CHANGE UP
MAGNESIUM SERPL-MCNC: 1.7 MG/DL — SIGNIFICANT CHANGE UP (ref 1.6–2.6)
MAGNESIUM SERPL-MCNC: 1.8 MG/DL — SIGNIFICANT CHANGE UP (ref 1.6–2.6)
MAGNESIUM SERPL-MCNC: 1.8 MG/DL — SIGNIFICANT CHANGE UP (ref 1.6–2.6)
MAGNESIUM SERPL-MCNC: 1.9 MG/DL — SIGNIFICANT CHANGE UP (ref 1.6–2.6)
MAGNESIUM SERPL-MCNC: 2 MG/DL — SIGNIFICANT CHANGE UP (ref 1.6–2.6)
MAGNESIUM SERPL-MCNC: 2 MG/DL — SIGNIFICANT CHANGE UP (ref 1.6–2.6)
MAGNESIUM SERPL-MCNC: 2.3 MG/DL — SIGNIFICANT CHANGE UP (ref 1.6–2.6)
MAGNESIUM SERPL-MCNC: 2.4 MG/DL — SIGNIFICANT CHANGE UP (ref 1.6–2.6)
MAGNESIUM SERPL-MCNC: 2.5 MG/DL — SIGNIFICANT CHANGE UP (ref 1.6–2.6)
MAGNESIUM SERPL-MCNC: 2.5 MG/DL — SIGNIFICANT CHANGE UP (ref 1.6–2.6)
MAGNESIUM SERPL-MCNC: 2.6 MG/DL — SIGNIFICANT CHANGE UP (ref 1.6–2.6)
MANUAL SMEAR VERIFICATION: SIGNIFICANT CHANGE UP
MCHC RBC-ENTMCNC: 29.5 GM/DL — LOW (ref 32–36)
MCHC RBC-ENTMCNC: 29.8 GM/DL — LOW (ref 32–36)
MCHC RBC-ENTMCNC: 30 GM/DL — LOW (ref 32–36)
MCHC RBC-ENTMCNC: 30 GM/DL — LOW (ref 32–36)
MCHC RBC-ENTMCNC: 30.2 GM/DL — LOW (ref 32–36)
MCHC RBC-ENTMCNC: 30.3 GM/DL — LOW (ref 32–36)
MCHC RBC-ENTMCNC: 30.4 GM/DL — LOW (ref 32–36)
MCHC RBC-ENTMCNC: 30.5 GM/DL — LOW (ref 32–36)
MCHC RBC-ENTMCNC: 30.6 GM/DL — LOW (ref 32–36)
MCHC RBC-ENTMCNC: 30.8 GM/DL — LOW (ref 32–36)
MCHC RBC-ENTMCNC: 31 GM/DL — LOW (ref 32–36)
MCHC RBC-ENTMCNC: 31 GM/DL — LOW (ref 32–36)
MCHC RBC-ENTMCNC: 31.1 GM/DL — LOW (ref 32–36)
MCHC RBC-ENTMCNC: 31.7 GM/DL — LOW (ref 32–36)
MCHC RBC-ENTMCNC: 32.1 PG — SIGNIFICANT CHANGE UP (ref 27–34)
MCHC RBC-ENTMCNC: 32.2 PG — SIGNIFICANT CHANGE UP (ref 27–34)
MCHC RBC-ENTMCNC: 32.3 PG — SIGNIFICANT CHANGE UP (ref 27–34)
MCHC RBC-ENTMCNC: 32.4 PG — SIGNIFICANT CHANGE UP (ref 27–34)
MCHC RBC-ENTMCNC: 32.6 PG — SIGNIFICANT CHANGE UP (ref 27–34)
MCHC RBC-ENTMCNC: 32.7 PG — SIGNIFICANT CHANGE UP (ref 27–34)
MCHC RBC-ENTMCNC: 32.8 PG — SIGNIFICANT CHANGE UP (ref 27–34)
MCHC RBC-ENTMCNC: 33.1 PG — SIGNIFICANT CHANGE UP (ref 27–34)
MCHC RBC-ENTMCNC: 33.2 PG — SIGNIFICANT CHANGE UP (ref 27–34)
MCHC RBC-ENTMCNC: 33.2 PG — SIGNIFICANT CHANGE UP (ref 27–34)
MCHC RBC-ENTMCNC: 33.3 PG — SIGNIFICANT CHANGE UP (ref 27–34)
MCHC RBC-ENTMCNC: 33.6 PG — SIGNIFICANT CHANGE UP (ref 27–34)
MCHC RBC-ENTMCNC: 33.6 PG — SIGNIFICANT CHANGE UP (ref 27–34)
MCHC RBC-ENTMCNC: 33.7 PG — SIGNIFICANT CHANGE UP (ref 27–34)
MCV RBC AUTO: 104.9 FL — HIGH (ref 80–100)
MCV RBC AUTO: 105.4 FL — HIGH (ref 80–100)
MCV RBC AUTO: 105.7 FL — HIGH (ref 80–100)
MCV RBC AUTO: 105.9 FL — HIGH (ref 80–100)
MCV RBC AUTO: 106 FL — HIGH (ref 80–100)
MCV RBC AUTO: 106.2 FL — HIGH (ref 80–100)
MCV RBC AUTO: 106.9 FL — HIGH (ref 80–100)
MCV RBC AUTO: 107.1 FL — HIGH (ref 80–100)
MCV RBC AUTO: 107.4 FL — HIGH (ref 80–100)
MCV RBC AUTO: 107.5 FL — HIGH (ref 80–100)
MCV RBC AUTO: 107.5 FL — HIGH (ref 80–100)
MCV RBC AUTO: 108 FL — HIGH (ref 80–100)
MCV RBC AUTO: 108.3 FL — HIGH (ref 80–100)
MCV RBC AUTO: 108.3 FL — HIGH (ref 80–100)
MCV RBC AUTO: 108.8 FL — HIGH (ref 80–100)
MCV RBC AUTO: 109.2 FL — HIGH (ref 80–100)
MCV RBC AUTO: 111.2 FL — HIGH (ref 80–100)
MCV RBC AUTO: 111.6 FL — HIGH (ref 80–100)
MICROCYTES BLD QL: SLIGHT — SIGNIFICANT CHANGE UP
MONOCYTES # BLD AUTO: 0 K/UL — SIGNIFICANT CHANGE UP (ref 0–0.9)
MONOCYTES # BLD AUTO: 0.13 K/UL — SIGNIFICANT CHANGE UP (ref 0–0.9)
MONOCYTES # BLD AUTO: 0.14 K/UL — SIGNIFICANT CHANGE UP (ref 0–0.9)
MONOCYTES # BLD AUTO: 0.15 K/UL — SIGNIFICANT CHANGE UP (ref 0–0.9)
MONOCYTES # BLD AUTO: 0.16 K/UL — SIGNIFICANT CHANGE UP (ref 0–0.9)
MONOCYTES # BLD AUTO: 0.17 K/UL — SIGNIFICANT CHANGE UP (ref 0–0.9)
MONOCYTES # BLD AUTO: 0.19 K/UL — SIGNIFICANT CHANGE UP (ref 0–0.9)
MONOCYTES # BLD AUTO: 0.19 K/UL — SIGNIFICANT CHANGE UP (ref 0–0.9)
MONOCYTES # BLD AUTO: 0.2 K/UL — SIGNIFICANT CHANGE UP (ref 0–0.9)
MONOCYTES # BLD AUTO: 0.24 K/UL — SIGNIFICANT CHANGE UP (ref 0–0.9)
MONOCYTES # BLD AUTO: 0.33 K/UL — SIGNIFICANT CHANGE UP (ref 0–0.9)
MONOCYTES # BLD AUTO: 0.5 K/UL — SIGNIFICANT CHANGE UP (ref 0–0.9)
MONOCYTES # BLD AUTO: 0.54 K/UL — SIGNIFICANT CHANGE UP (ref 0–0.9)
MONOCYTES # BLD AUTO: 0.54 K/UL — SIGNIFICANT CHANGE UP (ref 0–0.9)
MONOCYTES # BLD AUTO: 0.88 K/UL — SIGNIFICANT CHANGE UP (ref 0–0.9)
MONOCYTES NFR BLD AUTO: 0 % — LOW (ref 2–14)
MONOCYTES NFR BLD AUTO: 0.8 % — LOW (ref 2–14)
MONOCYTES NFR BLD AUTO: 0.9 % — LOW (ref 2–14)
MONOCYTES NFR BLD AUTO: 1 % — LOW (ref 2–14)
MONOCYTES NFR BLD AUTO: 1.2 % — LOW (ref 2–14)
MONOCYTES NFR BLD AUTO: 1.3 % — LOW (ref 2–14)
MONOCYTES NFR BLD AUTO: 1.4 % — LOW (ref 2–14)
MONOCYTES NFR BLD AUTO: 1.6 % — LOW (ref 2–14)
MONOCYTES NFR BLD AUTO: 1.8 % — LOW (ref 2–14)
MONOCYTES NFR BLD AUTO: 1.8 % — LOW (ref 2–14)
MONOCYTES NFR BLD AUTO: 2.6 % — SIGNIFICANT CHANGE UP (ref 2–14)
MONOCYTES NFR BLD AUTO: 2.7 % — SIGNIFICANT CHANGE UP (ref 2–14)
MONOCYTES NFR BLD AUTO: 3 % — SIGNIFICANT CHANGE UP (ref 2–14)
MONOS+MACROS # FLD: 5 % — SIGNIFICANT CHANGE UP
MRSA PCR RESULT.: NEGATIVE — SIGNIFICANT CHANGE UP
MYELOCYTES NFR BLD: 0.9 % — HIGH (ref 0–0)
NEUTROPHILS # BLD AUTO: 1.56 K/UL — LOW (ref 1.8–7.4)
NEUTROPHILS # BLD AUTO: 1.75 K/UL — LOW (ref 1.8–7.4)
NEUTROPHILS # BLD AUTO: 1.82 K/UL — SIGNIFICANT CHANGE UP (ref 1.8–7.4)
NEUTROPHILS # BLD AUTO: 2.03 K/UL — SIGNIFICANT CHANGE UP (ref 1.8–7.4)
NEUTROPHILS # BLD AUTO: 2.21 K/UL — SIGNIFICANT CHANGE UP (ref 1.8–7.4)
NEUTROPHILS # BLD AUTO: 2.49 K/UL — SIGNIFICANT CHANGE UP (ref 1.8–7.4)
NEUTROPHILS # BLD AUTO: 2.64 K/UL — SIGNIFICANT CHANGE UP (ref 1.8–7.4)
NEUTROPHILS # BLD AUTO: 3.25 K/UL — SIGNIFICANT CHANGE UP (ref 1.8–7.4)
NEUTROPHILS # BLD AUTO: 3.46 K/UL — SIGNIFICANT CHANGE UP (ref 1.8–7.4)
NEUTROPHILS # BLD AUTO: 3.77 K/UL — SIGNIFICANT CHANGE UP (ref 1.8–7.4)
NEUTROPHILS # BLD AUTO: 4.17 K/UL — SIGNIFICANT CHANGE UP (ref 1.8–7.4)
NEUTROPHILS # BLD AUTO: 4.33 K/UL — SIGNIFICANT CHANGE UP (ref 1.8–7.4)
NEUTROPHILS # BLD AUTO: 4.87 K/UL — SIGNIFICANT CHANGE UP (ref 1.8–7.4)
NEUTROPHILS # BLD AUTO: 5.87 K/UL — SIGNIFICANT CHANGE UP (ref 1.8–7.4)
NEUTROPHILS # BLD AUTO: 6.11 K/UL — SIGNIFICANT CHANGE UP (ref 1.8–7.4)
NEUTROPHILS NFR BLD AUTO: 11.6 % — LOW (ref 43–77)
NEUTROPHILS NFR BLD AUTO: 12.2 % — LOW (ref 43–77)
NEUTROPHILS NFR BLD AUTO: 13 % — LOW (ref 43–77)
NEUTROPHILS NFR BLD AUTO: 13 % — LOW (ref 43–77)
NEUTROPHILS NFR BLD AUTO: 13.7 % — LOW (ref 43–77)
NEUTROPHILS NFR BLD AUTO: 15.4 % — LOW (ref 43–77)
NEUTROPHILS NFR BLD AUTO: 18.5 % — LOW (ref 43–77)
NEUTROPHILS NFR BLD AUTO: 19.1 % — LOW (ref 43–77)
NEUTROPHILS NFR BLD AUTO: 19.5 % — LOW (ref 43–77)
NEUTROPHILS NFR BLD AUTO: 19.6 % — LOW (ref 43–77)
NEUTROPHILS NFR BLD AUTO: 20.2 % — LOW (ref 43–77)
NEUTROPHILS NFR BLD AUTO: 20.2 % — LOW (ref 43–77)
NEUTROPHILS NFR BLD AUTO: 20.3 % — LOW (ref 43–77)
NEUTROPHILS NFR BLD AUTO: 24.8 % — LOW (ref 43–77)
NEUTROPHILS NFR BLD AUTO: 8 % — LOW (ref 43–77)
NEUTROPHILS-BODY FLUID: 6 % — SIGNIFICANT CHANGE UP
NEUTS BAND # BLD: 0.9 % — SIGNIFICANT CHANGE UP (ref 0–8)
NEUTS BAND # BLD: 1 % — SIGNIFICANT CHANGE UP (ref 0–8)
NEUTS BAND # BLD: 1.7 % — SIGNIFICANT CHANGE UP (ref 0–8)
NEUTS BAND # BLD: 1.8 % — SIGNIFICANT CHANGE UP (ref 0–8)
NEUTS BAND # BLD: 4.6 % — SIGNIFICANT CHANGE UP (ref 0–8)
NIGHT BLUE STAIN TISS: SIGNIFICANT CHANGE UP
NITRITE UR-MCNC: NEGATIVE — SIGNIFICANT CHANGE UP
NITRITE UR-MCNC: NEGATIVE — SIGNIFICANT CHANGE UP
NON HDL CHOLESTEROL: 78 MG/DL — SIGNIFICANT CHANGE UP
NON-GYNECOLOGICAL CYTOLOGY STUDY: SIGNIFICANT CHANGE UP
NON-GYNECOLOGICAL CYTOLOGY STUDY: SIGNIFICANT CHANGE UP
NRBC # BLD: 0 /100 WBCS — SIGNIFICANT CHANGE UP (ref 0–0)
NRBC # BLD: 0 /100 — SIGNIFICANT CHANGE UP (ref 0–0)
NRBC # BLD: SIGNIFICANT CHANGE UP /100 WBCS (ref 0–0)
NT-PROBNP SERPL-SCNC: 6492 PG/ML — HIGH (ref 0–300)
NT-PROBNP SERPL-SCNC: 7179 PG/ML — HIGH (ref 0–300)
OSMOLALITY SERPL: 273 MOSM/KG — LOW (ref 280–301)
OSMOLALITY SERPL: 275 MOSM/KG — LOW (ref 280–301)
OSMOLALITY SERPL: 279 MOSM/KG — LOW (ref 280–301)
OSMOLALITY UR: 303 MOSM/KG — SIGNIFICANT CHANGE UP (ref 300–900)
OSMOLALITY UR: 586 MOSM/KG — SIGNIFICANT CHANGE UP (ref 300–900)
OVALOCYTES BLD QL SMEAR: SIGNIFICANT CHANGE UP
OVALOCYTES BLD QL SMEAR: SIGNIFICANT CHANGE UP
OVALOCYTES BLD QL SMEAR: SLIGHT — SIGNIFICANT CHANGE UP
PH UR: 5.5 — SIGNIFICANT CHANGE UP (ref 5–8)
PH UR: 6 — SIGNIFICANT CHANGE UP (ref 5–8)
PH, PLEURAL FLUID: 7.59 — SIGNIFICANT CHANGE UP
PHOSPHATE SERPL-MCNC: 2.8 MG/DL — SIGNIFICANT CHANGE UP (ref 2.5–4.5)
PHOSPHATE SERPL-MCNC: 2.9 MG/DL — SIGNIFICANT CHANGE UP (ref 2.5–4.5)
PHOSPHATE SERPL-MCNC: 3 MG/DL — SIGNIFICANT CHANGE UP (ref 2.5–4.5)
PHOSPHATE SERPL-MCNC: 3.2 MG/DL — SIGNIFICANT CHANGE UP (ref 2.5–4.5)
PHOSPHATE SERPL-MCNC: 3.2 MG/DL — SIGNIFICANT CHANGE UP (ref 2.5–4.5)
PHOSPHATE SERPL-MCNC: 3.4 MG/DL — SIGNIFICANT CHANGE UP (ref 2.5–4.5)
PHOSPHATE SERPL-MCNC: 3.6 MG/DL — SIGNIFICANT CHANGE UP (ref 2.5–4.5)
PHOSPHATE SERPL-MCNC: 3.7 MG/DL — SIGNIFICANT CHANGE UP (ref 2.5–4.5)
PHOSPHATE SERPL-MCNC: 3.7 MG/DL — SIGNIFICANT CHANGE UP (ref 2.5–4.5)
PHOSPHATE SERPL-MCNC: 3.8 MG/DL — SIGNIFICANT CHANGE UP (ref 2.5–4.5)
PHOSPHATE SERPL-MCNC: 3.9 MG/DL — SIGNIFICANT CHANGE UP (ref 2.5–4.5)
PHOSPHATE SERPL-MCNC: 4 MG/DL — SIGNIFICANT CHANGE UP (ref 2.5–4.5)
PHOSPHATE SERPL-MCNC: 4 MG/DL — SIGNIFICANT CHANGE UP (ref 2.5–4.5)
PHOSPHATE SERPL-MCNC: 4.2 MG/DL — SIGNIFICANT CHANGE UP (ref 2.5–4.5)
PLAT MORPH BLD: ABNORMAL
PLAT MORPH BLD: NORMAL — SIGNIFICANT CHANGE UP
PLATELET # BLD AUTO: 101 K/UL — LOW (ref 150–400)
PLATELET # BLD AUTO: 102 K/UL — LOW (ref 150–400)
PLATELET # BLD AUTO: 103 K/UL — LOW (ref 150–400)
PLATELET # BLD AUTO: 113 K/UL — LOW (ref 150–400)
PLATELET # BLD AUTO: 120 K/UL — LOW (ref 150–400)
PLATELET # BLD AUTO: 120 K/UL — LOW (ref 150–400)
PLATELET # BLD AUTO: 127 K/UL — LOW (ref 150–400)
PLATELET # BLD AUTO: 134 K/UL — LOW (ref 150–400)
PLATELET # BLD AUTO: 138 K/UL — LOW (ref 150–400)
PLATELET # BLD AUTO: 213 K/UL — SIGNIFICANT CHANGE UP (ref 150–400)
PLATELET # BLD AUTO: 214 K/UL — SIGNIFICANT CHANGE UP (ref 150–400)
PLATELET # BLD AUTO: 218 K/UL — SIGNIFICANT CHANGE UP (ref 150–400)
PLATELET # BLD AUTO: 225 K/UL — SIGNIFICANT CHANGE UP (ref 150–400)
PLATELET # BLD AUTO: 234 K/UL — SIGNIFICANT CHANGE UP (ref 150–400)
PLATELET # BLD AUTO: 254 K/UL — SIGNIFICANT CHANGE UP (ref 150–400)
PLATELET # BLD AUTO: 259 K/UL — SIGNIFICANT CHANGE UP (ref 150–400)
PLATELET # BLD AUTO: 265 K/UL — SIGNIFICANT CHANGE UP (ref 150–400)
PLATELET # BLD AUTO: 279 K/UL — SIGNIFICANT CHANGE UP (ref 150–400)
POIKILOCYTOSIS BLD QL AUTO: SIGNIFICANT CHANGE UP
POIKILOCYTOSIS BLD QL AUTO: SLIGHT — SIGNIFICANT CHANGE UP
POLYCHROMASIA BLD QL SMEAR: SIGNIFICANT CHANGE UP
POLYCHROMASIA BLD QL SMEAR: SLIGHT — SIGNIFICANT CHANGE UP
POTASSIUM SERPL-MCNC: 3.1 MMOL/L — LOW (ref 3.5–5.3)
POTASSIUM SERPL-MCNC: 3.7 MMOL/L — SIGNIFICANT CHANGE UP (ref 3.5–5.3)
POTASSIUM SERPL-MCNC: 3.7 MMOL/L — SIGNIFICANT CHANGE UP (ref 3.5–5.3)
POTASSIUM SERPL-MCNC: 3.8 MMOL/L — SIGNIFICANT CHANGE UP (ref 3.5–5.3)
POTASSIUM SERPL-MCNC: 4 MMOL/L — SIGNIFICANT CHANGE UP (ref 3.5–5.3)
POTASSIUM SERPL-MCNC: 4.5 MMOL/L — SIGNIFICANT CHANGE UP (ref 3.5–5.3)
POTASSIUM SERPL-MCNC: 4.7 MMOL/L — SIGNIFICANT CHANGE UP (ref 3.5–5.3)
POTASSIUM SERPL-MCNC: 4.7 MMOL/L — SIGNIFICANT CHANGE UP (ref 3.5–5.3)
POTASSIUM SERPL-MCNC: 4.8 MMOL/L — SIGNIFICANT CHANGE UP (ref 3.5–5.3)
POTASSIUM SERPL-MCNC: 4.9 MMOL/L — SIGNIFICANT CHANGE UP (ref 3.5–5.3)
POTASSIUM SERPL-MCNC: 5 MMOL/L — SIGNIFICANT CHANGE UP (ref 3.5–5.3)
POTASSIUM SERPL-MCNC: 5.1 MMOL/L — SIGNIFICANT CHANGE UP (ref 3.5–5.3)
POTASSIUM SERPL-MCNC: 5.2 MMOL/L — SIGNIFICANT CHANGE UP (ref 3.5–5.3)
POTASSIUM SERPL-MCNC: 5.6 MMOL/L — HIGH (ref 3.5–5.3)
POTASSIUM SERPL-SCNC: 3.1 MMOL/L — LOW (ref 3.5–5.3)
POTASSIUM SERPL-SCNC: 3.7 MMOL/L — SIGNIFICANT CHANGE UP (ref 3.5–5.3)
POTASSIUM SERPL-SCNC: 3.7 MMOL/L — SIGNIFICANT CHANGE UP (ref 3.5–5.3)
POTASSIUM SERPL-SCNC: 3.8 MMOL/L — SIGNIFICANT CHANGE UP (ref 3.5–5.3)
POTASSIUM SERPL-SCNC: 4 MMOL/L — SIGNIFICANT CHANGE UP (ref 3.5–5.3)
POTASSIUM SERPL-SCNC: 4.5 MMOL/L — SIGNIFICANT CHANGE UP (ref 3.5–5.3)
POTASSIUM SERPL-SCNC: 4.7 MMOL/L — SIGNIFICANT CHANGE UP (ref 3.5–5.3)
POTASSIUM SERPL-SCNC: 4.7 MMOL/L — SIGNIFICANT CHANGE UP (ref 3.5–5.3)
POTASSIUM SERPL-SCNC: 4.8 MMOL/L — SIGNIFICANT CHANGE UP (ref 3.5–5.3)
POTASSIUM SERPL-SCNC: 4.9 MMOL/L — SIGNIFICANT CHANGE UP (ref 3.5–5.3)
POTASSIUM SERPL-SCNC: 5 MMOL/L — SIGNIFICANT CHANGE UP (ref 3.5–5.3)
POTASSIUM SERPL-SCNC: 5.1 MMOL/L — SIGNIFICANT CHANGE UP (ref 3.5–5.3)
POTASSIUM SERPL-SCNC: 5.2 MMOL/L — SIGNIFICANT CHANGE UP (ref 3.5–5.3)
POTASSIUM SERPL-SCNC: 5.6 MMOL/L — HIGH (ref 3.5–5.3)
POTASSIUM UR-SCNC: 24 MMOL/L — SIGNIFICANT CHANGE UP
PROT ?TM UR-MCNC: 14 MG/DL — HIGH (ref 0–12)
PROT FLD-MCNC: 2 G/DL — SIGNIFICANT CHANGE UP
PROT PATTERN SERPL ELPH-IMP: SIGNIFICANT CHANGE UP
PROT SERPL-MCNC: 4.6 G/DL — LOW (ref 6–8.3)
PROT SERPL-MCNC: 4.7 G/DL — LOW (ref 6–8.3)
PROT SERPL-MCNC: 4.8 G/DL — LOW (ref 6–8.3)
PROT SERPL-MCNC: 4.8 G/DL — LOW (ref 6–8.3)
PROT SERPL-MCNC: 5 G/DL — LOW (ref 6–8.3)
PROT SERPL-MCNC: 5 G/DL — LOW (ref 6–8.3)
PROT SERPL-MCNC: 5.1 G/DL — LOW (ref 6–8.3)
PROT SERPL-MCNC: 5.3 G/DL — LOW (ref 6–8.3)
PROT SERPL-MCNC: 5.4 G/DL — LOW (ref 6–8.3)
PROT SERPL-MCNC: 5.7 G/DL — LOW (ref 6–8.3)
PROT SERPL-MCNC: 5.8 G/DL — LOW (ref 6–8.3)
PROT SERPL-MCNC: 5.8 G/DL — LOW (ref 6–8.3)
PROT UR-MCNC: 100 MG/DL
PROT UR-MCNC: NEGATIVE MG/DL — SIGNIFICANT CHANGE UP
PROT/CREAT UR-RTO: 0.2 RATIO — SIGNIFICANT CHANGE UP (ref 0–0.2)
PROTHROM AB SERPL-ACNC: 17 SEC — HIGH (ref 10.5–13.4)
PROTHROM AB SERPL-ACNC: 17.4 SEC — HIGH (ref 10.5–13.4)
PROTHROM AB SERPL-ACNC: 17.6 SEC — HIGH (ref 10.5–13.4)
PROTHROM AB SERPL-ACNC: 17.9 SEC — HIGH (ref 10.5–13.4)
PROTHROM AB SERPL-ACNC: 18.1 SEC — HIGH (ref 10.5–13.4)
PROTHROM AB SERPL-ACNC: 18.7 SEC — HIGH (ref 10.5–13.4)
PROTHROM AB SERPL-ACNC: 29.9 SEC — HIGH (ref 10.5–13.4)
PTR INTERPRETATION: SIGNIFICANT CHANGE UP
RAPID RVP RESULT: SIGNIFICANT CHANGE UP
RBC # BLD: 2.35 M/UL — LOW (ref 4.2–5.8)
RBC # BLD: 2.46 M/UL — LOW (ref 4.2–5.8)
RBC # BLD: 2.53 M/UL — LOW (ref 4.2–5.8)
RBC # BLD: 2.53 M/UL — LOW (ref 4.2–5.8)
RBC # BLD: 2.57 M/UL — LOW (ref 4.2–5.8)
RBC # BLD: 2.58 M/UL — LOW (ref 4.2–5.8)
RBC # BLD: 2.59 M/UL — LOW (ref 4.2–5.8)
RBC # BLD: 2.6 M/UL — LOW (ref 4.2–5.8)
RBC # BLD: 2.61 M/UL — LOW (ref 4.2–5.8)
RBC # BLD: 2.61 M/UL — LOW (ref 4.2–5.8)
RBC # BLD: 2.64 M/UL — LOW (ref 4.2–5.8)
RBC # BLD: 2.65 M/UL — LOW (ref 4.2–5.8)
RBC # BLD: 2.67 M/UL — LOW (ref 4.2–5.8)
RBC # BLD: 2.69 M/UL — LOW (ref 4.2–5.8)
RBC # BLD: 2.77 M/UL — LOW (ref 4.2–5.8)
RBC # BLD: 2.82 M/UL — LOW (ref 4.2–5.8)
RBC # BLD: 2.95 M/UL — LOW (ref 4.2–5.8)
RBC # BLD: 2.95 M/UL — LOW (ref 4.2–5.8)
RBC # BLD: 2.98 M/UL — LOW (ref 4.2–5.8)
RBC # FLD: 18.2 % — HIGH (ref 10.3–14.5)
RBC # FLD: 18.2 % — HIGH (ref 10.3–14.5)
RBC # FLD: 18.3 % — HIGH (ref 10.3–14.5)
RBC # FLD: 18.4 % — HIGH (ref 10.3–14.5)
RBC # FLD: 18.6 % — HIGH (ref 10.3–14.5)
RBC # FLD: 18.7 % — HIGH (ref 10.3–14.5)
RBC # FLD: 18.7 % — HIGH (ref 10.3–14.5)
RBC # FLD: 18.9 % — HIGH (ref 10.3–14.5)
RBC # FLD: 19 % — HIGH (ref 10.3–14.5)
RBC # FLD: 19.2 % — HIGH (ref 10.3–14.5)
RBC # FLD: 19.4 % — HIGH (ref 10.3–14.5)
RBC # FLD: 19.7 % — HIGH (ref 10.3–14.5)
RBC # FLD: 19.9 % — HIGH (ref 10.3–14.5)
RBC # FLD: 19.9 % — HIGH (ref 10.3–14.5)
RBC BLD AUTO: ABNORMAL
RBC CASTS # UR COMP ASSIST: < 5 /HPF — SIGNIFICANT CHANGE UP
RBC CASTS # UR COMP ASSIST: ABNORMAL /HPF
RCV VOL RI: < 2000 /UL — SIGNIFICANT CHANGE UP (ref 0–0)
RETICS #: 45.4 K/UL — SIGNIFICANT CHANGE UP (ref 25–125)
RETICS/RBC NFR: 1.5 % — SIGNIFICANT CHANGE UP (ref 0.5–2.5)
RH IG SCN BLD-IMP: POSITIVE — SIGNIFICANT CHANGE UP
S AUREUS DNA NOSE QL NAA+PROBE: NEGATIVE — SIGNIFICANT CHANGE UP
SARS-COV-2 RNA SPEC QL NAA+PROBE: NEGATIVE — SIGNIFICANT CHANGE UP
SARS-COV-2 RNA SPEC QL NAA+PROBE: SIGNIFICANT CHANGE UP
SCHISTOCYTES BLD QL AUTO: SLIGHT — SIGNIFICANT CHANGE UP
SIROLIMUS BLD-MCNC: 4.5 NG/ML — SIGNIFICANT CHANGE UP (ref 4.5–14)
SMUDGE CELLS # BLD: PRESENT — SIGNIFICANT CHANGE UP
SODIUM SERPL-SCNC: 128 MMOL/L — LOW (ref 135–145)
SODIUM SERPL-SCNC: 129 MMOL/L — LOW (ref 135–145)
SODIUM SERPL-SCNC: 130 MMOL/L — LOW (ref 135–145)
SODIUM SERPL-SCNC: 132 MMOL/L — LOW (ref 135–145)
SODIUM SERPL-SCNC: 135 MMOL/L — SIGNIFICANT CHANGE UP (ref 135–145)
SODIUM SERPL-SCNC: 136 MMOL/L — SIGNIFICANT CHANGE UP (ref 135–145)
SODIUM SERPL-SCNC: 136 MMOL/L — SIGNIFICANT CHANGE UP (ref 135–145)
SODIUM SERPL-SCNC: 137 MMOL/L — SIGNIFICANT CHANGE UP (ref 135–145)
SODIUM SERPL-SCNC: 139 MMOL/L — SIGNIFICANT CHANGE UP (ref 135–145)
SODIUM SERPL-SCNC: 139 MMOL/L — SIGNIFICANT CHANGE UP (ref 135–145)
SODIUM UR-SCNC: 49 MMOL/L — SIGNIFICANT CHANGE UP
SODIUM UR-SCNC: 69 MMOL/L — SIGNIFICANT CHANGE UP
SP GR SPEC: 1.02 — SIGNIFICANT CHANGE UP (ref 1–1.03)
SP GR SPEC: 1.02 — SIGNIFICANT CHANGE UP (ref 1–1.03)
SPECIMEN SOURCE FLD: SIGNIFICANT CHANGE UP
SPECIMEN SOURCE: SIGNIFICANT CHANGE UP
SPHEROCYTES BLD QL SMEAR: SLIGHT — SIGNIFICANT CHANGE UP
TACROLIMUS SERPL-MCNC: 14.6 NG/ML — SIGNIFICANT CHANGE UP
TACROLIMUS SERPL-MCNC: 9.4 NG/ML — SIGNIFICANT CHANGE UP
TIBC SERPL-MCNC: 135 UG/DL — LOW (ref 220–430)
TM INTERPRETATION: SIGNIFICANT CHANGE UP
TOTAL NUCLEATED CELL COUNT, BODY FLUID: 477 /UL — SIGNIFICANT CHANGE UP
TRIGL FLD-MCNC: 24 MG/DL — SIGNIFICANT CHANGE UP
TRIGL SERPL-MCNC: 116 MG/DL — SIGNIFICANT CHANGE UP
TROPONIN T SERPL-MCNC: 0.06 NG/ML — CRITICAL HIGH (ref 0–0.01)
TROPONIN T SERPL-MCNC: 0.07 NG/ML — CRITICAL HIGH (ref 0–0.01)
TSH SERPL-MCNC: 2.17 UIU/ML — SIGNIFICANT CHANGE UP (ref 0.27–4.2)
UIBC SERPL-MCNC: 98 UG/DL — LOW (ref 110–370)
URATE UR-MCNC: 17.1 MG/DL — SIGNIFICANT CHANGE UP
UROBILINOGEN FLD QL: 0.2 E.U./DL — SIGNIFICANT CHANGE UP
UROBILINOGEN FLD QL: 0.2 E.U./DL — SIGNIFICANT CHANGE UP
UUN UR-MCNC: 303 MG/DL — SIGNIFICANT CHANGE UP
UUN UR-MCNC: 400 MG/DL — SIGNIFICANT CHANGE UP
VARIANT LYMPHS # BLD: 0.9 % — SIGNIFICANT CHANGE UP (ref 0–6)
VARIANT LYMPHS # BLD: 1.8 % — SIGNIFICANT CHANGE UP (ref 0–6)
VARIANT LYMPHS # BLD: 2.7 % — SIGNIFICANT CHANGE UP (ref 0–6)
VARIANT LYMPHS # BLD: 3 % — SIGNIFICANT CHANGE UP (ref 0–6)
VIT B12 SERPL-MCNC: 1590 PG/ML — HIGH (ref 232–1245)
WBC # BLD: 11.94 K/UL — HIGH (ref 3.8–10.5)
WBC # BLD: 12.79 K/UL — HIGH (ref 3.8–10.5)
WBC # BLD: 14.01 K/UL — HIGH (ref 3.8–10.5)
WBC # BLD: 14.34 K/UL — HIGH (ref 3.8–10.5)
WBC # BLD: 14.4 K/UL — HIGH (ref 3.8–10.5)
WBC # BLD: 14.85 K/UL — HIGH (ref 3.8–10.5)
WBC # BLD: 15.2 K/UL — HIGH (ref 3.8–10.5)
WBC # BLD: 16.11 K/UL — HIGH (ref 3.8–10.5)
WBC # BLD: 16.8 K/UL — HIGH (ref 3.8–10.5)
WBC # BLD: 18.55 K/UL — HIGH (ref 3.8–10.5)
WBC # BLD: 18.67 K/UL — HIGH (ref 3.8–10.5)
WBC # BLD: 20.8 K/UL — HIGH (ref 3.8–10.5)
WBC # BLD: 23.74 K/UL — HIGH (ref 3.8–10.5)
WBC # BLD: 24.15 K/UL — HIGH (ref 3.8–10.5)
WBC # BLD: 27.36 K/UL — HIGH (ref 3.8–10.5)
WBC # BLD: 29.31 K/UL — HIGH (ref 3.8–10.5)
WBC # BLD: 30.09 K/UL — HIGH (ref 3.8–10.5)
WBC # BLD: 30.09 K/UL — HIGH (ref 3.8–10.5)
WBC # FLD AUTO: 11.94 K/UL — HIGH (ref 3.8–10.5)
WBC # FLD AUTO: 12.79 K/UL — HIGH (ref 3.8–10.5)
WBC # FLD AUTO: 14.01 K/UL — HIGH (ref 3.8–10.5)
WBC # FLD AUTO: 14.34 K/UL — HIGH (ref 3.8–10.5)
WBC # FLD AUTO: 14.4 K/UL — HIGH (ref 3.8–10.5)
WBC # FLD AUTO: 14.85 K/UL — HIGH (ref 3.8–10.5)
WBC # FLD AUTO: 15.2 K/UL — HIGH (ref 3.8–10.5)
WBC # FLD AUTO: 16.11 K/UL — HIGH (ref 3.8–10.5)
WBC # FLD AUTO: 16.8 K/UL — HIGH (ref 3.8–10.5)
WBC # FLD AUTO: 18.55 K/UL — HIGH (ref 3.8–10.5)
WBC # FLD AUTO: 18.67 K/UL — HIGH (ref 3.8–10.5)
WBC # FLD AUTO: 20.8 K/UL — HIGH (ref 3.8–10.5)
WBC # FLD AUTO: 23.74 K/UL — HIGH (ref 3.8–10.5)
WBC # FLD AUTO: 24.15 K/UL — HIGH (ref 3.8–10.5)
WBC # FLD AUTO: 27.36 K/UL — HIGH (ref 3.8–10.5)
WBC # FLD AUTO: 29.31 K/UL — HIGH (ref 3.8–10.5)
WBC # FLD AUTO: 30.09 K/UL — HIGH (ref 3.8–10.5)
WBC # FLD AUTO: 30.09 K/UL — HIGH (ref 3.8–10.5)
WBC UR QL: ABNORMAL /HPF
WBC UR QL: ABNORMAL /HPF

## 2023-01-01 PROCEDURE — 99222 1ST HOSP IP/OBS MODERATE 55: CPT | Mod: 25

## 2023-01-01 PROCEDURE — 31237 NSL/SINS NDSC SURG BX POLYPC: CPT | Mod: 50,58

## 2023-01-01 PROCEDURE — 84156 ASSAY OF PROTEIN URINE: CPT

## 2023-01-01 PROCEDURE — 99232 SBSQ HOSP IP/OBS MODERATE 35: CPT | Mod: GC

## 2023-01-01 PROCEDURE — 86900 BLOOD TYPING SEROLOGIC ABO: CPT

## 2023-01-01 PROCEDURE — 99214 OFFICE O/P EST MOD 30 MIN: CPT

## 2023-01-01 PROCEDURE — 83036 HEMOGLOBIN GLYCOSYLATED A1C: CPT

## 2023-01-01 PROCEDURE — 85045 AUTOMATED RETICULOCYTE COUNT: CPT

## 2023-01-01 PROCEDURE — 87102 FUNGUS ISOLATION CULTURE: CPT

## 2023-01-01 PROCEDURE — 86147 CARDIOLIPIN ANTIBODY EA IG: CPT

## 2023-01-01 PROCEDURE — 87075 CULTR BACTERIA EXCEPT BLOOD: CPT

## 2023-01-01 PROCEDURE — 87116 MYCOBACTERIA CULTURE: CPT

## 2023-01-01 PROCEDURE — 80197 ASSAY OF TACROLIMUS: CPT

## 2023-01-01 PROCEDURE — 85027 COMPLETE CBC AUTOMATED: CPT

## 2023-01-01 PROCEDURE — 99233 SBSQ HOSP IP/OBS HIGH 50: CPT

## 2023-01-01 PROCEDURE — 94660 CPAP INITIATION&MGMT: CPT

## 2023-01-01 PROCEDURE — 74018 RADEX ABDOMEN 1 VIEW: CPT

## 2023-01-01 PROCEDURE — 99222 1ST HOSP IP/OBS MODERATE 55: CPT

## 2023-01-01 PROCEDURE — 83550 IRON BINDING TEST: CPT

## 2023-01-01 PROCEDURE — 84295 ASSAY OF SERUM SODIUM: CPT

## 2023-01-01 PROCEDURE — 84300 ASSAY OF URINE SODIUM: CPT

## 2023-01-01 PROCEDURE — 81270 JAK2 GENE: CPT

## 2023-01-01 PROCEDURE — 86850 RBC ANTIBODY SCREEN: CPT

## 2023-01-01 PROCEDURE — 88112 CYTOPATH CELL ENHANCE TECH: CPT

## 2023-01-01 PROCEDURE — 99223 1ST HOSP IP/OBS HIGH 75: CPT

## 2023-01-01 PROCEDURE — 83986 ASSAY PH BODY FLUID NOS: CPT

## 2023-01-01 PROCEDURE — 88305 TISSUE EXAM BY PATHOLOGIST: CPT | Mod: 26

## 2023-01-01 PROCEDURE — 71045 X-RAY EXAM CHEST 1 VIEW: CPT

## 2023-01-01 PROCEDURE — 82728 ASSAY OF FERRITIN: CPT

## 2023-01-01 PROCEDURE — 84540 ASSAY OF URINE/UREA-N: CPT

## 2023-01-01 PROCEDURE — 93010 ELECTROCARDIOGRAM REPORT: CPT | Mod: 76

## 2023-01-01 PROCEDURE — 84155 ASSAY OF PROTEIN SERUM: CPT

## 2023-01-01 PROCEDURE — 99233 SBSQ HOSP IP/OBS HIGH 50: CPT | Mod: GC

## 2023-01-01 PROCEDURE — 99223 1ST HOSP IP/OBS HIGH 75: CPT | Mod: GC

## 2023-01-01 PROCEDURE — 83735 ASSAY OF MAGNESIUM: CPT

## 2023-01-01 PROCEDURE — 84165 PROTEIN E-PHORESIS SERUM: CPT

## 2023-01-01 PROCEDURE — 83010 ASSAY OF HAPTOGLOBIN QUANT: CPT

## 2023-01-01 PROCEDURE — 99285 EMERGENCY DEPT VISIT HI MDM: CPT | Mod: FS

## 2023-01-01 PROCEDURE — 84484 ASSAY OF TROPONIN QUANT: CPT

## 2023-01-01 PROCEDURE — 84478 ASSAY OF TRIGLYCERIDES: CPT

## 2023-01-01 PROCEDURE — 97110 THERAPEUTIC EXERCISES: CPT

## 2023-01-01 PROCEDURE — 71260 CT THORAX DX C+: CPT

## 2023-01-01 PROCEDURE — 88112 CYTOPATH CELL ENHANCE TECH: CPT | Mod: 26

## 2023-01-01 PROCEDURE — 88305 TISSUE EXAM BY PATHOLOGIST: CPT

## 2023-01-01 PROCEDURE — 84157 ASSAY OF PROTEIN OTHER: CPT

## 2023-01-01 PROCEDURE — 83880 ASSAY OF NATRIURETIC PEPTIDE: CPT

## 2023-01-01 PROCEDURE — 99232 SBSQ HOSP IP/OBS MODERATE 35: CPT

## 2023-01-01 PROCEDURE — 86146 BETA-2 GLYCOPROTEIN ANTIBODY: CPT

## 2023-01-01 PROCEDURE — 82962 GLUCOSE BLOOD TEST: CPT

## 2023-01-01 PROCEDURE — 92610 EVALUATE SWALLOWING FUNCTION: CPT

## 2023-01-01 PROCEDURE — 89051 BODY FLUID CELL COUNT: CPT

## 2023-01-01 PROCEDURE — 87015 SPECIMEN INFECT AGNT CONCNTJ: CPT

## 2023-01-01 PROCEDURE — 92526 ORAL FUNCTION THERAPY: CPT

## 2023-01-01 PROCEDURE — 99239 HOSP IP/OBS DSCHRG MGMT >30: CPT

## 2023-01-01 PROCEDURE — 82746 ASSAY OF FOLIC ACID SERUM: CPT

## 2023-01-01 PROCEDURE — 87635 SARS-COV-2 COVID-19 AMP PRB: CPT

## 2023-01-01 PROCEDURE — 74018 RADEX ABDOMEN 1 VIEW: CPT | Mod: 26

## 2023-01-01 PROCEDURE — 36415 COLL VENOUS BLD VENIPUNCTURE: CPT

## 2023-01-01 PROCEDURE — 84133 ASSAY OF URINE POTASSIUM: CPT

## 2023-01-01 PROCEDURE — 93005 ELECTROCARDIOGRAM TRACING: CPT

## 2023-01-01 PROCEDURE — 83540 ASSAY OF IRON: CPT

## 2023-01-01 PROCEDURE — 81240 F2 GENE: CPT

## 2023-01-01 PROCEDURE — 84100 ASSAY OF PHOSPHORUS: CPT

## 2023-01-01 PROCEDURE — 80195 ASSAY OF SIROLIMUS: CPT

## 2023-01-01 PROCEDURE — 84145 PROCALCITONIN (PCT): CPT

## 2023-01-01 PROCEDURE — 87641 MR-STAPH DNA AMP PROBE: CPT

## 2023-01-01 PROCEDURE — 86901 BLOOD TYPING SEROLOGIC RH(D): CPT

## 2023-01-01 PROCEDURE — 71045 X-RAY EXAM CHEST 1 VIEW: CPT | Mod: 26

## 2023-01-01 PROCEDURE — 87206 SMEAR FLUORESCENT/ACID STAI: CPT

## 2023-01-01 PROCEDURE — 84443 ASSAY THYROID STIM HORMONE: CPT

## 2023-01-01 PROCEDURE — 74230 X-RAY XM SWLNG FUNCJ C+: CPT

## 2023-01-01 PROCEDURE — 83935 ASSAY OF URINE OSMOLALITY: CPT

## 2023-01-01 PROCEDURE — 80061 LIPID PANEL: CPT

## 2023-01-01 PROCEDURE — 43246 EGD PLACE GASTROSTOMY TUBE: CPT

## 2023-01-01 PROCEDURE — 97161 PT EVAL LOW COMPLEX 20 MIN: CPT

## 2023-01-01 PROCEDURE — 80048 BASIC METABOLIC PNL TOTAL CA: CPT

## 2023-01-01 PROCEDURE — 82803 BLOOD GASES ANY COMBINATION: CPT

## 2023-01-01 PROCEDURE — 83615 LACTATE (LD) (LDH) ENZYME: CPT

## 2023-01-01 PROCEDURE — 81001 URINALYSIS AUTO W/SCOPE: CPT

## 2023-01-01 PROCEDURE — 74176 CT ABD & PELVIS W/O CONTRAST: CPT

## 2023-01-01 PROCEDURE — 88189 FLOWCYTOMETRY/READ 16 & >: CPT

## 2023-01-01 PROCEDURE — 85025 COMPLETE CBC W/AUTO DIFF WBC: CPT

## 2023-01-01 PROCEDURE — 93306 TTE W/DOPPLER COMPLETE: CPT

## 2023-01-01 PROCEDURE — 99213 OFFICE O/P EST LOW 20 MIN: CPT | Mod: 25

## 2023-01-01 PROCEDURE — 71260 CT THORAX DX C+: CPT | Mod: 26

## 2023-01-01 PROCEDURE — 88185 FLOWCYTOMETRY/TC ADD-ON: CPT

## 2023-01-01 PROCEDURE — 99222 1ST HOSP IP/OBS MODERATE 55: CPT | Mod: GC

## 2023-01-01 PROCEDURE — 82232 ASSAY OF BETA-2 PROTEIN: CPT

## 2023-01-01 PROCEDURE — 82330 ASSAY OF CALCIUM: CPT

## 2023-01-01 PROCEDURE — 97162 PT EVAL MOD COMPLEX 30 MIN: CPT

## 2023-01-01 PROCEDURE — 82945 GLUCOSE OTHER FLUID: CPT

## 2023-01-01 PROCEDURE — U0003: CPT

## 2023-01-01 PROCEDURE — 83605 ASSAY OF LACTIC ACID: CPT

## 2023-01-01 PROCEDURE — 97116 GAIT TRAINING THERAPY: CPT

## 2023-01-01 PROCEDURE — 93010 ELECTROCARDIOGRAM REPORT: CPT

## 2023-01-01 PROCEDURE — 87040 BLOOD CULTURE FOR BACTERIA: CPT

## 2023-01-01 PROCEDURE — 74176 CT ABD & PELVIS W/O CONTRAST: CPT | Mod: 26,MH

## 2023-01-01 PROCEDURE — U0005: CPT

## 2023-01-01 PROCEDURE — 93306 TTE W/DOPPLER COMPLETE: CPT | Mod: 26

## 2023-01-01 PROCEDURE — 84311 SPECTROPHOTOMETRY: CPT

## 2023-01-01 PROCEDURE — 85610 PROTHROMBIN TIME: CPT

## 2023-01-01 PROCEDURE — 81241 F5 GENE: CPT

## 2023-01-01 PROCEDURE — 87205 SMEAR GRAM STAIN: CPT

## 2023-01-01 PROCEDURE — 84560 ASSAY OF URINE/URIC ACID: CPT

## 2023-01-01 PROCEDURE — 83690 ASSAY OF LIPASE: CPT

## 2023-01-01 PROCEDURE — 80053 COMPREHEN METABOLIC PANEL: CPT

## 2023-01-01 PROCEDURE — 76770 US EXAM ABDO BACK WALL COMP: CPT | Mod: 26

## 2023-01-01 PROCEDURE — 74230 X-RAY XM SWLNG FUNCJ C+: CPT | Mod: 26

## 2023-01-01 PROCEDURE — 99285 EMERGENCY DEPT VISIT HI MDM: CPT | Mod: 25

## 2023-01-01 PROCEDURE — 86334 IMMUNOFIX E-PHORESIS SERUM: CPT

## 2023-01-01 PROCEDURE — 92612 ENDOSCOPY SWALLOW (FEES) VID: CPT

## 2023-01-01 PROCEDURE — 82010 KETONE BODYS QUAN: CPT

## 2023-01-01 PROCEDURE — 0225U NFCT DS DNA&RNA 21 SARSCOV2: CPT

## 2023-01-01 PROCEDURE — 99221 1ST HOSP IP/OBS SF/LOW 40: CPT

## 2023-01-01 PROCEDURE — 85598 HEXAGNAL PHOSPH PLTLT NEUTRL: CPT

## 2023-01-01 PROCEDURE — 82042 OTHER SOURCE ALBUMIN QUAN EA: CPT

## 2023-01-01 PROCEDURE — 96374 THER/PROPH/DIAG INJ IV PUSH: CPT

## 2023-01-01 PROCEDURE — 82436 ASSAY OF URINE CHLORIDE: CPT

## 2023-01-01 PROCEDURE — 85730 THROMBOPLASTIN TIME PARTIAL: CPT

## 2023-01-01 PROCEDURE — 86880 COOMBS TEST DIRECT: CPT

## 2023-01-01 PROCEDURE — 85384 FIBRINOGEN ACTIVITY: CPT

## 2023-01-01 PROCEDURE — 71250 CT THORAX DX C-: CPT | Mod: 26,MH

## 2023-01-01 PROCEDURE — 76770 US EXAM ABDO BACK WALL COMP: CPT

## 2023-01-01 PROCEDURE — 82570 ASSAY OF URINE CREATININE: CPT

## 2023-01-01 PROCEDURE — 99285 EMERGENCY DEPT VISIT HI MDM: CPT | Mod: CS

## 2023-01-01 PROCEDURE — 99285 EMERGENCY DEPT VISIT HI MDM: CPT

## 2023-01-01 PROCEDURE — 87086 URINE CULTURE/COLONY COUNT: CPT

## 2023-01-01 PROCEDURE — 87640 STAPH A DNA AMP PROBE: CPT

## 2023-01-01 PROCEDURE — 82607 VITAMIN B-12: CPT

## 2023-01-01 PROCEDURE — 87070 CULTURE OTHR SPECIMN AEROBIC: CPT

## 2023-01-01 PROCEDURE — 84132 ASSAY OF SERUM POTASSIUM: CPT

## 2023-01-01 PROCEDURE — 83930 ASSAY OF BLOOD OSMOLALITY: CPT

## 2023-01-01 PROCEDURE — 85613 RUSSELL VIPER VENOM DILUTED: CPT

## 2023-01-01 PROCEDURE — 71250 CT THORAX DX C-: CPT

## 2023-01-01 PROCEDURE — 71045 X-RAY EXAM CHEST 1 VIEW: CPT | Mod: 26,77

## 2023-01-01 DEVICE — PEG KT SAFETY 20FR: Type: IMPLANTABLE DEVICE | Status: FUNCTIONAL

## 2023-01-01 RX ORDER — GLUCAGON INJECTION, SOLUTION 0.5 MG/.1ML
1 INJECTION, SOLUTION SUBCUTANEOUS ONCE
Refills: 0 | Status: DISCONTINUED | OUTPATIENT
Start: 2023-01-01 | End: 2023-01-01

## 2023-01-01 RX ORDER — APIXABAN 2.5 MG/1
5 TABLET, FILM COATED ORAL EVERY 12 HOURS
Refills: 0 | Status: DISCONTINUED | OUTPATIENT
Start: 2023-01-01 | End: 2023-01-01

## 2023-01-01 RX ORDER — BACITRACIN ZINC 500 [USP'U]/G
500 OINTMENT TOPICAL 3 TIMES DAILY
Qty: 1 | Refills: 3 | Status: ACTIVE | COMMUNITY
Start: 2023-01-01 | End: 1900-01-01

## 2023-01-01 RX ORDER — SODIUM CHLORIDE 9 MG/ML
1000 INJECTION, SOLUTION INTRAVENOUS
Refills: 0 | Status: DISCONTINUED | OUTPATIENT
Start: 2023-01-01 | End: 2023-01-01

## 2023-01-01 RX ORDER — ALPRAZOLAM 0.25 MG
1 TABLET ORAL
Qty: 0 | Refills: 0 | DISCHARGE

## 2023-01-01 RX ORDER — SENNA PLUS 8.6 MG/1
2 TABLET ORAL
Qty: 0 | Refills: 0 | DISCHARGE
Start: 2023-01-01

## 2023-01-01 RX ORDER — SIROLIMUS 1 MG/ML
1 SOLUTION ORAL ONCE
Refills: 0 | Status: COMPLETED | OUTPATIENT
Start: 2023-01-01 | End: 2023-01-01

## 2023-01-01 RX ORDER — POTASSIUM CHLORIDE 20 MEQ
20 PACKET (EA) ORAL
Refills: 0 | Status: COMPLETED | OUTPATIENT
Start: 2023-01-01 | End: 2023-01-01

## 2023-01-01 RX ORDER — ACETAMINOPHEN 500 MG
650 TABLET ORAL ONCE
Refills: 0 | Status: COMPLETED | OUTPATIENT
Start: 2023-01-01 | End: 2023-01-01

## 2023-01-01 RX ORDER — HYDROMORPHONE HYDROCHLORIDE 2 MG/ML
1 INJECTION INTRAMUSCULAR; INTRAVENOUS; SUBCUTANEOUS ONCE
Refills: 0 | Status: DISCONTINUED | OUTPATIENT
Start: 2023-01-01 | End: 2023-01-01

## 2023-01-01 RX ORDER — TACROLIMUS 5 MG/1
2 CAPSULE ORAL EVERY 24 HOURS
Refills: 0 | Status: DISCONTINUED | OUTPATIENT
Start: 2023-01-01 | End: 2023-01-01

## 2023-01-01 RX ORDER — TACROLIMUS 5 MG/1
1 CAPSULE ORAL EVERY 12 HOURS
Refills: 0 | Status: DISCONTINUED | OUTPATIENT
Start: 2023-01-01 | End: 2023-01-01

## 2023-01-01 RX ORDER — INSULIN GLARGINE 100 [IU]/ML
10 INJECTION, SOLUTION SUBCUTANEOUS
Qty: 0 | Refills: 0 | DISCHARGE
Start: 2023-01-01

## 2023-01-01 RX ORDER — LANOLIN ALCOHOL/MO/W.PET/CERES
3 CREAM (GRAM) TOPICAL AT BEDTIME
Refills: 0 | Status: DISCONTINUED | OUTPATIENT
Start: 2023-01-01 | End: 2023-01-01

## 2023-01-01 RX ORDER — ACETAMINOPHEN 500 MG
1000 TABLET ORAL ONCE
Refills: 0 | Status: COMPLETED | OUTPATIENT
Start: 2023-01-01 | End: 2023-01-01

## 2023-01-01 RX ORDER — PIPERACILLIN AND TAZOBACTAM 4; .5 G/20ML; G/20ML
4.5 INJECTION, POWDER, LYOPHILIZED, FOR SOLUTION INTRAVENOUS EVERY 8 HOURS
Refills: 0 | Status: DISCONTINUED | OUTPATIENT
Start: 2023-01-01 | End: 2023-01-01

## 2023-01-01 RX ORDER — KETOROLAC TROMETHAMINE 30 MG/ML
30 SYRINGE (ML) INJECTION ONCE
Refills: 0 | Status: DISCONTINUED | OUTPATIENT
Start: 2023-01-01 | End: 2023-01-01

## 2023-01-01 RX ORDER — ACETAMINOPHEN 500 MG
2 TABLET ORAL
Qty: 0 | Refills: 0 | DISCHARGE
Start: 2023-01-01

## 2023-01-01 RX ORDER — SODIUM CHLORIDE 9 MG/ML
1000 INJECTION INTRAMUSCULAR; INTRAVENOUS; SUBCUTANEOUS
Refills: 0 | Status: DISCONTINUED | OUTPATIENT
Start: 2023-01-01 | End: 2023-01-01

## 2023-01-01 RX ORDER — VANCOMYCIN HCL 1 G
1000 VIAL (EA) INTRAVENOUS EVERY 24 HOURS
Refills: 0 | Status: DISCONTINUED | OUTPATIENT
Start: 2023-01-01 | End: 2023-01-01

## 2023-01-01 RX ORDER — ALPRAZOLAM 0.25 MG
0.25 TABLET ORAL ONCE
Refills: 0 | Status: DISCONTINUED | OUTPATIENT
Start: 2023-01-01 | End: 2023-01-01

## 2023-01-01 RX ORDER — INSULIN HUMAN 100 [IU]/ML
10 INJECTION, SOLUTION SUBCUTANEOUS ONCE
Refills: 0 | Status: COMPLETED | OUTPATIENT
Start: 2023-01-01 | End: 2023-01-01

## 2023-01-01 RX ORDER — INSULIN GLARGINE 100 [IU]/ML
10 INJECTION, SOLUTION SUBCUTANEOUS AT BEDTIME
Refills: 0 | Status: DISCONTINUED | OUTPATIENT
Start: 2023-01-01 | End: 2023-01-01

## 2023-01-01 RX ORDER — FUROSEMIDE 40 MG
40 TABLET ORAL ONCE
Refills: 0 | Status: COMPLETED | OUTPATIENT
Start: 2023-01-01 | End: 2023-01-01

## 2023-01-01 RX ORDER — INSULIN LISPRO 100/ML
VIAL (ML) SUBCUTANEOUS
Refills: 0 | Status: DISCONTINUED | OUTPATIENT
Start: 2023-01-01 | End: 2023-01-01

## 2023-01-01 RX ORDER — PANTOPRAZOLE SODIUM 20 MG/1
20 TABLET, DELAYED RELEASE ORAL
Refills: 0 | Status: DISCONTINUED | OUTPATIENT
Start: 2023-01-01 | End: 2023-01-01

## 2023-01-01 RX ORDER — DAPAGLIFLOZIN 10 MG/1
10 TABLET, FILM COATED ORAL EVERY 24 HOURS
Refills: 0 | Status: DISCONTINUED | OUTPATIENT
Start: 2023-01-01 | End: 2023-01-01

## 2023-01-01 RX ORDER — FLUCONAZOLE 150 MG/1
1 TABLET ORAL
Qty: 0 | Refills: 0 | DISCHARGE
Start: 2023-01-01 | End: 2023-01-01

## 2023-01-01 RX ORDER — HYDROMORPHONE HYDROCHLORIDE 2 MG/ML
1 INJECTION INTRAMUSCULAR; INTRAVENOUS; SUBCUTANEOUS
Qty: 0 | Refills: 0 | DISCHARGE

## 2023-01-01 RX ORDER — SENNA PLUS 8.6 MG/1
2 TABLET ORAL AT BEDTIME
Refills: 0 | Status: DISCONTINUED | OUTPATIENT
Start: 2023-01-01 | End: 2023-01-01

## 2023-01-01 RX ORDER — TACROLIMUS 5 MG/1
1 CAPSULE ORAL EVERY 24 HOURS
Refills: 0 | Status: DISCONTINUED | OUTPATIENT
Start: 2023-01-01 | End: 2023-01-01

## 2023-01-01 RX ORDER — LACTULOSE 10 G/15ML
15 SOLUTION ORAL
Qty: 0 | Refills: 0 | DISCHARGE
Start: 2023-01-01

## 2023-01-01 RX ORDER — HYDROMORPHONE HYDROCHLORIDE 2 MG/ML
2 INJECTION INTRAMUSCULAR; INTRAVENOUS; SUBCUTANEOUS
Qty: 240 | Refills: 0
Start: 2023-01-01 | End: 2023-04-14

## 2023-01-01 RX ORDER — AMLODIPINE BESYLATE 2.5 MG/1
5 TABLET ORAL DAILY
Refills: 0 | Status: DISCONTINUED | OUTPATIENT
Start: 2023-01-01 | End: 2023-01-01

## 2023-01-01 RX ORDER — ACETAMINOPHEN 500 MG
650 TABLET ORAL EVERY 6 HOURS
Refills: 0 | Status: DISCONTINUED | OUTPATIENT
Start: 2023-01-01 | End: 2023-01-01

## 2023-01-01 RX ORDER — GABAPENTIN 400 MG/1
100 CAPSULE ORAL
Qty: 0 | Refills: 0 | DISCHARGE

## 2023-01-01 RX ORDER — HYDROMORPHONE HYDROCHLORIDE 2 MG/ML
1 INJECTION INTRAMUSCULAR; INTRAVENOUS; SUBCUTANEOUS
Qty: 120 | Refills: 0
Start: 2023-01-01 | End: 2023-04-14

## 2023-01-01 RX ORDER — CEFPODOXIME PROXETIL 100 MG
1 TABLET ORAL
Qty: 4 | Refills: 0
Start: 2023-01-01 | End: 2023-01-01

## 2023-01-01 RX ORDER — DEXTROSE 50 % IN WATER 50 %
12.5 SYRINGE (ML) INTRAVENOUS ONCE
Refills: 0 | Status: DISCONTINUED | OUTPATIENT
Start: 2023-01-01 | End: 2023-01-01

## 2023-01-01 RX ORDER — MUPIROCIN 20 MG/G
2 OINTMENT TOPICAL TWICE DAILY
Qty: 1 | Refills: 3 | Status: ACTIVE | COMMUNITY
Start: 2023-01-01 | End: 1900-01-01

## 2023-01-01 RX ORDER — DEXTROSE 50 % IN WATER 50 %
25 SYRINGE (ML) INTRAVENOUS ONCE
Refills: 0 | Status: DISCONTINUED | OUTPATIENT
Start: 2023-01-01 | End: 2023-01-01

## 2023-01-01 RX ORDER — ATORVASTATIN CALCIUM 80 MG/1
40 TABLET, FILM COATED ORAL AT BEDTIME
Refills: 0 | Status: DISCONTINUED | OUTPATIENT
Start: 2023-01-01 | End: 2023-01-01

## 2023-01-01 RX ORDER — SIROLIMUS 1 MG/ML
1 SOLUTION ORAL
Qty: 0 | Refills: 0 | DISCHARGE
Start: 2023-01-01

## 2023-01-01 RX ORDER — DEXTROSE 50 % IN WATER 50 %
15 SYRINGE (ML) INTRAVENOUS ONCE
Refills: 0 | Status: DISCONTINUED | OUTPATIENT
Start: 2023-01-01 | End: 2023-01-01

## 2023-01-01 RX ORDER — MAGNESIUM SULFATE 500 MG/ML
1 VIAL (ML) INJECTION ONCE
Refills: 0 | Status: COMPLETED | OUTPATIENT
Start: 2023-01-01 | End: 2023-01-01

## 2023-01-01 RX ORDER — HYDROMORPHONE HYDROCHLORIDE 2 MG/ML
1 INJECTION INTRAMUSCULAR; INTRAVENOUS; SUBCUTANEOUS EVERY 4 HOURS
Refills: 0 | Status: DISCONTINUED | OUTPATIENT
Start: 2023-01-01 | End: 2023-01-01

## 2023-01-01 RX ORDER — ONDANSETRON 8 MG/1
4 TABLET, FILM COATED ORAL EVERY 8 HOURS
Refills: 0 | Status: DISCONTINUED | OUTPATIENT
Start: 2023-01-01 | End: 2023-01-01

## 2023-01-01 RX ORDER — POLYETHYLENE GLYCOL 3350 17 G/17G
17 POWDER, FOR SOLUTION ORAL
Refills: 0 | Status: DISCONTINUED | OUTPATIENT
Start: 2023-01-01 | End: 2023-01-01

## 2023-01-01 RX ORDER — DAPAGLIFLOZIN 10 MG/1
1 TABLET, FILM COATED ORAL
Qty: 30 | Refills: 0
Start: 2023-01-01 | End: 2023-04-14

## 2023-01-01 RX ORDER — PIPERACILLIN AND TAZOBACTAM 4; .5 G/20ML; G/20ML
3.38 INJECTION, POWDER, LYOPHILIZED, FOR SOLUTION INTRAVENOUS EVERY 8 HOURS
Refills: 0 | Status: DISCONTINUED | OUTPATIENT
Start: 2023-01-01 | End: 2023-01-01

## 2023-01-01 RX ORDER — CEFTRIAXONE 500 MG/1
1000 INJECTION, POWDER, FOR SOLUTION INTRAMUSCULAR; INTRAVENOUS EVERY 24 HOURS
Refills: 0 | Status: DISCONTINUED | OUTPATIENT
Start: 2023-01-01 | End: 2023-01-01

## 2023-01-01 RX ORDER — AMLODIPINE BESYLATE 2.5 MG/1
1 TABLET ORAL
Qty: 0 | Refills: 0 | DISCHARGE

## 2023-01-01 RX ORDER — APIXABAN 2.5 MG/1
1 TABLET, FILM COATED ORAL
Qty: 0 | Refills: 0 | DISCHARGE

## 2023-01-01 RX ORDER — HYDROMORPHONE HYDROCHLORIDE 2 MG/ML
2 INJECTION INTRAMUSCULAR; INTRAVENOUS; SUBCUTANEOUS EVERY 6 HOURS
Refills: 0 | Status: DISCONTINUED | OUTPATIENT
Start: 2023-01-01 | End: 2023-01-01

## 2023-01-01 RX ORDER — CEFTRIAXONE 500 MG/1
1000 INJECTION, POWDER, FOR SOLUTION INTRAMUSCULAR; INTRAVENOUS
Qty: 0 | Refills: 0 | DISCHARGE
Start: 2023-01-01 | End: 2023-01-01

## 2023-01-01 RX ORDER — INFLUENZA VIRUS VACCINE 15; 15; 15; 15 UG/.5ML; UG/.5ML; UG/.5ML; UG/.5ML
0.7 SUSPENSION INTRAMUSCULAR ONCE
Refills: 0 | Status: DISCONTINUED | OUTPATIENT
Start: 2023-01-01 | End: 2023-01-01

## 2023-01-01 RX ORDER — SODIUM ZIRCONIUM CYCLOSILICATE 10 G/10G
10 POWDER, FOR SUSPENSION ORAL ONCE
Refills: 0 | Status: COMPLETED | OUTPATIENT
Start: 2023-01-01 | End: 2023-01-01

## 2023-01-01 RX ORDER — DAPAGLIFLOZIN 10 MG/1
1 TABLET, FILM COATED ORAL
Qty: 0 | Refills: 0 | DISCHARGE
Start: 2023-01-01

## 2023-01-01 RX ORDER — LANOLIN ALCOHOL/MO/W.PET/CERES
3 CREAM (GRAM) TOPICAL ONCE
Refills: 0 | Status: COMPLETED | OUTPATIENT
Start: 2023-01-01 | End: 2023-01-01

## 2023-01-01 RX ORDER — TAMSULOSIN HYDROCHLORIDE 0.4 MG/1
0.4 CAPSULE ORAL AT BEDTIME
Refills: 0 | Status: DISCONTINUED | OUTPATIENT
Start: 2023-01-01 | End: 2023-01-01

## 2023-01-01 RX ORDER — INSULIN LISPRO 100/ML
VIAL (ML) SUBCUTANEOUS ONCE
Refills: 0 | Status: COMPLETED | OUTPATIENT
Start: 2023-01-01 | End: 2023-01-01

## 2023-01-01 RX ORDER — SIROLIMUS 1 MG/ML
1 SOLUTION ORAL EVERY 24 HOURS
Refills: 0 | Status: DISCONTINUED | OUTPATIENT
Start: 2023-01-01 | End: 2023-01-01

## 2023-01-01 RX ORDER — INSULIN LISPRO 100/ML
1 VIAL (ML) SUBCUTANEOUS
Qty: 0 | Refills: 0 | DISCHARGE
Start: 2023-01-01

## 2023-01-01 RX ORDER — TACROLIMUS 5 MG/1
1 CAPSULE ORAL EVERY 12 HOURS
Refills: 0 | Status: COMPLETED | OUTPATIENT
Start: 2023-01-01 | End: 2023-01-01

## 2023-01-01 RX ORDER — MAGNESIUM SULFATE 500 MG/ML
2 VIAL (ML) INJECTION ONCE
Refills: 0 | Status: COMPLETED | OUTPATIENT
Start: 2023-01-01 | End: 2023-01-01

## 2023-01-01 RX ORDER — GABAPENTIN 400 MG/1
1 CAPSULE ORAL
Qty: 0 | Refills: 0 | DISCHARGE

## 2023-01-01 RX ORDER — SIROLIMUS 1 MG/ML
1 SOLUTION ORAL
Qty: 30 | Refills: 0
Start: 2023-01-01 | End: 2023-04-14

## 2023-01-01 RX ORDER — INSULIN LISPRO 100/ML
18 VIAL (ML) SUBCUTANEOUS
Qty: 0 | Refills: 0 | DISCHARGE

## 2023-01-01 RX ORDER — FLUCONAZOLE 150 MG/1
200 TABLET ORAL DAILY
Refills: 0 | Status: DISCONTINUED | OUTPATIENT
Start: 2023-01-01 | End: 2023-01-01

## 2023-01-01 RX ORDER — HYDROMORPHONE HYDROCHLORIDE 2 MG/ML
0.5 INJECTION INTRAMUSCULAR; INTRAVENOUS; SUBCUTANEOUS EVERY 4 HOURS
Refills: 0 | Status: DISCONTINUED | OUTPATIENT
Start: 2023-01-01 | End: 2023-01-01

## 2023-01-01 RX ORDER — SODIUM CHLORIDE 9 MG/ML
500 INJECTION INTRAMUSCULAR; INTRAVENOUS; SUBCUTANEOUS ONCE
Refills: 0 | Status: COMPLETED | OUTPATIENT
Start: 2023-01-01 | End: 2023-01-01

## 2023-01-01 RX ORDER — LACTULOSE 10 G/15ML
10 SOLUTION ORAL
Refills: 0 | Status: DISCONTINUED | OUTPATIENT
Start: 2023-01-01 | End: 2023-01-01

## 2023-01-01 RX ADMIN — Medication 0.5 MILLIGRAM(S): at 22:18

## 2023-01-01 RX ADMIN — TACROLIMUS 2 MILLIGRAM(S): 5 CAPSULE ORAL at 07:11

## 2023-01-01 RX ADMIN — APIXABAN 5 MILLIGRAM(S): 2.5 TABLET, FILM COATED ORAL at 05:17

## 2023-01-01 RX ADMIN — APIXABAN 5 MILLIGRAM(S): 2.5 TABLET, FILM COATED ORAL at 21:11

## 2023-01-01 RX ADMIN — AMLODIPINE BESYLATE 5 MILLIGRAM(S): 2.5 TABLET ORAL at 06:52

## 2023-01-01 RX ADMIN — HYDROMORPHONE HYDROCHLORIDE 2 MILLIGRAM(S): 2 INJECTION INTRAMUSCULAR; INTRAVENOUS; SUBCUTANEOUS at 14:45

## 2023-01-01 RX ADMIN — Medication 50 MILLIEQUIVALENT(S): at 04:39

## 2023-01-01 RX ADMIN — Medication 2: at 12:12

## 2023-01-01 RX ADMIN — PANTOPRAZOLE SODIUM 20 MILLIGRAM(S): 20 TABLET, DELAYED RELEASE ORAL at 07:22

## 2023-01-01 RX ADMIN — SIROLIMUS 1 MILLIGRAM(S): 1 SOLUTION ORAL at 11:40

## 2023-01-01 RX ADMIN — HYDROMORPHONE HYDROCHLORIDE 1 MILLIGRAM(S): 2 INJECTION INTRAMUSCULAR; INTRAVENOUS; SUBCUTANEOUS at 18:24

## 2023-01-01 RX ADMIN — Medication 3 MILLIGRAM(S): at 03:06

## 2023-01-01 RX ADMIN — CEFTRIAXONE 100 MILLIGRAM(S): 500 INJECTION, POWDER, FOR SOLUTION INTRAMUSCULAR; INTRAVENOUS at 17:20

## 2023-01-01 RX ADMIN — Medication 40 MILLIGRAM(S): at 11:40

## 2023-01-01 RX ADMIN — INSULIN HUMAN 10 UNIT(S): 100 INJECTION, SOLUTION SUBCUTANEOUS at 21:51

## 2023-01-01 RX ADMIN — HYDROMORPHONE HYDROCHLORIDE 1 MILLIGRAM(S): 2 INJECTION INTRAMUSCULAR; INTRAVENOUS; SUBCUTANEOUS at 02:05

## 2023-01-01 RX ADMIN — HYDROMORPHONE HYDROCHLORIDE 1 MILLIGRAM(S): 2 INJECTION INTRAMUSCULAR; INTRAVENOUS; SUBCUTANEOUS at 02:55

## 2023-01-01 RX ADMIN — Medication 4: at 17:27

## 2023-01-01 RX ADMIN — Medication 650 MILLIGRAM(S): at 17:55

## 2023-01-01 RX ADMIN — APIXABAN 5 MILLIGRAM(S): 2.5 TABLET, FILM COATED ORAL at 05:22

## 2023-01-01 RX ADMIN — CEFTRIAXONE 100 MILLIGRAM(S): 500 INJECTION, POWDER, FOR SOLUTION INTRAMUSCULAR; INTRAVENOUS at 19:08

## 2023-01-01 RX ADMIN — INSULIN GLARGINE 10 UNIT(S): 100 INJECTION, SOLUTION SUBCUTANEOUS at 22:10

## 2023-01-01 RX ADMIN — HYDROMORPHONE HYDROCHLORIDE 2 MILLIGRAM(S): 2 INJECTION INTRAMUSCULAR; INTRAVENOUS; SUBCUTANEOUS at 12:05

## 2023-01-01 RX ADMIN — ATORVASTATIN CALCIUM 40 MILLIGRAM(S): 80 TABLET, FILM COATED ORAL at 21:29

## 2023-01-01 RX ADMIN — Medication 5 MILLIGRAM(S): at 05:53

## 2023-01-01 RX ADMIN — HYDROMORPHONE HYDROCHLORIDE 1 MILLIGRAM(S): 2 INJECTION INTRAMUSCULAR; INTRAVENOUS; SUBCUTANEOUS at 00:45

## 2023-01-01 RX ADMIN — PANTOPRAZOLE SODIUM 20 MILLIGRAM(S): 20 TABLET, DELAYED RELEASE ORAL at 07:14

## 2023-01-01 RX ADMIN — Medication 0.25 MILLIGRAM(S): at 05:08

## 2023-01-01 RX ADMIN — Medication 650 MILLIGRAM(S): at 11:29

## 2023-01-01 RX ADMIN — APIXABAN 5 MILLIGRAM(S): 2.5 TABLET, FILM COATED ORAL at 21:07

## 2023-01-01 RX ADMIN — HYDROMORPHONE HYDROCHLORIDE 2 MILLIGRAM(S): 2 INJECTION INTRAMUSCULAR; INTRAVENOUS; SUBCUTANEOUS at 06:20

## 2023-01-01 RX ADMIN — SODIUM CHLORIDE 500 MILLILITER(S): 9 INJECTION INTRAMUSCULAR; INTRAVENOUS; SUBCUTANEOUS at 20:23

## 2023-01-01 RX ADMIN — HYDROMORPHONE HYDROCHLORIDE 1 MILLIGRAM(S): 2 INJECTION INTRAMUSCULAR; INTRAVENOUS; SUBCUTANEOUS at 07:58

## 2023-01-01 RX ADMIN — TAMSULOSIN HYDROCHLORIDE 0.4 MILLIGRAM(S): 0.4 CAPSULE ORAL at 21:15

## 2023-01-01 RX ADMIN — Medication 2: at 09:06

## 2023-01-01 RX ADMIN — POLYETHYLENE GLYCOL 3350 17 GRAM(S): 17 POWDER, FOR SOLUTION ORAL at 17:39

## 2023-01-01 RX ADMIN — POLYETHYLENE GLYCOL 3350 17 GRAM(S): 17 POWDER, FOR SOLUTION ORAL at 18:37

## 2023-01-01 RX ADMIN — HYDROMORPHONE HYDROCHLORIDE 1 MILLIGRAM(S): 2 INJECTION INTRAMUSCULAR; INTRAVENOUS; SUBCUTANEOUS at 08:41

## 2023-01-01 RX ADMIN — Medication 40 MILLIGRAM(S): at 07:13

## 2023-01-01 RX ADMIN — ATORVASTATIN CALCIUM 40 MILLIGRAM(S): 80 TABLET, FILM COATED ORAL at 22:36

## 2023-01-01 RX ADMIN — SIROLIMUS 1 MILLIGRAM(S): 1 SOLUTION ORAL at 09:25

## 2023-01-01 RX ADMIN — HYDROMORPHONE HYDROCHLORIDE 1 MILLIGRAM(S): 2 INJECTION INTRAMUSCULAR; INTRAVENOUS; SUBCUTANEOUS at 11:58

## 2023-01-01 RX ADMIN — PIPERACILLIN AND TAZOBACTAM 25 GRAM(S): 4; .5 INJECTION, POWDER, LYOPHILIZED, FOR SOLUTION INTRAVENOUS at 18:04

## 2023-01-01 RX ADMIN — Medication 5 MILLIGRAM(S): at 06:51

## 2023-01-01 RX ADMIN — HYDROMORPHONE HYDROCHLORIDE 1 MILLIGRAM(S): 2 INJECTION INTRAMUSCULAR; INTRAVENOUS; SUBCUTANEOUS at 01:17

## 2023-01-01 RX ADMIN — Medication 2: at 09:28

## 2023-01-01 RX ADMIN — PIPERACILLIN AND TAZOBACTAM 25 GRAM(S): 4; .5 INJECTION, POWDER, LYOPHILIZED, FOR SOLUTION INTRAVENOUS at 17:35

## 2023-01-01 RX ADMIN — HYDROMORPHONE HYDROCHLORIDE 1 MILLIGRAM(S): 2 INJECTION INTRAMUSCULAR; INTRAVENOUS; SUBCUTANEOUS at 10:40

## 2023-01-01 RX ADMIN — HYDROMORPHONE HYDROCHLORIDE 1 MILLIGRAM(S): 2 INJECTION INTRAMUSCULAR; INTRAVENOUS; SUBCUTANEOUS at 04:20

## 2023-01-01 RX ADMIN — APIXABAN 5 MILLIGRAM(S): 2.5 TABLET, FILM COATED ORAL at 10:15

## 2023-01-01 RX ADMIN — PIPERACILLIN AND TAZOBACTAM 25 GRAM(S): 4; .5 INJECTION, POWDER, LYOPHILIZED, FOR SOLUTION INTRAVENOUS at 02:30

## 2023-01-01 RX ADMIN — CEFTRIAXONE 100 MILLIGRAM(S): 500 INJECTION, POWDER, FOR SOLUTION INTRAMUSCULAR; INTRAVENOUS at 17:05

## 2023-01-01 RX ADMIN — ATORVASTATIN CALCIUM 40 MILLIGRAM(S): 80 TABLET, FILM COATED ORAL at 21:15

## 2023-01-01 RX ADMIN — PIPERACILLIN AND TAZOBACTAM 25 GRAM(S): 4; .5 INJECTION, POWDER, LYOPHILIZED, FOR SOLUTION INTRAVENOUS at 02:57

## 2023-01-01 RX ADMIN — HYDROMORPHONE HYDROCHLORIDE 2 MILLIGRAM(S): 2 INJECTION INTRAMUSCULAR; INTRAVENOUS; SUBCUTANEOUS at 05:14

## 2023-01-01 RX ADMIN — Medication 2: at 09:07

## 2023-01-01 RX ADMIN — APIXABAN 5 MILLIGRAM(S): 2.5 TABLET, FILM COATED ORAL at 18:27

## 2023-01-01 RX ADMIN — Medication 6: at 03:16

## 2023-01-01 RX ADMIN — PIPERACILLIN AND TAZOBACTAM 25 GRAM(S): 4; .5 INJECTION, POWDER, LYOPHILIZED, FOR SOLUTION INTRAVENOUS at 09:08

## 2023-01-01 RX ADMIN — HYDROMORPHONE HYDROCHLORIDE 1 MILLIGRAM(S): 2 INJECTION INTRAMUSCULAR; INTRAVENOUS; SUBCUTANEOUS at 08:50

## 2023-01-01 RX ADMIN — APIXABAN 5 MILLIGRAM(S): 2.5 TABLET, FILM COATED ORAL at 09:09

## 2023-01-01 RX ADMIN — Medication 40 MILLIGRAM(S): at 07:22

## 2023-01-01 RX ADMIN — PANTOPRAZOLE SODIUM 20 MILLIGRAM(S): 20 TABLET, DELAYED RELEASE ORAL at 06:43

## 2023-01-01 RX ADMIN — PIPERACILLIN AND TAZOBACTAM 25 GRAM(S): 4; .5 INJECTION, POWDER, LYOPHILIZED, FOR SOLUTION INTRAVENOUS at 18:27

## 2023-01-01 RX ADMIN — Medication 650 MILLIGRAM(S): at 19:38

## 2023-01-01 RX ADMIN — SIROLIMUS 1 MILLIGRAM(S): 1 SOLUTION ORAL at 07:02

## 2023-01-01 RX ADMIN — TACROLIMUS 1 MILLIGRAM(S): 5 CAPSULE ORAL at 18:18

## 2023-01-01 RX ADMIN — Medication 2: at 06:03

## 2023-01-01 RX ADMIN — Medication 2: at 22:21

## 2023-01-01 RX ADMIN — HYDROMORPHONE HYDROCHLORIDE 1 MILLIGRAM(S): 2 INJECTION INTRAMUSCULAR; INTRAVENOUS; SUBCUTANEOUS at 09:11

## 2023-01-01 RX ADMIN — HYDROMORPHONE HYDROCHLORIDE 1 MILLIGRAM(S): 2 INJECTION INTRAMUSCULAR; INTRAVENOUS; SUBCUTANEOUS at 17:29

## 2023-01-01 RX ADMIN — SIROLIMUS 1 MILLIGRAM(S): 1 SOLUTION ORAL at 11:16

## 2023-01-01 RX ADMIN — HYDROMORPHONE HYDROCHLORIDE 2 MILLIGRAM(S): 2 INJECTION INTRAMUSCULAR; INTRAVENOUS; SUBCUTANEOUS at 08:07

## 2023-01-01 RX ADMIN — AMLODIPINE BESYLATE 5 MILLIGRAM(S): 2.5 TABLET ORAL at 07:13

## 2023-01-01 RX ADMIN — Medication 2: at 12:00

## 2023-01-01 RX ADMIN — HYDROMORPHONE HYDROCHLORIDE 1 MILLIGRAM(S): 2 INJECTION INTRAMUSCULAR; INTRAVENOUS; SUBCUTANEOUS at 18:09

## 2023-01-01 RX ADMIN — HYDROMORPHONE HYDROCHLORIDE 1 MILLIGRAM(S): 2 INJECTION INTRAMUSCULAR; INTRAVENOUS; SUBCUTANEOUS at 21:26

## 2023-01-01 RX ADMIN — Medication 650 MILLIGRAM(S): at 10:09

## 2023-01-01 RX ADMIN — SIROLIMUS 1 MILLIGRAM(S): 1 SOLUTION ORAL at 12:36

## 2023-01-01 RX ADMIN — Medication 30 MILLIGRAM(S): at 22:00

## 2023-01-01 RX ADMIN — Medication 0.25 MILLIGRAM(S): at 01:45

## 2023-01-01 RX ADMIN — HYDROMORPHONE HYDROCHLORIDE 1 MILLIGRAM(S): 2 INJECTION INTRAMUSCULAR; INTRAVENOUS; SUBCUTANEOUS at 22:36

## 2023-01-01 RX ADMIN — AMLODIPINE BESYLATE 5 MILLIGRAM(S): 2.5 TABLET ORAL at 07:01

## 2023-01-01 RX ADMIN — Medication 2: at 18:10

## 2023-01-01 RX ADMIN — HYDROMORPHONE HYDROCHLORIDE 1 MILLIGRAM(S): 2 INJECTION INTRAMUSCULAR; INTRAVENOUS; SUBCUTANEOUS at 11:04

## 2023-01-01 RX ADMIN — Medication 2: at 17:59

## 2023-01-01 RX ADMIN — APIXABAN 5 MILLIGRAM(S): 2.5 TABLET, FILM COATED ORAL at 10:43

## 2023-01-01 RX ADMIN — FLUCONAZOLE 200 MILLIGRAM(S): 150 TABLET ORAL at 11:40

## 2023-01-01 RX ADMIN — SIROLIMUS 1 MILLIGRAM(S): 1 SOLUTION ORAL at 17:39

## 2023-01-01 RX ADMIN — Medication 2: at 08:48

## 2023-01-01 RX ADMIN — APIXABAN 5 MILLIGRAM(S): 2.5 TABLET, FILM COATED ORAL at 09:58

## 2023-01-01 RX ADMIN — TACROLIMUS 1 MILLIGRAM(S): 5 CAPSULE ORAL at 19:16

## 2023-01-01 RX ADMIN — Medication 40 MILLIGRAM(S): at 18:41

## 2023-01-01 RX ADMIN — Medication 25 GRAM(S): at 15:19

## 2023-01-01 RX ADMIN — ATORVASTATIN CALCIUM 40 MILLIGRAM(S): 80 TABLET, FILM COATED ORAL at 21:12

## 2023-01-01 RX ADMIN — TAMSULOSIN HYDROCHLORIDE 0.4 MILLIGRAM(S): 0.4 CAPSULE ORAL at 22:36

## 2023-01-01 RX ADMIN — APIXABAN 5 MILLIGRAM(S): 2.5 TABLET, FILM COATED ORAL at 10:08

## 2023-01-01 RX ADMIN — Medication 650 MILLIGRAM(S): at 18:52

## 2023-01-01 RX ADMIN — POLYETHYLENE GLYCOL 3350 17 GRAM(S): 17 POWDER, FOR SOLUTION ORAL at 06:50

## 2023-01-01 RX ADMIN — POLYETHYLENE GLYCOL 3350 17 GRAM(S): 17 POWDER, FOR SOLUTION ORAL at 18:26

## 2023-01-01 RX ADMIN — HYDROMORPHONE HYDROCHLORIDE 2 MILLIGRAM(S): 2 INJECTION INTRAMUSCULAR; INTRAVENOUS; SUBCUTANEOUS at 00:04

## 2023-01-01 RX ADMIN — HYDROMORPHONE HYDROCHLORIDE 1 MILLIGRAM(S): 2 INJECTION INTRAMUSCULAR; INTRAVENOUS; SUBCUTANEOUS at 12:36

## 2023-01-01 RX ADMIN — Medication 650 MILLIGRAM(S): at 22:22

## 2023-01-01 RX ADMIN — Medication 6: at 07:48

## 2023-01-01 RX ADMIN — Medication 2: at 18:03

## 2023-01-01 RX ADMIN — TAMSULOSIN HYDROCHLORIDE 0.4 MILLIGRAM(S): 0.4 CAPSULE ORAL at 21:12

## 2023-01-01 RX ADMIN — APIXABAN 5 MILLIGRAM(S): 2.5 TABLET, FILM COATED ORAL at 18:41

## 2023-01-01 RX ADMIN — Medication 2: at 13:30

## 2023-01-01 RX ADMIN — ATORVASTATIN CALCIUM 40 MILLIGRAM(S): 80 TABLET, FILM COATED ORAL at 21:37

## 2023-01-01 RX ADMIN — HYDROMORPHONE HYDROCHLORIDE 1 MILLIGRAM(S): 2 INJECTION INTRAMUSCULAR; INTRAVENOUS; SUBCUTANEOUS at 22:32

## 2023-01-01 RX ADMIN — Medication 40 MILLIGRAM(S): at 07:02

## 2023-01-01 RX ADMIN — SODIUM CHLORIDE 100 MILLILITER(S): 9 INJECTION INTRAMUSCULAR; INTRAVENOUS; SUBCUTANEOUS at 12:03

## 2023-01-01 RX ADMIN — TACROLIMUS 1 MILLIGRAM(S): 5 CAPSULE ORAL at 18:26

## 2023-01-01 RX ADMIN — Medication 100 GRAM(S): at 00:02

## 2023-01-01 RX ADMIN — PIPERACILLIN AND TAZOBACTAM 25 GRAM(S): 4; .5 INJECTION, POWDER, LYOPHILIZED, FOR SOLUTION INTRAVENOUS at 02:37

## 2023-01-01 RX ADMIN — HYDROMORPHONE HYDROCHLORIDE 0.5 MILLIGRAM(S): 2 INJECTION INTRAMUSCULAR; INTRAVENOUS; SUBCUTANEOUS at 05:16

## 2023-01-01 RX ADMIN — LACTULOSE 10 GRAM(S): 10 SOLUTION ORAL at 06:50

## 2023-01-01 RX ADMIN — Medication 650 MILLIGRAM(S): at 12:01

## 2023-01-01 RX ADMIN — PANTOPRAZOLE SODIUM 20 MILLIGRAM(S): 20 TABLET, DELAYED RELEASE ORAL at 07:10

## 2023-01-01 RX ADMIN — Medication 40 MILLIGRAM(S): at 18:04

## 2023-01-01 RX ADMIN — Medication 5 MILLIGRAM(S): at 05:17

## 2023-01-01 RX ADMIN — Medication 50 MILLIEQUIVALENT(S): at 23:56

## 2023-01-01 RX ADMIN — Medication 2: at 09:31

## 2023-01-01 RX ADMIN — PANTOPRAZOLE SODIUM 20 MILLIGRAM(S): 20 TABLET, DELAYED RELEASE ORAL at 06:50

## 2023-01-01 RX ADMIN — Medication 650 MILLIGRAM(S): at 12:49

## 2023-01-01 RX ADMIN — HYDROMORPHONE HYDROCHLORIDE 2 MILLIGRAM(S): 2 INJECTION INTRAMUSCULAR; INTRAVENOUS; SUBCUTANEOUS at 05:16

## 2023-01-01 RX ADMIN — ATORVASTATIN CALCIUM 40 MILLIGRAM(S): 80 TABLET, FILM COATED ORAL at 22:09

## 2023-01-01 RX ADMIN — TACROLIMUS 1 MILLIGRAM(S): 5 CAPSULE ORAL at 19:00

## 2023-01-01 RX ADMIN — APIXABAN 5 MILLIGRAM(S): 2.5 TABLET, FILM COATED ORAL at 07:20

## 2023-01-01 RX ADMIN — HYDROMORPHONE HYDROCHLORIDE 1 MILLIGRAM(S): 2 INJECTION INTRAMUSCULAR; INTRAVENOUS; SUBCUTANEOUS at 08:34

## 2023-01-01 RX ADMIN — Medication 2: at 22:32

## 2023-01-01 RX ADMIN — PIPERACILLIN AND TAZOBACTAM 25 GRAM(S): 4; .5 INJECTION, POWDER, LYOPHILIZED, FOR SOLUTION INTRAVENOUS at 09:25

## 2023-01-01 RX ADMIN — Medication 650 MILLIGRAM(S): at 21:23

## 2023-01-01 RX ADMIN — TACROLIMUS 1 MILLIGRAM(S): 5 CAPSULE ORAL at 18:00

## 2023-01-01 RX ADMIN — TAMSULOSIN HYDROCHLORIDE 0.4 MILLIGRAM(S): 0.4 CAPSULE ORAL at 21:37

## 2023-01-01 RX ADMIN — TACROLIMUS 1 MILLIGRAM(S): 5 CAPSULE ORAL at 18:40

## 2023-01-01 RX ADMIN — APIXABAN 5 MILLIGRAM(S): 2.5 TABLET, FILM COATED ORAL at 22:36

## 2023-01-01 RX ADMIN — AMLODIPINE BESYLATE 5 MILLIGRAM(S): 2.5 TABLET ORAL at 07:42

## 2023-01-01 RX ADMIN — ATORVASTATIN CALCIUM 40 MILLIGRAM(S): 80 TABLET, FILM COATED ORAL at 22:24

## 2023-01-01 RX ADMIN — HYDROMORPHONE HYDROCHLORIDE 1 MILLIGRAM(S): 2 INJECTION INTRAMUSCULAR; INTRAVENOUS; SUBCUTANEOUS at 17:10

## 2023-01-01 RX ADMIN — PIPERACILLIN AND TAZOBACTAM 25 GRAM(S): 4; .5 INJECTION, POWDER, LYOPHILIZED, FOR SOLUTION INTRAVENOUS at 10:27

## 2023-01-01 RX ADMIN — HYDROMORPHONE HYDROCHLORIDE 1 MILLIGRAM(S): 2 INJECTION INTRAMUSCULAR; INTRAVENOUS; SUBCUTANEOUS at 10:15

## 2023-01-01 RX ADMIN — SENNA PLUS 2 TABLET(S): 8.6 TABLET ORAL at 23:10

## 2023-01-01 RX ADMIN — Medication 650 MILLIGRAM(S): at 09:09

## 2023-01-01 RX ADMIN — HYDROMORPHONE HYDROCHLORIDE 2 MILLIGRAM(S): 2 INJECTION INTRAMUSCULAR; INTRAVENOUS; SUBCUTANEOUS at 02:30

## 2023-01-01 RX ADMIN — TAMSULOSIN HYDROCHLORIDE 0.4 MILLIGRAM(S): 0.4 CAPSULE ORAL at 21:07

## 2023-01-01 RX ADMIN — INSULIN GLARGINE 10 UNIT(S): 100 INJECTION, SOLUTION SUBCUTANEOUS at 02:28

## 2023-01-01 RX ADMIN — Medication 0.5 MILLIGRAM(S): at 22:53

## 2023-01-01 RX ADMIN — TAMSULOSIN HYDROCHLORIDE 0.4 MILLIGRAM(S): 0.4 CAPSULE ORAL at 22:57

## 2023-01-01 RX ADMIN — Medication 650 MILLIGRAM(S): at 23:24

## 2023-01-01 RX ADMIN — TACROLIMUS 2 MILLIGRAM(S): 5 CAPSULE ORAL at 06:42

## 2023-01-01 RX ADMIN — HYDROMORPHONE HYDROCHLORIDE 1 MILLIGRAM(S): 2 INJECTION INTRAMUSCULAR; INTRAVENOUS; SUBCUTANEOUS at 14:15

## 2023-01-01 RX ADMIN — INSULIN GLARGINE 10 UNIT(S): 100 INJECTION, SOLUTION SUBCUTANEOUS at 22:25

## 2023-01-01 RX ADMIN — TACROLIMUS 1 MILLIGRAM(S): 5 CAPSULE ORAL at 07:01

## 2023-01-01 RX ADMIN — FLUCONAZOLE 200 MILLIGRAM(S): 150 TABLET ORAL at 12:36

## 2023-01-01 RX ADMIN — HYDROMORPHONE HYDROCHLORIDE 2 MILLIGRAM(S): 2 INJECTION INTRAMUSCULAR; INTRAVENOUS; SUBCUTANEOUS at 23:27

## 2023-01-01 RX ADMIN — APIXABAN 5 MILLIGRAM(S): 2.5 TABLET, FILM COATED ORAL at 22:57

## 2023-01-01 RX ADMIN — POLYETHYLENE GLYCOL 3350 17 GRAM(S): 17 POWDER, FOR SOLUTION ORAL at 23:21

## 2023-01-01 RX ADMIN — HYDROMORPHONE HYDROCHLORIDE 1 MILLIGRAM(S): 2 INJECTION INTRAMUSCULAR; INTRAVENOUS; SUBCUTANEOUS at 03:20

## 2023-01-01 RX ADMIN — ATORVASTATIN CALCIUM 40 MILLIGRAM(S): 80 TABLET, FILM COATED ORAL at 22:57

## 2023-01-01 RX ADMIN — Medication 2: at 17:05

## 2023-01-01 RX ADMIN — DAPAGLIFLOZIN 10 MILLIGRAM(S): 10 TABLET, FILM COATED ORAL at 09:08

## 2023-01-01 RX ADMIN — TACROLIMUS 1 MILLIGRAM(S): 5 CAPSULE ORAL at 10:48

## 2023-01-01 RX ADMIN — Medication 400 MILLIGRAM(S): at 20:25

## 2023-01-01 RX ADMIN — HYDROMORPHONE HYDROCHLORIDE 2 MILLIGRAM(S): 2 INJECTION INTRAMUSCULAR; INTRAVENOUS; SUBCUTANEOUS at 07:07

## 2023-01-01 RX ADMIN — Medication 650 MILLIGRAM(S): at 13:01

## 2023-01-01 RX ADMIN — HYDROMORPHONE HYDROCHLORIDE 1 MILLIGRAM(S): 2 INJECTION INTRAMUSCULAR; INTRAVENOUS; SUBCUTANEOUS at 11:53

## 2023-01-01 RX ADMIN — HYDROMORPHONE HYDROCHLORIDE 1 MILLIGRAM(S): 2 INJECTION INTRAMUSCULAR; INTRAVENOUS; SUBCUTANEOUS at 22:07

## 2023-01-01 RX ADMIN — PANTOPRAZOLE SODIUM 20 MILLIGRAM(S): 20 TABLET, DELAYED RELEASE ORAL at 07:20

## 2023-01-01 RX ADMIN — HYDROMORPHONE HYDROCHLORIDE 1 MILLIGRAM(S): 2 INJECTION INTRAMUSCULAR; INTRAVENOUS; SUBCUTANEOUS at 21:11

## 2023-01-01 RX ADMIN — HYDROMORPHONE HYDROCHLORIDE 2 MILLIGRAM(S): 2 INJECTION INTRAMUSCULAR; INTRAVENOUS; SUBCUTANEOUS at 04:50

## 2023-01-01 RX ADMIN — HYDROMORPHONE HYDROCHLORIDE 1 MILLIGRAM(S): 2 INJECTION INTRAMUSCULAR; INTRAVENOUS; SUBCUTANEOUS at 22:57

## 2023-01-01 RX ADMIN — Medication 650 MILLIGRAM(S): at 13:24

## 2023-01-01 RX ADMIN — ATORVASTATIN CALCIUM 40 MILLIGRAM(S): 80 TABLET, FILM COATED ORAL at 21:24

## 2023-01-01 RX ADMIN — HYDROMORPHONE HYDROCHLORIDE 2 MILLIGRAM(S): 2 INJECTION INTRAMUSCULAR; INTRAVENOUS; SUBCUTANEOUS at 13:13

## 2023-01-01 RX ADMIN — Medication 5 MILLIGRAM(S): at 07:02

## 2023-01-01 RX ADMIN — HYDROMORPHONE HYDROCHLORIDE 2 MILLIGRAM(S): 2 INJECTION INTRAMUSCULAR; INTRAVENOUS; SUBCUTANEOUS at 23:10

## 2023-01-01 RX ADMIN — HYDROMORPHONE HYDROCHLORIDE 1 MILLIGRAM(S): 2 INJECTION INTRAMUSCULAR; INTRAVENOUS; SUBCUTANEOUS at 15:59

## 2023-01-01 RX ADMIN — HYDROMORPHONE HYDROCHLORIDE 1 MILLIGRAM(S): 2 INJECTION INTRAMUSCULAR; INTRAVENOUS; SUBCUTANEOUS at 21:15

## 2023-01-01 RX ADMIN — LACTULOSE 10 GRAM(S): 10 SOLUTION ORAL at 18:26

## 2023-01-01 RX ADMIN — HYDROMORPHONE HYDROCHLORIDE 1 MILLIGRAM(S): 2 INJECTION INTRAMUSCULAR; INTRAVENOUS; SUBCUTANEOUS at 23:29

## 2023-01-01 RX ADMIN — POLYETHYLENE GLYCOL 3350 17 GRAM(S): 17 POWDER, FOR SOLUTION ORAL at 07:14

## 2023-01-01 RX ADMIN — Medication 650 MILLIGRAM(S): at 19:26

## 2023-01-01 RX ADMIN — SENNA PLUS 2 TABLET(S): 8.6 TABLET ORAL at 21:15

## 2023-01-01 RX ADMIN — Medication 650 MILLIGRAM(S): at 20:26

## 2023-01-01 RX ADMIN — SODIUM CHLORIDE 100 MILLILITER(S): 9 INJECTION INTRAMUSCULAR; INTRAVENOUS; SUBCUTANEOUS at 02:31

## 2023-01-01 RX ADMIN — Medication 5 MILLIGRAM(S): at 07:03

## 2023-01-01 RX ADMIN — HYDROMORPHONE HYDROCHLORIDE 1 MILLIGRAM(S): 2 INJECTION INTRAMUSCULAR; INTRAVENOUS; SUBCUTANEOUS at 22:51

## 2023-01-01 RX ADMIN — PIPERACILLIN AND TAZOBACTAM 25 GRAM(S): 4; .5 INJECTION, POWDER, LYOPHILIZED, FOR SOLUTION INTRAVENOUS at 10:50

## 2023-01-01 RX ADMIN — PIPERACILLIN AND TAZOBACTAM 25 GRAM(S): 4; .5 INJECTION, POWDER, LYOPHILIZED, FOR SOLUTION INTRAVENOUS at 10:16

## 2023-01-01 RX ADMIN — HYDROMORPHONE HYDROCHLORIDE 1 MILLIGRAM(S): 2 INJECTION INTRAMUSCULAR; INTRAVENOUS; SUBCUTANEOUS at 05:56

## 2023-01-01 RX ADMIN — SODIUM ZIRCONIUM CYCLOSILICATE 10 GRAM(S): 10 POWDER, FOR SUSPENSION ORAL at 20:24

## 2023-01-01 RX ADMIN — HYDROMORPHONE HYDROCHLORIDE 1 MILLIGRAM(S): 2 INJECTION INTRAMUSCULAR; INTRAVENOUS; SUBCUTANEOUS at 02:20

## 2023-01-01 RX ADMIN — Medication 40 MILLIGRAM(S): at 15:22

## 2023-01-01 RX ADMIN — TAMSULOSIN HYDROCHLORIDE 0.4 MILLIGRAM(S): 0.4 CAPSULE ORAL at 21:24

## 2023-01-01 RX ADMIN — TACROLIMUS 2 MILLIGRAM(S): 5 CAPSULE ORAL at 07:22

## 2023-01-01 RX ADMIN — TACROLIMUS 2 MILLIGRAM(S): 5 CAPSULE ORAL at 07:20

## 2023-01-01 RX ADMIN — HYDROMORPHONE HYDROCHLORIDE 1 MILLIGRAM(S): 2 INJECTION INTRAMUSCULAR; INTRAVENOUS; SUBCUTANEOUS at 07:34

## 2023-01-01 RX ADMIN — HYDROMORPHONE HYDROCHLORIDE 1 MILLIGRAM(S): 2 INJECTION INTRAMUSCULAR; INTRAVENOUS; SUBCUTANEOUS at 16:44

## 2023-01-01 RX ADMIN — APIXABAN 5 MILLIGRAM(S): 2.5 TABLET, FILM COATED ORAL at 07:02

## 2023-01-01 RX ADMIN — Medication 50 MILLIEQUIVALENT(S): at 02:35

## 2023-01-01 RX ADMIN — APIXABAN 5 MILLIGRAM(S): 2.5 TABLET, FILM COATED ORAL at 10:50

## 2023-01-01 RX ADMIN — HYDROMORPHONE HYDROCHLORIDE 1 MILLIGRAM(S): 2 INJECTION INTRAMUSCULAR; INTRAVENOUS; SUBCUTANEOUS at 10:43

## 2023-01-01 RX ADMIN — TACROLIMUS 1 MILLIGRAM(S): 5 CAPSULE ORAL at 18:16

## 2023-01-01 RX ADMIN — TACROLIMUS 2 MILLIGRAM(S): 5 CAPSULE ORAL at 07:14

## 2023-01-01 RX ADMIN — HYDROMORPHONE HYDROCHLORIDE 1 MILLIGRAM(S): 2 INJECTION INTRAMUSCULAR; INTRAVENOUS; SUBCUTANEOUS at 08:15

## 2023-01-01 RX ADMIN — HYDROMORPHONE HYDROCHLORIDE 0.5 MILLIGRAM(S): 2 INJECTION INTRAMUSCULAR; INTRAVENOUS; SUBCUTANEOUS at 03:52

## 2023-01-01 RX ADMIN — HYDROMORPHONE HYDROCHLORIDE 1 MILLIGRAM(S): 2 INJECTION INTRAMUSCULAR; INTRAVENOUS; SUBCUTANEOUS at 22:47

## 2023-01-01 RX ADMIN — HYDROMORPHONE HYDROCHLORIDE 2 MILLIGRAM(S): 2 INJECTION INTRAMUSCULAR; INTRAVENOUS; SUBCUTANEOUS at 12:08

## 2023-01-01 RX ADMIN — HYDROMORPHONE HYDROCHLORIDE 1 MILLIGRAM(S): 2 INJECTION INTRAMUSCULAR; INTRAVENOUS; SUBCUTANEOUS at 05:51

## 2023-01-01 RX ADMIN — PIPERACILLIN AND TAZOBACTAM 25 GRAM(S): 4; .5 INJECTION, POWDER, LYOPHILIZED, FOR SOLUTION INTRAVENOUS at 03:01

## 2023-01-01 RX ADMIN — Medication 650 MILLIGRAM(S): at 15:57

## 2023-01-01 RX ADMIN — Medication 4: at 12:24

## 2023-01-01 RX ADMIN — Medication 650 MILLIGRAM(S): at 22:24

## 2023-01-01 RX ADMIN — Medication 5 MILLIGRAM(S): at 05:21

## 2023-01-01 RX ADMIN — HYDROMORPHONE HYDROCHLORIDE 2 MILLIGRAM(S): 2 INJECTION INTRAMUSCULAR; INTRAVENOUS; SUBCUTANEOUS at 23:37

## 2023-01-01 RX ADMIN — Medication 30 MILLIGRAM(S): at 21:29

## 2023-01-01 RX ADMIN — HYDROMORPHONE HYDROCHLORIDE 1 MILLIGRAM(S): 2 INJECTION INTRAMUSCULAR; INTRAVENOUS; SUBCUTANEOUS at 10:26

## 2023-01-01 RX ADMIN — Medication 650 MILLIGRAM(S): at 18:55

## 2023-01-01 RX ADMIN — SIROLIMUS 1 MILLIGRAM(S): 1 SOLUTION ORAL at 06:52

## 2023-01-01 RX ADMIN — APIXABAN 5 MILLIGRAM(S): 2.5 TABLET, FILM COATED ORAL at 21:24

## 2023-01-01 RX ADMIN — Medication 25 GRAM(S): at 14:43

## 2023-01-01 RX ADMIN — HYDROMORPHONE HYDROCHLORIDE 2 MILLIGRAM(S): 2 INJECTION INTRAMUSCULAR; INTRAVENOUS; SUBCUTANEOUS at 00:20

## 2023-01-01 RX ADMIN — Medication 5 MILLIGRAM(S): at 06:58

## 2023-01-01 RX ADMIN — Medication 2: at 08:54

## 2023-01-01 RX ADMIN — HYDROMORPHONE HYDROCHLORIDE 2 MILLIGRAM(S): 2 INJECTION INTRAMUSCULAR; INTRAVENOUS; SUBCUTANEOUS at 11:05

## 2023-01-01 RX ADMIN — APIXABAN 5 MILLIGRAM(S): 2.5 TABLET, FILM COATED ORAL at 17:34

## 2023-01-01 RX ADMIN — PANTOPRAZOLE SODIUM 20 MILLIGRAM(S): 20 TABLET, DELAYED RELEASE ORAL at 07:08

## 2023-01-01 RX ADMIN — PIPERACILLIN AND TAZOBACTAM 25 GRAM(S): 4; .5 INJECTION, POWDER, LYOPHILIZED, FOR SOLUTION INTRAVENOUS at 18:42

## 2023-01-01 RX ADMIN — Medication 2: at 12:47

## 2023-01-01 RX ADMIN — Medication 12: at 02:55

## 2023-01-01 RX ADMIN — Medication 40 MILLIGRAM(S): at 06:43

## 2023-01-01 RX ADMIN — HYDROMORPHONE HYDROCHLORIDE 1 MILLIGRAM(S): 2 INJECTION INTRAMUSCULAR; INTRAVENOUS; SUBCUTANEOUS at 10:30

## 2023-01-01 RX ADMIN — AMLODIPINE BESYLATE 5 MILLIGRAM(S): 2.5 TABLET ORAL at 06:43

## 2023-01-01 RX ADMIN — SIROLIMUS 1 MILLIGRAM(S): 1 SOLUTION ORAL at 16:04

## 2023-01-01 RX ADMIN — APIXABAN 5 MILLIGRAM(S): 2.5 TABLET, FILM COATED ORAL at 09:31

## 2023-01-01 RX ADMIN — HYDROMORPHONE HYDROCHLORIDE 1 MILLIGRAM(S): 2 INJECTION INTRAMUSCULAR; INTRAVENOUS; SUBCUTANEOUS at 14:45

## 2023-01-01 RX ADMIN — APIXABAN 5 MILLIGRAM(S): 2.5 TABLET, FILM COATED ORAL at 21:37

## 2023-01-01 RX ADMIN — HYDROMORPHONE HYDROCHLORIDE 2 MILLIGRAM(S): 2 INJECTION INTRAMUSCULAR; INTRAVENOUS; SUBCUTANEOUS at 15:30

## 2023-01-01 RX ADMIN — Medication 4: at 12:15

## 2023-01-01 RX ADMIN — Medication 40 MILLIGRAM(S): at 21:16

## 2023-01-01 RX ADMIN — APIXABAN 5 MILLIGRAM(S): 2.5 TABLET, FILM COATED ORAL at 21:15

## 2023-01-01 RX ADMIN — HYDROMORPHONE HYDROCHLORIDE 1 MILLIGRAM(S): 2 INJECTION INTRAMUSCULAR; INTRAVENOUS; SUBCUTANEOUS at 04:05

## 2023-01-01 RX ADMIN — Medication 2: at 12:36

## 2023-01-01 RX ADMIN — HYDROMORPHONE HYDROCHLORIDE 1 MILLIGRAM(S): 2 INJECTION INTRAMUSCULAR; INTRAVENOUS; SUBCUTANEOUS at 06:53

## 2023-01-01 RX ADMIN — HYDROMORPHONE HYDROCHLORIDE 1 MILLIGRAM(S): 2 INJECTION INTRAMUSCULAR; INTRAVENOUS; SUBCUTANEOUS at 12:08

## 2023-01-01 RX ADMIN — HYDROMORPHONE HYDROCHLORIDE 2 MILLIGRAM(S): 2 INJECTION INTRAMUSCULAR; INTRAVENOUS; SUBCUTANEOUS at 01:04

## 2023-01-01 RX ADMIN — ATORVASTATIN CALCIUM 40 MILLIGRAM(S): 80 TABLET, FILM COATED ORAL at 21:07

## 2023-01-09 NOTE — DISCHARGE NOTE PROVIDER - NSDCQMSTROKE_NEU_ALL_CORE
Chief Complaint   Patient presents with     RECHECK     Schizoaffective disorder, depressive type     
No

## 2023-01-12 PROBLEM — J34.0 NASAL ULCER: Status: ACTIVE | Noted: 2023-01-01

## 2023-01-12 PROBLEM — K12.30 MUCOSITIS: Status: ACTIVE | Noted: 2023-01-01

## 2023-01-12 PROBLEM — J32.9 CHRONIC SINUSITIS: Status: ACTIVE | Noted: 2023-01-01

## 2023-01-12 NOTE — PROCEDURE
[Epistaxis] : evaluation of epistaxis [Rigid Endoscope] : examined with a rigid endoscope [Bilateral] : bilateral debridement of the nasal cavity [FreeTextEntry6] : thickened secretions and mucositis  [de-identified] : nasal ulcer  [FreeTextEntry1] : ulceration

## 2023-01-12 NOTE — HISTORY OF PRESENT ILLNESS
[FreeTextEntry1] : Patient following up today on epistaxis.  Patient states he has lost a lot of weight .  He has not had any nose bleeds since last visit but his left nostril is very dry and clumps of dry mucus comes out.  He has Chemo 1 week and radiation the other days .

## 2023-01-12 NOTE — PHYSICAL EXAM
[Nasal Endoscopy Performed] : nasal endoscopy was performed, see procedure section for findings [de-identified] : edema  [de-identified] : crusting  [Normal] : lingual tonsils are normal [Midline] : trachea located in midline position [de-identified] : mucositis  [FreeTextEntry2] : skin radaition changes.

## 2023-02-08 PROBLEM — E87.1 HYPONATREMIA: Status: ACTIVE | Noted: 2022-01-01

## 2023-02-08 PROBLEM — E11.51 DIABETES MELLITUS WITH PERIPHERAL CIRCULATORY DISORDER: Status: ACTIVE | Noted: 2020-02-19

## 2023-02-15 NOTE — H&P ADULT. - PROBLEM SELECTOR PLAN 10
left upper arm
FEN NPO replete lytes PRN, follow bicarb  PPx SCD, protonix  Dispo Floors for pancytopenia  Code Full

## 2023-03-08 NOTE — H&P ADULT - PROBLEM SELECTOR PLAN 9
TTE 7/2022 with mild concentric LVH, EF 65%, no regional wall motion abnormalities.  Reports not currently on diuretics.

## 2023-03-08 NOTE — H&P ADULT - HISTORY OF PRESENT ILLNESS
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 Leyva for admission for recurrent R pleural effusion.  As per family pt developed R pleural effusion during recent admission at OSH and had pleurX placed, VNS comes 3x week to drain 500cc.  thought 2/2 lymphoma but did not have further w/u during admission.  Went to see Dr. Leyva today and told to come in for admission to eval cause of effusion, Dr. Lora aware.     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  However, patient states that he went to this hospital bc the surgeon who operated on his jaw tumor (Dr. Lynn) is affiliated with this hospital and wanted him admitted because he continues to have jaw pain and the area "is not healing fast".  He was sent to [facility] ED today after an outpatient appointment with Dr. Leyva for admission for recurrent R pleural effusion and discussion with Dr. Lora.  Per chart review, discussion with family revealed that patient developed R pleural effusion during recent admission at OSH and had pleurX placed, VNS comes 3x week to drain 500cc.  Etiology believed to be 2/2 lymphoma but did not have further w/u during admission.  Patient states he has R face pain but no other complaints.  ROS otherwise negative.  He has not been taking torsemide or any other diuretics.    In the ED:  Initial vital signs: T: 97.6 F, HR: 89, BP: 121/74, R: 18, SpO2: 94% on RA  ED course:   Labs: significant for WBC 20.80, Hgb 10.0 (baseline ~7-8), Na 128, K 5.6, glucose 238,   Imaging:  CXR: wet read: R pleural effusion, some vascular congestion similar to prior  EKG: afib, RBBB, nonspecific ST changes unchanged from prior  Medications: 1g tylenol, 10g lokelma, 500cc NS  Consults: none

## 2023-03-08 NOTE — H&P ADULT - PROBLEM SELECTOR PLAN 7
laceration
Hx of renal transplant.  Follows with Dr. Leyva.  On tacrolimus 2mg every morning and 1mg every evening.  - c/w home med

## 2023-03-08 NOTE — ED PROVIDER NOTE - CARE PLAN
1 Principal Discharge DX:	Pleural effusion, right  Secondary Diagnosis:	Hyperkalemia  Secondary Diagnosis:	Hyponatremia

## 2023-03-08 NOTE — H&P ADULT - PROBLEM SELECTOR PLAN 1
Known R sided pleural effusion s/p pleurX placement ~1 week ago at OSH.  VNS comes to home 3x/week to drain 500cc fluid.  PCP recommended admssion for further evaluation and w/u of the effusion.  Currently satting well on RA w/o iwob or respiratory complaints.  Had been discovered and treated in Nov 2022 for pleural effusion likely 2/2 CHF exacerbation and dc'd on torsemide, which patient states he does not currently take.  - Dr. Lora to evaluate patient in AM   - ?CT chest for further characterization  - add on BNP  - consider repeat Echo  - consider diuretics if respiratory status worsens

## 2023-03-08 NOTE — ED PROVIDER NOTE - PROGRESS NOTE DETAILS
labs w/ Na 128, hyperkalemia 5.6.  chronic leukocytosis.  given 500cc bolus and will gove lokelma.  ekg afib, incomp rbbb, lateral twi- unchanged from prior.  cxr w/ cardiomegaly and R pleural effusion.  keep reverse iso as pt immunosuppressed.  d/w Dr. Lora and will admit RMF for w/u effusion labs w/ Na 128, hyperkalemia 5.6.  chronic leukocytosis.  given 500cc bolus and will give lokelma.  ekg afib, incomp rbbb, lateral twi- unchanged from prior.  cxr w/ cardiomegaly and R pleural effusion.  keep reverse iso as pt immunosuppressed.  d/w Dr. Lora and will admit RMF for w/u effusion

## 2023-03-08 NOTE — ED PROVIDER NOTE - PHYSICAL EXAMINATION
Vitals reviewed  Gen: comfortable/chronically ill appearing, nad, speaking in full sentences, no hypoxia/tachypnea   Skin: wwp, no rash/lesions  HEENT: ncat, eomi, dry oral mucosa, no vesicles or lesions, no facial ttp  Neck: supple, no JVD   CV: irregular rhythm, reg rate, no audible m/r/g  Resp: symmetrical expansion, b/l breath sounds, diminished R base, no wheezing/rales   Chest: + pleural drain site R anterior lower chest, skin intact around area   Abd: nondistended, soft, nontender, no r/g  Ext: FROM throughout, no peripheral edema/calf ttp, distal pulses inact   Neuro: alert/oriented, no focal deficits

## 2023-03-08 NOTE — ED ADULT NURSE NOTE - OBJECTIVE STATEMENT
Pt arrives with family with pleurX catheter in place. Dressing noted on the right side. Family says pt gets it drained 3x a week. No active draining noted. Pt and family states they are here to be admitted by dr. tran. Pt denies any other medical complaints at this moment. Pt is noted to be aox3, in no acute distress, able to maintain airway, having non labored breathing, non diaphoretic and able to talk in clear full sentences. IV access was established and labs were drawn. Pt arrives with family with Rt pleurX catheter in place. Dressing noted on the right side. Family says pt gets it drained 3x a week. No active draining noted. Pt and family states they are here to be admitted by dr. tran. Pt denies any other medical complaints at this moment. Pt is noted to be aox3, in no acute distress, able to maintain airway, having non labored breathing, non diaphoretic and able to talk in clear full sentences. IV access was established and labs were drawn.

## 2023-03-08 NOTE — H&P ADULT - PROBLEM SELECTOR PLAN 8
Hx of DM on humalog with sugar monitoring device in abdomen.  - sliding scale insulin while inpatient

## 2023-03-08 NOTE — H&P ADULT - NSHPLABSRESULTS_GEN_ALL_CORE
10.0   20.80 )-----------( 127      ( 08 Mar 2023 19:33 )             31.5       03-08    128<L>  |  100  |  15  ----------------------------<  218<H>  5.1   |  23  |  0.62    Ca    8.4      08 Mar 2023 22:03  Phos  2.9     03-08  Mg     1.8     03-08    TPro  5.7<L>  /  Alb  3.2<L>  /  TBili  0.7  /  DBili  x   /  AST  24  /  ALT  16  /  AlkPhos  119  03-08                  PT/INR - ( 08 Mar 2023 19:07 )   PT: 17.0 sec;   INR: 1.42          PTT - ( 08 Mar 2023 19:07 )  PTT:36.9 sec    Lactate Trend            CAPILLARY BLOOD GLUCOSE

## 2023-03-08 NOTE — H&P ADULT - PROBLEM SELECTOR PLAN 11
Hgb 10.0, higher than baseline ~8.  No active signs of bleeding.  Suspect ACD.  - transfuse Hgb <7  - monitor CBC

## 2023-03-08 NOTE — ED PROVIDER NOTE - ATTENDING APP SHARED VISIT CONTRIBUTION OF CARE
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 Leyva for admission for recurrent R pleural effusion.  pt afebrile, chronically ill but comfortable appearing, no hypoxia, heart irreg rhythm, b/l breath sounds, diminished R base, no wheezing/rales; + pleural drain site R anterior lower chest, skin intact around area; no JVD or LE edema.  will obtain labs, ekg, cxr and d/w Dr. Leyva/Dr. Lora

## 2023-03-08 NOTE — ED PROVIDER NOTE - OBJECTIVE STATEMENT
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 Leyva for admission for recurrent R pleural effusion.  As per family pt developed R pleural effusion during recent admission at OSH and had pleurX placed, VNS comes 3x week to drain 500cc.  thought 2/2 lymphoma but did not have further w/u during admission.  Went to see Dr. Leyva today and told to come in for admission to eval cause of effusion, Dr. Lora aware.  pt reports mild pain over jaw but denies dysphagia/angioedema.  denies f/c, HA, dizziness, fainting, chest pain, sob, uri sxs, abd pain, nvd, leg pain/swelling, fall/trauma

## 2023-03-08 NOTE — H&P ADULT - PROBLEM SELECTOR PLAN 4
Masseter mass s/p resection in ~2019 with recurrence.  Had been on Chemo and RT but no longer either as of 2-3 weeks ago.  Still with jaw pain, on hydromorphone 2mg PO q6h prn.  Follows with Dr. Skaggs and Dr. Bernardo at [facility] and Dr. Lynn (surgeon).  - tylenol 650mg prn mild pain, hydromorphone 2mg q6h prn mod pain, hydromorphone 1mg IVP q4h severe pain  - pureed diet for now, obtain speech and swallow eval for proper diet

## 2023-03-08 NOTE — H&P ADULT - NSHPPHYSICALEXAM_GEN_ALL_CORE
LOS: 1d    VITALS:   T(C): 36.7 (03-08-23 @ 23:46), Max: 36.7 (03-08-23 @ 23:46)  HR: 76 (03-08-23 @ 23:46) (76 - 89)  BP: 129/76 (03-08-23 @ 23:46) (121/74 - 129/76)  RR: 18 (03-08-23 @ 23:46) (18 - 18)  SpO2: 96% (03-08-23 @ 23:46) (94% - 96%)    GENERAL: NAD, lying in bed comfortably  HEAD:  Atraumatic, Normocephalic  EYES: EOMI, PERRLA, conjunctiva and sclera clear  ENT: Moist mucous membranes  NECK: Supple,  CHEST/LUNG: decreased breath sounds b/l in upper and lower lung fields.  No crackles appreciated.  Unlabored respirations.  R chest tube bandaged under gauze, c/d/i.  HEART: Irregular rate and rhythm; No murmurs.  ABDOMEN: BSx4; Soft, nontender, nondistended  EXTREMITIES:  2+ Peripheral Pulses.  Trace pitting edema b/l.  NERVOUS SYSTEM:  A&Ox3, no focal deficits   SKIN: Chronic hyperpigmentation b/l legs. LOS: 1d    VITALS:   T(C): 36.7 (03-08-23 @ 23:46), Max: 36.7 (03-08-23 @ 23:46)  HR: 76 (03-08-23 @ 23:46) (76 - 89)  BP: 129/76 (03-08-23 @ 23:46) (121/74 - 129/76)  RR: 18 (03-08-23 @ 23:46) (18 - 18)  SpO2: 96% (03-08-23 @ 23:46) (94% - 96%)    GENERAL: NAD, lying in bed comfortably  HEAD:  Atraumatic, Normocephalic  EYES: EOMI, PERRLA, conjunctiva and sclera clear  ENT: Moist mucous membranes  NECK: Supple,  CHEST/LUNG: decreased breath sounds b/l in upper and lower lung fields.  No crackles appreciated.  Unlabored respirations.  R chest tube bandaged under gauze, c/d/i.  HEART: Irregular rate and rhythm; No murmurs.  ABDOMEN: BSx4; Soft, nontender, nondistended, sugar monitoring device in LLQ  EXTREMITIES:  2+ Peripheral Pulses.  Trace pitting edema b/l.  NERVOUS SYSTEM:  A&Ox3, no focal deficits   SKIN: Chronic hyperpigmentation b/l legs.

## 2023-03-08 NOTE — H&P ADULT - ASSESSMENT
72M with PMHx Afib (on Eliquis, s/p DCCV 5/2022), PE, HFpEF (EF 65%), CAD s/p CABG (2005), R renal transplant (on tacrolimus, follows w/ Dr Leyva), jaw cancer s/p resection (~2019) w/ recurrence s/p chemo/RT (completed 2-3 weeks ago), JAYANT (not on CPAP), CLL (w/ chronic leukocytosis), DM2 (with humalog pump), HTN, HLD, recent admit OSH for tongue/facial swelling likely 2/2 RT c/b pleural effusion s/p pleurX drain R chest, dc one week ago presents from PCP office for further evaluation and w/u of pleural effusion.

## 2023-03-08 NOTE — H&P ADULT - PROBLEM SELECTOR PLAN 3
Had been hyponatremic to low 130s during last admission.  Currently Na 128 but corrected to 130 for hyperglycemia.  Appears hypervolemic on exam with pleural effusion and trace pitting edema.  Serum osm 279.  Patient had been prescribed torsemide but reports not taking it.  Currently asymptomatic.  - continue to monitor BMP

## 2023-03-08 NOTE — ED ADULT TRIAGE NOTE - CHIEF COMPLAINT QUOTE
Pt arrives with L pleurX catheter in place. States, "I got this placed emergently in another hospital but im here to be admitted by dr. tran." Pt appears pale. Denies acute complaints.

## 2023-03-09 NOTE — CONSULT NOTE ADULT - ASSESSMENT
72M with PMHx renal tx (on tacro 2/1) CLL DM HTN chronic pleural effusions who presented to the ED under the guise of Dr Leyva after an outpatient appointment for treatment of recurrent R pleural effusion- nephrology consulted for renal transplant medication management    Assessment/Plan:   #renal transplant  #hyponatremia,  continue with tacrolimus 2/1  please obtain tacro trough 30 mins prior to PM dose  please obtain serum osm, urine na, urine osm TSH cortisol  CXR    Thank you for the opportunity to participate in the care of your patient. The nephrology service remains available to assist with any questions or concerns. Please feel free to reach us by paging the on-call nephrology fellow for urgent issues or as below.     Madhuri Turcios D.O  PGY-5, Nephrology Fellow   P: [phone] 72M with PMHx renal tx (on tacro 2/1) CLL DM HTN chronic pleural effusions who presented to the ED under the guise of Dr Leyva after an outpatient appointment for treatment of recurrent R pleural effusion- nephrology consulted for renal transplant medication management    Assessment/Plan:   #renal transplant  #hyponatremia,  #chronic effusion  continue with tacrolimus 2/1-> however would reach out to transplant nephrology dr. galicia in regards to switching to sirolimus as patient with malignancies respond better to this verses tacrolimus  please obtain tacro trough 30 mins prior to PM dose  please obtain serum osm, urine na, urine osm TSH cortisol  please obtain CT chest to assess effusion as will likely need to be tapped and sent for cytologies  will follow up cytologies  to obtain records from dr yip's office regarding previous cytologies      Thank you for the opportunity to participate in the care of your patient. The nephrology service remains available to assist with any questions or concerns. Please feel free to reach us by paging the on-call nephrology fellow for urgent issues or as below.     Madhuri Turcios D.O  PGY-5, Nephrology Fellow   P: [phone]

## 2023-03-09 NOTE — CONSULT NOTE ADULT - SUBJECTIVE AND OBJECTIVE BOX
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  However, patient states that he went to this hospital bc the surgeon who operated on his jaw tumor (Dr. Lynn) is affiliated with this hospital and wanted him admitted because he continues to have jaw pain and the area "is not healing fast".  He was sent to [facility] ED today after an outpatient appointment with Dr. Leyva for admission for recurrent R pleural effusion and discussion with Dr. Lora.  Per chart review, discussion with family revealed that patient developed R pleural effusion during recent admission at OSH and had pleurX placed, VNS comes 3x week to drain 500cc.  Etiology believed to be 2/2 lymphoma but did not have further w/u during admission.  Patient states he has R face pain but no other complaints.  ROS otherwise negative.  He has not been taking torsemide or any other diuretics.    In the ED:  Initial vital signs: T: 97.6 F, HR: 89, BP: 121/74, R: 18, SpO2: 94% on RA  ED course:   Labs: significant for WBC 20.80, Hgb 10.0 (baseline ~7-8), Na 128, K 5.6, glucose 238,   Imaging:  CXR: wet read: R pleural effusion, some vascular congestion similar to prior  EKG: afib, RBBB, nonspecific ST changes unchanged from prior  Medications: 1g tylenol, 10g lokelma, 500cc NS  Consults: none  (08 Mar 2023 21:46)      PAST MEDICAL & SURGICAL HISTORY:  Barretts esophagus      CRI (chronic renal insufficiency)      DM (diabetes mellitus)      GERD (gastroesophageal reflux disease)      HTN (hypertension)      Monoclonal gammopathy      Mandibular abscess      Paroxysmal atrial fibrillation      Benign prostatic hyperplasia, presence of lower urinary tract symptoms unspecified, unspecified morphology      Hyperlipidemia      Barretts esophagus      S/P CABG (coronary artery bypass graft)  20 years      History of surgery  cyst removal      Mandibular abscess          MEDICATIONS  (STANDING):  apixaban 5 milliGRAM(s) Oral every 12 hours  atorvastatin 40 milliGRAM(s) Oral at bedtime  dextrose 5%. 1000 milliLiter(s) (100 mL/Hr) IV Continuous <Continuous>  dextrose 5%. 1000 milliLiter(s) (50 mL/Hr) IV Continuous <Continuous>  dextrose 50% Injectable 25 Gram(s) IV Push once  dextrose 50% Injectable 12.5 Gram(s) IV Push once  dextrose 50% Injectable 25 Gram(s) IV Push once  glucagon  Injectable 1 milliGRAM(s) IntraMuscular once  influenza  Vaccine (HIGH DOSE) 0.7 milliLiter(s) IntraMuscular once  insulin lispro (ADMELOG) corrective regimen sliding scale   SubCutaneous Before meals and at bedtime  pantoprazole    Tablet 20 milliGRAM(s) Oral before breakfast  polyethylene glycol 3350 17 Gram(s) Oral two times a day  tacrolimus 2 milliGRAM(s) Oral every 24 hours  tacrolimus 1 milliGRAM(s) Oral every 24 hours  tamsulosin 0.4 milliGRAM(s) Oral at bedtime    MEDICATIONS  (PRN):  acetaminophen     Tablet .. 650 milliGRAM(s) Oral every 6 hours PRN Temp greater or equal to 38C (100.4F), Mild Pain (1 - 3)  dextrose Oral Gel 15 Gram(s) Oral once PRN Blood Glucose LESS THAN 70 milliGRAM(s)/deciliter  HYDROmorphone   Tablet 2 milliGRAM(s) Oral every 6 hours PRN Moderate Pain (4 - 6)  HYDROmorphone  Injectable 1 milliGRAM(s) IV Push every 4 hours PRN Severe Pain (7 - 10)        Social History:                  -  Home Living Status :             -  Prior Home Care Services :             -  Family support :            -  Occupation :     Baseline Functional Level Prior to Admission :             - ADL's/ IADL's :  independent , requires partial assistance with            - Ambulatory status prior to admission :   walks with       FAMILY HISTORY:  No pertinent family history in first degree relatives        CBC Full  -  ( 08 Mar 2023 19:33 )  WBC Count : 20.80 K/uL  RBC Count : 2.98 M/uL  Hemoglobin : 10.0 g/dL  Hematocrit : 31.5 %  Platelet Count - Automated : 127 K/uL  Mean Cell Volume : 105.7 fl  Mean Cell Hemoglobin : 33.6 pg  Mean Cell Hemoglobin Concentration : 31.7 gm/dL  Auto Neutrophil # : 4.33 K/uL  Auto Lymphocyte # : 15.75 K/uL  Auto Monocyte # : 0.54 K/uL  Auto Eosinophil # : 0.00 K/uL  Auto Basophil # : 0.00 K/uL  Auto Neutrophil % : 19.1 %  Auto Lymphocyte % : 75.7 %  Auto Monocyte % : 2.6 %  Auto Eosinophil % : 0.0 %  Auto Basophil % : 0.0 %      03-08    128<L>  |  100  |  15  ----------------------------<  218<H>  5.1   |  23  |  0.62    Ca    8.4      08 Mar 2023 22:03  Phos  2.9     03-08  Mg     1.8     03-08    TPro  5.7<L>  /  Alb  3.2<L>  /  TBili  0.7  /  DBili  x   /  AST  24  /  ALT  16  /  AlkPhos  119  03-08          Radiology :               Vital Signs Last 24 Hrs  T(C): 36.3 (09 Mar 2023 05:22), Max: 36.7 (08 Mar 2023 23:46)  T(F): 97.4 (09 Mar 2023 05:22), Max: 98 (08 Mar 2023 23:46)  HR: 82 (09 Mar 2023 05:22) (65 - 89)  BP: 132/65 (09 Mar 2023 05:22) (121/74 - 139/85)  BP(mean): --  RR: 16 (09 Mar 2023 05:22) (16 - 18)  SpO2: 95% (09 Mar 2023 05:22) (94% - 96%)    Parameters below as of 09 Mar 2023 05:22  Patient On (Oxygen Delivery Method): room air            REVIEW OF SYSTEMS:      CONSTITUTIONAL: No fever, weight loss, or fatigue  EYES: No eye pain, visual disturbances, or discharge  ENMT:  No difficulty hearing, tinnitus, vertigo; No sinus or throat pain  NECK: No pain or stiffness  BREASTS: No pain, masses, or nipple discharge  RESPIRATORY: per hpi   CARDIOVASCULAR: No chest pain, palpitations, dizziness, or leg swelling  GASTROINTESTINAL: No abdominal or epigastric pain. No nausea, vomiting, or hematemesis; No diarrhea or constipation. No melena or hematochezia.  GENITOURINARY: No dysuria, frequency, hematuria, or incontinence  NEUROLOGICAL: No headaches, memory loss, loss of strength, numbness, or tremors  SKIN: No itching, burning, rashes, or lesions   LYMPH NODES: No enlarged glands  ENDOCRINE: No heat or cold intolerance; No hair loss  MUSCULOSKELETAL: No joint pain or swelling; No muscle, back, or extremity pain  PSYCHIATRIC: No depression, anxiety, mood swings, or difficulty sleeping  HEME/LYMPH: No easy bruising, or bleeding gums  ALLERGY AND IMMUNOLOGIC: No hives or eczema  VASCULAR: no swelling , erythema        Physical Exam : 72 y o man lying comfortably in semi Hutchins's position , awake , alert , no acute complaints     Head : normocephalic , atraumatic    Eyes : PERRLA , EOMI , no nystagmus , sclera anicteric    ENT : nasal discharge , uvula midline , no oropharyngeal erythema / exudate    Neck : supple , negative JVD , negative carotid bruits , no thyromegaly    Chest :  R pleurix  , dec bs R base    Cardiovascular : irregular rate and rhythm      Abdomen : soft , non distended , non tender to palpation in all 4 quadrants , normal bowel sounds      Extremities : WWP , neg cyanosis /clubbing / edema      Neurologic Exam :    Alert and oriented  x 3     Motor Exam:          Right UE:               no focal weakness ,  > 3+/5 throughout                             Left UE:                 no focal weakness ,  > 3+/5 throughout          Right LE:                no focal weakness ,  > 3+/5 throughout        Left LE:                  no focal weakness ,  > 3+/5 throughout        Sensation :         intact to light touch x 4 extremities     DTR :                     biceps/brachioradialis : equal                              patella/ankle : equal      Gait :  not tested        PM&R Impression : admitted for R pleural effusion        Recommendations / Plan :     1) Physical / Occupational therapy focusing on therapeutic exercises , equipment evaluation , bed mobility/transfer out of bed evaluation , progressive ambulation with assistive devices prn .    2) Current disposition plan recommendation  :    pending functional progress

## 2023-03-09 NOTE — PROGRESS NOTE ADULT - SUBJECTIVE AND OBJECTIVE BOX
Medicine Progress Note     SUBJECTIVE / OVERNIGHT EVENTS:  O/N events: ALEXANDER  Patient was seen and examined at bedside. Reports 10/10 pain in R jaw, non radiating.   Otherwise negative ROS    MEDICATIONS  (STANDING):  apixaban 5 milliGRAM(s) Oral every 12 hours  atorvastatin 40 milliGRAM(s) Oral at bedtime  dextrose 5%. 1000 milliLiter(s) (100 mL/Hr) IV Continuous <Continuous>  dextrose 5%. 1000 milliLiter(s) (50 mL/Hr) IV Continuous <Continuous>  dextrose 50% Injectable 25 Gram(s) IV Push once  dextrose 50% Injectable 12.5 Gram(s) IV Push once  dextrose 50% Injectable 25 Gram(s) IV Push once  glucagon  Injectable 1 milliGRAM(s) IntraMuscular once  influenza  Vaccine (HIGH DOSE) 0.7 milliLiter(s) IntraMuscular once  insulin lispro (ADMELOG) corrective regimen sliding scale   SubCutaneous Before meals and at bedtime  pantoprazole    Tablet 20 milliGRAM(s) Oral before breakfast  polyethylene glycol 3350 17 Gram(s) Oral two times a day  tacrolimus 2 milliGRAM(s) Oral every 24 hours  tacrolimus 1 milliGRAM(s) Oral every 24 hours  tamsulosin 0.4 milliGRAM(s) Oral at bedtime    MEDICATIONS  (PRN):  acetaminophen     Tablet .. 650 milliGRAM(s) Oral every 6 hours PRN Temp greater or equal to 38C (100.4F), Mild Pain (1 - 3)  dextrose Oral Gel 15 Gram(s) Oral once PRN Blood Glucose LESS THAN 70 milliGRAM(s)/deciliter  HYDROmorphone   Tablet 2 milliGRAM(s) Oral every 6 hours PRN Moderate Pain (4 - 6)  HYDROmorphone  Injectable 1 milliGRAM(s) IV Push every 4 hours PRN Severe Pain (7 - 10)    CAPILLARY BLOOD GLUCOSE      POCT Blood Glucose.: 182 mg/dL (09 Mar 2023 12:17)  POCT Blood Glucose.: 186 mg/dL (09 Mar 2023 08:39)        OBJECTIVE:  Vital Signs Last 24 Hrs  T(C): 36.4 (09 Mar 2023 12:00), Max: 36.7 (08 Mar 2023 23:46)  T(F): 97.6 (09 Mar 2023 12:00), Max: 98 (08 Mar 2023 23:46)  HR: 82 (09 Mar 2023 12:00) (65 - 89)  BP: 135/69 (09 Mar 2023 12:00) (121/74 - 139/85)  BP(mean): --  RR: 16 (09 Mar 2023 12:00) (16 - 18)  SpO2: 96% (09 Mar 2023 12:00) (94% - 96%)    Parameters below as of 09 Mar 2023 12:00  Patient On (Oxygen Delivery Method): room air        PHYSICAL EXAM:  General: AAOx3, NAD, euvolemic  Head: NC/AT; MMM; PERRL  Neck: Supple; no JVD  Respiratory: poor respiratory effort  Cardiovascular: Irregular rhythm/rate; S1/S2+, no m/r/g auscultated  Gastrointestinal: Soft; NTND; bowel sounds normal and present in all 4 quadrants  Extremities: WWP; no b/l L/E edema  Neurological: CNII-XII grossly intact; no obvious focal deficits  I&O's Summary      LABS:                        10.0   20.80 )-----------( 127      ( 08 Mar 2023 19:33 )             31.5     03-08    128<L>  |  100  |  15  ----------------------------<  218<H>  5.1   |  23  |  0.62    Ca    8.4      08 Mar 2023 22:03  Phos  2.9     03-08  Mg     1.8     03-08    TPro  5.7<L>  /  Alb  3.2<L>  /  TBili  0.7  /  DBili  x   /  AST  24  /  ALT  16  /  AlkPhos  119  03-08    PT/INR - ( 08 Mar 2023 19:07 )   PT: 17.0 sec;   INR: 1.42          PTT - ( 08 Mar 2023 19:07 )  PTT:36.9 sec              RADIOLOGY & ADDITIONAL TESTS:  Imaging from Last 24 Hours:

## 2023-03-09 NOTE — PROGRESS NOTE ADULT - PROBLEM SELECTOR PLAN 9
Patient with history of CLL, follows with oncology at [facility]. No lymphadenopathy appreciated on physical exam. Pt with chronic leukocytosis. Afebrile, no other signs or symptoms of infectious process. Leukocytosis likely due to CLL.  - monitor WBC

## 2023-03-09 NOTE — PROGRESS NOTE ADULT - PROBLEM SELECTOR PLAN 10
Patient with chronic renal insufficiency, s/p R renal transplant, on tacrolimus 2mg AM and 1mg PM  - Continue tacrolimus 2mg AM and 1mg PM

## 2023-03-09 NOTE — PROGRESS NOTE ADULT - PROBLEM SELECTOR PLAN 2
CXR shows right pleural effusion, previous thoracentesis in 12/21 with recurrent effusion. Etiology likely HFpEF exacerbation vs malignant     - BNP elevated, crackles on exam  - Last ECHO 7/22 showed LVEF 65%  - f/u TTE  - f/u ncCT chest for possible thoracentesis

## 2023-03-09 NOTE — PROGRESS NOTE ADULT - PROBLEM SELECTOR PLAN 3
Patient with history of HFpEF presenting with dyspnea on exertion, orthopnea found to be hypoxic to 84% on room air, right sided pleural effusion likely due to acute exacerbation.  TTE 7/2022 with mild concentric LVH, EF 65%, no regional wall motion abnormalities. ACS unlikely troponin 0.02, patient without chest pain, EKG without ischemic changes.  On exam patient with 2+ pitting edema bilateral LE. BNP 3,494 (baseline 1,500).  Home medications Torsemide 20mg po BID- per patient no longer taking. Patient's ambulatory SpO2 desatted from 91% to 84% on RA.    - strict I/Os, monitor UOP carefully  - daily weights  - discharge on 2L home O2      # Hyponatremia  Patient with Na 131 on admission which is around his baseline. Etiology likely Torsemide use.    - Continue to monitor BMP

## 2023-03-09 NOTE — PHYSICAL THERAPY INITIAL EVALUATION ADULT - ADDITIONAL COMMENTS
pt states that he lives w/ his family in a house w/ 7 steps to enter and then flat inside. Ambulates w/ use of RW. Denies hx of recent falls.

## 2023-03-09 NOTE — PROGRESS NOTE ADULT - PROBLEM SELECTOR PLAN 6
Patient with history of hypertension, home medications isosorbide mononitrate 30mg QD, amlodipine 5mg QD, torsemide 20mg BID    - continue imdur 30mg PO QD  - continue amlodipine 5mg PO QD  - Hold Torsemide for now since patient is on Lasix 60mg IV BID

## 2023-03-09 NOTE — CONSULT NOTE ADULT - SUBJECTIVE AND OBJECTIVE BOX
Patient is a 72y old  Male who presents with a chief complaint of Pleural effusion (09 Mar 2023 07:40)      HPI:  72M with PMHx renal tx (on tacro 2/1) CLL DM HTN chronic pleural effusions who presented to the ED under the guise of Dr Leyva after an outpatient appointment for treatment of recurrent R pleural effusion ; patient states that he gets his pleural effusion drained by VNS 3 times per week his breathing has been OK appetite has been limited due to right jaw pain/swelling otherwise no acute complaints voiced in the ED labs notable for Na 128 (though 130 when corrected for glucose) K 5.6 resolved after medical management CXR R pleural effusion unchanged from prior xrays nephrology consulted for transplant medication management         PAST MEDICAL & SURGICAL HISTORY:  Barretts esophagus      CRI (chronic renal insufficiency)      DM (diabetes mellitus)      GERD (gastroesophageal reflux disease)      HTN (hypertension)      Monoclonal gammopathy      Mandibular abscess      Paroxysmal atrial fibrillation      Benign prostatic hyperplasia, presence of lower urinary tract symptoms unspecified, unspecified morphology      Hyperlipidemia      Barretts esophagus      S/P CABG (coronary artery bypass graft)  20 years      History of surgery  cyst removal      Mandibular abscess            Allergies:  No Known Allergies      Home Medications:   acetaminophen     Tablet .. 650 milliGRAM(s) Oral every 6 hours PRN  apixaban 5 milliGRAM(s) Oral every 12 hours  atorvastatin 40 milliGRAM(s) Oral at bedtime  dextrose 5%. 1000 milliLiter(s) IV Continuous <Continuous>  dextrose 5%. 1000 milliLiter(s) IV Continuous <Continuous>  dextrose 50% Injectable 25 Gram(s) IV Push once  dextrose 50% Injectable 12.5 Gram(s) IV Push once  dextrose 50% Injectable 25 Gram(s) IV Push once  dextrose Oral Gel 15 Gram(s) Oral once PRN  glucagon  Injectable 1 milliGRAM(s) IntraMuscular once  HYDROmorphone   Tablet 2 milliGRAM(s) Oral every 6 hours PRN  HYDROmorphone  Injectable 1 milliGRAM(s) IV Push every 4 hours PRN  influenza  Vaccine (HIGH DOSE) 0.7 milliLiter(s) IntraMuscular once  insulin lispro (ADMELOG) corrective regimen sliding scale   SubCutaneous Before meals and at bedtime  pantoprazole    Tablet 20 milliGRAM(s) Oral before breakfast  polyethylene glycol 3350 17 Gram(s) Oral two times a day  tacrolimus 2 milliGRAM(s) Oral every 24 hours  tacrolimus 1 milliGRAM(s) Oral every 24 hours  tamsulosin 0.4 milliGRAM(s) Oral at bedtime      Hospital Medications:   MEDICATIONS  (STANDING):  apixaban 5 milliGRAM(s) Oral every 12 hours  atorvastatin 40 milliGRAM(s) Oral at bedtime  dextrose 5%. 1000 milliLiter(s) (100 mL/Hr) IV Continuous <Continuous>  dextrose 5%. 1000 milliLiter(s) (50 mL/Hr) IV Continuous <Continuous>  dextrose 50% Injectable 25 Gram(s) IV Push once  dextrose 50% Injectable 12.5 Gram(s) IV Push once  dextrose 50% Injectable 25 Gram(s) IV Push once  glucagon  Injectable 1 milliGRAM(s) IntraMuscular once  influenza  Vaccine (HIGH DOSE) 0.7 milliLiter(s) IntraMuscular once  insulin lispro (ADMELOG) corrective regimen sliding scale   SubCutaneous Before meals and at bedtime  pantoprazole    Tablet 20 milliGRAM(s) Oral before breakfast  polyethylene glycol 3350 17 Gram(s) Oral two times a day  tacrolimus 2 milliGRAM(s) Oral every 24 hours  tacrolimus 1 milliGRAM(s) Oral every 24 hours  tamsulosin 0.4 milliGRAM(s) Oral at bedtime      SOCIAL HISTORY:  Denies ETOh, Smoking,     Family History:  FAMILY HISTORY:  No pertinent family history in first degree relatives          VITALS:  T(F): 97.4 (03-09-23 @ 05:22), Max: 98 (03-08-23 @ 23:46)  HR: 82 (03-09-23 @ 05:22)  BP: 132/65 (03-09-23 @ 05:22)  RR: 16 (03-09-23 @ 05:22)  SpO2: 95% (03-09-23 @ 05:22)  Wt(kg): --      Weight (kg): 71.7 (03-08 @ 18:15)  CAPILLARY BLOOD GLUCOSE      POCT Blood Glucose.: 186 mg/dL (09 Mar 2023 08:39)      Review of Systems:  all other ROS negative    PHYSICAL EXAM:  GENERAL: Alert, awake, oriented x3   CHEST/LUNG: decreased breath sound at the right base  HEART: Regular rate and rhythm, no murmur, no gallops, no rub   ABDOMEN: Soft, nontender, non distended  : No flank or supra pubic tenderness.  EXTREMITIES: no pedal edema      LABS:  03-08    128<L>  |  100  |  15  ----------------------------<  218<H>  5.1   |  23  |  0.62    Ca    8.4      08 Mar 2023 22:03  Phos  2.9     03-08  Mg     1.8     03-08    TPro  5.7<L>  /  Alb  3.2<L>  /  TBili  0.7  /  DBili      /  AST  24  /  ALT  16  /  AlkPhos  119  03-08    Creatinine Trend: 0.62 <--, 0.59 <--                        10.0   20.80 )-----------( 127      ( 08 Mar 2023 19:33 )             31.5     Urine Studies:

## 2023-03-09 NOTE — CONSULT NOTE ADULT - ASSESSMENT
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    Problem/Plan - 1:  ·  Problem: Pleural effusion, right.   ·  Plan: Known R sided pleural effusion s/p pleurX placement ~1 week ago at OSH.  VNS comes to home 3x/week to drain 500cc fluid.  PCP recommended admssion for further evaluation and w/u of the effusion.  Currently satting well on RA w/o iwob or respiratory complaints.  Had been discovered and treated in Nov 2022 for pleural effusion likely 2/2 CHF exacerbation and dc'd on torsemide, which patient states he does not currently take.  - Dr. Lora to evaluate patient in AM   - ?CT chest for further characterization  - add on BNP  - consider repeat Echo  - consider diuretics if respiratory status worsens.    Problem/Plan - 2:  ·  Problem: Hyperkalemia.   ·  Plan: Presents with K 5.6, improved to 5.1 s/p 10g lokelma.  - RESOLVED.    Problem/Plan - 3:  ·  Problem: Hyponatremia.   ·  Plan: Had been hyponatremic to low 130s during last admission.  Currently Na 128 but corrected to 130 for hyperglycemia.  Appears hypervolemic on exam with pleural effusion and trace pitting edema.  Serum osm 279.  Patient had been prescribed torsemide but reports not taking it.  Currently asymptomatic.  - continue to monitor BMP.    Problem/Plan - 4:  ·  Problem: Cancer of jaw.   ·  Plan: Masseter mass s/p resection in ~2019 with recurrence.  Had been on Chemo and RT but no longer either as of 2-3 weeks ago.  Still with jaw pain, on hydromorphone 2mg PO q6h prn.  Follows with Dr. Skaggs and Dr. Bernardo at [facility] and Dr. Lynn (surgeon).  - tylenol 650mg prn mild pain, hydromorphone 2mg q6h prn mod pain, hydromorphone 1mg IVP q4h severe pain  - pureed diet for now, obtain speech and swallow eval for proper diet.    Problem/Plan - 5:  ·  Problem: CLL (chronic lymphocytic leukemia).   ·  Plan: Hx of CLL with chronic leukocytosis ~20.    Problem/Plan - 6:  ·  Problem: Afib.   ·  Plan: Hx of Afib on eliquis 5mg bid.  - c/w home med.    Problem/Plan - 7:  ·  Problem: Renal transplant recipient.   ·  Plan: Hx of renal transplant.  Follows with Dr. Leyva.  On tacrolimus 2mg every morning and 1mg every evening.  - c/w home med.    Problem/Plan - 8:  ·  Problem: DM (diabetes mellitus).   ·  Plan: Hx of DM on humalog with sugar monitoring device in abdomen.  - sliding scale insulin while inpatient.    Problem/Plan - 9:  ·  Problem: (HFpEF) heart failure with preserved ejection fraction.   ·  Plan: TTE 7/2022 with mild concentric LVH, EF 65%, no regional wall motion abnormalities.  Reports not currently on diuretics.    Problem/Plan - 10:  ·  Problem: HLD (hyperlipidemia).   ·  Plan; Hx of HLD on rosuvastatin 10mg qd.    - c/w atorvastatin 40mg therapeutic interchange.    Problem/Plan - 11:  ·  Problem: Anemia.  ·  Plan: Hgb 10.0, higher than baseline ~8.  No active signs of bleeding.  Suspect ACD.  - transfuse Hgb <7  - monitor CBC.    Problem/Plan - 12:  ·  Problem: Nutrition, metabolism, and development symptoms.   ·  Plan: F: none  E: replete prn  N: pureed, CC, kosher  DVT proph: eliquis.

## 2023-03-09 NOTE — PROGRESS NOTE ADULT - PROBLEM SELECTOR PLAN 12
F: none   E: replete PRN  N: consistent carb, DASH  GI: PPI  DVT: SCDs  Code: FULL CODE  Dispo: [facility]

## 2023-03-09 NOTE — PHYSICAL THERAPY INITIAL EVALUATION ADULT - GAIT DEVIATIONS NOTED, PT EVAL
decreased donna/increased time in double stance/decreased step length/decreased weight-shifting ability

## 2023-03-09 NOTE — PROGRESS NOTE ADULT - PROBLEM SELECTOR PLAN 1
Patient with shortness of breath found to have acute hypoxic respiratory failure (AHRF) etiology likely HFpEF exacerbation complicated by right-sided pleural effusion.  At baseline intermittently uses 2L NC at home, now with increased O2 demand.  No evidence of infection, patient afebrile without cough.  PE unlikely no reported chest pain, tachycardia, other hemodynamic instability.      - ncCT chest today for possible thoracentesis  - holding eliquis iso possible thora  - Continue supplemental oxygen, wean as tolerated

## 2023-03-09 NOTE — PHYSICAL THERAPY INITIAL EVALUATION ADULT - IMPAIRMENTS FOUND, PT EVAL
aerobic capacity/endurance/gait, locomotion, and balance/gross motor/joint integrity and mobility/muscle strength/poor safety awareness/posture

## 2023-03-10 NOTE — PROGRESS NOTE ADULT - SUBJECTIVE AND OBJECTIVE BOX
Medicine Progress Note (INCOMPLETE)    SUBJECTIVE / OVERNIGHT EVENTS:  O/N events:  Patient was seen and examined at bedside.  Otherwise negative ROS    MEDICATIONS  (STANDING):  apixaban 5 milliGRAM(s) Oral every 12 hours  atorvastatin 40 milliGRAM(s) Oral at bedtime  dextrose 5%. 1000 milliLiter(s) (100 mL/Hr) IV Continuous <Continuous>  dextrose 5%. 1000 milliLiter(s) (50 mL/Hr) IV Continuous <Continuous>  dextrose 50% Injectable 25 Gram(s) IV Push once  dextrose 50% Injectable 12.5 Gram(s) IV Push once  dextrose 50% Injectable 25 Gram(s) IV Push once  glucagon  Injectable 1 milliGRAM(s) IntraMuscular once  influenza  Vaccine (HIGH DOSE) 0.7 milliLiter(s) IntraMuscular once  insulin lispro (ADMELOG) corrective regimen sliding scale   SubCutaneous Before meals and at bedtime  pantoprazole    Tablet 20 milliGRAM(s) Oral before breakfast  polyethylene glycol 3350 17 Gram(s) Oral two times a day  tacrolimus 2 milliGRAM(s) Oral every 24 hours  tacrolimus 1 milliGRAM(s) Oral every 24 hours  tamsulosin 0.4 milliGRAM(s) Oral at bedtime    MEDICATIONS  (PRN):  acetaminophen     Tablet .. 650 milliGRAM(s) Oral every 6 hours PRN Temp greater or equal to 38C (100.4F), Mild Pain (1 - 3)  dextrose Oral Gel 15 Gram(s) Oral once PRN Blood Glucose LESS THAN 70 milliGRAM(s)/deciliter  HYDROmorphone   Tablet 2 milliGRAM(s) Oral every 6 hours PRN Moderate Pain (4 - 6)  HYDROmorphone  Injectable 1 milliGRAM(s) IV Push every 4 hours PRN Severe Pain (7 - 10)    CAPILLARY BLOOD GLUCOSE      POCT Blood Glucose.: 115 mg/dL (09 Mar 2023 21:33)  POCT Blood Glucose.: 187 mg/dL (09 Mar 2023 17:27)  POCT Blood Glucose.: 177 mg/dL (09 Mar 2023 14:53)  POCT Blood Glucose.: 182 mg/dL (09 Mar 2023 12:17)  POCT Blood Glucose.: 186 mg/dL (09 Mar 2023 08:39)        OBJECTIVE:  Vital Signs Last 24 Hrs  T(C): 36.4 (10 Mar 2023 05:51), Max: 36.6 (09 Mar 2023 20:38)  T(F): 97.6 (10 Mar 2023 05:51), Max: 97.9 (09 Mar 2023 20:38)  HR: 69 (10 Mar 2023 05:51) (69 - 85)  BP: 137/64 (10 Mar 2023 05:51) (131/69 - 137/64)  BP(mean): --  RR: 19 (10 Mar 2023 05:51) (16 - 19)  SpO2: 100% (10 Mar 2023 05:51) (96% - 100%)    Parameters below as of 10 Mar 2023 05:51  Patient On (Oxygen Delivery Method): nasal cannula        PHYSICAL EXAM:  General: AAOx3, NAD, euvolemic  Head: NC/AT; MMM; PERRL  Neck: Supple; no JVD  Respiratory: CTAB; no wheezes/rales/rhonchi  Cardiovascular: Regular rhythm/rate; S1/S2+, no m/r/g auscultated  Gastrointestinal: Soft; NTND; bowel sounds normal and present in all 4 quadrants  :   Extremities: WWP; no b/l U/E edema, no b/l L/E edema  Neurological: CNII-XII grossly intact; no obvious focal deficits  I&O's Summary    09 Mar 2023 07:01  -  10 Mar 2023 07:00  --------------------------------------------------------  IN: 0 mL / OUT: 350 mL / NET: -350 mL        LABS:                        8.9    16.80 )-----------( 101      ( 09 Mar 2023 16:35 )             28.7     03-09    129<L>  |  99  |  11  ----------------------------<  193<H>  5.0   |  21<L>  |  0.61    Ca    8.4      09 Mar 2023 16:35  Phos  2.8     03-09  Mg     1.9     03-09    TPro  4.8<L>  /  Alb  2.9<L>  /  TBili  0.6  /  DBili  x   /  AST  13  /  ALT  12  /  AlkPhos  107  03-09    PT/INR - ( 09 Mar 2023 16:35 )   PT: 18.1 sec;   INR: 1.51          PTT - ( 09 Mar 2023 16:35 )  PTT:36.6 sec          Culture - Body Fluid with Gram Stain (collected 09 Mar 2023 20:45)  Source: Pleural Fl pleural effusion  Gram Stain (09 Mar 2023 21:35):    No organisms seen    No WBC's seen.          RADIOLOGY & ADDITIONAL TESTS:  Imaging from Last 24 Hours: Medicine Progress Note     SUBJECTIVE / OVERNIGHT EVENTS:  O/N events: ALEXANDER  Patient was seen and examined at bedside sitting in chair. Denies CP, SOB, abdominal pain.   Otherwise negative ROS    MEDICATIONS  (STANDING):  apixaban 5 milliGRAM(s) Oral every 12 hours  atorvastatin 40 milliGRAM(s) Oral at bedtime  dextrose 5%. 1000 milliLiter(s) (100 mL/Hr) IV Continuous <Continuous>  dextrose 5%. 1000 milliLiter(s) (50 mL/Hr) IV Continuous <Continuous>  dextrose 50% Injectable 25 Gram(s) IV Push once  dextrose 50% Injectable 12.5 Gram(s) IV Push once  dextrose 50% Injectable 25 Gram(s) IV Push once  glucagon  Injectable 1 milliGRAM(s) IntraMuscular once  influenza  Vaccine (HIGH DOSE) 0.7 milliLiter(s) IntraMuscular once  insulin lispro (ADMELOG) corrective regimen sliding scale   SubCutaneous Before meals and at bedtime  pantoprazole    Tablet 20 milliGRAM(s) Oral before breakfast  polyethylene glycol 3350 17 Gram(s) Oral two times a day  tacrolimus 2 milliGRAM(s) Oral every 24 hours  tacrolimus 1 milliGRAM(s) Oral every 24 hours  tamsulosin 0.4 milliGRAM(s) Oral at bedtime    MEDICATIONS  (PRN):  acetaminophen     Tablet .. 650 milliGRAM(s) Oral every 6 hours PRN Temp greater or equal to 38C (100.4F), Mild Pain (1 - 3)  dextrose Oral Gel 15 Gram(s) Oral once PRN Blood Glucose LESS THAN 70 milliGRAM(s)/deciliter  HYDROmorphone   Tablet 2 milliGRAM(s) Oral every 6 hours PRN Moderate Pain (4 - 6)  HYDROmorphone  Injectable 1 milliGRAM(s) IV Push every 4 hours PRN Severe Pain (7 - 10)    CAPILLARY BLOOD GLUCOSE      POCT Blood Glucose.: 115 mg/dL (09 Mar 2023 21:33)  POCT Blood Glucose.: 187 mg/dL (09 Mar 2023 17:27)  POCT Blood Glucose.: 177 mg/dL (09 Mar 2023 14:53)  POCT Blood Glucose.: 182 mg/dL (09 Mar 2023 12:17)  POCT Blood Glucose.: 186 mg/dL (09 Mar 2023 08:39)        OBJECTIVE:  Vital Signs Last 24 Hrs  T(C): 36.4 (10 Mar 2023 05:51), Max: 36.6 (09 Mar 2023 20:38)  T(F): 97.6 (10 Mar 2023 05:51), Max: 97.9 (09 Mar 2023 20:38)  HR: 69 (10 Mar 2023 05:51) (69 - 85)  BP: 137/64 (10 Mar 2023 05:51) (131/69 - 137/64)  BP(mean): --  RR: 19 (10 Mar 2023 05:51) (16 - 19)  SpO2: 100% (10 Mar 2023 05:51) (96% - 100%)    Parameters below as of 10 Mar 2023 05:51  Patient On (Oxygen Delivery Method): nasal cannula        PHYSICAL EXAM:  General: AAOx3, NAD, euvolemic  Head: NC/AT; MMM; PERRL  Neck: Supple; no JVD  Respiratory: CTAB; no wheezes/rales/rhonchi  Cardiovascular: Irregular rhythm/rate; S1/S2+, no m/r/g auscultated  Gastrointestinal: Soft; NT distended; bowel sounds normal and present in all 4 quadrants  Extremities: WWP; no b/l U/E edema, no b/l L/E edema  Neurological: CNII-XII grossly intact; no obvious focal deficits  I&O's Summary    09 Mar 2023 07:01  -  10 Mar 2023 07:00  --------------------------------------------------------  IN: 0 mL / OUT: 350 mL / NET: -350 mL        LABS:                        8.9    16.80 )-----------( 101      ( 09 Mar 2023 16:35 )             28.7     03-09    129<L>  |  99  |  11  ----------------------------<  193<H>  5.0   |  21<L>  |  0.61    Ca    8.4      09 Mar 2023 16:35  Phos  2.8     03-09  Mg     1.9     03-09    TPro  4.8<L>  /  Alb  2.9<L>  /  TBili  0.6  /  DBili  x   /  AST  13  /  ALT  12  /  AlkPhos  107  03-09    PT/INR - ( 09 Mar 2023 16:35 )   PT: 18.1 sec;   INR: 1.51          PTT - ( 09 Mar 2023 16:35 )  PTT:36.6 sec          Culture - Body Fluid with Gram Stain (collected 09 Mar 2023 20:45)  Source: Pleural Fl pleural effusion  Gram Stain (09 Mar 2023 21:35):    No organisms seen    No WBC's seen.          RADIOLOGY & ADDITIONAL TESTS:  Imaging from Last 24 Hours:

## 2023-03-10 NOTE — PROGRESS NOTE ADULT - PROBLEM SELECTOR PLAN 3
Patient with history of HFpEF presenting with dyspnea on exertion, orthopnea found to be hypoxic to 84% on room air, right sided pleural effusion likely due to acute exacerbation.  TTE 7/2022 with mild concentric LVH, EF 65%, no regional wall motion abnormalities. ACS unlikely troponin 0.02, patient without chest pain, EKG without ischemic changes.  On exam patient with 2+ pitting edema bilateral LE. BNP 3,494 (baseline 1,500).  Home medications Torsemide 20mg po BID- per patient no longer taking. Patient's ambulatory SpO2 desatted from 91% to 84% on RA.    - strict I/Os, monitor UOP carefully  - daily weights  - discharge on 2L home O2      # Hyponatremia  Patient with Na 131 on admission which is around his baseline. Etiology likely Torsemide use.    - Continue to monitor BMP Patient with history of HFpEF presenting with dyspnea on exertion, orthopnea found to be hypoxic to 84% on room air, right sided pleural effusion likely due to acute exacerbation.  TTE 7/2022 with mild concentric LVH, EF 65%, no regional wall motion abnormalities. ACS unlikely troponin 0.02, patient without chest pain, EKG without ischemic changes.  On exam patient with 2+ pitting edema bilateral LE. BNP 3,494 (baseline 1,500).  Home medications Torsemide 20mg po BID- per patient no longer taking. Patient's ambulatory SpO2 desatted from 91% to 84% on RA.    - strict I/Os, monitor UOP carefully  - daily weights  - discharge on 2L home O2  continue diuretics.  He clinically not in acute exacerbation.  Only chronic diastolic heart failure      # Hyponatremia  Patient with Na 131 on admission which is around his baseline. Etiology likely Torsemide use.    - Continue to monitor BMP

## 2023-03-10 NOTE — PROGRESS NOTE ADULT - PROBLEM SELECTOR PLAN 1
Patient with shortness of breath found to have acute hypoxic respiratory failure (AHRF) etiology likely HFpEF exacerbation complicated by right-sided pleural effusion.  At baseline intermittently uses 2L NC at home, now with increased O2 demand.  No evidence of infection, patient afebrile without cough.  PE unlikely no reported chest pain, tachycardia, other hemodynamic instability.      - ncCT chest today for possible thoracentesis  - holding eliquis iso possible thora  - Continue supplemental oxygen, wean as tolerated Patient with shortness of breath found to have acute hypoxic respiratory failure (AHRF) etiology likely HFpEF exacerbation complicated by right-sided pleural effusion.  At baseline intermittently uses 2L NC at home, now with increased O2 demand.  No evidence of infection, patient afebrile without cough.  PE unlikely no reported chest pain, tachycardia, other hemodynamic instability.      - ncCT chest today for possible thoracentesis  He has a pleurax and no need for thoracentesis  - holding eliquis iso possible thora. NO thoracentesis and continue on AC  - Continue supplemental oxygen, wean as tolerated Patient with shortness of breath found to have acute hypoxic respiratory failure (AHRF) etiology likely HFpEF exacerbation complicated by right-sided pleural effusion.  At baseline intermittently uses 2L NC at home, now with increased O2 demand.  No evidence of infection, patient afebrile without cough.  PE unlikely no reported chest pain, tachycardia, other hemodynamic instability.      - ncCT chest showed large R pleural effusion with pleurx in place.   - Pleural fluid suggestive of exudative effusion likely 2/2 to   - Continue supplemental oxygen, wean as tolerated

## 2023-03-10 NOTE — SWALLOW BEDSIDE ASSESSMENT ADULT - NS SPL SWALLOW CLINIC TRIAL FT
Reduced bolus acceptance and stripping of spoon with puree and anterior loss of bolus with puree. Prolonged and reduced bolus prep/processing. Mild amount of oral residue on lingual surface after swallow, benefitted from use of liquid wash. Laryngeal elevation appreciated on palpation, indicating initiation of pharyngeal swallow. No overt signs of aspiration noted.

## 2023-03-10 NOTE — PROGRESS NOTE ADULT - SUBJECTIVE AND OBJECTIVE BOX
Patient is a 72y Male seen and evaluated at bedside. No acute distress, sitting in chair, does not offer any complaints. stable kidney function. discussed with primary team to switch  to sirolimus as patient with malignancies respond better to this verses tacrolimus along with starting back on Torsemide.         Meds:    acetaminophen     Tablet .. 650 every 6 hours PRN  amLODIPine   Tablet 5 daily  apixaban 5 every 12 hours  atorvastatin 40 at bedtime  dextrose 5%. 1000 <Continuous>  dextrose 5%. 1000 <Continuous>  dextrose 50% Injectable 25 once  dextrose 50% Injectable 12.5 once  dextrose 50% Injectable 25 once  dextrose Oral Gel 15 once PRN  glucagon  Injectable 1 once  HYDROmorphone   Tablet 2 every 6 hours PRN  HYDROmorphone  Injectable 1 every 4 hours PRN  influenza  Vaccine (HIGH DOSE) 0.7 once  insulin lispro (ADMELOG) corrective regimen sliding scale  Before meals and at bedtime  pantoprazole    Tablet 20 before breakfast  polyethylene glycol 3350 17 two times a day  tacrolimus 2 every 24 hours  tacrolimus 1 every 24 hours  tamsulosin 0.4 at bedtime  torsemide 40 daily      T(C): , Max: 37.2 (03-10-23 @ 10:05)  T(F): , Max: 98.9 (03-10-23 @ 10:05)  HR: 85 (03-10-23 @ 10:05)  BP: 136/71 (03-10-23 @ 10:05)  BP(mean): --  RR: 20 (03-10-23 @ 10:05)  SpO2: 98% (03-10-23 @ 10:05)  Wt(kg): --    03-09 @ 07:01  -  03-10 @ 07:00  --------------------------------------------------------  IN: 0 mL / OUT: 350 mL / NET: -350 mL          Review of Systems:  ROS negative except as per HPI      PHYSICAL EXAM:  GENERAL: Alert, awake, oriented x3   CHEST/LUNG: decreased breath sound at the right base  HEART: Regular rate and rhythm, no murmur, no gallops, no rub   ABDOMEN: Soft, nontender, non distended  : No flank or supra pubic tenderness.  EXTREMITIES: no pedal edema      LABS:                        8.7    14.34 )-----------( 120      ( 10 Mar 2023 07:13 )             28.7     03-10    129<L>  |  100  |  9   ----------------------------<  98  4.7   |  18<L>  |  0.60    Ca    8.2<L>      10 Mar 2023 07:13  Phos  3.0     03-10  Mg     1.9     03-10    TPro  4.6<L>  /  Alb  2.6<L>  /  TBili  0.6  /  DBili  x   /  AST  14  /  ALT  11  /  AlkPhos  100  03-10      PT/INR - ( 10 Mar 2023 07:13 )   PT: 18.7 sec;   INR: 1.56          PTT - ( 10 Mar 2023 07:13 )  PTT:34.9 sec    Sodium, Random Urine: 69 mmol/L (03-09 @ 18:02)  Osmolality, Random Urine: 586 mosm/kg (03-09 @ 18:02)        RADIOLOGY & ADDITIONAL STUDIES:

## 2023-03-10 NOTE — CONSULT NOTE ADULT - SUBJECTIVE AND OBJECTIVE BOX
Hematology Oncology Consult Note (Dr. Slater)  Discussed with Dr. Slater and recommendations reviewed with the primary team.    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  However, patient states that he went to this hospital bc the surgeon who operated on his jaw tumor (Dr. Lynn) is affiliated with this hospital and wanted him admitted because he continues to have jaw pain and the area "is not healing fast".  He was sent to [facility] ED today after an outpatient appointment with Dr. Leyva for admission for recurrent R pleural effusion and discussion with Dr. Lora.  Per chart review, discussion with family revealed that patient developed R pleural effusion during recent admission at OSH and had pleurX placed, VNS comes 3x week to drain 500cc.  Etiology believed to be 2/2 lymphoma but did not have further w/u during admission.  Patient states he has R face pain but no other complaints.  ROS otherwise negative.  He has not been taking torsemide or any other diuretics. Admitted for right pleural effusion s/p drainage. On imaging, there was an incidental finding of SMV thrombus. Patient has been on Eliquis therapy. Hematology consulted for further workup.     Patient reports mild intermittent abdominal pain and bloating. Denies any nausea or vomiting. Patient's son reports that he was off Eliquis for about 3 days last week for his Pleurx catheter placement. But since the procedure has been compliant with taking it.       PAST MEDICAL & SURGICAL HISTORY:  Garcia's esophagus  CRI (chronic renal insufficiency)  DM (diabetes mellitus)  GERD (gastroesophageal reflux disease)  HTN (hypertension)  Monoclonal gammopathy  Mandibular abscess  Paroxysmal atrial fibrillation  Benign prostatic hyperplasia, presence of lower urinary tract symptoms unspecified, unspecified morphology  Hyperlipidemia  S/P CABG (coronary artery bypass graft)  20 years  History of surgery  cyst removal  Mandibular abscess      Allergies: NKDA       Medications:  Home Medications:  amLODIPine 5 mg oral tablet: 1 tab(s) orally once a day (09 Mar 2023 14:17)  Eliquis 5 mg oral tablet: 1 tab(s) orally 2 times a day (09 Mar 2023 14:17)  gabapentin 100 mg oral tablet: orally twice a day (in the morning, once in the afternoon) (09 Mar 2023 14:17)  gabapentin 300 mg oral capsule: 1 cap(s) orally once a day (at bedtime) (09 Mar 2023 14:17)  HumaLOG KwikPen 100 units/mL injectable solution: 15-24 unit(s) injectable 3 times a day before meals (09 Mar 2023 14:17)  HYDROmorphone 2 mg oral tablet: 1 tab(s) orally every 6 hours, As Needed (09 Mar 2023 14:17)  rosuvastatin 10 mg oral tablet: 1 tab(s) orally once a day (09 Mar 2023 14:17)  tacrolimus 1 mg oral capsule: 2 cap(s) orally in AM  1 caps in PM (09 Mar 2023 14:17)  tamsulosin 0.4 mg oral capsule: 1 cap(s) orally once a day (at bedtime) (09 Mar 2023 14:17)  Xanax 0.25 mg oral tablet: 1 tab(s) orally every 8 hours, As Needed (09 Mar 2023 14:17)      FAMILY HISTORY:  No family history of clotting disorders.         PHYSICAL EXAM:    T(F): 98.5 (03-10-23 @ 15:41), Max: 98.9 (03-10-23 @ 10:05)  HR: 68 (03-10-23 @ 15:41) (68 - 85)  BP: 119/65 (03-10-23 @ 15:41) (119/65 - 137/64)  RR: 20 (03-10-23 @ 15:41) (16 - 20)  SpO2: 97% (03-10-23 @ 15:41) (96% - 100%)      Gen: in NAD   HEENT: normocephalic/atraumatic  Neck: supple  Cardiovascular: RR, nl S1S2, no murmurs  Respiratory: clear air entry b/l  Gastrointestinal: BS+, soft, NT/ND  Extremities: no edema, no calf tenderness  Skin: no rash on visible skin  Musculoskeletal:  full ROM  Psychiatric:  mood stable      Labs:                          8.7    14.34 )-----------( 120      ( 10 Mar 2023 07:13 )             28.7     CBC Full  -  ( 10 Mar 2023 07:13 )  WBC Count : 14.34 K/uL  RBC Count : 2.58 M/uL  Hemoglobin : 8.7 g/dL  Hematocrit : 28.7 %  Platelet Count - Automated : 120 K/uL  Mean Cell Volume : 111.2 fl  Mean Cell Hemoglobin : 33.7 pg  Mean Cell Hemoglobin Concentration : 30.3 gm/dL  Auto Neutrophil # : 1.75 K/uL  Auto Lymphocyte # : 12.46 K/uL  Auto Monocyte # : 0.13 K/uL  Auto Eosinophil # : 0.00 K/uL  Auto Basophil # : 0.00 K/uL  Auto Neutrophil % : 12.2 %  Auto Lymphocyte % : 86.9 %  Auto Monocyte % : 0.9 %  Auto Eosinophil % : 0.0 %  Auto Basophil % : 0.0 %    PT/INR - ( 10 Mar 2023 07:13 )   PT: 18.7 sec;   INR: 1.56        PTT - ( 10 Mar 2023 07:13 )  PTT:34.9 sec    03-10    129<L>  |  100  |  9   ----------------------------<  98  4.7   |  18<L>  |  0.60    Ca    8.2<L>      10 Mar 2023 07:13  Phos  3.0     03-10  Mg     1.9     03-10    TPro  4.6<L>  /  Alb  2.6<L>  /  TBili  0.6  /  DBili  x   /  AST  14  /  ALT  11  /  AlkPhos  100  03-10      Imaging Studies:    CT Chest w/ IV Cont (03.09.23 @ 13:09)   IMPRESSION:  1.  Large right pleural effusion with Pleurx catheter in place. Adjacent   partial consolidation/atelectasis right lower lobe.  2.  Incidental occlusive thrombus in SMV extending to portal confluence.  3.  Bulky confluent retroperitoneal adenopathy, few mildly prominent   mediastinal and bilateral axillary nodes and enlarged spleen, likely   related to known lymphoproliferative disease.  4.  Markedly fluid distended gallbladder with layering sludge or   vicarious contrast excretion, could be related to prolonged fasting. If   clinical concern for acute cholecystitis consider follow-up gallbladder   ultrasound.  5.  Suspect pulmonary artery hypertension.    Findings were discussed with Dr. Vasquez 3/9/2023 2:30 PM by Dr. Leach with   read back confirmation.

## 2023-03-10 NOTE — PROGRESS NOTE ADULT - PROBLEM SELECTOR PLAN 2
CXR shows right pleural effusion, previous thoracentesis in 12/21 with recurrent effusion. Etiology likely HFpEF exacerbation vs malignant     - BNP elevated, crackles on exam  - Last ECHO 7/22 showed LVEF 65%  - f/u TTE  - f/u ncCT chest for possible thoracentesis CXR shows right pleural effusion, previous thoracentesis in 12/21 with recurrent effusion. Etiology likely HFpEF exacerbation vs malignant     - BNP elevated, crackles on exam  - Last ECHO 7/22 showed LVEF 65%  - f/u TTE  the fluid is transudative in nature lymphocytic predominant.  Cell block and cyto P CXR shows right pleural effusion, previous thoracentesis in 12/21 with recurrent effusion. Etiology likely HFpEF exacerbation vs malignant     - pleurx attached to chest tube

## 2023-03-10 NOTE — PROGRESS NOTE ADULT - ATTENDING COMMENTS
awaiting pleural cytology.  have reviewed w/u in progress in detail, and have discussed with pt and family.  will hope to complete swallowing eval , and will review protocol for change from tacrolimus to sirolimus with [place] xp team.  will hope to make regular pleural drainage unnecessary if diuresis well tolerated and effective, and will the be able to consider d/c of pleurex catheter.     discussed with pt and family at length.

## 2023-03-10 NOTE — PROGRESS NOTE ADULT - PROBLEM SELECTOR PLAN 9
Patient with history of CLL, follows with oncology at [facility]. No lymphadenopathy appreciated on physical exam. Pt with chronic leukocytosis. Afebrile, no other signs or symptoms of infectious process. Leukocytosis likely due to CLL.  - monitor WBC

## 2023-03-10 NOTE — SWALLOW BEDSIDE ASSESSMENT ADULT - SLP PERTINENT HISTORY OF CURRENT PROBLEM
72M with PMHx Afib (on Eliquis, s/p DCCV 5/2022), PE, HFpEF (EF 65%), CAD s/p CABG (2005), R renal transplant (on tacrolimus, follows w/ Dr Leyva), jaw cancer s/p resection (~2019) w/ recurrence s/p chemo/RT (completed 2-3 weeks ago), JAYANT (not on CPAP), CLL (w/ chronic leukocytosis), DM2 (with humalog pump), HTN, HLD, recent admit OSH for tongue/facial swelling likely 2/2 RT c/b pleural effusion s/p pleurX drain (placed at [facility]) R chest, dc one week ago presents for R pleural effusion

## 2023-03-10 NOTE — SWALLOW BEDSIDE ASSESSMENT ADULT - COMMENTS
Pt's son at bedside. Hx of PNA ~2 months ago, and multiple admissions for PNA over past few years. Also hx acid reflux, taking Protonix at home.  Pt was tolerating a puree diet with thin liquids at home, no overt signs of aspiration. Per son, he never had a swallow assessment / MBSS.

## 2023-03-10 NOTE — SWALLOW BEDSIDE ASSESSMENT ADULT - SWALLOW EVAL: DIAGNOSIS
Pt presented with mild-moderate oral dysphagia with puree solids. No overt signs of aspiration. However, given hx of multiple PNA, hx jaw mass s/p resection and RT, with recent admit for tongue/facial swelling, and multiple comorbidities, pt is at high risk for aspiration pt would benefit from an objective swallow assessment via VFSS/MBSS given Pt presented with mild-moderate oral dysphagia with puree solids. No overt signs of aspiration. However, given hx of multiple PNA, hx jaw mass s/p resection and RT with recent admit for tongue/facial swelling, and multiple comorbidities, pt is at high risk for aspiration and related sequelae. Silent aspiration cannot be ruled out. Pt would benefit from an VFSS/MBSS for further swallow assessment.

## 2023-03-10 NOTE — CONSULT NOTE ADULT - ASSESSMENT
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 Hematology consulted for further management.     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 It was not present on a prior CT scan in 2019. He has had recent CT scans but without contrast so it is difficult to compare it to the current study.   - Patient was minimally symptomatic in the past 1 week, but currently denies any symptoms.   - Per son, patient was off Eliquis for 3 days for his Pleurx catheter placement, it is possible that he developed this thrombus while he was off his anticoagulation, especially given he is prothrombotic from his active malignancy (SCC of Jaw). We would rule out other causes of SMV thrombosis   - Check flow cytometry for PNH for reduction or loss of CD55 and CD59 protein   - Check AMELIA 2 mutation to rule out MPN   - Send hypercoagulable workup: Prothrombin gene mutation, Factor V Leiden mutation, APLS antibodies (anti-cardiolipin Ab, Beta 2 glycoprotein Ab, DRVVT and hexagonal phase for lupus anticoagulant)   - Recommend CT AP with IV contrast to better assess the extent of SMV thrombosis.   - Continue Eliquis 5 mg BID for now    Discussed with Dr. Slater.

## 2023-03-10 NOTE — PROGRESS NOTE ADULT - PROBLEM SELECTOR PLAN 12
F: none   E: replete PRN  N: consistent carb, DASH  GI: PPI  DVT: SCDs  Code: FULL CODE  Dispo: [facility]

## 2023-03-10 NOTE — SWALLOW BEDSIDE ASSESSMENT ADULT - SLP GENERAL OBSERVATIONS
Pt received sitting in chair. Pt reported facial/jaw pain; RN notified, administered pain medication.

## 2023-03-10 NOTE — PROGRESS NOTE ADULT - PROBLEM SELECTOR PLAN 5
Patient with history of Afib, home medication 5mg Eliquis BID.    - hold eliquis leyla almazan Patient with history of Afib, home medication 5mg Eliquis BID.    -

## 2023-03-11 NOTE — PROGRESS NOTE ADULT - PROBLEM SELECTOR PLAN 12
F: none   E: replete PRN  N: consistent carb, DASH  GI: PPI  DVT: eliquis 5 BID  Code: FULL CODE  Dispo: [facility]

## 2023-03-11 NOTE — SWALLOW FEES ASSESSMENT ADULT - COMMENTS
Incomplete note** Risks, benefits, and alternatives to this study were reviewed with pt and family member at bedside. Flexible endoscope passed transnasally to further assess swallow anatomy and physiology. Pt tolerated the passing of the scope and its presence.

## 2023-03-11 NOTE — PROGRESS NOTE ADULT - NSPROGADDITIONALINFOA_GEN_ALL_CORE
ESRD s/p transplant  continue Tacrolimus and check levels  question regarding changing Tacrolimus to Sirolimus - needs to be discussed with patient's transplant center    Hyponatremia - at baseline

## 2023-03-11 NOTE — SWALLOW FEES ASSESSMENT ADULT - DIAGNOSTIC IMPRESSIONS
Mild-moderate oral and mild pharyngeal dysphagia r/t post-surgical changes/effects of RT 2/2 jaw cancer. Airway safety is preserved but pharyngeal swallow efficiency is mildly impaired. Pt benefited from the use of a liquid wash to improve pharyngeal clearance.

## 2023-03-11 NOTE — PROGRESS NOTE ADULT - SUBJECTIVE AND OBJECTIVE BOX
Medicine Progress Note     SUBJECTIVE / OVERNIGHT EVENTS:  O/N events: received ativan overnight for insomnia  Patient was seen and examined at bedside awake and alert NAD asking when he could eat. For FEES bedside later this afternoon. Denies SOB. Reports stable pain in R jaw  Otherwise negative ROS    MEDICATIONS  (STANDING):  amLODIPine   Tablet 5 milliGRAM(s) Oral daily  apixaban 5 milliGRAM(s) Oral every 12 hours  atorvastatin 40 milliGRAM(s) Oral at bedtime  dextrose 5%. 1000 milliLiter(s) (50 mL/Hr) IV Continuous <Continuous>  dextrose 5%. 1000 milliLiter(s) (100 mL/Hr) IV Continuous <Continuous>  dextrose 50% Injectable 25 Gram(s) IV Push once  dextrose 50% Injectable 12.5 Gram(s) IV Push once  dextrose 50% Injectable 25 Gram(s) IV Push once  glucagon  Injectable 1 milliGRAM(s) IntraMuscular once  influenza  Vaccine (HIGH DOSE) 0.7 milliLiter(s) IntraMuscular once  insulin lispro (ADMELOG) corrective regimen sliding scale   SubCutaneous Before meals and at bedtime  pantoprazole    Tablet 20 milliGRAM(s) Oral before breakfast  polyethylene glycol 3350 17 Gram(s) Oral two times a day  tacrolimus 2 milliGRAM(s) Oral every 24 hours  tacrolimus 1 milliGRAM(s) Oral every 24 hours  tamsulosin 0.4 milliGRAM(s) Oral at bedtime  torsemide 40 milliGRAM(s) Oral daily    MEDICATIONS  (PRN):  acetaminophen     Tablet .. 650 milliGRAM(s) Oral every 6 hours PRN Temp greater or equal to 38C (100.4F), Mild Pain (1 - 3)  dextrose Oral Gel 15 Gram(s) Oral once PRN Blood Glucose LESS THAN 70 milliGRAM(s)/deciliter  HYDROmorphone   Tablet 2 milliGRAM(s) Oral every 6 hours PRN Moderate Pain (4 - 6)  HYDROmorphone  Injectable 1 milliGRAM(s) IV Push every 4 hours PRN Severe Pain (7 - 10)    CAPILLARY BLOOD GLUCOSE      POCT Blood Glucose.: 144 mg/dL (11 Mar 2023 22:04)  POCT Blood Glucose.: 96 mg/dL (11 Mar 2023 17:02)  POCT Blood Glucose.: 90 mg/dL (11 Mar 2023 12:06)  POCT Blood Glucose.: 85 mg/dL (11 Mar 2023 08:27)        OBJECTIVE:  Vital Signs Last 24 Hrs  T(C): 36.5 (11 Mar 2023 21:06), Max: 36.6 (11 Mar 2023 13:15)  T(F): 97.7 (11 Mar 2023 21:06), Max: 97.9 (11 Mar 2023 13:15)  HR: 92 (11 Mar 2023 21:06) (72 - 92)  BP: 105/64 (11 Mar 2023 21:06) (105/64 - 106/61)  BP(mean): --  RR: 16 (11 Mar 2023 21:06) (16 - 18)  SpO2: 99% (11 Mar 2023 21:06) (97% - 99%)    Parameters below as of 11 Mar 2023 21:06  Patient On (Oxygen Delivery Method): nasal cannula  O2 Flow (L/min): 2      PHYSICAL EXAM:  General: AAOx3, NAD, euvolemic  Head: NC/AT; MMM; PERRL, R jaw swelling stable  Neck: Supple; no JVD  Respiratory: decreased breath sounds in RLL  Cardiovascular: Irregular rhythm/rate; S1/S2+, no m/r/g auscultated  Gastrointestinal: Soft; NTND; bowel sounds normal and present in all 4 quadrants  Extremities: WWP; no b/l U/E edema, no b/l L/E edema  Neurological: CNII-XII grossly intact; no obvious focal deficits  I&O's Summary      LABS:                        8.3    14.85 )-----------( 113      ( 11 Mar 2023 05:30 )             27.4     03-11    130<L>  |  99  |  14  ----------------------------<  76  4.7   |  21<L>  |  0.80    Ca    8.3<L>      11 Mar 2023 05:30  Phos  3.7     03-11  Mg     1.7     03-11    TPro  4.7<L>  /  Alb  2.8<L>  /  TBili  0.7  /  DBili  x   /  AST  13  /  ALT  11  /  AlkPhos  104  03-11    PT/INR - ( 10 Mar 2023 07:13 )   PT: 18.7 sec;   INR: 1.56          PTT - ( 10 Mar 2023 07:13 )  PTT:34.9 sec          Culture - Fungal, Body Fluid (collected 09 Mar 2023 20:45)  Source: Pleural Fl pleural effusion  Preliminary Report (11 Mar 2023 15:04):    Culture is being performed. Fungal cultures are held for 4 weeks.    Culture - Acid Fast - Body Fluid w/Smear (collected 09 Mar 2023 20:45)  Source: Pleural Fl pleural effusion  Preliminary Report (11 Mar 2023 15:08):    Culture is being performed.    Culture - Body Fluid with Gram Stain (collected 09 Mar 2023 20:45)  Source: Pleural Fl pleural effusion  Gram Stain (09 Mar 2023 21:35):    No organisms seen    No WBC's seen.  Preliminary Report (10 Mar 2023 08:34):    No growth to date          RADIOLOGY & ADDITIONAL TESTS:  Imaging from Last 24 Hours:

## 2023-03-11 NOTE — PROGRESS NOTE ADULT - PROBLEM SELECTOR PLAN 12
F: none   E: replete PRN  N: consistent carb, DASH  GI: PPI  DVT: SCDs  Code: FULL CODE  Dispo: [facility]

## 2023-03-11 NOTE — PROGRESS NOTE ADULT - PROBLEM SELECTOR PLAN 3
Patient with history of HFpEF presenting with dyspnea on exertion, orthopnea found to be hypoxic to 84% on room air, right sided pleural effusion likely due to acute exacerbation.  TTE 7/2022 with mild concentric LVH, EF 65%, no regional wall motion abnormalities. ACS unlikely troponin 0.02, patient without chest pain, EKG without ischemic changes.  On exam patient with 2+ pitting edema bilateral LE. BNP 3,494 (baseline 1,500).  Home medications Torsemide 20mg po BID- per patient no longer taking. Patient's ambulatory SpO2 desatted from 91% to 84% on RA.    - strict I/Os, monitor UOP carefully  - daily weights  - discharge on 2L home O2  continue diuretics.  He clinically not in acute exacerbation.  Only chronic diastolic heart failure      # Hyponatremia  Patient with Na 131 on admission which is around his baseline. Etiology likely Torsemide use.    - Continue to monitor BMP

## 2023-03-11 NOTE — SWALLOW FEES ASSESSMENT ADULT - PHARYNGEAL PHASE COMMENTS
Pharyngeal swallow trigger was relatively timely across consistencies. Epiglottic retroflexion was complete. Reduced pharyngeal swallow efficiency resulted in stasis within the lateral channels which was significantly greater on the LEFT compared to the RIGHT. Mildly improved clearance with the use of a liquid wash. Airway protection was preserved throughout the study with no episodes of laryngeal penetration or aspiration.

## 2023-03-11 NOTE — SWALLOW FEES ASSESSMENT ADULT - ADDITIONAL RECOMMENDATIONS
Recommend outpatient dysphagia tx to introduce prophylactic swallowing exercises/head and neck stretching exercises to maintain speech and swallowing function/reduce severity of long-term effects of RT.

## 2023-03-11 NOTE — SWALLOW FEES ASSESSMENT ADULT - ORAL PHASE COMMENTS
Max mouth opening was <1 fingers' breadth (normal is 3) Max mouth opening was <1 fingers' breadth (normal is 3) which impacted bolus stripping from spoon and size of soft solid bolus pt was able to masticate. Mastication was reduced and prolonged. Difficult to inspect oral cavity for post-deglutitive residue given limited mouth opening.

## 2023-03-11 NOTE — PROGRESS NOTE ADULT - SUBJECTIVE AND OBJECTIVE BOX
Interval Events: Reviewed  Patient seen and examined at bedside.    Patient is a 72y old  Male who presents with a chief complaint of Pleural effusion (10 Mar 2023 16:49)  no acute event overnight, 2L NC 96%      PAST MEDICAL & SURGICAL HISTORY:  Barretts esophagus      CRI (chronic renal insufficiency)      DM (diabetes mellitus)      GERD (gastroesophageal reflux disease)      HTN (hypertension)      Monoclonal gammopathy      Mandibular abscess      Paroxysmal atrial fibrillation      Benign prostatic hyperplasia, presence of lower urinary tract symptoms unspecified, unspecified morphology      Hyperlipidemia      Barretts esophagus      S/P CABG (coronary artery bypass graft)  20 years      History of surgery  cyst removal      Mandibular abscess          MEDICATIONS:  Pulmonary:    Antimicrobials:    Anticoagulants:  apixaban 5 milliGRAM(s) Oral every 12 hours    Cardiac:  amLODIPine   Tablet 5 milliGRAM(s) Oral daily  torsemide 40 milliGRAM(s) Oral daily      Allergies    No Known Allergies    Intolerances        Vital Signs Last 24 Hrs  T(C): 36.7 (11 Mar 2023 05:58), Max: 37.2 (10 Mar 2023 10:05)  T(F): 98.1 (11 Mar 2023 05:58), Max: 98.9 (10 Mar 2023 10:05)  HR: 76 (11 Mar 2023 05:58) (63 - 85)  BP: 124/70 (11 Mar 2023 05:58) (119/65 - 136/71)  BP(mean): --  RR: 18 (11 Mar 2023 05:58) (18 - 20)  SpO2: 96% (11 Mar 2023 05:58) (96% - 98%)    Parameters below as of 11 Mar 2023 05:58    O2 Flow (L/min): 2      03-09 @ 07:01  -  03-10 @ 07:00  --------------------------------------------------------  IN: 0 mL / OUT: 350 mL / NET: -350 mL          Review of Systems:   •	General: negative  •	Skin/Breast: negative  •	Ophthalmologic: negative  •	ENMT: negative  •	Respiratory and Thorax: negative  •	Cardiovascular: negative  •	Gastrointestinal: negative  •	Genitourinary: negative  •	Musculoskeletal: negative  •	Neurological: negative  •	Psychiatric: negative  •	Hematology/Lymphatics: negative  •	Endocrine: negative  •	Allergic/Immunologic: negative    Physical Exam:   • Constitutional:	elderly male, NAD  • Eyes:	EOMI; PERRL; no drainage or redness  • ENMT:	No oral lesions; no gross abnormalities  • Neck	no thyromegaly or nodules  • Breasts:	not examined  • Back:	No deformity or limitation of movement  • Respiratory:	Breath Sounds equal & clear to auscultation, no accessory muscle use, right pleurx cath inplace  • Cardiovascular:	Regular rate & rhythm, normal S1, S2; no murmurs, gallops or rubs; no S3, S4  • Gastrointestinal:	Soft, non-tender, no hepatosplenomegaly, normal bowel sounds  • Genitourinary:	not examined  • Rectal: not examined  • Extremities:	No cyanosis, clubbing or edema  • Vascular:	Equal and normal pulses (dorsalis pedis)  • Neurologica:l	not examined  • Skin:	No lesions; no rash  • Lymph Nodes:	No lymphadedenopathy  • Musculoskeletal:	No joint pain, swelling or deformity; no limitation of movement        LABS:      CBC Full  -  ( 10 Mar 2023 07:13 )  WBC Count : 14.34 K/uL  RBC Count : 2.58 M/uL  Hemoglobin : 8.7 g/dL  Hematocrit : 28.7 %  Platelet Count - Automated : 120 K/uL  Mean Cell Volume : 111.2 fl  Mean Cell Hemoglobin : 33.7 pg  Mean Cell Hemoglobin Concentration : 30.3 gm/dL  Auto Neutrophil # : 1.75 K/uL  Auto Lymphocyte # : 12.46 K/uL  Auto Monocyte # : 0.13 K/uL  Auto Eosinophil # : 0.00 K/uL  Auto Basophil # : 0.00 K/uL  Auto Neutrophil % : 12.2 %  Auto Lymphocyte % : 86.9 %  Auto Monocyte % : 0.9 %  Auto Eosinophil % : 0.0 %  Auto Basophil % : 0.0 %    03-10    129<L>  |  100  |  9   ----------------------------<  98  4.7   |  18<L>  |  0.60    Ca    8.2<L>      10 Mar 2023 07:13  Phos  3.0     03-10  Mg     1.9     03-10    TPro  4.6<L>  /  Alb  2.6<L>  /  TBili  0.6  /  DBili  x   /  AST  14  /  ALT  11  /  AlkPhos  100  03-10    PT/INR - ( 10 Mar 2023 07:13 )   PT: 18.7 sec;   INR: 1.56          PTT - ( 10 Mar 2023 07:13 )  PTT:34.9 sec                Culture Results:   No growth to date (03-09 @ 20:45)      RADIOLOGY & ADDITIONAL STUDIES (The following images were personally reviewed):  Lucas:                                     No  Urine output:                       adequate  DVT prophylaxis:                 Yes  Flattus:                                  Yes  Bowel movement:              No

## 2023-03-11 NOTE — PROGRESS NOTE ADULT - PROBLEM SELECTOR PLAN 5
Patient with history of Afib, home medication 5mg Eliquis BID.    -cw home eliquis 5 mg BID   -cw hypercoagulable workup- heme onc following  - HR wnl continue to monitor

## 2023-03-11 NOTE — PROGRESS NOTE ADULT - SUBJECTIVE AND OBJECTIVE BOX
CC: PLEURAL EFFUSION, RIGHTHYPERKALEMIA;        INTERVAL HISTORY: lying in bed  complaining of not eating or drinking      ROS: No chest pain, no sob, no abd pain. No n/v/d    PAST MEDICAL & SURGICAL HISTORY:  Barretts esophagus    CRI (chronic renal insufficiency)    DM (diabetes mellitus)    GERD (gastroesophageal reflux disease)    HTN (hypertension)    Monoclonal gammopathy    Mandibular abscess    Paroxysmal atrial fibrillation    Benign prostatic hyperplasia, presence of lower urinary tract symptoms unspecified, unspecified morphology    Other hyperlipidemia    Hyperlipidemia    Barretts esophagus    No significant past surgical history    S/P CABG (coronary artery bypass graft)  20 years    History of surgery  cyst removal    Mandibular abscess        PHYSICAL EXAM:  T(C): 36.7 (03-11-23 @ 05:58), Max: 37.2 (03-10-23 @ 10:05)  HR: 76 (03-11-23 @ 05:58)  BP: 124/70 (03-11-23 @ 05:58) (119/65 - 136/71)  RR: 18 (03-11-23 @ 05:58)  SpO2: 96% (03-11-23 @ 05:58)  Wt(kg): --  I&O's Summary    Weight 71.7 (03-08 @ 18:15)  General: AAO x 3,  NAD.  HEENT: moist mucous membranes, no pallor/cyanosis.  Neck: no JVD visible.  Cardiac: S1, S2. RRR. No murmurs   Respratory: CTA b/l, no access muscle use.   Abdomen: soft. nontender. nondistended  Skin: no rashes.  Extremities: no LE edema b/l  Access:       DATA:                        8.3<L>  14.85<H> )-----------( 113<L>    ( 11 Mar 2023 05:30 )             27.4<L>        130<L>  |  99     |  14     ----------------------------<  76     Ca:8.3<L> (11 Mar 2023 05:30)  4.7     |  21<L>  |  0.80         TPro  4.7<L>  /  Alb  2.8<L>  /  TBili  0.7    /  DBili  x      /  AST  13     /  ALT  11     /  AlkPhos  104    11 Mar 2023 05:30  Lactate Dehydrogenase, Serum: 184 U/L (03-10 @ 17:04)  Lactate Dehydrogenase, Serum: SEE NOTE U/L (03-10 @ 07:13)    Osmolality, Serum: 275 mosm/kg *L* (03-11 @ 05:30)  Osmolality, Serum: 273 mosm/kg *L* (03-10 @ 17:04)        Sodium, Random Urine: 69 mmol/L (03-09 @ 18:02)  Osmolality, Random Urine: 586 mosm/kg (03-09 @ 18:02)            MEDICATIONS  (STANDING):  amLODIPine   Tablet 5 milliGRAM(s) Oral daily  apixaban 5 milliGRAM(s) Oral every 12 hours  atorvastatin 40 milliGRAM(s) Oral at bedtime  dextrose 5%. 1000 milliLiter(s) (100 mL/Hr) IV Continuous <Continuous>  dextrose 5%. 1000 milliLiter(s) (50 mL/Hr) IV Continuous <Continuous>  dextrose 50% Injectable 25 Gram(s) IV Push once  dextrose 50% Injectable 12.5 Gram(s) IV Push once  dextrose 50% Injectable 25 Gram(s) IV Push once  glucagon  Injectable 1 milliGRAM(s) IntraMuscular once  influenza  Vaccine (HIGH DOSE) 0.7 milliLiter(s) IntraMuscular once  insulin lispro (ADMELOG) corrective regimen sliding scale   SubCutaneous Before meals and at bedtime  pantoprazole    Tablet 20 milliGRAM(s) Oral before breakfast  polyethylene glycol 3350 17 Gram(s) Oral two times a day  tacrolimus 2 milliGRAM(s) Oral every 24 hours  tacrolimus 1 milliGRAM(s) Oral every 24 hours  tamsulosin 0.4 milliGRAM(s) Oral at bedtime  torsemide 40 milliGRAM(s) Oral daily    MEDICATIONS  (PRN):  acetaminophen     Tablet .. 650 milliGRAM(s) Oral every 6 hours PRN Temp greater or equal to 38C (100.4F), Mild Pain (1 - 3)  dextrose Oral Gel 15 Gram(s) Oral once PRN Blood Glucose LESS THAN 70 milliGRAM(s)/deciliter  HYDROmorphone   Tablet 2 milliGRAM(s) Oral every 6 hours PRN Moderate Pain (4 - 6)  HYDROmorphone  Injectable 1 milliGRAM(s) IV Push every 4 hours PRN Severe Pain (7 - 10)

## 2023-03-11 NOTE — PROGRESS NOTE ADULT - PROBLEM SELECTOR PLAN 9
Patient with history of CLL, follows with oncology at [facility]. No lymphadenopathy appreciated on physical exam. Pt with chronic leukocytosis. Afebrile, no other signs or symptoms of infectious process. Leukocytosis likely due to CLL.  - monitor WBC

## 2023-03-11 NOTE — SWALLOW FEES ASSESSMENT ADULT - SLP PERTINENT HISTORY OF CURRENT PROBLEM
PMHx significant for jaw cancer s/p resection (~2019) with recurrence s/p chemo/rt (completed 2-3 weeks ago, recent admit OSH for tongue/facial swelling likely 2/2 RT c/b pleural effusion s/p pleurX drain (placed at [facility]) R chest, dc one week ago presented for R pleural effusion

## 2023-03-11 NOTE — PROGRESS NOTE ADULT - PROBLEM SELECTOR PLAN 1
Patient with shortness of breath found to have acute hypoxic respiratory failure (AHRF) etiology likely HFpEF exacerbation complicated by right-sided pleural effusion.  At baseline intermittently uses 2L NC at home, now with increased O2 demand.  No evidence of infection, patient afebrile without cough.  PE unlikely no reported chest pain, tachycardia, other hemodynamic instability.      - ncCT chest showed large R pleural effusion with pleurx in place. Pleurx placed to drainage draining yellow fluid  - Pleural fluid suggestive of exudative effusion likely 2/2 to diastolic HF  - Continue supplemental oxygen, wean as tolerated

## 2023-03-11 NOTE — PROGRESS NOTE ADULT - PROBLEM SELECTOR PLAN 6
Patient with history of hypertension, home medications isosorbide mononitrate 30mg QD, amlodipine 5mg QD, torsemide 40mg BID    - continue amlodipine 5mg PO QD  - continue torsemide 40mg qd   - hold imdur 30 iso hypotension

## 2023-03-11 NOTE — PROGRESS NOTE ADULT - PROBLEM SELECTOR PLAN 3
Patient with history of HFpEF presenting with dyspnea on exertion, orthopnea found to be hypoxic to 84% on room air, right sided pleural effusion likely due to acute exacerbation.  TTE 7/2022 with mild concentric LVH, EF 65%, no regional wall motion abnormalities. ACS unlikely troponin 0.02, patient without chest pain, EKG without ischemic changes.  On exam patient with 2+ pitting edema bilateral LE. BNP 3,494 (baseline 1,500).  Home medications Torsemide 20mg po BID- per patient no longer taking. Patient's ambulatory SpO2 desatted from 91% to 84% on RA.    - TTE showed grade III diastolic HF with hyperdynamic EF  - strict I/Os, monitor UOP carefully  - daily weights  - discharge on 2L home O2  continue diuretics.  He clinically not in acute exacerbation.  Only chronic diastolic heart failure      # Hyponatremia  Patient with Na 131 on admission which is around his baseline. Etiology likely Torsemide use.    - Continue to monitor BMP

## 2023-03-11 NOTE — PROGRESS NOTE ADULT - PROBLEM SELECTOR PLAN 1
Patient with shortness of breath found to have acute hypoxic respiratory failure (AHRF) etiology likely HFpEF exacerbation complicated by right-sided pleural effusion.  At baseline intermittently uses 2L NC at home, now with increased O2 demand.  No evidence of infection, patient afebrile without cough.  PE unlikely no reported chest pain, tachycardia, other hemodynamic instability.      - ncCT chest showed large R pleural effusion with pleurx in place.   - Pleural fluid suggestive of exudative effusion likely 2/2 to   - Continue supplemental oxygen, wean as tolerated Patient with shortness of breath found to have acute hypoxic respiratory failure (AHRF) etiology likely HFpEF exacerbation complicated by right-sided pleural effusion.  At baseline intermittently uses 2L NC at home, now with increased O2 demand.  No evidence of infection, patient afebrile without cough.  PE unlikely no reported chest pain, tachycardia, other hemodynamic instability.      - ncCT chest showed large R pleural effusion with pleurx in place.   - Pleural fluid suggestive of exudative effusion likely 2/2 to diastolic HF  - Continue supplemental oxygen, wean as tolerated

## 2023-03-11 NOTE — PROGRESS NOTE ADULT - PROBLEM SELECTOR PLAN 9
Patient with history of CLL, follows with oncology at [facility]. No lymphadenopathy appreciated on physical exam. Pt with chronic leukocytosis. Afebrile, no other signs or symptoms of infectious process. Leukocytosis likely due to CLL.  - monitor WBC

## 2023-03-11 NOTE — PROGRESS NOTE ADULT - PROBLEM SELECTOR PLAN 2
CXR shows right pleural effusion, previous thoracentesis in 12/21 with recurrent effusion. Etiology likely HFpEF exacerbation vs malignant     - pleurx attached to drainage, continue to monitor output  - cw torsemide 40 qd

## 2023-03-11 NOTE — PROGRESS NOTE ADULT - SUBJECTIVE AND OBJECTIVE BOX
Physical Medicine and Rehabilitation Progress Note :       Patient is a 72y old  Male who presents with a chief complaint of Pleural effusion (11 Mar 2023 09:04)      HPI:  72M with PMHx Afib (on Eliquis, s/p DCCV 5/2022), PE, HFpEF (EF 65%), CAD s/p CABG (2005), R renal transplant (on tacrolimus, follows w/ Dr Leyva), jaw cancer s/p resection (~2019) w/ recurrence s/p chemo/RT (completed 2-3 weeks ago), JAYANT (not on CPAP), CLL (w/ chronic leukocytosis), DM2 (with humalog pump), HTN, HLD, recent admit OSH for tongue/facial swelling likely 2/2 RT c/b pleural effusion s/p pleurX drain R chest, dc one week ago (per discussion with ED attending).  However, patient states that he went to this hospital bc the surgeon who operated on his jaw tumor (Dr. Lynn) is affiliated with this hospital and wanted him admitted because he continues to have jaw pain and the area "is not healing fast".  He was sent to [facility] ED today after an outpatient appointment with Dr. Leyva for admission for recurrent R pleural effusion and discussion with Dr. Lora.  Per chart review, discussion with family revealed that patient developed R pleural effusion during recent admission at OSH and had pleurX placed, VNS comes 3x week to drain 500cc.  Etiology believed to be 2/2 lymphoma but did not have further w/u during admission.  Patient states he has R face pain but no other complaints.  ROS otherwise negative.  He has not been taking torsemide or any other diuretics.    In the ED:  Initial vital signs: T: 97.6 F, HR: 89, BP: 121/74, R: 18, SpO2: 94% on RA  ED course:   Labs: significant for WBC 20.80, Hgb 10.0 (baseline ~7-8), Na 128, K 5.6, glucose 238,   Imaging:  CXR: wet read: R pleural effusion, some vascular congestion similar to prior  EKG: afib, RBBB, nonspecific ST changes unchanged from prior  Medications: 1g tylenol, 10g lokelma, 500cc NS  Consults: none  (08 Mar 2023 21:46)                            8.3    14.85 )-----------( 113      ( 11 Mar 2023 05:30 )             27.4       03-11    130<L>  |  99  |  14  ----------------------------<  76  4.7   |  21<L>  |  0.80    Ca    8.3<L>      11 Mar 2023 05:30  Phos  3.7     03-11  Mg     1.7     03-11    TPro  4.7<L>  /  Alb  2.8<L>  /  TBili  0.7  /  DBili  x   /  AST  13  /  ALT  11  /  AlkPhos  104  03-11    Vital Signs Last 24 Hrs  T(C): 36.6 (11 Mar 2023 13:15), Max: 36.9 (10 Mar 2023 15:41)  T(F): 97.9 (11 Mar 2023 13:15), Max: 98.5 (10 Mar 2023 15:41)  HR: 72 (11 Mar 2023 13:15) (63 - 76)  BP: 106/61 (11 Mar 2023 13:15) (106/61 - 125/68)  BP(mean): --  RR: 18 (11 Mar 2023 13:15) (18 - 20)  SpO2: 97% (11 Mar 2023 13:15) (96% - 97%)    Parameters below as of 11 Mar 2023 13:15  Patient On (Oxygen Delivery Method): nasal cannula  O2 Flow (L/min): 2      MEDICATIONS  (STANDING):  amLODIPine   Tablet 5 milliGRAM(s) Oral daily  apixaban 5 milliGRAM(s) Oral every 12 hours  atorvastatin 40 milliGRAM(s) Oral at bedtime  dextrose 5%. 1000 milliLiter(s) (100 mL/Hr) IV Continuous <Continuous>  dextrose 5%. 1000 milliLiter(s) (50 mL/Hr) IV Continuous <Continuous>  dextrose 50% Injectable 25 Gram(s) IV Push once  dextrose 50% Injectable 12.5 Gram(s) IV Push once  dextrose 50% Injectable 25 Gram(s) IV Push once  glucagon  Injectable 1 milliGRAM(s) IntraMuscular once  influenza  Vaccine (HIGH DOSE) 0.7 milliLiter(s) IntraMuscular once  insulin lispro (ADMELOG) corrective regimen sliding scale   SubCutaneous Before meals and at bedtime  magnesium sulfate  IVPB 2 Gram(s) IV Intermittent once  pantoprazole    Tablet 20 milliGRAM(s) Oral before breakfast  polyethylene glycol 3350 17 Gram(s) Oral two times a day  tacrolimus 2 milliGRAM(s) Oral every 24 hours  tacrolimus 1 milliGRAM(s) Oral every 24 hours  tamsulosin 0.4 milliGRAM(s) Oral at bedtime  torsemide 40 milliGRAM(s) Oral daily    MEDICATIONS  (PRN):  acetaminophen     Tablet .. 650 milliGRAM(s) Oral every 6 hours PRN Temp greater or equal to 38C (100.4F), Mild Pain (1 - 3)  dextrose Oral Gel 15 Gram(s) Oral once PRN Blood Glucose LESS THAN 70 milliGRAM(s)/deciliter  HYDROmorphone   Tablet 2 milliGRAM(s) Oral every 6 hours PRN Moderate Pain (4 - 6)  HYDROmorphone  Injectable 1 milliGRAM(s) IV Push every 4 hours PRN Severe Pain (7 - 10)        Initial Physical Therapy Functional Status Assessment :       Previous Level of Function:     · Ambulation Skills	needs device and assist  · Transfer Skills	needs device and assist  · ADL Skills	needs device and assist  · Additional Comments	pt states that he lives w/ his family in a house w/ 7 steps to enter and then flat inside. Ambulates w/ use of RW. Denies hx of recent falls.    Cognitive Status Examination:   · Orientation	oriented to person, place, time and situation  · Level of Consciousness	alert  · Follows Commands and Answers Questions	75% of the time  · Personal Safety and Judgment	impaired    Peripheral Neurovascular:     Neurovascular Assessment:   · LLE	warm; no discoloration; no numbness; no tingling  · RLE	warm; no discoloration; no numbness; no tingling    Range of Motion Exam:   · Range of Motion Examination	no ROM deficits were identified    Manual Muscle Testing:   · Manual Muscle Testing Results	grossly at least 3+/5 2/2 ability to perform funct mob against gravity    Bed Mobility: Rolling/Turning:     · Level of [place]	minimum assist (75% patients effort)  · Physical Assist/Nonphysical Assist	1 person assist; verbal cues; nonverbal cues (demo/gestures)  · Assistive Device	bed rails    Bed Mobility: Sit to Supine:     · Level of [place]	minimum assist (75% patients effort)  · Physical Assist/Nonphysical Assist	1 person assist; nonverbal cues (demo/gestures); verbal cues  · Assistive Device	bed rails    Bed Mobility: Supine to Sit:     · Level of [place]	minimum assist (75% patients effort); moderate assist (50% patients effort)  · Physical Assist/Nonphysical Assist	1 person assist; nonverbal cues (demo/gestures); verbal cues  · Assistive Device	bed rails    Bed Mobility Analysis:     · Bed Mobility Limitations	decreased ability to use arms for pushing/pulling; decreased ability to use legs for bridging/pushing; impaired ability to control trunk for mobility  · Impairments Contributing to Impaired Bed Mobility	impaired balance; impaired postural control; decreased strength    Transfer: Sit to Stand:     · Level of [place]	moderate assist (50% patients effort)  · Physical Assist/Nonphysical Assist	1 person assist; nonverbal cues (demo/gestures); verbal cues  · Assistive Device	rolling walker    Transfer: Stand to Sit:     · Level of [place]	moderate assist (50% patients effort)  · Physical Assist/Nonphysical Assist	1 person assist; nonverbal cues (demo/gestures); verbal cues  · Assistive Device	rolling walker    Sit/Stand Transfer Safety Analysis:     · Transfer Safety Concerns Noted	decreased safety awareness; losing balance; decreased step length; decreased weight-shifting ability  · Impairments Contributing to Impaired Transfers	impaired balance; impaired postural control; decreased strength    Gait Skills:     · Level of [place]	moderate assist (50% patients effort)  · Physical Assist/Nonphysical Assist	1 person assist; nonverbal cues (demo/gestures); verbal cues  · Assistive Device	rolling walker  · Gait Distance	3 side steps    Gait Analysis:     · Gait Pattern Used	3-point gait  · Gait Deviations Noted	decreased donna; increased time in double stance; decreased step length; decreased weight-shifting ability  · Impairments Contributing to Gait Deviations	impaired balance; impaired postural control; decreased strength    Stair Negotiation:     · Level of Lu[place]	NA    Balance Skills Assessment:     · Sitting Balance: Static	good balance  · Sitting Balance: Dynamic	fair balance  · Sit-to-Stand Balance	poor balance  · Standing Balance: Static	poor balance  · Standing Balance: Dynamic	poor balance  · Systems Impairment Contributing to Balance Disturbance	musculoskeletal  · Identified Impairments Contributing to Balance Disturbance	impaired postural control; decreased strength    Sensory Examination:   Sensory Examination:    Grossly Intact:   · Gross Sensory Examination	Grossly Intact      Clinical Impressions:   · Criteria for Skilled Therapeutic Interventions	impairments found; functional limitations in following categories; risk reduction/prevention; rehab potential; therapy frequency; anticipated discharge recommendation  · Impairments Found (describe specific impairments)	aerobic capacity/endurance; gait, locomotion, and balance; gross motor; joint integrity and mobility; muscle strength; posture; poor safety awareness  · Functional Limitations in Following Categories (describe specific limitations)	self-care; home management; community/leisure  · Risk Reduction/Prevention (Describe Specific Areas of risk reduction/prevention)	risk factors  · Risk Areas	fall; safety awareness      PM&R Impression : as above    Current Disposition Plan Recommendations :    subacute rehab placement

## 2023-03-12 NOTE — PROGRESS NOTE ADULT - SUBJECTIVE AND OBJECTIVE BOX
Interval Events: Reviewed  Patient seen and examined at bedside.    Patient is a 72y old  Male who presents with a chief complaint of Pleural effusion (11 Mar 2023 13:42)  no acute event overnight, 2L NC 99%, aide at bedside      PAST MEDICAL & SURGICAL HISTORY:  Barretts esophagus      CRI (chronic renal insufficiency)      DM (diabetes mellitus)      GERD (gastroesophageal reflux disease)      HTN (hypertension)      Monoclonal gammopathy      Mandibular abscess      Paroxysmal atrial fibrillation      Benign prostatic hyperplasia, presence of lower urinary tract symptoms unspecified, unspecified morphology      Hyperlipidemia      Barretts esophagus      S/P CABG (coronary artery bypass graft)  20 years      History of surgery  cyst removal      Mandibular abscess          MEDICATIONS:  Pulmonary:    Antimicrobials:    Anticoagulants:  apixaban 5 milliGRAM(s) Oral every 12 hours    Cardiac:  amLODIPine   Tablet 5 milliGRAM(s) Oral daily  torsemide 40 milliGRAM(s) Oral daily      Allergies    No Known Allergies    Intolerances        Vital Signs Last 24 Hrs  T(C): 36.9 (12 Mar 2023 06:38), Max: 36.9 (12 Mar 2023 06:38)  T(F): 98.5 (12 Mar 2023 06:38), Max: 98.5 (12 Mar 2023 06:38)  HR: 88 (12 Mar 2023 06:38) (72 - 92)  BP: 128/74 (12 Mar 2023 06:38) (105/64 - 128/74)  BP(mean): --  RR: 16 (12 Mar 2023 06:38) (16 - 18)  SpO2: 100% (12 Mar 2023 06:38) (97% - 100%)    Parameters below as of 12 Mar 2023 06:38  Patient On (Oxygen Delivery Method): nasal cannula  O2 Flow (L/min): 2          Review of Systems:   •	General: negative  •	Skin/Breast: negative  •	Ophthalmologic: negative  •	ENMT: negative  •	Respiratory and Thorax: negative  •	Cardiovascular: negative  •	Gastrointestinal: negative  •	Genitourinary: negative  •	Musculoskeletal: negative  •	Neurological: negative  •	Psychiatric: negative  •	Hematology/Lymphatics: negative  •	Endocrine: negative  •	Allergic/Immunologic: negative    Physical Exam:   • Constitutional:	elderly male, NAD  • Eyes:	EOMI; PERRL; no drainage or redness  • ENMT:	No oral lesions; no gross abnormalities  • Neck	no thyromegaly or nodules  • Breasts:	not examined  • Back:	No deformity or limitation of movement  • Respiratory:	Breath Sounds equal & clear to auscultation, no accessory muscle use, right chest pleurx intact   • Cardiovascular:	Regular rate & rhythm, normal S1, S2; no murmurs, gallops or rubs; no S3, S4  • Gastrointestinal:	Soft, non-tender, no hepatosplenomegaly, normal bowel sounds  • Genitourinary:	not examined  • Rectal: not examined  • Extremities:	No cyanosis, clubbing or edema  • Vascular:	Equal and normal pulses (dorsalis pedis)  • Neurologica:l	not examined  • Skin:	No lesions; no rash  • Lymph Nodes:	No lymphadedenopathy  • Musculoskeletal:	No joint pain, swelling or deformity; no limitation of movement        LABS:      CBC Full  -  ( 11 Mar 2023 05:30 )  WBC Count : 14.85 K/uL  RBC Count : 2.53 M/uL  Hemoglobin : 8.3 g/dL  Hematocrit : 27.4 %  Platelet Count - Automated : 113 K/uL  Mean Cell Volume : 108.3 fl  Mean Cell Hemoglobin : 32.8 pg  Mean Cell Hemoglobin Concentration : 30.3 gm/dL  Auto Neutrophil # : 2.03 K/uL  Auto Lymphocyte # : 12.57 K/uL  Auto Monocyte # : 0.19 K/uL  Auto Eosinophil # : 0.02 K/uL  Auto Basophil # : 0.01 K/uL  Auto Neutrophil % : 13.7 %  Auto Lymphocyte % : 84.6 %  Auto Monocyte % : 1.3 %  Auto Eosinophil % : 0.1 %  Auto Basophil % : 0.1 %    03-11    130<L>  |  99  |  14  ----------------------------<  76  4.7   |  21<L>  |  0.80    Ca    8.3<L>      11 Mar 2023 05:30  Phos  3.7     03-11  Mg     1.7     03-11    TPro  4.7<L>  /  Alb  2.8<L>  /  TBili  0.7  /  DBili  x   /  AST  13  /  ALT  11  /  AlkPhos  104  03-11    PT/INR - ( 10 Mar 2023 07:13 )   PT: 18.7 sec;   INR: 1.56          PTT - ( 10 Mar 2023 07:13 )  PTT:34.9 sec                    RADIOLOGY & ADDITIONAL STUDIES (The following images were personally reviewed):  Lucas:                                     No  Urine output:                       adequate  DVT prophylaxis:                 Yes  Flattus:                                  Yes  Bowel movement:              No

## 2023-03-12 NOTE — PROGRESS NOTE ADULT - PROBLEM SELECTOR PLAN 2
CXR shows right pleural effusion, previous thoracentesis in 12/21 with recurrent effusion. Etiology likely HFpEF exacerbation vs malignant     - pleurx attached to chest tube

## 2023-03-12 NOTE — PROGRESS NOTE ADULT - NSPROGADDITIONALINFOA_GEN_ALL_CORE
ESRD s/p transplant  continue Tacrolimus (levels from 3/10 WNL)    Hyponatremia improving- continue fluid restriction  continue Torsemide

## 2023-03-12 NOTE — PROGRESS NOTE ADULT - PROBLEM SELECTOR PLAN 12
F: none   E: replete PRN  N: consistent carb, DASH  GI: PPI  DVT: SCDs  Code: FULL CODE  Dispo: [facility]

## 2023-03-12 NOTE — PROGRESS NOTE ADULT - SUBJECTIVE AND OBJECTIVE BOX
CC: PLEURAL EFFUSION, RIGHTHYPERKALEMIA;        INTERVAL HISTORY: resting comfortably in bed      ROS: No chest pain, no sob, no abd pain. No n/v/d    PAST MEDICAL & SURGICAL HISTORY:  Barretts esophagus    CRI (chronic renal insufficiency)    DM (diabetes mellitus)    GERD (gastroesophageal reflux disease)    HTN (hypertension)    Monoclonal gammopathy    Mandibular abscess    Paroxysmal atrial fibrillation    Benign prostatic hyperplasia, presence of lower urinary tract symptoms unspecified, unspecified morphology    Other hyperlipidemia    Hyperlipidemia    Barretts esophagus    No significant past surgical history    S/P CABG (coronary artery bypass graft)  20 years    History of surgery  cyst removal    Mandibular abscess        PHYSICAL EXAM:  T(C): 36.9 (03-12-23 @ 06:38), Max: 36.9 (03-12-23 @ 06:38)  HR: 88 (03-12-23 @ 06:38)  BP: 128/74 (03-12-23 @ 06:38) (105/64 - 128/74)  RR: 16 (03-12-23 @ 06:38)  SpO2: 100% (03-12-23 @ 06:38)  Wt(kg): --  I&O's Summary    Weight 71.7 (03-08 @ 18:15)  General: AAO x 3,  NAD.  HEENT: moist mucous membranes, no pallor/cyanosis.  Neck: no JVD visible.  Cardiac: S1, S2. RRR. No murmurs   Respratory: CTA b/l, no access muscle use.   Abdomen: soft. nontender. nondistended  Skin: no rashes.  Extremities: trace LE edema b/l  Access:       DATA:                        8.9<L>  16.11<H> )-----------( 120<L>    ( 12 Mar 2023 07:48 )             28.7<L>        132<L>  |  99     |  24<H>  ----------------------------<  145<H>  Ca:8.5   (12 Mar 2023 07:48)  4.9     |  25     |  1.06         TPro  4.7<L>  /  Alb  2.8<L>  /  TBili  0.7    /  DBili  x      /  AST  13     /  ALT  11     /  AlkPhos  104    11 Mar 2023 05:30  Lactate Dehydrogenase, Serum: 184 U/L (03-10 @ 17:04)    Osmolality, Serum: 275 mosm/kg *L* (03-11 @ 05:30)  Osmolality, Serum: 273 mosm/kg *L* (03-10 @ 17:04)                  MEDICATIONS  (STANDING):  amLODIPine   Tablet 5 milliGRAM(s) Oral daily  apixaban 5 milliGRAM(s) Oral every 12 hours  atorvastatin 40 milliGRAM(s) Oral at bedtime  dextrose 5%. 1000 milliLiter(s) (100 mL/Hr) IV Continuous <Continuous>  dextrose 5%. 1000 milliLiter(s) (50 mL/Hr) IV Continuous <Continuous>  dextrose 50% Injectable 25 Gram(s) IV Push once  dextrose 50% Injectable 12.5 Gram(s) IV Push once  dextrose 50% Injectable 25 Gram(s) IV Push once  glucagon  Injectable 1 milliGRAM(s) IntraMuscular once  influenza  Vaccine (HIGH DOSE) 0.7 milliLiter(s) IntraMuscular once  insulin lispro (ADMELOG) corrective regimen sliding scale   SubCutaneous Before meals and at bedtime  pantoprazole    Tablet 20 milliGRAM(s) Oral before breakfast  polyethylene glycol 3350 17 Gram(s) Oral two times a day  tacrolimus 2 milliGRAM(s) Oral every 24 hours  tacrolimus 1 milliGRAM(s) Oral every 24 hours  tamsulosin 0.4 milliGRAM(s) Oral at bedtime  torsemide 40 milliGRAM(s) Oral daily    MEDICATIONS  (PRN):  acetaminophen     Tablet .. 650 milliGRAM(s) Oral every 6 hours PRN Temp greater or equal to 38C (100.4F), Mild Pain (1 - 3)  dextrose Oral Gel 15 Gram(s) Oral once PRN Blood Glucose LESS THAN 70 milliGRAM(s)/deciliter  HYDROmorphone   Tablet 2 milliGRAM(s) Oral every 6 hours PRN Moderate Pain (4 - 6)  HYDROmorphone  Injectable 1 milliGRAM(s) IV Push every 4 hours PRN Severe Pain (7 - 10)

## 2023-03-12 NOTE — PROGRESS NOTE ADULT - PROBLEM SELECTOR PLAN 9
Patient with history of CLL, follows with oncology at [facility]. No lymphadenopathy appreciated on physical exam. Pt with chronic leukocytosis. Afebrile, no other signs or symptoms of infectious process. Leukocytosis likely due to CLL.  - monitor WBC

## 2023-03-12 NOTE — PROGRESS NOTE ADULT - PROBLEM SELECTOR PLAN 1
Patient with shortness of breath found to have acute hypoxic respiratory failure (AHRF) etiology likely HFpEF exacerbation complicated by right-sided pleural effusion.  At baseline intermittently uses 2L NC at home, now with increased O2 demand.  No evidence of infection, patient afebrile without cough.  PE unlikely no reported chest pain, tachycardia, other hemodynamic instability.      - ncCT chest showed large R pleural effusion with pleurx in place.   - Pleural fluid suggestive of exudative effusion likely 2/2 to diastolic HF  - Continue supplemental oxygen, wean as tolerated

## 2023-03-12 NOTE — PROGRESS NOTE ADULT - PROBLEM SELECTOR PLAN 6
Patient Education        Tdap (Tetanus, Diphtheria, Pertussis) Vaccine: What You Need to Know  Why get vaccinated? Tdap vaccine can prevent tetanus, diphtheria, and pertussis. Diphtheria and pertussis spread from person to person. Tetanus enters the body through cuts or wounds. · TETANUS (T) causes painful stiffening of the muscles. Tetanus can lead to serious health problems, including being unable to open the mouth, having trouble swallowing and breathing, or death. · DIPHTHERIA (D) can lead to difficulty breathing, heart failure, paralysis, or death. · PERTUSSIS (aP), also known as \"whooping cough,\" can cause uncontrollable, violent coughing which makes it hard to breathe, eat, or drink. Pertussis can be extremely serious in babies and young children, causing pneumonia, convulsions, brain damage, or death. In teens and adults, it can cause weight loss, loss of bladder control, passing out, and rib fractures from severe coughing. Tdap vaccine  Tdap is only for children 7 years and older, adolescents, and adults. Adolescents should receive a single dose of Tdap, preferably at age 6 or 15 years. Pregnant women should get a dose of Tdap during every pregnancy, to protect the  from pertussis. Infants are most at risk for severe, life threatening complications from pertussis. Adults who have never received Tdap should get a dose of Tdap. Also, adults should receive a booster dose every 10 years, or earlier in the case of a severe and dirty wound or burn. Booster doses can be either Tdap or Td (a different vaccine that protects against tetanus and diphtheria but not pertussis). Tdap may be given at the same time as other vaccines.   Talk with your health care provider  Tell your vaccine provider if the person getting the vaccine:  · Has had an allergic reaction after a previous dose of any vaccine that protects against tetanus, diphtheria, or pertussis, or has any severe, life threatening allergies. · Has had a coma, decreased level of consciousness, or prolonged seizures within 7 days after a previous dose of any pertussis vaccine (DTP, DTaP, or Tdap). · Has seizures or another nervous system problem. · Has ever had Guillain-Barré Syndrome (also called GBS). · Has had severe pain or swelling after a previous dose of any vaccine that protects against tetanus or diphtheria. In some cases, your health care provider may decide to postpone Tdap vaccination to a future visit. People with minor illnesses, such as a cold, may be vaccinated. People who are moderately or severely ill should usually wait until they recover before getting Tdap vaccine. Your health care provider can give you more information. Risks of a vaccine reaction  · Pain, redness, or swelling where the shot was given, mild fever, headache, feeling tired, and nausea, vomiting, diarrhea, or stomachache sometimes happen after Tdap vaccine. People sometimes faint after medical procedures, including vaccination. Tell your provider if you feel dizzy or have vision changes or ringing in the ears. As with any medicine, there is a very remote chance of a vaccine causing a severe allergic reaction, other serious injury, or death. What if there is a serious problem? An allergic reaction could occur after the vaccinated person leaves the clinic. If you see signs of a severe allergic reaction (hives, swelling of the face and throat, difficulty breathing, a fast heartbeat, dizziness, or weakness), call 9-1-1 and get the person to the nearest hospital.  For other signs that concern you, call your health care provider. Adverse reactions should be reported to the Vaccine Adverse Event Reporting System (VAERS). Your health care provider will usually file this report, or you can do it yourself. Visit the VAERS website at www.vaers. hhs.gov or call 0-153.541.4941.  VAERS is only for reporting reactions, and VAERS staff do not give medical advice. The National Vaccine Injury Compensation Program  The National Vaccine Injury Compensation Program (VICP) is a federal program that was created to compensate people who may have been injured by certain vaccines. Visit the VICP website at www.hrsa.gov/vaccinecompensation or call 0-502.384.7909 to learn about the program and about filing a claim. There is a time limit to file a claim for compensation. How can I learn more? · Ask your health care provider. · Call your local or state health department. · Contact the Centers for Disease Control and Prevention (CDC):  ? Call 9-337.590.9912 (1-800-CDC-INFO) or  ? Visit CDC's website at www.cdc.gov/vaccines  Vaccine Information Statement (Interim)  Tdap (Tetanus, Diphtheria, Pertussis) Vaccine  04/01/2020  42 TITO Prakash [id number]  Department of Health and Human Services  Centers for Disease Control and Prevention  Many Vaccine Information Statements are available in [place] and other languages. See www.immunize.org/vis. Muchas hojas de información sobre vacunas están disponibles en español y en otros idiomas. Visite www.immunize.org/vis. Care instructions adapted under license by [facility] ([facility]). If you have questions about a medical condition or this instruction, always ask your healthcare professional. [address] any warranty or liability for your use of this information. Patient Education        Well Visit, Ages 25 to 48: Care Instructions  Overview     Well visits can help you stay healthy. Your doctor has checked your overall health and may have suggested ways to take good care of yourself. Your doctor also may have recommended tests. At home, you can help prevent illness with healthy eating, regular exercise, and other steps. Follow-up care is a key part of your treatment and safety. Be sure to make and go to all appointments, and call your doctor if you are having problems.  It's also a good idea to know your test results and keep a list of the medicines you take. How can you care for yourself at home? · Get screening tests that you and your doctor decide on. Screening helps find diseases before any symptoms appear. · Eat healthy foods. Choose fruits, vegetables, whole grains, protein, and low-fat dairy foods. Limit fat, especially saturated fat. Reduce salt in your diet. · Limit alcohol. If you are a man, have no more than 2 drinks a day or 14 drinks a week. If you are a woman, have no more than 1 drink a day or 7 drinks a week. · Get at least 30 minutes of physical activity on most days of the week. Walking is a good choice. You also may want to do other activities, such as running, swimming, cycling, or playing tennis or team sports. Discuss any changes in your exercise program with your doctor. · Reach and stay at a healthy weight. This will lower your risk for many problems, such as obesity, diabetes, heart disease, and high blood pressure. · Do not smoke or allow others to smoke around you. If you need help quitting, talk to your doctor about stop-smoking programs and medicines. These can increase your chances of quitting for good. · Care for your mental health. It is easy to get weighed down by worry and stress. Learn strategies to manage stress, like deep breathing and mindfulness, and stay connected with your family and community. If you find you often feel sad or hopeless, talk with your doctor. Treatment can help. · Talk to your doctor about whether you have any risk factors for sexually transmitted infections (STIs). You can help prevent STIs if you wait to have sex with a new partner (or partners) until you've each been tested for STIs. It also helps if you use condoms (male or female condoms) and if you limit your sex partners to one person who only has sex with you. Vaccines are available for some STIs, such as HPV. · Use birth control if it's important to you to prevent pregnancy.  Talk with your doctor about the Patient with history of hypertension, home medications isosorbide mononitrate 30mg QD, amlodipine 5mg QD, torsemide 20mg BID    - continue imdur 30mg PO QD  - continue amlodipine 5mg PO QD  - Hold Torsemide for now since patient is on Lasix 60mg IV BID choices available and what might be best for you. · If you think you may have a problem with alcohol or drug use, talk to your doctor. This includes prescription medicines (such as amphetamines and opioids) and illegal drugs (such as cocaine and methamphetamine). Your doctor can help you figure out what type of treatment is best for you. · Protect your skin from too much sun. When you're outdoors from 10 a.m. to 4 p.m., stay in the shade or cover up with clothing and a hat with a wide brim. Wear sunglasses that block UV rays. Even when it's cloudy, put broad-spectrum sunscreen (SPF 30 or higher) on any exposed skin. · See a dentist one or two times a year for checkups and to have your teeth cleaned. · Wear a seat belt in the car. When should you call for help? Watch closely for changes in your health, and be sure to contact your doctor if you have any problems or symptoms that concern you. Where can you learn more? Go to https://THINK360.[employer]. org and sign in to your [employer] account. Enter P072 in the [employer] box to learn more about \"Well Visit, Ages 25 to 48: Care Instructions. \"     If you do not have an account, please click on the \"Sign Up Now\" link. Current as of: May 27, 2020               Content Version: 12.8  © 0578-6079 Healthwise, Incorporated. Care instructions adapted under license by [facility] ([facility]). If you have questions about a medical condition or this instruction, always ask your healthcare professional. [place] [address] any warranty or liability for your use of this information.

## 2023-03-13 PROBLEM — D47.Z1 POST-TRANSPLANT LYMPHOPROLIFERATIVE DISORDER: Status: ACTIVE | Noted: 2020-04-28

## 2023-03-13 PROBLEM — J90 PLEURAL EFFUSION: Status: ACTIVE | Noted: 2023-01-01

## 2023-03-13 PROBLEM — C91.10 CHRONIC LYMPHOCYTIC LEUKEMIA (CLL), B-CELL: Status: ACTIVE | Noted: 2021-12-01

## 2023-03-13 PROBLEM — Z79.01 ANTICOAGULATED: Status: ACTIVE | Noted: 2022-01-01

## 2023-03-13 PROBLEM — R13.10 DYSPHAGIA: Status: ACTIVE | Noted: 2020-08-16

## 2023-03-13 PROBLEM — R60.0 EDEMA OF EXTREMITIES: Status: ACTIVE | Noted: 2022-01-01

## 2023-03-13 PROBLEM — C06.9 SQUAMOUS CELL CARCINOMA OF MOUTH: Status: ACTIVE | Noted: 2023-01-01

## 2023-03-13 PROBLEM — R22.0 FACIAL MASS: Status: ACTIVE | Noted: 2022-01-01

## 2023-03-13 NOTE — PROGRESS NOTE ADULT - PROBLEM SELECTOR PLAN 2
CXR shows right pleural effusion, previous thoracentesis in 12/21 with recurrent effusion. Etiology likely HFpEF exacerbation vs malignant     - pleurx attached to chest tube CXR shows right pleural effusion, previous thoracentesis in 12/21 with recurrent effusion. Etiology likely HFpEF exacerbation vs malignant     - pleurx attached to chest tube  - monitor output

## 2023-03-13 NOTE — PROGRESS NOTE ADULT - PROBLEM SELECTOR PLAN 3
Patient with history of HFpEF presenting with dyspnea on exertion, orthopnea found to be hypoxic to 84% on room air, right sided pleural effusion likely due to acute exacerbation.  TTE 7/2022 with mild concentric LVH, EF 65%, no regional wall motion abnormalities. ACS unlikely troponin 0.02, patient without chest pain, EKG without ischemic changes.  On exam patient with 2+ pitting edema bilateral LE. BNP 3,494 (baseline 1,500).  Home medications Torsemide 20mg po BID- per patient no longer taking. Patient's ambulatory SpO2 desatted from 91% to 84% on RA.    - strict I/Os, monitor UOP carefully  - daily weights  - discharge on 2L home O2  continue diuretics.  He clinically not in acute exacerbation.  Only chronic diastolic heart failure      # Hyponatremia  Patient with Na 131 on admission which is around his baseline. Etiology likely Torsemide use.    - Continue to monitor BMP Patient with history of HFpEF presenting with dyspnea on exertion, orthopnea found to be hypoxic to 84% on room air, right sided pleural effusion likely due to acute exacerbation.  TTE 7/2022 with mild concentric LVH, EF 65%, no regional wall motion abnormalities. ACS unlikely troponin 0.02, patient without chest pain, EKG without ischemic changes.  On exam patient with 2+ pitting edema bilateral LE. BNP 3,494 (baseline 1,500).  Home medications Torsemide 20mg po BID- per patient no longer taking. Patient's ambulatory SpO2 desatted from 91% to 84% on RA.    - strict I/Os, monitor UOP  - daily weights      # Hyponatremia  Patient with Na 131 on admission which is around his baseline. Etiology likely Torsemide use.    - Continue to monitor BMP

## 2023-03-13 NOTE — PROGRESS NOTE ADULT - PROBLEM SELECTOR PLAN 8
Patient with history of Type 2 DM, home medications insulin humalog 15-24u 2x daily.  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 Pt had a normal BM 11/23 AM.  - Continue Miralax  - continue Senna  - continue Lactulose Patient with history of Type 2 DM, home medications insulin humalog 15-24u 2x daily.  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 Pt had a normal BM 11/23 AM.  - Continue Miralax  - continue Senna

## 2023-03-13 NOTE — DIETITIAN INITIAL EVALUATION ADULT - OTHER INFO
73 yo M with PMH of Afib (on Eliquis, s/p DCCV 5/2022), PE, HFpEF (EF 65%), CAD s/p CABG (2005), R renal transplant (on tacrolimus, follows w/ Dr Leyva), jaw cancer s/p resection (~2019) w/ recurrence s/p chemo/RT (completed 4 weeks ago), JAYANT (not on CPAP), CLL (w/ chronic leukocytosis), DM2 (with humalog pump), HTN, HLD admitted for pleural effusion s/p drainage. 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 Hematology consulted for further management.     Pt and visitor seen at bedside for initial assessment- at time of assessment pt reported "now is not a good time" thus interview deferred primarily to EMR at this time. No n/v/d/c documented at this time. No BM noted at this time. Confirms NKFA. Denies change in appetite PTA; unable to provide diet recall at this time. Dosing weight 158 pounds. Per EMR, pt 199 pounds (9/19/22); 41 pound/21% weight loss x 6 months, clinically significant. No dosing height; per previous admission pt 5'6. No edema documented at this time. No pressure ulcers documented at this time. Isac score=16. Labs reviewed 3/13; pt hyponatremic. Fingersticks 3/12-3/13: 155-224 mg/dL; insulin regimen ordered. Unable to perform nutrition focused physical exam at this time, however, upon physical observation, pt w/ noticeable mild wasting in temple region. Based on ASPEN guidelines, pt meets criteria for moderate malnutrition. Pt currently ordered for minced and moist Kosher diet; attempted to discuss diet consistency, however, pt declined at this time. Observed pt having soup at time of assessment. Made aware RD remains available. RD to follow up. See nutrition recommendations below.

## 2023-03-13 NOTE — PROGRESS NOTE ADULT - SUBJECTIVE AND OBJECTIVE BOX
Patient is a 72y Male seen and evaluated at bedside. slight uptick in SCr this AM to 1.06 Na 132 /63 bicarb 25 patient requiring NC patient complaining of no appetite       Meds:    acetaminophen     Tablet .. 650 every 6 hours PRN  amLODIPine   Tablet 5 daily  apixaban 5 every 12 hours  atorvastatin 40 at bedtime  dextrose 5%. 1000 <Continuous>  dextrose 5%. 1000 <Continuous>  dextrose 50% Injectable 25 once  dextrose 50% Injectable 12.5 once  dextrose 50% Injectable 25 once  dextrose Oral Gel 15 once PRN  glucagon  Injectable 1 once  HYDROmorphone   Tablet 2 every 6 hours PRN  HYDROmorphone  Injectable 1 every 4 hours PRN  influenza  Vaccine (HIGH DOSE) 0.7 once  insulin lispro (ADMELOG) corrective regimen sliding scale  Before meals and at bedtime  pantoprazole    Tablet 20 before breakfast  polyethylene glycol 3350 17 two times a day  tacrolimus 2 every 24 hours  tacrolimus 1 every 24 hours  tamsulosin 0.4 at bedtime  torsemide 40 daily      T(C): , Max: 36.7 (03-13-23 @ 02:58)  T(F): , Max: 98 (03-13-23 @ 02:58)  HR: 82 (03-13-23 @ 06:17)  BP: 116/63 (03-13-23 @ 06:17)  BP(mean): --  RR: 17 (03-13-23 @ 06:17)  SpO2: 97% (03-13-23 @ 06:17)  Wt(kg): --        Review of Systems:  all other ROS negative       PHYSICAL EXAM:  GENERAL: resting in bed 2L NC  CHEST/LUNG: decreased breath sounds at the bases   HEART: normal S1S2, RRR  ABDOMEN: Soft, Nontender, +BS,   EXTREMITIES: No clubbing, cyanosis, or edema         LABS:                        8.9    16.11 )-----------( 120      ( 12 Mar 2023 07:48 )             28.7     03-12    132<L>  |  99  |  24<H>  ----------------------------<  145<H>  4.9   |  25  |  1.06    Ca    8.5      12 Mar 2023 07:48  Phos  3.9     03-12  Mg     2.0     03-12                  RADIOLOGY & ADDITIONAL STUDIES:

## 2023-03-13 NOTE — PROGRESS NOTE ADULT - SUBJECTIVE AND OBJECTIVE BOX
Hematology Oncology Progress Note (Dr. Slater)    Subjective: Patient seen at bedside with son present. Per son, pt denies any abdominal pain. Sleeping during my evaluation.     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  However, patient states that he went to this hospital bc the surgeon who operated on his jaw tumor (Dr. Lynn) is affiliated with this hospital and wanted him admitted because he continues to have jaw pain and the area "is not healing fast".  He was sent to [facility] ED today after an outpatient appointment with Dr. Leyva for admission for recurrent R pleural effusion and discussion with Dr. Lora.  Per chart review, discussion with family revealed that patient developed R pleural effusion during recent admission at OSH and had pleurX placed, VNS comes 3x week to drain 500cc.  Etiology believed to be 2/2 lymphoma but did not have further w/u during admission.  Patient states he has R face pain but no other complaints.  ROS otherwise negative.  He has not been taking torsemide or any other diuretics. Admitted for right pleural effusion s/p drainage. On imaging, there was an incidental finding of SMV thrombus. Patient has been on Eliquis therapy. Hematology consulted for further workup.     Patient reports mild intermittent abdominal pain and bloating. Denies any nausea or vomiting. Patient's son reports that he was off Eliquis for about 3 days last week for his Pleurx catheter placement. But since the procedure has been compliant with taking it.       PHYSICAL EXAM:  Vital Signs Last 24 Hrs  T(C): 36.5 (13 Mar 2023 11:14), Max: 36.7 (13 Mar 2023 02:58)  T(F): 97.7 (13 Mar 2023 11:14), Max: 98 (13 Mar 2023 02:58)  HR: 86 (13 Mar 2023 11:14) (78 - 97)  BP: 112/69 (13 Mar 2023 11:14) (99/61 - 116/63)  RR: 18 (13 Mar 2023 11:14) (17 - 18)  SpO2: 100% (13 Mar 2023 11:14) (97% - 100%)    Parameters below as of 13 Mar 2023 11:14  Patient On (Oxygen Delivery Method): nasal cannula  O2 Flow (L/min): 2      MEDICATIONS  (STANDING):  amLODIPine   Tablet 5 milliGRAM(s) Oral daily  apixaban 5 milliGRAM(s) Oral every 12 hours  atorvastatin 40 milliGRAM(s) Oral at bedtime  dextrose 5%. 1000 milliLiter(s) (100 mL/Hr) IV Continuous <Continuous>  dextrose 5%. 1000 milliLiter(s) (50 mL/Hr) IV Continuous <Continuous>  dextrose 50% Injectable 25 Gram(s) IV Push once  dextrose 50% Injectable 12.5 Gram(s) IV Push once  dextrose 50% Injectable 25 Gram(s) IV Push once  glucagon  Injectable 1 milliGRAM(s) IntraMuscular once  influenza  Vaccine (HIGH DOSE) 0.7 milliLiter(s) IntraMuscular once  insulin lispro (ADMELOG) corrective regimen sliding scale   SubCutaneous Before meals and at bedtime  pantoprazole    Tablet 20 milliGRAM(s) Oral before breakfast  polyethylene glycol 3350 17 Gram(s) Oral two times a day  tacrolimus 2 milliGRAM(s) Oral every 24 hours  tacrolimus 1 milliGRAM(s) Oral every 24 hours  tamsulosin 0.4 milliGRAM(s) Oral at bedtime  torsemide 40 milliGRAM(s) Oral daily    MEDICATIONS  (PRN):  acetaminophen     Tablet .. 650 milliGRAM(s) Oral every 6 hours PRN Temp greater or equal to 38C (100.4F), Mild Pain (1 - 3)  dextrose Oral Gel 15 Gram(s) Oral once PRN Blood Glucose LESS THAN 70 milliGRAM(s)/deciliter  HYDROmorphone   Tablet 2 milliGRAM(s) Oral every 6 hours PRN Moderate Pain (4 - 6)  HYDROmorphone  Injectable 1 milliGRAM(s) IV Push every 4 hours PRN Severe Pain (7 - 10)        Labs:                          8.9    16.11 )-----------( 120      ( 12 Mar 2023 07:48 )             28.7       03-12    132<L>  |  99  |  24<H>  ----------------------------<  145<H>  4.9   |  25  |  1.06    Ca    8.5      12 Mar 2023 07:48  Phos  3.9     03-12  Mg     2.0     03-12      Imaging Studies:    CT Chest w/ IV Cont (03.09.23 @ 13:09)   IMPRESSION:  1.  Large right pleural effusion with Pleurx catheter in place. Adjacent   partial consolidation/atelectasis right lower lobe.  2.  Incidental occlusive thrombus in SMV extending to portal confluence.  3.  Bulky confluent retroperitoneal adenopathy, few mildly prominent   mediastinal and bilateral axillary nodes and enlarged spleen, likely   related to known lymphoproliferative disease.  4.  Markedly fluid distended gallbladder with layering sludge or   vicarious contrast excretion, could be related to prolonged fasting. If   clinical concern for acute cholecystitis consider follow-up gallbladder   ultrasound.  5.  Suspect pulmonary artery hypertension.    Findings were discussed with Dr. Vasquez 3/9/2023 2:30 PM by Dr. Leach with   read back confirmation.

## 2023-03-13 NOTE — PROGRESS NOTE ADULT - SUBJECTIVE AND OBJECTIVE BOX
Medicine Progress Note (INCOMPLETE)    SUBJECTIVE / OVERNIGHT EVENTS:  O/N events:  Patient was seen and examined at bedside.  Otherwise negative ROS    MEDICATIONS  (STANDING):  amLODIPine   Tablet 5 milliGRAM(s) Oral daily  apixaban 5 milliGRAM(s) Oral every 12 hours  atorvastatin 40 milliGRAM(s) Oral at bedtime  dextrose 5%. 1000 milliLiter(s) (100 mL/Hr) IV Continuous <Continuous>  dextrose 5%. 1000 milliLiter(s) (50 mL/Hr) IV Continuous <Continuous>  dextrose 50% Injectable 25 Gram(s) IV Push once  dextrose 50% Injectable 12.5 Gram(s) IV Push once  dextrose 50% Injectable 25 Gram(s) IV Push once  glucagon  Injectable 1 milliGRAM(s) IntraMuscular once  influenza  Vaccine (HIGH DOSE) 0.7 milliLiter(s) IntraMuscular once  insulin lispro (ADMELOG) corrective regimen sliding scale   SubCutaneous Before meals and at bedtime  magnesium sulfate  IVPB 2 Gram(s) IV Intermittent once  pantoprazole    Tablet 20 milliGRAM(s) Oral before breakfast  polyethylene glycol 3350 17 Gram(s) Oral two times a day  tacrolimus 2 milliGRAM(s) Oral every 24 hours  tacrolimus 1 milliGRAM(s) Oral every 24 hours  tamsulosin 0.4 milliGRAM(s) Oral at bedtime  torsemide 40 milliGRAM(s) Oral daily    MEDICATIONS  (PRN):  acetaminophen     Tablet .. 650 milliGRAM(s) Oral every 6 hours PRN Temp greater or equal to 38C (100.4F), Mild Pain (1 - 3)  dextrose Oral Gel 15 Gram(s) Oral once PRN Blood Glucose LESS THAN 70 milliGRAM(s)/deciliter  HYDROmorphone   Tablet 2 milliGRAM(s) Oral every 6 hours PRN Moderate Pain (4 - 6)  HYDROmorphone  Injectable 1 milliGRAM(s) IV Push every 4 hours PRN Severe Pain (7 - 10)    CAPILLARY BLOOD GLUCOSE      POCT Blood Glucose.: 182 mg/dL (13 Mar 2023 12:33)  POCT Blood Glucose.: 178 mg/dL (13 Mar 2023 08:19)  POCT Blood Glucose.: 191 mg/dL (12 Mar 2023 22:07)  POCT Blood Glucose.: 198 mg/dL (12 Mar 2023 17:14)        OBJECTIVE:  Vital Signs Last 24 Hrs  T(C): 36.5 (13 Mar 2023 11:14), Max: 36.7 (13 Mar 2023 02:58)  T(F): 97.7 (13 Mar 2023 11:14), Max: 98 (13 Mar 2023 02:58)  HR: 86 (13 Mar 2023 11:14) (78 - 86)  BP: 112/69 (13 Mar 2023 11:14) (105/62 - 116/63)  BP(mean): --  RR: 18 (13 Mar 2023 11:14) (17 - 18)  SpO2: 100% (13 Mar 2023 11:14) (97% - 100%)    Parameters below as of 13 Mar 2023 11:14  Patient On (Oxygen Delivery Method): nasal cannula  O2 Flow (L/min): 2      PHYSICAL EXAM:  General: AAOx3, NAD, euvolemic  Head: NC/AT; MMM; PERRL  Neck: Supple; no JVD  Respiratory: CTAB; no wheezes/rales/rhonchi  Cardiovascular: Regular rhythm/rate; S1/S2+, no m/r/g auscultated  Gastrointestinal: Soft; NTND; bowel sounds normal and present in all 4 quadrants  :   Extremities: WWP; no b/l U/E edema, no b/l L/E edema  Neurological: CNII-XII grossly intact; no obvious focal deficits  I&O's Summary      LABS:                        8.4    12.79 )-----------( 103      ( 13 Mar 2023 11:36 )             27.3     03-13    130<L>  |  99  |  24<H>  ----------------------------<  164<H>  4.8   |  26  |  0.97    Ca    8.1<L>      13 Mar 2023 11:36  Phos  3.2     03-13  Mg     1.8     03-13    TPro  4.8<L>  /  Alb  2.4<L>  /  TBili  0.5  /  DBili  x   /  AST  13  /  ALT  10  /  AlkPhos  110  03-13                  RADIOLOGY & ADDITIONAL TESTS:  Imaging from Last 24 Hours: Medicine Progress Note    SUBJECTIVE / OVERNIGHT EVENTS:  O/N events: ALEXANDER   Patient was seen and examined at bedside reports feeling well. Denies abdominal pain, SOB, chest pain, fever, chills. Pleurx tube had no drainage was kinked now taped.   Otherwise negative ROS    MEDICATIONS  (STANDING):  amLODIPine   Tablet 5 milliGRAM(s) Oral daily  apixaban 5 milliGRAM(s) Oral every 12 hours  atorvastatin 40 milliGRAM(s) Oral at bedtime  dextrose 5%. 1000 milliLiter(s) (100 mL/Hr) IV Continuous <Continuous>  dextrose 5%. 1000 milliLiter(s) (50 mL/Hr) IV Continuous <Continuous>  dextrose 50% Injectable 25 Gram(s) IV Push once  dextrose 50% Injectable 12.5 Gram(s) IV Push once  dextrose 50% Injectable 25 Gram(s) IV Push once  glucagon  Injectable 1 milliGRAM(s) IntraMuscular once  influenza  Vaccine (HIGH DOSE) 0.7 milliLiter(s) IntraMuscular once  insulin lispro (ADMELOG) corrective regimen sliding scale   SubCutaneous Before meals and at bedtime  magnesium sulfate  IVPB 2 Gram(s) IV Intermittent once  pantoprazole    Tablet 20 milliGRAM(s) Oral before breakfast  polyethylene glycol 3350 17 Gram(s) Oral two times a day  tacrolimus 2 milliGRAM(s) Oral every 24 hours  tacrolimus 1 milliGRAM(s) Oral every 24 hours  tamsulosin 0.4 milliGRAM(s) Oral at bedtime  torsemide 40 milliGRAM(s) Oral daily    MEDICATIONS  (PRN):  acetaminophen     Tablet .. 650 milliGRAM(s) Oral every 6 hours PRN Temp greater or equal to 38C (100.4F), Mild Pain (1 - 3)  dextrose Oral Gel 15 Gram(s) Oral once PRN Blood Glucose LESS THAN 70 milliGRAM(s)/deciliter  HYDROmorphone   Tablet 2 milliGRAM(s) Oral every 6 hours PRN Moderate Pain (4 - 6)  HYDROmorphone  Injectable 1 milliGRAM(s) IV Push every 4 hours PRN Severe Pain (7 - 10)    CAPILLARY BLOOD GLUCOSE      POCT Blood Glucose.: 182 mg/dL (13 Mar 2023 12:33)  POCT Blood Glucose.: 178 mg/dL (13 Mar 2023 08:19)  POCT Blood Glucose.: 191 mg/dL (12 Mar 2023 22:07)  POCT Blood Glucose.: 198 mg/dL (12 Mar 2023 17:14)        OBJECTIVE:  Vital Signs Last 24 Hrs  T(C): 36.5 (13 Mar 2023 11:14), Max: 36.7 (13 Mar 2023 02:58)  T(F): 97.7 (13 Mar 2023 11:14), Max: 98 (13 Mar 2023 02:58)  HR: 86 (13 Mar 2023 11:14) (78 - 86)  BP: 112/69 (13 Mar 2023 11:14) (105/62 - 116/63)  BP(mean): --  RR: 18 (13 Mar 2023 11:14) (17 - 18)  SpO2: 100% (13 Mar 2023 11:14) (97% - 100%)    Parameters below as of 13 Mar 2023 11:14  Patient On (Oxygen Delivery Method): nasal cannula  O2 Flow (L/min): 2      PHYSICAL EXAM:  General: AAOx3, NAD, euvolemic  Head: NC/AT; MMM; PERRL  Neck: Supple; no JVD  Respiratory: crackles in b/l bases  Cardiovascular: Irregular rhythm/rate; S1/S2+, no m/r/g auscultated  Gastrointestinal: Soft; NTND; bowel sounds normal and present in all 4 quadrants  Extremities: WWP; no b/l U/E edema, no b/l L/E edema  Neurological: CNII-XII grossly intact; no obvious focal deficits  I&O's Summary      LABS:                        8.4    12.79 )-----------( 103      ( 13 Mar 2023 11:36 )             27.3     03-13    130<L>  |  99  |  24<H>  ----------------------------<  164<H>  4.8   |  26  |  0.97    Ca    8.1<L>      13 Mar 2023 11:36  Phos  3.2     03-13  Mg     1.8     03-13    TPro  4.8<L>  /  Alb  2.4<L>  /  TBili  0.5  /  DBili  x   /  AST  13  /  ALT  10  /  AlkPhos  110  03-13                  RADIOLOGY & ADDITIONAL TESTS:  Imaging from Last 24 Hours:

## 2023-03-13 NOTE — DIETITIAN INITIAL EVALUATION ADULT - PERTINENT MEDS FT
MEDICATIONS  (STANDING):  amLODIPine   Tablet 5 milliGRAM(s) Oral daily  apixaban 5 milliGRAM(s) Oral every 12 hours  atorvastatin 40 milliGRAM(s) Oral at bedtime  dextrose 5%. 1000 milliLiter(s) (100 mL/Hr) IV Continuous <Continuous>  dextrose 5%. 1000 milliLiter(s) (50 mL/Hr) IV Continuous <Continuous>  dextrose 50% Injectable 25 Gram(s) IV Push once  dextrose 50% Injectable 12.5 Gram(s) IV Push once  dextrose 50% Injectable 25 Gram(s) IV Push once  glucagon  Injectable 1 milliGRAM(s) IntraMuscular once  influenza  Vaccine (HIGH DOSE) 0.7 milliLiter(s) IntraMuscular once  insulin lispro (ADMELOG) corrective regimen sliding scale   SubCutaneous Before meals and at bedtime  pantoprazole    Tablet 20 milliGRAM(s) Oral before breakfast  polyethylene glycol 3350 17 Gram(s) Oral two times a day  tacrolimus 2 milliGRAM(s) Oral every 24 hours  tacrolimus 1 milliGRAM(s) Oral every 24 hours  tamsulosin 0.4 milliGRAM(s) Oral at bedtime  torsemide 40 milliGRAM(s) Oral daily    MEDICATIONS  (PRN):  acetaminophen     Tablet .. 650 milliGRAM(s) Oral every 6 hours PRN Temp greater or equal to 38C (100.4F), Mild Pain (1 - 3)  dextrose Oral Gel 15 Gram(s) Oral once PRN Blood Glucose LESS THAN 70 milliGRAM(s)/deciliter  HYDROmorphone   Tablet 2 milliGRAM(s) Oral every 6 hours PRN Moderate Pain (4 - 6)  HYDROmorphone  Injectable 1 milliGRAM(s) IV Push every 4 hours PRN Severe Pain (7 - 10)

## 2023-03-13 NOTE — DIETITIAN INITIAL EVALUATION ADULT - PERTINENT LABORATORY DATA
03-13    130<L>  |  99  |  24<H>  ----------------------------<  164<H>  4.8   |  26  |  0.97    Ca    8.1<L>      13 Mar 2023 11:36  Phos  3.2     03-13  Mg     1.8     03-13    TPro  4.8<L>  /  Alb  2.4<L>  /  TBili  0.5  /  DBili  x   /  AST  13  /  ALT  10  /  AlkPhos  110  03-13  POCT Blood Glucose.: 182 mg/dL (03-13-23 @ 12:33)  A1C with Estimated Average Glucose Result: 6.0 % (03-09-23 @ 10:15)  A1C with Estimated Average Glucose Result: 7.5 % (11-23-22 @ 12:04)  A1C with Estimated Average Glucose Result: 6.9 % (09-19-22 @ 05:30)

## 2023-03-13 NOTE — DIETITIAN INITIAL EVALUATION ADULT - ADD RECOMMEND
1. Change diet to CTSCHO Kosher diet + Ensure Max 3x/day (consistency per SLP) 2. Encourage pt to meet nutritional needs as able 3. RD to obtain subjective information and provide diet ed as able 4. Pain and bowel regimen per team 5. Will assess/honor preferences as able 6. Monitor electrolytes; replete PRN 7. Malnutrition notice placed

## 2023-03-13 NOTE — PROGRESS NOTE ADULT - PROBLEM SELECTOR PLAN 5
Patient with history of Afib, home medication 5mg Eliquis BID.    - Patient with history of Afib, home medication 5mg Eliquis BID.    - c/w eliquis   - patient currently rate controlled

## 2023-03-13 NOTE — PROGRESS NOTE ADULT - PROBLEM SELECTOR PLAN 1
Patient with shortness of breath found to have acute hypoxic respiratory failure (AHRF) etiology likely HFpEF exacerbation complicated by right-sided pleural effusion.  At baseline intermittently uses 2L NC at home, now with increased O2 demand.  No evidence of infection, patient afebrile without cough.  PE unlikely no reported chest pain, tachycardia, other hemodynamic instability.      - ncCT chest showed large R pleural effusion with pleurx in place.   - Pleural fluid suggestive of exudative effusion likely 2/2 to diastolic HF  - Continue supplemental oxygen, wean as tolerated Patient with shortness of breath found to have acute hypoxic respiratory failure (AHRF) etiology likely HFpEF exacerbation complicated by right-sided pleural effusion.  At baseline intermittently uses 2L NC at home, now with increased O2 demand.  No evidence of infection, patient afebrile without cough.  PE unlikely no reported chest pain, tachycardia, other hemodynamic instability.      - ncCT chest showed large R pleural effusion with pleurx in place.   - Pleurx previously kinked now fixed in place   - Pleural fluid suggestive of exudative effusion likely 2/2 to diastolic HF- torsemide 40mg qd   - Continue supplemental oxygen, wean as tolerated

## 2023-03-13 NOTE — PROGRESS NOTE ADULT - PROBLEM SELECTOR PLAN 10
Patient with chronic renal insufficiency, s/p R renal transplant, on tacrolimus 2mg AM and 1mg PM  - Continue tacrolimus 2mg AM and 1mg PM Patient with chronic renal insufficiency, s/p R renal transplant, home tacrolimus 2mg AM and 1mg PM  - spoke to outpatient oncologist Dr Carreno transitioning from tacrolimus to sirolimus      - change to tacrolimus 1mg BID  - start sirolimus 1mg qd   - f/u tacrolimus and sirolimus levels 3/15  - monitor for sirolimus PRAVIN diarrhea, edema, anemia, allergic PNA

## 2023-03-13 NOTE — PROGRESS NOTE ADULT - PROBLEM SELECTOR PLAN 12
F: none   E: replete PRN  N: consistent carb, DASH  GI: PPI  DVT: SCDs  Code: FULL CODE  Dispo: [facility] F: none   E: replete PRN  N: consistent carb, DASH  GI: PPI  DVT: eliquis 5 BID  Code: FULL CODE  Dispo: [facility]

## 2023-03-13 NOTE — PROGRESS NOTE ADULT - ATTENDING COMMENTS
have reviewed in detail and discussed with dr desir [facility] xp and staff.  will discuss with dr jay the timing of pleurx removal.  continueing on torsemide with close f/u

## 2023-03-13 NOTE — PROGRESS NOTE ADULT - PROBLEM SELECTOR PLAN 6
Patient with history of hypertension, home medications isosorbide mononitrate 30mg QD, amlodipine 5mg QD, torsemide 20mg BID    - continue imdur 30mg PO QD  - continue amlodipine 5mg PO QD  - Hold Torsemide for now since patient is on Lasix 60mg IV BID Patient with history of hypertension, home medications isosorbide mononitrate 30mg QD, amlodipine 5mg QD, torsemide 20mg BID    - continue imdur 30mg PO QD  - continue amlodipine 5mg PO QD  - cw torsemide 40mg qd

## 2023-03-13 NOTE — DIETITIAN INITIAL EVALUATION ADULT - OTHER CALCULATIONS
Fluids per team. Based on Standards of Care pt within % IBW thus actual body weight used for all calculations. Needs adjusted for advanced age, post transplant, HF, CA, malnutrition.

## 2023-03-14 NOTE — PROGRESS NOTE ADULT - PROBLEM SELECTOR PLAN 8
Patient with history of Type 2 DM, home medications insulin humalog 15-24u 2x daily.  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 Pt had a normal BM 11/23 AM.  - Continue Miralax  - continue Senna Patient with history of Type 2 DM, home medications insulin humalog 15-24u 2x daily.  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 Pt had a normal BM 11/23 AM.  abdominal XR with heavy stool burden  - Continue Miralax  - continue Senna  - adminster dulcolax suppository

## 2023-03-14 NOTE — PROGRESS NOTE ADULT - PROBLEM SELECTOR PLAN 5
Patient with history of Afib, home medication 5mg Eliquis BID.    - c/w eliquis   - patient currently rate controlled

## 2023-03-14 NOTE — PROGRESS NOTE ADULT - PROBLEM SELECTOR PLAN 1
Patient with shortness of breath found to have acute hypoxic respiratory failure (AHRF) etiology likely HFpEF exacerbation complicated by right-sided pleural effusion.  At baseline intermittently uses 2L NC at home, now with increased O2 demand.  No evidence of infection, patient afebrile without cough.  PE unlikely no reported chest pain, tachycardia, other hemodynamic instability.      - ncCT chest showed large R pleural effusion with pleurx in place.   - Pleurx previously kinked now fixed in place   - Pleural fluid suggestive of exudative effusion likely 2/2 to diastolic HF- torsemide 40mg qd   - Continue supplemental oxygen, wean as tolerated Patient with shortness of breath found to have acute hypoxic respiratory failure (AHRF) etiology likely HFpEF exacerbation complicated by right-sided pleural effusion.  At baseline intermittently uses 2L NC at home, now with increased O2 demand.  No evidence of infection, patient afebrile without cough.  PE unlikely no reported chest pain, tachycardia, other hemodynamic instability.      - ncCT chest showed large R pleural effusion with pleurx in place.   - Pleurx previously kinked now fixed in place - output   - Pleural fluid suggestive of exudative effusion likely 2/2 to diastolic HF- torsemide 40mg qd-> 60qd  - will repeat CXR tomorrow, if pleural effusion is resolved and output decreases- will remove pleurx  - Continue supplemental oxygen, wean as tolerated

## 2023-03-14 NOTE — PROGRESS NOTE ADULT - SUBJECTIVE AND OBJECTIVE BOX
Patient is a 72y Male seen and evaluated at bedside. no aucte events overnifht patient started on sirolimus and titrating down tacro- Scr stable at 0.9 repeat CXR showing diminshment of pelural effusions patient still complaining of slight dyspnea this AM      Meds:    acetaminophen     Tablet .. 650 every 6 hours PRN  amLODIPine   Tablet 5 daily  apixaban 5 every 12 hours  atorvastatin 40 at bedtime  cefTRIAXone   IVPB 1000 every 24 hours  dextrose 5%. 1000 <Continuous>  dextrose 5%. 1000 <Continuous>  dextrose 50% Injectable 25 once  dextrose 50% Injectable 12.5 once  dextrose 50% Injectable 25 once  dextrose Oral Gel 15 once PRN  glucagon  Injectable 1 once  HYDROmorphone   Tablet 2 every 6 hours PRN  HYDROmorphone  Injectable 1 every 4 hours PRN  influenza  Vaccine (HIGH DOSE) 0.7 once  insulin lispro (ADMELOG) corrective regimen sliding scale  Before meals and at bedtime  pantoprazole    Tablet 20 before breakfast  polyethylene glycol 3350 17 two times a day  predniSONE   Tablet 5 every 24 hours  sirolimus 1 every 24 hours  tacrolimus 1 every 12 hours  tamsulosin 0.4 at bedtime      T(C): , Max: 36.6 (23 @ 21:23)  T(F): , Max: 97.9 (23 @ 21:23)  HR: 88 (23 @ 06:31)  BP: 149/75 (23 @ 06:31)  BP(mean): 99 (23 @ 06:31)  RR: 18 (23 @ 06:31)  SpO2: 95% (23 @ 06:31)  Wt(kg): --     @ 07:01  -   @ 07:00  --------------------------------------------------------  IN: 0 mL / OUT: 150 mL / NET: -150 mL          Review of Systems:  all other ROS negative       PHYSICAL EXAM:  GENERAL: well-developed, well nourished, alert, no acute distress at present  CHEST/LUNG: Clear to auscultation bilaterally  HEART: normal S1S2, RRR  ABDOMEN: Soft, Nontender, +BS, No flank tenderness bilateral  EXTREMITIES: No clubbing, cyanosis, or edema         LABS:                        8.4    12.79 )-----------( 103      ( 13 Mar 2023 11:36 )             27.3         130<L>  |  99  |  24<H>  ----------------------------<  164<H>  4.8   |  26  |  0.97    Ca    8.1<L>      13 Mar 2023 11:36  Phos  3.2       Mg     1.8         TPro  4.8<L>  /  Alb  2.4<L>  /  TBili  0.5  /  DBili  x   /  AST  13  /  ALT  10  /  AlkPhos  110          Urinalysis Basic - ( 13 Mar 2023 15:16 )    Color: Yellow / Appearance: Clear / S.020 / pH: x  Gluc: x / Ketone: NEGATIVE  / Bili: Negative / Urobili: 0.2 E.U./dL   Blood: x / Protein: NEGATIVE mg/dL / Nitrite: NEGATIVE   Leuk Esterase: Small / RBC: < 5 /HPF / WBC 5-10 /HPF   Sq Epi: x / Non Sq Epi: 0-5 /HPF / Bacteria: Present /HPF      Sodium, Random Urine: 49 mmol/L ( @ 15:16)  Creatinine, Random Urine: 73 mg/dL ( @ 15:16)  Protein/Creatinine Ratio Calculation: 0.2 Ratio ( @ 15:16)  Osmolality, Random Urine: 303 mosm/kg ( @ 15:16)  Potassium, Random Urine: 24 mmol/L ( @ 15:16)        RADIOLOGY & ADDITIONAL STUDIES:

## 2023-03-14 NOTE — PROGRESS NOTE ADULT - PROBLEM SELECTOR PLAN 4
Hgb on admission 8.4 (baseline 10) likely ALEXANDER vs AOCD in setting of renal insufficiency. Currently no signs of active bleeding (no hematochezia, melena, hemoptysis, hematuria)    - trend CBC, maintain active T&S  - transfuse if Hgb <7 Hgb on admission 8.4 (baseline 10) likely ALEXANDER vs AOCD in setting of renal insufficiency. 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

## 2023-03-14 NOTE — PROGRESS NOTE ADULT - PROBLEM SELECTOR PLAN 10
Patient with chronic renal insufficiency, s/p R renal transplant, home tacrolimus 2mg AM and 1mg PM  - spoke to outpatient oncologist Dr Carreno transitioning from tacrolimus to sirolimus      - change to tacrolimus 1mg BID  - start sirolimus 1mg qd   - f/u tacrolimus and sirolimus levels 3/15  - monitor for sirolimus PRAVIN diarrhea, edema, anemia, allergic PNA Patient with chronic renal insufficiency, s/p R renal transplant, home tacrolimus 2mg AM and 1mg PM  - spoke to outpatient oncologist Dr Carreno transitioning from tacrolimus to sirolimus      - change to tacrolimus 1mg BID (3/13)  - start sirolimus 1mg qd   - f/u tacrolimus and sirolimus levels 3/15  - monitor for sirolimus PRAVIN diarrhea, edema, anemia, allergic PNA

## 2023-03-14 NOTE — PROGRESS NOTE ADULT - PROBLEM SELECTOR PLAN 12
F: none   E: replete PRN  N: consistent carb, DASH  GI: PPI  DVT: eliquis 5 BID  Code: FULL CODE  Dispo: [facility] F: none   E: replete PRN  N: consistent carb, DASH  GI: PPI  Bowel: senna, miralax  DVT: eliquis 5 BID  Code: FULL CODE  Dispo: [facility]

## 2023-03-14 NOTE — PROGRESS NOTE ADULT - ATTENDING COMMENTS
have reviewed status in detail and have discusssed with pt and familly, and dr jay and martin team.    agree with findings and recommendations.  transition from tacrolimus to sirolimus in progress.   please repeat abd films to look at a colonic gas.  also, please check ferritin, fe, /tibc.  agree with findings and recommendations. have reviewed status in detail and have discusssed with pt and familly, and dr jay and martin team.    agree with findings and recommendations.  transition from tacrolimus to sirolimus in progress.   please repeat abd films to look at a colonic gas.  also, please check ferritin, fe, /tibc.  .

## 2023-03-14 NOTE — PROGRESS NOTE ADULT - SUBJECTIVE AND OBJECTIVE BOX
Medicine Progress Note (INCOMPLETE)    SUBJECTIVE / OVERNIGHT EVENTS:  O/N events:  Patient was seen and examined at bedside.  Otherwise negative ROS    MEDICATIONS  (STANDING):  amLODIPine   Tablet 5 milliGRAM(s) Oral daily  apixaban 5 milliGRAM(s) Oral every 12 hours  atorvastatin 40 milliGRAM(s) Oral at bedtime  cefTRIAXone   IVPB 1000 milliGRAM(s) IV Intermittent every 24 hours  dextrose 5%. 1000 milliLiter(s) (100 mL/Hr) IV Continuous <Continuous>  dextrose 5%. 1000 milliLiter(s) (50 mL/Hr) IV Continuous <Continuous>  dextrose 50% Injectable 25 Gram(s) IV Push once  dextrose 50% Injectable 12.5 Gram(s) IV Push once  dextrose 50% Injectable 25 Gram(s) IV Push once  glucagon  Injectable 1 milliGRAM(s) IntraMuscular once  influenza  Vaccine (HIGH DOSE) 0.7 milliLiter(s) IntraMuscular once  insulin lispro (ADMELOG) corrective regimen sliding scale   SubCutaneous Before meals and at bedtime  pantoprazole    Tablet 20 milliGRAM(s) Oral before breakfast  polyethylene glycol 3350 17 Gram(s) Oral two times a day  predniSONE   Tablet 5 milliGRAM(s) Oral every 24 hours  sirolimus 1 milliGRAM(s) Oral every 24 hours  tacrolimus 1 milliGRAM(s) Oral every 12 hours  tamsulosin 0.4 milliGRAM(s) Oral at bedtime  torsemide 40 milliGRAM(s) Oral daily    MEDICATIONS  (PRN):  acetaminophen     Tablet .. 650 milliGRAM(s) Oral every 6 hours PRN Temp greater or equal to 38C (100.4F), Mild Pain (1 - 3)  dextrose Oral Gel 15 Gram(s) Oral once PRN Blood Glucose LESS THAN 70 milliGRAM(s)/deciliter  HYDROmorphone   Tablet 2 milliGRAM(s) Oral every 6 hours PRN Moderate Pain (4 - 6)  HYDROmorphone  Injectable 1 milliGRAM(s) IV Push every 4 hours PRN Severe Pain (7 - 10)    CAPILLARY BLOOD GLUCOSE      POCT Blood Glucose.: 172 mg/dL (14 Mar 2023 09:20)  POCT Blood Glucose.: 180 mg/dL (13 Mar 2023 22:07)  POCT Blood Glucose.: 167 mg/dL (13 Mar 2023 18:04)  POCT Blood Glucose.: 182 mg/dL (13 Mar 2023 12:33)        OBJECTIVE:  Vital Signs Last 24 Hrs  T(C): 36.4 (14 Mar 2023 06:31), Max: 36.6 (13 Mar 2023 21:23)  T(F): 97.5 (14 Mar 2023 06:31), Max: 97.9 (13 Mar 2023 21:23)  HR: 88 (14 Mar 2023 06:) (74 - 88)  BP: 149/75 (14 Mar 2023 06:) (102/62 - 149/75)  BP(mean): 99 (14 Mar 2023 06:) (99 - 99)  RR: 18 (14 Mar 2023 06:) (18 - 18)  SpO2: 95% (14 Mar 2023 06:) (95% - 100%)    Parameters below as of 14 Mar 2023 06:31  Patient On (Oxygen Delivery Method): nasal cannula        PHYSICAL EXAM:  General: AAOx3, NAD, euvolemic  Head: NC/AT; MMM; PERRL  Neck: Supple; no JVD  Respiratory: CTAB; no wheezes/rales/rhonchi  Cardiovascular: Regular rhythm/rate; S1/S2+, no m/r/g auscultated  Gastrointestinal: Soft; NTND; bowel sounds normal and present in all 4 quadrants  :   Extremities: WWP; no b/l U/E edema, no b/l L/E edema  Neurological: CNII-XII grossly intact; no obvious focal deficits  I&O's Summary    13 Mar 2023 07:01  -  14 Mar 2023 07:00  --------------------------------------------------------  IN: 0 mL / OUT: 150 mL / NET: -150 mL        LABS:                        8.4    12.79 )-----------( 103      ( 13 Mar 2023 11:36 )             27.3     03-13    130<L>  |  99  |  24<H>  ----------------------------<  164<H>  4.8   |  26  |  0.97    Ca    8.1<L>      13 Mar 2023 11:36  Phos  3.2     03-13  Mg     1.8     03-13    TPro  4.8<L>  /  Alb  2.4<L>  /  TBili  0.5  /  DBili  x   /  AST  13  /  ALT  10  /  AlkPhos  110  03-13          Urinalysis Basic - ( 13 Mar 2023 15:16 )    Color: Yellow / Appearance: Clear / S.020 / pH: x  Gluc: x / Ketone: NEGATIVE  / Bili: Negative / Urobili: 0.2 E.U./dL   Blood: x / Protein: NEGATIVE mg/dL / Nitrite: NEGATIVE   Leuk Esterase: Small / RBC: < 5 /HPF / WBC 5-10 /HPF   Sq Epi: x / Non Sq Epi: 0-5 /HPF / Bacteria: Present /HPF            RADIOLOGY & ADDITIONAL TESTS:  Imaging from Last 24 Hours: Medicine Progress Note     HOSPITAL COURSE  72M with PMHx Afib (on Eliquis, s/p DCCV 2022), PE, HFpEF (EF 65%), CAD s/p CABG (), R renal transplant (on tacrolimus, follows w/ Dr Leyva), jaw cancer s/p resection (~) w/ recurrence s/p chemo/RT (completed 2-3 weeks ago), JAYANT (not on CPAP), CLL (w/ chronic leukocytosis), DM2 (with humalog pump), HTN, HLD, recent admit OSH for tongue/facial swelling likely 2/2 RT c/b pleural effusion s/p pleurX drain R chest, dc one week ago presents from PCP office for further evaluation and w/u of R pleural effusion. CT chest showed no PE, pleurx in place. Pleurx placed to suction. 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 Tacrolimus reduced to 1g BID for overlap.- unkinked draining yellow fluid. Repeat CXR showed significant reduction in R pleural effusion. Abdominal XR showed heavy stool burden, dulcolax suppository administered. Tacrolimus and sirolimus levels drawn.    O/N events: ALEXANDER  Patient was seen and examined at bedside awake and alert, reporting pain at Pleurx catheter site. Pleurx attached to suction with 1600 straw colored fluid. Pt denies SOB, CP, L/E edema. Reports abdominal discomfort and not having BM for last two days. Denies fever, chills.   Otherwise negative ROS    MEDICATIONS  (STANDING):  amLODIPine   Tablet 5 milliGRAM(s) Oral daily  apixaban 5 milliGRAM(s) Oral every 12 hours  atorvastatin 40 milliGRAM(s) Oral at bedtime  cefTRIAXone   IVPB 1000 milliGRAM(s) IV Intermittent every 24 hours  dextrose 5%. 1000 milliLiter(s) (100 mL/Hr) IV Continuous <Continuous>  dextrose 5%. 1000 milliLiter(s) (50 mL/Hr) IV Continuous <Continuous>  dextrose 50% Injectable 25 Gram(s) IV Push once  dextrose 50% Injectable 12.5 Gram(s) IV Push once  dextrose 50% Injectable 25 Gram(s) IV Push once  glucagon  Injectable 1 milliGRAM(s) IntraMuscular once  influenza  Vaccine (HIGH DOSE) 0.7 milliLiter(s) IntraMuscular once  insulin lispro (ADMELOG) corrective regimen sliding scale   SubCutaneous Before meals and at bedtime  pantoprazole    Tablet 20 milliGRAM(s) Oral before breakfast  polyethylene glycol 3350 17 Gram(s) Oral two times a day  predniSONE   Tablet 5 milliGRAM(s) Oral every 24 hours  sirolimus 1 milliGRAM(s) Oral every 24 hours  tacrolimus 1 milliGRAM(s) Oral every 12 hours  tamsulosin 0.4 milliGRAM(s) Oral at bedtime  torsemide 40 milliGRAM(s) Oral daily    MEDICATIONS  (PRN):  acetaminophen     Tablet .. 650 milliGRAM(s) Oral every 6 hours PRN Temp greater or equal to 38C (100.4F), Mild Pain (1 - 3)  dextrose Oral Gel 15 Gram(s) Oral once PRN Blood Glucose LESS THAN 70 milliGRAM(s)/deciliter  HYDROmorphone   Tablet 2 milliGRAM(s) Oral every 6 hours PRN Moderate Pain (4 - 6)  HYDROmorphone  Injectable 1 milliGRAM(s) IV Push every 4 hours PRN Severe Pain (7 - 10)    CAPILLARY BLOOD GLUCOSE      POCT Blood Glucose.: 172 mg/dL (14 Mar 2023 09:20)  POCT Blood Glucose.: 180 mg/dL (13 Mar 2023 22:07)  POCT Blood Glucose.: 167 mg/dL (13 Mar 2023 18:04)  POCT Blood Glucose.: 182 mg/dL (13 Mar 2023 12:33)        OBJECTIVE:  Vital Signs Last 24 Hrs  T(C): 36.4 (14 Mar 2023 06:31), Max: 36.6 (13 Mar 2023 21:23)  T(F): 97.5 (14 Mar 2023 06:31), Max: 97.9 (13 Mar 2023 21:23)  HR: 88 (14 Mar 2023 06:31) (74 - 88)  BP: 149/75 (14 Mar 2023 06:31) (102/62 - 149/75)  BP(mean): 99 (14 Mar 2023 06:31) (99 - 99)  RR: 18 (14 Mar 2023 06:31) (18 - 18)  SpO2: 95% (14 Mar 2023 06:31) (95% - 100%)    Parameters below as of 14 Mar 2023 06:31  Patient On (Oxygen Delivery Method): nasal cannula        PHYSICAL EXAM:  General: AAOx3, NAD, euvolemic  Head: NC/AT; MMM; PERRL  Respiratory: crackles at b/l bases   Cardiovascular: Irregular rhythm/rate; S1/S2+, no m/r/g auscultated  Gastrointestinal: Soft; NT distended; bowel sounds normal and present in all 4 quadrants  Extremities: WWP; no b/l U/E edema, no b/l L/E edema, discoloration at anterior shins b/l  Neurological: CNII-XII grossly intact; no obvious focal deficits  I&O's Summary    13 Mar 2023 07:01  -  14 Mar 2023 07:00  --------------------------------------------------------  IN: 0 mL / OUT: 150 mL / NET: -150 mL        LABS:                        8.4    12.79 )-----------( 103      ( 13 Mar 2023 11:36 )             27.3     03-13    130<L>  |  99  |  24<H>  ----------------------------<  164<H>  4.8   |  26  |  0.97    Ca    8.1<L>      13 Mar 2023 11:36  Phos  3.2     03-13  Mg     1.8     03-13    TPro  4.8<L>  /  Alb  2.4<L>  /  TBili  0.5  /  DBili  x   /  AST  13  /  ALT  10  /  AlkPhos  110  03-13          Urinalysis Basic - ( 13 Mar 2023 15:16 )    Color: Yellow / Appearance: Clear / S.020 / pH: x  Gluc: x / Ketone: NEGATIVE  / Bili: Negative / Urobili: 0.2 E.U./dL   Blood: x / Protein: NEGATIVE mg/dL / Nitrite: NEGATIVE   Leuk Esterase: Small / RBC: < 5 /HPF / WBC 5-10 /HPF   Sq Epi: x / Non Sq Epi: 0-5 /HPF / Bacteria: Present /HPF            RADIOLOGY & ADDITIONAL TESTS:  Imaging from Last 24 Hours:

## 2023-03-14 NOTE — PROGRESS NOTE ADULT - PROBLEM SELECTOR PLAN 2
CXR shows right pleural effusion, previous thoracentesis in 12/21 with recurrent effusion. Etiology likely HFpEF exacerbation vs malignant     - pleurx attached to chest tube  - monitor output CXR shows right pleural effusion, previous thoracentesis in 12/21 with recurrent effusion.   Fluid analysis exudative effusion etiology likely HFpEF exacerbation     - see plan above     #LATRICIA  baseline Cr 0.58-0.65  LATRICIA likely 2/2 episodes of hypotension  - monitor vitals    #acute UTI. UA shows small leuk esterase, WBC 5-10, bacteria present  - c/w ceftriaxone 1g q24 (3/13-1/17)    #hyponatremia  Pt with hx of hyponatremia   euvolemic isotonic hyponatremia  - lipid panel wnl  - f/u SPEP, UPEP  - start fluid restriction 1L

## 2023-03-14 NOTE — PROGRESS NOTE ADULT - PROBLEM SELECTOR PLAN 6
Patient with history of hypertension, home medications isosorbide mononitrate 30mg QD, amlodipine 5mg QD, torsemide 20mg BID    - continue imdur 30mg PO QD  - continue amlodipine 5mg PO QD  - cw torsemide 40mg qd

## 2023-03-14 NOTE — PROGRESS NOTE ADULT - PROBLEM SELECTOR PLAN 3
Patient with history of HFpEF presenting with dyspnea on exertion, orthopnea found to be hypoxic to 84% on room air, right sided pleural effusion likely due to acute exacerbation.  TTE 7/2022 with mild concentric LVH, EF 65%, no regional wall motion abnormalities. ACS unlikely troponin 0.02, patient without chest pain, EKG without ischemic changes.  On exam patient with 2+ pitting edema bilateral LE. BNP 3,494 (baseline 1,500).  Home medications Torsemide 20mg po BID- per patient no longer taking. Patient's ambulatory SpO2 desatted from 91% to 84% on RA.    - strict I/Os, monitor UOP  - daily weights      # Hyponatremia  Patient with Na 131 on admission which is around his baseline. Etiology likely Torsemide use.    - Continue to monitor BMP Patient with history of HFpEF presenting with dyspnea on exertion, orthopnea found to be hypoxic to 84% on room air, right sided pleural effusion likely due to acute exacerbation.  TTE 7/2022 with mild concentric LVH, EF 65%, no regional wall motion abnormalities. ACS unlikely troponin 0.02, patient without chest pain, EKG without ischemic changes.  On exam patient with 2+ pitting edema bilateral LE. BNP 3,494 (baseline 1,500).  Home medications Torsemide 20mg po BID- per patient no longer taking. Patient's ambulatory SpO2 desatted from 91% to 84% on RA.    - strict I/Os, monitor UOP  - daily weights  - increase torsemide 60 qd    # Hyponatremia  Patient with Na 131 on admission which is around his baseline. Etiology likely Torsemide use.    - Continue to monitor BMP

## 2023-03-14 NOTE — PROGRESS NOTE ADULT - PROBLEM SELECTOR PLAN 9
Patient with history of CLL, follows with oncology at [facility]. No lymphadenopathy appreciated on physical exam. Pt with chronic leukocytosis. Afebrile, no other signs or symptoms of infectious process. Leukocytosis likely due to CLL.  - monitor WBC Patient with history of CLL, follows with oncology at [facility]. No lymphadenopathy appreciated on physical exam. Pt with chronic leukocytosis. Afebrile, no other signs or symptoms of infectious process.    - c/w monitor

## 2023-03-15 PROBLEM — Z94.0 HISTORY OF RENAL TRANSPLANT: Status: RESOLVED | Noted: 2021-12-16 | Resolved: 2023-01-01

## 2023-03-15 NOTE — PROGRESS NOTE ADULT - SUBJECTIVE AND OBJECTIVE BOX
Medicine Progress Note (INCOMPLETE)    SUBJECTIVE / OVERNIGHT EVENTS:  O/N events:  Patient was seen and examined at bedside.  Otherwise negative ROS    MEDICATIONS  (STANDING):  amLODIPine   Tablet 5 milliGRAM(s) Oral daily  apixaban 5 milliGRAM(s) Oral every 12 hours  atorvastatin 40 milliGRAM(s) Oral at bedtime  cefTRIAXone   IVPB 1000 milliGRAM(s) IV Intermittent every 24 hours  dextrose 5%. 1000 milliLiter(s) (100 mL/Hr) IV Continuous <Continuous>  dextrose 5%. 1000 milliLiter(s) (50 mL/Hr) IV Continuous <Continuous>  dextrose 50% Injectable 25 Gram(s) IV Push once  dextrose 50% Injectable 12.5 Gram(s) IV Push once  dextrose 50% Injectable 25 Gram(s) IV Push once  glucagon  Injectable 1 milliGRAM(s) IntraMuscular once  influenza  Vaccine (HIGH DOSE) 0.7 milliLiter(s) IntraMuscular once  insulin lispro (ADMELOG) corrective regimen sliding scale   SubCutaneous Before meals and at bedtime  lactulose Syrup 10 Gram(s) Oral two times a day  pantoprazole    Tablet 20 milliGRAM(s) Oral before breakfast  polyethylene glycol 3350 17 Gram(s) Oral two times a day  predniSONE   Tablet 5 milliGRAM(s) Oral every 24 hours  senna 2 Tablet(s) Oral at bedtime  sirolimus 1 milliGRAM(s) Oral every 24 hours  tacrolimus 1 milliGRAM(s) Oral every 12 hours  tamsulosin 0.4 milliGRAM(s) Oral at bedtime  torsemide 60 milliGRAM(s) Oral every 24 hours    MEDICATIONS  (PRN):  acetaminophen     Tablet .. 650 milliGRAM(s) Oral every 6 hours PRN Temp greater or equal to 38C (100.4F), Mild Pain (1 - 3)  dextrose Oral Gel 15 Gram(s) Oral once PRN Blood Glucose LESS THAN 70 milliGRAM(s)/deciliter  HYDROmorphone   Tablet 2 milliGRAM(s) Oral every 6 hours PRN Moderate Pain (4 - 6)  HYDROmorphone  Injectable 1 milliGRAM(s) IV Push every 4 hours PRN Severe Pain (7 - 10)    CAPILLARY BLOOD GLUCOSE      POCT Blood Glucose.: 156 mg/dL (15 Mar 2023 08:38)  POCT Blood Glucose.: 288 mg/dL (15 Mar 2023 02:21)  POCT Blood Glucose.: 162 mg/dL (14 Mar 2023 16:27)  POCT Blood Glucose.: 166 mg/dL (14 Mar 2023 12:04)        OBJECTIVE:  Vital Signs Last 24 Hrs  T(C): 36.7 (15 Mar 2023 10:17), Max: 36.8 (15 Mar 2023 05:25)  T(F): 98 (15 Mar 2023 10:17), Max: 98.3 (15 Mar 2023 05:25)  HR: 74 (15 Mar 2023 10:) (73 - 80)  BP: 102/62 (15 Mar 2023 10:17) (80/76 - 117/77)  BP(mean): 76 (14 Mar 2023 18:45) (76 - 76)  RR: 20 (15 Mar 2023 10:) (18 - 20)  SpO2: 95% (15 Mar 2023 10:17) (95% - 100%)    Parameters below as of 15 Mar 2023 10:17  Patient On (Oxygen Delivery Method): room air        PHYSICAL EXAM:  General: AAOx3, NAD, euvolemic  Head: NC/AT; MMM; PERRL  Neck: Supple; no JVD  Respiratory: CTAB; no wheezes/rales/rhonchi  Cardiovascular: Regular rhythm/rate; S1/S2+, no m/r/g auscultated  Gastrointestinal: Soft; NTND; bowel sounds normal and present in all 4 quadrants  :   Extremities: WWP; no b/l U/E edema, no b/l L/E edema  Neurological: CNII-XII grossly intact; no obvious focal deficits  I&O's Summary    14 Mar 2023 07:01  -  15 Mar 2023 07:00  --------------------------------------------------------  IN: 0 mL / OUT: 220 mL / NET: -220 mL        LABS:                        8.6    11.94 )-----------( 138      ( 15 Mar 2023 05:30 )             28.3     03-15    129<L>  |  97  |  36<H>  ----------------------------<  163<H>  4.9   |  25  |  1.17    Ca    8.4      15 Mar 2023 05:30  Phos  3.4     03-15  Mg     1.9     03-15    TPro  5.0<L>  /  Alb  2.6<L>  /  TBili  0.5  /  DBili  x   /  AST  11  /  ALT  9<L>  /  AlkPhos  117  03-15          Urinalysis Basic - ( 13 Mar 2023 15:16 )    Color: Yellow / Appearance: Clear / S.020 / pH: x  Gluc: x / Ketone: NEGATIVE  / Bili: Negative / Urobili: 0.2 E.U./dL   Blood: x / Protein: NEGATIVE mg/dL / Nitrite: NEGATIVE   Leuk Esterase: Small / RBC: < 5 /HPF / WBC 5-10 /HPF   Sq Epi: x / Non Sq Epi: 0-5 /HPF / Bacteria: Present /HPF            RADIOLOGY & ADDITIONAL TESTS:  Imaging from Last 24 Hours: Medicine Progress Note    SUBJECTIVE / OVERNIGHT EVENTS:  O/N events: ALEXANDER  Patient was seen and examined at bedside. Pleurx drained 20cc straw colored fluid, reports pain at R chest. Denies SOB, chest pain, abdominal pain. Has not had BM in two days.   Otherwise negative ROS    MEDICATIONS  (STANDING):  amLODIPine   Tablet 5 milliGRAM(s) Oral daily  apixaban 5 milliGRAM(s) Oral every 12 hours  atorvastatin 40 milliGRAM(s) Oral at bedtime  cefTRIAXone   IVPB 1000 milliGRAM(s) IV Intermittent every 24 hours  dextrose 5%. 1000 milliLiter(s) (100 mL/Hr) IV Continuous <Continuous>  dextrose 5%. 1000 milliLiter(s) (50 mL/Hr) IV Continuous <Continuous>  dextrose 50% Injectable 25 Gram(s) IV Push once  dextrose 50% Injectable 12.5 Gram(s) IV Push once  dextrose 50% Injectable 25 Gram(s) IV Push once  glucagon  Injectable 1 milliGRAM(s) IntraMuscular once  influenza  Vaccine (HIGH DOSE) 0.7 milliLiter(s) IntraMuscular once  insulin lispro (ADMELOG) corrective regimen sliding scale   SubCutaneous Before meals and at bedtime  lactulose Syrup 10 Gram(s) Oral two times a day  pantoprazole    Tablet 20 milliGRAM(s) Oral before breakfast  polyethylene glycol 3350 17 Gram(s) Oral two times a day  predniSONE   Tablet 5 milliGRAM(s) Oral every 24 hours  senna 2 Tablet(s) Oral at bedtime  sirolimus 1 milliGRAM(s) Oral every 24 hours  tacrolimus 1 milliGRAM(s) Oral every 12 hours  tamsulosin 0.4 milliGRAM(s) Oral at bedtime  torsemide 60 milliGRAM(s) Oral every 24 hours    MEDICATIONS  (PRN):  acetaminophen     Tablet .. 650 milliGRAM(s) Oral every 6 hours PRN Temp greater or equal to 38C (100.4F), Mild Pain (1 - 3)  dextrose Oral Gel 15 Gram(s) Oral once PRN Blood Glucose LESS THAN 70 milliGRAM(s)/deciliter  HYDROmorphone   Tablet 2 milliGRAM(s) Oral every 6 hours PRN Moderate Pain (4 - 6)  HYDROmorphone  Injectable 1 milliGRAM(s) IV Push every 4 hours PRN Severe Pain (7 - 10)    CAPILLARY BLOOD GLUCOSE      POCT Blood Glucose.: 156 mg/dL (15 Mar 2023 08:38)  POCT Blood Glucose.: 288 mg/dL (15 Mar 2023 02:21)  POCT Blood Glucose.: 162 mg/dL (14 Mar 2023 16:27)  POCT Blood Glucose.: 166 mg/dL (14 Mar 2023 12:04)        OBJECTIVE:  Vital Signs Last 24 Hrs  T(C): 36.7 (15 Mar 2023 10:17), Max: 36.8 (15 Mar 2023 05:25)  T(F): 98 (15 Mar 2023 10:17), Max: 98.3 (15 Mar 2023 05:25)  HR: 74 (15 Mar 2023 10:17) (73 - 80)  BP: 102/62 (15 Mar 2023 10:17) (80/76 - 117/77)  BP(mean): 76 (14 Mar 2023 18:45) (76 - 76)  RR: 20 (15 Mar 2023 10:17) (18 - 20)  SpO2: 95% (15 Mar 2023 10:17) (95% - 100%)    Parameters below as of 15 Mar 2023 10:17  Patient On (Oxygen Delivery Method): room air        PHYSICAL EXAM:  General: AAOx3, NAD, euvolemic  Head: NC/AT; MMM; PERRL  Neck: Supple; no JVD  Respiratory: CTAB; no wheezes/rales/rhonchi  Cardiovascular: Regular rhythm/rate; S1/S2+, no m/r/g auscultated  Gastrointestinal: Soft; NTND; bowel sounds normal and present in all 4 quadrants  Extremities: WWP; no b/l U/E edema, no b/l L/E edema, discoloration at anterior shins  Neurological: CNII-XII grossly intact; no obvious focal deficits  I&O's Summary    14 Mar 2023 07:01  -  15 Mar 2023 07:00  --------------------------------------------------------  IN: 0 mL / OUT: 220 mL / NET: -220 mL        LABS:                        8.6    11.94 )-----------( 138      ( 15 Mar 2023 05:30 )             28.3     03-15    129<L>  |  97  |  36<H>  ----------------------------<  163<H>  4.9   |  25  |  1.17    Ca    8.4      15 Mar 2023 05:30  Phos  3.4     03-15  Mg     1.9     03-15    TPro  5.0<L>  /  Alb  2.6<L>  /  TBili  0.5  /  DBili  x   /  AST  11  /  ALT  9<L>  /  AlkPhos  117  03-15          Urinalysis Basic - ( 13 Mar 2023 15:16 )    Color: Yellow / Appearance: Clear / S.020 / pH: x  Gluc: x / Ketone: NEGATIVE  / Bili: Negative / Urobili: 0.2 E.U./dL   Blood: x / Protein: NEGATIVE mg/dL / Nitrite: NEGATIVE   Leuk Esterase: Small / RBC: < 5 /HPF / WBC 5-10 /HPF   Sq Epi: x / Non Sq Epi: 0-5 /HPF / Bacteria: Present /HPF            RADIOLOGY & ADDITIONAL TESTS:  Imaging from Last 24 Hours: Medicine Progress Note    SUBJECTIVE / OVERNIGHT EVENTS:  O/N events: ALEXANDER  Patient was seen and examined at bedside. Pleurx drained 20cc straw colored fluid, reports pain at R chest. Denies SOB, chest pain, abdominal pain. Has not had BM in two days.   Otherwise negative ROS    MEDICATIONS  (STANDING):  amLODIPine   Tablet 5 milliGRAM(s) Oral daily  apixaban 5 milliGRAM(s) Oral every 12 hours  atorvastatin 40 milliGRAM(s) Oral at bedtime  cefTRIAXone   IVPB 1000 milliGRAM(s) IV Intermittent every 24 hours  dextrose 5%. 1000 milliLiter(s) (100 mL/Hr) IV Continuous <Continuous>  dextrose 5%. 1000 milliLiter(s) (50 mL/Hr) IV Continuous <Continuous>  dextrose 50% Injectable 25 Gram(s) IV Push once  dextrose 50% Injectable 12.5 Gram(s) IV Push once  dextrose 50% Injectable 25 Gram(s) IV Push once  glucagon  Injectable 1 milliGRAM(s) IntraMuscular once  influenza  Vaccine (HIGH DOSE) 0.7 milliLiter(s) IntraMuscular once  insulin lispro (ADMELOG) corrective regimen sliding scale   SubCutaneous Before meals and at bedtime  lactulose Syrup 10 Gram(s) Oral two times a day  pantoprazole    Tablet 20 milliGRAM(s) Oral before breakfast  polyethylene glycol 3350 17 Gram(s) Oral two times a day  predniSONE   Tablet 5 milliGRAM(s) Oral every 24 hours  senna 2 Tablet(s) Oral at bedtime  sirolimus 1 milliGRAM(s) Oral every 24 hours  tacrolimus 1 milliGRAM(s) Oral every 12 hours  tamsulosin 0.4 milliGRAM(s) Oral at bedtime  torsemide 60 milliGRAM(s) Oral every 24 hours    MEDICATIONS  (PRN):  acetaminophen     Tablet .. 650 milliGRAM(s) Oral every 6 hours PRN Temp greater or equal to 38C (100.4F), Mild Pain (1 - 3)  dextrose Oral Gel 15 Gram(s) Oral once PRN Blood Glucose LESS THAN 70 milliGRAM(s)/deciliter  HYDROmorphone   Tablet 2 milliGRAM(s) Oral every 6 hours PRN Moderate Pain (4 - 6)  HYDROmorphone  Injectable 1 milliGRAM(s) IV Push every 4 hours PRN Severe Pain (7 - 10)    CAPILLARY BLOOD GLUCOSE      POCT Blood Glucose.: 156 mg/dL (15 Mar 2023 08:38)  POCT Blood Glucose.: 288 mg/dL (15 Mar 2023 02:21)  POCT Blood Glucose.: 162 mg/dL (14 Mar 2023 16:27)  POCT Blood Glucose.: 166 mg/dL (14 Mar 2023 12:04)        OBJECTIVE:  Vital Signs Last 24 Hrs  T(C): 36.7 (15 Mar 2023 10:17), Max: 36.8 (15 Mar 2023 05:25)  T(F): 98 (15 Mar 2023 10:17), Max: 98.3 (15 Mar 2023 05:25)  HR: 74 (15 Mar 2023 10:17) (73 - 80)  BP: 102/62 (15 Mar 2023 10:17) (80/76 - 117/77)  BP(mean): 76 (14 Mar 2023 18:45) (76 - 76)  RR: 20 (15 Mar 2023 10:17) (18 - 20)  SpO2: 95% (15 Mar 2023 10:17) (95% - 100%)    Parameters below as of 15 Mar 2023 10:17  Patient On (Oxygen Delivery Method): room air        PHYSICAL EXAM:  General: AAOx3, NAD, euvolemic  Head: NC/AT; MMM; PERRL  Neck: Supple; no JVD  Respiratory: coarse crackles b/l bases  Cardiovascular: Irregular rhythm/rate; S1/S2+, no m/r/g auscultated  Gastrointestinal: Soft; NTND; bowel sounds normal and present in all 4 quadrants  Extremities: WWP; no b/l U/E edema, no b/l L/E edema, discoloration at anterior shins  Neurological: CNII-XII grossly intact; no obvious focal deficits  I&O's Summary    14 Mar 2023 07:01  -  15 Mar 2023 07:00  --------------------------------------------------------  IN: 0 mL / OUT: 220 mL / NET: -220 mL        LABS:                        8.6    11.94 )-----------( 138      ( 15 Mar 2023 05:30 )             28.3     03-15    129<L>  |  97  |  36<H>  ----------------------------<  163<H>  4.9   |  25  |  1.17    Ca    8.4      15 Mar 2023 05:30  Phos  3.4     03-15  Mg     1.9     -15    TPro  5.0<L>  /  Alb  2.6<L>  /  TBili  0.5  /  DBili  x   /  AST  11  /  ALT  9<L>  /  AlkPhos  117  03-15          Urinalysis Basic - ( 13 Mar 2023 15:16 )    Color: Yellow / Appearance: Clear / S.020 / pH: x  Gluc: x / Ketone: NEGATIVE  / Bili: Negative / Urobili: 0.2 E.U./dL   Blood: x / Protein: NEGATIVE mg/dL / Nitrite: NEGATIVE   Leuk Esterase: Small / RBC: < 5 /HPF / WBC 5-10 /HPF   Sq Epi: x / Non Sq Epi: 0-5 /HPF / Bacteria: Present /HPF            RADIOLOGY & ADDITIONAL TESTS:  Imaging from Last 24 Hours:

## 2023-03-15 NOTE — DISCHARGE NOTE PROVIDER - NSDCMRMEDTOKEN_GEN_ALL_CORE_FT
amLODIPine 5 mg oral tablet: 1 tab(s) orally once a day  Eliquis 5 mg oral tablet: 1 tab(s) orally 2 times a day  gabapentin 100 mg oral tablet: orally twice a day (in the morning, once in the afternoon)  gabapentin 300 mg oral capsule: 1 cap(s) orally once a day (at bedtime)  HumaLOG KwikPen 100 units/mL injectable solution: 15-24 unit(s) injectable 3 times a day before meals  HYDROmorphone 2 mg oral tablet: 1 tab(s) orally every 6 hours, As Needed  pantoprazole 20 mg oral delayed release tablet: 1 tab(s) orally once a day  polyethylene glycol 3350 oral powder for reconstitution: 17 gram(s) orally once a day  rosuvastatin 10 mg oral tablet: 1 tab(s) orally once a day  tacrolimus 1 mg oral capsule: 2 cap(s) orally in AM  1 caps in PM  tamsulosin 0.4 mg oral capsule: 1 cap(s) orally once a day (at bedtime)  torsemide 40 mg oral tablet: 1 tab(s) orally 2 times a day   Xanax 0.25 mg oral tablet: 1 tab(s) orally every 8 hours, As Needed   acetaminophen 325 mg oral tablet: 2 tab(s) orally every 6 hours, As needed, Temp greater or equal to 38C (100.4F), Mild Pain (1 - 3)  amLODIPine 5 mg oral tablet: 1 tab(s) orally once a day  bisacodyl 10 mg rectal suppository: 1 suppository(ies) rectal once a day, As needed, Constipation  cefTRIAXone: 1000 milligram(s) intravenous once a day UNTIL 3/18  Eliquis 5 mg oral tablet: 1 tab(s) orally 2 times a day  Farxiga 10 mg oral tablet: 1 tab(s) orally every 24 hours  gabapentin 100 mg oral tablet: orally twice a day (in the morning, once in the afternoon)  gabapentin 300 mg oral capsule: 1 cap(s) orally once a day (at bedtime)  HumaLOG KwikPen 100 units/mL injectable solution: 15-24 unit(s) injectable 3 times a day before meals  HYDROmorphone 2 mg oral tablet: 1 tab(s) orally every 6 hours, As Needed  lactulose 10 g/15 mL oral syrup: 15 milliliter(s) orally 2 times a day  pantoprazole 20 mg oral delayed release tablet: 1 tab(s) orally once a day  polyethylene glycol 3350 oral powder for reconstitution: 17 gram(s) orally once a day  predniSONE 5 mg oral tablet: 1 tab(s) orally every 24 hours  rosuvastatin 10 mg oral tablet: 1 tab(s) orally once a day  senna leaf extract oral tablet: 2 tab(s) orally once a day (at bedtime)  sirolimus 1 mg oral tablet: 1 tab(s) orally every 24 hours  tamsulosin 0.4 mg oral capsule: 1 cap(s) orally once a day (at bedtime)  torsemide 40 mg oral tablet: 1 tab(s) orally every 24 hours   Xanax 0.25 mg oral tablet: 1 tab(s) orally every 8 hours, As Needed   acetaminophen 325 mg oral tablet: 2 tab(s) orally every 6 hours, As needed, Temp greater or equal to 38C (100.4F), Mild Pain (1 - 3)  amLODIPine 5 mg oral tablet: 1 tab(s) orally once a day  bisacodyl 10 mg rectal suppository: 1 suppository(ies) rectal once a day, As needed, Constipation  cefTRIAXone: 1000 milligram(s) intravenous once a day UNTIL 3/18  Eliquis 5 mg oral tablet: 1 tab(s) orally 2 times a day  Farxiga 10 mg oral tablet: 1 tab(s) orally every 24 hours  gabapentin 100 mg oral tablet: orally twice a day (in the morning, once in the afternoon)  gabapentin 300 mg oral capsule: 1 cap(s) orally once a day (at bedtime)  HYDROmorphone 2 mg oral tablet: 2 tab(s) orally every 6 hours, As Needed -for severe pain MDD:MDD: 16mg   HYDROmorphone 2 mg oral tablet: 1 tab(s) orally every 6 hours, As Needed -for moderate pain MDD:MDD: 8mg   insulin lispro 100 units/mL injectable solution:  injectable   Please check blood glucose levels 30 minutes before meals and at bedtime:    Give insulin lispro according to the following scale:  2 units Glu 151-200  4 units 201-250  6 units 251-300  8 units 300-350  10 units Glu 351-400  12 units Glu 400 or greater     lactulose 10 g/15 mL oral syrup: 15 milliliter(s) orally 2 times a day  pantoprazole 20 mg oral delayed release tablet: 1 tab(s) orally once a day  polyethylene glycol 3350 oral powder for reconstitution: 17 gram(s) orally once a day  predniSONE 5 mg oral tablet: 1 tab(s) orally every 24 hours  rosuvastatin 10 mg oral tablet: 1 tab(s) orally once a day  senna leaf extract oral tablet: 2 tab(s) orally once a day (at bedtime)  sirolimus 1 mg oral tablet: 1 tab(s) orally every 24 hours  tamsulosin 0.4 mg oral capsule: 1 cap(s) orally once a day (at bedtime)  torsemide 40 mg oral tablet: 1 tab(s) orally every 24 hours   Xanax 0.25 mg oral tablet: 1 tab(s) orally every 8 hours, As Needed

## 2023-03-15 NOTE — PROGRESS NOTE ADULT - PROBLEM SELECTOR PLAN 1
Patient with shortness of breath found to have acute hypoxic respiratory failure (AHRF) etiology likely HFpEF exacerbation complicated by right-sided pleural effusion.  At baseline intermittently uses 2L NC at home, now with increased O2 demand.  No evidence of infection, patient afebrile without cough.  PE unlikely no reported chest pain, tachycardia, other hemodynamic instability.      - ncCT chest showed large R pleural effusion with pleurx in place.   - Pleurx previously kinked now fixed in place - output   - Pleural fluid suggestive of exudative effusion likely 2/2 to diastolic HF- torsemide 40mg qd-> 60qd  - will repeat CXR tomorrow, if pleural effusion is resolved and output decreases- will remove pleurx  - Continue supplemental oxygen, wean as tolerated Patient with shortness of breath found to have acute hypoxic respiratory failure (AHRF) etiology likely HFpEF exacerbation complicated by right-sided pleural effusion.  At baseline intermittently uses 2L NC at home, now with increased O2 demand.  No evidence of infection, patient afebrile without cough.  PE unlikely no reported chest pain, tachycardia, other hemodynamic instability.      - ncCT chest showed large R pleural effusion with pleurx in place- drained 20cc overnight straw color fluid   - Pleural fluid suggestive of exudative effusion likely 2/2 to diastolic HF- torsemide 60 mg qd  - CXR this AM showed improvement in effusion, decreasing output- will remove pleurx today  - Continue supplemental oxygen, wean as tolerated

## 2023-03-15 NOTE — PROGRESS NOTE ADULT - PROBLEM SELECTOR PLAN 2
CXR shows right pleural effusion, previous thoracentesis in 12/21 with recurrent effusion.   Fluid analysis exudative effusion etiology likely HFpEF exacerbation     - see plan above     #LATRICIA  baseline Cr 0.58-0.65  LATRICIA likely 2/2 episodes of hypotension  - monitor vitals    #acute UTI. UA shows small leuk esterase, WBC 5-10, bacteria present  - c/w ceftriaxone 1g q24 (3/13-1/17)    #hyponatremia  Pt with hx of hyponatremia   euvolemic isotonic hyponatremia  - lipid panel wnl  - f/u SPEP, UPEP  - start fluid restriction 1L CXR shows right pleural effusion, previous thoracentesis in 12/21 with recurrent effusion.   Fluid analysis exudative effusion etiology likely HFpEF exacerbation     - see plan above     #LATRICIA  baseline Cr 0.58-0.65  LATRICIA likely 2/2 episodes of hypotension  - monitor vitals    #acute UTI. UA shows small leuk esterase, WBC 5-10, bacteria present  - c/w ceftriaxone 1g q24 (3/13-3/17)    #hyponatremia  Pt with hx of hyponatremia   euvolemic isotonic hyponatremia  - lipid panel wnl  - f/u SPEP, UPEP  - c/w fluid restriction 1L

## 2023-03-15 NOTE — PROGRESS NOTE ADULT - PROBLEM SELECTOR PLAN 11
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  Follows with oncologist at [facility], was recently given few chemo doses but decision made to stop chemo and proceed with radiation therapy.   - Followup with oncologist
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  Follows with oncologist at [facility], was recently given few chemo doses but decision made to stop chemo and proceed with radiation therapy.   - Followup with oncologist
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  Follows with oncologist at [facility], was recently given few chemo doses but decision made to stop chemo and proceed with radiation therapy.   - Followup with oncologist
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  Follows with oncologist at [facility], was recently given few chemo doses but decision made to stop chemo and proceed with radiation therapy.   - Followup with oncologist
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  Follows with oncologist at [facility], was recently given few chemo doses but decision made to stop chemo and proceed with radiation therapy.   - Followup with oncologist
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  Follows with oncologist at [facility], was recently given few chemo doses but decision made to stop chemo and proceed with radiation therapy.   - Followup with oncologist
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  Follows with oncologist at [facility], was recently given few chemo doses but decision made to stop chemo and proceed with radiation therapy.   - Followup with oncologist
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  Follows with oncologist at [facility], was recently given few chemo doses but decision made to stop chemo and proceed with radiation therapy.   - Followup with oncologist

## 2023-03-15 NOTE — PROGRESS NOTE ADULT - TIME BILLING
Patient seen and examined with house-staff during bedside rounds.  Resident note read, including vitals, physical findings, laboratory data, and radiological reports.   Revisions included below.  Direct personal management at bed side and extensive interpretation of the data.  Plan was outlined and discussed in details with the housestaff.  Decision making of high complexity  Action taken for acute disease activity to reflect the level of care provided:  - medication reconciliation  - review laboratory data      I discussed the case with Dr. Petra Barber.  The the Pleurx was connected to the Pleur-evac and drained so far 300 cc.  The pleural fluid is lymphocytic predominant transudative.  Cell block and cytology is still pending.  There is no clinical evidence of acute diastolic heart failure.  Patient clinically is euvolemic.  Dr. Chrystal Barber consulting heart failure.  Continue on the cardiac meds.  Follow-up on sodium.  Out of bed in chair.  The patient was seen by the swallow evaluation no dysphagia and started by.  Start physical therapy.  The etiology of pulmonary hypertension is unknown at this point.  No evidence of thromboembolic disease.  Continue diuresis.  CPAP at night
Patient seen and examined with house-staff during bedside rounds.  Resident note read, including vitals, physical findings, laboratory data, and radiological reports.   Revisions included below.  Direct personal management at bed side and extensive interpretation of the data.  Plan was outlined and discussed in details with the housestaff.  Decision making of high complexity  Action taken for acute disease activity to reflect the level of care provided:  - medication reconciliation  - review laboratory data  I discussed the Cytology of the pleural fluid and flow cytometry and still pending.  I await the report.  No drainage from the CT.  Plan to remove the pleural drainage.  Follow with renal.  Discussed with the family.  Discussed with cardiology and will follow.  He is on diuretics.  may need workup for PH.
Patient seen and examined with house-staff during bedside rounds.  Resident note read, including vitals, physical findings, laboratory data, and radiological reports.   Revisions included below.  Direct personal management at bed side and extensive interpretation of the data.  Plan was outlined and discussed in details with the housestaff.  Decision making of high complexity  Action taken for acute disease activity to reflect the level of care provided:  - medication reconciliation  - review laboratory data  The patient clinically stable.  The chest x-ray revealed pleural effusion.  The CT scan revealed moderate pleural effusion with a Pleurx in place.  We will connect the pleura's to the chest tube.  We will send fluid for analysis including cell block and cytology.  No evidence of infectious process.  Patient get drained 500 cc 3 days a week Monday Wednesday Friday.  We will change to drainage every day as an outpatient.  Patient is followed by hematology.  The concern is changing his regimen to have some lumbar loss.  The echocardiogram revealed pulmonary hypertension which is new.  Patient lost weight and he is not using the CPAP mask.  No evidence of thromboembolic disease.
Patient seen and examined with house-staff during bedside rounds.  Resident note read, including vitals, physical findings, laboratory data, and radiological reports.   Revisions included below.  Direct personal management at bed side and extensive interpretation of the data.  Plan was outlined and discussed in details with the housestaff.  Decision making of high complexity  Action taken for acute disease activity to reflect the level of care provided:  - medication reconciliation  - review laboratory data  he is stable.  CXR improved with no effusion.  Drained to 1700 cc.  Monitor next 24hr and if less than 200cc will remove the ct .  Await cell bloc and cytology.  Cardiology to evaluate te patient.  Discussed extensively with the daughter
Patient seen and examined with house-staff during bedside rounds.  Resident note read, including vitals, physical findings, laboratory data, and radiological reports.   Revisions included below.  Direct personal management at bed side and extensive interpretation of the data.  Plan was outlined and discussed in details with the housestaff.  Decision making of high complexity  Action taken for acute disease activity to reflect the level of care provided:  - medication reconciliation  - review laboratory data  he is improving and on prednisone and sirilimus.  The cell block and cytology pending.  The CT was kinked and adjusted it after fluching the tuube>  CT connected to suctioning.  CXR was reviewed and he has right pleural effusion follow CXR in am

## 2023-03-15 NOTE — DISCHARGE NOTE PROVIDER - CARE PROVIDERS DIRECT ADDRESSES
,[email or web address]

## 2023-03-15 NOTE — PROGRESS NOTE ADULT - PROBLEM SELECTOR PLAN 10
Patient with chronic renal insufficiency, s/p R renal transplant, home tacrolimus 2mg AM and 1mg PM  - spoke to outpatient oncologist Dr Carreno transitioning from tacrolimus to sirolimus      - change to tacrolimus 1mg BID (3/13)  - start sirolimus 1mg qd   - f/u tacrolimus and sirolimus levels 3/15  - monitor for sirolimus PRAVIN diarrhea, edema, anemia, allergic PNA Patient with chronic renal insufficiency, s/p R renal transplant, home tacrolimus 2mg AM and 1mg PM  - spoke to outpatient oncologist Dr Carreno transitioning from tacrolimus to sirolimus    - tacrolimus and sirolimus level within range- last dose of tacrolimus tonight. C/w just sirolimus 1mg qd  - monitor for sirolimus PRAVIN diarrhea, edema, anemia, allergic PNA

## 2023-03-15 NOTE — CHART NOTE - NSCHARTNOTEFT_GEN_A_CORE
Sent pleural effusion samples for cytology and flow cytometry. Right sided Pleurx catheter removed after injecting 5cc of 1% lidocaine within the subcutaneous tunnel. CXR post removal pending.

## 2023-03-15 NOTE — PROGRESS NOTE ADULT - ATTENDING COMMENTS
patient hopefully making progress toward much awaited discharge to rehab. yesterday torsemide increased to 60/d due to increased sob, will observe and titrate dose.   dr jay and I share eagerness to remove pleurex as soon as volume state under control.   awaiting fe studies.  will request input of heart failure team.      creat has increased slightly in assoc with necessary diuresis, which is acceptable and will need cont'd monitoring.   agree with findings and plans.

## 2023-03-15 NOTE — CONSULT NOTE ADULT - ATTENDING COMMENTS
have reviewed entire history and course to date and recent events including admission in Ct, placement of pleurex  tube, and need to establish pleuro-pulmonary diagnosis and clear plan for rx  to prevent recurrent volume mediated decompensation.   agree with findings and recommendations outlined above.
73 yo man [place], with DM2, CAD s/p remote CABG and HFpEF chronically maintained on Torsemide but recently taken off diuretics for clinical hypovolemia. Now admitted with worsening LE edema, orthopnea and reaccumulating R pleural effusion (drained via pleurex catheter). Improved with diuresis.     Renal function improving. pBNP ~3k on admission.  On exam JVP mildly elevated, no peripheral edema, warm.     - Recommend Torsemide 40 mg daily - this could be his maintenance dose   - Would start Farxiga 10 mg daily - literature in [place] is scarce but mostly suggests it is safe and effective. Discussed with nephrology  - Continue immunosuppression with Sirolimus per nephrology  - Will follow with you in the outpatient    Celso Vargas MD

## 2023-03-15 NOTE — PROGRESS NOTE ADULT - PROBLEM SELECTOR PROBLEM 3
Acute exacerbation of CHF (congestive heart failure)

## 2023-03-15 NOTE — DISCHARGE NOTE PROVIDER - HOSPITAL COURSE
#Discharge: do not delete    72M with PMHx Afib (on Eliquis, s/p DCCV 5/2022), PE, HFpEF (EF 65%), CAD s/p CABG (2005), R renal transplant (on tacrolimus, follows w/ Dr Leyva), jaw cancer s/p resection (~2019) w/ recurrence s/p chemo/RT (completed 2-3 weeks ago), JAYANT (not on CPAP), CLL (w/ chronic leukocytosis), DM2 (with humalog pump), HTN, HLD, recent admit OSH for tongue/facial swelling likely 2/2 RT c/b pleural effusion s/p pleurX drain (placed at [facility]) R chest, dc one week ago presents for R pleural effusion     Acute respiratory failure with hypoxia.   Patient with shortness of breath found to have acute hypoxic respiratory failure (AHRF) etiology likely HFpEF exacerbation complicated by right-sided pleural effusion.  At baseline intermittently uses 2L NC at home, now with increased O2 demand.  No evidence of infection, patient afebrile without cough.  PE unlikely no reported chest pain, tachycardia, other hemodynamic instability.      - ncCT chest showed large R pleural effusion with pleurx in place- drained 20cc overnight straw color fluid   - Pleural fluid suggestive of exudative effusion likely 2/2 to diastolic HF- torsemide 60 mg qd  - CXR this AM showed improvement in effusion, decreasing output- will remove pleurx today  - Continue supplemental oxygen, wean as tolerated.     Problem/Plan - 2:  ·  Problem: Pleural effusion.   ·  Plan: CXR shows right pleural effusion, previous thoracentesis in 12/21 with recurrent effusion.   Fluid analysis exudative effusion etiology likely HFpEF exacerbation     - see plan above     #LATRICIA  baseline Cr 0.58-0.65  LATRICIA likely 2/2 episodes of hypotension  - monitor vitals    #acute UTI. UA shows small leuk esterase, WBC 5-10, bacteria present  - c/w ceftriaxone 1g q24 (3/13-1/17)    #hyponatremia  Pt with hx of hyponatremia   euvolemic isotonic hyponatremia  - lipid panel wnl  - f/u SPEP, UPEP  - start fluid restriction 1L.     Problem/Plan - 3:  ·  Problem: Acute exacerbation of CHF (congestive heart failure).   ·  Plan: Patient with history of HFpEF presenting with dyspnea on exertion, orthopnea found to be hypoxic to 84% on room air, right sided pleural effusion likely due to acute exacerbation.  TTE 7/2022 with mild concentric LVH, EF 65%, no regional wall motion abnormalities. ACS unlikely troponin 0.02, patient without chest pain, EKG without ischemic changes.  On exam patient with 2+ pitting edema bilateral LE. BNP 3,494 (baseline 1,500).  Home medications Torsemide 20mg po BID- per patient no longer taking. Patient's ambulatory SpO2 desatted from 91% to 84% on RA.    - strict I/Os, monitor UOP  - daily weights  - c/w torsemide 60 qd    Patient was discharged to: home  New medications: sirolimus 1mg qd,  Changes to old medications:   Medications that were stopped: tacrolimus 1mg AM, 2mg PM   Items to Follow up as outpatient   Physical exam at time of discharge: #Discharge: do not delete    72M with PMHx Afib (on Eliquis, s/p DCCV 5/2022), PE, HFpEF (EF 65%), CAD s/p CABG (2005), R renal transplant (on tacrolimus, follows w/ Dr Leyva), jaw cancer s/p resection (~2019) w/ recurrence s/p chemo/RT (completed 2-3 weeks ago), JAYANT (not on CPAP), CLL (w/ chronic leukocytosis), DM2 (with humalog pump), HTN, HLD, recent admit OSH for tongue/facial swelling likely 2/2 RT c/b pleural effusion s/p pleurX drain (placed at [facility]) R chest, dc one week ago presents for R pleural effusion     #Acute respiratory failure with hypoxia   Patient with shortness of breath found to have acute hypoxic respiratory failure (AHRF) etiology likely HFpEF exacerbation complicated by right-sided pleural effusion.  At baseline intermittently uses 2L NC at home, now with increased O2 demand.  No evidence of infection, patient afebrile without cough.  PE unlikely no reported chest pain, tachycardia, other hemodynamic instability.      - ncCT chest showed large R pleural effusion with pleurx in place- drained 20cc overnight straw color fluid   - Pleural fluid suggestive of exudative effusion likely 2/2 to diastolic HF- torsemide 60 mg qd  - CXR this AM showed improvement in effusion, decreasing output- will remove pleurx today  - Continue supplemental oxygen, wean as tolerated.    #R pleural effusion.   CXR shows right pleural effusion, previous thoracentesis in 12/21 with recurrent effusion.   Fluid analysis exudative effusion etiology likely HFpEF exacerbation   - see plan above     #LATRICIA  baseline Cr 0.58-0.65  LARTICIA likely 2/2 episodes of hypotension  - monitor vitals    #acute UTI. UA shows small leuk esterase, WBC 5-10, bacteria present. Pending Ucx results  - c/w ceftriaxone 1g q24 (3/13-1/17)    #hyponatremia  Pt with hx of hyponatremia   euvolemic isotonic hyponatremia  - lipid panel wnl  - f/u SPEP, UPEP  - start fluid restriction 1L.    #Acute exacerbation of CHF (congestive heart failure).   Patient with history of HFpEF presenting with dyspnea on exertion, orthopnea found to be hypoxic to 84% on room air, right sided pleural effusion likely due to acute exacerbation.  TTE 7/2022 with mild concentric LVH, EF 65%, no regional wall motion abnormalities. ACS unlikely troponin 0.02, patient without chest pain, EKG without ischemic changes.  On exam patient with 2+ pitting edema bilateral LE. BNP 3,494 (baseline 1,500).  Home medications Torsemide 20mg po BID- per patient no longer taking. Patient's ambulatory SpO2 desatted from 91% to 84% on RA.  - strict I/Os, monitor UOP  - daily weights  - c/w torsemide 60 qd  - HF consulted appreciate recs    # Anemia.   Hgb on admission 8.4 (baseline 10) likely ALEXANDER vs AOCD in setting of renal insufficiency. Currently no signs of active bleeding (no hematochezia, melena, hemoptysis, hematuria)  - macrocytic anemia- B12 and folate wnl  - Fe panel Fe low, TIBC low, ferritin wnl likely 2/2 kidney disease vs UTI  - trend CBC, maintain active T&S  - transfuse if Hgb <7    #SMV thrombosis  incidental finding of SMV thrombosis occluding SMV on CT while on eliquis 5mg BID  - pt without abdominal pain on exam  vascular consulted, recommend AC if c/f embolism  heme onc consulted following  - f/u JAK2, prothrombin gene mutation, Factor V Leiden, anticardiolipin antibody, [id number] antibody, DRVVT, hexagonal phase for lupus anticoagulant  - recommend CT A/P with IV contrast once Cr improves.    # Constipation  Patient with worsening severe constipation, may be contributing to hypoxia. Pt had a normal BM 11/23 AM.  abdominal XR with heavy stool burden  - Continue Miralax  - continue Senna  - start lactulose BID.    #CLL (chronic lymphocytic leukemia). Patient with history of CLL, follows with oncology at [facility]. No lymphadenopathy appreciated on physical exam. Pt with chronic leukocytosis. Afebrile, no other signs or symptoms of infectious process.    #History of renal transplant.    Patient with chronic renal insufficiency, s/p R renal transplant, home tacrolimus 2mg AM and 1mg PM  Overlap dosing of tacrolimus and sirolimus for three days. Tacrolimus level and sirolimus lvl within range.  - c/w sirolimus 1mg qd   - f/u with Dr Carreno    #Jaw cancer. 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  Follows with oncologist at [facility], was recently given few chemo doses but decision made to stop chemo and proceed with radiation therapy.   - Followup with oncologist.    #Hypertension. Patient with history of hypertension, home medications isosorbide mononitrate 30mg QD, amlodipine 5mg QD, torsemide 20mg BID  - holding imdur 30mg PO QD iso low pressures  - continue amlodipine 5mg PO QD  - cw torsemide 60mg qd.    #Chronic atrial fibrillation. home medication 5mg Eliquis BID. - c/w home meds  # Coronary artery disease. s/p CABG 2005, home medication Rosuvastatin 10mg daily.  Currently no chest pain, trop 0.02, EKG without ischemic changes.  - c/w home meds  #Hyperlipidemia. home medication Rosuvastatin 10mg daily. - c/w home meds  #GERD. 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 Home medications insulin humalog 15-24u 2x daily. - c/w home meds      Patient was discharged to: home  New medications: sirolimus 1mg qd,  Changes to old medications:   Medications that were stopped: tacrolimus 1mg AM, 2mg PM   Items to Follow up as outpatient   Physical exam at time of discharge: #Discharge: do not delete    72M with PMHx Afib (on Eliquis, s/p DCCV 5/2022), PE, HFpEF (EF 65%), CAD s/p CABG (2005), R renal transplant (on tacrolimus, follows w/ Dr Leyva), jaw cancer s/p resection (~2019) w/ recurrence s/p chemo/RT (completed 2-3 weeks ago), JAYANT (not on CPAP), CLL (w/ chronic leukocytosis), DM2 (with humalog pump), HTN, HLD, recent admit OSH for tongue/facial swelling likely 2/2 RT c/b pleural effusion s/p pleurX drain (placed at [facility]) R chest, dc one week ago presents for R pleural effusion     #Acute respiratory failure with hypoxia   Patient with shortness of breath found to have acute hypoxic respiratory failure (AHRF) etiology likely HFpEF exacerbation complicated by right-sided pleural effusion.  At baseline intermittently uses 2L NC at home, now with increased O2 demand.  No evidence of infection, patient afebrile without cough.  PE unlikely no reported chest pain, tachycardia, other hemodynamic instability.      - ncCT chest showed large R pleural effusion with pleurx in place- drained completely, now s/p removal of chest tube.   - Pleural fluid suggestive of exudative effusion likely 2/2 to diastolic HF- torsemide 60 mg qd    #LATRICIA on CKD  baseline Cr 0.58-0.65  LATRICIA likely 2/2 episodes of hypotension  - monitor vitals    #Acute UTI. UA shows small leuk esterase, WBC 5-10, bacteria present. Pending Ucx results  - c/w ceftriaxone 1g q24 (3/13-3/18)    #Acute exacerbation of CHF (congestive heart failure).   Patient with history of HFpEF presenting with dyspnea on exertion, orthopnea found to be hypoxic to 84% on room air, right sided pleural effusion likely due to acute exacerbation.   - strict I/Os, monitor UOP  - daily weights  - c/w torsemide 40 QD, started Farxiga QD    # Anemia.   Hgb on admission 8.4 (baseline 10) likely ALEXANDER vs AOCD in setting of renal insufficiency. Currently no signs of active bleeding (no hematochezia, melena, hemoptysis, hematuria)  - macrocytic anemia- B12 and folate wnl  - Fe panel Fe low, TIBC low, ferritin wnl likely 2/2 kidney disease vs UTI  - trend CBC, maintain active T&S  - transfuse if Hgb <7    #SMV thrombosis  incidental finding of SMV thrombosis occluding SMV on CT while on eliquis 5mg BID  - pt without abdominal pain on exam  vascular consulted, recommend AC if c/f embolism  heme onc consulted following  - f/u JAK2, prothrombin gene mutation, Factor V Leiden, anticardiolipin antibody, [id number] antibody, DRVVT, hexagonal phase for lupus anticoagulant    # Constipation  Patient with worsening severe constipation, may be contributing to hypoxia. Pt had a normal BM 11/23 AM.  abdominal XR with heavy stool burden  - Continue Miralax  - continue Senna  - lactulose BID.    #CLL (chronic lymphocytic leukemia). Patient with history of CLL, follows with oncology at [facility]. No lymphadenopathy appreciated on physical exam. Pt with chronic leukocytosis. Afebrile, no other signs or symptoms of infectious process.    #History of renal transplant.    Patient with chronic renal insufficiency, s/p R renal transplant, home tacrolimus 2mg AM and 1mg PM  Overlap dosing of tacrolimus and sirolimus for three days. Tacrolimus level and sirolimus lvl within range.  - c/w sirolimus 1mg qd   - f/u with Dr Carreno    #Jaw cancer. 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  Follows with oncologist at [facility], was recently given few chemo doses but decision made to stop chemo and proceed with radiation therapy.   - Followup with oncologist.    #Hypertension. Patient with history of hypertension, home medications isosorbide mononitrate 30mg QD, amlodipine 5mg QD, torsemide 20mg BID  - holding imdur 30mg PO QD iso low pressures  - continue amlodipine 5mg PO QD  - cw torsemide 60mg qd.    #Chronic atrial fibrillation. home medication 5mg Eliquis BID. - c/w home meds  # Coronary artery disease. s/p CABG 2005, home medication Rosuvastatin 10mg daily.  Currently no chest pain, trop 0.02, EKG without ischemic changes.  - c/w home meds  #Hyperlipidemia. home medication Rosuvastatin 10mg daily. - c/w home meds  #GERD. 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 Home medications insulin humalog 15-24u 2x daily. - c/w home meds      Patient was discharged to: [facility] ([place]  New medications: sirolimus 1mg qd, farxiga 10mg, torsemide 40mg qd  Medications that were stopped: tacrolimus 1mg AM, 2mg PM   Items to Follow up as outpatient:   Physical exam at time of discharge: AAOx4, decreased breath sounds in RLL, rhonchi in LLL, heart irregular with systolic murmur, abdomen soft NTND +bowel sounds, no L/E edema b/l #Discharge: do not delete    72M with PMHx Afib (on Eliquis, s/p DCCV 5/2022), PE, HFpEF (EF 65%), CAD s/p CABG (2005), R renal transplant (on tacrolimus, follows w/ Dr Leyva), jaw cancer s/p resection (~2019) w/ recurrence s/p chemo/RT (completed 2-3 weeks ago), JAYANT (not on CPAP), CLL (w/ chronic leukocytosis), DM2 (with humalog pump), HTN, HLD, recent admit OSH for tongue/facial swelling likely 2/2 RT c/b pleural effusion s/p pleurX drain (placed at [facility]) R chest, dc one week ago presents for R pleural effusion     #Acute respiratory failure with hypoxia   Patient with shortness of breath found to have acute hypoxic respiratory failure (AHRF) etiology likely HFpEF exacerbation complicated by right-sided pleural effusion.  At baseline intermittently uses 2L NC at home, now with increased O2 demand.  No evidence of infection, patient afebrile without cough.  PE unlikely no reported chest pain, tachycardia, other hemodynamic instability.      - ncCT chest showed large R pleural effusion with pleurx in place- drained completely, now s/p removal of chest tube.   - Pleural fluid suggestive of exudative effusion likely 2/2 to diastolic HF- torsemide 60 mg qd    #LATRICIA on CKD  baseline Cr 0.58-0.65  LATRICIA likely 2/2 episodes of hypotension    #Acute UTI. UA shows small leuk esterase, WBC 5-10, bacteria present.   - c/w ceftriaxone 1g q24 (3/13-3/18)    #Acute exacerbation of CHF (congestive heart failure).   Patient with history of HFpEF presenting with dyspnea on exertion, orthopnea found to be hypoxic to 84% on room air, right sided pleural effusion likely due to acute exacerbation.   - strict I/Os, monitor UOP  - daily weights  - c/w torsemide 40 QD, started Farxiga QD    # Anemia.   Hgb on admission 8.4 (baseline 10) likely ALEXANDER vs AOCD in setting of renal insufficiency. Currently no signs of active bleeding (no hematochezia, melena, hemoptysis, hematuria)  - macrocytic anemia- B12 and folate wnl  - Fe panel Fe low, TIBC low, ferritin wnl likely 2/2 kidney disease. Not candidate for EPO iso active CLL    #SMV thrombosis  incidental finding of SMV thrombosis occluding SMV on CT while on eliquis 5mg BID  - pt without abdominal pain on exam  vascular consulted, recommend AC if c/f embolism  heme onc consulted following  - f/u JAK2, prothrombin gene mutation, Factor V Leiden, anticardiolipin antibody, [id number] antibody, DRVVT, hexagonal phase for lupus anticoagulant    # Constipation  Patient with worsening severe constipation, may be contributing to hypoxia. Pt had a normal BM 11/23 AM.  abdominal XR with heavy stool burden  - Continue Miralax  - continue Senna  - lactulose BID.    #CLL (chronic lymphocytic leukemia). Patient with history of CLL, follows with oncology at [facility]. No lymphadenopathy appreciated on physical exam. Pt with chronic leukocytosis. Afebrile, no other signs or symptoms of infectious process.    #History of renal transplant.    Patient with chronic renal insufficiency, s/p R renal transplant, home tacrolimus 2mg AM and 1mg PM  Overlap dosing of tacrolimus and sirolimus for three days. Tacrolimus level and sirolimus lvl within range.  - c/w sirolimus 1mg qd   - f/u with Dr Carreno    #Jaw cancer. 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  Follows with oncologist at [facility], was recently given few chemo doses but decision made to stop chemo and proceed with radiation therapy.   - Followup with oncologist.    #Hypertension. Patient with history of hypertension, home medications isosorbide mononitrate 30mg QD, amlodipine 5mg QD, torsemide 20mg BID  - holding imdur 30mg PO QD iso low pressures  - continue amlodipine 5mg PO QD  - cw torsemide 60mg qd.    #Chronic atrial fibrillation. home medication 5mg Eliquis BID. - c/w home meds  # Coronary artery disease. s/p CABG 2005, home medication Rosuvastatin 10mg daily.  Currently no chest pain, trop 0.02, EKG without ischemic changes.  - c/w home meds  #Hyperlipidemia. home medication Rosuvastatin 10mg daily. - c/w home meds  #GERD. 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 Home medications insulin humalog 15-24u 2x daily. - c/w home meds      Patient was discharged to: [facility]  New medications: sirolimus 1mg qd, farxiga 10mg, torsemide 40mg qd  Medications that were stopped: tacrolimus 1mg AM, 2mg PM   Items to Follow up as outpatient: oncologist Dr Kohler, PCP Dr Ordaz,   Physical exam at time of discharge: AAOx4, decreased breath sounds in RLL, rhonchi in LLL, heart irregular with systolic murmur, abdomen soft NTND +bowel sounds, no L/E edema b/l

## 2023-03-15 NOTE — PROGRESS NOTE ADULT - SUBJECTIVE AND OBJECTIVE BOX
Patient is a 72y Male seen and evaluated at bedside. no acute events overnight sirolimus/tacro levels pending patient to have pleurex removed today Na 129 Scr stable at 1.1       Meds:    acetaminophen     Tablet .. 650 every 6 hours PRN  amLODIPine   Tablet 5 daily  apixaban 5 every 12 hours  atorvastatin 40 at bedtime  cefTRIAXone   IVPB 1000 every 24 hours  dextrose 5%. 1000 <Continuous>  dextrose 5%. 1000 <Continuous>  dextrose 50% Injectable 25 once  dextrose 50% Injectable 12.5 once  dextrose 50% Injectable 25 once  dextrose Oral Gel 15 once PRN  glucagon  Injectable 1 once  HYDROmorphone   Tablet 2 every 6 hours PRN  HYDROmorphone  Injectable 1 every 4 hours PRN  influenza  Vaccine (HIGH DOSE) 0.7 once  insulin lispro (ADMELOG) corrective regimen sliding scale  Before meals and at bedtime  lactulose Syrup 10 two times a day  pantoprazole    Tablet 20 before breakfast  polyethylene glycol 3350 17 two times a day  predniSONE   Tablet 5 every 24 hours  senna 2 at bedtime  sirolimus 1 every 24 hours  tacrolimus 1 every 12 hours  tamsulosin 0.4 at bedtime  torsemide 60 every 24 hours      T(C): , Max: 36.8 (03-15-23 @ 05:25)  T(F): , Max: 98.3 (03-15-23 @ 05:25)  HR: 74 (03-15-23 @ 10:17)  BP: 102/62 (03-15-23 @ 10:17)  BP(mean): 76 (23 @ 18:45)  RR: 20 (03-15-23 @ 10:17)  SpO2: 95% (03-15-23 @ 10:17)  Wt(kg): --     @ 07:01  -  03-15 @ 07:00  --------------------------------------------------------  IN: 0 mL / OUT: 220 mL / NET: -220 mL          Review of Systems:  all other ROS negative       PHYSICAL EXAM:  GENERAL: well-developed, well nourished, alert, no acute distress at present  CHEST/LUNG: decreased breath sounds at the bases  HEART: normal S1S2, RRR  ABDOMEN: Soft, Nontender, +BS,   EXTREMITIES: No clubbing, cyanosis, or edema         LABS:                        8.6    11.94 )-----------( 138      ( 15 Mar 2023 05:30 )             28.3     -15    129<L>  |  97  |  36<H>  ----------------------------<  163<H>  4.9   |  25  |  1.17    Ca    8.4      15 Mar 2023 05:30  Phos  3.4     -15  Mg     1.9     03-15    TPro  5.0<L>  /  Alb  2.6<L>  /  TBili  0.5  /  DBili  x   /  AST  11  /  ALT  9<L>  /  AlkPhos  117  15        Urinalysis Basic - ( 13 Mar 2023 15:16 )    Color: Yellow / Appearance: Clear / S.020 / pH: x  Gluc: x / Ketone: NEGATIVE  / Bili: Negative / Urobili: 0.2 E.U./dL   Blood: x / Protein: NEGATIVE mg/dL / Nitrite: NEGATIVE   Leuk Esterase: Small / RBC: < 5 /HPF / WBC 5-10 /HPF   Sq Epi: x / Non Sq Epi: 0-5 /HPF / Bacteria: Present /HPF      Sodium, Random Urine: 49 mmol/L ( @ 15:16)  Creatinine, Random Urine: 73 mg/dL ( @ 15:16)  Protein/Creatinine Ratio Calculation: 0.2 Ratio ( @ 15:16)  Osmolality, Random Urine: 303 mosm/kg ( @ 15:16)  Potassium, Random Urine: 24 mmol/L ( @ 15:16)        RADIOLOGY & ADDITIONAL STUDIES:

## 2023-03-15 NOTE — DISCHARGE NOTE PROVIDER - NSDCCPCAREPLAN_GEN_ALL_CORE_FT
PRINCIPAL DISCHARGE DIAGNOSIS  Diagnosis: Pleural effusion, right  Assessment and Plan of Treatment: You had a pleural effusion that was drained. We removed your pleurx catheter. Please continue to take torsemide 40mg daily. Please call your PCP if you experience shortness of breath or chest pain      SECONDARY DISCHARGE DIAGNOSES  Diagnosis: (HFpEF) heart failure with preserved ejection fraction  Assessment and Plan of Treatment: You were diagnosed with a condition where your heart does not pump blood as well as it should.  This condition is called heart failure.  Please take torsemide 40mg daily and Farxiga 10mg daily and follow up with your primary care physician and cardiologist for continued management.    Diagnosis: Hyponatremia  Assessment and Plan of Treatment: Hyponatremia is when the amount of salt (sodium) in your blood is too low. Please eat regularly and maintain a regular diet. Do not drink more than 1L of water per day. If you feel more like you may vomit, feel more tired, headache gets worse, feel more confused, feel weaker, your symptoms go away and then they come back, have trouble following the diet instructions.  Get help right away if you have a seizure, pass out or keep vomiting.    Diagnosis: Renal transplant recipient  Assessment and Plan of Treatment: You have history of a kidney transplant. Please STOP taking tacrolimus. Take sirolimus 1mg every day. Please follow up with Dr Kohler.     PRINCIPAL DISCHARGE DIAGNOSIS  Diagnosis: Pleural effusion, right  Assessment and Plan of Treatment: You had a pleural effusion that was drained. We removed your pleurx catheter. Please continue to take torsemide 40mg daily. Please call your PCP if you experience shortness of breath or chest pain      SECONDARY DISCHARGE DIAGNOSES  Diagnosis: (HFpEF) heart failure with preserved ejection fraction  Assessment and Plan of Treatment: You were diagnosed with a condition where your heart does not pump blood as well as it should.  This condition is called heart failure.  Please take torsemide 40mg daily and Farxiga 10mg daily and follow up with your primary care physician and cardiologist for continued management.  Please get a TTE with your cardiologist    Diagnosis: Hyponatremia  Assessment and Plan of Treatment: Hyponatremia is when the amount of salt (sodium) in your blood is too low. Please eat regularly and maintain a regular diet. Do not drink more than 1L of water per day. If you feel more like you may vomit, feel more tired, headache gets worse, feel more confused, feel weaker, your symptoms go away and then they come back, have trouble following the diet instructions.  Get help right away if you have a seizure, pass out or keep vomiting.    Diagnosis: Renal transplant recipient  Assessment and Plan of Treatment: You have history of a kidney transplant. Please STOP taking tacrolimus. Take sirolimus 1mg every day. Please follow up with Dr Kohler.    Diagnosis: DM (diabetes mellitus)  Assessment and Plan of Treatment: Start taking farxiga 10mg daily.   Please check blood glucose levels 30 minutes before meals and at bedtime:  Give insulin lispro according to the following scale:  2 units Glu 151-200  4 units 201-250  6 units 251-300  8 units 300-350  10 units Glu 351-400  12 units Glu 400 or greater    Diagnosis: Cancer of jaw  Assessment and Plan of Treatment: You have cancer of your jaw. Please take hydromorphone 2mg every 6 hours as needed for moderate paina nd 4mg every 6 hr for severe pain. please continue to take lactulose and senna daily    Diagnosis: Acute UTI  Assessment and Plan of Treatment: You had a urinary tract infection- please continue ceftriaxone 1g daily until 3/18.

## 2023-03-15 NOTE — PROGRESS NOTE ADULT - PROBLEM SELECTOR PLAN 12
F: none   E: replete PRN  N: consistent carb, DASH  GI: PPI  Bowel: senna, miralax  DVT: eliquis 5 BID  Code: FULL CODE  Dispo: FABIANO F: none   E: replete PRN  N: consistent carb, DASH  GI: PPI  Bowel: senna, miralax, lactulose  DVT: eliquis 5 BID  Code: FULL CODE  Dispo: FABIANO

## 2023-03-15 NOTE — PROGRESS NOTE ADULT - PROBLEM SELECTOR PLAN 9
Patient with history of CLL, follows with oncology at [facility]. No lymphadenopathy appreciated on physical exam. Pt with chronic leukocytosis. Afebrile, no other signs or symptoms of infectious process.    - c/w monitor

## 2023-03-15 NOTE — DISCHARGE NOTE PROVIDER - PROVIDER TOKENS
PROVIDER:[TOKEN:[[id number]],SCHEDULEDAPPT:[03/29/2023],ESTABLISHEDPATIENT:[T]],PROVIDER:[TOKEN:[[id number]]] PROVIDER:[TOKEN:[[id number]],SCHEDULEDAPPT:[03/29/2023],ESTABLISHEDPATIENT:[T]],PROVIDER:[TOKEN:[[id number]]],PROVIDER:[TOKEN:[[id number]],FOLLOWUP:[1 month],ESTABLISHEDPATIENT:[T]]

## 2023-03-15 NOTE — PROGRESS NOTE ADULT - PROBLEM SELECTOR PLAN 8
Patient with history of Type 2 DM, home medications insulin humalog 15-24u 2x daily.  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 Pt had a normal BM 11/23 AM.  abdominal XR with heavy stool burden  - Continue Miralax  - continue Senna  - adminster dulcolax suppository Patient with history of Type 2 DM, home medications insulin humalog 15-24u 2x daily.  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 Pt had a normal BM 11/23 AM.  abdominal XR with heavy stool burden  - Continue Miralax  - continue Senna  - start lactulose BID Patient with history of Type 2 DM, home medications insulin humalog 15-24u 2x daily.  - hold home meds  - c/w mISS  - Consistent Carb Diet    # GERD  Patient with history of GERD complicated by Garcia's esophagus, home medication Pantoprazole 40mg QD  - Continue protonix 40 mg PO qd    # Constipation  Patient with worsening severe constipation, may be contributing to hypoxia. Pt had a normal BM 11/23 AM.  abdominal XR with heavy stool burden  - Continue Miralax  - continue Senna  - start lactulose BID

## 2023-03-15 NOTE — CONSULT NOTE ADULT - ASSESSMENT
This is a 72M with PMHx Afib (on Eliquis, s/p DCCV 5/2022), HFpEF (EF 65%), CAD s/p CABG (2005), R renal transplant (on tacrolimus being switched to sirolimus, follows w/ Dr Leyva), jaw cancer s/p resection (~2019) w/ recurrence s/p chemo/RT (completed 2-3 weeks ago), JAYANT (not on CPAP), CLL (w/ chronic leukocytosis), DM2 (with humalog pump), HTN, HLD, with recurrent R sided pleural effusion s/p pleurX drain on admission at OSH who presented who was admitted for further management of R effusion. Pleural studies have shown exudative effusion. Heart failure consulted for management of HFpEF    REVIEW OF STUDIES:   TTE 3/9: Lvidd 4.2cm, hyperdynamic LVEF, possible apical hypertrophy, dilated LA , grade III DD, mod-severe TR, PASP 66mmHg  RHC 12/9/21: RA 5/ RV 47/0, PA 45/24/33, PCWP 18 with V to 27. CO 5.27/CI 2.52 (TD). CO 6.43/CI 3.07 (Carloz). TPG 15, DPG 6. PVR 2.84 jackson (TD)    HFpEF  -   - Antihypertensive regimen with amlodipine 10mg, Imdur 30mg daily.   - Standing daily weights with strict I/O  - Monitor electrolytes to maintain K>4 and Mg>2    AFib  CHADsVASC 4, currently on eliquis     This is a 72M with PMHx Afib (on Eliquis, s/p DCCV 5/2022), HFpEF (EF 65%), CAD s/p CABG (2005), R renal transplant (on tacrolimus being switched to sirolimus, follows w/ Dr Leyva), jaw cancer s/p resection (~2019) w/ recurrence s/p chemo/RT (completed 2-3 weeks ago), JAYANT (not on CPAP), CLL (w/ chronic leukocytosis), DM2 (with humalog pump), HTN, HLD, with recurrent R sided pleural effusion s/p pleurX drain on admission at OSH who presented who was admitted for further management of R effusion. Pleural studies have shown exudative effusion. Heart failure consulted for management of HFpEF    REVIEW OF STUDIES:   TTE 3/9: Lvidd 4.2cm, hyperdynamic LVEF, possible apical hypertrophy, dilated LA , grade III DD, mod-severe TR, PASP 66mmHg  RHC 12/9/21: RA 5/ RV 47/0, PA 45/24/33, PCWP 18 with V to 27. CO 5.27/CI 2.52 (TD). CO 6.43/CI 3.07 (Carloz). TPG 15, DPG 6. PVR 2.84 jackson (TD)    HFpEF  Mildly overloaded on exam   - Decrease torsemide to 40mg qd  - Will discuss initiation of Farxiga 10mg for HFpEF if OK per renal transplant team. Will defer spironolactone for now   - Antihypertensive regimen with amlodipine 5mg  - Standing daily weights with strict I/O  - Monitor electrolytes to maintain K>4 and Mg>2    AFib  CHADsVASC 4, currently on eliquis    HF will continue to follow      This is a 72M with PMHx Afib (on Eliquis, s/p DCCV 5/2022), HFpEF (EF 65%), CAD s/p CABG (2005), R renal transplant (on tacrolimus being switched to sirolimus, follows w/ Dr Leyva), jaw cancer s/p resection (~2019) w/ recurrence s/p chemo/RT (completed 2-3 weeks ago), JAYANT (not on CPAP), CLL (w/ chronic leukocytosis), DM2 (with humalog pump), HTN, HLD, with recurrent R sided pleural effusion s/p pleurX drain on admission at OSH who presented who was admitted for further management of R effusion. Pleural studies have shown exudative effusion. Heart failure consulted for management of HFpEF    REVIEW OF STUDIES:   TTE 3/9: Lvidd 4.2cm, hyperdynamic LVEF, possible apical hypertrophy, dilated LA , grade III DD, mod-severe TR, PASP 66mmHg  RHC 12/9/21: RA 5/ RV 47/0, PA 45/24/33, PCWP 18 with V to 27. CO 5.27/CI 2.52 (TD). CO 6.43/CI 3.07 (Carloz). TPG 15, DPG 6. PVR 2.84 jackson (TD)    HFpEF  Mildly overloaded on exam   - Decrease torsemide to 40mg qd  - Will discuss initiation of Farxiga 10mg for HFpEF if OK per renal transplant team. Will defer spironolactone for now   - Please add pro BNP to labs today   - Antihypertensive regimen with amlodipine 5mg  - Standing daily weights with strict I/O  - Monitor electrolytes to maintain K>4 and Mg>2    AFib  CHADsVASC 4, currently on eliquis    HF will continue to follow

## 2023-03-15 NOTE — PROGRESS NOTE ADULT - PROBLEM SELECTOR PLAN 3
Patient with history of HFpEF presenting with dyspnea on exertion, orthopnea found to be hypoxic to 84% on room air, right sided pleural effusion likely due to acute exacerbation.  TTE 7/2022 with mild concentric LVH, EF 65%, no regional wall motion abnormalities. ACS unlikely troponin 0.02, patient without chest pain, EKG without ischemic changes.  On exam patient with 2+ pitting edema bilateral LE. BNP 3,494 (baseline 1,500).  Home medications Torsemide 20mg po BID- per patient no longer taking. Patient's ambulatory SpO2 desatted from 91% to 84% on RA.    - strict I/Os, monitor UOP  - daily weights  - increase torsemide 60 qd    # Hyponatremia  Patient with Na 131 on admission which is around his baseline. Etiology likely Torsemide use.    - Continue to monitor BMP Patient with history of HFpEF presenting with dyspnea on exertion, orthopnea found to be hypoxic to 84% on room air, right sided pleural effusion likely due to acute exacerbation.  TTE 7/2022 with mild concentric LVH, EF 65%, no regional wall motion abnormalities. ACS unlikely troponin 0.02, patient without chest pain, EKG without ischemic changes.  On exam patient with 2+ pitting edema bilateral LE. BNP 3,494 (baseline 1,500).  Home medications Torsemide 20mg po BID- per patient no longer taking. Patient's ambulatory SpO2 desatted from 91% to 84% on RA.    - strict I/Os, monitor UOP  - daily weights  - c/w torsemide 60 qd    # Hyponatremia  Patient with Na 131 on admission which is around his baseline. Etiology likely Torsemide use.    - Continue to monitor BMP Patient with history of HFpEF presenting with dyspnea on exertion, orthopnea found to be hypoxic to 84% on room air, right sided pleural effusion likely due to acute exacerbation.  TTE 7/2022 with mild concentric LVH, EF 65%, no regional wall motion abnormalities. ACS unlikely troponin 0.02, patient without chest pain, EKG without ischemic changes.  On exam patient with 2+ pitting edema bilateral LE. BNP 3,494 (baseline 1,500).  Home medications Torsemide 20mg po BID- per patient no longer taking. Patient's ambulatory SpO2 desatted from 91% to 84% on RA.    - strict I/Os, monitor UOP  - daily weights  - torsemide 60mg-> 40mg

## 2023-03-15 NOTE — PROGRESS NOTE ADULT - PROBLEM SELECTOR PROBLEM 1
New patient consultation note    CC: Prostate cancer    HPI:T he patient had a radical prostatectomy in 2006 demonstrating Jg 7 disease with a positive margin.  He had salvage radiation therapy in 2006 when his PSA was around 0.4 per the patient's recollection.  He notes his PSA was undetectable for 4 years, but then began coming back and recently has noted to have PSAs in the 1.2-1.4 range in May of this year.  He underwent an Axumin PET scan recently that shows no evidence of metastatic disease though there was uptake in the prostate bed.  PSA 7/29/19 was 1.45 ng/mL.  He has had a recent PSA.    Had Prior history of urethral stricture. This may have been due to the prior radiation therapy and a Olsen Catheter complication postop.    Current symptoms include Progressive LUTS and a decreased stream  4/11/18 PSA = 0.56  10/30/18 PSA =0.48  5/14/19 PSA =1.2  5/29/19 PSA=1.4  7/29/19 PSA=1.45  9/5/19  PSA =1.84    PAST MEDICAL HISTORY:  Otherwise significant for the prostate cancer,  history of pulmonary embolism and rib fractures from coughing fits and appendicitis.      PAST SURGICAL HISTORY:  Includes the RRP, a lumbar fusion, a thoracotomy for the history of rib fractures and the pulmonic hernia, shoulder surgery x3, appendectomy and a cervical spine surgery.      MEDICATIONS:     1.  Zyrtec.   2.  Lisinopril.      ALLERGIES:  NO KNOWN DRUG ALLERGIES.      FAMILY HISTORY:  Noncontributory.      SOCIAL HISTORY:  Negative for tobacco.  Alcohol 1 drink a day.      REVIEW OF SYSTEMS:  Negative for fevers, chills, sweats, nausea, vomiting, unexplained weight changes.      Physical Exam  PHYSICAL EXAMINATION  Head NCAT  Neck Supple no nodes  Lungs CTA  Cor RRR no M  Abdomen soft  Extremities no edema.   No skin lesions         6/28/2006 RP  FINAL PATHOLOGICAL DIAGNOSIS:  Modified bilateral pelvic lymph node dissection, bilateral nerve-sparing radical retropubic prostatectomy  Prostate with bilateral seminal 
vesicles, excision:  1.  Extensive adenocarcinoma, [place] primary grade 4 ( including  hypernephroid pattern), secondary grade 3 with tertiary pattern 5  ([place] grade 4 + 3 = total score 7 with tertiary pattern 5 also  present).  2.  Approximately 60% of gland is tumor.  3.  Positive margins, right and left side, focal margin positivity on  right, more abundant positive margin on the left and inferior and mid  location.  4.  Extensive background high grade prostatic intraepithelial neoplasia.    5.  Prominent perineural invasion and focal lymphovascular space  involvement identified.  6.  Negative bilateral seminal vesicles.  7.  Bilateral pelvic lymph nodes, negative for metastatic carcinoma (  immunoperoxidase stains performed).      PSA RESULTS        Results for CAIT PINEDA (MRN [id number]) as of 10/15/2019 14:05   Ref. Range 7/29/2019 12:56 9/5/2019 10:56   PSA Latest Ref Range: 0 - 4 ug/L 1.45 1.84     Impression and Plan    This is a 66-year-old gentleman with a serologic relapse of a Jg 4+3 = 7 with a tertiary pattern of Jg 5.  He has a doubling time that is under 9 months based on the most recent 2 measurements.  He is also status post salvage radiation therapy.    I spent approximately 1 hour with the patient and his wife discussing the systemic nature of his disease in light of the fact that he has progression after 2 forms of local therapy in addition to the rapid rate of doubling of the PSA.  We discussed in detail the various modalities of imaging and I relayed to him that many of our current imaging modalities are limited in their ability to  metastasis and the fact that even when patients have metastatic disease it is discoverable by standard radiation techniques it is often a focal area that represents a systemic condition.    We then discussed the various modalities of hormonal therapy both investigational and standard.  I suggested to him that in the face of nonmetastatic 
serologic relapse that intermittent androgen deprivation therapy remains a standard of care.  We discussed the psychological physical and metabolic complications of androgen deprivation therapy which include cardiovascular risk weight gain development of insulin resistance and type 2 diabetes as well as osteoporosis.    We then discussed the investigational approach that we are undertaking here at the [facility] which includes a 3 arm study where in the control arm is androgen deprivation therapy for 1 year and the 2 study arms include treatment with androgen deprivation therapy plus and AR antagonist (apalutamide) versus the combination of Abiraterone plus apalutamide.    Patient was given an opportunity ask questions over the course of this visit I also indicated that he would be contacted by our study staff for consideration of enrollment into this clinical trial.    Alejandro Palomino MD        
Acute respiratory failure with hypoxia

## 2023-03-15 NOTE — PROGRESS NOTE ADULT - PROBLEM SELECTOR PLAN 6
Patient with history of hypertension, home medications isosorbide mononitrate 30mg QD, amlodipine 5mg QD, torsemide 20mg BID    - continue imdur 30mg PO QD  - continue amlodipine 5mg PO QD  - cw torsemide 40mg qd Patient with history of hypertension, home medications isosorbide mononitrate 30mg QD, amlodipine 5mg QD, torsemide 20mg BID    - holding imdur 30mg PO QD iso low BP  - continue amlodipine 5mg PO QD  - torsemide 60-> 40mg qd

## 2023-03-15 NOTE — DISCHARGE NOTE PROVIDER - CARE PROVIDER_API CALL
Kofi Leyva (MD)  Internal Medicine; Nephrology  [address], [address] [address]  Phone: [phone]  Fax: [phone]  Established Patient  Scheduled Appointment: 03/29/2023    Marie Lora)  Critical Care Medicine; Pulmonary Disease  [address], [address] [place] [address]  Phone: [phone]  Fax: [phone]  Follow Up Time:    Kofi Leyva (MD)  Internal Medicine; Nephrology  [address], [address]  [place] [address]  Phone: [phone]  Fax: [phone]  Established Patient  Scheduled Appointment: 03/29/2023    Marie Lora)  Critical Care Medicine; Pulmonary Disease  [address], [address] [place] [address]  Phone: [phone]  Fax: [phone]  Follow Up Time:     SHANNON POE  Internal Medicine  [address] [place] [address]  Phone: [phone]  Fax: ([phone]  Established Patient  Follow Up Time: 1 month

## 2023-03-15 NOTE — DISCHARGE NOTE PROVIDER - DETAILS OF MALNUTRITION DIAGNOSIS/DIAGNOSES
This patient has been assessed with a concern for Malnutrition and was treated during this hospitalization for the following Nutrition diagnosis/diagnoses:     -  03/13/2023: Moderate protein-calorie malnutrition

## 2023-03-15 NOTE — PROGRESS NOTE ADULT - PROBLEM SELECTOR PLAN 4
Hgb on admission 8.4 (baseline 10) likely ALEXANDER vs AOCD in setting of renal insufficiency. Currently no signs of active bleeding (no hematochezia, melena, hemoptysis, hematuria)    - macrocytic anemia- B12 and folate wnl  - trend CBC, maintain active T&S  - transfuse if Hgb <7    #SMV thrombosis  incidental finding of SMV thrombosis occluding SMV on CT while on eliquis 5mg BID  - pt without abdominal pain on exam  vascular consulted, recommend AC if c/f embolism  heme onc consulted following  - f/u JAK2, prothrombin gene mutation, Factor V Leiden, anticardiolipin antibody, [id number] antibody, DRVVT, hexagonal phase for lupus anticoagulant  - recommend CT A/P with IV contrast once Cr improves Hgb on admission 8.4 (baseline 10) likely ALEXANDER vs AOCD in setting of renal insufficiency. Currently no signs of active bleeding (no hematochezia, melena, hemoptysis, hematuria)    - macrocytic anemia- B12 and folate wnl  - trend CBC, maintain active T&S  - transfuse if Hgb <7  - Fe panel     #SMV thrombosis  incidental finding of SMV thrombosis occluding SMV on CT while on eliquis 5mg BID  - pt without abdominal pain on exam  vascular consulted, recommend AC if c/f embolism  heme onc consulted following  - f/u JAK2, prothrombin gene mutation, Factor V Leiden, anticardiolipin antibody, [id number] antibody, DRVVT, hexagonal phase for lupus anticoagulant  - recommend CT A/P with IV contrast once Cr improves Hgb on admission 8.4 (baseline 10) likely ALEXANDER vs AOCD in setting of renal insufficiency. Currently no signs of active bleeding (no hematochezia, melena, hemoptysis, hematuria)    - macrocytic anemia- B12 and folate wnl  - Fe panel Fe low, TIBC low, ferritin wnl likely 2/2 kidney disease vs UTI  - trend CBC, maintain active T&S  - transfuse if Hgb <7    #SMV thrombosis  incidental finding of SMV thrombosis occluding SMV on CT while on eliquis 5mg BID  - pt without abdominal pain on exam  vascular consulted, recommend AC if c/f embolism  heme onc consulted following  - f/u JAK2, prothrombin gene mutation, Factor V Leiden, anticardiolipin antibody, [id number] antibody, DRVVT, hexagonal phase for lupus anticoagulant  - recommend CT A/P with IV contrast once Cr improves Hgb on admission 8.4 (baseline 10) likely ALEXANDER vs AOCD in setting of renal insufficiency. Currently no signs of active bleeding (no hematochezia, melena, hemoptysis, hematuria)    - macrocytic anemia- B12 and folate wnl  - Fe panel Fe low, TIBC low, ferritin wnl likely 2/2 kidney disease vs UTI  - trend CBC, maintain active T&S  - transfuse if Hgb <7    #SMV thrombosis  incidental finding of SMV thrombosis occluding SMV on CT while on eliquis 5mg BID  - pt without abdominal pain on exam  vascular consulted, recommend AC if c/f embolism  heme onc consulted following  - f/u JAK2, prothrombin gene mutation, Factor V Leiden, DRVVT, hexagonal phase for lupus anticoagulant  - anticardiolipin, [id number], flow cytometry for PNH negative  - recommend CT A/P with IV contrast once Cr improves

## 2023-03-16 NOTE — PROGRESS NOTE ADULT - ASSESSMENT
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   ·  Plan: Patient with shortness of breath found to have acute hypoxic respiratory failure (AHRF) etiology likely HFpEF exacerbation complicated by right-sided pleural effusion.  At baseline intermittently uses 2L NC at home, now with increased O2 demand.  No evidence of infection, patient afebrile without cough.  PE unlikely no reported chest pain, tachycardia, other hemodynamic instability.      - ncCT chest showed large R pleural effusion with pleurx in place.   - Pleural fluid suggestive of exudative effusion likely 2/2 to   - Continue supplemental oxygen, wean as tolerated.    Problem/Plan - 2:  ·  Problem: Pleural effusion.   ·  Plan: CXR shows right pleural effusion, previous thoracentesis in 12/21 with recurrent effusion. Etiology likely HFpEF exacerbation vs malignant     - pleurx attached to chest tube.    Problem/Plan - 3:  ·  Problem: Acute exacerbation of CHF (congestive heart failure).   ·  Plan: Patient with history of HFpEF presenting with dyspnea on exertion, orthopnea found to be hypoxic to 84% on room air, right sided pleural effusion likely due to acute exacerbation.  TTE 7/2022 with mild concentric LVH, EF 65%, no regional wall motion abnormalities. ACS unlikely troponin 0.02, patient without chest pain, EKG without ischemic changes.  On exam patient with 2+ pitting edema bilateral LE. BNP 3,494 (baseline 1,500).  Home medications Torsemide 20mg po BID- per patient no longer taking. Patient's ambulatory SpO2 desatted from 91% to 84% on RA.    - strict I/Os, monitor UOP carefully  - daily weights  - discharge on 2L home O2  continue diuretics.  He clinically not in acute exacerbation.  Only chronic diastolic heart failure      # Hyponatremia  Patient with Na 131 on admission which is around his baseline. Etiology likely Torsemide use.    - Continue to monitor BMP.    Problem/Plan - 4:  ·  Problem: Anemia.   ·  Plan: Hgb on admission 8.4 (baseline 10) likely ALEXANDER vs AOCD in setting of renal insufficiency. Currently no signs of active bleeding (no hematochezia, melena, hemoptysis, hematuria)    - trend CBC, maintain active T&S  - transfuse if Hgb <7.    Problem/Plan - 5:  ·  Problem: Chronic atrial fibrillation.   ·  Plan: Patient with history of Afib, home medication 5mg Eliquis BID.    -.    Problem/Plan - 6:  ·  Problem: Hypertension.   ·  Plan: Patient with history of hypertension, home medications isosorbide mononitrate 30mg QD, amlodipine 5mg QD, torsemide 20mg BID    - continue imdur 30mg PO QD  - continue amlodipine 5mg PO QD  - Hold Torsemide for now since patient is on Lasix 60mg IV BID.    Problem/Plan - 7:  ·  Problem: Hyperlipidemia.   ·  Plan: Patient with history of hyperlipidemia, home medication Rosuvastatin 10mg daily.  - Continue Atorvastatin 40mg daily    # Coronary artery disease.   Patient with history of CAD, s/p CABG 2005, home medication Rosuvastatin 10mg daily.  Currently no chest pain, trop 0.02, EKG without ischemic changes.    - Continue Atorvastatin 40mg daily.    Problem/Plan - 8:  ·  Problem: Diabetes mellitus.   ·  Plan: Patient with history of Type 2 DM, home medications insulin humalog 15-24u 2x daily.  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 Pt had a normal BM 11/23 AM.  - Continue Miralax  - continue Senna  - continue Lactulose.    Problem/Plan - 9:  ·  Problem: CLL (chronic lymphocytic leukemia).   ·  Plan: Patient with history of CLL, follows with oncology at [facility]. No lymphadenopathy appreciated on physical exam. Pt with chronic leukocytosis. Afebrile, no other signs or symptoms of infectious process. Leukocytosis likely due to CLL.  - monitor WBC.    Problem/Plan - 10:  ·  Problem: History of renal transplant.   ·  Plan; Patient with chronic renal insufficiency, s/p R renal transplant, on tacrolimus 2mg AM and 1mg PM  - Continue tacrolimus 2mg AM and 1mg PM.    Problem/Plan - 11:  ·  Problem: Jaw cancer.   ·  Plan: Patient with history jaw mass s/p resection (~2019) w/ recurrence, recently hospitalized (9/18/22-9/20/22) for transoral partial R  space/infratemporal fossa mass resection/dental extraction.  Follows with oncologist at [facility], was recently given few chemo doses but decision made to stop chemo and proceed with radiation therapy.   - Followup with oncologist.    Problem/Plan - 12:  ·  Problem: Preventive measure.   ·  Plan: F: none   E: replete PRN  N: consistent carb, DASH  GI: PPI  DVT: SCDs  Code: FULL CODE  Dispo: [facility].    
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) R chest, dc one week ago presents for R pleural effusion   
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 
72M with PMHx renal tx (on tacro 2/1) CLL DM HTN chronic pleural effusions who presented to the ED under the guise of Dr Leyva after an outpatient appointment for treatment of recurrent R pleural effusion- nephrology consulted for renal transplant medication management    Assessment/Plan:   #renal transplant  #hyponatremia,  #chronic effusion  c/w1 mg daily of sirolimus;  tacro to 1mg q 12 for 3 days then stop  to restart 5 mg prednisone   would increase torsemide to 60mg daily   Daily BMP   Monitor ins and outs   Fluid restriction to 1L     Madhuri Turcios D.O  PGY 5 nephrology fellow  [phone] 
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 Hematology consulted for further management.     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 It was not present on a prior CT scan in 2019. He has had recent CT scans but without contrast so it is difficult to compare it to the current study.   - Patient was minimally symptomatic in the past 1 week, but currently denies any symptoms.   - Per son, patient was off Eliquis for 3 days for his Pleurx catheter placement and also son expressed to me on today's evaluation that patient has been taking the Eliquis once daily instead of twice daily,  therefore it is possible that he developed this thrombus while he was off his anticoagulation and underdosing, especially given he is prothrombotic from his active malignancy (SCC of Jaw). We would rule out other causes of SMV thrombosis   - Check flow cytometry for PNH for reduction or loss of CD55 and CD59 protein, pending   - Check AMELIA 2 mutation to rule out MPN, pending   - Send hypercoagulable workup: Prothrombin gene mutation, Factor V Leiden mutation, APLS antibodies (anti-cardiolipin Ab, Beta 2 glycoprotein Ab, DRVVT and hexagonal phase for lupus anticoagulant)   - Recommend CT AP with IV contrast to better assess the extent of SMV thrombosis, however given concern for worsening serum Cr, okay to hold off for now.    - Continue Eliquis 5 mg BID for now    Discussed with Dr. Slater.
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 
72M with PMHx renal tx (on tacro 2/1) CLL DM HTN chronic pleural effusions who presented to the ED under the guise of Dr Leyva after an outpatient appointment for treatment of recurrent R pleural effusion- nephrology consulted for renal transplant medication management    Assessment/Plan:   #renal transplant  #hyponatremia,  #chronic effusion  c/w1 mg daily of sirolimus;    5 mg prednisone   torsemide to 40mg daily   Daily BMP   Monitor ins and outs   Fluid restriction to 1L     Madhuri Turcios D.O  PGY 5 nephrology fellow  [phone] 
72M with PMHx renal tx (on tacro 2/1) CLL DM HTN chronic pleural effusions who presented to the ED under the guise of Dr Leyva after an outpatient appointment for treatment of recurrent R pleural effusion- nephrology consulted for renal transplant medication management    Assessment/Plan:   #renal transplant  #hyponatremia,  #chronic effusion  c/w1 mg daily of sirolimus;  tacro to 1mg q 12 for 3 days then stop prior to DC on 3/16  5 mg prednisone   torsemide to 60mg daily   Daily BMP   Monitor ins and outs   Fluid restriction to 1L     Madhuri Turcios D.O  PGY 5 nephrology fellow  [phone] 
72M with PMHx renal tx (on tacro 2/1) CLL DM HTN chronic pleural effusions who presented to the ED under the guise of Dr Leyva after an outpatient appointment for treatment of recurrent R pleural effusion- nephrology consulted for renal transplant medication management    Assessment/Plan:   #renal transplant  #hyponatremia,  #chronic effusion  continue with tacrolimus 2/1-> however would reach out to transplant nephrology dr. galicia in regards to switching to sirolimus   Start patient on 40mg PO Torsemide   Daily BMP   Monitor ins and outs   Fluid restriction to 1L 
72M with PMHx renal tx (on tacro 2/1) CLL DM HTN chronic pleural effusions who presented to the ED under the guise of Dr Leyva after an outpatient appointment for treatment of recurrent R pleural effusion- nephrology consulted for renal transplant medication management    Assessment/Plan:   #renal transplant  #hyponatremia,  #chronic effusion  to start 1 mg daily of sirolimus; decrease tacro to 1mg q 12 for 3 days then stop  to restart 5 mg prednisone   to get repeat CXR this afternoon and likely can remove pleurex   c/w  40mg PO Torsemide   Daily BMP   Monitor ins and outs   Fluid restriction to 1L     Madhuri Turcios D.O  PGY 5 nephrology fellow  [phone] 
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 
72M with PMHx Afib (on Eliquis, s/p DCCV 5/2022), PE, HFpEF (EF 65%), CAD s/p CABG (2005), R renal transplant (on tacrolimus, follows w/ Dr Leyva), jaw cancer s/p resection (~2019) w/ recurrence s/p chemo/RT (completed 2-3 weeks ago), JAYANT (not on CPAP), CLL (w/ chronic leukocytosis), DM2 (with humalog pump), HTN, HLD, recent admit OSH for tongue/facial swelling likely 2/2 RT c/b pleural effusion s/p pleurX drain (placed at [facility]) R chest, dc one week ago presents for R pleural effusion 
72M with PMHx Afib (on Eliquis, s/p DCCV 5/2022), PE, HFpEF (EF 65%), CAD s/p CABG (2005), R renal transplant (on tacrolimus, follows w/ Dr Leyva), jaw cancer s/p resection (~2019) w/ recurrence s/p chemo/RT (completed 2-3 weeks ago), JAYANT (not on CPAP), CLL (w/ chronic leukocytosis), DM2 (with humalog pump), HTN, HLD, recent admit OSH for tongue/facial swelling likely 2/2 RT c/b pleural effusion s/p pleurX drain (placed at [facility]) R chest, dc one week ago presents for R pleural effusion 
72M with PMHx Afib (on Eliquis, s/p DCCV 5/2022), PE, HFpEF (EF 65%), CAD s/p CABG (2005), R renal transplant (on tacrolimus, follows w/ Dr Leyva), jaw cancer s/p resection (~2019) w/ recurrence s/p chemo/RT (completed 2-3 weeks ago), JAYANT (not on CPAP), CLL (w/ chronic leukocytosis), DM2 (with humalog pump), HTN, HLD, recent admit OSH for tongue/facial swelling likely 2/2 RT c/b pleural effusion s/p pleurX drain (placed at [facility]) R chest, dc one week ago presents for R pleural effusion 
72M with PMHx Afib (on Eliquis, s/p DCCV 5/2022), PE, HFpEF (EF 65%), CAD s/p CABG (2005), R renal transplant (on tacrolimus, follows w/ Dr Leyva), jaw cancer s/p resection (~2019) w/ recurrence s/p chemo/RT (completed 2-3 weeks ago), JAYANT (not on CPAP), CLL (w/ chronic leukocytosis), DM2 (with humalog pump), HTN, HLD, recent admit OSH for tongue/facial swelling likely 2/2 RT c/b pleural effusion s/p pleurX drain (placed at [facility]) R chest, dc one week ago presents for R pleural effusion

## 2023-03-16 NOTE — PROGRESS NOTE ADULT - PROVIDER SPECIALTY LIST ADULT
Heme/Onc
Nephrology
Internal Medicine
Nephrology
Rehab Medicine
Nephrology
Internal Medicine

## 2023-03-16 NOTE — PROGRESS NOTE ADULT - NUTRITIONAL ASSESSMENT
This patient has been assessed with a concern for Malnutrition and has been determined to have a diagnosis/diagnoses of Moderate protein-calorie malnutrition.    This patient is being managed with:   Diet Minced and Moist-  1000mL Fluid Restriction ([id number])  Shanita  Entered: Mar 14 2023  9:57PM    
This patient has been assessed with a concern for Malnutrition and has been determined to have a diagnosis/diagnoses of Moderate protein-calorie malnutrition.    This patient is being managed with:   Diet Minced and Moist-  Kosher  Entered: Mar 11 2023  9:11PM    
This patient has been assessed with a concern for Malnutrition and has been determined to have a diagnosis/diagnoses of Moderate protein-calorie malnutrition.    This patient is being managed with:   Diet Minced and Moist-  1000mL Fluid Restriction ([id number])  Shanita  Entered: Mar 14 2023  9:57PM    
This patient has been assessed with a concern for Malnutrition and has been determined to have a diagnosis/diagnoses of Moderate protein-calorie malnutrition.    This patient is being managed with:   Diet Minced and Moist-  Kosher  Entered: Mar 11 2023  9:11PM

## 2023-03-16 NOTE — PROGRESS NOTE ADULT - SUBJECTIVE AND OBJECTIVE BOX
Patient is a 72y Male seen and evaluated at bedside. no acute events overnight pleurex catheter removed yesterday torsemide dose decreased to 40 yesterday as per heart failure /61 pending DC today       Meds:    acetaminophen     Tablet .. 650 every 6 hours PRN  amLODIPine   Tablet 5 daily  apixaban 5 every 12 hours  atorvastatin 40 at bedtime  bisacodyl Suppository 10 daily PRN  cefTRIAXone   IVPB 1000 every 24 hours  dapagliflozin 10 every 24 hours  dextrose 5%. 1000 <Continuous>  dextrose 5%. 1000 <Continuous>  dextrose 50% Injectable 25 once  dextrose 50% Injectable 12.5 once  dextrose 50% Injectable 25 once  dextrose Oral Gel 15 once PRN  glucagon  Injectable 1 once  HYDROmorphone   Tablet 2 every 6 hours PRN  HYDROmorphone  Injectable 1 every 4 hours PRN  influenza  Vaccine (HIGH DOSE) 0.7 once  insulin lispro (ADMELOG) corrective regimen sliding scale  Before meals and at bedtime  lactulose Syrup 10 two times a day  pantoprazole    Tablet 20 before breakfast  polyethylene glycol 3350 17 two times a day  predniSONE   Tablet 5 every 24 hours  senna 2 at bedtime  sirolimus 1 every 24 hours  tamsulosin 0.4 at bedtime  torsemide 40 every 24 hours      T(C): , Max: 36.8 (03-16-23 @ 06:00)  T(F): , Max: 98.2 (03-16-23 @ 06:00)  HR: 77 (03-16-23 @ 06:00)  BP: 118/61 (03-16-23 @ 06:00)  BP(mean): --  RR: 17 (03-16-23 @ 06:00)  SpO2: 99% (03-16-23 @ 06:00)  Wt(kg): --    Height (cm): 167.6 (03-15 @ 13:18)    Review of Systems:  all other ROS negative       PHYSICAL EXAM:  GENERAL: well-developed, well nourished, alert, no acute distress at present  NECK: slight JVD  CHEST/LUNG: Clear to auscultation bilaterally  HEART: normal S1S2, RRR  ABDOMEN: Soft, Nontender, +BS, No flank tenderness bilateral  EXTREMITIES: No clubbing, cyanosis, or edema         LABS:                        8.6    11.94 )-----------( 138      ( 15 Mar 2023 05:30 )             28.3     03-15    129<L>  |  97  |  36<H>  ----------------------------<  163<H>  4.9   |  25  |  1.17    Ca    8.4      15 Mar 2023 05:30  Phos  3.4     03-15  Mg     1.9     03-15    TPro  4.6<L>  /  Alb  2.6<L>  /  TBili  0.5  /  DBili  x   /  AST  11  /  ALT  9<L>  /  AlkPhos  117  03-15                RADIOLOGY & ADDITIONAL STUDIES:

## 2023-03-16 NOTE — PROGRESS NOTE ADULT - SUBJECTIVE AND OBJECTIVE BOX
Medicine Progress Note (INCOMPLETE)    SUBJECTIVE / OVERNIGHT EVENTS:  O/N events:  Patient was seen and examined at bedside.  Otherwise negative ROS    MEDICATIONS  (STANDING):  amLODIPine   Tablet 5 milliGRAM(s) Oral daily  apixaban 5 milliGRAM(s) Oral every 12 hours  atorvastatin 40 milliGRAM(s) Oral at bedtime  cefTRIAXone   IVPB 1000 milliGRAM(s) IV Intermittent every 24 hours  dapagliflozin 10 milliGRAM(s) Oral every 24 hours  dextrose 5%. 1000 milliLiter(s) (100 mL/Hr) IV Continuous <Continuous>  dextrose 5%. 1000 milliLiter(s) (50 mL/Hr) IV Continuous <Continuous>  dextrose 50% Injectable 25 Gram(s) IV Push once  dextrose 50% Injectable 12.5 Gram(s) IV Push once  dextrose 50% Injectable 25 Gram(s) IV Push once  glucagon  Injectable 1 milliGRAM(s) IntraMuscular once  influenza  Vaccine (HIGH DOSE) 0.7 milliLiter(s) IntraMuscular once  insulin lispro (ADMELOG) corrective regimen sliding scale   SubCutaneous Before meals and at bedtime  lactulose Syrup 10 Gram(s) Oral two times a day  pantoprazole    Tablet 20 milliGRAM(s) Oral before breakfast  polyethylene glycol 3350 17 Gram(s) Oral two times a day  predniSONE   Tablet 5 milliGRAM(s) Oral every 24 hours  senna 2 Tablet(s) Oral at bedtime  sirolimus 1 milliGRAM(s) Oral every 24 hours  tamsulosin 0.4 milliGRAM(s) Oral at bedtime  torsemide 40 milliGRAM(s) Oral every 24 hours    MEDICATIONS  (PRN):  acetaminophen     Tablet .. 650 milliGRAM(s) Oral every 6 hours PRN Temp greater or equal to 38C (100.4F), Mild Pain (1 - 3)  bisacodyl Suppository 10 milliGRAM(s) Rectal daily PRN Constipation  dextrose Oral Gel 15 Gram(s) Oral once PRN Blood Glucose LESS THAN 70 milliGRAM(s)/deciliter  HYDROmorphone   Tablet 2 milliGRAM(s) Oral every 6 hours PRN Moderate Pain (4 - 6)  HYDROmorphone  Injectable 1 milliGRAM(s) IV Push every 4 hours PRN Severe Pain (7 - 10)    CAPILLARY BLOOD GLUCOSE      POCT Blood Glucose.: 151 mg/dL (16 Mar 2023 08:36)  POCT Blood Glucose.: 150 mg/dL (15 Mar 2023 22:12)  POCT Blood Glucose.: 246 mg/dL (15 Mar 2023 17:13)  POCT Blood Glucose.: 216 mg/dL (15 Mar 2023 12:19)        OBJECTIVE:  Vital Signs Last 24 Hrs  T(C): 36.8 (16 Mar 2023 06:00), Max: 36.8 (16 Mar 2023 06:00)  T(F): 98.2 (16 Mar 2023 06:00), Max: 98.2 (16 Mar 2023 06:00)  HR: 77 (16 Mar 2023 06:00) (71 - 82)  BP: 118/61 (16 Mar 2023 06:00) (99/61 - 118/61)  BP(mean): --  RR: 17 (16 Mar 2023 06:00) (16 - 20)  SpO2: 99% (16 Mar 2023 06:00) (95% - 99%)    Parameters below as of 15 Mar 2023 20:17  Patient On (Oxygen Delivery Method): room air        PHYSICAL EXAM:  General: AAOx3, NAD, euvolemic  Head: NC/AT; MMM; PERRL  Neck: Supple; no JVD  Respiratory: CTAB; no wheezes/rales/rhonchi  Cardiovascular: Regular rhythm/rate; S1/S2+, no m/r/g auscultated  Gastrointestinal: Soft; NTND; bowel sounds normal and present in all 4 quadrants  :   Extremities: WWP; no b/l U/E edema, no b/l L/E edema  Neurological: CNII-XII grossly intact; no obvious focal deficits  I&O's Summary      LABS:                        8.6    11.94 )-----------( 138      ( 15 Mar 2023 05:30 )             28.3     03-15    129<L>  |  97  |  36<H>  ----------------------------<  163<H>  4.9   |  25  |  1.17    Ca    8.4      15 Mar 2023 05:30  Phos  3.4     03-15  Mg     1.9     03-15    TPro  4.6<L>  /  Alb  2.6<L>  /  TBili  0.5  /  DBili  x   /  AST  11  /  ALT  9<L>  /  AlkPhos  117  03-15                  RADIOLOGY & ADDITIONAL TESTS:  Imaging from Last 24 Hours:

## 2023-03-16 NOTE — DISCHARGE NOTE NURSING/CASE MANAGEMENT/SOCIAL WORK - PATIENT PORTAL LINK FT
You can access the FollowMyHealth Patient Portal offered by [facility] by registering at the following website: [email or web address]. By joining [facility]’s FollowMyHealth portal, you will also be able to view your health information using other applications (apps) compatible with our system.

## 2023-03-16 NOTE — PROGRESS NOTE ADULT - REASON FOR ADMISSION
Pleural effusion

## 2023-03-16 NOTE — CONSULT NOTE ADULT - SUBJECTIVE AND OBJECTIVE BOX
CHIEF COMPLAINT:  Pleural effusion  HISTORY OF PRESENT ILLNESS:  72M with PMHx Afib (on Eliquis, s/p DCCV 5/2022), PE, HFpEF (EF 65%), CAD s/p CABG (2005), R renal transplant (on tacrolimus, follows w/ Dr Leyva), jaw cancer s/p resection (~2019) w/ recurrence s/p chemo/RT (completed 2-3 weeks ago), JAYANT (not on CPAP), CLL (w/ chronic leukocytosis), DM2 (with humalog pump), HTN, HLD, recent admit OSH for tongue/facial swelling likely 2/2 RT c/b pleural effusion s/p pleurX drain R chest, dc one week rior to admission to [facility]. He was sent to [facility] ED after an outpatient appointment with Dr. Leyva for admission for recurrent R pleural effusion and discussion with Dr. Lora.  Per chart review, discussion with family revealed that patient developed R pleural effusion during recent admission at OSH and had pleurX placed, VNS comes 3x week to drain 500cc.  Etiology believed to be 2/2 lymphoma. He is admitted for management of R pleural effusion. Heart failure consulted for HFpEF. Pleural studies show an exudative effusion. He has been getting PO torsemide while hospitalized.   Patient had catheterization done in December 2021 which showed a wedge of 18 and a PA pressure of 45.  The pulmonary artery saturation was normal.  At that time the echocardiogram showed a pulmonary artery pressure of 50.  The patient had a negative nuclear stress test in October 2021.  He currently denies shortness of breath chest pain palpitations.  He has had dizziness upon arising.    Chart, PMH, medication and allergies reviewed  PAST MEDICAL & SURGICAL HISTORY:  Barretts esophagus      CRI (chronic renal insufficiency)      DM (diabetes mellitus)      GERD (gastroesophageal reflux disease)      HTN (hypertension)      Monoclonal gammopathy      Mandibular abscess      Paroxysmal atrial fibrillation      Benign prostatic hyperplasia, presence of lower urinary tract symptoms unspecified, unspecified morphology      Hyperlipidemia      Barretts esophagus      S/P CABG (coronary artery bypass graft)  20 years      History of surgery  cyst removal      Mandibular abscess          [x  ] Diabetes   [ x ] Hypertension  [x  ] Hyperlipidemia  [ x ] CAD  [  ] PCI  [ x ] CABG    PREVIOUS DIAGNOSTIC TESTING:    [ x ] Echocardiogram:  [x  ]  Catheterization:  [x  ] Stress Test:  	    MEDICATIONS:  MEDICATIONS  (STANDING):  amLODIPine   Tablet 5 milliGRAM(s) Oral daily  apixaban 5 milliGRAM(s) Oral every 12 hours  atorvastatin 40 milliGRAM(s) Oral at bedtime  cefTRIAXone   IVPB 1000 milliGRAM(s) IV Intermittent every 24 hours  dapagliflozin 10 milliGRAM(s) Oral every 24 hours  dextrose 5%. 1000 milliLiter(s) (100 mL/Hr) IV Continuous <Continuous>  dextrose 5%. 1000 milliLiter(s) (50 mL/Hr) IV Continuous <Continuous>  dextrose 50% Injectable 25 Gram(s) IV Push once  dextrose 50% Injectable 12.5 Gram(s) IV Push once  dextrose 50% Injectable 25 Gram(s) IV Push once  glucagon  Injectable 1 milliGRAM(s) IntraMuscular once  influenza  Vaccine (HIGH DOSE) 0.7 milliLiter(s) IntraMuscular once  insulin lispro (ADMELOG) corrective regimen sliding scale   SubCutaneous Before meals and at bedtime  lactulose Syrup 10 Gram(s) Oral two times a day  pantoprazole    Tablet 20 milliGRAM(s) Oral before breakfast  polyethylene glycol 3350 17 Gram(s) Oral two times a day  predniSONE   Tablet 5 milliGRAM(s) Oral every 24 hours  senna 2 Tablet(s) Oral at bedtime  sirolimus 1 milliGRAM(s) Oral every 24 hours  tamsulosin 0.4 milliGRAM(s) Oral at bedtime  torsemide 40 milliGRAM(s) Oral every 24 hours      FAMILY HISTORY:  No pertinent family history in first degree relatives        SOCIAL HISTORY:    [ x ] Never smoker  [  ] Non-smoker  [  ] Smoker  [  ] Social alcohol use  [  ] Former smoker    Allergies    No Known Allergies    Intolerances    	    REVIEW OF SYSTEMS:  CONSTITUTIONAL: No fever, weight loss, +fatigue  EYES: No eye pain, visual disturbances, or discharge  ENT:  No difficulty hearing, tinnitus, vertigo; No sinus or throat pain  NECK: No pain or stiffness  PULMONARY: No cough, no wheezing, chills or hemoptysis; No Shortness of Breath  CARDIOVASCULAR: No chest pain, no  palpitations, no passing out, dizziness, no leg swelling  GASTROINTESTINAL: No abdominal  pain. No nausea, vomiting, or hematemesis; No diarrhea or constipation. No melena or hematochezia.  GENITOURINARY: No dysuria, frequency, hematuria, or incontinence  NEUROLOGICAL: No headaches, memory loss, loss of strength, numbness, or tremors  SKIN: No itching, burning, rashes, or lesions   LYMPH Nodes: No enlarged glands  ENDOCRINE: No heat or cold intolerance; No hair loss  MUSCULOSKELETAL: No joint pain or swelling; No muscle, back, or extremity pain  PSYCHIATRIC: No depression, anxiety, mood swings, or difficulty sleeping  HEME/LYMPH: No easy bruising, or bleeding gums  ALLERGY AND IMMUNOLOGIC: No hives or eczema	    PHYSICAL EXAM:  T(C): 36.8 (03-16-23 @ 06:00), Max: 36.8 (03-16-23 @ 06:00)  HR: 77 (03-16-23 @ 06:00) (71 - 82)  BP: 118/61 (03-16-23 @ 06:00) (99/61 - 118/61)  RR: 17 (03-16-23 @ 06:00) (16 - 20)  SpO2: 99% (03-16-23 @ 06:00) (95% - 99%)  Wt(kg): --  I&O's Summary      Appearance: Normal	  HEENT:   Normal oral mucosa, PERRL, EOMI	  Lymphatic: No lymphadenopathy  Cardiovascular: Normal S1 S2, No JVD, No murmur, No edema  Pulmonary: Lungs clear to auscultation	  Psychiatry: A & O x 3, Mood & affect appropriate  Gastrointestinal:  Soft, Non-tender, + BS	  Skin: No rashes, No ecchymoses, No cyanosis	  Neurologic: Non-focal  Extremities: Normal range of motion, No clubbing or cyanosis  	 	  CARDIAC MARKERS:                                8.6    11.94 )-----------( 138      ( 15 Mar 2023 05:30 )             28.3     03-15    129<L>  |  97  |  36<H>  ----------------------------<  163<H>  4.9   |  25  |  1.17    Ca    8.4      15 Mar 2023 05:30  Phos  3.4     03-15  Mg     1.9     03-15    TPro  4.6<L>  /  Alb  2.6<L>  /  TBili  0.5  /  DBili  x   /  AST  11  /  ALT  9<L>  /  AlkPhos  117  03-15    proBNP:  Lipid Profile:  HgA1c:  TSH:    TELEMETRY: 	    ECG:  afib RBBB TWA QTC  RADIOLOGY:	    ASSESSMENT/PLAN: 	  CHF–BNP has gone up.  Chest x-ray does not show congestive heart failure.  The echocardiogram shows a normal left ventricle.  The apex was not well visualized in all views.  There was severe pulmonary hypertension.  Recommend repeat echocardiogram with contrast.  The pleural fluid LDH and protein are low.  The pleural effusion has been mostly drained.  The plan is torsemide 40 mg daily and Farxiga.    Pulmonary hypertension–past cardiac catheterization indicates this is on the basis of congestive heart failure.  Pulmonary pressures have been lower on prior echocardiograms. The patient has also had hypoxia and JAYANT which contribute.    Atrial fibrillation–heart rate is controlled.  The patient is on anticoagulation.      Coronary artery disease–the patient has no chest pain.  EKG shows T wave abnormalities with mild change from prior.  Last nuclear stress test was normal.      Discussed with nursing staff.

## 2023-03-16 NOTE — PROGRESS NOTE ADULT - ATTENDING COMMENTS
reviewed in detail with pt, family, and staff.   agree with findings and plans.   have reviewed meds and planned f/u with pt for after rehab. reviewed in detail with pt, family, and staff.   agree with findings and plans.    because of protuberant but nontender abdomen, with s/w hyperactive bowels sounds we obtained a repeat axr which will require ongoing clinical monitoring but no mgt change at this time     have reviewed meds and planned f/u with pt for after rehab.

## 2023-03-23 NOTE — ASSESSMENT
[FreeTextEntry1] : This is a 72M with PMHx Afib (on Eliquis, s/p DCCV 5/2022), HFpEF (EF 65%), CAD s/p CABG (2005), R renal transplant (on tacrolimus being switched to sirolimus, follows w/ Dr Leyva), jaw cancer s/p resection (~2019) w/ recurrence s/p chemo/RT (completed 2-3 weeks ago), JAYANT (not on CPAP), CLL (w/ chronic leukocytosis), DM2 (with humalog pump), HTN, HLD, with recurrent R sided pleural effusion s/p pleurX drain on admission at OSH who presented who was admitted for further management of R effusion. Pleural studies have shown exudative effusion. Heart failure consulted for management of HFpEF\par \par REVIEW OF STUDIES: \par TTE 3/9: Lvidd 4.2cm, hyperdynamic LVEF, possible apical hypertrophy, dilated LA , grade III DD, mod-severe TR, PASP 66mmHg\par RHC 12/9/21: RA 5/ RV 47/0, PA 45/24/33, PCWP 18 with V to 27. CO 5.27/CI 2.52 (TD). CO 6.43/CI 3.07 (Carloz). TPG 15, DPG 6. PVR 2.84 jackson (TD)\par 71 yo man KTR, with DM2, CAD s/p remote CABG and HFpEF chronically maintained on Torsemide but recently taken off diuretics for clinical hypovolemia. Now admitted with worsening LE edema, orthopnea and reaccumulating R pleural effusion (drained via pleurex catheter). Improved with diuresis.\par \par # HFpEF\par -GDMT: Current regimen is farxiga 10 mg. Will use judicious caution w/SGLT2i as he is immunosupresed w/ hx of UTI, this was initiated inpatient.  Today will _____. \par -Diuretics: Current regimen is torsemide 40 mg daily.  Will ____ (continue or adjust dose)\par -Device:  usually either "s/p __, follows with ___" or "will reassess LVEF when maximized on medical therapy"\par -Labs at discharge 3/15 Na 129, K 4.9, Cl 97, CO2 25, BUN 36, Cr 1.17, proBNP 7179\par -Advanced therapies: if high risk features I'll have a sentence here, otherwise can leave blank\par \par #AF\par -AC on eliquis\par \par #HTN\par -c/w amlodipine\par \par #CAD\par -c/w statin\par \par #Immunosuppression \par -s/p right renal transplant\par -c/w prednisone 5 mg daily\par -c/w tacrolimus as per renal team\par \par #T2DM\par -A1C 6% in 3/2023\par \par #Hyponatremia\par -\par -c/w insulin regimen\par \par

## 2023-03-23 NOTE — HISTORY OF PRESENT ILLNESS
[FreeTextEntry1] : \par Date of HF diagnosis: \par Etiology of CMP:\par Date of last hospitalization: \par Date of last echocardiogram: \par LVEF/LVEDD:\par Type of ischemic work-up: N\par Prior RHC: Y\par \par Diabetes: Y\par Afib: Y\par CKD: Y\par ICD/CRT:N\par \par ACEi/ARB:N\par ARNI:N\par MRA: N\par BB:N\par SGLT2i: Y\par Nitrates/HydralazineN\par \par CardioMEMs:\par In clinical trial:\par \par \par 71 YO M with HFpEF (LV***, LVEF***). PMedHx also significant for  R renal transplant on immunosuppression (follows with Dr. Leyva), jaw cancer s/p resection (~2019) w/ recurrence s/p chemo/RT (in 2/2023), CLL (w/ chronic leukocytosis), JAYANT (not on CPAP), CAD s/p CABG (2005), AF ( on eliquis) s/p DCCV 5/2022, PE, DM2 (with humalog pump), HTN, and HLD. Presents today for follow up on his cardiomyopathy following recent hospitalization. \par \par \par Recent admit OSH for tongue/facial swelling likely 2/2 RT c/b pleural effusion s/p pleurX drain R chest, removed one week prior to admission. Presenting to [facility] ***ED after an outpatient appointment with Dr. Leyva for  for recurrent R pleural effusion, Cytology studies demonstrate an exudative effusion.\par \par \par \par \par \par

## 2023-03-25 NOTE — H&P ADULT - PROBLEM SELECTOR PLAN 7
Patient with chronic renal insufficiency, s/p R renal transplant, was previously on tacrolimus 2mg AM and 1mg PM which was discontinued during recent admission. Overlap dosing of tacrolimus and sirolimus for three days. Tacrolimus level and sirolimus lvl within range.  - c/w sirolimus 1mg qd   - f/u with Dr Carreno Patient with history of CLL, follows with oncology at [facility]. No lymphadenopathy appreciated on physical exam.  - f/u outpatient

## 2023-03-25 NOTE — H&P ADULT - PROBLEM SELECTOR PLAN 6
Patient with history of CLL, follows with oncology at [facility]. No lymphadenopathy appreciated on physical exam.  - f/u outpatient Hgb on admission 8.4 (baseline 10) likely ALEXANDER vs AOCD in setting of renal insufficiency. Stable from recent admission  - macrocytic anemia- B12 and folate wnl  - Fe panel Fe low, TIBC low, ferritin wnl likely 2/2 kidney disease. Not candidate for EPO iso active CLL    #SMV thrombosis  incidental finding of SMV thrombosis occluding SMV on CT while on eliquis 5mg BID, likely subacute per recent notes. vascular and heme/onc consulted recent admission. pt without abdominal pain on exam  - c/w home eliquis 5mg BID

## 2023-03-25 NOTE — ED ADULT NURSE NOTE - OBJECTIVE STATEMENT
Pt BIBEMS, family was concerned because pt appears in pain in abdomen, was febrile and O2 saturation was low. PER EMS, during transport to ED, pt blood glucose 446, O2 dropped to 70's on RA, pt placed on NC sating at 91-92%. Received pt A+Ox 1, arousable to voice and touch, tachypneic, tachycardic, rectal temperature 104.5. Pt has PMH of Afib, kidney transplant and DMII. 18 G placed in R AC, 18 G  L AC put in place by EMS. MD Pillai at bedside assessing, pending fluids and IV Tylenol.

## 2023-03-25 NOTE — H&P ADULT - PROBLEM SELECTOR PLAN 3
baseline Cr 0.58-0.65, discharged with Cr 1.1 on 3/16. Presents with Cr of 1.3. Suspect prerenal i/s/o sepsis. R/o ascending infection/pyelonephritis  - f/u UA  - f/u urine lytes  - f/u CT A/P official read #DM  Home medications insulin humalog 15-24u 2x daily. S/p 10U regular insulin in ED  - POCT glucose checks  - dose bedtime lantus once weight in

## 2023-03-25 NOTE — H&P ADULT - PROBLEM SELECTOR PLAN 2
#DM  Home medications insulin humalog 15-24u 2x daily. S/p 10U regular insulin in ED  - POCT glucose checks  - dose bedtime lantus once weight in Pleural fluid recently tested suggestive of exudative effusion likely 2/2 to diastolic HF   - hold torsemide 60 mg qd i/s/o sepsis  - cautiously diurese as tolerated Pleural fluid recently tested suggestive of exudative effusion likely 2/2 to diastolic HF   - hold torsemide 60 mg qd i/s/o sepsis  - cautiously diurese as tolerated    #Hx of tongue swelling  per family, patient recently admitted at [facility] for tongue/facial swelling and was started on steroids. During recent admission to [facility] discharged on prednisone 5mg qd  - collateral on hx and prednisone, end date  - c/w pred for now

## 2023-03-25 NOTE — CONSULT NOTE ADULT - SUBJECTIVE AND OBJECTIVE BOX
72M with PMHx Afib (on Eliquis, s/p DCCV 2022), PE, HFpEF (EF 65%), CAD s/p CABG (), R renal transplant (on tacrolimus, follows w/ Dr Leyva), jaw cancer s/p resection (~) w/ recurrence s/p chemo/RT (completed 2-3 weeks ago), JAYANT (not on CPAP), CLL (w/ chronic leukocytosis), DM2 (with humalog pump), HTN, HLD, recent admit OSH for tongue/facial swelling likely 2/2 RT c/b pleural effusion s/p pleurX drain (placed at [facility]) R chest, recently admitted for R pleural effusion s/p pleurX removal presents for fevers and AMS x 5 days at rehab center. According to the family, he was experiencing fevers as high as 104F and sent to our ED for further evaluation. ICU team consulted for severe sepsis with hypoxia.     ED course  Vitals: 104.8F, , /56, RR 24, NRB 99% -> 3L satting 94-95%  Labs: notable for WBC 29k, Hgb 8.4, INR 2.49, Cr 1.32, trop-T 0.07  Imagin) CT chest w/o - LLL consolidation c/f aspiration PNA, persistent large right and small left effusions  2) CT abd/pelvis w/o - thickened urinary bladder wall may be 2/2 to underdistention vs. possible cystitis.   Management: Vanc, Zosyn 1L NS, tylenol 1g x1    ROS  General: +weakness, fevers  Pulm: +cough, congestion  CV: No chest pain or palpitations  GI: +constipation  : +suprapubic discomfort  Neuro: No numbness or weakness  Skin: No itching, rashes      PAST MEDICAL & SURGICAL HISTORY:  Barretts esophagus      CRI (chronic renal insufficiency)      DM (diabetes mellitus)      GERD (gastroesophageal reflux disease)      HTN (hypertension)      Monoclonal gammopathy      Mandibular abscess      Paroxysmal atrial fibrillation      Benign prostatic hyperplasia, presence of lower urinary tract symptoms unspecified, unspecified morphology      Hyperlipidemia      Barretts esophagus      S/P CABG (coronary artery bypass graft)  20 years      History of surgery  cyst removal      Mandibular abscess          Home Medications:  acetaminophen 325 mg oral tablet: 2 tab(s) orally every 6 hours, As needed, Temp greater or equal to 38C (100.4F), Mild Pain (1 - 3) (16 Mar 2023 11:42)  amLODIPine 5 mg oral tablet: 1 tab(s) orally once a day (09 Mar 2023 14:17)  cefTRIAXone: 1000 milligram(s) intravenous once a day UNTIL 3/18 (16 Mar 2023 11:40)  Eliquis 5 mg oral tablet: 1 tab(s) orally 2 times a day (09 Mar 2023 14:17)  gabapentin 100 mg oral tablet: orally twice a day (in the morning, once in the afternoon) (09 Mar 2023 14:17)  gabapentin 300 mg oral capsule: 1 cap(s) orally once a day (at bedtime) (09 Mar 2023 14:17)  insulin lispro 100 units/mL injectable solution:  injectable   Please check blood glucose levels 30 minutes before meals and at bedtime:    Give insulin lispro according to the following scale:  2 units Glu 151-200  4 units 201-250  6 units 251-300  8 units 300-350  10 units Glu 351-400  12 units Glu 400 or greater    (16 Mar 2023 12:21)  lactulose 10 g/15 mL oral syrup: 15 milliliter(s) orally 2 times a day (16 Mar 2023 11:42)  rosuvastatin 10 mg oral tablet: 1 tab(s) orally once a day (09 Mar 2023 14:17)  senna leaf extract oral tablet: 2 tab(s) orally once a day (at bedtime) (16 Mar 2023 11:18)  tamsulosin 0.4 mg oral capsule: 1 cap(s) orally once a day (at bedtime) (09 Mar 2023 14:17)  Xanax 0.25 mg oral tablet: 1 tab(s) orally every 8 hours, As Needed (09 Mar 2023 14:17)    MEDICATIONS  (STANDING):    MEDICATIONS  (PRN):  acetaminophen     Tablet .. 650 milliGRAM(s) Oral every 6 hours PRN Temp greater or equal to 38C (100.4F), Mild Pain (1 - 3)      Allergies    No Known Allergies    Intolerances        SOCIAL HX:       FAMILY HISTORY:  No pertinent family history in first degree relatives    :      PHYSICAL EXAM:    ICU Vital Signs Last 24 Hrs  T(C): 37 (25 Mar 2023 18:34), Max: 40.4 (25 Mar 2023 16:11)  T(F): 98.6 (25 Mar 2023 18:34), Max: 104.8 (25 Mar 2023 16:11)  HR: 94 (25 Mar 2023 20:31) (94 - 124)  BP: 101/55 (25 Mar 2023 20:31) (100/58 - 107/54)  BP(mean): --  ABP: --  ABP(mean): --  RR: 20 (25 Mar 2023 20:31) (20 - 40)  SpO2: 97% (25 Mar 2023 20:31) (94% - 99%)    O2 Parameters below as of 25 Mar 2023 20:31  Patient On (Oxygen Delivery Method): nasal cannula      General: frail elderly gentleman, NAD  HEENT:  NC/AT, sclera anicteric       Lungs: diminished bibasilar breath sounds, no accessory muscle use, +congestive cough  Cardiovascular: RRR; +s1, s2, no M/R/G appreciated  Gastrointestinal: abdomen soft, NTND, bowel sounds present  Musculoskeletal: strength 5/5 in all extremities.    Extremities: no peripheral edema, no clubbing, no cyanosis  Skin: Warm, dry  Neurological: A&Ox2 (to person, date, hospital), follows commands, opens eyes to voice  POCUS: bibasilar consolidations with mild-mod R pleural effusion        LABS                          8.2    18.67 )-----------( 213      ( 25 Mar 2023 21:31 )             27.1                                               03-25    136  |  94<L>  |  59<H>  ----------------------------<  562<HH>  3.8   |  29  |  1.32<H>    Ca    8.2<L>      25 Mar 2023 21:31  Phos  3.7     03-25  Mg     2.3     03-25    TPro  5.3<L>  /  Alb  2.7<L>  /  TBili  0.6  /  DBili  x   /  AST  14  /  ALT  14  /  AlkPhos  100  03-25      PT/INR - ( 25 Mar 2023 16:25 )   PT: 29.9 sec;   INR: 2.49          PTT - ( 25 Mar 2023 16:25 )  PTT:38.9 sec                                       Urinalysis Basic - ( 25 Mar 2023 21:31 )    Color: Yellow / Appearance: Turbid / S.020 / pH: x  Gluc: x / Ketone: NEGATIVE  / Bili: Negative / Urobili: 0.2 E.U./dL   Blood: x / Protein: 100 mg/dL / Nitrite: NEGATIVE   Leuk Esterase: Moderate / RBC: 5-10 /HPF / WBC Many /HPF   Sq Epi: x / Non Sq Epi: 0-5 /HPF / Bacteria: Many /HPF        CARDIAC MARKERS ( 25 Mar 2023 16:25 )  x     / 0.07 ng/mL / x     / x     / x                                                LIVER FUNCTIONS - ( 25 Mar 2023 21:31 )  Alb: 2.7 g/dL / Pro: 5.3 g/dL / ALK PHOS: 100 U/L / ALT: 14 U/L / AST: 14 U/L / GGT: x

## 2023-03-25 NOTE — H&P ADULT - HISTORY OF PRESENT ILLNESS
72M with PMHx Afib (on Eliquis, s/p DCCV 5/2022), PE, HFpEF (EF 65%), CAD s/p CABG (2005), R renal transplant (on tacrolimus, follows w/ Dr Leyva), jaw cancer s/p resection (~2019) w/ recurrence s/p chemo/RT (completed 2-3 weeks ago), JAYANT (not on CPAP), CLL (w/ chronic leukocytosis), DM2 (with humalog pump), HTN, HLD, recent admit OSH for tongue/facial swelling likely 2/2 RT c/b pleural effusion s/p pleurX drain (placed at [facility]) R chest, recently admitted for R pleural effusion s/p pleurX removal presents for fevers and AMS x 5 days at rehab center. Per family, patient was experiencing fevers to 104 at the rehab center and was sent into the ED. In the ED, patient was altered and ICU was consulted. Patient's mental status improved s/p fluid resuscitation. Patient also c/o lower abdominal pain, improved s/p elmore placement. Urine appears to be draining purulent appearing urine.     ED course:   Vitals: T: 104.8 | HR  | BP: 100/58 | RR 20 92% 6L NC   LAbs: WBC 29.31 | H/H: 8.4/27.5 | INR: 2.49 | BUN/Cr: 1.32 | glucose: 476 | trops: 0.07 | TSH: WNL  CXR: R pleural effusion with b/l consolidations   CT chest and a/p performed   EKG: afib RVR to 120s with nonspecific new TW changes V1-3 and chronic TW inversions V4-6, III, aVF  Tx: vanc, zosyn, 1L NS, tylenol 1g x1

## 2023-03-25 NOTE — H&P ADULT - ASSESSMENT
72M with PMHx Afib (on Eliquis, s/p DCCV 5/2022), PE, HFpEF (EF 65%), CAD s/p CABG (2005), R renal transplant (on tacrolimus, follows w/ Dr Leyva), jaw cancer s/p resection (~2019) w/ recurrence s/p chemo/RT (completed 2-3 weeks ago), JAYANT (not on CPAP), CLL (w/ chronic leukocytosis), DM2 (with humalog pump), HTN, HLD, recent admit OSH for tongue/facial swelling likely 2/2 RT c/b pleural effusion s/p pleurX drain (placed at [facility]) R chest, recently admitted for R pleural effusion s/p pleurX removal presents for fevers and AMS x 5 days at rehab center.

## 2023-03-25 NOTE — CONSULT NOTE ADULT - ASSESSMENT
72M with PMHx Afib (on Eliquis, s/p DCCV 5/2022), PE, HFpEF (EF 65%), CAD s/p CABG (2005), R renal transplant (on tacrolimus, follows w/ Dr Leyva), jaw cancer s/p resection (~2019) w/ recurrence s/p chemo/RT (completed 2-3 weeks ago), JAYANT (not on CPAP), CLL (w/ chronic leukocytosis), DM2 (with humalog pump), HTN, HLD, recent admit OSH for tongue/facial swelling likely 2/2 RT c/b pleural effusion s/p pleurX drain (placed at [facility]) R chest, recently admitted for R pleural effusion s/p pleurX removal presents for fevers and AMS x 5 days at rehab center. ICU team consulted for severe sepsis with hypoxia.     #Severe sepsis  Meeting 3/4 SIRS (T, HR, WBC) with negative lactate. Sources include aspiration PNA based on the fact that family admits patient experiencing difficulties with swallowing and on modified pureed diet. POCUS also notable for bibasilar consolidations. His UA is also positive to suggest UTI.   - Given recent [facility] hospitalization, would empirically treat with Vancomycin and PsA dosed zosyn  - recommend checking MRSA swab, BCx, UCx, urine strep Ag, urine legionella  - please send sputum culture  - He was only resuscitated 1L NS in ED. Given improvement in mentation with IV fluids and appearing clinically dry on exam, would benefit from 30cc/kg dosing  - May need to hold sirolimus given severe sepsis state. Would need further discussion with his oncologist. Also recommend checking sirolimus level.     #Acute hypoxic respiratory failure  Concern for desaturations, although not documented in EMR. Reportedly required NRB, however, he was weaned to 3LNC upon ICU team assessment satting 94-95%. Chronically on 2LNC. Known pulmonary history of pleural effusion s/p pleurX drain (removed this month), unclear etiology. Reason for slight increase in O2 requirement is likely aspiration pneumonia.   - Abx treatment as above  - recommend formal speech/swallow evaluation    Patient does not need telemetry admission at this time. Case discussed with Dr. Pearce. Please re-consult if needed. 72M with PMHx Afib (on Eliquis, s/p DCCV 5/2022), PE, HFpEF (EF 65%), CAD s/p CABG (2005), R renal transplant (on tacrolimus, follows w/ Dr Leyva), jaw cancer s/p resection (~2019) w/ recurrence s/p chemo/RT (completed 2-3 weeks ago), JAYANT (not on CPAP), CLL (w/ chronic leukocytosis), DM2 (with humalog pump), HTN, HLD, recent admit OSH for tongue/facial swelling likely 2/2 RT c/b pleural effusion s/p pleurX drain (placed at [facility]) R chest, recently admitted for R pleural effusion s/p pleurX removal presents for fevers and AMS x 5 days at rehab center. ICU team consulted for severe sepsis with hypoxia.     #Severe sepsis  Meeting 3/4 SIRS (T, HR, WBC) with negative lactate. Sources include aspiration PNA (family admits patient experiencing difficulties with swallowing and on modified pureed diet) and possible UTI. POCUS also notable for bibasilar consolidations.   - Given recent [facility] hospitalization, would empirically treat with Vancomycin and PsA dosed zosyn  - recommend checking MRSA swab, BCx, UCx, urine strep Ag, urine legionella  - please send sputum culture  - He was only resuscitated 1L NS in ED. Given improvement in mentation with IV fluids and appearing clinically dry on exam, would benefit from 30cc/kg dosing  - May need to hold sirolimus given severe sepsis state. Would need further discussion with his oncologist. Also recommend checking sirolimus level.     #Acute hypoxic respiratory failure  Concern for desaturations, although not documented in EMR. Reportedly required NRB, however, he was weaned to 3LNC upon ICU team assessment satting 94-95%. Chronically on 2LNC. Known pulmonary history of pleural effusion s/p pleurX drain (removed this month), unclear etiology. Reason for slight increase in O2 requirement is likely aspiration pneumonia.   - Abx treatment as above  - recommend formal speech/swallow evaluation    Patient does not need telemetry admission at this time. Case discussed with Dr. Pearce. Please re-consult if needed.

## 2023-03-25 NOTE — H&P ADULT - PROBLEM SELECTOR PLAN 8
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  Follows with oncologist at [facility], was recently given few chemo doses but decision made to stop chemo and proceed with radiation therapy.   - Followup with oncologist. Patient with chronic renal insufficiency, s/p R renal transplant, was previously on tacrolimus 2mg AM and 1mg PM which was discontinued during recent admission. Overlap dosing of tacrolimus and sirolimus for three days. Tacrolimus level and sirolimus lvl within range recent admission  - f/u repeat sirolimus range and restart, sirolimus 1mg qd   - f/u with Dr Carreno

## 2023-03-25 NOTE — H&P ADULT - PROBLEM SELECTOR PLAN 1
SIRS + with evidence of pneumonia on CXR/CT chest and possibly a UTI. Patient with AMS, cough, fever found to have acute hypoxic respiratory failure (AHRF) etiology likely pneumonia.  At baseline intermittently uses 2L NC at home, now with increased O2 demand i/s/o ?aspiration pneumonia and moderate pleural effusions.  - MRSA swab  - f/u UA  - f/u blood cultures  - c/w vanc + zosyn to cover for HAP and UTI  - s/p 1L in ED, c/w maintenance IVF -cautious fluids i/s/o known dCHF  - f/u official CT chest   - Pleural fluid recently tested suggestive of exudative effusion likely 2/2 to diastolic HF hold torsemide 60 mg qd i/s/o sepsis, cautiously diurese as tolerated    #r/o UTI  purulent urine in [place]  - f/u repeat UA  - broad spectrum Abx as above SIRS + with evidence of pneumonia on CXR/CT chest and possibly a UTI. Patient with AMS, cough, fever found to have acute hypoxic respiratory failure (AHRF) etiology likely pneumonia.  At baseline intermittently uses 2L NC at home, now with increased O2 demand i/s/o ?aspiration pneumonia and moderate pleural effusions.  - MRSA swab  - f/u UA  - f/u blood cultures  - c/w vanc + zosyn to cover for HAP and UTI  - s/p 1L in ED, c/w maintenance IVF -cautious fluids i/s/o known dCHF  - f/u official CT chest     #r/o UTI  purulent urine in [place]  - f/u repeat UA  - broad spectrum Abx as above SIRS + with evidence of pneumonia on CXR/CT chest and possibly a UTI. Patient with AMS, cough, fever found to have acute hypoxic respiratory failure (AHRF) etiology likely pneumonia.  At baseline intermittently uses 2L NC at home, now with increased O2 demand i/s/o ?aspiration pneumonia and moderate pleural effusions.  - f/u official CT chest   - MRSA swab  - f/u UA  - f/u blood cultures  - c/w vanc + zosyn to cover for HAP and UTI  - s/p 1L in ED, c/w maintenance IVF -cautious fluids i/s/o known dCHF      #r/o UTI  purulent urine in [place]  - f/u repeat UA  - broad spectrum Abx as above

## 2023-03-25 NOTE — H&P ADULT - NSHPPHYSICALEXAM_GEN_ALL_CORE
VITAL SIGNS:  Vital Signs Last 24 Hrs  T(C): 37 (25 Mar 2023 18:34), Max: 40.4 (25 Mar 2023 16:11)  T(F): 98.6 (25 Mar 2023 18:34), Max: 104.8 (25 Mar 2023 16:11)  HR: 94 (25 Mar 2023 20:31) (94 - 124)  BP: 101/55 (25 Mar 2023 20:31) (100/58 - 107/54)  BP(mean): --  RR: 20 (25 Mar 2023 20:31) (20 - 40)  SpO2: 97% (25 Mar 2023 20:31) (94% - 99%)    Parameters below as of 25 Mar 2023 20:31  Patient On (Oxygen Delivery Method): nasal cannula      I&O's Summary      PHYSICAL EXAM:    General: elderly male, laying suping with eyes closed easily arousable, NAD on 6L NC  HEENT: PERRL, anicteric sclera; MMM  Cardiovascular: +S1/S2; irregularly irregular rhythm  Respiratory: B/L rhonchi with decreased breath sounds at b/l bases  Gastrointestinal: soft, NT/ND; +BSx4  Extremities: WWP; no edema  Vascular: 2+ radial, DP/PT pulses B/L  Neurological: AAOx3, moves all extremities

## 2023-03-25 NOTE — ED PROVIDER NOTE - OBJECTIVE STATEMENT
70 y/o M with a PMHx of DM, Afib, kidney transplant, and recent hospitalization with pleural effusion as per family is presenting at the ED for recent discharge from hospital s/p pleural effusion drainage and as per family pt with increasing confusion and respiratory distress for the last 5d. Pt also endorsing abdominal discomfort. Unable to obtain further details from prior charts as we're in down time and their are issues with accessing [facility].

## 2023-03-25 NOTE — H&P ADULT - PROBLEM SELECTOR PLAN 10
home medication 5mg Eliquis BID   c/w home meds Patient with history of hypertension, home medications isosorbide mononitrate 30mg QD, amlodipine 5mg QD, torsemide 20mg BID  - holding imdur 30mg PO QD iso soft pressures and sepsis, restart as tolerated  - holding amlodipine 5mg PO QD  - hold home torsemide 60mg qd

## 2023-03-25 NOTE — ED PROVIDER NOTE - PHYSICAL EXAMINATION
VITAL SIGNS: I have reviewed nursing notes and confirm.  CONSTITUTIONAL: Ill-appearing; in no acute distress. Grunts to verbal stimulation.  SKIN: Very hot to touch. Agree with RN documentation regarding decubitus evaluation. Remainder of skin exam is warm and dry, no acute rash.  HEAD: Normocephalic; atraumatic.  EYES: PERRL, EOM intact; conjunctiva and sclera clear.  ENT: Dry mucous membranes. No nasal discharge; airway clear.  NECK: Supple; non tender.  CARD: Tachycardic, irregular. No peripheral edema.  RESP: Tachypneic, shallow respirations. Lungs diminished bilaterally with breath sounds louder on the left side.   ABD: Normal bowel sounds; soft; non-distended; tender at the right lower abdomen over kidney transplant.   EXT: Normal ROM. No clubbing, cyanosis or edema.  LYMPH: No acute cervical adenopathy.  NEURO: Alert, oriented. Grossly unremarkable.  PSYCH: Cooperative, appropriate.

## 2023-03-25 NOTE — ED PROVIDER NOTE - CLINICAL SUMMARY MEDICAL DECISION MAKING FREE TEXT BOX
70 y/o M presenting from a nursing home with concerns for severe sepsis. Exam showing suspicions for PNA and recurrent pleural effusion. Also concerning for possible pyelonephritis of kidney transplant as pt very tender over transplant. Pt given broad spectrum antibiotics and Tylenol. Will initially give 1L normal saline rather than sepsis weight base due to concerns for pulmonary edema and reassess. Pt may need intubation for airway protection if there is no improvement with initial resuscitation. Will consult ICU and obtain imaging.

## 2023-03-25 NOTE — ED CLERICAL - NS ED CLERK NOTE PRE-ARRIVAL INFORMATION; ADDITIONAL PRE-ARRIVAL INFORMATION
This patient is enrolled in the Heart Failure STARS readmission reduction initiative and has active care navigation. This patient can be followed up by the care navigation team within 24 hours. To arrange close follow-up or to obtain additional clinical information, please call the care navigation contact number above..  For in house Cardiology patients, please call the Cardiology consult fellow at [phone] for ALL PATIENTS with a cardiac issue PRIOR to disposition if you are considering admission.

## 2023-03-25 NOTE — H&P ADULT - PROBLEM SELECTOR PLAN 5
Hgb on admission 8.4 (baseline 10) likely ALEXANDER vs AOCD in setting of renal insufficiency. Currently no signs of active bleeding (no hematochezia, melena, hemoptysis, hematuria)  - macrocytic anemia- B12 and folate wnl  - Fe panel Fe low, TIBC low, ferritin wnl likely 2/2 kidney disease. Not candidate for EPO iso active CLL    #SMV thrombosis  incidental finding of SMV thrombosis occluding SMV on CT while on eliquis 5mg BID  - pt without abdominal pain on exam  vascular consulted, recommend AC if c/f embolism  heme onc consulted following  - f/u JAK2, prothrombin gene mutation, Factor V Leiden, anticardiolipin antibody, [id number] antibody, DRVVT, hexagonal phase for lupus anticoagulant Hgb on admission 8.4 (baseline 10) likely ALEXANDER vs AOCD in setting of renal insufficiency. Currently no signs of active bleeding (no hematochezia, melena, hemoptysis, hematuria)  - macrocytic anemia- B12 and folate wnl  - Fe panel Fe low, TIBC low, ferritin wnl likely 2/2 kidney disease. Not candidate for EPO iso active CLL    #SMV thrombosis  incidental finding of SMV thrombosis occluding SMV on CT while on eliquis 5mg BID  - pt without abdominal pain on exam  vascular and heme/onc consulted recent admission, recommend AC if c/f embolism Patient with history of HFpEF presenting with worsened hypoxia, found to have b/l consolidations and pleural effusions  - strict I/Os, monitor UOP  - daily weights  - hold torsemide 40 QD i/s/o sepsis, will consider IV diuresis  - c/w Farxiga QD

## 2023-03-25 NOTE — H&P ADULT - PROBLEM SELECTOR PLAN 11
s/p CABG 2005, home medication Rosuvastatin 10mg daily.  EKG with new nonspecific TW changes V1-3 i/s/o afib, however currently no chest pain, trop 0.07.  - trend trops to peak  - f/u repeat EKG  - c/w home meds    #Hyperlipidemia  home medication Rosuvastatin 10mg daily   - c/w home meds  #GERD. 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

## 2023-03-25 NOTE — H&P ADULT - PROBLEM SELECTOR PLAN 12
F: s/p 1L NS  E: replete prn  N: NPO   DVT ppx: eliquis   Dispo: [facility] s/p CABG 2005, home medication Rosuvastatin 10mg daily.  EKG with new nonspecific TW changes V1-3 i/s/o afib, however currently no chest pain, trop 0.07.  - trend trops to peak  - f/u repeat EKG  - c/w home meds    #Hyperlipidemia  home medication Rosuvastatin 10mg daily   - c/w home meds  #GERD. 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

## 2023-03-25 NOTE — H&P ADULT - NSHPLABSRESULTS_GEN_ALL_CORE
LABS:                        8.4    29.31 )-----------( 279      ( 25 Mar 2023 16:25 )             27.5     03-25    136  |  93<L>  |  56<H>  ----------------------------<  476<HH>  4.5   |  28  |  1.32<H>    Ca    8.6      25 Mar 2023 16:25  Phos  3.7     03-25  Mg     2.3     03-25    TPro  5.7<L>  /  Alb  2.9<L>  /  TBili  0.6  /  DBili  x   /  AST  21  /  ALT  15  /  AlkPhos  101  03-25    PT/INR - ( 25 Mar 2023 16:25 )   PT: 29.9 sec;   INR: 2.49          PTT - ( 25 Mar 2023 16:25 )  PTT:38.9 sec    CAPILLARY BLOOD GLUCOSE      POCT Blood Glucose.: 546 mg/dL (25 Mar 2023 21:02)      RADIOLOGY & ADDITIONAL TESTS: Reviewed.

## 2023-03-25 NOTE — H&P ADULT - PROBLEM SELECTOR PLAN 4
Patient with history of HFpEF presenting with worsened hypoxia, found to have b/l consolidations and pleural effusions  - strict I/Os, monitor UOP  - daily weights  - hold torsemide 40 QD i/s/o sepsis, will consider IV diuresis  - hold Farxiga QD, restart once passes S&S Patient with history of HFpEF presenting with worsened hypoxia, found to have b/l consolidations and pleural effusions  - strict I/Os, monitor UOP  - daily weights  - hold torsemide 40 QD i/s/o sepsis, will consider IV diuresis  - c/w Farxiga QD baseline Cr 0.58-0.65, discharged with Cr 1.1 on 3/16. Presents with Cr of 1.3. Suspect prerenal i/s/o sepsis. R/o ascending infection/pyelonephritis  - f/u UA  - f/u urine lytes  - f/u CT A/P official read baseline Cr 0.58-0.65, discharged with Cr 1.1 on 3/16. Presents with Cr of 1.3. Suspect prerenal i/s/o sepsis. R/o ascending infection/pyelonephritis. No CVA tenderness on exam  - f/u UA  - f/u urine lytes  - f/u CT A/P official read

## 2023-03-25 NOTE — H&P ADULT - PROBLEM SELECTOR PLAN 9
Patient with history of hypertension, home medications isosorbide mononitrate 30mg QD, amlodipine 5mg QD, torsemide 20mg BID  - holding imdur 30mg PO QD iso soft pressures and sepsis, restart as toelrated  - holding amlodipine 5mg PO QD  - hold home torsemide 60mg qd Patient with history jaw mass s/p resection (~2019) w/ recurrence, recently hospitalized (9/18/22-9/20/22) for transoral partial R  space/infratemporal fossa mass resection/dental extraction.  Follows with oncologist at [facility], was recently given few chemo doses but decision made to stop chemo and proceed with radiation therapy.   - Followup with oncologist.

## 2023-03-26 NOTE — SWALLOW BEDSIDE ASSESSMENT ADULT - COMMENTS
Pt known from prior admission. FEES conducted 3/11/23, revealed mild-moderate oral and mild pharyngeal dysphagia, with preserved airway protection but pharyngeal swallow inefficiency. Recs were for minced/moist and thin liquids.

## 2023-03-26 NOTE — SWALLOW BEDSIDE ASSESSMENT ADULT - SWALLOW EVAL: DIAGNOSIS
Cannot rule out aspiration on bedside/clinical exam. While FEES on prior admission did not reveal airway protection deficits, given pt admitted with PNA with respiratory insufficiency and reduced secretion management, hx dysphagia, hx jaw cancer s/p resection w/ recurrence s/p chemo/RT, with unclear overt signs of aspiration at bedside vs cough at rest, pt would benefit from a VFSS/MBSS for further assessment.

## 2023-03-26 NOTE — SWALLOW BEDSIDE ASSESSMENT ADULT - SLP GENERAL OBSERVATIONS
Pt received awake, alert. Dysphonic vocal quality with intermittent periods of aphonia, likely related to reduced breath support for speech in setting of hypoxia. 3L O2 via NC.

## 2023-03-26 NOTE — SWALLOW BEDSIDE ASSESSMENT ADULT - PHARYNGEAL PHASE
Wet vocal quality at rest cleared with sips of thin liquid. Frequent coughing throughout all trials, potentially suggestive of aspiration or r/t cough at rest.

## 2023-03-26 NOTE — CHART NOTE - NSCHARTNOTEFT_GEN_A_CORE
Summary of events: Notified by F team that pt was desatting again, as low as 70s-80s on 5-9L NC. Placed on NRB 15L and was satting 88-94%  Vitals ~2am on NRB: /65, HR 99, O2 93%, breathing comfortably in NAD.  ROS: pt without any complaints, denies any pain.  Exam:   General: NAD  Pulm: (+) diffuse crackles in posterior lung fields  CV: (+) irregular rhythm  GI: abdomen soft NTND  Extremities: legs wwp, no edema  Neuro: A&O x3  A/P:  #Hypoxia  Pt placed on 6L NC and observed on zoll monitor - was satting 90s with good waveform for at least 15-20 minutes, in no respiratory distress, comfortable.   Likely 2/2 pleural effusions and PNA.   - would continue PNA treatment with broad IV Abx, narrow pending cultures  - would obtain collateral as to whether he uses CPAP outside of hospital, given hx of JAYANT as he may be intermittently desatting from apneic episodes  - see critical care consult note earlier in evening for further details  - discussed with RMF team and night intensivist- pt does not need telemetry at this time, please call back with any changes in clinical status or concerns

## 2023-03-26 NOTE — PATIENT PROFILE ADULT - FUNCTIONAL SCREEN CURRENT LEVEL: COMMUNICATION, MLM
dexmethylpheniDATE (Focalin XR) 15 MG 24 hr capsule 30 capsule 0 9/14/2020     Sig - Route: Take 1 capsule by mouth daily. - Oral          Last Medication Check: 6/17/2020  Last Medication Refill: 9/14/2020  Medication Check Due By: 12/23/2020  PDMP checked   Dexmethylphenidate HCl  15MG / Capsule Extended Release 24 Hour  Rx# [id number] Stimulant Qty: 30  Days: 30  Refills: 0 Prescribed: 9/14/2020  Dispensed: 9/14/2020  Sold: 9/15/2020 KAY GOULD  [facility] [address] [place]  [address]  [facility] [id number]      Fill @ [employer] on [address] in [place]   2 = difficulty understanding and speaking (not related to language barrier)

## 2023-03-26 NOTE — PROGRESS NOTE ADULT - PROBLEM SELECTOR PLAN 5
Patient with history of HFpEF presenting with worsened hypoxia, found to have b/l consolidations and pleural effusions  - strict I/Os, monitor UOP  - daily weights  - hold torsemide 40 QD i/s/o sepsis, will consider IV diuresis  - c/w Farxiga QD

## 2023-03-26 NOTE — PROGRESS NOTE ADULT - PROBLEM SELECTOR PLAN 1
SIRS + with evidence of pneumonia on CXR/CT chest and possibly a UTI. Patient with AMS, cough, fever found to have acute hypoxic respiratory failure (AHRF) etiology likely pneumonia.  At baseline intermittently uses 2L NC at home, now with increased O2 demand i/s/o ?aspiration pneumonia and moderate pleural effusions.  - f/u official CT chest   - MRSA swab  - f/u UA  - f/u blood cultures  - c/w vanc + zosyn to cover for HAP and UTI  - s/p 1L in ED, c/w maintenance IVF -cautious fluids i/s/o known dCHF      #r/o UTI  purulent urine in [place]  - f/u repeat UA  - broad spectrum Abx as above

## 2023-03-26 NOTE — PROGRESS NOTE ADULT - PROBLEM SELECTOR PLAN 12
s/p CABG 2005, home medication Rosuvastatin 10mg daily.  EKG with new nonspecific TW changes V1-3 i/s/o afib, however currently no chest pain, trop 0.07.  - trend trops to peak  - f/u repeat EKG  - c/w home meds    #Hyperlipidemia  home medication Rosuvastatin 10mg daily   - c/w home meds  #GERD. 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

## 2023-03-26 NOTE — PROGRESS NOTE ADULT - PROBLEM SELECTOR PLAN 8
Patient with chronic renal insufficiency, s/p R renal transplant, was previously on tacrolimus 2mg AM and 1mg PM which was discontinued during recent admission. Overlap dosing of tacrolimus and sirolimus for three days. Tacrolimus level and sirolimus lvl within range recent admission  - f/u repeat sirolimus range and restart, sirolimus 1mg qd   - f/u with Dr Carreno

## 2023-03-26 NOTE — PROGRESS NOTE ADULT - SUBJECTIVE AND OBJECTIVE BOX
Internal Medicine Teaching Service  Progress Note    Patient: Vel Singletary Date of Service: 7/12/2018   YOB: 1972 Admission Date: 7/10/2018   MRN: [id number] Attending: Jessica Alston MD       SUBJECTIVE     Vel Singletary is a 46 year old male who was admitted on 7/10/2018 for shortness of breath and nonproductive cough. Was found to have low risk subacute segmental pulmonary embolism on CT-A.    Yesterday he was complaining of cough and irritiation, so was given Robitussin and tessalon pearles with relief. He was also complaining of gastroesophageal reflex, so famotidine was started. This morning he continued to have \"epigastric tightening\" per nursing, so ordered EKG, which was normal. Pain comes when patient burps, and he says that he has this pain at home.     Aside from epigastric pain, patient feels well this morning. Denies other pain.. No hemoptysis. Eating and drinking without difficulty. No chest pain or SOB. He has no concerns and overall feels well.    OBJECTIVE     Scheduled Medications   enoxaparin (LOVENOX) injection 1.5 mg/kg Daily   famotidine 20 mg 2 times per day   sodium chloride (PF) 2 mL 2 times per day     PHYSICAL EXAM   Vital signs:    Visit Vitals  /75 (BP Location: RUE, Patient Position: Semi-Ruiz's)   Pulse 94   Temp 98.5 °F (36.9 °C) (Oral)   Resp 16   Ht 5' 6\" (1.676 m)   Wt 100 kg   SpO2 94%   BMI 35.58 kg/m²     Gen:              NAD, comfortable appearing male  HEENT:        PERRL, anicteric, no conjunctival pallor, no nasal discharge  CVS:              RRR, no M/R/G, S1/S2 normal, no JVD, no B/L LE edema  Pulm:           CTA bilaterally, no W/C/R, no retractions or increased work of breathing  GI:                 Well-healed midline abdominal scar, obese, +BS, soft, NT/ND  MSK:             ROM normal throughout, strength 5/5 in all extremities  Skin:             No rashes, lesions, ecchymoses or jaundice  Neuro:          A/Ox3, no gross motor or  Interval Events: Reviewed  Patient seen and examined at bedside.    Patient is a 72y old  Male who presents with a chief complaint of fever and AMS at rehab center.  hypoxic event overnight, on bipap.    PAST MEDICAL & SURGICAL HISTORY:  Barretts esophagus      CRI (chronic renal insufficiency)      DM (diabetes mellitus)      GERD (gastroesophageal reflux disease)      HTN (hypertension)      Monoclonal gammopathy      Mandibular abscess      Paroxysmal atrial fibrillation      Benign prostatic hyperplasia, presence of lower urinary tract symptoms unspecified, unspecified morphology      Hyperlipidemia      Barretts esophagus      S/P CABG (coronary artery bypass graft)  20 years      History of surgery  cyst removal      Mandibular abscess          MEDICATIONS:  Pulmonary:    Antimicrobials:  piperacillin/tazobactam IVPB.. 4.5 Gram(s) IV Intermittent every 8 hours  vancomycin  IVPB 1000 milliGRAM(s) IV Intermittent every 24 hours    Anticoagulants:  apixaban 5 milliGRAM(s) Oral every 12 hours    Cardiac:      Allergies    No Known Allergies    Intolerances        Vital Signs Last 24 Hrs  T(C): 36.4 (26 Mar 2023 04:38), Max: 40.4 (25 Mar 2023 16:11)  T(F): 97.5 (26 Mar 2023 04:38), Max: 104.8 (25 Mar 2023 16:11)  HR: 82 (26 Mar 2023 05:13) (82 - 124)  BP: 106/69 (26 Mar 2023 04:38) (100/58 - 107/54)  BP(mean): --  RR: 17 (26 Mar 2023 05:13) (17 - 40)  SpO2: 97% (26 Mar 2023 05:13) (77% - 99%)    Parameters below as of 26 Mar 2023 05:13  Patient On (Oxygen Delivery Method): BiPAP/CPAP    O2 Concentration (%): 40        Review of Systems:   •	General: negative  •	Skin/Breast: negative  •	Ophthalmologic: negative  •	ENMT: negative  •	Respiratory and Thorax: negative  •	Cardiovascular: negative  •	Gastrointestinal: negative  •	Genitourinary: negative  •	Musculoskeletal: negative  •	Neurological: negative  •	Psychiatric: negative  •	Hematology/Lymphatics: negative  •	Endocrine: negative  •	Allergic/Immunologic: negative    Physical Exam:   • Constitutional:	elderly male, NAD  • Eyes:	EOMI; PERRL; no drainage or redness  • ENMT:	No oral lesions; no gross abnormalities  • Neck	no thyromegaly or nodules  • Breasts:	not examined  • Back:	No deformity or limitation of movement  • Respiratory:	Breath Sounds equal & clear to auscultation, no accessory muscle use, rhonchi, decreased breath sounds at base  • Cardiovascular:	Regular rate & rhythm, normal S1, S2; no murmurs, gallops or rubs; no S3, S4  • Gastrointestinal:	Soft, non-tender, no hepatosplenomegaly, normal bowel sounds  • Genitourinary:	not examined  • Rectal: not examined  • Extremities:	No cyanosis, clubbing or edema  • Vascular:	Equal and normal pulses (dorsalis pedis)  • Neurologica:l	not examined  • Skin:	No lesions; no rash  • Lymph Nodes:	No lymphadedenopathy  • Musculoskeletal:	No joint pain, swelling or deformity; no limitation of movement        LABS:      CBC Full  -  ( 25 Mar 2023 21:31 )  WBC Count : 18.67 K/uL  RBC Count : 2.53 M/uL  Hemoglobin : 8.2 g/dL  Hematocrit : 27.1 %  Platelet Count - Automated : 213 K/uL  Mean Cell Volume : 107.1 fl  Mean Cell Hemoglobin : 32.4 pg  Mean Cell Hemoglobin Concentration : 30.3 gm/dL  Auto Neutrophil # : 3.46 K/uL  Auto Lymphocyte # : 14.79 K/uL  Auto Monocyte # : 0.33 K/uL  Auto Eosinophil # : 0.00 K/uL  Auto Basophil # : 0.03 K/uL  Auto Neutrophil % : 18.5 %  Auto Lymphocyte % : 79.2 %  Auto Monocyte % : 1.8 %  Auto Eosinophil % : 0.0 %  Auto Basophil % : 0.2 %        136  |  94<L>  |  59<H>  ----------------------------<  562<HH>  3.8   |  29  |  1.32<H>    Ca    8.2<L>      25 Mar 2023 21:31  Phos  3.7     03-25  Mg     2.3     03-25    TPro  5.3<L>  /  Alb  2.7<L>  /  TBili  0.6  /  DBili  x   /  AST  14  /  ALT  14  /  AlkPhos  100  03-25    PT/INR - ( 25 Mar 2023 16:25 )   PT: 29.9 sec;   INR: 2.49          PTT - ( 25 Mar 2023 16:25 )  PTT:38.9 sec      Urinalysis Basic - ( 25 Mar 2023 21:31 )    Color: Yellow / Appearance: Turbid / S.020 / pH: x  Gluc: x / Ketone: NEGATIVE  / Bili: Negative / Urobili: 0.2 E.U./dL   Blood: x / Protein: 100 mg/dL / Nitrite: NEGATIVE   Leuk Esterase: Moderate / RBC: 5-10 /HPF / WBC Many /HPF   Sq Epi: x / Non Sq Epi: 0-5 /HPF / Bacteria: Many /HPF              Culture Results:   No growth at 12 hours ( @ 16:25)  Culture Results:   No growth at 12 hours ( @ 16:18)      RADIOLOGY & ADDITIONAL STUDIES (The following images were personally reviewed):  Lucas:                                     No  Urine output:                       adequate  DVT prophylaxis:                 Yes  Flattus:                                  Yes  Bowel movement:              No   sensory deficits, no facial droop or dysarthria  Psych:          Appropriate mood/affect    I&O'S / LABS / IMAGING     I/Os:      Intake/Output Summary (Last 24 hours) at 07/12/18 0637  Last data filed at 07/12/18 0620   Gross per 24 hour   Intake              580 ml   Output                0 ml   Net              580 ml       LAB RESULTS    Recent Labs  Lab 07/11/18  0242 07/10/18  1545   WBC 9.5 9.5   HCT 36.9* 39.3   HGB 11.1* 11.9*   * 471*       Recent Labs  Lab 07/10/18  1545 07/10/18  1147   SODIUM 137 139   POTASSIUM 4.0 4.0   CHLORIDE 101 101   CO2 27 27   GLUCOSE 90 131*   BUN 8 9   CREATININE 0.68 0.63*     ASSESSMENT & PLAN     This patient is a 46 year old male with a PMH as above that presented with the following problems:     # Low risk subacute segmental pulmonary embolism. CT-A shows small right upper lobe segmental pulmonary embolus with no septal flattening or right heart strain. Patient is otherwise stable with normal vitals and 97% on 3L. PESI class II (score: 85), low bleeding risk. Colon cancer and recent surgery are the likely provoking factors for his PE. Was started on LMW Heparin infusion.   - Enoxaparin 1.5 mg/kg. Will discharge on this medication.  - Monitor for desaturation, hypotension  - Patient will require long term anticoagulation for at least 3-6 months upon discharge     # Stage IV Colon cancer s/p left colectomy (6/9/18). Diffuse carcinomatosis with peritoneal implants as well as a liver mass with biopsy positive adenocarcinoma. Colectomy procedure 6/9/18 went well with no complications. Denies any hematochezia or other GI complaints.  - Currently follows up with his outpatient oncologist is Dr. Frank Burnette. Patients presentation and plan was discussed with Dr. Burnette who agrees with the plan is will be available for consult.  - Patient will be going to [place] soon for further treatment and management of his colon cancer.      # Mild anemia. Hgb 11.9 upon  admission. Baseline Hgb appears to be around 10. No signs of external or internal bleeding. Otherwise asymptomatic.   - Continue to monitor     # Gastroesophageal reflux disease  -Famotidine 20 mg BID    # Cough: likely due to irritaton from pulomary embolism  - PRN  benzonatate, guaifenesin, cepacol lozenges  - For severe cough has Norco, which provides him the most relief    FEN: Normal diet     Dispo: Inpatient, to be discharged home pending insurance covering Lovenox.     Quality Indicators   Heart failure? No  Stroke measures indicated? no  AMI? NO  Pneumonia? NO  DVT/VTE Prophylaxis:  VTE Pharmacologic Prophylaxis: Yes  VTE Mechanical Prophylaxis: Yes    Signed:  Anderson Arriaga MD  PGY-1 Transitional Year Resident  Pager: [phone]  07/12/186:35 AM    Please contact cross cover [phone] ) during the following time periods:   Mon-Fri: 8p-7a   Sat/Sun: 11a-7a  Thank you!

## 2023-03-26 NOTE — SWALLOW BEDSIDE ASSESSMENT ADULT - SWALLOW EVAL: RECOMMENDED DIET
Recs for NPO pending MBSS. Can provide ice chips/sips as tolerated with strict aspiration precautions.

## 2023-03-26 NOTE — PROGRESS NOTE ADULT - PROBLEM SELECTOR PLAN 2
Pleural fluid recently tested suggestive of exudative effusion likely 2/2 to diastolic HF   - hold torsemide 60 mg qd i/s/o sepsis  - cautiously diurese as tolerated    #Hx of tongue swelling  per family, patient recently admitted at [facility] for tongue/facial swelling and was started on steroids. During recent admission to [facility] discharged on prednisone 5mg qd  - collateral on hx and prednisone, end date  - c/w pred for now

## 2023-03-26 NOTE — PROGRESS NOTE ADULT - PROBLEM SELECTOR PLAN 10
Patient with history of hypertension, home medications isosorbide mononitrate 30mg QD, amlodipine 5mg QD, torsemide 20mg BID  - holding imdur 30mg PO QD iso soft pressures and sepsis, restart as tolerated  - holding amlodipine 5mg PO QD  - hold home torsemide 60mg qd

## 2023-03-26 NOTE — SWALLOW BEDSIDE ASSESSMENT ADULT - SLP PERTINENT HISTORY OF CURRENT PROBLEM
ESRD on dialysis 72M with PMHx Afib (on Eliquis, s/p DCCV 5/2022), PE, HFpEF (EF 65%), CAD s/p CABG (2005), R renal transplant (on tacrolimus, follows w/ Dr Leyva), jaw cancer s/p resection (~2019) w/ recurrence s/p chemo/RT (completed 2-3 weeks ago), JAYANT (not on CPAP), CLL (w/ chronic leukocytosis), DM2 (with humalog pump), HTN, HLD, recent admit OSH for tongue/facial swelling likely 2/2 RT c/b pleural effusion s/p pleurX drain (placed at [facility]) R chest, recently admitted for R pleural effusion s/p pleurX removal presents for fevers and AMS x 5 days at rehab center. Acute pulmonary edema

## 2023-03-26 NOTE — CONSULT NOTE ADULT - SUBJECTIVE AND OBJECTIVE BOX
Patient is a 72y old  Male who presents with a chief complaint of fever and AMS at rehab center (26 Mar 2023 07:16)      HPI:  72M with PMHx renal tx (with recent switch from tacrolimus to sirolimus) jaw cancer DM HTN who presented to the ED with complaints of fever and AMS for a few days as per family was having fever as high as 104 at the nursing home-> in the ED patient found to have BL PNA with a grossly positive UA patient given vanc zosyn labs notable for BUN/Cr 64/1.3 K 4.0 bicarb 28 patient initially placed on BIPAP now on NC nephrology consulted          PAST MEDICAL & SURGICAL HISTORY:  Barretts esophagus      CRI (chronic renal insufficiency)      DM (diabetes mellitus)      GERD (gastroesophageal reflux disease)      HTN (hypertension)      Monoclonal gammopathy      Mandibular abscess      Paroxysmal atrial fibrillation      Benign prostatic hyperplasia, presence of lower urinary tract symptoms unspecified, unspecified morphology      Hyperlipidemia      Barretts esophagus      S/P CABG (coronary artery bypass graft)  20 years      History of surgery  cyst removal      Mandibular abscess            Allergies:  No Known Allergies      Home Medications:   acetaminophen     Tablet .. 650 milliGRAM(s) Oral every 6 hours PRN  apixaban 5 milliGRAM(s) Oral every 12 hours  atorvastatin 40 milliGRAM(s) Oral at bedtime  dextrose 5%. 1000 milliLiter(s) IV Continuous <Continuous>  dextrose 5%. 1000 milliLiter(s) IV Continuous <Continuous>  dextrose 50% Injectable 25 Gram(s) IV Push once  dextrose 50% Injectable 12.5 Gram(s) IV Push once  dextrose 50% Injectable 25 Gram(s) IV Push once  dextrose Oral Gel 15 Gram(s) Oral once PRN  glucagon  Injectable 1 milliGRAM(s) IntraMuscular once  insulin glargine Injectable (LANTUS) 10 Unit(s) SubCutaneous at bedtime  insulin lispro (ADMELOG) corrective regimen sliding scale   SubCutaneous three times a day before meals  piperacillin/tazobactam IVPB.. 4.5 Gram(s) IV Intermittent every 8 hours  predniSONE   Tablet 5 milliGRAM(s) Oral daily  sirolimus 1 milliGRAM(s) Oral every 24 hours  vancomycin  IVPB 1000 milliGRAM(s) IV Intermittent every 24 hours      Hospital Medications:   MEDICATIONS  (STANDING):  apixaban 5 milliGRAM(s) Oral every 12 hours  atorvastatin 40 milliGRAM(s) Oral at bedtime  dextrose 5%. 1000 milliLiter(s) (50 mL/Hr) IV Continuous <Continuous>  dextrose 5%. 1000 milliLiter(s) (100 mL/Hr) IV Continuous <Continuous>  dextrose 50% Injectable 25 Gram(s) IV Push once  dextrose 50% Injectable 12.5 Gram(s) IV Push once  dextrose 50% Injectable 25 Gram(s) IV Push once  glucagon  Injectable 1 milliGRAM(s) IntraMuscular once  insulin glargine Injectable (LANTUS) 10 Unit(s) SubCutaneous at bedtime  insulin lispro (ADMELOG) corrective regimen sliding scale   SubCutaneous three times a day before meals  piperacillin/tazobactam IVPB.. 4.5 Gram(s) IV Intermittent every 8 hours  predniSONE   Tablet 5 milliGRAM(s) Oral daily  sirolimus 1 milliGRAM(s) Oral every 24 hours  vancomycin  IVPB 1000 milliGRAM(s) IV Intermittent every 24 hours      SOCIAL HISTORY:  Denies ETOh, Smoking,     Family History:  FAMILY HISTORY:  No pertinent family history in first degree relatives          VITALS:  T(F): 99 (23 @ 08:53), Max: 104.8 (23 @ 16:11)  HR: 84 (23 @ 09:39)  BP: 98/59 (23 @ 09:39)  RR: 21 (23 @ 09:39)  SpO2: 100% (23 @ 09:39)  Wt(kg): --      Weight (kg): 65.8 ( @ 23:23)  CAPILLARY BLOOD GLUCOSE      POCT Blood Glucose.: 193 mg/dL (26 Mar 2023 11:58)  POCT Blood Glucose.: 289 mg/dL (26 Mar 2023 08:35)  POCT Blood Glucose.: 289 mg/dL (26 Mar 2023 07:45)  POCT Blood Glucose.: 472 mg/dL (26 Mar 2023 02:16)  POCT Blood Glucose.: 494 mg/dL (26 Mar 2023 02:15)  POCT Blood Glucose.: 499 mg/dL (26 Mar 2023 00:38)  POCT Blood Glucose.: 546 mg/dL (25 Mar 2023 21:02)  POCT Blood Glucose.: 474 mg/dL (25 Mar 2023 15:51)      Review of Systems:  all other ROS negative     PHYSICAL EXAM:  GENERAL: Alert, awake, oriented x3 on NC  CHEST/LUNG: Bilateral clear breath sounds  HEART: Regular rate and rhythm, no murmur, no gallops, no rub   ABDOMEN: Soft, nontender, non distended  EXTREMITIES: no pedal edema    LABS:      137  |  97  |  64<H>  ----------------------------<  316<H>  4.0   |  28  |  1.30    Ca    9.0      26 Mar 2023 07:59  Phos  4.2       Mg     2.5         TPro  5.8<L>  /  Alb  2.6<L>  /  TBili  0.6  /  DBili      /  AST  21  /  ALT  13  /  AlkPhos  102      Creatinine Trend: 1.30 <--, 1.32 <--, 1.32 <--                        8.6    18.55 )-----------( 225      ( 26 Mar 2023 07:59 )             28.5     Urine Studies:  Urinalysis Basic - ( 25 Mar 2023 21:31 )    Color: Yellow / Appearance: Turbid / S.020 / pH:   Gluc:  / Ketone: NEGATIVE  / Bili: Negative / Urobili: 0.2 E.U./dL   Blood:  / Protein: 100 mg/dL / Nitrite: NEGATIVE   Leuk Esterase: Moderate / RBC: 5-10 /HPF / WBC Many /HPF   Sq Epi:  / Non Sq Epi: 0-5 /HPF / Bacteria: Many /HPF      Chloride, Random Urine: <20 mmol/L ( @ 17:40)  Sodium, Random Urine: <20 mmol/L ( @ 17:40)  Creatinine, Random Urine: 50 mg/dL ( @ 17:40)  Osmolality, Random Urine: 395 mosm/kg ( @ 17:40)  Potassium, Random Urine: 21 mmol/L ( @ 17:40)

## 2023-03-26 NOTE — PATIENT PROFILE ADULT - FALL HARM RISK - UNIVERSAL INTERVENTIONS
Bed in lowest position, wheels locked, appropriate side rails in place/Call bell, personal items and telephone in reach/Instruct patient to call for assistance before getting out of bed or chair/Non-slip footwear when patient is out of bed/[place] to call system/Physically safe environment - no spills, clutter or unnecessary equipment/Purposeful Proactive Rounding/Room/bathroom lighting operational, light cord in reach

## 2023-03-26 NOTE — PROGRESS NOTE ADULT - PROBLEM SELECTOR PLAN 4
baseline Cr 0.58-0.65, discharged with Cr 1.1 on 3/16. Presents with Cr of 1.3. Suspect prerenal i/s/o sepsis. R/o ascending infection/pyelonephritis. No CVA tenderness on exam  - f/u UA  - f/u urine lytes  - f/u CT A/P official read

## 2023-03-26 NOTE — CONSULT NOTE ADULT - ASSESSMENT
72M with PMHx renal tx (with recent switch from tacrolimus to sirolimus) jaw cancer DM HTN who presented to the ED with complaints of fever and AMS found to have BL pNA- nephrology consulted    Assessment/Plan:   #renal transplant  #baseline Cr around 1.1  can continue with sirolimus  IVF if patient to remain  NPO  i's and o's  daily BMP      Thank you for the opportunity to participate in the care of your patient. The nephrology service remains available to assist with any questions or concerns. Please feel free to reach us by paging the on-call nephrology fellow for urgent issues or as below.     Madhuri Turcios D>O  PGY-5, Nephrology Fellow   P: [phone]

## 2023-03-27 NOTE — DIETITIAN INITIAL EVALUATION ADULT - PROBLEM SELECTOR PLAN 3
#DM  Home medications insulin humalog 15-24u 2x daily. S/p 10U regular insulin in ED  - POCT glucose checks  - dose bedtime lantus once weight in

## 2023-03-27 NOTE — DIETITIAN INITIAL EVALUATION ADULT - PROBLEM SELECTOR PLAN 2
Pleural fluid recently tested suggestive of exudative effusion likely 2/2 to diastolic HF   - hold torsemide 60 mg qd i/s/o sepsis  - cautiously diurese as tolerated    #Hx of tongue swelling  per family, patient recently admitted at [facility] for tongue/facial swelling and was started on steroids. During recent admission to [facility] discharged on prednisone 5mg qd  - collateral on hx and prednisone, end date  - c/w pred for now

## 2023-03-27 NOTE — DIETITIAN INITIAL EVALUATION ADULT - PERTINENT LABORATORY DATA
03-27    135  |  95<L>  |  59<H>  ----------------------------<  171<H>  3.7   |  28  |  1.17    Ca    8.7      27 Mar 2023 05:30  Phos  4.0     03-27  Mg     2.5     03-27    TPro  5.4<L>  /  Alb  2.4<L>  /  TBili  0.6  /  DBili  x   /  AST  25  /  ALT  16  /  AlkPhos  91  03-27  POCT Blood Glucose.: 186 mg/dL (03-27-23 @ 05:59)  A1C with Estimated Average Glucose Result: 6.0 % (03-09-23 @ 10:15)  A1C with Estimated Average Glucose Result: 7.5 % (11-23-22 @ 12:04)  A1C with Estimated Average Glucose Result: 6.9 % (09-19-22 @ 05:30)

## 2023-03-27 NOTE — DIETITIAN INITIAL EVALUATION ADULT - NS FNS DIET ORDER
CHIEF COMPLAINT:    SUBJECTIVE:     REVIEW OF SYSTEMS:    CONSTITUTIONAL: (  )  weakness,  (  ) fevers or chills  EYES/ENT: (  )visual changes;     NECK: (  ) pain or stiffness  RESPIRATORY:   (  )cough, wheezing, hemoptysis;  (  ) shortness of breath  CARDIOVASCULAR:  (  )chest pain or palpitations  GASTROINTESTINAL:   (  )abdominal or epigastric pain.  (  ) nausea, vomiting, or hematemesis;   (   ) diarrhea or constipation.   GENITOURINARY:   (    ) dysuria, frequency or hematuria  NEUROLOGICAL:  (   ) numbness or weakness   All other review of systems is negative unless indicated above    Vital Signs Last 24 Hrs  T(C): 36.4 (25 Apr 2021 05:09), Max: 36.9 (24 Apr 2021 12:27)  T(F): 97.6 (25 Apr 2021 05:09), Max: 98.4 (24 Apr 2021 12:27)  HR: 88 (25 Apr 2021 05:09) (78 - 91)  BP: 113/63 (25 Apr 2021 05:09) (113/63 - 137/90)  BP(mean): --  RR: 18 (25 Apr 2021 05:09) (18 - 18)  SpO2: 97% (25 Apr 2021 05:09) (97% - 98%)    I&O's Summary      CAPILLARY BLOOD GLUCOSE          PHYSICAL EXAM:    Constitutional:  (   ) NAD,   (   )awake and alert  HEENT: PERR, EOMI,    Neck: Soft and supple, No LAD, No JVD  Respiratory:  (    Breath sounds are clear bilaterally,    (   ) wheezing, rales or rhonchi  Cardiovascular:     (   )S1 and S2, regular rate and rhythm, no Murmurs, gallops or rubs  Gastrointestinal:  (   )Bowel Sounds present, soft,   (  )nontender, nondistended,    Extremities:    (  ) peripheral edema  Vascular: 2+ peripheral pulses  Neurological:    (    )A/O x 3,   (  ) focal deficits  Musculoskeletal:    (   )  normal strength b/l upper  (     ) normal  lower extremities  Skin: No rashes    MEDICATIONS:  MEDICATIONS  (STANDING):  apixaban 5 milliGRAM(s) Oral two times a day  atorvastatin 10 milliGRAM(s) Oral at bedtime  budesonide 160 MICROgram(s)/formoterol 4.5 MICROgram(s) Inhaler 2 Puff(s) Inhalation two times a day  carvedilol 25 milliGRAM(s) Oral every 12 hours  ciprofloxacin   IVPB 400 milliGRAM(s) IV Intermittent every 12 hours  DULoxetine 30 milliGRAM(s) Oral daily  melatonin 3 milliGRAM(s) Oral at bedtime  metroNIDAZOLE  IVPB 500 milliGRAM(s) IV Intermittent every 8 hours  oxyCODONE    IR 5 milliGRAM(s) Oral every 6 hours  pantoprazole    Tablet 40 milliGRAM(s) Oral before breakfast  pregabalin 300 milliGRAM(s) Oral two times a day  tamsulosin 0.4 milliGRAM(s) Oral at bedtime  valsartan 40 milliGRAM(s) Oral daily      LABS: All Labs Reviewed:                        9.4    3.99  )-----------( 227      ( 25 Apr 2021 07:58 )             33.0     04-25    143  |  108<H>  |  22  ----------------------------<  82  4.0   |  29  |  0.66    Ca    8.5      25 Apr 2021 07:58  Phos  2.9     04-25  Mg     1.9     04-25    TPro  6.2  /  Alb  2.6<L>  /  TBili  0.2  /  DBili  x   /  AST  12  /  ALT  8   /  AlkPhos  59  04-25          Blood Culture: 04-23 @ 23:14  Organism --  Gram Stain Blood -- Gram Stain --  Specimen Source .Urine Clean Catch (Midstream)  Culture-Blood --    04-22 @ 21:41  Organism --  Gram Stain Blood -- Gram Stain --  Specimen Source .Stool Feces  Culture-Blood --    04-21 @ 22:59  Organism --  Gram Stain Blood -- Gram Stain --  Specimen Source .Blood Blood-Peripheral  Culture-Blood --      Urine Culture      RADIOLOGY/EKG:    ASSESSMENT AND PLAN:    DVT PPX:    ADVANCED DIRECTIVE:    DISPOSITION: CHIEF COMPLAINT:  patient is to be diarrhea also complaining of pain scheduled for MRI of the abdomen regarding his liver mass    SUBJECTIVE:     REVIEW OF SYSTEMS: Diarrhea and back pain    CONSTITUTIONAL: (  )  weakness,  (  ) fevers or chills  EYES/ENT: (  )visual changes;     NECK: (  ) pain or stiffness  RESPIRATORY:   (  )cough, wheezing, hemoptysis;  (  ) shortness of breath  CARDIOVASCULAR:  (  )chest pain or palpitations  GASTROINTESTINAL:   (  )abdominal or epigastric pain.  (  ) nausea, vomiting, or hematemesis;   (   ) diarrhea or constipation.   GENITOURINARY:   (    ) dysuria, frequency or hematuria  NEUROLOGICAL:  (   ) numbness or weakness   All other review of systems is negative unless indicated above    Vital Signs Last 24 Hrs  T(C): 36.4 (25 Apr 2021 05:09), Max: 36.9 (24 Apr 2021 12:27)  T(F): 97.6 (25 Apr 2021 05:09), Max: 98.4 (24 Apr 2021 12:27)  HR: 88 (25 Apr 2021 05:09) (78 - 91)  BP: 113/63 (25 Apr 2021 05:09) (113/63 - 137/90)  BP(mean): --  RR: 18 (25 Apr 2021 05:09) (18 - 18)  SpO2: 97% (25 Apr 2021 05:09) (97% - 98%)    I&O's Summary      CAPILLARY BLOOD GLUCOSE          PHYSICAL EXAM:    Constitutional:  ( x  ) NAD,   ( x  )awake and alert  HEENT: PERR, EOMI,    Neck: Soft and supple, No LAD, No JVD  Respiratory:  ( x   Breath sounds are clear bilaterally,    (   ) wheezing, rales or rhonchi  Cardiovascular:     (  x )S1 and S2, regular rate and rhythm, no Murmurs, gallops or rubs  Gastrointestinal:  (  x )Bowel Sounds present, soft,   ( x )nontender,   + distended,    Extremities:    (x  ) peripheral edema  Vascular: 2+ peripheral pulses  Neurological:    (  x  )A/O x 3,   (  ) focal deficits  Musculoskeletal:    (   )  normal strength b/l upper  (     ) normal  lower extremities  Skin: No rashes    MEDICATIONS:  MEDICATIONS  (STANDING):  apixaban 5 milliGRAM(s) Oral two times a day  atorvastatin 10 milliGRAM(s) Oral at bedtime  budesonide 160 MICROgram(s)/formoterol 4.5 MICROgram(s) Inhaler 2 Puff(s) Inhalation two times a day  carvedilol 25 milliGRAM(s) Oral every 12 hours  ciprofloxacin   IVPB 400 milliGRAM(s) IV Intermittent every 12 hours  DULoxetine 30 milliGRAM(s) Oral daily  melatonin 3 milliGRAM(s) Oral at bedtime  metroNIDAZOLE  IVPB 500 milliGRAM(s) IV Intermittent every 8 hours  oxyCODONE    IR 5 milliGRAM(s) Oral every 6 hours  pantoprazole    Tablet 40 milliGRAM(s) Oral before breakfast  pregabalin 300 milliGRAM(s) Oral two times a day  tamsulosin 0.4 milliGRAM(s) Oral at bedtime  valsartan 40 milliGRAM(s) Oral daily      LABS: All Labs Reviewed:                        9.4    3.99  )-----------( 227      ( 25 Apr 2021 07:58 )             33.0     04-25    143  |  108<H>  |  22  ----------------------------<  82  4.0   |  29  |  0.66    Ca    8.5      25 Apr 2021 07:58  Phos  2.9     04-25  Mg     1.9     04-25    TPro  6.2  /  Alb  2.6<L>  /  TBili  0.2  /  DBili  x   /  AST  12  /  ALT  8   /  AlkPhos  59  04-25          Blood Culture: 04-23 @ 23:14  Organism --  Gram Stain Blood -- Gram Stain --  Specimen Source .Urine Clean Catch (Midstream)  Culture-Blood --    04-22 @ 21:41  Organism --  Gram Stain Blood -- Gram Stain --  Specimen Source .Stool Feces  Culture-Blood --    04-21 @ 22:59  Organism --  Gram Stain Blood -- Gram Stain --  Specimen Source .Blood Blood-Peripheral  Culture-Blood --      Urine Culture      RADIOLOGY/EKG:    ASSESSMENT AND PLAN:  68-year-old male with history of CAD CHF admitted due to diarrhea secondary to colitis C. difficile negative and antibiotics Cipro and Flagyl still has diarrhea also his CAT scan showed he had liver lesion suspicious of malignancy schedule for MRI GI is on the board he also had a colonoscopy and endoscopy at [facility] result not available unable to reach get the result.  GI will follow the patient continue off diuretic due to his diarrhea is complaining of pain will increase his oxycodone to 10 mg every 6 hour as needed.  Discussed with patient in medical team in detail  DVT PPX:    ADVANCED DIRECTIVE:    DISPOSITION: NPO

## 2023-03-27 NOTE — DIETITIAN INITIAL EVALUATION ADULT - OTHER CALCULATIONS
Based on Standards of Care pt within % IBW (102%) thus actual body weight used for all calculations (144.8#). Needs adjusted for advanced age, CHF, PU. Fluid recs per team.

## 2023-03-27 NOTE — DIETITIAN INITIAL EVALUATION ADULT - PROBLEM SELECTOR PLAN 7
Patient with history of CLL, follows with oncology at [facility]. No lymphadenopathy appreciated on physical exam.  - f/u outpatient

## 2023-03-27 NOTE — DIETITIAN INITIAL EVALUATION ADULT - PERTINENT MEDS FT
MEDICATIONS  (STANDING):  apixaban 5 milliGRAM(s) Oral every 12 hours  atorvastatin 40 milliGRAM(s) Oral at bedtime  dextrose 5%. 1000 milliLiter(s) (50 mL/Hr) IV Continuous <Continuous>  dextrose 5%. 1000 milliLiter(s) (100 mL/Hr) IV Continuous <Continuous>  dextrose 50% Injectable 25 Gram(s) IV Push once  dextrose 50% Injectable 12.5 Gram(s) IV Push once  dextrose 50% Injectable 25 Gram(s) IV Push once  glucagon  Injectable 1 milliGRAM(s) IntraMuscular once  insulin glargine Injectable (LANTUS) 10 Unit(s) SubCutaneous at bedtime  insulin lispro (ADMELOG) corrective regimen sliding scale   SubCutaneous every 6 hours  piperacillin/tazobactam IVPB.. 4.5 Gram(s) IV Intermittent every 8 hours  predniSONE   Tablet 5 milliGRAM(s) Oral daily  sirolimus 1 milliGRAM(s) Oral every 24 hours  sodium chloride 0.9%. 1000 milliLiter(s) (100 mL/Hr) IV Continuous <Continuous>    MEDICATIONS  (PRN):  acetaminophen     Tablet .. 650 milliGRAM(s) Oral every 6 hours PRN Temp greater or equal to 38C (100.4F), Mild Pain (1 - 3)  dextrose Oral Gel 15 Gram(s) Oral once PRN Blood Glucose LESS THAN 70 milliGRAM(s)/deciliter

## 2023-03-27 NOTE — PROGRESS NOTE ADULT - SUBJECTIVE AND OBJECTIVE BOX
Patient is a 72y Male seen and evaluated at bedside. patient on nasal cannula appears improved from a resp status this AM /66 K 3.7 bicarb 28 SCr 1.1       Meds:    acetaminophen     Tablet .. 650 every 6 hours PRN  apixaban 5 every 12 hours  atorvastatin 40 at bedtime  dextrose 5%. 1000 <Continuous>  dextrose 5%. 1000 <Continuous>  dextrose 50% Injectable 25 once  dextrose 50% Injectable 12.5 once  dextrose 50% Injectable 25 once  dextrose Oral Gel 15 once PRN  glucagon  Injectable 1 once  insulin glargine Injectable (LANTUS) 10 at bedtime  insulin lispro (ADMELOG) corrective regimen sliding scale  three times a day before meals  piperacillin/tazobactam IVPB.. 4.5 every 8 hours  predniSONE   Tablet 5 daily  sirolimus 1 every 24 hours  sodium chloride 0.9%. 1000 <Continuous>      T(C): , Max: 36.9 (23 @ 13:26)  T(F): , Max: 98.5 (23 @ 13:26)  HR: 77 (23 @ 05:56)  BP: 102/66 (23 @ 06:07)  BP(mean): --  RR: 18 (23 @ 05:56)  SpO2: 93% (23 @ 05:56)  Wt(kg): --     @ 07:01  -   @ 07:00  --------------------------------------------------------  IN: 0 mL / OUT: 950 mL / NET: -950 mL          Review of Systems:  all other ROS negative      PHYSICAL EXAM:  GENERAL: resting in bed on NC  CHEST/LUNG: decreased breath sounds at the bases  HEART: normal S1S2, RRR  ABDOMEN: Soft, Nontender, +BS,   EXTREMITIES: No clubbing, cyanosis, or edema         LABS:                        8.4    15.20 )-----------( 214      ( 27 Mar 2023 05:30 )             28.5         135  |  95<L>  |  59<H>  ----------------------------<  171<H>  3.7   |  28  |  1.17    Ca    8.7      27 Mar 2023 05:30  Phos  4.0       Mg     2.5         TPro  5.4<L>  /  Alb  2.4<L>  /  TBili  0.6  /  DBili  x   /  AST  25  /  ALT  16  /  AlkPhos  91        PT/INR - ( 25 Mar 2023 16:25 )   PT: 29.9 sec;   INR: 2.49          PTT - ( 25 Mar 2023 16:25 )  PTT:38.9 sec  Urinalysis Basic - ( 25 Mar 2023 21:31 )    Color: Yellow / Appearance: Turbid / S.020 / pH: x  Gluc: x / Ketone: NEGATIVE  / Bili: Negative / Urobili: 0.2 E.U./dL   Blood: x / Protein: 100 mg/dL / Nitrite: NEGATIVE   Leuk Esterase: Moderate / RBC: 5-10 /HPF / WBC Many /HPF   Sq Epi: x / Non Sq Epi: 0-5 /HPF / Bacteria: Many /HPF      Chloride, Random Urine: <20 mmol/L ( @ 17:40)  Sodium, Random Urine: <20 mmol/L ( @ 17:40)  Creatinine, Random Urine: 50 mg/dL ( @ 17:40)  Osmolality, Random Urine: 395 mosm/kg ( @ 17:40)  Potassium, Random Urine: 21 mmol/L ( @ 17:40)        RADIOLOGY & ADDITIONAL STUDIES:

## 2023-03-27 NOTE — DIETITIAN INITIAL EVALUATION ADULT - NSFNSPHYEXAMSKINFT_GEN_A_CORE
Pressure Injury 1: sacrum, Unstageable  Pressure Injury 2: Left:,heel, Suspected deep tissue injury  Pressure Injury 3: none, none  Pressure Injury 4: none, none  Pressure Injury 5: none, none  Pressure Injury 6: none, none  Pressure Injury 7: none, none  Pressure Injury 8: none, none  Pressure Injury 9: none, none  Pressure Injury 10: none, none  Pressure Injury 11: none, none, Pressure Injury 1: sacrum, Unstageable  Pressure Injury 2: Left:,heel, Suspected deep tissue injury  Pressure Injury 3: none, none  Pressure Injury 4: none, none  Pressure Injury 5: none, none  Pressure Injury 6: none, none  Pressure Injury 7: none, none  Pressure Injury 8: none, none  Pressure Injury 9: none, none  Pressure Injury 10: none, none  Pressure Injury 11: none, none

## 2023-03-27 NOTE — PROGRESS NOTE ADULT - PROBLEM SELECTOR PLAN 2
Pleural fluid recently tested suggestive of exudative effusion likely 2/2 to diastolic HF   - hold torsemide 60 mg qd i/s/o sepsis  - cautiously diurese as tolerated    #Hx of tongue swelling  per family, patient recently admitted at [facility] for tongue/facial swelling and was started on steroids. During recent admission to [facility] discharged on prednisone 5mg qd  - collateral on hx and prednisone, end date  - c/w pred for now

## 2023-03-27 NOTE — DIETITIAN INITIAL EVALUATION ADULT - PROBLEM SELECTOR PLAN 6
Hgb on admission 8.4 (baseline 10) likely ALEXANDER vs AOCD in setting of renal insufficiency. Stable from recent admission  - macrocytic anemia- B12 and folate wnl  - Fe panel Fe low, TIBC low, ferritin wnl likely 2/2 kidney disease. Not candidate for EPO iso active CLL    #SMV thrombosis  incidental finding of SMV thrombosis occluding SMV on CT while on eliquis 5mg BID, likely subacute per recent notes. vascular and heme/onc consulted recent admission. pt without abdominal pain on exam  - c/w home eliquis 5mg BID

## 2023-03-27 NOTE — PROGRESS NOTE ADULT - ATTENDING COMMENTS
I:   Cr 1.1. Breathing comfortably..  A: Stable CKD.Bilateral pulm infiltrates.   P: Pulm/ID consult. Folllow SCr.

## 2023-03-27 NOTE — PROGRESS NOTE ADULT - SUBJECTIVE AND OBJECTIVE BOX
----- INTERVAL HPI/OVERNIGHT EVENTS -----     Patient was seen and examined at bedside. As per nurse and patient, no o/n events, patient resting comfortably. No complaints at this time. Patient denies: fever, chills, dizziness, weakness, HA, Changes in vision, CP, palpitations, SOB, cough, N/V/D/C, dysuria, changes in bowel movements, LE edema. ROS otherwise negative.      Subjective: Patient is a 72y old  Male who presents with a chief complaint of fever and AMS at [facility] center (26 Mar 2023 07:16)      ----- VITAL SIGNS -----  T(F): 98.5 (23 @ 05:56)  HR: 77 (23 @ 05:56)  BP: 102/66 (23 @ 06:07)  RR: 18 (23 @ 05:56)  SpO2: 93% (23 @ 05:56)  Wt(kg): --      23 @ 07:01  -  23 @ 07:00  --------------------------------------------------------  IN: 0 mL / OUT: 950 mL / NET: -950 mL        ----- Physical Exam -----     Constitutional: resting comfortably in bed; NAD  HEENT:anicteric sclera, no nasal discharge; uvula midline, no oropharyngeal erythema or exudates  Respiratory: CTA B/L; decrease breath sounds on bases  Cardiac: +S1/S2; RRR; no M/R/G appreciated  Gastrointestinal: abdomen soft, NT/ND, +BS, no rebound tenderness or guarding  Back: no CVAT B/L  Extremities: legs WWP, no clubbing or cyanosis; no peripheral edema  Vascular: 2+ distal pedal pulses B/L  Dermatologic: skin warm, dry and intact; no rashes, wounds, or scars  Neurologic: AAOx3; CNII-XII grossly intact; strength 5/5 and sensation intact in all 4 extremities; no focal deficits  Psychiatric: affect and characteristics of appearance, verbalizations, behaviors are appropriate    MEDICATIONS  (STANDING):  apixaban 5 milliGRAM(s) Oral every 12 hours  atorvastatin 40 milliGRAM(s) Oral at bedtime  dextrose 5%. 1000 milliLiter(s) (50 mL/Hr) IV Continuous <Continuous>  dextrose 5%. 1000 milliLiter(s) (100 mL/Hr) IV Continuous <Continuous>  dextrose 50% Injectable 25 Gram(s) IV Push once  dextrose 50% Injectable 12.5 Gram(s) IV Push once  dextrose 50% Injectable 25 Gram(s) IV Push once  glucagon  Injectable 1 milliGRAM(s) IntraMuscular once  insulin glargine Injectable (LANTUS) 10 Unit(s) SubCutaneous at bedtime  insulin lispro (ADMELOG) corrective regimen sliding scale   SubCutaneous three times a day before meals  piperacillin/tazobactam IVPB.. 4.5 Gram(s) IV Intermittent every 8 hours  predniSONE   Tablet 5 milliGRAM(s) Oral daily  sirolimus 1 milliGRAM(s) Oral every 24 hours  sodium chloride 0.9%. 1000 milliLiter(s) (100 mL/Hr) IV Continuous <Continuous>    MEDICATIONS  (PRN):  acetaminophen     Tablet .. 650 milliGRAM(s) Oral every 6 hours PRN Temp greater or equal to 38C (100.4F), Mild Pain (1 - 3)  dextrose Oral Gel 15 Gram(s) Oral once PRN Blood Glucose LESS THAN 70 milliGRAM(s)/deciliter      Allergies    No Known Allergies    Intolerances        ----- LABS -----                         8.4    15.20 )-----------( 214      ( 27 Mar 2023 05:30 )             28.5         135  |  95<L>  |  59<H>  ----------------------------<  171<H>  3.7   |  28  |  1.17    Ca    8.7      27 Mar 2023 05:30  Phos  4.0       Mg     2.5         TPro  5.4<L>  /  Alb  2.4<L>  /  TBili  0.6  /  DBili  x   /  AST  25  /  ALT  16  /  AlkPhos  91      PT/INR - ( 25 Mar 2023 16:25 )   PT: 29.9 sec;   INR: 2.49          PTT - ( 25 Mar 2023 16:25 )  PTT:38.9 sec  Urinalysis Basic - ( 25 Mar 2023 21:31 )    Color: Yellow / Appearance: Turbid / S.020 / pH: x  Gluc: x / Ketone: NEGATIVE  / Bili: Negative / Urobili: 0.2 E.U./dL   Blood: x / Protein: 100 mg/dL / Nitrite: NEGATIVE   Leuk Esterase: Moderate / RBC: 5-10 /HPF / WBC Many /HPF   Sq Epi: x / Non Sq Epi: 0-5 /HPF / Bacteria: Many /HPF          ----- RADIOLOGY & ADDITIONAL TESTS -----     Reviewed

## 2023-03-27 NOTE — DIETITIAN INITIAL EVALUATION ADULT - OTHER INFO
72M with PMHx Afib (on Eliquis, s/p DCCV 5/2022), PE, HFpEF (EF 65%), CAD s/p CABG (2005), R renal transplant (on tacrolimus, follows w/ Dr Leyva), jaw cancer s/p resection (~2019) w/ recurrence s/p chemo/RT (completed 2-3 weeks ago), JAYANT (not on CPAP), CLL (w/ chronic leukocytosis), DM2 (with humalog pump), HTN, HLD, recent admit OSH for tongue/facial swelling likely 2/2 RT c/b pleural effusion s/p pleurX drain (placed at [facility]) R chest, recently admitted for R pleural effusion s/p pleurX removal presents for fevers and AMS x 5 days at rehab center. Per family, patient was experiencing fevers to 104 at the rehab center and was sent into the ED. In the ED, patient was altered and ICU was consulted. Patient's mental status improved s/p fluid resuscitation. Patient also c/o lower abdominal pain, improved s/p elmore placement. Urine appears to be draining purulent appearing urine.     Patient seen at bedside for nutrition consult. Pt refused RD interview d/t frustration over NPO status-reports "I don't need a dietitian right now." Current diet order: NPO. NKFA. Per S&S recommendation, pending MBS. No height entered in EMR, however pt reports he is 5'6". Dosing weight: 144.8 pounds, BMI 23.4. Isac score 10. Noted with unstageable PU to sacrum, DTI to L heel. No edema documented. BS elevated-insulin regimen as ordered. Observed with no overt signs and symptoms of muscle or fat wasting. Based on ASPEN guidelines, pt does not meet criteria for malnutrition. No cultural ethnic, [place] food preferences noted. Pt made aware RD remains available. See nutrition recommendations below.

## 2023-03-27 NOTE — DIETITIAN INITIAL EVALUATION ADULT - ADD RECOMMEND
1. Continue with diet order-diet advancement per team and SLP, when cleared for PO, recommend adding Chun 1x/day (80 kcal, 14 g essential amino acids, per serving) for wound healing  2. Encourage pt to meed nutritional needs as able   3. Monitor labs: electrolytes, cmp, fingersticks   4. Monitor weights   5. Pain and bowel regimen per team   6. Will continue to assess/honor food preferences as able  7. Obtain diet and weight history upon follow up

## 2023-03-27 NOTE — DIETITIAN INITIAL EVALUATION ADULT - PROBLEM SELECTOR PLAN 9
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  Follows with oncologist at [facility], was recently given few chemo doses but decision made to stop chemo and proceed with radiation therapy.   - Followup with oncologist.

## 2023-03-28 NOTE — PHYSICAL THERAPY INITIAL EVALUATION ADULT - TRANSFER SKILLS, REHAB EVAL
Subjective:      Patient ID: Tiana Salazar is a 36 y.o. male. Chief Complaint   Patient presents with    Back Pain     foraminal stenosis and spondylosis L5-S1- would like to about a nerve block     HPI Initial new patient consult, referred via Dr. Ana Jeffries for lumbar spinal procedures    Pain Assessment  Location of Pain: Back  Severity of Pain: 8  Quality of Pain: Sharp  Duration of Pain: Persistent  Frequency of Pain: Intermittent  Aggravating Factors: Bending,Stretching,Straightening,Kneeling,Squatting,Standing,Walking,Stairs  Limiting Behavior: Yes  Relieving Factors: Rest  Result of Injury: No  Work-Related Injury: No    Allergies   Allergen Reactions    Penicillin V        Outpatient Medications Marked as Taking for the 1/11/22 encounter (Office Visit) with PIYUSH Caba CNP   Medication Sig Dispense Refill    famotidine (PEPCID) 20 MG tablet Take 20 mg by mouth 2 times daily         Past Medical History:   Diagnosis Date    Foraminal stenosis of lumbar region     L5-S1    GERD (gastroesophageal reflux disease)     Headache     migraines    History of herniorrhaphy     HTN (hypertension)     Spinal stenosis     Spondylosis of lumbosacral region     L5-S1       Past Surgical History:   Procedure Laterality Date    COLONOSCOPY  2019    HERNIA REPAIR      VASECTOMY  11/12/2021       History reviewed. No pertinent family history.     Social History     Socioeconomic History    Marital status:      Spouse name: None    Number of children: None    Years of education: None    Highest education level: None   Occupational History    None   Tobacco Use    Smoking status: Never Smoker    Smokeless tobacco: Never Used   Substance and Sexual Activity    Alcohol use: Yes     Comment: socially    Drug use: None    Sexual activity: None   Other Topics Concern    None   Social History Narrative    None     Social Determinants of Health     Financial Resource Strain:     Difficulty of Paying Living Expenses: Not on file   Food Insecurity:     Worried About Running Out of Food in the Last Year: Not on file    Ran Out of Food in the Last Year: Not on file   Transportation Needs:     Lack of Transportation (Medical): Not on file    Lack of Transportation (Non-Medical): Not on file   Physical Activity:     Days of Exercise per Week: Not on file    Minutes of Exercise per Session: Not on file   Stress:     Feeling of Stress : Not on file   Social Connections:     Frequency of Communication with Friends and Family: Not on file    Frequency of Social Gatherings with Friends and Family: Not on file    Attends Nondenominational Services: Not on file    Active Member of [address] [place] [address] [employer] or Organizations: Not on file    Attends Club or Organization Meetings: Not on file    Marital Status: Not on file   Intimate Partner Violence:     Fear of Current or Ex-Partner: Not on file    Emotionally Abused: Not on file    Physically Abused: Not on file    Sexually Abused: Not on file   Housing Stability:     Unable to Pay for Housing in the Last Year: Not on file    Number of [place] in the Last Year: Not on file    Unstable Housing in the Last Year: Not on file     Review of Systems   Constitutional: Negative for activity change. HENT: Negative. Eyes: Negative. Respiratory: Negative. Gastrointestinal: Positive for blood in stool (from NSAIDs). Endocrine: Negative. Genitourinary: Negative. Musculoskeletal: Positive for arthralgias, back pain and myalgias. Skin: Negative. Allergic/Immunologic: Negative. Neurological: Negative for weakness and numbness. Hematological: Does not bruise/bleed easily. Psychiatric/Behavioral: Positive for sleep disturbance. Objective:   Physical Exam  Vitals reviewed. Constitutional:       General: He is awake. He is not in acute distress. Appearance: Normal appearance. He is well-developed, well-groomed and normal weight.  He is not ill-appearing, toxic-appearing or diaphoretic. Interventions: He is not intubated. HENT:      Head: Normocephalic and atraumatic. Right Ear: Hearing and external ear normal.      Left Ear: Hearing and external ear normal.   Eyes:      General: Lids are normal.   Cardiovascular:      Rate and Rhythm: Normal rate. Pulmonary:      Effort: Pulmonary effort is normal. No tachypnea, bradypnea, accessory muscle usage, prolonged expiration, respiratory distress or retractions. He is not intubated. Abdominal:      General: Abdomen is flat. Palpations: Abdomen is soft. Musculoskeletal:      Right lower leg: No edema. Left lower leg: No edema. Right foot: No Charcot foot or foot drop. Left foot: No Charcot foot or foot drop. Skin:     General: Skin is warm and dry. Capillary Refill: Capillary refill takes less than 2 seconds. Coloration: Skin is not ashen, jaundiced or pale. Findings: No rash. Nails: There is no clubbing. Neurological:      Mental Status: He is alert and oriented to person, place, and time. Cranial Nerves: No cranial nerve deficit. Psychiatric:         Attention and Perception: Attention normal.         Mood and Affect: Mood and affect normal.         Speech: Speech normal.         Behavior: Behavior is cooperative. Cognition and Memory: Cognition normal.         Judgment: Judgment normal.       Assessment / Plan:       Diagnosis Orders   1. Lumbar facet joint syndrome     2. Panniculitis involving lumbar region     3. Chronic bilateral low back pain without sciatica          Lumbar spine xray reviewed, Dr. Chiqui Torres note reviewed. Will schedule bilateral L5-S1 diagnostic MBB. If no relief, zia L5 TFE    Informed consent has not been obtained for procedure. Grace Dacosta not on blood thinners. Medications to hold have been reviewed with patient. Blood thinners must be held with permission from your cardiologist or primary care physician. A letter is not required to besent to PCP/Cardiologist regarding holding medications for procedure to decrease bleeding risk. needs device and assist

## 2023-03-28 NOTE — CONSULT NOTE ADULT - ATTENDING COMMENTS
mild CKD post txp stable  cont rx for infection -- seems to be clinically improved   ok to cont current IS-- not on MMF
History as per above. Minimal increase in oxygen requirements most likely driven by sepsis most likely due to lung source. Plan as per above. Does not need telemetry or ICU at this time.
Initial attending contact date  3/28/23    . See fellow note written above for details. I reviewed the fellow documentation. I have personally seen and examined this patient. I reviewed vitals, labs, medications, cardiac studies, and additional imaging. I agree with the above fellow's findings and plans as written above with the following additions/statements.    -Pt with known HFpEF readmitted with PNA/UTI. BNP elevated 9800, last 7000  on prior admit; however BNP elevated in setting of sepsis on admission  -On exam appears non toxic, euvolemic. Received 1 L IVF in EF and IVF @ 100cc/hr on floor  -Remains afebrile with downtrending WBC.   -Given sepsis appears to be resolving, would resume home torsemide 40 mg po qd to maintain euvolemia  - Hold farxiga in setting of PNA/UTI

## 2023-03-28 NOTE — CONSULT NOTE ADULT - SUBJECTIVE AND OBJECTIVE BOX
Cardiology Consult    O/N:  Interval History: patient was seen and examined in am. Noted to be receiving fluids.       OBJECTIVE  Vitals:  T(C): 36.3 (03-28-23 @ 12:26), Max: 36.5 (03-27-23 @ 20:10)  HR: 89 (03-28-23 @ 12:26) (62 - 89)  BP: 94/60 (03-28-23 @ 12:26) (94/60 - 110/56)  RR: 19 (03-28-23 @ 12:26) (19 - 19)  SpO2: 98% (03-28-23 @ 12:26) (94% - 99%)  Wt(kg): --    I/O:  I&O's Summary    27 Mar 2023 07:01  -  28 Mar 2023 07:00  --------------------------------------------------------  IN: 0 mL / OUT: 650 mL / NET: -650 mL        PHYSICAL EXAM:  GEN: Awake, comfortable.  HEENT: NCAT, JVP  RESP: CTA b/l  CV: RRR, normal s1/s2. No m/r/g.  ABD: Soft, NTND. BS+  EXT: Warm. No edema  	  LABS:                        8.4    15.20 )-----------( 214      ( 27 Mar 2023 05:30 )             28.5     03-27    135  |  95<L>  |  59<H>  ----------------------------<  171<H>  3.7   |  28  |  1.17    Ca    8.7      27 Mar 2023 05:30  Phos  4.0     03-27  Mg     2.5     03-27    TPro  5.4<L>  /  Alb  2.4<L>  /  TBili  0.6  /  DBili  x   /  AST  25  /  ALT  16  /  AlkPhos  91  03-27          RADIOLOGY & ADDITIONAL TESTS:  Reviewed .    MEDICATIONS  (STANDING):  apixaban 5 milliGRAM(s) Oral every 12 hours  atorvastatin 40 milliGRAM(s) Oral at bedtime  dextrose 5%. 1000 milliLiter(s) (50 mL/Hr) IV Continuous <Continuous>  dextrose 5%. 1000 milliLiter(s) (100 mL/Hr) IV Continuous <Continuous>  dextrose 50% Injectable 25 Gram(s) IV Push once  dextrose 50% Injectable 12.5 Gram(s) IV Push once  dextrose 50% Injectable 25 Gram(s) IV Push once  glucagon  Injectable 1 milliGRAM(s) IntraMuscular once  insulin glargine Injectable (LANTUS) 10 Unit(s) SubCutaneous at bedtime  insulin lispro (ADMELOG) corrective regimen sliding scale   SubCutaneous every 6 hours  piperacillin/tazobactam IVPB.. 4.5 Gram(s) IV Intermittent every 8 hours  predniSONE   Tablet 5 milliGRAM(s) Oral daily  sirolimus 1 milliGRAM(s) Oral every 24 hours  torsemide 40 milliGRAM(s) Oral every 24 hours    MEDICATIONS  (PRN):  acetaminophen     Tablet .. 650 milliGRAM(s) Oral every 6 hours PRN Temp greater or equal to 38C (100.4F), Mild Pain (1 - 3)  dextrose Oral Gel 15 Gram(s) Oral once PRN Blood Glucose LESS THAN 70 milliGRAM(s)/deciliter       1. Hyperdynamic left ventricular systolic function.   2. The apical segments are not well visaulized, however there is   possible apical hypertrophy.   3.The right ventricle is not well visualized. Probably normal right   ventricular systolic function.   4. Dilated left atrium.   5. Grade III left ventricular diastolic dysfunction.   6. Aortic sclerosis without significant stenosis.   7. Mild aortic regurgitation.   8. Moderate-to-severe tricuspid regurgitation.   9. Pulmonary artery systolic pressure is 66 mmHg.  10. No pericardial effusion.  11. Consider repeat study with echo contrast or cardiac MRI to evaluate   for apical HCM if clinically indicated.    < end of copied text >

## 2023-03-28 NOTE — CONSULT NOTE ADULT - ASSESSMENT
72M with PMHx Afib (on Eliquis, s/p DCCV 5/2022), PE, HFpEF (EF 65%), CAD s/p CABG (2005), R renal transplant (on tacrolimus, follows w/ Dr Leyva), jaw cancer s/p resection (~2019) w/ recurrence s/p chemo/RT (completed 2-3 weeks ago), JAYANT (not on CPAP), CLL (w/ chronic leukocytosis), DM2 (with humalog pump), HTN, HLD, recent admit OSH for tongue/facial swelling likely 2/2 RT c/b pleural effusion s/p pleurX drain (placed at [facility]) R chest, recently admitted for R pleural effusion s/p pleurX removal presents for fevers and AMS x 5 days at rehab center. SIRS + with evidence of pneumonia on CXR/CT chest and possibly a UTI. Cardiology was consulted for comanagement.     REVIEW OF STUDIES:   TTE 3/9: Lvidd 4.2cm, hyperdynamic LVEF, possible apical hypertrophy, dilated LA , grade III DD, mod-severe TR, PASP 66mmHg    #chronic HFpEF  Euvoluemic on PE, but at risk since receiving Fluids @100 cc/hr  - restart home torsemide to 40mg qd to maintain euvoluemia   - patient is being treated for UTI, would defer farxiga for now  - Antihypertensive regimenis held for now, ok to hold amlodipine 5 mg daily as patient is normotensive   - Standing daily weights with strict I/O  - Monitor electrolytes to maintain K>4 and Mg>2    #AFib  CHADsVASC 4, c/w eliquis

## 2023-03-28 NOTE — SWALLOW VFSS/MBS ASSESSMENT ADULT - ORAL PHASE COMMENTS
Adequate bolus acceptance and containment.  Oral bolus processing/mastication for solid was prolonged.  Lingual motion during bolus formation and transport across consistencies were partially disorganized and repetitive.  There was occasional premature spillage to the level of the valleculae before swallow trigger on thin liquid.  There was a collection of residue on posterior lingual surface after the swallow pf puree and solid, cleared on secondary swallow.

## 2023-03-28 NOTE — PROGRESS NOTE ADULT - SUBJECTIVE AND OBJECTIVE BOX
CC: SEPSIS      INTERVAL HISTORY:    Cr decreased to 1.17 today.     ROS: No chest pain. No shortness of breath. No nausea.    PAST MEDICAL & SURGICAL HISTORY:  Barretts esophagus    CRI (chronic renal insufficiency)    DM (diabetes mellitus)    GERD (gastroesophageal reflux disease)    HTN (hypertension)    Monoclonal gammopathy    Mandibular abscess    Paroxysmal atrial fibrillation    Benign prostatic hyperplasia, presence of lower urinary tract symptoms unspecified, unspecified morphology    Other hyperlipidemia    Hyperlipidemia    Barretts esophagus    No significant past surgical history    S/P CABG (coronary artery bypass graft)  20 years    History of surgery  cyst removal    Mandibular abscess      PHYSICAL EXAM:  T(C): 36.3 (28 Mar 2023 12:26), Max: 36.5 (28 Mar 2023 06:15)  HR: 89 (28 Mar 2023 12:26)  BP: 94/60 (28 Mar 2023 12:26) (94/60 - 110/56)  RR: 19 (28 Mar 2023 12:26)  SpO2: 98% (28 Mar 2023 12:26)      27 Mar 2023 07:01  -  28 Mar 2023 07:00  --------------------------------------------------------  IN:  Total IN: 0 mL    OUT:    Indwelling Catheter - Urethral (mL): 650 mL  Total OUT: 650 mL    Total NET: -650 mL      28 Mar 2023 07:01  -  28 Mar 2023 20:26  --------------------------------------------------------  IN:  Total IN: 0 mL    OUT:    Indwelling Catheter - Urethral (mL): 220 mL  Total OUT: 220 mL    Total NET: -220 mL        Weight 67.1 (27 Mar 2023 05:56)    Appearance: alert, pleasant, INAD.  ENT: oral mucosa moist, no pallor/cyanosis.  Neck: no JVD visible.  Cardiac: no rubs. no murmurs. Extremities:trace edema  Skin: no rashes.  Extremities (digits): no clubbing or cyanosis.  Respratory effort: no access muscle use. Lungs: CTA anteriorly.  Abdomen: soft. nontender. no masses.  Psych affect: not depressed.     MEDICATIONS  (STANDING):  apixaban 5 milliGRAM(s) Oral every 12 hours  atorvastatin 40 milliGRAM(s) Oral at bedtime  dextrose 5%. 1000 milliLiter(s) (50 mL/Hr) IV Continuous <Continuous>  dextrose 5%. 1000 milliLiter(s) (100 mL/Hr) IV Continuous <Continuous>  dextrose 50% Injectable 25 Gram(s) IV Push once  dextrose 50% Injectable 12.5 Gram(s) IV Push once  dextrose 50% Injectable 25 Gram(s) IV Push once  fluconAZOLE   Tablet 200 milliGRAM(s) Oral daily  glucagon  Injectable 1 milliGRAM(s) IntraMuscular once  insulin glargine Injectable (LANTUS) 10 Unit(s) SubCutaneous at bedtime  insulin lispro (ADMELOG) corrective regimen sliding scale   SubCutaneous Before meals and at bedtime  piperacillin/tazobactam IVPB.. 4.5 Gram(s) IV Intermittent every 8 hours  predniSONE   Tablet 5 milliGRAM(s) Oral daily  sirolimus 1 milliGRAM(s) Oral every 24 hours  torsemide 40 milliGRAM(s) Oral every 24 hours    MEDICATIONS  (PRN):  acetaminophen     Tablet .. 650 milliGRAM(s) Oral every 6 hours PRN Temp greater or equal to 38C (100.4F), Mild Pain (1 - 3)  dextrose Oral Gel 15 Gram(s) Oral once PRN Blood Glucose LESS THAN 70 milliGRAM(s)/deciliter    DATA:  135    |  95<L>  |  59<H>  ----------------------------<  171<H>  Ca:8.7   (27 Mar 2023 05:30)  3.7     |  28     |  1.17         TPro  5.4<L>  /  Alb  2.4<L>  /  TBili  0.6    /  DBili  x      /  AST  25     /  ALT  16     /  AlkPhos  91     27 Mar 2023 05:30    SCr 1.17 [27 Mar 2023 05:30]  SCr 1.30 [26 Mar 2023 07:59]  SCr 1.32 [25 Mar 2023 21:31]  SCr 1.32 [25 Mar 2023 16:25]  SCr 1.17 [15 Mar 2023 05:30]                          8.4<L>  15.20<H> )-----------( 214      ( 27 Mar 2023 05:30 )             28.5<L>    Phos:4.0 mg/dL M.5 mg/dL PTH:-- Uric acid:-- Serum Osm:--  Ferritin:-- Iron:-- TIBC:-- Tsat:--  B12:-- TSH:-- (27 Mar 2023 05:30)    Urinalysis Basic - ( 25 Mar 2023 21:31 )  Color: Yellow / Appearance: Turbid<!> / S.020 / pH: x  Gluc: x / Ketone: NEGATIVE  / Bili: Negative / Urobili: 0.2 E.U./dL   Blood: x / Protein: 100 mg/dL<!> / Nitrite: NEGATIVE   Leuk Esterase: Moderate<!> / RBC: 5-10 /HPF<!> / WBC Many /HPF<!>   Sq Epi: x / Non Sq Epi: 0-5 /HPF / Bacteria: Many /HPF<!>      UProt:-- UCr:50 mg/dL P/C Ratio:-- 24 hour Prot:-- UVol:-- CrCl:--  Julien:<20 mmol/L UOsm:395 mosm/kg UVol:-- UCl:<20 mmol/L UK:21 mmol/L (25 Mar 2023 17:40)

## 2023-03-28 NOTE — PROGRESS NOTE ADULT - PROBLEM SELECTOR PLAN 8
Patient with chronic renal insufficiency, s/p R renal transplant, was previously on tacrolimus 2mg AM and 1mg PM which was discontinued during recent admission. Overlap dosing of tacrolimus and sirolimus for three days. Tacrolimus level and sirolimus lvl within range recent admission  - f/u repeat sirolimus range and restart, sirolimus 1mg qd   - f/u with Dr Carreno Patient with chronic renal insufficiency, s/p R renal transplant, was previously on tacrolimus 2mg AM and 1mg PM which was discontinued during recent admission. Overlap dosing of tacrolimus and sirolimus for three days. Tacrolimus level and sirolimus lvl within range recent admission  -  sirolimus 1mg qd   - f/u with Dr Carreno

## 2023-03-28 NOTE — SWALLOW VFSS/MBS ASSESSMENT ADULT - THE ABOVE FINDINGS WERE DISCUSSED WITH
Physician/Patient Also discussed R facial and submental swelling and pain with MD to determine if further testing/ENT consult is warranted./Physician/Patient

## 2023-03-28 NOTE — PROGRESS NOTE ADULT - PROBLEM SELECTOR PLAN 1
SIRS + with evidence of pneumonia on CXR/CT chest and possibly a UTI. Patient with AMS, cough, fever found to have acute hypoxic respiratory failure (AHRF) etiology likely pneumonia.  At baseline intermittently uses 2L NC at home, now with increased O2 demand i/s/o ?aspiration pneumonia and moderate pleural effusions.  - f/u official CT chest   - MRSA swab  - f/u UA  - f/u blood cultures  - c/w vanc + zosyn to cover for HAP and UTI  - s/p 1L in ED, c/w maintenance IVF -cautious fluids i/s/o known dCHF      #r/o UTI  purulent urine in [place]  - f/u repeat UA  - broad spectrum Abx as above SIRS + with evidence of pneumonia on CXR/CT chest and possibly a UTI. Patient with AMS, cough, fever found to have acute hypoxic respiratory failure (AHRF) etiology likely pneumonia.  At baseline intermittently uses 2L NC at home, now with increased O2 demand i/s/o aspiration pna  - New large consolidation on CT  - UA positive and growing candida   - Bcx NTD  - c/w zosyn  - s/p 1L in ED, c/w maintenance IVF -cautious fluids i/s/o known dCHF

## 2023-03-28 NOTE — CONSULT NOTE ADULT - ASSESSMENT
71 yo M with PMHx of AFib on Eliquis (s/p DCCV 05/2022), history of pulmonary embolism, CAD s/p CABG (2005), R renal transplant on Tacrolimus, jaw cancer s/p resection (2019) with recurrence s/p chemoRT (completed 2-3 weeks ago), JAYANT not on CPAP, CLL not on treatment, DM2, essential hypertension, hyperlipidemia, recent admission to OSH for tongue/facial swelling secondary to radiation c/b pleural effusion s/p R Pleurex drain, recently admitted for R pleural effusion s/p Pleurex removal (03/15/23), recently found to have subacute SMV thrombosis likely due to underdosing of Eliquis (was taking the medication once daily instead of twice daily) and prothrombin gene heterozygosity (Factor II [id number]), presents for fevers and AMS x 5 days at rehab center. Has T 104.5 F in ED, resolved with broad-spectrum antibiotics. Currently on 3 L NC.    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 FISH shows no 13q14 deletion, trisomy 12, p53 (17p13) deletion/amplification, KATHRYN deletion (11q22.3)  - Can consider starting on CLL-directed therapy once infection is ruled-out.   - Send beta2-microglobulin  - Upon discharge, follow-up with Dr. Shaun Leblanc at [facility] ([facility] [address] [place] [address], [phone]) in 1-2 weeks.     To discuss with Dr. Shaun Leblanc  73 yo M with PMHx of AFib on Eliquis (s/p DCCV 05/2022), history of pulmonary embolism, CAD s/p CABG (2005), R renal transplant on Tacrolimus, jaw cancer s/p resection (2019) with recurrence s/p chemoRT (completed 2-3 weeks ago), JAYANT not on CPAP, CLL not on treatment, DM2, essential hypertension, hyperlipidemia, recent admission to OSH for tongue/facial swelling secondary to radiation c/b pleural effusion s/p R Pleurex drain, recently admitted for R pleural effusion s/p Pleurex removal (03/15/23), recently found to have subacute SMV thrombosis likely due to underdosing of Eliquis (was taking the medication once daily instead of twice daily) and prothrombin gene heterozygosity (Factor II [id number]), presents for fevers and AMS x 5 days at rehab center. Has T 104.5 F in ED, resolved with broad-spectrum antibiotics. Currently on 3 L NC.    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 FISH shows no 13q14 deletion, trisomy 12, p53 (17p13) deletion/amplification, KATHRYN deletion (11q22.3)  - Can consider starting on CLL-directed therapy once infection is ruled-out/cleared. This can be done as outpatient. Patient is not interested in pursuing further discussion regarding his CLL. He would like to return to his own hematologist at [facility] (Dr. Bonilla).  - Send beta2-microglobulin  - Send Haptoglobin, LDH, reticulocyte count, Direct Ancelmo.   - Send B12/Folate  - Maintain active T+S, transfuse PRBC if Hb < 7 or if actively bleeding/symptomatic    Discussed with Dr. Shaun Leblanc  73 yo M with PMHx of AFib on Eliquis (s/p DCCV 05/2022), history of pulmonary embolism, CAD s/p CABG (2005), R renal transplant on Tacrolimus, jaw cancer s/p resection (2019) with recurrence s/p chemoRT (completed 2-3 weeks ago), JAYANT not on CPAP, CLL not on treatment, DM2, essential hypertension, hyperlipidemia, recent admission to OSH for tongue/facial swelling secondary to radiation c/b pleural effusion s/p R Pleurex drain, recently admitted for R pleural effusion s/p Pleurex removal (03/15/23), recently found to have subacute SMV thrombosis likely due to underdosing of Eliquis (was taking the medication once daily instead of twice daily) and prothrombin gene heterozygosity (Factor II [id number]), presents for fevers and AMS x 5 days at rehab center. Has T 104.5 F in ED, resolved with broad-spectrum antibiotics. Currently on 3 L NC.    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 FISH shows no 13q14 deletion, trisomy 12, p53 (17p13) deletion/amplification, KATHRYN deletion (11q22.3)  - Can consider starting on CLL-directed therapy once infection is ruled-out/cleared. This can be done as outpatient. Patient is not interested in pursuing further discussion regarding his CLL. He would like to return to his own hematologist at [facility] (Dr. Jojo Huffman).  - Send beta2-microglobulin  - Send Haptoglobin, LDH, reticulocyte count, Direct Ancelmo.   - Send B12/Folate  - Maintain active T+S, transfuse PRBC if Hb < 7 or if actively bleeding/symptomatic    Discussed with Dr. Shaun Leblanc

## 2023-03-28 NOTE — SWALLOW VFSS/MBS ASSESSMENT ADULT - DIAGNOSTIC IMPRESSIONS
Patient presents with a mild-moderate oral and mild pharyngeal dysphagia.  Oral phase characterized by prolonged oral processing for solid and mild impairment in oral bolus transport/control.  Pharyngeal phase characterized by mildly reduced pharyngeal swallow efficiency resulting in trace transient penetration of thin liquid on occasion, and mild post-deglutitive pharyngeal residue with puree and solid but with ability to clear with liquid wash and dry swallows.  Given above, patient deemed safe to resume a po diet with some texture modification and with aspiration precautions.      Patient also with right posterior facial and submental fullness with reported increased pain/sensitivity in this region, which may be related to lymphedema or an infection process.  Will alert primary team to determine need for ENT consult.

## 2023-03-28 NOTE — PROGRESS NOTE ADULT - PROBLEM SELECTOR PLAN 4
baseline Cr 0.58-0.65, discharged with Cr 1.1 on 3/16. Presents with Cr of 1.3. Suspect prerenal i/s/o sepsis. R/o ascending infection/pyelonephritis. No CVA tenderness on exam  - f/u UA  - f/u urine lytes  - f/u CT A/P official read baseline Cr 0.58-0.65, discharged with Cr 1.1 on 3/16. Presents with Cr of 1.3. Suspect prerenal i/s/o sepsis. R/o ascending infection/pyelonephritis. No CVA tenderness on exam  - UA positive

## 2023-03-28 NOTE — SWALLOW VFSS/MBS ASSESSMENT ADULT - SLP GENERAL OBSERVATIONS
Pt awake and alert, pleasant, somewhat irritable but able to be redirected to participate fully in the study.  PT with 3 L of O2 supplementation via NC, in NAD.  Upper airway and voice quality clear at baseline.  Pt with fullness on R mandibular and neck region, (+) fullness in submental region as well.  Pt reported increased pain in this area.

## 2023-03-28 NOTE — PROGRESS NOTE ADULT - PROBLEM SELECTOR PLAN 2
Pleural fluid recently tested suggestive of exudative effusion likely 2/2 to diastolic HF   - hold torsemide 60 mg qd i/s/o sepsis  - cautiously diurese as tolerated    #Hx of tongue swelling  per family, patient recently admitted at [facility] for tongue/facial swelling and was started on steroids. During recent admission to [facility] discharged on prednisone 5mg qd  - collateral on hx and prednisone, end date  - c/w pred for now Pleural fluid recently tested suggestive of exudative effusion likely 2/2 to diastolic HF   - c/w torsemide 40 mg qd i/s/o sepsis  - cautiously diurese as tolerated    #Hx of tongue swelling  per family, patient recently admitted at [facility] for tongue/facial swelling and was started on steroids. During recent admission to [facility] discharged on prednisone 5mg qd  - collateral on hx and prednisone, end date  - c/w pred for now

## 2023-03-28 NOTE — PROGRESS NOTE ADULT - SUBJECTIVE AND OBJECTIVE BOX
----- INTERVAL HPI/OVERNIGHT EVENTS -----     Patient was seen and examined at bedside. As per nurse and patient, no o/n events, patient resting comfortably. No complaints at this time. Patient denies: fever, chills, dizziness, weakness, HA, Changes in vision, CP, palpitations, SOB, cough, N/V/D/C, dysuria, changes in bowel movements, LE edema. ROS otherwise negative.      Subjective: Patient is a 72y old  Male who presents with a chief complaint of SEPSIS     (27 Mar 2023 14:12)      ----- VITAL SIGNS -----  T(F): 97.7 (03-28-23 @ 06:15)  HR: 62 (03-28-23 @ 06:15)  BP: 110/56 (03-28-23 @ 06:15)  RR: 19 (03-28-23 @ 06:15)  SpO2: 94% (03-28-23 @ 06:15)  Wt(kg): --      03-27-23 @ 07:01  -  03-28-23 @ 07:00  --------------------------------------------------------  IN: 0 mL / OUT: 650 mL / NET: -650 mL        ----- Physical Exam -----     Constitutional: resting comfortably in bed; NAD  HEENT: NC/AT, PERRL, EOMI, anicteric sclera, no nasal discharge; uvula midline, no oropharyngeal erythema or exudates, MMM; no thyromegaly or anterior/posterior or submental NICHOLE; neck supple  Respiratory: CTA B/L; no W/R/R, no retractions  Cardiac: +S1/S2; RRR; no M/R/G appreciated  Gastrointestinal: abdomen soft, NT/ND, +BS, no rebound tenderness or guarding  Back: no CVAT B/L  Extremities: legs WWP, no clubbing or cyanosis; no peripheral edema  Vascular: 2+ distal pedal pulses B/L  Dermatologic: skin warm, dry and intact; no rashes, wounds, or scars  Neurologic: AAOx3; CNII-XII grossly intact; strength 5/5 and sensation intact in all 4 extremities; no focal deficits  Psychiatric: affect and characteristics of appearance, verbalizations, behaviors are appropriate    MEDICATIONS  (STANDING):  apixaban 5 milliGRAM(s) Oral every 12 hours  atorvastatin 40 milliGRAM(s) Oral at bedtime  dextrose 5%. 1000 milliLiter(s) (50 mL/Hr) IV Continuous <Continuous>  dextrose 5%. 1000 milliLiter(s) (100 mL/Hr) IV Continuous <Continuous>  dextrose 50% Injectable 25 Gram(s) IV Push once  dextrose 50% Injectable 12.5 Gram(s) IV Push once  dextrose 50% Injectable 25 Gram(s) IV Push once  glucagon  Injectable 1 milliGRAM(s) IntraMuscular once  insulin glargine Injectable (LANTUS) 10 Unit(s) SubCutaneous at bedtime  insulin lispro (ADMELOG) corrective regimen sliding scale   SubCutaneous every 6 hours  piperacillin/tazobactam IVPB.. 4.5 Gram(s) IV Intermittent every 8 hours  predniSONE   Tablet 5 milliGRAM(s) Oral daily  sirolimus 1 milliGRAM(s) Oral every 24 hours  sodium chloride 0.9%. 1000 milliLiter(s) (100 mL/Hr) IV Continuous <Continuous>    MEDICATIONS  (PRN):  acetaminophen     Tablet .. 650 milliGRAM(s) Oral every 6 hours PRN Temp greater or equal to 38C (100.4F), Mild Pain (1 - 3)  dextrose Oral Gel 15 Gram(s) Oral once PRN Blood Glucose LESS THAN 70 milliGRAM(s)/deciliter      Allergies    No Known Allergies    Intolerances        ----- LABS -----                         8.4    15.20 )-----------( 214      ( 27 Mar 2023 05:30 )             28.5     03-27    135  |  95<L>  |  59<H>  ----------------------------<  171<H>  3.7   |  28  |  1.17    Ca    8.7      27 Mar 2023 05:30  Phos  4.0     03-27  Mg     2.5     03-27    TPro  5.4<L>  /  Alb  2.4<L>  /  TBili  0.6  /  DBili  x   /  AST  25  /  ALT  16  /  AlkPhos  91  03-27            ----- RADIOLOGY & ADDITIONAL TESTS -----     Reviewed ----- INTERVAL HPI/OVERNIGHT EVENTS -----     Patient was seen and examined at bedside. As per nurse and patient, no o/n events, patient resting comfortably. No complaints at this time.       Subjective: Patient is a 72y old  Male who presents with a chief complaint of SEPSIS     (27 Mar 2023 14:12)      ----- VITAL SIGNS -----  T(F): 97.7 (03-28-23 @ 06:15)  HR: 62 (03-28-23 @ 06:15)  BP: 110/56 (03-28-23 @ 06:15)  RR: 19 (03-28-23 @ 06:15)  SpO2: 94% (03-28-23 @ 06:15)  Wt(kg): --      03-27-23 @ 07:01  -  03-28-23 @ 07:00  --------------------------------------------------------  IN: 0 mL / OUT: 650 mL / NET: -650 mL        ----- Physical Exam -----     Constitutional: resting comfortably in bed; NAD  HEENT:anicteric sclera, no nasal discharge; uvula midline, no oropharyngeal erythema or exudates  Respiratory: CTA B/L; decrease breath sounds on bases  Cardiac: +S1/S2; RRR; no M/R/G appreciated  Gastrointestinal: abdomen soft, NT/ND, +BS, no rebound tenderness or guarding  Back: no CVAT B/L  Extremities: legs WWP, no clubbing or cyanosis; no peripheral edema  Vascular: 2+ distal pedal pulses B/L  Dermatologic: skin warm, dry and intact; no rashes, wounds, or scars  Neurologic: AAOx2; CNII-XII grossly intact; strength 5/5 and sensation intact in all 4 extremities; no focal deficits  Psychiatric: affect and characteristics of appearance, verbalizations, behaviors are appropriate    MEDICATIONS  (STANDING):  apixaban 5 milliGRAM(s) Oral every 12 hours  atorvastatin 40 milliGRAM(s) Oral at bedtime  dextrose 5%. 1000 milliLiter(s) (50 mL/Hr) IV Continuous <Continuous>  dextrose 5%. 1000 milliLiter(s) (100 mL/Hr) IV Continuous <Continuous>  dextrose 50% Injectable 25 Gram(s) IV Push once  dextrose 50% Injectable 12.5 Gram(s) IV Push once  dextrose 50% Injectable 25 Gram(s) IV Push once  glucagon  Injectable 1 milliGRAM(s) IntraMuscular once  insulin glargine Injectable (LANTUS) 10 Unit(s) SubCutaneous at bedtime  insulin lispro (ADMELOG) corrective regimen sliding scale   SubCutaneous every 6 hours  piperacillin/tazobactam IVPB.. 4.5 Gram(s) IV Intermittent every 8 hours  predniSONE   Tablet 5 milliGRAM(s) Oral daily  sirolimus 1 milliGRAM(s) Oral every 24 hours  sodium chloride 0.9%. 1000 milliLiter(s) (100 mL/Hr) IV Continuous <Continuous>    MEDICATIONS  (PRN):  acetaminophen     Tablet .. 650 milliGRAM(s) Oral every 6 hours PRN Temp greater or equal to 38C (100.4F), Mild Pain (1 - 3)  dextrose Oral Gel 15 Gram(s) Oral once PRN Blood Glucose LESS THAN 70 milliGRAM(s)/deciliter      Allergies    No Known Allergies    Intolerances        ----- LABS -----                         8.4    15.20 )-----------( 214      ( 27 Mar 2023 05:30 )             28.5     03-27    135  |  95<L>  |  59<H>  ----------------------------<  171<H>  3.7   |  28  |  1.17    Ca    8.7      27 Mar 2023 05:30  Phos  4.0     03-27  Mg     2.5     03-27    TPro  5.4<L>  /  Alb  2.4<L>  /  TBili  0.6  /  DBili  x   /  AST  25  /  ALT  16  /  AlkPhos  91  03-27            ----- RADIOLOGY & ADDITIONAL TESTS -----     Reviewed

## 2023-03-28 NOTE — CONSULT NOTE ADULT - SUBJECTIVE AND OBJECTIVE BOX
LENGTH OF HOSPITAL STAY: 3d    CHIEF COMPLAINT:   Patient is a 72y old  Male who presents with a chief complaint of SEPSIS     (27 Mar 2023 14:12)    HISTORY OF PRESENTING ILLNESS:   72M with PMHx Afib (on Eliquis, s/p DCCV 5/2022), PE, HFpEF (EF 65%), CAD s/p CABG (2005), R renal transplant (on tacrolimus, follows w/ Dr Leyva), jaw cancer s/p resection (~2019) w/ recurrence s/p chemo/RT (completed 2-3 weeks ago), JAYANT (not on CPAP), CLL (w/ chronic leukocytosis), DM2 (with humalog pump), HTN, HLD, recent admit OSH for tongue/facial swelling likely 2/2 RT c/b pleural effusion s/p pleurX drain (placed at [facility]) R chest, recently admitted for R pleural effusion s/p pleurX removal presents for fevers and AMS x 5 days at rehab center. Per family, patient was experiencing fevers to 104 at the rehab center and was sent into the ED. In the ED, patient was altered and ICU was consulted. Patient's mental status improved s/p fluid resuscitation. Patient also c/o lower abdominal pain, improved s/p elmore placement. Urine appears to be draining purulent appearing urine.     ED course:   Vitals: T: 104.8 | HR  | BP: 100/58 | RR 20 92% 6L NC   LAbs: WBC 29.31 | H/H: 8.4/27.5 | INR: 2.49 | BUN/Cr: 1.32 | glucose: 476 | trops: 0.07 | TSH: WNL  CXR: R pleural effusion with b/l consolidations   CT chest and a/p performed   EKG: afib RVR to 120s with nonspecific new TW changes V1-3 and chronic TW inversions V4-6, III, aVF  Tx: vanc, zosyn, 1L NS, tylenol 1g x1   (25 Mar 2023 22:12)    PAST MEDICAL & SURGICAL HISTORY:  Barretts esophagus  CRI (chronic renal insufficiency)  DM (diabetes mellitus)  GERD (gastroesophageal reflux disease)  HTN (hypertension)  Monoclonal gammopathy  Mandibular abscess  Paroxysmal atrial fibrillation  Benign prostatic hyperplasia, presence of lower urinary tract symptoms unspecified, unspecified morphology  Hyperlipidemia  Barretts esophagus  S/P CABG (coronary artery bypass graft)  20 years  History of surgery  cyst removal  Mandibular abscess    ALLERGIES:  No Known Allergies    MEDICATIONS:  STANDING MEDICATIONS  apixaban 5 milliGRAM(s) Oral every 12 hours  atorvastatin 40 milliGRAM(s) Oral at bedtime  dextrose 5%. 1000 milliLiter(s) IV Continuous <Continuous>  dextrose 5%. 1000 milliLiter(s) IV Continuous <Continuous>  dextrose 50% Injectable 25 Gram(s) IV Push once  dextrose 50% Injectable 12.5 Gram(s) IV Push once  dextrose 50% Injectable 25 Gram(s) IV Push once  glucagon  Injectable 1 milliGRAM(s) IntraMuscular once  insulin glargine Injectable (LANTUS) 10 Unit(s) SubCutaneous at bedtime  insulin lispro (ADMELOG) corrective regimen sliding scale   SubCutaneous every 6 hours  piperacillin/tazobactam IVPB.. 4.5 Gram(s) IV Intermittent every 8 hours  predniSONE   Tablet 5 milliGRAM(s) Oral daily  sirolimus 1 milliGRAM(s) Oral every 24 hours  sodium chloride 0.9%. 1000 milliLiter(s) IV Continuous <Continuous>    PRN MEDICATIONS  acetaminophen     Tablet .. 650 milliGRAM(s) Oral every 6 hours PRN  dextrose Oral Gel 15 Gram(s) Oral once PRN    VITALS:   T(F): 97.7  HR: 62  BP: 110/56  RR: 19  SpO2: 94%    LABS:                        8.4    15.20 )-----------( 214      ( 27 Mar 2023 05:30 )             28.5     03-27    135  |  95<L>  |  59<H>  ----------------------------<  171<H>  3.7   |  28  |  1.17    Ca    8.7      27 Mar 2023 05:30  Phos  4.0     03-27  Mg     2.5     03-27    TPro  5.4<L>  /  Alb  2.4<L>  /  TBili  0.6  /  DBili  x   /  AST  25  /  ALT  16  /  AlkPhos  91  03-27    Culture - Urine (collected 25 Mar 2023 21:31)  Source: Catheterized Catheterized  Preliminary Report (26 Mar 2023 12:08):    Culture in progress    Culture - Urine (collected 25 Mar 2023 17:40)  Source: .Urine None  Preliminary Report (26 Mar 2023 12:22):    Culture in progress    Culture - Blood (collected 25 Mar 2023 16:25)  Source: .Blood None  Preliminary Report (27 Mar 2023 17:48):    No growth at 2 days.    Culture - Blood (collected 25 Mar 2023 16:18)  Source: .Blood None  Preliminary Report (27 Mar 2023 17:48):    No growth at 2 days.    RADIOLOGY:  < from: CT Abdomen and Pelvis No Cont (03.25.23 @ 20:53) >  LUNGS AND LARGE AIRWAYS:  New large consolidation occupying the entire left lower lobe with   associated air bronchograms. New patchy airspace opacities at the   posterior left upper lobe. Subcentimeter nodules in the right middle   lobe, unchanged. Atelectasis versus consolidation of the majority of the   right lower lobe with relative sparing of the anterior and medial basilar   segments. Complete collapse/opacification of the superior right lower   lobe is new. Patent central airways. Mosaic attenuation of the lungs. A   few scattered subcentimeter nodules in the left lung, unchanged.  PLEURA: Unchanged moderate right pleural effusion. Interval removal of   right chest tube. Small left pleural effusion.  VESSELS: Redemonstration of dilated main pulmonary artery.  HEART: Heart size is normal. No pericardial effusion. Surgical clips in   the anterior mediastinum. Severe coronary artery calcification.   Calcification at the aortic valve suggestive of aortic stenosis.  MEDIASTINUM AND JORDAN: Redemonstration of mediastinal, bilateral axillary,   bilateral subpectoral adenopathy.  CHEST WALL AND LOWER NECK: Status post median sternotomy. Bilateral   gynecomastia.    ABDOMEN AND PELVIS:  LIVER: Within normal limits.  BILE DUCTS: Normal caliber.  GALLBLADDER: Dilated gallbladder with layering gallstones, unchanged in   size since 2019. Probable gallbladder sludge.  SPLEEN: Enlarged, measuring 17.1 cm in craniocaudal dimension.  PANCREAS: Fatty infiltration.  ADRENALS: Within normal limits.  KIDNEYS/URETERS: Small left renal cyst. Extrarenal pelvises bilaterally.   Transplant kidney in the right iliac fossa with prominent calyces   suggestive of mild hydronephrosis.    BLADDER: Status post Elmore catheterplacement.  REPRODUCTIVE ORGANS: Enlarged prostate gland measuring 5 cm in width.    BOWEL: Colonic diverticulosis. No bowel obstruction.  PERITONEUM: No ascites. Mesenteric edema.  VESSELS: Atherosclerotic changes.  RETROPERITONEUM/LYMPH NODES: Hazysoft tissue density in the   retroperitoneal region extending into the bilateral iliac regions   consistent with lymphadenopathy. Borderline enlarged inguinal nodes   bilaterally.  ABDOMINAL WALL: Postsurgical changes.  BONES: Degenerative changes. Lobular fluid density anterior to the right   glenohumeral joint, most likely representing bursitis. Status post median   sternotomy.    IMPRESSION:    1. New large consolidation occupying the entire left lower lobe.  2. Persistent atelectasis versus consolidation of the majority of the   right lower lobe.  3. Unchanged moderate right pleural effusion. Small left pleural   effusion. Several scattered subcentimeter lung nodules.  4. Extensive lymphadenopathy above and below the diaphragm consistent   with known lymphoproliferative disorder.  5. Findings suggestive of mild hydronephrosis of the transplant kidney in   the right iliac fossa. Consider confirmation with CT urogram or   ultrasound. Correlate clinically. This finding was reported to Dr. Farrell   at 10:26 AM on 3/26/2023.  6. Chronic gallbladder hydrops. Consider ultrasound if there are symptoms   of biliary colic or signs of acute gallbladder/biliary pathology.  7. Splenomegaly.    < end of copied text >    PHYSICAL EXAM:  Constitutional: WDWN resting comfortably in bed; NAD  Head: NC/AT  Eyes: PERRL, EOMI, anicteric sclera  ENT: no nasal discharge; uvula midline, no oropharyngeal erythema or exudates; MMM  Neck: supple; no JVD or thyromegaly  Respiratory: CTA B/L; no W/R/R, no retractions  Cardiac: +S1/S2; RRR; no M/R/G; PMI non-displaced  Gastrointestinal: soft, NT/ND; no rebound or guarding; +BSx4  Back: spine midline, no bony tenderness or step-offs; no CVAT B/L  Extremities: WWP, no clubbing or cyanosis; no peripheral edema. Capillary refill <2 sec  Musculoskeletal: NROM x4; no joint swelling, tenderness or erythema  Vascular: 2+ radial, femoral, DP/PT pulses B/L  Dermatologic: skin warm, dry and intact; no rashes, wounds, or scars  Lymphatic: no submandibular or cervical LAD  Neurologic: AAOx3; CNII-XII grossly intact; no focal deficits  Psychiatric: affect and characteristics of appearance, verbalizations, behaviors are appropriate     LENGTH OF HOSPITAL STAY: 3d    CHIEF COMPLAINT:   Patient is a 72y old  Male who presents with a chief complaint of SEPSIS     (27 Mar 2023 14:12)    HISTORY OF PRESENTING ILLNESS:   72M with PMHx Afib (on Eliquis, s/p DCCV 5/2022), PE, HFpEF (EF 65%), CAD s/p CABG (2005), R renal transplant (on tacrolimus, follows w/ Dr Leyva), jaw cancer s/p resection (~2019) w/ recurrence s/p chemo/RT (completed 2-3 weeks ago), JAYANT (not on CPAP), CLL (w/ chronic leukocytosis), DM2 (with humalog pump), HTN, HLD, recent admit OSH for tongue/facial swelling likely 2/2 RT c/b pleural effusion s/p pleurX drain (placed at [facility]) R chest, recently admitted for R pleural effusion s/p pleurX removal presents for fevers and AMS x 5 days at rehab center. Per family, patient was experiencing fevers to 104 at the rehab center and was sent into the ED. In the ED, patient was altered and ICU was consulted. Patient's mental status improved s/p fluid resuscitation. Patient also c/o lower abdominal pain, improved s/p elmore placement. Urine appears to be draining purulent appearing urine.     ED course:   Vitals: T: 104.8 | HR  | BP: 100/58 | RR 20 92% 6L NC   LAbs: WBC 29.31 | H/H: 8.4/27.5 | INR: 2.49 | BUN/Cr: 1.32 | glucose: 476 | trops: 0.07 | TSH: WNL  CXR: R pleural effusion with b/l consolidations   CT chest and a/p performed   EKG: afib RVR to 120s with nonspecific new TW changes V1-3 and chronic TW inversions V4-6, III, aVF  Tx: vanc, zosyn, 1L NS, tylenol 1g x1   (25 Mar 2023 22:12)    PAST MEDICAL & SURGICAL HISTORY:  Barretts esophagus  CRI (chronic renal insufficiency)  DM (diabetes mellitus)  GERD (gastroesophageal reflux disease)  HTN (hypertension)  Monoclonal gammopathy  Mandibular abscess  Paroxysmal atrial fibrillation  Benign prostatic hyperplasia, presence of lower urinary tract symptoms unspecified, unspecified morphology  Hyperlipidemia  Barretts esophagus  S/P CABG (coronary artery bypass graft)  20 years  History of surgery  cyst removal  Mandibular abscess    ALLERGIES:  No Known Allergies    MEDICATIONS:  STANDING MEDICATIONS  apixaban 5 milliGRAM(s) Oral every 12 hours  atorvastatin 40 milliGRAM(s) Oral at bedtime  dextrose 5%. 1000 milliLiter(s) IV Continuous <Continuous>  dextrose 5%. 1000 milliLiter(s) IV Continuous <Continuous>  dextrose 50% Injectable 25 Gram(s) IV Push once  dextrose 50% Injectable 12.5 Gram(s) IV Push once  dextrose 50% Injectable 25 Gram(s) IV Push once  glucagon  Injectable 1 milliGRAM(s) IntraMuscular once  insulin glargine Injectable (LANTUS) 10 Unit(s) SubCutaneous at bedtime  insulin lispro (ADMELOG) corrective regimen sliding scale   SubCutaneous every 6 hours  piperacillin/tazobactam IVPB.. 4.5 Gram(s) IV Intermittent every 8 hours  predniSONE   Tablet 5 milliGRAM(s) Oral daily  sirolimus 1 milliGRAM(s) Oral every 24 hours  sodium chloride 0.9%. 1000 milliLiter(s) IV Continuous <Continuous>    PRN MEDICATIONS  acetaminophen     Tablet .. 650 milliGRAM(s) Oral every 6 hours PRN  dextrose Oral Gel 15 Gram(s) Oral once PRN    VITALS:   T(F): 97.7  HR: 62  BP: 110/56  RR: 19  SpO2: 94%    LABS:                        8.4    15.20 )-----------( 214      ( 27 Mar 2023 05:30 )             28.5     03-27    135  |  95<L>  |  59<H>  ----------------------------<  171<H>  3.7   |  28  |  1.17    Ca    8.7      27 Mar 2023 05:30  Phos  4.0     03-27  Mg     2.5     03-27    TPro  5.4<L>  /  Alb  2.4<L>  /  TBili  0.6  /  DBili  x   /  AST  25  /  ALT  16  /  AlkPhos  91  03-27    Culture - Urine (collected 25 Mar 2023 21:31)  Source: Catheterized Catheterized  Preliminary Report (26 Mar 2023 12:08):    Culture in progress    Culture - Urine (collected 25 Mar 2023 17:40)  Source: .Urine None  Preliminary Report (26 Mar 2023 12:22):    Culture in progress    Culture - Blood (collected 25 Mar 2023 16:25)  Source: .Blood None  Preliminary Report (27 Mar 2023 17:48):    No growth at 2 days.    Culture - Blood (collected 25 Mar 2023 16:18)  Source: .Blood None  Preliminary Report (27 Mar 2023 17:48):    No growth at 2 days.    RADIOLOGY:  < from: CT Abdomen and Pelvis No Cont (03.25.23 @ 20:53) >  LUNGS AND LARGE AIRWAYS:  New large consolidation occupying the entire left lower lobe with   associated air bronchograms. New patchy airspace opacities at the   posterior left upper lobe. Subcentimeter nodules in the right middle   lobe, unchanged. Atelectasis versus consolidation of the majority of the   right lower lobe with relative sparing of the anterior and medial basilar   segments. Complete collapse/opacification of the superior right lower   lobe is new. Patent central airways. Mosaic attenuation of the lungs. A   few scattered subcentimeter nodules in the left lung, unchanged.  PLEURA: Unchanged moderate right pleural effusion. Interval removal of   right chest tube. Small left pleural effusion.  VESSELS: Redemonstration of dilated main pulmonary artery.  HEART: Heart size is normal. No pericardial effusion. Surgical clips in   the anterior mediastinum. Severe coronary artery calcification.   Calcification at the aortic valve suggestive of aortic stenosis.  MEDIASTINUM AND JORDAN: Redemonstration of mediastinal, bilateral axillary,   bilateral subpectoral adenopathy.  CHEST WALL AND LOWER NECK: Status post median sternotomy. Bilateral   gynecomastia.    ABDOMEN AND PELVIS:  LIVER: Within normal limits.  BILE DUCTS: Normal caliber.  GALLBLADDER: Dilated gallbladder with layering gallstones, unchanged in   size since 2019. Probable gallbladder sludge.  SPLEEN: Enlarged, measuring 17.1 cm in craniocaudal dimension.  PANCREAS: Fatty infiltration.  ADRENALS: Within normal limits.  KIDNEYS/URETERS: Small left renal cyst. Extrarenal pelvises bilaterally.   Transplant kidney in the right iliac fossa with prominent calyces   suggestive of mild hydronephrosis.    BLADDER: Status post Elmore catheterplacement.  REPRODUCTIVE ORGANS: Enlarged prostate gland measuring 5 cm in width.    BOWEL: Colonic diverticulosis. No bowel obstruction.  PERITONEUM: No ascites. Mesenteric edema.  VESSELS: Atherosclerotic changes.  RETROPERITONEUM/LYMPH NODES: Hazysoft tissue density in the   retroperitoneal region extending into the bilateral iliac regions   consistent with lymphadenopathy. Borderline enlarged inguinal nodes   bilaterally.  ABDOMINAL WALL: Postsurgical changes.  BONES: Degenerative changes. Lobular fluid density anterior to the right   glenohumeral joint, most likely representing bursitis. Status post median   sternotomy.    IMPRESSION:    1. New large consolidation occupying the entire left lower lobe.  2. Persistent atelectasis versus consolidation of the majority of the   right lower lobe.  3. Unchanged moderate right pleural effusion. Small left pleural   effusion. Several scattered subcentimeter lung nodules.  4. Extensive lymphadenopathy above and below the diaphragm consistent   with known lymphoproliferative disorder.  5. Findings suggestive of mild hydronephrosis of the transplant kidney in   the right iliac fossa. Consider confirmation with CT urogram or   ultrasound. Correlate clinically. This finding was reported to Dr. Farrell   at 10:26 AM on 3/26/2023.  6. Chronic gallbladder hydrops. Consider ultrasound if there are symptoms   of biliary colic or signs of acute gallbladder/biliary pathology.  7. Splenomegaly.    < end of copied text >    PHYSICAL EXAM:  Constitutional: Resting comfortably in bed; NAD  Head: NC/AT  Eyes: PERRL, EOMI, anicteric sclera  Respiratory: decreased breath sounds bilaterally at lung bases  Cardiac: Irregular  Gastrointestinal: soft, NT/ND  Extremities: No pitting edema  Neurologic: AAOx3; no focal deficits  Psychiatric: affect and characteristics of appearance, verbalizations, behaviors are appropriate

## 2023-03-28 NOTE — PROGRESS NOTE ADULT - PROBLEM SELECTOR PLAN 5
Patient with history of HFpEF presenting with worsened hypoxia, found to have b/l consolidations and pleural effusions  - strict I/Os, monitor UOP  - daily weights  - hold torsemide 40 QD i/s/o sepsis, will consider IV diuresis  - c/w Farxiga QD Patient with history of HFpEF presenting with worsened hypoxia, found to have b/l consolidations and pleural effusions  - strict I/Os, monitor UOP  - daily weights  - on torsemide 40 QD i/s/o sepsis  - c/w Farxiga QD

## 2023-03-28 NOTE — SWALLOW VFSS/MBS ASSESSMENT ADULT - ADDITIONAL INFORMATION
Reconstructive plate used for mandibular reconstruction appreciated.  (+) submental soft tissue fullness.

## 2023-03-28 NOTE — SWALLOW VFSS/MBS ASSESSMENT ADULT - PHARYNGEAL PHASE COMMENTS
Initiation of pharyngeal swallow response was generally timely.  During swallows, hyolaryngeal excursion and base of tongue to posterior pharyngeal wall contraction were mildly reduced.  Epiglottic inversion was incomplete at times.  These deficits resulted in occasional trace transient penetration of thin liquid during the swallow, as well as mild base of tongue, vallecular, and pyriform sinus residue with puree and solid consistencies.  Residue cleared with dry swallows and liquid wash.  No aspiration was observed across consistencies on this study.

## 2023-03-28 NOTE — PROGRESS NOTE ADULT - PROBLEM SELECTOR PLAN 12
s/p CABG 2005, home medication Rosuvastatin 10mg daily.  EKG with new nonspecific TW changes V1-3 i/s/o afib, however currently no chest pain, trop 0.07.  - trend trops to peak  - f/u repeat EKG  - c/w home meds    #Hyperlipidemia  home medication Rosuvastatin 10mg daily   - c/w home meds  #GERD. hx of GERD complicated by Garcia's esophagus home meds pantoprazole 20mg - c/w home meds s/p CABG 2005, home medication Rosuvastatin 10mg daily.  EKG with new nonspecific TW changes V1-3 i/s/o afib, however currently no chest pain, trop 0.07.      #Hyperlipidemia  home medication Rosuvastatin 10mg daily   - c/w home meds  #GERD. 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

## 2023-03-28 NOTE — PROGRESS NOTE ADULT - PROBLEM SELECTOR PLAN 10
Patient with history of hypertension, home medications isosorbide mononitrate 30mg QD, amlodipine 5mg QD, torsemide 20mg BID  - holding imdur 30mg PO QD iso soft pressures and sepsis, restart as tolerated  - holding amlodipine 5mg PO QD  - hold home torsemide 60mg qd Patient with history of hypertension, home medications isosorbide mononitrate 30mg QD, amlodipine 5mg QD, torsemide 20mg BID  - holding imdur 30mg PO QD iso soft pressures and sepsis, restart as tolerated  - holding amlodipine 5mg PO QD  - on torsemide 40

## 2023-03-28 NOTE — SWALLOW VFSS/MBS ASSESSMENT ADULT - SLP PERTINENT HISTORY OF CURRENT PROBLEM
72M with PMHx Afib (on Eliquis, s/p DCCV 5/2022), PE, HFpEF (EF 65%), CAD s/p CABG (2005), R renal transplant (on tacrolimus, follows w/ Dr Leyva), jaw cancer s/p resection (~2019) w/ recurrence s/p chemo/RT (completed 2-3 weeks ago), JAYANT (not on CPAP), CLL (w/ chronic leukocytosis), DM2 (with humalog pump), HTN, HLD, recent admit OSH for tongue/facial swelling likely 2/2 RT c/b pleural effusion s/p pleurX drain (placed at [facility]) R chest, recently admitted for R pleural effusion s/p pleurX removal presents for fevers and AMS x 5 days at rehab center..  Pt diagnosed with sepsis with AHRF.  Clinical swallow eval on 3/26/23 showed overt signs & symptoms of airway protection deficits.  As such, pt recommended NPO pending MBSS.

## 2023-03-28 NOTE — PHYSICAL THERAPY INITIAL EVALUATION ADULT - PERTINENT HX OF CURRENT PROBLEM, REHAB EVAL
72M with PMHx Afib (on Eliquis, s/p DCCV 5/2022), PE, HFpEF (EF 65%), CAD s/p CABG (2005), R renal transplant (on tacrolimus, follows w/ Dr Leyva), jaw cancer s/p resection (~2019) w/ recurrence s/p chemo/RT (completed 2-3 weeks ago), JAYANT (not on CPAP), CLL (w/ chronic leukocytosis), DM2 (with humalog pump), HTN, HLD, recent admit OSH for tongue/facial swelling likely 2/2 RT c/b pleural effusion s/p pleurX drain (placed at [facility]) R chest, recently admitted for R pleural effusion s/p pleurX removal presents for fevers and AMS x 5 days at rehab center. Per family, patient was experiencing fevers to 104 at the rehab center and was sent into the ED. In the ED, patient was altered and ICU was consulted. Patient's mental status improved s/p fluid resuscitation. Patient also c/o lower abdominal pain, improved s/p elmore placement. Urine appears to be draining purulent appearing urine.

## 2023-03-28 NOTE — PROGRESS NOTE ADULT - NSPROGADDITIONALINFOA_GEN_ALL_CORE
Renal txp, recent tapering off of tacrolimus with substitution of sirolimus with developed of PNA.    Suggest:    1. Cont abx.  2. ID consult/followup.  3. Cont Scr.   4. Cont sirolimus (as not clearly pneumonitis).    Please call with any questions.    Oz Araujo MD, FACP, FASN | kidney.[facility]  Nephrology, Hypertension, and Internal Medicine  Mobile: [phone] (Daytime Hours Only)  Office/Answering Service: [phone]  Asst. Prof. of Medicine, [employer] at [facility] [place] - Nephrology at [address]  [address], [address], [place], [place]

## 2023-03-29 NOTE — PROGRESS NOTE ADULT - PROBLEM SELECTOR PLAN 8
Patient with chronic renal insufficiency, s/p R renal transplant, was previously on tacrolimus 2mg AM and 1mg PM which was discontinued during recent admission. Overlap dosing of tacrolimus and sirolimus for three days. Tacrolimus level and sirolimus lvl within range recent admission  -  sirolimus 1mg qd   - f/u with Dr Carreno

## 2023-03-29 NOTE — PROGRESS NOTE ADULT - PROBLEM SELECTOR PLAN 4
baseline Cr 0.58-0.65, discharged with Cr 1.1 on 3/16. Presents with Cr of 1.3. Suspect prerenal i/s/o sepsis. R/o ascending infection/pyelonephritis. No CVA tenderness on exam  - UA positive

## 2023-03-29 NOTE — PROGRESS NOTE ADULT - PROBLEM SELECTOR PLAN 1
SIRS + with evidence of pneumonia on CXR/CT chest and possibly a UTI. Patient with AMS, cough, fever found to have acute hypoxic respiratory failure (AHRF) etiology likely pneumonia.  At baseline intermittently uses 2L NC at home, now with increased O2 demand i/s/o aspiration pna  - New large consolidation on CT  - UA positive and growing candida   - Bcx NTD  - c/w zosyn  - s/p 1L in ED, c/w maintenance IVF -cautious fluids i/s/o known dCHF

## 2023-03-29 NOTE — PROGRESS NOTE ADULT - PROBLEM SELECTOR PLAN 2
Pleural fluid recently tested suggestive of exudative effusion likely 2/2 to diastolic HF   - c/w torsemide 40 mg qd i/s/o sepsis  - cautiously diurese as tolerated    #Hx of tongue swelling  per family, patient recently admitted at [facility] for tongue/facial swelling and was started on steroids. During recent admission to [facility] discharged on prednisone 5mg qd  - collateral on hx and prednisone, end date  - c/w pred for now

## 2023-03-29 NOTE — PROGRESS NOTE ADULT - PROBLEM SELECTOR PLAN 9
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  Follows with oncologist at [facility], was recently given few chemo doses but decision made to stop chemo and proceed with radiation therapy.   - Followup with oncologist.
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  Follows with oncologist at [facility], was recently given few chemo doses but decision made to stop chemo and proceed with radiation therapy.   - Followup with oncologist.
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  Follows with oncologist at [facility], was recently given few chemo doses but decision made to stop chemo and proceed with radiation therapy.   - Followup with oncologist.
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  Follows with oncologist at [facility], was recently given few chemo doses but decision made to stop chemo and proceed with radiation therapy.   - Followup with oncologist.

## 2023-03-29 NOTE — PROGRESS NOTE ADULT - SUBJECTIVE AND OBJECTIVE BOX
----- INTERVAL HPI/OVERNIGHT EVENTS -----     Patient was seen and examined at bedside. As per nurse and patient, no o/n events, patient resting comfortably. No complaints at this time. Patient denies: fever, chills, dizziness, weakness, HA, Changes in vision, CP, palpitations, SOB, cough, N/V/D/C, dysuria, changes in bowel movements, LE edema. ROS otherwise negative.      Subjective: Patient is a 72y old  Male who presents with a chief complaint of PNA (28 Mar 2023 20:25)      ----- VITAL SIGNS -----  T(F): 98.8 (03-29-23 @ 05:50)  HR: 71 (03-29-23 @ 05:50)  BP: 115/68 (03-29-23 @ 05:50)  RR: 18 (03-29-23 @ 05:50)  SpO2: 92% (03-29-23 @ 05:50)  Wt(kg): --      03-28-23 @ 07:01  -  03-29-23 @ 07:00  --------------------------------------------------------  IN: 0 mL / OUT: 1170 mL / NET: -1170 mL        ----- Physical Exam -----     Constitutional: resting comfortably in bed; NAD  HEENT: NC/AT, PERRL, EOMI, anicteric sclera, no nasal discharge; uvula midline, no oropharyngeal erythema or exudates, MMM; no thyromegaly or anterior/posterior or submental NICHOLE; neck supple  Respiratory: CTA B/L; no W/R/R, no retractions  Cardiac: +S1/S2; RRR; no M/R/G appreciated  Gastrointestinal: abdomen soft, NT/ND, +BS, no rebound tenderness or guarding  Back: no CVAT B/L  Extremities: legs WWP, no clubbing or cyanosis; no peripheral edema  Vascular: 2+ distal pedal pulses B/L  Dermatologic: skin warm, dry and intact; no rashes, wounds, or scars  Neurologic: AAOx3; CNII-XII grossly intact; strength 5/5 and sensation intact in all 4 extremities; no focal deficits  Psychiatric: affect and characteristics of appearance, verbalizations, behaviors are appropriate    MEDICATIONS  (STANDING):  atorvastatin 40 milliGRAM(s) Oral at bedtime  dextrose 5%. 1000 milliLiter(s) (50 mL/Hr) IV Continuous <Continuous>  dextrose 5%. 1000 milliLiter(s) (100 mL/Hr) IV Continuous <Continuous>  dextrose 50% Injectable 25 Gram(s) IV Push once  dextrose 50% Injectable 12.5 Gram(s) IV Push once  dextrose 50% Injectable 25 Gram(s) IV Push once  fluconAZOLE   Tablet 200 milliGRAM(s) Oral daily  glucagon  Injectable 1 milliGRAM(s) IntraMuscular once  insulin glargine Injectable (LANTUS) 10 Unit(s) SubCutaneous at bedtime  insulin lispro (ADMELOG) corrective regimen sliding scale   SubCutaneous Before meals and at bedtime  piperacillin/tazobactam IVPB.. 4.5 Gram(s) IV Intermittent every 8 hours  predniSONE   Tablet 5 milliGRAM(s) Oral daily  sirolimus 1 milliGRAM(s) Oral every 24 hours  torsemide 40 milliGRAM(s) Oral every 24 hours    MEDICATIONS  (PRN):  acetaminophen     Tablet .. 650 milliGRAM(s) Oral every 6 hours PRN Temp greater or equal to 38C (100.4F), Mild Pain (1 - 3)  dextrose Oral Gel 15 Gram(s) Oral once PRN Blood Glucose LESS THAN 70 milliGRAM(s)/deciliter      Allergies    No Known Allergies    Intolerances        ----- LABS -----                         8.7    24.15 )-----------( 234      ( 28 Mar 2023 22:56 )             28.5                   ----- RADIOLOGY & ADDITIONAL TESTS -----     Reviewed ----- INTERVAL HPI/OVERNIGHT EVENTS -----     Patient was seen and examined at bedside. As per nurse and patient, had one episode where he coughed up bright red blood w/ clots. d/juan eliquis and SCDs placed. ENT consulted       Subjective: Patient is a 72y old  Male who presents with a chief complaint of PNA (28 Mar 2023 20:25)      ----- VITAL SIGNS -----  T(F): 98.8 (03-29-23 @ 05:50)  HR: 71 (03-29-23 @ 05:50)  BP: 115/68 (03-29-23 @ 05:50)  RR: 18 (03-29-23 @ 05:50)  SpO2: 92% (03-29-23 @ 05:50)  Wt(kg): --      03-28-23 @ 07:01  -  03-29-23 @ 07:00  --------------------------------------------------------  IN: 0 mL / OUT: 1170 mL / NET: -1170 mL        ----- Physical Exam -----     Constitutional: resting comfortably in bed; NAD  HEENT:anicteric sclera, no nasal discharge; uvula midline, no oropharyngeal erythema or exudates  Respiratory: CTA B/L; decrease breath sounds on bases  Cardiac: +S1/S2; RRR; no M/R/G appreciated  Gastrointestinal: abdomen soft, NT/ND, +BS, no rebound tenderness or guarding  Back: no CVAT B/L  Extremities: legs WWP, no clubbing or cyanosis; no peripheral edema  Vascular: 2+ distal pedal pulses B/L  Dermatologic: skin warm, dry and intact; no rashes, wounds, or scars  Neurologic: AAOx2; CNII-XII grossly intact; strength 5/5 and sensation intact in all 4 extremities; no focal deficits  Psychiatric: affect and characteristics of appearance, verbalizations, behaviors are appropriate    MEDICATIONS  (STANDING):  atorvastatin 40 milliGRAM(s) Oral at bedtime  dextrose 5%. 1000 milliLiter(s) (50 mL/Hr) IV Continuous <Continuous>  dextrose 5%. 1000 milliLiter(s) (100 mL/Hr) IV Continuous <Continuous>  dextrose 50% Injectable 25 Gram(s) IV Push once  dextrose 50% Injectable 12.5 Gram(s) IV Push once  dextrose 50% Injectable 25 Gram(s) IV Push once  fluconAZOLE   Tablet 200 milliGRAM(s) Oral daily  glucagon  Injectable 1 milliGRAM(s) IntraMuscular once  insulin glargine Injectable (LANTUS) 10 Unit(s) SubCutaneous at bedtime  insulin lispro (ADMELOG) corrective regimen sliding scale   SubCutaneous Before meals and at bedtime  piperacillin/tazobactam IVPB.. 4.5 Gram(s) IV Intermittent every 8 hours  predniSONE   Tablet 5 milliGRAM(s) Oral daily  sirolimus 1 milliGRAM(s) Oral every 24 hours  torsemide 40 milliGRAM(s) Oral every 24 hours    MEDICATIONS  (PRN):  acetaminophen     Tablet .. 650 milliGRAM(s) Oral every 6 hours PRN Temp greater or equal to 38C (100.4F), Mild Pain (1 - 3)  dextrose Oral Gel 15 Gram(s) Oral once PRN Blood Glucose LESS THAN 70 milliGRAM(s)/deciliter      Allergies    No Known Allergies    Intolerances        ----- LABS -----                         8.7    24.15 )-----------( 234      ( 28 Mar 2023 22:56 )             28.5                   ----- RADIOLOGY & ADDITIONAL TESTS -----     Reviewed

## 2023-03-29 NOTE — PROGRESS NOTE ADULT - PROBLEM SELECTOR PROBLEM 1
Sepsis with acute hypoxic respiratory failure

## 2023-03-29 NOTE — PROGRESS NOTE ADULT - PROBLEM SELECTOR PLAN 11
home medication 5mg Eliquis BID   c/w home meds

## 2023-03-29 NOTE — PROGRESS NOTE ADULT - PROBLEM SELECTOR PLAN 7
Patient with history of CLL, follows with oncology at [facility]. No lymphadenopathy appreciated on physical exam.  - f/u outpatient
Patient with history of CLL, follows with oncology at [facility]. No lymphadenopathy appreciated on physical exam.  - f/u outpatient
Patient with history of CLL, follows with oncology at [facility]. No lymphadenopathy appreciated on physical exam.  - f/u outpatient
Patient with history of CLL, follows with oncology at [facility]. No lymphadenopathy appreciated on physical exam.  - f/u outpatient

## 2023-03-29 NOTE — PROGRESS NOTE ADULT - SUBJECTIVE AND OBJECTIVE BOX
Patient is a 72y Male seen and evaluated at bedside. overnight patient cough up blood with clots x 1 episode currenlty patient not in acute distress on RA /65 K 4,0 SCr at baseline of 1.1      Meds:    acetaminophen     Tablet .. 650 every 6 hours PRN  atorvastatin 40 at bedtime  dextrose 5%. 1000 <Continuous>  dextrose 5%. 1000 <Continuous>  dextrose 50% Injectable 25 once  dextrose 50% Injectable 12.5 once  dextrose 50% Injectable 25 once  dextrose Oral Gel 15 once PRN  fluconAZOLE   Tablet 200 daily  glucagon  Injectable 1 once  insulin glargine Injectable (LANTUS) 10 at bedtime  insulin lispro (ADMELOG) corrective regimen sliding scale  Before meals and at bedtime  piperacillin/tazobactam IVPB.. 4.5 every 8 hours  predniSONE   Tablet 5 daily  sirolimus 1 every 24 hours  torsemide 40 every 24 hours      T(C): , Max: 37.1 (03-29-23 @ 05:50)  T(F): , Max: 98.8 (03-29-23 @ 05:50)  HR: 83 (03-29-23 @ 12:23)  BP: 106/65 (03-29-23 @ 12:23)  BP(mean): --  RR: 19 (03-29-23 @ 12:23)  SpO2: 92% (03-29-23 @ 12:23)  Wt(kg): --    03-28 @ 07:01  -  03-29 @ 07:00  --------------------------------------------------------  IN: 0 mL / OUT: 1170 mL / NET: -1170 mL          Review of Systems:  all other ROS negative       PHYSICAL EXAM:  GENERAL: well-developed, well nourished, alert, no acute distress at present  CHEST/LUNG: Clear to auscultation bilaterally  HEART: normal S1S2, RRR  ABDOMEN: Soft, Nontender, +BS,   EXTREMITIES: No clubbing, cyanosis, or edema         LABS:                        9.5    23.74 )-----------( 254      ( 29 Mar 2023 05:30 )             31.7     03-29    137  |  97  |  51<H>  ----------------------------<  85  4.0   |  26  |  1.12    Ca    8.5      29 Mar 2023 05:30  Phos  4.0     03-29  Mg     2.6     03-29                  RADIOLOGY & ADDITIONAL STUDIES:

## 2023-03-29 NOTE — PROGRESS NOTE ADULT - PROBLEM SELECTOR PLAN 12
s/p CABG 2005, home medication Rosuvastatin 10mg daily.  EKG with new nonspecific TW changes V1-3 i/s/o afib, however currently no chest pain, trop 0.07.      #Hyperlipidemia  home medication Rosuvastatin 10mg daily   - c/w home meds  #GERD. 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

## 2023-03-29 NOTE — CONSULT NOTE ADULT - SUBJECTIVE AND OBJECTIVE BOX
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 Per family, patient was experiencing fevers to 104 at the rehab center and was sent into the ED. In the ED, patient was altered and ICU was consulted. Patient's mental status improved s/p fluid resuscitation. Patient also c/o lower abdominal pain, improved s/p elmore placement with purulent appearing urine. Yesterday pt noted to have episode of mild hemoptysis. Per CNA who was at bedside, the clot was about nickel sized. Reportedly an isolated event with no further bleeding since.       Allergies    No Known Allergies    Intolerances        PAST MEDICAL & SURGICAL HISTORY:  Barretts esophagus      CRI (chronic renal insufficiency)      DM (diabetes mellitus)      GERD (gastroesophageal reflux disease)      HTN (hypertension)      Monoclonal gammopathy      Mandibular abscess      Paroxysmal atrial fibrillation      Benign prostatic hyperplasia, presence of lower urinary tract symptoms unspecified, unspecified morphology      Hyperlipidemia      Barretts esophagus      S/P CABG (coronary artery bypass graft)  20 years      History of surgery  cyst removal      Mandibular abscess          MEDICATIONS:  Antiinfectives:   fluconAZOLE   Tablet 200 milliGRAM(s) Oral daily  piperacillin/tazobactam IVPB.. 4.5 Gram(s) IV Intermittent every 8 hours    Hematologic/Anticoagulation:    Pain medications/Neuro:  acetaminophen     Tablet .. 650 milliGRAM(s) Oral every 6 hours PRN    IV fluids:  dextrose 5%. 1000 milliLiter(s) IV Continuous <Continuous>  dextrose 5%. 1000 milliLiter(s) IV Continuous <Continuous>    Endocrine Medications:   atorvastatin 40 milliGRAM(s) Oral at bedtime  dextrose 50% Injectable 25 Gram(s) IV Push once  dextrose 50% Injectable 12.5 Gram(s) IV Push once  dextrose 50% Injectable 25 Gram(s) IV Push once  dextrose Oral Gel 15 Gram(s) Oral once PRN  glucagon  Injectable 1 milliGRAM(s) IntraMuscular once  insulin glargine Injectable (LANTUS) 10 Unit(s) SubCutaneous at bedtime  insulin lispro (ADMELOG) corrective regimen sliding scale   SubCutaneous Before meals and at bedtime  predniSONE   Tablet 5 milliGRAM(s) Oral daily    All other standing medications:   sirolimus 1 milliGRAM(s) Oral every 24 hours  torsemide 40 milliGRAM(s) Oral every 24 hours    All other PRN medications:      SOCIAL HISTORY: non-contributory  Tobacco History: denies  ETOH Use: denies  Drug Use: denies    FAMILY HISTORY:  No pertinent family history in first degree relatives        REVIEW OF SYSTEMS: see above    LABS:  CBC-                        9.5    23.74 )-----------( 254      ( 29 Mar 2023 05:30 )             31.7         03-29    137  |  97  |  51<H>  ----------------------------<  85  4.0   |  26  |  1.12    Ca    8.5      29 Mar 2023 05:30  Phos  4.0     03-29  Mg     2.6     03-29      Coagulation Studies-    Endocrine Panel-  --  --  8.5 mg/dL      Vital Signs Last 24 Hrs  T(C): 36.4 (29 Mar 2023 12:23), Max: 37.1 (29 Mar 2023 05:50)  T(F): 97.6 (29 Mar 2023 12:23), Max: 98.8 (29 Mar 2023 05:50)  HR: 83 (29 Mar 2023 12:23) (71 - 89)  BP: 106/65 (29 Mar 2023 12:23) (104/63 - 115/68)  BP(mean): --  RR: 19 (29 Mar 2023 12:23) (18 - 19)  SpO2: 92% (29 Mar 2023 12:23) (92% - 97%)    Parameters below as of 29 Mar 2023 12:23  Patient On (Oxygen Delivery Method): room air      PHYSICAL EXAM:  ENT EXAM-   Constitutional: mildly agitated  Head:  normocephalic, atraumatic.   OC/OP:  Floor of mouth, buccal mucosa, lips, hard palate, soft palate, uvula, posterior pharyngeal wall normal.  Dry mucosa. No areas of active bleeding, no clots or old blood noted.   Face/Neck:  Trachea midline. palpable mass along R angle of mandible, no TTP   MULTISYSTEM EXAM-  Neuro/Psych:  A&O x 3  Cranial nerves: 2-12 grossly intact bilaterally.  Eyes:  EOMI, no nystagmus.  Pulm:  No dyspnea, non-labored breathing  Skin:  No rash or lesions on exposed skin of head/neck    Procedure: laryngoscopy  risks and benefits of laryngoscopy explained to pt. Pt refusing laryngoscopy at this time.     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 ENT consulted after pt spit up clot last night, size of nickel, isolated event. Pt refusing laryngoscopy.  - reconsult if pt develops further hemoptysis, reconsider laryngoscopy at that time  - no further ENT intervention      Thank you for the consult, please page ENT at [phone] with any questions/concerns.  ----------------------------------------------------    Brittany Lopez MD  Department of Otolaryngology - Head and Neck Surgery  [facility]

## 2023-03-29 NOTE — PROGRESS NOTE ADULT - PROBLEM SELECTOR PLAN 10
Patient with history of hypertension, home medications isosorbide mononitrate 30mg QD, amlodipine 5mg QD, torsemide 20mg BID  - holding imdur 30mg PO QD iso soft pressures and sepsis, restart as tolerated  - holding amlodipine 5mg PO QD  - on torsemide 40

## 2023-03-29 NOTE — PROGRESS NOTE ADULT - PROBLEM SELECTOR PLAN 5
Patient with history of HFpEF presenting with worsened hypoxia, found to have b/l consolidations and pleural effusions  - strict I/Os, monitor UOP  - daily weights  - on torsemide 40 QD i/s/o sepsis  - c/w Farxiga QD

## 2023-03-30 NOTE — PROGRESS NOTE ADULT - PROVIDER SPECIALTY LIST ADULT
Internal Medicine
Nephrology
Cardiology
Internal Medicine
Internal Medicine
Nephrology
Internal Medicine
Internal Medicine

## 2023-03-30 NOTE — DISCHARGE NOTE PROVIDER - CARE PROVIDER_API CALL
JESSICA MCCOY  Cardiology  Phone: [phone]  Fax: [phone]  Scheduled Appointment: 04/12/2023    Marie Lora)  Critical Care Medicine; Pulmonary Disease  [address] [address]  Phone: [phone]  Fax: [phone]  Follow Up Time:     Kofi Leyva)  Internal Medicine; Nephrology  [address], [address] [address]  Phone: [phone]  Fax: [phone]  Follow Up Time:

## 2023-03-30 NOTE — DISCHARGE NOTE PROVIDER - HOSPITAL COURSE
#Discharge: DO NOT DELETE    HOSPITAL COURSE:    73 yo M with PMHx AFib (Eliquis, s/p DCCV in 5/2022), hx PE, HFpEF (LVEF 65%), CAD (s/p CABG in 2005), s/p R renal transplant (Sirolimus, follows Dr. Ordaz), jaw cancer (s/p resection in 2019, c/b recurrent with chemotx/RT completed 2-3 wks prior), JAYANT (not on CPAP), CLL (chronic leukocytosis), T2DM (formerly insulin pump), HTN, HLD, recent [facility] admission (tongue/facial swelling 2/2 RT, c/b pleural effusion s/p PleurX removal) px from rehab center with 5d hx of AMS/fevers likely 2/2 aspiration PNA and UTI for which pt was tx with Zosyn/Fluconazole transitioned to PO abx on discharge.     Problem List/Main Diagnoses:    #Sepsis  Meeting SIRS criteria with CXR/CT evidence of PNA and Ucx positive for Candida Glabrata started on Zosyn/Fluconazole.  - IV Zosyn transitioned to Augmentin on discharge to complete a total 14d course (3/26 - 4/4)   - PO Fluconazole for total 14d course (3/28 - 4/10)   - Bcx NGTD     #Pleural effusion  s/p recent thoracentesis with pleural fluid studies consistent with exudative effusion likely 2/2 diastolic CHF.  - Continue torsemide 40mg QD    #Allergic rxn  Per collateral from family, started on chronic steroids for tongue/facial swelling, continued on current admision.  - Continue prednisone 5mg QD  - Outpatient F/U regarding end date    #DM  - ISS while admitted  - Lantus 10 Qhs      #LATRICIA on CKD  On admission, found to have LATRICIA on CKD suspected pre-renal i/s/o sepsis.   - Cr 1.13 prior to discharge  - Continue outpatient F/U with Dr. Ordaz     #HFpEF  Hx of HFpEF. Concern for volume overload while admitted 2/2 IVF for sepsis.   - Cardiology consulted while admitted   - Continue torsemide 40mg QD  - Farxiga held i/s/o UTI (discuss resuming outpatient)  - Continue outpatient cardiology F/U with Dr. Trujillo   - Lucas placed for strict UOP monitoring while admitted     #Anemia  Iron studies consistent with anemia of chronic disease.    #SMV Thrombosis  Incidentally found to have SMV thrombosis on CT scan started on Eliquis 5mg BID.  - Briefly held due to 1 isolated episode of hemoptysis  - Continue Eliquis 5mg BID on discharge     #CLL  Hx of CLL following outpatient with [facility].  - Continue outpatient follow-up on discharge     #Renal transplant  Patient with chronic renal insufficiency s/p R renal transplant.   - Continue Sirolimus 1mg QD    #HTN  - Holding home anti-HTN (soft BPs while admitted i/s/o sepsis)  - Resume PRN as outpatient     #Atrial fibrillation  - Continue Eliquis 5mg BID #Discharge: DO NOT DELETE    HOSPITAL COURSE:    73 yo M with PMHx AFib (Eliquis, s/p DCCV in 5/2022), hx PE, HFpEF (LVEF 65%), CAD (s/p CABG in 2005), s/p R renal transplant (Sirolimus, follows Dr. Ordaz), jaw cancer (s/p resection in 2019, c/b recurrent with chemotx/RT completed 2-3 wks prior), JAYANT (not on CPAP), CLL (chronic leukocytosis), T2DM (formerly insulin pump), HTN, HLD, recent [facility] admission (tongue/facial swelling 2/2 RT, c/b pleural effusion s/p PleurX removal) px from rehab center with 5d hx of AMS/fevers likely 2/2 aspiration PNA and UTI for which pt was tx with Zosyn/Fluconazole transitioned to PO abx on discharge.     Problem List/Main Diagnoses:    #Sepsis  Meeting SIRS criteria with CXR/CT evidence of PNA and Ucx positive for Candida Glabrata started on Zosyn/Fluconazole.  - IV Zosyn transitioned to Augmentin on discharge to complete a total 14d course (3/26 - 4/4)   - PO Fluconazole for total 14d course (3/28 - 4/10)   - Not on any other QTC prolonging medications but will need repeat EKG outpatient  - Bcx NGTD     #Pleural effusion  s/p recent thoracentesis with pleural fluid studies consistent with exudative effusion likely 2/2 diastolic CHF.  - Continue torsemide 40mg QD    #Allergic rxn  Per collateral from family, started on chronic steroids for tongue/facial swelling, continued on current admision.  - Continue prednisone 5mg QD  - Outpatient F/U regarding end date    #DM  - ISS while admitted  - Lantus 10 Qhs      #LATRICIA on CKD  On admission, found to have LATRICIA on CKD suspected pre-renal i/s/o sepsis.   - Cr 1.13 prior to discharge  - Continue outpatient F/U with Dr. Ordaz     #HFpEF  Hx of HFpEF. Concern for volume overload while admitted 2/2 IVF for sepsis.   - Cardiology consulted while admitted   - Continue torsemide 40mg QD  - Farxiga held i/s/o UTI (discuss resuming outpatient)  - Continue outpatient cardiology F/U with Dr. Trujillo   - Lucas placed for strict UOP monitoring while admitted     #Anemia  Iron studies consistent with anemia of chronic disease.    #SMV Thrombosis  Incidentally found to have SMV thrombosis on CT scan started on Eliquis 5mg BID.  - Briefly held due to 1 isolated episode of hemoptysis  - Continue Eliquis 5mg BID on discharge     #CLL  Hx of CLL following outpatient with [facility].  - Continue outpatient follow-up on discharge     #Renal transplant  Patient with chronic renal insufficiency s/p R renal transplant.   - Continue Sirolimus 1mg QD    #HTN  - Holding home anti-HTN (soft BPs while admitted i/s/o sepsis)  - Resume PRN as outpatient     #Atrial fibrillation  - Continue Eliquis 5mg BID #Discharge: DO NOT DELETE    HOSPITAL COURSE:    71 yo M with PMHx AFib (Eliquis, s/p DCCV in 5/2022), hx PE, HFpEF (LVEF 65%), CAD (s/p CABG in 2005), s/p R renal transplant (Sirolimus, follows Dr. Ordaz), jaw cancer (s/p resection in 2019, c/b recurrent with chemotx/RT completed 2-3 wks prior), JAYANT (not on CPAP), CLL (chronic leukocytosis), T2DM (formerly insulin pump), HTN, HLD, recent [facility] admission (tongue/facial swelling 2/2 RT, c/b pleural effusion s/p PleurX removal) px from rehab center with 5d hx of AMS/fevers likely 2/2 aspiration PNA and UTI for which pt was tx with Zosyn/Fluconazole transitioned to PO abx on discharge.     Problem List/Main Diagnoses:    #Sepsis  Meeting SIRS criteria with CXR/CT evidence of PNA and Ucx positive for Candida Glabrata started on Zosyn/Fluconazole.  - IV Zosyn transitioned to Augmentin on discharge to complete a total 14d course (3/26 - 4/4)   - PO Fluconazole for total 14d course (3/28 - 4/10)   - Not on any other QTC prolonging medications but will need repeat EKG outpatient  - Bcx NGTD     #Pleural effusion  s/p recent thoracentesis with pleural fluid studies consistent with exudative effusion likely 2/2 diastolic CHF.  - Continue torsemide 40mg QD    #Urological retention  New Lucas placed in ED, removed 3/30 however patient did not pass TOV.  - Lucas replaced prior to DC  - TOV Outpatient    #Allergic rxn  Per collateral from family, started on chronic steroids for tongue/facial swelling, continued on current admision.  - Continue prednisone 5mg QD  - Outpatient F/U regarding end date    #DM  - ISS while admitted  - Lantus 10 Qhs      #LATRICIA on CKD  On admission, found to have LATRICIA on CKD suspected pre-renal i/s/o sepsis.   - Cr 1.13 prior to discharge  - Continue outpatient F/U with Dr. Ordaz     #HFpEF  Hx of HFpEF. Concern for volume overload while admitted 2/2 IVF for sepsis.   - Cardiology consulted while admitted   - Continue torsemide 40mg QD  - Farxiga held i/s/o UTI (discuss resuming outpatient)  - Continue outpatient cardiology F/U with Dr. Ronnie Lucas placed for strict UOP monitoring while admitted     #Anemia  Iron studies consistent with anemia of chronic disease.    #SMV Thrombosis  Incidentally found to have SMV thrombosis on CT scan started on Eliquis 5mg BID.  - Briefly held due to 1 isolated episode of hemoptysis  - Continue Eliquis 5mg BID on discharge     #CLL  Hx of CLL following outpatient with [facility].  - Continue outpatient follow-up on discharge     #Renal transplant  Patient with chronic renal insufficiency s/p R renal transplant.   - Continue Sirolimus 1mg QD    #HTN  - Holding home anti-HTN (soft BPs while admitted i/s/o sepsis)  - Resume PRN as outpatient     #Atrial fibrillation  - Continue Eliquis 5mg BID #Discharge: DO NOT DELETE    HOSPITAL COURSE:    71 yo M with PMHx AFib (Eliquis, s/p DCCV in 5/2022), hx PE, HFpEF (LVEF 65%), CAD (s/p CABG in 2005), s/p R renal transplant (Sirolimus, follows Dr. Ordaz), jaw cancer (s/p resection in 2019, c/b recurrent with chemotx/RT completed 2-3 wks prior), JAYANT (not on CPAP), CLL (chronic leukocytosis), T2DM (formerly insulin pump), HTN, HLD, recent [facility] admission (tongue/facial swelling 2/2 RT, c/b pleural effusion s/p PleurX removal) px from rehab center with 5d hx of AMS/fevers likely 2/2 aspiration PNA and UTI for which pt was tx with Zosyn/Fluconazole transitioned to PO abx on discharge.     Problem List/Main Diagnoses:    #Sepsis  Meeting SIRS criteria with CXR/CT evidence of PNA and Ucx positive for Candida Glabrata started on Zosyn/Fluconazole.  - IV Zosyn transitioned to Augmentin on discharge to complete a total 14d course (3/26 - 4/4)   - PO Fluconazole for total 14d course (3/28 - 4/10)   - Not on any other QTC prolonging medications but will need repeat EKG outpatient  - Bcx NGTD     #Pleural effusion  s/p recent thoracentesis with pleural fluid studies consistent with exudative effusion likely 2/2 diastolic CHF.  - Continue torsemide 40mg QD    #Urological retention  New Lucas placed in ED, removed 3/30 however patient with TOV .  - Adamantly refused Lucas     #Allergic rxn  Per collateral from family, started on chronic steroids for tongue/facial swelling, continued on current admision.  - Continue prednisone 5mg QD  - Outpatient F/U regarding end date    #DM  - ISS while admitted  - Lantus 10 Qhs      #LATRICIA on CKD  On admission, found to have LATRICIA on CKD suspected pre-renal i/s/o sepsis.   - Cr 1.13 prior to discharge  - Continue outpatient F/U with Dr. Ordaz     #HFpEF  Hx of HFpEF. Concern for volume overload while admitted 2/2 IVF for sepsis.   - Cardiology consulted while admitted   - Continue torsemide 40mg QD  - Farxiga held i/s/o UTI (discuss resuming outpatient)  - Continue outpatient cardiology F/U with Dr. Ronnie Lucas placed for strict UOP monitoring while admitted     #Anemia  Iron studies consistent with anemia of chronic disease.    #SMV Thrombosis  Incidentally found to have SMV thrombosis on CT scan started on Eliquis 5mg BID.  - Briefly held due to 1 isolated episode of hemoptysis  - Continue Eliquis 5mg BID on discharge     #CLL  Hx of CLL following outpatient with [facility].  - Continue outpatient follow-up on discharge     #Renal transplant  Patient with chronic renal insufficiency s/p R renal transplant.   - Continue Sirolimus 1mg QD    #HTN  - Holding home anti-HTN (soft BPs while admitted i/s/o sepsis)  - Resume PRN as outpatient     #Atrial fibrillation  - Continue Eliquis 5mg BID

## 2023-03-30 NOTE — DISCHARGE NOTE PROVIDER - NSDCCPCAREPLAN_GEN_ALL_CORE_FT
PRINCIPAL DISCHARGE DIAGNOSIS  Diagnosis: Sepsis  Assessment and Plan of Treatment: Infection that inflames air sacs in one or both lungs, which may fill with fluid. With pneumonia, the air sacs may fill with fluid or pus. The infection can be life-threatening to infants, children, and people over 65. Symptoms include cough with phlegm or pus, fever, chills, and difficulty breathing. Antibiotics can treat many forms of pneumonia. Some forms of pneumonia can be prevented by vaccines.  You were treated with IV antibiotics while you were admitted, you will take oral AUGMENTIN antibiotic until the last day (4/4) on discharge to complete the course.        SECONDARY DISCHARGE DIAGNOSES  Diagnosis: Acute UTI  Assessment and Plan of Treatment: Urinary tract infections, also called "UTIs," are infections that affect either the bladder or the kidneys. Bladder infections are more common than kidney infections. Bladder infections happen when bacteria get into the urethra and travel up into the bladder. Kidney infections happen when the bacteria travel even higher, up into the kidneys. The symptoms of a bladder infection include pain or a burning feeling when you urinate, the need to urinate often, the need to urinate suddenly or in a hurry, blood in the urine. Signs that an infection has spread to the kidneys include fever, back pain, or nausea/vomiting. It is important that you take your antibiotics as prescribed and to completion to properly treat your urinary tract infection and prevent antibiotic resistance.  You were found to have infection with a yeast in your urine called Candida for which you were treated with oral medication called FLUCONAZOLE until  4/10.    Diagnosis: Acute diastolic congestive heart failure  Assessment and Plan of Treatment: You have a known history of congestive heart failure prior to your admission. Congestive heart failure can cause make it harder for your heart to pump blood to the rest of your body. As a result, blood can get backed up into your lungs or into your legs. In order to avoid this, make sure to take all of your medications for heart failure as prescribed. This will keep your heart functioning well. If you notice increased difficulty with breathing, cough with bloody mucous, increased swelling in the legs, or chest pain be sure to contact your PCP or call 911 as you may need more treatment. Additionally be sure to follow up with your PCP and Cardiologist on a regular basis to make sure no additional medications or medication changes need to be made.  On discharge, you will NOT be resuming your blood pressure medications and your Farxiga due to low blood pressures while admitted and infection. Please follow-up with your cardiologist before resuming.     PRINCIPAL DISCHARGE DIAGNOSIS  Diagnosis: Sepsis  Assessment and Plan of Treatment: Infection that inflames air sacs in one or both lungs, which may fill with fluid. With pneumonia, the air sacs may fill with fluid or pus. The infection can be life-threatening to infants, children, and people over 65. Symptoms include cough with phlegm or pus, fever, chills, and difficulty breathing. Antibiotics can treat many forms of pneumonia. Some forms of pneumonia can be prevented by vaccines.  You were treated with IV antibiotics while you were admitted, you will take oral AUGMENTIN antibiotic until the last day (4/4) on discharge to complete the course.        SECONDARY DISCHARGE DIAGNOSES  Diagnosis: Acute UTI  Assessment and Plan of Treatment: Urinary tract infections, also called "UTIs," are infections that affect either the bladder or the kidneys. Bladder infections are more common than kidney infections. Bladder infections happen when bacteria get into the urethra and travel up into the bladder. Kidney infections happen when the bacteria travel even higher, up into the kidneys. The symptoms of a bladder infection include pain or a burning feeling when you urinate, the need to urinate often, the need to urinate suddenly or in a hurry, blood in the urine. Signs that an infection has spread to the kidneys include fever, back pain, or nausea/vomiting. It is important that you take your antibiotics as prescribed and to completion to properly treat your urinary tract infection and prevent antibiotic resistance.  You were found to have infection with a yeast in your urine called Candida for which you were treated with oral medication called FLUCONAZOLE until  4/10. While you are taking this medication, you will need to have another EKG done to make sure the medication FLUCONAZOLE is not causing you to have an abnormal rhythm (prolonged QTC).    Diagnosis: Acute diastolic congestive heart failure  Assessment and Plan of Treatment: You have a known history of congestive heart failure prior to your admission. Congestive heart failure can cause make it harder for your heart to pump blood to the rest of your body. As a result, blood can get backed up into your lungs or into your legs. In order to avoid this, make sure to take all of your medications for heart failure as prescribed. This will keep your heart functioning well. If you notice increased difficulty with breathing, cough with bloody mucous, increased swelling in the legs, or chest pain be sure to contact your PCP or call 911 as you may need more treatment. Additionally be sure to follow up with your PCP and Cardiologist on a regular basis to make sure no additional medications or medication changes need to be made.  On discharge, you will NOT be resuming your blood pressure medications and your Farxiga due to low blood pressures while admitted and infection. Please follow-up with your cardiologist before resuming.     PRINCIPAL DISCHARGE DIAGNOSIS  Diagnosis: Sepsis  Assessment and Plan of Treatment: Infection that inflames air sacs in one or both lungs, which may fill with fluid. With pneumonia, the air sacs may fill with fluid or pus. The infection can be life-threatening to infants, children, and people over 65. Symptoms include cough with phlegm or pus, fever, chills, and difficulty breathing. Antibiotics can treat many forms of pneumonia. Some forms of pneumonia can be prevented by vaccines.  You were treated with IV antibiotics while you were admitted, you will take oral AUGMENTIN antibiotic until the last day (4/4) on discharge to complete the course.        SECONDARY DISCHARGE DIAGNOSES  Diagnosis: Acute UTI  Assessment and Plan of Treatment: Urinary tract infections, also called "UTIs," are infections that affect either the bladder or the kidneys. Bladder infections are more common than kidney infections. Bladder infections happen when bacteria get into the urethra and travel up into the bladder. Kidney infections happen when the bacteria travel even higher, up into the kidneys. The symptoms of a bladder infection include pain or a burning feeling when you urinate, the need to urinate often, the need to urinate suddenly or in a hurry, blood in the urine. Signs that an infection has spread to the kidneys include fever, back pain, or nausea/vomiting. It is important that you take your antibiotics as prescribed and to completion to properly treat your urinary tract infection and prevent antibiotic resistance.  You were found to have infection with a yeast in your urine called Candida for which you were treated with oral medication called FLUCONAZOLE until  4/10. While you are taking this medication, you will need to have another EKG done to make sure the medication FLUCONAZOLE is not causing you to have an abnormal rhythm (prolonged QTC).    Diagnosis: Acute diastolic congestive heart failure  Assessment and Plan of Treatment: You have a known history of congestive heart failure prior to your admission. Congestive heart failure can cause make it harder for your heart to pump blood to the rest of your body. As a result, blood can get backed up into your lungs or into your legs. In order to avoid this, make sure to take all of your medications for heart failure as prescribed. This will keep your heart functioning well. If you notice increased difficulty with breathing, cough with bloody mucous, increased swelling in the legs, or chest pain be sure to contact your PCP or call 911 as you may need more treatment. Additionally be sure to follow up with your PCP and Cardiologist on a regular basis to make sure no additional medications or medication changes need to be made.  On discharge, you will NOT be resuming your blood pressure medications and your Farxiga due to low blood pressures while admitted and infection. Please follow-up with your cardiologist before resuming.    Diagnosis: Blount catheter in place  Assessment and Plan of Treatment: You will be discharged with a BLOUNT catheter that drains your urine. You will need to follow-up outpatient to see when you are medically ready to have the catheter removed and pee on your own.

## 2023-03-30 NOTE — PROGRESS NOTE ADULT - ASSESSMENT
72M with PMHx renal tx (with recent switch from tacrolimus to sirolimus) jaw cancer DM HTN who presented to the ED with complaints of fever and AMS found to have BL pNA- nephrology consulted    Assessment/Plan:   #renal transplant  #baseline Cr around 1.1  would advise pulm consult given PNA and  known pneumonitis from sirolimus   IVF if patient to remain  NPO  i's and o's  daily BMP      Thank you for the opportunity to participate in the care of your patient. The nephrology service remains available to assist with any questions or concerns. Please feel free to reach us by paging the on-call nephrology fellow for urgent issues or as below.     Madhuri Turcios D>O  PGY-5, Nephrology Fellow   P: [phone] 
72M with PMHx renal tx (with recent switch from tacrolimus to sirolimus) jaw cancer DM HTN who presented to the ED with complaints of fever and AMS found to have BL pNA- nephrology consulted    Assessment/Plan:   #renal transplant  #baseline Cr around 1.1  would advise pulm consult given PNA and  known pneumonitis from sirolimus   i's and o's  daily BMP      Thank you for the opportunity to participate in the care of your patient. The nephrology service remains available to assist with any questions or concerns. Please feel free to reach us by paging the on-call nephrology fellow for urgent issues or as below.     Madhuri Turcios D>O  PGY-5, Nephrology Fellow   P: [phone] 
72M with PMHx renal tx (with recent switch from tacrolimus to sirolimus) jaw cancer DM HTN who presented to the ED with complaints of fever and AMS found to have BL pNA- nephrology consulted    Assessment/Plan:   #renal transplant  #baseline Cr around 1.1  would advise pulm consult given PNA and  known pneumonitis from sirolimus   i's and o's  daily BMP      Thank you for the opportunity to participate in the care of your patient. The nephrology service remains available to assist with any questions or concerns. Please feel free to reach us by paging the on-call nephrology fellow for urgent issues or as below.     Madhuri Turcios D>O  PGY-5, Nephrology Fellow   P: [phone] 
72M with PMHx Afib (on Eliquis, s/p DCCV 5/2022), PE, HFpEF (EF 65%), CAD s/p CABG (2005), R renal transplant (on tacrolimus, follows w/ Dr Leyva), jaw cancer s/p resection (~2019) w/ recurrence s/p chemo/RT (completed 2-3 weeks ago), JAYANT (not on CPAP), CLL (w/ chronic leukocytosis), DM2 (with humalog pump), HTN, HLD, recent admit OSH for tongue/facial swelling likely 2/2 RT c/b pleural effusion s/p pleurX drain (placed at [facility]) R chest, recently admitted for R pleural effusion s/p pleurX removal presents for fevers and AMS x 5 days at rehab center.

## 2023-03-30 NOTE — DISCHARGE NOTE PROVIDER - PROVIDER TOKENS
PROVIDER:[TOKEN:[[id number]],SCHEDULEDAPPT:[04/12/2023]],PROVIDER:[TOKEN:[[id number]]],PROVIDER:[TOKEN:[[id number]]]

## 2023-03-30 NOTE — DISCHARGE NOTE PROVIDER - CARE PROVIDERS DIRECT ADDRESSES
,DirectAddress_Unknown,[email or web address]

## 2023-03-30 NOTE — PROGRESS NOTE ADULT - SUBJECTIVE AND OBJECTIVE BOX
INTERVAL HPI/OVERNIGHT EVENTS:  Patient was seen and examined at bedside. No episodes of bloody emesis. No complaints of N/V/D SOB, CP on examination.       T(C): 36.7 (03-30-23 @ 13:41), Max: 36.7 (03-30-23 @ 13:41)  HR: 80 (03-30-23 @ 13:41) (80 - 97)  BP: 124/65 (03-30-23 @ 13:41) (110/62 - 124/65)  RR: 18 (03-30-23 @ 13:41) (18 - 18)  SpO2: 97% (03-30-23 @ 13:41) (93% - 97%)  Wt(kg): --Vital Signs Last 24 Hrs  T(C): 36.7 (30 Mar 2023 13:41), Max: 36.7 (30 Mar 2023 13:41)  T(F): 98.1 (30 Mar 2023 13:41), Max: 98.1 (30 Mar 2023 13:41)  HR: 80 (30 Mar 2023 13:41) (80 - 97)  BP: 124/65 (30 Mar 2023 13:41) (110/62 - 124/65)  BP(mean): --  RR: 18 (30 Mar 2023 13:41) (18 - 18)  SpO2: 97% (30 Mar 2023 13:41) (93% - 97%)    Parameters below as of 30 Mar 2023 06:07  Patient On (Oxygen Delivery Method): nasal cannula        PHYSICAL EXAM:  GENERAL: NAD; well-groomed; well-developed; AAO x 3; good concentration   Neuro: CN2-12 grossly intact; speech clear; +2/4 DTR in UE and LE b/l; light touch sensation intact of UE and le b/l;  negative Romberg test; no pronator drift; intact tandem gait; intact heel and toe walking; intact finger to nose testing; intact rapid alternating movements  HEENT: NC/AT; MMM; neck supple; good dentition; no nystagmus; no scleral icterus; nasal passages clear; no throid or LN enlargement  Heart: RRR; +s1 and s2; no MRG (or grade 2 _ murmur appreciated at _ ICS); non displaced PMI; no JVD  Lungs: CTA b/l; normal effort; no accesory muscle use; symmetric inhalation and exhalation; vesicular breath sounds; no WWR; normal tactile fremitus; persussion resonant  GI: nondistended; nontender; normal bowel sounds [id number]; percussion typmanic; no organomegaly   Extremities: +2 pulses in UE and LE b/l; no clubbing, cyanosis, or edema b/l, capillary refill <2 sec b/l  Skin: skin dry and warm; no skin tenting; no rashes or lesions  MSK: normal tone; +5/5 strength in upper and lower extremities b/l    Consultant(s) Notes Reviewed:  [x ] YES  [ ] NO  Care Discussed with Consultants/Other Providers [ x] YES  [ ] NO    LABS:                        9.1    30.09 )-----------( 259      ( 30 Mar 2023 07:43 )             30.3     03-30    139  |  97  |  49<H>  ----------------------------<  116<H>  3.7   |  26  |  1.13    Ca    9.0      30 Mar 2023 07:43  Phos  3.8     03-30  Mg     2.4     03-30        PT/INR - ( 30 Mar 2023 07:43 )   PT: 17.9 sec;   INR: 1.50          PTT - ( 30 Mar 2023 07:43 )  PTT:35.6 sec    CAPILLARY BLOOD GLUCOSE      POCT Blood Glucose.: 126 mg/dL (29 Mar 2023 22:07)  POCT Blood Glucose.: 136 mg/dL (29 Mar 2023 17:55)            RADIOLOGY & ADDITIONAL TESTS:    Imaging Personally Reviewed:  [ ] YES  [ ] NO

## 2023-03-30 NOTE — PROGRESS NOTE ADULT - SUBJECTIVE AND OBJECTIVE BOX
INTERVAL HISTORY:  sleepy, no chest pain or dyspnea    MEDICATIONS:  MEDICATIONS  (STANDING):  atorvastatin 40 milliGRAM(s) Oral at bedtime  dextrose 5%. 1000 milliLiter(s) (50 mL/Hr) IV Continuous <Continuous>  dextrose 5%. 1000 milliLiter(s) (100 mL/Hr) IV Continuous <Continuous>  dextrose 50% Injectable 25 Gram(s) IV Push once  dextrose 50% Injectable 12.5 Gram(s) IV Push once  dextrose 50% Injectable 25 Gram(s) IV Push once  fluconAZOLE   Tablet 200 milliGRAM(s) Oral daily  glucagon  Injectable 1 milliGRAM(s) IntraMuscular once  insulin glargine Injectable (LANTUS) 10 Unit(s) SubCutaneous at bedtime  insulin lispro (ADMELOG) corrective regimen sliding scale   SubCutaneous Before meals and at bedtime  piperacillin/tazobactam IVPB.. 4.5 Gram(s) IV Intermittent every 8 hours  predniSONE   Tablet 5 milliGRAM(s) Oral daily  sirolimus 1 milliGRAM(s) Oral every 24 hours  torsemide 40 milliGRAM(s) Oral every 24 hours      REVIEW OF SYSTEMS:  CONSTITUTIONAL: No fever, no weight loss, no fatigue  PULMONARY: No cough, no wheezing, chills no hemoptysis; No Shortness of Breath  CARDIOVASCULAR: No chest pain, no palpitations, no syncope, dizziness, no leg swelling  GASTROINTESTINAL: No abdominal pain. No nausea, no  vomiting, no hematemesis; No diarrhea, no constipation. No melena, no hematochezia.  GENITOURINARY: No dysuria, no frequency, no hematuria, no incontinence  SKIN: No itching, no burning, no rashes, no lesions     PHYSICAL EXAM:  T(C): 36.3 (03-30-23 @ 06:07), Max: 36.6 (03-29-23 @ 20:39)  HR: 97 (03-30-23 @ 06:07) (80 - 97)  BP: 117/58 (03-30-23 @ 06:07) (106/65 - 117/58)  RR: 18 (03-30-23 @ 06:07) (18 - 19)  SpO2: 97% (03-30-23 @ 06:07) (92% - 97%)  Wt(kg): --  I&O's Summary    29 Mar 2023 07:01  -  30 Mar 2023 07:00  --------------------------------------------------------  IN: 0 mL / OUT: 700 mL / NET: -700 mL        Appearance:  No deformities, normal appearance	  HEENT:   Normal oral mucosa, PERRL, EOMI	  Neck: No jvd , no masses  Lymphatic: No  lymphadenopathy  Pulmonary: Lungs clear to auscultation and percussion  Cardiovascular: Normal S1 S2, No JVD, 2/6 murmur, No edema	  Abdomen:  Soft, Non-tender, + BS  Skin: No rashes, No ecchymoses	  Extremities:  No clubbing or cyanosis  MSK: Normal range of motion, no joint swelling    LABS:		  CARDIAC MARKERS:                         9.1    30.09 )-----------( 259      ( 30 Mar 2023 07:43 )             30.3   03-29    137  |  97  |  51<H>  ----------------------------<  85  4.0   |  26  |  1.12    Ca    8.5      29 Mar 2023 05:30  Phos  4.0     03-29  Mg     2.6     03-29      proBNP:  Lipid Profile:  HgA1c:  TSH:  PT/INR - ( 30 Mar 2023 07:43 )   PT: 17.9 sec;   INR: 1.50          PTT - ( 30 Mar 2023 07:43 )  PTT:35.6 sec    Culture - Urine (collected 03-25-23 @ 21:31)  Source: Catheterized CatheterizFinal Report (03-29-23 @ 12:52):    >100,000 CFU/ml Candida glabrata    Culture - Urine (collected 03-25-23 @ 17:40)  Source: .Urine None  Final Report (03-28-23 @ 13:24):    >100,000 CFU/ml Candida species closely resembles Candida glabrata    Culture - Blood (collected 03-25-23 @ 16:25)  Source: .Blood None  Preliminary Report (03-29-23 @ 17:00):    No growth at 4 days.    Culture - Blood (collected 03-25-23 @ 16:18)  Source: .Blood None  Preliminary Report (03-29-23 @ 17:00):    No growth at 4 days.      TELEMETRY: 	      QTC     ECG:  	   QTC         RADIOLOGY:   DIAGNOSTIC TESTING: [ ] Echocardiogram: [ ]  Catheterization: [ ] Stress Test:    PMH, medications, allergies Chart notes, vital signs, laboratory data, and radiology studies reviewed.    ASSESSMENT/PLAN: 	  Congestive heart failure–patient is comfortable.  The BNP is elevated but of unclear significance.  The x-ray does not show congestive heart failure.  The patient is off fluids.  Continue torsemide.     Atrial fibrillation–rate is acceptable.  Restart Eliquis.    Sepsis/pneumonia/pleural effusion–patient is improved.    Coronary artery disease/status post CABG–restart Eliquis.    Diabetes–follow sugars    Hypertension–blood pressure is low.  Patient is not on blood pressure medication.        Discussed with nursing staff.

## 2023-03-30 NOTE — PROGRESS NOTE ADULT - TIME BILLING
.
Patient seen and examined with house-staff during bedside rounds.  Resident note read, including vitals, physical findings, laboratory data, and radiological reports.   Revisions included below.  Direct personal management at bed side and extensive interpretation of the data.  Plan was outlined and discussed in details with the housestaff.  Decision making of high complexity  Action taken for acute disease activity to reflect the level of care provided:  - medication reconciliation  - review laboratory data  Likely stable.Risk patient is more alert.  His renal function improved.  Mental status improved.  There is resolution at toxic metabolic encephalopathy secondary to dehydration from prerenal element and sepsis secondary to hospital-acquired pneumonia bilaterally.  The pleural effusion on right side is unchanged and there was no change in comparison between the 2 x-rays from admission and prior to discharge.  Patient was started on antibiotic for hospital-acquired pneumonia.  Renal function improved with hydration.  The patient was in for evaluated by speech for swallow.  There was an evidence of aspiration ADMISSION.  THE PATIENT IS TO CONTINUE ON PREDNISONE AND SIROLIMUS.  FOLLOW-UP WITH NEPHROLOGY.  THE ACUTE HYPOXIC RESPIRATORY FAILURE RESOLVED THE BLOOD SUGAR IS BETTER CONTROLLED CONTINUE INSULIN I DISCUSSED THE CASE WITH ONCOLOGY.  I DISCUSSED THE CASE WITH THE FAMILY IN DETAILS
Patient seen and examined with house-staff during bedside rounds.  Resident note read, including vitals, physical findings, laboratory data, and radiological reports.   Revisions included below.  Direct personal management at bed side and extensive interpretation of the data.  Plan was outlined and discussed in details with the housestaff.  Decision making of high complexity  Action taken for acute disease activity to reflect the level of care provided:  - medication reconciliation  - review laboratory data  Patient is clinically stable.  The patient was bleeding from the mouth yesterday.  Discussed with ENT.  Does not seem to coming from mouth probable.  Colonoscopy if his condition improves process.  Continue antibiotic.  Attempted to get the patient on the bed but he is unstable.  The patient will go to rehab.  Renal function is stable.  Follow-up in oncology
Patient seen and examined with house-staff during bedside rounds.  Resident note read, including vitals, physical findings, laboratory data, and radiological reports.   Revisions included below.  Direct personal management at bed side and extensive interpretation of the data.  Plan was outlined and discussed in details with the housestaff.  Decision making of high complexity  Action taken for acute disease activity to reflect the level of care provided:  - medication reconciliation  - review laboratory data  I discussed the case in detail with the family twice.  All patient was seen by cardiology oncology.  As per oncology he will need treatment of CLL once infection is under control.  Advance diet.  I evaluated the swallow speech evaluation.  Out of bed in chair.  Monitor the right pleural effusion.

## 2023-03-30 NOTE — PROGRESS NOTE ADULT - SUBJECTIVE AND OBJECTIVE BOX
Patient is a 72y Male seen and evaluated at bedside. no acute events overnight /58 SCr 1.1K 3.7 bicarb 26       Meds:    acetaminophen     Tablet .. 650 every 6 hours PRN  atorvastatin 40 at bedtime  dextrose 5%. 1000 <Continuous>  dextrose 5%. 1000 <Continuous>  dextrose 50% Injectable 25 once  dextrose 50% Injectable 12.5 once  dextrose 50% Injectable 25 once  dextrose Oral Gel 15 once PRN  fluconAZOLE   Tablet 200 daily  glucagon  Injectable 1 once  insulin glargine Injectable (LANTUS) 10 at bedtime  insulin lispro (ADMELOG) corrective regimen sliding scale  Before meals and at bedtime  piperacillin/tazobactam IVPB.. 4.5 every 8 hours  predniSONE   Tablet 5 daily  sirolimus 1 every 24 hours  torsemide 40 every 24 hours      T(C): , Max: 36.6 (03-29-23 @ 20:39)  T(F): , Max: 97.9 (03-29-23 @ 20:39)  HR: 97 (03-30-23 @ 06:07)  BP: 117/58 (03-30-23 @ 06:07)  BP(mean): --  RR: 18 (03-30-23 @ 06:07)  SpO2: 97% (03-30-23 @ 06:07)  Wt(kg): --    03-29 @ 07:01  -  03-30 @ 07:00  --------------------------------------------------------  IN: 0 mL / OUT: 700 mL / NET: -700 mL          Review of Systems:  no ROS negative       PHYSICAL EXAM:  GENERAL: well-developed, well nourished, alert, no acute distress at present  CHEST/LUNG: Clear to auscultation bilaterally  HEART: normal S1S2, RRR  ABDOMEN: Soft, Nontender, +BS,   EXTREMITIES: No clubbing, cyanosis, or edema         LABS:                        9.1    30.09 )-----------( 259      ( 30 Mar 2023 07:43 )             30.3     03-30    139  |  97  |  49<H>  ----------------------------<  116<H>  3.7   |  26  |  1.13    Ca    9.0      30 Mar 2023 07:43  Phos  3.8     03-30  Mg     2.4     03-30        PT/INR - ( 30 Mar 2023 07:43 )   PT: 17.9 sec;   INR: 1.50          PTT - ( 30 Mar 2023 07:43 )  PTT:35.6 sec          RADIOLOGY & ADDITIONAL STUDIES:

## 2023-03-30 NOTE — DISCHARGE NOTE NURSING/CASE MANAGEMENT/SOCIAL WORK - PATIENT PORTAL LINK FT
You can access the FollowMyHealth Patient Portal offered by [facility] by registering at the following website: [email or web address]. By joining [facility]’s FollowMyHealth portal, you will also be able to view your health information using other applications (apps) compatible with our system.

## 2023-03-30 NOTE — DISCHARGE NOTE NURSING/CASE MANAGEMENT/SOCIAL WORK - NSDCPEFALRISK_GEN_ALL_CORE
For information on Fall & Injury Prevention, visit: [email or web address] OR  [email or web address] OR  https://www.cdc.gov/steadi/patient.html

## 2023-03-30 NOTE — DISCHARGE NOTE PROVIDER - NSDCMRMEDTOKEN_GEN_ALL_CORE_FT
acetaminophen 325 mg oral tablet: 2 tab(s) orally every 6 hours, As needed, Temp greater or equal to 38C (100.4F), Mild Pain (1 - 3)  amoxicillin-clavulanate 875 mg-125 mg oral tablet: 1 tab(s) orally once a day  Eliquis 5 mg oral tablet: 1 tab(s) orally 2 times a day  fluconazole 200 mg oral tablet: 1 tab(s) orally once a day  insulin glargine 100 units/mL subcutaneous solution: 10 unit(s) subcutaneous once a day (at bedtime)  insulin lispro 100 units/mL injectable solution:  injectable   Please check blood glucose levels 30 minutes before meals and at bedtime:    Give insulin lispro according to the following scale:  2 units Glu 151-200  4 units 201-250  6 units 251-300  8 units 300-350  10 units Glu 351-400  12 units Glu 400 or greater     predniSONE 5 mg oral tablet: 1 tab(s) orally every 24 hours  rosuvastatin 10 mg oral tablet: 1 tab(s) orally once a day  sirolimus 1 mg oral tablet: 1 tab(s) orally every 24 hours  torsemide 40 mg oral tablet: 1 tab(s) orally every 24 hours    acetaminophen 325 mg oral tablet: 2 tab(s) orally every 6 hours, As needed, Temp greater or equal to 38C (100.4F), Mild Pain (1 - 3)  amoxicillin-clavulanate 875 mg-125 mg oral tablet: 1 tab(s) orally every 12 hours  Eliquis 5 mg oral tablet: 1 tab(s) orally 2 times a day  fluconazole 200 mg oral tablet: 1 tab(s) orally once a day  insulin glargine 100 units/mL subcutaneous solution: 10 unit(s) subcutaneous once a day (at bedtime)  insulin lispro 100 units/mL injectable solution:  injectable   Please check blood glucose levels 30 minutes before meals and at bedtime:    Give insulin lispro according to the following scale:  2 units Glu 151-200  4 units 201-250  6 units 251-300  8 units 300-350  10 units Glu 351-400  12 units Glu 400 or greater     predniSONE 5 mg oral tablet: 1 tab(s) orally every 24 hours  rosuvastatin 10 mg oral tablet: 1 tab(s) orally once a day  sirolimus 1 mg oral tablet: 1 tab(s) orally every 24 hours  torsemide 40 mg oral tablet: 1 tab(s) orally every 24 hours

## 2023-04-04 NOTE — H&P ADULT - PROBLEM SELECTOR PLAN 5
#renal transplant  #baseline Cr around 1.1  - Continue outpatient F/U with Dr. Ordaz   Monitor creatinine daily

## 2023-04-04 NOTE — ED PROVIDER NOTE - PHYSICAL EXAMINATION
VITAL SIGNS: I have reviewed nursing notes and confirm.  CONSTITUTIONAL: cachectic  SKIN:  warm and dry, no acute rash.   HEAD:  normocephalic, atraumatic.  NECK: Supple; non tender.  CARD: Regular rate and rhythm.   RESP:  Clear to auscultation b/l  ABD: Normal bowel sounds; soft; non-distended; non-tender; no guarding/ rebound.  EXT: Normal ROM. No clubbing, cyanosis or edema. 2+ pulses to b/l ue/le.  NEURO: Alert, oriented, at baseline  PSYCH: Cooperative, mood and affect appropriate.

## 2023-04-04 NOTE — H&P ADULT - PROBLEM SELECTOR PLAN 1
GI consulted in the ED for PEG placement      #Pre-op clearance:  -f/u  CBC, CMP, Coags, covid19 negative   -Unclear last dose anticoag status since patient and aid are not able to answer the question. Will obtain from son in AM   -NPO at midnight for PEG on 4/5/23  -ECG Afib   -Since pt is not able to walk, unable to calculate METs.   -RCRI 3 [id number]% chance of cardiac evenbt in 30 days post procedure.   Pt is moderate risk for moderate risk of surgery. GI consulted in the ED for PEG placement      #Pre-op clearance:  -f/u  CBC, CMP, Coags, covid19 negative   -Unclear last dose anticoag status since patient and aid are not able to answer the question. Will obtain from son in AM   -NPO at midnight for PEG on 4/5/23  -ECG Afib no ST changes   -Since pt is not able to walk, unable to calculate METs.   -RCRI 3 [id number]% chance of cardiac evenbt in 30 days post procedure.   Pt is moderate risk for moderate risk of surgery.

## 2023-04-04 NOTE — H&P ADULT - ATTENDING COMMENTS
The patient is clinically stable.  I admitted the patient has a poor intake as outpatient has failure to thrive.  Patient is clinically dehydrated.  I discussed the case with GI for PEG.  The anticoagulation was discontinued yesterday and last dose was yesterday morning.

## 2023-04-04 NOTE — ED PROVIDER NOTE - OBJECTIVE STATEMENT
71 yo male with a hx of Afib on Eliquis, HTN, HLD, CHF, CAD s/p CABG, R renal transplant, CLL, JAYANT not on CPAP, T2DM, jsaw cancer s/p resection sent to the ED by his PCP Dr. Lora for failure to thrive with planned PEG tube placement by GI in the morning. The patient has been unable to eat due to his hx and has had significant weight loss. Denies having pain anywhere. Denies nausea or vomiting. Recently diagnosed with pneumonia and still on treatment.

## 2023-04-04 NOTE — H&P ADULT - PROBLEM SELECTOR PLAN 6
E:  F: None   N: NPO pending PEG E: Repelet K aggresively, PRN   F: None   N: NPO pending PEG  DVT ppx: Hold Eliquis pending PEG   Dispo: [facility]  CODE: FULL

## 2023-04-04 NOTE — ED PROVIDER NOTE - PROGRESS NOTE DETAILS
Labs noted. HypoK to 3.1. Added on Mg level. Will give IV repletion. Admit to Dr. Lora for further management.

## 2023-04-04 NOTE — ED ADULT NURSE NOTE - OBJECTIVE STATEMENT
72y M, PMHx of [facility], presents to the ED from [facility] for "peg tube placement in the morning." As per daughter, Dr. Lora has made all arrangements. Pt also reports being unable to eat, significant weight loss. Pt denies pain, SOB, fever, chills, NVD, urinary symptoms at this time. Pt A&Ox4, bedbound, vital signs stable on room air.

## 2023-04-04 NOTE — ED ADULT NURSE NOTE - CHIEF COMPLAINT QUOTE
Pt presents from [facility] for "feeding tube placement in AM' as per daughter, Dr. Juarez has made all arrangements. Hx of jaw CA, unable to eat, significant weight loss. Pt without complaint at time of arrival.

## 2023-04-04 NOTE — CHART NOTE - NSCHARTNOTEFT_GEN_A_CORE
GI svc aware of pt and case discussed with Dr. Lora.  Plan for PEG placement in AM in endoscopy unit    Recommendations:  -Evaluation and medical optimization as needed per ED/medicine  -Please check CBC, CMP, Coags, covid19 status  -Please confirm anticoag status date of last dose   -NPO at midnight for PEG on 4/5/23    Tim Laguerre DO  Gastroenterology Fellow  Pager: [phone]

## 2023-04-04 NOTE — H&P ADULT - NSHPPHYSICALEXAM_GEN_ALL_CORE
Vital Signs Last 24 Hrs  T(C): 36.6 (04-04-23 @ 21:15), Max: 36.6 (04-04-23 @ 21:15)  T(F): 97.9 (04-04-23 @ 21:15), Max: 97.9 (04-04-23 @ 21:15)  HR: 97 (04-04-23 @ 21:15) (97 - 97)  BP: 102/55 (04-04-23 @ 21:15) (102/55 - 102/55)  BP(mean): --  RR: 17 (04-04-23 @ 21:15) (17 - 17)  SpO2: 94% (04-04-23 @ 21:15) (94% - 94%)    PHYSICAL EXAM:  GENERAL: Pt lying in bed comfortably in NAD  HEAD:  Atraumatic   EYES: EOMI, PERRL, conjunctiva and sclera clear  ENT: Moist mucous membranes  NECK: Supple, No JVD  CHEST/LUNG: Clear to auscultation bilaterally; No rales, rhonchi, wheezing or rubs. Unlabored respirations  HEART: Regular rate and rhythm; No murmurs, rubs, or gallops  ABDOMEN: Bowel sounds present; Soft, Nontender, Nondistended. No guarding or rigidity    EXTREMITIES:  2+ Peripheral Pulses, brisk capillary refill. No clubbing, cyanosis, or edema  NERVOUS SYSTEM:  Alert & Oriented X3, speech clear. Answers questions appropriately. Facial movements symmetrical, no facial droop, tongue protrusion midline. Full and equal 5/5 strength B/L upper and lower extremities. +reflexes B/L LE. Sensation intact. No motor drift. No deficits   MSK: FROM x 4 extremities   SKIN: No rashes or lesions Vital Signs Last 24 Hrs  T(C): 36.6 (04-04-23 @ 21:15), Max: 36.6 (04-04-23 @ 21:15)  T(F): 97.9 (04-04-23 @ 21:15), Max: 97.9 (04-04-23 @ 21:15)  HR: 97 (04-04-23 @ 21:15) (97 - 97)  BP: 102/55 (04-04-23 @ 21:15) (102/55 - 102/55)  BP(mean): --  RR: 17 (04-04-23 @ 21:15) (17 - 17)  SpO2: 94% (04-04-23 @ 21:15) (94% - 94%)    PHYSICAL EXAM:  GENERAL: Pt lying in bed comfortably in NAD  HEAD:  Atraumatic   EYES: EOMI, PERRL, conjunctiva and sclera clear  ENT: Moist mucous membranes  NECK: Supple, No JVD  CHEST/LUNG: Clear to auscultation bilaterally; No rales, rhonchi, wheezing or rubs. Unlabored respirations  HEART: Iregular rate and rhythm; No murmurs, rubs, or gallops  ABDOMEN: Bowel sounds present; Soft, Nontender, Nondistended. No guarding or rigidity  , using the pads for urinating  EXTREMITIES:  2+ Peripheral Pulses, brisk capillary refill. No clubbing, cyanosis, or edema  NERVOUS SYSTEM:  Alert & Oriented X3, speech unclear. Answers questions appropriately

## 2023-04-04 NOTE — H&P ADULT - PROBLEM SELECTOR PLAN 3
- Continue Eliquis 5mg BID - Continue Eliquis 5mg BID  -Held for PEG procedure   -Resume after procedure

## 2023-04-04 NOTE — H&P ADULT - HISTORY OF PRESENT ILLNESS
****Most of the history was obtained from ED note and previous admission notes. Patient is not willing to answer questions and the home aid at bedside is new and does not have enough informations.     71 yo male with a hx of Afib on Eliquis, HTN, HLD, CHF, CAD s/p CABG, R renal transplant, CLL, JAYANT not on CPAP, T2DM, jaw cancer s/p resection sent to the ED by his PCP Dr. Lora for failure to thrive with planned PEG tube placement by GI in the morning. The patient has been unable to eat due to his hx and has had significant weight loss. Denies having pain anywhere. Denies nausea or vomiting. Recently diagnosed with pneumonia and still on treatment..   Denies HA, CP, SOB, abdominal pain.  ****Most of the history was obtained from ED note and previous admission notes. Patient is not willing to answer questions and the home aid at bedside is new and does not have enough informations.     71 yo male with a hx of Afib on Eliquis, HTN, HLD, CHF, CAD s/p CABG, R renal transplant, CLL, JAYANT not on CPAP, T2DM, jaw cancer s/p resection sent to the ED by his PCP Dr. Lora for failure to thrive with planned PEG tube placement by GI in the morning. The patient has been unable to eat due to his hx and has had significant weight loss. Denies having pain anywhere. Denies nausea or vomiting. Recently diagnosed with pneumonia and still on treatment..   Denies HA, CP, SOB, abdominal pain.     ED VS:   Tmax 97.9, /55, HR 97, RR 17, 94% RA   Labs: WBc 30.09, Hb 7.9, MCCV 104.9, K 3.1

## 2023-04-04 NOTE — H&P ADULT - PROBLEM SELECTOR PLAN 2
K 3.1 on arrival likely due to poor PO intake  -repeleted IV K 20mg x3   -Moniotor K daily and replete with IV pending PEG placement
Adequate: hears normal conversation without difficulty

## 2023-04-04 NOTE — ED PROVIDER NOTE - CLINICAL SUMMARY MEDICAL DECISION MAKING FREE TEXT BOX
71 yo male with a hx of Afib on Eliquis, HTN, HLD, CHF, CAD s/p CABG, R renal transplant, CLL, JAYANT not on CPAP, T2DM, jsaw cancer s/p resection sent to the ED by his PCP Dr. Lora for failure to thrive with planned PEG tube placement by GI in the morning.

## 2023-04-04 NOTE — H&P ADULT - ASSESSMENT
71 yo male with a hx of Afib on Eliquis, HTN, HLD, CHF, CAD s/p CABG, R renal transplant, CLL, JAYANT not on CPAP, T2DM, jaw cancer s/p resection sent to the ED by his PCP Dr. Lora for failure to thrive with planned PEG tube placement by GI in the morning.

## 2023-04-04 NOTE — ED ADULT TRIAGE NOTE - CHIEF COMPLAINT QUOTE
Pt presents from [facility] for "feeding tube placement in AM' as per daughter, Dr. Juarez has made all arrangements. Hx of jaw CA, unable to eat, significant weight loss. Pt without complaint at time of arrival.

## 2023-04-04 NOTE — ED CLERICAL - NS ED CLERK NOTE PRE-ARRIVAL INFORMATION; ADDITIONAL PRE-ARRIVAL INFORMATION
This patient is enrolled in the Heart Failure STARS readmission reduction initiative and has active care navigation. This patient can be followed up by the care navigation team within 24 hours. To arrange close follow-up or to obtain additional clinical information, please call the care navigation contact number above..      For in house Cardiology patients, please call the Cardiology consult fellow at [phone] for ALL PATIENTS with a cardiac issue PRIOR to disposition if you are considering admission.

## 2023-04-05 NOTE — DISCHARGE NOTE PROVIDER - NSDCCPCAREPLAN_GEN_ALL_CORE_FT
PRINCIPAL DISCHARGE DIAGNOSIS  Diagnosis: Adult failure to thrive  Assessment and Plan of Treatment: We placed a PEG tube to aid in administering medications and feeding. Please see the below instructions:  Post-procedure instructions:  1) Do not use PEG and maintain NPO for first four (4) hours (Until 1300 on 4/5)  2) Water and medications through the gastrostomy tube permitted four (4) hours after tube placement (after 1300 on 4/5)  3) Tube feeds permitted In 24 hours from placement (beginning 0900 on 4/6)  4) Protonix 40 mg daily moving forward  5) May restart anticoagulation in 48 hours (AM of 4/7)  New Tube Care:  1) OK to shower 24 hours after tube placement.  2) To remove drainage, crusts, or blood from the skin around the tube, use  a solution of half hydrogen peroxide- half water. Swab once a day and as  needed, followed by antibacterial soap (unless sensitive) and water.  3) Cleanse in a spiral pattern, beginning at the tube and moving outward.  Rinse and pat dry.  4) Some drainage around the site is normal. It is important to keep your  skin around the tube clean and dry.  5)Remove dressing 24 hours after surgery.

## 2023-04-05 NOTE — DISCHARGE NOTE PROVIDER - HOSPITAL COURSE
#Discharge: do not delete    Patient is 73 yo M with past medical history of a fib, HTN, CHF, CAD, renal transplant, CLL, JAYANT, T2DM, jaw cancer admitted for failure to thrive for planned PEG placement.    Presented with failure to thrive, found to have malnutrition    Inpatient treatment course:  Admitted after discussion between outpatient PCP Marie Lora and gastroenterology for planned PEG placement. Diagnosed as an outpatient on amoxicillin-clavulanate bid and fluconazole for CAP. Labs unremarkable other than hypokalemia on admission repleted by IV. PEG placed successfully 4/5/23 and patient deemed stable for discharge with outpatient PEG care.    Problem List/Main Diagnoses (system-based):  a fib: to resume apixaban evening of 4/6  DM: well controlled during brief admission. May need more insulin as outpatient once feeds titrated  Renal transplant: c/w prednisone, f/u Orlin      Patient was discharged to: (Ester Calabrese)  New medications: none  Changes to old medications: none  Medications that were stopped: none  Items to follow up as outpatient: blood glucose  Physical exam at the time of discharge:     VITAL SIGNS:  T(C): 37.2 (04-05-23 @ 07:30), Max: 37.2 (04-05-23 @ 05:44)  T(F): 98.9 (04-05-23 @ 05:44), Max: 98.9 (04-05-23 @ 05:44)  HR: 89 (04-05-23 @ 07:30) (89 - 97)  BP: 110/69 (04-05-23 @ 07:30) (102/55 - 110/69)  BP(mean): --  RR: 18 (04-05-23 @ 07:30) (17 - 18)  SpO2: 96% (04-05-23 @ 07:30) (94% - 96%)  Wt(kg): --    PHYSICAL EXAM:  Constitutional: thin male resting comfortably in bed; NAD  Eyes: PER, anicteric sclera  ENT: no nasal discharge; no oropharyngeal erythema or exudates; MMM  Respiratory: CTA B/L; no W/R/R, no retractions  Cardiac: +S1/S2; irregular rhythm, normal rate  Gastrointestinal: abdomen soft, bandaged  Extremities: WWP, no clubbing or cyanosis; no peripheral edema  Vascular: 2+ radial, DP pulses B/L  Neurologic: verbally conversant, not interested in further conversation #Discharge: do not delete    Patient is 73 yo M with past medical history of a fib, HTN, CHF, CAD, renal transplant, CLL, JAYANT, T2DM, jaw cancer admitted for failure to thrive for planned PEG placement.    Presented with failure to thrive, found to have dysphagia 2/2 mandibular malignancy.    Inpatient treatment course:  Admitted after discussion between outpatient PCP Marie Lora and gastroenterology for planned PEG placement. Diagnosed as an outpatient on amoxicillin-clavulanate bid and fluconazole for CAP. Labs unremarkable other than hypokalemia on admission repleted by IV. PEG placed successfully 4/5/23 and patient deemed stable for discharge with outpatient PEG care.  Notably, patient evaluated during recent 3/25-30/23 admission for aspiration pneumonia. He was evaluated for malnutrition during that admission but found to not meet ASPEN criteria, so decision made to pursue a PEG in the setting of aspiration pneumonia to meet nutritional needs and avoid malnutrition.  Problem List/Main Diagnoses (system-based):  a fib: to resume apixaban evening of 4/6  DM: well controlled during brief admission. May need more insulin as outpatient once feeds titrated  Renal transplant: c/w prednisone, f/u Orlin      Patient was discharged to: (Ester Calabrese)  New medications: none  Changes to old medications: none  Medications that were stopped: none  Items to follow up as outpatient: blood glucose  Physical exam at the time of discharge:     VITAL SIGNS:  T(C): 37.2 (04-05-23 @ 07:30), Max: 37.2 (04-05-23 @ 05:44)  T(F): 98.9 (04-05-23 @ 05:44), Max: 98.9 (04-05-23 @ 05:44)  HR: 89 (04-05-23 @ 07:30) (89 - 97)  BP: 110/69 (04-05-23 @ 07:30) (102/55 - 110/69)  BP(mean): --  RR: 18 (04-05-23 @ 07:30) (17 - 18)  SpO2: 96% (04-05-23 @ 07:30) (94% - 96%)  Wt(kg): --    PHYSICAL EXAM:  Constitutional: thin male resting comfortably in bed; NAD  Eyes: PER, anicteric sclera  ENT: no nasal discharge; no oropharyngeal erythema or exudates; MMM  Respiratory: CTA B/L; no W/R/R, no retractions  Cardiac: +S1/S2; irregular rhythm, normal rate  Gastrointestinal: abdomen soft, bandaged  Extremities: WWP, no clubbing or cyanosis; no peripheral edema  Vascular: 2+ radial, DP pulses B/L  Neurologic: verbally conversant, not interested in further conversation

## 2023-04-05 NOTE — DISCHARGE NOTE PROVIDER - NSDCMRMEDTOKEN_GEN_ALL_CORE_FT
acetaminophen 325 mg oral tablet: 2 tab(s) orally every 6 hours, As needed, Temp greater or equal to 38C (100.4F), Mild Pain (1 - 3)  amoxicillin-clavulanate 875 mg-125 mg oral tablet: 1 tab(s) orally every 12 hours  Eliquis 5 mg oral tablet: 1 tab(s) orally 2 times a day  fluconazole 200 mg oral tablet: 1 tab(s) orally once a day  insulin glargine 100 units/mL subcutaneous solution: 10 unit(s) subcutaneous once a day (at bedtime)  insulin lispro 100 units/mL injectable solution:  injectable   Please check blood glucose levels 30 minutes before meals and at bedtime:    Give insulin lispro according to the following scale:  2 units Glu 151-200  4 units 201-250  6 units 251-300  8 units 300-350  10 units Glu 351-400  12 units Glu 400 or greater     predniSONE 5 mg oral tablet: 1 tab(s) orally every 24 hours  rosuvastatin 10 mg oral tablet: 1 tab(s) orally once a day  sirolimus 1 mg oral tablet: 1 tab(s) orally every 24 hours  torsemide 40 mg oral tablet: 1 tab(s) orally every 24 hours

## 2023-04-05 NOTE — CONSULT NOTE ADULT - ATTENDING COMMENTS
Patient seen and d/w Dr. Laguerre. Agree with plan above.  Patient with mandibular malignancy with dysphagia here for FTT. Off AC for 48 hours and now s/p PEG placement. Hold A/C for 48 hours. Can start med/water through PEG in 4 hours. Can start TF through PEG in 24 hours.

## 2023-04-05 NOTE — CHART NOTE - NSCHARTNOTEFT_GEN_A_CORE
EGD with PEG placement performed with [facility] attending Dr. Zaldivar.    Procedure as follows:    EGD: Normal esophagus. Z line at 40 cm  Stomach: Diffuse non erosive gastritis without bleeding  Duodendum: Normal mucosa to D2    PEG placement:    In a darkened room, the abdominal wall was transilluminated and a site was selected. Indentation of the gastric wall by external finger pressure was demonstrated. The skin was surgically prepared and anesthetized with xylocaine. A small incision was made in the abdominal wall using a surgical blade. A 25-gauge needle with cannula was inserted through the abdominal wall and into the gastric lumen. A guidewire was passed through the cannula, was caught by the snare passed through the endoscope and brought out through the mouth. A [employer] 20 Fr PEG tube was secured to the guidewire and pulled through the abdominal wall. A satisfactory final position was confirmed endoscopically. the gastrostomy tube was secured with the outer flange positioned at  2.5 cm. The post procedure appearances were satisfactory.    Post-procedure instructions:    1) Do not use PEG and maintain NPO for first four (4) hours (Until 1300 on 4/5)  2) Water and medications through the gastrostomy tube permitted four (4) hours after tube placement (after 1300 on 4/5)  3) Tube feeds permitted In 24 hours from placement (beginning 0900 on 4/6)  4) Protonix 40 mg daily moving forward  5) May restart anticoagulation in 24 hours    New Tube Care:  1) OK to shower 24 hours after tube placement.  2) To remove drainage, crusts, or blood from the skin around the tube, use  a solution of half hydrogen peroxide- half water. Swab once a day and as  needed, followed by antibacterial soap (unless sensitive) and water.  3) Cleanse in a spiral pattern, beginning at the tube and moving outward.  Rinse and pat dry.  4) Some drainage around the site is normal. It is important to keep your  skin around the tube clean and dry.  5)Remove dressing 24 hours after surgery.    **Please contact GI fellow on call immediately if PEG falls out after placement.  Following discharge, please advise the patient to proceed to the ED if the PEG falls out within first 4 weeks after placement.**    Tim Laguerre DO  Gastroenterology Fellow  Pager: [phone] EGD with PEG placement performed with [facility] attending Dr. Zaldivar.    Procedure as follows:    EGD: Normal esophagus. Z line at 40 cm  Stomach: Diffuse non erosive gastritis without bleeding  Duodendum: Normal mucosa to D2    PEG placement:    In a darkened room, the abdominal wall was transilluminated and a site was selected. Indentation of the gastric wall by external finger pressure was demonstrated. The skin was surgically prepared and anesthetized with xylocaine. A small incision was made in the abdominal wall using a surgical blade. A 25-gauge needle with cannula was inserted through the abdominal wall and into the gastric lumen. A guidewire was passed through the cannula, was caught by the snare passed through the endoscope and brought out through the mouth. A [employer] 20 Fr PEG tube was secured to the guidewire and pulled through the abdominal wall. A satisfactory final position was confirmed endoscopically. the gastrostomy tube was secured with the outer flange positioned at  2.5 cm. The post procedure appearances were satisfactory.    Post-procedure instructions:    1) Do not use PEG and maintain NPO for first four (4) hours (Until 1300 on 4/5)  2) Water and medications through the gastrostomy tube permitted four (4) hours after tube placement (after 1300 on 4/5)  3) Tube feeds permitted In 24 hours from placement (beginning 0900 on 4/6)  4) Protonix 40 mg daily moving forward  5) May restart anticoagulation in 48 hours    New Tube Care:  1) OK to shower 24 hours after tube placement.  2) To remove drainage, crusts, or blood from the skin around the tube, use  a solution of half hydrogen peroxide- half water. Swab once a day and as  needed, followed by antibacterial soap (unless sensitive) and water.  3) Cleanse in a spiral pattern, beginning at the tube and moving outward.  Rinse and pat dry.  4) Some drainage around the site is normal. It is important to keep your  skin around the tube clean and dry.  5)Remove dressing 24 hours after surgery.    **Please contact GI fellow on call immediately if PEG falls out after placement.  Following discharge, please advise the patient to proceed to the ED if the PEG falls out within first 4 weeks after placement.**    Tim Laguerre DO  Gastroenterology Fellow  Pager: [phone]

## 2023-04-05 NOTE — DISCHARGE NOTE PROVIDER - PROVIDER TOKENS
PROVIDER:[TOKEN:[[id number]],FOLLOWUP:[Routine]],PROVIDER:[TOKEN:[[id number]],FOLLOWUP:[Routine]]

## 2023-04-05 NOTE — PRE-ANESTHESIA EVALUATION ADULT - NSANTHPMHFT_GEN_ALL_CORE
73 yo male with a hx of Afib on Eliquis, HTN, HLD, CHF, CAD s/p CABG, R renal transplant, CLL, JAYANT not on CPAP, T2DM, jaw cancer s/p resection sent to the ED by his PCP Dr. Lora for failure to thrive with planned PEG tube placement by GI in the morning. The patient has been unable to eat due to his hx and has had significant weight loss. Denies having pain anywhere. Denies nausea or vomiting. Recently diagnosed with pneumonia and still on treatment.    #Pre-op clearance:  -f/u  CBC, CMP, Coags, covid19 negative   -Unclear last dose anticoag status since patient and aid are not able to answer the question. Will obtain from son in AM   -NPO at midnight for PEG on 4/5/23  -ECG Afib no ST changes   -Since pt is not able to walk, unable to calculate METs.   -RCRI 3 axmcyc95% chance of cardiac evenbt in 30 days post procedure.   Pt is moderate risk for moderate risk of surgery.   Problem/Plan - 2:  ·  Problem: Hypokalemia.   ·  Plan: K 3.1 on arrival likely due to poor PO intake  -repeleted IV K 20mg x3   -Moniotor K daily and replete with IV pending PEG placement.

## 2023-04-05 NOTE — DISCHARGE NOTE PROVIDER - NSDCCAREPROVSEEN_GEN_ALL_CORE_FT
Marie Lora Marie Lora A  Patient seen and examined with house-staff during bedside rounds.  Resident note read, including vitals, physical findings, laboratory data, and radiological reports.   Revisions included below.  Direct personal management at bed side and extensive interpretation of the data.  Plan was outlined and discussed in details with the housestaff.  Decision making of high complexity  Action taken for acute disease activity to reflect the level of care provided:  - medication reconciliation  - review laboratory data  The patient was seen in the recovery room in the discussed the case with the GI patient was cleared by anesthesia.  The patient tolerated the procedure well.  Patient is to be discharged.  Instruction was to instructed still on anticoagulation within 48 hours.  Feeding can be started and 24 hours.  Patient can take oral meds with sips of water.  I discussed the case with the manager at the facility.

## 2023-04-05 NOTE — PRE-ANESTHESIA EVALUATION ADULT - NSANTHPEFT_GEN_ALL_CORE
General: Appearance is consistent with chronological age.   Airway:  See Mallampati score  EENT: Anicteric sclera; oropharynx clear, moist mucus membranes  Cardiovascular:  Regular rate and rhythm  Respiratory: Unlabored breathing  Neurological: Awake and alert, moves all extremities  Constitutional: MET<4 General: Appearance is consistent with chronological age.   Airway:  See Mallampati score  EENT: Anicteric sclera; oropharynx clear, moist mucus membranes  Cardiovascular:  irregular rate and rhythm  Respiratory: Unlabored breathing  Neurological: Awake and alert, moves all extremities  Constitutional: MET<4

## 2023-04-05 NOTE — CONSULT NOTE ADULT - SUBJECTIVE AND OBJECTIVE BOX
HPI:  ****Most of the history was obtained from ED note and previous admission notes. Patient minimally conversational and home health aide at bedside not familiar with care ****    73 yo male with a hx of Afib on Eliquis, HTN, HLD, CHF, CAD s/p CABG, R renal transplant, CLL, JAYANT not on CPAP, T2DM, jaw cancer s/p resection sent to the ED by his PCP Dr. Lora for failure to thrive with planned PEG tube placement by GI in the morning.     S/p resection (~2019) w/ recurrence s/p chemo/RT, JAYANT (not on CPAP), CLL (w/ chronic leukocytosis), DM2 (with humalog pump), HTN, HLD, recent admit OSH for tongue/facial swelling likely 2/2 RT c/b pleural effusion s/p pleurX drain (placed at [facility]) R chest, recently admitted for R pleural effusion s/p pleurX removal.     Recent admission from 3/25 to 3/30  following 5d hx of AMS/fevers likely 2/2 aspiration PNA and UTI for which pt was tx with Zosyn/Fluconazole transitioned to PO augmentin on discharge.     At bedside, pt in NAD, though unfamiliar with care/medications. No abd pain. Has chronic jaw pain    ED VS:   Tmax 97.9, /55, HR 97, RR 17, 94% RA   Labs: WBc 30.09, Hb 7.9, MCCV 104.9, K 3.1 (04 Apr 2023 23:44)    Allergies    No Known Allergies    Intolerances      Home Medications:  acetaminophen 325 mg oral tablet: 2 tab(s) orally every 6 hours, As needed, Temp greater or equal to 38C (100.4F), Mild Pain (1 - 3) (16 Mar 2023 11:42)  amoxicillin-clavulanate 875 mg-125 mg oral tablet: 1 tab(s) orally every 12 hours (30 Mar 2023 12:32)  Eliquis 5 mg oral tablet: 1 tab(s) orally 2 times a day (09 Mar 2023 14:17)  fluconazole 200 mg oral tablet: 1 tab(s) orally once a day (30 Mar 2023 11:42)  insulin glargine 100 units/mL subcutaneous solution: 10 unit(s) subcutaneous once a day (at bedtime) (30 Mar 2023 11:42)  insulin lispro 100 units/mL injectable solution:  injectable   Please check blood glucose levels 30 minutes before meals and at bedtime:    Give insulin lispro according to the following scale:  2 units Glu 151-200  4 units 201-250  6 units 251-300  8 units 300-350  10 units Glu 351-400  12 units Glu 400 or greater    (16 Mar 2023 12:21)  rosuvastatin 10 mg oral tablet: 1 tab(s) orally once a day (09 Mar 2023 14:17)    MEDICATIONS:  MEDICATIONS  (STANDING):    MEDICATIONS  (PRN):  HYDROmorphone  Injectable 0.5 milliGRAM(s) IV Push every 4 hours PRN Severe Pain (7 - 10)  ondansetron Injectable 4 milliGRAM(s) IV Push every 8 hours PRN Nausea and/or Vomiting    PAST MEDICAL & SURGICAL HISTORY:  Barretts esophagus      CRI (chronic renal insufficiency)      DM (diabetes mellitus)      GERD (gastroesophageal reflux disease)      HTN (hypertension)      Monoclonal gammopathy      Mandibular abscess      Paroxysmal atrial fibrillation      Benign prostatic hyperplasia, presence of lower urinary tract symptoms unspecified, unspecified morphology      Hyperlipidemia      Barretts esophagus      S/P CABG (coronary artery bypass graft)  20 years      History of surgery  cyst removal      Mandibular abscess        FAMILY HISTORY:  No pertinent family history in first degree relatives    REVIEW OF SYSTEMS:  All other 10 review of systems is negative unless indicated above.    Vital Signs Last 24 Hrs  T(C): 37.2 (05 Apr 2023 07:30), Max: 37.2 (05 Apr 2023 05:44)  T(F): 98.9 (05 Apr 2023 05:44), Max: 98.9 (05 Apr 2023 05:44)  HR: 89 (05 Apr 2023 07:30) (89 - 97)  BP: 110/69 (05 Apr 2023 07:30) (102/55 - 110/69)  BP(mean): --  RR: 18 (05 Apr 2023 07:30) (17 - 18)  SpO2: 96% (05 Apr 2023 07:30) (94% - 96%)    Parameters below as of 05 Apr 2023 02:38  Patient On (Oxygen Delivery Method): room air          PHYSICAL EXAM:    General: No acute distress  Eyes: Anicteric sclerae, moist conjunctivae  HENT: Moist mucous membranes  Neck: Trachea midline, supple  Lungs: Normal respiratory effort and no intercostal retractions  Cardiovascular: Irregular rate  Abdomen: Soft, non-tender non-distended; No rebound or guarding  Extremities: Normal range of motion, No clubbing, cyanosis or edema  Neurological: Alert and oriented to person/place  Skin: Warm and dry. No obvious rash    LABS:                        7.6    27.36 )-----------( 218      ( 05 Apr 2023 05:29 )             24.9     04-05    139  |  100  |  44<H>  ----------------------------<  184<H>  4.0   |  27  |  0.90    Ca    8.7      05 Apr 2023 05:29  Mg     2.2     04-04    TPro  5.7<L>  /  Alb  3.0<L>  /  TBili  0.4  /  DBili  x   /  AST  13  /  ALT  8<L>  /  AlkPhos  104  04-05        PT/INR - ( 05 Apr 2023 05:29 )   PT: 17.6 sec;   INR: 1.47          PTT - ( 05 Apr 2023 05:29 )  PTT:33.8 sec    RADIOLOGY & ADDITIONAL STUDIES:

## 2023-04-05 NOTE — DISCHARGE NOTE PROVIDER - CARE PROVIDER_API CALL
Marie Lora)  Critical Care Medicine; Pulmonary Disease  [address], [address] [place] [address]  Phone: [phone]  Fax: [phone]  Follow Up Time: Routine    Kofi Leyva)  Internal Medicine; Nephrology  [address], [address] [address]  Phone: [phone]  Fax: [phone]  Follow Up Time: Routine

## 2023-04-05 NOTE — DISCHARGE NOTE NURSING/CASE MANAGEMENT/SOCIAL WORK - PATIENT PORTAL LINK FT
You can access the FollowMyHealth Patient Portal offered by [facility] by registering at the following website: [email or web address]. By joining [employer]’s FollowMyHealth portal, you will also be able to view your health information using other applications (apps) compatible with our system.

## 2023-04-05 NOTE — PRE-ANESTHESIA EVALUATION ADULT - NSANTHADDINFOFT_GEN_ALL_CORE
Case discussed with another anesthesiology provider, Dr. Nathan Whitt who agreed with plan for minimal sedation with local by endoscopist. Discussed risks of anesthesia with patient and his spouse, including risk of aspiration, postop ICU care, prolonged ventilation, MI, stroke, death, who expressed full understanding and agreed to proceed.

## 2023-04-05 NOTE — CONSULT NOTE ADULT - ASSESSMENT
71 yo M with PMHx AFib (Eliquis, s/p DCCV in 5/2022), hx PE, HFpEF (LVEF 65%), CAD (s/p CABG in 2005), s/p R renal transplant (Sirolimus, follows Dr. Ordaz), jaw cancer (s/p resection in 2019, c/b recurrent with chemotx/RT completed 2-3 wks prior), JAYANT (not on CPAP), CLL (chronic leukocytosis), T2DM (formerly insulin pump) and recent admission for AMS/fevers likely 2/2 aspiration PNA and UTI for which pt was tx with Zosyn/Fluconazole transitioned to PO abx on discharge.    Presents for PEG placement given FTT 2/2 mandibular malignancy.  HDS in ED.  Incidentally noted downtrending Hgb with chronically elevated BUN.    -Last anticoag dose per son 4/3. Has been held since  -NPO since before midnight  -Plan for EGD and PEG today  -Transfuse/resuscitation per primary team    Tim Laguerre DO  Gastroenterology Fellow  Pager: [phone]

## 2023-04-05 NOTE — DISCHARGE NOTE PROVIDER - NSDCFUSCHEDAPPT_GEN_ALL_CORE_FT
Celso Barragan Physician [facility]  HEARTVASC [address]  Scheduled Appointment: 04/12/2023

## 2023-04-05 NOTE — DISCHARGE NOTE NURSING/CASE MANAGEMENT/SOCIAL WORK - NSDCPEFALRISK_GEN_ALL_CORE
For information on Fall & Injury Prevention, visit: [email or web address] OR  [email or web address] OR  https://www.cdc.gov/steadi/patient.html

## 2023-04-05 NOTE — DISCHARGE NOTE PROVIDER - CARE PROVIDERS DIRECT ADDRESSES
[email or web address]

## 2023-04-05 NOTE — PRE-ANESTHESIA EVALUATION ADULT - NSANTHAIRWAYFT_ENT_ALL_CORE
poor dentition, right sided jaw swollen poor dentition, right sided jaw swollen and hard, small MO, minimal jaw protrusion

## 2023-04-14 ENCOUNTER — TRANSCRIPTION ENCOUNTER (OUTPATIENT)
Age: 73
End: 2023-04-14

## 2023-04-14 DIAGNOSIS — C41.1 MALIGNANT NEOPLASM OF MANDIBLE: ICD-10-CM

## 2023-04-14 DIAGNOSIS — Z79.52 LONG TERM (CURRENT) USE OF SYSTEMIC STEROIDS: ICD-10-CM

## 2023-04-14 DIAGNOSIS — K21.9 GASTRO-ESOPHAGEAL REFLUX DISEASE WITHOUT ESOPHAGITIS: ICD-10-CM

## 2023-04-14 DIAGNOSIS — N40.1 BENIGN PROSTATIC HYPERPLASIA WITH LOWER URINARY TRACT SYMPTOMS: ICD-10-CM

## 2023-04-14 DIAGNOSIS — R13.10 DYSPHAGIA, UNSPECIFIED: ICD-10-CM

## 2023-04-14 DIAGNOSIS — D47.2 MONOCLONAL GAMMOPATHY: ICD-10-CM

## 2023-04-14 DIAGNOSIS — K22.70 BARRETT'S ESOPHAGUS WITHOUT DYSPLASIA: ICD-10-CM

## 2023-04-14 DIAGNOSIS — I11.0 HYPERTENSIVE HEART DISEASE WITH HEART FAILURE: ICD-10-CM

## 2023-04-14 DIAGNOSIS — N40.0 BENIGN PROSTATIC HYPERPLASIA WITHOUT LOWER URINARY TRACT SYMPTOMS: ICD-10-CM

## 2023-04-14 DIAGNOSIS — R62.7 ADULT FAILURE TO THRIVE: ICD-10-CM

## 2023-04-14 DIAGNOSIS — Z79.01 LONG TERM (CURRENT) USE OF ANTICOAGULANTS: ICD-10-CM

## 2023-04-14 DIAGNOSIS — Z94.0 KIDNEY TRANSPLANT STATUS: ICD-10-CM

## 2023-04-14 DIAGNOSIS — Z95.1 PRESENCE OF AORTOCORONARY BYPASS GRAFT: ICD-10-CM

## 2023-04-14 DIAGNOSIS — I25.10 ATHEROSCLEROTIC HEART DISEASE OF NATIVE CORONARY ARTERY WITHOUT ANGINA PECTORIS: ICD-10-CM

## 2023-04-14 DIAGNOSIS — I48.0 PAROXYSMAL ATRIAL FIBRILLATION: ICD-10-CM

## 2023-04-14 DIAGNOSIS — K29.70 GASTRITIS, UNSPECIFIED, WITHOUT BLEEDING: ICD-10-CM

## 2023-04-14 DIAGNOSIS — E11.9 TYPE 2 DIABETES MELLITUS WITHOUT COMPLICATIONS: ICD-10-CM

## 2023-04-14 DIAGNOSIS — E87.6 HYPOKALEMIA: ICD-10-CM

## 2023-04-14 DIAGNOSIS — Z79.4 LONG TERM (CURRENT) USE OF INSULIN: ICD-10-CM

## 2023-04-14 DIAGNOSIS — I50.9 HEART FAILURE, UNSPECIFIED: ICD-10-CM

## 2023-04-14 DIAGNOSIS — G47.33 OBSTRUCTIVE SLEEP APNEA (ADULT) (PEDIATRIC): ICD-10-CM

## 2023-04-14 DIAGNOSIS — Z96.41 PRESENCE OF INSULIN PUMP (EXTERNAL) (INTERNAL): ICD-10-CM

## 2023-04-20 PROCEDURE — 85027 COMPLETE CBC AUTOMATED: CPT

## 2023-04-20 PROCEDURE — 85025 COMPLETE CBC W/AUTO DIFF WBC: CPT

## 2023-04-20 PROCEDURE — 85610 PROTHROMBIN TIME: CPT

## 2023-04-20 PROCEDURE — 36415 COLL VENOUS BLD VENIPUNCTURE: CPT

## 2023-04-20 PROCEDURE — 43246 EGD PLACE GASTROSTOMY TUBE: CPT

## 2023-04-20 PROCEDURE — L8699: CPT

## 2023-04-20 PROCEDURE — 99285 EMERGENCY DEPT VISIT HI MDM: CPT | Mod: 25

## 2023-04-20 PROCEDURE — 80053 COMPREHEN METABOLIC PANEL: CPT

## 2023-04-20 PROCEDURE — 83735 ASSAY OF MAGNESIUM: CPT

## 2023-04-20 PROCEDURE — 85730 THROMBOPLASTIN TIME PARTIAL: CPT

## 2023-04-20 PROCEDURE — 86900 BLOOD TYPING SEROLOGIC ABO: CPT

## 2023-04-20 PROCEDURE — 86850 RBC ANTIBODY SCREEN: CPT

## 2023-04-20 PROCEDURE — 86901 BLOOD TYPING SEROLOGIC RH(D): CPT

## 2023-04-20 PROCEDURE — 87635 SARS-COV-2 COVID-19 AMP PRB: CPT

## 2023-04-26 LAB
CULTURE RESULTS: SIGNIFICANT CHANGE UP
SPECIMEN SOURCE: SIGNIFICANT CHANGE UP

## 2023-04-28 NOTE — ED PROVIDER NOTE - NSICDXPASTSURGICALHX_GEN_ALL_CORE_FT
Discussed with Dr. Williamson who is recommending [facility] setting.  Message left for Irene (CCP). Thank you--Tatyana Alas, Rehab Admission Coordinator   PAST SURGICAL HISTORY:  History of surgery cyst removal    Mandibular abscess     S/P CABG (coronary artery bypass graft) 20 years

## 2023-05-04 NOTE — SWALLOW FEES ASSESSMENT ADULT - THE ABOVE FINDINGS WERE DISCUSSED WITH
[FreeTextEntry1] : HPI: Patient is a 53 year old male with HTN here for initial visit for discussion of labs and evaluation of renal function. \par \par -He reports b/l flank and back pain for years, no h/o trauma\par -pain is dull and non radiating, it comes and goes and is aggravated by movement\par -Denies weakness, numbness \par -No h/o nephrolithiasis, dysuria, gross hematuria\par -H/o chr NSAID +ve for bursitis shoulder\par -C/o LUTS\par \par Denies SOB, CP, cough, expectoration, fever, chills, palpitation, syncope, swelling on feet. \par No history of renal diseases in the family. \par \par REVIEW OF SYSTEM:  All systems were reviewed in detail.  Pertinent positive and negatives have been mentioned in history of present illness.  The rest are negative.
Physician/Nursing/Patient/Family

## 2023-05-04 NOTE — DIETITIAN INITIAL EVALUATION ADULT - WEIGHT FOR BMI (LBS)
May 4, 2023  EMPLOYEE INFORMATION: EMPLOYER INFORMATION:   NAME: Wilver Beebe     : 1997 [phone]    DATE OF INJURY/EVENT:             Treating Provider: Zackary Elizabeth MD  Time In:  10:11 AM Time Out:  11:27 AM      DIAGNOSIS:   1. Carbon monoxide exposure        RETURN TO WORK:   Employee is unable to return to work.   Reason:             RESTRICTIONS: Restrictions are to be followed at work and at home.  Restrictions are in effect until next follow-up visit.  Unable to return to work.  See above for reason.     TREATMENT PLAN:   Medications for this injury/condition: None   Referral/Consult: None  Diagnostic Testing: None   Drug test not required.  Breath alcohol test not required.      Instructions: You are being seen here today for follow-up on recent concerns of car monoxide exposure while sleeping in the truck .  You were seen here and then in the ER yesterday for the symptoms and received oxygen therapy.  You report your symptoms to have resolved since then.  You here to get rechecked and also to get cleared so you could drive back home.  On exam no acute findings.  Based on your reported history, presentation I believe to be more cautious it is good for you to rest today, make sure you are keeping yourself hydrated and closely monitor for symptoms.  Recommend follow-up tomorrow for recheck and if you remain asymptomatic and your exam is unremarkable you could be considered to be released back to work.      NEXT RETURN VISIT:   2023 at 8am  Thank you for the privilege of providing medical care for this injury/condition.  If there are any questions, please call the occupational health clinic at Dept: [phone].      Electronically signed on 2023 at 11:27 AM by:   Zackary Elizabeth MD   [facility]      
158

## 2023-07-06 NOTE — ED ADULT NURSE NOTE - NS_SISCREENINGSR_GEN_ALL_ED
1. Functional oral stage for regular solids and thin liquids marked by adequate oral acceptance, chewing for solids and tongue motion for anterior to posterior transport. Adequate oral clearance post primary swallow.  2. Functional pharyngeal phase for regular solids and thin liquids marked by an adequate pharyngeal swallow trigger, adequate base of tongue retraction, adequate hyolaryngeal elevation, adequate epiglottic deflection and adequate pharyngeal contractility. Adequate pharyngeal clearance post primary swallow. No Aspiration viewed before during or after the swallow for regular solids and/or thin liquids.    Of Note: Patient give 1 teaspoon of puree, however patient exhibited gagging and volitionally expectorated bolus trial into emesis bag. Negative

## 2023-07-18 NOTE — PROGRESS NOTE ADULT - PROBLEM SELECTOR PLAN 10
Patient with chronic renal insufficiency, s/p R renal transplant, on tacrolimus 2mg AM and 1mg PM  - Continue tacrolimus 2mg AM and 1mg PM If an incision was performed as part of your procedure, contact your doctor with any pain, swelling, redness, or other concerns you may have about that area.

## 2023-07-21 NOTE — HISTORY OF PRESENT ILLNESS
[FreeTextEntry3] : All medical record entries made by PRETTY Mallory, acting as a scribe for this encounter under the direction of Alek Santillan MD . I have reviewed the chart and agree that the record accurately reflects my personal performance of the history, physical exam, assessment and plan. I have also personally directed, reviewed, and agreed with the chart.
[FreeTextEntry1] : referred from Dr Encinas for stress testing\par exertional dyspnea\par relieved by rest\par no rest sx\par no cp\par prior cabg\par last stress test 2018- mild inferior ischemia. No intervention

## 2023-09-18 NOTE — HISTORY OF PRESENT ILLNESS
[FreeTextEntry1] : The patient was recently hospitalized.  He was diuresed multiple liters of fluid.  He was discharged on torsemide 40 mg twice a day.  His longest walk was in this building which was no trouble.  He feels dizzy for 10 minutes when he gets up out of a chair.  This has improved.  He denies vertigo but does not feel right.  He has not had chest discomfort. show

## 2023-10-26 LAB — SARS-COV-2 N GENE NPH QL NAA+PROBE: NOT DETECTED

## 2023-11-30 NOTE — PATIENT PROFILE ADULT - VISION (WITH CORRECTIVE LENSES IF THE PATIENT USUALLY WEARS THEM):
full range of motion in all extremities Normal vision: sees adequately in most situations; can see medication labels, newsprint

## 2023-12-11 NOTE — PROGRESS NOTE ADULT - ATTENDING COMMENTS
no rashes , no suspicious lesions , no areas of discoloration , no jaundice present , good turgor , no masses , no tenderness on palpation
71/M PMH pAF with recent unsuccessful DCCV, COPD/ JAYANT on nightly O2, HTN, DM, obesity, CAD (CABG '05), renal transplant, CLL, hx R pleural effusion s/p thoracentesis, with chronic symptoms of fluid overload. recently seen in pulm clinic for pre op assessment and noted to be volume overloaded. increased torsemide 40mg with good response. now presented to ER as per his nephrologist, who he saw yesterday.     good urine outpt with lasix 80  TTE: LV EF 65%, LVEDD 4.6cms, no AS, unchanged from echo from 0ct/ 2021.     JVP elevated  reduced BS at rt base  b/l 2 + LE oedema    Plan:  cont Lasix 80 IV BID  A.fib rate controlled, cont NOAC  cont imdur  can optimize his antihypertensives if SBP remains elevated  nephro follow up for renal transplant  Heme onc follow up with elevated WBC to 30k and h/o CLL
I agree with the fellow's findings and plans as written above with the following additions/amendments:    Seen and examined at bedside. Discussed diuresis plan at length, has continued hypervolemia and hyponatremia, will need continued diuresis - would switch to PO toresmide as above, if UOP continues to be adequate agreeable to discharge and close followup with outpatient nephrologist. Continue tacrolimus at current levels. Further recs as above
71/M PMH pAF with recent unsuccessful DCCV, COPD/ JAYANT on nightly O2, HTN, DM, obesity, CAD (CABG '05), renal transplant, CLL, hx R pleural effusion s/p thoracentesis, with chronic symptoms of fluid overload. recently seen in pulm clinic for pre op assessment and noted to be volume overloaded. increased torsemide 40mg with good response. now presented to ER as per his nephrologist, who he saw yesterday.     good urine outpt  net neg 1.9lits  TTE: LV EF 65%, LVEDD 4.6cms, no AS, unchanged from echo from 0ct/ 2021.     JVP elevated - improving  reduced BS at rt base  b/l 1 + LE oedema improved    Plan:  cont Lasix 80 IV BID, may need additional dose if u/o low  can consider transitioning to PO diuretics this weekend  A.fib rate controlled, cont NOAC  cont imdur  will add hydralazine for better BP control  Can be switched to ARB/ACEI post surgery if agrred by Nephro   nephro follow up for renal transplant  pulm follow up
I agree with the fellow's findings and plans as written above with the following additions/amendments:    Seen and examined at bedside. Discussed current diuretic plan, improvement with diuretics, monitoring of transplant medications. Edema slightly improved, and given improvement in sCr would continue to diruese. No other issues identified. Further recs as above.
I agree with the fellow's findings and plans as written above with the following additions/amendments:    Seen and examined at bedside. Feels well, but has continued to have high water intake likely as part of hyponatremia - discussed low water intake and to increase protein intake, and will continue diuresis. Will add hypertonic both to help diuresis and improve hyponatremia prior to procedure. Further Recs as above.
I agree with the fellow's findings and plans as written above with the following additions/amendments:    Seen and examined at bedside. Discussed diuresis, likely cause of inability to diurese outpatient from gut edema, will see if able to get on stable dose of medication prior to discharge. Otherwise feeling improved, feels has quite a bit more to go. Will continue aggressive diuresis as above. Tacro level at goal. Further recs as above

## 2024-01-19 NOTE — H&P ADULT - PROBLEM/PLAN-10
DISPLAY PLAN FREE TEXT Apixaban/Eliquis - Compliance/Apixaban/Eliquis - Dietary Advice/Apixaban/Eliquis - Follow up monitoring/Apixaban/Eliquis - Potential for adverse drug reactions and interactions

## 2024-01-22 NOTE — PATIENT PROFILE ADULT. - NS TRANSFER PATIENT BELONGINGS
[facility]  Internal Medicine Residency Clinic    Attending Physician Statement  I have discussed the case, including pertinent history and exam findings with the resident physician.  I agree with the assessment, plan and orders as documented by the resident. I have reviewed all pertinent PMHx, PSHx, FamHx, SocialHx, medications, and allergies and updated history as appropriate.    Patient here for routine follow up of medical problems.     HTN  -plan to increase amlodipine to 10 mg and discontinue hydralazine   -patinet to return to clinic for repeat blood pressure evlauation at future appointment     IDDM2  -patinet not interested in further testing or treatment   -continue metofmrin   -screening labs and testing ordered     HCM  -patinet deferring vaccines  -mammogram ordered    Remainder of medical problems as per resident note.    John Yoo Jr, DO  1/22/2024 2:16 PM   Cell Phone/PDA (specify)/Clothing

## 2024-02-08 NOTE — DISCHARGE NOTE NURSING/CASE MANAGEMENT/SOCIAL WORK - NSCORESITESY/N_GEN_A_CORE_RD
[facility] AMBULATORY ENCOUNTER  OFFICE VISIT    CHIEF COMPLAINT:    Chief Complaint   Patient presents with    Cough    Congestion       SUBJECTIVE:  Marco Sanchez is a 32 year old male who presents to the urgent care with cough for the past week.  Cough is dry.  Also reports some chills, fatigue and chest pain only when coughing, no exertional or pleuritic chest pain.  She is a nonsmoker no history of asthma.  No recent sick contacts.  Denies objective fever, shortness a breath or lower extremity swelling.        PAST HISTORIES:  I have reviewed the past medical history, family history, social history, medications and allergies listed in the medical record as obtained by my nursing staff and support staff and agree with their documentation.      OBJECTIVE:  PHYSICAL EXAM:    Vital Signs:  Visit Vitals  BP (!) 131/90 (BP Location: LUE - Left upper extremity, Patient Position: Sitting, Cuff Size: Large Adult)   Pulse 84   Temp 98.4 °F (36.9 °C) (Oral)   Resp 18   Wt 104.3 kg (230 lb)   SpO2 97%   BMI 32.08 kg/m²       General:  Alert, cooperative, conversive. No acute distress.  Skin:  Warm and dry without rash.    Head:  Normocephalic-atraumatic.   Neck:  Trachea is midline.     Eyes:  Normal conjunctivae and sclerae.   ENT:  Nasal mucosa not edematous.  Oropharynx clear.  Mucous membranes are moist.   Cardiovascular:  Regular rate and rhythm without murmur.  Respiratory:   Normal respiratory effort.  Clear to auscultation.  No wheezes, rales or rhonchi.  Gastrointestinal:  Soft and nontender.  Normal bowel sounds.  No hepatomegaly or splenomegaly.       ASSESSMENT:   1. Acute cough        PLAN:  Orders Placed This Encounter    predniSONE (DELTASONE) 20 MG tablet    promethazine-dextromethorphan (PROMETHAZINE-DM) 6.25-15 MG/5ML syrup       32 year old female who presents to the urgent care with dry cough x 1 week.  Mildly hypertensive remainder vital signs normal limits, NAD, lungs clear auscultation.  Discussed most  likely viral bronchitis, who chest x-ray obtained given stable vital signs normal lung exam.  Prescribed prednisone as well as a cough suppressant.  Patient to follow up with PCP if symptoms persist for 5-7 days.  Return to clinic and ED precautions advised.      Instructions provided as documented in the After Visit Summary.    The patient indicated understanding of the diagnosis and agreed with the plan of care.        Jerardo Fuentes PA-C   Collaborating physician: Dr. Ulloa                No x2/50 feet

## 2024-03-11 NOTE — H&P ADULT - NSHPLABSRESULTS_GEN_ALL_CORE
Mom called because she just had surgery and cannot bring her son in but he has cough, ear pain, not eating well and has a fever  Mom has no one else to bring him in  She requested a call or a virtual visit 
.  LABS:                         7.9    30.09 )-----------( 265      ( 04 Apr 2023 22:34 )             25.8     04-04    137  |  96  |  46<H>  ----------------------------<  198<H>  3.1<L>   |  29  |  1.00    Ca    8.8      04 Apr 2023 22:34    TPro  5.8<L>  /  Alb  3.1<L>  /  TBili  0.4  /  DBili  x   /  AST  13  /  ALT  8<L>  /  AlkPhos  103  04-04    PT/INR - ( 04 Apr 2023 22:34 )   PT: 17.4 sec;   INR: 1.46          PTT - ( 04 Apr 2023 22:34 )  PTT:34.8 sec              RADIOLOGY, EKG & ADDITIONAL TESTS: Reviewed.
No

## 2024-04-12 NOTE — DIETITIAN INITIAL EVALUATION ADULT. - OBTAIN CURRENT WEIGHT
yes Admission Reconciliation is Completed  Discharge Reconciliation is Not Complete Admission Reconciliation is Completed  Discharge Reconciliation is Completed

## 2024-05-20 NOTE — ED ADULT NURSE NOTE - PAIN RATING/NUMBER SCALE (0-10): ACTIVITY
Patient ID: Alexandra Gleason is a 89 y.o. female.    Chief Complaint: No chief complaint on file.      HPI:  HPI  89F [facility]fered [facility] from [facility] for several days of crampy ab pain, NV. She began throwing up at home, unable to keep anything down and was taken to the hospital by family. She reports having many milder episodes like this over the past few months that always seemed to resolve until this time.   Hx of open daysi with appendectomy many years ago as well as hysterectomy years ago.   No CP, SOB. MI. She is very active and independent. Exercises regularly.   She was admitted to [facility]. CT shows dilated loops of bowel with transition point. Contrasted PO study did not show contrast passing into the colon.   NG tube in place with green output.    Review of Systems   Constitutional:  Negative for fever.   HENT:  Negative for trouble swallowing.    Respiratory:  Negative for shortness of breath.    Cardiovascular:  Negative for chest pain.   Gastrointestinal:  Positive for abdominal pain, diarrhea, nausea and vomiting. Negative for blood in stool.   Genitourinary:  Negative for dysuria.   Musculoskeletal:  Negative for gait problem.   Skin:  Negative for rash and wound.   Allergic/Immunologic: Negative for immunocompromised state.   Neurological:  Negative for weakness.   Hematological:  Does not bruise/bleed easily.   Psychiatric/Behavioral:  Negative for agitation.        Current Facility-Administered Medications   Medication Dose Route Frequency Provider Last Rate Last Admin    0.45% NaCl with KCl 20 mEq infusion  50 mL/hr Intravenous Continuous Nando Valdez MD 50 mL/hr at 05/20/24 1813 50 mL/hr at 05/20/24 1813    dextrose 10% bolus 125 mL 125 mL  12.5 g Intravenous PRN Nando Valdez MD        dextrose 10% bolus 250 mL 250 mL  25 g Intravenous PRN Nando Valdez MD        glucagon (human recombinant) injection 1 mg  1 mg Intramuscular PRN Nando Valdez MD         glucose chewable tablet 16 g  16 g Oral PRN Nando Valdez MD        glucose chewable tablet 24 g  24 g Oral PRN Nando Valdez MD        naloxone 0.4 mg/mL injection 0.02 mg  0.02 mg Intravenous PRN Nando Valdez MD        ondansetron injection 4 mg  4 mg Intravenous Q6H PRN Karma Olguin MD   4 mg at 05/20/24 1745    [START ON 5/21/2024] piperacillin-tazobactam (ZOSYN) 4.5 g in dextrose 5 % in water (D5W) 100 mL IVPB (MB+)  4.5 g Intravenous Q12H Nando Valdez MD        sodium chloride 0.9% flush 10 mL  10 mL Intravenous PRN Nando Valdez MD        sodium chloride 0.9% flush 10 mL  10 mL Intravenous Q12H PRN Nando Valdez MD           Review of patient's allergies indicates:   Allergen Reactions    Iodine and iodide containing products        Past Medical History:   Diagnosis Date    High cholesterol     Hypertension        Past Surgical History:   Procedure Laterality Date    BACK SURGERY      CHOLECYSTECTOMY      HYSTERECTOMY      NECK SURGERY         Social History     Socioeconomic History    Marital status:    Tobacco Use    Smoking status: Never   Substance and Sexual Activity    Alcohol use: No    Drug use: No       Vitals:    05/20/24 1815   BP: (!) 111/59   Pulse: 77   Resp: (!) 22   Temp:        Physical Exam  Constitutional:       General: She is not in acute distress.     Appearance: She is well-developed.   HENT:      Head: Normocephalic and atraumatic.   Eyes:      General: No scleral icterus.  Cardiovascular:      Rate and Rhythm: Normal rate.   Pulmonary:      Effort: Pulmonary effort is normal.      Breath sounds: No stridor.   Abdominal:      General: There is no distension.      Palpations: Abdomen is soft.      Tenderness: There is no abdominal tenderness.      Comments: Not appreciably tender on my exam   Lymphadenopathy:      Cervical: No cervical adenopathy.   Skin:     General: Skin is warm.      Findings: No  erythema.   Neurological:      Mental Status: She is alert and oriented to person, place, and time.   Psychiatric:         Behavior: Behavior normal.     Body mass index is 22.58 kg/m².  PO contrast only to distal small bowel. No free air noted  WBC 30 from 10  LA 2.1  Cardiac echo showed ef 60%  CT reviewed. NG may be a little deep. Will adjust    Assessment & Plan:  89F with presumed adhesive small bowel obstruction  Failed conservative management  Family and patient agreeable to surgery  Risks explained. Proceed tonight  Keep NG to suction  IVF     0 (no pain/absence of nonverbal indicators of pain)

## 2024-06-10 NOTE — ED PROVIDER NOTE - NSICDXPASTSURGICALHX_GEN_ALL_CORE_FT
Unable to answer due to medical condition/unresponsive/etc... PAST SURGICAL HISTORY:  History of surgery cyst removal    Mandibular abscess     S/P CABG (coronary artery bypass graft) 15 years

## 2024-07-01 NOTE — PROGRESS NOTE ADULT - PROBLEM/PLAN-1
----- Message from Frankie Alvarado DO sent at 6/25/2024  9:00 AM EDT -----  ED referral, I can see or Chelsy can in 1-2 weeks. OR shoe and WBAT    AK  ----- Message -----  From: Discharge Provider, Automatic  Sent: 6/25/2024  12:24 AM EDT  To: Frankie Alvarado DO      
DISPLAY PLAN FREE TEXT

## 2024-08-26 NOTE — ASSESSMENT
[FreeTextEntry1] : SORIA\par The patient was recently admitted to the hospital with fluid overload.  The patient was started on diuretics.  The patient was discharged home on home oxygen.  The patient improved prior to discharge.  The patient is to stable and compliant with the diuretics.  He is to continue on the current regimen.  We will follow-up in the office next week\par \par Obstructive sleep apnea\par \par The patient does not use CPAP but he is sleep with the oxygen BY DC: Amb with RW and supervision/SBGx1 >400ft ,able to negotiate 1 flight stairs with single HR in both hands

## 2024-08-27 NOTE — PROGRESS NOTE ADULT - PROBLEM/PLAN-1
[TextBox_4] : Annual exam for this 57 year old female, , LMP 24 c/o midcycle spotting since . 
DISPLAY PLAN FREE TEXT

## 2024-10-08 NOTE — PHYSICAL THERAPY INITIAL EVALUATION ADULT - DISCHARGE PLANNER MADE AWARE
Stop prednisone this week -- ok to resume if needed      Continue trelegy   Continue tezpsire.      Chest xrays and ct abd viewed    Use tyleonol 3  for cough/pain..... will renew for later October-- should not need long term .....          
yes

## 2024-12-23 NOTE — PROGRESS NOTE ADULT - PROBLEM/PLAN-3
What Is The Reason For Today's Visit?: Preventative Skin Check
DISPLAY PLAN FREE TEXT

## 2025-01-05 NOTE — H&P ADULT - PROBLEM SELECTOR PLAN 11
normal... Fluids: None  Electrolytes: replete as necessary, K>4, Mg>2  Nutrition: consistent carb kosher  Bowel Regimen: None  DVT ppx: SCD (holding eliquis)  GI ppx: Pantoprazole  Code: Full  Disposition: medical floor

## 2025-01-15 NOTE — CONSULT NOTE ADULT - TIME BILLING
Outpatient Physical Therapy  DAILY TREATMENT     [facility] Outpatient Physical Therapy  [address]  [place] [address]  Phone:  [phone]  Fax:  [phone]    Date: 01/16/2025    Patient: Rich Brown  YOB: 1990  MRN: [id number]     Time Calculation    Start time: 0730  Stop time: 0830 Time Calculation (min): 60 minutes         Chief Complaint: Back Problem    Visit #: 6    SUBJECTIVE:  See progress note    OBJECTIVE:  Current objective measures: see progress note  Gt Daniel Low Back Pain and Disability Score: 12.5       Therapeutic Exercises (CPT 98974):     1. TM walking, x6 min, cardiovascular warm up    2. cat cow to perfecto pose, x5    3. open book, x5    4. 3 way seated ball rolls, x10 ea    5. diaphragmetic breathing, x15 4 seconds in/4 seconds out    6. supine shoulder ER with OTB (doubled up), 2x 10 10 seconds isos, NT    7. LTR, x10    8. active hamstring stretch, x10 seconds x 10    9. ball bridge, 10 x10 secs, NT    10. t/s extension on FR, x10, NT    11. rows, 2x 10 BTB, NT      Therapeutic Exercise Summary: Pt performed these exercises with instruction and SPV.  Provided handout of these exercises for daily HEP.     Access Code: [email or web address]  URL: [email or web address]  Date: 11/21/2024  Prepared by: Sera Hernandez    Exercises  - Sidelying Open Book Thoracic Lumbar Rotation and Extension  - 1 x daily - 7 x weekly - 3 sets - 10 reps - 5 seconds hold  - Cat Cow  - 1 x daily - 7 x weekly - 3 sets - 10 reps - 5 seconds hold  - Standing 'L' Stretch at Counter  - 1 x daily - 7 x weekly - 3 sets - 30 seconds - 1 minutes hold    Therapeutic Treatments and Modalities:     1. E Stim Unattended (CPT 12865), t/s with mhp x10 mins    2. Manual Therapy (CPT 06349), CPA's to t/s gr II, rotational mobilizaitons gr II in t/s    3. Therapeutic Activities (CPT 90627), progress note, reassessing goals and current limiations    Time-based  treatments/modalities:    Physical Therapy Timed Treatment Charges  Manual therapy minutes (CPT 76725): 10 minutes  Therapeutic activity minutes (CPT 73123): 10 minutes  Therapeutic exercise minutes (CPT 07236): 25 minutes    ASSESSMENT:   Response to treatment: see progress note    PLAN/RECOMMENDATIONS:   Plan for treatment: therapy treatment to continue next visit.  Planned interventions for next visit: continue with current treatment.          work-up and management of PNA

## 2025-03-06 NOTE — DIETITIAN INITIAL EVALUATION ADULT - PROBLEM SELECTOR PROBLEM 10
DM (diabetes mellitus) Yosi,  To help your mood symptoms, lets make some medication changes;    REDUCE dose of Venlafaxine from 150mg to 75mg at night.  START Sertraline 50mg in the morning.      If you are feeling anxious at night, you can take a small dose of Gabapentin 100mg.    Please consider looking into CBT, though re-framing, mindfulness, meditation techniques; your insurance may have benefits for access to talk therapists who specialize in these mood support techniques.      Lets follow-up in 2 weeks to see how you are doing (or sooner if needed),  Dr. Moon Jackman

## 2025-03-18 NOTE — ED ADULT NURSE NOTE - NSICDXNOFAMILYHX_GEN_ALL_CORE
K/UL    Lymphocytes Absolute 0.94 0.50 - 4.60 K/UL    Monocytes Absolute 1.65 (H) 0.10 - 1.30 K/UL    Eosinophils Absolute 0.03 0.00 - 0.80 K/UL    Basophils Absolute 0.03 0.00 - 0.20 K/UL    Immature Granulocytes Absolute 0.05 0.0 - 0.5 K/UL   Comprehensive Metabolic Panel    Collection Time: 03/17/25  4:04 AM   Result Value Ref Range    Sodium 133 (L) 136 - 145 mmol/L    Potassium 4.0 3.5 - 5.1 mmol/L    Chloride 98 98 - 107 mmol/L    CO2 20 20 - 29 mmol/L    Anion Gap 15 7 - 16 mmol/L    Glucose 182 (H) 70 - 99 mg/dL    BUN 14 8 - 23 MG/DL    Creatinine 0.82 0.80 - 1.30 MG/DL    Est, Glom Filt Rate 89 >60 ml/min/1.73m2    Calcium 8.3 (L) 8.8 - 10.2 MG/DL    Total Bilirubin 0.7 0.0 - 1.2 MG/DL    ALT 53 8 - 55 U/L    AST 42 (H) 15 - 37 U/L    Alk Phosphatase 72 40 - 129 U/L    Total Protein 6.4 6.3 - 8.2 g/dL    Albumin 3.2 3.2 - 4.6 g/dL    Globulin 3.2 2.3 - 3.5 g/dL    Albumin/Globulin Ratio 1.0 1.0 - 1.9     Lactic Acid    Collection Time: 03/17/25  4:04 AM   Result Value Ref Range    Lactic Acid 1.3 0.5 - 2.0 mmol/L   Procalcitonin    Collection Time: 03/17/25  4:04 AM   Result Value Ref Range    Procalcitonin 0.14 (H) 0.00 - 0.10 ng/mL   Magnesium    Collection Time: 03/17/25  4:04 AM   Result Value Ref Range    Magnesium 1.9 1.8 - 2.4 mg/dL   Lipase    Collection Time: 03/17/25  4:04 AM   Result Value Ref Range    Lipase 21 13 - 60 U/L   Troponin    Collection Time: 03/17/25  4:04 AM   Result Value Ref Range    Troponin T 47.0 (H) 0 - 22 ng/L   Influenza A/B, Molecular    Collection Time: 03/17/25  4:18 AM    Specimen: Nasopharyngeal   Result Value Ref Range    Influenza A, TACOS Not detected NOTD      Influenza B, TACOS Not detected NOTD     COVID-19, Rapid    Collection Time: 03/17/25  4:19 AM    Specimen: Nasopharyngeal   Result Value Ref Range    Source NASAL      SARS-CoV-2, Rapid Not detected NOTD     Blood Culture 1    Collection Time: 03/17/25  4:57 AM    Specimen: Blood   Result Value Ref Range       Blood Culture 2    Collection Time: 03/17/25  9:19 AM    Specimen: Blood   Result Value Ref Range    Special Requests RIGHT ANTECUBITAL      Culture NO GROWTH AFTER 22 HOURS     CBC    Collection Time: 03/17/25  9:19 AM   Result Value Ref Range    WBC 9.0 4.3 - 11.1 K/uL    RBC 3.78 (L) 4.23 - 5.6 M/uL    Hemoglobin 12.2 (L) 13.6 - 17.2 g/dL    Hematocrit 36.5 (L) 41.1 - 50.3 %    MCV 96.6 82 - 102 FL    MCH 32.3 26.1 - 32.9 PG    MCHC 33.4 31.4 - 35.0 g/dL    RDW 12.9 11.9 - 14.6 %    Platelets 135 (L) 150 - 450 K/uL    MPV 9.8 9.4 - 12.3 FL    nRBC 0.00 0.0 - 0.2 K/uL   APTT    Collection Time: 03/17/25  9:19 AM   Result Value Ref Range    APTT 29.4 23.3 - 37.4 SEC   Protime-INR    Collection Time: 03/17/25  9:19 AM   Result Value Ref Range    Protime 14.4 11.3 - 14.9 sec    INR 1.1     Anti-Xa, Unfractionated Heparin    Collection Time: 03/17/25  9:19 AM   Result Value Ref Range    Anti-XA Unfrac Heparin <0.1 (L) 0.3 - 0.7 IU/mL   EKG 12 Lead    Collection Time: 03/17/25 10:47 AM   Result Value Ref Range    Ventricular Rate 99 BPM    Atrial Rate 99 BPM    P-R Interval 188 ms    QRS Duration 80 ms    Q-T Interval 354 ms    QTc Calculation (Bazett) 454 ms    P Axis 43 degrees    R Axis 34 degrees    T Axis 125 degrees    Diagnosis       Normal sinus rhythm  ST & T wave abnormality, consider lateral ischemia  Abnormal ECG  When compared with ECG of 17-MAR-2025 03:56,  Prior EKG in AF  Confirmed by MD JOSE ()MAT ([id number]) on 3/17/2025 11:48:29 AM     Echo (TTE) complete (PRN contrast/bubble/strain/3D)    Collection Time: 03/17/25 11:45 AM   Result Value Ref Range    LA Minor Axis 5.6 cm    LA Major Axis 7.3 cm    LA Area 2C 18.8 cm2    LA Area 4C 22.2 cm2    LA Volume MOD A2C 52 18 - 58 mL    LA Volume MOD A4C 55 18 - 58 mL    RA Area 4C 24.3 cm2    RA Volume 75 ml    AV Mean Velocity 1.2 m/s    AV Mean Gradient 7 mmHg    AV VTI 29.1 cm    AV Peak Velocity 1.8 m/s    AV Peak Gradient 13 mmHg    AV Area by  <-- Click to add NO pertinent Family History

## 2025-04-09 NOTE — DISCHARGE NOTE ADULT - ADDITIONAL INSTRUCTIONS
No
Please follow up with Dr Leyva within the next 2 weeks to ensure that your blood levels have not gone down.

## 2025-05-21 NOTE — PROGRESS NOTE ADULT - PROBLEM SELECTOR PLAN 3
Chief Complaint   Patient presents with   • Back Pain     Saw [facility] for spinal fusion for L3-L5 in December. Seeing [facility], did not have any openings today for the \"back doctor,\" has appointment with [facility] in over a week to test \"if its my hips or back causing the pain.\" Chronic pain, the last few weeks has been of the worst. Unsure of next steps until next appointment. Only pain medication currently is ibuprofen.        SUBJECTIVE:  Familia is a 73 year old male who is seen for acute on chronic pain to back and hips bilaterally.  Patient is under the care of Dr. Kaur for lumbar radiculopathy and spinal stenosis.  He has had instrumentation with positive outcome.  He has had continued pain despite that however and gabapentin, Lyrica, various medications to assist with his acute on chronic pain.  At present is also being seen by orthopedics had had his first initial assessment and has an appointment scheduled in 10 days to revisit this concern.  Presents here today with continued discomfort affecting his ability to sleep and frustration.  He denies incontinence.  He denies acute injury.  He denies change in character of symptoms.  He denies red hot swollen joint, fever, constitutional symptoms, rash of uncertain etiology.  At present he is only taking ibuprofen.    Past Medical History:   Diagnosis Date   • Allergy    • Anemia    • Anxiety    • Arthritis    • Attention deficit disorder    • Carpal tunnel syndrome     Bilateral   • Cervical spinal stenosis     S/P surgery   • Chronic pain     L5 back area   • CPAP (continuous positive airway pressure) dependence    • Depressive disorder    • Disorder of bone and cartilage, unspecified 11/04/2004   • Duodenitis    • Essential (primary) hypertension    • Essential tremor    • GERD (gastroesophageal reflux disease)    • HLD (hyperlipidemia)    • Hypokalemia    • Motion sickness    • MSSA bacteremia    • Multilevel degenerative disc disease    • Narcolepsy (CMD)     • Obesity    • Pseudogout    • Sleep apnea    • Venous insufficiency        Family History   Problem Relation Age of Onset   • Heart disease Mother    • Cancer, Lung Father    • Cancer Brother        Social History     Tobacco Use   Smoking Status Never   Smokeless Tobacco Never       ALLERGIES:   Allergen Reactions   • Birch [Birch ] SHORTNESS OF BREATH   • Contrast Media SHORTNESS OF BREATH   • Iodinated Diagnostic Agents Cough   • Iodine   (Environmental Or Med) Other (See Comments)     Iodinated contrast causes cough     • Latex   (Environmental) RASH   • Penicillins Other (See Comments)     Patient does not recall reaction, started in childhood. Tolerates Amoxicillin well.       Current Outpatient Medications   Medication Sig Dispense Refill   • amphetamine-dextroamphetamine (ADDERALL) 20 MG tablet Take 1 tablet by mouth every evening. 30 tablet 0   • amphetamine-dextroamphetamine (ADDERALL) 30 MG tablet Take 1 tablet by mouth every morning. 30 tablet 0   • cyclobenzaprine (FLEXERIL) 10 MG tablet cyclobenzaprine 10 mg tablet (Patient not taking: Reported on 5/21/2025)     • hydroxychloroquine (PLAQUENIL) 200 MG tablet hydroxychloroquine 200 mg tablet     • pregabalin (LYRICA) 75 MG capsule Take 75 mg by mouth. (Patient not taking: Reported on 5/21/2025)     • tadalafil (CIALIS) 5 MG tablet Take 5 mg by mouth daily.     • ibuprofen (MOTRIN) 200 MG tablet Take 200 mg by mouth every 6 hours as needed for Pain.     • HYDROcodone-acetaminophen (NORCO) 5-325 MG per tablet Take 1 tablet by mouth every 6 hours as needed for Pain. 40 tablet 0   • potassium CHLORIDE (Klor-Con M) 20 MEQ zeenat ER tablet Take 1 tablet by mouth daily. 90 tablet 3   • amoxicillin (AMOXIL) 500 MG tablet Take 4 tablets by mouth 1 time as needed (1 hour prior to dental appointments).     • ipratropium (ATROVENT) 0.03 % nasal spray Spray 2 sprays in each nostril in the morning and 2 sprays in the evening. 30 mL 11   • fluticasone (FLONASE) 50  MCG/ACT nasal spray Nasal for 30 Days     • HYDROcodone-acetaminophen (NORCO) 5-325 MG per tablet Take 1 tablet by mouth every 6 hours as needed.     • tamsulosin (FLOMAX) 0.4 MG Cap Take 0.4 mg by mouth daily.     • atorvastatin (LIPITOR) 20 MG tablet Take 1 tablet by mouth daily. 90 tablet 1   • lisinopril-hydroCHLOROthiazide (ZESTORETIC) 20-12.5 MG per tablet Take 2 tablets by mouth daily. 180 tablet 1   • nadolol (CORGARD) 40 MG tablet Take 40 mg by mouth daily.     • Carboxymethylcellulose Sodium (EYE DROPS OP) Place 1 drop into both eyes in the morning and 1 drop in the evening.     • Ascorbic Acid (vitamin C) 1000 MG tablet Take 1,000 mg by mouth daily.     • Menthol, Topical Analgesic, (BIOFREEZE ROLL-ON EX) Apply topically 4 times daily as needed (pain).     • Pyridoxine HCl (VITAMIN B-6 PO) Take 1 tablet by mouth daily.     • cyanocobalamin 100 MCG tablet Take 100 mcg by mouth daily.     • famotidine (PEPCID) 40 MG tablet Take 1 tablet by mouth daily. 90 tablet 4   • loratadine-pseudoephedrine (CLARITIN-D 12-hour) 5-120 MG per tablet Take 1 tablet by mouth daily as needed for Allergies.     • sertraline (ZOLOFT) 100 MG tablet Take 200 mg by mouth daily.     • Multiple Vitamin (MULTI VITAMIN DAILY PO) Take 1 tablet by mouth daily.     • albuterol 108 (90 Base) MCG/ACT inhaler Inhale 2 puffs into the lungs every 4 hours as needed for Shortness of Breath or Wheezing (allergies).       No current facility-administered medications for this visit.         OBJECTIVE: Visit Vitals  /84   Pulse 67   Temp 98.8 °F (37.1 °C) (Temporal)   Resp 18   SpO2 98%     Vital signs are stable.  Patient appears to be well not in any obvious apparent distress.  He is tearful and dialogue out of frustration and confusion of the process.  He walks with the assistance of a cane.  He has no foot drop.  Good peripheral pulse.  Normal neurovascular exam.  Pain is in the region of the SI joints bilaterally.  Can internally and  actually rotate his hips.  Range of motion preserved.  Skin normal.    MDM/PLAN: Records are reviewed in great detail to include neurosurgical notes, imaging obtained both in the form of plain films and MRI.  There is no acute neurosurgical emergency warranting of emergent imaging in the form of an MRI.  We have long dialogue about expectations.  While there is a unique circumstance in dealing with ongoing discomfort that do prescribe a limited quantity of hydrocodone to assist with the significant, severe pain particularly at night pending being seen by orthopedics for which they can then further delineate the origin of his discomfort to appropriately manage the concern versus simply prescribing medicine.  Patient understands that this is a unique circumstance moving forward no further refills would be provided.      ASSESSMENT:  Acute on chronic pain     on three anti-hypertensives  to resume Hytrin at home

## 2025-06-24 NOTE — PATIENT PROFILE ADULT - FUNCTIONAL ASSESSMENT - BASIC MOBILITY 3.
Nephrology / Renal Consult    Patient's name Donna Edwards  MRN # [id number]  62 year old female  Date of admission 6/23/2025  Attending: Harinder Hyman MD    Reason for Nephrology consult:  DDKTx,    HPI:   Donna Edwards is a 62 year old female with a PMHx significant for end-stage renal disease secondary to biopsy-proven diabetic nephropathy. She had been on hemodialysis since 06/02/2015, MWF with no urine output. She has a right upper arm arteriovenous fistula.      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     Surgical history includes lap sleeve gastrectomy, lap appendectomy, R BKA.      Patient underwent DDKT 2/16/25. Patient's post-op course was complicated by altered mentation and weakness attributed to tacrolimus and again to cyclosporine. Belatacept was initiated at that time. Patient underwent Kidney biopsy 2/24 that revealed ATN. Patient was accepted to [facility] 2/28/25, made great improvements with therapy and was stable for DC 3/13/25.     She continues to have DGF attending dialysis TThS which is now changed to Tue and Sat. She is utilizing her fistula for dialysis access.      Pt is s/p kidney bx 3/25/25 - ACR and ATN,treated with Thymo and steroids\  Kidney bx 4/1/25  - AMR and ATN, being treated with IVIG.  Kidney bx 5/6/25 - mild ACR and ATN being treated with steroid taper. .   Uop ranges 800-1600 cc/day.   Dialysis tapered down to Q Tue.  She was noted to have persistent Candiduria (krusei), developed some symptoms and significant microscopic hematuria. She was admitted to the hospital on 5/16/25 and underwent Rx with Micafungin IV.  24 hr urine CrCl 5/16 was 18 mls/min and 24 hr urine oxalate was 60. Dialysis was changed to once a week. DCed home on po diuretics, weight 75 kilos.  Stable on once a week dialysis.  Noted to have hyperoxaluria, kidney bx with oxalate crystals, h/o gastric sleeve procedure. Started on  low oxalate diet and calcium with meals.  Kidney bx 6/6/25 with ATN and oxalate crystals    Patient presented to the ER last night with c/o R groin and lower pubis pain,fever 100.4F,groin swelling and vomiting. In the er her vitals were stable except for low grade temp. Labs revealed wbc 28K, abnormal ua and CT a/p with swelling of r mons pubis with out any collection. She was started on iv abx and admitted. This am she continues to have the pain, denies urinary complaints. Nausea+,no diarrhea/sob/cp. Taking po. She has been off of dialysis for 2 weeks,.     Review of Systems: all systems reviewed and pertinent items noted above; All other systems are reviewed and are negative except as documented in the current HPI.       Patient Active Problem List    Diagnosis Date Noted    Sepsis, due to unspecified organism, unspecified whether acute organ dysfunction present  (CMD) 06/23/2025     Priority: Low    Vitamin D deficiency, unspecified 06/12/2025     Priority: Low    ASHLEY (acute kidney injury) (CMD) 05/30/2025     Priority: Low    Kidney transplant recipient (CMD) 05/17/2025     Priority: Low    Candiduria 05/16/2025     Priority: Low    Unspecified open wound of abdominal wall, left lower quadrant without penetration into peritoneal cavity, subsequent encounter 05/09/2025     Priority: Low    Status post biopsy of kidney 05/07/2025     Priority: Low    Rejection of transplanted organ 04/23/2025     Priority: Low    Open wound of left lower quadrant of abdominal wall without penetration into peritoneal cavity 03/30/2025     Priority: Low    Kidney transplanted (CMD) 03/25/2025     Priority: Low    Immunosuppression due to drug therapy  (CMD) 03/17/2025     Priority: Low    Kidney replaced by transplant (CMD) 03/12/2025     Priority: Low    Debility 03/06/2025     Priority: Low    Kidney transplant candidate 02/16/2025     Priority: Low    ESRD (end stage renal disease)  (CMD) 02/16/2025     Priority: Low     Parathyroid adenoma 09/28/2024     Priority: Low    Lumbar facet arthropathy 12/06/2023     Priority: Low    Lumbar radiculopathy 12/06/2023     Priority: Low    Lumbar stenosis with neurogenic claudication 12/06/2023     Priority: Low    Pre-transplant evaluation for chronic kidney disease      Priority: Low    Major depressive disorder, recurrent, mild (CMD) 05/07/2020     Priority: Low    Generalized anxiety disorder 05/07/2020     Priority: Low    Right below-knee amputee  (CMD) 05/04/2020     Priority: Low    Peripheral arterial disease (CMD) 10/04/2019     Priority: Low    Hemodialysis-associated hypotension 07/21/2017     Priority: Low    Nonproliferative diabetic retinopathy of both eyes (CMD) 02/20/2017     Priority: Low    ESRD needing dialysis (CMS/[facility]) 11/21/2016     Priority: Low    Mixed hyperlipidemia 01/26/2016     Priority: Low    Chronic hepatitis B (CMS/HCC) 12/08/2015     Priority: Low    Psoriasis 12/08/2015     Priority: Low    Anxiety 12/08/2015     Priority: Low    Gastroesophageal reflux disease without esophagitis 12/08/2015     Priority: Low    Primary osteoarthritis of both knees 12/08/2015     Priority: Low    Anemia in CKD (chronic kidney disease) 08/03/2015     Priority: Low    Type 2 diabetes mellitus with chronic kidney disease on chronic dialysis, with long-term current use of insulin (CMS/[facility]) 03/20/2013     Priority: Low     Past Medical History:   Diagnosis Date    A-V fistula (CMS/[facility]) - Right     Right upper arm    Adult acne     Anemia 2012    Arthritis     Benign neoplasm of colon 03/01/2017    tubular adenoma    Cataracts, both eyes 2014    Dr. England    Chronic hepatitis B  (CMD) 12/08/2015    Chronic kidney disease, stage V  (CMD) 01/16/2015    [place] [facility] [id number] [facility] [phone]    Dependence on wheelchair s/p amputation     Right BKA    Depression 12/08/2015    Dialysis patient (CMS/[facility]) - Mon, Wed & Fri     [place], [id number] [facility]; [phone];  Monday, Wednesday, Friday    Essential (primary) hypertension     Fibroid uterus 03/28/2013    see US report and 4/28/13 CT abd/pelvis report    Foot ulceration (CMS/HCC) - left - Sees Wound Care 08/2020    Sees Wound Care    Gastroesophageal reflux disease without esophagitis 12/08/2015    Hepatitis B     Immunosuppression due to drug therapy  (CMD) 03/17/2025    Lattice degeneration of retina, bilateral     inferior quadrants    Lives alone with help available     Mixed hyperlipidemia 01/26/2016    MRSA (methicillin resistant staph aureus) culture positive 04/21/2013    (+) Urine    MRSA (methicillin resistant Staphylococcus aureus)     PDR (proliferative diabetic retinopathy)  (CMD)     Primary osteoarthritis of both knees 12/08/2015    Psoriasis 1996    PVD (peripheral vascular disease) with claudication (CMD)     Sinusitis, chronic     Type 2 diabetes mellitus 1996    Uses prosthesis  - Mid right leg amputation     Uses wheelchair     Or right leg prosthesis    Vitreous hemorrhage (CMD)     Vitreous hemorrhage of right eye  (CMD) 02/2021    Wears reading eyeglasses       Past Surgical History:   Procedure Laterality Date    Abdomen surgery      Av fistula placement Right 02/27/2015    Right upper arm    Bariatric surgery  10/13/2018    Lap. Gastric Sleeve    Biopsy of breast, incisional Right 11/2012    RT 2012 - benign    Cardiac catherization  06/02/2017    Left & right heart cath. by Dr. Bauer at [facility]    Colonoscopy diagnostic  08/31/2023    Colonoscopy 5 year recall    Colonoscopy remove lesions by snare  03/01/2017    Dr Mcdonald - recall due 03/2022    Eye surgery Right 02/13/2019    YAG capsulotomy    Eye surgery Right 07/14/2016    Cataract extraction with IOL implant    Eye surgery Left 07/28/2016    Cataract extraction with IOL implant    Hysteroscopy,bio endo/polyptmy  05/16/2023    removal of endometrial polyps.    Incision and drainage foot Right 4/19/20202    Ir renal biopsy left  06/06/2025     Laparoscopic appendectomy of ruptured appendix  11/21/2016       [place]:  Perforated acute appendicitis.   Sessile serrated polyp with low grade dysplasia. Well differential neuroendocrine tumor, grade 1(carcinoid tumor), 0.25 cm   The margin of resection is free of the serrated polyp, dysplasia, and the carcinoid.    Leg amputation Right 04/21/2020    Right BKA    Lower extremity angiogram/possible pta - cv Bilateral 04/11/2020    Total occlusion of R SFA with instent restenosis.  Other severe blockages noted. Surgical consult    Lower extremity angiograms/possible pta/possible stent  08/23/2019    PTA/stents to long Right SFA lesions.  PTA to distal right SFA    Part remv vitreous,ant apprch Right 05/03/2022    Re-amputation lower leg Right 04/29/2020    Completion of right BKA    Root canal      Toe amputation Right 09/12/2019    Right 2nd toe    Toe amputation Right 04/21/2020    Right 5th toe    Vascular surgery Right 04/17/2020    Right Femoral to tibioperoneal bypass    Vascular surgery Left 110/16/2020    PTA of LeftTibioperonial Trunk    [place] tooth extraction  ~ 2001    Local        Social History     Tobacco Use    Smoking status: Never    Smokeless tobacco: Never   Vaping Use    Vaping status: never used   Substance Use Topics    Alcohol use: Yes     Alcohol/week: 1.0 standard drink of alcohol     Types: 1 Glasses of wine per week    Drug use: No      Family History   Problem Relation Age of Onset    Diabetes Mother     Kidney disease Mother     Diabetes Father     Hypertension Sister     Patient is unaware of any medical problems Brother     Patient is unaware of any medical problems Daughter     Patient is unaware of any medical problems Daughter     Hypertension Maternal Grandmother     Hypertension Maternal Grandfather     Cancer Maternal Grandfather         Prostate    Cancer, Breast Maternal Cousin 40       Medications Prior to Admission   Medication Sig Dispense Refill    insulin glargine, 1  Unit Dial, (Toujeo SoloStar) 300 UNIT/ML pen-injector Inject 7 Units into the skin nightly. Prime 3 units before each dose. 4.5 mL 1    folic acid (FOLATE) 1 MG tablet Take 1 tablet by mouth daily. 90 tablet 3    Multiple Vitamin (Multivitamin Adult) Tab Take 1 tablet by mouth daily.      predniSONE (DELTASONE) 5 MG tablet Take 2 tablets by mouth daily. 180 tablet 3    aspirin 81 MG chewable tablet Chew 1 tablet by mouth daily. 90 tablet 3    apixaBAN (ELIQUIS) 2.5 MG Tab Take 1 tablet by mouth in the morning and 1 tablet in the evening. 180 tablet 1    sodium bicarbonate 650 MG tablet Take 2 tablets by mouth in the morning and 2 tablets in the evening. 120 tablet 11    pyridoxine (VITAMIN B6) 50 MG tablet Take 1 tablet by mouth daily.      calcium carbonate (TUMS) 500 MG chewable tablet Chew 1 tablet by mouth in the morning and 1 tablet in the evening. With meals      entecavir (BARACLUDE) 0.5 MG tablet Take 1 tablet by mouth every 72 hours. 10 tablet 11    Insulin Lispro, 1 Unit Dial, (HumaLOG KwikPen) 100 UNIT/ML pen-injector Inject 14 units before breakfast and lunch, 12 units before dinner. If sugar < 100 subtract 2 units, if > 150, add 2 units, if > 200 add 4 units, if > 250 add 6 units, if > 300 + 8 units. Hold if not eating. Max daily dose: 64 units. 30 mL 2    valGANciclovir (VALCYTE) 450 MG tablet Take 1 tablet by mouth 3 days a week. Take on Tuesday, Thursday, and Saturday evenings. 13 tablet 2    PARoxetine (PAXIL) 20 MG tablet Take 1 tablet by mouth at bedtime. 30 tablet 1    mirtazapine (REMERON) 15 MG tablet Take 1 tablet by mouth nightly. 30 tablet 1    doxycycline hyclate (VIBRA-TABS) 100 MG tablet Take 1 tablet by mouth 1 time as needed (1 hour prior to dental appointments). 2 tablet 3    ARTIFICIAL TEAR OP Apply 2 drops to eye daily as needed (dry eye).      pentamidine (NEBUPENT) 300 MG inhalation solution Take 300 mg by nebulization every 28 days. To be administered in pulmonary clinic.       Insulin Pen Needle (BD Pen Needle Mariah U/F) 32G X 4 MM Misc Use to inject insulin 4 times daily. Remove needle cover(s) to expose needle before injecting. 150 each 1    acetaminophen (TYLENOL) 500 MG tablet Take 1 tablet by mouth every 6 hours as needed for Pain. 30 tablet 0    mycophenolate (CellCept) 500 MG tablet Take 2 tablets by mouth in the morning and 2 tablets in the evening. 120 tablet 11    Cholecalciferol (Vitamin D3) 50 mcg (2,000 units) tablet Take 1 tablet by mouth daily.      Lancets (OneTouch Delica Plus [id number]) Misc 1 each 5 times daily. 200 each 6    OneTouch Verio test strip Test blood sugar 4-5 times daily. 300 each 6     ALLERGIES:   Allergen Reactions    Erythromycin RASH    Penicillins RASH     Burning in mouth and upper body pruritus. No respiratory symptoms or swelling.    3/26/2025 shows multiple tolerations of cephalosporins Agustín Saucedo [facility]     Sulfa Antibiotics RASH and PRURITUS     Rash itching and dry skin. Mild, occurred in 2006.    Contrast Media NAUSEA     Pt felt nausea/vomiting and flushed with contrast dye.  Oral zofran 30min prior to receiving contrast and pt tolerated scan with no vomiting  Patient has a history of Kidney Transplant. Please discuss contrast administration with Transplant Nephrologist prior to administering, [phone]. MRI contrast OK.       Diltiazem Other (See Comments)     Increases patient's potassium    Lisinopril Cough    Losartan Other (See Comments)     hyperkalemia    Seasonal Other (See Comments)     hayfever    Unknown RASH and PRURITUS     [facility]      Scheduled Meds:  Current Facility-Administered Medications   Medication Dose Route Frequency Provider Last Rate Last Admin    Vancomycin Pharmacy to Manage   Does not apply See Admin Instructions Cherry Galeano PA-C        sodium hypochlorite (DAKIN'S) 0.062 % (1/8 strength) irrigation solution 1 application.  1 application. Topical 2 times per day Saumya Christensen APNP   1  application. at 06/24/25 1113    mycophenolate (CELLCEPT) tablet 500 mg  500 mg Oral BIDTX Giancarlo Arredondo MD        sodium chloride 0.9 % injection 2 mL  2 mL Intracatheter 2 times per day Bharathi Bianchi, DO   2 mL at 06/24/25 0855    metroNIDAZOLE (FLAGYL) premix IVPB 500 mg  500 mg Intravenous 3 times per day Bharathi Bianchi, DO   Completed at 06/24/25 1405    cefepime (MAXIPIME) 1 g in sodium chloride 0.9 % 100 mL IVPB  1 g Intravenous Daily Bharathi Bianchi,  mL/hr at 06/24/25 1421 1 g at 06/24/25 1421    sodium chloride 0.9 % injection 2 mL  2 mL Intracatheter 2 times per day Donna Tafoya PA-C        Potassium Standard Replacement Protocol (Levels 3.5 and lower)   Does not apply See Admin Instructions Donna Tafoya PA-C        Magnesium Standard Replacement Protocol   Does not apply See Admin Instructions Donna Tafoya PA-C        Phosphorus Standard Replacement Protocol   Does not apply See Admin Instructions Donna Tafoya PA-C        aspirin chewable 81 mg  81 mg Oral Daily Donna Tafoya PA-C   81 mg at 06/24/25 0854    calcium carbonate (TUMS) chewable tablet 500 mg  500 mg Oral BID Donna Tafoya PA-C   500 mg at 06/24/25 0853    [START ON 6/25/2025] entecavir (BARACLUDE) tablet 0.5 mg  0.5 mg Oral Q72H Donna Tafoya PA-C        folic acid (FOLATE) tablet 1 mg  1 mg Oral Daily Donna Tafoya PA-C   1 mg at 06/24/25 0851    insulin glargine (LANTUS) injection 7 Units  7 Units Subcutaneous Nightly Donna Tafoya PA-C   7 Units at 06/23/25 2157    insulin lispro (ADMELOG, HumaLOG) injection 14 Units  14 Units Subcutaneous BID AC Donna Tafoya PA-C   14 Units at 06/24/25 0902    mirtazapine (REMERON) tablet 15 mg  15 mg Oral Nightly Donna Tafoya PA-C   15 mg at 06/23/25 2147    PARoxetine (PAXIL) tablet 20 mg  20 mg Oral QHS Donna Tafoya PA-C        predniSONE (DELTASONE) tablet 10 mg  10 mg Oral Daily Donna Tafoya PA-C   10 mg at 06/24/25 0853    pyridoxine (VITAMIN B6) tablet 50 mg  50 mg Oral Daily Donna Tafoya  LYNNETTE   50 mg at 06/24/25 0905    sodium bicarbonate tablet 1,300 mg  1,300 mg Oral BID Donna Tafoya PA-C   1,300 mg at 06/24/25 0850    valGANciclovir (VALCYTE) tablet 450 mg  450 mg Oral Once per day on Tuesday Thursday Saturday Donna Tafoya PA-C   450 mg at 06/24/25 0849    insulin lispro (ADMELOG,HumaLOG) - Correction Dose   Subcutaneous TID WC Donna Tafoya PA-C   2 Units at 06/24/25 1336    apixaBAN (ELIQUIS) tablet 2.5 mg  2.5 mg Oral 2 times per day Donna Tafoya PA-C   2.5 mg at 06/24/25 0851    insulin lispro (ADMELOG, HumaLOG) injection 12 Units  12 Units Subcutaneous Daily with Donna Tafoya PA-C   12 Units at 06/23/25 2309     Continuous Infusions:  Current Facility-Administered Medications   Medication Dose Route Frequency Provider Last Rate Last Admin     PRN Meds:  Current Facility-Administered Medications   Medication Dose Route Frequency Provider Last Rate Last Admin    sodium hypochlorite (DAKIN'S) 0.062 % (1/8 strength) irrigation solution 1 application.  1 application. Topical PRN Saumya Christensen APNP        lidocaine 2% urethral (UROJET) 2 % jelly 10 mL  10 mL Topical PRN Saumya Christensen APNP        oxyCODONE (IMM REL) (ROXICODONE) tablet 2.5 mg  2.5 mg Oral Q4H PRN hCerry Galeano PA-C   2.5 mg at 06/24/25 1257    sodium chloride 0.9 % injection 10 mL  10 mL Intravenous PRN Bharathi Bianchi DO        hydrALAZINE (APRESOLINE) injection 10 mg  10 mg Intravenous Q4H PRN Donna Tafoya PA-C        labetalol (NORMODYNE) injection 20 mg  20 mg Intravenous Q4H PRN Donna Tafoya PA-C        sodium chloride 0.9 % injection 10 mL  10 mL Intravenous PRN Donna Tafoya PA-C        ondansetron (ZOFRAN ODT) disintegrating tablet 4 mg  4 mg Oral Q12H PRN Donna Tafoya PA-C        Or    ondansetron (ZOFRAN) injection 4 mg  4 mg Intravenous Q12H PRN Donna Tafoya PA-C        acetaminophen (TYLENOL) tablet 650 mg  650 mg Oral Q4H PRN Donna Tafoya PA-C        Or    acetaminophen (TYLENOL) suppository 650  mg  650 mg Rectal Q4H PRN Donna Tafoya PA-C        polyethylene glycol (MIRALAX) packet 17 g  17 g Oral Daily PRN Donna Tafoya PA-C        dextrose 50 % injection 25 g  25 g Intravenous PRN Donna Tafoya PA-C        dextrose 50 % injection 12.5 g  12.5 g Intravenous PRN Donna Tafoya PA-C        glucagon (GLUCAGEN) injection 1 mg  1 mg Intramuscular PRN Donna Tafoya PA-C        dextrose (GLUTOSE) 40 % gel 15 g  15 g Oral PRN Donna Tafoya PA-C        dextrose (GLUTOSE) 40 % gel 30 g  30 g Oral PRN Donna Tafoya PA-C           Objective:  Intake/Output this shift:  No intake/output data recorded.  Weight    06/23/25 1609   Weight: 79.4 kg (175 lb)      Physical Examination  Visit Vitals  /68 (BP Location: LUE - Left upper extremity, Patient Position: Semi-Agarwal's)   Pulse 83   Temp (!) 89.6 °F (32 °C)   Resp 16   Ht 5' 4.02\" (1.626 m) Comment: previously documented from 6/20/25 162.6cm   Wt 79.4 kg (175 lb)   LMP 07/11/2018 (Approximate)   SpO2 99%   BMI 30.02 kg/m²     GENERAL: nad  HEENT: Mucosa dry, Pallor+    NECK: No JVD, No Carotid bruit ,supple  PULM: CTA,RA  CVS: S1S2, no S3, no rub  Abd: Soft, NT, ND, R groin redness and tenderness+, no blisters. LLQ surgical wound is dressed.   EXT:  L leg edema+, R BKA  SKIN: Turgor good  NEURO:A & O x 3, no focal deficit.  Dialysis Access: LUE AVF with a good bruit    Laboratory Values:  Recent Labs   Lab 06/24/25  0537 06/23/25  1004 06/19/25  0812   GLUCOSE 213* 261* 45*   SODIUM 139 136 138   POTASSIUM 3.8 4.3 4.0   CHLORIDE 113* 106 109   CO2 18* 18* 17*   ANIONGAP 12 16 16   BUN 41* 43* 81*   CREATININE 2.39* 2.27* 2.73*   CALCIUM 10.5* 9.8 9.5   PHOS 2.0* 3.1 3.4   MG 2.5* 2.4 2.4     Recent Labs   Lab 06/24/25  0537 06/23/25  1004 06/19/25  0812   CALCIUM 10.5* 9.8 9.5     Recent Labs   Lab 06/24/25  0537 06/23/25  1004 06/19/25  0812 06/18/25  1126   WBC 28.6* 29.9* 9.6 8.2   HGB 10.3* 11.0* 11.5* 12.2   HCT 31.4* 35.0* 36.0 36.9    351 463* 422     Lab  Results   Component Value Date    APH 7.29 (L) 02/16/2025    APH 7.32 (L) 02/16/2025    APH 7.38 02/16/2025    APH 7.45 02/16/2025    APH 7.49 (H) 02/16/2025    APH 7.46 (H) 04/29/2020    APH 7.43 04/17/2020    APCO2 39 02/16/2025    APCO2 37 02/16/2025    APCO2 36 02/16/2025    APCO2 34 02/16/2025    APCO2 35 02/16/2025    APCO2 40 04/29/2020    APCO2 36 04/17/2020    APO2 54 (L) 02/16/2025    APO2 149 (H) 02/16/2025    APO2 249 (H) 02/16/2025    APO2 267 (H) 02/16/2025    APO2 502 (H) 02/16/2025    APO2 178 (H) 04/29/2020    APO2 402 (H) 04/17/2020    AHCO3 19 (L) 02/16/2025    AHCO3 19 (L) 02/16/2025    AHCO3 21 (L) 02/16/2025    AHCO3 24 02/16/2025    AHCO3 27 02/16/2025    AHCO3 28 04/29/2020    AHCO3 24 04/17/2020    ASAT 84 (L) 02/16/2025    ASAT 99 02/16/2025    ASAT 100 (H) 02/16/2025    ASAT 100 (H) 02/16/2025    ASAT 100 (H) 02/16/2025     Iron Panel  No results found    Urinalysis :   Recent Labs   Lab 06/24/25  0815 06/19/25  1210   USPG 1.020 1.013   UPROT 30* Trace*   UWBC Large* Large*   URBC Large* Trace*   UNITR Negative Negative   UBILI Negative Negative   UPH 5.5 5.5   UROB 0.2 0.2       Urine Chem Panel :   CREATININE, URINE (TOTAL) (mg/dL)   Date Value   03/27/2017 110.00   07/18/2016 108.00   12/08/2015 156   08/08/2014 97.45   08/02/2014 47.20   06/29/2013 83.64   04/21/2013 60.40     Creatinine, Urine (mg/dL)   Date Value   05/18/2025 33.9   05/09/2025 49.0   05/09/2025 51.0     PROTEIN(URINE) (mg/dL)   Date Value   06/14/2015 >=300 (A)   06/06/2015 >300 (A)   03/21/2015 >300 (A)   11/07/2014 300 (A)   09/30/2014 >300 (A)   03/17/2014 >300 (A)   01/07/2014 >300 (A)   06/29/2013 >300 (A)   04/21/2013 >300 (A)     PROTEIN, URINALYSIS (mg/dL)   Date Value   06/24/2025 30 (A)   06/19/2025 Trace (A)   05/29/2025 Trace (A)   05/16/2025 30 (A)   05/14/2025 100 (A)   05/09/2025 30 (A)   04/28/2025 Trace (A)     Sodium, Urine (mmol/L)   Date Value   05/09/2025 58           ASSESSMENT/PLAN:  1. H/O  ESRD 2/2 Diabetic  Nephropathy S/P DDKT 2/16/25.   Continues to have delayed graft function but improvement in urine output up to 1800cc daily. She is now on twice weekly dialysis (TuSa) Lasix 80mg PO at noon daily.   IR renal bx 3/26/25 showing acute T-cell mediated rejection (4-1B) and suspisiouc for acute/active antibody mediated rejection (2a).  Received additional thymoglobulin. Total lifetime dose of 6 mg/kg, defer final 0.75 dose - lymphopenia (0). Methylprednisone bolus followed by taper.  IR kidney bx 4/1/25 with AMR and ATN. Received IVIG   IR Kidney bx 5/5/25 with mild ACR and ATN. Calcium oxalate present.  IR kidney bx 6/6/25 as above    2. DGF/ASHLEY : with partial recovery. Has been off dialysis for 2 weeks. Has good uop. Creatinine around mid 2s.    2. Acute cellular and antibody mediated rejection:   ACR treated as above.  For AMR  she is receiving IVIG 1 g/kg 4/1 and 4/2, then 500 g/kg weekly x 8.  For ACR 5/6/25 she received steroid taper.    3. Open surgical wound with h/o necrotic skin tissue adjacent to incision.   S/P Surgical debridement 3/27/25.  Wound care following and VAC was placed on 3/28/25,receiving  three times weekly VAC changes by home care.   Resolved now     4. Prophylactic Immunosuppression.   S/P initial Thymo 3mg/kg completed. Additional thymo as  above.   Concern for CNI toxicity in setting of tremors and AMS, did not  tolerate  tacrolimus or cyclosporine.  Belatacept per protocol.  Cellcept 1000mg PO BID.  Prednisone 10 mg a day.  Low dose CSA restarted, goal ,   Cyclosporine is stopped after bx on 6/6/25 due to persistent ATN.   Recommend hold MMF due to seppsis     5. Post-Transplant Prophylaxis.   Timing of Prophylaxis reset with rejection treatment.  Bactrim stopped due to ASHLEY.  Dapsone stopped due to anemia.  Pentamidine started 3/6/25, last dose given 4/9/25. Therapy x 6  mo (end 8/23/25).   UTI: Cipro x 3 months (end date 6/27/25).  CMV (D+/R-). Letermovir x 6mo  (end 9/27/25) and acyclovir  x3mo (end 6/27/25). Letermovir is stopped and valcyte retstated.     6. Chronic Hep B.   Prior to transplant was on tenofovir weekly.   HCC Screening: US Liver without focal lesion noted (2/19/25). AFP <3 (2/26/25).     7. T2DM / Steroid Induced Hyperglycemia. Endocrinology following.      8. H/O Jugular Vein Thrombosis.   Eliquis 2.5 mg BID  Duplex UE US 2/20/25 - With no evidence of DVT in the right upper extremity. LEFT: Chronic nonocclusive eccentric thrombus in the left internal jugular vein.  bASA     9. H/o Chronic Hypotension.   Improved.  Midodrine and florinef stopped.    10. H/o Candida Kruesi candiduria: given the symptoms,abnormal UA and Richard it was decided to treat the pt with IV Micafungin.    11. Hyperoxaluria:  h/o gastric sleeve  Low oxalate diet  Calcium with meals(stop if low phos)  Pyridoxine.    12. Probable cellulitis R groin: cultures are taken. Seen by Id and started on cefepime+vancomycin.  Clinically no features of herpes zoster.    13. CKD stage IV: dialysis stopped 2 weeks ago, will monitor.      Plan:  Hold MMF  Gentle IV hydration  Monitor I/o,renal fx and electrolytes.  Avoid nephrotoxins.  D/w the transplant team       Thank you for allowing me to assist you in the care of this patient.    Please contact me with any questions or concerns.       Cheng Martínez MD  6/24/2025    4 = No assist / stand by assistance

## 2025-07-24 NOTE — PROGRESS NOTE ADULT - SUBJECTIVE AND OBJECTIVE BOX
I notified Pt. Malik Francis of Dr. Thornton message. Pt. expressed understanding of the message given.   Interval Events: Reviewed  Patient seen and examined at bedside.    Patient is a 71y old  Male who presents with a chief complaint of Chronic Hypoxic Respiratory Failure, HFpEF (23 Jul 2022 13:38)  no acute event overnight, 2L NC 98%, refused CPAP      PAST MEDICAL & SURGICAL HISTORY:  Barretts esophagus      CRI (chronic renal insufficiency)      DM (diabetes mellitus)      GERD (gastroesophageal reflux disease)      HTN (hypertension)      Monoclonal gammopathy      Mandibular abscess      Paroxysmal atrial fibrillation      Benign prostatic hyperplasia, presence of lower urinary tract symptoms unspecified, unspecified morphology      Other hyperlipidemia      Hyperlipidemia      S/P CABG (coronary artery bypass graft)  15 years      History of surgery  cyst removal      Mandibular abscess          MEDICATIONS:  Pulmonary:    Antimicrobials:    Anticoagulants:  heparin   Injectable 5000 Unit(s) SubCutaneous every 8 hours    Cardiac:  amLODIPine   Tablet 10 milliGRAM(s) Oral daily  furosemide   Injectable 80 milliGRAM(s) IV Push three times a day  isosorbide   mononitrate ER Tablet (IMDUR) 30 milliGRAM(s) Oral daily  tamsulosin 0.4 milliGRAM(s) Oral at bedtime      Allergies    No Known Allergies    Intolerances        Vital Signs Last 24 Hrs  T(C): 36 (24 Jul 2022 04:39), Max: 36.6 (23 Jul 2022 10:02)  T(F): 96.8 (24 Jul 2022 04:39), Max: 97.9 (23 Jul 2022 10:02)  HR: 70 (24 Jul 2022 05:41) (58 - 71)  BP: 152/68 (24 Jul 2022 05:41) (119/59 - 157/69)  BP(mean): 98 (24 Jul 2022 05:41) (83 - 98)  RR: 18 (24 Jul 2022 05:41) (18 - 18)  SpO2: 99% (24 Jul 2022 05:41) (97% - 99%)    Parameters below as of 24 Jul 2022 05:41  Patient On (Oxygen Delivery Method): nasal cannula w/ humidification  O2 Flow (L/min): 2      07-22 @ 07:01  -  07-23 @ 07:00  --------------------------------------------------------  IN: 540 mL / OUT: 2600 mL / NET: -2060 mL    07-23 @ 07:01 - 07-24 @ 06:28  --------------------------------------------------------  IN: 416 mL / OUT: 2150 mL / NET: -1734 mL          Review of Systems:   •	General: negative  •	Skin/Breast: negative  •	Ophthalmologic: negative  •	ENMT: negative  •	Respiratory and Thorax: negative  •	Cardiovascular: negative  •	Gastrointestinal: negative  •	Genitourinary: negative  •	Musculoskeletal: negative  •	Neurological: negative  •	Psychiatric: negative  •	Hematology/Lymphatics: negative  •	Endocrine: negative  •	Allergic/Immunologic: negative    Physical Exam:   • Constitutional:	NAD  • Eyes:	EOMI; PERRL; no drainage or redness  • ENMT:	No oral lesions; no gross abnormalities  • Neck	no thyromegaly or nodules  • Breasts:	not examined  • Back:	No deformity or limitation of movement  • Respiratory:	Breath Sounds equal & clear to auscultation, no accessory muscle use  • Cardiovascular:	Regular rate & rhythm, normal S1, S2; no murmurs, gallops or rubs; no S3, S4  • Gastrointestinal:	Soft, non-tender, no hepatosplenomegaly, normal bowel sounds  • Genitourinary:	not examined  • Rectal: not examined  • Extremities:	No cyanosis, clubbing or edema  • Vascular:	Equal and normal pulses (dorsalis pedis)  • Neurologica:l	not examined  • Skin:	No lesions; no rash  • Lymph Nodes:	No lymphadedenopathy  • Musculoskeletal:	No joint pain, swelling or deformity; no limitation of movement        LABS:      CBC Full  -  ( 24 Jul 2022 05:45 )  WBC Count : 29.27 K/uL  RBC Count : 3.94 M/uL  Hemoglobin : 10.4 g/dL  Hematocrit : 34.0 %  Platelet Count - Automated : 197 K/uL  Mean Cell Volume : 86.3 fl  Mean Cell Hemoglobin : 26.4 pg  Mean Cell Hemoglobin Concentration : 30.6 gm/dL  Auto Neutrophil # : x  Auto Lymphocyte # : x  Auto Monocyte # : x  Auto Eosinophil # : x  Auto Basophil # : x  Auto Neutrophil % : x  Auto Lymphocyte % : x  Auto Monocyte % : x  Auto Eosinophil % : x  Auto Basophil % : x    07-24    124<L>  |  86<L>  |  41<H>  ----------------------------<  147<H>  4.2   |  27  |  1.36<H>    Ca    9.4      24 Jul 2022 05:45  Phos  4.6     07-23  Mg     1.9     07-24    TPro  7.5  /  Alb  4.6  /  TBili  0.6  /  DBili  x   /  AST  20  /  ALT  15  /  AlkPhos  151<H>  07-22                        RADIOLOGY & ADDITIONAL STUDIES (The following images were personally reviewed):  Lucas:                                     No  Urine output:                       adequate  DVT prophylaxis:                 Yes  Flattus:                                  Yes  Bowel movement:              No

## (undated) DEVICE — WARMING BLANKET UPPER ADULT

## (undated) DEVICE — GLV 7 PROTEXIS (WHITE)

## (undated) DEVICE — MIDAS REX LEGEND BALL FLUTED ACORN LG BORE 7.5MM X 9CM

## (undated) DEVICE — DRSG DERMABOND 0.7ML

## (undated) DEVICE — DRAPE INSTRUMENT POUCH 6.75" X 11"

## (undated) DEVICE — STRYKER INSTRUMENT BATTERY

## (undated) DEVICE — SOL ANTI FOG

## (undated) DEVICE — VENODYNE/SCD SLEEVE CALF MEDIUM

## (undated) DEVICE — Device

## (undated) DEVICE — DRAIN RESERVOIR FOR JACKSON PRATT 100CC CARDINAL

## (undated) DEVICE — DRSG STERISTRIPS 0.5 X 4"

## (undated) DEVICE — DRAPE PROBE COVER 5" X 96"

## (undated) DEVICE — DRAPE TOWEL BLUE 17" X 24"

## (undated) DEVICE — POLISHER OR CAUTERY TIP

## (undated) DEVICE — CRANIAL MASK TRACKER

## (undated) DEVICE — DRAPE 3/4 SHEET 52X76"

## (undated) DEVICE — URETERAL CATH RED RUBBER 10FR (BLACK)

## (undated) DEVICE — PACK UPPER BODY

## (undated) DEVICE — ELCTR BOVIE SUCTION 8FR 6"

## (undated) DEVICE — SUT VICRYL 2-0 27" SH UNDYED

## (undated) DEVICE — DRAIN JACKSON PRATT 7MM FLAT FULL NO TROCAR

## (undated) DEVICE — SUT SILK 2-0 30" PSL

## (undated) DEVICE — SUT VICRYL 3-0 27" SH UNDYED

## (undated) DEVICE — RUBBERBAND STRL LTX FR 200/CA 3X1/8IN

## (undated) DEVICE — DRAIN PENROSE .25" X 18" LATEX

## (undated) DEVICE — NDL ELECTRODE ULTRACLEAN 6

## (undated) DEVICE — BLADE SCALPEL SAFETY #11 WITH PLASTIC GREEN HANDLE

## (undated) DEVICE — SUT CHROMIC 3-0 27" SH

## (undated) DEVICE — TUBING SUCTION 20FT

## (undated) DEVICE — MIDAS REX LEGEND TELESCOPING MATCH HEAD DIAMOND 3.0MM X 12CM

## (undated) DEVICE — DRAPE IRRIGATION POUCH 19X23"

## (undated) DEVICE — SYR BULB 2OZ (BLUE)

## (undated) DEVICE — STAPLER SKIN PROXIMATE